# Patient Record
Sex: MALE | Race: BLACK OR AFRICAN AMERICAN | Employment: OTHER | URBAN - METROPOLITAN AREA
[De-identification: names, ages, dates, MRNs, and addresses within clinical notes are randomized per-mention and may not be internally consistent; named-entity substitution may affect disease eponyms.]

---

## 2017-01-13 ENCOUNTER — OFFICE VISIT (OUTPATIENT)
Dept: CARDIOLOGY CLINIC | Age: 67
End: 2017-01-13

## 2017-01-13 VITALS
HEART RATE: 65 BPM | SYSTOLIC BLOOD PRESSURE: 142 MMHG | BODY MASS INDEX: 38.36 KG/M2 | WEIGHT: 315 LBS | DIASTOLIC BLOOD PRESSURE: 82 MMHG | HEIGHT: 76 IN

## 2017-01-13 DIAGNOSIS — I10 ESSENTIAL HYPERTENSION: ICD-10-CM

## 2017-01-13 DIAGNOSIS — I42.9 CARDIOMYOPATHY (HCC): ICD-10-CM

## 2017-01-13 DIAGNOSIS — I42.8 NICM (NONISCHEMIC CARDIOMYOPATHY) (HCC): Primary | ICD-10-CM

## 2017-01-13 PROCEDURE — 99215 OFFICE O/P EST HI 40 MIN: CPT | Performed by: INTERNAL MEDICINE

## 2017-01-13 PROCEDURE — 93000 ELECTROCARDIOGRAM COMPLETE: CPT | Performed by: INTERNAL MEDICINE

## 2017-03-07 RX ORDER — AMLODIPINE BESYLATE 2.5 MG/1
TABLET ORAL
Qty: 90 TABLET | Refills: 0 | Status: ON HOLD | OUTPATIENT
Start: 2017-03-07 | End: 2017-05-11 | Stop reason: HOSPADM

## 2017-03-07 RX ORDER — POTASSIUM CHLORIDE 20 MEQ/1
TABLET, EXTENDED RELEASE ORAL
Qty: 90 TABLET | Refills: 0 | Status: SHIPPED | OUTPATIENT
Start: 2017-03-07 | End: 2017-07-22 | Stop reason: SDUPTHER

## 2017-03-07 RX ORDER — LEVOTHYROXINE SODIUM 0.03 MG/1
TABLET ORAL
Qty: 90 TABLET | Refills: 0 | Status: SHIPPED | OUTPATIENT
Start: 2017-03-07 | End: 2017-06-24 | Stop reason: SDUPTHER

## 2017-03-13 ENCOUNTER — OFFICE VISIT (OUTPATIENT)
Dept: INTERNAL MEDICINE CLINIC | Age: 67
End: 2017-03-13

## 2017-03-13 VITALS
SYSTOLIC BLOOD PRESSURE: 130 MMHG | HEART RATE: 74 BPM | DIASTOLIC BLOOD PRESSURE: 82 MMHG | WEIGHT: 314.8 LBS | OXYGEN SATURATION: 98 % | BODY MASS INDEX: 38.32 KG/M2

## 2017-03-13 DIAGNOSIS — E11.8 CONTROLLED TYPE 2 DIABETES MELLITUS WITH COMPLICATION, WITH LONG-TERM CURRENT USE OF INSULIN (HCC): Primary | ICD-10-CM

## 2017-03-13 DIAGNOSIS — E78.00 PURE HYPERCHOLESTEROLEMIA: ICD-10-CM

## 2017-03-13 DIAGNOSIS — I10 ESSENTIAL HYPERTENSION: ICD-10-CM

## 2017-03-13 DIAGNOSIS — E55.9 VITAMIN D DEFICIENCY: ICD-10-CM

## 2017-03-13 DIAGNOSIS — N40.1 BENIGN NON-NODULAR PROSTATIC HYPERPLASIA WITH LOWER URINARY TRACT SYMPTOMS: ICD-10-CM

## 2017-03-13 DIAGNOSIS — Z13.9 SCREENING: ICD-10-CM

## 2017-03-13 DIAGNOSIS — Z79.4 CONTROLLED TYPE 2 DIABETES MELLITUS WITH COMPLICATION, WITH LONG-TERM CURRENT USE OF INSULIN (HCC): Primary | ICD-10-CM

## 2017-03-13 LAB
GLUCOSE BLD-MCNC: 156 MG/DL
HBA1C MFR BLD: 6.8 %

## 2017-03-13 PROCEDURE — 99214 OFFICE O/P EST MOD 30 MIN: CPT | Performed by: INTERNAL MEDICINE

## 2017-03-13 PROCEDURE — 82962 GLUCOSE BLOOD TEST: CPT | Performed by: INTERNAL MEDICINE

## 2017-03-13 PROCEDURE — 83036 HEMOGLOBIN GLYCOSYLATED A1C: CPT | Performed by: INTERNAL MEDICINE

## 2017-03-13 RX ORDER — ATORVASTATIN CALCIUM 20 MG/1
20 TABLET, FILM COATED ORAL DAILY
Qty: 30 TABLET | Refills: 5 | Status: SHIPPED | OUTPATIENT
Start: 2017-03-13 | End: 2017-10-10 | Stop reason: SDUPTHER

## 2017-03-13 RX ORDER — ATORVASTATIN CALCIUM 20 MG/1
TABLET, FILM COATED ORAL
Qty: 30 TABLET | Refills: 5 | Status: CANCELLED | OUTPATIENT
Start: 2017-03-13

## 2017-03-13 RX ORDER — TADALAFIL 5 MG/1
TABLET ORAL
Qty: 30 TABLET | Refills: 3 | Status: SHIPPED | OUTPATIENT
Start: 2017-03-13 | End: 2018-01-30

## 2017-03-13 ASSESSMENT — PATIENT HEALTH QUESTIONNAIRE - PHQ9
SUM OF ALL RESPONSES TO PHQ QUESTIONS 1-9: 0
1. LITTLE INTEREST OR PLEASURE IN DOING THINGS: 0
1. LITTLE INTEREST OR PLEASURE IN DOING THINGS: 0
2. FEELING DOWN, DEPRESSED OR HOPELESS: 0
SUM OF ALL RESPONSES TO PHQ9 QUESTIONS 1 & 2: 0
SUM OF ALL RESPONSES TO PHQ9 QUESTIONS 1 & 2: 0
SUM OF ALL RESPONSES TO PHQ QUESTIONS 1-9: 0

## 2017-03-14 LAB
A/G RATIO: 1.3 (ref 1.1–2.2)
ALBUMIN SERPL-MCNC: 3.7 G/DL (ref 3.4–5)
ALP BLD-CCNC: 94 U/L (ref 40–129)
ALT SERPL-CCNC: 18 U/L (ref 10–40)
ANION GAP SERPL CALCULATED.3IONS-SCNC: 15 MMOL/L (ref 3–16)
AST SERPL-CCNC: 15 U/L (ref 15–37)
BILIRUB SERPL-MCNC: 0.4 MG/DL (ref 0–1)
BUN BLDV-MCNC: 19 MG/DL (ref 7–20)
CALCIUM SERPL-MCNC: 8.7 MG/DL (ref 8.3–10.6)
CHLORIDE BLD-SCNC: 107 MMOL/L (ref 99–110)
CHOLESTEROL, TOTAL: 154 MG/DL (ref 0–199)
CO2: 21 MMOL/L (ref 21–32)
CREAT SERPL-MCNC: 1.4 MG/DL (ref 0.8–1.3)
CREATININE URINE: 164.1 MG/DL (ref 39–259)
GFR AFRICAN AMERICAN: >60
GFR NON-AFRICAN AMERICAN: 51
GLOBULIN: 2.9 G/DL
GLUCOSE BLD-MCNC: 111 MG/DL (ref 70–99)
HDLC SERPL-MCNC: 62 MG/DL (ref 40–60)
LDL CHOLESTEROL CALCULATED: 81 MG/DL
MICROALBUMIN UR-MCNC: 68 MG/DL
MICROALBUMIN/CREAT UR-RTO: 414.4 MG/G (ref 0–30)
POTASSIUM SERPL-SCNC: 4.7 MMOL/L (ref 3.5–5.1)
PROSTATE SPECIFIC ANTIGEN: 2.49 NG/ML (ref 0–4)
SODIUM BLD-SCNC: 143 MMOL/L (ref 136–145)
TOTAL PROTEIN: 6.6 G/DL (ref 6.4–8.2)
TRIGL SERPL-MCNC: 57 MG/DL (ref 0–150)
TSH REFLEX: 1.24 UIU/ML (ref 0.27–4.2)
VITAMIN D 25-HYDROXY: 34.9 NG/ML
VLDLC SERPL CALC-MCNC: 11 MG/DL

## 2017-03-26 ASSESSMENT — ENCOUNTER SYMPTOMS
GASTROINTESTINAL NEGATIVE: 1
RESPIRATORY NEGATIVE: 1
EYES NEGATIVE: 1

## 2017-03-30 ENCOUNTER — OFFICE VISIT (OUTPATIENT)
Dept: INTERNAL MEDICINE CLINIC | Age: 67
End: 2017-03-30

## 2017-03-30 VITALS
OXYGEN SATURATION: 98 % | HEIGHT: 76 IN | SYSTOLIC BLOOD PRESSURE: 104 MMHG | TEMPERATURE: 97.5 F | BODY MASS INDEX: 38.11 KG/M2 | HEART RATE: 109 BPM | DIASTOLIC BLOOD PRESSURE: 88 MMHG | WEIGHT: 313 LBS

## 2017-03-30 DIAGNOSIS — R35.89 POLYURIA: ICD-10-CM

## 2017-03-30 DIAGNOSIS — I50.22 CHRONIC SYSTOLIC CONGESTIVE HEART FAILURE (HCC): ICD-10-CM

## 2017-03-30 DIAGNOSIS — Z79.4 TYPE 2 DIABETES MELLITUS WITH COMPLICATION, WITH LONG-TERM CURRENT USE OF INSULIN (HCC): Primary | ICD-10-CM

## 2017-03-30 DIAGNOSIS — E11.8 TYPE 2 DIABETES MELLITUS WITH COMPLICATION, WITH LONG-TERM CURRENT USE OF INSULIN (HCC): Primary | ICD-10-CM

## 2017-03-30 DIAGNOSIS — N40.0 BENIGN NON-NODULAR PROSTATIC HYPERPLASIA WITHOUT LOWER URINARY TRACT SYMPTOMS: ICD-10-CM

## 2017-03-30 LAB
BACTERIA: ABNORMAL /HPF
BILIRUBIN URINE: ABNORMAL
BLOOD, URINE: ABNORMAL
CLARITY: ABNORMAL
COLOR: ABNORMAL
EPITHELIAL CELLS, UA: 0 /HPF (ref 0–5)
GLUCOSE BLD-MCNC: 153 MG/DL
GLUCOSE URINE: NEGATIVE MG/DL
HYALINE CASTS: 9 /HPF (ref 0–8)
KETONES, URINE: NEGATIVE MG/DL
LEUKOCYTE ESTERASE, URINE: ABNORMAL
MICROSCOPIC EXAMINATION: YES
NITRITE, URINE: POSITIVE
PH UA: 6
PRO-BNP: 2993 PG/ML (ref 0–124)
PROTEIN UA: 100 MG/DL
RBC UA: ABNORMAL /HPF (ref 0–2)
SPECIFIC GRAVITY UA: 1.02
URINE TYPE: ABNORMAL
UROBILINOGEN, URINE: 0.2 E.U./DL
WBC UA: 77 /HPF (ref 0–5)

## 2017-03-30 PROCEDURE — 82962 GLUCOSE BLOOD TEST: CPT | Performed by: INTERNAL MEDICINE

## 2017-03-30 PROCEDURE — 99214 OFFICE O/P EST MOD 30 MIN: CPT | Performed by: INTERNAL MEDICINE

## 2017-03-30 RX ORDER — TAMSULOSIN HYDROCHLORIDE 0.4 MG/1
0.4 CAPSULE ORAL DAILY
Qty: 30 CAPSULE | Refills: 3 | Status: SHIPPED | OUTPATIENT
Start: 2017-03-30 | End: 2017-08-09 | Stop reason: SDUPTHER

## 2017-03-30 ASSESSMENT — PATIENT HEALTH QUESTIONNAIRE - PHQ9
SUM OF ALL RESPONSES TO PHQ QUESTIONS 1-9: 0
SUM OF ALL RESPONSES TO PHQ9 QUESTIONS 1 & 2: 0
2. FEELING DOWN, DEPRESSED OR HOPELESS: 0
1. LITTLE INTEREST OR PLEASURE IN DOING THINGS: 0

## 2017-04-01 LAB
ORGANISM: ABNORMAL
URINE CULTURE, ROUTINE: ABNORMAL

## 2017-04-10 ASSESSMENT — ENCOUNTER SYMPTOMS
EYES NEGATIVE: 1
RESPIRATORY NEGATIVE: 1
GASTROINTESTINAL NEGATIVE: 1

## 2017-04-12 ENCOUNTER — TELEPHONE (OUTPATIENT)
Dept: INTERNAL MEDICINE CLINIC | Age: 67
End: 2017-04-12

## 2017-04-12 DIAGNOSIS — I50.21 ACUTE SYSTOLIC CONGESTIVE HEART FAILURE (HCC): Primary | ICD-10-CM

## 2017-04-12 RX ORDER — CIPROFLOXACIN 500 MG/1
500 TABLET, FILM COATED ORAL 2 TIMES DAILY
Qty: 20 TABLET | Refills: 0 | Status: SHIPPED | OUTPATIENT
Start: 2017-04-12 | End: 2017-04-22

## 2017-04-19 ENCOUNTER — TELEPHONE (OUTPATIENT)
Dept: INTERNAL MEDICINE CLINIC | Age: 67
End: 2017-04-19

## 2017-04-21 ENCOUNTER — TELEPHONE (OUTPATIENT)
Dept: INTERNAL MEDICINE CLINIC | Age: 67
End: 2017-04-21

## 2017-04-27 ENCOUNTER — TELEPHONE (OUTPATIENT)
Dept: INTERNAL MEDICINE CLINIC | Age: 67
End: 2017-04-27

## 2017-04-27 RX ORDER — BLOOD-GLUCOSE METER
1 EACH MISCELLANEOUS 2 TIMES DAILY
Qty: 1 KIT | Refills: 0 | Status: SHIPPED | OUTPATIENT
Start: 2017-04-27 | End: 2018-01-30

## 2017-05-04 ENCOUNTER — OFFICE VISIT (OUTPATIENT)
Dept: INTERNAL MEDICINE CLINIC | Age: 67
End: 2017-05-04

## 2017-05-04 VITALS
HEART RATE: 88 BPM | DIASTOLIC BLOOD PRESSURE: 66 MMHG | BODY MASS INDEX: 38.36 KG/M2 | SYSTOLIC BLOOD PRESSURE: 134 MMHG | TEMPERATURE: 98.3 F | WEIGHT: 315 LBS | OXYGEN SATURATION: 98 % | HEIGHT: 76 IN

## 2017-05-04 DIAGNOSIS — E11.8 TYPE 2 DIABETES MELLITUS WITH COMPLICATION, WITH LONG-TERM CURRENT USE OF INSULIN (HCC): ICD-10-CM

## 2017-05-04 DIAGNOSIS — I48.91 ATRIAL FIBRILLATION, UNSPECIFIED TYPE (HCC): ICD-10-CM

## 2017-05-04 DIAGNOSIS — I10 ESSENTIAL HYPERTENSION: ICD-10-CM

## 2017-05-04 DIAGNOSIS — I50.21 ACUTE SYSTOLIC CONGESTIVE HEART FAILURE (HCC): ICD-10-CM

## 2017-05-04 DIAGNOSIS — N39.0 URINARY TRACT INFECTION, SITE UNSPECIFIED: ICD-10-CM

## 2017-05-04 DIAGNOSIS — Z79.4 TYPE 2 DIABETES MELLITUS WITH COMPLICATION, WITH LONG-TERM CURRENT USE OF INSULIN (HCC): ICD-10-CM

## 2017-05-04 LAB — GLUCOSE BLD-MCNC: 92 MG/DL

## 2017-05-04 PROCEDURE — 99214 OFFICE O/P EST MOD 30 MIN: CPT | Performed by: INTERNAL MEDICINE

## 2017-05-04 PROCEDURE — 93000 ELECTROCARDIOGRAM COMPLETE: CPT | Performed by: INTERNAL MEDICINE

## 2017-05-04 PROCEDURE — 82962 GLUCOSE BLOOD TEST: CPT | Performed by: INTERNAL MEDICINE

## 2017-05-04 ASSESSMENT — PATIENT HEALTH QUESTIONNAIRE - PHQ9
1. LITTLE INTEREST OR PLEASURE IN DOING THINGS: 1
2. FEELING DOWN, DEPRESSED OR HOPELESS: 0
SUM OF ALL RESPONSES TO PHQ9 QUESTIONS 1 & 2: 1
SUM OF ALL RESPONSES TO PHQ QUESTIONS 1-9: 1

## 2017-05-15 ENCOUNTER — CARE COORDINATION (OUTPATIENT)
Dept: CARE COORDINATION | Age: 67
End: 2017-05-15

## 2017-05-16 ENCOUNTER — OFFICE VISIT (OUTPATIENT)
Dept: CARDIOLOGY CLINIC | Age: 67
End: 2017-05-16

## 2017-05-16 VITALS
HEART RATE: 60 BPM | DIASTOLIC BLOOD PRESSURE: 72 MMHG | WEIGHT: 311 LBS | BODY MASS INDEX: 37.86 KG/M2 | SYSTOLIC BLOOD PRESSURE: 120 MMHG

## 2017-05-16 DIAGNOSIS — I42.9 CARDIOMYOPATHY (HCC): ICD-10-CM

## 2017-05-16 DIAGNOSIS — I50.21 ACUTE SYSTOLIC CONGESTIVE HEART FAILURE (HCC): ICD-10-CM

## 2017-05-16 DIAGNOSIS — I10 ESSENTIAL HYPERTENSION: ICD-10-CM

## 2017-05-16 DIAGNOSIS — I42.8 NICM (NONISCHEMIC CARDIOMYOPATHY) (HCC): Primary | ICD-10-CM

## 2017-05-16 DIAGNOSIS — I47.29 NSVT (NONSUSTAINED VENTRICULAR TACHYCARDIA): ICD-10-CM

## 2017-05-16 DIAGNOSIS — I50.23 ACUTE ON CHRONIC SYSTOLIC HEART FAILURE (HCC): ICD-10-CM

## 2017-05-16 PROCEDURE — 99214 OFFICE O/P EST MOD 30 MIN: CPT | Performed by: NURSE PRACTITIONER

## 2017-05-17 ENCOUNTER — HOSPITAL ENCOUNTER (OUTPATIENT)
Dept: NON INVASIVE DIAGNOSTICS | Age: 67
Discharge: HOME | End: 2017-05-17
Admitting: INTERNAL MEDICINE

## 2017-05-17 DIAGNOSIS — I42.9 CARDIOMYOPATHY (HCC): ICD-10-CM

## 2017-05-17 LAB
LV EF: 25 %
LVEF MODALITY: NORMAL

## 2017-06-06 ENCOUNTER — OFFICE VISIT (OUTPATIENT)
Dept: CARDIOLOGY CLINIC | Age: 67
End: 2017-06-06

## 2017-06-06 VITALS
SYSTOLIC BLOOD PRESSURE: 110 MMHG | DIASTOLIC BLOOD PRESSURE: 62 MMHG | HEART RATE: 56 BPM | BODY MASS INDEX: 38.83 KG/M2 | WEIGHT: 315 LBS

## 2017-06-06 DIAGNOSIS — I42.8 NICM (NONISCHEMIC CARDIOMYOPATHY) (HCC): Primary | ICD-10-CM

## 2017-06-06 DIAGNOSIS — I48.0 PAROXYSMAL ATRIAL FIBRILLATION (HCC): ICD-10-CM

## 2017-06-06 PROCEDURE — 99214 OFFICE O/P EST MOD 30 MIN: CPT | Performed by: INTERNAL MEDICINE

## 2017-06-16 ENCOUNTER — OFFICE VISIT (OUTPATIENT)
Dept: INTERNAL MEDICINE CLINIC | Age: 67
End: 2017-06-16

## 2017-06-16 VITALS
WEIGHT: 315 LBS | DIASTOLIC BLOOD PRESSURE: 82 MMHG | HEART RATE: 104 BPM | OXYGEN SATURATION: 93 % | SYSTOLIC BLOOD PRESSURE: 118 MMHG | BODY MASS INDEX: 40 KG/M2

## 2017-06-16 DIAGNOSIS — E78.2 MIXED HYPERLIPIDEMIA: ICD-10-CM

## 2017-06-16 DIAGNOSIS — D63.8 ANEMIA, CHRONIC DISEASE: ICD-10-CM

## 2017-06-16 DIAGNOSIS — E11.8 CONTROLLED TYPE 2 DIABETES MELLITUS WITH COMPLICATION, WITH LONG-TERM CURRENT USE OF INSULIN (HCC): Primary | ICD-10-CM

## 2017-06-16 DIAGNOSIS — I50.22 CHRONIC SYSTOLIC CONGESTIVE HEART FAILURE (HCC): ICD-10-CM

## 2017-06-16 DIAGNOSIS — I10 ESSENTIAL HYPERTENSION: ICD-10-CM

## 2017-06-16 DIAGNOSIS — Z79.4 CONTROLLED TYPE 2 DIABETES MELLITUS WITH COMPLICATION, WITH LONG-TERM CURRENT USE OF INSULIN (HCC): Primary | ICD-10-CM

## 2017-06-16 LAB
ANION GAP SERPL CALCULATED.3IONS-SCNC: 13 MMOL/L (ref 3–16)
BUN BLDV-MCNC: 26 MG/DL (ref 7–20)
CALCIUM SERPL-MCNC: 8.9 MG/DL (ref 8.3–10.6)
CHLORIDE BLD-SCNC: 107 MMOL/L (ref 99–110)
CO2: 25 MMOL/L (ref 21–32)
CREAT SERPL-MCNC: 1.3 MG/DL (ref 0.8–1.3)
FERRITIN: 52.4 NG/ML (ref 30–400)
GFR AFRICAN AMERICAN: >60
GFR NON-AFRICAN AMERICAN: 55
GLUCOSE BLD-MCNC: 137 MG/DL
GLUCOSE BLD-MCNC: 97 MG/DL (ref 70–99)
HBA1C MFR BLD: 7.2 %
MAGNESIUM: 2.2 MG/DL (ref 1.8–2.4)
POTASSIUM SERPL-SCNC: 4.3 MMOL/L (ref 3.5–5.1)
SODIUM BLD-SCNC: 145 MMOL/L (ref 136–145)

## 2017-06-16 PROCEDURE — 83036 HEMOGLOBIN GLYCOSYLATED A1C: CPT | Performed by: INTERNAL MEDICINE

## 2017-06-16 PROCEDURE — 99214 OFFICE O/P EST MOD 30 MIN: CPT | Performed by: INTERNAL MEDICINE

## 2017-06-16 PROCEDURE — 82962 GLUCOSE BLOOD TEST: CPT | Performed by: INTERNAL MEDICINE

## 2017-06-16 RX ORDER — OMEGA-3-ACID ETHYL ESTERS 1 G/1
1 CAPSULE, LIQUID FILLED ORAL 2 TIMES DAILY
Qty: 60 CAPSULE | Refills: 3 | Status: SHIPPED | OUTPATIENT
Start: 2017-06-16 | End: 2017-06-26

## 2017-06-21 ENCOUNTER — TELEPHONE (OUTPATIENT)
Dept: INTERNAL MEDICINE CLINIC | Age: 67
End: 2017-06-21

## 2017-06-26 ENCOUNTER — OFFICE VISIT (OUTPATIENT)
Dept: CARDIOLOGY CLINIC | Age: 67
End: 2017-06-26

## 2017-06-26 VITALS
HEIGHT: 76 IN | DIASTOLIC BLOOD PRESSURE: 60 MMHG | OXYGEN SATURATION: 98 % | WEIGHT: 315 LBS | BODY MASS INDEX: 38.36 KG/M2 | HEART RATE: 82 BPM | SYSTOLIC BLOOD PRESSURE: 100 MMHG

## 2017-06-26 DIAGNOSIS — I47.29 NSVT (NONSUSTAINED VENTRICULAR TACHYCARDIA): ICD-10-CM

## 2017-06-26 DIAGNOSIS — I48.91 NEW ONSET A-FIB (HCC): Primary | ICD-10-CM

## 2017-06-26 DIAGNOSIS — R06.02 SOB (SHORTNESS OF BREATH): ICD-10-CM

## 2017-06-26 DIAGNOSIS — I48.0 PAROXYSMAL ATRIAL FIBRILLATION (HCC): ICD-10-CM

## 2017-06-26 PROCEDURE — 99215 OFFICE O/P EST HI 40 MIN: CPT | Performed by: INTERNAL MEDICINE

## 2017-06-26 PROCEDURE — 93000 ELECTROCARDIOGRAM COMPLETE: CPT | Performed by: INTERNAL MEDICINE

## 2017-06-26 RX ORDER — CARVEDILOL 12.5 MG/1
12.5 TABLET ORAL 2 TIMES DAILY
Qty: 60 TABLET | Refills: 8 | Status: SHIPPED | OUTPATIENT
Start: 2017-06-26 | End: 2018-03-16 | Stop reason: SDUPTHER

## 2017-06-26 ASSESSMENT — ENCOUNTER SYMPTOMS
GASTROINTESTINAL NEGATIVE: 1
EYES NEGATIVE: 1
RESPIRATORY NEGATIVE: 1

## 2017-06-27 ENCOUNTER — TELEPHONE (OUTPATIENT)
Dept: CARDIOLOGY CLINIC | Age: 67
End: 2017-06-27

## 2017-06-29 ENCOUNTER — TELEPHONE (OUTPATIENT)
Dept: CARDIOLOGY CLINIC | Age: 67
End: 2017-06-29

## 2017-06-30 ENCOUNTER — HOSPITAL ENCOUNTER (OUTPATIENT)
Dept: CARDIAC CATH/INVASIVE PROCEDURES | Age: 67
Discharge: HOME | End: 2017-07-01
Admitting: INTERNAL MEDICINE

## 2017-06-30 ENCOUNTER — HOSPITAL ENCOUNTER (OUTPATIENT)
Dept: NON INVASIVE DIAGNOSTICS | Age: 67
Discharge: HOME | End: 2017-06-30
Attending: INTERNAL MEDICINE | Admitting: INTERNAL MEDICINE

## 2017-06-30 VITALS — BODY MASS INDEX: 38.36 KG/M2 | WEIGHT: 315 LBS | HEIGHT: 76 IN

## 2017-06-30 LAB
ANION GAP SERPL CALCULATED.3IONS-SCNC: 11 MMOL/L (ref 3–16)
BUN BLDV-MCNC: 35 MG/DL (ref 7–20)
CALCIUM SERPL-MCNC: 8.6 MG/DL (ref 8.3–10.6)
CHLORIDE BLD-SCNC: 106 MMOL/L (ref 99–110)
CO2: 25 MMOL/L (ref 21–32)
CREAT SERPL-MCNC: 1.3 MG/DL (ref 0.8–1.3)
EKG ATRIAL RATE: 66 BPM
EKG ATRIAL RATE: 74 BPM
EKG DIAGNOSIS: NORMAL
EKG DIAGNOSIS: NORMAL
EKG P AXIS: 58 DEGREES
EKG P-R INTERVAL: 192 MS
EKG Q-T INTERVAL: 430 MS
EKG Q-T INTERVAL: 488 MS
EKG QRS DURATION: 172 MS
EKG QRS DURATION: 172 MS
EKG QTC CALCULATION (BAZETT): 499 MS
EKG QTC CALCULATION (BAZETT): 511 MS
EKG R AXIS: -43 DEGREES
EKG R AXIS: -51 DEGREES
EKG T AXIS: 32 DEGREES
EKG T AXIS: 52 DEGREES
EKG VENTRICULAR RATE: 66 BPM
EKG VENTRICULAR RATE: 81 BPM
GFR AFRICAN AMERICAN: >60
GFR NON-AFRICAN AMERICAN: 55
GLUCOSE BLD-MCNC: 84 MG/DL (ref 70–99)
HCT VFR BLD CALC: 34.9 % (ref 40.5–52.5)
HEMOGLOBIN: 11 G/DL (ref 13.5–17.5)
INR BLD: 1.32 (ref 0.85–1.15)
LV EF: 55 %
LVEF MODALITY: NORMAL
MCH RBC QN AUTO: 26.3 PG (ref 26–34)
MCHC RBC AUTO-ENTMCNC: 31.5 G/DL (ref 31–36)
MCV RBC AUTO: 83.4 FL (ref 80–100)
PDW BLD-RTO: 17.6 % (ref 12.4–15.4)
PLATELET # BLD: 151 K/UL (ref 135–450)
PMV BLD AUTO: 10 FL (ref 5–10.5)
POTASSIUM SERPL-SCNC: 4.1 MMOL/L (ref 3.5–5.1)
PROTHROMBIN TIME: 14.9 SEC (ref 9.6–13)
RBC # BLD: 4.18 M/UL (ref 4.2–5.9)
SODIUM BLD-SCNC: 142 MMOL/L (ref 136–145)
WBC # BLD: 4.3 K/UL (ref 4–11)

## 2017-06-30 PROCEDURE — 92960 CARDIOVERSION ELECTRIC EXT: CPT | Performed by: INTERNAL MEDICINE

## 2017-06-30 PROCEDURE — 93312 ECHO TRANSESOPHAGEAL: CPT | Performed by: INTERNAL MEDICINE

## 2017-06-30 PROCEDURE — 93325 DOPPLER ECHO COLOR FLOW MAPG: CPT | Performed by: INTERNAL MEDICINE

## 2017-06-30 RX ORDER — SODIUM CHLORIDE 0.9 % (FLUSH) 0.9 %
10 SYRINGE (ML) INJECTION PRN
Status: DISCONTINUED | OUTPATIENT
Start: 2017-06-30 | End: 2018-05-01 | Stop reason: HOSPADM

## 2017-06-30 RX ORDER — SODIUM CHLORIDE 9 MG/ML
INJECTION, SOLUTION INTRAVENOUS CONTINUOUS
Status: DISCONTINUED | OUTPATIENT
Start: 2017-06-30 | End: 2018-05-01 | Stop reason: HOSPADM

## 2017-06-30 RX ORDER — SODIUM CHLORIDE 0.9 % (FLUSH) 0.9 %
10 SYRINGE (ML) INJECTION EVERY 12 HOURS SCHEDULED
Status: CANCELLED | OUTPATIENT
Start: 2017-06-30

## 2017-06-30 RX ORDER — SODIUM CHLORIDE 0.9 % (FLUSH) 0.9 %
10 SYRINGE (ML) INJECTION EVERY 12 HOURS SCHEDULED
Status: DISCONTINUED | OUTPATIENT
Start: 2017-06-30 | End: 2018-05-01 | Stop reason: HOSPADM

## 2017-06-30 RX ORDER — SODIUM CHLORIDE 0.9 % (FLUSH) 0.9 %
10 SYRINGE (ML) INJECTION PRN
Status: CANCELLED | OUTPATIENT
Start: 2017-06-30

## 2017-07-03 ENCOUNTER — TELEPHONE (OUTPATIENT)
Dept: CARDIOLOGY CLINIC | Age: 67
End: 2017-07-03

## 2017-07-05 ENCOUNTER — INITIAL CONSULT (OUTPATIENT)
Dept: SURGERY | Age: 67
End: 2017-07-05

## 2017-07-05 VITALS
DIASTOLIC BLOOD PRESSURE: 61 MMHG | WEIGHT: 315 LBS | SYSTOLIC BLOOD PRESSURE: 114 MMHG | BODY MASS INDEX: 38.36 KG/M2 | HEIGHT: 76 IN | HEART RATE: 58 BPM

## 2017-07-05 DIAGNOSIS — E66.01 MORBID OBESITY WITH BMI OF 40.0-44.9, ADULT (HCC): ICD-10-CM

## 2017-07-05 DIAGNOSIS — I50.23 ACUTE ON CHRONIC SYSTOLIC HEART FAILURE (HCC): ICD-10-CM

## 2017-07-05 DIAGNOSIS — I47.29 NSVT (NONSUSTAINED VENTRICULAR TACHYCARDIA): ICD-10-CM

## 2017-07-05 DIAGNOSIS — L02.416 ABSCESS OF LEFT LEG: Primary | ICD-10-CM

## 2017-07-05 PROCEDURE — 99204 OFFICE O/P NEW MOD 45 MIN: CPT | Performed by: SURGERY

## 2017-07-05 ASSESSMENT — ENCOUNTER SYMPTOMS
SHORTNESS OF BREATH: 1
GASTROINTESTINAL NEGATIVE: 1

## 2017-07-13 ENCOUNTER — OFFICE VISIT (OUTPATIENT)
Dept: SURGERY | Age: 67
End: 2017-07-13

## 2017-07-13 VITALS
DIASTOLIC BLOOD PRESSURE: 68 MMHG | HEART RATE: 59 BPM | SYSTOLIC BLOOD PRESSURE: 126 MMHG | WEIGHT: 315 LBS | BODY MASS INDEX: 39.2 KG/M2

## 2017-07-13 DIAGNOSIS — I47.29 NSVT (NONSUSTAINED VENTRICULAR TACHYCARDIA): ICD-10-CM

## 2017-07-13 DIAGNOSIS — I50.23 ACUTE ON CHRONIC SYSTOLIC HEART FAILURE (HCC): ICD-10-CM

## 2017-07-13 DIAGNOSIS — E66.01 MORBID OBESITY WITH BMI OF 40.0-44.9, ADULT (HCC): ICD-10-CM

## 2017-07-13 DIAGNOSIS — L02.416 ABSCESS OF LEFT LEG: Primary | ICD-10-CM

## 2017-07-13 PROCEDURE — 99213 OFFICE O/P EST LOW 20 MIN: CPT | Performed by: SURGERY

## 2017-07-13 ASSESSMENT — ENCOUNTER SYMPTOMS: RESPIRATORY NEGATIVE: 1

## 2017-07-18 ENCOUNTER — TELEPHONE (OUTPATIENT)
Dept: INTERNAL MEDICINE CLINIC | Age: 67
End: 2017-07-18

## 2017-07-24 RX ORDER — POTASSIUM CHLORIDE 20 MEQ/1
TABLET, EXTENDED RELEASE ORAL
Qty: 90 TABLET | Refills: 0 | Status: SHIPPED | OUTPATIENT
Start: 2017-07-24 | End: 2017-09-06 | Stop reason: SDUPTHER

## 2017-07-25 ENCOUNTER — TELEPHONE (OUTPATIENT)
Dept: INTERNAL MEDICINE CLINIC | Age: 67
End: 2017-07-25

## 2017-07-25 ENCOUNTER — OFFICE VISIT (OUTPATIENT)
Dept: INTERNAL MEDICINE CLINIC | Age: 67
End: 2017-07-25

## 2017-07-25 DIAGNOSIS — I10 ESSENTIAL HYPERTENSION: ICD-10-CM

## 2017-07-25 DIAGNOSIS — R53.81 PHYSICAL DECONDITIONING: ICD-10-CM

## 2017-07-25 DIAGNOSIS — I42.9 CARDIOMYOPATHY, UNSPECIFIED (HCC): ICD-10-CM

## 2017-07-25 DIAGNOSIS — E11.8 CONTROLLED TYPE 2 DIABETES MELLITUS WITH COMPLICATION, WITH LONG-TERM CURRENT USE OF INSULIN (HCC): Primary | ICD-10-CM

## 2017-07-25 DIAGNOSIS — Z79.4 CONTROLLED TYPE 2 DIABETES MELLITUS WITH COMPLICATION, WITH LONG-TERM CURRENT USE OF INSULIN (HCC): Primary | ICD-10-CM

## 2017-07-25 DIAGNOSIS — E55.9 VITAMIN D DEFICIENCY: ICD-10-CM

## 2017-07-25 LAB
ANION GAP SERPL CALCULATED.3IONS-SCNC: 13 MMOL/L (ref 3–16)
BUN BLDV-MCNC: 29 MG/DL (ref 7–20)
CALCIUM SERPL-MCNC: 8.9 MG/DL (ref 8.3–10.6)
CHLORIDE BLD-SCNC: 100 MMOL/L (ref 99–110)
CO2: 26 MMOL/L (ref 21–32)
CREAT SERPL-MCNC: 1.1 MG/DL (ref 0.8–1.3)
GFR AFRICAN AMERICAN: >60
GFR NON-AFRICAN AMERICAN: >60
GLUCOSE BLD-MCNC: 119 MG/DL (ref 70–99)
GLUCOSE BLD-MCNC: 156 MG/DL
POTASSIUM SERPL-SCNC: 5 MMOL/L (ref 3.5–5.1)
SODIUM BLD-SCNC: 139 MMOL/L (ref 136–145)
VITAMIN D 25-HYDROXY: 34.8 NG/ML

## 2017-07-25 PROCEDURE — 99214 OFFICE O/P EST MOD 30 MIN: CPT | Performed by: INTERNAL MEDICINE

## 2017-07-25 PROCEDURE — 82962 GLUCOSE BLOOD TEST: CPT | Performed by: INTERNAL MEDICINE

## 2017-07-26 ENCOUNTER — TELEPHONE (OUTPATIENT)
Dept: INTERNAL MEDICINE CLINIC | Age: 67
End: 2017-07-26

## 2017-07-26 DIAGNOSIS — G56.03 CARPAL TUNNEL SYNDROME, BILATERAL UPPER LIMBS: Primary | ICD-10-CM

## 2017-07-27 ENCOUNTER — TELEPHONE (OUTPATIENT)
Dept: INTERNAL MEDICINE CLINIC | Age: 67
End: 2017-07-27

## 2017-08-04 ENCOUNTER — TELEPHONE (OUTPATIENT)
Dept: INTERNAL MEDICINE CLINIC | Age: 67
End: 2017-08-04

## 2017-08-07 ENCOUNTER — HOSPITAL ENCOUNTER (OUTPATIENT)
Dept: NON INVASIVE DIAGNOSTICS | Age: 67
Discharge: HOME | End: 2017-08-07
Attending: INTERNAL MEDICINE | Admitting: INTERNAL MEDICINE

## 2017-08-07 VITALS
DIASTOLIC BLOOD PRESSURE: 92 MMHG | SYSTOLIC BLOOD PRESSURE: 140 MMHG | OXYGEN SATURATION: 97 % | HEART RATE: 60 BPM | WEIGHT: 315 LBS | BODY MASS INDEX: 39.78 KG/M2

## 2017-08-07 DIAGNOSIS — I47.29 NSVT (NONSUSTAINED VENTRICULAR TACHYCARDIA): ICD-10-CM

## 2017-08-07 DIAGNOSIS — I48.0 PAROXYSMAL ATRIAL FIBRILLATION (HCC): ICD-10-CM

## 2017-08-07 LAB
LV EF: 35 %
LVEF MODALITY: NORMAL

## 2017-08-07 ASSESSMENT — ENCOUNTER SYMPTOMS
EYES NEGATIVE: 1
RESPIRATORY NEGATIVE: 1
GASTROINTESTINAL NEGATIVE: 1

## 2017-08-08 ENCOUNTER — OFFICE VISIT (OUTPATIENT)
Dept: CARDIOLOGY CLINIC | Age: 67
End: 2017-08-08

## 2017-08-08 VITALS
HEIGHT: 76 IN | SYSTOLIC BLOOD PRESSURE: 122 MMHG | DIASTOLIC BLOOD PRESSURE: 60 MMHG | HEART RATE: 56 BPM | OXYGEN SATURATION: 97 % | BODY MASS INDEX: 38.36 KG/M2 | WEIGHT: 315 LBS

## 2017-08-08 DIAGNOSIS — I50.22 CHRONIC SYSTOLIC HEART FAILURE (HCC): ICD-10-CM

## 2017-08-08 DIAGNOSIS — I48.91 ATRIAL FIBRILLATION STATUS POST CARDIOVERSION (HCC): Primary | ICD-10-CM

## 2017-08-08 DIAGNOSIS — I47.29 NSVT (NONSUSTAINED VENTRICULAR TACHYCARDIA): ICD-10-CM

## 2017-08-08 DIAGNOSIS — I48.0 PAROXYSMAL ATRIAL FIBRILLATION (HCC): ICD-10-CM

## 2017-08-08 DIAGNOSIS — I10 ESSENTIAL HYPERTENSION: ICD-10-CM

## 2017-08-08 DIAGNOSIS — I45.10 RBBB: ICD-10-CM

## 2017-08-08 DIAGNOSIS — I42.8 NICM (NONISCHEMIC CARDIOMYOPATHY) (HCC): ICD-10-CM

## 2017-08-08 DIAGNOSIS — G47.33 OBSTRUCTIVE SLEEP APNEA: ICD-10-CM

## 2017-08-08 PROCEDURE — 93000 ELECTROCARDIOGRAM COMPLETE: CPT | Performed by: NURSE PRACTITIONER

## 2017-08-08 PROCEDURE — 99215 OFFICE O/P EST HI 40 MIN: CPT | Performed by: NURSE PRACTITIONER

## 2017-08-09 ENCOUNTER — HOSPITAL ENCOUNTER (OUTPATIENT)
Dept: NEUROLOGY | Age: 67
Discharge: HOME | End: 2017-08-09
Attending: INTERNAL MEDICINE | Admitting: INTERNAL MEDICINE

## 2017-08-09 DIAGNOSIS — G56.03 BILATERAL CARPAL TUNNEL SYNDROME: ICD-10-CM

## 2017-08-10 ENCOUNTER — TELEPHONE (OUTPATIENT)
Dept: CARDIOLOGY CLINIC | Age: 67
End: 2017-08-10

## 2017-08-17 ENCOUNTER — TELEPHONE (OUTPATIENT)
Dept: CARDIOLOGY CLINIC | Age: 67
End: 2017-08-17

## 2017-09-01 DIAGNOSIS — Z95.810 CARDIAC RESYNCHRONIZATION THERAPY DEFIBRILLATOR (CRT-D) IN PLACE: Primary | ICD-10-CM

## 2017-09-06 ENCOUNTER — PROCEDURE VISIT (OUTPATIENT)
Dept: CARDIOLOGY CLINIC | Age: 67
End: 2017-09-06

## 2017-09-06 ENCOUNTER — OFFICE VISIT (OUTPATIENT)
Dept: CARDIOLOGY CLINIC | Age: 67
End: 2017-09-06

## 2017-09-06 VITALS
WEIGHT: 315 LBS | OXYGEN SATURATION: 99 % | HEART RATE: 60 BPM | DIASTOLIC BLOOD PRESSURE: 68 MMHG | SYSTOLIC BLOOD PRESSURE: 122 MMHG | HEIGHT: 76 IN | BODY MASS INDEX: 38.36 KG/M2

## 2017-09-06 DIAGNOSIS — I50.22 CHRONIC SYSTOLIC HEART FAILURE (HCC): ICD-10-CM

## 2017-09-06 DIAGNOSIS — Z95.810 CARDIAC RESYNCHRONIZATION THERAPY DEFIBRILLATOR (CRT-D) IN PLACE: ICD-10-CM

## 2017-09-06 DIAGNOSIS — I42.0 DILATED CARDIOMYOPATHY (HCC): Primary | ICD-10-CM

## 2017-09-06 DIAGNOSIS — I10 ESSENTIAL HYPERTENSION: ICD-10-CM

## 2017-09-06 DIAGNOSIS — E78.5 DYSLIPIDEMIA: ICD-10-CM

## 2017-09-06 DIAGNOSIS — I42.9 CARDIOMYOPATHY, UNSPECIFIED TYPE (HCC): ICD-10-CM

## 2017-09-06 DIAGNOSIS — E66.01 MORBID OBESITY WITH BMI OF 40.0-44.9, ADULT (HCC): ICD-10-CM

## 2017-09-06 DIAGNOSIS — I48.0 PAROXYSMAL ATRIAL FIBRILLATION (HCC): ICD-10-CM

## 2017-09-06 DIAGNOSIS — I47.29 NSVT (NONSUSTAINED VENTRICULAR TACHYCARDIA): ICD-10-CM

## 2017-09-06 DIAGNOSIS — G47.33 OBSTRUCTIVE SLEEP APNEA: ICD-10-CM

## 2017-09-06 PROCEDURE — 99024 POSTOP FOLLOW-UP VISIT: CPT | Performed by: NURSE PRACTITIONER

## 2017-09-06 PROCEDURE — 93284 PRGRMG EVAL IMPLANTABLE DFB: CPT | Performed by: INTERNAL MEDICINE

## 2017-09-06 RX ORDER — POTASSIUM CHLORIDE 20 MEQ/1
20 TABLET, EXTENDED RELEASE ORAL
Qty: 90 TABLET | Refills: 0 | Status: SHIPPED | OUTPATIENT
Start: 2017-09-06 | End: 2017-12-06 | Stop reason: ALTCHOICE

## 2017-09-08 RX ORDER — LISINOPRIL 2.5 MG/1
2.5 TABLET ORAL DAILY
Qty: 30 TABLET | Refills: 3 | Status: SHIPPED | OUTPATIENT
Start: 2017-09-08 | End: 2018-01-02 | Stop reason: SDUPTHER

## 2017-09-08 RX ORDER — SPIRONOLACTONE 25 MG/1
25 TABLET ORAL DAILY
Qty: 30 TABLET | Refills: 3 | Status: SHIPPED | OUTPATIENT
Start: 2017-09-08 | End: 2018-01-02 | Stop reason: SDUPTHER

## 2017-09-13 ENCOUNTER — OFFICE VISIT (OUTPATIENT)
Dept: CARDIOLOGY CLINIC | Age: 67
End: 2017-09-13

## 2017-09-13 VITALS
DIASTOLIC BLOOD PRESSURE: 70 MMHG | WEIGHT: 315 LBS | BODY MASS INDEX: 39.68 KG/M2 | SYSTOLIC BLOOD PRESSURE: 164 MMHG | HEART RATE: 64 BPM

## 2017-09-13 DIAGNOSIS — Z95.810 CARDIAC DEFIBRILLATOR IN PLACE: ICD-10-CM

## 2017-09-13 DIAGNOSIS — I48.91 ATRIAL FIBRILLATION STATUS POST CARDIOVERSION (HCC): ICD-10-CM

## 2017-09-13 DIAGNOSIS — I42.0 DILATED CARDIOMYOPATHY (HCC): Primary | ICD-10-CM

## 2017-09-13 PROCEDURE — 99024 POSTOP FOLLOW-UP VISIT: CPT | Performed by: INTERNAL MEDICINE

## 2017-09-28 ENCOUNTER — OFFICE VISIT (OUTPATIENT)
Dept: INTERNAL MEDICINE CLINIC | Age: 67
End: 2017-09-28

## 2017-09-28 VITALS
TEMPERATURE: 98.3 F | HEART RATE: 95 BPM | DIASTOLIC BLOOD PRESSURE: 60 MMHG | OXYGEN SATURATION: 98 % | SYSTOLIC BLOOD PRESSURE: 90 MMHG | BODY MASS INDEX: 38.36 KG/M2 | WEIGHT: 315 LBS | HEIGHT: 76 IN

## 2017-09-28 DIAGNOSIS — I42.9 CARDIOMYOPATHY, UNSPECIFIED TYPE (HCC): ICD-10-CM

## 2017-09-28 DIAGNOSIS — E11.9 TYPE 2 DIABETES MELLITUS WITHOUT COMPLICATION, WITH LONG-TERM CURRENT USE OF INSULIN (HCC): ICD-10-CM

## 2017-09-28 DIAGNOSIS — Z79.4 TYPE 2 DIABETES MELLITUS WITHOUT COMPLICATION, WITH LONG-TERM CURRENT USE OF INSULIN (HCC): ICD-10-CM

## 2017-09-28 DIAGNOSIS — I95.1 ORTHOSTATIC HYPOTENSION: ICD-10-CM

## 2017-09-28 LAB
ANION GAP SERPL CALCULATED.3IONS-SCNC: 13 MMOL/L (ref 3–16)
BUN BLDV-MCNC: 39 MG/DL (ref 7–20)
CALCIUM SERPL-MCNC: 9 MG/DL (ref 8.3–10.6)
CHLORIDE BLD-SCNC: 102 MMOL/L (ref 99–110)
CO2: 26 MMOL/L (ref 21–32)
CREAT SERPL-MCNC: 2 MG/DL (ref 0.8–1.3)
GFR AFRICAN AMERICAN: 41
GFR NON-AFRICAN AMERICAN: 34
GLUCOSE BLD-MCNC: 53 MG/DL (ref 70–99)
GLUCOSE BLD-MCNC: 88 MG/DL
HBA1C MFR BLD: 6.8 %
HCT VFR BLD CALC: 41.6 % (ref 40.5–52.5)
HEMOGLOBIN: 13.4 G/DL (ref 13.5–17.5)
MCH RBC QN AUTO: 28.5 PG (ref 26–34)
MCHC RBC AUTO-ENTMCNC: 32.1 G/DL (ref 31–36)
MCV RBC AUTO: 88.7 FL (ref 80–100)
PDW BLD-RTO: 15.9 % (ref 12.4–15.4)
PLATELET # BLD: 163 K/UL (ref 135–450)
PMV BLD AUTO: 10.8 FL (ref 5–10.5)
POTASSIUM SERPL-SCNC: 4.9 MMOL/L (ref 3.5–5.1)
RBC # BLD: 4.69 M/UL (ref 4.2–5.9)
SODIUM BLD-SCNC: 141 MMOL/L (ref 136–145)
WBC # BLD: 4.7 K/UL (ref 4–11)

## 2017-09-28 PROCEDURE — 83036 HEMOGLOBIN GLYCOSYLATED A1C: CPT | Performed by: INTERNAL MEDICINE

## 2017-09-28 PROCEDURE — 82962 GLUCOSE BLOOD TEST: CPT | Performed by: INTERNAL MEDICINE

## 2017-09-28 PROCEDURE — 99214 OFFICE O/P EST MOD 30 MIN: CPT | Performed by: INTERNAL MEDICINE

## 2017-09-28 NOTE — MR AVS SNAPSHOT
Additional Information about your Body Mass Index (BMI)           Your BMI as listed above is considered obese (30 or more). BMI is an estimate of body fat, calculated from your height and weight. The higher your BMI, the greater your risk of heart disease, high blood pressure, type 2 diabetes, stroke, gallstones, arthritis, sleep apnea, and certain cancers. BMI is not perfect. It may overestimate body fat in athletes and people who are more muscular. Even a small weight loss (between 5 and 10 percent of your current weight) by decreasing your calorie intake and becoming more physically active will help lower your risk of developing or worsening diseases associated with obesity. Learn more at: [email or web address]             Medications and Orders      Your Current Medications Are              NOVOLOG FLEXPEN 100 UNIT/ML injection pen AS DIRECTED 10 UNITS THREE TIMES DAILY    lisinopril (PRINIVIL;ZESTRIL) 2.5 MG tablet Take 1 tablet by mouth daily    spironolactone (ALDACTONE) 25 MG tablet Take 1 tablet by mouth daily    potassium chloride (KLOR-CON M) 20 MEQ extended release tablet Take 1 tablet by mouth every 48 hours    acetaminophen (TYLENOL) 325 MG tablet Take 2 tablets by mouth every 4 hours as needed for Pain    tamsulosin (FLOMAX) 0.4 MG capsule TAKE 1 CAPSULE BY MOUTH DAILY    levothyroxine (SYNTHROID) 25 MCG tablet TAKE 1 TABLET BY MOUTH DAILY    carvedilol (COREG) 12.5 MG tablet Take 1 tablet by mouth 2 times daily    glucose blood VI test strips (ONE TOUCH ULTRA TEST) strip Monitor blood sugar 3 times daily and as needed.     furosemide (LASIX) 40 MG tablet Take 1 tablet by mouth 2 times daily    insulin glargine (LANTUS SOLOSTAR) 100 UNIT/ML injection pen INJECT 4-6 UNITS DAILY    apixaban (ELIQUIS) 5 MG TABS tablet Take 1 tablet by mouth 2 times daily    Coenzyme Q10 (CO Q 10) 100 MG CAPS Take 1 capsule by mouth daily guardian has access to your record, the parent or guardian should login with their own [employer] username and password and access your record to view the After Visit Summary. Additional Information  If you have questions, please contact the physician practice where you receive care. Remember, [employer] is NOT to be used for urgent needs. For medical emergencies, dial 911. For questions regarding your [employer] account call [phone]. If you have a clinical question, please call your doctor's office.

## 2017-09-28 NOTE — PROGRESS NOTES
Vaccine Information Sheet, \"Influenza - Inactivated\"  given to Jelly Simon, or parent/legal guardian of  Jelly Simon and verbalized understanding. Patient responses:    Have you ever had a reaction to a flu vaccine? No  Are you able to eat eggs without adverse effects? No  Do you have any current illness? No  Have you ever had Guillian [place] Syndrome? No    Flu vaccine given per order. Please see immunization tab.

## 2017-09-29 ENCOUNTER — TELEPHONE (OUTPATIENT)
Dept: INTERNAL MEDICINE CLINIC | Age: 67
End: 2017-09-29

## 2017-09-29 NOTE — TELEPHONE ENCOUNTER
Called and spoke to patient to inform that information was forwarded to PCP and PCP will give a call later with further instruction.  Message sent to PCP

## 2017-09-29 NOTE — TELEPHONE ENCOUNTER
Patient checked his blood pressure this morning 131/63 in one arm and checked other 133/63 pulse 62 please advised.

## 2017-10-10 DIAGNOSIS — I10 ESSENTIAL HYPERTENSION: ICD-10-CM

## 2017-10-10 RX ORDER — ATORVASTATIN CALCIUM 20 MG/1
20 TABLET, FILM COATED ORAL DAILY
Qty: 30 TABLET | Refills: 0 | Status: SHIPPED | OUTPATIENT
Start: 2017-10-10 | End: 2017-12-05 | Stop reason: SDUPTHER

## 2017-10-11 ENCOUNTER — TELEPHONE (OUTPATIENT)
Dept: INTERNAL MEDICINE CLINIC | Age: 67
End: 2017-10-11

## 2017-10-11 NOTE — TELEPHONE ENCOUNTER
Patient is needing refill on eliquis said he called yesterday and contacted pharmacy he is all out of this medication please call.

## 2017-10-16 ASSESSMENT — ENCOUNTER SYMPTOMS
EYES NEGATIVE: 1
RESPIRATORY NEGATIVE: 1
GASTROINTESTINAL NEGATIVE: 1

## 2017-10-19 ENCOUNTER — HOSPITAL ENCOUNTER (OUTPATIENT)
Dept: CARDIAC REHAB | Age: 67
Discharge: HOME | End: 2017-10-31
Attending: INTERNAL MEDICINE | Admitting: INTERNAL MEDICINE

## 2017-10-24 LAB
ANION GAP SERPL CALCULATED.3IONS-SCNC: 12 MMOL/L (ref 3–16)
BUN BLDV-MCNC: 14 MG/DL (ref 7–20)
CALCIUM SERPL-MCNC: 9.4 MG/DL (ref 8.3–10.6)
CHLORIDE BLD-SCNC: 105 MMOL/L (ref 99–110)
CO2: 24 MMOL/L (ref 21–32)
CREAT SERPL-MCNC: 1 MG/DL (ref 0.8–1.3)
GFR AFRICAN AMERICAN: >60
GFR NON-AFRICAN AMERICAN: >60
GLUCOSE BLD-MCNC: 137 MG/DL (ref 70–99)
POTASSIUM SERPL-SCNC: 5.1 MMOL/L (ref 3.5–5.1)
SODIUM BLD-SCNC: 141 MMOL/L (ref 136–145)

## 2017-10-31 ENCOUNTER — OFFICE VISIT (OUTPATIENT)
Dept: INTERNAL MEDICINE CLINIC | Age: 67
End: 2017-10-31

## 2017-10-31 VITALS
OXYGEN SATURATION: 99 % | DIASTOLIC BLOOD PRESSURE: 70 MMHG | SYSTOLIC BLOOD PRESSURE: 138 MMHG | WEIGHT: 315 LBS | BODY MASS INDEX: 40.05 KG/M2 | HEART RATE: 77 BPM | TEMPERATURE: 98.7 F

## 2017-10-31 DIAGNOSIS — I42.8 OTHER CARDIOMYOPATHY (HCC): ICD-10-CM

## 2017-10-31 DIAGNOSIS — Z79.4 TYPE 2 DIABETES MELLITUS WITH COMPLICATION, WITH LONG-TERM CURRENT USE OF INSULIN (HCC): ICD-10-CM

## 2017-10-31 DIAGNOSIS — E11.8 TYPE 2 DIABETES MELLITUS WITH COMPLICATION, WITH LONG-TERM CURRENT USE OF INSULIN (HCC): ICD-10-CM

## 2017-10-31 DIAGNOSIS — R05.9 COUGH: ICD-10-CM

## 2017-10-31 DIAGNOSIS — Z23 FLU VACCINE NEED: ICD-10-CM

## 2017-10-31 PROCEDURE — 99214 OFFICE O/P EST MOD 30 MIN: CPT | Performed by: INTERNAL MEDICINE

## 2017-10-31 PROCEDURE — 90472 IMMUNIZATION ADMIN EACH ADD: CPT | Performed by: INTERNAL MEDICINE

## 2017-10-31 PROCEDURE — 90471 IMMUNIZATION ADMIN: CPT | Performed by: INTERNAL MEDICINE

## 2017-10-31 PROCEDURE — 90715 TDAP VACCINE 7 YRS/> IM: CPT | Performed by: INTERNAL MEDICINE

## 2017-10-31 PROCEDURE — 90662 IIV NO PRSV INCREASED AG IM: CPT | Performed by: INTERNAL MEDICINE

## 2017-10-31 RX ORDER — ATORVASTATIN CALCIUM 40 MG/1
40 TABLET, FILM COATED ORAL DAILY
Qty: 30 TABLET | Refills: 5 | Status: SHIPPED | OUTPATIENT
Start: 2017-10-31 | End: 2018-03-16 | Stop reason: SDUPTHER

## 2017-10-31 RX ORDER — BENZONATATE 100 MG/1
100 CAPSULE ORAL 3 TIMES DAILY PRN
Qty: 21 CAPSULE | Refills: 0 | Status: SHIPPED | OUTPATIENT
Start: 2017-10-31 | End: 2017-11-07

## 2017-10-31 NOTE — PROGRESS NOTES
Subjective:      Patient ID: Krystina Mariano is a 61 y.o. male. HPIHe is here for a check up. Complains of cold symptoms with cough and congestion. Seems to be getting better. Denies fever or chills. He notes back pain with standing particularly if standing is lengthy as in taking a shower. Has started cardiac rehab. He has cardiomyopathy with h/o CHF. Had gained 8 pounds after stopping lasix for 3 days. Restarted lasix and loss 5 of them. Says he just wanted to see what would happen without lasix. Review of Systems   Constitutional:        9 pound weight gain since last visit on 9/28/17. HENT: Negative. Eyes: Negative. Respiratory: Negative. Cardiovascular: Negative. Gastrointestinal: Negative. Endocrine:        Diabetes, treated with B-B insulin. , last A1c 6.8. Genitourinary: Negative. Musculoskeletal: Negative. Skin: Negative. Neurological: Negative. Psychiatric/Behavioral: Negative. Objective:   Physical Exam   Constitutional: He is oriented to person, place, and time. No distress. Obese, pleasant male. HENT:   Head: Normocephalic and atraumatic. Right Ear: External ear normal.   Left Ear: External ear normal.   Nose: Nose normal.   Mouth/Throat: Oropharynx is clear and moist.   Eyes: Conjunctivae and EOM are normal. Pupils are equal, round, and reactive to light. No scleral icterus. Neck: Normal range of motion. Neck supple. No thyromegaly present. Cardiovascular: Normal rate, regular rhythm, normal heart sounds and intact distal pulses. Pulmonary/Chest: Effort normal and breath sounds normal.   Abdominal: Soft. Bowel sounds are normal. He exhibits no mass. Musculoskeletal:   Leg edema, 1+. Back exam negative. Lymphadenopathy:     He has no cervical adenopathy. Neurological: He is alert and oriented to person, place, and time. He has normal reflexes. Skin: Skin is warm and dry.    Psychiatric: He has a normal mood and affect. His behavior is normal. Judgment and thought content normal.       Assessment:       1. Other cardiomyopathy ([address]  Medication compliance stressed. Low sodium diet discussed. Continue in cardiac rehab. Weight monitoring. 2. Type 2 diabetes mellitus with complication, with long-term current use of insulin (HCC)  atorvastatin (LIPITOR) 40 MG tablet  Diet, physical activity, weight reduction discussed. Amb External Referral To Ophthalmology   3. Cough. ? Angy [place] or CHF. benzonatate (TESSALON PERLES) 100 MG capsule  See above. 4. Flu vaccine need  INFLUENZA, HIGH DOSE, 65 YRS +, IM, PF, PREFILL SYR, 0.5ML (FLUZONE HD)    Tdap (age 6y and older) IM (BOOSTRIX)    CANCELED: INFLUENZA, QUADV, 18-64 YRS, ID, PF, MICRO INJ, 0.1ML (FLUZONE QUADV, PF)   5. Back pain. Exam negative. Need core strengthening as part of rehab. [facility] received counseling on the following healthy behaviors: diet, physical activity and compliance. Patient given educational materials on diet and exercise. I have instructed [facility] to complete a self tracking handout on weight, blood sugar and instructed them to bring it with them to his next appointment. Discussed use, benefit, and side effects of prescribed medications. Barriers to medication compliance addressed. All patient questions answered. Pt voiced understanding. Plan:     See plans above.

## 2017-11-01 ENCOUNTER — HOSPITAL ENCOUNTER (OUTPATIENT)
Dept: OTHER | Age: 67
Discharge: HOME | End: 2017-11-30
Attending: INTERNAL MEDICINE | Admitting: INTERNAL MEDICINE

## 2017-11-06 ASSESSMENT — ENCOUNTER SYMPTOMS
EYES NEGATIVE: 1
RESPIRATORY NEGATIVE: 1
GASTROINTESTINAL NEGATIVE: 1

## 2017-11-10 LAB
GLUCOSE BLD-MCNC: 117 MG/DL (ref 70–99)
GLUCOSE BLD-MCNC: 96 MG/DL (ref 70–99)
PERFORMED ON: ABNORMAL
PERFORMED ON: NORMAL

## 2017-11-29 NOTE — TELEPHONE ENCOUNTER
Medication Refill    When was your last appointment with cardiology?  (if 1year or longer, please schedule an appointment)9/6/17 (appt on 12/6/17)    Medication needing refilled: Furosemide    Doseage of the medication: 40mg    How are you taking this medication (QD, BID, TID, QID, PRN):    Patient want a 30 or 90 day supply called in: 30 day    Which Pharm[facility] are we sending the medication to:  [facility] [phone] [facility] [address], [address] [facility] [phone]    Pharmacy Phone number:    Pharmacy Fax number:      You can reach Marshal at #[phone]

## 2017-11-29 NOTE — TELEPHONE ENCOUNTER
Kimiracle Nuñez is calling back stating he has been out of his Furosemide for 3 days now and would like to get this medication ASAP. Told him we were still waiting for the doctor to sign off.

## 2017-11-30 RX ORDER — FUROSEMIDE 40 MG/1
40 TABLET ORAL 2 TIMES DAILY
Qty: 60 TABLET | Refills: 1 | Status: SHIPPED | OUTPATIENT
Start: 2017-11-30 | End: 2017-12-28 | Stop reason: SDUPTHER

## 2017-12-01 ENCOUNTER — HOSPITAL ENCOUNTER (OUTPATIENT)
Dept: OTHER | Age: 67
Discharge: HOME | End: 2017-12-31
Attending: INTERNAL MEDICINE | Admitting: INTERNAL MEDICINE

## 2017-12-05 DIAGNOSIS — I10 ESSENTIAL HYPERTENSION: ICD-10-CM

## 2017-12-06 ENCOUNTER — OFFICE VISIT (OUTPATIENT)
Dept: CARDIOLOGY CLINIC | Age: 67
End: 2017-12-06

## 2017-12-06 ENCOUNTER — PROCEDURE VISIT (OUTPATIENT)
Dept: CARDIOLOGY CLINIC | Age: 67
End: 2017-12-06

## 2017-12-06 VITALS
OXYGEN SATURATION: 98 % | DIASTOLIC BLOOD PRESSURE: 60 MMHG | WEIGHT: 315 LBS | HEART RATE: 60 BPM | SYSTOLIC BLOOD PRESSURE: 118 MMHG | HEIGHT: 75 IN | BODY MASS INDEX: 39.17 KG/M2

## 2017-12-06 DIAGNOSIS — Z95.810 CARDIAC RESYNCHRONIZATION THERAPY DEFIBRILLATOR (CRT-D) IN PLACE: ICD-10-CM

## 2017-12-06 DIAGNOSIS — I10 ESSENTIAL HYPERTENSION: ICD-10-CM

## 2017-12-06 DIAGNOSIS — I42.0 DILATED CARDIOMYOPATHY (HCC): ICD-10-CM

## 2017-12-06 DIAGNOSIS — I50.22 CHRONIC SYSTOLIC HEART FAILURE (HCC): ICD-10-CM

## 2017-12-06 DIAGNOSIS — I42.9 CARDIOMYOPATHY, UNSPECIFIED TYPE (HCC): ICD-10-CM

## 2017-12-06 DIAGNOSIS — I48.0 PAROXYSMAL ATRIAL FIBRILLATION (HCC): ICD-10-CM

## 2017-12-06 DIAGNOSIS — I47.29 NSVT (NONSUSTAINED VENTRICULAR TACHYCARDIA): Primary | ICD-10-CM

## 2017-12-06 PROCEDURE — 93290 INTERROG DEV EVAL ICPMS IP: CPT | Performed by: INTERNAL MEDICINE

## 2017-12-06 PROCEDURE — 93284 PRGRMG EVAL IMPLANTABLE DFB: CPT | Performed by: INTERNAL MEDICINE

## 2017-12-06 PROCEDURE — 99213 OFFICE O/P EST LOW 20 MIN: CPT | Performed by: NURSE PRACTITIONER

## 2017-12-06 RX ORDER — AMIODARONE HYDROCHLORIDE 200 MG/1
200 TABLET ORAL DAILY
Qty: 30 TABLET | Refills: 3 | Status: SHIPPED | OUTPATIENT
Start: 2017-12-06 | End: 2018-04-02 | Stop reason: SDUPTHER

## 2017-12-06 RX ORDER — PEN NEEDLE, DIABETIC 31 GX5/16"
NEEDLE, DISPOSABLE MISCELLANEOUS
Qty: 100 EACH | Refills: 3 | Status: SHIPPED | OUTPATIENT
Start: 2017-12-06 | End: 2019-04-30 | Stop reason: SDUPTHER

## 2017-12-06 RX ORDER — ATORVASTATIN CALCIUM 20 MG/1
20 TABLET, FILM COATED ORAL DAILY
Qty: 30 TABLET | Refills: 0 | Status: SHIPPED | OUTPATIENT
Start: 2017-12-06 | End: 2017-12-06 | Stop reason: DRUGHIGH

## 2017-12-06 NOTE — PROGRESS NOTES
[address]   Electrophysiology   Date: 12/6/2017  CC:    Chief Complaint   Patient presents with    3 Month Follow-Up     CM, CHF, AF, HTN, HLD, CKD, DM, ICD; device check today    Dizziness     little off and on    Edema     very little feet and ankles    Fatigue     \"so so\"    Shortness of Breath     little, but thinks because he is out of shape     I had the privilege of visiting Dg Black in the office. He presents today for follow up after BiV ICD and CM/HF management. Patient reports he has been feeling well. Past medical history significant for atrial fibrillation s/p DCCV, HTN, HLD, CHF, recent dx of low EF, DM.       HPI: Dg Black is a 74 y.o.      Past Medical History:   Diagnosis Date    Atrial fibrillation [facility]     Cardiomyopathy     CHF (congestive heart failure) (HCC)     Dyslipidemia     Hyperlipidemia     Hypertension     Hypothyroidism     Leg edema     MRSA (methicillin resistant staph aureus) culture positive 10/5/15    foot/bone    MRSA nasal colonization 05/05/2017    Obesity     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     Thyroid disease     Type 2 diabetes mellitus without complication (HCC)     Type II or unspecified type diabetes mellitus without mention of complication, not stated as uncontrolled         Past Surgical History:   Procedure Laterality Date    ADRENAL GLAND SURGERY  1970    CARDIAC DEFIBRILLATOR PLACEMENT  09/05/2017    Dr. Nancy Trinh  1-2-10    GASTRIC BYPASS SURGERY      OTHER SURGICAL HISTORY  10/6/15    INCISION AND DRAINAGE RIGHT FOOT          OTHER SURGICAL HISTORY Right 10/8/15    INCISION AND DRAINAGE RIGHT FOOT         Allergies:  No Known Allergies    Medication:  Current Outpatient Prescriptions   Medication Sig Dispense Refill    B-D UF III MINI PEN NEEDLES 31G X 5 MM MISC USE DAILY 100 each 3    furosemide (LASIX) 40 MG tablet Take 1 tablet by mouth 2 times daily 60 tablet 1    ONE TOUCH times daily for 30 days. 60 capsule 6    therapeutic multivitamin-minerals (THERAGRAN-M) tablet Take 1 tablet by mouth daily.  aspirin 81 MG EC tablet Take 81 mg by mouth daily. No current facility-administered medications for this visit. Facility-Administered Medications Ordered in Other Visits   Medication Dose Route Frequency Provider Last Rate Last Dose    0.9 % sodium chloride infusion   Intravenous Continuous Heraclio Gama MD        sodium chloride flush 0.9 % injection 10 mL  10 mL Intravenous 2 times per day Heraclio Gama MD        sodium chloride flush 0.9 % injection 10 mL  10 mL Intravenous PRN Heraclio Gama MD           Social History:  Reviewed. reports that he has quit smoking. His smoking use included Cigarettes. He quit smokeless tobacco use about 11 months ago. He reports that he drinks alcohol. He reports that he does not use drugs. Family History:  Reviewed. family history includes Heart Disease in his father. Review of System:    · General ROS: negative for chills, fever, change in weight   · Psychological ROS: negative for  anxiety or depression  · Ophthalmic ROS: negative for  eye pain or loss of vision  · ENT ROS: negative for  headaches, sore throat   · Allergy and Immunology ROS: negative for  hives  · Hematological and Lymphatic ROS: negative for  bleeding problems, blood clots, bruising or jaundice  · Endocrine ROS: negative for  skin changes, temperature intolerance or unexpected weight changes  · Respiratory ROS: negative for  cough, sputum, wheezing, shortness of breath   · Cardiovascular ROS: Per HPI.    · Gastrointestinal ROS: negative for abdominal pain, diarrhea, nausea/vomiting, bleeding   · Musculoskeletal ROS: negative for  joint swelling, pain    · Neurological ROS: negative for  confusion, numbness/tingling, seizures, weakness  · Dermatological ROS: negative for rash, changes in skin color, lesions     Physical Examination:  /60   Pulse 60   Ht 6' 3\" (1.905 m)   Wt (!) 332 lb (150.6 kg) Comment: did not wish to remove shoes  SpO2 98%   BMI 41.50 kg/m²     · Constitutional: Oriented. No distress. · Head: Normocephalic and atraumatic. · Mouth/Throat: Oropharynx is clear and moist.   · Eyes: Conjunctivae normal. EOM are normal.   · Neck: Normal range of motion. Neck supple. No rigidity. No JVD present. · Cardiovascular: Normal rate, regular rhythm, S1&S2 and intact distal pulses. · Incision site stable  · Pulmonary/Chest: Bilateral respiratory sounds. No wheezes. No rhonchi. · Abdominal: Soft. Bowel sounds present. No distension, No tenderness. · Musculoskeletal: No tenderness. No edema    · Neurological: Alert and oriented. Cranial nerve appears intact, No Gross deficit   · Skin: Skin is warm and dry. No rash noted. · Psychiatric: Has a normal mood, affect and behavior       Labs:  Lab Results   Component Value Date    CREATININE 1.0 10/24/2017    BUN 14 10/24/2017     10/24/2017    K 5.1 10/24/2017     10/24/2017    CO2 24 10/24/2017       EC/1/17  personally reviewed   SB   RBBB   Right axis   ms     Echo:   17  Left ventricular size is mildly increased. Mild concentric left ventricular   hypertrophy is present. Global ejection fraction is moderately decreased and   estimated at approximately 35%. There is global hypokinesis with abnormal   septal motion secondary to the bundle branch block.   The right ventricle is mildly enlarged with normal left ventricular   function.   The left atrium is moderately dilated.   The right atrium is mildly dilated.   No structural valve abnormalities appreciated.       Cath:    Records not available   Device:   Device interrogated and functioning appropriately   [employer]    BivICD     Assessment:   Patient Active Problem List    Diagnosis Date Noted    NSVT (nonsustained ventricular tachycardia) ([facility])      Priority: High    Chronic systolic heart failure ([address] Priority: High    Dilated cardiomyopathy (HCC)      Priority: High    Essential hypertension      Priority: High    Hyperlipidemia      Priority: High    [facility] 10/05/2015     Priority: High    Foot osteomyelitis, right ([facility] 10/05/2015     Priority: High    Obstructive sleep apnea 09/08/2010     Priority: High    Diabetes mellitus ([facility] 06/05/2010     Priority: High    Diabetic foot ulcer ([facility] 06/05/2010     Priority: High    Dyslipidemia 06/05/2010     Priority: High    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 ([facility] 06/05/2010     Priority: High    Obesity 06/05/2010     Priority: High    Abscess of left leg 07/05/2017     Priority: Low    Morbid obesity with BMI of 40.0-44.9, adult ([facility] 07/05/2017     Priority: Low    Paroxysmal atrial fibrillation (HCC)      Priority: Low    Hypokalemia      Priority: Low    H/O gastric bypass 12/07/2016     Priority: Low    Benign prostatic hyperplasia with lower urinary tract symptoms 12/07/2016     Priority: Low    MRSA infection 10/07/2015     Priority: Low    Pre-op evaluation      Priority: Low    Diabetic ulcer of right foot associated with type 2 diabetes mellitus ([facility]      Priority: Low    Subacute osteomyelitis of right foot ([facility]      Priority: Low    Diabetic foot infection ([facility] 10/05/2015     Priority: Low    Hypothyroidism 01/09/2014     Priority: Low    Graves' disease 06/19/2012     Priority: Low    Vitamin D deficiency 09/22/2010     Priority: Low    Acute congestive heart failure ([facility] 09/08/2010     Priority: Low    Cardiomyopathy ([facility] 06/05/2010     Priority: Low    Renal disease due to hypertension 06/05/2010     Priority: Low    HTN (hypertension) 06/05/2010     Priority: Low    Cardiac resynchronization therapy defibrillator (CRT-D) in place 09/01/2017        Plan:  1. BiV ICD in situ   Device interrogated and functioning appropriately   restrictions   2.  Cardiomyopathy   Non ischemic   EF 35%   On Coreg,

## 2017-12-06 NOTE — PROGRESS NOTES
Patient comes in for programming evaluation for his defibrillator. All sensing and pacing parameters are within normal range. Lowered outputs. Please see interrogation for more detail. Patient will follow up in 3 months in office or remotely. Optivol is within normal range.

## 2017-12-14 RX ORDER — INSULIN GLARGINE 100 [IU]/ML
INJECTION, SOLUTION SUBCUTANEOUS
Qty: 15 ML | Refills: 0 | Status: SHIPPED | OUTPATIENT
Start: 2017-12-14 | End: 2018-06-27 | Stop reason: SDUPTHER

## 2017-12-28 ENCOUNTER — OFFICE VISIT (OUTPATIENT)
Dept: CARDIOLOGY CLINIC | Age: 67
End: 2017-12-28

## 2017-12-28 VITALS
HEART RATE: 68 BPM | WEIGHT: 315 LBS | BODY MASS INDEX: 41.65 KG/M2 | DIASTOLIC BLOOD PRESSURE: 60 MMHG | SYSTOLIC BLOOD PRESSURE: 130 MMHG

## 2017-12-28 DIAGNOSIS — I47.29 NSVT (NONSUSTAINED VENTRICULAR TACHYCARDIA): ICD-10-CM

## 2017-12-28 DIAGNOSIS — I10 ESSENTIAL HYPERTENSION: ICD-10-CM

## 2017-12-28 DIAGNOSIS — I42.0 DILATED CARDIOMYOPATHY (HCC): Primary | ICD-10-CM

## 2017-12-28 DIAGNOSIS — I50.22 CHRONIC SYSTOLIC HEART FAILURE (HCC): ICD-10-CM

## 2017-12-28 DIAGNOSIS — I42.9 CARDIOMYOPATHY, UNSPECIFIED TYPE (HCC): ICD-10-CM

## 2017-12-28 PROCEDURE — 99214 OFFICE O/P EST MOD 30 MIN: CPT | Performed by: INTERNAL MEDICINE

## 2017-12-28 PROCEDURE — 93000 ELECTROCARDIOGRAM COMPLETE: CPT | Performed by: INTERNAL MEDICINE

## 2017-12-28 RX ORDER — FUROSEMIDE 40 MG/1
40 TABLET ORAL DAILY
Qty: 60 TABLET | Refills: 1 | Status: SHIPPED | OUTPATIENT
Start: 2017-12-28 | End: 2018-03-16 | Stop reason: SDUPTHER

## 2017-12-28 NOTE — PROGRESS NOTES
[address]   Dr Alia Brooke. Dalia CRAFT, [address]                                                                   Outpatient Follow Up Note      CC: fu with hx CM,  pafib. s/p DCCV  HTN HLD CRD DMT2  BiV ICD in situ  appears near euvolemic / his lasix was decreased to 40mg daily from BID by PCP  Goes to cardiac rehab     12/28/17  HPI:  Bryson Lindsey is 74 y.o. male who presents today with hxCM HFpEF, pafib. s/p DCCV  HTN HLD CRD DMT2 / BiV ICD in situ / no on amiodarone    stable today         Past Medical History:   Diagnosis Date    Atrial fibrillation ([address]     Cardiomyopathy     CHF (congestive heart failure) (HCC)     Dyslipidemia     Hyperlipidemia     Hypertension     Hypothyroidism     Leg edema     MRSA (methicillin resistant staph aureus) culture positive 10/5/15    foot/bone    MRSA nasal colonization 05/05/2017    Obesity     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     Thyroid disease     Type 2 diabetes mellitus without complication (HCC)     Type II or unspecified type diabetes mellitus without mention of complication, not stated as uncontrolled      PSH  Past Surgical History:   Procedure Laterality Date    ADRENAL GLAND SURGERY  1970    CARDIAC DEFIBRILLATOR PLACEMENT  09/05/2017    Dr. Jimmy Martinez  1-2-10    GASTRIC BYPASS SURGERY      OTHER SURGICAL HISTORY  10/6/15    INCISION AND DRAINAGE RIGHT FOOT          OTHER SURGICAL HISTORY Right 10/8/15    INCISION AND DRAINAGE RIGHT FOOT       SH:       History   Smoking Status    Former Smoker    Types: Cigarettes   Smokeless Tobacco    Former User    Quit date: 1/1/2017     Current Medications:  Prior to Visit Medications    Medication Sig Taking?  Authorizing Provider   ONE TOUCH ULTRA TEST strip USE WITH GLUCOSE METER THREE TIMES DAILY AND AS NEEDED Yes MD YURIY Collins 100 UNIT/ML injection pen INJECT 4 TO 6 UNITS AS DIRECTED DAILY Yes Kaitlin Cano MD   B-D UF III MINI PEN NEEDLES 31G X 5 MM MISC USE DAILY Yes Kaitlin Cano MD   amiodarone (CORDARONE) 200 MG tablet Take 1 tablet by mouth daily Yes Henry Hutton NP   furosemide (LASIX) 40 MG tablet Take 1 tablet by mouth 2 times daily Yes Mariah Carpio MD   atorvastatin (LIPITOR) 40 MG tablet Take 1 tablet by mouth daily Yes Kaitlin Cano MD   apixaban (ELIQUIS) 5 MG TABS tablet Take 1 tablet by mouth 2 times daily Yes Mariah Carpio MD   NOVOLOG FLEXPEN 100 UNIT/ML injection pen AS DIRECTED 10 UNITS THREE TIMES DAILY Yes Kaitlin Cano MD   lisinopril (PRINIVIL;ZESTRIL) 2.5 MG tablet Take 1 tablet by mouth daily Yes Kaitlin Cano MD   spironolactone (ALDACTONE) 25 MG tablet Take 1 tablet by mouth daily Yes Kaitlin Cano MD   acetaminophen (TYLENOL) 325 MG tablet Take 2 tablets by mouth every 4 hours as needed for Pain Yes Haily Moise MD   tamsulosin (FLOMAX) 0.4 MG capsule TAKE 1 CAPSULE BY MOUTH DAILY Yes Kaitlin Cano MD   levothyroxine (SYNTHROID) 25 MCG tablet TAKE 1 TABLET BY MOUTH DAILY Yes Kaitlin Cano MD   carvedilol (COREG) 12.5 MG tablet Take 1 tablet by mouth 2 times daily Yes Mariah Carpio MD   Coenzyme Q10 (CO Q 10) 100 MG CAPS Take 1 capsule by mouth daily Yes Historical Provider, MD   Cinnamon 500 MG CAPS Take 1 capsule by mouth daily Yes Historical Provider, MD Sawyer Hover Oil 1000 MG CAPS Take 1 capsule by mouth daily Yes Historical Provider, MD   L-Arginine 1000 MG TABS Take 1 tablet by mouth Twice a Week Yes Historical Provider, MD   Blood Glucose Monitoring Suppl (ONE TOUCH ULTRA 2) W/DEVICE KIT 1 kit by Does not apply route 2 times daily Yes Kaitlin Cano MD   glucose blood VI test strips (ONE TOUCH TEST STRIPS) strip 1 each by In Vitro route daily As needed.  Yes Kaitlin Cano MD   tadalafil (CIALIS) 5 MG tablet Take 1 tablet daily for prostate enlargement ( BPH P. Yes Kaitlin Cano MD   magnesium oxide (MAG-OX) 400 MG tablet Take 400 mg by mouth once a week  Yes Historical Provider, MD   Vitamin D (CHOLECALCIFEROL) 1000 UNITS CAPS capsule Take 2,000 Units by mouth nightly  Yes Historical Provider, MD   Insulin Syringe-Needle U-100 (INSULIN SYRINGE .5CC/31GX5/16\") 31G X 5/16\" 0.5 ML MISC Patient tests bid Yes Elan Deng MD   omega-3 acid ethyl esters (LOVAZA) 1 GM capsule Take 1 capsule by mouth 2 times daily for 30 days. Yes Elan Deng MD   therapeutic multivitamin-minerals [facility]) tablet Take 1 tablet by mouth daily. Yes Historical Provider, MD   aspirin 81 MG EC tablet Take 81 mg by mouth daily. Yes Historical Provider, MD     Family History  Family History   Problem Relation Age of Onset    Heart Disease Father         ROS:    CONSTITUTIONAL: No major weight gain or loss, fatigue, weakness, night sweats or fever. HEENT: No new vision difficulties or ringing in the ears. RESPIRATORY: No new SOB, PND, orthopnea or cough. CARDIOVASCULAR: See HPI  GI: No nausea, vomiting, diarrhea, constipation, abdominal pain or changes in bowel habits. : No urinary frequency, urgency, incontinence hematuria or dysuria. SKIN: No cyanosis or skin lesions. MUSCULOSKELETAL: No new muscle or joint pain. NEUROLOGICAL: No syncope or TIA-like symptoms. PSYCHIATRIC: No anxiety, pain, insomnia or depression  WILL? PHYSICAL EXAM:      Wt Readings from Last 3 Encounters:   12/28/17 (!) 333 lb 3.2 oz (151.1 kg)   12/06/17 (!) 332 lb (150.6 kg)   10/31/17 (!) 329 lb (149.2 kg)       BP Readings from Last 3 Encounters:   12/28/17 130/60   12/06/17 118/60   10/31/17 138/70       Pulse Readings from Last 3 Encounters:   12/28/17 68   12/06/17 60   10/31/17 77       CONSTITUTIONAL: Cooperative, no apparent distress, and appears well nourished / developed  NEUROLOGIC:  Awake and orientated to person, place and time. PSYCH: Calm affect. SKIN: Warm and dry.   HEENT: Sclera non-icteric, normocephalic, neck supple, no elevation of JVP, normal carotid pulses with no activity   Hx biartic surgery/ obesity but has lost 120#    Plan:   Patient education on touch screen in room   Discussed heart heathy lifestyle including nutrition, exercise & importance of nonsmoking,       Echo and blood work  PTV in 3 months       [address]   This patient is stable and better than on our last visit. He did have admission to hospital and has improved significantly. He goes to cardiac rehab and is lost another 15 pounds weight. Has been decreased to 40 mg twice a day and he seems to be doing well and having much less edema fluid. Denies any chest pains or shortness of breath. At this time we will continue his medication which includes anticoagulation with eliquis. In 10 units to watch diet. He has lost a total of 420 pounds since his bariatric surgery and with the diuresis. His last electrolytes within good range. Return to see us in 3 months. Monika Lee M.D.  [facility] [place]

## 2018-01-01 ENCOUNTER — HOSPITAL ENCOUNTER (OUTPATIENT)
Dept: OTHER | Age: 68
Discharge: HOME | End: 2018-01-31
Attending: INTERNAL MEDICINE | Admitting: INTERNAL MEDICINE

## 2018-01-02 RX ORDER — SPIRONOLACTONE 25 MG/1
25 TABLET ORAL DAILY
Qty: 30 TABLET | Refills: 0 | Status: SHIPPED | OUTPATIENT
Start: 2018-01-02 | End: 2018-01-31 | Stop reason: SDUPTHER

## 2018-01-02 RX ORDER — LISINOPRIL 2.5 MG/1
2.5 TABLET ORAL DAILY
Qty: 30 TABLET | Refills: 0 | Status: SHIPPED | OUTPATIENT
Start: 2018-01-02 | End: 2018-01-31 | Stop reason: SDUPTHER

## 2018-01-02 RX ORDER — ATORVASTATIN CALCIUM 20 MG/1
TABLET, FILM COATED ORAL
Qty: 30 TABLET | Refills: 0 | Status: SHIPPED | OUTPATIENT
Start: 2018-01-02 | End: 2018-01-30

## 2018-01-17 ENCOUNTER — PROCEDURE VISIT (OUTPATIENT)
Dept: CARDIOLOGY CLINIC | Age: 68
End: 2018-01-17

## 2018-01-17 ENCOUNTER — OFFICE VISIT (OUTPATIENT)
Dept: CARDIOLOGY CLINIC | Age: 68
End: 2018-01-17

## 2018-01-17 VITALS
SYSTOLIC BLOOD PRESSURE: 118 MMHG | HEIGHT: 75 IN | BODY MASS INDEX: 39.17 KG/M2 | WEIGHT: 315 LBS | OXYGEN SATURATION: 97 % | HEART RATE: 60 BPM | DIASTOLIC BLOOD PRESSURE: 62 MMHG

## 2018-01-17 DIAGNOSIS — I42.0 DILATED CARDIOMYOPATHY (HCC): ICD-10-CM

## 2018-01-17 DIAGNOSIS — I10 ESSENTIAL HYPERTENSION: ICD-10-CM

## 2018-01-17 DIAGNOSIS — I47.29 NSVT (NONSUSTAINED VENTRICULAR TACHYCARDIA): Primary | ICD-10-CM

## 2018-01-17 DIAGNOSIS — I50.22 CHRONIC SYSTOLIC HEART FAILURE (HCC): ICD-10-CM

## 2018-01-17 DIAGNOSIS — I42.9 CARDIOMYOPATHY, UNSPECIFIED TYPE (HCC): ICD-10-CM

## 2018-01-17 DIAGNOSIS — Z95.810 CARDIAC RESYNCHRONIZATION THERAPY DEFIBRILLATOR (CRT-D) IN PLACE: ICD-10-CM

## 2018-01-17 DIAGNOSIS — I48.0 PAROXYSMAL ATRIAL FIBRILLATION (HCC): ICD-10-CM

## 2018-01-17 PROCEDURE — 99214 OFFICE O/P EST MOD 30 MIN: CPT | Performed by: NURSE PRACTITIONER

## 2018-01-17 PROCEDURE — 93284 PRGRMG EVAL IMPLANTABLE DFB: CPT | Performed by: INTERNAL MEDICINE

## 2018-01-17 PROCEDURE — 93290 INTERROG DEV EVAL ICPMS IP: CPT | Performed by: INTERNAL MEDICINE

## 2018-01-17 NOTE — PROGRESS NOTES
Patient comes in for device follow up. Everything is functioning well. Pt had AF episodes recorded. He is on Eliquis. ATP therapy turned on per Dr. Campos Pratt. Optivol is slightly elevated. Patient is to see Carrillo Green NP today in office as well.

## 2018-01-23 ENCOUNTER — TELEPHONE (OUTPATIENT)
Dept: INTERNAL MEDICINE CLINIC | Age: 68
End: 2018-01-23

## 2018-01-23 DIAGNOSIS — K64.9 HEMORRHOIDS WITHOUT COMPLICATION: Primary | ICD-10-CM

## 2018-01-23 RX ORDER — HYDROCORTISONE ACETATE 25 MG/1
25 SUPPOSITORY RECTAL EVERY 12 HOURS
Qty: 24 SUPPOSITORY | Refills: 1 | Status: SHIPPED | OUTPATIENT
Start: 2018-01-23 | End: 2018-06-06 | Stop reason: ALTCHOICE

## 2018-01-23 NOTE — TELEPHONE ENCOUNTER
Patient is requesting the doctor call in a prescription for hemorrhoids problems   Call back [phone]

## 2018-01-30 ENCOUNTER — OFFICE VISIT (OUTPATIENT)
Dept: INTERNAL MEDICINE CLINIC | Age: 68
End: 2018-01-30

## 2018-01-30 VITALS
OXYGEN SATURATION: 98 % | HEIGHT: 75 IN | BODY MASS INDEX: 39.17 KG/M2 | DIASTOLIC BLOOD PRESSURE: 60 MMHG | HEART RATE: 75 BPM | WEIGHT: 315 LBS | SYSTOLIC BLOOD PRESSURE: 118 MMHG

## 2018-01-30 DIAGNOSIS — Z79.4 TYPE 2 DIABETES MELLITUS WITHOUT COMPLICATION, WITH LONG-TERM CURRENT USE OF INSULIN (HCC): Primary | ICD-10-CM

## 2018-01-30 DIAGNOSIS — I42.9 CARDIOMYOPATHY, UNSPECIFIED TYPE (HCC): ICD-10-CM

## 2018-01-30 DIAGNOSIS — I10 ESSENTIAL HYPERTENSION: ICD-10-CM

## 2018-01-30 DIAGNOSIS — I50.22 CHRONIC SYSTOLIC HEART FAILURE (HCC): ICD-10-CM

## 2018-01-30 DIAGNOSIS — E78.2 MIXED HYPERLIPIDEMIA: ICD-10-CM

## 2018-01-30 DIAGNOSIS — I47.29 NSVT (NONSUSTAINED VENTRICULAR TACHYCARDIA) (HCC): ICD-10-CM

## 2018-01-30 DIAGNOSIS — E11.9 TYPE 2 DIABETES MELLITUS WITHOUT COMPLICATION, WITH LONG-TERM CURRENT USE OF INSULIN (HCC): Primary | ICD-10-CM

## 2018-01-30 DIAGNOSIS — I42.0 DILATED CARDIOMYOPATHY (HCC): ICD-10-CM

## 2018-01-30 DIAGNOSIS — I42.8 OTHER CARDIOMYOPATHY (HCC): ICD-10-CM

## 2018-01-30 LAB
A/G RATIO: 1.1 (ref 1.1–2.2)
ALBUMIN SERPL-MCNC: 3.8 G/DL (ref 3.4–5)
ALP BLD-CCNC: 105 U/L (ref 40–129)
ALT SERPL-CCNC: 25 U/L (ref 10–40)
ANION GAP SERPL CALCULATED.3IONS-SCNC: 12 MMOL/L (ref 3–16)
AST SERPL-CCNC: 22 U/L (ref 15–37)
BILIRUB SERPL-MCNC: 0.5 MG/DL (ref 0–1)
BILIRUBIN DIRECT: <0.2 MG/DL (ref 0–0.3)
BILIRUBIN, INDIRECT: NORMAL MG/DL (ref 0–1)
BUN BLDV-MCNC: 16 MG/DL (ref 7–20)
CALCIUM SERPL-MCNC: 8.9 MG/DL (ref 8.3–10.6)
CHLORIDE BLD-SCNC: 106 MMOL/L (ref 99–110)
CHOLESTEROL, TOTAL: 135 MG/DL (ref 0–199)
CO2: 24 MMOL/L (ref 21–32)
CREAT SERPL-MCNC: 1.1 MG/DL (ref 0.8–1.3)
CREATININE URINE: 165 MG/DL (ref 39–259)
GFR AFRICAN AMERICAN: >60
GFR NON-AFRICAN AMERICAN: >60
GLOBULIN: 3.4 G/DL
GLUCOSE BLD-MCNC: 115 MG/DL
GLUCOSE BLD-MCNC: 119 MG/DL (ref 70–99)
HBA1C MFR BLD: 7.1 %
HCT VFR BLD CALC: 40.6 % (ref 40.5–52.5)
HDLC SERPL-MCNC: 71 MG/DL (ref 40–60)
HEMOGLOBIN: 13 G/DL (ref 13.5–17.5)
LDL CHOLESTEROL CALCULATED: 51 MG/DL
MAGNESIUM: 2.2 MG/DL (ref 1.8–2.4)
MCH RBC QN AUTO: 28.7 PG (ref 26–34)
MCHC RBC AUTO-ENTMCNC: 32 G/DL (ref 31–36)
MCV RBC AUTO: 89.7 FL (ref 80–100)
MICROALBUMIN UR-MCNC: 12.2 MG/DL
MICROALBUMIN/CREAT UR-RTO: 73.9 MG/G (ref 0–30)
PDW BLD-RTO: 14.5 % (ref 12.4–15.4)
PLATELET # BLD: 134 K/UL (ref 135–450)
PMV BLD AUTO: 11 FL (ref 5–10.5)
POTASSIUM SERPL-SCNC: 5 MMOL/L (ref 3.5–5.1)
RBC # BLD: 4.53 M/UL (ref 4.2–5.9)
SODIUM BLD-SCNC: 142 MMOL/L (ref 136–145)
TOTAL PROTEIN: 7.2 G/DL (ref 6.4–8.2)
TRIGL SERPL-MCNC: 64 MG/DL (ref 0–150)
TSH REFLEX FT4: 1.92 UIU/ML (ref 0.27–4.2)
VLDLC SERPL CALC-MCNC: 13 MG/DL
WBC # BLD: 4.6 K/UL (ref 4–11)

## 2018-01-30 PROCEDURE — 82962 GLUCOSE BLOOD TEST: CPT | Performed by: INTERNAL MEDICINE

## 2018-01-30 PROCEDURE — 83036 HEMOGLOBIN GLYCOSYLATED A1C: CPT | Performed by: INTERNAL MEDICINE

## 2018-01-30 PROCEDURE — 99214 OFFICE O/P EST MOD 30 MIN: CPT | Performed by: INTERNAL MEDICINE

## 2018-01-30 NOTE — PROGRESS NOTES
Subjective:      Patient ID: Jameson Fernandes is a 74 y.o. male. HPIHe is here for a check up and f/u on HTN, DM, HLP. He is taking medication as prescribed, continues to work on a more balanced meal plan and more physical actiivty although he does do cardiac rehab 3 times weekly. Note that he was experiencing dizziness which is more lightheadedness but with stopping cialis this symptom resolved except if he gets up to quickly. He decreased lasix and experienced 6 pounds weight gain and which then leveled off. Denies CP or SOB. Review of Systems   Constitutional:        BMI elevated at 41.5. See HPI. HENT: Negative. Eyes: Negative. Respiratory: Negative. Cardiovascular:        Cardiomyopathy with ICD. ECHO, LVEF 35 to 40 %. Diastolic dysfunction, hypokinesis of inferior and damon-septal walls. LVH. A. Fib history on eliquis and amidarone with decrease A. Fib burden   Gastrointestinal: Negative. Genitourinary: Negative. Musculoskeletal: Negative. Skin: Negative. Neurological: Negative. Psychiatric/Behavioral: Negative. Objective:   Physical Exam   Constitutional: He is oriented to person, place, and time. No distress. Obese, pleasant male. HENT:   Head: Normocephalic and atraumatic. Right Ear: External ear normal.   Left Ear: External ear normal.   Nose: Nose normal.   Mouth/Throat: Oropharynx is clear and moist.   Eyes: Conjunctivae and EOM are normal. Pupils are equal, round, and reactive to light. No scleral icterus. Neck: Normal range of motion. Neck supple. No thyromegaly present. Cardiovascular: Normal rate, regular rhythm, normal heart sounds and intact distal pulses. No orthostatic b/p change. Pulmonary/Chest: Effort normal and breath sounds normal.   Abdominal: Soft. Bowel sounds are normal. He exhibits no mass. Musculoskeletal:   Right leg trace edema, left leg 1 + edema. Feet, no ulcers, and decrease light touch appreciation.

## 2018-01-31 RX ORDER — SPIRONOLACTONE 25 MG/1
25 TABLET ORAL DAILY
Qty: 90 TABLET | Refills: 0 | Status: SHIPPED | OUTPATIENT
Start: 2018-01-31 | End: 2018-03-16 | Stop reason: SDUPTHER

## 2018-01-31 RX ORDER — LISINOPRIL 2.5 MG/1
2.5 TABLET ORAL DAILY
Qty: 90 TABLET | Refills: 0 | Status: SHIPPED | OUTPATIENT
Start: 2018-01-31 | End: 2018-03-16 | Stop reason: SDUPTHER

## 2018-02-01 ENCOUNTER — HOSPITAL ENCOUNTER (OUTPATIENT)
Dept: NON INVASIVE DIAGNOSTICS | Age: 68
Discharge: HOME | End: 2018-02-01
Attending: NURSE PRACTITIONER | Admitting: NURSE PRACTITIONER

## 2018-02-01 ENCOUNTER — HOSPITAL ENCOUNTER (OUTPATIENT)
Dept: OTHER | Age: 68
Discharge: HOME | End: 2018-02-28
Attending: INTERNAL MEDICINE | Admitting: INTERNAL MEDICINE

## 2018-02-01 DIAGNOSIS — I47.29 NSVT (NONSUSTAINED VENTRICULAR TACHYCARDIA): ICD-10-CM

## 2018-02-01 DIAGNOSIS — I42.0 DILATED CARDIOMYOPATHY (HCC): ICD-10-CM

## 2018-02-01 DIAGNOSIS — I42.9 CARDIOMYOPATHY, UNSPECIFIED TYPE (HCC): ICD-10-CM

## 2018-02-01 DIAGNOSIS — I50.22 CHRONIC SYSTOLIC HEART FAILURE (HCC): ICD-10-CM

## 2018-02-01 DIAGNOSIS — I10 ESSENTIAL HYPERTENSION: ICD-10-CM

## 2018-02-01 LAB
LV EF: 40 %
LVEF MODALITY: NORMAL
VITAMIN D 1,25-DIHYDROXY: 102 PG/ML (ref 19.9–79.3)

## 2018-02-06 ENCOUNTER — HOSPITAL ENCOUNTER (OUTPATIENT)
Dept: CARDIAC REHAB | Age: 68
Discharge: HOME | End: 2018-02-07
Admitting: INTERNAL MEDICINE

## 2018-02-06 VITALS — WEIGHT: 315 LBS | BODY MASS INDEX: 41.53 KG/M2

## 2018-02-11 ASSESSMENT — ENCOUNTER SYMPTOMS
EYES NEGATIVE: 1
GASTROINTESTINAL NEGATIVE: 1
RESPIRATORY NEGATIVE: 1

## 2018-02-13 ENCOUNTER — HOSPITAL ENCOUNTER (OUTPATIENT)
Dept: CARDIAC REHAB | Age: 68
Discharge: HOME | End: 2018-02-14
Admitting: INTERNAL MEDICINE

## 2018-02-13 VITALS — WEIGHT: 315 LBS | BODY MASS INDEX: 41.87 KG/M2

## 2018-02-15 ENCOUNTER — HOSPITAL ENCOUNTER (OUTPATIENT)
Dept: CARDIAC REHAB | Age: 68
Discharge: HOME | End: 2018-02-16
Admitting: INTERNAL MEDICINE

## 2018-02-15 VITALS — WEIGHT: 315 LBS | BODY MASS INDEX: 41.87 KG/M2

## 2018-02-15 NOTE — PROGRESS NOTES
[facility]  Phase 2 Cardiac Rehabilitation  Individualized Cardiac Treatment Plan     Patient Name:  Artem Sargent :  1950 Age:  74 y.o. Account Number:  [de-identified]  Diagnosis:  CHF/ Pacemaker   Date of Event: 17  Physician:  [place]/ Dr Filiberto Grover   Next Office Visit:  Dr. Nicholas Javier in 2017  Dr Filiberto Grover 2017    Risk Stratifications: ()Low ()Intermediate (x)High  Allergies: No Known Allergies     .  Primary Diagnosis   Dilated Cardiomyopathy 17     Cardiac History  History of symptoms related to cardiac event. (Note frequency, duration, location, radiation, quality, predisposing factors, other symptoms and what relieved symptoms)       Mr. Arabella Shea presents today for his Cardiac Rehab evaluation. He reports a history of Hypertension, Diabetes, Hyperlipidemia, and congestive heart failure. He reports he was a light smoker in his 31's and quit about 10-12 years ago. He states he had been having increased shortness of breath and swelling in his legs in May 2017. He denies having had chest pains He states after his last admission for CHF he was doing well. He noticed when he was home and taking his Lasix that he had to go the bathroom so much that he could not leave his house. So he decided to stop taking his Lasix. , so he could \" do things\" . He reports he had been going to his physician for about 3 weeks and was told he needed to be admitted into the hospital for better control. He states his weight on admission was 335 and when he went home it was 309. He states he watches his sodium but only weighs himself every other day. He reports he was also in atrial fibrillation and was cardioverted during his admission. He then had a Biv-AICD implanted on 17. [facility] denies chest pain or soreness at the pacemaker sight. He reports he had some dizziness recently and had decreased his Lasix to 40mg.  He reports since he has done that he has not to progress with this goal...  5. Limiting fried foods to twice weekly. 6. Feels he does 'pretty good' at looking at sodium in the nutrition label and choosing lower sodium options when available.                 Review goals/Revised:      1. Anticipating nutrition classes in January. 2. Would like to learn more about how to count CHO, will provide written materials (reviewed with RD). 3. Reports intake of 4-6 servings of fruits/non-starchy vegetables per day. 4. Limiting to twice weekly. 5. Reports he needs to progress with this goal, continues to eat fried fish twice weekly. Goal to limit to once weekly over the next month, experiment with other methods of cooking fish. 6. Continuing to progress, able to better identify high sodium foods, read labels.                 Review goals/Revised:    1. Attended first of three nutrition classes today, 1/17/18, asked pertinent questions. 2. Reports he is trying to monitor her CHO, but 'can do better'. 3. Reports he is doing well with this, consuming at least 6 servings total per day. 4. Limiting sweet all together, less consumption of cookies and doughnuts. 5. States that this is difficult for him, reports he 'can do better' with limiting fried foods. 6. Reports he feels comfortable with reading nutrition labels and identifying healthier options.                       Long Term:  1. Improved Rate your plate score  [] yes  2.  Improved glucose control  [] yes   Individual Cardiac Treatment Plan  Psychosocial  PSYCHOSOCIAL  ASSESSMENT/PLAN PSYCHOSOCIAL  REASSESSMENT PSYCHOSOCIAL   REASSESSMENT PSYCHOSOCIAL   REASSESSMENT PSYCHOSOCIAL  DISCHARGE/FOLLOW-UP   PSYCHOSOCIAL ASSESSMENT PSYCHOSOCIAL REASSESSMENT PSYCHOSOCIAL REASSESSMENT PSYCHOSOCIAL REASSESSMENT PSYCHOSOCIAL REASSESSMENT   Behavioral Outcomes Behavioral Outcomes Behavioral Outcomes Behavioral Outcomes Behavioral Outcomes   Tool Used: Ferrans and Nava Score:  Overall score 14.38  Psych/Spiritial 13.57      PHQ-9 score8     Score 10 or > signifies moderate or > depression.       Depression:  Overall score  Psych/Spiritial      PHQ-9 score:    Does patient have Family Support? [x] Yes   [] No   [] Lives alone [x]  Lives with spouse           PSYCHOSOCIAL PLAN PSYCHOSOCIAL PLAN PSYCHOSOCIAL PLAN PSYCHOSOCIAL PLAN  PSYCHOSOCIAL PLAN   Interventions Interventions Interventions Interventions Interventions    [] Physician notified of PHQ-9 score of 10 or > per protocol, as signifies moderate or > depression          PHQ-9 score:    []  Improvement   []  Unchanged   []  Notify PCP if score is worse   Is patient currently taking anti-depressant or anti-anxiety medications? [] Yes   [x] No  Current depression tx:   Denies depression or anxiety  Current depression tx:   [x]  N/A Current depression tx   [x] N/A: Current depression tx:   [x]  N/A  []  Patient has plan/treatment for anxiety/depression. Education Education Education Education Education   Individual discussion/education of:   [x] Stress management skills   [x] Relaxation Techniques   [x] Coping Techniques     Being on the computer  Like to watch You Tube  Check if completed:   [] Attends Emotional Wellness class  (x)Voices method of coping/ relaxation technique Check if completed:   [] Attends Emotional wellness class   [x] Voices method of coping/ relaxation technique Check if completed:   [] Attends Emotional wellness class   [x] Voices method of coping/ relaxation technique     Goals       Goals*   Initial Psychosocial Goals Set [] Yes   [] No       Previous Psychosocial Goals Met  [] Yes   [] No   Sheronrobert's psychosocial goals are as follows:     1. To take a class at [employer] , on grammar and computers. Wants to write a book     2. Be able to go back to the [employer]     Improved PHQ9 score  Improved Mental Health  Score Reports he has not yet made effort at achieving these goals Denies concern for depression or anxiety at this time.      Plans to attend a class at a local [employer] in January.  Has not been able to progress with psychosocial goals over the past month, but hopes to be able to soon. Hopes to attend class after cardiac rehab is complete.            [] Improved PHQ9 score   [] Improved Mental Health score      Individual Cardiac Treatment Plan  Other:  Risk Factor/Education  CORE MEASURE  ASSESSMENT/PLAN CORE MEASURE  REASSESSMENT  CORE MEASURE  REASSESSMENT CORE MEASURE  REASSESSMENT CORE MEASURE  DISCHARGE/FOLLOW-UP   CORE MEASURE ASSESSMENT CORE MEASURE REASSESSMENT CORE MEASURE REASSESSMENT CORE MEASURE REASSESSMENT CORE MEASURE  Discharge   Hypertension   [x]Yes []No  Resting BP: 136/64  Peak Ex BP:140/70   See medication list. Hypertension     Resting BP: 120/68  Peak Ex BP: 140/76  Medication Changes:  [x]Yes []No  Reports recent change in his Coreg and Lasix (dosages decreased due to complaints of dizziness) Hypertension     Resting BP: 140/78  Peak Ex BP: 140/72  Medication Changes:  []Yes [x]No    Hypertension     Resting BP: 142/70  Peak Ex BP: 120/74  Medication Changes:  []Yes [x]No               Hypertension     Resting BP:   Peak Ex BP:  Medication Changes:  []Yes []No   Tobacco Use  []Current [x]Former []Never  Started age 29   Years smoked: quit 10-12 years ago   Never a serious smokers .  Did not like the smell of smoke on people     Date Quit:   Quit date set: []Yes []No  Date:  NA    # cigarettes smoked/day: 3 / and mostly when drinking   Used to go to happy hour all the time   Does not drink anymore   Smokeless Tobacco use:   []Yes  [x]No  Amount:  Tobacco Use-remains smoke free  Change in smoking status               Quit date:  Tobacco Use-remains smoke free  Change in smoking status               Quit date:  Tobacco Use  Change in smoking status     Remains smoke free            Quit date:                Tobacco Use  Change in smoking status               Quit date:    Heart Health Knowledge   Test Score: 14 /15  CHF 15/15       Heart Health Knowledge   Test Score:    /15         Learning Barriers  Please select one:  []Speech  []Literacy  [] Hearing  []Cognitive  [] Vision  [x]Ready to Learn Learning Barriers addressed:[] Y[] N  Modifications:         Other Core Measures EDUCATION PLAN Other Core Measures EDUCATION PLAN Other Core Measures EDUCATION PLAN Other Core Measures EDUCATION PLAN Other Core Measure EDUCATION PLAN   Interventions Interventions Interventions Interventions Interventions   Cardiac Risk Factor Education Needed        [x]HTN               [] Attended Education Class on Risk Factors for Heart Disease  [] Home B/P monitoring  [x] Dietary Sodium Restriction       []Attended Education Class on Risk Factors for Heart Disease  [] Home B/P monitoring  [x] Dietary Sodium Restriction          []Attended Education Class on Risk Factors for Heart Disease     []Home B/P monitoring  [x] Dietary Sodium Restriction             Marvin voices 1. Understanding of risks for heart disease and how to modify their risk. 2. Understanding of importance of restricting sodium in diet. []Y []N  Smoking Cessation counseling needed  []Y []N Pt verbalizes readiness to quit smoking?  []Y[] () N Quit date set  []Y []N Cut down cigarettes []One on one counseling on Tobacco Cessation  [] Attend Smoking Cessation classes    []Smoking Cessation Information given  []Smoking cessation medications used: [] One on one counseling on Tobacco Cessation. [] Attend smoking cessation classes        []Smoking cessation medications used: [] One on one counseling on Tobacco Cessation.     [] Attend smoking cessation classes          []Smoking cessation medications used: Tobacco Cessation Counseling attended  []Yes  []No  If not completed, Why?      Core Measure Education Core Measure Education Core Measure Education Core Measure Education Education   [x] Cardiac Rehab Education booklet given  [x] Cardiac Rehab education class list given Attended program, walking outdoors twice weekly for short periods. He is encouraged to begin using his home stationary bike as well. He continues to follow the heart failure program, weighing himself daily and monitoring his sodium intake. He is looking forward to the nutrition classes beginning in early January for additional information on how to modify his diet. Please see above for specifics regarding how he is progressing with meeting initial program goals. Thank you. VANITA Stovall MS, DAVINA, RAKESH, RN 11/17/2017     60 day ITP Update: Bravo Guy continues to progress well. He has completed 15 sessions to date. He was absent for the past 4 sessions due to a cold. He returns today, anxious to get back to exercising. He has reached a max MET level of 1.7 on the Nustep. He feels he is making good progress with dietary modifications. He is looking forward to the nutrition series of classes that begin mid-January. He reports continued compliance with the CHF protocol. Please see above for specifics regarding goal progression. Will continue to progress as tolerated. Thank you. VANITA Stovall MS, DAVINA, RAKESH, RN 12/15/2017.     90 day ITP Update: Bravo Guy has returned after a long absence due to illness. He has completed 21 sessions to date. He feels he has lost some of his stamina due to his absence, but is looking forward to returning to exercise on a more regular basis. He was eagerly anticipating the nutrition classes that started today hoping to gain additional knowledge about to eat a healthier diet that will support blood glucose control and weight loss. He exercises without adverse signs/symptoms. Telemetry remains paced rhythm. Please see above for specifics regarding goal progression. Will continue to progress as tolerated and provide support and education towards risk factor modification. Thank you. VANITA Stovall MS, DAVINA, RAKESH, RN 1/17/2018    120 day ITP Update: Bravo Guy has progressed over the past 30 days since returning to .  He has completed 28 sessions to date. He states he is feeling well and that his exercise is getting \"back on track\". His weight has been maintained @ 332.4-335lbs He is weighing himself daily and has noted a 3lb/24hr wt gain recently. He states that he has been instructed by his cardiologist on those days to take an additional lasix. He currently does not appear to any increased edema and denies sob. He states he is compliant with his fluids, taking in less than 64oz daily and continues to avoid sodium. Telemetry monitoring remains paced rhythm.  Christina Nicholas RN 2/15/2018

## 2018-02-20 ENCOUNTER — HOSPITAL ENCOUNTER (OUTPATIENT)
Dept: CARDIAC REHAB | Age: 68
Discharge: HOME | End: 2018-02-21
Admitting: INTERNAL MEDICINE

## 2018-02-20 VITALS — WEIGHT: 315 LBS | BODY MASS INDEX: 41.87 KG/M2

## 2018-02-22 ENCOUNTER — HOSPITAL ENCOUNTER (OUTPATIENT)
Dept: CARDIAC REHAB | Age: 68
Discharge: HOME | End: 2018-02-23
Admitting: INTERNAL MEDICINE

## 2018-02-22 VITALS — BODY MASS INDEX: 41.87 KG/M2 | WEIGHT: 315 LBS

## 2018-02-27 ENCOUNTER — HOSPITAL ENCOUNTER (OUTPATIENT)
Dept: CARDIAC REHAB | Age: 68
Discharge: HOME | End: 2018-02-28
Admitting: INTERNAL MEDICINE

## 2018-02-27 VITALS — WEIGHT: 315 LBS | BODY MASS INDEX: 41.87 KG/M2

## 2018-03-01 ENCOUNTER — HOSPITAL ENCOUNTER (OUTPATIENT)
Dept: OTHER | Age: 68
Discharge: HOME | End: 2018-03-31
Attending: INTERNAL MEDICINE | Admitting: INTERNAL MEDICINE

## 2018-03-01 ENCOUNTER — HOSPITAL ENCOUNTER (OUTPATIENT)
Dept: CARDIAC REHAB | Age: 68
Discharge: HOME | End: 2018-03-02
Admitting: INTERNAL MEDICINE

## 2018-03-01 VITALS — BODY MASS INDEX: 42.12 KG/M2 | WEIGHT: 315 LBS

## 2018-03-01 RX ORDER — TAMSULOSIN HYDROCHLORIDE 0.4 MG/1
CAPSULE ORAL
Qty: 30 CAPSULE | Refills: 5 | Status: SHIPPED | OUTPATIENT
Start: 2018-03-01 | End: 2018-08-08 | Stop reason: SDUPTHER

## 2018-03-02 ENCOUNTER — TELEPHONE (OUTPATIENT)
Dept: CARDIOLOGY CLINIC | Age: 68
End: 2018-03-02

## 2018-03-13 ENCOUNTER — TELEPHONE (OUTPATIENT)
Dept: INTERNAL MEDICINE CLINIC | Age: 68
End: 2018-03-13

## 2018-03-14 ENCOUNTER — TELEPHONE (OUTPATIENT)
Dept: INTERNAL MEDICINE CLINIC | Age: 68
End: 2018-03-14

## 2018-03-16 DIAGNOSIS — I10 ESSENTIAL HYPERTENSION: Primary | ICD-10-CM

## 2018-03-16 DIAGNOSIS — E11.8 TYPE 2 DIABETES MELLITUS WITH COMPLICATION, WITH LONG-TERM CURRENT USE OF INSULIN (HCC): ICD-10-CM

## 2018-03-16 DIAGNOSIS — Z79.4 TYPE 2 DIABETES MELLITUS WITH COMPLICATION, WITH LONG-TERM CURRENT USE OF INSULIN (HCC): ICD-10-CM

## 2018-03-16 DIAGNOSIS — E03.9 ACQUIRED HYPOTHYROIDISM: ICD-10-CM

## 2018-03-16 DIAGNOSIS — I50.20 SYSTOLIC CONGESTIVE HEART FAILURE, UNSPECIFIED CONGESTIVE HEART FAILURE CHRONICITY: ICD-10-CM

## 2018-03-16 RX ORDER — LISINOPRIL 2.5 MG/1
2.5 TABLET ORAL DAILY
Qty: 30 TABLET | Refills: 1 | Status: SHIPPED | OUTPATIENT
Start: 2018-03-16 | End: 2018-06-29 | Stop reason: SDUPTHER

## 2018-03-16 RX ORDER — CARVEDILOL 12.5 MG/1
12.5 TABLET ORAL 2 TIMES DAILY
Qty: 60 TABLET | Refills: 1 | Status: SHIPPED | OUTPATIENT
Start: 2018-03-16 | End: 2018-10-01 | Stop reason: SDUPTHER

## 2018-03-16 RX ORDER — LEVOTHYROXINE SODIUM 0.03 MG/1
25 TABLET ORAL DAILY
Qty: 30 TABLET | Refills: 3 | Status: SHIPPED | OUTPATIENT
Start: 2018-03-16 | End: 2018-12-04 | Stop reason: SDUPTHER

## 2018-03-16 RX ORDER — SPIRONOLACTONE 25 MG/1
25 TABLET ORAL DAILY
Qty: 30 TABLET | Refills: 0 | Status: SHIPPED | OUTPATIENT
Start: 2018-03-16 | End: 2018-05-29 | Stop reason: SDUPTHER

## 2018-03-16 RX ORDER — ATORVASTATIN CALCIUM 40 MG/1
40 TABLET, FILM COATED ORAL DAILY
Qty: 30 TABLET | Refills: 1 | Status: SHIPPED | OUTPATIENT
Start: 2018-03-16 | End: 2018-06-29 | Stop reason: SDUPTHER

## 2018-03-16 RX ORDER — FUROSEMIDE 40 MG/1
40 TABLET ORAL DAILY
Qty: 60 TABLET | Refills: 1 | Status: SHIPPED | OUTPATIENT
Start: 2018-03-16 | End: 2018-06-29 | Stop reason: SDUPTHER

## 2018-03-20 ENCOUNTER — HOSPITAL ENCOUNTER (OUTPATIENT)
Dept: CARDIAC REHAB | Age: 68
Discharge: HOME | End: 2018-03-21
Admitting: INTERNAL MEDICINE

## 2018-03-20 VITALS — WEIGHT: 315 LBS | BODY MASS INDEX: 42.12 KG/M2

## 2018-03-22 ENCOUNTER — HOSPITAL ENCOUNTER (OUTPATIENT)
Dept: CARDIAC REHAB | Age: 68
Discharge: HOME | End: 2018-03-23
Admitting: INTERNAL MEDICINE

## 2018-03-27 ENCOUNTER — HOSPITAL ENCOUNTER (OUTPATIENT)
Dept: CARDIAC REHAB | Age: 68
Discharge: HOME | End: 2018-03-28
Admitting: INTERNAL MEDICINE

## 2018-03-27 VITALS — WEIGHT: 315 LBS | BODY MASS INDEX: 42.25 KG/M2

## 2018-03-27 NOTE — PROGRESS NOTES
Participated in education class on managing sodium restriction and review of mediterranean and DASH diet

## 2018-03-28 ENCOUNTER — TELEPHONE (OUTPATIENT)
Dept: INTERNAL MEDICINE CLINIC | Age: 68
End: 2018-03-28

## 2018-03-28 NOTE — TELEPHONE ENCOUNTER
Returned call to Andi Solis isnt needed to measure INR. Pt needs to stop one day before procedure and start back after procedure.

## 2018-03-28 NOTE — TELEPHONE ENCOUNTER
Megan Cerna calling from Dr. Deloris Song office concerning the patient procedure on 4/11/18    Dr. Deloris Song would like to verify if the patient is still on the Eliquis 5 MG and what     His  INR should be before allowing the procedure    Call back  [phone]

## 2018-03-29 ENCOUNTER — HOSPITAL ENCOUNTER (OUTPATIENT)
Dept: CARDIAC REHAB | Age: 68
Discharge: HOME | End: 2018-03-30
Admitting: INTERNAL MEDICINE

## 2018-03-29 VITALS — BODY MASS INDEX: 42.75 KG/M2 | WEIGHT: 315 LBS

## 2018-04-01 ENCOUNTER — HOSPITAL ENCOUNTER (OUTPATIENT)
Dept: OTHER | Age: 68
Discharge: HOME | End: 2018-04-30
Attending: INTERNAL MEDICINE | Admitting: INTERNAL MEDICINE

## 2018-04-02 RX ORDER — AMIODARONE HYDROCHLORIDE 200 MG/1
200 TABLET ORAL DAILY
Qty: 90 TABLET | Refills: 3 | Status: SHIPPED | OUTPATIENT
Start: 2018-04-02 | End: 2018-07-09 | Stop reason: ALTCHOICE

## 2018-04-02 RX ORDER — CARVEDILOL 12.5 MG/1
TABLET ORAL
Qty: 60 TABLET | Refills: 3 | Status: SHIPPED | OUTPATIENT
Start: 2018-04-02 | End: 2018-06-06 | Stop reason: SDUPTHER

## 2018-04-23 ENCOUNTER — PROCEDURE VISIT (OUTPATIENT)
Dept: CARDIOLOGY CLINIC | Age: 68
End: 2018-04-23

## 2018-04-23 ENCOUNTER — TELEPHONE (OUTPATIENT)
Dept: CARDIOLOGY CLINIC | Age: 68
End: 2018-04-23

## 2018-04-23 DIAGNOSIS — Z95.810 CARDIAC RESYNCHRONIZATION THERAPY DEFIBRILLATOR (CRT-D) IN PLACE: Primary | ICD-10-CM

## 2018-04-23 DIAGNOSIS — I42.0 DILATED CARDIOMYOPATHY (HCC): ICD-10-CM

## 2018-04-23 DIAGNOSIS — I50.22 CHRONIC SYSTOLIC HEART FAILURE (HCC): ICD-10-CM

## 2018-04-23 PROCEDURE — 93283 PRGRMG EVAL IMPLANTABLE DFB: CPT | Performed by: INTERNAL MEDICINE

## 2018-04-23 PROCEDURE — 93290 INTERROG DEV EVAL ICPMS IP: CPT | Performed by: INTERNAL MEDICINE

## 2018-04-24 ENCOUNTER — TELEPHONE (OUTPATIENT)
Dept: CARDIOLOGY CLINIC | Age: 68
End: 2018-04-24

## 2018-05-01 ENCOUNTER — HOSPITAL ENCOUNTER (OUTPATIENT)
Dept: CARDIAC REHAB | Age: 68
Discharge: HOME | End: 2018-05-02
Admitting: INTERNAL MEDICINE

## 2018-05-01 ENCOUNTER — HOSPITAL ENCOUNTER (OUTPATIENT)
Dept: OTHER | Age: 68
Discharge: HOME | End: 2018-05-31
Attending: INTERNAL MEDICINE | Admitting: INTERNAL MEDICINE

## 2018-05-04 NOTE — PROGRESS NOTES
[facility]  Phase 2 Cardiac Rehabilitation  Individualized Cardiac Treatment Plan     Patient Name:  Gigi Bejarano :  1950 Age:  74 y.o. Account Number:  [de-identified]  Diagnosis:  CHF/ Pacemaker   Date of Event: 17  Physician:  [place]/ Dr Afshin Byrd   Next Office Visit:  Dr. Gallo Blackwell in 2017  Dr Afshin Byrd 2017    Risk Stratifications: ()Low ()Intermediate (x)High  Allergies: No Known Allergies     .  Primary Diagnosis   Dilated Cardiomyopathy 17     Cardiac History  History of symptoms related to cardiac event. (Note frequency, duration, location, radiation, quality, predisposing factors, other symptoms and what relieved symptoms)       Mr. Brie Iqbal presents today for his Cardiac Rehab evaluation. He reports a history of Hypertension, Diabetes, Hyperlipidemia, and congestive heart failure. He reports he was a light smoker in his 31's and quit about 10-12 years ago. He states he had been having increased shortness of breath and swelling in his legs in May 2017. He denies having had chest pains He states after his last admission for CHF he was doing well. He noticed when he was home and taking his Lasix that he had to go the bathroom so much that he could not leave his house. So he decided to stop taking his Lasix. , so he could \" do things\" . He reports he had been going to his physician for about 3 weeks and was told he needed to be admitted into the hospital for better control. He states his weight on admission was 335 and when he went home it was 309. He states he watches his sodium but only weighs himself every other day. He reports he was also in atrial fibrillation and was cardioverted during his admission. He then had a Biv-AICD implanted on 17. Martínez Galvan denies chest pain or soreness at the pacemaker sight. He reports he had some dizziness recently and had decreased his Lasix to 40mg.  He reports since he has done that he has not mets or higher for patients in better physical condition.        Resistance Training  [x] yes  [] no          Max. Met level: 1.7     Session #: 9     Resistance Training  [] yes  [x] no  Type:     Symptoms with Exercise[x] yes [] no     Joint stiffness at times Max. Met Level: 1.7     Session#: 15     Resistance Training  [x] yes [x] no  Type: resistance exercises, stretches at home    Will orient to weights      Symptoms with Exercise[x] yes[] no    Continues to have joint stiffness, but describes it as tolerable and non-limiting Max. Met. Level: 1.9     Session #: 21     Resistance Training  [] yes [x] no  Type:     Symptoms with Exercise [x] yes [] no    Some stiffness in joints, otherwise no complaints Max. Met level:     Sessions#:     Home Resistance Training [] Y[] N  Type:   Home Exercise Plan and Goals  Vincent plans:  Exercise (x)yes ()no  Frequency: 2 x week   Duration: 10 minutes   Mode:Walking     Has a stationary bike in basement that he would like to get back down the stairs to use.      Discharge Goal:  30+ min.  Of aerobic exercise 3-5 days/week       Home Exercise Goal  Reassessed  Exercising [x] yes[] no  Frequency: 3 times weekly  Duration: 5 minutes  Mode: outdoor walking     Some SOB with walking, will try to increase duration, encouraged to begin using his stationary bike    Home Exercise Goal Reassessed  Exercising [x] yes [] no  Frequency: daily  Duration: 10 minutes  Mode: walking, stretching, leg lifts    Encouraged to begin using stationary bike at home.       Home Exercise Goal Reassessed  Exercising [x] yes [] no  Frequency: 2-3 times weekly  Duration: 20-30 minutes  Mode: see below    Katie 10 minutes/day    Walks around the house for 10 minutes per day    Uses stationary bike in basement once weekly-finds the steps difficult so he is not using is as regularly             See above plan   Education Plan Education Plan Education Plan Education Plan Education Completed Orientation to:  [x] Y [] N Rehab/Routine  [x] Y[] N RPE Scale  [x] Y [] N Exercise Safety  [x] Y [] N   S/S to Report  [x] ()Y [] N Infection Control    Understand/Review  [x] Y [] N RPE Scale  [x] Y [] N Exercise Safety  [x] Y [] N Equipment Orientation  [x] Y [] N S/S to Report  [x] Y [] N Warm Up/Cool Down        Attends Ed Class:  []  Benefits of Exercise   []   Resistance Training 101  []   Benefits of Cardiac Rehab     [x] Patient is competent with stretches/equipment  []  Patient continues to need assist with equipment/routine           Attends Ed. Class  []  Benefits of Exercise  []   Resistance Training 101  []  Benefits of Cardiac Rehab                                            Attends Ed.  Class:  -   Benefits of Exercise   -  Resistance Training 101       []  Carlee Talley when not to exercise  []  Y Completed all 3  Education classes      Individual Cardiac Treatment Plan  Nutrition  NUTRITION  ASSESSMENT/PLAN NUTRITION  REASSESSMENT NUTRITION   REASSESSMENT NUTRITION   REASSESSMENT NUTRITION  DISCHARGE/FOLLOW-UP   NUTRITION ASSESSMENT NUTRITION REASSESSMENT NUTRITION REASSESSMENT NUTRITION   REASSESSMENT NUTRITION REASSESSMENT   Weight Management  Weight:  329.1  Height:  73.5''  ( 6'1.5'')    BMI: 42.8   [] Normal  [] Overweight (x)Obese    [] Recent gain:    [] Recent loss:   Patients goal weight:   Reports he averages 318-329 #   Weighs every other day     Short term 300#  Long term 260#     Weight Management  Weight: weight fluctuates 329-334 lbs                         Weight Management  Weight: 333 lbs                      Weight   Management  Weight: 334 lbs          Weight Management  Weight:                  Height:    BMI:    Diet  Current Diet: Cardiac/ Diabetic        Diet  Dietary Improvements:   Diabetes:   [x] Y  [] N     HgA1c 6.8 /date 9/28/17  Checks BS at home   [x] Y  [] N  Frequency  3 x day / sliding scale coverage   Range 103-190  Medications Insulin   Low BS Reaction never had hypoglycemia    [x]  To check BS first 6 sessions before/after exercise   [x]  To carry snack for low BS Most recent BS    [] Y  [x] N Any medication changes   [] Y  [x] N Problems with low sugar or high sugars with exercise. Treatment: checks his blood sugars TID, states they typically run in the 140s during the day and near 100 for am fasting Most recent BS; range 105-160 mg/dL    [] Y  [x] N Any medication changes    Continues to check his blood sugars three times daily, fasting and two previous to meals Most recent BS- mg/dL    [] Y  [x] N Any medication changes Most recent BS   [] Y  [] N Any medication changes   Lipids  Hyperlipidemia  [x] Y  [] N  3/14/17  Total Chol: 154  HDL: 62  LDL: 81  Triglycerides: 57  Current Tx: Atorvastatin 80 mg daily            [] Y [x] N Medication changes? [] Y  [x] N Recent Lipids   [] Y [x] N Medication changes? [] Y [x] N Recent Lipids  [] Y [x] N Medication changes? [] Y [x] N Recent Lipids  [] Y  [] N Medication changes? [] Y  [] N Recent lipids   Diet Assessment Tool:  Rate your plate survey  WHKNT=11/51  Score of 55-69 is excellent.        Diet Assessment Tool:  Rate your plate survey  Score:    /69  Score of 55-69 is excellent. NUTRITION PLAN NUTRITION PLAN NUTRITION PLAN NUTRITION PLAN NUTRITION PLAN   *Interventions* *Interventions* *Interventions* *Interventions* *Interventions*   Professional Referral  Please check if needed. [] Dietician Consult    [] Diabetes Referral Professional Referral   []  Seen by dietician for   Consult/referral   []  Diabetes referral  [] Seen by dietician for consult/ referral   []  Seen for Diabetes Referral    Has patient completed consult if needed?   [] Y [] N Met with dietician   [] Y  [] N Met with diabetes educator   Nutrition Education Nutrition Education Nutrition Education Nutrition Education Nutrition Education    [] Individual Nutrition Counseling by staff/dietician     Did not have diabetic 14.38  Psych/Spiritial 13.57      PHQ-9 score8     Score 10 or > signifies moderate or > depression.       Depression:  Overall score  Psych/Spiritial      PHQ-9 score:    Does patient have Family Support? [x] Yes   [] No   [] Lives alone [x]  Lives with spouse           PSYCHOSOCIAL PLAN PSYCHOSOCIAL PLAN PSYCHOSOCIAL PLAN PSYCHOSOCIAL PLAN  PSYCHOSOCIAL PLAN   Interventions Interventions Interventions Interventions Interventions    [] Physician notified of PHQ-9 score of 10 or > per protocol, as signifies moderate or > depression          PHQ-9 score:    []  Improvement   []  Unchanged   []  Notify PCP if score is worse   Is patient currently taking anti-depressant or anti-anxiety medications? [] Yes   [x] No  Current depression tx:   Denies depression or anxiety  Current depression tx:   [x]  N/A Current depression tx   [x] N/A: Current depression tx:   [x]  N/A  []  Patient has plan/treatment for anxiety/depression. Education Education Education Education Education   Individual discussion/education of:   [x] Stress management skills   [x] Relaxation Techniques   [x] Coping Techniques     Being on the computer  Like to watch You Tube  Check if completed:   [] Attends Emotional Wellness class  (x)Voices method of coping/ relaxation technique Check if completed:   [] Attends Emotional wellness class   [x] Voices method of coping/ relaxation technique Check if completed:   [] Attends Emotional wellness class   [x] Voices method of coping/ relaxation technique     Goals       Goals*   Initial Psychosocial Goals Set [] Yes   [] No       Previous Psychosocial Goals Met  [] Yes   [] No   Marvin's psychosocial goals are as follows:     1. To take a class at [employer] , on grammar and computers. Wants to write a book     2.  Be able to go back to the [place]     Improved PHQ9 score  Improved Mental Health  Score Reports he has not yet made effort at achieving these goals Denies concern for depression or anxiety at this time. Plans to attend a class at a local [employer] in January.  Has not been able to progress with psychosocial goals over the past month, but hopes to be able to soon. Hopes to attend class after cardiac rehab is complete.            [] Improved PHQ9 score   [] Improved Mental Health score      Individual Cardiac Treatment Plan  Other:  Risk Factor/Education  CORE MEASURE  ASSESSMENT/PLAN CORE MEASURE  REASSESSMENT  CORE MEASURE  REASSESSMENT CORE MEASURE  REASSESSMENT CORE MEASURE  DISCHARGE/FOLLOW-UP   CORE MEASURE ASSESSMENT CORE MEASURE REASSESSMENT CORE MEASURE REASSESSMENT CORE MEASURE REASSESSMENT CORE MEASURE  Discharge   Hypertension   [x]Yes []No  Resting BP: 136/64  Peak Ex BP:140/70   See medication list. Hypertension     Resting BP: 120/68  Peak Ex BP: 140/76  Medication Changes:  [x]Yes []No  Reports recent change in his Coreg and Lasix (dosages decreased due to complaints of dizziness) Hypertension     Resting BP: 140/78  Peak Ex BP: 140/72  Medication Changes:  []Yes [x]No    Hypertension     Resting BP: 142/70  Peak Ex BP: 120/74  Medication Changes:  []Yes [x]No               Hypertension     Resting BP:   Peak Ex BP:  Medication Changes:  []Yes []No   Tobacco Use  []Current [x]Former []Never  Started age 29   Years smoked: quit 10-12 years ago   Never a serious smokers .  Did not like the smell of smoke on people     Date Quit:   Quit date set: []Yes []No  Date:  NA    # cigarettes smoked/day: 3 / and mostly when drinking   Used to go to happy hour all the time   Does not drink anymore   Smokeless Tobacco use:   []Yes  [x]No  Amount:  Tobacco Use-remains smoke free  Change in smoking status               Quit date:  Tobacco Use-remains smoke free  Change in smoking status               Quit date:  Tobacco Use  Change in smoking status     Remains smoke free            Quit date:                Tobacco Use  Change in smoking status               Quit date:    Heart Health Knowledge

## 2018-05-10 ENCOUNTER — TELEPHONE (OUTPATIENT)
Dept: INTERNAL MEDICINE CLINIC | Age: 68
End: 2018-05-10

## 2018-06-06 ENCOUNTER — OFFICE VISIT (OUTPATIENT)
Dept: INTERNAL MEDICINE CLINIC | Age: 68
End: 2018-06-06

## 2018-06-06 VITALS
DIASTOLIC BLOOD PRESSURE: 60 MMHG | SYSTOLIC BLOOD PRESSURE: 130 MMHG | HEART RATE: 64 BPM | HEIGHT: 76 IN | RESPIRATION RATE: 20 BRPM | WEIGHT: 315 LBS | BODY MASS INDEX: 38.36 KG/M2 | TEMPERATURE: 98.3 F

## 2018-06-06 DIAGNOSIS — J06.9 ACUTE URI: Primary | ICD-10-CM

## 2018-06-06 DIAGNOSIS — E11.9 TYPE 2 DIABETES MELLITUS WITHOUT COMPLICATION, WITH LONG-TERM CURRENT USE OF INSULIN (HCC): ICD-10-CM

## 2018-06-06 DIAGNOSIS — R05.9 COUGH: ICD-10-CM

## 2018-06-06 DIAGNOSIS — Z79.4 TYPE 2 DIABETES MELLITUS WITHOUT COMPLICATION, WITH LONG-TERM CURRENT USE OF INSULIN (HCC): ICD-10-CM

## 2018-06-06 DIAGNOSIS — Z71.3 DIETARY COUNSELING: ICD-10-CM

## 2018-06-06 LAB
GLUCOSE BLD-MCNC: 221 MG/DL
HBA1C MFR BLD: 7.2 %

## 2018-06-06 PROCEDURE — 99213 OFFICE O/P EST LOW 20 MIN: CPT | Performed by: NURSE PRACTITIONER

## 2018-06-06 PROCEDURE — 82962 GLUCOSE BLOOD TEST: CPT | Performed by: NURSE PRACTITIONER

## 2018-06-06 PROCEDURE — 83036 HEMOGLOBIN GLYCOSYLATED A1C: CPT | Performed by: NURSE PRACTITIONER

## 2018-06-06 RX ORDER — AMOXICILLIN 500 MG/1
500 CAPSULE ORAL 3 TIMES DAILY
Qty: 21 CAPSULE | Refills: 0 | Status: SHIPPED | OUTPATIENT
Start: 2018-06-06 | End: 2018-06-13

## 2018-06-06 RX ORDER — GUAIFENESIN 600 MG/1
600 TABLET, EXTENDED RELEASE ORAL 2 TIMES DAILY
Qty: 14 TABLET | Refills: 0 | Status: SHIPPED | OUTPATIENT
Start: 2018-06-06 | End: 2018-06-06 | Stop reason: CLARIF

## 2018-06-06 ASSESSMENT — ENCOUNTER SYMPTOMS
WHEEZING: 0
STRIDOR: 0
SHORTNESS OF BREATH: 0
APNEA: 0
DIARRHEA: 0
CHOKING: 0
SWOLLEN GLANDS: 0
COUGH: 1
SORE THROAT: 0
SINUS PAIN: 0
CHEST TIGHTNESS: 0

## 2018-06-06 ASSESSMENT — PATIENT HEALTH QUESTIONNAIRE - PHQ9
2. FEELING DOWN, DEPRESSED OR HOPELESS: 0
SUM OF ALL RESPONSES TO PHQ9 QUESTIONS 1 & 2: 0
1. LITTLE INTEREST OR PLEASURE IN DOING THINGS: 0
SUM OF ALL RESPONSES TO PHQ QUESTIONS 1-9: 0

## 2018-06-12 ENCOUNTER — NURSE ONLY (OUTPATIENT)
Dept: CARDIOLOGY CLINIC | Age: 68
End: 2018-06-12

## 2018-06-12 DIAGNOSIS — I48.0 PAROXYSMAL ATRIAL FIBRILLATION (HCC): Primary | ICD-10-CM

## 2018-06-19 ENCOUNTER — OFFICE VISIT (OUTPATIENT)
Dept: INTERNAL MEDICINE CLINIC | Age: 68
End: 2018-06-19

## 2018-06-19 VITALS
DIASTOLIC BLOOD PRESSURE: 62 MMHG | HEART RATE: 83 BPM | WEIGHT: 315 LBS | BODY MASS INDEX: 38.36 KG/M2 | HEIGHT: 76 IN | SYSTOLIC BLOOD PRESSURE: 128 MMHG | OXYGEN SATURATION: 95 %

## 2018-06-19 DIAGNOSIS — Z79.4 TYPE 2 DIABETES MELLITUS WITHOUT COMPLICATION, WITH LONG-TERM CURRENT USE OF INSULIN (HCC): Primary | ICD-10-CM

## 2018-06-19 DIAGNOSIS — R53.83 OTHER FATIGUE: ICD-10-CM

## 2018-06-19 DIAGNOSIS — R97.20 ELEVATED PSA: ICD-10-CM

## 2018-06-19 DIAGNOSIS — E11.9 TYPE 2 DIABETES MELLITUS WITHOUT COMPLICATION, WITH LONG-TERM CURRENT USE OF INSULIN (HCC): Primary | ICD-10-CM

## 2018-06-19 DIAGNOSIS — I10 ESSENTIAL HYPERTENSION: ICD-10-CM

## 2018-06-19 DIAGNOSIS — E78.2 MIXED HYPERLIPIDEMIA: ICD-10-CM

## 2018-06-19 LAB — GLUCOSE BLD-MCNC: 151 MG/DL

## 2018-06-19 PROCEDURE — G8417 CALC BMI ABV UP PARAM F/U: HCPCS | Performed by: INTERNAL MEDICINE

## 2018-06-19 PROCEDURE — 2022F DILAT RTA XM EVC RTNOPTHY: CPT | Performed by: INTERNAL MEDICINE

## 2018-06-19 PROCEDURE — 1123F ACP DISCUSS/DSCN MKR DOCD: CPT | Performed by: INTERNAL MEDICINE

## 2018-06-19 PROCEDURE — 1036F TOBACCO NON-USER: CPT | Performed by: INTERNAL MEDICINE

## 2018-06-19 PROCEDURE — 82962 GLUCOSE BLOOD TEST: CPT | Performed by: INTERNAL MEDICINE

## 2018-06-19 PROCEDURE — 4040F PNEUMOC VAC/ADMIN/RCVD: CPT | Performed by: INTERNAL MEDICINE

## 2018-06-19 PROCEDURE — 99214 OFFICE O/P EST MOD 30 MIN: CPT | Performed by: INTERNAL MEDICINE

## 2018-06-19 PROCEDURE — G8427 DOCREV CUR MEDS BY ELIG CLIN: HCPCS | Performed by: INTERNAL MEDICINE

## 2018-06-19 PROCEDURE — 3017F COLORECTAL CA SCREEN DOC REV: CPT | Performed by: INTERNAL MEDICINE

## 2018-06-19 PROCEDURE — 3045F PR MOST RECENT HEMOGLOBIN A1C LEVEL 7.0-9.0%: CPT | Performed by: INTERNAL MEDICINE

## 2018-06-19 RX ORDER — AZITHROMYCIN 250 MG/1
TABLET, FILM COATED ORAL
Qty: 1 PACKET | Refills: 0 | Status: SHIPPED | OUTPATIENT
Start: 2018-06-19 | End: 2018-06-23

## 2018-06-19 NOTE — PROGRESS NOTES
Subjective:      Patient ID: Rm Mcleod is a 74 y.o. male. HPIHe is here for a check up and f/u on diabetes, type 2, HTN, and hyperlipidemia. He is taking medication as prescribed, continues to work on a more balanced meal plan. He complains of low energy. Legs feel tired after about 1/4 of a mile. Uses energy drinks to increase energy before working out at the [employer] 3 times per week. Weight has been increasing. Denies any exertional CP. Review of Systems   Constitutional: Positive for fatigue. Elevated BMI at 42.2. See HPI. HENT: Negative. Eyes: Negative. Respiratory: Negative. Cardiovascular:        HTN, on stable med regimen. Cardiomyopathy, with h/o HFrEF. Has ICD. Has been compliant with medication. Gastrointestinal: Negative. Endocrine:        Hyperlipidemia, treated with statin. Diabetes, treated with B-B insulin. A1c is 7.2. Genitourinary: Negative. Musculoskeletal: Negative. Skin: Negative. Neurological: Negative. Psychiatric/Behavioral: Negative. Objective:   Physical Exam   Constitutional: He is oriented to person, place, and time. No distress. Obese, pleasant male. HENT:   Head: Normocephalic and atraumatic. Right Ear: External ear normal.   Left Ear: External ear normal.   Nose: Nose normal.   Mouth/Throat: Oropharynx is clear and moist.   Eyes: Conjunctivae and EOM are normal. Pupils are equal, round, and reactive to light. No scleral icterus. Neck: Normal range of motion. Neck supple. No thyromegaly present. Cardiovascular: Normal rate, regular rhythm, normal heart sounds and intact distal pulses. Pulmonary/Chest: Effort normal and breath sounds normal.   Abdominal: Soft. Bowel sounds are normal. He exhibits no mass. Musculoskeletal:   Lower leg edema, trace right and 1/2 + left. Lymphadenopathy:     He has no cervical adenopathy.    Neurological: He is alert and oriented to person, place,

## 2018-06-20 ENCOUNTER — TELEPHONE (OUTPATIENT)
Dept: INTERNAL MEDICINE CLINIC | Age: 68
End: 2018-06-20

## 2018-06-20 LAB
ANION GAP SERPL CALCULATED.3IONS-SCNC: 13 MMOL/L (ref 3–16)
BUN BLDV-MCNC: 20 MG/DL (ref 7–20)
CALCIUM SERPL-MCNC: 9.2 MG/DL (ref 8.3–10.6)
CHLORIDE BLD-SCNC: 105 MMOL/L (ref 99–110)
CO2: 24 MMOL/L (ref 21–32)
CREAT SERPL-MCNC: 1.2 MG/DL (ref 0.8–1.3)
GFR AFRICAN AMERICAN: >60
GFR NON-AFRICAN AMERICAN: >60
GLUCOSE BLD-MCNC: 139 MG/DL (ref 70–99)
POTASSIUM SERPL-SCNC: 4.5 MMOL/L (ref 3.5–5.1)
PROSTATE SPECIFIC ANTIGEN: 1.61 NG/ML (ref 0–4)
SODIUM BLD-SCNC: 142 MMOL/L (ref 136–145)

## 2018-06-20 RX ORDER — CIPROFLOXACIN 500 MG/1
500 TABLET, FILM COATED ORAL 2 TIMES DAILY
Qty: 14 TABLET | Refills: 0 | Status: SHIPPED | OUTPATIENT
Start: 2018-06-20 | End: 2018-06-27

## 2018-06-27 ENCOUNTER — TELEPHONE (OUTPATIENT)
Dept: INTERNAL MEDICINE CLINIC | Age: 68
End: 2018-06-27

## 2018-07-05 ENCOUNTER — TELEPHONE (OUTPATIENT)
Dept: INTERNAL MEDICINE CLINIC | Age: 68
End: 2018-07-05

## 2018-07-06 ENCOUNTER — OFFICE VISIT (OUTPATIENT)
Dept: CARDIOLOGY CLINIC | Age: 68
End: 2018-07-06

## 2018-07-06 VITALS
WEIGHT: 315 LBS | DIASTOLIC BLOOD PRESSURE: 70 MMHG | HEART RATE: 64 BPM | SYSTOLIC BLOOD PRESSURE: 138 MMHG | BODY MASS INDEX: 42.17 KG/M2

## 2018-07-06 DIAGNOSIS — I42.0 DILATED CARDIOMYOPATHY (HCC): ICD-10-CM

## 2018-07-06 DIAGNOSIS — I48.0 PAROXYSMAL ATRIAL FIBRILLATION (HCC): Primary | ICD-10-CM

## 2018-07-06 DIAGNOSIS — Z95.810 CARDIAC RESYNCHRONIZATION THERAPY DEFIBRILLATOR (CRT-D) IN PLACE: ICD-10-CM

## 2018-07-06 DIAGNOSIS — I10 ESSENTIAL HYPERTENSION: ICD-10-CM

## 2018-07-06 PROCEDURE — 4040F PNEUMOC VAC/ADMIN/RCVD: CPT | Performed by: INTERNAL MEDICINE

## 2018-07-06 PROCEDURE — 3017F COLORECTAL CA SCREEN DOC REV: CPT | Performed by: INTERNAL MEDICINE

## 2018-07-06 PROCEDURE — G8417 CALC BMI ABV UP PARAM F/U: HCPCS | Performed by: INTERNAL MEDICINE

## 2018-07-06 PROCEDURE — 1036F TOBACCO NON-USER: CPT | Performed by: INTERNAL MEDICINE

## 2018-07-06 PROCEDURE — 1101F PT FALLS ASSESS-DOCD LE1/YR: CPT | Performed by: INTERNAL MEDICINE

## 2018-07-06 PROCEDURE — G8427 DOCREV CUR MEDS BY ELIG CLIN: HCPCS | Performed by: INTERNAL MEDICINE

## 2018-07-06 PROCEDURE — 99214 OFFICE O/P EST MOD 30 MIN: CPT | Performed by: INTERNAL MEDICINE

## 2018-07-06 PROCEDURE — 1123F ACP DISCUSS/DSCN MKR DOCD: CPT | Performed by: INTERNAL MEDICINE

## 2018-07-06 NOTE — PROGRESS NOTES
UNITS CAPS capsule Take 2,000 Units by mouth nightly  Yes Historical Provider, MD   Insulin Syringe-Needle U-100 (INSULIN SYRINGE .5CC/31GX5/16\") 31G X 5/16\" 0.5 ML MISC Patient tests bid Yes Antonio Dangelo MD   therapeutic multivitamin-minerals [facility]) tablet Take 1 tablet by mouth daily. Yes Historical Provider, MD   aspirin 81 MG EC tablet Take 81 mg by mouth daily. Yes Historical Provider, MD   spironolactone (ALDACTONE) 25 MG tablet TAKE 1 TABLET BY MOUTH DAILY  Antonio Dangelo MD     Family History  Family History   Problem Relation Age of Onset    Heart Disease Father         ROS:    CONSTITUTIONAL: No major weight gain or loss, fatigue, weakness, night sweats or fever. HEENT: No new vision difficulties or ringing in the ears. RESPIRATORY: No new SOB, PND, orthopnea or cough. CARDIOVASCULAR: See HPI  GI: No nausea, vomiting, diarrhea, constipation, abdominal pain or changes in bowel habits. : No urinary frequency, urgency, incontinence hematuria or dysuria. SKIN: No cyanosis or skin lesions. MUSCULOSKELETAL: No new muscle or joint pain. NEUROLOGICAL: No syncope or TIA-like symptoms. PSYCHIATRIC: No anxiety, pain, insomnia or depression  WILL? PHYSICAL EXAM:      Wt Readings from Last 3 Encounters:   07/06/18 (!) 346 lb 6.4 oz (157.1 kg)   06/19/18 (!) 344 lb (156 kg)   06/06/18 (!) 345 lb 6.4 oz (156.7 kg)       BP Readings from Last 3 Encounters:   07/06/18 138/70   06/19/18 128/62   06/06/18 130/60       Pulse Readings from Last 3 Encounters:   07/06/18 64   06/19/18 83   06/06/18 64       CONSTITUTIONAL: Cooperative, no apparent distress, and appears well nourished / developed  NEUROLOGIC:  Awake and orientated to person, place and time. PSYCH: Calm affect. SKIN: Warm and dry. HEENT: Sclera non-icteric, normocephalic, neck supple, no elevation of JVP, normal carotid pulses with no bruits and thyroid normal size.   LUNGS:  No increased work of breathing and clear to auscultation, no crackles or wheezing  CARDIOVASCULAR:  Regular rate and rhythm with no murmurs, gallops, rubs, or abnormal heart sounds, normal PMI. The apical impulses not displaced  Heart tones are crisp and normal  JVP . .. ABDOMEN:  Normal bowel sounds, non-distended and non-tender to palpation  EXT: <1+  edema, no calf tenderness. Pulses are present bilaterally. Assessment:    NICM   BiV ICD in situ / Dr Elma Chow, lasix, ACE, spironolactone  Goes to cardiac rehab     pAFIb   On eliquis / BB amiodarone   [id number] Score for Atrial Fibrillation Stroke Risk   Risk   Factors  Component Value   C CHF Yes 1   H HTN Yes 1   A2 Age >= 75 No,  (68 y.o.) 0   D DM Yes 1   S2 Prior Stroke/TIA No 0   V Vascular Disease No 0   A Age 74-69 Yes,  (68 y.o.) 1   Sc Sex male 0    [id number]  Score  4   Echo 8/2017   This is a limited echo to assess left ventricular function. A complete echo   was performed on 5/17/2017 and a transesophageal echo was performed on   6/30/2017. Left ventricular size is mildly increased. Mild concentric left ventricular   hypertrophy is present. Global ejection fraction is moderately decreased and   estimated at approximately 35%. There is global hypokinesis with abnormal   septal motion secondary to the bundle branch block.   The right ventricle is mildly enlarged with normal left ventricular   function.   The left atrium is moderately dilated.   The right atrium is mildly dilated.   No structural valve abnormalities appreciated. On amiodarone  Last TSH:. ..wnl on 5/2017   Last chest xray:. .. 9/2/2017    Last eye exam...needs to make appt soon   Last hepatic panel . ..will check      EKG today    Hx NSVT  On amiodarone / BB     HTN  Optimal    WILL  uses CPAP    HLD  lipitor     anticoagulated  / on eliquis   Hgb and creatinine are wnl     Morbid obesity / hx bariatric surgery   Discussed nutrition / activity   Hx biartic surgery/ obesity but has lost 120#    Plan:   Patient education on touch screen in room   Discussed heart heathy lifestyle including nutrition, exercise & importance of nonsmoking,       At this time all looks good. I think we can in stop the amiodarone. He wishes to come off of that and I think that probably is appropriate. He will have an interrogation of his device. As Dr. anderson his electrophysiologist is leaving he will be following up with a new EP doctor [facility] at the [facility].  See us in 3 months. Jake Palafox M.D.  [facility] [place]

## 2018-07-08 ASSESSMENT — ENCOUNTER SYMPTOMS
RESPIRATORY NEGATIVE: 1
GASTROINTESTINAL NEGATIVE: 1
EYES NEGATIVE: 1

## 2018-07-09 ENCOUNTER — PROCEDURE VISIT (OUTPATIENT)
Dept: CARDIOLOGY CLINIC | Age: 68
End: 2018-07-09

## 2018-07-09 ENCOUNTER — OFFICE VISIT (OUTPATIENT)
Dept: CARDIOLOGY CLINIC | Age: 68
End: 2018-07-09

## 2018-07-09 VITALS
HEART RATE: 60 BPM | OXYGEN SATURATION: 96 % | HEIGHT: 75 IN | SYSTOLIC BLOOD PRESSURE: 130 MMHG | WEIGHT: 315 LBS | DIASTOLIC BLOOD PRESSURE: 80 MMHG | BODY MASS INDEX: 39.17 KG/M2

## 2018-07-09 DIAGNOSIS — I47.29 NSVT (NONSUSTAINED VENTRICULAR TACHYCARDIA): ICD-10-CM

## 2018-07-09 DIAGNOSIS — I42.0 DILATED CARDIOMYOPATHY (HCC): ICD-10-CM

## 2018-07-09 DIAGNOSIS — I48.0 PAROXYSMAL A-FIB (HCC): ICD-10-CM

## 2018-07-09 DIAGNOSIS — I10 ESSENTIAL HYPERTENSION: ICD-10-CM

## 2018-07-09 DIAGNOSIS — I50.22 CHRONIC SYSTOLIC HEART FAILURE (HCC): ICD-10-CM

## 2018-07-09 DIAGNOSIS — I42.8 NICM (NONISCHEMIC CARDIOMYOPATHY) (HCC): ICD-10-CM

## 2018-07-09 DIAGNOSIS — I50.22 CHRONIC SYSTOLIC HF (HEART FAILURE) (HCC): Primary | ICD-10-CM

## 2018-07-09 DIAGNOSIS — Z95.810 CARDIAC RESYNCHRONIZATION THERAPY DEFIBRILLATOR (CRT-D) IN PLACE: ICD-10-CM

## 2018-07-09 PROCEDURE — 1036F TOBACCO NON-USER: CPT | Performed by: INTERNAL MEDICINE

## 2018-07-09 PROCEDURE — G8427 DOCREV CUR MEDS BY ELIG CLIN: HCPCS | Performed by: INTERNAL MEDICINE

## 2018-07-09 PROCEDURE — 93284 PRGRMG EVAL IMPLANTABLE DFB: CPT | Performed by: INTERNAL MEDICINE

## 2018-07-09 PROCEDURE — 93290 INTERROG DEV EVAL ICPMS IP: CPT | Performed by: INTERNAL MEDICINE

## 2018-07-09 PROCEDURE — 1123F ACP DISCUSS/DSCN MKR DOCD: CPT | Performed by: INTERNAL MEDICINE

## 2018-07-09 PROCEDURE — G8417 CALC BMI ABV UP PARAM F/U: HCPCS | Performed by: INTERNAL MEDICINE

## 2018-07-09 PROCEDURE — 99214 OFFICE O/P EST MOD 30 MIN: CPT | Performed by: INTERNAL MEDICINE

## 2018-07-09 PROCEDURE — 4040F PNEUMOC VAC/ADMIN/RCVD: CPT | Performed by: INTERNAL MEDICINE

## 2018-07-09 PROCEDURE — 3017F COLORECTAL CA SCREEN DOC REV: CPT | Performed by: INTERNAL MEDICINE

## 2018-07-09 NOTE — PATIENT INSTRUCTIONS
· You have symptoms of a stroke. These may include:  ¨ Sudden numbness, tingling, weakness, or loss of movement in your face, arm, or leg, especially on only one side of your body. ¨ Sudden vision changes. ¨ Sudden trouble speaking. ¨ Sudden confusion or trouble understanding simple statements. ¨ Sudden problems with walking or balance. ¨ A sudden, severe headache that is different from past headaches.     · You passed out (lost consciousness).    Call your doctor now or seek immediate medical care if:    · You have new or increased shortness of breath.     · You feel dizzy or lightheaded, or you feel like you may faint.     · Your heart rate becomes irregular.     · You can feel your heart flutter in your chest or skip heartbeats. Tell your doctor if these symptoms are new or worse.    Watch closely for changes in your health, and be sure to contact your doctor if you have any problems. Where can you learn more? Go to [email or web address]. org and sign in to your [employer] account. Enter U020 in the Hi[employer] box to learn more about \"Atrial Fibrillation: Care Instructions. \"     If you do not have an account, please click on the \"Sign Up Now\" link. Current as of: December 6, 2017  Content Version: 11.6  © 3964-2538 [employer]. Care instructions adapted under license by [facility] ([facility]). If you have questions about a medical condition or this instruction, always ask your healthcare professional. [place] [address] any warranty or liability for your use of this information.

## 2018-07-09 NOTE — PROGRESS NOTES
[address]   Electrophysiology   Date: 7/9/2018  I had the privilege of visiting Nolberto Cesar in the office. CC: Device questions    HPI: Nolberto Cesar is a 74 y.o. male with a history of nonischemic cardiomyopathy s/p Bi-V ICD implant 9/2017, symptomatic Afib s/p DCCV 6/2017, HTN, HLD and DM II. Today, he arrives for a follow up for Afib and device management. He has been doing okay. He has completed cardiac rehab phase II and is now participating in an exercise program at the [facility]. He is asking about drinking energy drinks from time to time. He denies lightheadedness, dizziness, shortness of breath, chest pain, palpitations, orthopnea, edema, presyncope or syncope. 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 Christine Solis  1-2-10    GASTRIC BYPASS SURGERY      OTHER SURGICAL HISTORY  10/6/15    INCISION AND DRAINAGE RIGHT FOOT          OTHER SURGICAL HISTORY Right 10/8/15    INCISION AND DRAINAGE RIGHT FOOT         Allergies:  No Known Allergies    Medication:  Prior to Admission medications    Medication Sig Start Date End Date Taking?  Authorizing Provider   atorvastatin (LIPITOR) 40 MG tablet TAKE 1 TABLET BY MOUTH DAILY 6/29/18 7/29/18 Yes Kush Davalos MD lisinopril (PRINIVIL;ZESTRIL) 2.5 MG tablet TAKE 1 TABLET BY MOUTH DAILY 6/29/18  Yes Albina Daniels MD   furosemide (LASIX) 40 MG tablet TAKE 1 TABLET BY MOUTH DAILY 6/29/18  Yes Albina Daniels MD   NOVOLOG FLEXPEN 100 UNIT/ML injection pen AS DIRECTED 10 UNITS THREE TIMES DAILY 6/28/18  Yes Albina Daniels MD   insulin glargine (LANTUS SOLOSTAR) 100 UNIT/ML injection pen INJECT 4 TO 6 UNITS AS DIRECTED DAILY 6/27/18  Yes Albina Daniels MD   insulin glargine (LANTUS SOLOSTAR) 100 UNIT/ML injection pen 3-6 units nightly    Lot [id number] Ex; 2/2020 6/27/18  Yes Albina Daniels MD   apixaban (ELIQUIS) 5 MG TABS tablet Take 1 tablet by mouth 2 times daily 6/6/18  Yes Marlene Linn APRN - CNP   carvedilol (COREG) 12.5 MG tablet Take 1 tablet by mouth 2 times daily 3/16/18  Yes Albina Daniels MD   levothyroxine (SYNTHROID) 25 MCG tablet Take 1 tablet by mouth daily 3/16/18 6/6/21 Yes Albina Daniels MD   tamsulosin (FLOMAX) 0.4 MG capsule TAKE 1 CAPSULE BY MOUTH DAILY 3/1/18  Yes Albina Daniels MD   B-D UF III MINI PEN NEEDLES 31G X 5 MM MISC USE DAILY 12/6/17  Yes Albina Daniels MD   acetaminophen (TYLENOL) 325 MG tablet Take 2 tablets by mouth every 4 hours as needed for Pain 9/2/17  Yes Fede Musa MD   Coenzyme Q10 (CO Q 10) 100 MG CAPS Take 1 capsule by mouth daily   Yes Historical Provider, MD   Cinnamon 500 MG CAPS Take 1 capsule by mouth daily   Yes Historical Provider, MD Vyas Owen Oil 1000 MG CAPS Take 1 capsule by mouth daily   Yes Historical Provider, MD   glucose blood VI test strips (ONE TOUCH TEST STRIPS) strip 1 each by In Vitro route daily As needed.  4/27/17  Yes Albina Daniels MD   magnesium oxide (MAG-OX) 400 MG tablet Take 400 mg by mouth once a week    Yes Historical Provider, MD   Vitamin D (CHOLECALCIFEROL) 1000 UNITS CAPS capsule Take 2,000 Units by mouth nightly    Yes Historical Provider, MD   Insulin Syringe-Needle U-100 (INSULIN SYRINGE .5CC/31GX5/16\") 31G X 5/16\" 0.5 ML MISC Patient tests bid 5/21/13  Yes Kenia Pack MD   therapeutic multivitamin-minerals [facility]) tablet Take 1 tablet by mouth daily. Yes Historical Provider, MD   aspirin 81 MG EC tablet Take 81 mg by mouth daily. Yes Historical Provider, MD   spironolactone (ALDACTONE) 25 MG tablet TAKE 1 TABLET BY MOUTH DAILY 5/29/18 6/28/18  Kenia Pack MD       Social History:  Reviewed. reports that he quit smoking about 18 months ago. His smoking use included Cigarettes. He has a 4.00 pack-year smoking history. He has never used smokeless tobacco. He reports that he drinks alcohol. He reports that he does not use drugs. Family History:  Reviewed. family history includes Heart Disease in his father. Denies family history of sudden cardiac death, arrhythmia, premature CAD    Review of System:    · General ROS: negative for - chills, fever   · Psychological ROS: negative for - anxiety or depression  · Ophthalmic ROS: negative for - eye pain or loss of vision  · ENT ROS: negative for - headaches, sore throat   · Allergy and Immunology ROS: negative for - hives  · Hematological and Lymphatic ROS: negative for - bleeding problems, blood clots, bruising or jaundice  · Endocrine ROS: negative for - skin changes, temperature intolerance or unexpected weight changes  · Respiratory ROS: negative for - cough, sputum, wheezing  · Cardiovascular ROS: Per HPI. · Gastrointestinal ROS: negative for - abdominal pain, diarrhea, nausea/vomiting, bleeding +obesity  · Genito-Urinary ROS: negative for - dysuria or incontinence  · Musculoskeletal ROS: negative for - joint swelling   · Neurological ROS: negative for - confusion, numbness/tingling, seizures, weakness  · Dermatological ROS: negative for - rash    Physical Examination:  Vitals:    07/09/18 1601   BP: 130/80   Pulse: 60   SpO2: 96%       · Constitutional: Oriented. No distress. · Head: Normocephalic and atraumatic.    · Mouth/Throat: Oropharynx is clear and moist.   · Eyes:

## 2018-07-26 RX ORDER — APIXABAN 5 MG/1
TABLET, FILM COATED ORAL
Qty: 60 TABLET | Refills: 3 | Status: SHIPPED | OUTPATIENT
Start: 2018-07-26 | End: 2018-10-23 | Stop reason: SDUPTHER

## 2018-08-01 RX ORDER — LEVOTHYROXINE SODIUM 0.03 MG/1
TABLET ORAL
Qty: 90 TABLET | Refills: 0 | Status: SHIPPED | OUTPATIENT
Start: 2018-08-01 | End: 2018-10-23 | Stop reason: SDUPTHER

## 2018-08-08 NOTE — TELEPHONE ENCOUNTER
Patient requesting a medication refill.   Medication Tamsulosin (Flomax)   Dosage 0.4 mg  Frequencytake 1 capsule by mouth daily  [facility]   Next [facility] visit09/19/18     Patient just received new insurance    [employer] # [phone]

## 2018-08-09 RX ORDER — TAMSULOSIN HYDROCHLORIDE 0.4 MG/1
CAPSULE ORAL
Qty: 30 CAPSULE | Refills: 5 | Status: SHIPPED | OUTPATIENT
Start: 2018-08-09 | End: 2018-08-09 | Stop reason: SDUPTHER

## 2018-08-25 NOTE — PROGRESS NOTES
had any further dizziness. He reports he is to follow up with his PCP today.       []  MI              []  CABG         []  PTCA            []  Valve Replacement     [x]  Arrhythmia Atrial Fib cardioversion 2017     [x]  CHF   Last Admission: 17    [x] Pacemaker        []  ICD   []  Other      Ejection fraction  At one time 23%  Now up tp 35%                  Physical Assessment Alert and oriented to person,place, and time. He is pleasant and cooperative and in no acute distress      General Appearance              Color:   [x]  Natural          [] Pale    ( ) Cyanotic      []  Flushed         [] Jaundice              Skin Integrity:   Warm and dry. Skin intact. Lower extremities dry and slightly discolored / reddish/brown               Incision(s) where:  Left clavicular. Healed and in close approximation  Hx of infection at incision(s) site [x] No  [] Yes                 Cardiovascular Assessment/ Vital Signs     Heart rate: 58              Heart sounds: S1S2 regular               Height: 73.5'' / 6'1.5'' without shoes    Weight:  329.1     Resp rate:12               Lungs sounds: Clear to auscultation       Dyspnea  []  no  [x] yes, at rest at times and when starts to walk a short distance        [x]  Sleep Apnea    [x]  CPAP     []  Oxygen                  Sleeping Habits:     # of Pillows:  2 under head   # of times up at night:  3 x to use Bathroom . Sleeps in spurts. Reports he sleeps well flomax was helping      BP  R arm sittin/66       L arm sittin/64  Checks BP @ home QOD      Peripheral pulses        []  no      [x] yes     Edema [] no       [x] yes      Location:  1+ edema ankles      Fatigue  [] no      [x] yes      \" a little \"    Numbness/tingling [x]  no   []  yes   He reports he had some tingling in his right hand and was unable to move his fingers.  He reports he had tests for neuropathy and they came back \" okay\" He sstates it was due to his KCL and he had adjustments in his HR: 80  BPM      RPE:   12 / slight dyspnea and sciatic pain down left leg    Rhythm:  PMR        6 Min. Walk Test  Distance walked:       feet  Mets:  Max. HR.      BPM  RPE:   Rhythm:  % changed:   Fall Risk/Other  Fall risk assessed (x)yes   Issue: None   Orthopedic problems ()yes ()no  Walker () Cane()   Safety issues reviewed  (x)yes If patient has balance or orthopedic issue, action: n/a         EXERCISE PLAN Exercise Plan EXERCISE PLAN EXERCISE PLAN EXERCISE PLAN   Interventions Interventions Interventions Interventions Interventions   Exercise Prescription/Order Exercise Prescription/Order Exercise Prescription/order Exercise   Prescription/Order Discharge Exercise Plan                Mode         1. TM  No treadmill at this time   2.NS at  1/30  for  20 min at  2.0 mets  3. UBE at 0-5  for  10 min Mode        Bike-recumbent bike level 1/19 lovett x 10 minutes  UBE-arm ergometer 10 lovett x 10 minutes (1.3 METS)  Scifit-stepper level 3/13 lovett x 20-30 minutes (1.7 METS)    Mode        TM  NS level 4/14 lovett x 30 minutes (1.7 METS)  Ellipt/Arc  Bike  UBE arm ergometer 15 lovett x 10 minutes (1.5 METS)  Scifit    Mode        TM  NS level 6/27 lovett x 20 minutes (1.9 METS)  Ellipt/Arc  Bike  UBE arm ergometer 15 lovett x 10 minutes   Scifit levl 5/8 lovett x 10 minutes    Continue independent exercise [] Y     Continue in Phase IV [] Y     Aerobic Mode:      Frequency: 2-3  Week  Duration: 15-30 min  Intensity:11-13  THR___or RPE 11-12    Frequency:   3 x week   Duration: 20-30 min  Intensity: 12  RPE 11-13    Frequency: 3 x week  Duration: 20-40 min. Intensity:   RPE 11-14    Frequency: 3 x week  Duration: 30-45 min  Intensity: 12  RPE 11-14 Frequency:      Exercise 3-5 days per week. [] yes[] no  []   30+ min. Of exercise per session    [] yes [] no     Progression:  Depending on patient condition, time and intensity will be increased. An initial met level< 3 is classified as \"light\".  Progression to \"moderate\" 3-6 14.38  Psych/Spiritial 13.57      PHQ-9 score8     Score 10 or > signifies moderate or > depression.       Depression:  Overall score  Psych/Spiritial      PHQ-9 score:    Does patient have Family Support? [x] Yes   [] No   [] Lives alone [x]  Lives with spouse           PSYCHOSOCIAL PLAN PSYCHOSOCIAL PLAN PSYCHOSOCIAL PLAN PSYCHOSOCIAL PLAN  PSYCHOSOCIAL PLAN   Interventions Interventions Interventions Interventions Interventions    [] Physician notified of PHQ-9 score of 10 or > per protocol, as signifies moderate or > depression          PHQ-9 score:    []  Improvement   []  Unchanged   []  Notify PCP if score is worse   Is patient currently taking anti-depressant or anti-anxiety medications? [] Yes   [x] No  Current depression tx:   Denies depression or anxiety  Current depression tx:   [x]  N/A Current depression tx   [x] N/A: Current depression tx:   [x]  N/A  []  Patient has plan/treatment for anxiety/depression. Education Education Education Education Education   Individual discussion/education of:   [x] Stress management skills   [x] Relaxation Techniques   [x] Coping Techniques     Being on the computer  Like to watch You Tube  Check if completed:   [] Attends Emotional Wellness class  (x)Voices method of coping/ relaxation technique Check if completed:   [] Attends Emotional wellness class   [x] Voices method of coping/ relaxation technique Check if completed:   [] Attends Emotional wellness class   [x] Voices method of coping/ relaxation technique     Goals       Goals*   Initial Psychosocial Goals Set [] Yes   [] No       Previous Psychosocial Goals Met  [] Yes   [] No   Marvin's psychosocial goals are as follows:     1. To take a class at [employer] , on grammar and computers. Wants to write a book     2.  Be able to go back to the [employer]     Improved PHQ9 score  Improved Mental Health  Score Reports he has not yet made effort at achieving these goals Denies concern for depression or anxiety Test Score: 14 /15  CHF 15/15       Heart Health Knowledge   Test Score:    /15         Learning Barriers  Please select one:  []Speech  []Literacy  [] Hearing  []Cognitive  [] Vision  [x]Ready to Learn Learning Barriers addressed:[] Y[] N  Modifications:         Other Core Measures EDUCATION PLAN Other Core Measures EDUCATION PLAN Other Core Measures EDUCATION PLAN Other Core Measures EDUCATION PLAN Other Core Measure EDUCATION PLAN   Interventions Interventions Interventions Interventions Interventions   Cardiac Risk Factor Education Needed        [x]HTN               [] Attended Education Class on Risk Factors for Heart Disease  [] Home B/P monitoring  [x] Dietary Sodium Restriction       []Attended Education Class on Risk Factors for Heart Disease  [] Home B/P monitoring  [x] Dietary Sodium Restriction          []Attended Education Class on Risk Factors for Heart Disease     []Home B/P monitoring  [x] Dietary Sodium Restriction             Marvin voices 1. Understanding of risks for heart disease and how to modify their risk. 2. Understanding of importance of restricting sodium in diet. []Y []N  Smoking Cessation counseling needed  []Y []N Pt verbalizes readiness to quit smoking?  []Y[] () N Quit date set  []Y []N Cut down cigarettes []One on one counseling on Tobacco Cessation  [] Attend Smoking Cessation classes    []Smoking Cessation Information given  []Smoking cessation medications used: [] One on one counseling on Tobacco Cessation. [] Attend smoking cessation classes        []Smoking cessation medications used: [] One on one counseling on Tobacco Cessation.     [] Attend smoking cessation classes          []Smoking cessation medications used: Tobacco Cessation Counseling attended  []Yes  []No  If not completed, Why?      Core Measure Education Core Measure Education Core Measure Education Core Measure Education Education   [x] Cardiac Rehab Education booklet given  [x] Cardiac Rehab education class list given Attended Education Classes:  1. []  A & P of the  Heart & Body  2. [x]Heart Disease  3. [x] Risk Factors  4. []  Cardiac Medications  5. [] Cardiac Interventions Attended Education Classes:  1. []  A & P of the  Heart & Body  2. [x] Heart Disease  3. [x]  Risk Factors  4. [x]  Cardiac Medications  5. [] Cardiac Interventions Attended Education Classes:  1. [] A & P of the  Heart & Body  2. [x]Heart Disease  3. [x] Risk Factors  4. [x] Cardiac Medications  5. []Cardiac Interventions Attend all the education classes. *Goals* Goal Reassessment Goal Reassessment Goal Reassessment *Goals*   Vincents Initial Risk factor/education  goals are as follows:     1. To be able to get weight and diabetes under control  2 Increase energy level  3. Establishing an exercise routine. 4. Follow CHF protocol, start weighing daily          Resting B/P < 140/90 or 130/80 if DM or CKD  [] Y []N Complete tobacco cessation  [x]Y []N Understanding risks of heart disease  [x]Y []N Heart failure self management    Goal Progress:         1. Continue to progress  2. Feels his energy levels have increased about '5%', feels he is able to go up and down stairs much easier. 3. Attendance is fairly consistent in cardiac rehab. 4. Continues to weigh himself on a regular basis, keeping record. Able to verbalize when it is appropriate to call the doctor.    Goal Progress:    1. Maintaining weight. 2. He recently had a cold and was absent from rehab for 4 sessions, feels he lost some of his 'stamina' at that time, rates his overall improvement in energy the same, ~5%.  3. Attendance fairly consistent with the exception of recent absence due to to illness. 4. Reports compliance with CHF protocol.    Goal Progress:    1. Maintaining weight. 2. He is returning after a recent absence due to illness so he had not been exercising as frequently. Feels he has lost some of the stamina in which he had achieved.  Hopes to return to exercise on a regular basis and gain some of the stamina and strength back. 3. Trying to return to a more structured exercise routine. 4. Continues to follow the CHF protocol on a regular basis. Vijaya Choe has met goals ()yes         Physician Response  [x]Initiate Individualized Treatment Plan (ITP)  []Other Physician Response  [x] Proceed with rehab and 30 day updates per ITP. []Other    Physician Response  [] Proceed with rehab and 30 day updates per ITP. []Other Physician Response  [x] Proceed with rehab and 30 day updates per ITP. []Other               Physician Final Signature      Comments: 10/20/17 Initial evaluation for Cardiac Rehab phase II. Mr Matti Gomez present today to begin his Cardiac Rehab. He reports he has not been doing any form of exercise sine his hospitalization in May 2017. He states he does get some dyspnea with activity. He has not had any complaints of dizziness sine his Lasix was decreased. He does not weigh himself everyday and it was stressed to him the importance of following the CHF protocol. He verbalized understanding. He was able to complete the six minute walk yet his gait was slightly unsteady. He reports his back has been hurting and feels its his mattress. He did experience some left leg sciatic pain but was able to walk 800 feet. He complained of slight dyspnea at the end. He walked at 1.5 mph for a MET level of 2.2. He is looking forward to attending cardiac rehab and getting his energy back as well as learning about following a heart healthy diet and managing his diabetes. He is receptive to setting goals to modify his risk factors. See above. His telemetry reveals Paced Rhythm Thank You. Eliel Colmenares R.N.      30 day ITP Update: Vijaya Choe is progressing well in the program. He has completed 9 sessions to date. He is tolerating slow but gradual increases in exercise workloads and duration. Telemetry remains paced rhythm. His max MET level is 1.7.  He has started to develop returning to CR. He has completed 28 sessions to date. He states he is feeling well and that his exercise is getting \"back on track\". His weight has been maintained @ 332.4-335lbs He is weighing himself daily and has noted a 3lb/24hr wt gain recently. He states that he has been instructed by his cardiologist on those days to take an additional lasix. He currently does not appear to any increased edema and denies sob. He states he is compliant with his fluids, taking in less than 64oz daily and continues to avoid sodium. Telemetry monitoring remains paced rhythm.  Izabela Desai RN 2/15/2018

## 2018-09-19 ENCOUNTER — OFFICE VISIT (OUTPATIENT)
Dept: INTERNAL MEDICINE CLINIC | Age: 68
End: 2018-09-19

## 2018-09-19 VITALS
DIASTOLIC BLOOD PRESSURE: 80 MMHG | SYSTOLIC BLOOD PRESSURE: 134 MMHG | HEIGHT: 76 IN | WEIGHT: 315 LBS | OXYGEN SATURATION: 98 % | BODY MASS INDEX: 38.36 KG/M2 | HEART RATE: 65 BPM

## 2018-09-19 DIAGNOSIS — E55.9 VITAMIN D DEFICIENCY: ICD-10-CM

## 2018-09-19 DIAGNOSIS — E56.9 VITAMIN DEFICIENCY: ICD-10-CM

## 2018-09-19 DIAGNOSIS — R53.83 OTHER FATIGUE: ICD-10-CM

## 2018-09-19 DIAGNOSIS — Z23 NEED FOR PNEUMOCOCCAL VACCINATION: ICD-10-CM

## 2018-09-19 DIAGNOSIS — I10 ESSENTIAL HYPERTENSION: ICD-10-CM

## 2018-09-19 DIAGNOSIS — E78.2 MIXED HYPERLIPIDEMIA: ICD-10-CM

## 2018-09-19 DIAGNOSIS — R80.8 OTHER PROTEINURIA: ICD-10-CM

## 2018-09-19 DIAGNOSIS — E11.9 TYPE 2 DIABETES MELLITUS WITHOUT COMPLICATION, WITH LONG-TERM CURRENT USE OF INSULIN (HCC): Primary | ICD-10-CM

## 2018-09-19 DIAGNOSIS — R53.81 PHYSICAL DECONDITIONING: ICD-10-CM

## 2018-09-19 DIAGNOSIS — I42.8 OTHER CARDIOMYOPATHY (HCC): ICD-10-CM

## 2018-09-19 DIAGNOSIS — Z79.4 TYPE 2 DIABETES MELLITUS WITHOUT COMPLICATION, WITH LONG-TERM CURRENT USE OF INSULIN (HCC): Primary | ICD-10-CM

## 2018-09-19 DIAGNOSIS — N18.2 CKD (CHRONIC KIDNEY DISEASE) STAGE 2, GFR 60-89 ML/MIN: ICD-10-CM

## 2018-09-19 DIAGNOSIS — Z23 NEED FOR IMMUNIZATION AGAINST INFLUENZA: ICD-10-CM

## 2018-09-19 LAB
ANION GAP SERPL CALCULATED.3IONS-SCNC: 12 MMOL/L (ref 3–16)
BUN BLDV-MCNC: 14 MG/DL (ref 7–20)
CALCIUM SERPL-MCNC: 9.1 MG/DL (ref 8.3–10.6)
CHLORIDE BLD-SCNC: 103 MMOL/L (ref 99–110)
CHOLESTEROL, TOTAL: 152 MG/DL (ref 0–199)
CO2: 28 MMOL/L (ref 21–32)
CREAT SERPL-MCNC: 1.2 MG/DL (ref 0.8–1.3)
CREATININE URINE: 228.2 MG/DL (ref 39–259)
FOLATE: 19.4 NG/ML (ref 4.78–24.2)
GFR AFRICAN AMERICAN: >60
GFR NON-AFRICAN AMERICAN: >60
GLUCOSE BLD-MCNC: 110 MG/DL
GLUCOSE BLD-MCNC: 118 MG/DL (ref 70–99)
HBA1C MFR BLD: 7.5 %
HCT VFR BLD CALC: 36.6 % (ref 40.5–52.5)
HDLC SERPL-MCNC: 62 MG/DL (ref 40–60)
HEMOGLOBIN: 11.9 G/DL (ref 13.5–17.5)
LDL CHOLESTEROL CALCULATED: 75 MG/DL
MCH RBC QN AUTO: 28 PG (ref 26–34)
MCHC RBC AUTO-ENTMCNC: 32.5 G/DL (ref 31–36)
MCV RBC AUTO: 86.1 FL (ref 80–100)
MICROALBUMIN UR-MCNC: 9.4 MG/DL
MICROALBUMIN/CREAT UR-RTO: 41.2 MG/G (ref 0–30)
PDW BLD-RTO: 13.7 % (ref 12.4–15.4)
PLATELET # BLD: 168 K/UL (ref 135–450)
PMV BLD AUTO: 10.7 FL (ref 5–10.5)
POTASSIUM SERPL-SCNC: 4 MMOL/L (ref 3.5–5.1)
RBC # BLD: 4.25 M/UL (ref 4.2–5.9)
SODIUM BLD-SCNC: 143 MMOL/L (ref 136–145)
TRIGL SERPL-MCNC: 73 MG/DL (ref 0–150)
TSH REFLEX: 1.29 UIU/ML (ref 0.27–4.2)
VITAMIN B-12: 1560 PG/ML (ref 211–911)
VITAMIN D 25-HYDROXY: 36.7 NG/ML
VLDLC SERPL CALC-MCNC: 15 MG/DL
WBC # BLD: 4.8 K/UL (ref 4–11)

## 2018-09-19 PROCEDURE — 2022F DILAT RTA XM EVC RTNOPTHY: CPT | Performed by: INTERNAL MEDICINE

## 2018-09-19 PROCEDURE — 1101F PT FALLS ASSESS-DOCD LE1/YR: CPT | Performed by: INTERNAL MEDICINE

## 2018-09-19 PROCEDURE — 90662 IIV NO PRSV INCREASED AG IM: CPT | Performed by: INTERNAL MEDICINE

## 2018-09-19 PROCEDURE — G0008 ADMIN INFLUENZA VIRUS VAC: HCPCS | Performed by: INTERNAL MEDICINE

## 2018-09-19 PROCEDURE — 1036F TOBACCO NON-USER: CPT | Performed by: INTERNAL MEDICINE

## 2018-09-19 PROCEDURE — G8417 CALC BMI ABV UP PARAM F/U: HCPCS | Performed by: INTERNAL MEDICINE

## 2018-09-19 PROCEDURE — 83036 HEMOGLOBIN GLYCOSYLATED A1C: CPT | Performed by: INTERNAL MEDICINE

## 2018-09-19 PROCEDURE — G0009 ADMIN PNEUMOCOCCAL VACCINE: HCPCS | Performed by: INTERNAL MEDICINE

## 2018-09-19 PROCEDURE — 4040F PNEUMOC VAC/ADMIN/RCVD: CPT | Performed by: INTERNAL MEDICINE

## 2018-09-19 PROCEDURE — 1123F ACP DISCUSS/DSCN MKR DOCD: CPT | Performed by: INTERNAL MEDICINE

## 2018-09-19 PROCEDURE — 82962 GLUCOSE BLOOD TEST: CPT | Performed by: INTERNAL MEDICINE

## 2018-09-19 PROCEDURE — 3017F COLORECTAL CA SCREEN DOC REV: CPT | Performed by: INTERNAL MEDICINE

## 2018-09-19 PROCEDURE — 3045F PR MOST RECENT HEMOGLOBIN A1C LEVEL 7.0-9.0%: CPT | Performed by: INTERNAL MEDICINE

## 2018-09-19 PROCEDURE — G8428 CUR MEDS NOT DOCUMENT: HCPCS | Performed by: INTERNAL MEDICINE

## 2018-09-19 PROCEDURE — 90670 PCV13 VACCINE IM: CPT | Performed by: INTERNAL MEDICINE

## 2018-09-19 PROCEDURE — 99215 OFFICE O/P EST HI 40 MIN: CPT | Performed by: INTERNAL MEDICINE

## 2018-09-19 PROCEDURE — 36415 COLL VENOUS BLD VENIPUNCTURE: CPT | Performed by: INTERNAL MEDICINE

## 2018-09-19 NOTE — PROGRESS NOTES
Vaccine Information Sheet, \"Influenza - Inactivated\"  given to Darnell Cespedes, or parent/legal guardian of  Darnell Cespedes and verbalized understanding. Patient responses:    Have you ever had a reaction to a flu vaccine? No  Are you able to eat eggs without adverse effects? Yes  Do you have any current illness? No  Have you ever had Guillian Sobieski Syndrome? No    Flu vaccine given per order. Please see immunization tab.
cervical adenopathy. Neurological: He is alert and oriented to person, place, and time. He has normal reflexes. Skin: Skin is warm and dry. Psychiatric: He has a normal mood and affect. His behavior is normal. Judgment and thought content normal.       Assessment:        Diagnosis Orders   1. Type 2 diabetes mellitus without complication, with long-term current use of insulin ([facility])  Diet, physical activity and weight loss discussed. SS insulin adjustments discussed. POCT glycosylated hemoglobin (Hb A1C)    POCT Glucose   2. Mixed hyperlipidemia  Lipid Panel  Heart healthy diet and statin compliance discussed. 3. Essential hypertension  Stable. Continue same medication regimen. DASH diet discussed. Basic Metabolic Panel    TSH with Reflex    MICROALBUMIN / CREATININE URINE RATIO   4. Other proteinuria  Diabetic and hypertensive nephropathy. Risk factor control stressed. 5. Other fatigue  CBC  Suspect deconditioning in addition to chronic HF. Med compliance, regular physical activity discussed. 6. Vitamin deficiency  VITAMIN B12 & FOLATE   7. Vitamin D deficiency  VITAMIN D 25 HYDROXY  Diet and supplement discussed. 8. Need for pneumococcal vaccination  PREVNAR 13 IM (Pneumococcal conjugate vaccine 13-valent)   9. Need for immunization against influenza  INFLUENZA, HIGH DOSE, 65 YRS +, IM, PF, PREFILL SYR, 0.5ML (FLUZONE HD)   10. Other cardiomyopathy ([address]  diuretic compliance discussed. Lower sodium. Daily weight. Reestablish baseline weight. 11. Physical deconditioning  Regular physical activity discussed. 12.    Stage 2 CKD: stable. Risk factor control stressed. Avoidance of nephrotoxins discussed. Plan:    See plans above.         Bharati Steiner MD

## 2018-10-01 RX ORDER — CARVEDILOL 12.5 MG/1
TABLET ORAL
Qty: 180 TABLET | Refills: 3 | Status: SHIPPED | OUTPATIENT
Start: 2018-10-01 | End: 2018-12-31 | Stop reason: SDUPTHER

## 2018-10-01 RX ORDER — CARVEDILOL 12.5 MG/1
12.5 TABLET ORAL 2 TIMES DAILY
Qty: 60 TABLET | Refills: 3 | Status: SHIPPED | OUTPATIENT
Start: 2018-10-01 | End: 2018-10-01 | Stop reason: SDUPTHER

## 2018-10-11 ENCOUNTER — TELEPHONE (OUTPATIENT)
Dept: INTERNAL MEDICINE CLINIC | Age: 68
End: 2018-10-11

## 2018-10-15 ENCOUNTER — TELEPHONE (OUTPATIENT)
Dept: INTERNAL MEDICINE CLINIC | Age: 68
End: 2018-10-15

## 2018-10-15 ASSESSMENT — ENCOUNTER SYMPTOMS
GASTROINTESTINAL NEGATIVE: 1
SHORTNESS OF BREATH: 1
EYES NEGATIVE: 1

## 2018-10-16 ENCOUNTER — NURSE ONLY (OUTPATIENT)
Dept: CARDIOLOGY CLINIC | Age: 68
End: 2018-10-16

## 2018-10-16 DIAGNOSIS — Z95.810 CARDIAC RESYNCHRONIZATION THERAPY DEFIBRILLATOR (CRT-D) IN PLACE: ICD-10-CM

## 2018-10-23 ENCOUNTER — OFFICE VISIT (OUTPATIENT)
Dept: CARDIOLOGY CLINIC | Age: 68
End: 2018-10-23
Payer: COMMERCIAL

## 2018-10-23 VITALS
WEIGHT: 315 LBS | HEART RATE: 92 BPM | DIASTOLIC BLOOD PRESSURE: 78 MMHG | BODY MASS INDEX: 43.36 KG/M2 | SYSTOLIC BLOOD PRESSURE: 142 MMHG | OXYGEN SATURATION: 94 %

## 2018-10-23 DIAGNOSIS — E05.00 GRAVES' DISEASE: ICD-10-CM

## 2018-10-23 DIAGNOSIS — L08.9 DIABETIC FOOT INFECTION (HCC): ICD-10-CM

## 2018-10-23 DIAGNOSIS — E11.40: ICD-10-CM

## 2018-10-23 DIAGNOSIS — I42.0 DILATED CARDIOMYOPATHY (HCC): ICD-10-CM

## 2018-10-23 DIAGNOSIS — I50.22 CHRONIC SYSTOLIC HEART FAILURE (HCC): ICD-10-CM

## 2018-10-23 DIAGNOSIS — E11.628 DIABETIC FOOT INFECTION (HCC): ICD-10-CM

## 2018-10-23 DIAGNOSIS — G47.33 OBSTRUCTIVE SLEEP APNEA: ICD-10-CM

## 2018-10-23 DIAGNOSIS — E11.649 TYPE 2 DIABETES MELLITUS WITH HYPOGLYCEMIA WITHOUT COMA, WITHOUT LONG-TERM CURRENT USE OF INSULIN (HCC): ICD-10-CM

## 2018-10-23 DIAGNOSIS — G54.0: ICD-10-CM

## 2018-10-23 DIAGNOSIS — E78.5 HYPERLIPIDEMIA, UNSPECIFIED HYPERLIPIDEMIA TYPE: ICD-10-CM

## 2018-10-23 DIAGNOSIS — I10 ESSENTIAL HYPERTENSION: ICD-10-CM

## 2018-10-23 DIAGNOSIS — E78.5 DYSLIPIDEMIA: Primary | ICD-10-CM

## 2018-10-23 PROCEDURE — 2022F DILAT RTA XM EVC RTNOPTHY: CPT | Performed by: NURSE PRACTITIONER

## 2018-10-23 PROCEDURE — 1123F ACP DISCUSS/DSCN MKR DOCD: CPT | Performed by: NURSE PRACTITIONER

## 2018-10-23 PROCEDURE — G8417 CALC BMI ABV UP PARAM F/U: HCPCS | Performed by: NURSE PRACTITIONER

## 2018-10-23 PROCEDURE — 99214 OFFICE O/P EST MOD 30 MIN: CPT | Performed by: NURSE PRACTITIONER

## 2018-10-23 PROCEDURE — 3045F PR MOST RECENT HEMOGLOBIN A1C LEVEL 7.0-9.0%: CPT | Performed by: NURSE PRACTITIONER

## 2018-10-23 PROCEDURE — G8427 DOCREV CUR MEDS BY ELIG CLIN: HCPCS | Performed by: NURSE PRACTITIONER

## 2018-10-23 PROCEDURE — G8482 FLU IMMUNIZE ORDER/ADMIN: HCPCS | Performed by: NURSE PRACTITIONER

## 2018-10-23 PROCEDURE — 3017F COLORECTAL CA SCREEN DOC REV: CPT | Performed by: NURSE PRACTITIONER

## 2018-10-23 PROCEDURE — 1101F PT FALLS ASSESS-DOCD LE1/YR: CPT | Performed by: NURSE PRACTITIONER

## 2018-10-23 PROCEDURE — 4040F PNEUMOC VAC/ADMIN/RCVD: CPT | Performed by: NURSE PRACTITIONER

## 2018-10-23 PROCEDURE — 1036F TOBACCO NON-USER: CPT | Performed by: NURSE PRACTITIONER

## 2018-10-23 NOTE — PROGRESS NOTES
(CHOLECALCIFEROL) 1000 UNITS CAPS capsule Take 2,000 Units by mouth nightly  Yes Historical Provider, MD   Insulin Syringe-Needle U-100 (INSULIN SYRINGE .5CC/31GX5/16\") 31G X 5/16\" 0.5 ML MISC Patient tests bid Yes Kathy Patel MD   therapeutic multivitamin-minerals [facility]) tablet Take 1 tablet by mouth daily. Yes Historical Provider, MD   aspirin 81 MG EC tablet Take 81 mg by mouth daily. Yes Historical Provider, MD     Family History  Family History   Problem Relation Age of Onset    Heart Disease Father           · ROS:   · Cardiovascular: Reviewed in HPI  · Allergic/Immunologic: No nasal congestion or hives. · All other ROS are reviewed and are unremarkable. PHYSICAL EXAM:      Wt Readings from Last 3 Encounters:   10/23/18 (!) 356 lb 3.2 oz (161.6 kg)   09/19/18 (!) 350 lb (158.8 kg)   07/09/18 (!) 345 lb (156.5 kg)       BP Readings from Last 3 Encounters:   10/23/18 (!) 142/78   09/19/18 134/80   07/09/18 130/80       Pulse Readings from Last 3 Encounters:   10/23/18 92   09/19/18 65   07/09/18 60       Exam   ENT: neg   NEUROLOGIC:  Neg   PSYCH: neg  SKIN: Warm and dry.   LUNGS:  No increased work of breathing and clear to auscultation, no crackles or wheezing  CARDIOVASCULAR: RRR  ABDOMEN: soft large  EXT: 1+ edema pitting     Assessment:     NICM   BiV ICD in situ / Dr Miranda Ear   On Coreg, lasix, ACE, spironolactone  Goes to cardiac rehab      pAFIb   On eliquis / BB   Hgb 11.9  / sCr 1.2   [id number] Score for Atrial Fibrillation Stroke Risk    Risk   Factors   Component Value   C CHF Yes 1   H HTN Yes 1   A2 Age >= 76 No,  (68 y.o.) 0   D DM Yes 1   S2 Prior Stroke/TIA No 0   V Vascular Disease No 0   A Age 74-69 Yes,  (68 y.o.) 1   Sc Sex male 0     [id number]  Score   4   Echo 2/2018  Mild concentric left ventricular hypertrophy is present.   Left ventricular size is moderately increased.   Left ventricular function is reduced with ejection fraction estimated at 35   to 04-%.   Diastolic

## 2018-10-26 ENCOUNTER — TELEPHONE (OUTPATIENT)
Dept: INTERNAL MEDICINE CLINIC | Age: 68
End: 2018-10-26

## 2018-10-29 RX ORDER — INSULIN ASPART 100 [IU]/ML
INJECTION, SOLUTION INTRAVENOUS; SUBCUTANEOUS
Qty: 15 ML | Refills: 0 | Status: SHIPPED | OUTPATIENT
Start: 2018-10-29 | End: 2018-10-31 | Stop reason: SDUPTHER

## 2018-10-30 ENCOUNTER — TELEPHONE (OUTPATIENT)
Dept: PRIMARY CARE CLINIC | Age: 68
End: 2018-10-30

## 2018-10-31 ENCOUNTER — TELEPHONE (OUTPATIENT)
Dept: INTERNAL MEDICINE CLINIC | Age: 68
End: 2018-10-31

## 2018-11-07 ENCOUNTER — OFFICE VISIT (OUTPATIENT)
Dept: CARDIOLOGY CLINIC | Age: 68
End: 2018-11-07
Payer: COMMERCIAL

## 2018-11-07 VITALS
BODY MASS INDEX: 39.17 KG/M2 | HEART RATE: 62 BPM | DIASTOLIC BLOOD PRESSURE: 80 MMHG | HEIGHT: 75 IN | OXYGEN SATURATION: 97 % | SYSTOLIC BLOOD PRESSURE: 130 MMHG | WEIGHT: 315 LBS

## 2018-11-07 DIAGNOSIS — Z79.899 ON AMIODARONE THERAPY: ICD-10-CM

## 2018-11-07 DIAGNOSIS — E66.01 CLASS 3 SEVERE OBESITY DUE TO EXCESS CALORIES WITHOUT SERIOUS COMORBIDITY WITH BODY MASS INDEX (BMI) OF 40.0 TO 44.9 IN ADULT (HCC): ICD-10-CM

## 2018-11-07 DIAGNOSIS — I48.0 PAROXYSMAL ATRIAL FIBRILLATION (HCC): Primary | ICD-10-CM

## 2018-11-07 DIAGNOSIS — I42.8 NICM (NONISCHEMIC CARDIOMYOPATHY) (HCC): ICD-10-CM

## 2018-11-07 DIAGNOSIS — Z95.810 CARDIAC RESYNCHRONIZATION THERAPY DEFIBRILLATOR (CRT-D) IN PLACE: ICD-10-CM

## 2018-11-07 LAB
ALBUMIN SERPL-MCNC: 4.3 G/DL (ref 3.4–5)
ALP BLD-CCNC: 129 U/L (ref 40–129)
ALT SERPL-CCNC: 20 U/L (ref 10–40)
AST SERPL-CCNC: 18 U/L (ref 15–37)
BILIRUB SERPL-MCNC: 0.3 MG/DL (ref 0–1)
BILIRUBIN DIRECT: <0.2 MG/DL (ref 0–0.3)
BILIRUBIN, INDIRECT: NORMAL MG/DL (ref 0–1)
TOTAL PROTEIN: 7.1 G/DL (ref 6.4–8.2)
TSH REFLEX: 1.06 UIU/ML (ref 0.27–4.2)

## 2018-11-07 PROCEDURE — 1101F PT FALLS ASSESS-DOCD LE1/YR: CPT | Performed by: INTERNAL MEDICINE

## 2018-11-07 PROCEDURE — 99214 OFFICE O/P EST MOD 30 MIN: CPT | Performed by: INTERNAL MEDICINE

## 2018-11-07 PROCEDURE — 3017F COLORECTAL CA SCREEN DOC REV: CPT | Performed by: INTERNAL MEDICINE

## 2018-11-07 PROCEDURE — 1036F TOBACCO NON-USER: CPT | Performed by: INTERNAL MEDICINE

## 2018-11-07 PROCEDURE — 4040F PNEUMOC VAC/ADMIN/RCVD: CPT | Performed by: INTERNAL MEDICINE

## 2018-11-07 PROCEDURE — 1123F ACP DISCUSS/DSCN MKR DOCD: CPT | Performed by: INTERNAL MEDICINE

## 2018-11-07 PROCEDURE — G8482 FLU IMMUNIZE ORDER/ADMIN: HCPCS | Performed by: INTERNAL MEDICINE

## 2018-11-07 PROCEDURE — G8417 CALC BMI ABV UP PARAM F/U: HCPCS | Performed by: INTERNAL MEDICINE

## 2018-11-07 PROCEDURE — G8427 DOCREV CUR MEDS BY ELIG CLIN: HCPCS | Performed by: INTERNAL MEDICINE

## 2018-11-07 PROCEDURE — 93000 ELECTROCARDIOGRAM COMPLETE: CPT | Performed by: INTERNAL MEDICINE

## 2018-11-07 RX ORDER — AMIODARONE HYDROCHLORIDE 200 MG/1
200 TABLET ORAL DAILY
Qty: 30 TABLET | Refills: 6 | Status: SHIPPED | OUTPATIENT
Start: 2018-11-07 | End: 2019-04-30 | Stop reason: SDUPTHER

## 2018-11-07 NOTE — PROGRESS NOTES
Giovanni Glez MD       Social History:   reports that he quit smoking about 22 months ago. His smoking use included Cigarettes. He has a 4.00 pack-year smoking history. He has never used smokeless tobacco. He reports that he drinks alcohol. He reports that he does not use drugs. Family History:  family history includes Heart Disease in his father. Reviewed. Denies family history of sudden cardiac death, arrhythmia, premature CAD    Review of System:    · General ROS: negative for - chills, fever   · Psychological ROS: negative for - anxiety or depression  · Ophthalmic ROS: negative for - eye pain or loss of vision  · ENT ROS: negative for - epistaxis, headaches, nasal discharge, sore throat   · Allergy and Immunology ROS: negative for - hives, nasal congestion   · Hematological and Lymphatic ROS: negative for - bleeding problems, blood clots, bruising or jaundice  · Endocrine ROS: negative for - skin changes, temperature intolerance or unexpected weight changes  · Respiratory ROS: negative for - cough, hemoptysis, pleuritic pain, SOB, sputum changes or wheezing  · Cardiovascular ROS: Per HPI. · Gastrointestinal ROS: negative for - abdominal pain, blood in stools, diarrhea, hematemesis, melena,  nausea/vomiting or swallowing difficulty/pain +obese  · Genito-Urinary ROS: negative for - dysuria or incontinence  · Musculoskeletal ROS: negative for - joint swelling or muscle pain  · Neurological ROS: negative for - confusion, dizziness, gait disturbance, headaches, numbness/tingling, seizures,  speech problems, tremors, visual changes or weakness  · Dermatological ROS: negative for - rash    Physical Examination:  Vitals:    11/07/18 1420   BP: 130/80   Pulse: 62   SpO2: 97%       · Constitutional: Oriented. No distress. · Head: Normocephalic and atraumatic. · Mouth/Throat: Oropharynx is clear and moist.   · Eyes: Conjunctivae normal. EOM are normal.   · Neck: Normal range of motion. Neck supple. No rigidity. Dyslipidemia 06/05/2010     Priority: High    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 ([facility] 06/05/2010     Priority: High    Obesity 06/05/2010     Priority: High    Cardiac resynchronization therapy defibrillator (CRT-D) in place 09/01/2017    Abscess of left leg 07/05/2017    Morbid obesity with BMI of 40.0-44.9, adult ([facility] 07/05/2017    Paroxysmal atrial fibrillation (HCC)     Hypokalemia     H/O gastric bypass 12/07/2016    Benign prostatic hyperplasia with lower urinary tract symptoms 12/07/2016    MRSA infection 10/07/2015    Diabetic ulcer of right foot associated with type 2 diabetes mellitus ([facility]     Subacute osteomyelitis of right foot ([facility]     Diabetic foot infection ([facility] 10/05/2015    Hypothyroidism 01/09/2014    Graves' disease 06/19/2012    Vitamin D deficiency 09/22/2010    Acute congestive heart failure ([facility] 09/08/2010    Cardiomyopathy ([facility] 06/05/2010    Renal disease due to hypertension 06/05/2010    HTN (hypertension) 06/05/2010        Plan:    Cardiomyopathy:  -Nonischemic  -S/p BI-V ICD implant  -Improved post implant but LVEF at 35-40%  -Device interrogated recently and functioning appropriately  -Approximately 8.1 years remaining on battery life  -Bi-V paced 94%      Afib:  -Paroxysmal  -Symptomatic  -Captured on EKGs 6/26/17, 5/4/17, 6/30/17 and device interrogation  -7.6% afib burden on device interrogation (666 episodes)  -Per EKG, in SR today  -Was previously on amiodarone (which was discontinued by Dr. Elvin Knox summer 2018). I would recommend restarting the amiodarone at 200 mg daily since he often has episodes of atrial fibrillation with RVR that lead to HF exacerbation. Will monitor closely for amio toxicity.  Will be checking baseline TFT, LFT, PFT starting today.  -Continue Coreg 12.5 mg BID  -CHADS Vasc (HF, HTN, DM, age)- on Eliquis 5 mg daily      Obesity:  -Placed a referral for weight loss clinic at [facility]      Follow up in 6 months with myself and with

## 2018-11-07 NOTE — PATIENT INSTRUCTIONS
· You have symptoms of a stroke. These may include:  ? Sudden numbness, tingling, weakness, or loss of movement in your face, arm, or leg, especially on only one side of your body. ? Sudden vision changes. ? Sudden trouble speaking. ? Sudden confusion or trouble understanding simple statements. ? Sudden problems with walking or balance. ? A sudden, severe headache that is different from past headaches.     · You passed out (lost consciousness).    Call your doctor now or seek immediate medical care if:    · You have new or increased shortness of breath.     · You feel dizzy or lightheaded, or you feel like you may faint.     · Your heart rate becomes irregular.     · You can feel your heart flutter in your chest or skip heartbeats. Tell your doctor if these symptoms are new or worse.    Watch closely for changes in your health, and be sure to contact your doctor if you have any problems. Where can you learn more? Go to [email or web address] and sign in to your [employer] account. Enter U020 in the [employer] box to learn more about \"Atrial Fibrillation: Care Instructions. \"     If you do not have an account, please click on the \"Sign Up Now\" link. Current as of: December 6, 2017  Content Version: 11.8  © 6375-3228 Healthwise, Incorporated. Care instructions adapted under license by [facility] ([facility]). If you have questions about a medical condition or this instruction, always ask your healthcare professional. [place] [address] any warranty or liability for your use of this information.

## 2018-11-19 ENCOUNTER — HOSPITAL ENCOUNTER (OUTPATIENT)
Dept: PULMONOLOGY | Age: 68
Discharge: HOME | End: 2018-11-19
Payer: MEDICARE

## 2018-11-19 DIAGNOSIS — Z79.899 ON AMIODARONE THERAPY: ICD-10-CM

## 2018-11-19 PROCEDURE — 94726 PLETHYSMOGRAPHY LUNG VOLUMES: CPT

## 2018-11-19 PROCEDURE — 94010 BREATHING CAPACITY TEST: CPT | Performed by: INTERNAL MEDICINE

## 2018-11-19 PROCEDURE — 94010 BREATHING CAPACITY TEST: CPT

## 2018-11-19 PROCEDURE — 94726 PLETHYSMOGRAPHY LUNG VOLUMES: CPT | Performed by: INTERNAL MEDICINE

## 2018-11-19 PROCEDURE — 94664 DEMO&/EVAL PT USE INHALER: CPT

## 2018-11-19 PROCEDURE — 94729 DIFFUSING CAPACITY: CPT

## 2018-11-19 PROCEDURE — 94729 DIFFUSING CAPACITY: CPT | Performed by: INTERNAL MEDICINE

## 2018-11-27 ENCOUNTER — TELEPHONE (OUTPATIENT)
Dept: BARIATRICS/WEIGHT MGMT | Age: 68
End: 2018-11-27

## 2018-11-30 RX ORDER — APIXABAN 5 MG/1
TABLET, FILM COATED ORAL
Qty: 60 TABLET | Refills: 6 | Status: SHIPPED | OUTPATIENT
Start: 2018-11-30 | End: 2019-07-30 | Stop reason: SDUPTHER

## 2018-12-04 DIAGNOSIS — E03.9 ACQUIRED HYPOTHYROIDISM: ICD-10-CM

## 2018-12-06 RX ORDER — LEVOTHYROXINE SODIUM 0.03 MG/1
25 TABLET ORAL DAILY
Qty: 90 TABLET | Refills: 3 | Status: SHIPPED | OUTPATIENT
Start: 2018-12-06 | End: 2019-03-18 | Stop reason: SDUPTHER

## 2018-12-10 ENCOUNTER — OFFICE VISIT (OUTPATIENT)
Dept: BARIATRICS/WEIGHT MGMT | Age: 68
End: 2018-12-10
Payer: COMMERCIAL

## 2018-12-10 VITALS
SYSTOLIC BLOOD PRESSURE: 158 MMHG | DIASTOLIC BLOOD PRESSURE: 88 MMHG | BODY MASS INDEX: 39.17 KG/M2 | HEART RATE: 76 BPM | WEIGHT: 315 LBS | HEIGHT: 75 IN

## 2018-12-10 DIAGNOSIS — I10 ESSENTIAL HYPERTENSION: ICD-10-CM

## 2018-12-10 DIAGNOSIS — E11.69 DIABETES MELLITUS TYPE 2 IN OBESE (HCC): ICD-10-CM

## 2018-12-10 DIAGNOSIS — Z98.84 H/O GASTRIC BYPASS: ICD-10-CM

## 2018-12-10 DIAGNOSIS — Z71.3 DIETARY COUNSELING AND SURVEILLANCE: ICD-10-CM

## 2018-12-10 DIAGNOSIS — E66.9 DIABETES MELLITUS TYPE 2 IN OBESE (HCC): ICD-10-CM

## 2018-12-10 DIAGNOSIS — E66.01 MORBID OBESITY WITH BMI OF 40.0-44.9, ADULT (HCC): Primary | ICD-10-CM

## 2018-12-10 PROCEDURE — 2022F DILAT RTA XM EVC RTNOPTHY: CPT | Performed by: FAMILY MEDICINE

## 2018-12-10 PROCEDURE — G8417 CALC BMI ABV UP PARAM F/U: HCPCS | Performed by: FAMILY MEDICINE

## 2018-12-10 PROCEDURE — 99204 OFFICE O/P NEW MOD 45 MIN: CPT | Performed by: FAMILY MEDICINE

## 2018-12-10 PROCEDURE — 3045F PR MOST RECENT HEMOGLOBIN A1C LEVEL 7.0-9.0%: CPT | Performed by: FAMILY MEDICINE

## 2018-12-10 PROCEDURE — G8482 FLU IMMUNIZE ORDER/ADMIN: HCPCS | Performed by: FAMILY MEDICINE

## 2018-12-10 PROCEDURE — 1036F TOBACCO NON-USER: CPT | Performed by: FAMILY MEDICINE

## 2018-12-10 PROCEDURE — 4040F PNEUMOC VAC/ADMIN/RCVD: CPT | Performed by: FAMILY MEDICINE

## 2018-12-10 PROCEDURE — 3017F COLORECTAL CA SCREEN DOC REV: CPT | Performed by: FAMILY MEDICINE

## 2018-12-10 PROCEDURE — 1101F PT FALLS ASSESS-DOCD LE1/YR: CPT | Performed by: FAMILY MEDICINE

## 2018-12-10 PROCEDURE — G8427 DOCREV CUR MEDS BY ELIG CLIN: HCPCS | Performed by: FAMILY MEDICINE

## 2018-12-10 PROCEDURE — 1123F ACP DISCUSS/DSCN MKR DOCD: CPT | Performed by: FAMILY MEDICINE

## 2018-12-10 ASSESSMENT — ENCOUNTER SYMPTOMS
RESPIRATORY NEGATIVE: 1
GASTROINTESTINAL NEGATIVE: 1
EYES NEGATIVE: 1

## 2018-12-10 NOTE — PROGRESS NOTES
Patient: Vini Barrera  Encounter Date: 12/10/2018    YOB: 1950               Age: 79 y.o.    BP (!) 158/88   Pulse 76   Ht 6' 3\" (1.905 m)   Wt (!) 358 lb (162.4 kg)   BMI 44.75 kg/m²                                          Body mass index is 44.75 kg/m². Chief Complaint   Patient presents with    Weight Management     NP, MWM       HPI:    79 y.o. male presents to establish care and join our medical weight loss program as referred by Dr. Neto Downing. The patient's medical history is significant for class III obesity (s/p Keyur-en-Y gastric bypass in 2012). The patient has a long-standing history of obesity which started gradually. The problem is severe. The patient has been struggling with weight regain. Presurgical weight: 426 pounds. Lowest postsurgical weight: 319 pounds. Net weight loss to date: 68 pounds. Does not have any restrictions from surgery. Can eat >2 cups per sitting. Risk factors include annual weight gain of >2 lbs (1 kg)/ year and sedentary lifestyle. Aggravating factors include poor diet and lack of physical activity. The patient has tried various diet/exercise plans which have been ineffective in the long-run. Motivated to start losing weight to help improve his overall health. Primarily interested in dietary counseling. When did you become overweight?   [] Childhood   [x] Teens   [x] Adulthood   [] Pregnancy   [] Menopause    Highest adult weight: 426 pounds at age 63    Weight history:     Vitals in Chronological Order 9/28/2017 10/31/2017 10/31/2017 12/6/2017   Weight 320 lb 329 lb  332 lb     Vitals in Chronological Order 12/28/2017 1/17/2018 1/30/2018 2/6/2018   Weight 333 lb 3.2 oz 334 lb 333 lb 332 lb 4 oz     Vitals in Chronological Order 2/13/2018 2/15/2018 2/20/2018 2/22/2018   Weight 335 lb 335 lb 335 lb 335 lb     Vitals in Chronological Order 2/27/2018 3/1/2018 3/20/2018 3/27/2018   Weight 335 lb 337 lb 337 lb 338 lb     Vitals in Chronological Order 3/29/2018 6/6/2018 6/19/2018 7/6/2018   Weight 342 lb 345 lb 6.4 oz 344 lb 346 lb 6.4 oz     Vitals in Chronological Order 7/9/2018 9/19/2018 10/23/2018 10/23/2018   Weight 345 lb 350 lb 356 lb 3.2 oz      Vitals in Chronological Order 11/7/2018 12/10/2018 12/10/2018   Weight 355 lb 358 lb          Triggers for weight gain? [] Stress   [] Illness   [] Medications   [] Travel  []Injury     [] Nightshift work   [] Insomnia  [x] No specific triggers   [] Other    Food triggers:   [x] Stress   [x] Boredom   [x] Fast food   [x] Eating out   [] Seeking reward   [] Social     Have you ever taken medications to help you lose weight? [] Yes  [x] No    Have you ever been on a meal replacement program?  [] Yes  [x] No      Dietitian'sassessment reviewed and addressed with patient.        Reviewed:  [x] Nutrition and the importance of regular protein intake  [x] Hidden CHO/carbohydrate sources  [x] Alcohol use  [x] Tobacco use  [x] Drug use- Denies   [x] Importance of exercise and reducing sedentary time  [x] Labs      No Known Allergies      Current Outpatient Prescriptions:     levothyroxine (SYNTHROID) 25 MCG tablet, TAKE 1 TABLET BY MOUTH DAILY, Disp: 90 tablet, Rfl: 3    ELIQUIS 5 MG TABS tablet, TAKE 1 TABLET BY MOUTH TWICE DAILY, Disp: 60 tablet, Rfl: 6    spironolactone (ALDACTONE) 25 MG tablet, TAKE 1 TABLET BY MOUTH DAILY, Disp: 30 tablet, Rfl: 5    lisinopril (PRINIVIL;ZESTRIL) 2.5 MG tablet, TAKE 1 TABLET BY MOUTH DAILY, Disp: 90 tablet, Rfl: 3    CIALIS 5 MG tablet, TAKE 1 TABLET BY MOUTH DAILY, Disp: 30 tablet, Rfl: 5    amiodarone (CORDARONE) 200 MG tablet, Take 1 tablet by mouth daily, Disp: 30 tablet, Rfl: 6    insulin aspart (NOVOLOG FLEXPEN) 100 UNIT/ML injection pen, Inject 20 Units into the skin 3 times daily (before meals), Disp: 5 pen, Rfl: 5    Insulin Degludec 100 UNIT/ML SOPN, Inject 4 Units into the skin Daily, Disp: 3 pen, Rfl: 5    omega-3 acid ethyl esters (LOVAZA) 1 g capsule, Right 10/8/15    INCISION AND DRAINAGE RIGHT FOOT         Family History   Problem Relation Age of Onset    Hypertension Mother     Heart Disease Father     Hypertension Maternal Grandmother     Diabetes Maternal Grandmother        Review of Systems   Constitutional:        Weight gain    HENT: Negative. Eyes: Negative. Respiratory: Negative. Cardiovascular: Negative. Gastrointestinal: Negative. Endocrine: Negative. Musculoskeletal: Negative. Neurological: Negative. Psychiatric/Behavioral: Negative. Physical Exam   Constitutional: He is oriented to person, place, and time. He appears well-developed and well-nourished. HENT:   Head: Normocephalic. Eyes: Conjunctivae and EOM are normal.   Neck: Normal range of motion. Neck supple. No thyromegaly present. Cardiovascular: Normal rate, regular rhythm and normal heart sounds. Exam reveals no gallop and no friction rub. No murmur heard. Pulmonary/Chest: Effort normal and breath sounds normal. No respiratory distress. He has no wheezes. He has no rales. Abdominal: Soft. He exhibits no mass. There is no tenderness. There is no rebound and no guarding. obese   Musculoskeletal: He exhibits edema. + 1 B/L LE   Lymphadenopathy:     He has no cervical adenopathy. Neurological: He is alert and oriented to person, place, and time. Skin: Skin is warm and dry. Psychiatric: He has a normal mood and affect.  His behavior is normal. Judgment and thought content normal.       Orders Only on 11/07/2018   Component Date Value Ref Range Status    TSH 11/07/2018 1.06  0.27 - 4.20 uIU/mL Final    Total Protein 11/07/2018 7.1  6.4 - 8.2 g/dL Final    Alb 11/07/2018 4.3  3.4 - 5.0 g/dL Final    Alkaline Phosphatase 11/07/2018 129  40 - 129 U/L Final    ALT 11/07/2018 20  10 - 40 U/L Final    AST 11/07/2018 18  15 - 37 U/L Final    Total Bilirubin 11/07/2018 0.3  0.0 - 1.0 mg/dL Final    Bilirubin, Direct 11/07/2018 <0.2  0.0 - 0.3

## 2018-12-19 ENCOUNTER — OFFICE VISIT (OUTPATIENT)
Dept: INTERNAL MEDICINE CLINIC | Age: 68
End: 2018-12-19
Payer: COMMERCIAL

## 2018-12-19 VITALS
HEART RATE: 72 BPM | OXYGEN SATURATION: 94 % | HEIGHT: 75 IN | SYSTOLIC BLOOD PRESSURE: 136 MMHG | WEIGHT: 315 LBS | BODY MASS INDEX: 39.17 KG/M2 | DIASTOLIC BLOOD PRESSURE: 78 MMHG

## 2018-12-19 DIAGNOSIS — S99.921A INJURY OF RIGHT GREAT TOE, INITIAL ENCOUNTER: ICD-10-CM

## 2018-12-19 DIAGNOSIS — E11.8 TYPE 2 DIABETES MELLITUS WITH COMPLICATION, WITH LONG-TERM CURRENT USE OF INSULIN (HCC): ICD-10-CM

## 2018-12-19 DIAGNOSIS — I10 ESSENTIAL HYPERTENSION: Primary | ICD-10-CM

## 2018-12-19 DIAGNOSIS — D64.9 CHRONIC ANEMIA: ICD-10-CM

## 2018-12-19 DIAGNOSIS — R53.83 OTHER FATIGUE: ICD-10-CM

## 2018-12-19 DIAGNOSIS — Z79.4 TYPE 2 DIABETES MELLITUS WITH COMPLICATION, WITH LONG-TERM CURRENT USE OF INSULIN (HCC): ICD-10-CM

## 2018-12-19 DIAGNOSIS — E78.2 MIXED HYPERLIPIDEMIA: ICD-10-CM

## 2018-12-19 LAB
FERRITIN: 26.9 NG/ML (ref 30–400)
GLUCOSE BLD-MCNC: 89 MG/DL
HBA1C MFR BLD: 8 %
HCT VFR BLD CALC: 38.7 % (ref 40.5–52.5)
HEMOGLOBIN: 12.3 G/DL (ref 13.5–17.5)
IRON SATURATION: 11 % (ref 20–50)
IRON: 38 UG/DL (ref 59–158)
MCH RBC QN AUTO: 27 PG (ref 26–34)
MCHC RBC AUTO-ENTMCNC: 31.8 G/DL (ref 31–36)
MCV RBC AUTO: 84.8 FL (ref 80–100)
PDW BLD-RTO: 15.7 % (ref 12.4–15.4)
PLATELET # BLD: 162 K/UL (ref 135–450)
PMV BLD AUTO: 11.3 FL (ref 5–10.5)
RBC # BLD: 4.56 M/UL (ref 4.2–5.9)
TOTAL IRON BINDING CAPACITY: 331 UG/DL (ref 260–445)
WBC # BLD: 5.5 K/UL (ref 4–11)

## 2018-12-19 PROCEDURE — 2022F DILAT RTA XM EVC RTNOPTHY: CPT | Performed by: INTERNAL MEDICINE

## 2018-12-19 PROCEDURE — 1123F ACP DISCUSS/DSCN MKR DOCD: CPT | Performed by: INTERNAL MEDICINE

## 2018-12-19 PROCEDURE — G8417 CALC BMI ABV UP PARAM F/U: HCPCS | Performed by: INTERNAL MEDICINE

## 2018-12-19 PROCEDURE — 1036F TOBACCO NON-USER: CPT | Performed by: INTERNAL MEDICINE

## 2018-12-19 PROCEDURE — G8427 DOCREV CUR MEDS BY ELIG CLIN: HCPCS | Performed by: INTERNAL MEDICINE

## 2018-12-19 PROCEDURE — 3017F COLORECTAL CA SCREEN DOC REV: CPT | Performed by: INTERNAL MEDICINE

## 2018-12-19 PROCEDURE — 82962 GLUCOSE BLOOD TEST: CPT | Performed by: INTERNAL MEDICINE

## 2018-12-19 PROCEDURE — 4040F PNEUMOC VAC/ADMIN/RCVD: CPT | Performed by: INTERNAL MEDICINE

## 2018-12-19 PROCEDURE — 1101F PT FALLS ASSESS-DOCD LE1/YR: CPT | Performed by: INTERNAL MEDICINE

## 2018-12-19 PROCEDURE — 3045F PR MOST RECENT HEMOGLOBIN A1C LEVEL 7.0-9.0%: CPT | Performed by: INTERNAL MEDICINE

## 2018-12-19 PROCEDURE — 99215 OFFICE O/P EST HI 40 MIN: CPT | Performed by: INTERNAL MEDICINE

## 2018-12-19 PROCEDURE — 83036 HEMOGLOBIN GLYCOSYLATED A1C: CPT | Performed by: INTERNAL MEDICINE

## 2018-12-19 PROCEDURE — G8482 FLU IMMUNIZE ORDER/ADMIN: HCPCS | Performed by: INTERNAL MEDICINE

## 2018-12-19 ASSESSMENT — PATIENT HEALTH QUESTIONNAIRE - PHQ9
SUM OF ALL RESPONSES TO PHQ QUESTIONS 1-9: 0
2. FEELING DOWN, DEPRESSED OR HOPELESS: 0
SUM OF ALL RESPONSES TO PHQ QUESTIONS 1-9: 0
SUM OF ALL RESPONSES TO PHQ9 QUESTIONS 1 & 2: 0
1. LITTLE INTEREST OR PLEASURE IN DOING THINGS: 0

## 2018-12-19 NOTE — PROGRESS NOTES
sounds and intact distal pulses. Pulmonary/Chest: Effort normal and breath sounds normal.   Abdominal: Soft. Bowel sounds are normal. He exhibits no mass. Musculoskeletal:   Right great toe nailbed as described in HPI. Left leg, 1-1/2 + leg edema, 1 + right leg edema. Lymphadenopathy:     He has no cervical adenopathy. Neurological: He is alert and oriented to person, place, and time. He has normal reflexes. Skin: Skin is warm and dry. Psychiatric: He has a normal mood and affect. His behavior is normal. Judgment and thought content normal.       Assessment:        Diagnosis Orders   1. Essential hypertension  Stable. Continue same medication regimen. DASH diet discussed. 2. Chronic anemia  CBC    IRON AND TIBC    FERRITIN  Low ferritin suggest blood loss anemia. Need GI consult. 3. Type 2 diabetes mellitus with complication, with long-term current use of insulin ([facility])  Diet, physical activity and weight loss discussed. Insulin adjustment with SS discussed. 4. BMI 40.0-44.9, adult ([facility])  Lower calories and regular physical activity discussed. 5. Mixed hyperlipidemia  Heart healthy diet and statin compliance discussed. 6. Injury of right great toe, initial encounter  Non traumatic nail removal. Tissue looks healthy. Podiatry f/u.   7. Other fatigue  Multiple factors including anemia, weight gain, conditioning, chronic HF, medication, etc.   Refer to GI for w/u. He is embarking on a healthier lifestyle. Cardiology f/u for any med and other adjustments. Plan:     See plans above.         Tyrone Cosby MD

## 2018-12-30 ASSESSMENT — ENCOUNTER SYMPTOMS
EYES NEGATIVE: 1
RESPIRATORY NEGATIVE: 1
GASTROINTESTINAL NEGATIVE: 1

## 2018-12-31 ENCOUNTER — TELEPHONE (OUTPATIENT)
Dept: CARDIOLOGY CLINIC | Age: 68
End: 2018-12-31

## 2019-01-02 RX ORDER — FUROSEMIDE 40 MG/1
40 TABLET ORAL DAILY
Qty: 90 TABLET | Refills: 1 | Status: ON HOLD | OUTPATIENT
Start: 2019-01-02 | End: 2019-03-08 | Stop reason: HOSPADM

## 2019-01-23 ENCOUNTER — TELEPHONE (OUTPATIENT)
Dept: CARDIOLOGY CLINIC | Age: 69
End: 2019-01-23

## 2019-01-23 ENCOUNTER — TELEPHONE (OUTPATIENT)
Dept: INTERNAL MEDICINE CLINIC | Age: 69
End: 2019-01-23

## 2019-01-31 ENCOUNTER — TELEPHONE (OUTPATIENT)
Dept: INTERNAL MEDICINE CLINIC | Age: 69
End: 2019-01-31

## 2019-01-31 DIAGNOSIS — D50.0 IRON DEFICIENCY ANEMIA DUE TO CHRONIC BLOOD LOSS: Primary | ICD-10-CM

## 2019-02-11 ENCOUNTER — TELEPHONE (OUTPATIENT)
Dept: BARIATRICS/WEIGHT MGMT | Age: 69
End: 2019-02-11

## 2019-02-14 ENCOUNTER — TELEPHONE (OUTPATIENT)
Dept: PULMONOLOGY | Age: 69
End: 2019-02-14

## 2019-02-15 ENCOUNTER — TELEPHONE (OUTPATIENT)
Dept: INTERNAL MEDICINE CLINIC | Age: 69
End: 2019-02-15

## 2019-02-20 ENCOUNTER — TELEPHONE (OUTPATIENT)
Dept: CARDIOLOGY CLINIC | Age: 69
End: 2019-02-20

## 2019-03-08 ENCOUNTER — HOSPITAL ENCOUNTER (OUTPATIENT)
Age: 69
Setting detail: OUTPATIENT SURGERY
Discharge: HOME | End: 2019-03-08
Attending: INTERNAL MEDICINE | Admitting: INTERNAL MEDICINE
Payer: MEDICARE

## 2019-03-08 ENCOUNTER — ANESTHESIA (OUTPATIENT)
Dept: ENDOSCOPY | Age: 69
End: 2019-03-08
Payer: MEDICARE

## 2019-03-08 ENCOUNTER — ANESTHESIA EVENT (OUTPATIENT)
Dept: ENDOSCOPY | Age: 69
End: 2019-03-08
Payer: MEDICARE

## 2019-03-08 VITALS
OXYGEN SATURATION: 94 % | DIASTOLIC BLOOD PRESSURE: 67 MMHG | RESPIRATION RATE: 20 BRPM | HEIGHT: 76 IN | HEART RATE: 60 BPM | WEIGHT: 315 LBS | SYSTOLIC BLOOD PRESSURE: 123 MMHG | TEMPERATURE: 96.8 F | BODY MASS INDEX: 38.36 KG/M2

## 2019-03-08 VITALS
SYSTOLIC BLOOD PRESSURE: 98 MMHG | DIASTOLIC BLOOD PRESSURE: 60 MMHG | RESPIRATION RATE: 24 BRPM | OXYGEN SATURATION: 99 %

## 2019-03-08 LAB
GLUCOSE BLD-MCNC: 172 MG/DL (ref 70–99)
PERFORMED ON: ABNORMAL

## 2019-03-08 PROCEDURE — 2580000003 HC RX 258: Performed by: NURSE ANESTHETIST, CERTIFIED REGISTERED

## 2019-03-08 PROCEDURE — 7100000011 HC PHASE II RECOVERY - ADDTL 15 MIN: Performed by: INTERNAL MEDICINE

## 2019-03-08 PROCEDURE — 7100000010 HC PHASE II RECOVERY - FIRST 15 MIN: Performed by: INTERNAL MEDICINE

## 2019-03-08 PROCEDURE — 3609009500 HC COLONOSCOPY DIAGNOSTIC OR SCREENING: Performed by: INTERNAL MEDICINE

## 2019-03-08 PROCEDURE — 2709999900 HC NON-CHARGEABLE SUPPLY: Performed by: INTERNAL MEDICINE

## 2019-03-08 PROCEDURE — 2500000003 HC RX 250 WO HCPCS: Performed by: NURSE ANESTHETIST, CERTIFIED REGISTERED

## 2019-03-08 PROCEDURE — 6360000002 HC RX W HCPCS: Performed by: INTERNAL MEDICINE

## 2019-03-08 PROCEDURE — 3700000001 HC ADD 15 MINUTES (ANESTHESIA): Performed by: INTERNAL MEDICINE

## 2019-03-08 PROCEDURE — 6360000002 HC RX W HCPCS: Performed by: NURSE ANESTHETIST, CERTIFIED REGISTERED

## 2019-03-08 PROCEDURE — 3609012400 HC EGD TRANSORAL BIOPSY SINGLE/MULTIPLE: Performed by: INTERNAL MEDICINE

## 2019-03-08 PROCEDURE — 88305 TISSUE EXAM BY PATHOLOGIST: CPT

## 2019-03-08 PROCEDURE — 3700000000 HC ANESTHESIA ATTENDED CARE: Performed by: INTERNAL MEDICINE

## 2019-03-08 PROCEDURE — 2580000003 HC RX 258: Performed by: INTERNAL MEDICINE

## 2019-03-08 RX ORDER — SODIUM CHLORIDE, SODIUM LACTATE, POTASSIUM CHLORIDE, CALCIUM CHLORIDE 600; 310; 30; 20 MG/100ML; MG/100ML; MG/100ML; MG/100ML
INJECTION, SOLUTION INTRAVENOUS CONTINUOUS PRN
Status: DISCONTINUED | OUTPATIENT
Start: 2019-03-08 | End: 2019-03-08 | Stop reason: SDUPTHER

## 2019-03-08 RX ORDER — PROPOFOL 10 MG/ML
INJECTION, EMULSION INTRAVENOUS PRN
Status: DISCONTINUED | OUTPATIENT
Start: 2019-03-08 | End: 2019-03-08 | Stop reason: SDUPTHER

## 2019-03-08 RX ORDER — LEVOTHYROXINE SODIUM 0.03 MG/1
25 TABLET ORAL DAILY
Refills: 3 | Status: ON HOLD | COMMUNITY
Start: 2019-02-13 | End: 2019-08-28

## 2019-03-08 RX ORDER — LIDOCAINE HYDROCHLORIDE 10 MG/ML
INJECTION, SOLUTION EPIDURAL; INFILTRATION; INTRACAUDAL; PERINEURAL PRN
Status: DISCONTINUED | OUTPATIENT
Start: 2019-03-08 | End: 2019-03-08 | Stop reason: SDUPTHER

## 2019-03-08 RX ORDER — LANOLIN ALCOHOL/MO/W.PET/CERES
325 CREAM (GRAM) TOPICAL
Status: ON HOLD | COMMUNITY
End: 2020-07-20

## 2019-03-08 RX ORDER — AMPICILLIN AND SULBACTAM 2; 1 G/1; G/1
3 INJECTION, POWDER, FOR SOLUTION INTRAMUSCULAR; INTRAVENOUS ONCE
Status: DISCONTINUED | OUTPATIENT
Start: 2019-03-08 | End: 2019-03-08

## 2019-03-08 RX ADMIN — AMPICILLIN SODIUM AND SULBACTAM SODIUM 3 G: 2; 1 INJECTION, POWDER, FOR SOLUTION INTRAMUSCULAR; INTRAVENOUS at 15:27

## 2019-03-08 RX ADMIN — PROPOFOL 20 MG: 10 INJECTION, EMULSION INTRAVENOUS at 14:56

## 2019-03-08 RX ADMIN — PROPOFOL 20 MG: 10 INJECTION, EMULSION INTRAVENOUS at 14:44

## 2019-03-08 RX ADMIN — PROPOFOL 20 MG: 10 INJECTION, EMULSION INTRAVENOUS at 15:01

## 2019-03-08 RX ADMIN — SODIUM CHLORIDE, SODIUM LACTATE, POTASSIUM CHLORIDE, AND CALCIUM CHLORIDE: 600; 310; 30; 20 INJECTION, SOLUTION INTRAVENOUS at 14:36

## 2019-03-08 RX ADMIN — PROPOFOL 30 MG: 10 INJECTION, EMULSION INTRAVENOUS at 14:54

## 2019-03-08 RX ADMIN — PROPOFOL 50 MG: 10 INJECTION, EMULSION INTRAVENOUS at 14:42

## 2019-03-08 RX ADMIN — LIDOCAINE HYDROCHLORIDE 40 MG: 10 INJECTION, SOLUTION EPIDURAL; INFILTRATION; INTRACAUDAL; PERINEURAL at 14:42

## 2019-03-08 RX ADMIN — PROPOFOL 40 MG: 10 INJECTION, EMULSION INTRAVENOUS at 14:52

## 2019-03-08 RX ADMIN — PROPOFOL 20 MG: 10 INJECTION, EMULSION INTRAVENOUS at 14:49

## 2019-03-08 RX ADMIN — PROPOFOL 30 MG: 10 INJECTION, EMULSION INTRAVENOUS at 14:58

## 2019-03-08 RX ADMIN — PROPOFOL 20 MG: 10 INJECTION, EMULSION INTRAVENOUS at 14:47

## 2019-03-08 ASSESSMENT — PULMONARY FUNCTION TESTS
PIF_VALUE: 0
PIF_VALUE: 1
PIF_VALUE: 0
PIF_VALUE: 1
PIF_VALUE: 1
PIF_VALUE: 0

## 2019-03-08 ASSESSMENT — PAIN - FUNCTIONAL ASSESSMENT
PAIN_FUNCTIONAL_ASSESSMENT: 0-10

## 2019-03-08 ASSESSMENT — ENCOUNTER SYMPTOMS
SHORTNESS OF BREATH: 1
COUGH: 1

## 2019-03-14 ENCOUNTER — TELEPHONE (OUTPATIENT)
Dept: CARDIOLOGY CLINIC | Age: 69
End: 2019-03-14

## 2019-03-18 ENCOUNTER — TELEPHONE (OUTPATIENT)
Dept: INTERNAL MEDICINE CLINIC | Age: 69
End: 2019-03-18

## 2019-03-18 DIAGNOSIS — E03.9 ACQUIRED HYPOTHYROIDISM: ICD-10-CM

## 2019-03-18 RX ORDER — LEVOTHYROXINE SODIUM 0.03 MG/1
25 TABLET ORAL DAILY
Qty: 90 TABLET | Refills: 3 | Status: SHIPPED | OUTPATIENT
Start: 2019-03-18 | End: 2019-04-29

## 2019-03-19 ENCOUNTER — TELEPHONE (OUTPATIENT)
Dept: INTERNAL MEDICINE CLINIC | Age: 69
End: 2019-03-19

## 2019-03-19 ENCOUNTER — NURSE ONLY (OUTPATIENT)
Dept: CARDIOLOGY CLINIC | Age: 69
End: 2019-03-19
Payer: COMMERCIAL

## 2019-03-19 DIAGNOSIS — I50.22 CHRONIC SYSTOLIC HF (HEART FAILURE) (HCC): ICD-10-CM

## 2019-03-19 DIAGNOSIS — I42.8 NICM (NONISCHEMIC CARDIOMYOPATHY) (HCC): ICD-10-CM

## 2019-03-19 DIAGNOSIS — Z95.810 CARDIAC RESYNCHRONIZATION THERAPY DEFIBRILLATOR (CRT-D) IN PLACE: Primary | ICD-10-CM

## 2019-03-19 PROCEDURE — 93296 REM INTERROG EVL PM/IDS: CPT | Performed by: INTERNAL MEDICINE

## 2019-03-19 PROCEDURE — 93295 DEV INTERROG REMOTE 1/2/MLT: CPT | Performed by: INTERNAL MEDICINE

## 2019-03-19 PROCEDURE — 93297 REM INTERROG DEV EVAL ICPMS: CPT | Performed by: INTERNAL MEDICINE

## 2019-03-20 ENCOUNTER — OFFICE VISIT (OUTPATIENT)
Dept: BARIATRICS/WEIGHT MGMT | Age: 69
End: 2019-03-20
Payer: COMMERCIAL

## 2019-03-20 VITALS
SYSTOLIC BLOOD PRESSURE: 110 MMHG | RESPIRATION RATE: 16 BRPM | BODY MASS INDEX: 39.17 KG/M2 | DIASTOLIC BLOOD PRESSURE: 70 MMHG | WEIGHT: 315 LBS | HEIGHT: 75 IN | HEART RATE: 68 BPM

## 2019-03-20 DIAGNOSIS — E66.01 MORBID OBESITY WITH BMI OF 40.0-44.9, ADULT (HCC): Primary | ICD-10-CM

## 2019-03-20 DIAGNOSIS — I10 ESSENTIAL HYPERTENSION: ICD-10-CM

## 2019-03-20 DIAGNOSIS — E78.5 HYPERLIPIDEMIA, UNSPECIFIED HYPERLIPIDEMIA TYPE: ICD-10-CM

## 2019-03-20 DIAGNOSIS — Z98.84 H/O GASTRIC BYPASS: ICD-10-CM

## 2019-03-20 DIAGNOSIS — G47.33 OBSTRUCTIVE SLEEP APNEA: ICD-10-CM

## 2019-03-20 PROCEDURE — 3017F COLORECTAL CA SCREEN DOC REV: CPT | Performed by: NURSE PRACTITIONER

## 2019-03-20 PROCEDURE — G8427 DOCREV CUR MEDS BY ELIG CLIN: HCPCS | Performed by: NURSE PRACTITIONER

## 2019-03-20 PROCEDURE — 4040F PNEUMOC VAC/ADMIN/RCVD: CPT | Performed by: NURSE PRACTITIONER

## 2019-03-20 PROCEDURE — 99213 OFFICE O/P EST LOW 20 MIN: CPT | Performed by: NURSE PRACTITIONER

## 2019-03-20 PROCEDURE — G8482 FLU IMMUNIZE ORDER/ADMIN: HCPCS | Performed by: NURSE PRACTITIONER

## 2019-03-20 PROCEDURE — G8417 CALC BMI ABV UP PARAM F/U: HCPCS | Performed by: NURSE PRACTITIONER

## 2019-03-20 PROCEDURE — 1101F PT FALLS ASSESS-DOCD LE1/YR: CPT | Performed by: NURSE PRACTITIONER

## 2019-03-20 PROCEDURE — 1036F TOBACCO NON-USER: CPT | Performed by: NURSE PRACTITIONER

## 2019-03-20 PROCEDURE — 1123F ACP DISCUSS/DSCN MKR DOCD: CPT | Performed by: NURSE PRACTITIONER

## 2019-03-20 ASSESSMENT — ENCOUNTER SYMPTOMS
ALLERGIC/IMMUNOLOGIC NEGATIVE: 1
EYES NEGATIVE: 1
RESPIRATORY NEGATIVE: 1
GASTROINTESTINAL NEGATIVE: 1

## 2019-03-21 ENCOUNTER — OFFICE VISIT (OUTPATIENT)
Dept: INTERNAL MEDICINE CLINIC | Age: 69
End: 2019-03-21
Payer: COMMERCIAL

## 2019-03-21 VITALS
BODY MASS INDEX: 39.17 KG/M2 | HEART RATE: 79 BPM | SYSTOLIC BLOOD PRESSURE: 136 MMHG | HEIGHT: 75 IN | WEIGHT: 315 LBS | OXYGEN SATURATION: 97 % | DIASTOLIC BLOOD PRESSURE: 78 MMHG

## 2019-03-21 DIAGNOSIS — D50.8 OTHER IRON DEFICIENCY ANEMIA: ICD-10-CM

## 2019-03-21 DIAGNOSIS — E56.9 VITAMIN DEFICIENCY: ICD-10-CM

## 2019-03-21 DIAGNOSIS — E11.8 TYPE 2 DIABETES MELLITUS WITH COMPLICATION, WITH LONG-TERM CURRENT USE OF INSULIN (HCC): Primary | ICD-10-CM

## 2019-03-21 DIAGNOSIS — Z98.84 H/O GASTRIC BYPASS: ICD-10-CM

## 2019-03-21 DIAGNOSIS — Z79.4 TYPE 2 DIABETES MELLITUS WITH COMPLICATION, WITH LONG-TERM CURRENT USE OF INSULIN (HCC): Primary | ICD-10-CM

## 2019-03-21 DIAGNOSIS — E78.2 MIXED HYPERLIPIDEMIA: ICD-10-CM

## 2019-03-21 DIAGNOSIS — R53.81 PHYSICAL DECONDITIONING: ICD-10-CM

## 2019-03-21 DIAGNOSIS — I10 ESSENTIAL HYPERTENSION: ICD-10-CM

## 2019-03-21 LAB
FERRITIN: 27.2 NG/ML (ref 30–400)
GLUCOSE BLD-MCNC: 166 MG/DL
HBA1C MFR BLD: 7.2 %
HCT VFR BLD CALC: 39.4 % (ref 40.5–52.5)
HEMOGLOBIN: 12.4 G/DL (ref 13.5–17.5)
IRON SATURATION: 18 % (ref 20–50)
IRON: 55 UG/DL (ref 59–158)
MCH RBC QN AUTO: 27.3 PG (ref 26–34)
MCHC RBC AUTO-ENTMCNC: 31.4 G/DL (ref 31–36)
MCV RBC AUTO: 86.8 FL (ref 80–100)
PDW BLD-RTO: 14.7 % (ref 12.4–15.4)
PLATELET # BLD: 159 K/UL (ref 135–450)
PMV BLD AUTO: 11.2 FL (ref 5–10.5)
RBC # BLD: 4.53 M/UL (ref 4.2–5.9)
TOTAL IRON BINDING CAPACITY: 307 UG/DL (ref 260–445)
WBC # BLD: 4.8 K/UL (ref 4–11)

## 2019-03-21 PROCEDURE — G8417 CALC BMI ABV UP PARAM F/U: HCPCS | Performed by: INTERNAL MEDICINE

## 2019-03-21 PROCEDURE — 82962 GLUCOSE BLOOD TEST: CPT | Performed by: INTERNAL MEDICINE

## 2019-03-21 PROCEDURE — G8427 DOCREV CUR MEDS BY ELIG CLIN: HCPCS | Performed by: INTERNAL MEDICINE

## 2019-03-21 PROCEDURE — 83036 HEMOGLOBIN GLYCOSYLATED A1C: CPT | Performed by: INTERNAL MEDICINE

## 2019-03-21 PROCEDURE — G8482 FLU IMMUNIZE ORDER/ADMIN: HCPCS | Performed by: INTERNAL MEDICINE

## 2019-03-21 PROCEDURE — 2022F DILAT RTA XM EVC RTNOPTHY: CPT | Performed by: INTERNAL MEDICINE

## 2019-03-21 PROCEDURE — 1123F ACP DISCUSS/DSCN MKR DOCD: CPT | Performed by: INTERNAL MEDICINE

## 2019-03-21 PROCEDURE — 3045F PR MOST RECENT HEMOGLOBIN A1C LEVEL 7.0-9.0%: CPT | Performed by: INTERNAL MEDICINE

## 2019-03-21 PROCEDURE — 99214 OFFICE O/P EST MOD 30 MIN: CPT | Performed by: INTERNAL MEDICINE

## 2019-03-21 PROCEDURE — 1036F TOBACCO NON-USER: CPT | Performed by: INTERNAL MEDICINE

## 2019-03-21 PROCEDURE — 3017F COLORECTAL CA SCREEN DOC REV: CPT | Performed by: INTERNAL MEDICINE

## 2019-03-21 PROCEDURE — 4040F PNEUMOC VAC/ADMIN/RCVD: CPT | Performed by: INTERNAL MEDICINE

## 2019-03-21 ASSESSMENT — PATIENT HEALTH QUESTIONNAIRE - PHQ9
SUM OF ALL RESPONSES TO PHQ9 QUESTIONS 1 & 2: 0
SUM OF ALL RESPONSES TO PHQ QUESTIONS 1-9: 0
2. FEELING DOWN, DEPRESSED OR HOPELESS: 0
1. LITTLE INTEREST OR PLEASURE IN DOING THINGS: 0
SUM OF ALL RESPONSES TO PHQ QUESTIONS 1-9: 0

## 2019-03-25 LAB
ALPHA-TOCOPHEROL: 8.4 MG/L (ref 5.5–18)
GAMMA-TOCOPHEROL: 0.6 MG/L (ref 0–6)
RETINYL PALMITATE: <0.02 MG/L (ref 0–0.1)
VITAMIN A LEVEL: 0.52 MG/L (ref 0.3–1.2)
VITAMIN A, INTERP: NORMAL
VITAMIN B1, PLASMA: 16 NMOL/L (ref 4–15)
VITAMIN K1: 0.85 NMOL/L (ref 0.22–4.88)

## 2019-03-27 ENCOUNTER — TELEPHONE (OUTPATIENT)
Dept: INTERNAL MEDICINE CLINIC | Age: 69
End: 2019-03-27

## 2019-03-29 ASSESSMENT — ENCOUNTER SYMPTOMS
RESPIRATORY NEGATIVE: 1
EYES NEGATIVE: 1

## 2019-04-29 ENCOUNTER — OFFICE VISIT (OUTPATIENT)
Dept: CARDIOLOGY CLINIC | Age: 69
End: 2019-04-29
Payer: COMMERCIAL

## 2019-04-29 ENCOUNTER — TELEPHONE (OUTPATIENT)
Dept: INTERNAL MEDICINE CLINIC | Age: 69
End: 2019-04-29

## 2019-04-29 VITALS
WEIGHT: 315 LBS | DIASTOLIC BLOOD PRESSURE: 70 MMHG | BODY MASS INDEX: 43.57 KG/M2 | HEART RATE: 66 BPM | SYSTOLIC BLOOD PRESSURE: 118 MMHG

## 2019-04-29 DIAGNOSIS — E11.9 TYPE 2 DIABETES MELLITUS WITHOUT COMPLICATION, UNSPECIFIED WHETHER LONG TERM INSULIN USE (HCC): Primary | ICD-10-CM

## 2019-04-29 DIAGNOSIS — I10 ESSENTIAL HYPERTENSION: ICD-10-CM

## 2019-04-29 DIAGNOSIS — I42.9 CARDIOMYOPATHY, UNSPECIFIED TYPE (HCC): ICD-10-CM

## 2019-04-29 DIAGNOSIS — I50.22 CHRONIC SYSTOLIC HEART FAILURE (HCC): ICD-10-CM

## 2019-04-29 DIAGNOSIS — I42.0 DILATED CARDIOMYOPATHY (HCC): ICD-10-CM

## 2019-04-29 DIAGNOSIS — G62.9 NEUROPATHY: ICD-10-CM

## 2019-04-29 DIAGNOSIS — I48.0 PAROXYSMAL ATRIAL FIBRILLATION (HCC): ICD-10-CM

## 2019-04-29 PROCEDURE — 3045F PR MOST RECENT HEMOGLOBIN A1C LEVEL 7.0-9.0%: CPT | Performed by: INTERNAL MEDICINE

## 2019-04-29 PROCEDURE — 1036F TOBACCO NON-USER: CPT | Performed by: INTERNAL MEDICINE

## 2019-04-29 PROCEDURE — 99214 OFFICE O/P EST MOD 30 MIN: CPT | Performed by: INTERNAL MEDICINE

## 2019-04-29 PROCEDURE — G8427 DOCREV CUR MEDS BY ELIG CLIN: HCPCS | Performed by: INTERNAL MEDICINE

## 2019-04-29 PROCEDURE — 3017F COLORECTAL CA SCREEN DOC REV: CPT | Performed by: INTERNAL MEDICINE

## 2019-04-29 PROCEDURE — 2022F DILAT RTA XM EVC RTNOPTHY: CPT | Performed by: INTERNAL MEDICINE

## 2019-04-29 PROCEDURE — 4040F PNEUMOC VAC/ADMIN/RCVD: CPT | Performed by: INTERNAL MEDICINE

## 2019-04-29 PROCEDURE — G8417 CALC BMI ABV UP PARAM F/U: HCPCS | Performed by: INTERNAL MEDICINE

## 2019-04-29 PROCEDURE — 1123F ACP DISCUSS/DSCN MKR DOCD: CPT | Performed by: INTERNAL MEDICINE

## 2019-04-29 NOTE — PROGRESS NOTES
[address]  Office Visit           Joelyn Gottron MD,  [place] Counter APRN FNP CVNP       Cardiology           Roopa Quiñones  1950    April 29, 2019    CC: here with CM     HPI:  The patient is 79 y.o. male with hx CM HFpEF, pafib. s/p DCCV  HTN HLD CRD DMT2 / BiV ICD in situ / obesity / WILL uses CPAP    AICD interrogation are with  Dr [facility] office routinely     No c/o cp / has ocas woozy feeling / no falls uses cane/ <1+ edema / near euvolemic /  Down 10# / works out routinely / has weak legs with neuropathy   No c/o n/v diarrhea  / no wt gain no cough fever chills     Reviewed most recent test with pt. Review of Systems:  Constitutional: Denies  fatigue, weakness, night sweats or fever. HEENT: Denies new visual changes, ringing in ears, nosebleeds,nasal congestion  Respiratory: Denies new or change in SOB, PND, orthopnea or cough. Cardiovascular: see HPI  GI: Denies N/V, diarrhea, constipation, abdominal pain, change in bowel habits, melena or hematochezia  : Denies urinary frequency, urgency, incontinence, hematuria or dysuria. Skin: Denies rash, hives, or cyanosis  Musculoskeletal: Denies joint or muscle aches/pain  Neurological: Denies syncope or TIA-like symptoms.   Psychiatric: Denies anxiety, insomnia or depression     Past Medical History:   Diagnosis Date    Atrial fibrillation ([facility]     Back pain     Cardiomyopathy     CHF (congestive heart failure) (HCC)     COPD (chronic obstructive pulmonary disease) (HCC)     Dyslipidemia     GERD (gastroesophageal reflux disease)     Hyperlipidemia     Hypertension     Hypothyroidism     Leg edema     MRSA (methicillin resistant staph aureus) culture positive 10/5/15    foot/bone    MRSA nasal colonization 05/05/2017    Obesity     Prolonged emergence from general anesthesia     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     Stroke [facility])     Thyroid disease     Type 2 diabetes mellitus without complication (HCC)     Type II or unspecified type diabetes mellitus without mention of complication, not stated as uncontrolled      Past Surgical History:   Procedure Laterality Date    ADRENAL GLAND SURGERY  1970    CARDIAC DEFIBRILLATOR PLACEMENT  2017    Dr. Calabrese Reasons COLONOSCOPY N/A 3/8/2019    COLONOSCOPY WITH CHRISTIANNE MARAVILLA (3gm) performed by Yovany Pina MD at [address]  1-2-10    GASTRIC BYPASS SURGERY      OTHER SURGICAL HISTORY  10/6/15    INCISION AND DRAINAGE RIGHT FOOT          OTHER SURGICAL HISTORY Right 10/8/15    INCISION AND DRAINAGE RIGHT FOOT      PACEMAKER PLACEMENT      UPPER GASTROINTESTINAL ENDOSCOPY N/A 3/8/2019    EGD BIOPSY GASTRIC H. PYLORI performed by Yovany Pina MD at [address] ENDOSCOPY     Family History   Problem Relation Age of Onset    Hypertension Mother     Heart Disease Father     Hypertension Maternal Grandmother     Diabetes Maternal Grandmother      Social History     Tobacco Use    Smoking status: Former Smoker     Packs/day: 1.00     Years: 4.00     Pack years: 4.00     Types: Cigarettes     Last attempt to quit: 2017     Years since quittin.3    Smokeless tobacco: Never Used   Substance Use Topics    Alcohol use: Yes     Comment: 2-3 times per year    Drug use: No       No Known Allergies  Current Outpatient Medications   Medication Sig Dispense Refill    levothyroxine (SYNTHROID) 25 MCG tablet Take 25 mcg by mouth daily  3    ferrous sulfate 325 (65 Fe) MG EC tablet Take 325 mg by mouth daily (with breakfast)      carvedilol (COREG) 12.5 MG tablet TAKE 1 TABLET BY MOUTH TWICE DAILY 60 tablet 5    amLODIPine (NORVASC) 2.5 MG tablet TAKE 1 TABLET BY MOUTH DAILY 90 tablet 5    ELIQUIS 5 MG TABS tablet TAKE 1 TABLET BY MOUTH TWICE DAILY 60 tablet 6    spironolactone (ALDACTONE) 25 MG tablet TAKE 1 TABLET BY MOUTH DAILY 30 tablet 5    lisinopril (PRINIVIL;ZESTRIL) 2.5 MG tablet TAKE 1 TABLET BY MOUTH DAILY 90 tablet 3    CIALIS 5 MG tablet TAKE 1 TABLET BY MOUTH DAILY 30 tablet 5    amiodarone (CORDARONE) 200 MG tablet Take 1 tablet by mouth daily 30 tablet 6    insulin aspart (NOVOLOG FLEXPEN) 100 UNIT/ML injection pen Inject 20 Units into the skin 3 times daily (before meals) 5 pen 5    Insulin Degludec 100 UNIT/ML SOPN Inject 4 Units into the skin Daily 3 pen 5    omega-3 acid ethyl esters (LOVAZA) 1 g capsule TAKE 1 CAPSULE BY MOUTH TWICE DAILY (Patient taking differently: TAKE 1 CAPSULE BY MOUTH Daily) 60 capsule 5    tamsulosin (FLOMAX) 0.4 MG capsule TAKE 1 CAPSULE BY MOUTH DAILY 90 capsule 3    ONE TOUCH ULTRA TEST strip TEST UP TO THREE TIMES DAILY 300 strip 5    furosemide (LASIX) 40 MG tablet TAKE 1 TABLET BY MOUTH DAILY 60 tablet 5    B-D UF III MINI PEN NEEDLES 31G X 5 MM MISC USE DAILY 100 each 3    acetaminophen (TYLENOL) 325 MG tablet Take 2 tablets by mouth every 4 hours as needed for Pain 120 tablet 3    Coenzyme Q10 (CO Q 10) 100 MG CAPS Take 1 capsule by mouth daily      Cinnamon 500 MG CAPS Take 1 capsule by mouth daily      glucose blood VI test strips (ONE TOUCH TEST STRIPS) strip 1 each by In Vitro route daily As needed. 100 each 3    Vitamin D (CHOLECALCIFEROL) 1000 UNITS CAPS capsule Take 2,000 Units by mouth nightly       Insulin Syringe-Needle U-100 (INSULIN SYRINGE .5CC/31GX5/16\") 31G X 5/16\" 0.5 ML MISC Patient tests bid 100 Syringe 5    therapeutic multivitamin-minerals (THERAGRAN-M) tablet Take 1 tablet by mouth daily.  aspirin 81 MG EC tablet Take 81 mg by mouth daily.  atorvastatin (LIPITOR) 20 MG tablet TAKE 1 TABLET BY MOUTH EVERY DAY 30 tablet 5    Krill Oil 1000 MG CAPS Take 1 capsule by mouth daily       No current facility-administered medications for this visit.         Physical Exam:   /70   Pulse 66   Wt (!) 348 lb 9.6 oz (158.1 kg)   BMI 43.57 kg/m²   BP Readings from Last 3 Encounters:   04/29/19 118/70   03/21/19 136/78   03/20/19 110/70     Pulse Readings from Last 3 Encounters:   04/29/19 66   03/21/19 79   03/20/19 68     Wt Readings from Last 3 Encounters:   04/29/19 (!) 348 lb 9.6 oz (158.1 kg)   03/21/19 (!) 352 lb (159.7 kg)   03/20/19 (!) 349 lb (158.3 kg)     Constitutional: He is oriented to person, place, and time. He appears well-developed and well-nourished. In no acute distress. HEENT: Normocephalic and atraumatic. Sclerae anicteric. No xanthelasmas. Conjunctiva white, no subconjunctival hemorrhage   External inspection of ears nose teeth & gums   Eyes:PERRLA EOM's intact. Neck: Neck supple. No JVD present. Carotids without bruits. No mass and no thyromegaly present. No lymphadenopathy present. Cardiovascular: RRR, normal S1 and S2; no murmur/gallop or rub, PMI nondisplaced  Pulmonary/Chest: Effort normal.  Lungs clear to auscultation. Chest wall nontender  Abdominal: soft, nontender, nondistended. + bowel sounds; no organomegaly or bruits. Aorta normal  Extremities: No edema, cyanosis, or clubbing. Pulses are 2+ radial/carotid/dorsalis pedis bilaterally. Cap refill brisk. Neurological: No cranial nerve deficit. Psychiatric: He has a normal mood and affect.  His speech is normal and behavior is normal.     Lab Review:   Lab Results   Component Value Date    TRIG 73 09/19/2018    HDL 62 09/19/2018    HDL 56 11/15/2011    LDLCALC 75 09/19/2018    LABVLDL 15 09/19/2018     Lab Results   Component Value Date     09/19/2018    K 4.0 09/19/2018     09/19/2018    CO2 28 09/19/2018    BUN 14 09/19/2018    CREATININE 1.2 09/19/2018    GLUCOSE 166 03/21/2019    CALCIUM 9.1 09/19/2018      Lab Results   Component Value Date    WBC 4.8 03/21/2019    HGB 12.4 (L) 03/21/2019    HCT 39.4 (L) 03/21/2019    MCV 86.8 03/21/2019     03/21/2019       I have reviewed any available labs, images, any stress test, LHC on this pt       Please cc this note to PCP    Assessment:    hx CM HFpEF  Near euvolemic   BiV ICD in situ / on amiodarone / obesity   AICD interrogation are with  Dr Yeny Camacho office routinely     Pafib  [id number] Score for Atrial Fibrillation Stroke Risk   Risk   Factors  Component Value   C CHF Yes 1   H HTN Yes 1   A2 Age >= 76 No,  (70 y.o.) 0   D DM Yes 1   S2 Prior Stroke/TIA No 0   V Vascular Disease No 0   A Age 74-69 Yes,  (70 y.o.) 1   Sc Sex male 0    [id number]  Score  4   Denies  any falls or any noted bleeding   Hgb 12.4 /stable  / sCr wnl   s/p DCCV      HTN   Optimal     HLD  On statin   LDL 75 9/2018     CRD  Stable    DMT2  Glucose average  118     WILL   uses CPAP     Plan:  No c/o cp / has ocas woozy feeling / no falls uses cane/ mild edema / near euvolemic /  Down 10# / works out routinely / has weak legs with neuropathy   Recommend  PT   Echo soon / may need GXT or LHC in future   Fu in 2-3 weeks     Thanks for allowing me to participate in the care of this patient.   Please cc this note to PCP      LOR Uriostegui

## 2019-04-29 NOTE — PATIENT INSTRUCTIONS
No c/o cp / has ocas woozy feeling / no falls uses cane/ mild edema / near euvolemic /  Down 10# / works out routinely / has weak legs with neuropathy   Echo soon / sean need GXT or LHC in future   Fu in 2-3 weeks

## 2019-04-29 NOTE — TELEPHONE ENCOUNTER
Pt stated his heart doctor suggestion he sees a physical therapist due to his legs been very weak. Please contact asap

## 2019-04-30 RX ORDER — AMIODARONE HYDROCHLORIDE 200 MG/1
200 TABLET ORAL DAILY
Qty: 90 TABLET | Refills: 3 | Status: SHIPPED | OUTPATIENT
Start: 2019-04-30 | End: 2020-10-21 | Stop reason: SDUPTHER

## 2019-05-02 ENCOUNTER — TELEPHONE (OUTPATIENT)
Dept: INTERNAL MEDICINE CLINIC | Age: 69
End: 2019-05-02

## 2019-05-02 DIAGNOSIS — R26.89 BALANCE PROBLEM: Primary | ICD-10-CM

## 2019-05-02 DIAGNOSIS — R29.898 WEAKNESS OF BOTH LOWER EXTREMITIES: ICD-10-CM

## 2019-05-13 ENCOUNTER — TELEPHONE (OUTPATIENT)
Dept: CARDIOLOGY CLINIC | Age: 69
End: 2019-05-13

## 2019-05-13 ENCOUNTER — HOSPITAL ENCOUNTER (OUTPATIENT)
Dept: PHYSICAL THERAPY | Age: 69
Setting detail: THERAPIES SERIES
Discharge: HOME | End: 2019-05-13
Payer: COMMERCIAL

## 2019-05-13 DIAGNOSIS — I42.9 CARDIOMYOPATHY, UNSPECIFIED TYPE (HCC): ICD-10-CM

## 2019-05-13 DIAGNOSIS — I50.22 CHRONIC SYSTOLIC HEART FAILURE (HCC): ICD-10-CM

## 2019-05-13 DIAGNOSIS — I42.0 DILATED CARDIOMYOPATHY (HCC): Primary | ICD-10-CM

## 2019-05-13 NOTE — TELEPHONE ENCOUNTER
[facility] Scheduling/pre-cert  called to request an updated order for Mr. Montero Echo. They said they requested this last week. They cannot pre-cert his upcoming Echo without a corrected order. E11.9 Diabetes is not a covered diagnosis. The patient has Cardiomyopathy I42.9  and CHF I50.22 both of which are covered. The new ICD-10 codes where given to the pre-cert department and an updated order placed in the chart.

## 2019-05-13 NOTE — FLOWSHEET NOTE
Physical Therapy  Cancellation/No-show Note  Patient Name:  Taryn Abraham  :  1950   Date:  2019  Cancelled visits to date: 1   19 Initial eval.   No-shows to date: 0    For today's appointment patient:  [x]  Cancelled  []  Rescheduled appointment  []  No-show     Reason given by patient:  []  Patient ill  []  Conflicting appointment  [x]  No transportation    []  Conflict with work  []  No reason given  []  Other:     Comments:      Electronically signed by:  Valeri Monet, PT

## 2019-05-23 NOTE — PROGRESS NOTES
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 Hailey Olson COLONOSCOPY N/A 3/8/2019    COLONOSCOPY WITH MAC, UNASYN (3gm) performed by Marisa Lundborg, MD at [address]  1-2-10    GASTRIC BYPASS SURGERY      OTHER SURGICAL HISTORY  10/6/15    INCISION AND DRAINAGE RIGHT FOOT          OTHER SURGICAL HISTORY Right 10/8/15    INCISION AND DRAINAGE RIGHT FOOT      PACEMAKER PLACEMENT      UPPER GASTROINTESTINAL ENDOSCOPY N/A 3/8/2019    EGD BIOPSY GASTRIC H. PYLORI performed by Marisa Lundborg, MD at [address] ENDOSCOPY       Allergies:  No Known Allergies    Medication:   Prior to Admission medications    Medication Sig Start Date End Date Taking?  Authorizing Provider   B-D UF III MINI PEN NEEDLES 31G X 5 MM MISC USE DAILY 5/1/19   Fahad Mead MD   NOVOLOG FLEXPEN 100 UNIT/ML injection pen ADMINISTER 20 UNITS UNDER THE SKIN THREE TIMES DAILY BEFORE MEALS 5/1/19   Fahad Mead MD   amiodarone (CORDARONE) 200 MG tablet TAKE 1 TABLET BY MOUTH DAILY 4/30/19   Shelle Simmonds, MD   levothyroxine (SYNTHROID) 25 MCG tablet Take 25 mcg by mouth daily 2/13/19   Historical Provider, MD   ferrous sulfate 325 (65 Fe) MG EC tablet Take 325 mg by mouth daily (with breakfast)    Historical Provider, MD   atorvastatin (LIPITOR) 20 MG tablet TAKE 1 TABLET BY MOUTH EVERY DAY 2/2/19   Fahad Mead MD carvedilol (COREG) 12.5 MG tablet TAKE 1 TABLET BY MOUTH TWICE DAILY 12/31/18   Cali Nicolas MD   amLODIPine (NORVASC) 2.5 MG tablet TAKE 1 TABLET BY MOUTH DAILY 12/31/18   Cali Nicolas MD   ELIQUIS 5 MG TABS tablet TAKE 1 TABLET BY MOUTH TWICE DAILY 11/30/18   Vidhya Evangelista MD   spironolactone (ALDACTONE) 25 MG tablet TAKE 1 TABLET BY MOUTH DAILY 11/30/18 4/29/19  Cali Nicolas MD   lisinopril (PRINIVIL;ZESTRIL) 2.5 MG tablet TAKE 1 TABLET BY MOUTH DAILY 11/30/18   Cali Nicolas MD   CIALIS 5 MG tablet TAKE 1 TABLET BY MOUTH DAILY 11/14/18   Cali Nicolas MD   Insulin Degludec 100 UNIT/ML SOPN Inject 4 Units into the skin Daily 10/30/18   Cali Nicolas MD   omega-3 acid ethyl esters (LOVAZA) 1 g capsule TAKE 1 CAPSULE BY MOUTH TWICE DAILY  Patient taking differently: TAKE 1 CAPSULE BY MOUTH Daily 10/19/18   Cali Nicolas MD   tamsulosin (FLOMAX) 0.4 MG capsule TAKE 1 CAPSULE BY MOUTH DAILY 8/10/18   Cali Nicolas MD   ONE TOUCH ULTRA TEST strip TEST UP TO THREE TIMES DAILY 8/1/18   Cali Nicolas MD   furosemide (LASIX) 40 MG tablet TAKE 1 TABLET BY MOUTH DAILY 6/29/18   Cali Nicolas MD   acetaminophen (TYLENOL) 325 MG tablet Take 2 tablets by mouth every 4 hours as needed for Pain 9/2/17   Josias Argueta MD   Coenzyme Q10 (CO Q 10) 100 MG CAPS Take 1 capsule by mouth daily    Historical Provider, MD   Cinnamon 500 MG CAPS Take 1 capsule by mouth daily    Historical Provider, MD Chin [place]ows Oil 1000 MG CAPS Take 1 capsule by mouth daily    Historical Provider, MD   glucose blood VI test strips (ONE TOUCH TEST STRIPS) strip 1 each by In Vitro route daily As needed.  4/27/17   Cali Nicolas MD   Vitamin D (CHOLECALCIFEROL) 1000 UNITS CAPS capsule Take 2,000 Units by mouth nightly     Historical Provider, MD   Insulin Syringe-Needle U-100 (INSULIN SYRINGE .5CC/31GX5/16\") 31G X 5/16\" 0.5 ML MISC Patient tests bid 5/21/13   Cali Nicolas MD   therapeutic multivitamin-minerals [facility]) tablet Take 1 tablet by mouth daily. Historical Provider, MD   aspirin 81 MG EC tablet Take 81 mg by mouth daily. Historical Provider, MD       Social History:   reports that he quit smoking about 2 years ago. His smoking use included cigarettes. He has a 4.00 pack-year smoking history. He has never used smokeless tobacco. He reports that he drinks alcohol. He reports that he does not use drugs. Family History:  family history includes Diabetes in his maternal grandmother; Heart Disease in his father; Hypertension in his maternal grandmother and mother. Reviewed. Denies family history of sudden cardiac death, arrhythmia, premature CAD    Review of System:    · General ROS: negative for - chills, fever   · Psychological ROS: negative for - anxiety or depression  · Ophthalmic ROS: negative for - eye pain or loss of vision  · ENT ROS: negative for - epistaxis, headaches, nasal discharge, sore throat   · Allergy and Immunology ROS: negative for - hives, nasal congestion   · Hematological and Lymphatic ROS: negative for - bleeding problems, blood clots, bruising or jaundice  · Endocrine ROS: negative for - skin changes, temperature intolerance or unexpected weight changes  · Respiratory ROS: negative for - cough, hemoptysis, pleuritic pain, SOB, sputum changes or wheezing  · Cardiovascular ROS: Per HPI. · Gastrointestinal ROS: negative for - abdominal pain, blood in stools, diarrhea, hematemesis, melena,  nausea/vomiting or swallowing difficulty/pain +obese  · Genito-Urinary ROS: negative for - dysuria or incontinence  · Musculoskeletal ROS: negative for - joint swelling or muscle pain  · Neurological ROS: negative for - confusion, dizziness, gait disturbance, headaches, numbness/tingling, seizures,  speech problems, tremors, visual changes or weakness  · Dermatological ROS: negative for - rash    Physical Examination:  There were no vitals filed for this visit. · Constitutional: Oriented. No distress.    · Head: Normocephalic and atraumatic. · Mouth/Throat: Oropharynx is clear and moist.   · Eyes: Conjunctivae normal. EOM are normal.   · Neck: Normal range of motion. Neck supple. No rigidity. No JVD present. · Cardiovascular: Paced, S1&S2 and intact distal pulses. · Pulmonary/Chest: Bilateral respiratory sounds. No wheezes. No rhonchi. · Abdominal: Soft. Bowel sounds present. No distension, No tenderness. · Musculoskeletal: No tenderness. No edema    · Lymphadenopathy: Has no cervical adenopathy. · Neurological: Alert and oriented. Cranial nerve appears intact, No Gross deficit   · Skin: Skin is warm and dry. No rash noted. +LCW device  · Psychiatric: Has a normal mood, affect and behavior     Labs:  Reviewed. ECG: reviewed, 5/29/19 sinus rhythm with biventricular pacing. Studies:   1. Event monitor:   None    2. Echo: 5/24/19  Summary  Poor image quality. Overall left ventricular systolic function appears moderately reduced. Ejection fraction is visually estimated to be 35-40% with diffuse hypokinesis. There is mildly increased left ventricular wall thickness. The right ventricle is not well visualized but ICD wire is present. Mild tricuspid regurgitation. Trivial aortic and mitral regurgitation. Echo:  2/1/18 (s/p Bi-V implant)  Mild concentric left ventricular hypertrophy is present. Left ventricular size is moderately increased. Left ventricular function is reduced with ejection fraction estimated at 35  to 40-%. Diastolic filling parameters suggests grade I diastolic dysfunction . Hypokinesis of inferior & damon-septal wall. Mildly dilated left atrium. Aortic valve leaflets appear thickened. Trivial aortic regurgitation is present. 3. Stress Test:    None    4. Cath:   None on file     Procedures:  1. Medtronic Bi-V ICD implant on 9/1/17 with Dr. Eduardo Jackson  2.  Successful DCCV on 6/30/17 with Dr. Eduardo Jackson    Assessment:   Patient Active Problem List    Diagnosis Date Noted    NSVT (nonsustained ventricular tachycardia) (HCC)      Priority: High    Chronic systolic heart failure (HCC)      Priority: High    Dilated cardiomyopathy (HCC)      Priority: High    Essential hypertension      Priority: High    Hyperlipidemia      Priority: High    Diabetic foot ([facility] 10/05/2015     Priority: High    [facility] 10/05/2015     Priority: High    Obstructive sleep apnea 09/08/2010     Priority: High    Diabetes mellitus ([facility] 06/05/2010     Priority: High    Diabetic foot ulcer ([facility] 06/05/2010     Priority: High    Dyslipidemia 06/05/2010     Priority: High    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 ([facility] 06/05/2010     Priority: High    Obesity 06/05/2010     Priority: High    On amiodarone therapy     Cardiac resynchronization therapy defibrillator (CRT-D) in place 09/01/2017    Abscess of left leg 07/05/2017    Morbid obesity with BMI of 40.0-44.9, adult ([facility] 07/05/2017    Paroxysmal atrial fibrillation (HCC)     Hypokalemia     H/O gastric bypass 12/07/2016    Benign prostatic hyperplasia with lower urinary tract symptoms 12/07/2016    MRSA infection 10/07/2015    Diabetic ulcer of right foot associated with type 2 diabetes mellitus ([facility]     Subacute osteomyelitis of right foot ([facility]     Diabetic foot infection ([facility] 10/05/2015    Hypothyroidism 01/09/2014    Graves' disease 06/19/2012    Vitamin D deficiency 09/22/2010    Acute congestive heart failure ([facility] 09/08/2010    Cardiomyopathy ([facility] 06/05/2010    Renal disease due to hypertension 06/05/2010    HTN (hypertension) 06/05/2010        Plan:    Cardiomyopathy:  -Nonischemic  -S/p BI-V ICD implant  -Improved post implant but LVEF at 35-40%  -Device interrogated today and functioning appropriately   Medtronic BiV ICD, implanted 9/1/17: Estimated longevity of 7.2 years.  The right atrial lead has an intrinsic p-wave amplitude recording of 2.4 mV, impedance of 475 ohms, and pacing capture

## 2019-05-24 ENCOUNTER — HOSPITAL ENCOUNTER (OUTPATIENT)
Dept: NON INVASIVE DIAGNOSTICS | Age: 69
Discharge: HOME | End: 2019-05-24
Payer: MEDICARE

## 2019-05-24 DIAGNOSIS — E11.9 TYPE 2 DIABETES MELLITUS WITHOUT COMPLICATION, UNSPECIFIED WHETHER LONG TERM INSULIN USE (HCC): ICD-10-CM

## 2019-05-24 LAB
LV EF: 38 %
LVEF MODALITY: NORMAL

## 2019-05-24 PROCEDURE — 93306 TTE W/DOPPLER COMPLETE: CPT

## 2019-05-29 ENCOUNTER — OFFICE VISIT (OUTPATIENT)
Dept: CARDIOLOGY CLINIC | Age: 69
End: 2019-05-29
Payer: COMMERCIAL

## 2019-05-29 ENCOUNTER — PROCEDURE VISIT (OUTPATIENT)
Dept: CARDIOLOGY CLINIC | Age: 69
End: 2019-05-29
Payer: COMMERCIAL

## 2019-05-29 VITALS
OXYGEN SATURATION: 99 % | DIASTOLIC BLOOD PRESSURE: 66 MMHG | BODY MASS INDEX: 39.17 KG/M2 | HEART RATE: 63 BPM | WEIGHT: 315 LBS | HEIGHT: 75 IN | SYSTOLIC BLOOD PRESSURE: 134 MMHG

## 2019-05-29 DIAGNOSIS — I47.29 NSVT (NONSUSTAINED VENTRICULAR TACHYCARDIA): Primary | ICD-10-CM

## 2019-05-29 DIAGNOSIS — I42.8 NICM (NONISCHEMIC CARDIOMYOPATHY) (HCC): ICD-10-CM

## 2019-05-29 DIAGNOSIS — I50.22 CHRONIC SYSTOLIC HF (HEART FAILURE) (HCC): ICD-10-CM

## 2019-05-29 DIAGNOSIS — Z95.810 CARDIAC RESYNCHRONIZATION THERAPY DEFIBRILLATOR (CRT-D) IN PLACE: ICD-10-CM

## 2019-05-29 PROCEDURE — 3017F COLORECTAL CA SCREEN DOC REV: CPT | Performed by: INTERNAL MEDICINE

## 2019-05-29 PROCEDURE — 1123F ACP DISCUSS/DSCN MKR DOCD: CPT | Performed by: INTERNAL MEDICINE

## 2019-05-29 PROCEDURE — 4040F PNEUMOC VAC/ADMIN/RCVD: CPT | Performed by: INTERNAL MEDICINE

## 2019-05-29 PROCEDURE — G8417 CALC BMI ABV UP PARAM F/U: HCPCS | Performed by: INTERNAL MEDICINE

## 2019-05-29 PROCEDURE — 93290 INTERROG DEV EVAL ICPMS IP: CPT | Performed by: INTERNAL MEDICINE

## 2019-05-29 PROCEDURE — 99214 OFFICE O/P EST MOD 30 MIN: CPT | Performed by: INTERNAL MEDICINE

## 2019-05-29 PROCEDURE — 93000 ELECTROCARDIOGRAM COMPLETE: CPT | Performed by: INTERNAL MEDICINE

## 2019-05-29 PROCEDURE — G8427 DOCREV CUR MEDS BY ELIG CLIN: HCPCS | Performed by: INTERNAL MEDICINE

## 2019-05-29 PROCEDURE — 93284 PRGRMG EVAL IMPLANTABLE DFB: CPT | Performed by: INTERNAL MEDICINE

## 2019-05-29 PROCEDURE — 1036F TOBACCO NON-USER: CPT | Performed by: INTERNAL MEDICINE

## 2019-05-29 NOTE — PATIENT INSTRUCTIONS
Patient Education        Heart-Healthy Diet: Care Instructions  Your Care Instructions    A heart-healthy diet has lots of vegetables, fruits, nuts, beans, and whole grains, and is low in salt. It limits foods that are high in saturated fat, such as meats, cheeses, and fried foods. It may be hard to change your diet, but even small changes can lower your risk of heart attack and heart disease. Follow-up care is a key part of your treatment and safety. Be sure to make and go to all appointments, and call your doctor if you are having problems. It's also a good idea to know your test results and keep a list of the medicines you take. How can you care for yourself at home? Watch your portions  · Learn what a serving is. A \"serving\" and a \"portion\" are not always the same thing. Make sure that you are not eating larger portions than are recommended. For example, a serving of pasta is ½ cup. A serving size of meat is 2 to 3 ounces. A 3-ounce serving is about the size of a deck of cards. Measure serving sizes until you are good at [place]" them. Keep in mind that restaurants often serve portions that are 2 or 3 times the size of one serving. · To keep your energy level up and keep you from feeling hungry, eat often but in smaller portions. · Eat only the number of calories you need to stay at a healthy weight. If you need to lose weight, eat fewer calories than your body burns (through exercise and other physical activity). Eat more fruits and vegetables  · Eat a variety of fruit and vegetables every day. Dark green, deep orange, red, or yellow fruits and vegetables are especially good for you. Examples include spinach, carrots, peaches, and berries. · Keep carrots, celery, and other veggies handy for snacks. Buy fruit that is in season and store it where you can see it so that you will be tempted to eat it. · Cook dishes that have a lot of veggies in them, such as stir-fries and soups.   Limit saturated and [employer], and be sure to contact your doctor if:    · You would like help planning heart-healthy meals. Where can you learn more? Go to [email or web address] and sign in to your [employer] account. Enter V137 in the [employer] box to learn more about \"Heart-Healthy Diet: Care Instructions. \"     If you do not have an account, please click on the \"Sign Up Now\" link. Current as of: July 22, 2018  Content Version: 12.0  © 8705-7404 [employer], [employer]. Care instructions adapted under license by [facility] ([facility]). If you have questions about a medical condition or this instruction, always ask your healthcare professional. [address] any warranty or liability for your use of this information.

## 2019-05-29 NOTE — LETTER
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 [address]     Type II or unspecified type diabetes mellitus without mention of complication, not stated as uncontrolled         Past Surgical History:   Procedure Laterality Date    ADRENAL GLAND SURGERY  1970    CARDIAC DEFIBRILLATOR PLACEMENT  09/05/2017    Dr. Odalys Campos COLONOSCOPY N/A 3/8/2019    COLONOSCOPY WITH MAC, UNASYN (3gm) performed by Guillermo Guzman MD at [address] [place] [address]  1-2-10    GASTRIC BYPASS SURGERY      OTHER SURGICAL HISTORY  10/6/15    INCISION AND DRAINAGE RIGHT FOOT          OTHER SURGICAL HISTORY Right 10/8/15    INCISION AND DRAINAGE RIGHT FOOT      PACEMAKER PLACEMENT      UPPER GASTROINTESTINAL ENDOSCOPY N/A 3/8/2019    EGD BIOPSY GASTRIC H. PYLORI performed by Guillermo Guzman MD at [facility] ENDOSCOPY       Allergies:  No Known Allergies    Medication:   Prior to Admission medications    Medication Sig Start Date End Date Taking?  Authorizing Provider   B-D UF III MINI PEN NEEDLES 31G X 5 MM MISC USE DAILY 5/1/19   Virgel Goodell, MD   NOVOLOG FLEXPEN 100 UNIT/ML injection pen ADMINISTER 20 UNITS UNDER THE SKIN THREE TIMES DAILY BEFORE MEALS 5/1/19   Virgel Goodell, MD   amiodarone (CORDARONE) 200 MG tablet TAKE 1 TABLET BY MOUTH DAILY 4/30/19   Herbert Dumont MD   levothyroxine (SYNTHROID) 25 MCG tablet Take 25 mcg by mouth daily 2/13/19   Historical Provider, MD   ferrous sulfate 325 (65 Fe) MG EC tablet Take 325 mg by mouth daily (with breakfast)    Historical Provider, MD   atorvastatin (LIPITOR) 20 MG tablet TAKE 1 TABLET BY MOUTH EVERY DAY 2/2/19   Akosua Ponce MD   carvedilol (COREG) 12.5 MG tablet TAKE 1 TABLET BY MOUTH TWICE DAILY 12/31/18   Akosua Ponce MD   amLODIPine (NORVASC) 2.5 MG tablet TAKE 1 TABLET BY MOUTH DAILY 12/31/18   Akosua Ponce MD   ELIQUIS 5 MG TABS tablet TAKE 1 TABLET BY MOUTH TWICE DAILY 11/30/18   William Jane MD   spironolactone (ALDACTONE) 25 MG tablet TAKE 1 TABLET BY MOUTH DAILY 11/30/18 4/29/19  Akosua Ponce MD   lisinopril (PRINIVIL;ZESTRIL) 2.5 MG tablet TAKE 1 TABLET BY MOUTH DAILY 11/30/18   Akosua Ponce MD   CIALIS 5 MG tablet TAKE 1 TABLET BY MOUTH DAILY 11/14/18   Akosua Ponce MD   Insulin Degludec 100 UNIT/ML SOPN Inject 4 Units into the skin Daily 10/30/18   Akosua Ponce MD   omega-3 acid ethyl esters (LOVAZA) 1 g capsule TAKE 1 CAPSULE BY MOUTH TWICE DAILY  Patient taking differently: TAKE 1 CAPSULE BY MOUTH Daily 10/19/18   Akosua Ponce MD   tamsulosin (FLOMAX) 0.4 MG capsule TAKE 1 CAPSULE BY MOUTH DAILY 8/10/18   Akosua Ponce MD   ONE TOUCH ULTRA TEST strip TEST UP TO THREE TIMES DAILY 8/1/18   Akosua Ponce MD   furosemide (LASIX) 40 MG tablet TAKE 1 TABLET BY MOUTH DAILY 6/29/18   Akosua Ponce MD   acetaminophen (TYLENOL) 325 MG tablet Take 2 tablets by mouth every 4 hours as needed for Pain 9/2/17   Marielle Ellis MD   Coenzyme Q10 (CO Q 10) 100 MG CAPS Take 1 capsule by mouth daily    Historical Provider, MD   Cinnamon 500 MG CAPS Take 1 capsule by mouth daily    Historical Provider, MD [place] Oil 1000 MG CAPS Take 1 capsule by mouth daily    Historical Provider, MD   glucose blood VI test strips (ONE TOUCH TEST STRIPS) strip 1 each by In Vitro route daily As needed.  4/27/17   Akosua Ponce MD   Vitamin D (CHOLECALCIFEROL) 1000 UNITS CAPS capsule Take 2,000 Units by mouth nightly     Historical Provider, MD   Insulin Syringe-Needle U-100 (INSULIN SYRINGE .5CC/31GX5/16\") 31G X 5/16\" 0.5 ML MISC Patient tests bid 5/21/13   Akosua Ponce MD therapeutic multivitamin-minerals (THERAGRAN-M) tablet Take 1 tablet by mouth daily. Historical Provider, MD   aspirin 81 MG EC tablet Take 81 mg by mouth daily. Historical Provider, MD       Social History:   reports that he quit smoking about 2 years ago. His smoking use included cigarettes. He has a 4.00 pack-year smoking history. He has never used smokeless tobacco. He reports that he drinks alcohol. He reports that he does not use drugs. Family History:  family history includes Diabetes in his maternal grandmother; Heart Disease in his father; Hypertension in his maternal grandmother and mother. Reviewed. Denies family history of sudden cardiac death, arrhythmia, premature CAD    Review of System:    · General ROS: negative for - chills, fever   · Psychological ROS: negative for - anxiety or depression  · Ophthalmic ROS: negative for - eye pain or loss of vision  · ENT ROS: negative for - epistaxis, headaches, nasal discharge, sore throat   · Allergy and Immunology ROS: negative for - hives, nasal congestion   · Hematological and Lymphatic ROS: negative for - bleeding problems, blood clots, bruising or jaundice  · Endocrine ROS: negative for - skin changes, temperature intolerance or unexpected weight changes  · Respiratory ROS: negative for - cough, hemoptysis, pleuritic pain, SOB, sputum changes or wheezing  · Cardiovascular ROS: Per HPI.    · Gastrointestinal ROS: negative for - abdominal pain, blood in stools, diarrhea, hematemesis, melena,  nausea/vomiting or swallowing difficulty/pain +obese  · Genito-Urinary ROS: negative for - dysuria or incontinence  · Musculoskeletal ROS: negative for - joint swelling or muscle pain  · Neurological ROS: negative for - confusion, dizziness, gait disturbance, headaches, numbness/tingling, seizures,  speech problems, tremors, visual changes or weakness  · Dermatological ROS: negative for - rash    Physical Examination: There were no vitals filed for this visit. · Constitutional: Oriented. No distress. · Head: Normocephalic and atraumatic. · Mouth/Throat: Oropharynx is clear and moist.   · Eyes: Conjunctivae normal. EOM are normal.   · Neck: Normal range of motion. Neck supple. No rigidity. No JVD present. · Cardiovascular: Paced, S1&S2 and intact distal pulses. · Pulmonary/Chest: Bilateral respiratory sounds. No wheezes. No rhonchi. · Abdominal: Soft. Bowel sounds present. No distension, No tenderness. · Musculoskeletal: No tenderness. No edema    · Lymphadenopathy: Has no cervical adenopathy. · Neurological: Alert and oriented. Cranial nerve appears intact, No Gross deficit   · Skin: Skin is warm and dry. No rash noted. +LCW device  · Psychiatric: Has a normal mood, affect and behavior     Labs:  Reviewed. ECG: reviewed, 5/29/19 sinus rhythm with biventricular pacing. Studies:   1. Event monitor:   None    2. Echo: 5/24/19  Summary  Poor image quality. Overall left ventricular systolic function appears moderately reduced. Ejection fraction is visually estimated to be 35-40% with diffuse hypokinesis. There is mildly increased left ventricular wall thickness. The right ventricle is not well visualized but ICD wire is present. Mild tricuspid regurgitation. Trivial aortic and mitral regurgitation. Echo:  2/1/18 (s/p Bi-V implant)  Mild concentric left ventricular hypertrophy is present. Left ventricular size is moderately increased. Left ventricular function is reduced with ejection fraction estimated at 35  to 40-%. Diastolic filling parameters suggests grade I diastolic dysfunction . Hypokinesis of inferior & damon-septal wall. Mildly dilated left atrium. Aortic valve leaflets appear thickened. Trivial aortic regurgitation is present. 3. Stress Test:    None    4. Cath:   None on file     Procedures:  1.  Medtronic Bi-V ICD implant on 9/1/17 with Dr. Lake Gallagher 2. Successful DCCV on 6/30/17 with Dr. Pike Eastern:   Patient Active Problem List    Diagnosis Date Noted    NSVT (nonsustained ventricular tachycardia) ([facility] [address]      Priority: High    Chronic systolic heart failure ([address]      Priority: High    Dilated cardiomyopathy ([address]      Priority: High    Essential hypertension      Priority: High    Hyperlipidemia      Priority: High    Diabetic foot ([facility] [address] 10/05/2015     Priority: High    Foot osteomyelitis, right ([address] 10/05/2015     Priority: High    Obstructive sleep apnea 09/08/2010     Priority: High    Diabetes mellitus ([address] 06/05/2010     Priority: High    Diabetic foot ulcer ([facility] [address] 06/05/2010     Priority: High    Dyslipidemia 06/05/2010     Priority: High    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 ([address] 06/05/2010     Priority: High    Obesity 06/05/2010     Priority: High    On amiodarone therapy     Cardiac resynchronization therapy defibrillator (CRT-D) in place 09/01/2017    Abscess of left leg 07/05/2017    Morbid obesity with BMI of 40.0-44.9, adult ([facility] [address] 07/05/2017    Paroxysmal atrial fibrillation (HCC)     Hypokalemia     H/O gastric bypass 12/07/2016    Benign prostatic hyperplasia with lower urinary tract symptoms 12/07/2016    MRSA infection 10/07/2015    Diabetic ulcer of right foot associated with type 2 diabetes mellitus ([facility] [address]     Subacute osteomyelitis of right foot ([facility] [address]     Diabetic foot infection ([facility] [address] 10/05/2015    Hypothyroidism 01/09/2014    Graves' disease 06/19/2012    Vitamin D deficiency 09/22/2010    Acute congestive heart failure ([address] 09/08/2010    Cardiomyopathy ([facility] [address] 06/05/2010    Renal disease due to hypertension 06/05/2010    HTN (hypertension) 06/05/2010        Plan:    Cardiomyopathy:  -Nonischemic  -S/p BI-V ICD implant  -Improved post implant but LVEF at 35-40%  -Device interrogated today and functioning appropriately   Medtronic BiV ICD, implanted 9/1/17: Estimated longevity of 7.2 years.  The right atrial lead has an intrinsic p-wave amplitude recording of 2.4 mV, impedance of 475 ohms, and pacing capture threshold of 0.75 V @ 0.4ms. The right ventricular lead has an intrinsic R-wave amplitude recording of 7.6 mV, impedance of 399 ohms, and pacing capture threshold of 0.875 V @ 0.4ms. The coronary sinus lead has an impedance of 646 ohms, and pacing capture threshold of 0.75 V @ 0.4ms. There has been no VT,VF there are 1458 episodes of paroxsymal afib longest lasting 6 minutes. There has been  no ATP therapy and no ICD shock therapy. The patient is biventricularly paced 99% of the time    Afib:  -Paroxysmal  -Symptomatic  -Captured on EKGs 6/26/17, 5/4/17, 6/30/17 and device interrogation  -7.6% afib burden on device interrogation (666 episodes)  -Per EKG, in SR today  -Continue Amiodarone 200 mg Daily  -TSH 1.06 11/7/18  -AST 18, ALT 20 (1/7/18)  -Continue Coreg 12.5 mg BID  -CHADS Vasc (HF, HTN, DM, age)- on Eliquis 5 mg daily  -Much time was spent counseling the patient on healthier lifestyle, particularly dramatically decreasing the intake of processed sugar. Follow up in one year. Thank you for allowing me to participate in the care of Taryn Abraham. All questions and concerns were addressed to the patient/family. Alternatives to my treatment were discussed. This note was scribed in the presence of William Jane MD by Martha Peña RN. The scribe's documentation has been prepared under my direction and personally reviewed by me in its entirety. I confirm that the note above accurately reflects all work, physical examination, the discussion of treatments and procedures, and medical decision making performed by me.     William Jane MD, [address], [facility] [place], [facility]  Cardiac Electrophysiology  [address]    Office: [phone]

## 2019-05-29 NOTE — PROGRESS NOTES
Patient comes in for programming evaluation for his ICD. The device is functioning within normal parameters. No changes need to be made at this time. Known PAF recorded. - Atrial ATP       - w/ high V rates      - on Eliquis  1 new NSVT       - 1 sec, 9-beats      - on Coreg and Mandyo    Dr. Narda Crockett to review interrogation. See interrogation/Paceart report for further details. Patient is to see Dr. Narda Crockett in office today also. Patient will follow up in 3 months in office or remotely.

## 2019-05-31 ENCOUNTER — OFFICE VISIT (OUTPATIENT)
Dept: CARDIOLOGY CLINIC | Age: 69
End: 2019-05-31
Payer: COMMERCIAL

## 2019-05-31 ENCOUNTER — TELEPHONE (OUTPATIENT)
Dept: INTERNAL MEDICINE CLINIC | Age: 69
End: 2019-05-31

## 2019-05-31 VITALS
DIASTOLIC BLOOD PRESSURE: 70 MMHG | SYSTOLIC BLOOD PRESSURE: 130 MMHG | WEIGHT: 315 LBS | HEART RATE: 70 BPM | BODY MASS INDEX: 42.72 KG/M2

## 2019-05-31 DIAGNOSIS — I42.8 NICM (NONISCHEMIC CARDIOMYOPATHY) (HCC): ICD-10-CM

## 2019-05-31 DIAGNOSIS — Z95.810 CARDIAC RESYNCHRONIZATION THERAPY DEFIBRILLATOR (CRT-D) IN PLACE: ICD-10-CM

## 2019-05-31 DIAGNOSIS — I42.9 CARDIOMYOPATHY, UNSPECIFIED TYPE (HCC): Primary | ICD-10-CM

## 2019-05-31 DIAGNOSIS — I10 ESSENTIAL HYPERTENSION: ICD-10-CM

## 2019-05-31 PROCEDURE — 1036F TOBACCO NON-USER: CPT | Performed by: INTERNAL MEDICINE

## 2019-05-31 PROCEDURE — G8417 CALC BMI ABV UP PARAM F/U: HCPCS | Performed by: INTERNAL MEDICINE

## 2019-05-31 PROCEDURE — 1123F ACP DISCUSS/DSCN MKR DOCD: CPT | Performed by: INTERNAL MEDICINE

## 2019-05-31 PROCEDURE — 4040F PNEUMOC VAC/ADMIN/RCVD: CPT | Performed by: INTERNAL MEDICINE

## 2019-05-31 PROCEDURE — 3017F COLORECTAL CA SCREEN DOC REV: CPT | Performed by: INTERNAL MEDICINE

## 2019-05-31 PROCEDURE — G8427 DOCREV CUR MEDS BY ELIG CLIN: HCPCS | Performed by: INTERNAL MEDICINE

## 2019-05-31 PROCEDURE — 99214 OFFICE O/P EST MOD 30 MIN: CPT | Performed by: INTERNAL MEDICINE

## 2019-05-31 NOTE — PROGRESS NOTES
[address]   Cardiac Electrophysiology Consultation   Date: 5/31/2019  Reason for Consultation: Device Management  Consult Requesting Physician: Ramon Marx MD  Primary Care Physician: Jannet Prince MD    Chief Complaint:   No chief complaint on file. HPI: Ben Jalloh is a 79 y.o. with a PMH significant for nonischemic cardiomyopathy with Bi-V ICD in situ, paroxysmal Afib, obesity s/p gastric bypass (lost 130 lbs with a 30 lb regain to date), HTN, HLD, hypothyroidism on synthroid and DM II who was previously followed by Dr. Afia Ornelas at [facility] for his cardiac needs. It appears that he switched his care around 5/2017 to [address] [place] [address] after he presented to [facility] with increasing SOB and LE edema--sent by his PCP. He was found to be in afib (not new) and acute on chronic HF. A cardioversion was attempted but cancelled d/t JOSHUA thrombus on JACQUELYN-- only to be rescheduled and successfully completed 6/30/17. He also underwent Bi-V ICD implant on 9/1/17 and his LVEF has since improved to 35-40%. Review of notes from [facility] documents a diagnosis of atrial flutter but I do not see EKG's or notes to corroborate this diagnosis. He was on amiodarone for approximately 4-5 months before he was taken off of it by Dr. Tim Gomez for concern over possible side effects. He has remained on Coreg only since that time. Amiodarone was restarted at last office visit 11/7/18 after frequent episodes of atrial fibrillation with RVR that lead to HF exacerbation. Today, he arrives for a follow up for device management. He reports that his medication Carvedilol is making him feel jittery. He also reports feeling fatigued and short of breath.      Past Medical History:   Diagnosis Date    Atrial fibrillation (HCC)     Back pain     Cardiomyopathy     CHF (congestive heart failure) (HCC)     COPD (chronic obstructive pulmonary disease) (HCC)     Dyslipidemia     GERD (gastroesophageal reflux Demetra Aviles MD   carvedilol (COREG) 12.5 MG tablet TAKE 1 TABLET BY MOUTH TWICE DAILY 12/31/18  Yes Ancelmo Najera MD   amLODIPine (NORVASC) 2.5 MG tablet TAKE 1 TABLET BY MOUTH DAILY 12/31/18  Yes Ancelmo Najera MD   ELIQUIS 5 MG TABS tablet TAKE 1 TABLET BY MOUTH TWICE DAILY 11/30/18  Yes Dennis Alegria MD   lisinopril (PRINIVIL;ZESTRIL) 2.5 MG tablet TAKE 1 TABLET BY MOUTH DAILY 11/30/18  Yes Ancelmo Najera MD   CIALIS 5 MG tablet TAKE 1 TABLET BY MOUTH DAILY 11/14/18  Yes Ancelmo Najera MD   Insulin Degludec 100 UNIT/ML SOPN Inject 4 Units into the skin Daily 10/30/18  Yes nAcelmo Najera MD   omega-3 acid ethyl esters (LOVAZA) 1 g capsule TAKE 1 CAPSULE BY MOUTH TWICE DAILY  Patient taking differently: TAKE 1 CAPSULE BY MOUTH Daily 10/19/18  Yes Ancelmo Najera MD   tamsulosin (FLOMAX) 0.4 MG capsule TAKE 1 CAPSULE BY MOUTH DAILY 8/10/18  Yes Ancelmo Najera MD   ONE TOUCH ULTRA TEST strip TEST UP TO THREE TIMES DAILY 8/1/18  Yes Ancelmo Najera MD   furosemide (LASIX) 40 MG tablet TAKE 1 TABLET BY MOUTH DAILY 6/29/18  Yes Ancelmo Najera MD   acetaminophen (TYLENOL) 325 MG tablet Take 2 tablets by mouth every 4 hours as needed for Pain 9/2/17  Yes Antwon Brown MD   Coenzyme Q10 (CO Q 10) 100 MG CAPS Take 1 capsule by mouth daily   Yes Historical Provider, MD   Cinnamon 500 MG CAPS Take 1 capsule by mouth daily   Yes Historical Provider, MD Garcia Terence Oil 1000 MG CAPS Take 1 capsule by mouth daily   Yes Historical Provider, MD   glucose blood VI test strips (ONE TOUCH TEST STRIPS) strip 1 each by In Vitro route daily As needed. 4/27/17  Yes Ancelmo Najera MD   Vitamin D (CHOLECALCIFEROL) 1000 UNITS CAPS capsule Take 2,000 Units by mouth nightly    Yes Historical Provider, MD   Insulin Syringe-Needle U-100 (INSULIN SYRINGE .5CC/31GX5/16\") 31G X 5/16\" 0.5 ML MISC Patient tests bid 5/21/13  Yes Ancelmo Najera MD   therapeutic multivitamin-minerals [facility]) tablet Take 1 tablet by mouth daily.    Yes Historical Provider, MD   aspirin 81 MG EC tablet Take 81 mg by mouth daily. Yes Historical Provider, MD   spironolactone (ALDACTONE) 25 MG tablet TAKE 1 TABLET BY MOUTH DAILY 11/30/18 4/29/19  Roge Stroud MD       Social History:   reports that he quit smoking about 2 years ago. His smoking use included cigarettes. He has a 4.00 pack-year smoking history. He has never used smokeless tobacco. He reports that he drinks alcohol. He reports that he does not use drugs. Family History:  family history includes Diabetes in his maternal grandmother; Heart Disease in his father; Hypertension in his maternal grandmother and mother. Reviewed. Denies family history of sudden cardiac death, arrhythmia, premature CAD    Review of System:    · General ROS: negative for - chills, fever   · Psychological ROS: negative for - anxiety or depression  · Ophthalmic ROS: negative for - eye pain or loss of vision  · ENT ROS: negative for - epistaxis, headaches, nasal discharge, sore throat   · Allergy and Immunology ROS: negative for - hives, nasal congestion   · Hematological and Lymphatic ROS: negative for - bleeding problems, blood clots, bruising or jaundice  · Endocrine ROS: negative for - skin changes, temperature intolerance or unexpected weight changes  · Respiratory ROS: negative for - cough, hemoptysis, pleuritic pain, SOB, sputum changes or wheezing  · Cardiovascular ROS: Per HPI.    · Gastrointestinal ROS: negative for - abdominal pain, blood in stools, diarrhea, hematemesis, melena,  nausea/vomiting or swallowing difficulty/pain +obese  · Genito-Urinary ROS: negative for - dysuria or incontinence  · Musculoskeletal ROS: negative for - joint swelling or muscle pain  · Neurological ROS: negative for - confusion, dizziness, gait disturbance, headaches, numbness/tingling, seizures,  speech problems, tremors, visual changes or weakness  · Dermatological ROS: negative for - rash    Physical Examination:  Vitals:    05/31/19 1148 BP: 130/70   Pulse: 70       · Constitutional: Oriented. No distress. · Head: Normocephalic and atraumatic. · Mouth/Throat: Oropharynx is clear and moist.   · Eyes: Conjunctivae normal. EOM are normal.   · Neck: Normal range of motion. Neck supple. No rigidity. No JVD present. · Cardiovascular: Paced, S1&S2 and intact distal pulses. · Pulmonary/Chest: Bilateral respiratory sounds. No wheezes. No rhonchi. · Abdominal: Soft. Bowel sounds present. No distension, No tenderness. · Musculoskeletal: No tenderness. No edema    · Lymphadenopathy: Has no cervical adenopathy. · Neurological: Alert and oriented. Cranial nerve appears intact, No Gross deficit   · Skin: Skin is warm and dry. No rash noted. +LCW device  · Psychiatric: Has a normal mood, affect and behavior     Labs:  Reviewed. ECG: reviewed, 5/29/19 sinus rhythm with biventricular pacing. Studies:   1. Event monitor:   None    2. Echo: 5/24/19  Summary  Poor image quality. Overall left ventricular systolic function appears moderately reduced. Ejection fraction is visually estimated to be 35-40% with diffuse hypokinesis. There is mildly increased left ventricular wall thickness. The right ventricle is not well visualized but ICD wire is present. Mild tricuspid regurgitation. Trivial aortic and mitral regurgitation. Echo:  2/1/18 (s/p Bi-V implant)  Mild concentric left ventricular hypertrophy is present. Left ventricular size is moderately increased. Left ventricular function is reduced with ejection fraction estimated at 35  to 40-%. Diastolic filling parameters suggests grade I diastolic dysfunction . Hypokinesis of inferior & damon-septal wall. Mildly dilated left atrium. Aortic valve leaflets appear thickened. Trivial aortic regurgitation is present. 3. Stress Test:    None    4. Cath:   None on file     Procedures:  1. Medtronic Bi-V ICD implant on 9/1/17 with Dr. Patrick Taylor  2.  Successful DCCV on 6/30/17 with  Janes    Assessment:   Patient Active Problem List    Diagnosis Date Noted    NSVT ([facility]onsustained ventricular tachycardia) ([facility])      Priority: High    Chronic systolic heart failure ([facility]      Priority: High    Dilated cardiomyopathy ([address]      Priority: High    Essential hypertension      Priority: High    Hyperlipidemia      Priority: High    Diabetic foot ([address] 10/05/2015     Priority: High    Foot osteomyelitis, right ([address] 10/05/2015     Priority: High    Obstructive sleep apnea 09/08/2010     Priority: High    Diabetes mellitus ([address] 06/05/2010     Priority: High    Diabetic foot ulcer ([address] 06/05/2010     Priority: High    Dyslipidemia 06/05/2010     Priority: High    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 ([address] 06/05/2010     Priority: High    Obesity 06/05/2010     Priority: High    On amiodarone therapy     Cardiac resynchronization therapy defibrillator (CRT-D) in place 09/01/2017    Abscess of left leg 07/05/2017    Morbid obesity with BMI of 40.0-44.9, adult ([address] 07/05/2017    Paroxysmal atrial fibrillation (HCC)     Hypokalemia     H/O gastric bypass 12/07/2016    Benign prostatic hyperplasia with lower urinary tract symptoms 12/07/2016    MRSA infection 10/07/2015    Diabetic ulcer of right foot associated with type 2 diabetes mellitus ([address]     Subacute osteomyelitis of right foot ([address]     Diabetic foot infection ([facility] 10/05/2015    Hypothyroidism 01/09/2014    Graves' disease 06/19/2012    Vitamin D deficiency 09/22/2010    Acute congestive heart failure ([address] 09/08/2010    Cardiomyopathy ([address] 06/05/2010    Renal disease due to hypertension 06/05/2010    HTN (hypertension) 06/05/2010        Plan:    Cardiomyopathy:  -Nonischemic  -S/p BI-V ICD implant  -Improved post implant but LVEF at 35-40%  -Device interrogated today and functioning appropriately   Medtronic BiV ICD, implanted 9/1/17: Estimated longevity of 7.2 years.  The right atrial lead has an intrinsic p-wave amplitude recording of 2.4 mV, impedance of 475 ohms, and pacing capture threshold of 0.75 V @ 0.4ms. The right ventricular lead has an intrinsic R-wave amplitude recording of 7.6 mV, impedance of 399 ohms, and pacing capture threshold of 0.875 V @ 0.4ms. The coronary sinus lead has an impedance of 646 ohms, and pacing capture threshold of 0.75 V @ 0.4ms. There has been no VT,VF there are 1458 episodes of paroxsymal afib longest lasting 6 minutes. There has been  no ATP therapy and no ICD shock therapy. The patient is biventricularly paced 99% of the time    Afib:  -Paroxysmal  -Symptomatic  -Captured on EKGs 6/26/17, 5/4/17, 6/30/17 and device interrogation  -7.6% afib burden on device interrogation (666 episodes)  -Per EKG, in SR today  -Continue Amiodarone 200 mg Daily  -TSH 1.06 11/7/18  -AST 18, ALT 20 (1/7/18)  -Continue Coreg 12.5 mg BID  -CHADS Vasc (HF, HTN, DM, age)- on Eliquis 5 mg daily  -Much time was spent counseling the patient on healthier lifestyle, particularly dramatically decreasing the intake of processed sugar. Follow up in one year. Thank you for allowing me to participate in the care of Nehemiah Rojas. All questions and concerns were addressed to the patient/family. Alternatives to my treatment were discussed. This note was scribed in the presence of Yaniv Harris MD by Bernice Thorpe RN. The scribe's documentation has been prepared under my direction and personally reviewed by me in its entirety. I confirm that the note above accurately reflects all work, physical examination, the discussion of treatments and procedures, and medical decision making performed by me.     Yaniv Harris MD, [address], [facility], [facility]  Cardiac Electrophysiology  [address] [facility]   Office: [phone]

## 2019-05-31 NOTE — PROGRESS NOTES
[facility]  Office Visit           Javi Sherman MD,  [facility]                           Cardiology           Liane Common  1950    May 31, 2019    CC: here with CM     HPI:  The patient is 79 y.o. male with hx CM HFpEF, pafib. s/p DCCV  HTN HLD CRD DMT2 / BiV ICD in situ / obesity / WILL uses CPAP    AICD interrogation are with  Dr Margaret Soto office routinely     No c/o cp / has ocas woozy feeling / no falls uses cane/ <1+ edema / near euvolemic /  Down 10# / works out routinely / has weak legs with neuropathy   No c/o n/v diarrhea  / no wt gain no cough fever chills      This patient on 5/31/19 is awake and alert and seemed to be improving. He wishes to come off some of his medications. Recent interrogation of his device shows that he is 7.9% in atrial fibrillation. Medications included amiodarone. I think at this time his blood pressure is relatively soft and we can reduce his carvedilol to 6.25 mg twice a day and spironolactone at 12.5 mg daily. We'll discontinue amlodipine. Status post gastric I appears surgery with weight loss of over 130 pounds. Review of Systems:  Constitutional: Denies  fatigue, weakness, night sweats or fever. HEENT: Denies new visual changes, ringing in ears, nosebleeds,nasal congestion  Respiratory: Denies new or change in SOB, PND, orthopnea or cough. Cardiovascular: see HPI  GI: Denies N/V, diarrhea, constipation, abdominal pain, change in bowel habits, melena or hematochezia  : Denies urinary frequency, urgency, incontinence, hematuria or dysuria. Skin: Denies rash, hives, or cyanosis  Musculoskeletal: Denies joint or muscle aches/pain  Neurological: Denies syncope or TIA-like symptoms.   Psychiatric: Denies anxiety, insomnia or depression     Past Medical History:   Diagnosis Date    Atrial fibrillation ([address]     Back pain     Cardiomyopathy     CHF (congestive heart failure) (HCC)     COPD (chronic obstructive pulmonary disease) ([address]     Dyslipidemia     GERD (gastroesophageal reflux disease)     Hyperlipidemia     Hypertension     Hypothyroidism     Leg edema     MRSA (methicillin resistant staph aureus) culture positive 10/5/15    foot/bone    MRSA nasal colonization 2017    Obesity     Prolonged emergence from general anesthesia     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 Zaina Villegas COLONOSCOPY N/A 3/8/2019    COLONOSCOPY WITH MAC, UNASYN (3gm) performed by Deni Walker MD at [address] [place] [address]  1--10    GASTRIC BYPASS SURGERY      OTHER SURGICAL HISTORY  10/6/15    INCISION AND DRAINAGE RIGHT FOOT          OTHER SURGICAL HISTORY Right 10/8/15    INCISION AND DRAINAGE RIGHT FOOT      PACEMAKER PLACEMENT      UPPER GASTROINTESTINAL ENDOSCOPY N/A 3/8/2019    EGD BIOPSY GASTRIC H. PYLORI performed by Deni Walker MD at [facility] ENDOSCOPY     Family History   Problem Relation Age of Onset    Hypertension Mother     Heart Disease Father     Hypertension Maternal Grandmother     Diabetes Maternal Grandmother      Social History     Tobacco Use    Smoking status: Former Smoker     Packs/day: 1.00     Years: 4.00     Pack years: 4.00     Types: Cigarettes     Last attempt to quit: 2017     Years since quittin.4    Smokeless tobacco: Never Used   Substance Use Topics    Alcohol use: Yes     Comment: 2-3 times per year    Drug use: No       No Known Allergies  Current Outpatient Medications   Medication Sig Dispense Refill    B-D UF III MINI PEN NEEDLES 31G X 5 MM MISC USE DAILY 100 each 5    NOVOLOG FLEXPEN 100 UNIT/ML injection pen ADMINISTER 20 UNITS UNDER THE SKIN THREE TIMES DAILY BEFORE MEALS 15 mL 3    amiodarone (CORDARONE) 200 MG tablet TAKE 1 TABLET BY MOUTH DAILY 90 tablet 3    levothyroxine (SYNTHROID) 25 MCG tablet Take 25 mcg by mouth daily  3    ferrous sulfate 325 (65 Fe) MG EC tablet Take 325 mg by mouth daily (with breakfast)      atorvastatin (LIPITOR) 20 MG tablet TAKE 1 TABLET BY MOUTH EVERY DAY 30 tablet 5    carvedilol (COREG) 12.5 MG tablet TAKE 1 TABLET BY MOUTH TWICE DAILY (Patient taking differently: half table every other day.) 60 tablet 5    ELIQUIS 5 MG TABS tablet TAKE 1 TABLET BY MOUTH TWICE DAILY 60 tablet 6    spironolactone (ALDACTONE) 25 MG tablet TAKE 1 TABLET BY MOUTH DAILY (Patient taking differently: Take 25 mg by mouth daily Indications: Half tablet Daily ) 30 tablet 5    lisinopril (PRINIVIL;ZESTRIL) 2.5 MG tablet TAKE 1 TABLET BY MOUTH DAILY 90 tablet 3    CIALIS 5 MG tablet TAKE 1 TABLET BY MOUTH DAILY 30 tablet 5    Insulin Degludec 100 UNIT/ML SOPN Inject 4 Units into the skin Daily 3 pen 5    omega-3 acid ethyl esters (LOVAZA) 1 g capsule TAKE 1 CAPSULE BY MOUTH TWICE DAILY (Patient taking differently: TAKE 1 CAPSULE BY MOUTH Daily) 60 capsule 5    tamsulosin (FLOMAX) 0.4 MG capsule TAKE 1 CAPSULE BY MOUTH DAILY 90 capsule 3    ONE TOUCH ULTRA TEST strip TEST UP TO THREE TIMES DAILY 300 strip 5    furosemide (LASIX) 40 MG tablet TAKE 1 TABLET BY MOUTH DAILY 60 tablet 5    acetaminophen (TYLENOL) 325 MG tablet Take 2 tablets by mouth every 4 hours as needed for Pain 120 tablet 3    Coenzyme Q10 (CO Q 10) 100 MG CAPS Take 1 capsule by mouth daily      Cinnamon 500 MG CAPS Take 1 capsule by mouth daily      Krill Oil 1000 MG CAPS Take 1 capsule by mouth daily      glucose blood VI test strips (ONE TOUCH TEST STRIPS) strip 1 each by In Vitro route daily As needed.  100 each 3    Vitamin D (CHOLECALCIFEROL) 1000 UNITS CAPS capsule Take 2,000 Units by mouth nightly       Insulin Syringe-Needle U-100 (INSULIN SYRINGE .5CC/31GX5/16\") 31G X 5/16\" 0.5 ML MISC Patient tests bid 100 Syringe 5    therapeutic multivitamin-minerals (THERAGRAN-M) tablet Take 1 tablet by mouth daily.  aspirin 81 MG EC tablet Take 81 mg by mouth daily. No current facility-administered medications for this visit. Physical Exam:   /70   Pulse 70   Wt (!) 341 lb 12.8 oz (155 kg)   BMI 42.72 kg/m²   BP Readings from Last 3 Encounters:   05/31/19 130/70   05/29/19 134/66   04/29/19 118/70     Pulse Readings from Last 3 Encounters:   05/31/19 70   05/29/19 63   04/29/19 66     Wt Readings from Last 3 Encounters:   05/31/19 (!) 341 lb 12.8 oz (155 kg)   05/29/19 (!) 342 lb (155.1 kg)   04/29/19 (!) 348 lb 9.6 oz (158.1 kg)     Constitutional: He is oriented to person, place, and time. He appears well-developed and well-nourished. In no acute distress. HEENT: Normocephalic and atraumatic. Sclerae anicteric. No xanthelasmas. Conjunctiva white, no subconjunctival hemorrhage   External inspection of ears nose teeth & gums   Eyes:PERRLA EOM's intact. Neck: Neck supple. No JVD present. Carotids without bruits. No mass and no thyromegaly present. No lymphadenopathy present. Cardiovascular: RRR, normal S1 and S2; no murmur/gallop or rub, PMI nondisplaced  Pulmonary/Chest: Effort normal.  Lungs clear to auscultation. Chest wall nontender  Abdominal: soft, nontender, nondistended. + bowel sounds; no organomegaly or bruits. Aorta normal  Extremities: No edema, cyanosis, or clubbing. Pulses are 2+ radial/carotid/dorsalis pedis bilaterally. Cap refill brisk. Neurological: No cranial nerve deficit. Psychiatric: He has a normal mood and affect.  His speech is normal and behavior is normal.     Lab Review:   Lab Results   Component Value Date    TRIG 73 09/19/2018    HDL 62 09/19/2018    HDL 56 11/15/2011    LDLCALC 75 09/19/2018    LABVLDL 15 09/19/2018     Lab Results   Component Value Date     09/19/2018    K 4.0 09/19/2018     09/19/2018    CO2 28 09/19/2018    BUN 14 09/19/2018    CREATININE 1.2 09/19/2018    GLUCOSE 166 03/21/2019    CALCIUM 9.1 09/19/2018      Lab Results   Component Value Date    WBC 4.8 03/21/2019    HGB 12.4 (L) 03/21/2019    HCT 39.4 (L) 03/21/2019    MCV 86.8 03/21/2019     03/21/2019                  Assessment:    hx CM HFpEF  Near euvolemic   BiV ICD in situ /  on amiodarone / obesity   AICD interrogation are with  Dr Jeffry Somers office routinely     Pafib  [id number] Score for Atrial Fibrillation Stroke Risk   Risk   Factors  Component Value   C CHF Yes 1   H HTN Yes 1   A2 Age >= 76 No,  (70 y.o.) 0   D DM Yes 1   S2 Prior Stroke/TIA No 0   V Vascular Disease No 0   A Age 74-69 Yes,  (70 y.o.) 1   Sc Sex male 0    [id number]  Score  4   Denies  any falls or any noted bleeding   Hgb 12.4 /stable  / sCr wnl   s/p DCCV      HTN   Optimal     HLD  On statin   LDL 75 9/2018     CRD  Stable    DMT2  Glucose average  118     WILL   uses CPAP     Plan:   Continuing to improve from our perspective. Reduce carvedilol 6.25 mg twice a day. Stop amlodipine. Reduce spironolactone 12.5 mg daily. Return to see me in 3 months. Andreas Huff M.D.  [facility]

## 2019-06-24 ENCOUNTER — TELEPHONE (OUTPATIENT)
Dept: CARDIOLOGY CLINIC | Age: 69
End: 2019-06-24

## 2019-06-24 DIAGNOSIS — I10 ESSENTIAL HYPERTENSION: ICD-10-CM

## 2019-06-28 RX ORDER — CARVEDILOL 6.25 MG/1
6.25 TABLET ORAL 2 TIMES DAILY WITH MEALS
Qty: 60 TABLET | Refills: 5 | Status: SHIPPED | OUTPATIENT
Start: 2019-06-28 | End: 2019-07-02 | Stop reason: SDUPTHER

## 2019-06-28 RX ORDER — SPIRONOLACTONE 25 MG/1
12.5 TABLET ORAL DAILY
Qty: 30 TABLET | Refills: 5 | Status: SHIPPED | OUTPATIENT
Start: 2019-06-28 | End: 2019-07-02 | Stop reason: SDUPTHER

## 2019-07-02 DIAGNOSIS — I10 ESSENTIAL HYPERTENSION: ICD-10-CM

## 2019-07-02 RX ORDER — SPIRONOLACTONE 25 MG/1
25 TABLET ORAL DAILY
Qty: 90 TABLET | Refills: 0 | Status: ON HOLD | OUTPATIENT
Start: 2019-07-02 | End: 2019-08-29

## 2019-07-02 RX ORDER — SPIRONOLACTONE 25 MG/1
12.5 TABLET ORAL DAILY
Qty: 30 TABLET | Refills: 3 | Status: SHIPPED | OUTPATIENT
Start: 2019-07-02 | End: 2020-05-08 | Stop reason: SDUPTHER

## 2019-07-02 RX ORDER — CARVEDILOL 6.25 MG/1
6.25 TABLET ORAL 2 TIMES DAILY WITH MEALS
Qty: 60 TABLET | Refills: 3 | Status: SHIPPED | OUTPATIENT
Start: 2019-07-02 | End: 2020-02-17

## 2019-07-30 RX ORDER — APIXABAN 5 MG/1
TABLET, FILM COATED ORAL
Qty: 60 TABLET | Refills: 0 | Status: SHIPPED | OUTPATIENT
Start: 2019-07-30 | End: 2019-09-30 | Stop reason: SDUPTHER

## 2019-08-07 ENCOUNTER — HOSPITAL ENCOUNTER (OUTPATIENT)
Dept: PHYSICAL THERAPY | Age: 69
Setting detail: THERAPIES SERIES
Discharge: HOME | End: 2019-08-07
Payer: MEDICARE

## 2019-08-07 PROCEDURE — 97161 PT EVAL LOW COMPLEX 20 MIN: CPT | Performed by: PHYSICAL THERAPIST

## 2019-08-07 PROCEDURE — 97530 THERAPEUTIC ACTIVITIES: CPT | Performed by: PHYSICAL THERAPIST

## 2019-08-07 PROCEDURE — 97110 THERAPEUTIC EXERCISES: CPT | Performed by: PHYSICAL THERAPIST

## 2019-08-07 NOTE — PLAN OF CARE
length. Balance: requires UE support to maintain tandem balance. SLS not assessed d/t difficulty with tandem balance. [x] Patient history, allergies, meds reviewed. Medical chart reviewed. See intake form. Review Of Systems (ROS):  [x]Performed Review of systems (Integumentary, CardioPulmonary, Neurological) by intake and observation. Intake form has been scanned into medical record. Patient has been instructed to contact their primary care physician regarding ROS issues if not already being addressed at this time. Co-morbidities/Complexities (which will affect course of rehabilitation):   []None           Arthritic conditions   []Rheumatoid arthritis (M05.9)  []Osteoarthritis (M19.91)   Cardiovascular conditions   []Hypertension (I10)  []Hyperlipidemia (E78.5)  []Angina pectoris (I20)  []Atherosclerosis (I70)   Musculoskeletal conditions   []Disc pathology   []Congenital spine pathologies   [x]Prior surgical intervention  []Osteoporosis (M81.8)  []Osteopenia (M85.8)   Endocrine conditions   []Hypothyroid (E03.9)  []Hyperthyroid Gastrointestinal conditions   []Constipation (V89.49)   Metabolic conditions   []Morbid obesity (E66.01)  [x]Diabetes type 1(E10.65) or 2 (E11.65)   []Neuropathy (G60.9)     Pulmonary conditions   []Asthma (J45)  []Coughing   []COPD (J44.9)   Psychological Disorders  []Anxiety (F41.9)  []Depression (F32.9)   []Other:   []Other:          Barriers to/and or personal factors that will affect rehab potential:              []Age  []Sex              [x]Motivation/Lack of Motivation                        []Co-Morbidities              []Cognitive Function, education/learning barriers              []Environmental, home barriers              []profession/work barriers  []past PT/medical experience  [x]other:  Justification: Pt is motivated to get better and does go to the gym, he is receptive to feedback    Falls Risk Assessment (30 days):  Increased d/t balance Modalities as needed that may include: thermal agents, E-stim, Biofeedback, US, iontophoresis as indicated  [x] Patient education on joint protection, postural re-education, activity modification, progression of HEP. HEP instruction:   Initiated 8/7/19. Printed hand out given. Pt demonstrated proper form of each exercise and expressed verbal understanding of frequency and duration. GOALS:  Patient stated goal: get stronger, be able to walk and stand for >30 min    Therapist goals for Patient:   Short Term Goals: To be achieved in: 2 weeks 8/21/19  1. Independent in HEP and progression per patient tolerance, in order to prevent re-injury. 2. Patient will have a decrease in pain to facilitate improvement in movement, function, and ADLs as indicated by Functional Deficits. Long Term Goals: To be achieved in: 8 weeks 10/2/19  1. Disability index score of 20% or less for the [facility] to assist with reaching prior level of function. 2. Patient will demonstrate increased AROM to [facility] to allow for proper joint functioning as indicated by patients Functional Deficits. 3. Patient will demonstrate an increase in BLE strength to 4/5 to allow for proper functional mobility as indicated by patients Functional Deficits. 4. Patient will return to ADLs, IADLs and functional activities without increased symptoms or restriction. 5. Patient will be able to negotiate stairs with step over step pattern. 6. Patient will be able to stand/ambulate for 30 minutes or greater for improved community mobility and participation. Electronically signed by:  Margaret Rossi PT, DPT  Physical Therapist  [phone]  [email or web address]

## 2019-08-07 NOTE — FLOWSHEET NOTE
TA x  1    [] [facility] Traction (84872)  [] ES(attended) (31574)      [] ES (un) (92657):     GOALS:   Patient stated goal: get stronger, be able to walk and stand for >30 min     Therapist goals for Patient:   Short Term Goals: To be achieved in: 2 weeks 8/21/19  1. Independent in HEP and progression per patient tolerance, in order to prevent re-injury. 2. Patient will have a decrease in pain to facilitate improvement in movement, function, and ADLs as indicated by Functional Deficits.     Long Term Goals: To be achieved in: 8 weeks 10/2/19  1. Disability index score of 20% or less for the [facility] to assist with reaching prior level of function. 2. Patient will demonstrate increased AROM to [facility] to allow for proper joint functioning as indicated by patients Functional Deficits. 3. Patient will demonstrate an increase in BLE strength to 4/5 to allow for proper functional mobility as indicated by patients Functional Deficits. 4. Patient will return to ADLs, IADLs and functional activities without increased symptoms or restriction. 5. Patient will be able to negotiate stairs with step over step pattern. 6. Patient will be able to stand/ambulate for 30 minutes or greater for improved community mobility and participation. Progression Towards Functional goals:  [] Patient is progressing as expected towards functional goals listed. [] Progression is slowed due to complexities listed. [] Progression has been slowed due to co-morbidities. [x] Plan just implemented, too soon to assess goals progression  [] Other:     ASSESSMENT:  See eval    Treatment/Activity Tolerance:  [x] Patient tolerated treatment well [x] Patient limited by fatique  [] Patient limited by pain  [] Patient limited by other medical complications  [x] Other: Pt only able to complete 10 reps of step ups d/t muscle weakness.  Good tolerance to weight machines, stated that he could already feel a huge difference between reps/weight used on the

## 2019-08-09 ENCOUNTER — ANESTHESIA EVENT (OUTPATIENT)
Dept: ENDOSCOPY | Age: 69
End: 2019-08-09
Payer: MEDICARE

## 2019-08-09 ENCOUNTER — HOSPITAL ENCOUNTER (OUTPATIENT)
Age: 69
Setting detail: OUTPATIENT SURGERY
Discharge: HOME | End: 2019-08-09
Attending: INTERNAL MEDICINE | Admitting: INTERNAL MEDICINE
Payer: MEDICARE

## 2019-08-09 ENCOUNTER — ANESTHESIA (OUTPATIENT)
Dept: ENDOSCOPY | Age: 69
End: 2019-08-09
Payer: MEDICARE

## 2019-08-09 VITALS
RESPIRATION RATE: 16 BRPM | OXYGEN SATURATION: 99 % | DIASTOLIC BLOOD PRESSURE: 66 MMHG | SYSTOLIC BLOOD PRESSURE: 115 MMHG | HEART RATE: 81 BPM

## 2019-08-09 VITALS
SYSTOLIC BLOOD PRESSURE: 92 MMHG | DIASTOLIC BLOOD PRESSURE: 72 MMHG | RESPIRATION RATE: 21 BRPM | OXYGEN SATURATION: 93 %

## 2019-08-09 LAB
GLUCOSE BLD-MCNC: 149 MG/DL (ref 70–99)
PERFORMED ON: ABNORMAL

## 2019-08-09 PROCEDURE — 2580000003 HC RX 258: Performed by: NURSE ANESTHETIST, CERTIFIED REGISTERED

## 2019-08-09 PROCEDURE — 7100000010 HC PHASE II RECOVERY - FIRST 15 MIN: Performed by: INTERNAL MEDICINE

## 2019-08-09 PROCEDURE — 88305 TISSUE EXAM BY PATHOLOGIST: CPT

## 2019-08-09 PROCEDURE — 3609010300 HC COLONOSCOPY W/BIOPSY SINGLE/MULTIPLE: Performed by: INTERNAL MEDICINE

## 2019-08-09 PROCEDURE — 2500000003 HC RX 250 WO HCPCS: Performed by: INTERNAL MEDICINE

## 2019-08-09 PROCEDURE — 7100000011 HC PHASE II RECOVERY - ADDTL 15 MIN: Performed by: INTERNAL MEDICINE

## 2019-08-09 PROCEDURE — 6360000002 HC RX W HCPCS: Performed by: NURSE ANESTHETIST, CERTIFIED REGISTERED

## 2019-08-09 PROCEDURE — 3609010600 HC COLONOSCOPY POLYPECTOMY SNARE/COLD BIOPSY: Performed by: INTERNAL MEDICINE

## 2019-08-09 PROCEDURE — 3700000001 HC ADD 15 MINUTES (ANESTHESIA): Performed by: INTERNAL MEDICINE

## 2019-08-09 PROCEDURE — 3700000000 HC ANESTHESIA ATTENDED CARE: Performed by: INTERNAL MEDICINE

## 2019-08-09 PROCEDURE — 2709999900 HC NON-CHARGEABLE SUPPLY: Performed by: INTERNAL MEDICINE

## 2019-08-09 RX ORDER — PROPOFOL 10 MG/ML
INJECTION, EMULSION INTRAVENOUS PRN
Status: DISCONTINUED | OUTPATIENT
Start: 2019-08-09 | End: 2019-08-09 | Stop reason: SDUPTHER

## 2019-08-09 RX ORDER — SODIUM CHLORIDE, SODIUM LACTATE, POTASSIUM CHLORIDE, CALCIUM CHLORIDE 600; 310; 30; 20 MG/100ML; MG/100ML; MG/100ML; MG/100ML
INJECTION, SOLUTION INTRAVENOUS CONTINUOUS PRN
Status: DISCONTINUED | OUTPATIENT
Start: 2019-08-09 | End: 2019-08-09 | Stop reason: SDUPTHER

## 2019-08-09 RX ADMIN — SODIUM CHLORIDE, SODIUM LACTATE, POTASSIUM CHLORIDE, AND CALCIUM CHLORIDE: 600; 310; 30; 20 INJECTION, SOLUTION INTRAVENOUS at 12:11

## 2019-08-09 RX ADMIN — PROPOFOL 50 MG: 10 INJECTION, EMULSION INTRAVENOUS at 12:30

## 2019-08-09 RX ADMIN — PROPOFOL 50 MG: 10 INJECTION, EMULSION INTRAVENOUS at 12:32

## 2019-08-09 RX ADMIN — PROPOFOL 50 MG: 10 INJECTION, EMULSION INTRAVENOUS at 12:28

## 2019-08-09 ASSESSMENT — PULMONARY FUNCTION TESTS
PIF_VALUE: 0

## 2019-08-09 ASSESSMENT — PAIN - FUNCTIONAL ASSESSMENT
PAIN_FUNCTIONAL_ASSESSMENT: 0-10

## 2019-08-09 ASSESSMENT — LIFESTYLE VARIABLES: SMOKING_STATUS: 0

## 2019-08-09 ASSESSMENT — PAIN SCALES - GENERAL: PAINLEVEL_OUTOF10: 0

## 2019-08-09 NOTE — H&P
ADMINISTER 20 UNITS UNDER THE SKIN THREE TIMES DAILY BEFORE MEALS 5/1/19  Yes Glenn Street MD   amiodarone (CORDARONE) 200 MG tablet TAKE 1 TABLET BY MOUTH DAILY 4/30/19  Yes Nick Cleveland MD   ferrous sulfate 325 (65 Fe) MG EC tablet Take 325 mg by mouth daily (with breakfast)   Yes Historical Provider, MD   atorvastatin (LIPITOR) 20 MG tablet TAKE 1 TABLET BY MOUTH EVERY DAY 2/2/19  Yes Glenn Street MD   lisinopril (PRINIVIL;ZESTRIL) 2.5 MG tablet TAKE 1 TABLET BY MOUTH DAILY 11/30/18  Yes Glenn Street MD   CIALIS 5 MG tablet TAKE 1 TABLET BY MOUTH DAILY 11/14/18  Yes Glenn Street MD   Insulin Degludec 100 UNIT/ML SOPN Inject 4 Units into the skin Daily 10/30/18  Yes Glenn Street MD   omega-3 acid ethyl esters (LOVAZA) 1 g capsule TAKE 1 CAPSULE BY MOUTH TWICE DAILY  Patient taking differently: TAKE 1 CAPSULE BY MOUTH Daily 10/19/18  Yes Glenn Street MD   tamsulosin (FLOMAX) 0.4 MG capsule TAKE 1 CAPSULE BY MOUTH DAILY 8/10/18  Yes Glenn Street MD   ONE TOUCH ULTRA TEST strip TEST UP TO THREE TIMES DAILY 8/1/18  Yes Glenn Street MD   furosemide (LASIX) 40 MG tablet TAKE 1 TABLET BY MOUTH DAILY 6/29/18  Yes Glenn Street MD   Coenzyme Q10 (CO Q 10) 100 MG CAPS Take 1 capsule by mouth daily   Yes Historical Provider, MD   Cinnamon 500 MG CAPS Take 1 capsule by mouth daily   Yes Historical Provider, MD   glucose blood VI test strips (ONE TOUCH TEST STRIPS) strip 1 each by In Vitro route daily As needed. 4/27/17  Yes Glenn Street MD   Vitamin D (CHOLECALCIFEROL) 1000 UNITS CAPS capsule Take 2,000 Units by mouth nightly    Yes Historical Provider, MD   Insulin Syringe-Needle U-100 (INSULIN SYRINGE .5CC/31GX5/16\") 31G X 5/16\" 0.5 ML MISC Patient tests bid 5/21/13  Yes Glenn Street MD   therapeutic multivitamin-minerals [facility]) tablet Take 1 tablet by mouth daily. Yes Historical Provider, MD   aspirin 81 MG EC tablet Take 81 mg by mouth daily.    Yes Historical Provider, MD   ELIQUIS 5 MG

## 2019-08-09 NOTE — ANESTHESIA PRE PROCEDURE
Obstructive sleep apnea G47.33    Acute congestive heart failure ([facility]) I50.9    Vitamin D deficiency E55.9    Graves' disease E05.00    Hypothyroidism E03.9    Diabetic foot ([facility]) E11.8    Foot osteomyelitis, right ([facility]) M86.9    Diabetic foot infection ([facility]) E11.628, L08.9    Dilated cardiomyopathy ([facility]) I42.0    Essential hypertension I10    Hyperlipidemia E78.5    Diabetic ulcer of right foot associated with type 2 diabetes mellitus ([facility] E11.621, L97.519    Subacute osteomyelitis of right foot ([facility] M86.271    MRSA infection A49.02    H/O gastric bypass Z98.84    Benign prostatic hyperplasia with lower urinary tract symptoms N40.1    Paroxysmal atrial fibrillation ([facility]) I48.0    Hypokalemia E87.6    Chronic systolic heart failure ([facility]) I50.22    NSVT (nonsustained ventricular tachycardia) ([facility]) I47.2    Abscess of left leg L02.416    Morbid obesity with BMI of 40.0-44.9, adult ([facility]) E66.01, Z68.41    Cardiac resynchronization therapy defibrillator (CRT-D) in place Z95.810    On amiodarone therapy Z79.899       Past Medical History:        Diagnosis Date    Atrial fibrillation ([facility])     Back pain     Cardiomyopathy     CHF (congestive heart failure) ([facility])     COPD (chronic obstructive pulmonary disease) ([facility])     Dyslipidemia     GERD (gastroesophageal reflux disease)     Hyperlipidemia     Hypertension     Hypothyroidism     Leg edema     MRSA (methicillin resistant staph aureus) culture positive 10/5/15    foot/bone    MRSA nasal colonization 05/05/2017    Obesity     Prolonged emergence from general anesthesia     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

## 2019-08-19 NOTE — TELEPHONE ENCOUNTER
Medication Question/Concern    What is the name of the medication you need to speak with someone about? eliquis    Doseage of the medication: 5mg    How are you taking this medication: 2x daily    What issues/concerns are you having with this medication: pt states he knocked his bottle of medication down the sink, pt would like to know if we could provide him with samples until it is time for his refill. You can reach the pt at [phone].

## 2019-08-26 ENCOUNTER — NURSE TRIAGE (OUTPATIENT)
Dept: OTHER | Facility: CLINIC | Age: 69
End: 2019-08-26

## 2019-08-26 ENCOUNTER — HOSPITAL ENCOUNTER (EMERGENCY)
Age: 69
Discharge: HOME | End: 2019-08-27
Attending: EMERGENCY MEDICINE
Payer: MEDICARE

## 2019-08-26 DIAGNOSIS — S91.109A TOE AVULSION, INITIAL ENCOUNTER: Primary | ICD-10-CM

## 2019-08-26 PROCEDURE — 99284 EMERGENCY DEPT VISIT MOD MDM: CPT

## 2019-08-27 ENCOUNTER — APPOINTMENT (OUTPATIENT)
Dept: GENERAL RADIOLOGY | Age: 69
End: 2019-08-27
Payer: MEDICARE

## 2019-08-27 VITALS
WEIGHT: 315 LBS | BODY MASS INDEX: 42.12 KG/M2 | DIASTOLIC BLOOD PRESSURE: 67 MMHG | TEMPERATURE: 98.1 F | OXYGEN SATURATION: 98 % | SYSTOLIC BLOOD PRESSURE: 147 MMHG | RESPIRATION RATE: 16 BRPM | HEART RATE: 71 BPM

## 2019-08-27 LAB
A/G RATIO: 1.2 (ref 1.1–2.2)
ALBUMIN SERPL-MCNC: 3.7 G/DL (ref 3.4–5)
ALP BLD-CCNC: 122 U/L (ref 40–129)
ALT SERPL-CCNC: 42 U/L (ref 10–40)
ANION GAP SERPL CALCULATED.3IONS-SCNC: 12 MMOL/L (ref 3–16)
APTT: 41.6 SEC (ref 26–36)
AST SERPL-CCNC: 31 U/L (ref 15–37)
BASOPHILS ABSOLUTE: 0.1 K/UL (ref 0–0.2)
BASOPHILS RELATIVE PERCENT: 1.5 %
BILIRUB SERPL-MCNC: 0.3 MG/DL (ref 0–1)
BUN BLDV-MCNC: 15 MG/DL (ref 7–20)
CALCIUM SERPL-MCNC: 9 MG/DL (ref 8.3–10.6)
CHLORIDE BLD-SCNC: 106 MMOL/L (ref 99–110)
CO2: 21 MMOL/L (ref 21–32)
CREAT SERPL-MCNC: 1 MG/DL (ref 0.8–1.3)
EOSINOPHILS ABSOLUTE: 0.2 K/UL (ref 0–0.6)
EOSINOPHILS RELATIVE PERCENT: 4.2 %
GFR AFRICAN AMERICAN: >60
GFR NON-AFRICAN AMERICAN: >60
GLOBULIN: 3.2 G/DL
GLUCOSE BLD-MCNC: 161 MG/DL (ref 70–99)
GLUCOSE BLD-MCNC: 179 MG/DL (ref 70–99)
HCT VFR BLD CALC: 39.7 % (ref 40.5–52.5)
HEMOGLOBIN: 13.4 G/DL (ref 13.5–17.5)
INR BLD: 1.19 (ref 0.86–1.14)
LYMPHOCYTES ABSOLUTE: 1.2 K/UL (ref 1–5.1)
LYMPHOCYTES RELATIVE PERCENT: 25.1 %
MCH RBC QN AUTO: 29.3 PG (ref 26–34)
MCHC RBC AUTO-ENTMCNC: 33.7 G/DL (ref 31–36)
MCV RBC AUTO: 86.9 FL (ref 80–100)
MONOCYTES ABSOLUTE: 0.5 K/UL (ref 0–1.3)
MONOCYTES RELATIVE PERCENT: 10.1 %
NEUTROPHILS ABSOLUTE: 2.7 K/UL (ref 1.7–7.7)
NEUTROPHILS RELATIVE PERCENT: 59.1 %
PDW BLD-RTO: 14.5 % (ref 12.4–15.4)
PERFORMED ON: ABNORMAL
PLATELET # BLD: 147 K/UL (ref 135–450)
PMV BLD AUTO: 10.1 FL (ref 5–10.5)
POTASSIUM REFLEX MAGNESIUM: 4.4 MMOL/L (ref 3.5–5.1)
PROTHROMBIN TIME: 13.6 SEC (ref 9.8–13)
RBC # BLD: 4.57 M/UL (ref 4.2–5.9)
SODIUM BLD-SCNC: 139 MMOL/L (ref 136–145)
TOTAL PROTEIN: 6.9 G/DL (ref 6.4–8.2)
WBC # BLD: 4.6 K/UL (ref 4–11)

## 2019-08-27 PROCEDURE — 80053 COMPREHEN METABOLIC PANEL: CPT

## 2019-08-27 PROCEDURE — 85025 COMPLETE CBC W/AUTO DIFF WBC: CPT

## 2019-08-27 PROCEDURE — 73660 X-RAY EXAM OF TOE(S): CPT

## 2019-08-27 PROCEDURE — 85610 PROTHROMBIN TIME: CPT

## 2019-08-27 PROCEDURE — 36415 COLL VENOUS BLD VENIPUNCTURE: CPT

## 2019-08-27 PROCEDURE — 85730 THROMBOPLASTIN TIME PARTIAL: CPT

## 2019-08-27 PROCEDURE — 6370000000 HC RX 637 (ALT 250 FOR IP): Performed by: EMERGENCY MEDICINE

## 2019-08-27 RX ORDER — CEPHALEXIN 500 MG/1
500 CAPSULE ORAL ONCE
Status: COMPLETED | OUTPATIENT
Start: 2019-08-27 | End: 2019-08-27

## 2019-08-27 RX ORDER — CEPHALEXIN 500 MG/1
500 CAPSULE ORAL 4 TIMES DAILY
Qty: 40 CAPSULE | Refills: 0 | Status: ON HOLD | OUTPATIENT
Start: 2019-08-27 | End: 2019-08-30 | Stop reason: SDUPTHER

## 2019-08-27 RX ADMIN — CEPHALEXIN 500 MG: 500 CAPSULE ORAL at 01:35

## 2019-08-27 ASSESSMENT — PAIN DESCRIPTION - PAIN TYPE: TYPE: ACUTE PAIN

## 2019-08-27 ASSESSMENT — PAIN DESCRIPTION - DESCRIPTORS: DESCRIPTORS: ACHING

## 2019-08-27 ASSESSMENT — PAIN SCALES - GENERAL: PAINLEVEL_OUTOF10: 7

## 2019-08-27 ASSESSMENT — PAIN DESCRIPTION - LOCATION: LOCATION: ARM

## 2019-08-27 ASSESSMENT — PAIN - FUNCTIONAL ASSESSMENT: PAIN_FUNCTIONAL_ASSESSMENT: ACTIVITIES ARE NOT PREVENTED

## 2019-08-27 ASSESSMENT — PAIN DESCRIPTION - FREQUENCY: FREQUENCY: CONTINUOUS

## 2019-08-27 ASSESSMENT — PAIN DESCRIPTION - ORIENTATION: ORIENTATION: RIGHT

## 2019-08-27 NOTE — ED PROVIDER NOTES
mood and affect, thoughts are linear and organized, without delusions/hallucinations, responds appropriately to questions      LABS and DIAGNOSTIC RESULTS      RADIOLOGY  X-RAYS:  I have reviewed radiologic plain film image(s). ALL OTHER NON-PLAIN FILM IMAGES SUCH AS CT, ULTRASOUND AND MRI HAVE BEEN READ BY THE RADIOLOGIST. XR TOE RIGHT (MIN 2 VIEWS)   Final Result   1. Mild subluxation of the distal phalanx of the 2nd toe with respect to the   mid phalanx with small triangular osseous density appear to be present   between the mid and distal phalanx, possibly sequela of prior trauma. 2. Interval extensive erosion of the mid to distal 1st metatarsal with   triangular/pencil pointing appearance noted and circular area of erosion   along its distal most aspect. Findings may represent osteomyelitis, possibly   chronic. 3. Diffuse medial and dorsal soft tissue swelling.             LABS  Results for orders placed or performed during the hospital encounter of 08/26/19   CBC Auto Differential   Result Value Ref Range    WBC 4.6 4.0 - 11.0 K/uL    RBC 4.57 4.20 - 5.90 M/uL    Hemoglobin 13.4 (L) 13.5 - 17.5 g/dL    Hematocrit 39.7 (L) 40.5 - 52.5 %    MCV 86.9 80.0 - 100.0 fL    MCH 29.3 26.0 - 34.0 pg    MCHC 33.7 31.0 - 36.0 g/dL    RDW 14.5 12.4 - 15.4 %    Platelets 128 593 - 126 K/uL    MPV 10.1 5.0 - 10.5 fL    Neutrophils % 59.1 %    Lymphocytes % 25.1 %    Monocytes % 10.1 %    Eosinophils % 4.2 %    Basophils % 1.5 %    Neutrophils Absolute 2.7 1.7 - 7.7 K/uL    Lymphocytes Absolute 1.2 1.0 - 5.1 K/uL    Monocytes Absolute 0.5 0.0 - 1.3 K/uL    Eosinophils Absolute 0.2 0.0 - 0.6 K/uL    Basophils Absolute 0.1 0.0 - 0.2 K/uL   Comprehensive Metabolic Panel w/ Reflex to MG   Result Value Ref Range    Sodium 139 136 - 145 mmol/L    Potassium reflex Magnesium 4.4 3.5 - 5.1 mmol/L    Chloride 106 99 - 110 mmol/L    CO2 21 21 - 32 mmol/L    Anion Gap 12 3 - 16    Glucose 179 (H) 70 - 99 mg/dL    BUN 15 7 - 20

## 2019-08-28 ENCOUNTER — OFFICE VISIT (OUTPATIENT)
Dept: ORTHOPEDIC SURGERY | Age: 69
End: 2019-08-28
Payer: MEDICARE

## 2019-08-28 ENCOUNTER — ANESTHESIA EVENT (OUTPATIENT)
Dept: OPERATING ROOM | Age: 69
DRG: 516 | End: 2019-08-28
Payer: COMMERCIAL

## 2019-08-28 ENCOUNTER — ANESTHESIA (OUTPATIENT)
Dept: OPERATING ROOM | Age: 69
DRG: 516 | End: 2019-08-28
Payer: COMMERCIAL

## 2019-08-28 ENCOUNTER — HOSPITAL ENCOUNTER (INPATIENT)
Age: 69
LOS: 2 days | Discharge: HOME | DRG: 516 | End: 2019-08-30
Attending: ORTHOPAEDIC SURGERY | Admitting: ORTHOPAEDIC SURGERY
Payer: COMMERCIAL

## 2019-08-28 VITALS
OXYGEN SATURATION: 100 % | SYSTOLIC BLOOD PRESSURE: 144 MMHG | DIASTOLIC BLOOD PRESSURE: 73 MMHG | RESPIRATION RATE: 25 BRPM | TEMPERATURE: 97.7 F

## 2019-08-28 VITALS — RESPIRATION RATE: 16 BRPM | BODY MASS INDEX: 38.36 KG/M2 | HEIGHT: 76 IN | WEIGHT: 315 LBS

## 2019-08-28 DIAGNOSIS — S92.911B: Primary | ICD-10-CM

## 2019-08-28 PROBLEM — S92.919B: Status: ACTIVE | Noted: 2019-08-28

## 2019-08-28 PROBLEM — S92.501D: Status: ACTIVE | Noted: 2019-08-28

## 2019-08-28 LAB
GLUCOSE BLD-MCNC: 102 MG/DL (ref 70–99)
GLUCOSE BLD-MCNC: 102 MG/DL (ref 70–99)
GLUCOSE BLD-MCNC: 181 MG/DL (ref 70–99)
GLUCOSE BLD-MCNC: 206 MG/DL (ref 70–99)
GLUCOSE BLD-MCNC: 95 MG/DL (ref 70–99)
PERFORMED ON: ABNORMAL
PERFORMED ON: NORMAL

## 2019-08-28 PROCEDURE — 2709999900 HC NON-CHARGEABLE SUPPLY: Performed by: ORTHOPAEDIC SURGERY

## 2019-08-28 PROCEDURE — 11012 DEB SKIN BONE AT FX SITE: CPT | Performed by: ORTHOPAEDIC SURGERY

## 2019-08-28 PROCEDURE — 2580000003 HC RX 258: Performed by: ANESTHESIOLOGY

## 2019-08-28 PROCEDURE — 2580000003 HC RX 258: Performed by: INTERNAL MEDICINE

## 2019-08-28 PROCEDURE — 7100000001 HC PACU RECOVERY - ADDTL 15 MIN: Performed by: ORTHOPAEDIC SURGERY

## 2019-08-28 PROCEDURE — 3700000001 HC ADD 15 MINUTES (ANESTHESIA): Performed by: ORTHOPAEDIC SURGERY

## 2019-08-28 PROCEDURE — 1200000000 HC SEMI PRIVATE

## 2019-08-28 PROCEDURE — 2500000003 HC RX 250 WO HCPCS: Performed by: NURSE ANESTHETIST, CERTIFIED REGISTERED

## 2019-08-28 PROCEDURE — G8417 CALC BMI ABV UP PARAM F/U: HCPCS | Performed by: ORTHOPAEDIC SURGERY

## 2019-08-28 PROCEDURE — 6370000000 HC RX 637 (ALT 250 FOR IP): Performed by: INTERNAL MEDICINE

## 2019-08-28 PROCEDURE — 94150 VITAL CAPACITY TEST: CPT

## 2019-08-28 PROCEDURE — 3600000003 HC SURGERY LEVEL 3 BASE: Performed by: ORTHOPAEDIC SURGERY

## 2019-08-28 PROCEDURE — 94760 N-INVAS EAR/PLS OXIMETRY 1: CPT

## 2019-08-28 PROCEDURE — 2580000003 HC RX 258: Performed by: ORTHOPAEDIC SURGERY

## 2019-08-28 PROCEDURE — 99203 OFFICE O/P NEW LOW 30 MIN: CPT | Performed by: ORTHOPAEDIC SURGERY

## 2019-08-28 PROCEDURE — 3600000013 HC SURGERY LEVEL 3 ADDTL 15MIN: Performed by: ORTHOPAEDIC SURGERY

## 2019-08-28 PROCEDURE — 94660 CPAP INITIATION&MGMT: CPT

## 2019-08-28 PROCEDURE — 88311 DECALCIFY TISSUE: CPT

## 2019-08-28 PROCEDURE — 6360000002 HC RX W HCPCS: Performed by: INTERNAL MEDICINE

## 2019-08-28 PROCEDURE — 3700000000 HC ANESTHESIA ATTENDED CARE: Performed by: ORTHOPAEDIC SURGERY

## 2019-08-28 PROCEDURE — 7100000000 HC PACU RECOVERY - FIRST 15 MIN: Performed by: ORTHOPAEDIC SURGERY

## 2019-08-28 PROCEDURE — 0QBQ0ZZ EXCISION OF RIGHT TOE PHALANX, OPEN APPROACH: ICD-10-PCS | Performed by: ORTHOPAEDIC SURGERY

## 2019-08-28 PROCEDURE — G8427 DOCREV CUR MEDS BY ELIG CLIN: HCPCS | Performed by: ORTHOPAEDIC SURGERY

## 2019-08-28 PROCEDURE — 6360000002 HC RX W HCPCS: Performed by: ORTHOPAEDIC SURGERY

## 2019-08-28 PROCEDURE — 88305 TISSUE EXAM BY PATHOLOGIST: CPT

## 2019-08-28 PROCEDURE — 2580000003 HC RX 258: Performed by: NURSE ANESTHETIST, CERTIFIED REGISTERED

## 2019-08-28 PROCEDURE — 6360000002 HC RX W HCPCS: Performed by: NURSE ANESTHETIST, CERTIFIED REGISTERED

## 2019-08-28 RX ORDER — CEFAZOLIN SODIUM 1 G/3ML
INJECTION, POWDER, FOR SOLUTION INTRAMUSCULAR; INTRAVENOUS PRN
Status: DISCONTINUED | OUTPATIENT
Start: 2019-08-28 | End: 2019-08-28 | Stop reason: SDUPTHER

## 2019-08-28 RX ORDER — FERROUS SULFATE TAB EC 324 MG (65 MG FE EQUIVALENT) 324 (65 FE) MG
325 TABLET DELAYED RESPONSE ORAL
Status: DISCONTINUED | OUTPATIENT
Start: 2019-08-29 | End: 2019-08-29

## 2019-08-28 RX ORDER — ATORVASTATIN CALCIUM 40 MG/1
40 TABLET, FILM COATED ORAL DAILY
Status: DISCONTINUED | OUTPATIENT
Start: 2019-08-28 | End: 2019-08-30 | Stop reason: HOSPADM

## 2019-08-28 RX ORDER — ASPIRIN 81 MG/1
81 TABLET ORAL DAILY
Status: DISCONTINUED | OUTPATIENT
Start: 2019-08-28 | End: 2019-08-30 | Stop reason: HOSPADM

## 2019-08-28 RX ORDER — POTASSIUM CHLORIDE 7.45 MG/ML
10 INJECTION INTRAVENOUS PRN
Status: DISCONTINUED | OUTPATIENT
Start: 2019-08-28 | End: 2019-08-30 | Stop reason: HOSPADM

## 2019-08-28 RX ORDER — ONDANSETRON 2 MG/ML
4 INJECTION INTRAMUSCULAR; INTRAVENOUS EVERY 6 HOURS PRN
Status: DISCONTINUED | OUTPATIENT
Start: 2019-08-28 | End: 2019-08-30 | Stop reason: HOSPADM

## 2019-08-28 RX ORDER — MAGNESIUM SULFATE 1 G/100ML
1 INJECTION INTRAVENOUS PRN
Status: DISCONTINUED | OUTPATIENT
Start: 2019-08-28 | End: 2019-08-30 | Stop reason: HOSPADM

## 2019-08-28 RX ORDER — FENTANYL CITRATE 50 UG/ML
25 INJECTION, SOLUTION INTRAMUSCULAR; INTRAVENOUS EVERY 5 MIN PRN
Status: DISCONTINUED | OUTPATIENT
Start: 2019-08-28 | End: 2019-08-28 | Stop reason: HOSPADM

## 2019-08-28 RX ORDER — SPIRONOLACTONE 25 MG/1
25 TABLET ORAL DAILY
Status: DISCONTINUED | OUTPATIENT
Start: 2019-08-28 | End: 2019-08-30 | Stop reason: HOSPADM

## 2019-08-28 RX ORDER — FUROSEMIDE 40 MG/1
40 TABLET ORAL DAILY
Status: DISCONTINUED | OUTPATIENT
Start: 2019-08-28 | End: 2019-08-30 | Stop reason: HOSPADM

## 2019-08-28 RX ORDER — DEXTROSE MONOHYDRATE 50 MG/ML
100 INJECTION, SOLUTION INTRAVENOUS PRN
Status: DISCONTINUED | OUTPATIENT
Start: 2019-08-28 | End: 2019-08-30 | Stop reason: HOSPADM

## 2019-08-28 RX ORDER — SODIUM CHLORIDE 0.9 % (FLUSH) 0.9 %
10 SYRINGE (ML) INJECTION PRN
Status: DISCONTINUED | OUTPATIENT
Start: 2019-08-28 | End: 2019-08-30 | Stop reason: HOSPADM

## 2019-08-28 RX ORDER — POTASSIUM CHLORIDE 20 MEQ/1
40 TABLET, EXTENDED RELEASE ORAL PRN
Status: DISCONTINUED | OUTPATIENT
Start: 2019-08-28 | End: 2019-08-30 | Stop reason: HOSPADM

## 2019-08-28 RX ORDER — SODIUM CHLORIDE 0.9 % (FLUSH) 0.9 %
10 SYRINGE (ML) INJECTION EVERY 12 HOURS SCHEDULED
Status: DISCONTINUED | OUTPATIENT
Start: 2019-08-28 | End: 2019-08-30 | Stop reason: HOSPADM

## 2019-08-28 RX ORDER — SODIUM CHLORIDE 0.9 % (FLUSH) 0.9 %
10 SYRINGE (ML) INJECTION EVERY 12 HOURS SCHEDULED
Status: DISCONTINUED | OUTPATIENT
Start: 2019-08-28 | End: 2019-08-28 | Stop reason: HOSPADM

## 2019-08-28 RX ORDER — AMIODARONE HYDROCHLORIDE 200 MG/1
200 TABLET ORAL DAILY
Status: DISCONTINUED | OUTPATIENT
Start: 2019-08-28 | End: 2019-08-30 | Stop reason: HOSPADM

## 2019-08-28 RX ORDER — SODIUM CHLORIDE 0.9 % (FLUSH) 0.9 %
10 SYRINGE (ML) INJECTION PRN
Status: DISCONTINUED | OUTPATIENT
Start: 2019-08-28 | End: 2019-08-28 | Stop reason: HOSPADM

## 2019-08-28 RX ORDER — DEXTROSE MONOHYDRATE 25 G/50ML
12.5 INJECTION, SOLUTION INTRAVENOUS PRN
Status: DISCONTINUED | OUTPATIENT
Start: 2019-08-28 | End: 2019-08-30 | Stop reason: HOSPADM

## 2019-08-28 RX ORDER — SODIUM CHLORIDE 9 MG/ML
INJECTION, SOLUTION INTRAVENOUS CONTINUOUS
Status: DISCONTINUED | OUTPATIENT
Start: 2019-08-28 | End: 2019-08-28

## 2019-08-28 RX ORDER — PROPOFOL 10 MG/ML
INJECTION, EMULSION INTRAVENOUS CONTINUOUS PRN
Status: DISCONTINUED | OUTPATIENT
Start: 2019-08-28 | End: 2019-08-28 | Stop reason: SDUPTHER

## 2019-08-28 RX ORDER — OXYCODONE HYDROCHLORIDE AND ACETAMINOPHEN 5; 325 MG/1; MG/1
1 TABLET ORAL PRN
Status: DISCONTINUED | OUTPATIENT
Start: 2019-08-28 | End: 2019-08-28 | Stop reason: HOSPADM

## 2019-08-28 RX ORDER — M-VIT,TX,IRON,MINS/CALC/FOLIC 27MG-0.4MG
1 TABLET ORAL DAILY
Status: DISCONTINUED | OUTPATIENT
Start: 2019-08-28 | End: 2019-08-30 | Stop reason: HOSPADM

## 2019-08-28 RX ORDER — NICOTINE POLACRILEX 4 MG
15 LOZENGE BUCCAL PRN
Status: DISCONTINUED | OUTPATIENT
Start: 2019-08-28 | End: 2019-08-30 | Stop reason: HOSPADM

## 2019-08-28 RX ORDER — LEVOTHYROXINE SODIUM 0.03 MG/1
1 TABLET ORAL DAILY
Status: DISCONTINUED | OUTPATIENT
Start: 2019-08-28 | End: 2019-08-28 | Stop reason: SDUPTHER

## 2019-08-28 RX ORDER — LEVOTHYROXINE SODIUM 0.03 MG/1
25 TABLET ORAL DAILY
Status: DISCONTINUED | OUTPATIENT
Start: 2019-08-29 | End: 2019-08-30 | Stop reason: HOSPADM

## 2019-08-28 RX ORDER — ONDANSETRON 2 MG/ML
4 INJECTION INTRAMUSCULAR; INTRAVENOUS
Status: DISCONTINUED | OUTPATIENT
Start: 2019-08-28 | End: 2019-08-28 | Stop reason: HOSPADM

## 2019-08-28 RX ORDER — GLYCOPYRROLATE 0.2 MG/ML
INJECTION INTRAMUSCULAR; INTRAVENOUS PRN
Status: DISCONTINUED | OUTPATIENT
Start: 2019-08-28 | End: 2019-08-28 | Stop reason: SDUPTHER

## 2019-08-28 RX ORDER — OXYCODONE HYDROCHLORIDE AND ACETAMINOPHEN 5; 325 MG/1; MG/1
2 TABLET ORAL PRN
Status: DISCONTINUED | OUTPATIENT
Start: 2019-08-28 | End: 2019-08-28 | Stop reason: HOSPADM

## 2019-08-28 RX ORDER — PROPOFOL 10 MG/ML
INJECTION, EMULSION INTRAVENOUS PRN
Status: DISCONTINUED | OUTPATIENT
Start: 2019-08-28 | End: 2019-08-28 | Stop reason: SDUPTHER

## 2019-08-28 RX ORDER — CARVEDILOL 6.25 MG/1
6.25 TABLET ORAL 2 TIMES DAILY WITH MEALS
Status: DISCONTINUED | OUTPATIENT
Start: 2019-08-28 | End: 2019-08-30 | Stop reason: HOSPADM

## 2019-08-28 RX ORDER — TAMSULOSIN HYDROCHLORIDE 0.4 MG/1
0.4 CAPSULE ORAL DAILY
Status: DISCONTINUED | OUTPATIENT
Start: 2019-08-28 | End: 2019-08-30 | Stop reason: HOSPADM

## 2019-08-28 RX ORDER — INSULIN GLARGINE 100 [IU]/ML
4 INJECTION, SOLUTION SUBCUTANEOUS DAILY
Status: DISCONTINUED | OUTPATIENT
Start: 2019-08-29 | End: 2019-08-29

## 2019-08-28 RX ORDER — SODIUM CHLORIDE 9 MG/ML
INJECTION, SOLUTION INTRAVENOUS CONTINUOUS PRN
Status: DISCONTINUED | OUTPATIENT
Start: 2019-08-28 | End: 2019-08-28 | Stop reason: SDUPTHER

## 2019-08-28 RX ORDER — KETAMINE HCL IN NACL, ISO-OSM 100MG/10ML
SYRINGE (ML) INJECTION PRN
Status: DISCONTINUED | OUTPATIENT
Start: 2019-08-28 | End: 2019-08-28 | Stop reason: SDUPTHER

## 2019-08-28 RX ORDER — ATORVASTATIN CALCIUM 20 MG/1
1 TABLET, FILM COATED ORAL DAILY
Status: DISCONTINUED | OUTPATIENT
Start: 2019-08-28 | End: 2019-08-28 | Stop reason: SDUPTHER

## 2019-08-28 RX ADMIN — PROPOFOL 50 MCG/KG/MIN: 10 INJECTION, EMULSION INTRAVENOUS at 10:28

## 2019-08-28 RX ADMIN — SODIUM CHLORIDE: 9 INJECTION, SOLUTION INTRAVENOUS at 10:24

## 2019-08-28 RX ADMIN — AMIODARONE HYDROCHLORIDE 200 MG: 200 TABLET ORAL at 14:03

## 2019-08-28 RX ADMIN — ATORVASTATIN CALCIUM 40 MG: 40 TABLET, FILM COATED ORAL at 14:03

## 2019-08-28 RX ADMIN — MULTIPLE VITAMINS W/ MINERALS TAB 1 TABLET: TAB at 14:03

## 2019-08-28 RX ADMIN — INSULIN LISPRO 4 UNITS: 100 INJECTION, SOLUTION INTRAVENOUS; SUBCUTANEOUS at 17:26

## 2019-08-28 RX ADMIN — VITAMIN D, TAB 1000IU (100/BT) 2000 UNITS: 25 TAB at 20:48

## 2019-08-28 RX ADMIN — INSULIN LISPRO 1 UNITS: 100 INJECTION, SOLUTION INTRAVENOUS; SUBCUTANEOUS at 21:52

## 2019-08-28 RX ADMIN — CEFAZOLIN SODIUM 3000 MG: 1 INJECTION, POWDER, FOR SOLUTION INTRAMUSCULAR; INTRAVENOUS at 10:33

## 2019-08-28 RX ADMIN — GLYCOPYRROLATE 0.2 MG: 0.2 INJECTION, SOLUTION INTRAMUSCULAR; INTRAVENOUS at 10:36

## 2019-08-28 RX ADMIN — Medication 15 MG: at 10:28

## 2019-08-28 RX ADMIN — CEFAZOLIN 1 G: 1 INJECTION, POWDER, FOR SOLUTION INTRAMUSCULAR; INTRAVENOUS at 23:00

## 2019-08-28 RX ADMIN — FUROSEMIDE 40 MG: 40 TABLET ORAL at 14:03

## 2019-08-28 RX ADMIN — CARVEDILOL 6.25 MG: 6.25 TABLET, FILM COATED ORAL at 17:26

## 2019-08-28 RX ADMIN — SODIUM CHLORIDE, PRESERVATIVE FREE 10 ML: 5 INJECTION INTRAVENOUS at 10:15

## 2019-08-28 RX ADMIN — PROPOFOL 20 MG: 10 INJECTION, EMULSION INTRAVENOUS at 10:29

## 2019-08-28 RX ADMIN — TAMSULOSIN HYDROCHLORIDE 0.4 MG: 0.4 CAPSULE ORAL at 14:03

## 2019-08-28 RX ADMIN — SODIUM CHLORIDE, PRESERVATIVE FREE 10 ML: 5 INJECTION INTRAVENOUS at 20:49

## 2019-08-28 RX ADMIN — ASPIRIN 81 MG: 81 TABLET ORAL at 14:03

## 2019-08-28 RX ADMIN — Medication 15 MG: at 10:35

## 2019-08-28 RX ADMIN — SPIRONOLACTONE 25 MG: 25 TABLET ORAL at 14:03

## 2019-08-28 RX ADMIN — CEFAZOLIN 1 G: 1 INJECTION, POWDER, FOR SOLUTION INTRAMUSCULAR; INTRAVENOUS at 15:54

## 2019-08-28 ASSESSMENT — PAIN - FUNCTIONAL ASSESSMENT
PAIN_FUNCTIONAL_ASSESSMENT: ACTIVITIES ARE NOT PREVENTED
PAIN_FUNCTIONAL_ASSESSMENT: PREVENTS OR INTERFERES SOME ACTIVE ACTIVITIES AND ADLS
PAIN_FUNCTIONAL_ASSESSMENT: 0-10
PAIN_FUNCTIONAL_ASSESSMENT: PREVENTS OR INTERFERES SOME ACTIVE ACTIVITIES AND ADLS

## 2019-08-28 ASSESSMENT — PULMONARY FUNCTION TESTS
PIF_VALUE: 0
PIF_VALUE: 1
PIF_VALUE: 0

## 2019-08-28 ASSESSMENT — PAIN SCALES - GENERAL
PAINLEVEL_OUTOF10: 0
PAINLEVEL_OUTOF10: 3
PAINLEVEL_OUTOF10: 0

## 2019-08-28 ASSESSMENT — PAIN DESCRIPTION - FREQUENCY: FREQUENCY: CONTINUOUS

## 2019-08-28 ASSESSMENT — PAIN DESCRIPTION - PAIN TYPE: TYPE: ACUTE PAIN

## 2019-08-28 ASSESSMENT — PAIN DESCRIPTION - ONSET: ONSET: ON-GOING

## 2019-08-28 ASSESSMENT — PAIN DESCRIPTION - DESCRIPTORS: DESCRIPTORS: ACHING

## 2019-08-28 ASSESSMENT — PAIN DESCRIPTION - LOCATION: LOCATION: FOOT

## 2019-08-28 ASSESSMENT — PAIN DESCRIPTION - PROGRESSION: CLINICAL_PROGRESSION: NOT CHANGED

## 2019-08-28 ASSESSMENT — ENCOUNTER SYMPTOMS: SHORTNESS OF BREATH: 0

## 2019-08-28 ASSESSMENT — PAIN DESCRIPTION - ORIENTATION: ORIENTATION: RIGHT

## 2019-08-28 NOTE — PROGRESS NOTES
Patient to room 3108 from PACU by bed transport . Patient is alert and oriented X4. Fluids: infusing. Vitals: he is hypertensive, other vitals stable. Dressing: to the right foot is clean, dry, intact. Pulses: to the right leg are intact. Review of floor, room, call light, phone and hospital policies complete. Patient and family verbalized understanding. All questions have been answered.  Will monitor

## 2019-08-28 NOTE — H&P
Cinnamon 500 MG CAPS Take 1 capsule by mouth daily      glucose blood VI test strips (ONE TOUCH TEST STRIPS) strip 1 each by In Vitro route daily As needed. 100 each 3    Vitamin D (CHOLECALCIFEROL) 1000 UNITS CAPS capsule Take 2,000 Units by mouth nightly       Insulin Syringe-Needle U-100 (INSULIN SYRINGE .5CC/31GX5/16\") 31G X 5/16\" 0.5 ML MISC Patient tests bid 100 Syringe 5    therapeutic multivitamin-minerals (THERAGRAN-M) tablet Take 1 tablet by mouth daily.  aspirin 81 MG EC tablet Take 81 mg by mouth daily. No Known Allergies   I reviewed the HPI, medications, allergies, reason for surgery, diagnosis and treatment plan and there has been no change. The patient was evaluated by me today. Physical exam findings for this update include:    Vitals:    08/28/19 0956   BP: 134/76   Pulse: 61   Resp: 18   Temp: 97.2 °F (36.2 °C)   SpO2: 99%     Airway is intact  Chest: chest clear, no wheezing, rales, normal symmetric air entry, no tachypnea, retractions or cyanosis  Heart: regular rate and rhythm ; heart sounds normal  Findings on exam of the body region where surgery is to be performed include:  Right foot 2nd toe open fracture dislocation.     Electronically signed by Willy Kasper on 8/28/2019 at 10:02 AM
never used smokeless tobacco. He reports that he drinks alcohol. He reports that he does not use drugs. Family History:  Reviewed in detail and negative for DM, Early CAD, Cancer, CVA. Positive as follows:        Problem Relation Age of Onset    Hypertension Mother     Heart Disease Father     Hypertension Maternal Grandmother     Diabetes Maternal Grandmother        REVIEW OF SYSTEMS:   Positive review  noted in the HPI. All other systems reviewed and negative. PHYSICAL EXAM:    BP (!) 185/80   Pulse 60   Temp 97.9 °F (36.6 °C) (Oral)   Resp 16   Ht 6' 4\" (1.93 m)   Wt (!) 337 lb (152.9 kg)   SpO2 96%   BMI 41.02 kg/m²   General Appearance: alert and oriented to person, place and time, well developed and well- nourished, in no acute distress  Skin: warm and dry, dressing + Toe  Head: normocephalic and atraumatic  Eyes: pupils equal, round, and reactive to light, extraocular eye movements intact, conjunctivae normal  ENT: tympanic membrane, external ear and ear canal normal bilaterally, nose without deformity, nasal mucosa and turbinates normal without polyps  Neck: supple and non-tender without mass, no thyromegaly or thyroid nodules, no cervical lymphadenopathy  Pulmonary/Chest: clear to auscultation bilaterally- no wheezes, rales or rhonchi, normal air movement, no respiratory distress  Cardiovascular: normal rate, regular rhythm, normal S1 and S2, no murmurs, rubs, clicks, or gallops, Peripheral pulses good, Cap refill <3 sec, no carotid bruits  Abdomen: soft, non-tender, non-distended, normal bowel sounds, no masses or organomegaly  Extremities: no cyanosis, clubbing or edema  Musculoskeletal: normal range of motion, no joint swelling, deformity or tenderness  Neurologic: reflexes normal and symmetric, no cranial nerve deficit, gait, coordination and speech normal      LABS:    Right foot shows mild subluxation of the distal phalanx of the second toe.   Interval erosion of the distal first

## 2019-08-28 NOTE — PROGRESS NOTES
[id number] [facility] Orthopedic Surgery   Progress Note      S/P :  SUBJECTIVE  In bed. Alert and oriented. . Pain is   described in right foot and with the intensity of moderate. Pain is described as aching. OBJECTIVE              Physical                      VITALS:  BP (!) 185/80   Pulse 60   Temp 97.9 °F (36.6 °C) (Oral)   Resp 16   Ht 6' 4\" (1.93 m)   Wt (!) 337 lb (152.9 kg)   SpO2 96%   BMI 41.02 kg/m²                     MUSCULOSKELETAL:  right foot NVI. Wiggles toes to command. Brisk cap refill noted to right toes. Dressing of Kerlix right foot clean and dry.                     NEUROLOGIC:                                  Sensory:  Touch:  Right Lower Extremity:  normal                                           Data       CBC:   Lab Results   Component Value Date    WBC 4.6 08/27/2019    RBC 4.57 08/27/2019    HGB 13.4 08/27/2019    HCT 39.7 08/27/2019    MCV 86.9 08/27/2019    MCH 29.3 08/27/2019    MCHC 33.7 08/27/2019    RDW 14.5 08/27/2019     08/27/2019    MPV 10.1 08/27/2019        WBC:    Lab Results   Component Value Date    WBC 4.6 08/27/2019        Hemoglobin/Hematocrit:    Lab Results   Component Value Date    HGB 13.4 08/27/2019    HCT 39.7 08/27/2019        PT/INR:    Lab Results   Component Value Date    PROTIME 13.6 08/27/2019    INR 1.19 08/27/2019              Current Inpatient Medications             Current Facility-Administered Medications: amiodarone (CORDARONE) tablet 200 mg, 200 mg, Oral, Daily  aspirin EC tablet 81 mg, 81 mg, Oral, Daily  atorvastatin (LIPITOR) tablet 40 mg, 40 mg, Oral, Daily  carvedilol (COREG) tablet 6.25 mg, 6.25 mg, Oral, BID WC  [START ON 8/29/2019] apixaban (ELIQUIS) tablet 5 mg, 5 mg, Oral, BID  [START ON 8/29/2019] ferrous sulfate EC tablet 325 mg, 325 mg, Oral, Daily with breakfast  furosemide (LASIX) tablet 40 mg, 40 mg, Oral, Daily  [START ON 8/29/2019] levothyroxine (SYNTHROID) tablet 25 mcg, 25 mcg, Oral, Daily  spironolactone (ALDACTONE) tablet

## 2019-08-29 LAB
ANION GAP SERPL CALCULATED.3IONS-SCNC: 11 MMOL/L (ref 3–16)
BUN BLDV-MCNC: 19 MG/DL (ref 7–20)
CALCIUM SERPL-MCNC: 8.7 MG/DL (ref 8.3–10.6)
CHLORIDE BLD-SCNC: 105 MMOL/L (ref 99–110)
CO2: 23 MMOL/L (ref 21–32)
CREAT SERPL-MCNC: 1.3 MG/DL (ref 0.8–1.3)
GFR AFRICAN AMERICAN: >60
GFR NON-AFRICAN AMERICAN: 55
GLUCOSE BLD-MCNC: 126 MG/DL (ref 70–99)
GLUCOSE BLD-MCNC: 149 MG/DL (ref 70–99)
GLUCOSE BLD-MCNC: 159 MG/DL (ref 70–99)
GLUCOSE BLD-MCNC: 177 MG/DL (ref 70–99)
GLUCOSE BLD-MCNC: 213 MG/DL (ref 70–99)
HCT VFR BLD CALC: 36.1 % (ref 40.5–52.5)
HEMOGLOBIN: 11.9 G/DL (ref 13.5–17.5)
MCH RBC QN AUTO: 29.3 PG (ref 26–34)
MCHC RBC AUTO-ENTMCNC: 32.9 G/DL (ref 31–36)
MCV RBC AUTO: 89.3 FL (ref 80–100)
PDW BLD-RTO: 14.9 % (ref 12.4–15.4)
PERFORMED ON: ABNORMAL
PLATELET # BLD: 133 K/UL (ref 135–450)
PMV BLD AUTO: 9.7 FL (ref 5–10.5)
POTASSIUM REFLEX MAGNESIUM: 3.8 MMOL/L (ref 3.5–5.1)
RBC # BLD: 4.05 M/UL (ref 4.2–5.9)
SEDIMENTATION RATE, ERYTHROCYTE: 17 MM/HR (ref 0–20)
SODIUM BLD-SCNC: 139 MMOL/L (ref 136–145)
WBC # BLD: 5.1 K/UL (ref 4–11)

## 2019-08-29 PROCEDURE — 2580000003 HC RX 258: Performed by: INTERNAL MEDICINE

## 2019-08-29 PROCEDURE — 85652 RBC SED RATE AUTOMATED: CPT

## 2019-08-29 PROCEDURE — 6360000002 HC RX W HCPCS: Performed by: INTERNAL MEDICINE

## 2019-08-29 PROCEDURE — 97161 PT EVAL LOW COMPLEX 20 MIN: CPT

## 2019-08-29 PROCEDURE — 80048 BASIC METABOLIC PNL TOTAL CA: CPT

## 2019-08-29 PROCEDURE — 6370000000 HC RX 637 (ALT 250 FOR IP): Performed by: INTERNAL MEDICINE

## 2019-08-29 PROCEDURE — 97530 THERAPEUTIC ACTIVITIES: CPT

## 2019-08-29 PROCEDURE — 85027 COMPLETE CBC AUTOMATED: CPT

## 2019-08-29 PROCEDURE — 94760 N-INVAS EAR/PLS OXIMETRY 1: CPT

## 2019-08-29 PROCEDURE — 1200000000 HC SEMI PRIVATE

## 2019-08-29 PROCEDURE — 97116 GAIT TRAINING THERAPY: CPT

## 2019-08-29 PROCEDURE — 36415 COLL VENOUS BLD VENIPUNCTURE: CPT

## 2019-08-29 RX ORDER — INSULIN GLARGINE 100 [IU]/ML
4 INJECTION, SOLUTION SUBCUTANEOUS NIGHTLY
Status: DISCONTINUED | OUTPATIENT
Start: 2019-08-29 | End: 2019-08-30 | Stop reason: HOSPADM

## 2019-08-29 RX ORDER — FERROUS SULFATE TAB EC 324 MG (65 MG FE EQUIVALENT) 324 (65 FE) MG
324 TABLET DELAYED RESPONSE ORAL
Status: DISCONTINUED | OUTPATIENT
Start: 2019-08-29 | End: 2019-08-30 | Stop reason: HOSPADM

## 2019-08-29 RX ADMIN — APIXABAN 5 MG: 5 TABLET, FILM COATED ORAL at 09:43

## 2019-08-29 RX ADMIN — INSULIN LISPRO 2 UNITS: 100 INJECTION, SOLUTION INTRAVENOUS; SUBCUTANEOUS at 17:24

## 2019-08-29 RX ADMIN — SODIUM CHLORIDE, PRESERVATIVE FREE 10 ML: 5 INJECTION INTRAVENOUS at 09:50

## 2019-08-29 RX ADMIN — ATORVASTATIN CALCIUM 40 MG: 40 TABLET, FILM COATED ORAL at 09:44

## 2019-08-29 RX ADMIN — INSULIN GLARGINE 4 UNITS: 100 INJECTION, SOLUTION SUBCUTANEOUS at 21:28

## 2019-08-29 RX ADMIN — CARVEDILOL 6.25 MG: 6.25 TABLET, FILM COATED ORAL at 17:21

## 2019-08-29 RX ADMIN — AMIODARONE HYDROCHLORIDE 200 MG: 200 TABLET ORAL at 09:43

## 2019-08-29 RX ADMIN — FUROSEMIDE 40 MG: 40 TABLET ORAL at 09:43

## 2019-08-29 RX ADMIN — APIXABAN 5 MG: 5 TABLET, FILM COATED ORAL at 21:27

## 2019-08-29 RX ADMIN — VITAMIN D, TAB 1000IU (100/BT) 2000 UNITS: 25 TAB at 21:27

## 2019-08-29 RX ADMIN — SODIUM CHLORIDE, PRESERVATIVE FREE 10 ML: 5 INJECTION INTRAVENOUS at 21:28

## 2019-08-29 RX ADMIN — INSULIN LISPRO 2 UNITS: 100 INJECTION, SOLUTION INTRAVENOUS; SUBCUTANEOUS at 09:42

## 2019-08-29 RX ADMIN — TAMSULOSIN HYDROCHLORIDE 0.4 MG: 0.4 CAPSULE ORAL at 17:41

## 2019-08-29 RX ADMIN — CEFAZOLIN 1 G: 1 INJECTION, POWDER, FOR SOLUTION INTRAMUSCULAR; INTRAVENOUS at 22:49

## 2019-08-29 RX ADMIN — SPIRONOLACTONE 25 MG: 25 TABLET ORAL at 09:43

## 2019-08-29 RX ADMIN — CEFAZOLIN 1 G: 1 INJECTION, POWDER, FOR SOLUTION INTRAMUSCULAR; INTRAVENOUS at 15:31

## 2019-08-29 RX ADMIN — CARVEDILOL 6.25 MG: 6.25 TABLET, FILM COATED ORAL at 09:43

## 2019-08-29 RX ADMIN — CEFAZOLIN 1 G: 1 INJECTION, POWDER, FOR SOLUTION INTRAMUSCULAR; INTRAVENOUS at 06:22

## 2019-08-29 RX ADMIN — ASPIRIN 81 MG: 81 TABLET ORAL at 09:43

## 2019-08-29 RX ADMIN — MULTIPLE VITAMINS W/ MINERALS TAB 1 TABLET: TAB at 09:43

## 2019-08-29 RX ADMIN — FERROUS SULFATE TAB EC 324 MG (65 MG FE EQUIVALENT) 324 MG: 324 (65 FE) TABLET DELAYED RESPONSE at 12:17

## 2019-08-29 RX ADMIN — LEVOTHYROXINE SODIUM 25 MCG: 25 TABLET ORAL at 06:22

## 2019-08-29 RX ADMIN — INSULIN LISPRO 2 UNITS: 100 INJECTION, SOLUTION INTRAVENOUS; SUBCUTANEOUS at 12:15

## 2019-08-29 ASSESSMENT — PAIN SCALES - GENERAL
PAINLEVEL_OUTOF10: 0

## 2019-08-29 ASSESSMENT — PAIN DESCRIPTION - LOCATION
LOCATION: FOOT
LOCATION: FOOT

## 2019-08-29 ASSESSMENT — PAIN DESCRIPTION - PAIN TYPE
TYPE: ACUTE PAIN
TYPE: ACUTE PAIN

## 2019-08-29 NOTE — PROGRESS NOTES
Progress Note  Admit Date: 2019      PCP: Shahzad Martinez MD     CC: F/U for fracture toe    SUBJECTIVE / Interval History:  Patient feels okay. No leg pain. States he has a blister under his first toe postsurgical dressing present        Allergies  Patient has no known allergies. Medications    Scheduled Meds:   amiodarone  200 mg Oral Daily    aspirin  81 mg Oral Daily    atorvastatin  40 mg Oral Daily    carvedilol  6.25 mg Oral BID WC    apixaban  5 mg Oral BID    ferrous sulfate  325 mg Oral Daily with breakfast    furosemide  40 mg Oral Daily    levothyroxine  25 mcg Oral Daily    spironolactone  25 mg Oral Daily    tamsulosin  0.4 mg Oral Daily    therapeutic multivitamin-minerals  1 tablet Oral Daily    vitamin D  2,000 Units Oral Nightly    insulin lispro  0-12 Units Subcutaneous TID WC    insulin lispro  0-6 Units Subcutaneous Nightly    sodium chloride flush  10 mL Intravenous 2 times per day    insulin glargine  4 Units Subcutaneous Daily    ceFAZolin  1 g Intravenous Q8H     Continuous Infusions:   dextrose         PRN Meds:  sodium chloride flush, magnesium hydroxide, ondansetron, potassium chloride **OR** potassium alternative oral replacement **OR** potassium chloride, magnesium sulfate, glucose, dextrose, glucagon (rDNA), dextrose    Vitals    TEMPERATURE:  Current - Temp: 98 °F (36.7 °C); Max - Temp  Av.7 °F (36.5 °C)  Min: 97.2 °F (36.2 °C)  Max: 98 °F (36.7 °C)  RESPIRATIONS RANGE: Resp  Av  Min: 12  Max: 25  PULSE RANGE: Pulse  Av.1  Min: 60  Max: 66  BLOOD PRESSURE RANGE:  Systolic (05TKY), JKQ:124 , Min:134 , QVA:505   ; Diastolic (90MJE), MIKE:59, Min:72, Max:89    PULSE OXIMETRY RANGE: SpO2  Av.5 %  Min: 96 %  Max: 100 %  24HR INTAKE/OUTPUT:      Intake/Output Summary (Last 24 hours) at 2019 0734  Last data filed at 2019 0454  Gross per 24 hour   Intake 1380 ml   Output 1525 ml   Net -145 ml       Exam:    Gen: No distress.    Eyes: last 72 hours. CSF Culture:  No results for input(s): COLORCSF, APPEARCSF, CFTUBE, CLOTCSF, WBCCSF, RBCCSF, NEUTCSF, NUMCELLSCSF, LYMPHSCSF, MONOCSF, GLUCCSF, VOLCSF in the last 72 hours. Respiratory Culture:  No results for input(s): Mini Joyner in the last 72 hours. AFB:No results for input(s): AFBSMEAR in the last 72 hours. Urine Culture  No results for input(s): LABURIN in the last 72 hours. RADIOLOGY:    No orders to display       CONSULTS:    IP CONSULT TO PODIATRY    ASSESSMENT AND PLAN:      Active Problems:    Open fracture of second toe of right foot with routine healing    Open fracture of toe  Resolved Problems:    * No resolved hospital problems. *    Fracture of second toe  - s/p amputation on 8/28/19  - ct post op care  - ct IV abx     Chr systolic CHF  -EF 40 %  - compensated  - ct cardiac meds     Possible chr osteo - 1st metatarsal area-. Postop dressing present, unable to examine the leg  - seen on Xray  -  podiatry cosult.  -Hold off osteomyelitis treatment to podiatry sees the patient. Patient is already on Ancef for his fracture toe.     DM2 with neuropathy  - ct home meds  - monitor glucose  - SSI     Afib  - ct home meds      DVT Prophylaxis: lovenox  Diet: DIET CARB CONTROL; Low Sodium (2 GM); Daily Fluid Restriction: 2000 ml  Code Status: Full Code    PT/OT Eval Status:noted    Discharge plan - awaiting podiatry consult    The patient and / or the family were informed of the results of any tests, a time was given to answer questions, a plan was proposed and they agreed with plan. Discussed with consulting physicians, nursing and social work     The note was completed using EMR. Every effort was made to ensure accuracy; however, inadvertent computerized transcription errors may be present.        Keyur Robles MD

## 2019-08-29 NOTE — PROGRESS NOTES
demonstrated each exercise to pt. Therapist provided additional education about abiding by [facility] precautions when exercising and taking exercise and physical activity slow until his current injury healed. Pt provided verbal understanding of all the information provided by the therapist. Pt presents near his baseline prior to admission. He is safe and independent with all transfers, functional mobility and ambulation. Recommend pt DC home with assist from family when necessary per MD approval. Pt no longer needs to be seen by therapy while in the hospital.  Treatment Diagnosis: Decreased functional mobility  Prognosis: Excellent  Decision Making: Low Complexity  History: See above  PT Education: Goals;PT Role;Precautions;Plan of Care;General Safety;Weight-bearing Education  Patient Education: Education about exercise, healthy eating habits, and living a healier lifestyle  REQUIRES PT FOLLOW UP: No  Activity Tolerance  Activity Tolerance: Patient Tolerated treatment well       Patient Diagnosis(es): There were no encounter diagnoses. has a past medical history of Atrial fibrillation ([facility] Back pain, Cardiomyopathy, CHF (congestive heart failure) ([address] COPD (chronic obstructive pulmonary disease) ([address] Dyslipidemia, GERD (gastroesophageal reflux disease), Hyperlipidemia, Hypertension, Hypothyroidism, Leg edema, MRSA (methicillin resistant staph aureus) culture positive, MRSA nasal colonization, Obesity, Prolonged emergence from general anesthesia, 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, Stroke [facility]), Thyroid disease, Type 2 diabetes mellitus without complication ([address] and Type II or unspecified type diabetes mellitus without mention of complication, not stated as uncontrolled. has a past surgical history that includes Foot surgery (1-2-10); Adrenal gland surgery (1970); Gastric bypass surgery; other surgical history (10/6/15); other surgical history (Right, 10/8/15);  Cardiac defibrillator placement

## 2019-08-29 NOTE — CARE COORDINATION
Sw spoke with patient regarding dc needs. Patient reported he was independent in adls prior to admission; he has a cane. He reported his spouse is supportive and will transport him home at dc. Patient denied needs at the present time. Sw provided Sw phone number should needs arise.     Electronically signed by Candace Cortes on 8/29/2019 at 3:31 PM

## 2019-08-29 NOTE — DISCHARGE INSTR - COC
Continuity of Care Form    Patient Name: Carole Lpoez   :  1950  MRN:  [id number]    Admit date:  2019  Discharge date:  ***    Code Status Order: Full Code   Advance Directives:   Advance Care Flowsheet Documentation     Date/Time Healthcare Directive Type of Healthcare Directive Copy in [address] Agent's Name Healthcare Agent's Phone Number    [phone]  No, patient does not have an advance directive for healthcare treatment -- -- -- -- --          Admitting Physician:  Vira Noriega MD  PCP: Sol Grubbs MD    Discharging Nurse: [facility] Unit/Room#: N2P-8042/8050-64  Discharging Unit Phone Number: ***    Emergency Contact:   Extended Emergency Contact Information  Primary Emergency Contact: Ar Montero  Address: [facility], [address] [place] [address],[address] Phone: [phone]  Mobile Phone: [phone]  Relation: Spouse    Past Surgical History:  Past Surgical History:   Procedure Laterality Date   [address]  2017    Dr. Mark Jackson 3/8/2019    COLONOSCOPY WITH MAC, UNASYN (3gm) performed by Erika Cruz MD at [address] [facility] [address] 2019    COLONOSCOPY POLYPECTOMY SNARE/COLD BIOPSY performed by Erika Cruz MD at [address] [facility]  2019    COLONOSCOPY WITH BIOPSY performed by Erika Cruz MD at [address] [place] [place] [address] 2019    INCISION AND INCISIONAL DEBRIDEMENT OPEN FRACTURE RIGHT 2ND TOE SKIN AND BONE performed by Vira Noriega MD at [address] [place] [address] Rd  1-2-10    GASTRIC BYPASS SURGERY      OTHER SURGICAL HISTORY  10/6/15    INCISION AND DRAINAGE RIGHT FOOT          OTHER SURGICAL HISTORY Right 10/8/15    INCISION AND DRAINAGE RIGHT FOOT      PACEMAKER PLACEMENT      UPPER GASTROINTESTINAL ENDOSCOPY N/A 3/8/2019    EGD BIOPSY GASTRIC H. PYLORI performed by Javier Acosta MD at [address] ENDOSCOPY       Immunization History:   Immunization History   Administered Date(s) Administered    Influenza 10/20/2013    Influenza Virus Vaccine 10/16/2015    Influenza, High Dose (Fluzone 65 yrs and older) 10/31/2017, 09/19/2018    Influenza, Intradermal, Preservative free 11/21/2016    PPD Test 10/16/2015, 10/29/2015    Pneumococcal Conjugate 13-valent ([id number]) 09/19/2018    Pneumococcal Conjugate Vaccine 10/05/2013    Pneumococcal Polysaccharide ([id number]) 08/28/2016    Tdap (Boostrix, Adacel) 10/31/2017       Active Problems:  Patient Active Problem List   Diagnosis Code    Diabetes mellitus ([facility] E11.9    Diabetic foot ulcer ([facility] E11.621, L97.509    Dyslipidemia E78.5    Cardiomyopathy ([facility] I42.9    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 ([facility] E11.21    Renal disease due to hypertension I12.9    Obesity E66.9    HTN (hypertension) I10    Obstructive sleep apnea G47.33    Acute congestive heart failure (HCC) I50.9    Vitamin D deficiency E55.9    Graves' disease E05.00    Hypothyroidism E03.9    Diabetic foot ([address] E11.8    Foot osteomyelitis, right ([address] M86.9    Diabetic foot infection ([address] E11.628, L08.9    Dilated cardiomyopathy ([address] I42.0    Essential hypertension I10    Hyperlipidemia E78.5    Diabetic ulcer of right foot associated with type 2 diabetes mellitus ([facility] E11.621, L97.519    Subacute osteomyelitis of right foot ([address] M86.271    MRSA infection A49.02    H/O gastric bypass Z98.84    Benign prostatic hyperplasia with lower urinary tract symptoms N40.1    Paroxysmal atrial fibrillation (HCC) I48.0    Hypokalemia E87.6    Chronic systolic heart failure (HCC) I50.22    NSVT (nonsustained ventricular tachycardia) ([facility]) I47.2    Abscess of left leg L02.416    Morbid obesity with BMI of 40.0-44.9, adult (HCC) E66.01, Z68.41    Cardiac resynchronization therapy defibrillator (CRT-D) in place Z95.810    On SIGNATURE:  {[facility]:[id number]}

## 2019-08-29 NOTE — CONSULTS
Department of Podiatric Surgery  History and Physical        CHIEF COMPLAINT:  No chief complaint on file. Reason for Consultation:  Osteomyelitis    History Obtained From:  patient, electronic medical record    HISTORY OF PRESENT ILLNESS:      The patient is a 79 y.o. male who presents with Left foot concern for osteomyelitis. The patient had surgery yesterday to amputate the distal 2nd digit of the left foot by Dr. Kevin Ren. Upon x-ray evaluation for that procedure the radiologist noted what appeared to be chronic osteomyelitis of the first metatarsal. The patient did have infection in this same area 7 years prior and had a very proximal amputation of the 1st ray with preservation of the hallux. The x-rays were reviewed and given that history there is a classic picture not of osteomyelitis, but rather regrowth along the periosteum that was likely left in place. No lab work to support diagnosis of osteomyelitis chronic or acute. Ordered ESR for a baseline. Low index of suspicion for anything other than exogenous bone growth.      Past Medical History:        Diagnosis Date    Atrial fibrillation ([address]     Back pain     Cardiomyopathy     CHF [facility]congestive heart failure) ([facility])     COPD (chronic obstructive pulmonary disease) ([facility])     Dyslipidemia     GERD (gastroesophageal reflux disease)     Hyperlipidemia     Hypertension     Hypothyroidism     Leg edema     MRSA (methicillin resistant staph aureus) culture positive 10/5/15    foot/bone    MRSA nasal colonization 05/05/2017    Obesity     Prolonged emergence from general anesthesia     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     Stroke [facility])     Thyroid disease     Type 2 diabetes mellitus without complication ([facility])     Type II or unspecified type diabetes mellitus without mention of complication, not stated as uncontrolled      Past Surgical History:        Procedure Laterality Date    ADRENAL GLAND SURGERY  1970    CARDIAC DEFIBRILLATOR

## 2019-08-30 VITALS
DIASTOLIC BLOOD PRESSURE: 84 MMHG | BODY MASS INDEX: 38.36 KG/M2 | OXYGEN SATURATION: 98 % | TEMPERATURE: 98.8 F | WEIGHT: 315 LBS | SYSTOLIC BLOOD PRESSURE: 134 MMHG | HEART RATE: 63 BPM | RESPIRATION RATE: 18 BRPM | HEIGHT: 76 IN

## 2019-08-30 LAB
GLUCOSE BLD-MCNC: 141 MG/DL (ref 70–99)
GLUCOSE BLD-MCNC: 145 MG/DL (ref 70–99)
PERFORMED ON: ABNORMAL
PERFORMED ON: ABNORMAL

## 2019-08-30 PROCEDURE — 94660 CPAP INITIATION&MGMT: CPT

## 2019-08-30 PROCEDURE — 2580000003 HC RX 258: Performed by: INTERNAL MEDICINE

## 2019-08-30 PROCEDURE — 6370000000 HC RX 637 (ALT 250 FOR IP): Performed by: INTERNAL MEDICINE

## 2019-08-30 PROCEDURE — 6360000002 HC RX W HCPCS: Performed by: INTERNAL MEDICINE

## 2019-08-30 PROCEDURE — 94760 N-INVAS EAR/PLS OXIMETRY 1: CPT

## 2019-08-30 RX ORDER — CEPHALEXIN 500 MG/1
500 CAPSULE ORAL 4 TIMES DAILY
Qty: 40 CAPSULE | Refills: 0 | Status: SHIPPED | OUTPATIENT
Start: 2019-08-30 | End: 2019-09-09

## 2019-08-30 RX ADMIN — AMIODARONE HYDROCHLORIDE 200 MG: 200 TABLET ORAL at 10:19

## 2019-08-30 RX ADMIN — APIXABAN 5 MG: 5 TABLET, FILM COATED ORAL at 10:19

## 2019-08-30 RX ADMIN — ASPIRIN 81 MG: 81 TABLET ORAL at 10:19

## 2019-08-30 RX ADMIN — INSULIN LISPRO 2 UNITS: 100 INJECTION, SOLUTION INTRAVENOUS; SUBCUTANEOUS at 10:19

## 2019-08-30 RX ADMIN — CARVEDILOL 6.25 MG: 6.25 TABLET, FILM COATED ORAL at 10:19

## 2019-08-30 RX ADMIN — SODIUM CHLORIDE, PRESERVATIVE FREE 10 ML: 5 INJECTION INTRAVENOUS at 10:26

## 2019-08-30 RX ADMIN — LEVOTHYROXINE SODIUM 25 MCG: 25 TABLET ORAL at 07:11

## 2019-08-30 RX ADMIN — ATORVASTATIN CALCIUM 40 MG: 40 TABLET, FILM COATED ORAL at 10:19

## 2019-08-30 RX ADMIN — FERROUS SULFATE TAB EC 324 MG (65 MG FE EQUIVALENT) 324 MG: 324 (65 FE) TABLET DELAYED RESPONSE at 10:19

## 2019-08-30 RX ADMIN — CEFAZOLIN 1 G: 1 INJECTION, POWDER, FOR SOLUTION INTRAMUSCULAR; INTRAVENOUS at 07:11

## 2019-08-30 RX ADMIN — MULTIPLE VITAMINS W/ MINERALS TAB 1 TABLET: TAB at 10:19

## 2019-08-30 ASSESSMENT — PAIN SCALES - GENERAL
PAINLEVEL_OUTOF10: 0

## 2019-08-30 NOTE — RESEARCH
[facility] Orthopedic Surgery   Progress Note      S/P :  SUBJECTIVE  Sitting at side of bed bathing self from basin. Alert and oriented. Wants to go home . Pain is not  described in right foot States he saw Dr Renae Cheng and there is no bone infection so he is happy about that. OBJECTIVE              Physical                      VITALS:  /84   Pulse 63   Temp 98.8 °F (37.1 °C)   Resp 18   Ht 6' 4\" (1.93 m)   Wt (!) 337 lb 11.9 oz (153.2 kg)   SpO2 98%   BMI 41.11 kg/m²                     MUSCULOSKELETAL:  Right foot numb as usual, chronic. Right foot in dressing ACE,, clean and dry. Postop shoe is on.                       Data       CBC:   Lab Results   Component Value Date    WBC 5.1 08/29/2019    RBC 4.05 08/29/2019    HGB 11.9 08/29/2019    HCT 36.1 08/29/2019    MCV 89.3 08/29/2019    MCH 29.3 08/29/2019    MCHC 32.9 08/29/2019    RDW 14.9 08/29/2019     08/29/2019    MPV 9.7 08/29/2019        WBC:    Lab Results   Component Value Date    WBC 5.1 08/29/2019        Hemoglobin/Hematocrit:    Lab Results   Component Value Date    HGB 11.9 08/29/2019    HCT 36.1 08/29/2019        PT/INR:    Lab Results   Component Value Date    PROTIME 13.6 08/27/2019    INR 1.19 08/27/2019              Current Inpatient Medications             Current Facility-Administered Medications: insulin glargine (LANTUS) injection vial 4 Units, 4 Units, Subcutaneous, Nightly  ferrous sulfate EC tablet 324 mg, 324 mg, Oral, Daily with breakfast  amiodarone (CORDARONE) tablet 200 mg, 200 mg, Oral, Daily  aspirin EC tablet 81 mg, 81 mg, Oral, Daily  atorvastatin (LIPITOR) tablet 40 mg, 40 mg, Oral, Daily  carvedilol (COREG) tablet 6.25 mg, 6.25 mg, Oral, BID WC  apixaban (ELIQUIS) tablet 5 mg, 5 mg, Oral, BID  furosemide (LASIX) tablet 40 mg, 40 mg, Oral, Daily  levothyroxine (SYNTHROID) tablet 25 mcg, 25 mcg, Oral, Daily  spironolactone (ALDACTONE) tablet 25 mg, 25 mg, Oral, Daily  tamsulosin (FLOMAX) capsule 0.4 mg, 0.4 mg,

## 2019-08-30 NOTE — DISCHARGE SUMMARY
week.   Dressing supplies issued to pt if he needs to change for soiled dressing prn only. See DC instruction      Physical Exam Performed:     /84   Pulse 63   Temp 98.8 °F (37.1 °C)   Resp 18   Ht 6' 4\" (1.93 m)   Wt (!) 337 lb 11.9 oz (153.2 kg)   SpO2 98%   BMI 41.11 kg/m²       General appearance:  No apparent distress, appears stated age and cooperative. HEENT:  Normal cephalic, atraumatic without obvious deformity. Pupils equal, round, and reactive to light. Extra ocular muscles intact. Conjunctivae/corneas clear. Neck: Supple, with full range of motion. No jugular venous distention. Trachea midline. Respiratory:  Normal respiratory effort. Clear to auscultation, bilaterally without Rales/Wheezes/Rhonchi. Cardiovascular:  Regular rate and rhythm with normal S1/S2 without murmurs, rubs or gallops. Abdomen: Soft, non-tender, non-distended with normal bowel sounds. Musculoskeletal:  No clubbing, cyanosis or edema bilaterally. Full range of motion without deformity. Skin: Skin color, texture, turgor normal.  No rashes or lesions. Neurologic:  Neurovascularly intact without any focal sensory/motor deficits. Cranial nerves: II-XII intact, grossly non-focal.  Psychiatric:  Alert and oriented, thought content appropriate, normal insight  Capillary Refill: Brisk,< 3 seconds   Peripheral Pulses: +2 palpable, equal bilaterally       Labs:  For convenience and continuity at follow-up the following most recent labs are provided:      CBC:    Lab Results   Component Value Date    WBC 5.1 08/29/2019    HGB 11.9 08/29/2019    HCT 36.1 08/29/2019     08/29/2019       Renal:    Lab Results   Component Value Date     08/29/2019    K 3.8 08/29/2019     08/29/2019    CO2 23 08/29/2019    BUN 19 08/29/2019    CREATININE 1.3 08/29/2019    CALCIUM 8.7 08/29/2019    PHOS 4.2 10/15/2015         Significant Diagnostic Studies    Radiology:   No orders to display          Consults:     IP CONSULT TO PODIATRY  IP CONSULT TO PODIATRY    Disposition:  home     Condition at Discharge: Stable    Discharge Instructions/Follow-up:  pcp/ortho    Code Status:  Full Code     Activity: activity as tolerated    Diet: cardiac diet      Discharge Medications:     Current Discharge Medication List           Details   cephALEXin (KEFLEX) 500 MG capsule Take 1 capsule by mouth 4 times daily for 10 days  Qty: 40 capsule, Refills: 0              Details   atorvastatin (LIPITOR) 40 MG tablet TAKE 1 TABLET BY MOUTH DAILY  Qty: 30 tablet, Refills: 5    Associated Diagnoses: Type 2 diabetes mellitus with complication, with long-term current use of insulin (HCC)      levothyroxine (SYNTHROID) 25 MCG tablet TAKE 1 TABLET BY MOUTH DAILY  Qty: 30 tablet, Refills: 5    Associated Diagnoses: Acquired hypothyroidism      B-D UF III MINI PEN NEEDLES 31G X 5 MM MISC USE DAILY  Qty: 100 each, Refills: 5      ferrous sulfate 325 (65 Fe) MG EC tablet Take 325 mg by mouth daily (with breakfast)      lisinopril (PRINIVIL;ZESTRIL) 2.5 MG tablet TAKE 1 TABLET BY MOUTH DAILY  Qty: 90 tablet, Refills: 3    Comments: **Patient requests 90 days supply**  Associated Diagnoses: Essential hypertension      omega-3 acid ethyl esters (LOVAZA) 1 g capsule TAKE 1 CAPSULE BY MOUTH TWICE DAILY  Qty: 60 capsule, Refills: 5    Associated Diagnoses: Chronic systolic congestive heart failure (HCC)      tamsulosin (FLOMAX) 0.4 MG capsule TAKE 1 CAPSULE BY MOUTH DAILY  Qty: 90 capsule, Refills: 3    Comments: **Patient requests 90 days supply**      !! ONE TOUCH ULTRA TEST strip TEST UP TO THREE TIMES DAILY  Qty: 300 strip, Refills: 5    Comments: **Patient requests 90 days supply**      furosemide (LASIX) 40 MG tablet TAKE 1 TABLET BY MOUTH DAILY  Qty: 60 tablet, Refills: 5      Coenzyme Q10 (CO Q 10) 100 MG CAPS Take 1 capsule by mouth daily      Cinnamon 500 MG CAPS Take 1 capsule by mouth daily      !! glucose blood VI test strips (ONE TOUCH TEST STRIPS) strip

## 2019-08-30 NOTE — OP NOTE
was then brought to the  operating room and underwent local MAC with sedation. At this point, we  had the right foot prepped and draped in regular sterile routine  fashion. A time-out was called, confirming the patient's name, site,  and procedure. We avoided tourniquet given the patient has neuropathy and calcified vessels. At  this point, we used the #15 blade, we started with excisional  debridement starting with the skin, subcutaneous tissue all the way down  to the fascia and tendon. There was an open fracture dislocation of the  middle phalanx down to the head of the proximal phalanx. At this point,  we performed excisional debridement of the bone and removed all the open  fracture part and also used a bone cutter to remove part of the proximal  phalanx head to allow for a wound closure. We irrigated the wound copiously with normal saline mixed with  gentamicin. At this point, we were able to close the dorsal flap in  place. We controlled bleeding and we closed the wound with 4-0 nylon. Dressing was applied in the form of Xeroform, 4x4, Kerlix and Ace wrap. The patient tolerated the procedure well and was taken to the recovery  in stable condition. POSTOPERATIVE PLAN:  The patient will be readmitted at least overnight  for IV antibiotic given that it is an open grade 2 fracture. After that  we will send him home on p.o. antibiotics for 10 days. We are going to  keep the sutures in place for at least four weeks given that he had  diabetic neuropathy.         Sowmya Espinoza MD    D: 08/29/2019 16:44:02       T: 08/29/2019 19:21:56     [id number]  Job#: [id number]     Doc#: [id number]    CC:  Cesar Cortes MD

## 2019-08-31 ENCOUNTER — CARE COORDINATION (OUTPATIENT)
Dept: CASE MANAGEMENT | Age: 69
End: 2019-08-31

## 2019-08-31 DIAGNOSIS — S92.501D: Primary | ICD-10-CM

## 2019-08-31 PROCEDURE — 1111F DSCHRG MED/CURRENT MED MERGE: CPT | Performed by: INTERNAL MEDICINE

## 2019-09-03 RX ORDER — TAMSULOSIN HYDROCHLORIDE 0.4 MG/1
CAPSULE ORAL
Qty: 90 CAPSULE | Refills: 0 | Status: SHIPPED | OUTPATIENT
Start: 2019-09-03 | End: 2019-11-28 | Stop reason: SDUPTHER

## 2019-09-06 ENCOUNTER — OFFICE VISIT (OUTPATIENT)
Dept: ORTHOPEDIC SURGERY | Age: 69
End: 2019-09-06
Payer: COMMERCIAL

## 2019-09-06 VITALS — HEIGHT: 76 IN | BODY MASS INDEX: 38.36 KG/M2 | WEIGHT: 315 LBS | HEART RATE: 80 BPM | RESPIRATION RATE: 16 BRPM

## 2019-09-06 DIAGNOSIS — S92.911B: Primary | ICD-10-CM

## 2019-09-06 PROCEDURE — 99213 OFFICE O/P EST LOW 20 MIN: CPT | Performed by: ORTHOPAEDIC SURGERY

## 2019-09-06 PROCEDURE — 4040F PNEUMOC VAC/ADMIN/RCVD: CPT | Performed by: ORTHOPAEDIC SURGERY

## 2019-09-06 PROCEDURE — 1111F DSCHRG MED/CURRENT MED MERGE: CPT | Performed by: ORTHOPAEDIC SURGERY

## 2019-09-06 PROCEDURE — 1123F ACP DISCUSS/DSCN MKR DOCD: CPT | Performed by: ORTHOPAEDIC SURGERY

## 2019-09-06 PROCEDURE — 3017F COLORECTAL CA SCREEN DOC REV: CPT | Performed by: ORTHOPAEDIC SURGERY

## 2019-09-06 PROCEDURE — G8417 CALC BMI ABV UP PARAM F/U: HCPCS | Performed by: ORTHOPAEDIC SURGERY

## 2019-09-06 PROCEDURE — G8427 DOCREV CUR MEDS BY ELIG CLIN: HCPCS | Performed by: ORTHOPAEDIC SURGERY

## 2019-09-06 PROCEDURE — 1036F TOBACCO NON-USER: CPT | Performed by: ORTHOPAEDIC SURGERY

## 2019-09-06 NOTE — PROGRESS NOTES
CHIEF COMPLAINT: Right  foot pain/ second toe distal phalanx open fracture dislocation, s/p I&D. DATE OF SURGERY:  8/28/2019    HISTORY:  Mr. Miranda Bias 79 y.o.  [place]  male  presents today for F/U after I&d right foot open fracture. He was seen on 8/28/2019 as a consultation request from Duglas Knowles MD for evaluation of a right foot injury which occurred when he developed a laceration on the plantar surface of his right foot 2nd toe 2 days ago. He is a diabetic with diabetic neuropathy and does not know how it happened. He noticed bleeding that wasn't stopping. He denies pain d/t the dense neuropathy. No other complaint. He was seen 1st at [address] [facility], where he was x-rayed  and asked to f/u with orthopaedics. He was started on Keflex. He is on Eliquis.     Past Medical History:   Diagnosis Date    Atrial fibrillation (HCC)     Back pain     Cardiomyopathy     CHF (congestive heart failure) (HCC)     COPD (chronic obstructive pulmonary disease) (HCC)     Dyslipidemia     GERD (gastroesophageal reflux disease)     Hyperlipidemia     Hypertension     Hypothyroidism     Leg edema     MRSA (methicillin resistant staph aureus) culture positive 10/5/15    foot/bone    MRSA nasal colonization 05/05/2017    Obesity     Prolonged emergence from general anesthesia     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     Stroke [facility])     Thyroid disease     Type 2 diabetes mellitus without complication (HCC)     Type II or unspecified type diabetes mellitus without mention of complication, not stated as uncontrolled        Past Surgical History:   Procedure Laterality Date    ADRENAL GLAND SURGERY  1970    CARDIAC DEFIBRILLATOR PLACEMENT  09/05/2017    Dr. Fredis Reed COLONOSCOPY N/A 3/8/2019    COLONOSCOPY WITH MAC, UNASYN (3gm) performed by Allen Tavares MD at [address] [facility] N/A 8/9/2019    COLONOSCOPY POLYPECTOMY SNARE/COLD BIOPSY performed by Allen Tavares MD at [address] [place] [address] N

## 2019-09-10 ENCOUNTER — NURSE ONLY (OUTPATIENT)
Dept: CARDIOLOGY CLINIC | Age: 69
End: 2019-09-10
Payer: MEDICARE

## 2019-09-10 DIAGNOSIS — I42.8 NICM (NONISCHEMIC CARDIOMYOPATHY) (HCC): ICD-10-CM

## 2019-09-10 DIAGNOSIS — Z95.810 CARDIAC RESYNCHRONIZATION THERAPY DEFIBRILLATOR (CRT-D) IN PLACE: ICD-10-CM

## 2019-09-10 DIAGNOSIS — I50.22 CHRONIC SYSTOLIC HF (HEART FAILURE) (HCC): ICD-10-CM

## 2019-09-10 PROCEDURE — 93297 REM INTERROG DEV EVAL ICPMS: CPT | Performed by: INTERNAL MEDICINE

## 2019-09-10 PROCEDURE — 93296 REM INTERROG EVL PM/IDS: CPT | Performed by: INTERNAL MEDICINE

## 2019-09-10 PROCEDURE — 93295 DEV INTERROG REMOTE 1/2/MLT: CPT | Performed by: INTERNAL MEDICINE

## 2019-09-11 ENCOUNTER — CARE COORDINATION (OUTPATIENT)
Dept: CASE MANAGEMENT | Age: 69
End: 2019-09-11

## 2019-09-25 ENCOUNTER — OFFICE VISIT (OUTPATIENT)
Dept: ORTHOPEDIC SURGERY | Age: 69
End: 2019-09-25
Payer: COMMERCIAL

## 2019-09-25 VITALS — WEIGHT: 315 LBS | RESPIRATION RATE: 16 BRPM | BODY MASS INDEX: 38.36 KG/M2 | HEIGHT: 76 IN | HEART RATE: 88 BPM

## 2019-09-25 DIAGNOSIS — M86.171 OTHER ACUTE OSTEOMYELITIS OF RIGHT FOOT (HCC): Primary | ICD-10-CM

## 2019-09-25 DIAGNOSIS — S92.521B OPEN DISPLACED FRACTURE OF MIDDLE PHALANX OF LESSER TOE OF RIGHT FOOT, INITIAL ENCOUNTER: ICD-10-CM

## 2019-09-25 PROCEDURE — G8427 DOCREV CUR MEDS BY ELIG CLIN: HCPCS | Performed by: NURSE PRACTITIONER

## 2019-09-25 PROCEDURE — 4040F PNEUMOC VAC/ADMIN/RCVD: CPT | Performed by: NURSE PRACTITIONER

## 2019-09-25 PROCEDURE — 1036F TOBACCO NON-USER: CPT | Performed by: NURSE PRACTITIONER

## 2019-09-25 PROCEDURE — G8417 CALC BMI ABV UP PARAM F/U: HCPCS | Performed by: NURSE PRACTITIONER

## 2019-09-25 PROCEDURE — 99213 OFFICE O/P EST LOW 20 MIN: CPT | Performed by: NURSE PRACTITIONER

## 2019-09-25 PROCEDURE — 1111F DSCHRG MED/CURRENT MED MERGE: CPT | Performed by: NURSE PRACTITIONER

## 2019-09-25 PROCEDURE — 3017F COLORECTAL CA SCREEN DOC REV: CPT | Performed by: NURSE PRACTITIONER

## 2019-09-25 PROCEDURE — 1123F ACP DISCUSS/DSCN MKR DOCD: CPT | Performed by: NURSE PRACTITIONER

## 2019-09-30 RX ORDER — AMIODARONE HYDROCHLORIDE 200 MG/1
200 TABLET ORAL DAILY
Qty: 30 TABLET | Refills: 5 | Status: SHIPPED | OUTPATIENT
Start: 2019-09-30 | End: 2020-04-02

## 2019-09-30 RX ORDER — APIXABAN 5 MG/1
TABLET, FILM COATED ORAL
Qty: 60 TABLET | Refills: 0 | OUTPATIENT
Start: 2019-09-30

## 2019-10-07 ENCOUNTER — CARE COORDINATION (OUTPATIENT)
Dept: CASE MANAGEMENT | Age: 69
End: 2019-10-07

## 2019-10-23 ENCOUNTER — OFFICE VISIT (OUTPATIENT)
Dept: ORTHOPEDIC SURGERY | Age: 69
End: 2019-10-23
Payer: MEDICARE

## 2019-10-23 VITALS — BODY MASS INDEX: 38.36 KG/M2 | RESPIRATION RATE: 16 BRPM | HEIGHT: 76 IN | WEIGHT: 315 LBS

## 2019-10-23 DIAGNOSIS — S92.501D: ICD-10-CM

## 2019-10-23 DIAGNOSIS — M86.171 OTHER ACUTE OSTEOMYELITIS OF RIGHT FOOT (HCC): Primary | ICD-10-CM

## 2019-10-23 PROCEDURE — 4040F PNEUMOC VAC/ADMIN/RCVD: CPT | Performed by: ORTHOPAEDIC SURGERY

## 2019-10-23 PROCEDURE — G8417 CALC BMI ABV UP PARAM F/U: HCPCS | Performed by: ORTHOPAEDIC SURGERY

## 2019-10-23 PROCEDURE — G8484 FLU IMMUNIZE NO ADMIN: HCPCS | Performed by: ORTHOPAEDIC SURGERY

## 2019-10-23 PROCEDURE — 3017F COLORECTAL CA SCREEN DOC REV: CPT | Performed by: ORTHOPAEDIC SURGERY

## 2019-10-23 PROCEDURE — 1036F TOBACCO NON-USER: CPT | Performed by: ORTHOPAEDIC SURGERY

## 2019-10-23 PROCEDURE — 99213 OFFICE O/P EST LOW 20 MIN: CPT | Performed by: ORTHOPAEDIC SURGERY

## 2019-10-23 PROCEDURE — G8427 DOCREV CUR MEDS BY ELIG CLIN: HCPCS | Performed by: ORTHOPAEDIC SURGERY

## 2019-10-23 PROCEDURE — 1123F ACP DISCUSS/DSCN MKR DOCD: CPT | Performed by: ORTHOPAEDIC SURGERY

## 2019-11-04 RX ORDER — CARVEDILOL 12.5 MG/1
TABLET ORAL
Qty: 180 TABLET | Refills: 1 | Status: SHIPPED | OUTPATIENT
Start: 2019-11-04 | End: 2020-07-13 | Stop reason: DRUGHIGH

## 2019-11-19 ENCOUNTER — OFFICE VISIT (OUTPATIENT)
Dept: INTERNAL MEDICINE CLINIC | Age: 69
End: 2019-11-19
Payer: COMMERCIAL

## 2019-11-19 VITALS
HEIGHT: 76 IN | OXYGEN SATURATION: 97 % | WEIGHT: 315 LBS | HEART RATE: 89 BPM | SYSTOLIC BLOOD PRESSURE: 138 MMHG | BODY MASS INDEX: 38.36 KG/M2 | DIASTOLIC BLOOD PRESSURE: 84 MMHG

## 2019-11-19 DIAGNOSIS — E78.2 MIXED HYPERLIPIDEMIA: ICD-10-CM

## 2019-11-19 DIAGNOSIS — R53.81 PHYSICAL DECONDITIONING: ICD-10-CM

## 2019-11-19 DIAGNOSIS — I10 ESSENTIAL HYPERTENSION: ICD-10-CM

## 2019-11-19 DIAGNOSIS — R97.20 ELEVATED PSA: ICD-10-CM

## 2019-11-19 DIAGNOSIS — Z23 FLU VACCINE NEED: ICD-10-CM

## 2019-11-19 DIAGNOSIS — Z79.4 TYPE 2 DIABETES MELLITUS WITH COMPLICATION, WITH LONG-TERM CURRENT USE OF INSULIN (HCC): Primary | ICD-10-CM

## 2019-11-19 DIAGNOSIS — I42.8 OTHER CARDIOMYOPATHY (HCC): ICD-10-CM

## 2019-11-19 DIAGNOSIS — R53.83 OTHER FATIGUE: ICD-10-CM

## 2019-11-19 DIAGNOSIS — E11.8 TYPE 2 DIABETES MELLITUS WITH COMPLICATION, WITH LONG-TERM CURRENT USE OF INSULIN (HCC): Primary | ICD-10-CM

## 2019-11-19 LAB
ANION GAP SERPL CALCULATED.3IONS-SCNC: 12 MMOL/L (ref 3–16)
BUN BLDV-MCNC: 17 MG/DL (ref 7–20)
CALCIUM SERPL-MCNC: 8.6 MG/DL (ref 8.3–10.6)
CHLORIDE BLD-SCNC: 107 MMOL/L (ref 99–110)
CHP ED QC CHECK: NORMAL
CO2: 24 MMOL/L (ref 21–32)
CREAT SERPL-MCNC: 1 MG/DL (ref 0.8–1.3)
GFR AFRICAN AMERICAN: >60
GFR NON-AFRICAN AMERICAN: >60
GLUCOSE BLD-MCNC: 129 MG/DL (ref 70–99)
GLUCOSE BLD-MCNC: 143 MG/DL
HBA1C MFR BLD: 6.8 %
POTASSIUM SERPL-SCNC: 4.8 MMOL/L (ref 3.5–5.1)
SODIUM BLD-SCNC: 143 MMOL/L (ref 136–145)
TSH REFLEX: 1.32 UIU/ML (ref 0.27–4.2)

## 2019-11-19 PROCEDURE — 3017F COLORECTAL CA SCREEN DOC REV: CPT | Performed by: INTERNAL MEDICINE

## 2019-11-19 PROCEDURE — 1036F TOBACCO NON-USER: CPT | Performed by: INTERNAL MEDICINE

## 2019-11-19 PROCEDURE — G8427 DOCREV CUR MEDS BY ELIG CLIN: HCPCS | Performed by: INTERNAL MEDICINE

## 2019-11-19 PROCEDURE — G8484 FLU IMMUNIZE NO ADMIN: HCPCS | Performed by: INTERNAL MEDICINE

## 2019-11-19 PROCEDURE — 1123F ACP DISCUSS/DSCN MKR DOCD: CPT | Performed by: INTERNAL MEDICINE

## 2019-11-19 PROCEDURE — 83036 HEMOGLOBIN GLYCOSYLATED A1C: CPT | Performed by: INTERNAL MEDICINE

## 2019-11-19 PROCEDURE — 3044F HG A1C LEVEL LT 7.0%: CPT | Performed by: INTERNAL MEDICINE

## 2019-11-19 PROCEDURE — G8417 CALC BMI ABV UP PARAM F/U: HCPCS | Performed by: INTERNAL MEDICINE

## 2019-11-19 PROCEDURE — 2022F DILAT RTA XM EVC RTNOPTHY: CPT | Performed by: INTERNAL MEDICINE

## 2019-11-19 PROCEDURE — 82962 GLUCOSE BLOOD TEST: CPT | Performed by: INTERNAL MEDICINE

## 2019-11-19 PROCEDURE — 99214 OFFICE O/P EST MOD 30 MIN: CPT | Performed by: INTERNAL MEDICINE

## 2019-11-19 PROCEDURE — 4040F PNEUMOC VAC/ADMIN/RCVD: CPT | Performed by: INTERNAL MEDICINE

## 2019-12-02 RX ORDER — TAMSULOSIN HYDROCHLORIDE 0.4 MG/1
CAPSULE ORAL
Qty: 90 CAPSULE | Refills: 0 | Status: SHIPPED | OUTPATIENT
Start: 2019-12-02 | End: 2020-03-03

## 2019-12-15 ASSESSMENT — ENCOUNTER SYMPTOMS
EYES NEGATIVE: 1
RESPIRATORY NEGATIVE: 1

## 2019-12-17 ENCOUNTER — NURSE ONLY (OUTPATIENT)
Dept: CARDIOLOGY CLINIC | Age: 69
End: 2019-12-17
Payer: COMMERCIAL

## 2019-12-17 ENCOUNTER — TELEPHONE (OUTPATIENT)
Dept: CARDIOLOGY CLINIC | Age: 69
End: 2019-12-17

## 2019-12-17 DIAGNOSIS — Z95.810 CARDIAC RESYNCHRONIZATION THERAPY DEFIBRILLATOR (CRT-D) IN PLACE: ICD-10-CM

## 2019-12-17 DIAGNOSIS — I42.8 NICM (NONISCHEMIC CARDIOMYOPATHY) (HCC): ICD-10-CM

## 2019-12-17 DIAGNOSIS — I50.22 CHRONIC SYSTOLIC HF (HEART FAILURE) (HCC): ICD-10-CM

## 2019-12-17 PROCEDURE — 93296 REM INTERROG EVL PM/IDS: CPT | Performed by: INTERNAL MEDICINE

## 2019-12-17 PROCEDURE — 93295 DEV INTERROG REMOTE 1/2/MLT: CPT | Performed by: INTERNAL MEDICINE

## 2019-12-19 ENCOUNTER — TELEPHONE (OUTPATIENT)
Dept: CARDIOLOGY CLINIC | Age: 69
End: 2019-12-19

## 2019-12-23 ENCOUNTER — TELEPHONE (OUTPATIENT)
Dept: CARDIOLOGY CLINIC | Age: 69
End: 2019-12-23

## 2019-12-23 ENCOUNTER — TELEPHONE (OUTPATIENT)
Dept: INTERNAL MEDICINE CLINIC | Age: 69
End: 2019-12-23

## 2019-12-31 ENCOUNTER — TELEPHONE (OUTPATIENT)
Dept: CARDIOLOGY CLINIC | Age: 69
End: 2019-12-31

## 2020-01-02 ENCOUNTER — TELEPHONE (OUTPATIENT)
Dept: INTERNAL MEDICINE CLINIC | Age: 70
End: 2020-01-02

## 2020-01-02 NOTE — TELEPHONE ENCOUNTER
Patient requesting a medication refill.   Medication: Cialis  Pharmacy: [id number] [facility], [address]  Last office visit: 11/19/2019  Next office visit: 2/19/2020

## 2020-01-03 RX ORDER — TADALAFIL 5 MG/1
TABLET ORAL
Qty: 30 TABLET | Refills: 5 | Status: ON HOLD | OUTPATIENT
Start: 2020-01-03 | End: 2020-07-20

## 2020-01-09 DIAGNOSIS — I42.8 OTHER CARDIOMYOPATHY (HCC): Primary | ICD-10-CM

## 2020-01-20 ENCOUNTER — TELEPHONE (OUTPATIENT)
Dept: INTERNAL MEDICINE CLINIC | Age: 70
End: 2020-01-20

## 2020-01-21 RX ORDER — BLOOD-GLUCOSE METER
1 KIT MISCELLANEOUS 3 TIMES DAILY
Qty: 1 KIT | Refills: 0 | Status: SHIPPED | OUTPATIENT
Start: 2020-01-21

## 2020-01-30 RX ORDER — ATORVASTATIN CALCIUM 40 MG/1
40 TABLET, FILM COATED ORAL DAILY
Qty: 30 TABLET | Refills: 5 | Status: SHIPPED | OUTPATIENT
Start: 2020-01-30 | End: 2020-06-02

## 2020-02-17 RX ORDER — FUROSEMIDE 40 MG/1
40 TABLET ORAL DAILY
Qty: 60 TABLET | Refills: 5 | Status: SHIPPED | OUTPATIENT
Start: 2020-02-17 | End: 2021-02-01 | Stop reason: SDUPTHER

## 2020-02-17 RX ORDER — CARVEDILOL 6.25 MG/1
TABLET ORAL
Qty: 60 TABLET | Refills: 2 | Status: SHIPPED | OUTPATIENT
Start: 2020-02-17 | End: 2020-05-15

## 2020-02-26 ENCOUNTER — TELEPHONE (OUTPATIENT)
Dept: ORTHOPEDIC SURGERY | Age: 70
End: 2020-02-26

## 2020-02-26 NOTE — TELEPHONE ENCOUNTER
PA submitted via [facility] for Omega-3-acid Ethyl Esters 1GM capsules.   Key: [id number] - PA Case ID: [id number] - Rx #: [id number]    STATUS: PENDING

## 2020-03-02 RX ORDER — LISINOPRIL 2.5 MG/1
2.5 TABLET ORAL DAILY
Qty: 90 TABLET | Refills: 3 | Status: SHIPPED | OUTPATIENT
Start: 2020-03-02 | End: 2021-04-01 | Stop reason: SDUPTHER

## 2020-03-02 RX ORDER — LEVOTHYROXINE SODIUM 0.03 MG/1
TABLET ORAL
Qty: 90 TABLET | Refills: 3 | Status: SHIPPED | OUTPATIENT
Start: 2020-03-02 | End: 2021-07-08

## 2020-03-11 ENCOUNTER — OFFICE VISIT (OUTPATIENT)
Dept: INTERNAL MEDICINE CLINIC | Age: 70
End: 2020-03-11
Payer: MEDICARE

## 2020-03-11 VITALS
SYSTOLIC BLOOD PRESSURE: 130 MMHG | HEIGHT: 76 IN | WEIGHT: 314 LBS | OXYGEN SATURATION: 99 % | BODY MASS INDEX: 38.24 KG/M2 | DIASTOLIC BLOOD PRESSURE: 82 MMHG | HEART RATE: 87 BPM

## 2020-03-11 DIAGNOSIS — D50.8 OTHER IRON DEFICIENCY ANEMIA: ICD-10-CM

## 2020-03-11 DIAGNOSIS — E78.2 MIXED HYPERLIPIDEMIA: ICD-10-CM

## 2020-03-11 DIAGNOSIS — Z12.5 PROSTATE CANCER SCREENING: ICD-10-CM

## 2020-03-11 LAB
BASOPHILS ABSOLUTE: 0 K/UL (ref 0–0.2)
BASOPHILS RELATIVE PERCENT: 0.8 %
CHP ED QC CHECK: NORMAL
EOSINOPHILS ABSOLUTE: 0.2 K/UL (ref 0–0.6)
EOSINOPHILS RELATIVE PERCENT: 2.9 %
GLUCOSE BLD-MCNC: 151 MG/DL
HBA1C MFR BLD: 7.3 %
HCT VFR BLD CALC: 43.4 % (ref 40.5–52.5)
HEMOGLOBIN: 13.9 G/DL (ref 13.5–17.5)
LYMPHOCYTES ABSOLUTE: 1.2 K/UL (ref 1–5.1)
LYMPHOCYTES RELATIVE PERCENT: 22.4 %
MCH RBC QN AUTO: 29.1 PG (ref 26–34)
MCHC RBC AUTO-ENTMCNC: 32 G/DL (ref 31–36)
MCV RBC AUTO: 90.9 FL (ref 80–100)
MONOCYTES ABSOLUTE: 0.4 K/UL (ref 0–1.3)
MONOCYTES RELATIVE PERCENT: 6.5 %
NEUTROPHILS ABSOLUTE: 3.6 K/UL (ref 1.7–7.7)
NEUTROPHILS RELATIVE PERCENT: 67.4 %
PDW BLD-RTO: 15.7 % (ref 12.4–15.4)
PLATELET # BLD: 170 K/UL (ref 135–450)
PMV BLD AUTO: 10.4 FL (ref 5–10.5)
RBC # BLD: 4.78 M/UL (ref 4.2–5.9)
WBC # BLD: 5.4 K/UL (ref 4–11)

## 2020-03-11 PROCEDURE — G8417 CALC BMI ABV UP PARAM F/U: HCPCS | Performed by: INTERNAL MEDICINE

## 2020-03-11 PROCEDURE — 82962 GLUCOSE BLOOD TEST: CPT | Performed by: INTERNAL MEDICINE

## 2020-03-11 PROCEDURE — 83036 HEMOGLOBIN GLYCOSYLATED A1C: CPT | Performed by: INTERNAL MEDICINE

## 2020-03-11 PROCEDURE — 99214 OFFICE O/P EST MOD 30 MIN: CPT | Performed by: INTERNAL MEDICINE

## 2020-03-11 PROCEDURE — 3051F HG A1C>EQUAL 7.0%<8.0%: CPT | Performed by: INTERNAL MEDICINE

## 2020-03-11 PROCEDURE — 4040F PNEUMOC VAC/ADMIN/RCVD: CPT | Performed by: INTERNAL MEDICINE

## 2020-03-11 PROCEDURE — 1123F ACP DISCUSS/DSCN MKR DOCD: CPT | Performed by: INTERNAL MEDICINE

## 2020-03-11 PROCEDURE — G8484 FLU IMMUNIZE NO ADMIN: HCPCS | Performed by: INTERNAL MEDICINE

## 2020-03-11 PROCEDURE — 1036F TOBACCO NON-USER: CPT | Performed by: INTERNAL MEDICINE

## 2020-03-11 PROCEDURE — G8427 DOCREV CUR MEDS BY ELIG CLIN: HCPCS | Performed by: INTERNAL MEDICINE

## 2020-03-11 PROCEDURE — 3017F COLORECTAL CA SCREEN DOC REV: CPT | Performed by: INTERNAL MEDICINE

## 2020-03-11 PROCEDURE — 2022F DILAT RTA XM EVC RTNOPTHY: CPT | Performed by: INTERNAL MEDICINE

## 2020-03-11 ASSESSMENT — PATIENT HEALTH QUESTIONNAIRE - PHQ9
2. FEELING DOWN, DEPRESSED OR HOPELESS: 0
SUM OF ALL RESPONSES TO PHQ QUESTIONS 1-9: 0
SUM OF ALL RESPONSES TO PHQ QUESTIONS 1-9: 0
SUM OF ALL RESPONSES TO PHQ9 QUESTIONS 1 & 2: 0
1. LITTLE INTEREST OR PLEASURE IN DOING THINGS: 0

## 2020-03-11 NOTE — PROGRESS NOTES
cervical adenopathy. Skin:     General: Skin is warm and dry. Neurological:      Mental Status: He is alert and oriented to person, place, and time. Deep Tendon Reflexes: Reflexes are normal and symmetric. Psychiatric:         Behavior: Behavior normal.         Thought Content: Thought content normal.         Judgment: Judgment normal.         Assessment:        Diagnosis Orders   1. Type 2 diabetes mellitus with complication, with long-term current use of insulin ([facility])  Diet, physical activity, weight reduction stressed. POCT Glucose    POCT glycosylated hemoglobin (Hb A1C)    empagliflozin (JARDIANCE) 10 MG tablet added to regimen. 2. Essential hypertension  Stable. DASH diet discussed. Lipid Panel    Basic Metabolic Panel    TSH with Reflex    TSH with Reflex    Basic Metabolic Panel   3. Mixed hyperlipidemia  Heart healthy diet and statin compliance discussed. 4. Other cardiomyopathy ([address]  Heart healthy lifestyle and med compliance discussed. 5.  Other fatigue  Suspect weight, conditioning, and cardiomyopathy contributing,  Advised heart healthy lifestyle and with focus on aerobic activity. 6.     Iron deficiency anemia: check iron studies. GI consult. Plan:    See plans above.          Kentrell Fraga MD

## 2020-03-12 LAB
CHOLESTEROL, TOTAL: 126 MG/DL (ref 0–199)
HDLC SERPL-MCNC: 54 MG/DL (ref 40–60)
LDL CHOLESTEROL CALCULATED: 59 MG/DL
PROSTATE SPECIFIC ANTIGEN: 2.53 NG/ML (ref 0–4)
TRIGL SERPL-MCNC: 65 MG/DL (ref 0–150)
VLDLC SERPL CALC-MCNC: 13 MG/DL

## 2020-03-24 ENCOUNTER — NURSE ONLY (OUTPATIENT)
Dept: CARDIOLOGY CLINIC | Age: 70
End: 2020-03-24
Payer: COMMERCIAL

## 2020-03-24 PROCEDURE — 93295 DEV INTERROG REMOTE 1/2/MLT: CPT | Performed by: INTERNAL MEDICINE

## 2020-03-24 PROCEDURE — 93296 REM INTERROG EVL PM/IDS: CPT | Performed by: INTERNAL MEDICINE

## 2020-03-24 NOTE — PROGRESS NOTES
Carelink transmission shows normal sensing and pacing function. Noted AF, pt is on Eliquis. See interrogation for more details. Optivol is within normal range.

## 2020-03-31 ENCOUNTER — TELEPHONE (OUTPATIENT)
Dept: INTERNAL MEDICINE CLINIC | Age: 70
End: 2020-03-31

## 2020-03-31 RX ORDER — FLASH GLUCOSE SCANNING READER
EACH MISCELLANEOUS
Qty: 1 DEVICE | Refills: 0 | Status: SHIPPED | OUTPATIENT
Start: 2020-03-31

## 2020-03-31 RX ORDER — FLASH GLUCOSE SENSOR
KIT MISCELLANEOUS
Qty: 3 EACH | Refills: 3 | Status: SHIPPED | OUTPATIENT
Start: 2020-03-31 | End: 2022-10-05 | Stop reason: SDUPTHER

## 2020-03-31 NOTE — TELEPHONE ENCOUNTER
Patient requesting results from last visit    Patient also requested [address]? says he checked with his insurance and they will pay for it.

## 2020-04-02 RX ORDER — AMIODARONE HYDROCHLORIDE 200 MG/1
200 TABLET ORAL DAILY
Qty: 30 TABLET | Refills: 5 | Status: SHIPPED | OUTPATIENT
Start: 2020-04-02 | End: 2020-07-13 | Stop reason: SDUPTHER

## 2020-04-02 RX ORDER — EMPAGLIFLOZIN 10 MG/1
TABLET, FILM COATED ORAL
Qty: 30 TABLET | Refills: 3 | Status: ON HOLD | OUTPATIENT
Start: 2020-04-02 | End: 2020-07-20

## 2020-04-16 NOTE — TELEPHONE ENCOUNTER
Please review and approve in Dr. Brenda Hoyt absence:     LAST OV:  5/29/2019  NEXT OV: 5/13/2020  LAST LIPID, CBC:  3/11/2020  LAST TSH, BMP:  11/19/2019

## 2020-05-07 RX ORDER — SPIRONOLACTONE 25 MG/1
12.5 TABLET ORAL DAILY
Qty: 30 TABLET | Refills: 3 | Status: CANCELLED | OUTPATIENT
Start: 2020-05-07 | End: 2020-06-06

## 2020-05-11 RX ORDER — SPIRONOLACTONE 25 MG/1
12.5 TABLET ORAL DAILY
Qty: 15 TABLET | Refills: 0 | Status: SHIPPED | OUTPATIENT
Start: 2020-05-11 | End: 2020-06-03 | Stop reason: SDUPTHER

## 2020-05-12 DIAGNOSIS — I10 ESSENTIAL HYPERTENSION: ICD-10-CM

## 2020-05-12 NOTE — TELEPHONE ENCOUNTER
Medication:   Requested Prescriptions     Pending Prescriptions Disp Refills    spironolactone (ALDACTONE) 25 MG tablet 15 tablet 0     Sig: Take 0.5 tablets by mouth daily     Last OV:05/31/2019  Last LAB:03/11/2020

## 2020-05-13 ENCOUNTER — NURSE ONLY (OUTPATIENT)
Dept: CARDIOLOGY CLINIC | Age: 70
End: 2020-05-13
Payer: MEDICARE

## 2020-05-13 ENCOUNTER — OFFICE VISIT (OUTPATIENT)
Dept: CARDIOLOGY CLINIC | Age: 70
End: 2020-05-13
Payer: MEDICARE

## 2020-05-13 VITALS
TEMPERATURE: 97.9 F | WEIGHT: 315 LBS | BODY MASS INDEX: 38.36 KG/M2 | SYSTOLIC BLOOD PRESSURE: 112 MMHG | HEIGHT: 76 IN | DIASTOLIC BLOOD PRESSURE: 68 MMHG

## 2020-05-13 PROCEDURE — 93290 INTERROG DEV EVAL ICPMS IP: CPT | Performed by: INTERNAL MEDICINE

## 2020-05-13 PROCEDURE — 99214 OFFICE O/P EST MOD 30 MIN: CPT | Performed by: INTERNAL MEDICINE

## 2020-05-13 PROCEDURE — 93284 PRGRMG EVAL IMPLANTABLE DFB: CPT | Performed by: INTERNAL MEDICINE

## 2020-05-13 NOTE — PROGRESS NOTES
well. He denies chest pain/pressure, tightness, edema, shortness of breath, heart racing, palpitations, lightheadedness, dizziness, syncope, presyncope,  PND or orthopnea.      Past Medical History:   Diagnosis Date    Atrial fibrillation (HCC)     Back pain     Cardiomyopathy     CHF (congestive heart failure) (HCC)     COPD (chronic obstructive pulmonary disease) (HCC)     Dyslipidemia     GERD (gastroesophageal reflux disease)     Hyperlipidemia     Hypertension     Hypothyroidism     Leg edema     MRSA (methicillin resistant staph aureus) culture positive 10/5/15    foot/bone    MRSA nasal colonization 05/05/2017    Obesity     Prolonged emergence from general anesthesia     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     [facility]     Thyroid disease     Type 2 diabetes mellitus without complication ([facility]     Type II or unspecified type diabetes mellitus without mention of complication, not stated as uncontrolled         Past Surgical History:   Procedure Laterality Date    ADRENAL GLAND SURGERY  1970    CARDIAC DEFIBRILLATOR PLACEMENT  09/05/2017    Dr. Dante Woo COLONOSCOPY N/A 3/8/2019    COLONOSCOPY WITH MAC, UNASYN (3gm) performed by Ioana Kenny MD at [address] [place] [address] N/A 8/9/2019    COLONOSCOPY POLYPECTOMY SNARE/COLD BIOPSY performed by Ioana Kenny MD at [address] [place] [address]  8/9/2019    COLONOSCOPY WITH BIOPSY performed by Ioana Kenny MD at [address] [place] [address] 8/28/2019    INCISION AND INCISIONAL DEBRIDEMENT OPEN FRACTURE RIGHT 2ND TOE SKIN AND BONE performed by Isaak Najera MD at [address] [place] [address] Rd  1-2-10    GASTRIC BYPASS SURGERY      OTHER SURGICAL HISTORY  10/6/15    INCISION AND DRAINAGE RIGHT FOOT          OTHER SURGICAL HISTORY Right 10/8/15    INCISION AND DRAINAGE RIGHT FOOT      PACEMAKER PLACEMENT      UPPER GASTROINTESTINAL ENDOSCOPY N/A 3/8/2019    EGD BIOPSY GASTRIC H. PYLORI performed by Indra Castellon Christelle Cisse MD at [facility] ENDOSCOPY       Allergies:  No Known Allergies    Medication:   Prior to Admission medications    Medication Sig Start Date End Date Taking? Authorizing Provider   spironolactone (ALDACTONE) 25 MG tablet Take 0.5 tablets by mouth daily 5/11/20 6/10/20 Yes KENNY Anderson CNP   apixaban (ELIQUIS) 5 MG TABS tablet TAKE 1 TABLET BY MOUTH TWICE DAILY 4/16/20  Yes Rafael Patel MD   JARDIANCE 10 MG tablet TAKE 1 TABLET BY MOUTH DAILY 4/2/20  Yes Amber Barton MD   amiodarone (CORDARONE) 200 MG tablet TAKE 1 TABLET BY MOUTH DAILY 4/2/20  Yes Dora Feldman MD   Continuous Blood Gluc  (FREESTYLE KWESI 14 DAY READER) ANITA Use as Directed Dx E11.9 3/31/20  Yes Amber Barton MD   Continuous Blood Gluc Sensor (FREESTYLE KWESI 14 DAY SENSOR) MISC Use as directed Dx E11.9 3/31/20  Yes Amber Barton MD   dapagliflozin (FARXIGA) 5 MG tablet Take 1 tablet by mouth every morning 3/11/20  Yes Amber Barton MD   tamsulosin (FLOMAX) 0.4 MG capsule TAKE 1 CAPSULE BY MOUTH DAILY 3/3/20  Yes Amber Barton MD   lisinopril (PRINIVIL;ZESTRIL) 2.5 MG tablet TAKE 1 TABLET BY MOUTH DAILY 3/2/20  Yes Amber Barton MD   levothyroxine (SYNTHROID) 25 MCG tablet TAKE 1 TABLET BY MOUTH DAILY 3/2/20  Yes Amber Barton MD   furosemide (LASIX) 40 MG tablet TAKE 1 TABLET BY MOUTH DAILY 2/17/20  Yes Amber Barton MD   carvedilol (COREG) 6.25 MG tablet TAKE 1 TABLET BY MOUTH TWICE DAILY WITH MEALS 2/17/20  Yes KENNY Anderson CNP   omega-3 acid ethyl esters (LOVAZA) 1 g capsule TAKE 1 CAPSULE BY MOUTH TWICE DAILY 2/11/20  Yes Amber Barton MD   atorvastatin (LIPITOR) 40 MG tablet TAKE 1 TABLET BY MOUTH DAILY 1/30/20  Yes Amber Barton MD   Blood Glucose Monitoring Suppl (ONE TOUCH ULTRA MINI) w/Device KIT 1 kit by Does not apply route three times daily 1/21/20  Yes MD Aydin Torrez MISC by Does not apply route Expires: 1/9/22. Diagnosis: cardiomyopathy.  1/9/20  Yes Amber Barton Normal range of motion. Neck supple. No rigidity. No JVD present. · Cardiovascular: Paced, S1&S2 and intact distal pulses. · Pulmonary/Chest: Bilateral respiratory sounds. No wheezes. No rhonchi. · Abdominal: Soft. Bowel sounds present. No distension, No tenderness. · Musculoskeletal: No tenderness. No edema    · Lymphadenopathy: Has no cervical adenopathy. · Neurological: Alert and oriented. Cranial nerve appears intact, No Gross deficit   · Skin: Skin is warm and dry. No rash noted. +LCW device  · Psychiatric: Has a normal mood, affect and behavior     Labs:  Reviewed. EC20 reviewed sinus with paced ventricular complexes that track sinus rhythm (consider biventricular pacing)    Studies:   1. Event monitor:   None    2. Echo: 19  Summary  Poor image quality. Overall left ventricular systolic function appears moderately reduced. Ejection fraction is visually estimated to be 35-40% with diffuse hypokinesis. There is mildly increased left ventricular wall thickness. The right ventricle is not well visualized but ICD wire is present. Mild tricuspid regurgitation. Trivial aortic and mitral regurgitation. Echo:  18 (s/p Bi-V implant)  Mild concentric left ventricular hypertrophy is present. Left ventricular size is moderately increased. Left ventricular function is reduced with ejection fraction estimated at 35  to 40-%. Diastolic filling parameters suggests grade I diastolic dysfunction . Hypokinesis of inferior & damon-septal wall. Mildly dilated left atrium. Aortic valve leaflets appear thickened. Trivial aortic regurgitation is present. 3. Stress Test:    None    4. Cath:   None on file     Procedures:  1. Medtronic Bi-V ICD implant on 17 with Dr. Adwoa Yarbrough  2.  Successful DCCV on 17 with Dr. Radha Kinsey / Plan:    Cardiomyopathy:  -Nonischemic  -S/p BI-V ICD implant  -Improved post implant but LVEF at 35-40%  -Device interrogated remotely on 3/24/2020

## 2020-05-13 NOTE — PATIENT INSTRUCTIONS
Patient Education        Learning About Saving Energy When You Have a Chronic Condition  Introduction    Everyday tasks can be tiring when you have COPD, heart failure, or another long-term (chronic) condition. You may feel at times that you've lost your ability to live your life. But learning to conserve, or save, your energy can help you be less tired. Conserving your energy means finding ways of doing daily activities with as little effort as possible. With some small changes in the way you do things, you can get your tasks done more easily. Some treatments are available that might help. Pulmonary rehabilitation can teach you ways to breathe easier. Cardiac rehabilitation can help make your heart stronger. You also may want to see an occupational or physical therapist. The therapist can give you more tips on building strength and moving with less effort. What can you do to conserve your energy? Planning  · Make a list of what you have to do every day. Group the tasks by location. · Do all the chores in one part of your house around the same time. · Go out for errands or do chores at the time of day when you have the most energy. · Plan rest periods into your day. Getting things done  · Sit down as often as you can when you get dressed, do chores, or cook. · Use a cart with wheels to roll items, such as laundry, from one room to another. · Push or slide boxes or other large items instead of lifting them. Reaching and bending  · Put things you use the most on shelves that are at the level of your waist or shoulder. · Use long-handled grabbers or other tools to reach items on a high shelf or to  things off the floor. Use long-handled dusters when you clean the house. · Use a raised toilet seat to avoid bending too far to sit or stand up. Eating  · Eat several small meals instead of three larger meals.   · If you get too tired to eat much, try to choose healthy foods that have more calories. Have a yogurt-and-fruit smoothie for breakfast. Put avocado on a sandwich. Or add cheese or peanut butter to snacks. · If you don't feel very hungry, try to eat first and drink water or other fluids later, after a meal. This can help keep you from losing weight. Sip small amounts of fluids if you need to drink while you eat. Having sex  · Choose the time of day when you have more energy. · A wdzu-[id number] position for sex can be less tiring. Sometimes you may want to focus more on caressing. Watch closely for changes in your health, and be sure to contact your doctor if you have any problems. Where can you learn more? Go to https://e[employer]pepiceweb.[employer]. org and sign in to your [employer]ol[employer] account. Enter H190 in the [employer] box to learn more about \"Learning About Saving Energy When You Have a Chronic Condition. \"     If you do not have an account, please click on the \"Sign Up Now\" link. Current as of: June 9, 2019Content Version: 12.4  © 9532-2728 Healthwise, Incorporated. Care instructions adapted under license by [facility] ([facility]). If you have questions about a medical condition or this instruction, always ask your healthcare professional. [place] [address] any warranty or liability for your use of this information. Patient Education        Learning About Cutting Calories  How do calories affect your weight? Food gives your body energy. Energy from the food you eat is measured in calories. This energy keeps your heart beating, your brain active, and your muscles working. Your body needs a certain number of calories each day. After your body uses the calories it needs, it stores extra calories as fat. To lose weight safely, you have to eat fewer calories while eating in a healthy way. How many calories do you need each day? The more active you are, the more calories you need. When you are less active, you need fewer calories.  How many calories you need each day also depends on several things, including your age and whether you are male or female. Here are some general guidelines for adults:  · Less active women and older adults need 1,600 to 2,000 calories each day. · Active women and less active men need 2,000 to 2,400 calories each day. · Active men need 2,400 to 3,000 calories each day. How can you cut calories and eat healthy meals? Whole grains, vegetables and fruits, and dried beans are good lower-calorie foods. They give you lots of nutrients and fiber. And they fill you up. Sweets, energy drinks, and soda pop are high in calories. They give you few nutrients and no fiber. Try to limit soda pop, fruit juice, and energy drinks. Drink water instead. Some fats can be part of a healthy diet. But cutting back on fats from highly processed foods like fast foods and many snack foods is a good way to lower the calories in your diet. Also, use smaller amounts of fats like butter, margarine, salad dressing, and mayonnaise. Add fresh garlic, lemon, or herbs to your meals to add flavor without adding fat. Meats and dairy products can be a big source of hidden fats. Try to choose lean or low-fat versions of these products. Fat-free cookies, candies, chips, and frozen treats can still be high in sugar and calories. Some fat-free foods have more calories than regular ones. Eat fat-free treats in moderation, as you would other foods. If your favorite foods are high in fat, salt, sugar, or calories, limit how often you eat them. Eat smaller servings, or look for healthy substitutes. Fill up on fruits, vegetables, and whole grains. Eating at home  · Use meat as a side dish instead of as the main part of your meal.  · Try main dishes that use whole wheat pasta, brown rice, dried beans, or vegetables. · Find ways to cook with little or no fat, such as broiling, steaming, or grilling. · Use cooking spray instead of oil.  If you use oil, use a monounsaturated oil,

## 2020-05-15 RX ORDER — CARVEDILOL 6.25 MG/1
TABLET ORAL
Qty: 60 TABLET | Refills: 2 | Status: SHIPPED | OUTPATIENT
Start: 2020-05-15 | End: 2020-06-15

## 2020-06-03 RX ORDER — SPIRONOLACTONE 25 MG/1
12.5 TABLET ORAL DAILY
Qty: 15 TABLET | Refills: 0 | Status: SHIPPED | OUTPATIENT
Start: 2020-06-03 | End: 2020-06-03

## 2020-06-03 RX ORDER — SPIRONOLACTONE 25 MG/1
TABLET ORAL
Qty: 45 TABLET | Refills: 5 | Status: SHIPPED | OUTPATIENT
Start: 2020-06-03 | End: 2020-12-14 | Stop reason: SDUPTHER

## 2020-06-16 RX ORDER — CARVEDILOL 6.25 MG/1
TABLET ORAL
Qty: 30 TABLET | Refills: 0 | Status: SHIPPED | OUTPATIENT
Start: 2020-06-16 | End: 2020-07-13

## 2020-06-23 ENCOUNTER — TELEPHONE (OUTPATIENT)
Dept: INTERNAL MEDICINE CLINIC | Age: 70
End: 2020-06-23

## 2020-06-26 ENCOUNTER — OFFICE VISIT (OUTPATIENT)
Dept: CARDIOLOGY CLINIC | Age: 70
End: 2020-06-26
Payer: MEDICARE

## 2020-06-26 VITALS
SYSTOLIC BLOOD PRESSURE: 124 MMHG | DIASTOLIC BLOOD PRESSURE: 60 MMHG | WEIGHT: 315 LBS | HEART RATE: 72 BPM | BODY MASS INDEX: 38.71 KG/M2 | TEMPERATURE: 98 F

## 2020-06-26 PROCEDURE — 99214 OFFICE O/P EST MOD 30 MIN: CPT | Performed by: INTERNAL MEDICINE

## 2020-06-26 NOTE — PROGRESS NOTES
3. 4    Smokeless tobacco: Never Used   Substance Use Topics    Alcohol use: Yes     Comment: 2-3 times per year    Drug use: No       No Known Allergies  Current Outpatient Medications   Medication Sig Dispense Refill    carvedilol (COREG) 6.25 MG tablet TAKE 1 TABLET BY MOUTH TWICE DAILY WITH MEALS 30 tablet 0    spironolactone (ALDACTONE) 25 MG tablet TAKE 1/2 TABLET BY MOUTH DAILY 45 tablet 5    atorvastatin (LIPITOR) 40 MG tablet TAKE 1 TABLET BY MOUTH DAILY 90 tablet 3    apixaban (ELIQUIS) 5 MG TABS tablet TAKE 1 TABLET BY MOUTH TWICE DAILY 60 tablet 11    JARDIANCE 10 MG tablet TAKE 1 TABLET BY MOUTH DAILY 30 tablet 3    amiodarone (CORDARONE) 200 MG tablet TAKE 1 TABLET BY MOUTH DAILY 30 tablet 5    Continuous Blood Gluc  (FREESTYLE KWESI 14 DAY READER) ANITA Use as Directed Dx E11.9 1 Device 0    Continuous Blood Gluc Sensor (FREESTYLE KWESI 14 DAY SENSOR) [facility] Use as directed Dx E11.9 3 each 3    dapagliflozin (FARXIGA) 5 MG tablet Take 1 tablet by mouth every morning 90 tablet 1    tamsulosin (FLOMAX) 0.4 MG capsule TAKE 1 CAPSULE BY MOUTH DAILY 90 capsule 2    lisinopril (PRINIVIL;ZESTRIL) 2.5 MG tablet TAKE 1 TABLET BY MOUTH DAILY 90 tablet 3    levothyroxine (SYNTHROID) 25 MCG tablet TAKE 1 TABLET BY MOUTH DAILY 90 tablet 3    furosemide (LASIX) 40 MG tablet TAKE 1 TABLET BY MOUTH DAILY 60 tablet 5    omega-3 acid ethyl esters (LOVAZA) 1 g capsule TAKE 1 CAPSULE BY MOUTH TWICE DAILY 180 capsule 3    Blood Glucose Monitoring Suppl (ONE TOUCH ULTRA MINI) w/Device KIT 1 kit by Does not apply route three times daily 1 kit 0    Handicap Placard MISC by Does not apply route Expires: 1/9/22. Diagnosis: cardiomyopathy.  1 each 0    tadalafil (CIALIS) 5 MG tablet TAKE 1 TABLET BY MOUTH DAILY 30 tablet 5    Insulin Degludec (TRESIBA FLEXTOUCH) 100 UNIT/ML SOPN Inject 10 Units into the skin nightly 9 mL 3    carvedilol (COREG) 12.5 MG tablet TAKE 1 TABLET BY MOUTH TWICE DAILY 180 tablet 1    insulin aspart (NOVOLOG FLEXPEN) 100 UNIT/ML injection pen Inject 8-10 Units into the skin 3 times daily (before meals) 15 pen 3    ONE TOUCH ULTRA TEST strip TEST UP TO THREE TIMES DAILY 300 strip 0    B-D UF III MINI PEN NEEDLES 31G X 5 MM MISC USE DAILY 100 each 5    amiodarone (CORDARONE) 200 MG tablet TAKE 1 TABLET BY MOUTH DAILY 90 tablet 3    ferrous sulfate 325 (65 Fe) MG EC tablet Take 325 mg by mouth daily (with breakfast)      acetaminophen (TYLENOL) 325 MG tablet Take 2 tablets by mouth every 4 hours as needed for Pain 120 tablet 3    Coenzyme Q10 (CO Q 10) 100 MG CAPS Take 1 capsule by mouth daily      Cinnamon 500 MG CAPS Take 1 capsule by mouth daily      glucose blood VI test strips (ONE TOUCH TEST STRIPS) strip 1 each by In Vitro route daily As needed. 100 each 3    Vitamin D (CHOLECALCIFEROL) 1000 UNITS CAPS capsule Take 2,000 Units by mouth nightly       Insulin Syringe-Needle U-100 (INSULIN SYRINGE .5CC/31GX5/16\") 31G X 5/16\" 0.5 ML MISC Patient tests bid 100 Syringe 5    therapeutic multivitamin-minerals (THERAGRAN-M) tablet Take 1 tablet by mouth daily.  aspirin 81 MG EC tablet Take 81 mg by mouth daily.  levothyroxine (SYNTHROID) 25 MCG tablet TAKE 1 TABLET BY MOUTH DAILY 30 tablet 5     No current facility-administered medications for this visit. Physical Exam:   /60   Pulse 72   Temp 98 °F (36.7 °C)   Wt (!) 318 lb (144.2 kg)   BMI 38.71 kg/m²   BP Readings from Last 3 Encounters:   06/26/20 124/60   05/13/20 112/68   03/11/20 130/82     Pulse Readings from Last 3 Encounters:   06/26/20 72   03/11/20 87   11/19/19 89     Wt Readings from Last 3 Encounters:   06/26/20 (!) 318 lb (144.2 kg)   05/13/20 (!) 320 lb (145.2 kg)   03/11/20 (!) 314 lb (142.4 kg)     Constitutional: He is oriented to person, place, and time. He appears well-developed and well-nourished. In no acute distress. HEENT: Normocephalic and atraumatic. Sclerae anicteric.

## 2020-06-29 RX ORDER — ATORVASTATIN CALCIUM 40 MG/1
40 TABLET, FILM COATED ORAL DAILY
Qty: 90 TABLET | Refills: 3 | Status: SHIPPED | OUTPATIENT
Start: 2020-06-29 | End: 2021-07-30

## 2020-07-01 ENCOUNTER — VIRTUAL VISIT (OUTPATIENT)
Dept: INTERNAL MEDICINE CLINIC | Age: 70
End: 2020-07-01
Payer: MEDICARE

## 2020-07-01 PROCEDURE — 3051F HG A1C>EQUAL 7.0%<8.0%: CPT | Performed by: INTERNAL MEDICINE

## 2020-07-01 PROCEDURE — 99214 OFFICE O/P EST MOD 30 MIN: CPT | Performed by: INTERNAL MEDICINE

## 2020-07-02 ENCOUNTER — HOSPITAL ENCOUNTER (OUTPATIENT)
Dept: VASCULAR LAB | Age: 70
Discharge: HOME | End: 2020-07-02
Payer: MEDICARE

## 2020-07-02 PROCEDURE — 93970 EXTREMITY STUDY: CPT

## 2020-07-06 RX ORDER — BLOOD SUGAR DIAGNOSTIC
STRIP MISCELLANEOUS
Qty: 300 STRIP | Refills: 1 | Status: SHIPPED | OUTPATIENT
Start: 2020-07-06 | End: 2021-02-01

## 2020-07-07 ENCOUNTER — TELEPHONE (OUTPATIENT)
Dept: INTERNAL MEDICINE CLINIC | Age: 70
End: 2020-07-07

## 2020-07-07 DIAGNOSIS — I10 ESSENTIAL HYPERTENSION: ICD-10-CM

## 2020-07-07 DIAGNOSIS — Z95.810 CARDIAC RESYNCHRONIZATION THERAPY DEFIBRILLATOR (CRT-D) IN PLACE: ICD-10-CM

## 2020-07-07 DIAGNOSIS — E78.2 MIXED HYPERLIPIDEMIA: ICD-10-CM

## 2020-07-07 DIAGNOSIS — I50.22 CHRONIC SYSTOLIC HF (HEART FAILURE) (HCC): ICD-10-CM

## 2020-07-07 LAB
ANION GAP SERPL CALCULATED.3IONS-SCNC: 12 MMOL/L (ref 3–16)
BUN BLDV-MCNC: 49 MG/DL (ref 7–20)
CALCIUM SERPL-MCNC: 8.6 MG/DL (ref 8.3–10.6)
CHLORIDE BLD-SCNC: 102 MMOL/L (ref 99–110)
CHOLESTEROL, TOTAL: 112 MG/DL (ref 0–199)
CO2: 21 MMOL/L (ref 21–32)
CREAT SERPL-MCNC: 2.1 MG/DL (ref 0.8–1.3)
GFR AFRICAN AMERICAN: 38
GFR NON-AFRICAN AMERICAN: 32
GLUCOSE BLD-MCNC: 150 MG/DL (ref 70–99)
HDLC SERPL-MCNC: 53 MG/DL (ref 40–60)
LDL CHOLESTEROL CALCULATED: 49 MG/DL
POTASSIUM SERPL-SCNC: 5.4 MMOL/L (ref 3.5–5.1)
SODIUM BLD-SCNC: 135 MMOL/L (ref 136–145)
TRIGL SERPL-MCNC: 49 MG/DL (ref 0–150)
TSH REFLEX: 1.34 UIU/ML (ref 0.27–4.2)
VLDLC SERPL CALC-MCNC: 10 MG/DL

## 2020-07-07 NOTE — TELEPHONE ENCOUNTER
Patient had virtual visit with physician labs were order for patient but he also wanted an a1c ordered patient wants to be contacted when this has been added so he can get bloodwork done please advise.

## 2020-07-13 ENCOUNTER — TELEPHONE (OUTPATIENT)
Dept: INTERNAL MEDICINE CLINIC | Age: 70
End: 2020-07-13

## 2020-07-13 RX ORDER — CARVEDILOL 6.25 MG/1
TABLET ORAL
Qty: 180 TABLET | Refills: 2 | Status: ON HOLD | OUTPATIENT
Start: 2020-07-13 | End: 2020-07-27 | Stop reason: HOSPADM

## 2020-07-14 ENCOUNTER — TELEPHONE (OUTPATIENT)
Dept: CARDIOLOGY CLINIC | Age: 70
End: 2020-07-14

## 2020-07-14 NOTE — TELEPHONE ENCOUNTER
The patient called requesting his VL Extremity Venous Bilateral results. Please call the patient back at [phone].

## 2020-07-15 NOTE — TELEPHONE ENCOUNTER
Larissa  Venous ultrasound no DVT  He is on eliquis which is a benefit to protecting against DVT  Marlowe Sacks   Please inform him

## 2020-07-17 ENCOUNTER — TELEPHONE (OUTPATIENT)
Dept: INTERNAL MEDICINE CLINIC | Age: 70
End: 2020-07-17

## 2020-07-17 NOTE — TELEPHONE ENCOUNTER
Per patient physician increased the Novolog from 8 to 18 units. Patient now needs more medication.       [address]

## 2020-07-19 ENCOUNTER — APPOINTMENT (OUTPATIENT)
Dept: GENERAL RADIOLOGY | Age: 70
End: 2020-07-19
Payer: MEDICARE

## 2020-07-19 ENCOUNTER — HOSPITAL ENCOUNTER (EMERGENCY)
Age: 70
Discharge: HOME | End: 2020-07-19
Attending: EMERGENCY MEDICINE
Payer: MEDICARE

## 2020-07-19 VITALS
RESPIRATION RATE: 16 BRPM | DIASTOLIC BLOOD PRESSURE: 55 MMHG | HEIGHT: 76 IN | HEART RATE: 72 BPM | BODY MASS INDEX: 38.36 KG/M2 | OXYGEN SATURATION: 99 % | SYSTOLIC BLOOD PRESSURE: 121 MMHG | TEMPERATURE: 98.3 F | WEIGHT: 315 LBS

## 2020-07-19 LAB
ANION GAP SERPL CALCULATED.3IONS-SCNC: 10 MMOL/L (ref 3–16)
BASOPHILS ABSOLUTE: 0 K/UL (ref 0–0.2)
BASOPHILS RELATIVE PERCENT: 0.2 %
BUN BLDV-MCNC: 25 MG/DL (ref 7–20)
C-REACTIVE PROTEIN: 37.8 MG/L (ref 0–5.1)
CALCIUM SERPL-MCNC: 8.6 MG/DL (ref 8.3–10.6)
CHLORIDE BLD-SCNC: 102 MMOL/L (ref 99–110)
CHP ED QC CHECK: YES
CO2: 22 MMOL/L (ref 21–32)
CREAT SERPL-MCNC: 1.5 MG/DL (ref 0.8–1.3)
EOSINOPHILS ABSOLUTE: 0.1 K/UL (ref 0–0.6)
EOSINOPHILS RELATIVE PERCENT: 1.2 %
GFR AFRICAN AMERICAN: 56
GFR NON-AFRICAN AMERICAN: 47
GLUCOSE BLD-MCNC: 167 MG/DL (ref 70–99)
GLUCOSE BLD-MCNC: 172 MG/DL
GLUCOSE BLD-MCNC: 172 MG/DL (ref 70–99)
HCT VFR BLD CALC: 38.3 % (ref 40.5–52.5)
HEMOGLOBIN: 12.2 G/DL (ref 13.5–17.5)
LACTIC ACID: 1.1 MMOL/L (ref 0.4–2)
LYMPHOCYTES ABSOLUTE: 0.4 K/UL (ref 1–5.1)
LYMPHOCYTES RELATIVE PERCENT: 4.5 %
MCH RBC QN AUTO: 28.1 PG (ref 26–34)
MCHC RBC AUTO-ENTMCNC: 31.8 G/DL (ref 31–36)
MCV RBC AUTO: 88.4 FL (ref 80–100)
MONOCYTES ABSOLUTE: 0.8 K/UL (ref 0–1.3)
MONOCYTES RELATIVE PERCENT: 8.7 %
NEUTROPHILS ABSOLUTE: 8.2 K/UL (ref 1.7–7.7)
NEUTROPHILS RELATIVE PERCENT: 85.4 %
PDW BLD-RTO: 14.3 % (ref 12.4–15.4)
PERFORMED ON: ABNORMAL
PLATELET # BLD: 183 K/UL (ref 135–450)
PMV BLD AUTO: 9.8 FL (ref 5–10.5)
POTASSIUM REFLEX MAGNESIUM: 4.8 MMOL/L (ref 3.5–5.1)
PRO-BNP: 182 PG/ML (ref 0–124)
RBC # BLD: 4.34 M/UL (ref 4.2–5.9)
REASON FOR REJECTION: NORMAL
REASON FOR REJECTION: NORMAL
REJECTED TEST: NORMAL
REJECTED TEST: NORMAL
SODIUM BLD-SCNC: 134 MMOL/L (ref 136–145)
TROPONIN: <0.01 NG/ML
WBC # BLD: 9.6 K/UL (ref 4–11)

## 2020-07-19 PROCEDURE — 85025 COMPLETE CBC W/AUTO DIFF WBC: CPT

## 2020-07-19 PROCEDURE — 73630 X-RAY EXAM OF FOOT: CPT

## 2020-07-19 PROCEDURE — 84484 ASSAY OF TROPONIN QUANT: CPT

## 2020-07-19 PROCEDURE — 99284 EMERGENCY DEPT VISIT MOD MDM: CPT

## 2020-07-19 PROCEDURE — 83605 ASSAY OF LACTIC ACID: CPT

## 2020-07-19 PROCEDURE — 83880 ASSAY OF NATRIURETIC PEPTIDE: CPT

## 2020-07-19 PROCEDURE — 71045 X-RAY EXAM CHEST 1 VIEW: CPT

## 2020-07-19 PROCEDURE — 80048 BASIC METABOLIC PNL TOTAL CA: CPT

## 2020-07-19 PROCEDURE — 93005 ELECTROCARDIOGRAM TRACING: CPT | Performed by: PHYSICIAN ASSISTANT

## 2020-07-19 PROCEDURE — 86140 C-REACTIVE PROTEIN: CPT

## 2020-07-19 RX ORDER — AMOXICILLIN AND CLAVULANATE POTASSIUM 875; 125 MG/1; MG/1
1 TABLET, FILM COATED ORAL 2 TIMES DAILY
Qty: 28 TABLET | Refills: 0 | Status: ON HOLD | OUTPATIENT
Start: 2020-07-19 | End: 2020-07-27 | Stop reason: HOSPADM

## 2020-07-19 ASSESSMENT — PAIN DESCRIPTION - FREQUENCY: FREQUENCY: CONTINUOUS

## 2020-07-19 ASSESSMENT — PAIN DESCRIPTION - LOCATION: LOCATION: FOOT

## 2020-07-19 ASSESSMENT — PAIN DESCRIPTION - PROGRESSION: CLINICAL_PROGRESSION: NOT CHANGED

## 2020-07-19 ASSESSMENT — PAIN SCALES - GENERAL: PAINLEVEL_OUTOF10: 9

## 2020-07-19 ASSESSMENT — PAIN DESCRIPTION - ORIENTATION: ORIENTATION: LEFT

## 2020-07-19 ASSESSMENT — PAIN DESCRIPTION - PAIN TYPE: TYPE: ACUTE PAIN

## 2020-07-19 ASSESSMENT — PAIN DESCRIPTION - DESCRIPTORS: DESCRIPTORS: THROBBING

## 2020-07-19 ASSESSMENT — PAIN - FUNCTIONAL ASSESSMENT: PAIN_FUNCTIONAL_ASSESSMENT: ACTIVITIES ARE NOT PREVENTED

## 2020-07-19 ASSESSMENT — PAIN DESCRIPTION - ONSET: ONSET: AWAKENED FROM SLEEP

## 2020-07-19 NOTE — ED PROVIDER NOTES
ED Attending Attestation Note     Date of evaluation: 7/19/2020    This patient was seen by the advance practice provider. I have seen and examined the patient, agree with the workup, evaluation, management and diagnosis. The care plan has been discussed. 72-year-old male who presents to the emergency department due to concern for foot wound. His past medical history is notable for diabetes, diabetic foot ulcer, renal disease related to diabetes, obesity, WILL, paroxysmal atrial fibrillation (on Eliquis) dilated cardiomyopathy, hypertension, hyperlipidemia, chronic systolic heart failure. He states he has had the wound for about a week. He endorses fatigue for a few weeks. States the wound started draining a couple of days ago though it started bleeding yesterday. He denies any fevers, chills, nausea, vomiting. Endorses some new swelling at the foot around the wound. On my exam he has a foot ulcer (approximately 8cm oval shaped) with minimal serosanguinous drainage noted at the heel that does not probe to the bone. Please see attached image. Given his fatigue an EKG was ordered which was noted to be AV dual paced; however, in comparison to prior EKG from May 13, 2020 morphology has changed. In addition to his wound work-up a cardiac work-up was also done which was negative. Labs here were notable for CRP 38, Cr 1.5 (baseline 1-1.3), Hgb 12.2 similar to baseline. Unfortunately two ESRs sent and were rejected by lab. Troponin and BNP negative. Discussed results with patient and wife. Spoke with cardiology for urgent follow up given his fatigue and new EKG findings who were in agreement. Spoke with podiatry who were in agreement with starting antibiotics and requested augmentin (follow up already scheduled for tomorrow). Prior to discharge discussed strict return precautions which both he and wife verbalized understanding of.      EKG Indication: Fatigue  EKG Interpretation: Rate 76, AV dual paced rhythm, axis left. AR within normal limits. , QTc 524. Left bundle branch block morphology noted. Comparison to prior EKG from May 13, 2020 shows this morphology is new and previously had lower voltage with paced ventricular complexes.                 Ana Dawkins MD  07/19/20 [id number]

## 2020-07-19 NOTE — ED TRIAGE NOTES
pt presents to ED for L foot pain. hx of diabetes. states he had a \"hole\" on his foot that his wife stated was a blister 2 days ago. last night it started bleeding \"profusely\". on eliquis. has appointment with podiatry tomorrow. Denies fever or chills. Denies n/v/d. +1 pitting edema noted LLE at foot and ankle. Hx of neuropathy so sensation is weak but not worse that normal he reports. +1 pedal pulse palpated. Skin is warm and dry. Sore/hole noted to bottom of foot that is white in color at the tibial surface. No bleeding noted at this time. Pt is afebrile, VSS. Lung sounds clear, normal S1S2. Denies taking medication for pain today besides ASA. Evin MULTANI is at bedside. Fall risk assessment completed every shift. All precautions in place. Pt has call light within reach at all times. Room clear of clutter. Pt aware to call for assistance when getting up.

## 2020-07-19 NOTE — ED PROVIDER NOTES
[address]      Pt Name: Daisy Dang  MRN: [id number]  [place] 1950  Date of evaluation: 7/19/2020  Provider: RANGEL Bella    Shared Visit or Autonomous Visit:  I have seen and evaluated this patient with my supervising physician Edouard Schumacher MD.      [address] [place] [address]       Chief Complaint   Patient presents with    Wound Check     pt presents to ED for L foot pain. hx of diabetes. states he had a \"hole\" on his foot that his wife stated was a blister 2 days ago. last night it started bleeding \"profusely\". on eliquis. has appointment with podiatry tomorrow. HISTORY OF PRESENT ILLNESS  (Location/Symptom, Timing/Onset, Context/Setting, Quality, Duration, Modifying Factors, Severity.)   Daisy Dang is a 81 y.o. male who presents to the emergency department with a complaint of a sore on the bottom of his left foot. Patient is an insulin-dependent diabetic, has some neuropathy in both feet, especially the toes, he says he first noticed some pain and soreness around the bottom of the heel a couple weeks ago, but just over the last for 5 days of noticed there is a hole in the bottom of the foot. He says that last night there was also some bleeding, quite a lot, but the bleeding stopped. Patient is on Eliquis therapy. He denies any known trauma to the left foot. He says he walks a little bit around the house but does not do extensive walking, and he uses a cane. He also reports that he has been feeling unusually fatigued and tired for the last couple of weeks, but has been eating normally, and denies chest pain or trouble breathing. Denies any fevers. Not presently on any antibiotic therapy. No other complaints. Nursing Notes were reviewed and I agree. REVIEW OF SYSTEMS    (2-9 systems for level 4, 10 or more for level 5)     Constitutional: Positive for fatigue. Negative for fever, chills, and unexpected weight change.    HENT: Negative for congestion, ear pain, facial swelling, rhinorrhea, sneezing, sore throat and trouble swallowing. Eyes:  Negative for photophobia, pain and visual disturbance. Respiratory:  Negative for cough, shortness of breath, wheezing and stridor. Cardiovascular:  Negative for chest pain, palpitations and leg swelling. Gastrointestinal:  Negative for nausea, vomiting, abdominal pain, diarrhea, constipation and blood in stool. Genitourinary:  Negative for dysuria, urgency, hematuria, flank pain, and groin pain. Musculoskeletal: Positive for painful sore on the bottom of the left foot near the heel. Positive for chronic neuropathy in both feet. Negative for myalgias, arthralgias, neck pain and neck stiffness. Neurological:  Negative for dizziness, seizures, syncope, speech difficulty, focal weakness, numbness and headache. Psychiatric/Behavioral:  Negative for suicidal ideas, hallucinations, confusion. Except as noted above the remainder of the review of systems was reviewed and negative.        PAST MEDICAL HISTORY         Diagnosis Date    Atrial fibrillation ([facility]     Back pain     Cardiomyopathy     CH[facility]congestive [facility] failure) ([facility]     COPD (chronic obstructive pulmonary disease) ([facility])     Dyslipidemia     GERD (gastroesophageal reflux disease)     Hyperlipidemia     Hypertension     Hypothyroidism     Leg edema     MRSA (methicillin resistant staph aureus) culture positive 10/5/15    foot/bone    MRSA nasal colonization 05/05/2017    Obesity     Prolonged emergence from general anesthesia     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     Stroke [facility])     Thyroid disease     Type 2 diabetes mellitus without complication ([facility]     Type II or unspecified type diabetes mellitus without mention of complication, not stated as uncontrolled        SURGICAL HISTORY           Procedure Laterality Date    ADRENAL GLAND SURGERY  1970    CARDIAC DEFIBRILLATOR PLACEMENT  09/05/2017 Dr. Cortney Magdaleno COLONOSCOPY N/A 3/8/2019    COLONOSCOPY WITH MAC, UNASYN (3gm) performed by Ghassan Melton MD at [facility] N/A 8/9/2019    COLONOSCOPY POLYPECTOMY SNARE/COLD BIOPSY performed by Ghassan Melton MD at [facility]  8/9/2019    COLONOSCOPY WITH BIOPSY performed by Ghassan Melton MD at [facility] Right 8/28/2019    INCISION AND INCISIONAL DEBRIDEMENT OPEN FRACTURE RIGHT 2ND TOE SKIN AND BONE performed by Anjum Mitchell MD at [address] [place] [address] Rd  1-2-10    GASTRIC BYPASS SURGERY      OTHER SURGICAL HISTORY  10/6/15    INCISION AND DRAINAGE RIGHT FOOT          OTHER SURGICAL HISTORY Right 10/8/15    INCISION AND DRAINAGE RIGHT FOOT      PACEMAKER PLACEMENT      UPPER GASTROINTESTINAL ENDOSCOPY N/A 3/8/2019    EGD BIOPSY GASTRIC H. PYLORI performed by Ghassan Melton MD [address]        Discharge Medication List as of 7/19/2020 12:45 PM      CONTINUE these medications which have NOT CHANGED    Details   carvedilol (COREG) 6.25 MG tablet TAKE 1 TABLET BY MOUTH TWICE DAILY WITH MEALS, Disp-180 tablet,R-2**Patient requests 90 days supply**Normal      !! ONETOUCH ULTRA strip TEST UP TO THREE TIMES DAILY, Disp-300 strip,R-1**Patient requests 90 days supply**Normal      atorvastatin (LIPITOR) 40 MG tablet TAKE 1 TABLET BY MOUTH DAILY, Disp-90 tablet, R-3**Patient requests 90 days supply**Normal      spironolactone (ALDACTONE) 25 MG tablet TAKE 1/2 TABLET BY MOUTH DAILY, Disp-45 tablet, R-5**Patient requests 90 days supply**Normal      apixaban (ELIQUIS) 5 MG TABS tablet TAKE 1 TABLET BY MOUTH TWICE DAILY, Disp-60 tablet, R-11Normal      JARDIANCE 10 MG tablet TAKE 1 TABLET BY MOUTH DAILY, Disp-30 tablet, R-3Normal      Continuous Blood Gluc  (FREESTYLE KWESI 14 DAY READER) ANITA Use as Directed Dx E11.9, Disp-1 Device, R-0Normal      Continuous Blood Gluc Sensor (FREESTYLE KWESI 14 DAY SENSOR) [facility] Use as directed Dx E11.9, Disp-3 each, R-3Normal      dapagliflozin (FARXIGA) 5 MG tablet Take 1 tablet by mouth every morning, Disp-90 tablet, R-1Print      tamsulosin (FLOMAX) 0.4 MG capsule TAKE 1 CAPSULE BY MOUTH DAILY, Disp-90 capsule, R-2Normal      lisinopril (PRINIVIL;ZESTRIL) 2.5 MG tablet TAKE 1 TABLET BY MOUTH DAILY, Disp-90 tablet, R-3Normal      levothyroxine (SYNTHROID) 25 MCG tablet TAKE 1 TABLET BY MOUTH DAILY, Disp-90 tablet, R-3Normal      furosemide (LASIX) 40 MG tablet TAKE 1 TABLET BY MOUTH DAILY, Disp-60 tablet, R-5Normal      omega-3 acid ethyl esters (LOVAZA) 1 g capsule TAKE 1 CAPSULE BY MOUTH TWICE DAILY, Disp-180 capsule, R-3Normal      Blood Glucose Monitoring Suppl (ONE TOUCH ULTRA MINI) w/Device KIT 3 TIMES DAILY Starting Tue 1/21/2020, Disp-1 kit, R-0, Normal      Handicap Placard MISC Starting u 1/9/2020, Disp-1 each, R-0, PrintExpires: 1/9/22. Diagnosis: cardiomyopathy.       tadalafil (CIALIS) 5 MG tablet TAKE 1 TABLET BY MOUTH DAILY, Disp-30 tablet, R-5Normal      Insulin Degludec (TRESIBA FLEXTOUCH) 100 UNIT/ML SOPN Inject 10 Units into the skin nightly, Disp-9 mL, R-3Normal      insulin aspart (NOVOLOG FLEXPEN) 100 UNIT/ML injection pen Inject 8-10 Units into the skin 3 times daily (before meals), Disp-15 pen, R-3Normal      B-D UF III MINI PEN NEEDLES 31G X 5 MM MISC Disp-100 each, R-5, Normal      amiodarone (CORDARONE) 200 MG tablet TAKE 1 TABLET BY MOUTH DAILY, Disp-90 tablet, R-3**Patient requests 90 days supply**Normal      ferrous sulfate 325 (65 Fe) MG EC tablet Take 325 mg by mouth daily (with breakfast)Historical Med      acetaminophen (TYLENOL) 325 MG tablet Take 2 tablets by mouth every 4 hours as needed for Pain, Disp-120 tablet, R-3Normal      Coenzyme Q10 (CO Q 10) 100 MG CAPS Take 1 capsule by mouth dailyHistorical Med      Cinnamon 500 MG CAPS Take 1 capsule by mouth dailyHistorical Med      !! glucose blood VI test strips (ONE TOUCH TEST STRIPS) strip DAILY Starting 2017, Until Discontinued, Disp-100 each, R-3, NormalAs needed. Vitamin D (CHOLECALCIFEROL) 1000 UNITS CAPS capsule Take 2,000 Units by mouth nightly Historical Med      Insulin Syringe-Needle U-100 (INSULIN SYRINGE .5CC/31G/\") 31G X 5\" 0.5 ML MISC Starting 2013, Until Discontinued, Disp-100 Syringe, R-5, NormalPatient tests bid      therapeutic multivitamin-minerals (THERAGRAN-M) tablet Take 1 tablet by mouth daily. aspirin 81 MG EC tablet Take 81 mg by mouth daily. !! - Potential duplicate medications found. Please discuss with provider. ALLERGIES     Patient has no known allergies. FAMILY HISTORY           Problem Relation Age of Onset    Hypertension Mother     Heart Disease Father     Hypertension Maternal Grandmother     Diabetes Maternal Grandmother      Family Status   Relation Name Status    Mother      Father      MGM  (Not Specified)        SOCIAL HISTORY      reports that he quit smoking about 3 years ago. His smoking use included cigarettes. He has a 4.00 pack-year smoking history. He has never used smokeless tobacco. He reports current alcohol use. He reports that he does not use drugs. PHYSICAL EXAM    (up to 7 for level 4, 8 or more for level 5)     ED Triage Vitals [20 1007]   BP Temp Temp Source Pulse Resp SpO2 Height Weight   (!) 167/68 98.3 °F (36.8 °C) Oral 82 17 97 % 6' 4\" (1.93 m) (!) 328 lb 9.6 oz (149.1 kg)       Constitutional:  Appearing well-developed and well-nourished. No distress. HENT:  Normocephalic and atraumatic. Conjunctivae and EOM are normal. Pupils are equal, round, and reactive to light. Neck:  Normal range of motion. Neck supple. No tracheal deviation present. No thyromegaly present. No cervical adenopathy. Cardiovascular:  Normal rate, regular rhythm, normal heart sounds and intact distal pulses. Pulmonary/Chest:  Effort normal and breath sounds normal. No respiratory distress.  No wheezes or rales. Abdominal:  Soft. Bowel sounds are normal. No distension, mass, tenderness, rebound or guarding. Musculoskeletal:  Normal range of motion to the joints of the lower extremities. Negative for lower extremity edema. Approximately 8 cm oval-shaped area of swelling on the plantar left foot, primarily over the medial and anterior aspects of the calcaneus, with a central ulceration of approximately 2 cm with surrounding can breakdown. See image below. No visible bone on superficial exam.  Neurological:  Alert and oriented to person, place, and time. No cranial nerve deficit. Skin:  Skin is warm and dry. Not diaphoretic. Psychiatric:  Normal mood, affect, behavior, judgment and thought content. DIAGNOSTIC RESULTS     RADIOLOGY:     Interpretation per the Radiologist below, if available at the time of this note:    XR CHEST PORTABLE   Final Result   No acute cardiopulmonary disease on portable chest.         XR FOOT LEFT (MIN 3 VIEWS)   Final Result   Soft tissue swelling and suggestion of possible soft tissue gas along the   plantar surface of the foot. No definitive evidence of osteomyelitis. New deformity of the hindfoot with fracture and collapse of the talus,   possibly also the calcaneus. Overall findings suspected to be due to   neuropathic joint.              LABS:  Labs Reviewed   CBC WITH AUTO DIFFERENTIAL - Abnormal; Notable for the following components:       Result Value    Hemoglobin 12.2 (*)     Hematocrit 38.3 (*)     Neutrophils Absolute 8.2 (*)     Lymphocytes Absolute 0.4 (*)     All other components within normal limits    Narrative:     Performed at:  [address] [address]   Phone [phone]   BASIC METABOLIC PANEL W/ REFLEX TO MG FOR LOW K - Abnormal; Notable for the following components:    Sodium 134 (*)     Glucose 167 (*)     BUN 25 (*)     CREATININE 1.5 (*)     GFR Non- American 47 (*)     GFR  64 (*)     All other components within normal limits    Narrative:     [address] [place] [address] tel. [phone],  Rejected Test Name/Called to: Bernarda Mccormick RN, 07/19/2020 11:26, by [place] [employer]  Performed at:  [facility]  [address] [place], [place] [address]   Phone [phone]   BRAIN NATRIURETIC PEPTIDE - Abnormal; Notable for the following components:    Pro- (*)     All other components within normal limits    Narrative:     [address] [place] [address] tel. [phone],  Rejected Test Name/Called to: Bernarda Mccormick RN, 07/19/2020 11:26, by [place] Memos  Performed at:  [facility]  [address] [place], [place] [address]   Phone [phone]   C-REACTIVE PROTEIN - Abnormal; Notable for the following components:    CRP 37.8 (*)     All other components within normal limits    Narrative:     [address] [place] [address] tel. [phone],  Rejected Test Name/Called to: Bernarda Mccormick RN, 07/19/2020 11:26, by [place] [employer]  Performed at:  [facility]  [address] [place], [place] [address]   Phone [phone]   POCT GLUCOSE - Abnormal; Notable for the following components:    POC Glucose 172 (*)     All other components within normal limits    Narrative:     Performed at:  [facility]  [address] [place], [place] [address]   Phone [phone]   POCT GLUCOSE - Normal   LACTIC ACID, PLASMA    Narrative:     [address] [place] [address] tel. [phone],  Rejected Test Name/Called to: Bernarda Mccormick RN, 07/19/2020 11:26, by [place] Memos  Performed at:  [facility]  [address] [place], [place] [address]   Phone [phone]   TROPONIN    Narrative:     [address] [place] [address] tel. [phone],  Rejected Test Name/Called to: Bernarda Mccormick RN, 07/19/2020 11:26, by [place] Memos  Performed at:  [facility]  [address] [place], [place] [address]   Phone [phone] Results of the cardiologist on-call, Dr. Araseli Freeman, who advised that the patient did not seem to need any further cardiology intervention but should follow-up with his cardiologist tomorrow. There was no indication for hospitalization or further workup. Patient will be discharged. The patient verbalized understanding and agreement with this plan of care. The patient was advised to return to the emergency department if symptoms should significantly worsen or if new and concerning symptoms should appear. I estimate there is LOW risk for SEVERE CELLULITIS, SEPSIS, NECROTIZING FASCIITIS, PULMONARY EMBOLISM, ACUTE CORONARY SYNDROME, OR THORACIC AORTIC DISSECTION, thus I consider the discharge disposition reasonable. PROCEDURES:  None    FINAL IMPRESSION      1. Diabetic ulcer of left heel associated with type 2 diabetes mellitus, unspecified ulcer stage ([address]    2.  Abnormal EKG          DISPOSITION/PLAN   DISPOSITION Decision To Discharge 07/19/2020 12:44:32 PM      PATIENT REFERRED TO:  Reggie March, [address] [facility].  [phone]    Go to   Keep your scheduled appointment tomorrow for podiatry    Jose Armando Cheng MD  [address] [place] [address]  [phone]    Call in 1 day  For cardiology follow-up care      DISCHARGE MEDICATIONS:  Discharge Medication List as of 7/19/2020 12:45 PM      START taking these medications    Details   amoxicillin-clavulanate (AUGMENTIN) 875-125 MG per tablet Take 1 tablet by mouth 2 times daily for 14 days, Disp-28 tablet,R-0Print             (Please note that portions of this note were completed with a voice recognition program.  Efforts were made to edit the dictations but occasionally words are mis-transcribed.)    Rocky Clifton, [address] [place] [address],[address], [address] [place] [address] Fanny  07/19/20 [id number]

## 2020-07-19 NOTE — ED NOTES
AVS reviewed with pt and wife who verbalized understanding of f/u care and dc instructions. Pt stable for dc. IV removed.  Pt aware to f/u with podiatry and cardiology      hBarati Walton RN  07/19/20 [address] [place] [address]  07/19/20 3412

## 2020-07-20 ENCOUNTER — HOSPITAL ENCOUNTER (INPATIENT)
Age: 70
LOS: 7 days | Discharge: NURSING, REHAB OR LONG-TERM CARE | DRG: 623 | End: 2020-07-27
Attending: INTERNAL MEDICINE | Admitting: INTERNAL MEDICINE
Payer: MEDICARE

## 2020-07-20 ENCOUNTER — APPOINTMENT (OUTPATIENT)
Dept: GENERAL RADIOLOGY | Age: 70
DRG: 623 | End: 2020-07-20
Attending: INTERNAL MEDICINE
Payer: MEDICARE

## 2020-07-20 PROBLEM — L03.119 CELLULITIS AND ABSCESS OF FOOT: Status: ACTIVE | Noted: 2020-07-20

## 2020-07-20 PROBLEM — L02.619 CELLULITIS AND ABSCESS OF FOOT: Status: ACTIVE | Noted: 2020-07-20

## 2020-07-20 LAB
EKG ATRIAL RATE: 76 BPM
EKG DIAGNOSIS: NORMAL
EKG P AXIS: 40 DEGREES
EKG P-R INTERVAL: 172 MS
EKG Q-T INTERVAL: 466 MS
EKG QRS DURATION: 190 MS
EKG QTC CALCULATION (BAZETT): 524 MS
EKG R AXIS: 206 DEGREES
EKG T AXIS: 40 DEGREES
EKG VENTRICULAR RATE: 76 BPM
GLUCOSE BLD-MCNC: 179 MG/DL (ref 70–99)
PERFORMED ON: ABNORMAL

## 2020-07-20 PROCEDURE — 6360000002 HC RX W HCPCS: Performed by: STUDENT IN AN ORGANIZED HEALTH CARE EDUCATION/TRAINING PROGRAM

## 2020-07-20 PROCEDURE — 87075 CULTR BACTERIA EXCEPT BLOOD: CPT

## 2020-07-20 PROCEDURE — 87205 SMEAR GRAM STAIN: CPT

## 2020-07-20 PROCEDURE — 73630 X-RAY EXAM OF FOOT: CPT

## 2020-07-20 PROCEDURE — 6370000000 HC RX 637 (ALT 250 FOR IP): Performed by: INTERNAL MEDICINE

## 2020-07-20 PROCEDURE — 2580000003 HC RX 258: Performed by: STUDENT IN AN ORGANIZED HEALTH CARE EDUCATION/TRAINING PROGRAM

## 2020-07-20 PROCEDURE — 87070 CULTURE OTHR SPECIMN AEROBIC: CPT

## 2020-07-20 PROCEDURE — 93010 ELECTROCARDIOGRAM REPORT: CPT | Performed by: INTERNAL MEDICINE

## 2020-07-20 PROCEDURE — 1200000000 HC SEMI PRIVATE

## 2020-07-20 RX ORDER — DOCUSATE SODIUM 100 MG/1
100 CAPSULE, LIQUID FILLED ORAL 2 TIMES DAILY PRN
Status: DISCONTINUED | OUTPATIENT
Start: 2020-07-20 | End: 2020-07-27 | Stop reason: HOSPADM

## 2020-07-20 RX ORDER — DEXTROSE MONOHYDRATE 50 MG/ML
100 INJECTION, SOLUTION INTRAVENOUS PRN
Status: DISCONTINUED | OUTPATIENT
Start: 2020-07-20 | End: 2020-07-27 | Stop reason: HOSPADM

## 2020-07-20 RX ORDER — ONDANSETRON 2 MG/ML
4 INJECTION INTRAMUSCULAR; INTRAVENOUS EVERY 6 HOURS PRN
Status: DISCONTINUED | OUTPATIENT
Start: 2020-07-20 | End: 2020-07-20 | Stop reason: CLARIF

## 2020-07-20 RX ORDER — CARVEDILOL 6.25 MG/1
6.25 TABLET ORAL 2 TIMES DAILY WITH MEALS
Status: DISCONTINUED | OUTPATIENT
Start: 2020-07-21 | End: 2020-07-23

## 2020-07-20 RX ORDER — AMIODARONE HYDROCHLORIDE 200 MG/1
200 TABLET ORAL DAILY
Status: DISCONTINUED | OUTPATIENT
Start: 2020-07-21 | End: 2020-07-27 | Stop reason: HOSPADM

## 2020-07-20 RX ORDER — INSULIN LISPRO 100 [IU]/ML
10 INJECTION, SOLUTION INTRAVENOUS; SUBCUTANEOUS
Status: DISCONTINUED | OUTPATIENT
Start: 2020-07-21 | End: 2020-07-26

## 2020-07-20 RX ORDER — DEXTROSE MONOHYDRATE 25 G/50ML
12.5 INJECTION, SOLUTION INTRAVENOUS PRN
Status: DISCONTINUED | OUTPATIENT
Start: 2020-07-20 | End: 2020-07-27 | Stop reason: HOSPADM

## 2020-07-20 RX ORDER — INSULIN LISPRO 100 [IU]/ML
0-12 INJECTION, SOLUTION INTRAVENOUS; SUBCUTANEOUS
Status: DISCONTINUED | OUTPATIENT
Start: 2020-07-21 | End: 2020-07-22

## 2020-07-20 RX ORDER — LISINOPRIL 2.5 MG/1
2.5 TABLET ORAL DAILY
Status: DISCONTINUED | OUTPATIENT
Start: 2020-07-21 | End: 2020-07-27 | Stop reason: HOSPADM

## 2020-07-20 RX ORDER — TAMSULOSIN HYDROCHLORIDE 0.4 MG/1
0.4 CAPSULE ORAL NIGHTLY
Status: DISCONTINUED | OUTPATIENT
Start: 2020-07-21 | End: 2020-07-27 | Stop reason: HOSPADM

## 2020-07-20 RX ORDER — LEVOTHYROXINE SODIUM 0.03 MG/1
25 TABLET ORAL DAILY
Status: DISCONTINUED | OUTPATIENT
Start: 2020-07-21 | End: 2020-07-27 | Stop reason: HOSPADM

## 2020-07-20 RX ORDER — ACETAMINOPHEN 500 MG
500 TABLET ORAL EVERY 6 HOURS PRN
Status: DISCONTINUED | OUTPATIENT
Start: 2020-07-20 | End: 2020-07-27 | Stop reason: HOSPADM

## 2020-07-20 RX ORDER — NALOXONE HYDROCHLORIDE 0.4 MG/ML
0.4 INJECTION, SOLUTION INTRAMUSCULAR; INTRAVENOUS; SUBCUTANEOUS PRN
Status: DISCONTINUED | OUTPATIENT
Start: 2020-07-20 | End: 2020-07-27 | Stop reason: HOSPADM

## 2020-07-20 RX ORDER — SPIRONOLACTONE 25 MG/1
12.5 TABLET ORAL DAILY
Status: DISCONTINUED | OUTPATIENT
Start: 2020-07-21 | End: 2020-07-27 | Stop reason: HOSPADM

## 2020-07-20 RX ORDER — INSULIN LISPRO 100 [IU]/ML
0-6 INJECTION, SOLUTION INTRAVENOUS; SUBCUTANEOUS NIGHTLY
Status: DISCONTINUED | OUTPATIENT
Start: 2020-07-20 | End: 2020-07-22

## 2020-07-20 RX ORDER — DIPHENHYDRAMINE HYDROCHLORIDE 50 MG/ML
25 INJECTION INTRAMUSCULAR; INTRAVENOUS EVERY 6 HOURS PRN
Status: DISCONTINUED | OUTPATIENT
Start: 2020-07-20 | End: 2020-07-27 | Stop reason: HOSPADM

## 2020-07-20 RX ORDER — ATORVASTATIN CALCIUM 40 MG/1
40 TABLET, FILM COATED ORAL NIGHTLY
Status: DISCONTINUED | OUTPATIENT
Start: 2020-07-21 | End: 2020-07-27 | Stop reason: HOSPADM

## 2020-07-20 RX ORDER — FUROSEMIDE 40 MG/1
40 TABLET ORAL DAILY
Status: DISCONTINUED | OUTPATIENT
Start: 2020-07-21 | End: 2020-07-27 | Stop reason: HOSPADM

## 2020-07-20 RX ORDER — PROMETHAZINE HYDROCHLORIDE 12.5 MG/1
12.5 TABLET ORAL EVERY 6 HOURS PRN
Status: DISCONTINUED | OUTPATIENT
Start: 2020-07-20 | End: 2020-07-27 | Stop reason: HOSPADM

## 2020-07-20 RX ORDER — NICOTINE POLACRILEX 4 MG
15 LOZENGE BUCCAL PRN
Status: DISCONTINUED | OUTPATIENT
Start: 2020-07-20 | End: 2020-07-27 | Stop reason: HOSPADM

## 2020-07-20 RX ORDER — INSULIN ASPART 100 [IU]/ML
18 INJECTION, SOLUTION INTRAVENOUS; SUBCUTANEOUS
Qty: 16 PEN | Refills: 3 | Status: SHIPPED | OUTPATIENT
Start: 2020-07-20 | End: 2021-02-01

## 2020-07-20 RX ORDER — ASPIRIN 81 MG/1
81 TABLET ORAL DAILY
Status: DISCONTINUED | OUTPATIENT
Start: 2020-07-21 | End: 2020-07-27 | Stop reason: HOSPADM

## 2020-07-20 RX ADMIN — INSULIN LISPRO 1 UNITS: 100 INJECTION, SOLUTION INTRAVENOUS; SUBCUTANEOUS at 23:31

## 2020-07-20 RX ADMIN — CEFEPIME 2 G: 2 INJECTION, POWDER, FOR SOLUTION INTRAVENOUS at 23:28

## 2020-07-20 NOTE — CONSULTS
Department of Podiatry Consult Note  Resident       Reason for Consult:  Abscess, left foot  Requesting Physician:  Ct Solorio MD    CHIEF COMPLAINT:  Left foot wound    HISTORY OF PRESENT ILLNESS:                The patient is a 81 y.o. male with significant past medical history listed below who is consulted for a left foot abscess. Patient admits to a wound on the bottom of his left foot, that originally started as a blister about 1 week ago. He is unsure as to what caused the wound. He states that since Saturday night, he noticed increased bloody drainage from the wound, but denies an inciting event. He was seen yesterday morning in [facility] ED for the wound but was sent home with a prescription for oral antibiotics (augmentin). Patient followed up with his podiatrist, Dr. Rudy Bell, today and was directly admitted to [facility] for IV antibiotics and possible surgical intervention. Patient denies any recent or current f/n/v/c/cp/sob, but admits to increased fatigue. He has no other pedal complaints at this time.      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)     Thyroid disease     Type 2 diabetes mellitus without complication ([facility]     Type II or unspecified type diabetes mellitus without mention of complication, not stated as uncontrolled      Past Surgical History:        Procedure Laterality Date    ADRENAL GLAND SURGERY  1970    CARDIAC DEFIBRILLATOR PLACEMENT  09/05/2017 Dr. Dion Chisholm 3/8/2019    COLONOSCOPY WITH MAC, UNASYN (3gm) performed by Ahsan Lawson MD at [facility] N/A 8/9/2019    COLONOSCOPY POLYPECTOMY SNARE/COLD BIOPSY performed by Ahsan Lawson MD at [facility]  8/9/2019    COLONOSCOPY WITH BIOPSY performed by Ahsan Lawson MD at [address] [place] [address] Right 8/28/2019    INCISION AND INCISIONAL DEBRIDEMENT OPEN FRACTURE RIGHT 2ND TOE SKIN AND BONE performed by Jeni Hernández MD at [address] [place] [address] Rd  1-2-10    GASTRIC BYPASS SURGERY      OTHER SURGICAL HISTORY  10/6/15    INCISION AND DRAINAGE RIGHT FOOT          OTHER SURGICAL HISTORY Right 10/8/15    INCISION AND DRAINAGE RIGHT FOOT      PACEMAKER PLACEMENT      UPPER GASTROINTESTINAL ENDOSCOPY N/A 3/8/2019    EGD BIOPSY GASTRIC H. PYLORI performed by Ahsan Lawson MD at [facility] ENDOSCOPY     Current Medications:    No current facility-administered medications for this encounter. Allergies:   Patient has no known allergies. Social History:    TOBACCO:   reports that he quit smoking about 3 years ago. His smoking use included cigarettes. He has a 4.00 pack-year smoking history. He has never used smokeless tobacco.  ETOH:   reports current alcohol use. DRUGS:   reports no history of drug use. Family History:       Problem Relation Age of Onset    Hypertension Mother     Heart Disease Father     Hypertension Maternal Grandmother     Diabetes Maternal Grandmother      REVIEW OF SYSTEMS:  A 12 point review of symptoms is unremarkable with the exception of the chief complaint. Patient specifically denies nausea, fever, vomiting, chills, shortness of breath, chest pain, abdominal pain, constipation or difficulty urinating. PHYSICAL EXAM:      Vitals: There were no vitals taken for this visit.     LABS:   Recent Labs     07/19/20  1100   WBC 9.6   HGB 12.2*   HCT 38.3*        Recent Labs     07/19/20  1100   *   K 4.8    CO2 22   BUN 25*   CREATININE 1.5*     No results for input(s): PROT, INR, APTT in the last 72 hours. VASCULAR: DP and PT pulses non palpable 2/2 edema. CFT is brisk to the digits of the foot b/l. Skin temperature is warm to warm from proximal to distal with focal calor noted to the left plantar midfoot. Non-pitting edema noted to the left foot, localized to the area of the wound. No pain with calf compression b/l. Mild localized erythema noted, left foot. NEUROLOGIC: Gross and epicritic sensation is diminished b/l. Protective sensation is absent at all pedal sites b/l. DERMATOLOGIC: Full thickness wound present to the plantar aspect of the left midfoot. Wound bed is fibrotic tissue and measures approximately 3.0x2.0x2.0 cm. Wound edges macerated. Wound probes to bone. Serosanguinous drainage noted. No malodor noted. Images from 7/19/20      MUSCULOSKELETAL: Muscle strength is 5/5 for all pedal groups tested. Mild pain with palpation of left wound and periwound area. IMAGING  -XR left foot pending     IMPRESSION/RECOMMENDATIONS:      -Full thickness wound, plantar left foot, Coffey III  -Cellulitis, left LE  -Diabetes mellitus with peripheral neuropathy     -Patient examined and evaluated at bedside  -VSS, CBC pending  -ESR/CRP pending   -Broad spectrum IV antibiotics vanc/cefepime started at this time  -XR pending  -Possible MRI left foot depending on pacemaker compatibility   -Wound culture of left foot wound collected; follow up results   -Wound packed with sterile 1/4 inch iodoform packing gauze, and dressed with betadine, gauze, kerlix, and ace  -Surgical shoe ordered. Patient to wear at all times while out of bed. -NON weight bearing to the left foot   -PT/OT ordered, f/u eval; patient strict NON WB to the left foot 2/2 plantar foot wound     Dispo: Patient with full thickness plantar left foot wound. F/u wound culture and possible MRI to determine further treatment plan. Thank you for the opportunity to take part in the patient's care.  The patient will be staffed with Dr. Ian Garcia, NICA, PGY-3  Pager #: [phone] or perfect serve ([address] [place] [address])

## 2020-07-20 NOTE — CONSULTS
Clinical Pharmacy Consult Note    Admit date: 7/20/2020    Subjective/Objective:  82 yo with PMHx that includes afib, CHF, COPD, HLD, HTN, hypothyroidism, obesity, stroke, DM2, and hx of MRSA infection of foot in 2015. Patient presented to [facility] ED yesterday for evaluation of a wound on the bottom of his left foot and was discharged on Augmentin. Patient followed up with podiatry today and was admitted for IV antibiotics for cellulitis and possible abscess. Pharmacy is consulted to dose Vancomycin per Dr. Fay Mitchell. Pertinent Medications:  Vancomycin -- day # 1   1500mg IV q18h (7/20-current)   Cefepime -- day #1   2g IV q12h (7/20-current)     Recent Labs     07/19/20  1100   *   K 4.8      CO2 22   BUN 25*   CREATININE 1.5*       Estimated Creatinine Clearance: 75 mL/min (A) (based on SCr of 1.5 mg/dL (H)). Recent Labs     07/19/20  1100   WBC 9.6   HGB 12.2*   HCT 38.3*   MCV 88.4          Height:  6' 4\" (193 cm)  Weight: Weight: (!) 328 lb (148.8 kg)    Micro:  Date Site Micro Susceptibility   7/20 Wound  (Foot) Ordered        Assessment/Plan:  1. Cellulitis / abscess of left foot  :  vancomycin + cefepime day #1  Vancomycin  · Will start vancomycin 1.5g IV q18h which provides ~ 14 mg/kg (based on AdjBW of 112kg) and is based on a half-life elimination of ~16 hours. · Will monitor closely as patient is at increased risk of accumulation given BMI of 40, and variable renal function. · Clinical pharmacist will follow-up in AM.  · Renal function will be monitored closely and dosing will be adjusted as appropriate. Please call with any questions. Thank you for consulting pharmacy!   Kimberly Aguilar PharmD, [address] [facility]: [phone]  [address] [place] [address] wireless: [phone]  7/20/2020 7:38 PM

## 2020-07-21 ENCOUNTER — APPOINTMENT (OUTPATIENT)
Dept: MRI IMAGING | Age: 70
DRG: 623 | End: 2020-07-21
Attending: INTERNAL MEDICINE
Payer: MEDICARE

## 2020-07-21 ENCOUNTER — NURSE ONLY (OUTPATIENT)
Dept: CARDIOLOGY CLINIC | Age: 70
End: 2020-07-21
Payer: MEDICARE

## 2020-07-21 PROBLEM — E11.42 DIABETIC POLYNEUROPATHY ASSOCIATED WITH TYPE 2 DIABETES MELLITUS (HCC): Status: ACTIVE | Noted: 2020-07-21

## 2020-07-21 PROBLEM — E11.621 TYPE 2 DIABETES MELLITUS WITH LEFT DIABETIC FOOT ULCER (HCC): Status: ACTIVE | Noted: 2020-07-21

## 2020-07-21 PROBLEM — L97.529 TYPE 2 DIABETES MELLITUS WITH LEFT DIABETIC FOOT ULCER (HCC): Status: ACTIVE | Noted: 2020-07-21

## 2020-07-21 LAB
ANION GAP SERPL CALCULATED.3IONS-SCNC: 9 MMOL/L (ref 3–16)
BASOPHILS ABSOLUTE: 0 K/UL (ref 0–0.2)
BASOPHILS RELATIVE PERCENT: 0.4 %
BUN BLDV-MCNC: 35 MG/DL (ref 7–20)
C-REACTIVE PROTEIN: 309.3 MG/L (ref 0–5.1)
CALCIUM SERPL-MCNC: 8.9 MG/DL (ref 8.3–10.6)
CHLORIDE BLD-SCNC: 104 MMOL/L (ref 99–110)
CO2: 22 MMOL/L (ref 21–32)
CREAT SERPL-MCNC: 1.6 MG/DL (ref 0.8–1.3)
EOSINOPHILS ABSOLUTE: 0.2 K/UL (ref 0–0.6)
EOSINOPHILS RELATIVE PERCENT: 1.6 %
ESTIMATED AVERAGE GLUCOSE: 177.2 MG/DL
GFR AFRICAN AMERICAN: 52
GFR NON-AFRICAN AMERICAN: 43
GLUCOSE BLD-MCNC: 170 MG/DL (ref 70–99)
GLUCOSE BLD-MCNC: 186 MG/DL (ref 70–99)
GLUCOSE BLD-MCNC: 199 MG/DL (ref 70–99)
GLUCOSE BLD-MCNC: 218 MG/DL (ref 70–99)
GLUCOSE BLD-MCNC: 90 MG/DL (ref 70–99)
HBA1C MFR BLD: 7.8 %
HCT VFR BLD CALC: 32.6 % (ref 40.5–52.5)
HEMOGLOBIN: 10.4 G/DL (ref 13.5–17.5)
LYMPHOCYTES ABSOLUTE: 0.9 K/UL (ref 1–5.1)
LYMPHOCYTES RELATIVE PERCENT: 9.3 %
MCH RBC QN AUTO: 28.5 PG (ref 26–34)
MCHC RBC AUTO-ENTMCNC: 31.9 G/DL (ref 31–36)
MCV RBC AUTO: 89.4 FL (ref 80–100)
MONOCYTES ABSOLUTE: 1.2 K/UL (ref 0–1.3)
MONOCYTES RELATIVE PERCENT: 12.1 %
NEUTROPHILS ABSOLUTE: 7.7 K/UL (ref 1.7–7.7)
NEUTROPHILS RELATIVE PERCENT: 76.6 %
PDW BLD-RTO: 14.3 % (ref 12.4–15.4)
PERFORMED ON: ABNORMAL
PERFORMED ON: NORMAL
PLATELET # BLD: 159 K/UL (ref 135–450)
PMV BLD AUTO: 9.4 FL (ref 5–10.5)
POTASSIUM SERPL-SCNC: 4.2 MMOL/L (ref 3.5–5.1)
PREALBUMIN: 9.9 MG/DL (ref 20–40)
RBC # BLD: 3.65 M/UL (ref 4.2–5.9)
SODIUM BLD-SCNC: 135 MMOL/L (ref 136–145)
WBC # BLD: 10 K/UL (ref 4–11)

## 2020-07-21 PROCEDURE — 73720 MRI LWR EXTREMITY W/O&W/DYE: CPT

## 2020-07-21 PROCEDURE — 6370000000 HC RX 637 (ALT 250 FOR IP): Performed by: INTERNAL MEDICINE

## 2020-07-21 PROCEDURE — A9579 GAD-BASE MR CONTRAST NOS,1ML: HCPCS | Performed by: PODIATRIST

## 2020-07-21 PROCEDURE — 80048 BASIC METABOLIC PNL TOTAL CA: CPT

## 2020-07-21 PROCEDURE — 6360000002 HC RX W HCPCS: Performed by: STUDENT IN AN ORGANIZED HEALTH CARE EDUCATION/TRAINING PROGRAM

## 2020-07-21 PROCEDURE — 84134 ASSAY OF PREALBUMIN: CPT

## 2020-07-21 PROCEDURE — 97162 PT EVAL MOD COMPLEX 30 MIN: CPT

## 2020-07-21 PROCEDURE — 93284 PRGRMG EVAL IMPLANTABLE DFB: CPT | Performed by: INTERNAL MEDICINE

## 2020-07-21 PROCEDURE — 97530 THERAPEUTIC ACTIVITIES: CPT

## 2020-07-21 PROCEDURE — 97535 SELF CARE MNGMENT TRAINING: CPT

## 2020-07-21 PROCEDURE — 85025 COMPLETE CBC W/AUTO DIFF WBC: CPT

## 2020-07-21 PROCEDURE — 99223 1ST HOSP IP/OBS HIGH 75: CPT | Performed by: INTERNAL MEDICINE

## 2020-07-21 PROCEDURE — 36415 COLL VENOUS BLD VENIPUNCTURE: CPT

## 2020-07-21 PROCEDURE — 2580000003 HC RX 258: Performed by: STUDENT IN AN ORGANIZED HEALTH CARE EDUCATION/TRAINING PROGRAM

## 2020-07-21 PROCEDURE — 6360000004 HC RX CONTRAST MEDICATION: Performed by: PODIATRIST

## 2020-07-21 PROCEDURE — 97166 OT EVAL MOD COMPLEX 45 MIN: CPT

## 2020-07-21 PROCEDURE — 86140 C-REACTIVE PROTEIN: CPT

## 2020-07-21 PROCEDURE — 1200000000 HC SEMI PRIVATE

## 2020-07-21 PROCEDURE — 83036 HEMOGLOBIN GLYCOSYLATED A1C: CPT

## 2020-07-21 RX ORDER — SIMETHICONE 20 MG/.3ML
40 EMULSION ORAL EVERY 6 HOURS PRN
Status: DISCONTINUED | OUTPATIENT
Start: 2020-07-21 | End: 2020-07-27 | Stop reason: HOSPADM

## 2020-07-21 RX ORDER — TRAMADOL HYDROCHLORIDE 50 MG/1
50 TABLET ORAL EVERY 6 HOURS PRN
Status: DISCONTINUED | OUTPATIENT
Start: 2020-07-21 | End: 2020-07-27 | Stop reason: HOSPADM

## 2020-07-21 RX ORDER — INSULIN LISPRO 100 [IU]/ML
10 INJECTION, SOLUTION INTRAVENOUS; SUBCUTANEOUS ONCE
Status: COMPLETED | OUTPATIENT
Start: 2020-07-21 | End: 2020-07-21

## 2020-07-21 RX ADMIN — APIXABAN 5 MG: 5 TABLET, FILM COATED ORAL at 08:17

## 2020-07-21 RX ADMIN — INSULIN GLARGINE 10 UNITS: 100 INJECTION, SOLUTION SUBCUTANEOUS at 01:49

## 2020-07-21 RX ADMIN — LISINOPRIL 2.5 MG: 2.5 TABLET ORAL at 08:17

## 2020-07-21 RX ADMIN — INSULIN LISPRO 10 UNITS: 100 INJECTION, SOLUTION INTRAVENOUS; SUBCUTANEOUS at 08:24

## 2020-07-21 RX ADMIN — INSULIN LISPRO 2 UNITS: 100 INJECTION, SOLUTION INTRAVENOUS; SUBCUTANEOUS at 08:23

## 2020-07-21 RX ADMIN — TAMSULOSIN HYDROCHLORIDE 0.4 MG: 0.4 CAPSULE ORAL at 20:23

## 2020-07-21 RX ADMIN — INSULIN LISPRO 1 UNITS: 100 INJECTION, SOLUTION INTRAVENOUS; SUBCUTANEOUS at 22:02

## 2020-07-21 RX ADMIN — CARVEDILOL 6.25 MG: 6.25 TABLET, FILM COATED ORAL at 00:02

## 2020-07-21 RX ADMIN — AMIODARONE HYDROCHLORIDE 200 MG: 200 TABLET ORAL at 08:17

## 2020-07-21 RX ADMIN — ASPIRIN 81 MG: 81 TABLET, COATED ORAL at 08:17

## 2020-07-21 RX ADMIN — INSULIN LISPRO 10 UNITS: 100 INJECTION, SOLUTION INTRAVENOUS; SUBCUTANEOUS at 12:27

## 2020-07-21 RX ADMIN — CEFEPIME 2 G: 2 INJECTION, POWDER, FOR SOLUTION INTRAVENOUS at 20:24

## 2020-07-21 RX ADMIN — VANCOMYCIN HYDROCHLORIDE 1500 MG: 10 INJECTION, POWDER, LYOPHILIZED, FOR SOLUTION INTRAVENOUS at 00:02

## 2020-07-21 RX ADMIN — INSULIN LISPRO 4 UNITS: 100 INJECTION, SOLUTION INTRAVENOUS; SUBCUTANEOUS at 12:27

## 2020-07-21 RX ADMIN — APIXABAN 5 MG: 5 TABLET, FILM COATED ORAL at 20:23

## 2020-07-21 RX ADMIN — GADOTERIDOL 20 ML: 279.3 INJECTION, SOLUTION INTRAVENOUS at 16:03

## 2020-07-21 RX ADMIN — TRAMADOL HYDROCHLORIDE 50 MG: 50 TABLET, FILM COATED ORAL at 20:23

## 2020-07-21 RX ADMIN — CEFEPIME 2 G: 2 INJECTION, POWDER, FOR SOLUTION INTRAVENOUS at 08:18

## 2020-07-21 RX ADMIN — INSULIN LISPRO 10 UNITS: 100 INJECTION, SOLUTION INTRAVENOUS; SUBCUTANEOUS at 21:57

## 2020-07-21 RX ADMIN — ATORVASTATIN CALCIUM 40 MG: 40 TABLET, FILM COATED ORAL at 20:23

## 2020-07-21 RX ADMIN — APIXABAN 5 MG: 5 TABLET, FILM COATED ORAL at 00:02

## 2020-07-21 RX ADMIN — CARVEDILOL 6.25 MG: 6.25 TABLET, FILM COATED ORAL at 08:17

## 2020-07-21 RX ADMIN — ATORVASTATIN CALCIUM 40 MG: 40 TABLET, FILM COATED ORAL at 00:02

## 2020-07-21 RX ADMIN — VANCOMYCIN HYDROCHLORIDE 1750 MG: 10 INJECTION, POWDER, LYOPHILIZED, FOR SOLUTION INTRAVENOUS at 17:44

## 2020-07-21 RX ADMIN — LEVOTHYROXINE SODIUM 25 MCG: 0.03 TABLET ORAL at 06:45

## 2020-07-21 RX ADMIN — TRAMADOL HYDROCHLORIDE 50 MG: 50 TABLET, FILM COATED ORAL at 14:09

## 2020-07-21 RX ADMIN — FUROSEMIDE 40 MG: 40 TABLET ORAL at 08:17

## 2020-07-21 RX ADMIN — TAMSULOSIN HYDROCHLORIDE 0.4 MG: 0.4 CAPSULE ORAL at 00:05

## 2020-07-21 RX ADMIN — CARVEDILOL 6.25 MG: 6.25 TABLET, FILM COATED ORAL at 17:44

## 2020-07-21 ASSESSMENT — PAIN SCALES - GENERAL
PAINLEVEL_OUTOF10: 7
PAINLEVEL_OUTOF10: 6
PAINLEVEL_OUTOF10: 0
PAINLEVEL_OUTOF10: 3

## 2020-07-21 NOTE — PROGRESS NOTES
at risk for accumulation due to BMI of 39.9 and elevated SCr. Will monitor closely. · Renal function will be monitored closely /dose will be adjusted as appropriate. Please call with any questions.   Eden Finley PharmD, BCPS  Wireless: [id number]  or [phone]  7/21/2020 10:06 AM Alert and oriented to person, place and time

## 2020-07-21 NOTE — PROGRESS NOTES
Physical Therapy    Facility/Department: [facility] [address] [facility] [address] [facility]  Initial Assessment    NAME: Brayden Padilla  : 1950  MRN: [id number]    Date of Service: 2020    Discharge Recommendations:Marvin Reid scored a 10/24 on the AM-PAC short mobility form. Current research shows that an AM-PAC score of 17 or less is typically not associated with a discharge to the patient's home setting. Based on the patient's AM-PAC score and their current functional mobility deficits, it is recommended that the patient have 3-5 sessions per week of Physical Therapy at d/c to increase the patient's independence. Please see assessment section for further patient specific details. If patient discharges prior to next session this note will serve as a discharge summary. Please see below for the latest assessment towards goals. PT Equipment Recommendations  Equipment Needed: (defer to next level of care)    Assessment   Assessment: 82 yo admitted 20 for L foot cellulitis and is now strict NWB L. Pt unable to demo sit to stand transfer NWB L. Pt reports wife works full time. Safety concerns for pt to return home NWB L. Pt would benefit from continued skilled IP PT at discharge. Treatment Diagnosis: mobility impairment due to L foot cellulitis  Decision Making: Low Complexity  Patient Education: Pt educated on PT role, importance of OOB mobility, strict NWB L and he verbalized understanding. REQUIRES PT FOLLOW UP: Yes  Activity Tolerance  Activity Tolerance: Patient Tolerated treatment well;Patient limited by endurance; Patient limited by pain       Patient Diagnosis(es): There were no encounter diagnoses.      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 has a past surgical history that includes Foot surgery (1-2-10); Adrenal gland surgery (1970); Gastric bypass surgery; other surgical history (10/6/15); other surgical history (Right, 10/8/15); Cardiac defibrillator placement (09/05/2017); pacemaker placement; Colonoscopy (N/A, 3/8/2019); Upper gastrointestinal endoscopy (N/A, 3/8/2019); Colonoscopy (N/A, 8/9/2019); Colonoscopy (8/9/2019); and Foot Debridement (Right, 8/28/2019). Restrictions  Position Activity Restriction  Other position/activity restrictions: strict NWB L, no activity restrictions noted     Vision/Hearing  Vision: Within Functional Limits  Hearing: Within functional limits       Subjective  General  Additional Pertinent Hx: The patient is a 81 y.o. male with medical history as below, most notable for HTN, DM type 2 with neuropathy, a fib on eliquis, systolic CHF, s/p BiV ICD in 2017, who presents to [facility] with ulcer on his left foot with drainage. Podiatry consult. Family / Caregiver Present: No  Diagnosis: L foot ulcer/LE cellulitis  Follows Commands: Within Functional Limits  Subjective  Subjective: Pt found supine in bed and agreeable to PT. \" My R leg hasn't been working well since my bariatric surgery.  \"  Pain Screening  Patient Currently in Pain: Yes(rates L foot pain 6/10, RN aware)         Orientation  Orientation  Overall Orientation Status: Within Functional Limits  Social/Functional History  Social/Functional History  Lives With: Spouse  Type of Home: House  Home Layout: Two level, Performs ADL's on one level  Home Access: Stairs to enter with rails(not sterdy)  Entrance Stairs - Number of Steps: 3  Bathroom Shower/Tub: Tub/Shower unit  Bathroom Toilet: Standard  Home Equipment: [employer], [address] Help From: Family  ADL Assistance: Independent  Homemaking Assistance: Independent  Ambulation Assistance: Independent  Transfer Assistance: Independent  Active : Yes  Mode of Transportation: Car  Occupation: Retired  Type of occupation: [employer] (multiple jobs)  Additional Comments: fall 3-4 months       Objective          AROM RLE (degrees)  RLE AROM: WFL  AROM LLE (degrees)  LLE AROM : WFL     Strength RLE  Strength RLE: (hip flexion 2+/5; knee ext and ankle DF 4/5)  Strength LLE  Strength LLE: (4/5 hip flex/knee ext; ankle NT due to bandaging)     Sensation  Overall Sensation Status: (impaired B LE)     Bed mobility  Rolling to Right: Stand by assistance  Supine to Sit: Minimal assistance  Sit to Supine: Minimal assistance  Scooting: Minimal assistance  Comment: Pt required assist to move and position LEs in bed. Transfers  Sit to Stand: (attempt sit to/from stand from raised ht bed x2 with NWB L and pt unable to clear hips without WB L)        Balance  Sitting - Static: Good  Sitting - Dynamic: Good  Comments: Pt able to sit EOB x15 mins unsupported independent.         Plan   Plan  Times per week: 2-5  Current Treatment Recommendations: Transfer Training, Endurance Training, Functional Mobility Training  Safety Devices  Type of devices: Bed alarm in place, Left in bed, Call light within reach, Nurse notified                        AM-PAC Score  AM-PAC Inpatient Mobility Raw Score : 10 (07/21/20 1208)  AM-PAC Inpatient T-Scale Score : 32.29 (07/21/20 1208)  Mobility Inpatient CMS 0-100% Score: 76.75 (07/21/20 1208)  Mobility Inpatient CMS G-Code Modifier : CL (07/21/20 1208)          Goals  Short term goals  Time Frame for Short term goals: discharge  Short term goal 1: sit to/from supine supervision  Short term goal 2: sit to/from stand min assist NWB L  Short term goal 3: bed to/from chair supervision NWDULCE L  Patient Goals   Patient goals : return home       Therapy Time   Individual Concurrent Group Co-treatment   Time In 1100         Time Out 1138         Minutes 38           Timed Code Treatment Minutes: 28      Total Treatment Minutes:  1463 Shruti Arias PT

## 2020-07-21 NOTE — PROGRESS NOTES
Received shift change report at bedside with NILESH Ward. Patient AAOx4, VSS. No acute distress noted at this time. Patient has no needs at this time, Will continue to monitor.

## 2020-07-21 NOTE — PROGRESS NOTES
Progress Note  Admit Date: 7/20/2020       Overnight Events: none noted     CC: F/U for diabetic foot infection  Interval History: pt syates he is having some pain in leg and foot this afternoon, no other complaints except askign for cpap as he uses one at home.  vancomycin  1,750 mg Intravenous Q18H    cefepime  2 g Intravenous Q12H    insulin lispro  0-12 Units Subcutaneous TID WC    insulin lispro  0-6 Units Subcutaneous Nightly    amiodarone  200 mg Oral Daily    apixaban  5 mg Oral BID    aspirin  81 mg Oral Daily    atorvastatin  40 mg Oral Nightly    carvedilol  6.25 mg Oral BID WC    furosemide  40 mg Oral Daily    insulin lispro (1 Unit Dial)  10 Units Subcutaneous TID WC    insulin glargine  10 Units Subcutaneous Nightly    levothyroxine  25 mcg Oral Daily    lisinopril  2.5 mg Oral Daily    spironolactone  12.5 mg Oral Daily    tamsulosin  0.4 mg Oral Nightly      PRN Medications: naloxone, diphenhydrAMINE, acetaminophen, docusate sodium, promethazine, glucose, dextrose, glucagon (rDNA), dextrose  Diet: DIET CARDIAC; Carb Control: 4 carb choices (60 gms)/meal  Continuous Infusions:   dextrose       PHYSICAL EXAM:  /67   Pulse 64   Temp 98.4 °F (36.9 °C) (Oral)   Resp 16   Ht 6' 4\" (1.93 m)   Wt (!) 328 lb (148.8 kg)   SpO2 97%   BMI 39.93 kg/m²   Recent Labs     07/19/20  1130 07/20/20  2124 07/21/20  0816 07/21/20  1207   POCGLU 172* 179* 170* 218*       Intake/Output Summary (Last 24 hours) at 7/21/2020 1259  Last data filed at 7/21/2020 1007  Gross per 24 hour   Intake 240 ml   Output 775 ml   Net -535 ml     General appearance: No apparent distress appears stated age and cooperative. HEENT Normal cephalic, atraumatic without obvious deformity. Conjunctivae/corneas clear. Neck: Supple, No jugular venous distention/bruits. Trachea midline without thyromegaly or adenopathy with full range of motion.   Lungs: Clear to auscultation, bilaterally without Rales/Wheezes/Rhonchi with good respiratory effort. Heart: Regular rate and rhythm with Normal S1/S2 without murmurs, rubs or gallops, point of maximum impulse non-displaced  Abdomen: Soft, non-tender or non-distended without rigidity or guarding and positive bowel sounds all four quadrants. Extremities: No clubbing, cyanosis, left foot is covered in dressing. Skin: Skin color, texture, turgor normal.  Pictures reviewed. Neurologic: Alert and oriented X 3, grossly non-focal.  Mental status: Alert, oriented, thought content appropriate. Capillary refill is brisk  Peripheral pulses 2+    LABS:  Recent Labs     07/19/20  1100 07/21/20  0604   WBC 9.6 10.0   HGB 12.2* 10.4*   HCT 38.3* 32.6*    159                                                                    Recent Labs     07/19/20  1100 07/19/20  1146 07/21/20  0605   *  --  135*   K 4.8  --  4.2     --  104   CO2 22  --  22   BUN 25*  --  35*   CREATININE 1.5*  --  1.6*   GLUCOSE 167* 172 186*     No results for input(s): AST, ALT, ALB, BILITOT, ALKPHOS in the last 72 hours. Recent Labs     07/19/20  1100   TROPONINI <0.01     Assessment & Plan:    Patient Active Problem List:      Diabetic foot infection  Possible osteomyelitis  HTN  Chronic systolic CHF EF 15-59%  DM type 2  Atrial fibrillation on eliquiOSA     Plan:  Start cefepime and vancomycin  - seen by ID, rec continue for now. Podiatry consulted, possible MRI, trying to see if compatible with pacer. Follow up on wound cultures and also blood work  Cont with coreg and low dose lisinopril if creatinine is stable  lantus nightly with meal insulin, ISS, adjust based on glucose levels  PT/OT  Will continue eliquis if no procedures expected  Will ask resp therapy to assist with CPAP.           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 ([address]     Renal disease due to hypertension     Obesity     HTN (hypertension)     Obstructive sleep apnea     Acute congestive heart failure (HCC)     Vitamin D deficiency     Graves' disease     Hypothyroidism     Diabetic foot ([address]     Foot osteomyelitis, right ([address]     Diabetic foot infection ([address]     Dilated cardiomyopathy ([address]     Essential hypertension     Hyperlipidemia     Diabetic ulcer of right foot associated with type 2 diabetes mellitus ([address]     Subacute osteomyelitis of right foot (HCC)     MRSA infection     H/O gastric bypass     Benign prostatic hyperplasia with lower urinary tract symptoms     Paroxysmal atrial fibrillation (HCC)     Hypokalemia     Chronic systolic heart failure (HCC)     NSVT (nonsustained ventricular tachycardia) (HCC)     Abscess of left leg     Morbid obesity with BMI of 40.0-44.9, adult ([facility]     Cardiac resynchronization therapy defibrillator (CRT-D) in place     On amiodarone therapy     Open fracture of second toe of right foot with routine healing     Open fracture of toe     Open fracture dislocation of interphalangeal joint of single toe of right foot     Cellulitis and abscess of foot     Type 2 diabetes mellitus with left diabetic foot ulcer ([address]     Diabetic polyneuropathy associated with type 2 diabetes mellitus ([address]        The patient and / or the family were informed of the results of any tests, a time was given to answer questions, a plan was proposed and they agreed with plan.   Disposition: inpt  Full Code

## 2020-07-21 NOTE — PROGRESS NOTES
Patient is AOx4 and able to make needs known; compliant with all medications as ordered; VSS; RRR; consuming all nutrition; bowel sounds 4Q; non weightbearing L foot; dressing CDI; voiding adequately; afebrile; bed alarm engaged; call light within reach; will continue to monitor.        Electronically signed by Barak Herrmann RN on 7/21/20 at 3:45 PM EDT

## 2020-07-21 NOTE — H&P
Hospital Medicine History & Physical      PCP: Sirena Garces MD    Date of Admission: 7/20/2020    Date of Service: Pt seen/examined on 7/20/2020 and Admitted to Inpatient with expected LOS greater than two midnights due to medical therapy. Chief Complaint:  Drainage and pain in left foot      History Of Present Illness: The patient is a 81 y.o. male with medical history as below, most notable for HTN, DM type 2 with neuropathy, a fib on eliquis, systolic CHF, s/p BiV ICD in 2017, who presents to [facility] with ulcer on his left foot with drainage. Patient initially noted left foot swelling few weeks ago. There was no ulcer present initially. Last week his wife noted some bloody drainage from ope sore on the bottom of his heel. He was seen in ER yesterday and discharged home with augmentin. He followed up with his podiatrist, who was concerned about worsening foot cellulitis and wound probing to the bone. Patient was referred for direct admission.      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 Baron Marie 3/8/2019    COLONOSCOPY WITH MAC, UNASYN (3gm) performed by Fay Addison MD at [facility] N/A 8/9/2019    COLONOSCOPY POLYPECTOMY SNARE/COLD BIOPSY performed by Fay Addison MD at [facility]  8/9/2019    COLONOSCOPY WITH BIOPSY performed by Fay Addison MD at [address] Right 8/28/2019    INCISION AND INCISIONAL DEBRIDEMENT OPEN FRACTURE RIGHT 2ND TOE SKIN AND BONE performed by Phan Gamez MD at [address] [place] [address] Rd  1-2-10    GASTRIC BYPASS SURGERY      OTHER SURGICAL HISTORY  10/6/15    INCISION AND DRAINAGE RIGHT FOOT          OTHER SURGICAL HISTORY Right 10/8/15    INCISION AND DRAINAGE RIGHT FOOT      PACEMAKER PLACEMENT      UPPER GASTROINTESTINAL ENDOSCOPY N/A 3/8/2019    EGD BIOPSY GASTRIC H. PYLORI performed by Fay Addison MD at [facility] ENDOSCOPY       Medications Prior to Admission:    Prior to Admission medications    Medication Sig Start Date End Date Taking?  Authorizing Provider   insulin aspart (NOVOLOG FLEXPEN) 100 UNIT/ML injection pen Inject 18 Units into the skin 3 times daily (before meals) 7/20/20   Perla Stroud MD   amoxicillin-clavulanate (AUGMENTIN) 875-125 MG per tablet Take 1 tablet by mouth 2 times daily for 14 days 7/19/20 8/2/20  RANGEL James   carvedilol (COREG) 6.25 MG tablet TAKE 1 TABLET BY MOUTH TWICE DAILY WITH MEALS 7/13/20   KENNY Solomon - CNP   ONETOUCH ULTRA strip TEST UP TO THREE TIMES DAILY 7/6/20   Perla Stroud MD   atorvastatin (LIPITOR) 40 MG tablet TAKE 1 TABLET BY MOUTH DAILY 6/29/20   Perla Stroud MD   spironolactone (ALDACTONE) 25 MG tablet TAKE 1/2 TABLET BY MOUTH DAILY 6/3/20   Lucy Nicholas MD   apixaban (ELIQUIS) 5 MG TABS tablet TAKE 1 TABLET BY MOUTH TWICE DAILY 4/16/20   Oscar Murdock MD   JARDIANCE 10 MG tablet TAKE 1 TABLET BY MOUTH DAILY 4/2/20   Perla Stroud MD   Continuous Blood Gluc  (FREESTYLE KWESI 14 DAY READER) ANITA Use as Directed Dx E11.9 3/31/20 Jorge Casey MD   Continuous Blood Gluc Sensor (FREESTYLE KWESI 14 DAY SENSOR) MISC Use as directed Dx E11.9 3/31/20   Jorge Casey MD   dapagliflozin [facility]) 5 MG tablet Take 1 tablet by mouth every morning 3/11/20   Jorge Casey MD   tamsulosin (FLOMAX) 0.4 MG capsule TAKE 1 CAPSULE BY MOUTH DAILY 3/3/20   Jorge Casey MD   lisinopril (PRINIVIL;ZESTRIL) 2.5 MG tablet TAKE 1 TABLET BY MOUTH DAILY 3/2/20   Jorge Casey MD   levothyroxine (SYNTHROID) 25 MCG tablet TAKE 1 TABLET BY MOUTH DAILY 3/2/20   Jorge Casey MD   furosemide (LASIX) 40 MG tablet TAKE 1 TABLET BY MOUTH DAILY  Patient taking differently: Take 80 mg by mouth daily  2/17/20   Jorge Casey MD   omega-3 acid ethyl esters (LOVAZA) 1 g capsule TAKE 1 CAPSULE BY MOUTH TWICE DAILY 2/11/20   Jorge Casey MD   Blood Glucose Monitoring Suppl (ONE TOUCH ULTRA MINI) w/Device KIT 1 kit by Does not apply route three times daily 1/21/20   Jorge Casey MD   Handicap Placard MISC by Does not apply route Expires: 1/9/22. Diagnosis: cardiomyopathy.  1/9/20   Jorge Casey MD   tadalafil (CIALIS) 5 MG tablet TAKE 1 TABLET BY MOUTH DAILY 1/3/20   Jorge Casey MD   Insulin Degludec [facility]) 100 UNIT/ML SOPN Inject 10 Units into the skin nightly 12/27/19   Jorge Casey MD   levothyroxine (SYNTHROID) 25 MCG tablet TAKE 1 TABLET BY MOUTH DAILY 7/31/19 8/31/19  Jorge Casey MD   B-D UF III MINI PEN NEEDLES 31G X 5 MM MISC USE DAILY 5/1/19   Jorge Casey MD   amiodarone (CORDARONE) 200 MG tablet TAKE 1 TABLET BY MOUTH DAILY 4/30/19   Lonnie Pugh MD   ferrous sulfate 325 (65 Fe) MG EC tablet Take 325 mg by mouth daily (with breakfast)    Historical MD Jayashree   acetaminophen (TYLENOL) 325 MG tablet Take 2 tablets by mouth every 4 hours as needed for Pain 9/2/17   Tex Sen, MD   Coenzyme Q10 (CO Q 10) 100 MG CAPS Take 1 capsule by mouth daily    Historical Provider, MD   Cinnamon 500 MG CAPS Take 1 capsule by mouth daily Historical Provider, MD   glucose blood VI test strips (ONE TOUCH TEST STRIPS) strip 1 each by In Vitro route daily As needed. 4/27/17   Luna Chau MD   Vitamin D (CHOLECALCIFEROL) 1000 UNITS CAPS capsule Take 2,000 Units by mouth nightly     Historical Provider, MD   Insulin Syringe-Needle U-100 (INSULIN SYRINGE .5CC/31GX5/16\") 31G X 5/16\" 0.5 ML MISC Patient tests bid 5/21/13   Luna Chau MD   therapeutic multivitamin-minerals [facility]) tablet Take 1 tablet by mouth daily. Historical Provider, MD   aspirin 81 MG EC tablet Take 81 mg by mouth daily. Historical Provider, MD       Allergies:  Patient has no known allergies. Social History:  The patient currently lives at home. TOBACCO:   reports that he quit smoking about 3 years ago. His smoking use included cigarettes. He has a 4.00 pack-year smoking history. He has never used smokeless tobacco.  ETOH:   reports current alcohol use. Family History:  Reviewed in detail and Positive as follows:        Problem Relation Age of Onset    Hypertension Mother     Heart Disease Father     Hypertension Maternal Grandmother     Diabetes Maternal Grandmother        REVIEW OF SYSTEMS:   Positive and negative as noted in the HPI. All other systems reviewed and negative. PHYSICAL EXAM:    /66   Pulse 80   Temp 99 °F (37.2 °C) (Oral)   Resp 16   Ht 6' 4\" (1.93 m)   Wt (!) 328 lb (148.8 kg)   SpO2 98%   BMI 39.93 kg/m²     General appearance: No apparent distress appears stated age and cooperative. HEENT Normal cephalic, atraumatic without obvious deformity. Conjunctivae/corneas clear. Neck: Supple, No jugular venous distention/bruits. Trachea midline without thyromegaly or adenopathy with full range of motion. Lungs: Clear to auscultation, bilaterally without Rales/Wheezes/Rhonchi with good respiratory effort.   Heart: Regular rate and rhythm with Normal S1/S2 without murmurs, rubs or gallops, point of maximum impulse non-displaced  Abdomen: Soft, non-tender or non-distended without rigidity or guarding and positive bowel sounds all four quadrants. Extremities: No clubbing, cyanosis, left foot is covered in dressing. Skin: Skin color, texture, turgor normal.  Pictures reviewed. Neurologic: Alert and oriented X 3, grossly non-focal.  Mental status: Alert, oriented, thought content appropriate. Capillary refill is brisk  Peripheral pulses 2+    ECHO  Poor image quality. Overall left ventricular systolic function appears moderately reduced. Ejection fraction is visually estimated to be 35-40% with diffuse   hypokinesis. There is mildly increased left ventricular wall thickness. The right ventricle is not well visualized but ICD wire is present. Mild tricuspid regurgitation. Trivial aortic and mitral regurgitation. CBC   Recent Labs     07/19/20  1100   WBC 9.6   HGB 12.2*   HCT 38.3*         RENAL  Recent Labs     07/19/20  1100   *   K 4.8      CO2 22   BUN 25*   CREATININE 1.5*     LFT'S  No results for input(s): AST, ALT, ALB, BILIDIR, BILITOT, ALKPHOS in the last 72 hours. COAG  No results for input(s): INR in the last 72 hours.   CARDIAC ENZYMES  Recent Labs     07/19/20  1100   TROPONINI <0.01       U/A:    Lab Results   Component Value Date    NITRITE neg 12/29/2010    COLORU DK YELLOW 03/30/2017    WBCUA 77 03/30/2017    RBCUA 5-10 03/30/2017    BACTERIA 4+ 03/30/2017    CLARITYU CLOUDY 03/30/2017    SPECGRAV 1.024 03/30/2017    LEUKOCYTESUR MODERATE 03/30/2017    BLOODU MODERATE 03/30/2017    GLUCOSEU Negative 03/30/2017       ABG    Lab Results   Component Value Date    ELU7WQX 25 01/09/2012    BEART 0.8 01/09/2012    PHART 7.39 01/09/2012    WTT0DBU 43 01/09/2012    PO2ART 67 01/09/2012           Active Hospital Problems    Diagnosis Date Noted    Cellulitis and abscess of foot [L03.119, L02.619] 07/20/2020         ASSESSMENT/PLAN:    Diabetic foot infection  Possible osteomyelitis  HTN  Chronic systolic CHF EF 42-33%  DM type 2  Atrial fibrillation on eliquis    Plan:  Start cefepime and vancomycin  consult podiatry  Follow up on wound cultures and also blood work  If possible obtain MRI left foot (order was placed, will need verification of pacemaker compatibility in the morning)  Cont with coreg and low dose lisinopril if creatinine is stable  lantus nightly with meal insulin, ISS, adjust based on glucose levels  PT/OT  Infectious disease consult  Will continue eliquis if no procedures expected      DVT Prophylaxis: eliquis  Diet: No diet orders on file  Code Status: Full Code  PT/OT Eval Status: pending    Ryland David MD    Thank you Perla Stroud MD for the opportunity to be involved in this patient's care. If you have any questions or concerns please feel free to contact me at [phone].

## 2020-07-21 NOTE — PROGRESS NOTES
Pt arrived to unit around [address], FAHAD, podiatry and medicine informed, IVs attempted, Pt NPO until at least XR per podiatry and pt informed. Report given to nightshift RN.

## 2020-07-21 NOTE — PLAN OF CARE
Problem: Pain:  Goal: Pain level will decrease  Description: Pain level will decrease  Outcome: Ongoing  Note: Patient alerts RN of increasing pain; RN administers pain medication as ordered to patient satisfaction       Problem: Falls - Risk of:  Goal: Will remain free from falls  Description: Will remain free from falls  Outcome: Ongoing  Note: Call light within reach; bed alarm engaged; increased frequency of rounds

## 2020-07-21 NOTE — PROGRESS NOTES
Podiatric Surgery Daily Progress Note  Bharati Orlin      Subjective :   Patient seen and examined this am at the bedside. Patient denies any acute overnight events. Patient denies N/V/F/C/SOB. Patient denies calf pain, thigh pain, chest pain. Review of Systems: A 12 point review of symptoms is unremarkable with the exception of the chief complaint. Patient specifically denies nausea, fever, vomiting, chills, shortness of breath, chest pain, abdominal pain, constipation or difficulty urinating. Objective     /68   Pulse 75   Temp 98.9 °F (37.2 °C) (Oral)   Resp 16   Ht 6' 4\" (1.93 m)   Wt (!) 328 lb (148.8 kg)   SpO2 96%   BMI 39.93 kg/m²      I/O:    Intake/Output Summary (Last 24 hours) at 7/21/2020 0706  Last data filed at 7/21/2020 0529  Gross per 24 hour   Intake --   Output 475 ml   Net -475 ml              Wt Readings from Last 3 Encounters:   07/20/20 (!) 328 lb (148.8 kg)   07/19/20 (!) 328 lb 9.6 oz (149.1 kg)   06/26/20 (!) 318 lb (144.2 kg)       LABS:    Recent Labs     07/19/20  1100 07/21/20  0604   WBC 9.6 10.0   HGB 12.2* 10.4*   HCT 38.3* 32.6*    159        Recent Labs     07/19/20  1100   *   K 4.8      CO2 22   BUN 25*   CREATININE 1.5*      No results for input(s): PROT, INR, APTT in the last 72 hours. LOWER EXTREMITY EXAMINATION    Dressing to left LE intact. Moderate serosanguinous strikethrough noted to the external dressing. Serosanguinous drainage noted to the internal layers of the dressing. VASCULAR: DP and PT pulses non palpable 2/2 edema. CFT is brisk to the digits of the foot b/l. Skin temperature is warm to warm from proximal to distal with focal calor noted to the left plantar midfoot. Non-pitting edema noted to the left foot, localized to the area of the wound. No pain with calf compression b/l. Mild localized erythema noted, left foot.      NEUROLOGIC: Gross and epicritic sensation is diminished b/l.  Protective sensation is absent at all pedal sites b/l.     DERMATOLOGIC: Full thickness wound present to the plantar aspect of the left midfoot. Wound bed is fibrotic tissue and measures approximately 3.0x2.0x2.0 cm. Wound edges macerated. Wound probes to bone. Serosanguinous drainage noted. No malodor noted. MUSCULOSKELETAL: Muscle strength is 5/5 for all pedal groups tested. Mild pain with palpation of left wound and periwound area.       IMAGING:  XR left foot 7/21/20 - pending     ASSESSMENT/PLAN  -Full thickness wound, plantar left foot, Coffey III  -Cellulitis, left LE  -Diabetes mellitus with peripheral neuropathy      -Patient examined and evaluated at bedside  -VSS, no leukocytosis noted  -CRP 37.8  -Broad spectrum IV antibiotics vanc/cefepime started at this time  -XR pending  -Possible MRI left foot depending on pacemaker compatibility   -Wound culture of left foot wound collected 7/20/20; follow up results   -Wound packed with sterile 1/4 inch iodoform packing gauze, and dressed with betadine, gauze, abd, kerlix, and ace  -Surgical shoe ordered. Patient to wear at all times while out of bed. -NON weight bearing to the left foot   -PT/OT ordered, f/u eval; patient strict NON WB to the left foot 2/2 plantar foot wound      DISPO: Continued moderate drainage noted to left foot wound. Will follow up MRI to determine further treatment plan. Discussed assessment and plan with Dr. Jossie Lugo.     Do Shaikh DPM, PGY-3  Pager #: [phone] or perfect serve ([address] [place] [address])

## 2020-07-21 NOTE — CONSULTS
List of Home Medications the patient is currently taking is complete. Source of medications in list is phone call with patient. The following medications were removed from admission medication list:  Ferrous sulfate 325 mg tablet  Jardiance mg tablet  Tadalafil 5 mg    The following medication instructions/doses were adjusted to reflect how patient is taking:  Spironolactone 25 mg - patient taking 1 tablet daily, not 1/2 tablet. Furosemide 40 mg - patient taking 40 mg daily (previously 80 mg)    Please note:  Per patient, dapagliflozin has replaced Jardiance. Please call with questions.   Tete Rico [phone]  7/20/2020 10:10 PM

## 2020-07-21 NOTE — CARE COORDINATION
Case Management Assessment           Initial Evaluation                Date / Time of Evaluation: 7/21/2020 5:51 PM                 Assessment Completed by: Edda Alexander    Patient Name: Frank Espinosa     YOB: 1950  Diagnosis: Cellulitis and abscess of foot [L03.119, L02.619]     Date / Time: 7/20/2020  6:13 PM    Patient Admission Status: Inpatient    If patient is discharged prior to next notation, then this note serves as note for discharge by case management. Current PCP: Reynaldo Mcdaniels MD  Clinic Patient: No    Chart Reviewed: Yes  Patient/ Family Interviewed: Yes    Initial assessment completed at bedside with: pt     Hospitalization in the last 30 days: No    Emergency Contacts:  Extended Emergency Contact Information  Primary Emergency Contact: [facility]  Address: [facility], [address] [place] [address],[address] Phone: [phone]  Mobile Phone: [phone]  Relation: Spouse    Advance Directives:   Code Status: [address]: No    Financial  Payor: Mingo Garcia / Plan: Wilberto Etienne ESSENTIAL/PLUS / Product Type: *No Product type* /     Pre-cert required for SNF: Yes    Pharmacy    [id number] - [address]  [facility] [address] [id number]  Phone: [phone] Fax: [phone]      Potential assistance Purchasing Medications: Potential Assistance Purchasing Medications: No  Does Patient want to participate in local refill/ meds to beds program?: No    Meds To Beds General Rules:  1. Can ONLY be done Monday- Friday between 8:30am-5pm  2. Prescription(s) must be in pharmacy by 3pm to be filled same day  3. Copy of patient's insurance/ prescription drug card and patient face sheet must be sent along with the prescription(s)  4. Cost of Rx cannot be added to hospital bill.  If financial assistance is needed, please contact unit  or ;  or  CANNOT provide pharmacy voucher for patients co-pays  5.  Patients can then  the prescription on their way out of the hospital at discharge, or pharmacy can deliver to the bedside if staff is available. (payment due at time of pick-up or delivery - cash, check, or card accepted)     Able to afford home medications/ co-pay costs: Yes    ADLS  Support Systems: Spouse/Significant Other, Children    PT AM-PAC: 10 /24  OT AM-PAC: 15 /24    [place]: from home with S/O   Steps:     Plans to RETURN to current housing: Yes  Barriers to RETURNING to current housing: pending surgical intervention and PT OT eval     Home Care Information  Currently ACTIVE with [address]: No  Home Care Agency: Not Applicable    Currently ACTIVE with [place] on A[facility]: No        Durable Medical Equipment  DME Provider: none  Equipment:     Home Oxygen and [address] [place] prior to admission: No  [address]: Not Applicable  Other Respiratory Equipment:       Dialysis  Active with HD/PD prior to admission: No  Nephrologist:     HD Center:  Not Applicable    DISCHARGE PLAN:  Disposition: TBD     Transportation PLAN for discharge: family     Factors facilitating achievement of predicted outcomes: Family support, Cooperative and Pleasant    Barriers to discharge: Wound Care and PT OT eval w/recs    Additional Case Management Notes: pt from home with S/O wants to return but needs PT OT eval for Dispo recs, ID consult for ABX recs, cs pending    The Plan for Transition of Care is related to the following treatment goals of Cellulitis and abscess of foot [L03.119, L02.619]    The Patient and/or patient representative Grisel Pratt and his family were provided with a choice of provider and agrees with the discharge plan Yes    Freedom of choice list was provided with basic dialogue that supports the patient's individualized plan of care/goals and shares the quality data associated with the providers.  Not Indicated    Care Transition patient: Irish Hernandes RN  [facility]  Case Management Department  Ph: [phone]   Fax: [phone]

## 2020-07-21 NOTE — PROGRESS NOTES
Occupational Therapy   Occupational Therapy Initial Assessment & Treatment Note  Date: 2020   Patient Name: Philip Santiago  MRN: [id number]     : 1950    Date of Service: 2020    Discharge Recommendations: Philip Santiago scored a 15/24 on the AM-PAC ADL Inpatient form. Current research shows that an AM-PAC score of 17 or less is typically not associated with a discharge to the patient's home setting. Based on the patient's AM-PAC score and their current ADL deficits, it is recommended that the patient have 3-5 sessions per week of Occupational Therapy at d/c to increase the patient's independence. Please see assessment section for further patient specific details. If patient discharges prior to next session this note will serve as a discharge summary. Please see below for the latest assessment towards goals. Assessment   Performance deficits / Impairments: Decreased functional mobility ; Decreased ADL status; Decreased endurance;Decreased safe awareness  Assessment: Pt from home with wife. Prior to admission, Pt was Independent in simple ADLs and IADLs. Pt is significantly limited with mobility / transfers / LE ADLs 2/2  LLE NWB  and chronic weakness in RLE. Pt required Min A for bed mobility and shoe and sock management. Pt unable to stand despite Max A x 2 with NWB LLE. Recommend OOB via lift with RN staff. Pt would benefit from inpt OT services at d/c to increase functional level. Will follow as inpt.   Treatment Diagnosis: Decreased functional mobility and ADL status 2/2 NWB precaution on L  Prognosis: Good  Decision Making: Medium Complexity  OT Education: OT Role;Plan of Care  Patient Education: Good understanding  Activity Tolerance  Activity Tolerance: Patient Tolerated treatment well;Treatment limited secondary to medical complications (free text)  Activity Tolerance: Pt limited 2/2 NWB precautions in LLE  Safety Devices  Safety Devices in place: Yes  Type of devices: Bed alarm Pt pleasant and alert. Pt agreeable for OT eval and treatment. Pt stated that his R leg is weak from previous-stroke after bariatric surgery. Social/Functional History  Social/Functional History  Lives With: Spouse  Type of Home: House  Home Layout: Two level, Performs ADL's on one level  Home Access: Stairs to enter with rails(not steady)  Entrance Stairs - Number of Steps: 3  Bathroom Shower/Tub: Tub/Shower unit  Bathroom Toilet: Standard  Home Equipment: Cane, [address] Help From: Family  ADL Assistance: Independent  Homemaking Assistance: Independent  Ambulation Assistance: Independent  Transfer Assistance: Independent  Active : Yes  Mode of Transportation: Car  Occupation: Retired  Type of occupation: [employer] (multiple jobs)  Additional Comments: fall 3-4 months       Objective  Treatment included functional transfer training, ADL's and pt. education. Vision: Within Functional Limits  Hearing: Within functional limits    Orientation  Overall Orientation Status: Within Normal Limits     Balance  Sitting Balance: Modified independent (Uses hands to support on bed. 15 min sit)  Standing Balance: Unable to assess(comment)  ADL  UE Bathing: Unable to assess(comment)  LE Bathing: Unable to assess(comment)  UE Dressing: Modified independent   LE Dressing: Minimal assistance;Verbal cueing;Setup  Toileting: Unable to assess(comment)  Additional Comments: Pt was limited due to NWB on L foot. Tone RUE  RUE Tone: Normotonic  Tone LUE  LUE Tone: Normotonic  Coordination  Movements Are Fluid And Coordinated: Yes     Bed mobility  Rolling to Right: Stand by assistance  Supine to Sit: Minimal assistance(HOB elevated, use of railing)  Sit to Supine: Minimal assistance(HOB flatten)  Scooting: Minimal assistance  Comment: Pt required help to move legs and position them in bed.   Transfers  Sit to stand: Unable to assess  Stand to sit: Unable to assess  Transfer Comments: Unable to stand despite Max A x 2 - 2 trials. Pt was limited due to NWB status on L foot and weakness in R foot. Cognition  Overall Cognitive Status: WFL    LUE AROM (degrees)  LUE AROM : WFL  Left Hand AROM (degrees)  Left Hand AROM: WFL  RUE AROM (degrees)  RUE AROM : WFL  Right Hand AROM (degrees)  Right Hand AROM: WFL  LUE Strength  Gross LUE Strength: WNL  L Hand General: 4+/5  RUE Strength  Gross RUE Strength: WNL  R Hand General: 4+/5     Plan   Plan  Times per week: 2-5  Times per day: Daily  Current Treatment Recommendations: Strengthening, Functional Mobility Training, Endurance Training, Self-Care / ADL, Safety Education & Training    AM-PAC Score  AM-[facility] Inpatient Daily Activity Raw Score: 15 (07/21/20 1113)  AM-PAC Inpatient ADL T-Scale Score : 34.69 (07/21/20 1113)  ADL Inpatient CMS 0-100% Score: 56.46 (07/21/20 1113)  ADL Inpatient CMS G-Code Modifier : CK (07/21/20 1113)    Goals  Short term goals  Time Frame for Short term goals: at d/c  Short term goal 1: SBA for bed mobility, supine to sit EOB  Short term goal 2: SBA for LE dressing at EOB, no verbal cues    Patient Goals   Patient goals : wants to go home       Therapy Time   Individual Concurrent Group Co-treatment   Time In 1030         Time Out 1108         Minutes 38         Timed Code Treatment Minutes:   23 min    Total Treatment Minutes:  38 min  Juan J Lua, Occupational Therapy Student     Therapist was present, directed the patients care, made skilled judgement and was responsible for assessment and treatment of the patient.      Rae Lehighton OTR/L  [phone]

## 2020-07-21 NOTE — CONSULTS
Infectious Diseases Inpatient Consult Note    RESIDENT NOTE - reviewed / edited, attending note at bottom    Reason for Consult:   L diabetic foot infection  Requesting Physician:   Dr. Carol Crowley MD  Primary Care Physician:  Gildardo Dang MD  History Obtained From:   Pt, EPIC    Admit Date: 7/20/2020  Hospital Day: 2    CHIEF COMPLAINT:    Left foot wound    HISTORY OF PRESENT ILLNESS:    81 y.o male PMH Afib, CHF, COPD, MRSA and DM2 w/ peripheral neuropathy. Patient following a podiatrist Dr. Charles Holloway for local wound care. Over 3 weeks ago patient had noticed increased swelling to his left leg. Patient went to his Cardiologist Dr. Tavares Winter and recommended increasing his Lasix to 80 mg a day. Patient went to [facility] ED (7/18) and was sent home with Augmentin. Patient was on oral abx for 2 days    Saw Dr. Charles Holloway at [facility] on Monday 7/20. Due to concern of increased redness, deep wound, and increased drainage he was referred for direct admission. Admit 7/20 - WBC 9.6 low grade fever 99  .3, Wound culture    Today 7/21 - Afebrile, no leukocytosis  Patient admits to slight tenderness to the bottom part of his left, but overall has no other complaints. Denies N/V/F/C/SOB or other constitutional symptoms.        Past Medical History:    Past Medical History:   Diagnosis Date    Atrial fibrillation ([facility]     Back pain     Cardiomyopathy     CHF (congestive heart failure) (HCC)     COPD (chronic obstructive pulmonary disease) (HCC)     Dyslipidemia     GERD (gastroesophageal reflux disease)     Hyperlipidemia     Hypertension     Hypothyroidism     Leg edema     MRSA (methicillin resistant staph aureus) culture positive 10/5/15    foot/bone    MRSA nasal colonization 05/05/2017    Obesity     Prolonged emergence from general anesthesia     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     Stroke [facility])     Thyroid disease     Type 2 diabetes mellitus without complication ([address]     Type II or unspecified type diabetes mellitus without mention of complication, not stated as uncontrolled        Past Surgical History:    Past Surgical History:   Procedure Laterality Date    ADRENAL GLAND SURGERY  1970    CARDIAC DEFIBRILLATOR PLACEMENT  09/05/2017    Dr. Brittaney Linares COLONOSCOPY N/A 3/8/2019    COLONOSCOPY WITH MAC, UNASYN (3gm) performed by Serina Ledesma MD at [facility] N/A 8/9/2019    COLONOSCOPY POLYPECTOMY SNARE/COLD BIOPSY performed by Serina Ledesma MD at [facility]  8/9/2019    COLONOSCOPY WITH BIOPSY performed by Serina Ledesma MD at [address] [place] [address] Right 8/28/2019    INCISION AND INCISIONAL DEBRIDEMENT OPEN FRACTURE RIGHT 2ND TOE SKIN AND BONE performed by Britany Thomas MD at [facility]  1-2-10    GASTRIC BYPASS SURGERY      OTHER SURGICAL HISTORY  10/6/15    INCISION AND DRAINAGE RIGHT FOOT          OTHER SURGICAL HISTORY Right 10/8/15    INCISION AND DRAINAGE RIGHT FOOT      PACEMAKER PLACEMENT      UPPER GASTROINTESTINAL ENDOSCOPY N/A 3/8/2019    EGD BIOPSY GASTRIC H. PYLORI performed by Serina Ledesma MD at [facility] ENDOSCOPY       Current Medications:     cefepime  2 g Intravenous Q12H    vancomycin  1,500 mg Intravenous Q18H    insulin lispro  0-12 Units Subcutaneous TID WC    insulin lispro  0-6 Units Subcutaneous Nightly    amiodarone  200 mg Oral Daily    apixaban  5 mg Oral BID    aspirin  81 mg Oral Daily    atorvastatin  40 mg Oral Nightly    carvedilol  6.25 mg Oral BID WC    furosemide  40 mg Oral Daily    insulin lispro (1 Unit Dial)  10 Units Subcutaneous TID WC    insulin glargine  10 Units Subcutaneous Nightly    levothyroxine  25 mcg Oral Daily    lisinopril  2.5 mg Oral Daily    spironolactone  12.5 mg Oral Daily    tamsulosin  0.4 mg Oral Nightly       Allergies:  Patient has no known allergies.     Social History:    TOBACCO:    None  ETOH:    None  DRUGS:   None  MARITAL STATUS:   OCCUPATION:   Retired     Patient lives at home with his wife    Family History:   No immunodeficiency    REVIEW OF SYSTEMS:    No fever / chills / sweats. No weight loss. No visual change, eye pain, eye discharge. No oral lesion, sore throat, dysphagia. Denies cough / sputum. Denies chest pain, palpitations. Denies n / v / abd pain. No diarrhea. Denies dysuria or change in urinary function. + swelling and pain LLE. No myalgia, arthralgia.  + skin change plantar L foot full thickness wound  Denies focal weakness, sensory change or other neurologic symptom    Denies new / worse depression, psychiatric symptoms  Denies any Endocrine or Hematologic symptoms    PHYSICAL EXAM:      Vitals:    /74   Pulse 66   Temp 98.6 °F (37 °C) (Oral)   Resp 16   Ht 6' 4\" (1.93 m)   Wt (!) 328 lb (148.8 kg)   SpO2 95%   BMI 39.93 kg/m²     GENERAL: No apparent distress.     HEENT: Membranes moist, no oral lesion, PERRL  NECK:  Supple, no lymphadenopaty  LUNGS: Clear b/l, no rales, no dullness  CARDIAC: RRR, no murmur appreciated  ABD:  + BS, soft / NT  EXT:  No rash, edema LLE , plantar ulceration L foot  NEURO: No focal neurologic findings  PSYCH: Orientation, sensorium, mood normal  LINES:  Peripheral iv    DATA:    Lab Results   Component Value Date    WBC 10.0 07/21/2020    HGB 10.4 (L) 07/21/2020    HCT 32.6 (L) 07/21/2020    MCV 89.4 07/21/2020     07/21/2020     Lab Results   Component Value Date    CREATININE 1.6 (H) 07/21/2020    BUN 35 (H) 07/21/2020     (L) 07/21/2020    K 4.2 07/21/2020     07/21/2020    CO2 22 07/21/2020       Hepatic Function Panel:   Lab Results   Component Value Date    ALKPHOS 122 08/27/2019    ALT 42 08/27/2019    AST 31 08/27/2019    PROT 6.9 08/27/2019    PROT 6.8 11/27/2012    BILITOT 0.3 08/27/2019    BILIDIR <0.2 11/07/2018    IBILI see below 11/07/2018    LABALBU 3.7 08/27/2019       Micro:  7/20 Body fluid cult; NGTD  7/20 Wound cult, f/u    Imaging:   Xray left foot 7/20  Impression    1. Pathologic joint is complete collapse of the hindfoot with fragmentation. 2. Extensive forefoot soft tissue swelling with no localized bone destruction or demineralization. 3. Correlation with MRI would exclude occult osteomyelitis. Xray chest 7/19  Impression    No acute cardiopulmonary disease on portable chest.        LE DVT study 7/2  Conclusions          Summary          Normal venous duplex examination of the legs bilaterally with normal venous     Doppler signals noted throughout.     No evidence of thrombophlebitis is noted bilaterally in the deep and     superficial veins of the legs.  Small calf thrombi cannot be excluded ,     particularly in the left lower extremity      MRI Left foot; pending    7/20        IMPRESSION:      Patient Active Problem List   Diagnosis    Diabetes mellitus ([address]    Diabetic foot ulcer ([address]    Cardiomyopathy ([address]    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 ([address]    Renal disease due to hypertension    Obesity    HTN (hypertension)    Obstructive sleep apnea    Acute congestive heart failure (HCC)    Vitamin D deficiency    Graves' disease    Hypothyroidism    Diabetic foot ([address]    Foot osteomyelitis, right ([address]    Diabetic foot infection ([address]    Dilated cardiomyopathy ([address]    Essential hypertension    Hyperlipidemia    Diabetic ulcer of right foot associated with type 2 diabetes mellitus ([address]    Subacute osteomyelitis of right foot ([address]    MRSA infection    H/O gastric bypass    Benign prostatic hyperplasia with lower urinary tract symptoms    Paroxysmal atrial fibrillation (HCC)    Hypokalemia    Chronic systolic heart failure (HCC)    NSVT (nonsustained ventricular tachycardia) (HCC)    Abscess of left leg    Morbid obesity with BMI of 40.0-44.9, adult ([address]    Cardiac resynchronization therapy defibrillator (CRT-D) in place    On amiodarone therapy    Open fracture of second toe of right foot with routine healing    Open fracture of toe    Open fracture dislocation of interphalangeal joint of single toe of right foot    Cellulitis and abscess of foot       Diabetic foot infection - Left   Cellulitis, Left  DM2 with peripheral neuropathy  CHF  Obesity BMI (39)    RECOMMENDATIONS:    Cont. IV Vanc & Cefepime  F/U wound cult, MRI  Podiatry following; monitor and f/u advance imaging    Discussed with attending    Marc Knowles DPM   Podiatric Resident, PGY-2  Pager: [phone] or Perfect serve    Addendum to Resident Consult note:  Pt seen, examined and evaluated. I have reviewed the current history, physical findings, labs and assessment and plan and agree with note as documented by resident (Dr. Glen Echeverria). As above  DM foot with neuropathy, L foot ulcer and cellulitis. Admit, started on vancomycin + cefepime  Podiatry consulted - noted 'probe to bone'  Wound cult sent.   Poss MRI (if PM compatible)    REC/  Cont vancomycin + cefepime for now  Await cult  Wound care, further w/u per Podiatry    Discussed with pt   Dread Waddell MD

## 2020-07-22 ENCOUNTER — ANESTHESIA (OUTPATIENT)
Dept: OPERATING ROOM | Age: 70
DRG: 623 | End: 2020-07-22
Payer: MEDICARE

## 2020-07-22 ENCOUNTER — APPOINTMENT (OUTPATIENT)
Dept: GENERAL RADIOLOGY | Age: 70
DRG: 623 | End: 2020-07-22
Attending: INTERNAL MEDICINE
Payer: MEDICARE

## 2020-07-22 ENCOUNTER — ANESTHESIA EVENT (OUTPATIENT)
Dept: OPERATING ROOM | Age: 70
DRG: 623 | End: 2020-07-22
Payer: MEDICARE

## 2020-07-22 ENCOUNTER — TELEPHONE (OUTPATIENT)
Dept: INTERNAL MEDICINE CLINIC | Age: 70
End: 2020-07-22

## 2020-07-22 VITALS — OXYGEN SATURATION: 100 % | DIASTOLIC BLOOD PRESSURE: 68 MMHG | SYSTOLIC BLOOD PRESSURE: 134 MMHG

## 2020-07-22 LAB
ANION GAP SERPL CALCULATED.3IONS-SCNC: 10 MMOL/L (ref 3–16)
ANION GAP SERPL CALCULATED.3IONS-SCNC: 12 MMOL/L (ref 3–16)
BASOPHILS ABSOLUTE: 0 K/UL (ref 0–0.2)
BASOPHILS RELATIVE PERCENT: 0.5 %
BODY FLUID CULTURE, STERILE: NORMAL
BUN BLDV-MCNC: 34 MG/DL (ref 7–20)
BUN BLDV-MCNC: 38 MG/DL (ref 7–20)
CALCIUM SERPL-MCNC: 9 MG/DL (ref 8.3–10.6)
CALCIUM SERPL-MCNC: 9.5 MG/DL (ref 8.3–10.6)
CHLORIDE BLD-SCNC: 101 MMOL/L (ref 99–110)
CHLORIDE BLD-SCNC: 103 MMOL/L (ref 99–110)
CO2: 21 MMOL/L (ref 21–32)
CO2: 24 MMOL/L (ref 21–32)
CREAT SERPL-MCNC: 1.3 MG/DL (ref 0.8–1.3)
CREAT SERPL-MCNC: 1.6 MG/DL (ref 0.8–1.3)
CREATININE URINE: 39.8 MG/DL (ref 39–259)
EKG ATRIAL RATE: 64 BPM
EKG DIAGNOSIS: NORMAL
EKG P AXIS: 38 DEGREES
EKG P-R INTERVAL: 202 MS
EKG Q-T INTERVAL: 466 MS
EKG QRS DURATION: 166 MS
EKG QTC CALCULATION (BAZETT): 480 MS
EKG R AXIS: 180 DEGREES
EKG T AXIS: 30 DEGREES
EKG VENTRICULAR RATE: 64 BPM
EOSINOPHILS ABSOLUTE: 0.3 K/UL (ref 0–0.6)
EOSINOPHILS RELATIVE PERCENT: 4.5 %
GFR AFRICAN AMERICAN: 52
GFR AFRICAN AMERICAN: >60
GFR NON-AFRICAN AMERICAN: 43
GFR NON-AFRICAN AMERICAN: 55
GLUCOSE BLD-MCNC: 115 MG/DL (ref 70–99)
GLUCOSE BLD-MCNC: 126 MG/DL (ref 70–99)
GLUCOSE BLD-MCNC: 130 MG/DL (ref 70–99)
GLUCOSE BLD-MCNC: 138 MG/DL (ref 70–99)
GLUCOSE BLD-MCNC: 138 MG/DL (ref 70–99)
GLUCOSE BLD-MCNC: 149 MG/DL (ref 70–99)
GLUCOSE BLD-MCNC: 149 MG/DL (ref 70–99)
GLUCOSE BLD-MCNC: 256 MG/DL (ref 70–99)
GLUCOSE BLD-MCNC: 73 MG/DL (ref 70–99)
GRAM STAIN RESULT: NORMAL
HCT VFR BLD CALC: 30.9 % (ref 40.5–52.5)
HEMOGLOBIN: 10.1 G/DL (ref 13.5–17.5)
LYMPHOCYTES ABSOLUTE: 0.9 K/UL (ref 1–5.1)
LYMPHOCYTES RELATIVE PERCENT: 13.1 %
MAGNESIUM: 2.3 MG/DL (ref 1.8–2.4)
MCH RBC QN AUTO: 28.7 PG (ref 26–34)
MCHC RBC AUTO-ENTMCNC: 32.7 G/DL (ref 31–36)
MCV RBC AUTO: 87.8 FL (ref 80–100)
MONOCYTES ABSOLUTE: 0.9 K/UL (ref 0–1.3)
MONOCYTES RELATIVE PERCENT: 12.6 %
NEUTROPHILS ABSOLUTE: 4.8 K/UL (ref 1.7–7.7)
NEUTROPHILS RELATIVE PERCENT: 69.3 %
PDW BLD-RTO: 14.1 % (ref 12.4–15.4)
PERFORMED ON: ABNORMAL
PERFORMED ON: NORMAL
PLATELET # BLD: 171 K/UL (ref 135–450)
PMV BLD AUTO: 9.7 FL (ref 5–10.5)
POTASSIUM SERPL-SCNC: 4.7 MMOL/L (ref 3.5–5.1)
POTASSIUM SERPL-SCNC: 5.5 MMOL/L (ref 3.5–5.1)
PROTEIN PROTEIN: 0.01 G/DL
PROTEIN PROTEIN: 8 MG/DL
RBC # BLD: 3.51 M/UL (ref 4.2–5.9)
SARS-COV-2, NAAT: NOT DETECTED
SODIUM BLD-SCNC: 132 MMOL/L (ref 136–145)
SODIUM BLD-SCNC: 139 MMOL/L (ref 136–145)
SODIUM URINE: 85 MMOL/L
WBC # BLD: 6.9 K/UL (ref 4–11)

## 2020-07-22 PROCEDURE — 87075 CULTR BACTERIA EXCEPT BLOOD: CPT

## 2020-07-22 PROCEDURE — 88304 TISSUE EXAM BY PATHOLOGIST: CPT

## 2020-07-22 PROCEDURE — 2500000003 HC RX 250 WO HCPCS: Performed by: PODIATRIST

## 2020-07-22 PROCEDURE — 2709999900 HC NON-CHARGEABLE SUPPLY: Performed by: PODIATRIST

## 2020-07-22 PROCEDURE — 2580000003 HC RX 258: Performed by: STUDENT IN AN ORGANIZED HEALTH CARE EDUCATION/TRAINING PROGRAM

## 2020-07-22 PROCEDURE — 6360000002 HC RX W HCPCS: Performed by: PODIATRIST

## 2020-07-22 PROCEDURE — 84156 ASSAY OF PROTEIN URINE: CPT

## 2020-07-22 PROCEDURE — 83735 ASSAY OF MAGNESIUM: CPT

## 2020-07-22 PROCEDURE — 85025 COMPLETE CBC W/AUTO DIFF WBC: CPT

## 2020-07-22 PROCEDURE — 87116 MYCOBACTERIA CULTURE: CPT

## 2020-07-22 PROCEDURE — 1200000000 HC SEMI PRIVATE

## 2020-07-22 PROCEDURE — 87015 SPECIMEN INFECT AGNT CONCNTJ: CPT

## 2020-07-22 PROCEDURE — 3600000014 HC SURGERY LEVEL 4 ADDTL 15MIN: Performed by: PODIATRIST

## 2020-07-22 PROCEDURE — 6370000000 HC RX 637 (ALT 250 FOR IP): Performed by: STUDENT IN AN ORGANIZED HEALTH CARE EDUCATION/TRAINING PROGRAM

## 2020-07-22 PROCEDURE — 93010 ELECTROCARDIOGRAM REPORT: CPT | Performed by: INTERNAL MEDICINE

## 2020-07-22 PROCEDURE — 84166 PROTEIN E-PHORESIS/URINE/CSF: CPT

## 2020-07-22 PROCEDURE — 6360000002 HC RX W HCPCS: Performed by: NURSE ANESTHETIST, CERTIFIED REGISTERED

## 2020-07-22 PROCEDURE — 99232 SBSQ HOSP IP/OBS MODERATE 35: CPT | Performed by: INTERNAL MEDICINE

## 2020-07-22 PROCEDURE — 94660 CPAP INITIATION&MGMT: CPT

## 2020-07-22 PROCEDURE — 6370000000 HC RX 637 (ALT 250 FOR IP): Performed by: INTERNAL MEDICINE

## 2020-07-22 PROCEDURE — 7100000001 HC PACU RECOVERY - ADDTL 15 MIN: Performed by: PODIATRIST

## 2020-07-22 PROCEDURE — 87070 CULTURE OTHR SPECIMN AEROBIC: CPT

## 2020-07-22 PROCEDURE — 87205 SMEAR GRAM STAIN: CPT

## 2020-07-22 PROCEDURE — 3700000000 HC ANESTHESIA ATTENDED CARE: Performed by: PODIATRIST

## 2020-07-22 PROCEDURE — 6360000002 HC RX W HCPCS: Performed by: STUDENT IN AN ORGANIZED HEALTH CARE EDUCATION/TRAINING PROGRAM

## 2020-07-22 PROCEDURE — 84155 ASSAY OF PROTEIN SERUM: CPT

## 2020-07-22 PROCEDURE — 2580000003 HC RX 258: Performed by: NURSE ANESTHETIST, CERTIFIED REGISTERED

## 2020-07-22 PROCEDURE — 71046 X-RAY EXAM CHEST 2 VIEWS: CPT

## 2020-07-22 PROCEDURE — 84165 PROTEIN E-PHORESIS SERUM: CPT

## 2020-07-22 PROCEDURE — 87102 FUNGUS ISOLATION CULTURE: CPT

## 2020-07-22 PROCEDURE — 87186 SC STD MICRODIL/AGAR DIL: CPT

## 2020-07-22 PROCEDURE — 93005 ELECTROCARDIOGRAM TRACING: CPT | Performed by: STUDENT IN AN ORGANIZED HEALTH CARE EDUCATION/TRAINING PROGRAM

## 2020-07-22 PROCEDURE — 36415 COLL VENOUS BLD VENIPUNCTURE: CPT

## 2020-07-22 PROCEDURE — 87077 CULTURE AEROBIC IDENTIFY: CPT

## 2020-07-22 PROCEDURE — 87206 SMEAR FLUORESCENT/ACID STAI: CPT

## 2020-07-22 PROCEDURE — 82570 ASSAY OF URINE CREATININE: CPT

## 2020-07-22 PROCEDURE — 83883 ASSAY NEPHELOMETRY NOT SPEC: CPT

## 2020-07-22 PROCEDURE — 2580000003 HC RX 258: Performed by: PODIATRIST

## 2020-07-22 PROCEDURE — 88311 DECALCIFY TISSUE: CPT

## 2020-07-22 PROCEDURE — 0QBM0ZX EXCISION OF LEFT TARSAL, OPEN APPROACH, DIAGNOSTIC: ICD-10-PCS | Performed by: PODIATRIST

## 2020-07-22 PROCEDURE — 80048 BASIC METABOLIC PNL TOTAL CA: CPT

## 2020-07-22 PROCEDURE — 3600000004 HC SURGERY LEVEL 4 BASE: Performed by: PODIATRIST

## 2020-07-22 PROCEDURE — 73630 X-RAY EXAM OF FOOT: CPT

## 2020-07-22 PROCEDURE — 3700000001 HC ADD 15 MINUTES (ANESTHESIA): Performed by: PODIATRIST

## 2020-07-22 PROCEDURE — 84300 ASSAY OF URINE SODIUM: CPT

## 2020-07-22 PROCEDURE — 7100000000 HC PACU RECOVERY - FIRST 15 MIN: Performed by: PODIATRIST

## 2020-07-22 PROCEDURE — 0JBR0ZZ EXCISION OF LEFT FOOT SUBCUTANEOUS TISSUE AND FASCIA, OPEN APPROACH: ICD-10-PCS | Performed by: PODIATRIST

## 2020-07-22 RX ORDER — PROMETHAZINE HYDROCHLORIDE 25 MG/ML
6.25 INJECTION, SOLUTION INTRAMUSCULAR; INTRAVENOUS
Status: DISCONTINUED | OUTPATIENT
Start: 2020-07-22 | End: 2020-07-22 | Stop reason: HOSPADM

## 2020-07-22 RX ORDER — FENTANYL CITRATE 50 UG/ML
INJECTION, SOLUTION INTRAMUSCULAR; INTRAVENOUS PRN
Status: DISCONTINUED | OUTPATIENT
Start: 2020-07-22 | End: 2020-07-22 | Stop reason: SDUPTHER

## 2020-07-22 RX ORDER — HYDRALAZINE HYDROCHLORIDE 20 MG/ML
5 INJECTION INTRAMUSCULAR; INTRAVENOUS EVERY 10 MIN PRN
Status: DISCONTINUED | OUTPATIENT
Start: 2020-07-22 | End: 2020-07-22 | Stop reason: HOSPADM

## 2020-07-22 RX ORDER — OXYCODONE HYDROCHLORIDE 5 MG/1
5 TABLET ORAL PRN
Status: DISCONTINUED | OUTPATIENT
Start: 2020-07-22 | End: 2020-07-22 | Stop reason: HOSPADM

## 2020-07-22 RX ORDER — INSULIN LISPRO 100 [IU]/ML
0-6 INJECTION, SOLUTION INTRAVENOUS; SUBCUTANEOUS
Status: DISCONTINUED | OUTPATIENT
Start: 2020-07-22 | End: 2020-07-26

## 2020-07-22 RX ORDER — INSULIN LISPRO 100 [IU]/ML
0-3 INJECTION, SOLUTION INTRAVENOUS; SUBCUTANEOUS NIGHTLY
Status: DISCONTINUED | OUTPATIENT
Start: 2020-07-22 | End: 2020-07-26

## 2020-07-22 RX ORDER — MEPERIDINE HYDROCHLORIDE 25 MG/ML
12.5 INJECTION INTRAMUSCULAR; INTRAVENOUS; SUBCUTANEOUS EVERY 5 MIN PRN
Status: DISCONTINUED | OUTPATIENT
Start: 2020-07-22 | End: 2020-07-22 | Stop reason: HOSPADM

## 2020-07-22 RX ORDER — LABETALOL 20 MG/4 ML (5 MG/ML) INTRAVENOUS SYRINGE
5 EVERY 10 MIN PRN
Status: DISCONTINUED | OUTPATIENT
Start: 2020-07-22 | End: 2020-07-22 | Stop reason: HOSPADM

## 2020-07-22 RX ORDER — SODIUM CHLORIDE 9 MG/ML
INJECTION, SOLUTION INTRAVENOUS CONTINUOUS PRN
Status: DISCONTINUED | OUTPATIENT
Start: 2020-07-22 | End: 2020-07-22 | Stop reason: SDUPTHER

## 2020-07-22 RX ORDER — PROPOFOL 10 MG/ML
INJECTION, EMULSION INTRAVENOUS PRN
Status: DISCONTINUED | OUTPATIENT
Start: 2020-07-22 | End: 2020-07-22 | Stop reason: SDUPTHER

## 2020-07-22 RX ORDER — MIDAZOLAM HYDROCHLORIDE 1 MG/ML
INJECTION INTRAMUSCULAR; INTRAVENOUS PRN
Status: DISCONTINUED | OUTPATIENT
Start: 2020-07-22 | End: 2020-07-22 | Stop reason: SDUPTHER

## 2020-07-22 RX ORDER — OXYCODONE HYDROCHLORIDE 5 MG/1
10 TABLET ORAL PRN
Status: DISCONTINUED | OUTPATIENT
Start: 2020-07-22 | End: 2020-07-22 | Stop reason: HOSPADM

## 2020-07-22 RX ORDER — DIPHENHYDRAMINE HYDROCHLORIDE 50 MG/ML
12.5 INJECTION INTRAMUSCULAR; INTRAVENOUS
Status: DISCONTINUED | OUTPATIENT
Start: 2020-07-22 | End: 2020-07-22 | Stop reason: HOSPADM

## 2020-07-22 RX ORDER — MORPHINE SULFATE 4 MG/ML
1 INJECTION, SOLUTION INTRAMUSCULAR; INTRAVENOUS EVERY 5 MIN PRN
Status: DISCONTINUED | OUTPATIENT
Start: 2020-07-22 | End: 2020-07-22 | Stop reason: HOSPADM

## 2020-07-22 RX ORDER — MAGNESIUM HYDROXIDE 1200 MG/15ML
LIQUID ORAL CONTINUOUS PRN
Status: COMPLETED | OUTPATIENT
Start: 2020-07-22 | End: 2020-07-22

## 2020-07-22 RX ORDER — INSULIN LISPRO 100 [IU]/ML
10 INJECTION, SOLUTION INTRAVENOUS; SUBCUTANEOUS ONCE
Status: COMPLETED | OUTPATIENT
Start: 2020-07-22 | End: 2020-07-22

## 2020-07-22 RX ORDER — METOCLOPRAMIDE HYDROCHLORIDE 5 MG/ML
10 INJECTION INTRAMUSCULAR; INTRAVENOUS
Status: DISCONTINUED | OUTPATIENT
Start: 2020-07-22 | End: 2020-07-22 | Stop reason: HOSPADM

## 2020-07-22 RX ORDER — LIDOCAINE HYDROCHLORIDE 20 MG/ML
INJECTION, SOLUTION EPIDURAL; INFILTRATION; INTRACAUDAL; PERINEURAL PRN
Status: DISCONTINUED | OUTPATIENT
Start: 2020-07-22 | End: 2020-07-22 | Stop reason: ALTCHOICE

## 2020-07-22 RX ADMIN — SPIRONOLACTONE 12.5 MG: 25 TABLET, FILM COATED ORAL at 09:27

## 2020-07-22 RX ADMIN — Medication 40 MG: at 09:30

## 2020-07-22 RX ADMIN — CARVEDILOL 6.25 MG: 6.25 TABLET, FILM COATED ORAL at 09:26

## 2020-07-22 RX ADMIN — VANCOMYCIN HYDROCHLORIDE 1750 MG: 10 INJECTION, POWDER, LYOPHILIZED, FOR SOLUTION INTRAVENOUS at 12:02

## 2020-07-22 RX ADMIN — PROPOFOL 30 MG: 10 INJECTION, EMULSION INTRAVENOUS at 17:49

## 2020-07-22 RX ADMIN — MIDAZOLAM HYDROCHLORIDE 2 MG: 2 INJECTION, SOLUTION INTRAMUSCULAR; INTRAVENOUS at 17:46

## 2020-07-22 RX ADMIN — Medication 40 MG: at 15:31

## 2020-07-22 RX ADMIN — CEFEPIME 2 G: 2 INJECTION, POWDER, FOR SOLUTION INTRAVENOUS at 20:41

## 2020-07-22 RX ADMIN — TRAMADOL HYDROCHLORIDE 50 MG: 50 TABLET, FILM COATED ORAL at 15:31

## 2020-07-22 RX ADMIN — TRAMADOL HYDROCHLORIDE 50 MG: 50 TABLET, FILM COATED ORAL at 02:36

## 2020-07-22 RX ADMIN — FENTANYL CITRATE 50 MCG: 50 INJECTION INTRAMUSCULAR; INTRAVENOUS at 18:04

## 2020-07-22 RX ADMIN — TRAMADOL HYDROCHLORIDE 50 MG: 50 TABLET, FILM COATED ORAL at 22:42

## 2020-07-22 RX ADMIN — LISINOPRIL 2.5 MG: 2.5 TABLET ORAL at 09:26

## 2020-07-22 RX ADMIN — ASPIRIN 81 MG: 81 TABLET, COATED ORAL at 09:27

## 2020-07-22 RX ADMIN — TRAMADOL HYDROCHLORIDE 50 MG: 50 TABLET, FILM COATED ORAL at 09:32

## 2020-07-22 RX ADMIN — LEVOTHYROXINE SODIUM 25 MCG: 0.03 TABLET ORAL at 05:10

## 2020-07-22 RX ADMIN — Medication 40 MG: at 02:36

## 2020-07-22 RX ADMIN — CEFEPIME 2 G: 2 INJECTION, POWDER, FOR SOLUTION INTRAVENOUS at 06:42

## 2020-07-22 RX ADMIN — FUROSEMIDE 40 MG: 40 TABLET ORAL at 09:27

## 2020-07-22 RX ADMIN — INSULIN LISPRO 10 UNITS: 100 INJECTION, SOLUTION INTRAVENOUS; SUBCUTANEOUS at 23:11

## 2020-07-22 RX ADMIN — FENTANYL CITRATE 50 MCG: 50 INJECTION INTRAMUSCULAR; INTRAVENOUS at 17:51

## 2020-07-22 RX ADMIN — Medication 40 MG: at 22:36

## 2020-07-22 RX ADMIN — ATORVASTATIN CALCIUM 40 MG: 40 TABLET, FILM COATED ORAL at 22:38

## 2020-07-22 RX ADMIN — TAMSULOSIN HYDROCHLORIDE 0.4 MG: 0.4 CAPSULE ORAL at 22:38

## 2020-07-22 RX ADMIN — AMIODARONE HYDROCHLORIDE 200 MG: 200 TABLET ORAL at 09:26

## 2020-07-22 RX ADMIN — CARVEDILOL 6.25 MG: 6.25 TABLET, FILM COATED ORAL at 16:54

## 2020-07-22 RX ADMIN — SODIUM CHLORIDE: 9 INJECTION, SOLUTION INTRAVENOUS at 17:45

## 2020-07-22 ASSESSMENT — PULMONARY FUNCTION TESTS
PIF_VALUE: 0
PIF_VALUE: 1
PIF_VALUE: 1
PIF_VALUE: 0
PIF_VALUE: 1
PIF_VALUE: 0
PIF_VALUE: 1
PIF_VALUE: 0
PIF_VALUE: 1
PIF_VALUE: 0
PIF_VALUE: 1
PIF_VALUE: 0
PIF_VALUE: 0
PIF_VALUE: 1
PIF_VALUE: 0
PIF_VALUE: 1
PIF_VALUE: 0
PIF_VALUE: 1

## 2020-07-22 ASSESSMENT — PAIN DESCRIPTION - DESCRIPTORS
DESCRIPTORS: ACHING
DESCRIPTORS: THROBBING
DESCRIPTORS: ACHING

## 2020-07-22 ASSESSMENT — PAIN SCALES - GENERAL
PAINLEVEL_OUTOF10: 6
PAINLEVEL_OUTOF10: 6
PAINLEVEL_OUTOF10: 7
PAINLEVEL_OUTOF10: 6
PAINLEVEL_OUTOF10: 0
PAINLEVEL_OUTOF10: 4
PAINLEVEL_OUTOF10: 8
PAINLEVEL_OUTOF10: 6

## 2020-07-22 ASSESSMENT — PAIN DESCRIPTION - PAIN TYPE
TYPE: ACUTE PAIN
TYPE: SURGICAL PAIN
TYPE: ACUTE PAIN

## 2020-07-22 ASSESSMENT — PAIN DESCRIPTION - LOCATION
LOCATION: LEG
LOCATION: FOOT;LEG
LOCATION: BACK

## 2020-07-22 ASSESSMENT — PAIN DESCRIPTION - ONSET
ONSET: ON-GOING

## 2020-07-22 ASSESSMENT — PAIN DESCRIPTION - FREQUENCY
FREQUENCY: CONTINUOUS

## 2020-07-22 ASSESSMENT — PAIN DESCRIPTION - PROGRESSION
CLINICAL_PROGRESSION: NOT CHANGED
CLINICAL_PROGRESSION: NOT CHANGED

## 2020-07-22 ASSESSMENT — PAIN DESCRIPTION - ORIENTATION: ORIENTATION: LEFT

## 2020-07-22 ASSESSMENT — PAIN - FUNCTIONAL ASSESSMENT: PAIN_FUNCTIONAL_ASSESSMENT: ACTIVITIES ARE NOT PREVENTED

## 2020-07-22 NOTE — BRIEF OP NOTE
Brief Postoperative Note      Patient: Irina Lewis  YOB: 1950  MRN: [id number]    Date of Procedure: 7/22/2020    Pre-Op Diagnosis: ABSCESS LEFT FOOT    Post-Op Diagnosis: Same       Procedure(s):  LEFT FOOT INCISION AND DRAINAGE, down to and including bone, with removal of osseous fragment and BONE BIOPSY    Surgeon(s):  Funmilayo Irene DPM    Assistant:  Resident: Leda Cunningham DPM    Anesthesia: Choice    Estimated Blood Loss (mL): Minimal    Complications: None    Specimens:   ID Type Source Tests Collected by Time Destination   1 : 1. TAILOR [facility] LEFT FOOT Specimen Foot CULTURE, FUNGUS, CULTURE, SURGICAL, CULTURE WITH SMEAR, ACID FAST BACILLIUS Denis sol DPM 7/22/2020 1813    A : A. TAILOR HEAD LEFT FOOT Tissue Tissue SURGICAL PATHOLOGY Denis Nguyen DPM 7/22/2020 1818        Implants:  * No implants in log *      Drains: * No LDAs found *    Findings: Large free floating osseous fragment removed from plantar foot wound and sent for pathology. DISPO: No further surgical intervention from podiatry standpoint. F/u bone biopsy. Patient will need wound vac at d/c.      Electronically signed by Leda Cunningham DPM on 7/22/2020 at 6:37 PM

## 2020-07-22 NOTE — PROGRESS NOTES
Patient off floor. Will come back to swab for COVID19.   Electronically signed by Neelam Avendano RN on 7/22/2020 at 11:20 AM

## 2020-07-22 NOTE — PROGRESS NOTES
Patient admitted to PACU #13 from OR per bed at 1841 s/p LEFT FOOT INCISION AND DRAINAGE WITH BONE BIOPSY AND REMOVAL OF FREE FLOATING BONE FRAGMENT (Left). Report received at bedside in PACU per CRNA. Patient was reported to be hemodynamically stable in OR but patient did have a short run (approximately 10 beats of VT) and it appeared that patient's defibrillator went off in the OR per CRNA with no further runs of VT and patient continuing to be in a paced rhythm. No complications reported in OR per CRNA. Patient connected to PACU monitoring equipment. IVF's infusing with site unremarkable. Patient arrived to PACU responsive but not fully wakeful from anesthesia but with respirations easy, even and regular with no pain noted or verbalized. LLE surgical dressing with ace wrap and wound vac remains C,D,I with scant amount of bloody drainage noted in tubing of wound vac. LLE elevated on pillow. No further changes. Will continue to monitor.

## 2020-07-22 NOTE — PROGRESS NOTES
PACU Transfer Note    Vitals:    07/22/20 1945   BP: (!) 142/58   Pulse: 61   Resp: 18   Temp: 97.6 °F (36.4 °C)   SpO2: 100%       In: 388 [I.V.:388]  Out: 150 [Urine:150]    Pain assessment:  none  Pain Level: 0    Report given to Receiving unit RN. Patient meets efrain discharge criteria from PACU. No further changes.      7/22/2020 7:57 PM

## 2020-07-22 NOTE — TELEPHONE ENCOUNTER
----- Message from David Flow sent at 7/22/2020  9:00 AM EDT -----  Subject: Message to Provider    QUESTIONS  Information for Provider? Patient is having foot surgery. Dr. Maddy Owusu   is doing the surgery this evening. Just wanted to let the doctor know.  ---------------------------------------------------------------------------  --------------  CALL BACK INFO  What is the best way for the office to contact you? OK to leave message on   voicemail  Preferred Call Back Phone Number? [phone]  ---------------------------------------------------------------------------  --------------  SCRIPT ANSWERS  Relationship to Patient? Other  Representative Name? Ar(wife)  Is the Representative on the appropriate HIPAA document in Epic?  Yes

## 2020-07-22 NOTE — ANESTHESIA PRE PROCEDURE
Department of Anesthesiology  Preprocedure Note       Name:  Mary Lucero   Age:  81 y.o.  :  1950                                          MRN:  [id number]         Date:  2020      Surgeon: Parvin Crowe):  Sammi White DPM    Procedure: Procedure(s):  LEFT FOOT INCISION AND DRAINAGE    Medications prior to admission:   Prior to Admission medications    Medication Sig Start Date End Date Taking?  Authorizing Provider   carvedilol (COREG) 6.25 MG tablet TAKE 1 TABLET BY MOUTH TWICE DAILY WITH MEALS 20  Yes Mariela Samuel, APRN - CNP   atorvastatin (LIPITOR) 40 MG tablet TAKE 1 TABLET BY MOUTH DAILY 20  Yes Manish Jain MD   spironolactone (ALDACTONE) 25 MG tablet TAKE 1/2 TABLET BY MOUTH DAILY  Patient taking differently: 25 mg daily  6/3/20  Yes Patrick Michel MD   apixaban (ELIQUIS) 5 MG TABS tablet TAKE 1 TABLET BY MOUTH TWICE DAILY 20  Yes George Serrato MD   dapagliflozin (FARXIGA) 5 MG tablet Take 1 tablet by mouth every morning 3/11/20  Yes Manish Jain MD   tamsulosin (FLOMAX) 0.4 MG capsule TAKE 1 CAPSULE BY MOUTH DAILY 3/3/20  Yes Manish Jain MD   lisinopril (PRINIVIL;ZESTRIL) 2.5 MG tablet TAKE 1 TABLET BY MOUTH DAILY 3/2/20  Yes Manish Jain MD   levothyroxine (SYNTHROID) 25 MCG tablet TAKE 1 TABLET BY MOUTH DAILY 3/2/20  Yes Manish Jain MD   furosemide (LASIX) 40 MG tablet TAKE 1 TABLET BY MOUTH DAILY 20  Yes Manish Jain MD   omega-3 acid ethyl esters (LOVAZA) 1 g capsule TAKE 1 CAPSULE BY MOUTH TWICE DAILY 20  Yes Manish Jain MD   amiodarone (CORDARONE) 200 MG tablet TAKE 1 TABLET BY MOUTH DAILY 19  Yes Patrick Michel MD   Coenzyme Q10 (CO Q 10) 100 MG CAPS Take 1 capsule by mouth daily   Yes Historical Provider, MD   Cinnamon 500 MG CAPS Take 1 capsule by mouth daily   Yes Historical Provider, MD   Vitamin D (CHOLECALCIFEROL) 1000 UNITS CAPS capsule Take 2,000 Units by mouth nightly    Yes Historical Provider, MD   therapeutic Subcutaneous Nightly Summer Berry MD        vancomycin (VANCOCIN) 1,750 mg in dextrose 5 % 250 mL IVPB  1,750 mg Intravenous Q18H Hilda Lucas DPM   Stopped at 07/22/20 1404    traMADol (ULTRAM) tablet 50 mg  50 mg Oral Q6H PRN Mychal Monreal MD   50 mg at 07/22/20 1531    simethicone (MYLICON) 40 ST/6.4HG drops 40 mg  40 mg Oral Q6H PRN Lashae Campo MD   40 mg at 07/22/20 1531    naloxone [facility]) injection 0.4 mg  0.4 mg Intravenous PRN Hilda Lucas, NICA        diphenhydrAMINE (BENADRYL) injection 25 mg  25 mg Intravenous Q6H PRN Hilda Lucas, DPM        acetaminophen (TYLENOL) tablet 500 mg  500 mg Oral Q6H PRN Hilda Lucas, DPM        docusate sodium (COLACE) capsule 100 mg  100 mg Oral BID PRN Hilda Lucas DPM        promethazine (PHENERGAN) tablet 12.5 mg  12.5 mg Oral Q6H PRN Hilda Lucas, DPGIBSON        cefepime (MAXIPIME) 2 g IVPB minibag  2 g Intravenous Q12H Hilda Lucas DPM   Stopped at 07/22/20 0750    glucose (GLUTOSE) 40 % oral gel 15 g  15 g Oral PRN Summer Berry MD        dextrose 50 % IV solution  12.5 g Intravenous PRN Summer Berry MD        glucagon (rDNA) injection 1 mg  1 mg Intramuscular PRN Summer Berry MD        dextrose 5 % solution  100 mL/hr Intravenous PRN Summer Berry MD        amiodarone (CORDARONE) tablet 200 mg  200 mg Oral Daily Summer Berry MD   200 mg at 07/22/20 8632    [Held by provider] apixaban (ELIQUIS) tablet 5 mg  5 mg Oral BID Summer Berry MD   5 mg at 07/21/20 2023    aspirin EC tablet 81 mg  81 mg Oral Daily Summer Berry MD   81 mg at 07/22/20 3676    atorvastatin (LIPITOR) tablet 40 mg  40 mg Oral Nightly Summer Berry MD   40 mg at 07/21/20 2023    carvedilol (COREG) tablet 6.25 mg  6.25 mg Oral BID  Summer Berry MD   6.25 mg at 07/22/20 1654    furosemide (LASIX) tablet 40 mg  40 mg Oral Daily Summer Berry MD   40 mg at 07/22/20 0912    insulin lispro (1 Unit Dial) 10 Units 10 Units Subcutaneous TID WC Arabella Garcia MD   10 Units at 07/21/20 1227    insulin glargine (LANTUS;BASAGLAR) injection pen 10 Units  10 Units Subcutaneous Nightly Arabella Garcia MD   10 Units at 07/21/20 0149    levothyroxine (SYNTHROID) tablet 25 mcg  25 mcg Oral Daily Arabella Garcia MD   25 mcg at 07/22/20 0510    lisinopril (PRINIVIL;ZESTRIL) tablet 2.5 mg  2.5 mg Oral Daily Arabella Garcia MD   2.5 mg at 07/22/20 4071    spironolactone (ALDACTONE) tablet 12.5 mg  12.5 mg Oral Daily Arabella Garcia MD   12.5 mg at 07/22/20 2241    tamsulosin (FLOMAX) capsule 0.4 mg  0.4 mg Oral Nightly Arabella Garcia MD   0.4 mg at 07/21/20 2023       Allergies:  No Known Allergies    Problem List:    Patient Active Problem List   Diagnosis Code    Diabetes mellitus ([address] [facility] E11.9    Diabetic foot ulcer ([facility] E11.621, L97.509    Cardiomyopathy ([facility] I42.9    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 ([facility] E11.21    Renal disease due to hypertension I12.9    Obesity E66.9    HTN (hypertension) I10    Obstructive sleep apnea G47.33    Acute congestive heart failure (HCC) I50.9    Vitamin D deficiency E55.9    Graves' disease E05.00    Hypothyroidism E03.9    Diabetic foot ([facility] E11.8    Foot osteomyelitis, right ([facility] M86.9    Diabetic foot infection ([facility] E11.628, L08.9    Dilated cardiomyopathy ([facility] I42.0    Essential hypertension I10    Hyperlipidemia E78.5    Diabetic ulcer of right foot associated with type 2 diabetes mellitus ([facility] E11.621, L97.519    Subacute osteomyelitis of right foot ([facility] M86.271    MRSA infection A49.02    H/O gastric bypass Z98.84    Benign prostatic hyperplasia with lower urinary tract symptoms N40.1    Paroxysmal atrial fibrillation (HCC) I48.0    Hypokalemia E87.6    Chronic systolic heart failure (HCC) I50.22    NSVT (nonsustained ventricular tachycardia) ([facility] I47.2    Abscess of left leg L02.416    Morbid obesity with BMI of 40.0-44.9, adult ([facility] [facility] E66.01, Z68.41  Cardiac resynchronization therapy defibrillator (CRT-D) in place Z95.810    On amiodarone therapy Z79.899    Open fracture of second toe of right foot with routine healing S92.501D    Open fracture of toe S92.919B    Open fracture dislocation of interphalangeal joint of single toe of right foot S92.911B    Cellulitis and abscess of foot L03.119, L02.619    Type 2 diabetes mellitus with left diabetic foot ulcer (HCC) E11.621, L97.529    Diabetic polyneuropathy associated with type 2 diabetes mellitus (HCC) E11.42       Past Medical History:        Diagnosis Date    Atrial fibrillation (HCC)     Back pain     Cardiomyopathy     CHF (congestive heart failure) (HCC)     COPD (chronic obstructive pulmonary disease) (HCC)     Dyslipidemia     GERD (gastroesophageal reflux disease)     Hyperlipidemia     Hypertension     Hypothyroidism     Leg edema     MRSA (methicillin resistant staph aureus) culture positive 10/5/15    foot/bone    MRSA nasal colonization 05/05/2017    Obesity     Prolonged emergence from general anesthesia     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     Stroke ([facility]     Thyroid disease     Type 2 diabetes mellitus without complication (HCC)     Type II or unspecified type diabetes mellitus without mention of complication, not stated as uncontrolled        Past Surgical History:        Procedure Laterality Date    ADRENAL GLAND SURGERY  1970    CARDIAC DEFIBRILLATOR PLACEMENT  09/05/2017    Dr. Lieutenant Concepcion COLONOSCOPY N/A 3/8/2019    COLONOSCOPY WITH MAC, UNASYN (3gm) performed by Elsy Green MD at [facility] COLONOSCOPY N/A 8/9/2019    COLONOSCOPY POLYPECTOMY SNARE/COLD BIOPSY performed by Elsy Green MD at [address]  8/9/2019    COLONOSCOPY WITH BIOPSY performed by Elsy Green MD at [address] [place] [address] 8/28/2019    INCISION AND INCISIONAL DEBRIDEMENT OPEN FRACTURE RIGHT 2ND TOE SKIN AND BONE performed by Phan Gamez MD at [address] [place] [address] Rd  1-2-10    GASTRIC BYPASS SURGERY      OTHER SURGICAL HISTORY  10/6/15    INCISION AND DRAINAGE RIGHT FOOT          OTHER SURGICAL HISTORY Right 10/8/15    INCISION AND DRAINAGE RIGHT FOOT      PACEMAKER PLACEMENT      UPPER GASTROINTESTINAL ENDOSCOPY N/A 3/8/2019    EGD BIOPSY GASTRIC H. PYLORI performed by Fay Addison MD at [address] History:    Social History     Tobacco Use    Smoking status: Former Smoker     Packs/day: 1.00     Years: 4.00     Pack years: 4.00     Types: Cigarettes     Last attempt to quit: 1/1/2017     Years since quitting: 3.5    Smokeless tobacco: Never Used   Substance Use Topics    Alcohol use: Yes     Comment: 2-3 times per year                                Counseling given: Not Answered      Vital Signs (Current):   Vitals:    07/22/20 0731 07/22/20 1158 07/22/20 1456 07/22/20 1636   BP: 111/62 113/70 119/68 120/68   Pulse: 64 65 60 63   Resp: 16 16 16 16   Temp: 98.3 °F (36.8 °C) 97.9 °F (36.6 °C) 97.7 °F (36.5 °C) 97.9 °F (36.6 °C)   TempSrc: Oral Oral Oral Oral   SpO2: 99%  99% 97%   Weight:       Height:                                                  BP Readings from Last 3 Encounters:   07/22/20 120/68   07/19/20 (!) 121/55   06/26/20 124/60       NPO Status:                                                                                 BMI:   Wt Readings from Last 3 Encounters:   07/20/20 (!) 328 lb (148.8 kg)   07/19/20 (!) 328 lb 9.6 oz (149.1 kg)   06/26/20 (!) 318 lb (144.2 kg)     Body mass index is 39.93 kg/m².     CBC:   Lab Results   Component Value Date    WBC 6.9 07/22/2020    RBC 3.51 07/22/2020    HGB 10.1 07/22/2020    HCT 30.9 07/22/2020    MCV 87.8 07/22/2020    RDW 14.1 07/22/2020     07/22/2020       CMP:   Lab Results   Component Value Date     07/22/2020    K 4.7 07/22/2020    K 4.8 07/19/2020     07/22/2020    CO2 21 07/22/2020    BUN 38 07/22/2020    CREATININE 1.6 07/22/2020    GFRAA 52 07/22/2020    GFRAA >60 05/15/2013    AGRATIO 1.2 08/27/2019    LABGLOM 43 07/22/2020    GLUCOSE 126 07/22/2020    PROT 6.9 08/27/2019    PROT 6.8 11/27/2012    CALCIUM 9.0 07/22/2020    BILITOT 0.3 08/27/2019    ALKPHOS 122 08/27/2019    AST 31 08/27/2019    ALT 42 08/27/2019       POC Tests:   Recent Labs     07/22/20  1634   POCGLU 138*       Coags:   Lab Results   Component Value Date    PROTIME 13.6 08/27/2019    INR 1.19 08/27/2019    APTT 41.6 08/27/2019       HCG (If Applicable): No results found for: PREGTESTUR, PREGSERUM, HCG, HCGQUANT     ABGs:   Lab Results   Component Value Date    PHART 7.39 01/09/2012    PO2ART 67 01/09/2012    NTQ2NFJ 43 01/09/2012    IXN0QQG 25 01/09/2012    BEART 0.8 01/09/2012        Type & Screen (If Applicable):  No results found for: LABABO, LABRH    Drug/Infectious Status (If Applicable):  No results found for: HIV, HEPCAB    COVID-19 Screening (If Applicable):   Lab Results   Component Value Date    COVID19 Not Detected 07/22/2020         Anesthesia Evaluation  Patient summary reviewed and Nursing notes reviewed no history of anesthetic complications:   Airway: Mallampati: II  TM distance: >3 FB   Neck ROM: full  Mouth opening: > = 3 FB Dental:          Pulmonary:   (+) COPD:  sleep apnea:                             Cardiovascular:    (+) hypertension:, CHF:,                   Neuro/Psych:   (+) CVA:, neuromuscular disease:,             GI/Hepatic/Renal:   (+) GERD:,           Endo/Other:    (+) DiabetesType II DM, , hypothyroidism::., .                 Abdominal:           Vascular:                                        Anesthesia Plan      general     ASA 1    (62-year-old male presents for LEFT FOOT INCISION AND DRAINAGE. Plan general anesthesia with ASA standard monitors. Questions answered. Patient agreeable with anesthetic plan.  )  Induction: intravenous. Anesthetic plan and risks discussed with patient.       Plan discussed with CRNA.    Attending anesthesiologist reviewed and agrees with Pre Eval content        Lars Garland MD   7/22/2020

## 2020-07-22 NOTE — CONSULTS
Labs and notes reviewed    Full consult to follow    Thanks  Nephrology  [address]  Office: [phone]  Cell: [phone]  Fax: [phone]

## 2020-07-22 NOTE — PLAN OF CARE
Problem: Pain:  Goal: Pain level will decrease  Description: Pain level will decrease  Outcome: Ongoing  Note: Patient taking PRN pain medications, no pain at this time. Will continue to assess. Problem: Falls - Risk of:  Goal: Will remain free from falls  Description: Will remain free from falls  Outcome: Ongoing  Note: Patient continues to be on bedrest no falls this shift. Will continue to monitor. Yes

## 2020-07-22 NOTE — PROGRESS NOTES
Report called to [address] Triana RN receiving patient on [address] at 5093 with all information provided.

## 2020-07-22 NOTE — PROGRESS NOTES
Progress Note  Admit Date: 7/20/2020       Overnight Events: none noted     CC: F/U for diabetic foot infection  Interval History: pt was able to get MRI yesterday, results are significant for some deep abscess formations as well as abnormal marrow signal, possible osteo vs charcot. Subjectively he is feeling much better since admission. He denies any chest pain or dyspnea. Tolerating antibiotics without nausea or emesis.  vancomycin  1,750 mg Intravenous Q18H    cefepime  2 g Intravenous Q12H    insulin lispro  0-12 Units Subcutaneous TID WC    insulin lispro  0-6 Units Subcutaneous Nightly    amiodarone  200 mg Oral Daily    [Held by provider] apixaban  5 mg Oral BID    aspirin  81 mg Oral Daily    atorvastatin  40 mg Oral Nightly    carvedilol  6.25 mg Oral BID WC    furosemide  40 mg Oral Daily    insulin lispro (1 Unit Dial)  10 Units Subcutaneous TID WC    insulin glargine  10 Units Subcutaneous Nightly    levothyroxine  25 mcg Oral Daily    lisinopril  2.5 mg Oral Daily    spironolactone  12.5 mg Oral Daily    tamsulosin  0.4 mg Oral Nightly      PRN Medications: traMADol, simethicone, naloxone, diphenhydrAMINE, acetaminophen, docusate sodium, promethazine, glucose, dextrose, glucagon (rDNA), dextrose  Diet: Diet NPO Effective Now  Continuous Infusions:   dextrose       PHYSICAL EXAM:  /62   Pulse 64   Temp 98.3 °F (36.8 °C) (Oral)   Resp 16   Ht 6' 4\" (1.93 m)   Wt (!) 328 lb (148.8 kg)   SpO2 99%   BMI 39.93 kg/m²   Recent Labs     07/21/20  1207 07/21/20  1745 07/21/20  1953 07/22/20  0059 07/22/20  0737   POCGLU 218* 90 199* 73 115*       Intake/Output Summary (Last 24 hours) at 7/22/2020 1009  Last data filed at 7/22/2020 0737  Gross per 24 hour   Intake 50 ml   Output 950 ml   Net -900 ml     General appearance: No apparent distress appears stated age and cooperative.   Lungs: Clear to auscultation, bilaterally without Rales/Wheezes/Rhonchi with good respiratory effort. Heart: Regular rate and rhythm with Normal S1 and split S2 without murmurs, rubs or gallops, point of maximum impulse non-displaced  Abdomen: Soft, non-tender or non-distended without rigidity or guarding and positive bowel sounds all four quadrants. Extremities: No clubbing, cyanosis, left foot is covered in dressing. Skin: Skin color, texture, turgor normal.  Pictures reviewed. Neurologic: Alert and oriented X 3, grossly non-focal.  Mental status: Alert, oriented, thought content appropriate. Capillary refill is brisk  Peripheral pulses 2+    LABS:  Recent Labs     07/19/20  1100 07/21/20  0604 07/22/20  0431   WBC 9.6 10.0 6.9   HGB 12.2* 10.4* 10.1*   HCT 38.3* 32.6* 30.9*    159 171                                                                    Recent Labs     07/19/20  1100 07/19/20  1146 07/21/20  0605 07/22/20  0431   *  --  135* 132*   K 4.8  --  4.2 4.7     --  104 101   CO2 22  --  22 21   BUN 25*  --  35* 38*   CREATININE 1.5*  --  1.6* 1.6*   GLUCOSE 167* 172 186* 126*     No results for input(s): AST, ALT, ALB, BILITOT, ALKPHOS in the last 72 hours. Recent Labs     07/19/20  1100   TROPONINI <0.01     Assessment & Plan:    Patient Active Problem List:      Diabetic foot infection with deep stead abscesses and Possible osteomyelitis vs charcot neuroarthropathy:  Wound cx NGTD  On cefepime and vanco  Podiatry and ID are on board  Plan for I&D and bone biopsy today  Non WB on LE  Might need placement- SW following    HTN, Chronic systolic CHF EF 29-51%:  Controlled volume status is stable a this time    DM type 2:  Cont low dose lantus 10 U, prandial coverage and decrease sliding scale to low dose    Atrial fibrillation:  Hold eliquis in preparation for I&D    WILL:  Nightly CPAP    CKD stage 3:  Stable creatinine at 1.6. does not look that patient was able to follow up with nephrology recently.  Will consult while he is here.      Disposition: inpt  Full Code

## 2020-07-22 NOTE — PROGRESS NOTES
demineralization. 3. Correlation with MRI would exclude occult osteomyelitis.       Xray chest 7/19  Impression    No acute cardiopulmonary disease on portable chest.          LE DVT study 7/2   Summary          Normal venous duplex examination of the legs bilaterally with normal venous     Doppler signals noted throughout.     No evidence of thrombophlebitis is noted bilaterally in the deep and     superficial veins of the legs. Small calf thrombi cannot be excluded ,     particularly in the left lower extremity       MRI Left foot  Impression    Soft tissue ulcer involving the heel pad with underlying soft tissue inflammation         Hindfoot collapse with fragmentation and disorganization consistent with Charcot neuropathy. Dorsal  subluxation of the midfoot indicating disruption of Chopart ligaments.         Abnormal marrow signal and marrow enhancement involving the hindfoot and midfoot as well as distal tibia and fibula, suspicious for osteomyelitis.  Ancillary findings include peripherally enhancing fluid collections which likely represent abscesses.         Ossific structure within the medial heel pad, correlating with conventional radiographs-see above       Scheduled Meds:   insulin lispro  0-6 Units Subcutaneous TID WC    insulin lispro  0-3 Units Subcutaneous Nightly    vancomycin  1,750 mg Intravenous Q18H    cefepime  2 g Intravenous Q12H    amiodarone  200 mg Oral Daily    [Held by provider] apixaban  5 mg Oral BID    aspirin  81 mg Oral Daily    atorvastatin  40 mg Oral Nightly    carvedilol  6.25 mg Oral BID WC    furosemide  40 mg Oral Daily    insulin lispro (1 Unit Dial)  10 Units Subcutaneous TID WC    insulin glargine  10 Units Subcutaneous Nightly    levothyroxine  25 mcg Oral Daily    lisinopril  2.5 mg Oral Daily    spironolactone  12.5 mg Oral Daily    tamsulosin  0.4 mg Oral Nightly       Continuous Infusions:   dextrose         PRN Meds:  traMADol, simethicone, naloxone, diphenhydrAMINE, acetaminophen, docusate sodium, promethazine, glucose, dextrose, glucagon (rDNA), dextrose      Assessment:      Diabetic foot infection - Left   Cellulitis, Left  OM vs Charcot neuropathy; Left foot  DM2 with peripheral neuropathy  CHF  Obesity BMI (39)    Plan:     Cont. IV vancomycin + Cefepime for now  Podiatry - concern w/ new MRI finding suspected OM w/ fluid collection  Sx intervention this evening / wound care  L foot/ankle I&D w/ bone biopsy  Pt NPO    F/U sx bone path and cult    Anticipate PICC with IV abx outpt    Discussed with Attending    Thomas Barba DPM   Podiatric Resident, PGY-2  Pager: [phone] or Perfect serve    Addendum to Resident Progress note:  Pt seen, examined and evaluated. I have reviewed the current history, physical findings, labs and assessment and plan and agree with note as documented by resident (Dr. Hung Coronado).    As above  DM foot with neuropathy, L foot ulcer and cellulitis. Admit, started on vancomycin + cefepime  Podiatry consulted - noted 'probe to bone'  Wound cult sent. MRI -   'Hindfoot collapse with fragmentation and disorganization consistent with Charcot neuropathy.  Dorsal  subluxation of the midfoot indicating disruption of Chopart ligaments. 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

## 2020-07-22 NOTE — PROGRESS NOTES
Dr. Katie Pickard at bedside in PACU talking with patient at 36. X-rays taken in PACU per physician order at [address]. Patient tolerated without any complaints.

## 2020-07-22 NOTE — PLAN OF CARE
Problem: Falls - Risk of:  Goal: Absence of physical injury  Description: Absence of physical injury  Outcome: Ongoing  Note: Patient is on bedrest at this time. Patient unable to keep weight off of injured foot when trying to get up. Patient is oriented and does not try to get up      Problem: Skin Integrity:  Goal: Will show no infection signs and symptoms  Description: Will show no infection signs and symptoms  Outcome: Ongoing  Note: Patient currently receiving IV abx. No temp and WBC at 6.9 this AM. Patient does have pus like drainage from wound site. Will go to OR later today.

## 2020-07-22 NOTE — PROGRESS NOTES
Clinical Pharmacy Progress Note    ADMIT DATE: 7/20/2020     Interval update: MRI showed suspected osteomyelitis with fluid collection - podiatry planning for surgical intervention this evening. 82 yo M with PMHx significant for HTN, DM, AFib, HFrEF, s/p BiV ICD (2017) who presented to ED 7/20 with L foot ulcer with drainage. Pt was seen in ED 7/19 and discharged on Augmentin. He was seen by podiatrist on 7/20 and referred to direct admission. Currently on IV cefepime + vancomycin. Pharmacy asked to dose vancomycin per Dr. Edson Dumas. PERTINENT MEDICATIONS:  Cefepime 2g IV q12h - day #2  Vancomycin - pharmacy to dose - day #2    7/20   Vancomycin 1.75g IV q18h (7/21-current)    LABORATORY:  Recent Labs     07/21/20  0605 07/22/20  0431   * 132*   K 4.2 4.7    101   CO2 22 21   BUN 35* 38*   CREATININE 1.6* 1.6*   GLUCOSE 186* 126*     Estimated Creatinine Clearance: 71 mL/min (A) (based on SCr of 1.6 mg/dL (H)). Recent Labs     07/21/20  0604 07/22/20  0431   WBC 10.0 6.9   HGB 10.4* 10.1*    171     MICROBIOLOGY:  L foot culture (7/20):  NGTD    VITALS:  /70   Pulse 65   Temp 97.9 °F (36.6 °C) (Oral)   Resp 16   Ht 6' 4\" (1.93 m)   Wt (!) 328 lb (148.8 kg)   SpO2 99%   BMI 39.93 kg/m²     Intake/Output Summary (Last 24 hours) at 7/22/2020 1324  Last data filed at 7/22/2020 1201  Gross per 24 hour   Intake 50 ml   Output 1225 ml   Net -1175 ml     PROPHYLAXIS:  Stress ulcer: not indicated  VTE:  Apixaban 5 mg BID (on hold for surgery)    ASSESSMENT/PLAN:  1)  Diabetic foot infection, r/o osteomyelitis: Cefepime + Vancomycin - Day #3  · Vancomycin--pharmacy to dose  · Remains on vancomycin 1.75g IV q18h. SCr stable at 1.6, unchanged from yesterday. · Vancomycin trough was ordered for this AM, but was not drawn before dose was given. Will retime trough prior to next dose, 7/23 AM. Target trough = 15-20 mcg/mL.   · Patient is at risk for accumulation due to BMI of 39.9 and elevated SCr. Will monitor closely. · Renal function will be monitored closely /dose will be adjusted as appropriate. Please call with any questions.   Ronna CortesD, BCPS  Wireless: [phone]  or [phone]  7/22/2020 1:24 PM

## 2020-07-22 NOTE — FLOWSHEET NOTE
07/21/20 2016   Encounter Summary   Services provided to: Patient   Referral/Consult From: Rounding   Continue Visiting   (7/21, carmen )   Length of Encounter 15 minutes   Routine   Type Initial   Assessment Calm; Approachable; Hopeful   Intervention Active listening;Explored feelings, thoughts, concerns   Outcome Comfort;Expressed gratitude   Staff [place], [place]

## 2020-07-22 NOTE — PROGRESS NOTES
changes consistent with osteomyelitis  -Possible MRI left foot depending on pacemaker compatibility   -Wound culture of left foot wound collected 7/20/20; NGTD  -Wound packed with sterile 1/4 inch iodoform packing gauze, and dressed with betadine, gauze, abd, kerlix, and ace  -Surgical shoe ordered. Patient to wear at all times while out of bed. -NON weight bearing to the left foot, heel weight bearing for transfers only    -PT/OT ordered, f/u eval; patient strict NON WB to the left foot 2/2 plantar foot wound, heel weight bearing for transfers only  -Discussed surgical intervention with patient at bedside. All potential risks, benefits, and complications were discussed with the patient prior to the scheduling of surgery. No guarantees or promises where made to what the outcomes will be. The patient wished to proceed with surgery, and informed written consent was obtained. -NPO immediately   -COVID-19 for surgery   -Chest x-ray pre-op   -EKG pre-op      DISPO: Given MRI changes, will proceed with surgical intervention at this time and perform incision and drainage with bone biopsy of the left foot and ankle; patient will likely require long term antibiotics outpatient; Patient is scheduled as an add on procedure for tonight, 7/22/20 at 5:30 pm    Discussed assessment and plan with Dr. Ashleigh Marquez.     Roberta Sen DPM, PGY-3  Pager #: [phone] or perfect serve ([address] [place] [address])

## 2020-07-22 NOTE — PROGRESS NOTES
Device interrogation by company representative @ [facility] 7/21/20. Post MRI. crt-d w/ normal function. MRI SureScan Checklist  The following device clinic information has been confirmed  Device was implanted in the pectoral region  Leads are [employer] MRI labeled  Leads are electrically intact  No implanted lead extenders, lead adaptors, or abandoned leads are present  RV capture threshold should not exceed 2.00 V at 0.40 ms for pacemaker dependent patients  No parameters have been changed during the current session. See PACEART report under Cardiology tab.

## 2020-07-22 NOTE — CONSULTS
[facility]    [facility]              [phone]                       Plan :     - UA, Urine Na, Urine Protein, UPEP, SPEP, Urine Eosinophils, Serum free light chains  - Renal U/S  - Continue Abx with Vanc and Cefepime  - Please adjust Vanc apporpriately to decrease risk of renal injury  - Please avoid nephrotoxic agents   - Continue Lasix     Assessment :     1. RODERICK on CKD stage 3  - Likely 2/2 to cardiorenal syndrome along with infectious component  - CHF (last echo 5/19, EF 35-40%)  - Cr fluctuates between 1-1.5  - Type II Cardiorenal syndrome: CKD due to chronic HF  - UA, Urine Na, Urine Protein, UPEP, SPEP, Urine Eosinophils, Serum free light chains  - Renal U/S    2. Osteomyelitis  - Continue Abx with Vanc and Cefepime  - Please adjust Vanc apporpriately to decrease risk of renal injury    3. CKD Stage 3  - Assessed by Dr. Emily Jean initially at [facility]  - S/p gastric bypass in 2013 with 100lb wt. Loss  - Last time seen Cr was 1.0  - Please avoid nephrotoxic agents     4. Mild Hyponatremia  - Likely 2/2 high volume status due to CHF  - Na 132  - Continue Lasix    [facility] would like to thank Tricia Mcgowan MD   for opportunity to serve this patient      Please call with questions at-   24 Hrs Answering service [phone] or  7 am- 5 pm via Perfect serve or cell phone  Vonda Wood          CC/reason for consult :     RODERICK     HPI :     Frank Espinosa is a 81 y.o. male with PMH of IDDM, Afib (on Eliquis), systolic CHF s/p BiV ICD in 2017, COPD, HTN, MRSA + osteomyelitis in 2015, CKD stage 3, Stroke presented [facility] on 7/20/2020 with a draining left foot ulcer. Patient initially started experiencing left foot pain and soreness a couple of weeks ago. 5 days ago he noticed increased drainage from the heel of his left foot. On 7/18/20, he noticed blood from the area and came to the ED.  In the ED, he complained of fatigue for a couple of weeks along with the draining foot ulcer. He was seen by podiatry, put on Augmentin and told to follow up outpatient. After his follow-up outpatient, there was concern of worsening foot cellulitis and wound probing to the bone. He came to the ED on 7/20/20 and was admitted. In the ED, vittals were stable, BUN:Cr was 25:1.5, . 3. He had an MRI done later after his admission which showed soft tissue inflammation, Charcot neuropathy, suspicion for osteomyelitis involving his distal tibia and fibula and peripheral enhancing abcess     Interval History:     No acute overnights events    ROS:     Seen with - deferred due to COVID r/o status    positives in bold   Constitutional:  fever, chills, weakness, weight change, fatigue  Skin:  rash, pruritus, hair loss, bruising, dry skin, petechiae  Head, Face, Neck   headaches, swelling,  cervical adenopathy  Respiratory: shortness of breath, cough, or wheezing  Cardiovascular: chest pain, palpitations, dizzy, edema  Gastrointestinal: nausea, vomiting, diarrhea, constipation,belly pain    Yellow skin, blood in stool  Musculoskeletal:  back pain, muscle weakness, gait problems,       joint pain or swelling. Genitourinary:  dysuria, poor urine flow, flank pain, blood in urine  Neurologic:  vertigo, TIA'S, syncope, seizures, focal weakness  Psychosocial:  insomnia, anxiety, or depression.   Additional positive findings:                       All other remaining systems are negative or unable to obtain        PMH/PSH/SH/Family History:     Past Medical History:   Diagnosis Date    Atrial fibrillation (HCC)     Back pain     Cardiomyopathy     CHF (congestive heart failure) (HCC)     COPD (chronic obstructive pulmonary disease) (HCC)     Dyslipidemia     GERD (gastroesophageal reflux disease)     Hyperlipidemia     Hypertension     Hypothyroidism     Leg edema     MRSA (methicillin resistant staph aureus) culture positive 10/5/15    foot/bone    MRSA nasal colonization 05/05/2017  Obesity     Prolonged emergence from general anesthesia     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     [facility] [facility]     Thyroid disease     Type 2 diabetes mellitus without complication ([facility]     Type II or unspecified type diabetes mellitus without mention of complication, not stated as uncontrolled        Past Surgical History:   Procedure Laterality Date    ADRENAL GLAND SURGERY  1970    CARDIAC DEFIBRILLATOR PLACEMENT  09/05/2017    Dr. Margaret Vidales COLONOSCOPY N/A 3/8/2019    COLONOSCOPY WITH MAC, UNASYN (3gm) performed by Sumi Gonzalez MD at [address] 8/9/2019    COLONOSCOPY POLYPECTOMY SNARE/COLD BIOPSY [facility]  8/9/2019    COLONOSCOPY WITH BIOPSY performed by Sumi Gonzalez MD at [facility] Right 8/28/2019    INCISION AND INCISIONAL DEBRIDEMENT OPEN FRACTURE RIGHT 2ND TOE SKIN AND BONE performed by Harlan Pfeiffer MD at [facility] [facility]  1-2-10    GASTRIC BYPASS SURGERY      OTHER SURGICAL HISTORY  10/6/15    INCISION AND DRAINAGE RIGHT FOOT          OTHER SURGICAL HISTORY Right 10/8/15    INCISION AND DRAINAGE RIGHT FOOT      PACEMAKER PLACEMENT      UPPER GASTROINTESTINAL ENDOSCOPY N/A 3/8/2019    EGD BIOPSY GASTRIC H. PYLORI [facility]        reports that he quit smoking about 3 years ago. His smoking use included cigarettes. He has a 4.00 pack-year smoking history. He has never used smokeless tobacco. He reports current alcohol use. He reports that he does not use drugs. family history includes Diabetes in his maternal grandmother; Heart Disease in his father; Hypertension in his maternal grandmother and mother.          Medication:     Current Facility-Administered Medications: insulin lispro (1 Unit Dial) 0-6 Units, 0-6 Units, Subcutaneous, TID WC  insulin lispro (1 Unit Dial) 0-3 Units, 0-3 Units, Subcutaneous, Nightly  vancomycin (VANCOCIN) 1,750 mg in dextrose 5 % 250 mL IVPB, 1,750 mg, Intravenous, Q18H  traMADol (ULTRAM) tablet 50 mg, 50 mg, Oral, Q6H PRN  simethicone (MYLICON) 40 HY/6.5BW drops 40 mg, 40 mg, Oral, Q6H PRN  naloxone (NARCAN) injection 0.4 mg, 0.4 mg, Intravenous, PRN  diphenhydrAMINE (BENADRYL) injection 25 mg, 25 mg, Intravenous, Q6H PRN  acetaminophen (TYLENOL) tablet 500 mg, 500 mg, Oral, Q6H PRN  docusate sodium (COLACE) capsule 100 mg, 100 mg, Oral, BID PRN  promethazine (PHENERGAN) tablet 12.5 mg, 12.5 mg, Oral, Q6H PRN  cefepime (MAXIPIME) 2 g IVPB minibag, 2 g, Intravenous, Q12H  glucose (GLUTOSE) 40 % oral gel 15 g, 15 g, Oral, PRN  dextrose 50 % IV solution, 12.5 g, Intravenous, PRN  glucagon (rDNA) injection 1 mg, 1 mg, Intramuscular, PRN  dextrose 5 % solution, 100 mL/hr, Intravenous, PRN  amiodarone (CORDARONE) tablet 200 mg, 200 mg, Oral, Daily  [Held by provider] apixaban (ELIQUIS) tablet 5 mg, 5 mg, Oral, BID  aspirin EC tablet 81 mg, 81 mg, Oral, Daily  atorvastatin (LIPITOR) tablet 40 mg, 40 mg, Oral, Nightly  carvedilol (COREG) tablet 6.25 mg, 6.25 mg, Oral, BID WC  furosemide (LASIX) tablet 40 mg, 40 mg, Oral, Daily  insulin lispro (1 Unit Dial) 10 Units, 10 Units, Subcutaneous, TID WC  insulin glargine (LANTUS;BASAGLAR) injection pen 10 Units, 10 Units, Subcutaneous, Nightly  levothyroxine (SYNTHROID) tablet 25 mcg, 25 mcg, Oral, Daily  lisinopril (PRINIVIL;ZESTRIL) tablet 2.5 mg, 2.5 mg, Oral, Daily  spironolactone (ALDACTONE) tablet 12.5 mg, 12.5 mg, Oral, Daily  tamsulosin (FLOMAX) capsule 0.4 mg, 0.4 mg, Oral, Nightly       Vitals :     Vitals:    07/22/20 0731   BP: 111/62   Pulse: 64   Resp: 16   Temp: 98.3 °F (36.8 °C)   SpO2: 99%       I & O :       Intake/Output Summary (Last 24 hours) at 7/22/2020 1157  Last data filed at 7/22/2020 0737  Gross per 24 hour   Intake 50 ml   Output 950 ml   Net -900 ml        Physical Examination :     General appearance: Anxious- no, distressed- no, in good spirits- yes  HEENT: Lips- normal, teeth- ok , oral mucosa- moist  Neck : Mass- no, appears symmetrical, JVD- not visible  Respiratory: Respiratory effort- , wheeze- no, crackles -  Cardiovascular: Ausculation- No M/R/G, Edema   Abdomen: visible mass- no, distention- no, scar- no, tenderness- no                            hepatosplenomegaly-  no  Musculoskeletal:  clubbing no,cyanosis- no , digital ischemia- no                           muscle strength- grossly normal , tone - grossly normal  Skin: rashes- no , ulcers- no, induration- no, tightening - no  Psychiatric:  Judgement and insight- normal           AAO X 3  Additional finding:     LABS:     Recent Labs     07/21/20  0604 07/22/20  0431   WBC 10.0 6.9   HGB 10.4* 10.1*   HCT 32.6* 30.9*    171     Recent Labs     07/21/20  0605 07/22/20  0431   * 132*   K 4.2 4.7    101   CO2 22 21   BUN 35* 38*   CREATININE 1.6* 1.6*   GLUCOSE 186* 126*          Patient was seen and examined and the case was discussed with the resident. He acted as my scribe. I agree with the assessment and plan.     Thanks  Nephrology  Anthony Cardenas [address] [facility], [address] [place] [address]  Office: [phone]  Cell: [phone]  Fax: [phone]

## 2020-07-22 NOTE — PROGRESS NOTES
Patient's wife at bedside in PACU visiting with her  along with update given at [address].

## 2020-07-23 ENCOUNTER — PROCEDURE VISIT (OUTPATIENT)
Dept: CARDIOLOGY CLINIC | Age: 70
End: 2020-07-23
Payer: MEDICARE

## 2020-07-23 ENCOUNTER — APPOINTMENT (OUTPATIENT)
Dept: ULTRASOUND IMAGING | Age: 70
DRG: 623 | End: 2020-07-23
Attending: INTERNAL MEDICINE
Payer: MEDICARE

## 2020-07-23 LAB
ANION GAP SERPL CALCULATED.3IONS-SCNC: 12 MMOL/L (ref 3–16)
BASOPHILS ABSOLUTE: 0 K/UL (ref 0–0.2)
BASOPHILS RELATIVE PERCENT: 0.6 %
BUN BLDV-MCNC: 36 MG/DL (ref 7–20)
CALCIUM SERPL-MCNC: 9.1 MG/DL (ref 8.3–10.6)
CHLORIDE BLD-SCNC: 103 MMOL/L (ref 99–110)
CO2: 22 MMOL/L (ref 21–32)
CREAT SERPL-MCNC: 1.3 MG/DL (ref 0.8–1.3)
EOSINOPHIL,URINE: NORMAL
EOSINOPHILS ABSOLUTE: 0.2 K/UL (ref 0–0.6)
EOSINOPHILS RELATIVE PERCENT: 4.7 %
GFR AFRICAN AMERICAN: >60
GFR NON-AFRICAN AMERICAN: 55
GLUCOSE BLD-MCNC: 142 MG/DL (ref 70–99)
GLUCOSE BLD-MCNC: 158 MG/DL (ref 70–99)
GLUCOSE BLD-MCNC: 179 MG/DL (ref 70–99)
GLUCOSE BLD-MCNC: 189 MG/DL (ref 70–99)
GLUCOSE BLD-MCNC: 206 MG/DL (ref 70–99)
GLUCOSE BLD-MCNC: 212 MG/DL (ref 70–99)
GLUCOSE BLD-MCNC: 78 MG/DL (ref 70–99)
HCT VFR BLD CALC: 33 % (ref 40.5–52.5)
HEMOGLOBIN: 10.7 G/DL (ref 13.5–17.5)
LYMPHOCYTES ABSOLUTE: 0.7 K/UL (ref 1–5.1)
LYMPHOCYTES RELATIVE PERCENT: 12.6 %
MCH RBC QN AUTO: 28.7 PG (ref 26–34)
MCHC RBC AUTO-ENTMCNC: 32.5 G/DL (ref 31–36)
MCV RBC AUTO: 88.3 FL (ref 80–100)
MONOCYTES ABSOLUTE: 0.8 K/UL (ref 0–1.3)
MONOCYTES RELATIVE PERCENT: 15.3 %
NEUTROPHILS ABSOLUTE: 3.5 K/UL (ref 1.7–7.7)
NEUTROPHILS RELATIVE PERCENT: 66.8 %
PDW BLD-RTO: 14.1 % (ref 12.4–15.4)
PERFORMED ON: ABNORMAL
PERFORMED ON: NORMAL
PLATELET # BLD: 179 K/UL (ref 135–450)
PMV BLD AUTO: 9 FL (ref 5–10.5)
POTASSIUM SERPL-SCNC: 4.7 MMOL/L (ref 3.5–5.1)
RBC # BLD: 3.74 M/UL (ref 4.2–5.9)
SODIUM BLD-SCNC: 137 MMOL/L (ref 136–145)
UREA NITROGEN, UR: 491.3 MG/DL (ref 800–1666)
VANCOMYCIN TROUGH: 18.9 UG/ML (ref 10–20)
WBC # BLD: 5.3 K/UL (ref 4–11)

## 2020-07-23 PROCEDURE — 94761 N-INVAS EAR/PLS OXIMETRY MLT: CPT

## 2020-07-23 PROCEDURE — 6360000002 HC RX W HCPCS: Performed by: STUDENT IN AN ORGANIZED HEALTH CARE EDUCATION/TRAINING PROGRAM

## 2020-07-23 PROCEDURE — 85025 COMPLETE CBC W/AUTO DIFF WBC: CPT

## 2020-07-23 PROCEDURE — 80048 BASIC METABOLIC PNL TOTAL CA: CPT

## 2020-07-23 PROCEDURE — 97535 SELF CARE MNGMENT TRAINING: CPT

## 2020-07-23 PROCEDURE — 6370000000 HC RX 637 (ALT 250 FOR IP): Performed by: STUDENT IN AN ORGANIZED HEALTH CARE EDUCATION/TRAINING PROGRAM

## 2020-07-23 PROCEDURE — 02HV33Z INSERTION OF INFUSION DEVICE INTO SUPERIOR VENA CAVA, PERCUTANEOUS APPROACH: ICD-10-PCS | Performed by: INTERNAL MEDICINE

## 2020-07-23 PROCEDURE — 2580000003 HC RX 258: Performed by: INTERNAL MEDICINE

## 2020-07-23 PROCEDURE — 76770 US EXAM ABDO BACK WALL COMP: CPT

## 2020-07-23 PROCEDURE — 6370000000 HC RX 637 (ALT 250 FOR IP): Performed by: INTERNAL MEDICINE

## 2020-07-23 PROCEDURE — 97530 THERAPEUTIC ACTIVITIES: CPT

## 2020-07-23 PROCEDURE — 2580000003 HC RX 258: Performed by: STUDENT IN AN ORGANIZED HEALTH CARE EDUCATION/TRAINING PROGRAM

## 2020-07-23 PROCEDURE — 93284 PRGRMG EVAL IMPLANTABLE DFB: CPT | Performed by: INTERNAL MEDICINE

## 2020-07-23 PROCEDURE — 84540 ASSAY OF URINE/UREA-N: CPT

## 2020-07-23 PROCEDURE — 2500000003 HC RX 250 WO HCPCS: Performed by: INTERNAL MEDICINE

## 2020-07-23 PROCEDURE — 99223 1ST HOSP IP/OBS HIGH 75: CPT | Performed by: INTERNAL MEDICINE

## 2020-07-23 PROCEDURE — C1751 CATH, INF, PER/CENT/MIDLINE: HCPCS

## 2020-07-23 PROCEDURE — 36569 INSJ PICC 5 YR+ W/O IMAGING: CPT

## 2020-07-23 PROCEDURE — 94660 CPAP INITIATION&MGMT: CPT

## 2020-07-23 PROCEDURE — 1200000000 HC SEMI PRIVATE

## 2020-07-23 PROCEDURE — 36415 COLL VENOUS BLD VENIPUNCTURE: CPT

## 2020-07-23 PROCEDURE — 6360000002 HC RX W HCPCS: Performed by: INTERNAL MEDICINE

## 2020-07-23 PROCEDURE — 80202 ASSAY OF VANCOMYCIN: CPT

## 2020-07-23 RX ORDER — CARVEDILOL 12.5 MG/1
12.5 TABLET ORAL 2 TIMES DAILY WITH MEALS
Status: DISCONTINUED | OUTPATIENT
Start: 2020-07-23 | End: 2020-07-27 | Stop reason: HOSPADM

## 2020-07-23 RX ORDER — LIDOCAINE HYDROCHLORIDE 10 MG/ML
5 INJECTION, SOLUTION EPIDURAL; INFILTRATION; INTRACAUDAL; PERINEURAL ONCE
Status: COMPLETED | OUTPATIENT
Start: 2020-07-23 | End: 2020-07-23

## 2020-07-23 RX ORDER — SODIUM CHLORIDE 0.9 % (FLUSH) 0.9 %
10 SYRINGE (ML) INJECTION EVERY 12 HOURS SCHEDULED
Status: DISCONTINUED | OUTPATIENT
Start: 2020-07-23 | End: 2020-07-27 | Stop reason: HOSPADM

## 2020-07-23 RX ORDER — SODIUM CHLORIDE 0.9 % (FLUSH) 0.9 %
10 SYRINGE (ML) INJECTION PRN
Status: DISCONTINUED | OUTPATIENT
Start: 2020-07-23 | End: 2020-07-27 | Stop reason: HOSPADM

## 2020-07-23 RX ORDER — INSULIN LISPRO 100 [IU]/ML
4 INJECTION, SOLUTION INTRAVENOUS; SUBCUTANEOUS ONCE
Status: COMPLETED | OUTPATIENT
Start: 2020-07-23 | End: 2020-07-23

## 2020-07-23 RX ADMIN — CARVEDILOL 12.5 MG: 12.5 TABLET, FILM COATED ORAL at 16:41

## 2020-07-23 RX ADMIN — APIXABAN 5 MG: 5 TABLET, FILM COATED ORAL at 20:18

## 2020-07-23 RX ADMIN — TRAMADOL HYDROCHLORIDE 50 MG: 50 TABLET, FILM COATED ORAL at 13:22

## 2020-07-23 RX ADMIN — INSULIN LISPRO 10 UNITS: 100 INJECTION, SOLUTION INTRAVENOUS; SUBCUTANEOUS at 09:54

## 2020-07-23 RX ADMIN — LISINOPRIL 2.5 MG: 2.5 TABLET ORAL at 09:53

## 2020-07-23 RX ADMIN — TAMSULOSIN HYDROCHLORIDE 0.4 MG: 0.4 CAPSULE ORAL at 20:18

## 2020-07-23 RX ADMIN — INSULIN LISPRO 10 UNITS: 100 INJECTION, SOLUTION INTRAVENOUS; SUBCUTANEOUS at 11:37

## 2020-07-23 RX ADMIN — CARVEDILOL 6.25 MG: 6.25 TABLET, FILM COATED ORAL at 09:52

## 2020-07-23 RX ADMIN — INSULIN GLARGINE 10 UNITS: 100 INJECTION, SOLUTION SUBCUTANEOUS at 22:58

## 2020-07-23 RX ADMIN — AMIODARONE HYDROCHLORIDE 200 MG: 200 TABLET ORAL at 09:53

## 2020-07-23 RX ADMIN — INSULIN LISPRO 4 UNITS: 100 INJECTION, SOLUTION INTRAVENOUS; SUBCUTANEOUS at 20:47

## 2020-07-23 RX ADMIN — INSULIN LISPRO 2 UNITS: 100 INJECTION, SOLUTION INTRAVENOUS; SUBCUTANEOUS at 11:36

## 2020-07-23 RX ADMIN — Medication 40 MG: at 09:53

## 2020-07-23 RX ADMIN — FUROSEMIDE 40 MG: 40 TABLET ORAL at 09:51

## 2020-07-23 RX ADMIN — INSULIN LISPRO 1 UNITS: 100 INJECTION, SOLUTION INTRAVENOUS; SUBCUTANEOUS at 09:54

## 2020-07-23 RX ADMIN — CEFTRIAXONE SODIUM 2 G: 2 INJECTION, POWDER, FOR SOLUTION INTRAMUSCULAR; INTRAVENOUS at 13:05

## 2020-07-23 RX ADMIN — Medication 10 ML: at 20:20

## 2020-07-23 RX ADMIN — ATORVASTATIN CALCIUM 40 MG: 40 TABLET, FILM COATED ORAL at 20:18

## 2020-07-23 RX ADMIN — INSULIN GLARGINE 10 UNITS: 100 INJECTION, SOLUTION SUBCUTANEOUS at 00:58

## 2020-07-23 RX ADMIN — Medication 40 MG: at 19:59

## 2020-07-23 RX ADMIN — TRAMADOL HYDROCHLORIDE 50 MG: 50 TABLET, FILM COATED ORAL at 20:18

## 2020-07-23 RX ADMIN — SPIRONOLACTONE 12.5 MG: 25 TABLET, FILM COATED ORAL at 09:52

## 2020-07-23 RX ADMIN — LIDOCAINE HYDROCHLORIDE 5 ML: 10 INJECTION, SOLUTION EPIDURAL; INFILTRATION; INTRACAUDAL; PERINEURAL at 12:45

## 2020-07-23 RX ADMIN — LEVOTHYROXINE SODIUM 25 MCG: 0.03 TABLET ORAL at 06:24

## 2020-07-23 RX ADMIN — APIXABAN 5 MG: 5 TABLET, FILM COATED ORAL at 09:52

## 2020-07-23 RX ADMIN — VANCOMYCIN HYDROCHLORIDE 1750 MG: 10 INJECTION, POWDER, LYOPHILIZED, FOR SOLUTION INTRAVENOUS at 06:25

## 2020-07-23 RX ADMIN — ASPIRIN 81 MG: 81 TABLET, COATED ORAL at 09:52

## 2020-07-23 ASSESSMENT — PAIN DESCRIPTION - PAIN TYPE: TYPE: SURGICAL PAIN

## 2020-07-23 ASSESSMENT — PAIN DESCRIPTION - DESCRIPTORS
DESCRIPTORS: ACHING
DESCRIPTORS: ACHING

## 2020-07-23 ASSESSMENT — PAIN DESCRIPTION - LOCATION
LOCATION: LEG

## 2020-07-23 ASSESSMENT — PAIN SCALES - GENERAL
PAINLEVEL_OUTOF10: 6
PAINLEVEL_OUTOF10: 2
PAINLEVEL_OUTOF10: 6
PAINLEVEL_OUTOF10: 8
PAINLEVEL_OUTOF10: 7

## 2020-07-23 ASSESSMENT — PAIN DESCRIPTION - PROGRESSION: CLINICAL_PROGRESSION: NOT CHANGED

## 2020-07-23 ASSESSMENT — PAIN DESCRIPTION - ORIENTATION
ORIENTATION: LEFT

## 2020-07-23 ASSESSMENT — PAIN DESCRIPTION - FREQUENCY: FREQUENCY: CONTINUOUS

## 2020-07-23 ASSESSMENT — PAIN - FUNCTIONAL ASSESSMENT: PAIN_FUNCTIONAL_ASSESSMENT: ACTIVITIES ARE NOT PREVENTED

## 2020-07-23 ASSESSMENT — PAIN DESCRIPTION - ONSET: ONSET: ON-GOING

## 2020-07-23 NOTE — PROCEDURES
PICC   PROCEDURE   NOTE  Chart reviewed for allergies, diagnosis, labs, known contraindications, reason for line placement and planned length of treatment. Informed consent noted to be signed and on chart. Insertion procedure discussed with patient/family member. Three patient identifiers - Patient name,   and MRN -  completed &  confirmed verbally. Time out performed Hat, mask and eye shield donned. PICC site scrubbed with Chloraprep  One-Step applicator for 30 seconds x 1. Hand Hygiene  performed with 3% Chlorhexidine surgical scrub x1 min prior to  Sterile gloves, sterile gown being donned. Patient draped using maximal sterile barrier technique ( head to toe ). PICC site scrubbed a 2nd time with Chloraprep One-Step applicator x 30 sec. Modified Seldinger  technique/ultrasound assisted and tip locating system utilized for insertion. 1% Lidocaine 5 ml injected interdermally pre-insertion. PICC tip location in the SVC confirmed by ECG technology Sherlock 3CG. Positive brisk blood return obtained from all lumens  and each lumen flushed with 10 mls  0.9% Sterile Sodium Chloride. All lumens flush easily with no resistance. Valve placed on all lumens followed by Alcohol Swab Caps on end of each. Bio-patch in place. Catheter secured with non-sutured locking device per hospital protocol. Sterile Tegaderm applied over PICC site. Sterile field maintained during procedure. Guide wire and positioning wire accounted for post procedure and disposed of in sharps. Appearance of site is Clean dry and intact without bleeding or edema. All edges of Tegaderm occlusive. Site marked with date and initials of RN placing line. Teaching performed to pt/family and noted in education section. Bed placed in low position post-procedure. Top 2 side rails in up position call button in reach. Pt. Response to procedure tolerated well.   RN notified of all of the above. A single Lumen Power PICC was trimmed at 44 CM and placed [place] brachial vein. 1 CM out.

## 2020-07-23 NOTE — PROGRESS NOTES
Podiatric Surgery Daily Progress Note  Brayden Padilla   1 Day Post-Op      Subjective :   Patient seen and examined this am at the bedside. Patient denies any acute overnight events. Patient denies N/V/F/C/SOB. Patient denies calf pain, thigh pain, chest pain. Review of Systems: A 12 point review of symptoms is unremarkable with the exception of the chief complaint. Patient specifically denies nausea, fever, vomiting, chills, shortness of breath, chest pain, abdominal pain, constipation or difficulty urinating. Objective     /66   Pulse 75   Temp 97.6 °F (36.4 °C) (Oral)   Resp 16   Ht 6' 4\" (1.93 m)   Wt (!) 328 lb (148.8 kg)   SpO2 96%   BMI 39.93 kg/m²      I/O:    Intake/Output Summary (Last 24 hours) at 7/23/2020 0507  Last data filed at 7/23/2020 0302  Gross per 24 hour   Intake 1318 ml   Output 2375 ml   Net -1057 ml              Wt Readings from Last 3 Encounters:   07/20/20 (!) 328 lb (148.8 kg)   07/19/20 (!) 328 lb 9.6 oz (149.1 kg)   06/26/20 (!) 318 lb (144.2 kg)       LABS:    Recent Labs     07/21/20  0604 07/22/20  0431   WBC 10.0 6.9   HGB 10.4* 10.1*   HCT 32.6* 30.9*    171        Recent Labs     07/22/20  2052      K 5.5*      CO2 24   BUN 34*   CREATININE 1.3        Recent Labs     07/22/20  1300   PROT 6.3*           LOWER EXTREMITY EXAMINATION    Dressing to left LE intact. Wound vac running at continuous 125 mmHg. Scant hemorrhagic drainage noted in cannister. No strike through noted to external layers of dressing.      VASCULAR: DP and PT pulses non palpable 2/2 edema. CFT is brisk to the digits of the foot b/l. Skin temperature is warm to warm from proximal to distal with focal calor noted to the left plantar midfoot. Non-pitting edema noted to the left foot, localized to the area of the wound. No pain with calf compression b/l. Mild localized erythema noted, left foot.      NEUROLOGIC: Gross and epicritic sensation is diminished b/l.  Protective sensation is absent at all pedal sites b/l.     DERMATOLOGIC: Full thickness wound present to the plantar aspect of the left midfoot. Wound bed is mainly red granular tissue and measures approximately 3.0x2.0x2.0 cm. Lateral periwound slightly macerated. Wound probes to bone. Scant hemorrhagic drainage with dressing removal noted. No malodor noted. MUSCULOSKELETAL: Muscle strength is 5/5 for all pedal groups tested. No pain with palpation of left wound and periwound area.       IMAGING:  XR left foot s/p I&D 7/22/20  Findings:    Hindfoot collapse and dorsal subluxation of the midfoot representing Charcot neuropathy are redemonstrated. There has been interval removal of the ossific density in the medial heel pad. There is no acute fracture or dislocation. Postoperative soft    tissue tissue swelling is present.              Impression    Impression:    Charcot neuropathy status post removal of plantar osseous fragment.                MRI left foot 7/21/20  Impression         Soft tissue ulcer involving the heel pad with underlying soft tissue inflammation         Hindfoot collapse with fragmentation and disorganization consistent with Charcot neuropathy. Dorsal  subluxation of the midfoot indicating disruption of Chopart ligaments.         Abnormal marrow signal and marrow enhancement involving the hindfoot and midfoot as well as distal tibia and fibula, suspicious for osteomyelitis.  Ancillary findings include peripherally enhancing fluid collections which likely represent abscesses.         Ossific structure within the medial heel pad, correlating with conventional radiographs-see above           ASSESSMENT/PLAN  -S/P Incision and Drainage, down to and including bone, left foot (DOS: 7/22/20)  -Full thickness wound, plantar left foot, Coffey III  -Cellulitis, left LE - resolved  -Osteomyelitis vs charcot neuroarthopathy, left foot/ankle  -Diabetes mellitus with peripheral neuropathy      -Patient examined and evaluated at bedside  -VSS, morning labs pending   -XR reviewed s/p I&D procedure, impressions stated above  -MRI reviewed, impressions stated above; multiple abscess formations identified, and changes consistent with osteomyelitis  -Wound culture of left foot wound collected 7/20/20; NGTD  -F/u bone biopsy and wound cultures  -ID following, recs appreciated.   -Wound vac dressing changed   -Wound vac running at continuous 125 mmHg  -Surgical shoe ordered. Patient to wear at all times while out of bed. -NON weight bearing to the left foot, heel weight bearing for transfers only    -PT/OT ordered, f/u eval; patient strict NON WB to the left foot 2/2 plantar foot wound, heel weight bearing for transfers only  -SW following, WOUND VAC NEEDED AT D/C. Paperwork filled out and placed on patients chart. DISPO: No further surgical intervention from podiatry standpoint. Patient okay for discharge once wound vac therapy is set up and final ID recs are made. Discussed assessment and plan with Dr. Shruthi Mace.     Theresa Patiño DPM, PGY-3  Pager #: [phone] or perfect serve ([address] [place] [address])

## 2020-07-23 NOTE — PROGRESS NOTES
Occupational Therapy  Facility/Department: [facility]  Daily Treatment Note  NAME: Vida Sanchez  : 1950  MRN: [id number]    Date of Service: 2020    Discharge Recommendations:Marvin Guy scored a  on the AM-PAC ADL Inpatient form. Current research shows that an AM-PAC score of 17 or less is typically not associated with a discharge to the patient's home setting. Based on the patient's AM-PAC score and their current ADL deficits, it is recommended that the patient have 3-5 sessions per week of Occupational Therapy at d/c to increase the patient's independence. Please see assessment section for further patient specific details. If patient discharges prior to next session this note will serve as a discharge summary. Please see below for the latest assessment towards goals. OT Equipment Recommendations  Equipment Needed: No  Other: defer to next care facility    Assessment   Performance deficits / Impairments: Decreased functional mobility ; Decreased ADL status; Decreased endurance;Decreased safe awareness  Assessment: Pt with ongoing limitations with functional transfers / LE ADls. Pt requires 2 person assist with sit to stand and difficultly maintaining Heel w/b LLE Pt would benefit from ongoing OT as inpt at d/c. Will follow per plan of care. Treatment Diagnosis: Decreased functional mobility and ADL status  2/2 NWB/ Heel WB  precaution on LLE  REQUIRES OT FOLLOW UP: Yes  Activity Tolerance  Activity Tolerance: Patient Tolerated treatment well;Patient limited by fatigue  Activity Tolerance: Pt limited 2/2 NWB precautions in LLE  Safety Devices  Safety Devices in place: Yes  Type of devices: Bed alarm in place; Left in bed;Call light within reach;Nurse notified         Patient Diagnosis(es): There were no encounter diagnoses.       has a past medical history of Atrial fibrillation ([address] Back pain, Cardiomyopathy, CHF (congestive heart failure) ([address] COPD (chronic obstructive pulmonary disease) ([facility] Dyslipidemia, GERD (gastroesophageal reflux disease), Hyperlipidemia, Hypertension, Hypothyroidism, Leg edema, MRSA (methicillin resistant staph aureus) culture positive, MRSA nasal colonization, Obesity, Prolonged emergence from general anesthesia, 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, Stroke [facility]), Thyroid disease, Type 2 diabetes mellitus without complication ([facility] [address] and Type II or unspecified type diabetes mellitus without mention of complication, not stated as uncontrolled. has a past surgical history that includes Foot surgery (1-2-10); Adrenal gland surgery (1970); Gastric bypass surgery; other surgical history (10/6/15); other surgical history (Right, 10/8/15); Cardiac defibrillator placement (09/05/2017); pacemaker placement; Colonoscopy (N/A, 3/8/2019); Upper gastrointestinal endoscopy (N/A, 3/8/2019); Colonoscopy (N/A, 8/9/2019); Colonoscopy (8/9/2019); Foot Debridement (Right, 8/28/2019); and Foot Debridement (Left, 7/22/2020). Restrictions  Position Activity Restriction  Other position/activity restrictions: strict NWB L except heel WB for txfers only (per 7/22 podiatry note), no activity restrictions noted  Subjective   General  Chart Reviewed: Yes  Additional Pertinent Hx: Pt admit 7/20 from pediatry for Cellulitis and abscess of foot. s/p I&D 7/22 with wound vac application. XRfoot: possible soft tissue gas along plantar surface on foot, deformity on hindfoot            XRchest: negative for cardiopulmonary    PMH: DM, stroke, renal disease, HTN, COPD, obesity, back pain, cardiomyopathy  Family / Caregiver Present: No  Diagnosis: Cellulitis and abscess of foot  Subjective  Subjective: \" I think the doctors told me I need to go to a nursing center\"  Pt in bed agreeable for OT tx.   Pt able use heel LLE for transfers only  Vital Signs  Patient Currently in Pain: Denies     Orientation  Orientation  Overall Orientation Status: Within Normal Limits    Objective    ADL  Feeding: Setup  LE Dressing: Moderate assistance;Maximum assistance(pt needing Max A with donning shoe / cast shoe -\" It hurts to bend)  Toileting: Maximum assistance(with pericare / able to use urinal to void)  Additional Comments: Pt was limited due to NWB/ Heel WB  on L foot. Balance  Sitting Balance: Supervision(to Mod independence - pt needing cues for sitting square at EOB. Sat x 18 mins)  Standing Balance:  Moderate assistance(x 1 with walker -- Pt unable to maintain heel WB LLE)  Standing Balance  Time: 1 mins x 1 , parital stance x 15 secs  Activity: stance with walker  Functional Mobility  Functional Mobility Comments: unable to attempt - not able to maintain heel NWB  Toilet Transfers  Toilet Transfer: Unable to assess  Toilet Transfers Comments: not safe to attempt  Bed mobility  Rolling to Right: Stand by assistance(with rail and HOB Up)  Supine to Sit: Minimal assistance(with trapeze and rail; effortful for LEs)  Sit to Supine: Minimal assistance(for LEs)  Scooting: Minimal assistance  Transfers  Sit to stand: Dependent/Total(Max A x 1 and Min A with raised bed)  Stand to sit: Moderate assistance  Transfer Comments: Pt attempted stance x 2 with Max A x 1, Min A x 1 with difficulty maintaining Heel WB LLE     Cognition  Overall Cognitive Status: WFL     Type of ROM/Therapeutic Exercise  Comment: pt edu and encouraged to perform AROM in bed all planes- verb understanding     Plan   Plan  Times per week: 2-5  Times per day: Daily  Current Treatment Recommendations: Strengthening, Functional Mobility Training, Endurance Training, Self-Care / ADL, Safety Education & Training    AM-PAC Score  AM-[facility] Inpatient Daily Activity Raw Score: 16 (07/23/20 1210)  AM-PAC Inpatient ADL T-Scale Score : 35.96 (07/23/20 1210)  ADL Inpatient CMS 0-100% Score: 53.32 (07/23/20 1210)  ADL Inpatient CMS G-Code Modifier : CK (07/23/20 1210)    Goals  Short term goals  Time Frame for Short term goals: at d/c  Short term goal 1: SBA for bed mobility, supine to sit EOB - not met  Short term goal 2: SBA for LE dressing at EOB, no verbal cues - not met  Short term goal 3: Sit to Stand with Mod A x1- new goal  Patient Goals   Patient goals : wants to go home     Therapy Time   Individual Concurrent Group Co-treatment   Time In 1124         Time Out 1203         Minutes 39              Timed Code Treatment Minutes:  39 mins     Total Treatment Minutes:  39 mins       Denis Armstrong OT

## 2020-07-23 NOTE — OP NOTE
Operative Note      Patient: Bharati Ordaz  YOB: 1950  MRN: [id number]    Date of Procedure: 7/22/2020    Pre-Op Diagnosis: ABSCESS LEFT FOOT  Post-Op Diagnosis: Same   Procedure(s):  1. LEFT FOOT INCISION AND DRAINAGE down to and including bone, left foot   2. Bone biopsy, left foot   Surgeon(s):  Miguel Schreiber DPM  Assistant:   Resident: Hilda Lucas DPM  Anesthesia: Choice  Estimated Blood Loss (mL): Minimal  Complications: None  Specimens:   ID Type Source Tests Collected by Time Destination   1 : 1. TAILOR HEAD LEFT FOOT Specimen Foot CULTURE, FUNGUS, CULTURE, SURGICAL, CULTURE WITH SMEAR, ACID FAST [facility] 7/22/2020 1813    A : A. TAILOR HEAD LEFT FOOT Tissue Tissue [facility] 7/22/2020 1818       Drains:   Negative Pressure Wound Therapy Foot Left (Active)   Wound Type Surgical 07/23/20 1251   Dressing Type Other (Comment) 07/23/20 1251   Cycle Continuous 07/23/20 1251   Target Pressure (mmHg) 125 07/23/20 1251   Dressing Status Clean; Intact;Dry 07/23/20 1251   Dressing Changed Changed/New 07/22/20 1940   Drainage Amount Scant 07/22/20 1940   Drainage Description Serosanguinous 07/22/20 1940   Output (ml) 0 ml 07/23/20 0937   Wound Assessment Other (Comment) 07/23/20 0302   Michelle-wound Assessment Other (Comment) 07/23/20 0302   Findings: as dictated     Detailed Description of Procedure:     INDICATIONS FOR PROCEDURE: This patient has signs and symptoms clinically  consistent with the above mentioned preoperative diagnosis. Having failed conservative treatment, it was determined that the patient would benefit from surgical intervention. All potential risks, benefits, and complications were discussed with the patient prior to the scheduling of surgery. All the patient's questions were answered and no guarantees were given. The patient wished to proceed with surgery, and informed written consent was obtained.      DETAILS OF PROCEDURE: The patient was brought from the pre-operative area and placed on the operating table in the supine position. Following IV sedation, a local anesthetic block was then injected proximal to the incision site consisting of 10cc of 2% lidocaine plain. The left  lower extremity was then scrubbed, prepped, and draped in the usual sterile fashion. A time-out was performed. The patient, procedure, and operative site were confirmed. An Esmarch bandage was then utilized to exsanguinate the patient's left lower extremity and left in place at the level of the ankle for hemostasis, and the following procedure was performed. Procedures #1 and #2: Incision and drainage, down to and including bone, and bone biopsy, left foot: Attention was directed toward the plantar medial aspect of the left midfoot where a full thickness wound was noted. The wound probed deep to bone. Moderate serosanguinous drainage was noted. Using a #15 blade, sharp excisional debridement of the left foot wound was performed down to, but not including, the level of bone. <25 cm^2 non-viable tissue was removed. 5cc estimated blood loss. Hemostasis achieved with pressure. Next, blunt dissection was used to explore the wound bed and a free floating large osseous fragment was identified directly below the wound bed. This was grasped with a kocher and sharply excised in toto. Upon removal, the fragment was noted to be at least 5.0x4.0cm. It was passed to the back table to be sent as a bone biopsy specimen for pathology evaluation. At this time, copious amounts of normal sterile saline was used to irrigate the wound bed via pulse lavage system. Upon completion, hemostasis was achieved with pressure. Attention was then directed toward applying the wound vac. At this time a piece of non-adherent dressing was placed over the incision site. Next, a [employer] Wound Vac was placed over the patient's incision site using the manufacturers instructions.  Tegaderm was used to cover and protect the surrounding soft tissues from maceration. The black foam was cut to size and placed over the incision site. This was then covered with clear tegaderm. Next, the wound vac was connected to the suction device with no leaks noted and great negative pressure noted. A soft sterile dressing was applied consisting of gauze, kerlix, and ace. The esmarch tourniquet was released after approximately 20 minutes and a prompt hyperemic response was noted on all aspects of the patient's left lower extremity. END OF PROCEDURE: The patient tolerated the procedure and anesthesia well. The patient left the OR and was transferred to the PACU with vital signs stable and vascular status intact to all aspects of the left lower extremity. Following short period of postoperative monitoring, the patient will be readmitted to medical floor for further medical management and treatment of their medical comorbid conditions.     Dictated on behalf of Dr. Wilfrid Singh, Sebastian Felix DPM, PGY-3  Pager #: [phone] or perfect elisabeth Conner)      Electronically signed by Nikole Reno DPM on 7/23/2020 at 2:08 PM

## 2020-07-23 NOTE — PROGRESS NOTES
Impression    Impression:    Charcot neuropathy status post removal of plantar osseous fragment. Xray left foot 7/20  Impression    1. Pathologic joint is complete collapse of the hindfoot with fragmentation. 2. Extensive forefoot soft tissue swelling with no localized bone destruction or demineralization. 3. Correlation with MRI would exclude occult osteomyelitis.       Xray chest 7/19  Impression    No acute cardiopulmonary disease on portable chest.       LE DVT study 7/2   Summary          Normal venous duplex examination of the legs bilaterally with normal venous     Doppler signals noted throughout.     No evidence of thrombophlebitis is noted bilaterally in the deep and     superficial veins of the legs. Small calf thrombi cannot be excluded ,     particularly in the left lower extremity       MRI Left foot  Impression    Soft tissue ulcer involving the heel pad with underlying soft tissue inflammation         Hindfoot collapse with fragmentation and disorganization consistent with Charcot neuropathy. Dorsal  subluxation of the midfoot indicating disruption of Chopart ligaments.         Abnormal marrow signal and marrow enhancement involving the hindfoot and midfoot as well as distal tibia and fibula, suspicious for osteomyelitis.  Ancillary findings include peripherally enhancing fluid collections which likely represent abscesses.         Ossific structure within the medial heel pad, correlating with conventional radiographs-see above       Scheduled Meds:   [START ON 7/24/2020] vancomycin  1,750 mg Intravenous Q24H    insulin lispro  0-6 Units Subcutaneous TID WC    insulin lispro  0-3 Units Subcutaneous Nightly    apixaban  5 mg Oral BID    cefepime  2 g Intravenous Q12H    amiodarone  200 mg Oral Daily    [Held by provider] apixaban  5 mg Oral BID    aspirin  81 mg Oral Daily    atorvastatin  40 mg Oral Nightly    carvedilol  6.25 mg Oral BID WC    furosemide  40 mg Oral Daily    insulin lispro (1 Unit Dial)  10 Units Subcutaneous TID     insulin glargine  10 Units Subcutaneous Nightly    levothyroxine  25 mcg Oral Daily    lisinopril  2.5 mg Oral Daily    spironolactone  12.5 mg Oral Daily    tamsulosin  0.4 mg Oral Nightly       Continuous Infusions:   dextrose         PRN Meds:  traMADol, simethicone, naloxone, diphenhydrAMINE, acetaminophen, docusate sodium, promethazine, glucose, dextrose, glucagon (rDNA), dextrose      Assessment:      S/P I&D with bone removal (DOS 7/22/2020)  Diabetic foot infection - Left   Cellulitis, Left  OM vs Charcot neuropathy; Left foot  DM2 with peripheral neuropathy  CHF  Obesity BMI (39)    Plan:     Cont. IV vancomycin + Cefepime for now  Podiatry - no further sx / OK for D/C with wound VAC / ID recs  Unsure if bone destruction was caused by Osteomylitis vs. Charcot    F/U sx bone path and cult    Nephrology - Cr improving / f/u outpt     May anticipate PICC with IV abx outpt     Discussed with Attending    Hudson Hemphill DPM   Podiatric Resident, PGY-2  Pager: [phone] or Perfect serve    Addendum to Resident Progress note:  Pt seen, examined and evaluated. I have reviewed the current history, physical findings, labs and assessment and plan and agree with note as documented by resident (Dr. Gonzales).    As above  DM foot with neuropathy, L foot ulcer and cellulitis. Admit, started on vancomycin + cefepime  Podiatry consulted - noted 'probe to bone'  Wound cult - GS 3+WBC, no organism, cult - mixed skin xu.    MRI -   'Hindfoot collapse with fragmentation and disorganization consistent with Charcot neuropathy. Dorsal  subluxation of the midfoot indicating disruption of Chopart ligaments. '    POD#1 L foot I&D, removed bone fragment.     IMP/  DM, neuropathy  Charcot neuroarthropathy with extensive destructive changes on MRI   DM foot ulcer with poss extension osteomyelitis       Unclear how much bone marrow signal on MRI represents Charcot vs osteomyelitis is unclear  Given limb threatening condition, will empirically rx for osteomyelitis     REC/  Cont vancomycin  Change cefepime to ceftriaxone  Will f/u on OR cult  / path    PICC   See TONYA  OR later today per Podiatry - debridement; await findings, cultures / path     Discussed with pt, RN  Prakash Gant MD    INFUSION ORDERS:  Vancomycin 1.75 gm iv daily through 9/2  Ceftriaxone 2 gm iv daily through 9/2  - First dose given in hospital  - PICC   - Disposition / date discharge  - Check CBC w diff, CMP, ESR, CRP, vancomycin trough every Mon or Tue - FAX result to [phone]  - Call with antibiotic / infusion issues, [phone]  - No f/u in outpatient ID office necessary

## 2020-07-23 NOTE — PROGRESS NOTES
4 Eyes Admission Assessment     I agree as the admission nurse that 2 RN's have performed a thorough Head to Toe Skin Assessment on the patient. ALL assessment sites listed below have been assessed on admission. Areas assessed by both nurses:   [x]   Head, Face, and Ears   [x]   Shoulders, Back, and Chest  [x]   Arms, Elbows, and Hands   [x]   Coccyx, Sacrum, and Ischum  [x]   Legs, Feet, and Heels        Does the Patient have Skin Breakdown? No, exception to surgical site see TORO Parks Prevention initiated:  No   Wound Care Orders initiated:  No      [facility] nurse consulted for Pressure Injury (Stage 3,4, Unstageable, DTI, NWPT, and Complex wounds):  No      Nurse 1 eSignature: Electronically signed by Vonda Holly on 7/22/20 at 10:30 PM EDT    **SHARE this note so that the co-signing nurse is able to place an eSignature**    Nurse 2 eSignature: Electronically signed by Rin Alvarez RN on 7/22/20 at 10:30 PM EDT

## 2020-07-23 NOTE — PROGRESS NOTES
[facility]    [facility]              [phone]                       Plan :     -  UPEP, SPEP, Urine Eosinophils, Serum free light chain are pending  - Renal U/S  - On ABX  - Please adjust Vanc apporpriately to decrease risk of renal injury  - Please avoid nephrotoxic agents   - Continue Lasix  - creatinine improved 1.6 > 1.3     Assessment :     1. RODERICK on CKD stage 3  - Likely 2/2 to cardiorenal syndrome along with infectious component  - CHF (last echo 5/19, EF 35-40%)  - Cr fluctuates between 1-1.5  - Type II Cardiorenal syndrome: CKD due to chronic HF  - UA, Urine Na, Urine Protein, UPEP, SPEP, Urine Eosinophils, Serum free light chains  - Renal U/S    2. Osteomyelitis  - Continue Abx with Vanc and Cefepime  - Please adjust Vanc apporpriately to decrease risk of renal injury    3. CKD Stage 3  - Assessed by Dr. Dorinda Ahmadi initially at [facility]  - S/p gastric bypass in 2013 with 100lb wt. Loss  - Last time seen Cr was 1.0  - Please avoid nephrotoxic agents     4. Mild Hyponatremia  - Likely 2/2 high volume status due to CHF  - Na 137  - Continue Lasix    [facility] would like to thank Steve Macedo MD   for opportunity to serve this patient      Please call with questions at-   24 Hrs Answering service [phone] or  7 am- 5 pm via Perfect serve or cell phone  Juliana Yung          CC/reason for consult :     RODERICK     HPI :     Sahil Quintana is a 81 y.o. male with PMH of IDDM, Afib (on Eliquis), systolic CHF s/p BiV ICD in 2017, COPD, HTN, MRSA + osteomyelitis in 2015, CKD stage 3, Stroke presented [facility] on 7/20/2020 with a draining left foot ulcer. Patient initially started experiencing left foot pain and soreness a couple of weeks ago. 5 days ago he noticed increased drainage from the heel of his left foot. On 7/18/20, he noticed blood from the area and came to the ED.  In the ED, he complained of fatigue for a couple of weeks along with the draining foot ulcer. He was seen by podiatry, put on Augmentin and told to follow up outpatient. After his follow-up outpatient, there was concern of worsening foot cellulitis and wound probing to the bone. He came to the ED on 7/20/20 and was admitted. In the ED, vittals were stable, BUN:Cr was 25:1.5, . 3. He had an MRI done later after his admission which showed soft tissue inflammation, Charcot neuropathy, suspicion for osteomyelitis involving his distal tibia and fibula and peripheral enhancing abcess     Interval History:     No acute overnights events  Urine is 2375 ml and BP is OK    ROS:     Seen with -  With resident    positives in bold   Constitutional:  fever, chills, weakness, weight change, fatigue  Skin:  rash, pruritus, hair loss, bruising, dry skin, petechiae  Head, Face, Neck   headaches, swelling,  cervical adenopathy  Respiratory: shortness of breath, cough, or wheezing  Cardiovascular: chest pain, palpitations, dizzy, edema  Gastrointestinal: nausea, vomiting, diarrhea, constipation,belly pain    Yellow skin, blood in stool  Musculoskeletal:  back pain, muscle weakness, gait problems,       joint pain or swelling. Genitourinary:  dysuria, poor urine flow, flank pain, blood in urine  Neurologic:  vertigo, TIA'S, syncope, seizures, focal weakness  Psychosocial:  insomnia, anxiety, or depression.   Additional positive findings:                       All other remaining systems are negative or unable to obtain        PMH/PSH/SH/Family History:     Past Medical History:   Diagnosis Date    Atrial fibrillation (HCC)     Back pain     Cardiomyopathy     CHF (congestive heart failure) (HCC)     COPD (chronic obstructive pulmonary disease) (HCC)     Dyslipidemia     GERD (gastroesophageal reflux disease)     Hyperlipidemia     Hypertension     Hypothyroidism     Leg edema     MRSA (methicillin resistant staph aureus) culture positive 10/5/15    foot/bone    MRSA nasal colonization 05/05/2017    Obesity     Prolonged emergence from general anesthesia     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     Stroke [facility])     Thyroid disease     Type 2 diabetes mellitus without complication ([facility] [address]     Type II or unspecified type diabetes mellitus without mention of complication, not stated as uncontrolled        Past Surgical History:   Procedure Laterality Date    ADRENAL GLAND SURGERY  1970    CARDIAC DEFIBRILLATOR PLACEMENT  09/05/2017    Dr. Deidre Dalton COLONOSCOPY N/A 3/8/2019    COLONOSCOPY WITH MAC, UNASYN (3gm) performed by Micki Liang MD at [address] 8/9/2019    COLONOSCOPY POLYPECTOMY SNARE/COLD BIOPSY [facility]  8/9/2019    COLONOSCOPY WITH BIOPSY performed by Micki Liang MD at [address] Right 8/28/2019    INCISION AND INCISIONAL DEBRIDEMENT OPEN FRACTURE RIGHT 2ND TOE SKIN AND BONE performed by Sai Wills MD at [address] Left 7/22/2020    LEFT FOOT INCISION AND DRAINAGE WITH BONE BIOPSY AND REMOVAL OF FREE FLOATING BONE FRAGMENT performed by Jose Roberto Moreno DPM at [facility]  1-2-    GASTRIC BYPASS SURGERY      OTHER SURGICAL HISTORY  10/6/15    INCISION AND DRAINAGE RIGHT FOOT          OTHER SURGICAL HISTORY Right 10/8/15    INCISION AND DRAINAGE RIGHT FOOT      PACEMAKER PLACEMENT      UPPER GASTROINTESTINAL ENDOSCOPY N/A 3/8/2019    EGD BIOPSY GASTRIC H. PYLORI performed by Micki Liang MD at [facility]        reports that he quit smoking about 3 years ago. His smoking use included cigarettes. He has a 4.00 pack-year smoking history. He has never used smokeless tobacco. He reports current alcohol use. He reports that he does not use drugs. family history includes Diabetes in his maternal grandmother; Heart Disease in his father; Hypertension in his maternal grandmother and mother.          Medication: Current Facility-Administered Medications: [START ON 7/24/2020] vancomycin (VANCOCIN) 1,750 mg in dextrose 5 % 250 mL IVPB, 1,750 mg, Intravenous, Q24H  insulin lispro (1 Unit Dial) 0-6 Units, 0-6 Units, Subcutaneous, TID WC  insulin lispro (1 Unit Dial) 0-3 Units, 0-3 Units, Subcutaneous, Nightly  apixaban (ELIQUIS) tablet 5 mg, 5 mg, Oral, BID  traMADol (ULTRAM) tablet 50 mg, 50 mg, Oral, Q6H PRN  simethicone (MYLICON) 40 EK/9.2EO drops 40 mg, 40 mg, Oral, Q6H PRN  naloxone (NARCAN) injection 0.4 mg, 0.4 mg, Intravenous, PRN  diphenhydrAMINE (BENADRYL) injection 25 mg, 25 mg, Intravenous, Q6H PRN  acetaminophen (TYLENOL) tablet 500 mg, 500 mg, Oral, Q6H PRN  docusate sodium (COLACE) capsule 100 mg, 100 mg, Oral, BID PRN  promethazine (PHENERGAN) tablet 12.5 mg, 12.5 mg, Oral, Q6H PRN  cefepime (MAXIPIME) 2 g IVPB minibag, 2 g, Intravenous, Q12H  glucose (GLUTOSE) 40 % oral gel 15 g, 15 g, Oral, PRN  dextrose 50 % IV solution, 12.5 g, Intravenous, PRN  glucagon (rDNA) injection 1 mg, 1 mg, Intramuscular, PRN  dextrose 5 % solution, 100 mL/hr, Intravenous, PRN  amiodarone (CORDARONE) tablet 200 mg, 200 mg, Oral, Daily  [Held by provider] apixaban (ELIQUIS) tablet 5 mg, 5 mg, Oral, BID  aspirin EC tablet 81 mg, 81 mg, Oral, Daily  atorvastatin (LIPITOR) tablet 40 mg, 40 mg, Oral, Nightly  carvedilol (COREG) tablet 6.25 mg, 6.25 mg, Oral, BID WC  furosemide (LASIX) tablet 40 mg, 40 mg, Oral, Daily  insulin lispro (1 Unit Dial) 10 Units, 10 Units, Subcutaneous, TID WC  insulin glargine (LANTUS;BASAGLAR) injection pen 10 Units, 10 Units, Subcutaneous, Nightly  levothyroxine (SYNTHROID) tablet 25 mcg, 25 mcg, Oral, Daily  lisinopril (PRINIVIL;ZESTRIL) tablet 2.5 mg, 2.5 mg, Oral, Daily  spironolactone (ALDACTONE) tablet 12.5 mg, 12.5 mg, Oral, Daily  tamsulosin (FLOMAX) capsule 0.4 mg, 0.4 mg, Oral, Nightly       Vitals :     Vitals:    07/23/20 0417   BP:    Pulse:    Resp: 16   Temp:    SpO2:        I & O : Intake/Output Summary (Last 24 hours) at 7/23/2020 0810  Last data filed at 7/23/2020 0302  Gross per 24 hour   Intake 1268 ml   Output 2375 ml   Net -1107 ml        Physical Examination :     General appearance: Anxious- no, distressed- no, in good spirits- yes  HEENT: Lips- normal, teeth- ok , oral mucosa- moist  Neck : Mass- no, appears symmetrical, JVD- not visible  Respiratory: Respiratory effort- , wheeze- no, crackles -  Cardiovascular:  Ausculation- No M/R/G, Edema   Abdomen: visible mass- no, distention- no, scar- no, tenderness- no                            hepatosplenomegaly-  no  Musculoskeletal:  clubbing no,cyanosis- no , digital ischemia- no                           muscle strength- grossly normal , tone - grossly normal  Skin: rashes- no , ulcers- no, induration- no, tightening - no  Psychiatric:  Judgement and insight- normal           AAO X 3  Additional finding:     LABS:     Recent Labs     07/21/20  0604 07/22/20  0431 07/23/20  0423   WBC 10.0 6.9 5.3   HGB 10.4* 10.1* 10.7*   HCT 32.6* 30.9* 33.0*    171 179     Recent Labs     07/22/20  0431 07/22/20 2052 07/23/20  0423   * 139 137   K 4.7 5.5* 4.7    103 103   CO2 21 24 22   BUN 38* 34* 36*   CREATININE 1.6* 1.3 1.3   GLUCOSE 126* 138* 142*   MG  --  2.30  --             Thanks  Nephrology  Anthony Cardenas [address], [address]  Office: [phone]  Cell: [phone]  Fax: [phone]

## 2020-07-23 NOTE — PROGRESS NOTES
Physical Therapy  Facility/Department: [facility]  Daily Treatment Note  NAME: Jannet Aguilar  : 1950  MRN: [id number]    Date of Service: 2020    Discharge Recommendations:Marvin Springer scored a  on the AM-PAC short mobility form. Current research shows that an AM-PAC score of 17 or less is typically not associated with a discharge to the patient's home setting. Based on the patient's AM-PAC score and their current functional mobility deficits, it is recommended that the patient have 3-5 sessions per week of Physical Therapy at d/c to increase the patient's independence. Please see assessment section for further patient specific details. If patient discharges prior to next session this note will serve as a discharge summary. Please see below for the latest assessment towards goals. PT Equipment Recommendations  Equipment Needed: (defer)    Assessment   Body structures, Functions, Activity limitations: Decreased functional mobility   Assessment: Pt now POD 1 from L foot I and D and is allowed heel WB L for txfers per podiatry notes. Pt reports he has preexisting weakness R LE from old CVA. Pt unable to observe heel WB L for sit to/from stand with bariatric rolling walker. Safety concerns for pt to return home with L foot WB restriction and inability to maintain. Pt planning to pursue SNF at discharge. Pt would benefit from continued skilled IP PT at discharge to maximize his functional independence prior to d/c home. REcommend nursing utilize maximover to assist pt to chair PRN and often. Treatment Diagnosis: mobility impairment due to L foot cellulitis  Patient Education: Pt educated on PT role, importance of OOB mobility, strict NWB L/heel WB L for txfers and he verbalized understanding. REQUIRES PT FOLLOW UP: Yes  Activity Tolerance  Activity Tolerance: Patient Tolerated treatment well;Patient limited by endurance; Patient limited by pain  Activity Tolerance: MOSS with activity     Patient Diagnosis(es): There were no encounter diagnoses. has a past medical history of Atrial fibrillation ([facility] [address] Back pain, Cardiomyopathy, CHF (congestive heart failure) ([facility] [address] COPD (chronic obstructive pulmonary disease) ([facility] [address] Dyslipidemia, GERD (gastroesophageal reflux disease), Hyperlipidemia, Hypertension, Hypothyroidism, Leg edema, MRSA (methicillin resistant staph aureus) culture positive, MRSA nasal colonization, Obesity, Prolonged emergence from general anesthesia, 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, Stroke [facility]), Thyroid disease, Type [facility] complication [facility] and Type II or unspecified type diabetes mellitus without mention of complication, not stated as uncontrolled. has a past surgical history that includes Foot surgery (1-2-10); Adrenal gland surgery (1970); Gastric bypass surgery; other surgical history (10/6/15); other surgical history (Right, 10/8/15); Cardiac defibrillator placement (09/05/2017); pacemaker placement; Colonoscopy (N/A, 3/8/2019); Upper gastrointestinal endoscopy (N/A, 3/8/2019); Colonoscopy (N/A, 8/9/2019); Colonoscopy (8/9/2019); Foot Debridement (Right, 8/28/2019); and Foot Debridement (Left, 7/22/2020). Restrictions  Position Activity Restriction  Other position/activity restrictions: strict NWB L except heel WB for txfers only (per 7/22 podiatry note), no activity restrictions noted  Subjective   General  Chart Reviewed: Yes  Additional Pertinent Hx: The patient is a 81 y.o. male with medical history as below, most notable for HTN, DM type 2 with neuropathy, a fib on eliquis, systolic CHF, s/p BiV ICD in 2017, who presents to [facility] with ulcer on his left foot with drainage. Podiatry consult; 7/22 OR for I and D L foot down to bone. Family / Caregiver Present: No  Subjective  Subjective: Pt found supine in bed and agreeable to PT. \"I'm going to a rehab center when I leave.  \"  Pain Screening  Patient Currently in Pain: Denies  Vital Signs  Patient Currently in Pain: Denies       Orientation  Orientation  Overall Orientation Status: Within Functional Limits     Objective   Bed mobility  Rolling to Right: Stand by assistance(with rail and HOB Up)  Supine to Sit: Minimal assistance(with trapeze and rail; effortful for LEs)  Sit to Supine: Minimal assistance(for LEs)  Scooting: Minimal assistance     Transfers  Sit to Stand: mod  Assistance(from raised ht bed with max vc;pt unable to maintain heel WB L; slow and effortful); pt stood x1 min with min/CGA and max vc  Stand to sit: Minimal Assistance(vc for hand placement; unable to maintain heel WB L)  Bed to Chair: (not attempted due to pt unable to maintain heel WB L to stand; PICC line RN waiting to see pt)    Ambulation  Ambulation?: (unable to assess due to pt's inability to maintain heel WB L for stance)     Balance  Sitting - Static: Good  Sitting - Dynamic: Good  Comments: Pt able to sit EOB x15 mins unsupported independent.                             AM-PAC Score  AM-PAC Inpatient Mobility Raw Score : 11 (07/23/20 1214)  -PAC Inpatient T-Scale Score : 33.86 (07/23/20 1214)  Mobility Inpatient CMS 0-100% Score: 72.57 (07/23/20 1214)  Mobility Inpatient CMS G-Code Modifier : CL (07/23/20 1214)          Goals  Short term goals  Time Frame for Short term goals: discharge  Short term goal 1: sit to/from supine supervision  ongoing  Short term goal 2: sit to/from stand min assist NWB L/heel WB L  ongoing  Short term goal 3: bed to/from chair supervision NWB L/heel WB L  ongoing  Patient Goals   Patient goals : return home    Plan    Plan  Times per week: 2-5  Current Treatment Recommendations: Transfer Training, Endurance Training, Functional Mobility Training  Safety Devices  Type of devices: Bed alarm in place, Left in bed, Call light within reach, Nurse notified(PICC Line RN here and needed pt in bed)     Therapy Time   Individual Concurrent Group Co-treatment   Time In [phone]

## 2020-07-23 NOTE — ANESTHESIA POSTPROCEDURE EVALUATION
Department of Anesthesiology  Postprocedure Note    Patient: Bharati Ordaz  MRN: [id number]  YOB: 1950  Date of evaluation: 7/22/2020  Time:  8:03 PM     Procedure Summary     Date:  07/22/20 Room / Location:  [address] [place] [address] [address] [place] [address] / [facility]    Anesthesia Start:  1603 Anesthesia Stop:  9980    Procedure:  LEFT FOOT INCISION AND DRAINAGE WITH BONE BIOPSY AND REMOVAL OF FREE FLOATING BONE FRAGMENT (Left ) Diagnosis:  (ABSCESS LEFT FOOT)    Surgeon:  Miguel Schreiber DPM Responsible Provider:  Deni Degroot MD    Anesthesia Type:  general ASA Status:  3          Anesthesia Type: No value filed. Perez Phase I: Perez Score: 10    Perez Phase II:      Last vitals: Reviewed and per EMR flowsheets.        Anesthesia Post Evaluation    Patient location during evaluation: PACU  Patient participation: complete - patient participated  Level of consciousness: awake and alert  Pain score: 0  Airway patency: patent  Nausea & Vomiting: no nausea and no vomiting  Complications: no  Cardiovascular status: hemodynamically stable  Respiratory status: acceptable  Hydration status: euvolemic

## 2020-07-23 NOTE — CONSULTS
[address]   Cardiac Electrophysiology Consultation   Date: 7/23/2020  Admit Date:  7/20/2020  Reason for Consultation: ? ICD shock  Consult Requesting Physician: Carmelo Montes MD     No chief complaint on file. HPI: Heri Bradshaw is a 81 y.o. gentleman with a past medical history significant for nonischemic cardiomyopathy, status post CRT-D, paroxysmal atrial fibrillation, obesity, status post gastric bypass, hypertension, hyperlipidemia, hypothyroidism, type 2 diabetes mellitus was admitted to the hospital secondary to the need for wound excision of the lower extremity. In the OR, he was reported to have a 10 beat run of ventricular tachycardia and received a ICD therapy based on the CRNA report. I do not have any documentations of the same. Hence, EP was consulted for further evaluation. Patient denies to have any symptoms of shortness of breath, chest pain, palpitations or lightheadedness. He denies any syncope. There is no previous ICD therapy.       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 Wilfred Coreas N/A 3/8/2019    COLONOSCOPY WITH MAC, UNASYN (3gm) performed by Minh Barakat MD at [address] N/A 8/9/2019    COLONOSCOPY POLYPECTOMY SNARE/COLD BIOPSY performed by Minh Barakat MD at [address] [facility]  8/9/2019    COLONOSCOPY WITH BIOPSY performed by Minh Barakat MD at [address] [facility] Right 8/28/2019    INCISION AND INCISIONAL DEBRIDEMENT OPEN FRACTURE RIGHT 2ND TOE SKIN AND BONE performed by Mary Quesada MD at [address] [facility] Left 7/22/2020    LEFT FOOT INCISION AND DRAINAGE WITH BONE BIOPSY AND REMOVAL OF FREE FLOATING BONE FRAGMENT performed by Reggie Rodriguez DPM at [address] [facility]  1-2-10    GASTRIC BYPASS SURGERY      OTHER SURGICAL HISTORY  10/6/15    INCISION AND DRAINAGE RIGHT FOOT          OTHER SURGICAL HISTORY Right 10/8/15    INCISION AND DRAINAGE RIGHT FOOT      PACEMAKER PLACEMENT      UPPER GASTROINTESTINAL ENDOSCOPY N/A 3/8/2019    EGD BIOPSY GASTRIC H. PYLORI performed by Minh Barakat MD at [facility] ENDOSCOPY       No Known Allergies    Social History:  Reviewed. reports that he quit smoking about 3 years ago. His smoking use included cigarettes. He has a 4.00 pack-year smoking history. He has never used smokeless tobacco. He reports current alcohol use. He reports that he does not use drugs. Family History:  Reviewed. family history includes Diabetes in his maternal grandmother; Heart Disease in his father; Hypertension in his maternal grandmother and mother. No premature CAD. Review of System:  All other systems reviewed except for that noted above.  Pertinent negatives and positives are:     Objective      · General: negative for fever, chills   · Ophthalmic ROS: negative for - eye pain or loss of vision  · ENT ROS: negative for - headaches, sore throat   · Respiratory: negative for - cough, sputum  · Cardiovascular: Reviewed in HPI  · Gastrointestinal: negative for - abdominal pain, diarrhea, N/V  · Hematology: negative for - bleeding, blood clots, bruising or jaundice  · Genito-Urinary:  negative for - Dysuria or incontinence  · Musculoskeletal: negative for - Joint swelling, muscle pain  · Neurological: negative for - confusion, dizziness, headaches   · Psychiatric: No anxiety, no depression. · Dermatological: negative for - rash    Physical Examination:  Vitals:    20 1112   BP: (!) 147/71   Pulse: 69   Resp: 18   Temp: 97.6 °F (36.4 °C)   SpO2: 98%        Intake/Output Summary (Last 24 hours) at 2020 1229  Last data filed at 2020 0950  Gross per 24 hour   Intake 1268 ml   Output 2400 ml   Net -1132 ml     In: 1018 [P.O.:580; I.V.:388]  Out: 1300    Wt Readings from Last 3 Encounters:   20 (!) 330 lb (149.7 kg)   20 (!) 328 lb 9.6 oz (149.1 kg)   20 (!) 318 lb (144.2 kg)     Temp  Av.7 °F (36.5 °C)  Min: 97 °F (36.1 °C)  Max: 97.9 °F (36.6 °C)  Pulse  Av.8  Min: 60  Max: 78  BP  Min: 107/57  Max: 162/75  SpO2  Av.4 %  Min: 95 %  Max: 100 %  FiO2   Av.4 %  Min: 21 %  Max: 95 %    · Telemetry: A paced biventricular paced rhythm, nonsustained ventricular tachycardia lasting for 4 beats  · Constitutional: Alert. Oriented to person, place, and time. No distress. · Head: Normocephalic and atraumatic. · Mouth/Throat: Lips appear moist. Oropharynx is clear and moist.  · Eyes: Conjunctivae normal. EOM are normal.   · Neck: Neck supple. No lymphadenopathy. No rigidity. No JVD present. · Cardiovascular: Normal rate, regular rhythm. Normal S1&S2. Carotid pulse 2+ bilaterally. · Pulmonary/Chest: Bilateral respiratory sounds present. No respiratory accessory muscle use. No wheezes, No rhonchi. · Abdominal: Soft. Normal bowel sounds present. No distension, No tenderness. No splenomegaly. No hernia. · Musculoskeletal: No tenderness. · Lymphadenopathy: Has no cervical adenopathy. · Neurological: Alert and oriented.  Cranial nerve II-XII grossly intact, No gross deficit to touch. · Skin: Skin is warm and dry. No rash, lesions, ulcerations noted. · Psychiatric: No anxiety nor agitation. Labs:  Reviewed. Recent Labs     07/22/20 0431 07/22/20 2052 07/23/20 0423   * 139 137   K 4.7 5.5* 4.7    103 103   CO2 21 24 22   BUN 38* 34* 36*   CREATININE 1.6* 1.3 1.3     Recent Labs     07/21/20  0604 07/22/20 0431 07/23/20 0423   WBC 10.0 6.9 5.3   HGB 10.4* 10.1* 10.7*   HCT 32.6* 30.9* 33.0*   MCV 89.4 87.8 88.3    171 179     Lab Results   Component Value Date    CKTOTAL 46 04/30/2012    TROPONINI <0.01 07/19/2020     No results found for: BNP  Lab Results   Component Value Date    PROTIME 13.6 08/27/2019    PROTIME 11.1 09/01/2017    PROTIME 14.9 06/30/2017    INR 1.19 08/27/2019    INR 0.98 09/01/2017    INR 1.32 06/30/2017     Lab Results   Component Value Date    CHOL 112 07/07/2020    HDL 53 07/07/2020    HDL 56 11/15/2011    TRIG 49 07/07/2020       Diagnostic and imaging results reviewed. ECG: A paced V paced    I independently reviewed the ECG and telemetry.      Scheduled Meds:   [START ON 7/24/2020] vancomycin  1,750 mg Intravenous Q24H    cefTRIAXone (ROCEPHIN) IV  2 g Intravenous Q24H    lidocaine 1 % injection  5 mL Intradermal Once    sodium chloride flush  10 mL Intravenous 2 times per day    insulin lispro  0-6 Units Subcutaneous TID WC    insulin lispro  0-3 Units Subcutaneous Nightly    apixaban  5 mg Oral BID    amiodarone  200 mg Oral Daily    aspirin  81 mg Oral Daily    atorvastatin  40 mg Oral Nightly    carvedilol  6.25 mg Oral BID WC    furosemide  40 mg Oral Daily    insulin lispro (1 Unit Dial)  10 Units Subcutaneous TID WC    insulin glargine  10 Units Subcutaneous Nightly    levothyroxine  25 mcg Oral Daily    lisinopril  2.5 mg Oral Daily    spironolactone  12.5 mg Oral Daily    tamsulosin  0.4 mg Oral Nightly     Continuous Infusions:   dextrose       PRN Meds:.sodium chloride disease 06/19/2012    Vitamin D deficiency 09/22/2010    Acute congestive heart failure ([facility] 09/08/2010    Cardiomyopathy ([facility] 06/05/2010    Renal disease due to hypertension 06/05/2010    HTN (hypertension) 06/05/2010      Active Hospital Problems    Diagnosis Date Noted    Type 2 diabetes mellitus with left diabetic foot ulcer ([facility] [Y25.390, L97.529] 07/21/2020    Diabetic polyneuropathy associated with type 2 diabetes mellitus ([facility] [E11.42] 07/21/2020    Cellulitis and abscess of foot [L03.119, L02.619] 07/20/2020     Recommendation (s):    1. Nonischemic cardiomyopathy  2. No ICD therapy/no sustained ventricular tachycardia  3. Nonsustained ventricular tachycardia  4. Hypertension  5. Paroxysmal atrial fibrillation, on amiodarone    The CIED was interrogated and programmed and I supervised and reviewed all the data. All findings and changes are in device interrogation sheat and reflect my personal interpretation and changes and is scanned to Epic. Device interrogation did not show any evidence of sustained ventricular tachycardia. If there is any 10 beat run of ventricular tachycardia, probably this will not be sensed/stored in the device. However, a 10 beat run of ventricular tachycardia will not give ICD therapy based on the device settings. He does have nonsustained ventricular tachycardia. We do have some room in the blood pressure to increase the dose of beta-blockers. He, he is on Coreg 6.25 mg p.o. twice daily. Recommend increasing Coreg to 12.5 mg p.o. twice daily. He is also on amiodarone 200 mg daily for management of his atrial fibrillation. This, in addition to the increased dose of beta-blockers, will be suffice for management of nonsustained ventricular tachycardia. Meanwhile, let us continue him on Eliquis 5 mg p.o. twice daily for stroke prophylaxis in the setting of paroxysmal atrial fibrillation.     Thank you for allowing me to participate in the care of Osmany Varma . If you have any questions/comments, please do not hesitate to contact us. Vidya Mijares MD   Cardiac Electrophysiology  [address]    For any EP related issues after 5 PM, contact [facility] on call cardiology through .

## 2020-07-23 NOTE — PLAN OF CARE
Problem: Pain:  Goal: Control of acute pain  Description: Control of acute pain  Outcome: Ongoing  Note: Patient stated a pain of 6/10 in lower left leg. Patient was given PRN pain medication and has since been resting in bed. Will continue to monitor. Problem: Skin Integrity:  Goal: Absence of new skin breakdown  Description: Absence of new skin breakdown  Outcome: Ongoing  Note: Patient has no new presence of skin breakdown. Patient has surgical site covered with dressing over left foot. Will continue to monitor.

## 2020-07-23 NOTE — PROGRESS NOTES
Progress Note  Admit Date: 7/20/2020       Overnight Events: none noted     CC: F/U for diabetic foot infection  Interval History:   Patient was admitted with diabetic foot infection. MRI significant for some deep abscess formations as well as abnormal marrow signal, possible osteo vs charcot. S/p I&D left foot 7/22 with bone fragment removal and bone biopsy. IV recommended IV antibiotics through 9/2/20    Per PACU report- patient had Vtach in [facility] [address] [place] [address] and shocked by his ICD. He is paced now and denies any chest pain or dyspnea. Overall feels much better. Has wound vac in place.             [START ON 7/24/2020] vancomycin  1,750 mg Intravenous Q24H    cefTRIAXone (ROCEPHIN) IV  2 g Intravenous Q24H    lidocaine 1 % injection  5 mL Intradermal Once    sodium chloride flush  10 mL Intravenous 2 times per day    insulin lispro  0-6 Units Subcutaneous TID WC    insulin lispro  0-3 Units Subcutaneous Nightly    apixaban  5 mg Oral BID    amiodarone  200 mg Oral Daily    aspirin  81 mg Oral Daily    atorvastatin  40 mg Oral Nightly    carvedilol  6.25 mg Oral BID WC    furosemide  40 mg Oral Daily    insulin lispro (1 Unit Dial)  10 Units Subcutaneous TID WC    insulin glargine  10 Units Subcutaneous Nightly    levothyroxine  25 mcg Oral Daily    lisinopril  2.5 mg Oral Daily    spironolactone  12.5 mg Oral Daily    tamsulosin  0.4 mg Oral Nightly      PRN Medications: sodium chloride flush, traMADol, simethicone, naloxone, diphenhydrAMINE, acetaminophen, docusate sodium, promethazine, glucose, dextrose, glucagon (rDNA), dextrose  Diet: DIET CARDIAC; Carb Control: 4 carb choices (60 gms)/meal  Dietary Nutrition Supplements: Wound Healing Oral Supplement  Continuous Infusions:   dextrose       PHYSICAL EXAM:  /71   Pulse 78   Temp 97.9 °F (36.6 °C) (Oral)   Resp 16   Ht 6' 4\" (1.93 m)   Wt (!) 330 lb (149.7 kg)   SpO2 97%   BMI 40.17 kg/m²   Recent Labs     07/22/20  1844 07/22/20 2039 07/22/20  2246 07/23/20  0058 07/23/20  0741   POCGLU 149* 149* 256* 206* 158*       Intake/Output Summary (Last 24 hours) at 7/23/2020 1106  Last data filed at 7/23/2020 0950  Gross per 24 hour   Intake 1268 ml   Output 2675 ml   Net -1407 ml     General appearance: No apparent distress appears stated age and cooperative. Lungs: Clear to auscultation, bilaterally without Rales/Wheezes/Rhonchi with good respiratory effort. Heart: Regular rate and rhythm with Normal S1 and split S2 without murmurs, rubs or gallops, point of maximum impulse non-displaced  Abdomen: Soft, non-tender or non-distended without rigidity or guarding and positive bowel sounds all four quadrants. Extremities: No clubbing, cyanosis, left foot is covered in dressing. Skin: Skin color, texture, turgor normal.  Pictures reviewed. Neurologic: Alert and oriented X 3, grossly non-focal.  Mental status: Alert, oriented, thought content appropriate. Capillary refill is brisk  Peripheral pulses 2+    LABS:  Recent Labs     07/21/20  0604 07/22/20  0431 07/23/20  0423   WBC 10.0 6.9 5.3   HGB 10.4* 10.1* 10.7*   HCT 32.6* 30.9* 33.0*    171 179                                                                    Recent Labs     07/22/20  0431 07/22/20 2052 07/23/20  0423   * 139 137   K 4.7 5.5* 4.7    103 103   CO2 21 24 22   BUN 38* 34* 36*   CREATININE 1.6* 1.3 1.3   GLUCOSE 126* 138* 142*     No results for input(s): AST, ALT, ALB, BILITOT, ALKPHOS in the last 72 hours. No results for input(s): TROPONINI in the last 72 hours.   Assessment & Plan:    Patient Active Problem List:      Diabetic foot infection with abscesses and Possible osteomyelitis vs charcot neuroarthropathy:  Wound cx NGTD  S/p I&D with bone biopsy 7/22  On ceftriaxone and vanco, plan for IV antibiotics through 9/2  Podiatry and ID are on board  Non WB on LE  Might need placement- SW following    HTN, Chronic systolic CHF EF 94-21%:  Controlled   volume status is stable a this time  Cont with coreg, lisinopril, lasix    Episode of Vtach in OR with ICD response:  Cardiology consulted patient may need ischemic eval    DM type 2:  Cont low dose lantus 10 U, prandial coverage and sliding scale to low dose    Atrial fibrillation:  Resume eliquis    WILL:  Nightly CPAP    CKD stage 3:  Stable creatinine at 1.6-1.3. nephrology following.      Disposition: inpt.  Will need cardiology clearance prior to discharge  Full Code [Mother] : mother [Toothpaste] : Primary Fluoride Source: Toothpaste [Yes] : Patient goes to dentist yearly [Up to date] : Up to date [Eats meals with family] : eats meals with family [Has family members/adults to turn to for help] : has family members/adults to turn to for help [Is permitted and is able to make independent decisions] : Is permitted and is able to make independent decisions [Grade: ____] : Grade: [unfilled] [Normal Performance] : normal performance [Normal Behavior/Attention] : normal behavior/attention [Eats regular meals including adequate fruits and vegetables] : eats regular meals including adequate fruits and vegetables [Normal Homework] : normal homework [Drinks non-sweetened liquids] : drinks non-sweetened liquids  [Calcium source] : calcium source [Has friends] : has friends [At least 1 hour of physical activity a day] : at least 1 hour of physical activity a day [Has interests/participates in community activities/volunteers] : has interests/participates in community activities/volunteers. [Screen time (except homework) less than 2 hours a day] : screen time (except homework) less than 2 hours a day [Uses safety belts/safety equipment] : uses safety belts/safety equipment  [No] : No cigarette smoke exposure [Has ways to cope with stress] : has ways to cope with stress [Has peer relationships free of violence] : has peer relationships free of violence [Displays self-confidence] : displays self-confidence [Premenarche] : premenarche [Sleep Concerns] : no sleep concerns [Has concerns about body or appearance] : does not have concerns about body or appearance [Uses electronic nicotine delivery system] : does not use electronic nicotine delivery system [Uses drugs] : does not use drugs  [Uses tobacco] : does not use tobacco [Has problems with sleep] : does not have problems with sleep [Drinks alcohol] : does not drink alcohol [Has thought about hurting self or considered suicide] : has not thought about hurting self or considered suicide [de-identified] : Fluoride rinse [Gets depressed, anxious, or irritable/has mood swings] : does not get depressed, anxious, or irritable/has mood swings [de-identified] : RFMS [de-identified] : Volleyball/Track and Field

## 2020-07-23 NOTE — CONSULTS
[facility]   Interventional cardiology Consult/H&P  Consulting Cardiologist Alyssa Cheng M.D., [facility]  Referring Provider:  Oli Qureshi MD    7/23/2020, 2:06 PM        Primary Cardiologist:  Asked by Bakari Sutton MD/Reji Castro MD to evaluate and assess this patient's pacemaker discharged with V. tach    History of Present Illness:   Donna Landeros is a 81 y.o. male is being seen for evaluation of his cardiac status relative to his cardiomyopathy and concerned about possible pacemaker discharge yesterday in the surgical suite. The patient was here for debridement of the ulcerated wound on his leg. In the process apparently had a run of V. tach and reportedly had DC cardioversion by his defibrillator. We did have it interrogated today and there is no evidence on the interrogation that it defibrillator actually fired. The patient has been stable his ejection fraction has been estimated to be in the range of 35% and clinically not in failure. He has leg ulceration that are very slow in healing and he in fact did have infection and was having debridement yesterday. Today he is awake and alert and generally feeling good and in good spirits. No chest pains no shortness of breath. He relates that he did have a heart cath about 10 years ago at the [facility] and that he had clean arteries at that time. .  We are having trouble finding those reports to corroborate. We will continue digging.        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), Thyroid disease, Type 2 diabetes mellitus without complication ([facility] and Type II or unspecified type diabetes mellitus without mention of complication, not stated as uncontrolled. Surgical History:   has a past surgical history that includes Foot surgery (1-2-10); Adrenal gland surgery (1970); Gastric bypass surgery; other surgical history (10/6/15); other surgical history (Right, 10/8/15); Cardiac defibrillator placement (09/05/2017); pacemaker placement; Colonoscopy (N/A, 3/8/2019); Upper gastrointestinal endoscopy (N/A, 3/8/2019); Colonoscopy (N/A, 8/9/2019); Colonoscopy (8/9/2019); Foot Debridement (Right, 8/28/2019); and Foot Debridement (Left, 7/22/2020). Social History:   reports that he quit smoking about 3 years ago. His smoking use included cigarettes. He has a 4.00 pack-year smoking history. He has never used smokeless tobacco. He reports current alcohol use. He reports that he does not use drugs. Family History:  family history includes Diabetes in his maternal grandmother; Heart Disease in his father; Hypertension in his maternal grandmother and mother. Home Medications:  Prior to Admission medications    Medication Sig Start Date End Date Taking?  Authorizing Provider   carvedilol (COREG) 6.25 MG tablet TAKE 1 TABLET BY MOUTH TWICE DAILY WITH MEALS 7/13/20  Yes KENNY Ibrahim - CNP   atorvastatin (LIPITOR) 40 MG tablet TAKE 1 TABLET BY MOUTH DAILY 6/29/20  Yes Ajith Miles MD   spironolactone (ALDACTONE) 25 MG tablet TAKE 1/2 TABLET BY MOUTH DAILY  Patient taking differently: 25 mg daily  6/3/20  Yes Marjan Maxwell MD   apixaban (ELIQUIS) 5 MG TABS tablet TAKE 1 TABLET BY MOUTH TWICE DAILY 4/16/20  Yes Lnyne Marcus MD   dapagliflozin (FARXIGA) 5 MG tablet Take 1 tablet by mouth every morning 3/11/20  Yes Ajith Miles MD   tamsulosin (FLOMAX) 0.4 MG capsule TAKE 1 CAPSULE BY MOUTH DAILY 3/3/20  Yes Ajith Miles MD   lisinopril Units into the skin nightly 12/27/19   Serenity Hernandez MD   B-D UF III MINI PEN NEEDLES 31G X 5 MM MISC USE DAILY 5/1/19   Serenity Hernandez MD   acetaminophen (TYLENOL) 325 MG tablet Take 2 tablets by mouth every 4 hours as needed for Pain 9/2/17   Jacek Trejo MD   glucose blood VI test strips (ONE TOUCH TEST STRIPS) strip 1 each by In Vitro route daily As needed.  4/27/17   Serenity Hernandez MD   Insulin Syringe-Needle U-100 (INSULIN SYRINGE .5CC/31GX5/16\") 31G X 5/16\" 0.5 ML MISC Patient tests bid 5/21/13   Serenity Hernandez MD        Current Medications:  Current Facility-Administered Medications   Medication Dose Route Frequency Provider Last Rate Last Dose    [START ON 7/24/2020] vancomycin (VANCOCIN) 1,750 mg in dextrose 5 % 250 mL IVPB  1,750 mg Intravenous Q24H Hilda Lucas DPM        cefTRIAXone (ROCEPHIN) 2 g IVPB in D5W 50ml minibag  2 g Intravenous Q24H Adebayo Rose MD   Stopped at 07/23/20 1351    sodium chloride flush 0.9 % injection 10 mL  10 mL Intravenous 2 times per day Adebayo Rose MD        sodium chloride flush 0.9 % injection 10 mL  10 mL Intravenous PRN Adebayo Rose MD        carvedilol (COREG) tablet 12.5 mg  12.5 mg Oral BID  Candie Stewart MD        insulin lispro (1 Unit Dial) 0-6 Units  0-6 Units Subcutaneous TID  iHlda Lucas DPM   2 Units at 07/23/20 1136    insulin lispro (1 Unit Dial) 0-3 Units  0-3 Units Subcutaneous Nightly Hilda Lucas DPM        apixaban (ELIQUIS) tablet 5 mg  5 mg Oral BID ACE NickM   5 mg at 07/23/20 3443    traMADol (ULTRAM) tablet 50 mg  50 mg Oral Q6H PRN ACE NickM   50 mg at 07/23/20 1322    simethicone (MYLICON) 40 YZ/4.3HO drops 40 mg  40 mg Oral Q6H PRN Hilda Lucas DPM   40 mg at 07/23/20 0953    naloxone (NARCAN) injection 0.4 mg  0.4 mg Intravenous PRN iHlda Lucas DPM        diphenhydrAMINE (BENADRYL) injection 25 mg  25 mg Intravenous Q6H PRN or diplopia. No scleral icterus. · ENT: No Headaches, hearing loss or vertigo. No mouth sores or sore throat. · Cardiovascular: Reviewed in HPI  ·  Pulmonary:  no cough or sputum production. · Gastrointestinal: No abdominal pain, appetite loss, blood in stools. No change in bowel or bladder habits. · Genitourinary: No dysuria, trouble voiding, or hematuria. · Musculoskeletal:  No gait disturbance, weakness or joint complaints. · Integumentary: No rash or pruritis. Endocrine: No malaise, fatigue or temperature intolerance. Hematologic/Lymphatic: No abnormal bruising or bleeding, blood clots or swollen lymph nodes. · Allergic/Immunologic: No nasal congestion or hives. · All other ROS are reviewed and are unremarkable. Physical Examination:    Vitals:    07/23/20 0626 07/23/20 0932 07/23/20 1112 07/23/20 1247   BP:  128/71 (!) 147/71 (!) 147/71   Pulse:  78 69 67   Resp:  16 18 16   Temp:  97.9 °F (36.6 °C) 97.6 °F (36.4 °C) 98.2 °F (36.8 °C)   TempSrc:  Oral Oral Oral   SpO2:  97% 98% 96%   Weight: (!) 330 lb (149.7 kg)      Height:            EXAMl and General Appearance: Generally weak. Having wounds on his lower extremity from surgical debridement yesterday. HEENT: eyes and ears intact. No nasal masses  THYROID: not enlarged  LUNGS:  · Clear to auscultation and percussion  HEART and VASCULAR:  · The apical impulses not displaced  · Heart tones are crisp and normal  · Cervical veins are not engorged  · The carotid upstroke is normal in amplitude and contour without delay or bruit  · Peripheral pulses are symmetrical and full  · There is no clubbing, cyanosis of the extremities. · No peripheral edema  · Femoral Arteries: 2+ and equal  · Pedal Pulses:2+ and equal   ABDOMEN[de-identified]  · No masses or tenderness  · Liver/Spleen: No Abnormalities Noted  NEUROLOGICAL:  . Moves all extremities to command. Cranial nerves 2-12 are in tact.   PSYCHIATRIC: alert and lucid  and oriented and appropriate  SKIN: No lesions or rashes  LYMPH NODES: none enlarged    ·   ·   ·     CBC with Differential:    Lab Results   Component Value Date    WBC 5.3 07/23/2020    RBC 3.74 07/23/2020    HGB 10.7 07/23/2020    HCT 33.0 07/23/2020     07/23/2020    MCV 88.3 07/23/2020    MCH 28.7 07/23/2020    MCHC 32.5 07/23/2020    RDW 14.1 07/23/2020    LYMPHOPCT 12.6 07/23/2020    MONOPCT 15.3 07/23/2020    EOSPCT 11.2 10/26/2015    BASOPCT 0.6 07/23/2020    MONOSABS 0.8 07/23/2020    LYMPHSABS 0.7 07/23/2020    EOSABS 0.2 07/23/2020    BASOSABS 0.0 07/23/2020     BMP:    Lab Results   Component Value Date     07/23/2020    K 4.7 07/23/2020    K 4.8 07/19/2020     07/23/2020    CO2 22 07/23/2020    BUN 36 07/23/2020    LABALBU 3.7 08/27/2019    CREATININE 1.3 07/23/2020    CALCIUM 9.1 07/23/2020    GFRAA >60 07/23/2020    GFRAA >60 05/15/2013    LABGLOM 55 07/23/2020     Uric Acid:  No components found for: URIC  PT/INR:    Lab Results   Component Value Date    PROTIME 13.6 08/27/2019    INR 1.19 08/27/2019     Last 3 Troponin:    Lab Results   Component Value Date    TROPONINI <0.01 07/19/2020    TROPONINI <0.01 07/03/2017    TROPONINI 0.09 05/05/2017     FLP:    Lab Results   Component Value Date    TRIG 49 07/07/2020    HDL 53 07/07/2020    HDL 56 11/15/2011    LDLCALC 49 07/07/2020    LABVLDL 10 07/07/2020       EKG: Sequential paced rhythm with occasional PVC.   echocardiogram  Assessment/ Plan     Patient Active Problem List    Diagnosis Date Noted    NSVT (nonsustained ventricular tachycardia) (HCC)      Priority: High    Chronic systolic heart failure ([facility]      Priority: High    Dilated cardiomyopathy ([facility]      Priority: High    Essential hypertension      Priority: High    Hyperlipidemia      Priority: High    Diabetic foot ([facility] 10/05/2015     Priority: High    Foot osteomyelitis, right ([facility] 10/05/2015     Priority: High    Obstructive sleep apnea 09/08/2010     Priority: High    Diabetes mellitus ([facility] 06/05/2010     Priority: High    Diabetic foot ulcer ([facility] 06/05/2010     Priority: High    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 ([facility] 06/05/2010     Priority: High    Obesity 06/05/2010     Priority: High    Type 2 diabetes mellitus with left diabetic foot ulcer ([facility] 07/21/2020    Diabetic polyneuropathy associated with type 2 diabetes mellitus ([facility] 07/21/2020    Cellulitis and abscess of foot 07/20/2020    Open fracture of second toe of right foot with routine healing 08/28/2019    Open fracture of toe 08/28/2019    Open fracture dislocation of interphalangeal joint of single toe of right foot 08/28/2019    On amiodarone therapy     Cardiac resynchronization therapy defibrillator (CRT-D) in place 09/01/2017    Abscess of left leg 07/05/2017    Morbid obesity with BMI of 40.0-44.9, adult ([facility] 07/05/2017    Paroxysmal atrial fibrillation (HCC)     Hypokalemia     H/O gastric bypass 12/07/2016    Benign prostatic hyperplasia with lower urinary tract symptoms 12/07/2016    MRSA infection 10/07/2015    Diabetic ulcer of right foot associated with type 2 diabetes mellitus ([facility]     Subacute osteomyelitis of right foot ([facility]     Diabetic foot infection ([facility] 10/05/2015    Hypothyroidism 01/09/2014    Graves' disease 06/19/2012    Vitamin D deficiency 09/22/2010    Acute congestive heart failure ([facility] 09/08/2010    Cardiomyopathy ([facility] 06/05/2010    Renal disease due to hypertension 06/05/2010    HTN (hypertension) 06/05/2010   The report of a shock from his defibrillator yesterday seem to be unfounded. Interrogation suggest that this did not occur. The patient is having some PVCs and did have a 4 5 beat run of ventricular tachycardia this morning. We are evaluating for possible coronary ischemia. We are trying to find his cardiac cath report from 10 years ago. We will continue to evaluate his electrolytes and monitor him.   An ischemia evaluation and current time will be attempted. In concert with Dr. Ana Luisa Santos will increase his Coreg for more beta-blockade continuation of Eliquis because of his underlying atrial fibrillation. Thanks for allowing me the opportunity  to participate in the evaluation and care of your patients.  Please call if we can assist further [phone]    This chart was likely completed using voice recognition technology and may contain unintended grammatical , phraseology,and/or punctuation errors  Winnie Monzon M.D., [facility]  7/23/20202:06 PM

## 2020-07-23 NOTE — CARE COORDINATION
CM following, spoke with pt and S/O and they have given SNF choice referrals made. Pt with PICC for IV ABX and [place] and non-wt bearing to operative foot. awaiting acceptance for SNF then start cert.   Electronically signed by Amy Galvan RN on 7/23/2020 at [address] PM  [phone]

## 2020-07-23 NOTE — DISCHARGE INSTR - COC
Continuity of Care Form    Patient Name: Abhinav Deleon   :  1950  MRN:  [id number]    Admit date:  2020  Discharge date:  2020    Code Status Order: Full Code   Advance Directives:   Advance Care Flowsheet Documentation     Date/Time Healthcare Directive Type of Healthcare Directive Copy in [address] Agent's Name Healthcare Agent's Phone Number    20 [phone]  No, patient does not have an advance directive for healthcare treatment -- -- -- -- --          Admitting Physician:  Zaina Chow MD  PCP: Tito Nolan MD    [facility]0361-27  Discharging Unit Phone Number:     Emergency Contact:   Extended Emergency Contact Information  Primary Emergency Contact: Ar Montero  Address: [facility], [address] [place] [address],[address] [place] [address] Phone: [phone]  Mobile Phone: [phone]  Relation: Spouse    Past Surgical History:  Past Surgical History:   Procedure Laterality Date   [address]  2017    Dr. [place] Us 3/8/2019    COLONOSCOPY WITH Oh MARAVILLA (3gm) performed by Keri Mclaughlin MD at [address] [place] [address] Road 2019    COLONOSCOPY POLYPECTOMY SNARE/COLD BIOPSY performed by Keri Mclaughlin MD at [place] [address]  2019    COLONOSCOPY WITH BIOPSY performed by Keri Mclaughlin MD at [address] [place] [address] 2019    INCISION AND INCISIONAL DEBRIDEMENT OPEN FRACTURE RIGHT 2ND TOE SKIN AND BONE performed by Juan Bravo MD at [address] [place] [address] Rd  1-2-10    GASTRIC BYPASS SURGERY      OTHER SURGICAL HISTORY  10/6/15    INCISION AND DRAINAGE RIGHT FOOT          OTHER SURGICAL HISTORY Right 10/8/15    INCISION AND DRAINAGE RIGHT FOOT      PACEMAKER PLACEMENT      UPPER GASTROINTESTINAL ENDOSCOPY N/A 3/8/2019    EGD BIOPSY GASTRIC H.  PYLORI performed by Micki Liang MD at [facility] ENDOSCOPY       Immunization History:   Immunization History   Administered Date(s) Administered    Influenza 10/20/2013    Influenza Virus Vaccine 10/16/2015    Influenza, High Dose (Fluzone 65 yrs and older) 10/31/2017, 09/19/2018    Influenza, Intradermal, Preservative free 11/21/2016    PPD Test 10/16/2015, 10/29/2015    Pneumococcal Conjugate 13-valent ([id number]) 09/19/2018    Pneumococcal Conjugate Vaccine 10/05/2013    Pneumococcal Polysaccharide ([id number]) 08/28/2016    Tdap (Boostrix, Adacel) 10/31/2017       Active Problems:  Patient Active Problem List   Diagnosis Code    Diabetes mellitus ([facility] E11.9    Diabetic foot ulcer ([facility] E11.621, L97.509    Cardiomyopathy ([facility] I42.9    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 ([address] E11.21    Renal disease due to hypertension I12.9    Obesity E66.9    HTN (hypertension) I10    Obstructive sleep apnea G47.33    Acute congestive heart failure (HCC) I50.9    Vitamin D deficiency E55.9    Graves' disease E05.00    Hypothyroidism E03.9    Diabetic foot ([address] E11.8    Foot osteomyelitis, right ([address] M86.9    Diabetic foot infection ([address] E11.628, L08.9    Dilated cardiomyopathy ([address] I42.0    Essential hypertension I10    Hyperlipidemia E78.5    Diabetic ulcer of right foot associated with type 2 diabetes mellitus ([address] E11.621, L97.519    Subacute osteomyelitis of right foot ([address] M86.271    MRSA infection A49.02    H/O gastric bypass Z98.84    Benign prostatic hyperplasia with lower urinary tract symptoms N40.1    Paroxysmal atrial fibrillation (HCC) I48.0    Hypokalemia E87.6    Chronic systolic heart failure (HCC) I50.22    NSVT (nonsustained ventricular tachycardia) ([facility]) I47.2    Abscess of left leg L02.416    Morbid obesity with BMI of 40.0-44.9, adult (HCC) E66.01, Z68.41    Cardiac resynchronization therapy defibrillator (CRT-D) in place Z95.810    On amiodarone therapy

## 2020-07-24 LAB
ANION GAP SERPL CALCULATED.3IONS-SCNC: 9 MMOL/L (ref 3–16)
BASOPHILS ABSOLUTE: 0.1 K/UL (ref 0–0.2)
BASOPHILS RELATIVE PERCENT: 1.1 %
BUN BLDV-MCNC: 38 MG/DL (ref 7–20)
CALCIUM SERPL-MCNC: 9.2 MG/DL (ref 8.3–10.6)
CHLORIDE BLD-SCNC: 101 MMOL/L (ref 99–110)
CO2: 26 MMOL/L (ref 21–32)
CREAT SERPL-MCNC: 1.4 MG/DL (ref 0.8–1.3)
EOSINOPHILS ABSOLUTE: 0.2 K/UL (ref 0–0.6)
EOSINOPHILS RELATIVE PERCENT: 4.9 %
GFR AFRICAN AMERICAN: >60
GFR NON-AFRICAN AMERICAN: 50
GLUCOSE BLD-MCNC: 117 MG/DL (ref 70–99)
GLUCOSE BLD-MCNC: 122 MG/DL (ref 70–99)
GLUCOSE BLD-MCNC: 148 MG/DL (ref 70–99)
GLUCOSE BLD-MCNC: 186 MG/DL (ref 70–99)
GLUCOSE BLD-MCNC: 207 MG/DL (ref 70–99)
GLUCOSE BLD-MCNC: 210 MG/DL (ref 70–99)
HCT VFR BLD CALC: 31.2 % (ref 40.5–52.5)
HEMOGLOBIN: 10.1 G/DL (ref 13.5–17.5)
KAPPA, FREE LIGHT CHAINS, SERUM: 126.12 MG/L (ref 3.3–19.4)
KAPPA/LAMBDA RATIO: 1.55 (ref 0.26–1.65)
KAPPA/LAMBDA TEST COMMENT: ABNORMAL
LAMBDA, FREE LIGHT CHAINS, SERUM: 81.6 MG/L (ref 5.71–26.3)
LYMPHOCYTES ABSOLUTE: 1 K/UL (ref 1–5.1)
LYMPHOCYTES RELATIVE PERCENT: 21.7 %
MCH RBC QN AUTO: 28.4 PG (ref 26–34)
MCHC RBC AUTO-ENTMCNC: 32.5 G/DL (ref 31–36)
MCV RBC AUTO: 87.2 FL (ref 80–100)
MONOCYTES ABSOLUTE: 0.7 K/UL (ref 0–1.3)
MONOCYTES RELATIVE PERCENT: 15.2 %
NEUTROPHILS ABSOLUTE: 2.7 K/UL (ref 1.7–7.7)
NEUTROPHILS RELATIVE PERCENT: 57.1 %
PDW BLD-RTO: 14.2 % (ref 12.4–15.4)
PERFORMED ON: ABNORMAL
PLATELET # BLD: 189 K/UL (ref 135–450)
PMV BLD AUTO: 9 FL (ref 5–10.5)
POTASSIUM SERPL-SCNC: 4.1 MMOL/L (ref 3.5–5.1)
RBC # BLD: 3.58 M/UL (ref 4.2–5.9)
SODIUM BLD-SCNC: 136 MMOL/L (ref 136–145)
WBC # BLD: 4.8 K/UL (ref 4–11)

## 2020-07-24 PROCEDURE — 2580000003 HC RX 258: Performed by: INTERNAL MEDICINE

## 2020-07-24 PROCEDURE — 99233 SBSQ HOSP IP/OBS HIGH 50: CPT | Performed by: INTERNAL MEDICINE

## 2020-07-24 PROCEDURE — 6370000000 HC RX 637 (ALT 250 FOR IP): Performed by: INTERNAL MEDICINE

## 2020-07-24 PROCEDURE — 6360000002 HC RX W HCPCS: Performed by: INTERNAL MEDICINE

## 2020-07-24 PROCEDURE — 6360000002 HC RX W HCPCS: Performed by: STUDENT IN AN ORGANIZED HEALTH CARE EDUCATION/TRAINING PROGRAM

## 2020-07-24 PROCEDURE — 6370000000 HC RX 637 (ALT 250 FOR IP): Performed by: STUDENT IN AN ORGANIZED HEALTH CARE EDUCATION/TRAINING PROGRAM

## 2020-07-24 PROCEDURE — 85025 COMPLETE CBC W/AUTO DIFF WBC: CPT

## 2020-07-24 PROCEDURE — 2580000003 HC RX 258: Performed by: STUDENT IN AN ORGANIZED HEALTH CARE EDUCATION/TRAINING PROGRAM

## 2020-07-24 PROCEDURE — 94660 CPAP INITIATION&MGMT: CPT

## 2020-07-24 PROCEDURE — 1200000000 HC SEMI PRIVATE

## 2020-07-24 PROCEDURE — 36591 DRAW BLOOD OFF VENOUS DEVICE: CPT

## 2020-07-24 PROCEDURE — 36592 COLLECT BLOOD FROM PICC: CPT

## 2020-07-24 PROCEDURE — 80048 BASIC METABOLIC PNL TOTAL CA: CPT

## 2020-07-24 RX ORDER — INSULIN LISPRO 100 [IU]/ML
4 INJECTION, SOLUTION INTRAVENOUS; SUBCUTANEOUS ONCE
Status: COMPLETED | OUTPATIENT
Start: 2020-07-24 | End: 2020-07-24

## 2020-07-24 RX ADMIN — TRAMADOL HYDROCHLORIDE 50 MG: 50 TABLET, FILM COATED ORAL at 14:10

## 2020-07-24 RX ADMIN — CEFTRIAXONE SODIUM 2 G: 2 INJECTION, POWDER, FOR SOLUTION INTRAMUSCULAR; INTRAVENOUS at 11:35

## 2020-07-24 RX ADMIN — INSULIN LISPRO 10 UNITS: 100 INJECTION, SOLUTION INTRAVENOUS; SUBCUTANEOUS at 08:38

## 2020-07-24 RX ADMIN — Medication 40 MG: at 06:17

## 2020-07-24 RX ADMIN — TRAMADOL HYDROCHLORIDE 50 MG: 50 TABLET, FILM COATED ORAL at 07:29

## 2020-07-24 RX ADMIN — INSULIN LISPRO 2 UNITS: 100 INJECTION, SOLUTION INTRAVENOUS; SUBCUTANEOUS at 11:35

## 2020-07-24 RX ADMIN — INSULIN LISPRO 10 UNITS: 100 INJECTION, SOLUTION INTRAVENOUS; SUBCUTANEOUS at 11:36

## 2020-07-24 RX ADMIN — AMIODARONE HYDROCHLORIDE 200 MG: 200 TABLET ORAL at 08:33

## 2020-07-24 RX ADMIN — TRAMADOL HYDROCHLORIDE 50 MG: 50 TABLET, FILM COATED ORAL at 23:31

## 2020-07-24 RX ADMIN — APIXABAN 5 MG: 5 TABLET, FILM COATED ORAL at 21:18

## 2020-07-24 RX ADMIN — Medication 10 ML: at 22:10

## 2020-07-24 RX ADMIN — VANCOMYCIN HYDROCHLORIDE 1750 MG: 10 INJECTION, POWDER, LYOPHILIZED, FOR SOLUTION INTRAVENOUS at 06:18

## 2020-07-24 RX ADMIN — INSULIN LISPRO 1 UNITS: 100 INJECTION, SOLUTION INTRAVENOUS; SUBCUTANEOUS at 08:38

## 2020-07-24 RX ADMIN — INSULIN LISPRO 4 UNITS: 100 INJECTION, SOLUTION INTRAVENOUS; SUBCUTANEOUS at 18:01

## 2020-07-24 RX ADMIN — SPIRONOLACTONE 12.5 MG: 25 TABLET, FILM COATED ORAL at 08:33

## 2020-07-24 RX ADMIN — CARVEDILOL 12.5 MG: 12.5 TABLET, FILM COATED ORAL at 08:33

## 2020-07-24 RX ADMIN — INSULIN LISPRO 1 UNITS: 100 INJECTION, SOLUTION INTRAVENOUS; SUBCUTANEOUS at 21:17

## 2020-07-24 RX ADMIN — ASPIRIN 81 MG: 81 TABLET, COATED ORAL at 08:33

## 2020-07-24 RX ADMIN — ATORVASTATIN CALCIUM 40 MG: 40 TABLET, FILM COATED ORAL at 21:18

## 2020-07-24 RX ADMIN — Medication 10 ML: at 08:34

## 2020-07-24 RX ADMIN — INSULIN GLARGINE 10 UNITS: 100 INJECTION, SOLUTION SUBCUTANEOUS at 23:21

## 2020-07-24 RX ADMIN — APIXABAN 5 MG: 5 TABLET, FILM COATED ORAL at 08:33

## 2020-07-24 RX ADMIN — FUROSEMIDE 40 MG: 40 TABLET ORAL at 08:33

## 2020-07-24 RX ADMIN — TAMSULOSIN HYDROCHLORIDE 0.4 MG: 0.4 CAPSULE ORAL at 21:18

## 2020-07-24 RX ADMIN — LEVOTHYROXINE SODIUM 25 MCG: 0.03 TABLET ORAL at 06:16

## 2020-07-24 RX ADMIN — CARVEDILOL 12.5 MG: 12.5 TABLET, FILM COATED ORAL at 17:15

## 2020-07-24 ASSESSMENT — PAIN DESCRIPTION - LOCATION
LOCATION: LEG

## 2020-07-24 ASSESSMENT — PAIN DESCRIPTION - PAIN TYPE
TYPE: SURGICAL PAIN

## 2020-07-24 ASSESSMENT — PAIN DESCRIPTION - ORIENTATION
ORIENTATION: LEFT

## 2020-07-24 ASSESSMENT — PAIN DESCRIPTION - FREQUENCY
FREQUENCY: CONTINUOUS

## 2020-07-24 ASSESSMENT — PAIN SCALES - GENERAL
PAINLEVEL_OUTOF10: 7
PAINLEVEL_OUTOF10: 0
PAINLEVEL_OUTOF10: 0
PAINLEVEL_OUTOF10: 6
PAINLEVEL_OUTOF10: 0

## 2020-07-24 ASSESSMENT — PAIN DESCRIPTION - DESCRIPTORS
DESCRIPTORS: ACHING

## 2020-07-24 ASSESSMENT — PAIN - FUNCTIONAL ASSESSMENT
PAIN_FUNCTIONAL_ASSESSMENT: ACTIVITIES ARE NOT PREVENTED
PAIN_FUNCTIONAL_ASSESSMENT: PREVENTS OR INTERFERES SOME ACTIVE ACTIVITIES AND ADLS
PAIN_FUNCTIONAL_ASSESSMENT: ACTIVITIES ARE NOT PREVENTED

## 2020-07-24 ASSESSMENT — PAIN DESCRIPTION - ONSET
ONSET: ON-GOING

## 2020-07-24 ASSESSMENT — PAIN DESCRIPTION - PROGRESSION
CLINICAL_PROGRESSION: NOT CHANGED

## 2020-07-24 NOTE — PROGRESS NOTES
Patient is alert and oriented. Patient VSS and afebrile. Patient has stated a pain of 6/20 in lower left extremity and was given PRN pain medication and has since been resting. Patient blood glucose was 179 and patient stated that he would like to take 4 units of insulin because he still wanted a snack latter on. MD was notified and a one time dose was put in for patient. Patient was given lantus later on per patient request. Patient has ACE wrap and wound vac in place with no drainage on ACE wrap. Patient had a loose bowel movement tonight. Patient is able to turn self in bed and has bed in lowest position and call light within reach. Will continue to monitor.

## 2020-07-24 NOTE — PROGRESS NOTES
Podiatric Surgery Daily Progress Note  Andria Cerna   2 Days Post-Op      Subjective :   Patient seen and examined this am at the bedside. Patient denies any acute overnight events. Patient denies N/V/F/C/SOB. Patient denies calf pain, thigh pain, chest pain. Review of Systems: A 12 point review of symptoms is unremarkable with the exception of the chief complaint. Patient specifically denies nausea, fever, vomiting, chills, shortness of breath, chest pain, abdominal pain, constipation or difficulty urinating. Objective     /70   Pulse 70   Temp 97.9 °F (36.6 °C) (Oral)   Resp 14   Ht 6' 4\" (1.93 m)   Wt (!) 330 lb (149.7 kg)   SpO2 100%   BMI 40.17 kg/m²      I/O:    Intake/Output Summary (Last 24 hours) at 7/24/2020 0655  Last data filed at 7/24/2020 0215  Gross per 24 hour   Intake 630 ml   Output 2200 ml   Net -1570 ml              Wt Readings from Last 3 Encounters:   07/23/20 (!) 330 lb (149.7 kg)   07/19/20 (!) 328 lb 9.6 oz (149.1 kg)   06/26/20 (!) 318 lb (144.2 kg)       LABS:    Recent Labs     07/23/20  0423 07/24/20  0430   WBC 5.3 4.8   HGB 10.7* 10.1*   HCT 33.0* 31.2*    189        Recent Labs     07/24/20  0430      K 4.1      CO2 26   BUN 38*   CREATININE 1.4*        Recent Labs     07/22/20  1300   PROT 6.3*           LOWER EXTREMITY EXAMINATION    Dressing to left LE intact. Wound vac running at continuous 125 mmHg. Scant hemorrhagic drainage noted in cannister. No strike through noted to external layers of dressing.      VASCULAR: DP and PT pulses non palpable 2/2 edema. CFT is brisk to the digits of the foot b/l. Skin temperature is warm to warm from proximal to distal with focal calor noted to the left plantar midfoot. Non-pitting edema noted to the left foot, localized to the area of the wound. No pain with calf compression b/l. Mild localized erythema noted, left foot.      NEUROLOGIC: Gross and epicritic sensation is diminished b/l.  Protective bedside  -VSS, morning labs pending   -XR reviewed s/p I&D procedure, impressions stated above  -MRI reviewed, impressions stated above; multiple abscess formations identified, and changes consistent with osteomyelitis  -Wound culture of left foot wound collected 7/20/20: NGTD  -Surgical wound cultures from 7/22/20: NGTD  -F/u bone biopsy from 7/22/20  -ID following, recs appreciated. PICC line inserted, patient to receive vancomycin/ceftriaxone outpatient through 9/2  -Wound vac dressing changed. Followed by gauze to protect the skin from the wound vac tubing, followed by kerlix, and ace   -Wound vac running at continuous 125 mmHg  -Surgical shoe ordered. Patient to wear at all times while out of bed. -NON weight bearing to the left foot, heel weight bearing for transfers only    -PT/OT ordered, f/u eval; patient strict NON WB to the left foot 2/2 plantar foot wound, heel weight bearing for transfers only  -SW following, WOUND VAC NEEDED AT D/C. Paperwork filled out and placed on patients chart. DISPO: No further surgical intervention from podiatry standpoint. Patient okay for discharge once medically stable. Discussed assessment and plan with Dr. Charles Reno.     Hussein Sanchez DPM, PGY-3  Pager #: [phone] or perfect serve ([address] [place] [address])

## 2020-07-24 NOTE — PLAN OF CARE
Problem: Pain:  Goal: Pain level will decrease  Description: Pain level will decrease  Outcome: Ongoing  Note: Patient complains of pain in his left leg. Leg is elevated and patient was repositioned. Patient given prn medication. Will continue to monitor. Problem: Skin Integrity:  Goal: Will show no infection signs and symptoms  Description: Will show no infection signs and symptoms  Outcome: Ongoing  Note: Patient skin is intact. Patient surgical dressing is CDI. Patient on special mattress. Will continue to monitor.

## 2020-07-24 NOTE — PROGRESS NOTES
07/24/20  0737   POCGLU 212* 78 179* 189* 148*       Intake/Output Summary (Last 24 hours) at 7/24/2020 1020  Last data filed at 7/24/2020 1290  Gross per 24 hour   Intake 1120 ml   Output 2475 ml   Net -1355 ml     General appearance: No apparent distress appears stated age and cooperative. Lungs: Clear to auscultation, bilaterally without Rales/Wheezes/Rhonchi with good respiratory effort. Heart: Regular rate and rhythm with Normal S1 and split S2 without murmurs, rubs or gallops, point of maximum impulse non-displaced  Abdomen: Soft, non-tender or non-distended without rigidity or guarding and positive bowel sounds all four quadrants. Extremities: No clubbing, cyanosis, left foot is covered in dressing. Skin: Skin color, texture, turgor normal.  Pictures reviewed. Neurologic: Alert and oriented X 3, grossly non-focal.  Mental status: Alert, oriented, thought content appropriate. Capillary refill is brisk  Peripheral pulses 2+    LABS:  Recent Labs     07/22/20 0431 07/23/20  0423 07/24/20  0430   WBC 6.9 5.3 4.8   HGB 10.1* 10.7* 10.1*   HCT 30.9* 33.0* 31.2*    179 189                                                                    Recent Labs     07/22/20 2052 07/23/20  0423 07/24/20  0430    137 136   K 5.5* 4.7 4.1    103 101   CO2 24 22 26   BUN 34* 36* 38*   CREATININE 1.3 1.3 1.4*   GLUCOSE 138* 142* 122*     No results for input(s): AST, ALT, ALB, BILITOT, ALKPHOS in the last 72 hours. No results for input(s): TROPONINI in the last 72 hours. Assessment & Plan:    Patient Active Problem List:      Diabetic foot infection with abscesses and Possible osteomyelitis vs charcot neuroarthropathy:  Wound cx NGTD  S/p I&D with bone biopsy 7/22- path pending  On ceftriaxone and vanco, plan for IV antibiotics through 9/2.  PICC in place (7/23)  Podiatry and ID are on board  Non WB on LE  need placement- SW following, precert in process    HTN, Chronic systolic CHF EF

## 2020-07-24 NOTE — PROGRESS NOTES
The [address] [place] [address]  Cardiology Daily Progress Note  7/24/2020,3:55 PM      Pam Calloway MD  Primary cardiologist:    Admit Date:  7/20/2020  Hospital Day: Hospital Day: 5  Subjective:  Mr. Hua Thorne he is awake and alert. His rhythm is stable he is paced. Did not have shocks from his defibrillator. Cardiac rhythm looks stable. The wounds are stabilized and bandaged and he is about to be transferred to an external long-term care facility. From a cardiac perspective I see nothing else that needs to be done. We will sign off at this time but be happy to follow him as necessary. Objective:   /65   Pulse 66   Temp 97.8 °F (36.6 °C) (Oral)   Resp 18   Ht 6' 4\" (1.93 m)   Wt (!) 330 lb (149.7 kg)   SpO2 96%   BMI 40.17 kg/m²       Intake/Output Summary (Last 24 hours) at 7/24/2020 1555  Last data filed at 7/24/2020 1534  Gross per 24 hour   Intake 1320 ml   Output 1625 ml   Net -305 ml       TELEMETRY: AV paced at 68. Physical Examination:    Vitals:    07/24/20 0713 07/24/20 0728 07/24/20 1131 07/24/20 1432   BP: 108/67  135/68 110/65   Pulse: 68  60 66   Resp: 14  16 18   Temp: 98 °F (36.7 °C)  98 °F (36.7 °C) 97.8 °F (36.6 °C)   TempSrc: Oral  Oral Oral   SpO2: 100%  100% 96%   Weight:  (!) 330 lb (149.7 kg)     Height:            Constitutional and General Appearance:  Healthy. And alert . HEENT: eyes and ears intact. No nasal masses  THYROID: not enlarged  LUNGS:  · Clear to auscultation and percussion  HEART and VASCULAR:  · The apical impulses not displaced  · Heart tones are crisp and normal  · Cervical veins are not engorged  · The carotid upstroke is normal in amplitude and contour without delay or bruit  · Peripheral pulses are symmetrical and full  · There is no clubbing, cyanosis of the extremities.   · No peripheral edema  · Femoral Arteries: 2+ and equal  · Pedal Pulses: 2+ and equal   ABDOMEN[de-identified]  · No masses or 72 hours.   IMAGING: as noted    Assessment:  Patient Active Problem List    Diagnosis Date Noted    NSVT (nonsustained ventricular tachycardia) ([facility])      Priority: High    Chronic systolic heart failure ([facility]      Priority: High    Dilated cardiomyopathy ([address]      Priority: High    Essential hypertension      Priority: High    Hyperlipidemia      Priority: High    Diabetic foot ([address] 10/05/2015     Priority: High    Foot osteomyelitis, right ([address] 10/05/2015     Priority: High    Obstructive sleep apnea 09/08/2010     Priority: High    Diabetes mellitus ([address] 06/05/2010     Priority: High    Diabetic foot ulcer ([address] 06/05/2010     Priority: High    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 ([address] 06/05/2010     Priority: High    Obesity 06/05/2010     Priority: High    Type 2 diabetes mellitus with left diabetic foot ulcer ([address] 07/21/2020    Diabetic polyneuropathy associated with type 2 diabetes mellitus ([facility] 07/21/2020    Cellulitis and abscess of foot 07/20/2020    Open fracture of second toe of right foot with routine healing 08/28/2019    Open fracture of toe 08/28/2019    Open fracture dislocation of interphalangeal joint of single toe of right foot 08/28/2019    On amiodarone therapy     Cardiac resynchronization therapy defibrillator (CRT-D) in place 09/01/2017    Abscess of left leg 07/05/2017    Morbid obesity with BMI of 40.0-44.9, adult ([address] 07/05/2017    Paroxysmal atrial fibrillation (HCC)     Hypokalemia     H/O gastric bypass 12/07/2016    Benign prostatic hyperplasia with lower urinary tract symptoms 12/07/2016    MRSA infection 10/07/2015    Diabetic ulcer of right foot associated with type 2 diabetes mellitus ([address]     Subacute osteomyelitis of right foot ([address]     Diabetic foot infection ([facility] 10/05/2015    Hypothyroidism 01/09/2014    Graves' disease 06/19/2012    Vitamin D deficiency 09/22/2010    Acute congestive heart failure ([address] 09/08/2010    Cardiomyopathy ([facility] 06/05/2010    Renal disease due to hypertension 06/05/2010    HTN (hypertension) 06/05/2010       Plan:   Continue current medications. Core Measures:  · Discharge instructions:   · LVEF documented:   · ACEI for LV dysfunction:   ·    Clinically stable. About to be discharged home. We will follow her as necessary. Allie Rhodes MD, [facility]  Thanks for allowing me  the opportunity to participate in the evaluation and care of your patient.  If there are questions please call me [phone]    This note was likely completed using voice recognition technology and may contain unintended grammatical, phraseology and/or punctuation  errors      Amanda Prieto M.D.,[facility]  7/24/2020 3:55 PM

## 2020-07-24 NOTE — PROGRESS NOTES
Device interrogation by company representative @ [facility] 7/23/20, pt currently admitted. See interrogation for further details. CRT-D. No  arrhythmias recorded. Total * 98.0 % (MVP Off)  Effective 97.6 %  optivol near baseline. See PACEART report under Cardiology tab.

## 2020-07-24 NOTE — PROGRESS NOTES
Clinical Pharmacy Progress Note    ADMIT DATE: 7/20/2020     Subjective / Objective:  80 yo M with PMHx significant for HTN, DM, AFib, HFrEF, s/p BiV ICD (2017) who initially was seen in ED 7/19 for left foot wound with drainage - discharged on Augmentin. He was seen by podiatrist on 7/20 for same, and was referred for direct admission for IV antibiotics, imaging, and surgical intervention. MRI with osteomyelitis vs charcot neuroarthropathy. Pt is now s/p I&D of LLE down to and including the bone (done 7/22). Interval update:    Surgical cultures with no growth. Per ID, planning to continue Vanc + Ceftriaxone through 9/2/20. PICC placed 7/23. Pharmacy is consulted to dose vancomycin per Dr. Roma Betts. Current antibiotics:  Cefepime 2g IV q12h - day #4  Vancomycin - pharmacy to dose - day #4   1.5g IV x1 7/20   1.75g IV q18h (7/21-7/23)   1.75g IV q24h (7/23 - current)      Recent Labs     07/23/20  0423 07/24/20  0430    136   K 4.7 4.1    101   CO2 22 26   BUN 36* 38*   CREATININE 1.3 1.4*   GLUCOSE 142* 122*     Estimated Creatinine Clearance: 81 mL/min (A) (based on SCr of 1.4 mg/dL (H)).     Recent Labs     07/23/20  0423 07/24/20  0430   WBC 5.3 4.8   HGB 10.7* 10.1*    189     Vancomycin Levels:  Trough  7/23 @ 04:23 = 18.9mcg/mL (drawn ~1 hr early, on 1.75g IV q18h)    MICROBIOLOGY:  Fluid, left foot (7/22) = Mixed skin xu    VITALS:  /68   Pulse 60   Temp 98 °F (36.7 °C) (Oral)   Resp 16   Ht 6' 4\" (1.93 m)   Wt (!) 330 lb (149.7 kg)   SpO2 100%   BMI 40.17 kg/m²     Intake/Output Summary (Last 24 hours) at 7/24/2020 1336  Last data filed at 7/24/2020 1250  Gross per 24 hour   Intake 1240 ml   Output 1875 ml   Net -635 ml     PROPHYLAXIS:  Stress ulcer: not indicated  VTE:  Apixaban 5 mg BID     ASSESSMENT/PLAN:  1)  LLE Diabetic foot infection with osteomyelitis vs charcot: Cefepime + Vancomycin - Day #4  · Cefepime -   Current dose remains appropriate based on indication and current renal function. Will continue to monitor and adjust as needed per [facility] Renal Dose Adjustment Policy. · Vancomycin - Pharmacy to dose  · Continue 1.75g IV q24h. Dose adjusted 7/23 based on trough. · Clinical condition will be monitored closely, and repeat levels will be ordered & dose adjustments made as appropriate.     Please call with questions--  Thanks--  Jenny Serrato, PharmD, [address], [facility] [place]  [phone]   7/24/2020 1:36 PM

## 2020-07-24 NOTE — PROGRESS NOTES
The [address] [place] [address]  Cardiology Daily Progress Note  7/24/2020,4:16 PM      [facility]  Reynaldo Mcdaniels MD  Primary cardiologist:    Admit Date:  7/20/2020  Hospital Day: Hospital Day: 5  Subjective:  Mr. Harley Lema he is awake and alert. His rhythm is stable he is paced. Did not have shocks from his defibrillator. Cardiac rhythm looks stable. The wounds are stabilized and bandaged and he is about to be transferred to an external long-term care facility. From a cardiac perspective I see nothing else that needs to be done. We will sign off at this time but be happy to follow him as necessary. Objective:   /65   Pulse 66   Temp 97.8 °F (36.6 °C) (Oral)   Resp 18   Ht 6' 4\" (1.93 m)   Wt (!) 330 lb (149.7 kg)   SpO2 96%   BMI 40.17 kg/m²       Intake/Output Summary (Last 24 hours) at 7/24/2020 1616  Last data filed at 7/24/2020 1534  Gross per 24 hour   Intake 1320 ml   Output 1625 ml   Net -305 ml       TELEMETRY: AV paced at 68. Physical Examination:    Vitals:    07/24/20 0713 07/24/20 0728 07/24/20 1131 07/24/20 1432   BP: 108/67  135/68 110/65   Pulse: 68  60 66   Resp: 14  16 18   Temp: 98 °F (36.7 °C)  98 °F (36.7 °C) 97.8 °F (36.6 °C)   TempSrc: Oral  Oral Oral   SpO2: 100%  100% 96%   Weight:  (!) 330 lb (149.7 kg)     Height:            Constitutional and General Appearance:  Healthy. And alert . HEENT: eyes and ears intact. No nasal masses  THYROID: not enlarged  LUNGS:  · Clear to auscultation and percussion  HEART and VASCULAR:  · The apical impulses not displaced  · Heart tones are crisp and normal  · Cervical veins are not engorged  · The carotid upstroke is normal in amplitude and contour without delay or bruit  · Peripheral pulses are symmetrical and full  · There is no clubbing, cyanosis of the extremities.   · No peripheral edema  · Femoral Arteries: 2+ and equal  · Pedal Pulses: 2+ and equal   ABDOMEN[de-identified]  · No masses or tenderness  · Liver/Spleen: No Abnormalities Noted  NEUROLOGICAL:  . Moves all extremities to command. Cranial nerves 2-12 are in tact. PSYCHIATRIC: alert and lucid and oriented and appropriate  SKIN: No lesions or rashes  LYMPH NODES: none enlarged      Medications:    vancomycin  1,750 mg Intravenous Q24H    cefTRIAXone (ROCEPHIN) IV  2 g Intravenous Q24H    sodium chloride flush  10 mL Intravenous 2 times per day    carvedilol  12.5 mg Oral BID WC    insulin lispro  0-6 Units Subcutaneous TID WC    insulin lispro  0-3 Units Subcutaneous Nightly    apixaban  5 mg Oral BID    amiodarone  200 mg Oral Daily    aspirin  81 mg Oral Daily    atorvastatin  40 mg Oral Nightly    furosemide  40 mg Oral Daily    insulin lispro (1 Unit Dial)  10 Units Subcutaneous TID WC    insulin glargine  10 Units Subcutaneous Nightly    levothyroxine  25 mcg Oral Daily    lisinopril  2.5 mg Oral Daily    spironolactone  12.5 mg Oral Daily    tamsulosin  0.4 mg Oral Nightly      dextrose       sodium chloride flush, traMADol, simethicone, naloxone, diphenhydrAMINE, acetaminophen, docusate sodium, promethazine, glucose, dextrose, glucagon (rDNA), dextrose    Lab Data:  CBC:   Recent Labs     07/22/20 0431 07/23/20  0423 07/24/20  0430   WBC 6.9 5.3 4.8   HGB 10.1* 10.7* 10.1*   HCT 30.9* 33.0* 31.2*   MCV 87.8 88.3 87.2    179 189     BMP:   Recent Labs     07/22/20 2052 07/23/20 0423 07/24/20  0430    137 136   K 5.5* 4.7 4.1    103 101   CO2 24 22 26   BUN 34* 36* 38*   CREATININE 1.3 1.3 1.4*     Troponin:Troponin:   Lab Results   Component Value Date    TROPONINI <0.01 07/19/2020     LIVER PROFILE: No results for input(s): AST, ALT, LIPASE, BILIDIR, BILITOT, ALKPHOS in the last 72 hours. Invalid input(s): AMYLASE,  ALB  PT/INR: No results for input(s): PROTIME, INR in the last 72 hours. APTT: No results for input(s): APTT in the last 72 hours.   BNP:  No results for input(s): BNP in the last 72 hours.   IMAGING: as noted    Assessment:  Patient Active Problem List    Diagnosis Date Noted    NSVT (nonsustained ventricular tachycardia) ([facility])      Priority: High    Chronic systolic heart failure ([facility]      Priority: High    Dilated cardiomyopathy ([facility]      Priority: High    Essential hypertension      Priority: High    Hyperlipidemia      Priority: High    Diabetic foot ([facility] 10/05/2015     Priority: High    Foot osteomyelitis, right ([facility] 10/05/2015     Priority: High    Obstructive sleep apnea 09/08/2010     Priority: High    Diabetes mellitus ([facility] 06/05/2010     Priority: High    Diabetic foot ulcer ([facility] 06/05/2010     Priority: High    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 ([facility] 06/05/2010     Priority: High    Obesity 06/05/2010     Priority: High    Cardiac resynchronization therapy defibrillator (CRT-D) in place 09/01/2017     Priority: Low    Abscess of left leg 07/05/2017     Priority: Low    Morbid obesity with BMI of 40.0-44.9, adult ([facility] 07/05/2017     Priority: Low    Paroxysmal atrial fibrillation (HCC)      Priority: Low    Hypokalemia      Priority: Low    H/O gastric bypass 12/07/2016     Priority: Low    Benign prostatic hyperplasia with lower urinary tract symptoms 12/07/2016     Priority: Low    MRSA infection 10/07/2015     Priority: Low    Diabetic ulcer of right foot associated with type 2 diabetes mellitus ([facility]      Priority: Low    Subacute osteomyelitis of right foot ([facility]      Priority: Low    Diabetic foot infection ([facility] 10/05/2015     Priority: Low    Hypothyroidism 01/09/2014     Priority: Low    Graves' disease 06/19/2012     Priority: Low    Vitamin D deficiency 09/22/2010     Priority: Low    Acute congestive heart failure ([facility] 09/08/2010     Priority: Low    Cardiomyopathy ([facility] 06/05/2010     Priority: Low    Renal disease due to hypertension 06/05/2010     Priority: Low    HTN (hypertension) 06/05/2010     Priority: Low    Type 2 diabetes mellitus with left diabetic foot ulcer ([facility] 07/21/2020    Diabetic polyneuropathy associated with type 2 diabetes mellitus ([facility] 07/21/2020    Cellulitis and abscess of foot 07/20/2020    Open fracture of second toe of right foot with routine healing 08/28/2019    Open fracture of toe 08/28/2019    Open fracture dislocation of interphalangeal joint of single toe of right foot 08/28/2019    On amiodarone therapy        Plan:   Continue current medications. Clinically stable. Going home soon About to be discharged home. We will follow him as necessary. Tere Torres MD, [address] [facility]  Thanks for allowing me  the opportunity to participate in the evaluation and care of your patient.  If there are questions please call me [phone]    This note was likely completed using voice recognition technology and may contain unintended grammatical, phraseology and/or punctuation  errors      [facility]ennie Stuart Medical CenterWILVER SAMPSON M.D.,[facility]  7/24/2020 4:16 PM

## 2020-07-24 NOTE — PROGRESS NOTES
ID Follow-up NOTE  RESIDENT NOTE - reviewed / edited, attending note at bottom    CC:   L diabetic foot infection  Antibiotics: Vancomycin + Ceftriaxone    Admit Date: 7/20/2020  Hospital Day: 5    Subjective:     Patient is feeling \"great\". No other complaints  Wound VAC in place    Objective:     Patient Vitals for the past 8 hrs:   BP Temp Temp src Pulse Resp SpO2 Weight   07/24/20 0728 -- -- -- -- -- -- (!) 330 lb (149.7 kg)   07/24/20 0713 108/67 98 °F (36.7 °C) Oral 68 14 100 % --   07/24/20 0458 -- -- -- -- 16 -- --   07/24/20 0419 128/70 97.9 °F (36.6 °C) Oral 70 14 100 % --   07/24/20 0204 -- -- -- -- 16 -- --     I/O last 3 completed shifts: In: 56 [P.O.:620; I.V.:10]  Out: 2400 [Urine:2400]  I/O this shift: In: 56 [P.O.:240; I.V.:250]  Out: -     EXAM:  GENERAL: No apparent distress. Eating breakfast  HEENT: Membranes moist, no oral lesion  NECK:  Supple, no lymphadenopathy  LUNGS: Clear b/l, no rales, no dullness  CARDIAC: RRR, no murmur appreciated  ABD:  + BS, soft / NT  EXT:  LLE wound with wound VAC running at 125 mmHg  NEURO: No focal neurologic findings  PSYCH: Orientation, sensorium, mood normal  LINES:  R PICC in place    Data Review:  Lab Results   Component Value Date    WBC 4.8 07/24/2020    HGB 10.1 (L) 07/24/2020    HCT 31.2 (L) 07/24/2020    MCV 87.2 07/24/2020     07/24/2020     Lab Results   Component Value Date    CREATININE 1.4 (H) 07/24/2020    BUN 38 (H) 07/24/2020     07/24/2020    K 4.1 07/24/2020     07/24/2020    CO2 26 07/24/2020       Hepatic Function Panel:   Lab Results   Component Value Date    ALKPHOS 122 08/27/2019    ALT 42 08/27/2019    AST 31 08/27/2019    PROT 6.3 07/22/2020    PROT 6.8 11/27/2012    BILITOT 0.3 08/27/2019    BILIDIR <0.2 11/07/2018    IBILI see below 11/07/2018    LABALBU 3.7 08/27/2019       MICRO:  7/22 sx cult; NGTD  7/22 sx Path; f/u results  7/21 COVID-19; Not detected1  7/20 Body fluid cult;  Mixed skin xu,  Rare growth      IMAGING:  Postop Xray 7/22   Impression    Impression:    Charcot neuropathy status post removal of plantar osseous fragment. Xray left foot 7/20  Impression    1. Pathologic joint is complete collapse of the hindfoot with fragmentation. 2. Extensive forefoot soft tissue swelling with no localized bone destruction or demineralization. 3. Correlation with MRI would exclude occult osteomyelitis.       Xray chest 7/19  Impression    No acute cardiopulmonary disease on portable chest.       LE DVT study 7/2   Summary          Normal venous duplex examination of the legs bilaterally with normal venous     Doppler signals noted throughout.     No evidence of thrombophlebitis is noted bilaterally in the deep and     superficial veins of the legs. Small calf thrombi cannot be excluded ,     particularly in the left lower extremity       MRI Left foot  Impression    Soft tissue ulcer involving the heel pad with underlying soft tissue inflammation         Hindfoot collapse with fragmentation and disorganization consistent with Charcot neuropathy. Dorsal  subluxation of the midfoot indicating disruption of Chopart ligaments.         Abnormal marrow signal and marrow enhancement involving the hindfoot and midfoot as well as distal tibia and fibula, suspicious for osteomyelitis.  Ancillary findings include peripherally enhancing fluid collections which likely represent abscesses.         Ossific structure within the medial heel pad, correlating with conventional radiographs-see above       Scheduled Meds:   vancomycin  1,750 mg Intravenous Q24H    cefTRIAXone (ROCEPHIN) IV  2 g Intravenous Q24H    sodium chloride flush  10 mL Intravenous 2 times per day    carvedilol  12.5 mg Oral BID WC    insulin lispro  0-6 Units Subcutaneous TID WC    insulin lispro  0-3 Units Subcutaneous Nightly    apixaban  5 mg Oral BID    amiodarone  200 mg Oral Daily    aspirin  81 mg Oral Daily    atorvastatin  40 mg Oral Nightly    furosemide  40 mg Oral Daily    insulin lispro (1 Unit Dial)  10 Units Subcutaneous TID     insulin glargine  10 Units Subcutaneous Nightly    levothyroxine  25 mcg Oral Daily    lisinopril  2.5 mg Oral Daily    spironolactone  12.5 mg Oral Daily    tamsulosin  0.4 mg Oral Nightly       Continuous Infusions:   dextrose         PRN Meds:  sodium chloride flush, traMADol, simethicone, naloxone, diphenhydrAMINE, acetaminophen, docusate sodium, promethazine, glucose, dextrose, glucagon (rDNA), dextrose      Assessment:      S/P I&D with bone removal (DOS 7/22/2020)  Diabetic foot infection - Left   Cellulitis, Left - improved  OM vs Charcot neuropathy; Left foot  DM2 with peripheral neuropathy  CHF  Obesity BMI (39)    Plan:     Cont. IV vancomycin D/c Cefepime  Started Ceftriaxone  [facility] for D/c to SNF / wound care / F/u Dr. Kathy Monahan if bone destruction was caused by Osteomylitis vs. Charcot  Discussed case with Radiologist - multiple fluid collection w/ increased bone marrow edema on calcaneus and at multiple bone of the foot. Suspected L foot infected and tracking up the peroneal & FHL tendons. Sx Cult; no growth  F/U sx bone path  CM - SNF placement    Nephrology - Cr improving / f/u outpt     PICC placed with IV Vancomycin + Ceftriaxone through 9/2 / Orders in [facility]    Discussed with Attending    Babs Modi DPM   Podiatric Resident, PGY-2  Pager: [phone] or Perfect serve    Addendum to Resident Progress note:  Pt seen, examined and evaluated. I have reviewed the current history, physical findings, labs and assessment and plan and agree with note as documented by resident (Dr. Gonzales).    As above  DM foot with neuropathy, L foot ulcer and cellulitis.   Admit, started on vancomycin + cefepime  Podiatry consulted - noted 'probe to bone'    Wound cult - GS 3+WBC, no organism, cult - mixed skin xu.      MRI -  Reviewed with Radiologist, edema mult bones, fluid collection c/c infection  'Hindfoot collapse with fragmentation and disorganization consistent with Charcot neuropathy.  Dorsal  subluxation of the midfoot indicating disruption of Chopart ligaments. '     POD#2 L foot I&D, removed bone fragment, irrigation     IMP/  DM, neuropathy  Charcot neuroarthropathy with extensive destructive changes on MRI   DM foot ulcer with poss extension osteomyelitis       Unclear how much bone marrow signal on MRI represents Charcot vs osteomyelitis is unclear  Given limb threatening condition, will empirically rx for osteomyelitis     REC/  Cont vancomycin +ceftriaxone  Will f/u on OR cult  / path     See TONYA - treat with iv antibiotic x at least 6 weeks, after iv course, po suppression  Wound care and f/u per Podiatry     Discussed with pt  Call with ID issues over weekend  Abelino Garcia MD     INFUSION ORDERS:  Vancomycin 1.75 gm iv daily through 9/2  Ceftriaxone 2 gm iv daily through 9/2  - First dose given in hospital  - R PICC   - Disposition / date discharge  - Check CBC w diff, CMP, ESR, CRP, vancomycin trough every Mon or Tue - FAX result to [phone]  - Call with antibiotic / infusion issues, [phone]  - No f/u in outpatient ID office necessary

## 2020-07-24 NOTE — CARE COORDINATION
CM following, pt will DC to [facility] on Monday floor to SNF program through [facility]. Spoke with Jet Montero [phone] has approved pt to DC can take pt on Monday. KRYSTYNA complete #[id number]. Pt will need transport at DC. Pt aware of DC plan and in agreement.   Electronically signed by Barbara Golden RN on 7/24/2020 at 4:43 PM  [phone]

## 2020-07-24 NOTE — PROGRESS NOTES
[facility]    [facility]              [phone]                       Plan :     -  UPEP, SPEP, Serum free light chain are pending  -Renal ultrasound showed bilateral normal-sized kidneys with no hydronephrosis. Prostate is slightly enlarged.  -Started lisinopril and spironolactone. - Please adjust Vanc apporpriately to decrease risk of renal injury  - Please avoid nephrotoxic agents   - Continue Lasix  - creatinine improved 1.6 > 1.4     Assessment :     1. RODERICK on CKD stage 3  - Likely 2/2 to cardiorenal syndrome along with infectious component  - CHF (last echo 5/19, EF 35-40%)  - Cr fluctuates between 1-1.5  - Type II Cardiorenal syndrome: CKD due to chronic HF  - UA, Urine Na, Urine Protein, UPEP, SPEP, Urine Eosinophils, Serum free light chains      2. Osteomyelitis  - Continue Abx with Vanc and Cefepime  - Please adjust Vanc apporpriately to decrease risk of renal injury    3. CKD Stage 3  - Assessed by Dr. Leila Lockwood initially at [facility]  - S/p gastric bypass in 2013 with 100lb wt. Loss  - Last time seen Cr was 1.0  - Please avoid nephrotoxic agents     4. Mild Hyponatremia  - Likely 2/2 high volume status due to CHF  - Continue Lasix    [facility] would like to thank Contreras Martinez MD   for opportunity to serve this patient      Please call with questions at-   24 Hrs Answering service [phone] or  7 am- 5 pm via Perfect serve or cell phone  Sumeet Reusing          CC/reason for consult :     RODERICK     HPI :     Beti Osuna is a 81 y.o. male with PMH of IDDM, Afib (on Eliquis), systolic CHF s/p BiV ICD in 2017, COPD, HTN, MRSA + osteomyelitis in 2015, CKD stage 3, Stroke presented [facility] on 7/20/2020 with a draining left foot ulcer. Patient initially started experiencing left foot pain and soreness a couple of weeks ago. 5 days ago he noticed increased drainage from the heel of his left foot.  On 7/18/20, he noticed blood from the area and came to the ED. In the ED, he complained of fatigue for a couple of weeks along with the draining foot ulcer. He was seen by podiatry, put on Augmentin and told to follow up outpatient. After his follow-up outpatient, there was concern of worsening foot cellulitis and wound probing to the bone. He came to the ED on 7/20/20 and was admitted. In the ED, vittals were stable, BUN:Cr was 25:1.5, . 3. He had an MRI done later after his admission which showed soft tissue inflammation, Charcot neuropathy, suspicion for osteomyelitis involving his distal tibia and fibula and peripheral enhancing abcess     Interval History:     No acute overnights events  Urine is 2375 ml and BP is OK    ROS:     Seen with -  With resident    positives in bold   Constitutional:  fever, chills, weakness, weight change, fatigue  Skin:  rash, pruritus, hair loss, bruising, dry skin, petechiae  Head, Face, Neck   headaches, swelling,  cervical adenopathy  Respiratory: shortness of breath, cough, or wheezing  Cardiovascular: chest pain, palpitations, dizzy, edema  Gastrointestinal: nausea, vomiting, diarrhea, constipation,belly pain    Yellow skin, blood in stool  Musculoskeletal:  back pain, muscle weakness, gait problems,       joint pain or swelling. Genitourinary:  dysuria, poor urine flow, flank pain, blood in urine  Neurologic:  vertigo, TIA'S, syncope, seizures, focal weakness  Psychosocial:  insomnia, anxiety, or depression.   Additional positive findings:                       All other remaining systems are negative or unable to obtain        PMH/PSH/SH/Family History:     Past Medical History:   Diagnosis Date    Atrial fibrillation (HCC)     Back pain     Cardiomyopathy     CHF (congestive heart failure) (HCC)     COPD (chronic obstructive pulmonary disease) (HCC)     Dyslipidemia     GERD (gastroesophageal reflux disease)     Hyperlipidemia     Hypertension     Hypothyroidism     Leg edema     MRSA (methicillin resistant staph aureus) culture positive 10/5/15    foot/bone    MRSA nasal colonization 05/05/2017    Obesity     Prolonged emergence from general anesthesia     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     Stroke [facility])     Thyroid disease     Type 2 diabetes mellitus without complication ([facility] [address]     Type II or unspecified type diabetes mellitus without mention of complication, not stated as uncontrolled        Past Surgical History:   Procedure Laterality Date    ADRENAL GLAND SURGERY  1970    CARDIAC DEFIBRILLATOR PLACEMENT  09/05/2017    Dr. Puentes Nose COLONOSCOPY N/A 3/8/2019    COLONOSCOPY WITH MAC, UNASYN (3gm) performed by Jazmyn Mcmanus MD at [address] 8/9/2019    COLONOSCOPY POLYPECTOMY SNARE/COLD BIOPSY performed by Jazmyn Mcmanus MD at [address] [facility]  8/9/2019    COLONOSCOPY WITH BIOPSY performed by Jazmyn Mcmanus MD at [address] [facility] Right 8/28/2019    INCISION AND INCISIONAL DEBRIDEMENT OPEN FRACTURE RIGHT 2ND TOE SKIN AND BONE performed by Breanna Blas MD at [address] Left 7/22/2020    LEFT FOOT INCISION AND DRAINAGE WITH BONE BIOPSY AND REMOVAL OF FREE FLOATING BONE FRAGMENT performed by Dmitry Link DPM at [address] [facility]  1-2-    GASTRIC BYPASS SURGERY      OTHER SURGICAL HISTORY  10/6/15    INCISION AND DRAINAGE RIGHT FOOT          OTHER SURGICAL HISTORY Right 10/8/15    INCISION AND DRAINAGE RIGHT FOOT      PACEMAKER PLACEMENT      UPPER GASTROINTESTINAL ENDOSCOPY N/A 3/8/2019    EGD BIOPSY GASTRIC H. PYLORI performed by Jazmyn Mcmanus MD at [facility]        reports that he quit smoking about 3 years ago. His smoking use included cigarettes. He has a 4.00 pack-year smoking history. He has never used smokeless tobacco. He reports current alcohol use. He reports that he does not use drugs.     family history includes Diabetes in his maternal grandmother; Heart Disease in his father; Hypertension in his maternal grandmother and mother.          Medication:     Current Facility-Administered Medications: vancomycin (VANCOCIN) 1,750 mg in dextrose 5 % 250 mL IVPB, 1,750 mg, Intravenous, Q24H  cefTRIAXone (ROCEPHIN) 2 g IVPB in D5W 50ml minibag, 2 g, Intravenous, Q24H  sodium chloride flush 0.9 % injection 10 mL, 10 mL, Intravenous, 2 times per day  sodium chloride flush 0.9 % injection 10 mL, 10 mL, Intravenous, PRN  carvedilol (COREG) tablet 12.5 mg, 12.5 mg, Oral, BID WC  insulin lispro (1 Unit Dial) 0-6 Units, 0-6 Units, Subcutaneous, TID WC  insulin lispro (1 Unit Dial) 0-3 Units, 0-3 Units, Subcutaneous, Nightly  apixaban (ELIQUIS) tablet 5 mg, 5 mg, Oral, BID  traMADol (ULTRAM) tablet 50 mg, 50 mg, Oral, Q6H PRN  simethicone (MYLICON) 40 KOJO/0.8IJ drops 40 mg, 40 mg, Oral, Q6H PRN  naloxone (NARCAN) injection 0.4 mg, 0.4 mg, Intravenous, PRN  diphenhydrAMINE (BENADRYL) injection 25 mg, 25 mg, Intravenous, Q6H PRN  acetaminophen (TYLENOL) tablet 500 mg, 500 mg, Oral, Q6H PRN  docusate sodium (COLACE) capsule 100 mg, 100 mg, Oral, BID PRN  promethazine (PHENERGAN) tablet 12.5 mg, 12.5 mg, Oral, Q6H PRN  glucose (GLUTOSE) 40 % oral gel 15 g, 15 g, Oral, PRN  dextrose 50 % IV solution, 12.5 g, Intravenous, PRN  glucagon (rDNA) injection 1 mg, 1 mg, Intramuscular, PRN  dextrose 5 % solution, 100 mL/hr, Intravenous, PRN  amiodarone (CORDARONE) tablet 200 mg, 200 mg, Oral, Daily  aspirin EC tablet 81 mg, 81 mg, Oral, Daily  atorvastatin (LIPITOR) tablet 40 mg, 40 mg, Oral, Nightly  furosemide (LASIX) tablet 40 mg, 40 mg, Oral, Daily  insulin lispro (1 Unit Dial) 10 Units, 10 Units, Subcutaneous, TID WC  insulin glargine (LANTUS;BASAGLAR) injection pen 10 Units, 10 Units, Subcutaneous, Nightly  levothyroxine (SYNTHROID) tablet 25 mcg, 25 mcg, Oral, Daily  lisinopril (PRINIVIL;ZESTRIL) tablet 2.5 mg, 2.5 mg, Oral, Daily  spironolactone (ALDACTONE) tablet 12.5 mg, 12.5 mg, Oral, Daily  tamsulosin (FLOMAX) capsule 0.4 mg, 0.4 mg, Oral, Nightly       Vitals :     Vitals:    07/24/20 0713   BP: 108/67   Pulse: 68   Resp: 14   Temp: 98 °F (36.7 °C)   SpO2: 100%       I & O :       Intake/Output Summary (Last 24 hours) at 7/24/2020 0912  Last data filed at 7/24/2020 0830  Gross per 24 hour   Intake 1120 ml   Output 2400 ml   Net -1280 ml        Physical Examination :     General appearance: Anxious- no, distressed- no, in good spirits- yes  HEENT: Lips- normal, teeth- ok , oral mucosa- moist  Neck : Mass- no, appears symmetrical, JVD- not visible  Respiratory: Respiratory effort- , wheeze- no, crackles -  Cardiovascular:  Ausculation- No M/R/G, Edema   Abdomen: visible mass- no, distention- no, scar- no, tenderness- no                            hepatosplenomegaly-  no  Musculoskeletal:  clubbing no,cyanosis- no , digital ischemia- no                           muscle strength- grossly normal , tone - grossly normal  Skin: rashes- no , ulcers- no, induration- no, tightening - no  Psychiatric:  Judgement and insight- normal           AAO X 3  Additional finding:     LABS:     Recent Labs     07/22/20 0431 07/23/20  0423 07/24/20  0430   WBC 6.9 5.3 4.8   HGB 10.1* 10.7* 10.1*   HCT 30.9* 33.0* 31.2*    179 189     Recent Labs     07/22/20 2052 07/23/20  0423 07/24/20  0430    137 136   K 5.5* 4.7 4.1    103 101   CO2 24 22 26   BUN 34* 36* 38*   CREATININE 1.3 1.3 1.4*   GLUCOSE 138* 142* 122*   MG 2.30  --   --             Thanks  Nephrology  Anthony C[address]as [address], [address]  Office: [phone]  Cell: [phone]  Fax: [phone]

## 2020-07-24 NOTE — PLAN OF CARE
Problem: Pain:  Goal: Control of acute pain  Description: Control of acute pain  Outcome: Ongoing  Note: Patient stated a pain of 6/10 in lower left extremity. Patient was given PRN pain medication and has since been resting. Will continue to monitor. Problem: Falls - Risk of:  Goal: Absence of physical injury  Description: Absence of physical injury  Outcome: Ongoing  Note: Patient has been in bed. Patient is able to turn self and move bed up and down as needed. Patient has not been able to get out of bed since he has a none weight baring side on his left foot. Will continue to monitor.

## 2020-07-25 LAB
ANION GAP SERPL CALCULATED.3IONS-SCNC: 9 MMOL/L (ref 3–16)
BASOPHILS ABSOLUTE: 0 K/UL (ref 0–0.2)
BASOPHILS RELATIVE PERCENT: 0.9 %
BUN BLDV-MCNC: 40 MG/DL (ref 7–20)
CALCIUM SERPL-MCNC: 9.3 MG/DL (ref 8.3–10.6)
CHLORIDE BLD-SCNC: 103 MMOL/L (ref 99–110)
CO2: 26 MMOL/L (ref 21–32)
CREAT SERPL-MCNC: 1.1 MG/DL (ref 0.8–1.3)
EOSINOPHILS ABSOLUTE: 0.3 K/UL (ref 0–0.6)
EOSINOPHILS RELATIVE PERCENT: 5.4 %
GFR AFRICAN AMERICAN: >60
GFR NON-AFRICAN AMERICAN: >60
GLUCOSE BLD-MCNC: 111 MG/DL (ref 70–99)
GLUCOSE BLD-MCNC: 159 MG/DL (ref 70–99)
GLUCOSE BLD-MCNC: 166 MG/DL (ref 70–99)
GLUCOSE BLD-MCNC: 173 MG/DL (ref 70–99)
GLUCOSE BLD-MCNC: 230 MG/DL (ref 70–99)
HCT VFR BLD CALC: 31.8 % (ref 40.5–52.5)
HEMOGLOBIN: 10.5 G/DL (ref 13.5–17.5)
LYMPHOCYTES ABSOLUTE: 1.1 K/UL (ref 1–5.1)
LYMPHOCYTES RELATIVE PERCENT: 22.3 %
MCH RBC QN AUTO: 28.7 PG (ref 26–34)
MCHC RBC AUTO-ENTMCNC: 33.1 G/DL (ref 31–36)
MCV RBC AUTO: 86.5 FL (ref 80–100)
MONOCYTES ABSOLUTE: 0.7 K/UL (ref 0–1.3)
MONOCYTES RELATIVE PERCENT: 13.7 %
NEUTROPHILS ABSOLUTE: 2.9 K/UL (ref 1.7–7.7)
NEUTROPHILS RELATIVE PERCENT: 57.7 %
PDW BLD-RTO: 14 % (ref 12.4–15.4)
PERFORMED ON: ABNORMAL
PLATELET # BLD: 210 K/UL (ref 135–450)
PMV BLD AUTO: 9.1 FL (ref 5–10.5)
POTASSIUM SERPL-SCNC: 4.4 MMOL/L (ref 3.5–5.1)
RBC # BLD: 3.67 M/UL (ref 4.2–5.9)
SODIUM BLD-SCNC: 138 MMOL/L (ref 136–145)
WBC # BLD: 5.1 K/UL (ref 4–11)

## 2020-07-25 PROCEDURE — 6370000000 HC RX 637 (ALT 250 FOR IP): Performed by: INTERNAL MEDICINE

## 2020-07-25 PROCEDURE — 6370000000 HC RX 637 (ALT 250 FOR IP): Performed by: STUDENT IN AN ORGANIZED HEALTH CARE EDUCATION/TRAINING PROGRAM

## 2020-07-25 PROCEDURE — 2580000003 HC RX 258: Performed by: STUDENT IN AN ORGANIZED HEALTH CARE EDUCATION/TRAINING PROGRAM

## 2020-07-25 PROCEDURE — 6360000002 HC RX W HCPCS: Performed by: STUDENT IN AN ORGANIZED HEALTH CARE EDUCATION/TRAINING PROGRAM

## 2020-07-25 PROCEDURE — U0003 INFECTIOUS AGENT DETECTION BY NUCLEIC ACID (DNA OR RNA); SEVERE ACUTE RESPIRATORY SYNDROME CORONAVIRUS 2 (SARS-COV-2) (CORONAVIRUS DISEASE [COVID-19]), AMPLIFIED PROBE TECHNIQUE, MAKING USE OF HIGH THROUGHPUT TECHNOLOGIES AS DESCRIBED BY CMS-2020-01-R: HCPCS

## 2020-07-25 PROCEDURE — 1200000000 HC SEMI PRIVATE

## 2020-07-25 PROCEDURE — 94660 CPAP INITIATION&MGMT: CPT

## 2020-07-25 PROCEDURE — 85025 COMPLETE CBC W/AUTO DIFF WBC: CPT

## 2020-07-25 PROCEDURE — 36592 COLLECT BLOOD FROM PICC: CPT

## 2020-07-25 PROCEDURE — 2580000003 HC RX 258: Performed by: INTERNAL MEDICINE

## 2020-07-25 PROCEDURE — 80048 BASIC METABOLIC PNL TOTAL CA: CPT

## 2020-07-25 PROCEDURE — 6360000002 HC RX W HCPCS: Performed by: INTERNAL MEDICINE

## 2020-07-25 RX ADMIN — Medication 10 ML: at 20:19

## 2020-07-25 RX ADMIN — LEVOTHYROXINE SODIUM 25 MCG: 0.03 TABLET ORAL at 05:48

## 2020-07-25 RX ADMIN — ASPIRIN 81 MG: 81 TABLET, COATED ORAL at 08:22

## 2020-07-25 RX ADMIN — CARVEDILOL 12.5 MG: 12.5 TABLET, FILM COATED ORAL at 17:39

## 2020-07-25 RX ADMIN — INSULIN LISPRO 0 UNITS: 100 INJECTION, SOLUTION INTRAVENOUS; SUBCUTANEOUS at 17:39

## 2020-07-25 RX ADMIN — CEFTRIAXONE SODIUM 2 G: 2 INJECTION, POWDER, FOR SOLUTION INTRAMUSCULAR; INTRAVENOUS at 11:05

## 2020-07-25 RX ADMIN — AMIODARONE HYDROCHLORIDE 200 MG: 200 TABLET ORAL at 08:21

## 2020-07-25 RX ADMIN — Medication 40 MG: at 12:48

## 2020-07-25 RX ADMIN — LISINOPRIL 2.5 MG: 2.5 TABLET ORAL at 08:22

## 2020-07-25 RX ADMIN — FUROSEMIDE 40 MG: 40 TABLET ORAL at 08:21

## 2020-07-25 RX ADMIN — TRAMADOL HYDROCHLORIDE 50 MG: 50 TABLET, FILM COATED ORAL at 05:48

## 2020-07-25 RX ADMIN — APIXABAN 5 MG: 5 TABLET, FILM COATED ORAL at 20:19

## 2020-07-25 RX ADMIN — ATORVASTATIN CALCIUM 40 MG: 40 TABLET, FILM COATED ORAL at 20:19

## 2020-07-25 RX ADMIN — CARVEDILOL 12.5 MG: 12.5 TABLET, FILM COATED ORAL at 08:22

## 2020-07-25 RX ADMIN — SPIRONOLACTONE 12.5 MG: 25 TABLET, FILM COATED ORAL at 08:21

## 2020-07-25 RX ADMIN — INSULIN LISPRO 2 UNITS: 100 INJECTION, SOLUTION INTRAVENOUS; SUBCUTANEOUS at 12:59

## 2020-07-25 RX ADMIN — APIXABAN 5 MG: 5 TABLET, FILM COATED ORAL at 08:21

## 2020-07-25 RX ADMIN — INSULIN LISPRO 1 UNITS: 100 INJECTION, SOLUTION INTRAVENOUS; SUBCUTANEOUS at 08:24

## 2020-07-25 RX ADMIN — TAMSULOSIN HYDROCHLORIDE 0.4 MG: 0.4 CAPSULE ORAL at 20:19

## 2020-07-25 RX ADMIN — INSULIN LISPRO 10 UNITS: 100 INJECTION, SOLUTION INTRAVENOUS; SUBCUTANEOUS at 17:40

## 2020-07-25 RX ADMIN — INSULIN LISPRO 10 UNITS: 100 INJECTION, SOLUTION INTRAVENOUS; SUBCUTANEOUS at 12:59

## 2020-07-25 RX ADMIN — INSULIN LISPRO 10 UNITS: 100 INJECTION, SOLUTION INTRAVENOUS; SUBCUTANEOUS at 08:24

## 2020-07-25 RX ADMIN — INSULIN LISPRO 1 UNITS: 100 INJECTION, SOLUTION INTRAVENOUS; SUBCUTANEOUS at 22:18

## 2020-07-25 RX ADMIN — VANCOMYCIN HYDROCHLORIDE 1750 MG: 10 INJECTION, POWDER, LYOPHILIZED, FOR SOLUTION INTRAVENOUS at 05:49

## 2020-07-25 ASSESSMENT — PAIN SCALES - GENERAL
PAINLEVEL_OUTOF10: 0
PAINLEVEL_OUTOF10: 6
PAINLEVEL_OUTOF10: 0

## 2020-07-25 NOTE — PLAN OF CARE
Problem: Pain:  Goal: Pain level will decrease  Description: Pain level will decrease  Outcome: Ongoing  Note: Patient alerts RN of increasing pain; RN administers pain medication as ordered to patient satisfaction    Goal: Control of acute pain  Description: Control of acute pain  7/25/2020 0112 by Fletcher Kwok  Outcome: Ongoing  Note: Patient stated pain of 6/10. Patient was given PRN pain medication and since been satisfied. Will continue to monitor. Problem: Falls - Risk of:  Goal: Will remain free from falls  Description: Will remain free from falls  Outcome: Ongoing  Note: Call light within reach; bed alarm engaged; increased frequency of rounds      Problem: Skin Integrity:  Goal: Will show no infection signs and symptoms  Description: Will show no infection signs and symptoms  7/25/2020 0112 by Fletcher Kwok  Outcome: Ongoing  Note: Patient has wound vac on left lower extremity that is clean, dry and intact. Patient has VSS and afebrile. Patient shows o signs of infection.

## 2020-07-25 NOTE — PROGRESS NOTES
Progress Note  Admit Date: 7/20/2020       Overnight Events: none noted     CC: F/U for diabetic foot infection  Interval History:   Patient was admitted with diabetic foot infection. MRI significant for some deep abscess formations as well as abnormal marrow signal, possible osteo vs charcot. S/p I&D left foot 7/22 with bone fragment removal and bone biopsy. IV recommended IV antibiotics through 9/2/20. Has wound vac in place. PICC was placed on 7/23. No acute events. Cardiology recommended no further testing. Afebrile. Pain is well controlled. No dyspnea. Eating well.            vancomycin  1,750 mg Intravenous Q24H    cefTRIAXone (ROCEPHIN) IV  2 g Intravenous Q24H    sodium chloride flush  10 mL Intravenous 2 times per day    carvedilol  12.5 mg Oral BID WC    insulin lispro  0-6 Units Subcutaneous TID WC    insulin lispro  0-3 Units Subcutaneous Nightly    apixaban  5 mg Oral BID    amiodarone  200 mg Oral Daily    aspirin  81 mg Oral Daily    atorvastatin  40 mg Oral Nightly    furosemide  40 mg Oral Daily    insulin lispro (1 Unit Dial)  10 Units Subcutaneous TID WC    insulin glargine  10 Units Subcutaneous Nightly    levothyroxine  25 mcg Oral Daily    lisinopril  2.5 mg Oral Daily    spironolactone  12.5 mg Oral Daily    tamsulosin  0.4 mg Oral Nightly      PRN Medications: sodium chloride flush, traMADol, simethicone, naloxone, diphenhydrAMINE, acetaminophen, docusate sodium, promethazine, glucose, dextrose, glucagon (rDNA), dextrose  Diet: DIET CARDIAC; Carb Control: 4 carb choices (60 gms)/meal  Dietary Nutrition Supplements: Wound Healing Oral Supplement  Continuous Infusions:   dextrose       PHYSICAL EXAM:  BP (!) 142/73   Pulse 60   Temp 97.4 °F (36.3 °C) (Oral)   Resp 16   Ht 6' 4\" (1.93 m)   Wt (!) 331 lb (150.1 kg)   SpO2 96%   BMI 40.29 kg/m²   Recent Labs     07/24/20  1640 07/24/20  2116 07/24/20  2323 07/25/20  0801 07/25/20  1158   POCGLU 117* 210* 186* 166* 230*       Intake/Output Summary (Last 24 hours) at 7/25/2020 1211  Last data filed at 7/25/2020 2204  Gross per 24 hour   Intake 1060 ml   Output 1800 ml   Net -740 ml     General appearance: No apparent distress appears stated age and cooperative. Lungs: Clear to auscultation, bilaterally without Rales/Wheezes/Rhonchi with good respiratory effort. Heart: Regular rate and rhythm with Normal S1 and split S2 without murmurs, rubs or gallops, point of maximum impulse non-displaced  Abdomen: Soft, non-tender or non-distended without rigidity or guarding and positive bowel sounds all four quadrants. Extremities: No clubbing, cyanosis, left foot is covered in dressing. Skin: Skin color, texture, turgor normal.  Pictures reviewed. Neurologic: Alert and oriented X 3, grossly non-focal.  Mental status: Alert, oriented, thought content appropriate. Capillary refill is brisk  Peripheral pulses 2+    LABS:  Recent Labs     07/23/20  0423 07/24/20  0430 07/25/20  0430   WBC 5.3 4.8 5.1   HGB 10.7* 10.1* 10.5*   HCT 33.0* 31.2* 31.8*    189 210                                                                    Recent Labs     07/23/20  0423 07/24/20  0430 07/25/20  0430    136 138   K 4.7 4.1 4.4    101 103   CO2 22 26 26   BUN 36* 38* 40*   CREATININE 1.3 1.4* 1.1   GLUCOSE 142* 122* 159*     No results for input(s): AST, ALT, ALB, BILITOT, ALKPHOS in the last 72 hours. No results for input(s): TROPONINI in the last 72 hours. Assessment & Plan:    Patient Active Problem List:      Diabetic foot infection with abscesses and Possible osteomyelitis vs charcot neuroarthropathy:  Wound cx NGTD  S/p I&D with bone biopsy 7/22- path pending  On ceftriaxone and vanco, plan for IV antibiotics through 9/2.  PICC in place (7/23)  Podiatry and ID are on board  Non WB on LE  need placement- SW following, precert in process    HTN, Chronic systolic CHF EF 56-94%:  Controlled   volume status is stable a this

## 2020-07-25 NOTE — PROGRESS NOTES
representing Charcot neuropathy are redemonstrated. There has been interval removal of the ossific density in the medial heel pad. There is no acute fracture or dislocation. Postoperative soft    tissue tissue swelling is present.              Impression    Impression:    Charcot neuropathy status post removal of plantar osseous fragment.                 MRI left foot 7/21/20  Impression         Soft tissue ulcer involving the heel pad with underlying soft tissue inflammation         Hindfoot collapse with fragmentation and disorganization consistent with Charcot neuropathy. Dorsal  subluxation of the midfoot indicating disruption of Chopart ligaments.         Abnormal marrow signal and marrow enhancement involving the hindfoot and midfoot as well as distal tibia and fibula, suspicious for osteomyelitis. Ancillary findings include peripherally enhancing fluid collections which likely represent abscesses.         Ossific structure within the medial heel pad, correlating with conventional radiographs-see above        ASSESSMENT/PLAN  1. S/p incision and drainage, down to and including bone, left foot (DOS:7/22/20)  2. Full thickness wound, plantar left foot, Coffey III  3. Cellulitis, left LE resolved  4. Osteomyelitis vs charcot neuroarthropathy, left foot/ankle  5. Diabetes mellitus with peripheral neuropathy    -Patient examined and evaluated at bedside this am  -VSS, no leukocytosis (WBC-5.1)  -XR reviewed, impressions noted above  -MRI reviewed, impressions noted above  -Surgical wound culture from 7/22/2020- NGTD  -Bone biopsy from 7/22/20- pending  -ID following, recommends vancomycin/ceftriaxone. PICC placed, patient to receive vancomycin/ceftriaxone through 9/2. Agree with recommendations  -Wound vac not changed at this time. To be changed MWF. -Wound vac running at continuous 125 mmHg  -NON weight bearing to the left foot, heel weight bearing for transfers only  -SW following, WOUND VAC NEED AT D/C.

## 2020-07-25 NOTE — PLAN OF CARE
Problem: Pain:  Goal: Control of acute pain  Description: Control of acute pain  Outcome: Ongoing  Note: Patient stated pain of 6/10. Patient was given PRN pain medication and since been satisfied. Will continue to monitor. Problem: Skin Integrity:  Goal: Will show no infection signs and symptoms  Description: Will show no infection signs and symptoms  7/25/2020 0112 by Lyn Loomis  Outcome: Ongoing  Note: Patient has wound vac on left lower extremity that is clean, dry and intact. Patient has VSS and afebrile. Patient shows o signs of infection.

## 2020-07-25 NOTE — PROGRESS NOTES
[facility]    [facility]              [phone]                       Plan :     -  UPEP, SPEP, Serum free light chain are pending  -Renal ultrasound showed bilateral normal-sized kidneys with no hydronephrosis. Prostate is slightly enlarged.  -Started lisinopril and spironolactone. - Creatinine getting better  Tolerating ACE well   Dose vanc according to GFR     Assessment :     1. RODERICK on CKD stage 3  - Likely 2/2 to cardiorenal syndrome along with infectious component  - CHF (last echo 5/19, EF 35-40%)  - Cr fluctuates between 1-1.5  - Type II Cardiorenal syndrome: CKD due to chronic HF  - UA, Urine Na, Urine Protein, UPEP, SPEP, Urine Eosinophils, Serum free light chains      2. Osteomyelitis  - Continue Abx with Vanc and Cefepime  - Please adjust Vanc apporpriately to decrease risk of renal injury    3. CKD Stage 3  - Assessed by Dr. Ja Titus initially at [facility]  - S/p gastric bypass in 2013 with 100lb wt. Loss  - Last time seen Cr was 1.0  - Please avoid nephrotoxic agents     4. Mild Hyponatremia  - Likely 2/2 high volume status due to CHF  - Continue [facility] would like to thank Jaymie Rich MD   for opportunity to serve this patient      Please call with questions at-   24 Hrs Answering service [phone] or  7 am- 5 pm via Perfect serve or cell phone  Bárbara Licea          CC/reason for consult :     RODERICK     HPI :     Nicolle Gentile is a 81 y.o. male with PMH of IDDM, Afib (on Eliquis), systolic CHF s/p BiV ICD in 2017, COPD, HTN, MRSA + osteomyelitis in 2015, CKD stage 3, Stroke presented [facility] on 7/20/2020 with a draining left foot ulcer. Patient initially started experiencing left foot pain and soreness a couple of weeks ago. 5 days ago he noticed increased drainage from the heel of his left foot. On 7/18/20, he noticed blood from the area and came to the ED.  In the ED, he complained of fatigue for a couple of weeks along with the draining foot ulcer. He was seen by podiatry, put on Augmentin and told to follow up outpatient. After his follow-up outpatient, there was concern of worsening foot cellulitis and wound probing to the bone. He came to the ED on 7/20/20 and was admitted. In the ED, vittals were stable, BUN:Cr was 25:1.5, . 3. He had an MRI done later after his admission which showed soft tissue inflammation, Charcot neuropathy, suspicion for osteomyelitis involving his distal tibia and fibula and peripheral enhancing abcess     Interval History:     Seems ok   No new complaints    ROS:     Seen with -     positives in bold   Constitutional:  fever, chills, weakness, weight change, fatigue  Skin:  rash, pruritus, hair loss, bruising, dry skin, petechiae  Head, Face, Neck   headaches, swelling,  cervical adenopathy  Respiratory: shortness of breath, cough, or wheezing  Cardiovascular: chest pain, palpitations, dizzy, edema  Gastrointestinal: nausea, vomiting, diarrhea, constipation,belly pain    Yellow skin, blood in stool  Musculoskeletal:  back pain, muscle weakness, gait problems,       joint pain or swelling. Genitourinary:  dysuria, poor urine flow, flank pain, blood in urine  Neurologic:  vertigo, TIA'S, syncope, seizures, focal weakness  Psychosocial:  insomnia, anxiety, or depression.   Additional positive findings:                       All other remaining systems are negative or unable to obtain        PMH/PSH/SH/Family History:     Past Medical History:   Diagnosis Date    Atrial fibrillation (HCC)     Back pain     Cardiomyopathy     CHF (congestive heart failure) (HCC)     COPD (chronic obstructive pulmonary disease) (HCC)     Dyslipidemia     GERD (gastroesophageal reflux disease)     Hyperlipidemia     Hypertension     Hypothyroidism     Leg edema     MRSA (methicillin resistant staph aureus) culture positive 10/5/15    foot/bone    MRSA nasal colonization 05/05/2017    Obesity     Prolonged emergence from general anesthesia     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     Stroke [facility])     Thyroid disease     Type 2 diabetes mellitus without complication ([address]     Type II or unspecified type diabetes mellitus without mention of complication, not stated as uncontrolled        Past Surgical History:   Procedure Laterality Date    ADRENAL GLAND SURGERY  1970    CARDIAC DEFIBRILLATOR PLACEMENT  09/05/2017    Dr. Kylie Santiago COLONOSCOPY N/A 3/8/2019    COLONOSCOPY WITH MAC, UNASYN (3gm) performed by Angella Roman MD at [address] 8/9/2019    COLONOSCOPY POLYPECTOMY SNARE/COLD BIOPSY performed by [facility] MD [facility] [address] [facility] [address]  8/9/2019    COLONOSCOPY WITH BIOPSY performed by Angella Roman MD at [address] [facility] Right 8/28/2019    INCISION AND INCISIONAL DEBRIDEMENT OPEN FRACTURE RIGHT 2ND TOE SKIN AND BONE performed by Lane Sibley MD at [address] Left 7/22/2020    LEFT FOOT INCISION AND DRAINAGE WITH BONE BIOPSY AND REMOVAL OF FREE FLOATING BONE FRAGMENT performed by Liz Hutton DPM at [address]    GASTRIC BYPASS SURGERY      OTHER SURGICAL HISTORY  10/6/15    INCISION AND DRAINAGE RIGHT FOOT          OTHER SURGICAL HISTORY Right 10/8/15    INCISION AND DRAINAGE RIGHT FOOT      PACEMAKER PLACEMENT      UPPER GASTROINTESTINAL ENDOSCOPY N/A 3/8/2019    EGD BIOPSY GASTRIC H. PYLORI performed by Angella Roman MD at [facility]        reports that he quit smoking about 3 years ago. His smoking use included cigarettes. He has a 4.00 pack-year smoking history. He has never used smokeless tobacco. He reports current alcohol use. He reports that he does not use drugs. family history includes Diabetes in his maternal grandmother; Heart Disease in his father; Hypertension in his maternal grandmother and mother.          Medication:     Current Facility-Administered Medications: vancomycin (VANCOCIN) 1,750 mg in dextrose 5 % 250 mL IVPB, 1,750 mg, Intravenous, Q24H  cefTRIAXone (ROCEPHIN) 2 g IVPB in D5W 50ml minibag, 2 g, Intravenous, Q24H  sodium chloride flush 0.9 % injection 10 mL, 10 mL, Intravenous, 2 times per day  sodium chloride flush 0.9 % injection 10 mL, 10 mL, Intravenous, PRN  carvedilol (COREG) tablet 12.5 mg, 12.5 mg, Oral, BID WC  insulin lispro (1 Unit Dial) 0-6 Units, 0-6 Units, Subcutaneous, TID WC  insulin lispro (1 Unit Dial) 0-3 Units, 0-3 Units, Subcutaneous, Nightly  apixaban (ELIQUIS) tablet 5 mg, 5 mg, Oral, BID  traMADol (ULTRAM) tablet 50 mg, 50 mg, Oral, Q6H PRN  simethicone (MYLICON) 40 BN/8.4IO drops 40 mg, 40 mg, Oral, Q6H PRN  naloxone (NARCAN) injection 0.4 mg, 0.4 mg, Intravenous, PRN  diphenhydrAMINE (BENADRYL) injection 25 mg, 25 mg, Intravenous, Q6H PRN  acetaminophen (TYLENOL) tablet 500 mg, 500 mg, Oral, Q6H PRN  docusate sodium (COLACE) capsule 100 mg, 100 mg, Oral, BID PRN  promethazine (PHENERGAN) tablet 12.5 mg, 12.5 mg, Oral, Q6H PRN  glucose (GLUTOSE) 40 % oral gel 15 g, 15 g, Oral, PRN  dextrose 50 % IV solution, 12.5 g, Intravenous, PRN  glucagon (rDNA) injection 1 mg, 1 mg, Intramuscular, PRN  dextrose 5 % solution, 100 mL/hr, Intravenous, PRN  amiodarone (CORDARONE) tablet 200 mg, 200 mg, Oral, Daily  aspirin EC tablet 81 mg, 81 mg, Oral, Daily  atorvastatin (LIPITOR) tablet 40 mg, 40 mg, Oral, Nightly  furosemide (LASIX) tablet 40 mg, 40 mg, Oral, Daily  insulin lispro (1 Unit Dial) 10 Units, 10 Units, Subcutaneous, TID WC  insulin glargine (LANTUS;BASAGLAR) injection pen 10 Units, 10 Units, Subcutaneous, Nightly  levothyroxine (SYNTHROID) tablet 25 mcg, 25 mcg, Oral, Daily  lisinopril (PRINIVIL;ZESTRIL) tablet 2.5 mg, 2.5 mg, Oral, Daily  spironolactone (ALDACTONE) tablet 12.5 mg, 12.5 mg, Oral, Daily  tamsulosin (FLOMAX) capsule 0.4 mg, 0.4 mg, Oral, Nightly       Vitals :     Vitals:    07/25/20 1453   BP: (!) 164/70   Pulse: 68   Resp:

## 2020-07-25 NOTE — PROGRESS NOTES
Patient AOx4 and able to make needs known; compliant with all medications as ordered; pain controlled with PRN pain management; wounds vac dressing CDI; patient denies SOB; CP; bowel sounds audible 4Q; VSS; RRR, AV paced; bed in lowest position; bed alarm engaged; increased frequency of rounds; will continue to monitor.        Electronically signed by Bharti Benavidez RN on 7/25/20 at 12:22 PM EDT

## 2020-07-26 LAB
ANION GAP SERPL CALCULATED.3IONS-SCNC: 10 MMOL/L (ref 3–16)
BASOPHILS ABSOLUTE: 0.1 K/UL (ref 0–0.2)
BASOPHILS RELATIVE PERCENT: 0.9 %
BUN BLDV-MCNC: 47 MG/DL (ref 7–20)
CALCIUM SERPL-MCNC: 9.5 MG/DL (ref 8.3–10.6)
CHLORIDE BLD-SCNC: 102 MMOL/L (ref 99–110)
CO2: 27 MMOL/L (ref 21–32)
CREAT SERPL-MCNC: 1.2 MG/DL (ref 0.8–1.3)
EOSINOPHILS ABSOLUTE: 0.3 K/UL (ref 0–0.6)
EOSINOPHILS RELATIVE PERCENT: 4.6 %
GFR AFRICAN AMERICAN: >60
GFR NON-AFRICAN AMERICAN: >60
GLUCOSE BLD-MCNC: 157 MG/DL (ref 70–99)
GLUCOSE BLD-MCNC: 164 MG/DL (ref 70–99)
GLUCOSE BLD-MCNC: 169 MG/DL (ref 70–99)
GLUCOSE BLD-MCNC: 218 MG/DL (ref 70–99)
GLUCOSE BLD-MCNC: 254 MG/DL (ref 70–99)
HCT VFR BLD CALC: 32 % (ref 40.5–52.5)
HEMOGLOBIN: 10.6 G/DL (ref 13.5–17.5)
LYMPHOCYTES ABSOLUTE: 1 K/UL (ref 1–5.1)
LYMPHOCYTES RELATIVE PERCENT: 16.2 %
MCH RBC QN AUTO: 28.5 PG (ref 26–34)
MCHC RBC AUTO-ENTMCNC: 33.2 G/DL (ref 31–36)
MCV RBC AUTO: 86 FL (ref 80–100)
MONOCYTES ABSOLUTE: 0.7 K/UL (ref 0–1.3)
MONOCYTES RELATIVE PERCENT: 10.7 %
NEUTROPHILS ABSOLUTE: 4.2 K/UL (ref 1.7–7.7)
NEUTROPHILS RELATIVE PERCENT: 67.6 %
PDW BLD-RTO: 14.2 % (ref 12.4–15.4)
PERFORMED ON: ABNORMAL
PLATELET # BLD: 218 K/UL (ref 135–450)
PMV BLD AUTO: 8.7 FL (ref 5–10.5)
POTASSIUM SERPL-SCNC: 4.9 MMOL/L (ref 3.5–5.1)
RBC # BLD: 3.71 M/UL (ref 4.2–5.9)
SODIUM BLD-SCNC: 139 MMOL/L (ref 136–145)
VANCOMYCIN TROUGH: 20.3 UG/ML (ref 10–20)
WBC # BLD: 6.2 K/UL (ref 4–11)

## 2020-07-26 PROCEDURE — 6370000000 HC RX 637 (ALT 250 FOR IP): Performed by: STUDENT IN AN ORGANIZED HEALTH CARE EDUCATION/TRAINING PROGRAM

## 2020-07-26 PROCEDURE — 80202 ASSAY OF VANCOMYCIN: CPT

## 2020-07-26 PROCEDURE — 6360000002 HC RX W HCPCS: Performed by: INTERNAL MEDICINE

## 2020-07-26 PROCEDURE — 6370000000 HC RX 637 (ALT 250 FOR IP): Performed by: INTERNAL MEDICINE

## 2020-07-26 PROCEDURE — 2580000003 HC RX 258: Performed by: STUDENT IN AN ORGANIZED HEALTH CARE EDUCATION/TRAINING PROGRAM

## 2020-07-26 PROCEDURE — 85025 COMPLETE CBC W/AUTO DIFF WBC: CPT

## 2020-07-26 PROCEDURE — 94660 CPAP INITIATION&MGMT: CPT

## 2020-07-26 PROCEDURE — 80048 BASIC METABOLIC PNL TOTAL CA: CPT

## 2020-07-26 PROCEDURE — 2580000003 HC RX 258: Performed by: INTERNAL MEDICINE

## 2020-07-26 PROCEDURE — 6360000002 HC RX W HCPCS: Performed by: STUDENT IN AN ORGANIZED HEALTH CARE EDUCATION/TRAINING PROGRAM

## 2020-07-26 PROCEDURE — 1200000000 HC SEMI PRIVATE

## 2020-07-26 RX ORDER — INSULIN LISPRO 100 [IU]/ML
0-12 INJECTION, SOLUTION INTRAVENOUS; SUBCUTANEOUS
Status: DISCONTINUED | OUTPATIENT
Start: 2020-07-26 | End: 2020-07-27 | Stop reason: HOSPADM

## 2020-07-26 RX ORDER — INSULIN LISPRO 100 [IU]/ML
12 INJECTION, SOLUTION INTRAVENOUS; SUBCUTANEOUS
Status: DISCONTINUED | OUTPATIENT
Start: 2020-07-26 | End: 2020-07-27 | Stop reason: HOSPADM

## 2020-07-26 RX ORDER — INSULIN LISPRO 100 [IU]/ML
0-6 INJECTION, SOLUTION INTRAVENOUS; SUBCUTANEOUS NIGHTLY
Status: DISCONTINUED | OUTPATIENT
Start: 2020-07-26 | End: 2020-07-27 | Stop reason: HOSPADM

## 2020-07-26 RX ORDER — INSULIN LISPRO 100 [IU]/ML
4 INJECTION, SOLUTION INTRAVENOUS; SUBCUTANEOUS NIGHTLY
Status: DISCONTINUED | OUTPATIENT
Start: 2020-07-26 | End: 2020-07-27 | Stop reason: HOSPADM

## 2020-07-26 RX ADMIN — ATORVASTATIN CALCIUM 40 MG: 40 TABLET, FILM COATED ORAL at 21:21

## 2020-07-26 RX ADMIN — LEVOTHYROXINE SODIUM 25 MCG: 0.03 TABLET ORAL at 07:34

## 2020-07-26 RX ADMIN — TAMSULOSIN HYDROCHLORIDE 0.4 MG: 0.4 CAPSULE ORAL at 21:21

## 2020-07-26 RX ADMIN — APIXABAN 5 MG: 5 TABLET, FILM COATED ORAL at 08:46

## 2020-07-26 RX ADMIN — LISINOPRIL 2.5 MG: 2.5 TABLET ORAL at 08:46

## 2020-07-26 RX ADMIN — INSULIN LISPRO 10 UNITS: 100 INJECTION, SOLUTION INTRAVENOUS; SUBCUTANEOUS at 07:38

## 2020-07-26 RX ADMIN — INSULIN LISPRO 1 UNITS: 100 INJECTION, SOLUTION INTRAVENOUS; SUBCUTANEOUS at 21:27

## 2020-07-26 RX ADMIN — CARVEDILOL 12.5 MG: 12.5 TABLET, FILM COATED ORAL at 07:45

## 2020-07-26 RX ADMIN — Medication 10 ML: at 08:46

## 2020-07-26 RX ADMIN — INSULIN LISPRO 12 UNITS: 100 INJECTION, SOLUTION INTRAVENOUS; SUBCUTANEOUS at 17:58

## 2020-07-26 RX ADMIN — VANCOMYCIN HYDROCHLORIDE 1250 MG: 10 INJECTION, POWDER, LYOPHILIZED, FOR SOLUTION INTRAVENOUS at 08:46

## 2020-07-26 RX ADMIN — Medication 40 MG: at 03:16

## 2020-07-26 RX ADMIN — TRAMADOL HYDROCHLORIDE 50 MG: 50 TABLET, FILM COATED ORAL at 03:16

## 2020-07-26 RX ADMIN — APIXABAN 5 MG: 5 TABLET, FILM COATED ORAL at 21:21

## 2020-07-26 RX ADMIN — FUROSEMIDE 40 MG: 40 TABLET ORAL at 08:46

## 2020-07-26 RX ADMIN — INSULIN LISPRO 10 UNITS: 100 INJECTION, SOLUTION INTRAVENOUS; SUBCUTANEOUS at 11:30

## 2020-07-26 RX ADMIN — SPIRONOLACTONE 12.5 MG: 25 TABLET, FILM COATED ORAL at 08:47

## 2020-07-26 RX ADMIN — TRAMADOL HYDROCHLORIDE 50 MG: 50 TABLET, FILM COATED ORAL at 23:25

## 2020-07-26 RX ADMIN — INSULIN LISPRO 3 UNITS: 100 INJECTION, SOLUTION INTRAVENOUS; SUBCUTANEOUS at 11:29

## 2020-07-26 RX ADMIN — INSULIN LISPRO 4 UNITS: 100 INJECTION, SOLUTION INTRAVENOUS; SUBCUTANEOUS at 21:47

## 2020-07-26 RX ADMIN — INSULIN LISPRO 4 UNITS: 100 INJECTION, SOLUTION INTRAVENOUS; SUBCUTANEOUS at 17:58

## 2020-07-26 RX ADMIN — CEFTRIAXONE SODIUM 2 G: 2 INJECTION, POWDER, FOR SOLUTION INTRAMUSCULAR; INTRAVENOUS at 11:41

## 2020-07-26 RX ADMIN — ASPIRIN 81 MG: 81 TABLET, COATED ORAL at 08:47

## 2020-07-26 RX ADMIN — AMIODARONE HYDROCHLORIDE 200 MG: 200 TABLET ORAL at 08:47

## 2020-07-26 RX ADMIN — Medication 10 ML: at 21:21

## 2020-07-26 ASSESSMENT — PAIN SCALES - GENERAL
PAINLEVEL_OUTOF10: 6
PAINLEVEL_OUTOF10: 0
PAINLEVEL_OUTOF10: 6

## 2020-07-26 NOTE — PROGRESS NOTES
111* 173* 157*       Intake/Output Summary (Last 24 hours) at 7/26/2020 0908  Last data filed at 7/26/2020 0857  Gross per 24 hour   Intake 240 ml   Output 1875 ml   Net -1635 ml     General appearance: No apparent distress appears stated age and cooperative. Lungs: Clear to auscultation, bilaterally without Rales/Wheezes/Rhonchi with good respiratory effort. Heart: Regular rate and rhythm with Normal S1 and split S2 without murmurs, rubs or gallops, point of maximum impulse non-displaced  Abdomen: Soft, non-tender or non-distended without rigidity or guarding and positive bowel sounds all four quadrants. Extremities: No clubbing, cyanosis, left foot is covered in dressing. Skin: Skin color, texture, turgor normal.  Pictures reviewed. Neurologic: Alert and oriented X 3, grossly non-focal.  Mental status: Alert, oriented, thought content appropriate. Capillary refill is brisk  Peripheral pulses 2+    LABS:  Recent Labs     07/24/20  0430 07/25/20  0430 07/26/20  0535   WBC 4.8 5.1 6.2   HGB 10.1* 10.5* 10.6*   HCT 31.2* 31.8* 32.0*    210 218                                                                    Recent Labs     07/24/20  0430 07/25/20  0430 07/26/20  0535    138 139   K 4.1 4.4 4.9    103 102   CO2 26 26 27   BUN 38* 40* 47*   CREATININE 1.4* 1.1 1.2   GLUCOSE 122* 159* 164*     No results for input(s): AST, ALT, ALB, BILITOT, ALKPHOS in the last 72 hours. No results for input(s): TROPONINI in the last 72 hours. Assessment & Plan:    Patient Active Problem List:      Diabetic foot infection with abscesses and Possible osteomyelitis vs charcot neuroarthropathy:  Wound cx NGTD  S/p I&D with bone biopsy 7/22- path pending  On ceftriaxone and vanco, plan for IV antibiotics through 9/2. PICC in place (7/23)  Podiatry and ID are on board  Non WB on LE  need placement- SW following, precert in process, likely discharge Monday.  COVID 19 sent for placement    HTN, Chronic systolic CHF EF 35-40%:  Controlled   volume status is stable a this time  Cont with coreg, lisinopril, lasix    Episode of Vtach in OR:  Cardiology consulted . ICD interrogated- showed runs of NSVT but no shocks. Coreg dose was increased, already on amio. No need for further cardiac testing. DM type 2:  Cont low dose lantus 14 U, prandial coverage and sliding scale low dose. Added lispro for his bedtime snack. Atrial fibrillation:  eliquis    WILL:  Nightly CPAP    CKD stage 3:  Stable creatinine at 1.6-1.3. nephrology following.      Disposition: inpt.  Full Code

## 2020-07-26 NOTE — PROGRESS NOTES
infection with osteomyelitis vs charcot: Ceftriaxone - day #4 + Vancomycin - day #6  · Vancomycin - Pharmacy to dose  · Trough this AM = 20.3mcg/mL, higher than prior trough despite reducing dose 7/23. · Will decrease further to 1.25g IV q24h. · Clinical condition will be monitored closely, and repeat levels will be ordered & dose adjustments made as appropriate.     Please call with questions--  Thanks--  Chauncey Gonzalez, PharmD, [address] [facility]  [phone]   7/26/2020 7:33 AM

## 2020-07-26 NOTE — PROGRESS NOTES
fatigue for a couple of weeks along with the draining foot ulcer. He was seen by podiatry, put on Augmentin and told to follow up outpatient. After his follow-up outpatient, there was concern of worsening foot cellulitis and wound probing to the bone. He came to the ED on 7/20/20 and was admitted. In the ED, vittals were stable, BUN:Cr was 25:1.5, . 3. He had an MRI done later after his admission which showed soft tissue inflammation, Charcot neuropathy, suspicion for osteomyelitis involving his distal tibia and fibula and peripheral enhancing abcess     Interval History:     No new complaints     ROS:     Seen with -     positives in bold   Constitutional:  fever, chills, weakness, weight change, fatigue  Skin:  rash, pruritus, hair loss, bruising, dry skin, petechiae  Head, Face, Neck   headaches, swelling,  cervical adenopathy  Respiratory: shortness of breath, cough, or wheezing  Cardiovascular: chest pain, palpitations, dizzy, edema  Gastrointestinal: nausea, vomiting, diarrhea, constipation,belly pain    Yellow skin, blood in stool  Musculoskeletal:  back pain, muscle weakness, gait problems,       joint pain or swelling. Genitourinary:  dysuria, poor urine flow, flank pain, blood in urine  Neurologic:  vertigo, TIA'S, syncope, seizures, focal weakness  Psychosocial:  insomnia, anxiety, or depression.   Additional positive findings:                       All other remaining systems are negative or unable to obtain        PMH/PSH/SH/Family History:     Past Medical History:   Diagnosis Date    Atrial fibrillation (HCC)     Back pain     Cardiomyopathy     CHF (congestive heart failure) (HCC)     COPD (chronic obstructive pulmonary disease) (HCC)     Dyslipidemia     GERD (gastroesophageal reflux disease)     Hyperlipidemia     Hypertension     Hypothyroidism     Leg edema     MRSA (methicillin resistant staph aureus) culture positive 10/5/15    foot/bone    MRSA nasal colonization Facility-Administered Medications: vancomycin (VANCOCIN) 1,250 mg in dextrose 5 % 250 mL IVPB, 1,250 mg, Intravenous, Q24H  insulin glargine (LANTUS;BASAGLAR) injection pen 14 Units, 14 Units, Subcutaneous, Nightly  insulin lispro (1 Unit Dial) 4 Units, 4 Units, Subcutaneous, Nightly  insulin lispro (1 Unit Dial) 12 Units, 12 Units, Subcutaneous, TID WC  insulin lispro (1 Unit Dial) 0-12 Units, 0-12 Units, Subcutaneous, TID WC  insulin lispro (1 Unit Dial) 0-6 Units, 0-6 Units, Subcutaneous, Nightly  cefTRIAXone (ROCEPHIN) 2 g IVPB in D5W 50ml minibag, 2 g, Intravenous, Q24H  sodium chloride flush 0.9 % injection 10 mL, 10 mL, Intravenous, 2 times per day  sodium chloride flush 0.9 % injection 10 mL, 10 mL, Intravenous, PRN  carvedilol (COREG) tablet 12.5 mg, 12.5 mg, Oral, BID WC  apixaban (ELIQUIS) tablet 5 mg, 5 mg, Oral, BID  traMADol (ULTRAM) tablet 50 mg, 50 mg, Oral, Q6H PRN  simethicone (MYLICON) 40 RN/7.5OU drops 40 mg, 40 mg, Oral, Q6H PRN  naloxone (NARCAN) injection 0.4 mg, 0.4 mg, Intravenous, PRN  diphenhydrAMINE (BENADRYL) injection 25 mg, 25 mg, Intravenous, Q6H PRN  acetaminophen (TYLENOL) tablet 500 mg, 500 mg, Oral, Q6H PRN  docusate sodium (COLACE) capsule 100 mg, 100 mg, Oral, BID PRN  promethazine (PHENERGAN) tablet 12.5 mg, 12.5 mg, Oral, Q6H PRN  glucose (GLUTOSE) 40 % oral gel 15 g, 15 g, Oral, PRN  dextrose 50 % IV solution, 12.5 g, Intravenous, PRN  glucagon (rDNA) injection 1 mg, 1 mg, Intramuscular, PRN  dextrose 5 % solution, 100 mL/hr, Intravenous, PRN  amiodarone (CORDARONE) tablet 200 mg, 200 mg, Oral, Daily  aspirin EC tablet 81 mg, 81 mg, Oral, Daily  atorvastatin (LIPITOR) tablet 40 mg, 40 mg, Oral, Nightly  furosemide (LASIX) tablet 40 mg, 40 mg, Oral, Daily  levothyroxine (SYNTHROID) tablet 25 mcg, 25 mcg, Oral, Daily  lisinopril (PRINIVIL;ZESTRIL) tablet 2.5 mg, 2.5 mg, Oral, Daily  spironolactone (ALDACTONE) tablet 12.5 mg, 12.5 mg, Oral, Daily  tamsulosin (FLOMAX) capsule 0.4 mg, 0.4 mg, Oral, Nightly       Vitals :     Vitals:    07/26/20 1454   BP: 98/61   Pulse: 64   Resp: 16   Temp: 98.6 °F (37 °C)   SpO2: 93%       I & O :       Intake/Output Summary (Last 24 hours) at 7/26/2020 1504  Last data filed at 7/26/2020 1350  Gross per 24 hour   Intake 240 ml   Output 2150 ml   Net -1910 ml        Physical Examination :     General appearance: Anxious- no, distressed- no, in good spirits- yes  HEENT: Lips- normal, teeth- ok , oral mucosa- moist  Neck : Mass- no, appears symmetrical, JVD- not visible  Respiratory: Respiratory effort- , wheeze- no, crackles -  Cardiovascular:  Ausculation- No M/R/G, Edema   Abdomen: visible mass- no, distention- no, scar- no, tenderness- no                            hepatosplenomegaly-  no  Musculoskeletal:  clubbing no,cyanosis- no , digital ischemia- no                           muscle strength- grossly normal , tone - grossly normal  Skin: rashes- no , ulcers- no, induration- no, tightening - no  Psychiatric:  Judgement and insight- normal           AAO X 3  Additional finding:     LABS:     Recent Labs     07/24/20  0430 07/25/20  0430 07/26/20  0535   WBC 4.8 5.1 6.2   HGB 10.1* 10.5* 10.6*   HCT 31.2* 31.8* 32.0*    210 218     Recent Labs     07/24/20  0430 07/25/20  0430 07/26/20  0535    138 139   K 4.1 4.4 4.9    103 102   CO2 26 26 27   BUN 38* 40* 47*   CREATININE 1.4* 1.1 1.2   GLUCOSE 122* 159* 164*            Thanks  Nephrology  Anthony Cardenas [address], [address]  Office: [phone]

## 2020-07-26 NOTE — PLAN OF CARE
Problem: Pain:  Goal: Pain level will decrease  Description: Pain level will decrease  Outcome: Ongoing  Note: Patient c/o pain rated as 8 out of 10 Tramadol given per protocol. Upon reassessment patient was asleep with RR >10. Will continue to monitor       Problem: Falls - Risk of:  Goal: Will remain free from falls  Description: Will remain free from falls  Outcome: Ongoing  Note: Patient remains free from physical injury. Fall precautions in place: Patient in bed lowest position,wheels locked. 2/4 side rails up Call light and beside table within reach. Will continue to monitor       Problem: Skin Integrity:  Goal: Will show no infection signs and symptoms  Description: Will show no infection signs and symptoms  Outcome: Ongoing  Note: CDI surgical sites. Ace wrap in place. No signs of infection noted.  Will continue to monitor

## 2020-07-26 NOTE — PROGRESS NOTES
Podiatric Surgery Daily Progress Note  Eva Loredo      Subjective :   Patient seen and examined this am at the bedside. Patient states his foot is not in pain this morning, the pain comes and goes but is well controlled with pain medication. Patient denies any acute overnight events. Patient denies N/V/F/C/SOB. Patient denies calf pain, thigh pain, chest pain. Review of Systems: A 12 point review of symptoms is unremarkable with the exception of the chief complaint. Patient specifically denies nausea, fever, vomiting, chills, shortness of breath, chest pain, abdominal pain, constipation or difficulty urinating. Objective     /65   Pulse 72   Temp 98.1 °F (36.7 °C) (Oral)   Resp 18   Ht 6' 4\" (1.93 m)   Wt (!) 331 lb (150.1 kg)   SpO2 99%   BMI 40.29 kg/m²      I/O:    Intake/Output Summary (Last 24 hours) at 7/26/2020 0815  Last data filed at 7/26/2020 0109  Gross per 24 hour   Intake 240 ml   Output 1800 ml   Net -1560 ml              Wt Readings from Last 3 Encounters:   07/25/20 (!) 331 lb (150.1 kg)   07/19/20 (!) 328 lb 9.6 oz (149.1 kg)   06/26/20 (!) 318 lb (144.2 kg)       LABS:    Recent Labs     07/25/20  0430 07/26/20  0535   WBC 5.1 6.2   HGB 10.5* 10.6*   HCT 31.8* 32.0*    218        Recent Labs     07/26/20  0535      K 4.9      CO2 27   BUN 47*   CREATININE 1.2      No results for input(s): PROT, INR, APTT in the last 72 hours. LOWER EXTREMITY EXAMINATION    Dressing to left lower extremity is clean, dry, and intact at this time. No strikethrough noted to the external layer of dressing. Wound vac and dressing to be changed MWF. Wound vac noted to have excellent seal and is running continuous at 125 mmHg. Approximately 20 cc of hemorrhagic drainage noted in canister. No pain with calf squeeze. Patient is able to flex and extend digits of left lower extremity.      IMAGING:  XR left foot s/p I&D 7/22/20  Findings:    Hindfoot collapse and dorsal patients non weight bearing status while at the facility, patient understands  -SW following, WOUND VAC NEEDED AT D/C, paperwork filled out and in patient's chart    DISPO: S/p incision and drainage, down to and including bone, left foot (DOS:7/22/20). Wound vac will be changed tomorrow AM prior to discharge. No further surgical intervention from podiatric standpoint at this time. Patient is OK for discharge from podiatric standpoint.     Discussed assessment and plan with Dr. Josue Jeffery DPM.    Ru Head DPM  Podiatric Resident, PGY-1  Pager #: [phone] or Perfect Serve

## 2020-07-27 VITALS
DIASTOLIC BLOOD PRESSURE: 57 MMHG | RESPIRATION RATE: 16 BRPM | HEIGHT: 76 IN | SYSTOLIC BLOOD PRESSURE: 103 MMHG | BODY MASS INDEX: 38.36 KG/M2 | TEMPERATURE: 98.2 F | WEIGHT: 315 LBS | HEART RATE: 62 BPM | OXYGEN SATURATION: 94 %

## 2020-07-27 LAB
ANAEROBIC CULTURE: NORMAL
ANION GAP SERPL CALCULATED.3IONS-SCNC: 6 MMOL/L (ref 3–16)
BASOPHILS ABSOLUTE: 0.1 K/UL (ref 0–0.2)
BASOPHILS RELATIVE PERCENT: 1.3 %
BUN BLDV-MCNC: 49 MG/DL (ref 7–20)
CALCIUM SERPL-MCNC: 9.5 MG/DL (ref 8.3–10.6)
CHLORIDE BLD-SCNC: 101 MMOL/L (ref 99–110)
CO2: 29 MMOL/L (ref 21–32)
CREAT SERPL-MCNC: 1.4 MG/DL (ref 0.8–1.3)
EOSINOPHILS ABSOLUTE: 0.2 K/UL (ref 0–0.6)
EOSINOPHILS RELATIVE PERCENT: 3.4 %
GFR AFRICAN AMERICAN: >60
GFR NON-AFRICAN AMERICAN: 50
GLUCOSE BLD-MCNC: 149 MG/DL (ref 70–99)
GLUCOSE BLD-MCNC: 160 MG/DL (ref 70–99)
GLUCOSE BLD-MCNC: 216 MG/DL (ref 70–99)
GLUCOSE BLD-MCNC: 85 MG/DL (ref 70–99)
HCT VFR BLD CALC: 31.6 % (ref 40.5–52.5)
HEMOGLOBIN: 10.3 G/DL (ref 13.5–17.5)
LYMPHOCYTES ABSOLUTE: 1.1 K/UL (ref 1–5.1)
LYMPHOCYTES RELATIVE PERCENT: 15.3 %
MCH RBC QN AUTO: 28.2 PG (ref 26–34)
MCHC RBC AUTO-ENTMCNC: 32.5 G/DL (ref 31–36)
MCV RBC AUTO: 86.5 FL (ref 80–100)
MONOCYTES ABSOLUTE: 0.7 K/UL (ref 0–1.3)
MONOCYTES RELATIVE PERCENT: 9.4 %
NEUTROPHILS ABSOLUTE: 5.1 K/UL (ref 1.7–7.7)
NEUTROPHILS RELATIVE PERCENT: 70.6 %
PDW BLD-RTO: 14 % (ref 12.4–15.4)
PERFORMED ON: ABNORMAL
PERFORMED ON: ABNORMAL
PERFORMED ON: NORMAL
PLATELET # BLD: 235 K/UL (ref 135–450)
PMV BLD AUTO: 8.8 FL (ref 5–10.5)
POTASSIUM SERPL-SCNC: 4.8 MMOL/L (ref 3.5–5.1)
RBC # BLD: 3.65 M/UL (ref 4.2–5.9)
SODIUM BLD-SCNC: 136 MMOL/L (ref 136–145)
WBC # BLD: 7.3 K/UL (ref 4–11)

## 2020-07-27 PROCEDURE — 85025 COMPLETE CBC W/AUTO DIFF WBC: CPT

## 2020-07-27 PROCEDURE — 97530 THERAPEUTIC ACTIVITIES: CPT

## 2020-07-27 PROCEDURE — 6360000002 HC RX W HCPCS: Performed by: INTERNAL MEDICINE

## 2020-07-27 PROCEDURE — 6370000000 HC RX 637 (ALT 250 FOR IP): Performed by: STUDENT IN AN ORGANIZED HEALTH CARE EDUCATION/TRAINING PROGRAM

## 2020-07-27 PROCEDURE — 2580000003 HC RX 258: Performed by: INTERNAL MEDICINE

## 2020-07-27 PROCEDURE — 97110 THERAPEUTIC EXERCISES: CPT

## 2020-07-27 PROCEDURE — 99232 SBSQ HOSP IP/OBS MODERATE 35: CPT | Performed by: INTERNAL MEDICINE

## 2020-07-27 PROCEDURE — 94660 CPAP INITIATION&MGMT: CPT

## 2020-07-27 PROCEDURE — 97535 SELF CARE MNGMENT TRAINING: CPT

## 2020-07-27 PROCEDURE — 2580000003 HC RX 258: Performed by: STUDENT IN AN ORGANIZED HEALTH CARE EDUCATION/TRAINING PROGRAM

## 2020-07-27 PROCEDURE — 6370000000 HC RX 637 (ALT 250 FOR IP): Performed by: INTERNAL MEDICINE

## 2020-07-27 PROCEDURE — 6360000002 HC RX W HCPCS: Performed by: STUDENT IN AN ORGANIZED HEALTH CARE EDUCATION/TRAINING PROGRAM

## 2020-07-27 PROCEDURE — 80048 BASIC METABOLIC PNL TOTAL CA: CPT

## 2020-07-27 RX ORDER — CARVEDILOL 12.5 MG/1
12.5 TABLET ORAL 2 TIMES DAILY WITH MEALS
Qty: 60 TABLET | Refills: 3 | Status: SHIPPED | OUTPATIENT
Start: 2020-07-27 | End: 2020-12-14 | Stop reason: SDUPTHER

## 2020-07-27 RX ORDER — TRAMADOL HYDROCHLORIDE 50 MG/1
50 TABLET ORAL EVERY 6 HOURS PRN
Qty: 20 TABLET | Refills: 0 | Status: SHIPPED | OUTPATIENT
Start: 2020-07-27 | End: 2020-08-01

## 2020-07-27 RX ADMIN — FUROSEMIDE 40 MG: 40 TABLET ORAL at 09:09

## 2020-07-27 RX ADMIN — LISINOPRIL 2.5 MG: 2.5 TABLET ORAL at 09:09

## 2020-07-27 RX ADMIN — CARVEDILOL 12.5 MG: 12.5 TABLET, FILM COATED ORAL at 09:09

## 2020-07-27 RX ADMIN — TRAMADOL HYDROCHLORIDE 50 MG: 50 TABLET, FILM COATED ORAL at 16:11

## 2020-07-27 RX ADMIN — TRAMADOL HYDROCHLORIDE 50 MG: 50 TABLET, FILM COATED ORAL at 06:34

## 2020-07-27 RX ADMIN — INSULIN LISPRO 2 UNITS: 100 INJECTION, SOLUTION INTRAVENOUS; SUBCUTANEOUS at 08:58

## 2020-07-27 RX ADMIN — APIXABAN 5 MG: 5 TABLET, FILM COATED ORAL at 09:08

## 2020-07-27 RX ADMIN — AMIODARONE HYDROCHLORIDE 200 MG: 200 TABLET ORAL at 09:09

## 2020-07-27 RX ADMIN — LEVOTHYROXINE SODIUM 25 MCG: 0.03 TABLET ORAL at 06:34

## 2020-07-27 RX ADMIN — ASPIRIN 81 MG: 81 TABLET, COATED ORAL at 09:08

## 2020-07-27 RX ADMIN — INSULIN LISPRO 12 UNITS: 100 INJECTION, SOLUTION INTRAVENOUS; SUBCUTANEOUS at 12:52

## 2020-07-27 RX ADMIN — CEFTRIAXONE SODIUM 2 G: 2 INJECTION, POWDER, FOR SOLUTION INTRAMUSCULAR; INTRAVENOUS at 12:18

## 2020-07-27 RX ADMIN — SPIRONOLACTONE 12.5 MG: 25 TABLET, FILM COATED ORAL at 09:08

## 2020-07-27 RX ADMIN — INSULIN LISPRO 4 UNITS: 100 INJECTION, SOLUTION INTRAVENOUS; SUBCUTANEOUS at 12:52

## 2020-07-27 RX ADMIN — INSULIN LISPRO 12 UNITS: 100 INJECTION, SOLUTION INTRAVENOUS; SUBCUTANEOUS at 08:58

## 2020-07-27 RX ADMIN — VANCOMYCIN HYDROCHLORIDE 1250 MG: 10 INJECTION, POWDER, LYOPHILIZED, FOR SOLUTION INTRAVENOUS at 10:16

## 2020-07-27 RX ADMIN — Medication 40 MG: at 03:36

## 2020-07-27 ASSESSMENT — PAIN SCALES - GENERAL
PAINLEVEL_OUTOF10: 6
PAINLEVEL_OUTOF10: 0
PAINLEVEL_OUTOF10: 6

## 2020-07-27 NOTE — PROGRESS NOTES
ID Follow-up NOTE  RESIDENT NOTE - reviewed / edited, attending note at bottom    CC:   L diabetic foot infection w/ Wound VAC placement  Antibiotics: Vancomycin + Ceftriaxone    Admit Date: 7/20/2020  Hospital Day: 8    Subjective:     Patient still feels \"great\" and thinks he will be d/c later this evening. Wound Vac changed this AM by Podiatry. No other complaints. Objective:     Patient Vitals for the past 8 hrs:   BP Temp Temp src Pulse Resp SpO2   07/27/20 1051 113/60 97.2 °F (36.2 °C) Oral 62 16 96 %   07/27/20 0750 123/68 97.3 °F (36.3 °C) Oral 67 16 97 %     I/O last 3 completed shifts: In: 240 [P.O.:240]  Out: 2125 [Urine:2125]  I/O this shift:  In: 240 [P.O.:240]  Out: 300 [Urine:300]    EXAM:  GENERAL: No apparent distress. Eating lunch in bed  HEENT: Membranes moist, no oral lesion  NECK:  Supple, no lymphadenopathy  LUNGS: Clear b/l, no rales, no dullness  CARDIAC: RRR, no murmur appreciated  ABD:  + BS, soft / NT  EXT:  LLE wound VAC running at 125 mmHg  NEURO: No focal neurologic findings  PSYCH: Orientation, sensorium, mood normal  LINES:  R PICC in place    Data Review:  Lab Results   Component Value Date    WBC 7.3 07/27/2020    HGB 10.3 (L) 07/27/2020    HCT 31.6 (L) 07/27/2020    MCV 86.5 07/27/2020     07/27/2020     Lab Results   Component Value Date    CREATININE 1.4 (H) 07/27/2020    BUN 49 (H) 07/27/2020     07/27/2020    K 4.8 07/27/2020     07/27/2020    CO2 29 07/27/2020       Hepatic Function Panel:   Lab Results   Component Value Date    ALKPHOS 122 08/27/2019    ALT 42 08/27/2019    AST 31 08/27/2019    PROT 6.3 07/22/2020    PROT 6.8 11/27/2012    BILITOT 0.3 08/27/2019    BILIDIR <0.2 11/07/2018    IBILI see below 11/07/2018    LABALBU 3.7 08/27/2019       MICRO:  7/26 SARS-CoV; pending  7/22 sx cult; Staphylococcus epidermidis - rare growth  7/22 sx Path; f/u results  7/21 COVID-19; Not detected  7/20 Body fluid cult;  Mixed skin xu,  Rare growth    IMAGING:  Postop Xray 7/22   Impression    Impression:    Charcot neuropathy status post removal of plantar osseous fragment. Xray left foot 7/20  Impression    1. Pathologic joint is complete collapse of the hindfoot with fragmentation. 2. Extensive forefoot soft tissue swelling with no localized bone destruction or demineralization. 3. Correlation with MRI would exclude occult osteomyelitis.       Xray chest 7/19  Impression    No acute cardiopulmonary disease on portable chest.       LE DVT study 7/2   Summary          Normal venous duplex examination of the legs bilaterally with normal venous     Doppler signals noted throughout.     No evidence of thrombophlebitis is noted bilaterally in the deep and     superficial veins of the legs. Small calf thrombi cannot be excluded ,     particularly in the left lower extremity       MRI Left foot  Impression    Soft tissue ulcer involving the heel pad with underlying soft tissue inflammation         Hindfoot collapse with fragmentation and disorganization consistent with Charcot neuropathy. Dorsal  subluxation of the midfoot indicating disruption of Chopart ligaments.         Abnormal marrow signal and marrow enhancement involving the hindfoot and midfoot as well as distal tibia and fibula, suspicious for osteomyelitis.  Ancillary findings include peripherally enhancing fluid collections which likely represent abscesses.         Ossific structure within the medial heel pad, correlating with conventional radiographs-see above       Scheduled Meds:   vancomycin  1,250 mg Intravenous Q24H    insulin glargine  14 Units Subcutaneous Nightly    insulin lispro  4 Units Subcutaneous Nightly    insulin lispro (1 Unit Dial)  12 Units Subcutaneous TID WC    insulin lispro  0-12 Units Subcutaneous TID WC    insulin lispro  0-6 Units Subcutaneous Nightly    cefTRIAXone (ROCEPHIN) IV  2 g Intravenous Q24H    sodium chloride flush  10 mL Intravenous 2 times per day    carvedilol  12.5 mg Oral BID WC    apixaban  5 mg Oral BID    amiodarone  200 mg Oral Daily    aspirin  81 mg Oral Daily    atorvastatin  40 mg Oral Nightly    furosemide  40 mg Oral Daily    levothyroxine  25 mcg Oral Daily    lisinopril  2.5 mg Oral Daily    spironolactone  12.5 mg Oral Daily    tamsulosin  0.4 mg Oral Nightly       Continuous Infusions:   dextrose         PRN Meds:  sodium chloride flush, traMADol, simethicone, naloxone, diphenhydrAMINE, acetaminophen, docusate sodium, promethazine, glucose, dextrose, glucagon (rDNA), dextrose      Assessment:      S/P I&D with bone removal (DOS 7/22/2020)  Diabetic foot infection - Left - resolved   Cellulitis, Left - improved  OM vs Charcot neuropathy; Left foot  DM2 with peripheral neuropathy  CHF  Obesity BMI (39)    Plan:     Cont. IV vancomycin & Ceftriaxone  [facility] for D/c to SNF / Wound VAC this AM / F/u Dr. Kaitlynn Kumar if bone destruction was caused by Osteomylitis vs. Charcot  Discussed case with Radiologist - multiple fluid collection w/ increased bone marrow edema on calcaneus and at multiple bone of the foot. Suspected L foot infected and tracking up the peroneal & FHL tendons. Sx Cult; Staphylococcus epidermidis  F/U sx bone path  CM - SNF  / SARS-CoV r/o for SNF placement    Nephrology - Cr stable / f/u outpt     PICC placed / IV Vancomycin + Ceftriaxone through 9/2 / See TONYA    Discussed with Attending    Marcelo Veronica DPM   Podiatric Resident, PGY-2  Pager: [phone] or Perfect serve    Addendum to Resident Progress note:  Pt seen, examined and evaluated. I have reviewed the current history, physical findings, labs and assessment and plan and agree with note as documented by resident (Dr. Gonzales).    As above  DM foot with neuropathy, L foot ulcer and cellulitis.   Admit, started on vancomycin + cefepime  Podiatry consulted - noted 'probe to bone'     Wound cult - GS 3+WBC, no organism, cult - mixed skin xu.       MRI -  Reviewed with Radiologist, edema mult bones, fluid collection c/c infection  'Hindfoot collapse with fragmentation and disorganization consistent with Charcot neuropathy.  Dorsal  subluxation of the midfoot indicating disruption of Chopart ligaments. '     POD#5 L foot I&D, removed bone fragment, irrigation  Cult + S epidermidis     IMP/  DM, neuropathy  Charcot neuroarthropathy with extensive destructive changes on MRI   DM foot ulcer with poss extension osteomyelitis       Unclear how much bone marrow signal on MRI represents Charcot vs osteomyelitis is unclear  Given limb threatening condition, will empirically rx for osteomyelitis     REC/  Cont vancomycin +ceftriaxone  Will f/u on OR cult  / path     See TONYA - treat with iv antibiotic x at least 6 weeks, after iv course, po suppression  Wound care and f/u per Podiatry     Discussed with pt  Paco Mullen MD     INFUSION ORDERS:  Vancomycin 1.25 gm iv daily through 9/2  Ceftriaxone 2 gm iv daily through 9/2  - First dose given in hospital  - R PICC   - Disposition / date discharge - [facility] [phone] /   - Check CBC w diff, CMP, ESR, CRP, vancomycin trough every Mon or Tue - FAX result to [phone]  - Call with antibiotic / infusion issues, [phone]  - No f/u in outpatient ID office necessary

## 2020-07-27 NOTE — PLAN OF CARE
Problem: Pain:  Goal: Pain level will decrease  Description: Pain level will decrease  7/27/2020 0940 by Paula Morillo RN  Outcome: Ongoing  Note: Patient alerts RN of increasing pain; RN administers pain medication as ordered to patient satisfaction   7/27/2020 0508 by Rosie Olivera RN  Outcome: Ongoing  Note: Patient c/o pain rated as 6 out of 10 Tramadol given per protocol. Upon reassessment patient was asleep with RR >10. Will continue to monitor       Problem: Falls - Risk of:  Goal: Will remain free from falls  Description: Will remain free from falls  7/27/2020 0940 by Paula Morillo RN  Outcome: Ongoing  Note: Call light within reach; bed alarm engaged; increased frequency of rounds   7/27/2020 0508 by Rosie Olivera RN  Outcome: Ongoing  Note: Patient remains free from physical injury. Fall precautions in place: Patient in bed lowest position,wheels locked. 2/4 side rails up Call light and beside table within reach. Will continue to monitor       Problem: Skin Integrity:  Goal: Will show no infection signs and symptoms  Description: Will show no infection signs and symptoms  7/27/2020 0508 by Rosie Olivera RN  Outcome: Ongoing  Note: CDI Ace wrap no signs of infection noted. Will continue to monitor     Problem: Infection - Surgical Site:  Goal: Will show no infection signs and symptoms  Description: Will show no infection signs and symptoms  7/27/2020 0508 by Rosie Olivera RN  Outcome: Ongoing  Note: CDI Ace wrap no signs of infection noted.  Will continue to monitor

## 2020-07-27 NOTE — PROGRESS NOTES
Podiatric Surgery Daily Progress Note  Asiya Ochoa      Subjective :   Patient seen and examined this am at the bedside. Patient denies any acute overnight events. Patient denies N/V/F/C/SOB. Patient denies calf pain, thigh pain, chest pain. Review of Systems: A 12 point review of symptoms is unremarkable with the exception of the chief complaint. Patient specifically denies nausea, fever, vomiting, chills, shortness of breath, chest pain, abdominal pain, constipation or difficulty urinating. Objective     /68   Pulse 67   Temp 97.3 °F (36.3 °C) (Oral)   Resp 16   Ht 6' 4\" (1.93 m)   Wt (!) 331 lb (150.1 kg)   SpO2 97%   BMI 40.29 kg/m²      I/O:    Intake/Output Summary (Last 24 hours) at 7/27/2020 1045  Last data filed at 7/27/2020 0912  Gross per 24 hour   Intake 480 ml   Output 2175 ml   Net -1695 ml              Wt Readings from Last 3 Encounters:   07/25/20 (!) 331 lb (150.1 kg)   07/19/20 (!) 328 lb 9.6 oz (149.1 kg)   06/26/20 (!) 318 lb (144.2 kg)       LABS:    Recent Labs     07/26/20  0535 07/27/20  0635   WBC 6.2 7.3   HGB 10.6* 10.3*   HCT 32.0* 31.6*    235        Recent Labs     07/27/20  0635      K 4.8      CO2 29   BUN 49*   CREATININE 1.4*      No results for input(s): PROT, INR, APTT in the last 72 hours. LOWER EXTREMITY EXAMINATION    Dressing to left LE intact. No strikethrough noted to the external dressing. No drainage noted to the internal layers of the dressing. Scant hemorrhagic drainage noted in wound vac canister. VASCULAR: DP and PT pulses are nonpalpable b/l. CFT is brisk to the digits of the foot b/l. Skin temperature is warm to warm from proximal to distal with no focal calor noted to left plantar midfoot. No edema noted b/l. No pain with calf compression b/l. NEUROLOGIC: Gross and epicritic sensation is diminished b/l. Protective sensation is absent at all pedal sites b/l.     DERMATOLOGIC: Full thickness wound noted to the plantar aspect of left midfoot. Wound bed is mixture of granular and fibrotic tissue. Periwound maceration noted. No periwound erythema noted. No purulent drainage or malodor noted. Wound does not probe to bone, track, or undermine. Images from 7/27. Verbal consent obtained for all images taken today. MUSCULOSKELETAL: Muscle strength is 5/5 for all pedal groups tested. No pain with palpation of left foot plantar wound and periwound area. IMAGING:  XR left foot s/p I&D 7/22/20  Findings:    Hindfoot collapse and dorsal subluxation of the midfoot representing Charcot neuropathy are redemonstrated. There has been interval removal of the ossific density in the medial heel pad. There is no acute fracture or dislocation. Postoperative soft    tissue tissue swelling is present.              Impression    Impression:    Charcot neuropathy status post removal of plantar osseous fragment.                 MRI left foot 7/21/20  Impression         Soft tissue ulcer involving the heel pad with underlying soft tissue inflammation         Hindfoot collapse with fragmentation and disorganization consistent with Charcot neuropathy. Dorsal  subluxation of the midfoot indicating disruption of Chopart ligaments.         Abnormal marrow signal and marrow enhancement involving the hindfoot and midfoot as well as distal tibia and fibula, suspicious for osteomyelitis. Ancillary findings include peripherally enhancing fluid collections which likely represent abscesses.         Ossific structure within the medial heel pad, correlating with conventional radiographs-see above          ASSESSMENT/PLAN  1. S/p I&D, down to and including bone; left foot (DOS: 7/22/20)  2. Full thickness wound; plantar left foot; Coffey III  3. Cellulitis; left LE resolved  4. Osteomyelitis vs charcot neuroarthropathy; left foot/ankle  5.  Diabetes mellitus with peripheral neuropathy    -Patient was examined and evaluated at bedside this am  -VSS, no leukocytosis (WBC-7.3)  -XR, MRI reviewed, impressions noted above  -Surgical wound culture from 7/22- staph epidermidis  -Bone biospy- pending  -ID following, recommends vancomycin/ceftriaxone. PICC is placed, patient to receive IV antibiotics through 9/2 per ID. Agree with recommendations  -Wound dressed with wet to dry sterile saline, DSD, Ace bandage  -Patient to be NON weight bearing to left foot, heel weight bearing for transfers only  -Discussed importance of patients non weight bearing status while at the facility, patient understands  -Patient to follow up with Dr. Reg Dowd in his private office this week, patient understands  -Spoke with  today ensuring that wound vac will be at facility  -Wound vac will need to be applied at facility by tomorrow    DISPO: s/p I&D, down to and including bone, left foot (DOS: 7/22/20). Wet to dry applied to left lower extremity, wound vac needs to be applied by tomorrow at patient's facility. Patient is OK for discharge from podiatric standpoint.      Discussed assessment and plan with Dr. Atif Gibbs DPM.    Sushil Cintron DPM  Podiatric Resident, PGY-1  Pager #: [phone] or Perfect Serve

## 2020-07-27 NOTE — PROGRESS NOTES
Physical Therapy  Facility/Department: [facility]  Daily Treatment Note  NAME: Brayden Padilla  : 1950  MRN: [id number]    Date of Service: 2020    Discharge Recommendations:  Brayden Padilla scored a  on the AM-PAC short mobility form. Current research shows that an AM-PAC score of 17 or less is typically not associated with a discharge to the patient's home setting. Based on the patient's AM-PAC score and their current functional mobility deficits, it is recommended that the patient have 3-5 sessions per week of Physical Therapy at d/c to increase the patient's independence. Please see assessment section for further patient specific details. If patient discharges prior to next session this note will serve as a discharge summary. Please see below for the latest assessment towards goals. Patient would benefit from continued therapy after discharge   PT Equipment Recommendations  Equipment Needed: No    Assessment   Body structures, Functions, Activity limitations: Decreased functional mobility   Assessment: Limited treatment as pt fatigued quickly sitting EOB and requested to use the bedpan after sitting and exercises. Rec continued inpt PT at d/c. Will follow. Treatment Diagnosis: mobility impairment due to L foot cellulitis  Prognosis: Good  Decision Making: Low Complexity  Patient Education: Pt educated on PT role, importance of OOB mobility, strict NWB L/heel WB L for txfers and he verbalized understanding  REQUIRES PT FOLLOW UP: Yes  Activity Tolerance  Activity Tolerance: Patient limited by fatigue     Patient Diagnosis(es): The encounter diagnosis was Cellulitis and abscess of foot.      has a past medical history of Atrial fibrillation ([address] Back pain, Cardiomyopathy, CHF (congestive heart failure) ([address] COPD (chronic obstructive pulmonary disease) ([facility] [address] Dyslipidemia, GERD (gastroesophageal reflux disease), Hyperlipidemia, Hypertension, Hypothyroidism, Leg edema, MRSA (methicillin resistant staph aureus) culture positive, MRSA nasal colonization, Obesity, Prolonged emergence from general anesthesia, 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, Stroke [facility]), Thyroid disease, Type 2 diabetes mellitus without complication ([facility] [address] and Type II or unspecified type diabetes mellitus without mention of complication, not stated as uncontrolled. has a past surgical history that includes Foot surgery (1-2-10); Adrenal gland surgery (1970); Gastric bypass surgery; other surgical history (10/6/15); other surgical history (Right, 10/8/15); Cardiac defibrillator placement (09/05/2017); pacemaker placement; Colonoscopy (N/A, 3/8/2019); Upper gastrointestinal endoscopy (N/A, 3/8/2019); Colonoscopy (N/A, 8/9/2019); Colonoscopy (8/9/2019); Foot Debridement (Right, 8/28/2019); and Foot Debridement (Left, 7/22/2020). Restrictions  Position Activity Restriction  Other position/activity restrictions: Strict NWB L except heel WB for txfers only (per 7/22 podiatry note), no activity restrictions noted     Subjective   General  Chart Reviewed: Yes  Additional Pertinent Hx: The patient is a 81 y.o. male with medical history as below, most notable for HTN, DM type 2 with neuropathy, a fib on eliquis, systolic CHF, s/p BiV ICD in 2017, who presents to [facility] with ulcer on his left foot with drainage. Podiatry consult; 7/22 OR for I and D L foot down to bone. Family / Caregiver Present: Yes  Subjective  Subjective: Pt found supine in bed and agreeable to PT  Pain Screening  Patient Currently in Pain: Denies       Orientation   WFL    Objective   Bed mobility  Rolling to Right: Stand by assistance  Supine to Sit: Minimal assistance  Sit to Supine: Minimal assistance(For LE assistance)  Scooting: Minimal assistance     Balance  Sitting - Static: Fair  Comments: Pt sat EOB x 8  mins with SBA. Pt with tendency to lean to the R.      Exercises  Comments: Pt performed B hip flexion, LAQ, AP x 10 with cues at EOB. Limited exercises as pt requests to lay down for bedpan placement.      Treatment:  Functional mobility training and pt education    Goals  Short term goals  Time Frame for Short term goals: discharge  Short term goal 1: sit to/from supine supervision  ongoing  Short term goal 2: sit to/from stand min assist NWB L/heel WB L  ongoing  Short term goal 3: bed to/from chair supervision NWB L/heel WB L  ongoing  Patient Goals   Patient goals : return home    Plan    Plan  Times per week: 2-5  Current Treatment Recommendations: Transfer Training, Endurance Training, Functional Mobility Training  Safety Devices  Type of devices: Left in chair, Nurse notified, Bed alarm in place, Left in bed     Therapy Time   Individual Concurrent Group Co-treatment   Time In 1305         Time Out 1330         Minutes 25           Timed Code Treatment Minutes:   10    Total Treatment Minutes:  [address] Li Beltran, PT

## 2020-07-27 NOTE — PLAN OF CARE
Problem: Pain:  Goal: Pain level will decrease  Description: Pain level will decrease  Outcome: Ongoing  Note: Patient c/o pain rated as 6 out of 10 Tramadol given per protocol. Upon reassessment patient was asleep with RR >10. Will continue to monitor       Problem: Falls - Risk of:  Goal: Will remain free from falls  Description: Will remain free from falls  Outcome: Ongoing  Note: Patient remains free from physical injury. Fall precautions in place: Patient in bed lowest position,wheels locked. 2/4 side rails up Call light and beside table within reach. Will continue to monitor       Problem: Skin Integrity:  Goal: Will show no infection signs and symptoms  Description: Will show no infection signs and symptoms  Outcome: Ongoing  Note: CDI Ace wrap no signs of infection noted.  Will continue to monitor

## 2020-07-27 NOTE — PROGRESS NOTES
infection with osteomyelitis vs charcot: Ceftriaxone - day #5 + Vancomycin - day #7  · Vancomycin--Pharmacy to dose  · Currently on vanc 1.25g IV q24h. Dose adjusted 7/26 based on trough of 20.3mcg/mL on 1.75g IV q24h. · SCr up slightly today (1.2-->1. 4). 2.1L UOP over past 24h. · Will continue current dose for now with close monitoring of renal function. Will monitor SCr very closely. · Clinical condition will be monitored closely, and repeat levels will be ordered & dose adjustments made as appropriate.     Please call with questions--  Eden Finley, RonnaD, BCPS  Wireless: [phone]  or [phone]  7/27/2020 8:05 AM

## 2020-07-27 NOTE — PLAN OF CARE
Problem: Pain:  Goal: Pain level will decrease  Description: Pain level will decrease  7/27/2020 1721 by Quinn Jarrett RN  Outcome: Completed  7/27/2020 0940 by Quinn Jarrett RN  Outcome: Ongoing  Note: Patient alerts RN of increasing pain; RN administers pain medication as ordered to patient satisfaction   7/27/2020 0508 by Benny Springer RN  Outcome: Ongoing  Note: Patient c/o pain rated as 6 out of 10 Tramadol given per protocol. Upon reassessment patient was asleep with RR >10. Will continue to monitor    Goal: Control of acute pain  Description: Control of acute pain  Outcome: Completed  Goal: Control of chronic pain  Description: Control of chronic pain  Outcome: Completed     Problem: Falls - Risk of:  Goal: Will remain free from falls  Description: Will remain free from falls  7/27/2020 1721 by Quinn Jarrett RN  Outcome: Completed  7/27/2020 0940 by Quinn Jarrett RN  Outcome: Ongoing  Note: Call light within reach; bed alarm engaged; increased frequency of rounds   7/27/2020 0508 by Benny Springer RN  Outcome: Ongoing  Note: Patient remains free from physical injury. Fall precautions in place: Patient in bed lowest position,wheels locked. 2/4 side rails up Call light and beside table within reach. Will continue to monitor    Goal: Absence of physical injury  Description: Absence of physical injury  Outcome: Completed     Problem: Skin Integrity:  Goal: Will show no infection signs and symptoms  Description: Will show no infection signs and symptoms  7/27/2020 1721 by Quinn Jarrett RN  Outcome: Completed  7/27/2020 0508 by Benny Springer RN  Outcome: Ongoing  Note: CDI Ace wrap no signs of infection noted.  Will continue to monitor  Goal: Absence of new skin breakdown  Description: Absence of new skin breakdown  Outcome: Completed     Problem: Infection - Surgical Site:  Goal: Will show no infection signs and symptoms  Description: Will show no infection signs and symptoms  7/27/2020 1721 by Richie Herrera RN  Outcome: Completed  7/27/2020 0508 by Teodoro Garcia RN  Outcome: Ongoing  Note: CDI Ace wrap no signs of infection noted.  Will continue to monitor

## 2020-07-27 NOTE — DISCHARGE SUMMARY
Hospital Medicine Discharge Summary      Patient ID: Frank Espinosa      Patient's PCP: Reynaldo Mcdaniels MD    Admit Date: 7/20/2020     Discharge Date:   7/27/2020    Admitting Physician: Tricia Mcgowan MD    Discharge Physician: Tricia Mcgowan MD     Discharge Diagnoses: Active Hospital Problems    Diagnosis Date Noted    Type 2 diabetes mellitus with left diabetic foot ulcer ([facility] [Q89.564, L97.529] 07/21/2020    Diabetic polyneuropathy associated with type 2 diabetes mellitus ([facility] [E11.42] 07/21/2020    Cellulitis and abscess of foot [L03.119, L02.619] 07/20/2020         The patient was seen and examined on day of discharge and this discharge summary is in conjunction with any daily progress note from day of discharge. Hospital Course: The patient is a 81 y.o. male with medical history most notable for HTN, DM type 2 with neuropathy, a fib on eliquis, systolic CHF, s/p BiV ICD in 2017, who presented to [facility] with ulcer on his left foot with drainage. He followed up with his podiatrist, who was concerned about worsening foot cellulitis and wound probing to the bone. Patient was referred for direct admission. Hospital course:  1. Diabetic foot infection with abscesses and Possible osteomyelitis vs charcot neuroarthropathy:  Wound cx - staph epi  S/p I&D with bone biopsy 7/22- path pending  On ceftriaxone and vanco, plan for IV antibiotics through 9/2. PICC in place (7/23)  Non WB on LE  Patient needed placement to SNF     2. HTN, Chronic systolic CHF EF 64-48%, a fib:  Controlled volume status is stable a this time  Cont with coreg, lisinopril, lasix, eliquis     3. Possible Episode of Vtach in OR:  Cardiology consulted . ICD interrogated- showed runs of NSVT but no shocks. Coreg dose was increased, already on amio. No need for further cardiac testing.      4. WILL:  Nightly CPAP     5.  CKD stage 3:  Stable creatinine at 1.6-1.3. nephrology following.        Consults:     IP 2 times daily (with meals)  Qty: 60 tablet, Refills: 3              Details   atorvastatin (LIPITOR) 40 MG tablet TAKE 1 TABLET BY MOUTH DAILY  Qty: 90 tablet, Refills: 3    Comments: **Patient requests 90 days supply**  Associated Diagnoses: Type 2 diabetes mellitus with complication, with long-term current use of insulin (HCC)      spironolactone (ALDACTONE) 25 MG tablet TAKE 1/2 TABLET BY MOUTH DAILY  Qty: 45 tablet, Refills: 5    Comments: **Patient requests 90 days supply**  Associated Diagnoses: Essential hypertension      apixaban (ELIQUIS) 5 MG TABS tablet TAKE 1 TABLET BY MOUTH TWICE DAILY  Qty: 60 tablet, Refills: 11      dapagliflozin (FARXIGA) 5 MG tablet Take 1 tablet by mouth every morning  Qty: 90 tablet, Refills: 1      tamsulosin (FLOMAX) 0.4 MG capsule TAKE 1 CAPSULE BY MOUTH DAILY  Qty: 90 capsule, Refills: 2      lisinopril (PRINIVIL;ZESTRIL) 2.5 MG tablet TAKE 1 TABLET BY MOUTH DAILY  Qty: 90 tablet, Refills: 3    Associated Diagnoses: Essential hypertension      levothyroxine (SYNTHROID) 25 MCG tablet TAKE 1 TABLET BY MOUTH DAILY  Qty: 90 tablet, Refills: 3      furosemide (LASIX) 40 MG tablet TAKE 1 TABLET BY MOUTH DAILY  Qty: 60 tablet, Refills: 5      omega-3 acid ethyl esters (LOVAZA) 1 g capsule TAKE 1 CAPSULE BY MOUTH TWICE DAILY  Qty: 180 capsule, Refills: 3    Associated Diagnoses: Chronic systolic congestive heart failure (HCC)      amiodarone (CORDARONE) 200 MG tablet TAKE 1 TABLET BY MOUTH DAILY  Qty: 90 tablet, Refills: 3    Comments: **Patient requests 90 days supply**      Coenzyme Q10 (CO Q 10) 100 MG CAPS Take 1 capsule by mouth daily      Cinnamon 500 MG CAPS Take 1 capsule by mouth daily      Vitamin D (CHOLECALCIFEROL) 1000 UNITS CAPS capsule Take 2,000 Units by mouth nightly       therapeutic multivitamin-minerals (THERAGRAN-M) tablet Take 1 tablet by mouth daily. aspirin 81 MG EC tablet Take 81 mg by mouth daily.       insulin aspart (NOVOLOG FLEXPEN) 100 UNIT/ML injection pen Inject 18 Units into the skin 3 times daily (before meals)  Qty: 16 pen, Refills: 3      !! ONETOUCH ULTRA strip TEST UP TO THREE TIMES DAILY  Qty: 300 strip, Refills: 1    Comments: **Patient requests 90 days supply**      Continuous Blood Gluc  (FREESTYLE KWESI 14 DAY READER) ANITA Use as Directed Dx E11.9  Qty: 1 Device, Refills: 0      Continuous Blood Gluc Sensor (FREESTYLE KWESI 14 DAY SENSOR) MISC Use as directed Dx E11.9  Qty: 3 each, Refills: 3      Blood Glucose Monitoring Suppl (ONE TOUCH ULTRA MINI) w/Device KIT 1 kit by Does not apply route three times daily  Qty: 1 kit, Refills: 0      Handicap Placard MISC by Does not apply route Expires: 1/9/22. Diagnosis: cardiomyopathy. Qty: 1 each, Refills: 0    Associated Diagnoses: Other cardiomyopathy (HCC)      Insulin Degludec (TRESIBA FLEXTOUCH) 100 UNIT/ML SOPN Inject 10 Units into the skin nightly  Qty: 9 mL, Refills: 3      B-D UF III MINI PEN NEEDLES 31G X 5 MM MISC USE DAILY  Qty: 100 each, Refills: 5      acetaminophen (TYLENOL) 325 MG tablet Take 2 tablets by mouth every 4 hours as needed for Pain  Qty: 120 tablet, Refills: 3      !! glucose blood VI test strips (ONE TOUCH TEST STRIPS) strip 1 each by In Vitro route daily As needed. Qty: 100 each, Refills: 3      Insulin Syringe-Needle U-100 (INSULIN SYRINGE .5CC/31GX5/16\") 31G X 5/16\" 0.5 ML MISC Patient tests bid  Qty: 100 Syringe, Refills: 5       !! - Potential duplicate medications found. Please discuss with provider. Time Spent on discharge is more than 30 minutes in the examination, evaluation, counseling and review of medications and discharge plan. Signed:  Shayy Harp MD   7/27/2020      Thank you Jessica Cuenca MD for the opportunity to be involved in this patient's care. If you have any questions or concerns please feel free to contact me at [phone].

## 2020-07-27 NOTE — PROGRESS NOTES
[facility]    [facility]              [phone]                       Plan :     -Free light chain ratio is 1.55.  - Continue lisinopril and spironolactone. - Creatinine stable      Dose vanc according to GFR     Assessment :     1. RODERICK on CKD stage 3  - Likely 2/2 to cardiorenal syndrome along with infectious component  - CHF (last echo 5/19, EF 35-40%)  - Cr fluctuates between 1-1.5  - Type II Cardiorenal syndrome: CKD due to chronic HF  - UA, Urine Na, Urine Protein, UPEP, SPEP, Urine    Eosinophils, Serum free light chains  - -Renal ultrasound showed bilateral normal-sized kidneys with no hydronephrosis. Prostate is slightly enlarged. 2. Osteomyelitis  - Continue Abx with Vanc and Cefepime  - Please adjust Vanc apporpriately to decrease risk of renal injury    3. CKD Stage 3  - Assessed by Dr. Bob Plaza initially at [facility]  - S/p gastric bypass in 2013 with 100lb wt. Loss  - Last time seen Cr was 1.0  - Please avoid nephrotoxic agents     4. Mild Hyponatremia  - Likely 2/2 high volume status due to CHF  - Continue [facility] would like to thank Lindy Lim MD   for opportunity to serve this patient      Please call with questions at-   24 Hrs Answering service [phone] or  7 am- 5 pm via Perfect serve or cell phone  Sammy Zarco          CC/reason for consult :     RODERICK     HPI :     Joce Obando is a 81 y.o. male with PMH of IDDM, Afib (on Eliquis), systolic CHF s/p BiV ICD in 2017, COPD, HTN, MRSA + osteomyelitis in 2015, CKD stage 3, Stroke presented [facility] on 7/20/2020 with a draining left foot ulcer. Patient initially started experiencing left foot pain and soreness a couple of weeks ago. 5 days ago he noticed increased drainage from the heel of his left foot. On 7/18/20, he noticed blood from the area and came to the ED. In the ED, he complained of fatigue for a couple of weeks along with the draining foot ulcer.  He was seen by podiatry, put on Augmentin and told to follow up outpatient. After his follow-up outpatient, there was concern of worsening foot cellulitis and wound probing to the bone. He came to the ED on 7/20/20 and was admitted. In the ED, vittals were stable, BUN:Cr was 25:1.5, . 3. He had an MRI done later after his admission which showed soft tissue inflammation, Charcot neuropathy, suspicion for osteomyelitis involving his distal tibia and fibula and peripheral enhancing abcess     Interval History:     No new complaints   Blood pressures well controlled. Urine output is 2.1 L.    ROS:     Seen with -resident      positives in bold   Constitutional:  fever, chills, weakness, weight change, fatigue  Skin:  rash, pruritus, hair loss, bruising, dry skin, petechiae  Head, Face, Neck   headaches, swelling,  cervical adenopathy  Respiratory: shortness of breath, cough, or wheezing  Cardiovascular: chest pain, palpitations, dizzy, edema  Gastrointestinal: nausea, vomiting, diarrhea, constipation,belly pain    Yellow skin, blood in stool  Musculoskeletal:  back pain, muscle weakness, gait problems,       joint pain or swelling. Genitourinary:  dysuria, poor urine flow, flank pain, blood in urine  Neurologic:  vertigo, TIA'S, syncope, seizures, focal weakness  Psychosocial:  insomnia, anxiety, or depression.   Additional positive findings:                       All other remaining systems are negative or unable to obtain        PMH/PSH/SH/Family History:     Past Medical History:   Diagnosis Date    Atrial fibrillation (HCC)     Back pain     Cardiomyopathy     CHF (congestive heart failure) (HCC)     COPD (chronic obstructive pulmonary disease) (HCC)     Dyslipidemia     GERD (gastroesophageal reflux disease)     Hyperlipidemia     Hypertension     Hypothyroidism     Leg edema     MRSA (methicillin resistant staph aureus) culture positive 10/5/15    foot/bone    MRSA nasal colonization 05/05/2017  Obesity     Prolonged emergence from general anesthesia     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     [facility]roke [facility])     Thyroid disease     Type 2 diabetes mellitus without complication ([facility] [address]     Type II or unspecified type diabetes mellitus without mention of complication, not stated as uncontrolled        Past Surgical History:   Procedure Laterality Date    ADRENAL GLAND SURGERY  1970    CARDIAC DEFIBRILLATOR PLACEMENT  09/05/2017    Dr. Meza Look COLONOSCOPY N/A 3/8/2019    COLONOSCOPY WITH MAC, UNASYN (3gm) performed by Emmanuelle Nicolas MD at [address] 8/9/2019    COLONOSCOPY POLYPECTOMY SNARE/COLD BIOPSY performed by Emmanuelle Nicolas MD at [address]  8/9/2019    COLONOSCOPY WITH BIOPSY performed by Emmanuelle Nicolas MD at [address] [facility] Right 8/28/2019    INCISION AND INCISIONAL DEBRIDEMENT OPEN FRACTURE RIGHT 2ND TOE SKIN AND BONE performed by April Owens MD at [address] Left 7/22/2020    LEFT FOOT INCISION AND DRAINAGE WITH BONE BIOPSY AND REMOVAL OF FREE FLOATING BONE FRAGMENT performed by Stephanie Brown DPM at [address] [address]    GASTRIC BYPASS SURGERY      OTHER SURGICAL HISTORY  10/6/15    INCISION AND DRAINAGE RIGHT FOOT          OTHER SURGICAL HISTORY Right 10/8/15    INCISION AND DRAINAGE RIGHT FOOT      PACEMAKER PLACEMENT      UPPER GASTROINTESTINAL ENDOSCOPY N/A 3/8/2019    EGD BIOPSY GASTRIC H. PYLORI performed by Emmanuelle Nicolas MD at [place] [address]        reports that he quit smoking about 3 years ago. His smoking use included cigarettes. He has a 4.00 pack-year smoking history. He has never used smokeless tobacco. He reports current alcohol use. He reports that he does not use drugs. family history includes Diabetes in his maternal grandmother; Heart Disease in his father; Hypertension in his maternal grandmother and mother.          Medication:     Current Facility-Administered Medications: vancomycin (VANCOCIN) 1,250 mg in dextrose 5 % 250 mL IVPB, 1,250 mg, Intravenous, Q24H  insulin glargine (LANTUS;BASAGLAR) injection pen 14 Units, 14 Units, Subcutaneous, Nightly  insulin lispro (1 Unit Dial) 4 Units, 4 Units, Subcutaneous, Nightly  insulin lispro (1 Unit Dial) 12 Units, 12 Units, Subcutaneous, TID WC  insulin lispro (1 Unit Dial) 0-12 Units, 0-12 Units, Subcutaneous, TID WC  insulin lispro (1 Unit Dial) 0-6 Units, 0-6 Units, Subcutaneous, Nightly  cefTRIAXone (ROCEPHIN) 2 g IVPB in D5W 50ml minibag, 2 g, Intravenous, Q24H  sodium chloride flush 0.9 % injection 10 mL, 10 mL, Intravenous, 2 times per day  sodium chloride flush 0.9 % injection 10 mL, 10 mL, Intravenous, PRN  carvedilol (COREG) tablet 12.5 mg, 12.5 mg, Oral, BID WC  apixaban (ELIQUIS) tablet 5 mg, 5 mg, Oral, BID  traMADol (ULTRAM) tablet 50 mg, 50 mg, Oral, Q6H PRN  simethicone (MYLICON) 40 UF/8.9OA drops 40 mg, 40 mg, Oral, Q6H PRN  naloxone (NARCAN) injection 0.4 mg, 0.4 mg, Intravenous, PRN  diphenhydrAMINE (BENADRYL) injection 25 mg, 25 mg, Intravenous, Q6H PRN  acetaminophen (TYLENOL) tablet 500 mg, 500 mg, Oral, Q6H PRN  docusate sodium (COLACE) capsule 100 mg, 100 mg, Oral, BID PRN  promethazine (PHENERGAN) tablet 12.5 mg, 12.5 mg, Oral, Q6H PRN  glucose (GLUTOSE) 40 % oral gel 15 g, 15 g, Oral, PRN  dextrose 50 % IV solution, 12.5 g, Intravenous, PRN  glucagon (rDNA) injection 1 mg, 1 mg, Intramuscular, PRN  dextrose 5 % solution, 100 mL/hr, Intravenous, PRN  amiodarone (CORDARONE) tablet 200 mg, 200 mg, Oral, Daily  aspirin EC tablet 81 mg, 81 mg, Oral, Daily  atorvastatin (LIPITOR) tablet 40 mg, 40 mg, Oral, Nightly  furosemide (LASIX) tablet 40 mg, 40 mg, Oral, Daily  levothyroxine (SYNTHROID) tablet 25 mcg, 25 mcg, Oral, Daily  lisinopril (PRINIVIL;ZESTRIL) tablet 2.5 mg, 2.5 mg, Oral, Daily  spironolactone (ALDACTONE) tablet 12.5 mg, 12.5 mg, Oral, Daily  tamsulosin (FLOMAX) capsule 0.4 mg, 0.4 mg, Oral, Nightly       Vitals :     Vitals:    07/27/20 0750   BP: 123/68   Pulse: 67   Resp: 16   Temp: 97.3 °F (36.3 °C)   SpO2: 97%       I & O :       Intake/Output Summary (Last 24 hours) at 7/27/2020 0846  Last data filed at 7/27/2020 0400  Gross per 24 hour   Intake 240 ml   Output 2125 ml   Net -1885 ml        Physical Examination :     General appearance: Anxious- no, distressed- no, in good spirits- yes  HEENT: Lips- normal, teeth- ok , oral mucosa- moist  Neck : Mass- no, appears symmetrical, JVD- not visible  Respiratory: Respiratory effort- , wheeze- no, crackles -  Cardiovascular:  Ausculation- No M/R/G, Edema   Abdomen: visible mass- no, distention- no, scar- no, tenderness- no                            hepatosplenomegaly-  no  Musculoskeletal:  clubbing no,cyanosis- no , digital ischemia- no                           muscle strength- grossly normal , tone - grossly normal  Skin: rashes- no , ulcers- no, induration- no, tightening - no  Psychiatric:  Judgement and insight- normal           AAO X 3  Additional finding:     LABS:     Recent Labs     07/25/20  0430 07/26/20  0535 07/27/20  0635   WBC 5.1 6.2 7.3   HGB 10.5* 10.6* 10.3*   HCT 31.8* 32.0* 31.6*    218 235     Recent Labs     07/25/20  0430 07/26/20  0535 07/27/20  0635    139 136   K 4.4 4.9 4.8    102 101   CO2 26 27 29   BUN 40* 47* 49*   CREATININE 1.1 1.2 1.4*   GLUCOSE 159* 164* 160*            Thanks  Nephrology  [address], [address]  Office: [phone]

## 2020-07-27 NOTE — PROGRESS NOTES
Occupational Therapy  Facility/Department: [address]  Daily Treatment Note  NAME: Henri Wing  : 1950  MRN: [id number]    Date of Service: 2020    Discharge Recommendations:    Henri Wing scored a 17/24 on the AM-PAC ADL Inpatient form. Current research shows that an AM-PAC score of 17 or less is typically not associated with a discharge to the patient's home setting. Based on the patient's AM-PAC score and their current ADL deficits, it is recommended that the patient have 3-5 sessions per week of Occupational Therapy at d/c to increase the patient's independence. Please see assessment section for further patient specific details. If patient discharges prior to next session this note will serve as a discharge summary. Please see below for the latest assessment towards goals. Assessment     Pt limited by deconditioning and pain this session. Pt completing bed mobility with SBA for supine to sit and Mod A for sit to Supine. Pt declining stance secondary to pain. UE exercise and ADL grooming completed EOB with encouragement and SBA. Pt is currently unsafe for return home and would benefit from inpt OT for maximizing functional independence. Continue OT per POC. Patient Diagnosis(es): The encounter diagnosis was Cellulitis and abscess of foot.       has a past medical history of Atrial fibrillation ([address] Back pain, Cardiomyopathy, CHF (congestive heart failure) ([address] COPD (chronic obstructive pulmonary disease) ([address] Dyslipidemia, GERD (gastroesophageal reflux disease), Hyperlipidemia, Hypertension, Hypothyroidism, Leg edema, MRSA (methicillin resistant staph aureus) culture positive, MRSA nasal colonization, Obesity, Prolonged emergence from general anesthesia, 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, Stroke [facility]), Thyroid disease, Type 2 diabetes mellitus without complication ([address] and Type II or unspecified type diabetes mellitus without mention of complication, not stated as uncontrolled. has a past surgical history that includes Foot surgery (1-2-10); Adrenal gland surgery (1970); Gastric bypass surgery; other surgical history (10/6/15); other surgical history (Right, 10/8/15); Cardiac defibrillator placement (09/05/2017); pacemaker placement; Colonoscopy (N/A, 3/8/2019); Upper gastrointestinal endoscopy (N/A, 3/8/2019); Colonoscopy (N/A, 8/9/2019); Colonoscopy (8/9/2019); Foot Debridement (Right, 8/28/2019); and Foot Debridement (Left, 7/22/2020). Restrictions  Position Activity Restriction  Other position/activity restrictions: strict NWB L except heel WB for txfers only (per 7/22 podiatry note), no activity restrictions noted       Additional Pertinent Hx: The patient is a 81 y.o. male with medical history as below, most notable for HTN, DM type 2 with neuropathy, a fib on eliquis, systolic CHF, s/p BiV ICD in 2017, who [facility] to [facility] with ulcer on his left foot with drainage. Podiatry consult; 7/22 OR for I and D L foot down to bone. Diagnosis: Cellulitis and abscess of foot  Treatment Diagnosis: Decreased functional mobiltiy and ADL satus 2/2 NWB/ Heel WB  precaution on LLE    Subjective:   Pt met side lying in bed and agreeable to OT treatment with encouragement    Pain:   Pain increasing  In LLE with sitting EOB     Objective:    Cognition/Orientation:  WFL     Bed mobility   Supine to sit: SBA with increased time and effort HOB flat and use of BR  Sit to Supine:  Mod A for assist with LE  Scooting: CGA for scooting forward on EOB (blocking R knee for ensuring no sliding forward to far on EOB)    Functional Mobility   Other:  Pt declining stance from EOB secondary to pain    Pt sitting EOB for 15 min with pt supporting trunk with UE and R lateral lean    ADLs   Grooming: SBA for washing face and hands seated EOB    UE Exercises   10 reps shoulder flex/ext  10 reps forward punch  10 reps diagonal cross body reach  2 sets of 5 chair

## 2020-07-27 NOTE — PROGRESS NOTES
07/27/20  0809 07/27/20  1049   POCGLU 254* 218* 169* 149* 216*       Intake/Output Summary (Last 24 hours) at 7/27/2020 1059  Last data filed at 7/27/2020 0912  Gross per 24 hour   Intake 480 ml   Output 2175 ml   Net -1695 ml     General appearance: No apparent distress appears stated age and cooperative. Lungs: Clear to auscultation, bilaterally without Rales/Wheezes/Rhonchi with good respiratory effort. Heart: Regular rate and rhythm with Normal S1 and split S2 without murmurs, rubs or gallops, point of maximum impulse non-displaced  Abdomen: Soft, non-tender or non-distended without rigidity or guarding and positive bowel sounds all four quadrants. Extremities: No clubbing, cyanosis, left foot is covered in dressing. Skin: Skin color, texture, turgor normal.  Pictures reviewed. Neurologic: Alert and oriented X 3, grossly non-focal.  Mental status: Alert, oriented, thought content appropriate. Capillary refill is brisk  Peripheral pulses 2+    LABS:  Recent Labs     07/25/20  0430 07/26/20  0535 07/27/20  0635   WBC 5.1 6.2 7.3   HGB 10.5* 10.6* 10.3*   HCT 31.8* 32.0* 31.6*    218 235                                                                    Recent Labs     07/25/20  0430 07/26/20  0535 07/27/20  0635    139 136   K 4.4 4.9 4.8    102 101   CO2 26 27 29   BUN 40* 47* 49*   CREATININE 1.1 1.2 1.4*   GLUCOSE 159* 164* 160*     No results for input(s): AST, ALT, ALB, BILITOT, ALKPHOS in the last 72 hours. No results for input(s): TROPONINI in the last 72 hours. Assessment & Plan:    Patient Active Problem List:      Diabetic foot infection with abscesses and Possible osteomyelitis vs charcot neuroarthropathy:  Wound cx - staph epi  S/p I&D with bone biopsy 7/22- path pending  On ceftriaxone and vanco, plan for IV antibiotics through 9/2.  PICC in place (7/23)  Podiatry and ID are on board  Non WB on LE  need placement- SW following, precert in process, COVID 19 sent for placement    HTN, Chronic systolic CHF EF 76-51%:  Controlled   volume status is stable a this time  Cont with coreg, lisinopril, lasix    Episode of Vtach in OR:  Cardiology consulted . ICD interrogated- showed runs of NSVT but no shocks. Coreg dose was increased, already on amio. No need for further cardiac testing. DM type 2:  Cont low dose lantus 14 U, prandial coverage and sliding scale low dose. Added lispro for his bedtime snack. Atrial fibrillation:  eliquis    WILL:  Nightly CPAP    CKD stage 3:  Stable creatinine at 1.6-1.3. nephrology following.      Disposition: inpt. Full Code   Medically stable for discharge.

## 2020-07-27 NOTE — CARE COORDINATION
Case Management Assessment            Discharge Note                    Date / Time of Note: 7/27/2020 1:10 PM                  Discharge Note Completed by: Gala Hodgkins    Patient Name: Eva Loredo   YOB: 1950  Diagnosis: Cellulitis and abscess of foot [L03.119, L02.619]   Date / Time: 7/20/2020  6:13 PM    Current PCP: Jacek Cook MD  Clinic patient: No    Hospitalization in the last 30 days: No    Advance Directives:  Code Status: Full Code  [place] DNR form completed and on chart: No    Financial:  Payor: Valeri Buchanan / Plan: Bridgett George ESSENTIAL/PLUS / Product Type: *No Product type* /      Pharmacy:    [id number] [address]  [address] [place] [address]  Phone: [phone] Fax: [phone]      Assistance purchasing medications?: Potential Assistance Purchasing Medications: No  Assistance provided by Case Management: None at this time    Does patient want to participate in local refill/ meds to beds program?: No    Meds To Beds General Rules:  1. Can ONLY be done Monday- Friday between 8:30am-5pm  2. Prescription(s) must be in pharmacy by 3pm to be filled same day  3. Copy of patient's insurance/ prescription drug card and patient face sheet must be sent along with the prescription(s)  4. Cost of Rx cannot be added to hospital bill. If financial assistance is needed, please contact unit  or ;  or  CANNOT provide pharmacy voucher for patients co-pays  5.  Patients can then  the prescription on their way out of the hospital at discharge, or pharmacy can deliver to the bedside if staff is available. (payment due at time of pick-up or delivery - cash, check, or card accepted)     Able to afford home medications/ co-pay costs: Yes    ADLS:  Current PT AM-PAC Score: 11 /24  Current OT AM-PAC Score: 17 /24      DISCHARGE Disposition: nurse to call report to [phone]. Spoke with Judy Marc from [facility] to dry has biju placed to wound [place] will be placed at SNF. The Plan for Transition of Care is related to the following treatment goals of Cellulitis and abscess of foot [L03.119, L02.619]    The Patient and/or patient representative Gina Jennings and his family were provided with a choice of provider and agrees with the discharge plan Yes    Freedom of choice list was provided with basic dialogue that supports the patient's individualized plan of care/goals and shares the quality data associated with the providers.  Yes    Care Transitions patient: No    Barbara Golden RN  [facility]  Case Management Department  Ph: [phone]  Fax: [phone]

## 2020-07-27 NOTE — PROGRESS NOTES
Report called to Artie Hines, receiving nurse, at [phone] [facility] transport     Electronically signed by Leola Cruz RN on 7/27/20 at 6:10 PM EDT

## 2020-07-27 NOTE — PROGRESS NOTES
Patient discharged via transport stretcher without incident         Electronically signed by Nyla Calles RN on 7/27/20 at 6:57 PM EDT

## 2020-07-28 LAB
ANAEROBIC CULTURE: ABNORMAL
CULTURE SURGICAL: ABNORMAL
GRAM STAIN RESULT: ABNORMAL
ORGANISM: ABNORMAL

## 2020-07-28 RX ORDER — DAPAGLIFLOZIN 5 MG/1
TABLET, FILM COATED ORAL
Qty: 90 TABLET | Refills: 1 | Status: SHIPPED | OUTPATIENT
Start: 2020-07-28 | End: 2021-02-01

## 2020-07-28 NOTE — ADT AUTH CERT
Utilization Reviews         Cellulitis - Care Day 6 (7/25/2020) by Roxy Torres RN         Review Status  Review Entered    Completed  7/27/2020 11:15        Criteria Review       Care Day: 6 Care Date: 7/25/2020 Level of Care:    Guideline Day 3    Level Of Care    ( ) Floor to discharge [E]    7/27/2020 11:15 AM EDT by Aditya Valera      no    Clinical Status    (X) * Hemodynamic stability    7/27/2020 11:15 AM EDT by Aditya Valera      98.1 (36.7)   16   68   164/70Abnormal    (X) * Fever absent or improved    7/27/2020 11:15 AM EDT by Kianna Murphy absent    ( ) * Skin exam stable or improved    7/27/2020 11:15 AM EDT by Kianna Murphy no    (X) * Mental status at baseline    7/27/2020 11:15 AM EDT by Kianna Murphy a/o    ( ) * Antibiotic treatment needs appropriate for next level of care    7/27/2020 11:15 AM EDT by Kianna elizalde    (X) * Pain absent or manageable at next level of care    7/27/2020 11:15 AM EDT by Aditya Valera      pain rated 0-6/10.managed    ( ) * Discharge plans and education understood    Activity    (X) * Ambulatory    7/27/2020 11:15 AM EDT by Aditya Valera      [facility]    Routes    (X) * Oral hydration, medications, [F] and diet    7/27/2020 11:15 AM EDT by Aditya Valera      flomax o qd, aldactone 12.5 po qd,coreg 12.5 po bid,  lisinopril 2.5 po qd, synthroic qd ssi qid, insulin 10u tid, lasix 40 po qd, asa 81 po qd lipitor 40 po qd,eliquis 5mg po bid cordarone 200mg po qd, ultram 50mg q 6 prn(took x 1)    Interventions    (X) WBC    7/27/2020 11:15 AM EDT by Aditya Valera      5.1    Medications    (X) Parenteral or oral antibiotics [B]    7/27/2020 11:15 AM EDT by Aditya Valera      vanco iv q 24, rocephin iv q 24,    * Milestone    Additional Notes    7/25    Gen floor    POD#3 s/P debridement    Bun/cr-40/1.1    On IV ATBx    + wound vac          EP consult-- 81 y.o. gentleman with a past medical history significant for nonischemic cardiomyopathy, status post CRT-D, paroxysmal atrial fibrillation, obesity, status post gastric bypass, hypertension, hyperlipidemia, hypothyroidism, type 2 diabetes mellitus was admitted to the hospital secondary to the need for wound excision of the lower extremity.  In the OR, he was reported to have a 10 beat run of ventricular tachycardia and received a ICD therapy based on the CRNA report. Marti Gongora do not have any documentations of the same.  Hence, EP was consulted for further evaluation. Device interrogation did not show any evidence of sustained ventricular tachycardia. He does have non-sustained v-tach. Will adjust coreg dose to 12.5 po bid          Podiatry-    ASSESSMENT/PLAN    -S/P Incision and Drainage, down to and including bone, left foot (DOS: 7/22/20)    -Full thickness wound, plantar left foot, Coffey III    -Cellulitis, left LE - resolved    -Osteomyelitis vs charcot neuroarthopathy, left foot/ankle    -Diabetes mellitus with peripheral neuropathy          -Patient examined and evaluated at bedside    -VSS, morning labs pending    -XR reviewed s/p I&D procedure, impressions stated above    -MRI reviewed, impressions stated above; multiple abscess formations identified, and changes consistent with osteomyelitis    -Wound culture of left foot wound collected 7/20/20: NGTD    -Surgical wound cultures from 7/22/20: NGTD    -F/u bone biopsy from 7/22/20    -ID following, recs appreciated. PICC line inserted, patient to receive vancomycin/ceftriaxone outpatient through 9/2    -Wound vac dressing changed. Followed by gauze to protect the skin from the wound vac tubing, followed by kerlix, and ace    -Wound vac running at continuous 125 mmHg    -Surgical shoe ordered. Patient to wear at all times while out of bed.     -NON weight bearing to the left foot, heel weight bearing for transfers only      -PT/OT ordered, f/u eval; patient strict NON WB to the left foot 2/2 plantar foot wound, heel weight bearaing for transfers Renal-    Plan :         -  UPEP, SPEP, Serum free light chain are pending    -Renal ultrasound showed bilateral normal-sized kidneys with no hydronephrosis.  Prostate is slightly enlarged.    -Started lisinopril and spironolactone. - Creatinine getting better    Tolerating ACE well    Dose vanc according to GFR         Assessment :         1. RODERICK on CKD stage 3    - Likely 2/2 to cardiorenal syndrome along with infectious component    - CHF (last echo 5/19, EF 35-40%)    - Cr fluctuates between 1-1.5    - Type II Cardiorenal syndrome: CKD due to chronic HF    - UA, Urine Na, Urine Protein, UPEP, SPEP, Urine Eosinophils, Serum free light chains              2. Osteomyelitis    - Continue Abx with Vanc and Cefepime    - Please adjust Vanc apporpriately to decrease risk of renal injury         3. CKD Stage 3    - Assessed by Dr. Bob Plaza initially at [facility]    - S/p gastric bypass in 2013 with 100lb wt. Loss    - Last time seen Cr was 1.0    - Please avoid nephrotoxic agents         4.  Mild Hyponatremia    - Likely 2/2 high volume status due to CHF    - Continue Lasix           Cellulitis - Care Day 4 (7/23/2020) by Gilles Alcantara RN         Review Status  Review Entered    Completed  7/27/2020 11:07        Criteria Review       Care Day: 4 Care Date: 7/23/2020 Level of Care:    Guideline Day 3    Level Of Care    ( ) Floor to discharge [E]    7/27/2020 11:07 AM EDT by Corrie Barraza      no    Clinical Status    (X) * Hemodynamic stability    7/27/2020 11:07 AM EDT by Corrie Barraza      97.9 (36.6)   14   70   128/70    (X) * Fever absent or improved    7/27/2020 11:07 AM EDT by Alissa Leslie absent    ( ) * Skin exam stable or improved    (X) * Mental status at baseline    7/27/2020 11:07 AM EDT by Corrie Barraza      a/o    ( ) * Antibiotic treatment needs appropriate for next level of care    7/27/2020 11:07 AM EDT by Corrie Barraza      no    (X) * Pain absent or manageable at next level of care    7/27/2020 11:07 AM EDT by Marlene Mallory      pain 6/10-managed    ( ) * Discharge plans and education understood    Activity    (X) * Ambulatory    7/27/2020 11:07 AM EDT by Marlene Mallory      [facility]    Routes    (X) * Oral hydration, medications, [F] and diet    7/27/2020 11:07 AM EDT by Marlene Mallory      flomax o qd, aldactone 12.5 po qd, lisinopril 2.5 po qd, synthroic qd ssi qid, insulin 10u tid, lasix 40 po qd, asa 81 po qd lipitor 40 po qd,eliquis 5mg po bid cordarone 200mg po qd, ultram 50mg q 6 prn(took x 2)    Interventions    (X) WBC    7/27/2020 11:07 AM EDT by Marlene Mallory      5.3    Medications    (X) Parenteral or oral antibiotics [B]    7/27/2020 11:07 AM EDT by Cheyenne billingsley iv q 24 , vanco iv q 18    * Milestone    Additional Notes    7/23    POD#1    On IV ATBx    + wound vac    Bun/cr-36/1.3    Podiatry-    -S/P Incision and Drainage, down to and including bone, left foot (DOS: 7/22/20)    -Full thickness wound, plantar left foot, Coffey III    -Cellulitis, left LE - resolved    -Osteomyelitis vs charcot neuroarthopathy, left foot/ankle    -Diabetes mellitus with peripheral neuropathy          -Patient examined and evaluated at bedside    -VSS, morning labs pending    -XR reviewed s/p I&D procedure, impressions stated above    -MRI reviewed, impressions stated above; multiple abscess formations identified, and changes consistent with osteomyelitis    -Wound culture of left foot wound collected 7/20/20; NGTD    -F/u bone biopsy and wound cultures    -ID following, recs appreciated.    -Wound vac dressing changed    -Wound vac running at continuous 125 mmHg    -Surgical shoe ordered. Patient to wear at all times while out of bed.     -NON weight bearing to the left foot, heel weight bearing for transfers only      -PT/OT ordered, f/u eval; patient strict NON WB to the left foot 2/2 plantar foot wound, heel weight bearing for transfers only    -SW following, WOUND VAC NEEDED AT D/C. Paperwork filled out and placed on patients chart.            Renal-    Plan :         -  UPEP, SPEP, Urine Eosinophils, Serum free light chain are pending    - Renal U/S    - On ABX    - Please adjust Vanc apporpriately to decrease risk of renal injury    - Please avoid nephrotoxic agents    - Continue Lasix    - creatinine improved 1.6 > 1.3         Assessment :         1. RODERICK on CKD stage 3    - Likely 2/2 to cardiorenal syndrome along with infectious component    - CHF (last echo 5/19, EF 35-40%)    - Cr fluctuates between 1-1.5    - Type II Cardiorenal syndrome: CKD due to chronic HF    - UA, Urine Na, Urine Protein, UPEP, SPEP, Urine Eosinophils, Serum free light chains    - Renal U/S         2. Osteomyelitis    - Continue Abx with Vanc and Cefepime    - Please adjust Vanc apporpriately to decrease risk of renal injury         3. CKD Stage 3    - Assessed by Dr. Clarissa Leblanc initially at [facility]    - S/p gastric bypass in 2013 with 100lb wt. Loss    - Last time seen Cr was 1.0    - Please avoid nephrotoxic agents         4. Mild Hyponatremia    - Likely 2/2 high volume status due to CHF    - Na 137    - Continue Lasix          ID-    S/P I&D with bone removal (DOS 7/22/2020)    Diabetic foot infection - Left    Cellulitis, Left    OM vs Charcot neuropathy; Left foot    DM2 with peripheral neuropathy    CHF    Obesity BMI (39)         Plan:         Cont.  IV vancomycin + Cefepime for now    Podiatry - no further sx / OK for D/C with wound VAC / ID recs    Unsure if bone destruction was caused by Osteomylitis vs. Charcot    F/U sx bone path and cult    Nephrology - Cr improving / f/u outpt    May anticipate PICC with IV abx outpt

## 2020-07-29 LAB
ALBUMIN SERPL-MCNC: 2.4 G/DL (ref 3.1–4.9)
ALPHA-1-GLOBULIN: 0.5 G/DL (ref 0.2–0.4)
ALPHA-2-GLOBULIN: 1.1 G/DL (ref 0.4–1.1)
BETA GLOBULIN: 1.2 G/DL (ref 0.9–1.6)
GAMMA GLOBULIN: 1.1 G/DL (ref 0.6–1.8)
SPE/IFE INTERPRETATION: NORMAL
TOTAL PROTEIN: 6.3 G/DL (ref 6.4–8.2)
URINE ELECTROPHORESIS INTERP: NORMAL

## 2020-07-31 ASSESSMENT — ENCOUNTER SYMPTOMS
EYES NEGATIVE: 1
GASTROINTESTINAL NEGATIVE: 1
RESPIRATORY NEGATIVE: 1

## 2020-07-31 NOTE — PROGRESS NOTES
2020    TELEHEALTH EVALUATION -- Audio/Visual (During ANH-25 public health emergency)    HPI:    Joce Obando (:  1950) has requested an audio/video evaluation for the following concern(s):    Diabetes, T2: takes medication as prescribed, working on more physical activity and meal planning. Treated with B-B insulin and Farxiga. A1c 7.3 3/2020. Hypertension: monitors sodium in his diet, compliant with med regimen. See med list.     Hyperlipidemia: treated with statin. LDL 59, 3/2020. Increasing PSA. 1.61 2018. 2.53, 3/2020. H/O BPH and polyuria. Cardiomyopathy with reduced LVEF and A. Fib treated with eliquis. Recent increase left swelling. Doppler negative. Lasix increased. Review of Systems   Constitutional: Negative. HENT: Negative. Eyes: Negative. Respiratory: Negative. Cardiovascular:        HTN: see HPI, see med list.     Cardiomyopathy, see HPI. Denies CP or SOB. Gastrointestinal: Negative. Endocrine:        Diabetes, see HPI. HLD: see HPI. Genitourinary:        Increasing PSA with h/o BPH. See HPI. Musculoskeletal: Negative. Skin: Negative. Neurological: Negative. Psychiatric/Behavioral: Negative. Prior to Visit Medications    Medication Sig Taking?  Authorizing Provider   atorvastatin (LIPITOR) 40 MG tablet TAKE 1 TABLET BY MOUTH DAILY Yes Luna Chau MD   spironolactone (ALDACTONE) 25 MG tablet TAKE 1/2 TABLET BY MOUTH DAILY  Patient taking differently: 25 mg daily  Yes Juice Pringle MD   apixaban (ELIQUIS) 5 MG TABS tablet TAKE 1 TABLET BY MOUTH TWICE DAILY Yes Pam Severino MD   Continuous Blood Gluc  (FREESTYLE KWESI 14 DAY READER) ANITA Use as Directed Dx E11.9 Yes Luna Chau MD   Continuous Blood Gluc Sensor (FREESTYLE KWESI 14 DAY SENSOR) MISC Use as directed Dx E11.9 Yes Luna Chau MD   tamsulosin (FLOMAX) 0.4 MG capsule TAKE 1 CAPSULE BY MOUTH DAILY Yes Luna Chau MD   lisinopril (PRINIVIL;ZESTRIL) 2.5 MG tablet TAKE 1 TABLET BY MOUTH DAILY Yes Ajith Miles MD   levothyroxine (SYNTHROID) 25 MCG tablet TAKE 1 TABLET BY MOUTH DAILY Yes Ajith Miles MD   furosemide (LASIX) 40 MG tablet TAKE 1 TABLET BY MOUTH DAILY Yes Ajith iMles MD   omega-3 acid ethyl esters (LOVAZA) 1 g capsule TAKE 1 CAPSULE BY MOUTH TWICE DAILY Yes Ajith Miles MD   Blood Glucose Monitoring Suppl (ONE TOUCH ULTRA MINI) w/Device KIT 1 kit by Does not apply route three times daily Yes Ajith Miles MD   Handicap Placard MISC by Does not apply route Expires: 1/9/22. Diagnosis: cardiomyopathy. Yes Ajith Miles MD   Insulin Degludec (TRESIBA FLEXTOUCH) 100 UNIT/ML SOPN Inject 10 Units into the skin nightly Yes Ajith Miles MD   B-D UF III MINI PEN NEEDLES 31G X 5 MM MISC USE DAILY Yes Ajith Miles MD   amiodarone (CORDARONE) 200 MG tablet TAKE 1 TABLET BY MOUTH DAILY Yes Marjan Maxwell MD   acetaminophen (TYLENOL) 325 MG tablet Take 2 tablets by mouth every 4 hours as needed for Pain Yes Lynne Marcus MD   Coenzyme Q10 (CO Q 10) 100 MG CAPS Take 1 capsule by mouth daily Yes Historical Provider, MD   Cinnamon 500 MG CAPS Take 1 capsule by mouth daily Yes Historical Provider, MD   glucose blood VI test strips (ONE TOUCH TEST STRIPS) strip 1 each by In Vitro route daily As needed. Yes Ajith Miles MD   Vitamin D (CHOLECALCIFEROL) 1000 UNITS CAPS capsule Take 2,000 Units by mouth nightly  Yes Historical Provider, MD   Insulin Syringe-Needle U-100 (INSULIN SYRINGE .5CC/31GX5/16\") 31G X 5/16\" 0.5 ML MISC Patient tests bid Yes Ajith Miles MD   therapeutic multivitamin-minerals [facility]) tablet Take 1 tablet by mouth daily. Yes Historical Provider, MD   aspirin 81 MG EC tablet Take 81 mg by mouth daily.  Yes Historical Provider, MD   FARXIGA 5 MG tablet TAKE 1 TABLET BY MOUTH EVERY MORNING  Ajith Miles MD   traMADol (ULTRAM) 50 MG tablet Take 1 tablet by mouth every 6 hours as needed for Pain for up to 5 days. Kathi Sosa MD   carvedilol (COREG) 12.5 MG tablet Take 1 tablet by mouth 2 times daily (with meals)  Kathi Sosa MD   insulin aspart (NOVOLOG FLEXPEN) 100 UNIT/ML injection pen Inject 18 Units into the skin 3 times daily (before meals)  Sirena Garces MD   [facility] ULTRA strip TEST UP TO THREE TIMES DAILY  Sirena Garces MD       Social History     Tobacco Use    Smoking status: Former Smoker     Packs/day: 1.00     Years: 4.00     Pack years: 4.00     Types: Cigarettes     Last attempt to quit: 1/1/2017     Years since quitting: 3.5    Smokeless tobacco: Never Used   Substance Use Topics    Alcohol use: Yes     Comment: 2-3 times per year    Drug use: No            PHYSICAL EXAMINATION:  [ INSTRUCTIONS:  \"[x]\" Indicates a positive item  \"[]\" Indicates a negative item  -- DELETE ALL ITEMS NOT EXAMINED]  Vital Signs: (As obtained by patient/caregiver or practitioner observation)    Blood pressure-120/80  Heart rate-    Respiratory rate-    Temperature-  Pulse oximetry-     Constitutional: [x] Appears well-developed and well-nourished [x] No apparent distress      [] Abnormal-   Mental status  [x] Alert and awake  [x] Oriented to person/place/time [x]Able to follow commands      Eyes:  EOM    [x]  Normal  [] Abnormal-  Sclera  [x]  Normal  [] Abnormal -         Discharge [x]  None visible  [] Abnormal -    HENT:   [x] Normocephalic, atraumatic.   [] Abnormal   [x] Mouth/Throat: Mucous membranes are moist.     External Ears [x] Normal  [] Abnormal-     Neck: [] No visualized mass     Pulmonary/Chest: [x] Respiratory effort normal.  [x] No visualized signs of difficulty breathing or respiratory distress        [] Abnormal-      Musculoskeletal:   [x] Normal gait with no signs of ataxia         [x] Normal range of motion of neck        [] Abnormal-       Neurological:        [x] No Facial Asymmetry (Cranial nerve 7 motor function) (limited exam to video visit)          [x] No gaze palsy        [] discussion virtually to substitute for in-person clinic visit. Patient and provider were located at their individual homes. --Dior Baer MD on 7/31/2020 at 5:05 PM    An electronic signature was used to authenticate this note.

## 2020-08-03 LAB
ALBUMIN SERPL-MCNC: 2.6 G/DL
ALP BLD-CCNC: 94 U/L
ALT SERPL-CCNC: 16 U/L
ANION GAP SERPL CALCULATED.3IONS-SCNC: NORMAL MMOL/L
AST SERPL-CCNC: 13 U/L
BASOPHILS ABSOLUTE: 0.1 /ΜL
BASOPHILS RELATIVE PERCENT: 0.8 %
BILIRUB SERPL-MCNC: 0.3 MG/DL (ref 0.1–1.4)
BUN BLDV-MCNC: 28 MG/DL
C-REACTIVE PROTEIN: 31.2
CALCIUM SERPL-MCNC: 8.4 MG/DL
CHLORIDE BLD-SCNC: 102 MMOL/L
CO2: 29 MMOL/L
CREAT SERPL-MCNC: 1.3 MG/DL
EOSINOPHILS ABSOLUTE: 0.3 /ΜL
EOSINOPHILS RELATIVE PERCENT: 4 %
GFR CALCULATED: NORMAL
GLUCOSE BLD-MCNC: 111 MG/DL
HCT VFR BLD CALC: 28.5 % (ref 41–53)
HEMOGLOBIN: 9.3 G/DL (ref 13.5–17.5)
LYMPHOCYTES ABSOLUTE: 1.1 /ΜL
LYMPHOCYTES RELATIVE PERCENT: 18 %
MCH RBC QN AUTO: 28 PG
MCHC RBC AUTO-ENTMCNC: 32.7 G/DL
MCV RBC AUTO: 85.5 FL
MONOCYTES ABSOLUTE: 0.5 /ΜL
MONOCYTES RELATIVE PERCENT: 8.4 %
NEUTROPHILS ABSOLUTE: 4.3 /ΜL
NEUTROPHILS RELATIVE PERCENT: 68.8 %
PDW BLD-RTO: 14.2 %
PLATELET # BLD: 252 K/ΜL
PMV BLD AUTO: 9.3 FL
POTASSIUM SERPL-SCNC: 4.5 MMOL/L
RBC # BLD: 3.34 10^6/ΜL
SEDIMENTATION RATE, ERYTHROCYTE: 85
SODIUM BLD-SCNC: 139 MMOL/L
TOTAL PROTEIN: 5.7
VANCOMYCIN TROUGH: 17.8
WBC # BLD: 6.2 10^3/ML

## 2020-08-05 LAB
REPORT: NORMAL
SARS-COV-2: NOT DETECTED
THIS TEST SENT TO: NORMAL

## 2020-08-11 LAB
ALBUMIN SERPL-MCNC: 2.7 G/DL
ALP BLD-CCNC: 92 U/L
ALT SERPL-CCNC: 16 U/L
ANION GAP SERPL CALCULATED.3IONS-SCNC: NORMAL MMOL/L
AST SERPL-CCNC: 14 U/L
BASOPHILS ABSOLUTE: 0.1 /ΜL
BASOPHILS RELATIVE PERCENT: 1.1 %
BILIRUB SERPL-MCNC: 0.3 MG/DL (ref 0.1–1.4)
BUN BLDV-MCNC: 20 MG/DL
C-REACTIVE PROTEIN: 16.1
CALCIUM SERPL-MCNC: 8.4 MG/DL
CHLORIDE BLD-SCNC: 101 MMOL/L
CO2: 33 MMOL/L
CREAT SERPL-MCNC: 1.2 MG/DL
EOSINOPHILS ABSOLUTE: 0.4 /ΜL
EOSINOPHILS RELATIVE PERCENT: 7.5 %
GFR CALCULATED: NORMAL
GLUCOSE BLD-MCNC: 67 MG/DL
HCT VFR BLD CALC: 31 % (ref 41–53)
HEMOGLOBIN: 9.8 G/DL (ref 13.5–17.5)
LYMPHOCYTES ABSOLUTE: 1.3 /ΜL
LYMPHOCYTES RELATIVE PERCENT: 28.8 %
MCH RBC QN AUTO: 27.3 PG
MCHC RBC AUTO-ENTMCNC: 31.5 G/DL
MCV RBC AUTO: 86.8 FL
MONOCYTES ABSOLUTE: 0.5 /ΜL
MONOCYTES RELATIVE PERCENT: 10.6 %
NEUTROPHILS ABSOLUTE: 2.7 /ΜL
NEUTROPHILS RELATIVE PERCENT: 54 %
PDW BLD-RTO: 14.7 %
PLATELET # BLD: 234 K/ΜL
PMV BLD AUTO: 9.3 FL
POTASSIUM SERPL-SCNC: 4.3 MMOL/L
RBC # BLD: 3.57 10^6/ΜL
SEDIMENTATION RATE, ERYTHROCYTE: 77
SODIUM BLD-SCNC: 139 MMOL/L
TOTAL PROTEIN: 5.5
VANCOMYCIN TROUGH: 13.9
WBC # BLD: 4.9 10^3/ML

## 2020-08-17 LAB
ALBUMIN SERPL-MCNC: 2.8 G/DL
ALP BLD-CCNC: 98 U/L
ALT SERPL-CCNC: 16 U/L
ANION GAP SERPL CALCULATED.3IONS-SCNC: NORMAL MMOL/L
AST SERPL-CCNC: 14 U/L
BASOPHILS ABSOLUTE: 0.1 /ΜL
BASOPHILS RELATIVE PERCENT: 1.2 %
BILIRUB SERPL-MCNC: 0.2 MG/DL (ref 0.1–1.4)
BUN BLDV-MCNC: 20 MG/DL
C-REACTIVE PROTEIN: 10.9
CALCIUM SERPL-MCNC: 8.6 MG/DL
CHLORIDE BLD-SCNC: 105 MMOL/L
CO2: 31 MMOL/L
CREAT SERPL-MCNC: 1.3 MG/DL
EOSINOPHILS ABSOLUTE: 0.5 /ΜL
EOSINOPHILS RELATIVE PERCENT: 9 %
GFR CALCULATED: NORMAL
GLUCOSE BLD-MCNC: 69 MG/DL
HCT VFR BLD CALC: 30.4 % (ref 41–53)
HEMOGLOBIN: 9.9 G/DL (ref 13.5–17.5)
LYMPHOCYTES ABSOLUTE: 1.3 /ΜL
LYMPHOCYTES RELATIVE PERCENT: 24 %
MCH RBC QN AUTO: 28.6 PG
MCHC RBC AUTO-ENTMCNC: 32.7 G/DL
MCV RBC AUTO: 87.6 FL
MONOCYTES ABSOLUTE: 0.5 /ΜL
MONOCYTES RELATIVE PERCENT: 9.7 %
NEUTROPHILS ABSOLUTE: 3.1 /ΜL
NEUTROPHILS RELATIVE PERCENT: 56.1 %
PDW BLD-RTO: 16.8 %
PLATELET # BLD: 181 K/ΜL
PMV BLD AUTO: 8.9 FL
POTASSIUM SERPL-SCNC: 4.1 MMOL/L
RBC # BLD: 3.47 10^6/ΜL
SEDIMENTATION RATE, ERYTHROCYTE: 18
SODIUM BLD-SCNC: 142 MMOL/L
TOTAL PROTEIN: 5.4
VANCOMYCIN TROUGH: 13.4
WBC # BLD: 5.6 10^3/ML

## 2020-08-24 LAB
FUNGUS (MYCOLOGY) CULTURE: NORMAL
FUNGUS STAIN: NORMAL

## 2020-08-26 ENCOUNTER — TELEPHONE (OUTPATIENT)
Dept: INFECTIOUS DISEASES | Age: 70
End: 2020-08-26

## 2020-08-26 LAB
ALBUMIN SERPL-MCNC: 2.6 G/DL
ALP BLD-CCNC: 135 U/L
ALT SERPL-CCNC: 50 U/L
ANION GAP SERPL CALCULATED.3IONS-SCNC: NORMAL MMOL/L
AST SERPL-CCNC: 15 U/L
BASOPHILS ABSOLUTE: 0.1 /ΜL
BASOPHILS RELATIVE PERCENT: 1.3 %
BILIRUB SERPL-MCNC: 0.3 MG/DL (ref 0.1–1.4)
BUN BLDV-MCNC: 17 MG/DL
C-REACTIVE PROTEIN: 11
CALCIUM SERPL-MCNC: 8.2 MG/DL
CHLORIDE BLD-SCNC: 107 MMOL/L
CO2: 24 MMOL/L
CREAT SERPL-MCNC: 1.3 MG/DL
EOSINOPHILS ABSOLUTE: 0.6 /ΜL
EOSINOPHILS RELATIVE PERCENT: 13.9 %
GFR CALCULATED: NORMAL
GLUCOSE BLD-MCNC: 130 MG/DL
HCT VFR BLD CALC: 33.8 % (ref 41–53)
HEMOGLOBIN: 10.6 G/DL (ref 13.5–17.5)
LYMPHOCYTES ABSOLUTE: 1.5 /ΜL
LYMPHOCYTES RELATIVE PERCENT: 31.9 %
MCH RBC QN AUTO: 28.4 PG
MCHC RBC AUTO-ENTMCNC: 31.4 G/DL
MCV RBC AUTO: 90.6 FL
MONOCYTES ABSOLUTE: 0.6 /ΜL
MONOCYTES RELATIVE PERCENT: 12 %
NEUTROPHILS ABSOLUTE: 1.9 /ΜL
NEUTROPHILS RELATIVE PERCENT: 40.9 %
PDW BLD-RTO: 17.9 %
PLATELET # BLD: 142 K/ΜL
PMV BLD AUTO: 9.2 FL
POTASSIUM SERPL-SCNC: 3.5 MMOL/L
RBC # BLD: 3.73 10^6/ΜL
SEDIMENTATION RATE, ERYTHROCYTE: 48
SODIUM BLD-SCNC: 141 MMOL/L
TOTAL PROTEIN: 5.4
VANCOMYCIN TROUGH: 20.2
WBC # BLD: 4.7 10^3/ML

## 2020-08-26 NOTE — TELEPHONE ENCOUNTER
8/26/2020  VT 20.2 (true trough)  BUN 17  Creatinine 1.3    Orders  IV Vancomycin 1.25 gm QD  IV Ceftriaxone 2 gm QD    I did confirm a true trough and current dosing. Facility stated they are giving patient 1.75 gm QD.

## 2020-08-31 ENCOUNTER — TELEPHONE (OUTPATIENT)
Dept: INFECTIOUS DISEASES | Age: 70
End: 2020-08-31

## 2020-08-31 NOTE — TELEPHONE ENCOUNTER
Called pt / pt called back --   Pt report is 'healing'. Taking 2 antibiotics - vancomycin / ceftriaxone. Saw Podiatrist, Dr Leonel Bradley last Fri 8/28. Has VAC over wound.     Hands and legs 'breaking out'    Will end iv antibiotic as scheduled - 9/2 last day

## 2020-09-02 ENCOUNTER — TELEPHONE (OUTPATIENT)
Dept: INFECTIOUS DISEASES | Age: 70
End: 2020-09-02

## 2020-09-02 NOTE — TELEPHONE ENCOUNTER
Patient called stating he did not receive his IV abx yesterday due to abnormal labs. Notes state today would be last day of IV abx. He is at [facility]. Did we receive labs from 9/1?

## 2020-09-08 LAB
AFB CULTURE (MYCOBACTERIA): NORMAL
AFB SMEAR: NORMAL

## 2020-10-20 ENCOUNTER — TELEPHONE (OUTPATIENT)
Dept: CARDIOLOGY CLINIC | Age: 70
End: 2020-10-20

## 2020-10-20 ENCOUNTER — TELEPHONE (OUTPATIENT)
Dept: INTERNAL MEDICINE CLINIC | Age: 70
End: 2020-10-20

## 2020-10-20 NOTE — TELEPHONE ENCOUNTER
Calling to get a verbal order for home care. He was just relased from [facility]. Wants to confirm that he should be on 2 liters of oxygen at night. Vitamin D on dischage papers has 2000 mg and he is taking 5000 mg.  Please advise

## 2020-10-21 ENCOUNTER — TELEPHONE (OUTPATIENT)
Dept: INTERNAL MEDICINE CLINIC | Age: 70
End: 2020-10-21

## 2020-10-21 ENCOUNTER — NURSE ONLY (OUTPATIENT)
Dept: CARDIOLOGY CLINIC | Age: 70
End: 2020-10-21
Payer: MEDICARE

## 2020-10-21 PROCEDURE — 93295 DEV INTERROG REMOTE 1/2/MLT: CPT | Performed by: INTERNAL MEDICINE

## 2020-10-21 PROCEDURE — 93297 REM INTERROG DEV EVAL ICPMS: CPT | Performed by: INTERNAL MEDICINE

## 2020-10-21 PROCEDURE — 93296 REM INTERROG EVL PM/IDS: CPT | Performed by: INTERNAL MEDICINE

## 2020-10-21 RX ORDER — AMIODARONE HYDROCHLORIDE 200 MG/1
200 TABLET ORAL DAILY
Qty: 90 TABLET | Refills: 3 | Status: SHIPPED | OUTPATIENT
Start: 2020-10-21 | End: 2021-10-06

## 2020-10-21 RX ORDER — AMIODARONE HYDROCHLORIDE 200 MG/1
200 TABLET ORAL DAILY
Qty: 90 TABLET | Refills: 3 | OUTPATIENT
Start: 2020-10-21

## 2020-10-21 NOTE — LETTER
[address] [place] [employer] [phone] [address] [phone]    Pacemaker/Defibrillator Clinic          10/21/20        [address] [facility] [address]        Dear Klarissa Long    This letter is to inform you that we received the transmission from your monitor at home that checks your implanted heart device. The next date your monitor will automatically transmit will be 1-25-21. If your report needs attention we will notify you. Your device and monitor are wireless and most transmit cellularly, but please periodically check your monitor is still plugged in to the electrical outlet. If you still use the telephone land line to send please ensure the connection to the phone kacy is secure. This will help to ensure successful automatic transmissions in the future. Also, the monitor needs to be close to you while sleeping at night. Please be aware that the remote device transmission sites are periodically monitored only during regular business hours during which simultaneous in-office device clinics are being run. If your transmission requires attention, we will contact you as soon as possible. Thank you.             Vinay [address]

## 2020-10-21 NOTE — PROGRESS NOTES
We received remote transmission from patient's monitor at home. Transmission shows normal sensing and pacing function. EP physician will review. See interrogation under cardiology tab in the [address] [facility] [address] field for more details. Optivol is within normal range.

## 2020-10-28 ENCOUNTER — HOSPITAL ENCOUNTER (OUTPATIENT)
Dept: VASCULAR LAB | Age: 70
Discharge: HOME | End: 2020-10-28
Payer: MEDICARE

## 2020-10-28 PROCEDURE — 93971 EXTREMITY STUDY: CPT

## 2020-10-29 ENCOUNTER — TELEPHONE (OUTPATIENT)
Dept: INTERNAL MEDICINE CLINIC | Age: 70
End: 2020-10-29

## 2020-10-29 NOTE — TELEPHONE ENCOUNTER
FYI [facility] want you to know that they are changing pt plan of care to once a week pt is still not aloud to bare  weight only allowed to do little weight transfer due to surgery

## 2020-11-03 ENCOUNTER — OFFICE VISIT (OUTPATIENT)
Dept: INTERNAL MEDICINE CLINIC | Age: 70
End: 2020-11-03
Payer: MEDICARE

## 2020-11-03 VITALS
WEIGHT: 315 LBS | TEMPERATURE: 96.9 F | HEART RATE: 76 BPM | DIASTOLIC BLOOD PRESSURE: 64 MMHG | OXYGEN SATURATION: 99 % | SYSTOLIC BLOOD PRESSURE: 110 MMHG | BODY MASS INDEX: 40.29 KG/M2

## 2020-11-03 LAB
CHP ED QC CHECK: NORMAL
GLUCOSE BLD-MCNC: 91 MG/DL
HBA1C MFR BLD: 6.4 %

## 2020-11-03 PROCEDURE — G0438 PPPS, INITIAL VISIT: HCPCS | Performed by: INTERNAL MEDICINE

## 2020-11-03 PROCEDURE — 83036 HEMOGLOBIN GLYCOSYLATED A1C: CPT | Performed by: INTERNAL MEDICINE

## 2020-11-03 PROCEDURE — 82962 GLUCOSE BLOOD TEST: CPT | Performed by: INTERNAL MEDICINE

## 2020-11-03 RX ORDER — CLOTRIMAZOLE AND BETAMETHASONE DIPROPIONATE 10; .64 MG/G; MG/G
CREAM TOPICAL
Qty: 15 G | Refills: 1 | Status: SHIPPED | OUTPATIENT
Start: 2020-11-03 | End: 2022-04-26

## 2020-11-03 ASSESSMENT — PATIENT HEALTH QUESTIONNAIRE - PHQ9
SUM OF ALL RESPONSES TO PHQ QUESTIONS 1-9: 0
SUM OF ALL RESPONSES TO PHQ QUESTIONS 1-9: 0
1. LITTLE INTEREST OR PLEASURE IN DOING THINGS: 0
SUM OF ALL RESPONSES TO PHQ9 QUESTIONS 1 & 2: 0
SUM OF ALL RESPONSES TO PHQ QUESTIONS 1-9: 0
2. FEELING DOWN, DEPRESSED OR HOPELESS: 0

## 2020-11-03 ASSESSMENT — LIFESTYLE VARIABLES: HOW OFTEN DO YOU HAVE A DRINK CONTAINING ALCOHOL: 0

## 2020-11-03 NOTE — PROGRESS NOTES
Subjective:      Patient ID: Jacinto Calhoun is a 78 y.o. male. HPIJohann has been hospitalized with left foot infection, with debridement including osteomyelitis. Appropriate antibiotic and length of treated completed at [address]. Had bone infection. Needed skin graft after completion of antibiotic. Extensive rehab with slow improvement. Foot is healing but still non weight bearing. Has been home for 2 weeks. Says Medicare ran out. Regular podiatry f/u. Review of Systems see above and above. Objective:   Physical Exam see above and below. Assessment:    see above and below. Plan:    see above and below. Rosi Palafox MD   Medicare Annual Wellness Visit  Name: Jet Look Date: 11/3/2020   MRN: <[id number]> Sex: Male   Age: 78 y.o. Ethnicity: Non-/Non    : 1950 Race: Winter Amaya is here for Medicare AWV    Screenings for behavioral, psychosocial and functional/safety risks, and cognitive dysfunction are all negative except as indicated below. These results, as well as other patient data from the [address] [facility] [place] form, are documented in Flowsheets linked to this Encounter. No Known Allergies    Prior to Visit Medications    Medication Sig Taking?  Authorizing Provider   amiodarone (CORDARONE) 200 MG tablet Take 1 tablet by mouth daily  January Flor MD   FARXIGA 5 MG tablet TAKE 1 TABLET BY MOUTH EVERY MORNING  Rosi Palafox MD   carvedilol (COREG) 12.5 MG tablet Take 1 tablet by mouth 2 times daily (with meals)  Edwige Arreola MD   insulin aspart (NOVOLOG FLEXPEN) 100 UNIT/ML injection pen Inject 18 Units into the skin 3 times daily (before meals)  Rosi Palafox MD   [facility] ULTRA strip TEST UP  [facility] [address]  Rosi Palafox MD   atorvastatin (LIPITOR) 40 MG tablet TAKE 1 TABLET BY MOUTH DAILY  Rosi Palafox MD   spironolactone (ALDACTONE) 25 MG tablet TAKE 1/2 TABLET BY MOUTH DAILY  Patient taking [facility]) tablet Take 1 tablet by mouth daily. Historical Provider, MD   aspirin 81 MG EC tablet Take 81 mg by mouth daily.   Historical Provider, MD       Past Medical History:   Diagnosis Date    Atrial fibrillation ([facility]     Back pain     Cardiomyopathy     CHF (congestive heart failure) (HCC)     COPD (chronic obstructive pulmonary disease) (HCC)     Dyslipidemia     GERD (gastroesophageal reflux disease)     Hyperlipidemia     Hypertension     Hypothyroidism     Leg edema     MRSA (methicillin resistant staph aureus) culture positive 10/5/15    foot/bone    MRSA nasal colonization 05/05/2017    Obesity     Prolonged emergence from general anesthesia     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     Stroke ([facility]     Thyroid disease     Type 2 diabetes mellitus without complication ([facility]     Type II or unspecified type diabetes mellitus without mention of complication, not stated as uncontrolled        Past Surgical History:   Procedure Laterality Date    ADRENAL GLAND SURGERY  1970    CARDIAC DEFIBRILLATOR PLACEMENT  09/05/2017    Dr. Vianey Rodriguez COLONOSCOPY N/A 3/8/2019    COLONOSCOPY WITH MAC, UNASYN (3gm) performed by Nika Irwin MD at [facility] N/A 8/9/2019    COLONOSCOPY POLYPECTOMY SNARE/COLD BIOPSY performed by Nika Irwin MD at [facility]  8/9/2019    COLONOSCOPY WITH BIOPSY performed by Nika Irwin MD at [address] [place] [address] Right 8/28/2019    INCISION AND INCISIONAL DEBRIDEMENT OPEN FRACTURE RIGHT 2ND TOE SKIN AND BONE performed by Zaheer Darling MD at [address] [place] [address] 7/22/2020    LEFT FOOT INCISION AND DRAINAGE WITH BONE BIOPSY AND REMOVAL OF FREE FLOATING BONE FRAGMENT performed by Chepe Alanis DPM at [facility]  1-2-    GASTRIC BYPASS SURGERY      OTHER SURGICAL HISTORY  10/6/15    INCISION AND DRAINAGE RIGHT FOOT          OTHER SURGICAL HISTORY Right 10/8/15 INCISION AND DRAINAGE RIGHT FOOT      PACEMAKER PLACEMENT      UPPER GASTROINTESTINAL ENDOSCOPY N/A 3/8/2019    EGD BIOPSY GASTRIC H. PYLORI performed by Rolene Canavan, MD at [facility] ENDOSCOPY       Family History   Problem Relation Age of Onset    Hypertension Mother     Heart Disease Father     Hypertension Maternal Grandmother     Diabetes Maternal Grandmother        CareTeam (Including outside providers/suppliers regularly involved in providing care):   Patient Care Team:  Rosi Palafox MD as PCP - General (Internal Medicine)  Rosi Palafox MD as PCP - [facility]  Lorin Fernandez MD as Consulting Physician (Pulmonology)    Wt Readings from Last 3 Encounters:   11/03/20 (!) 331 lb (150.1 kg)   07/25/20 (!) 331 lb (150.1 kg)   07/19/20 (!) 328 lb 9.6 oz (149.1 kg)     Vitals:    11/03/20 1424   BP: 110/64   Site: Left Upper Arm   Position: Sitting   Cuff Size: Medium Adult   Pulse: 76   Temp: 96.9 °F (36.1 °C)   SpO2: 99%   Weight: (!) 331 lb (150.1 kg)     Body mass index is 40.29 kg/m². Based upon direct observation of the patient, evaluation of cognition reveals normal memory and cognitive exam. Created clock with correct time. Patient's complete Health Risk Assessment and screening values have been reviewed and are found in Flowsheets. The following problems were reviewed today and where indicated follow up appointments were made and/or referrals ordered. Positive Risk Factor Screenings with Interventions:     General Health and ACP:  General  In general, how would you say your health is?: Fair  In the past 7 days, have you experienced any of the following?  New or Increased Pain, New or Increased Fatigue, Loneliness, Social Isolation, Stress or Anger?: (!) New or Increased Fatigue  Do you get the social and emotional support that you need?: Yes  Do you have a Living Will?: (!) No  Advance Directives     Power of  Living Will ACP-Advance Directive ACP-Power of [employer] Not on File Filed on 07/03/17 Filed Not on File      General Health Risk Interventions:  · References given for cutler. · Discussion general conditioning. · Questions answered. Health Habits/Nutrition:  Health Habits/Nutrition  Do you exercise for at least 20 minutes 2-3 times per week?: (!) No  Have you lost any weight without trying in the past 3 months?: (!) Yes  Do you eat fewer than 2 meals per day?: No  Have you seen a dentist within the past year?: Yes     Health Habits/Nutrition Interventions:  · Health and wellness advice given. · Literature provided. · Questions answered. Hearing/Vision:  No exam data present  Hearing/Vision  Do you or your family notice any trouble with your hearing?: No  Do you have difficulty driving, watching TV, or doing any of your daily activities because of your eyesight?: No  Have you had an eye exam within the past year?: (!) No  Hearing/Vision Interventions:  · Referral given. Safety:  Safety  Do you have working smoke detectors?: Yes  Have all throw rugs been removed or fastened?: Yes  Do you have non-slip mats or surfaces in all bathtubs/showers?: (!) No  Do all of your stairways have a railing or banister?: Yes  Are your doorways, halls and stairs free of clutter?: Yes  Do you always fasten your seatbelt when you are in a car?: Yes  Safety Interventions:  · References given. ADL:  ADLs  In the past 7 days, did you need help from others to perform any of the following everyday activities? Eating, dressing, grooming, bathing, toileting, or walking/balance?: (!) Bathing, Toileting, Walking/Balance  In the past 7 days, did you need help from others to take care of any of the following? Laundry, housekeeping, banking/finances, shopping, telephone use, food preparation, transportation, or taking medications?: [employer], Housekeeping, Food Preparation, Transportation  ADL Interventions:  · Wife and aide in place.      Personalized Preventive Plan   Current Health Maintenance Status  Immunization History   Administered Date(s) Administered    Influenza 10/20/2013    Influenza Virus Vaccine 10/16/2015    Influenza, High Dose (Fluzone 65 yrs and older) 10/31/2017, 09/19/2018    Influenza, Intradermal, Preservative free 11/21/2016    PPD Test 10/16/2015, 10/29/2015    Pneumococcal Conjugate 13-valent ([id number]) 09/19/2018    Pneumococcal Conjugate Vaccine 10/05/2013    Pneumococcal Polysaccharide ([id number]) 08/28/2016    Tdap (Boostrix, Adacel) 10/31/2017        Health Maintenance   Topic Date Due    AAA screen  1950    Shingles Vaccine (1 of 2) 11/03/2002    Diabetic retinal exam  11/13/2014    Annual Wellness Visit (AWV)  03/22/2020    Diabetic foot exam  08/29/2020    Flu vaccine (1) 09/01/2020    Lipid screen  07/07/2021    TSH testing  07/07/2021    A1C test (Diabetic or Prediabetic)  07/21/2021    Potassium monitoring  08/26/2021    Creatinine monitoring  08/26/2021    Pneumococcal 65+ years Vaccine (2 of 2 - PPSV23) 08/28/2021    DTaP/Tdap/Td vaccine (2 - Td) 10/31/2027    Colon cancer screen colonoscopy  08/09/2029    Hepatitis C screen  Completed    Hepatitis A vaccine  Aged Out    Hib vaccine  Aged Out    Meningococcal (ACWY) vaccine  Aged Out     Recommendations for Vouch Due: see orders and patient instructions/AVS.  . Recommended screening schedule for the next 5-10 years is provided to the patient in written form: see Patient Instructions/AVS.     Diagnosis Orders   1. Diabetes mellitus type 2 with complications (HCC)  Diet, weight reduction, physical activity as tolerated. Med compliance stressed. POCT Glucose    POCT glycosylated hemoglobin (Hb A1C)    dapagliflozin (FARXIGA) 10 MG tablet   2. Flu vaccine need  INFLUENZA, TRIV, INACTIVATED, SUBUNIT, ADJUVANTED, 65 YRS AND OLDER, IM, PREFILL SYR, 0.5ML (FLUAD TRIV)   3. Essential hypertension  Continue same med regime. DASH diet discussed. COMPREHENSIVE METABOLIC PANEL   4. Vitamin D deficiency  VITAMIN D 25 HYDROXY  Diet and supplement discussed. 5. Chronic anemia  Diet discussed. FERRITIN    IRON AND TIBC   6. Rash  clotrimazole-betamethasone (LOTRISONE) 1-0.05 % cream   7. Screening for AAA (abdominal aortic aneurysm)  US screening for AAA   8. Routine general medical examination at a health care facility  As above. Cardiovascular Disease Risk Counseling: Assessed the patient's risk to develop cardiovascular disease and reviewed main risk factors. Reviewed steps to reduce disease risk including:   · Quitting tobacco use, reducing amount smoked, or not starting the habit  · Making healthy food choices  · Being physically active and gradualy increasing activity levels   · Reduce weight and determine a healthy BMI goal  · Monitor blood pressure and treat if higher than 140/90 mmHg  · Maintain blood total cholesterol levels under 5 mmol/l or 190 mg/dl  · Maintain LDL cholesterol levels under 3.0 mmol/l or 115 mg/dl   · Control blood glucose levels  · Consider taking aspirin (75 mg daily), once blood pressure is controlled   Provided a follow up plan. Time spent (minutes): 10 minutes.

## 2020-11-03 NOTE — PATIENT INSTRUCTIONS
Probiotic for prolonged period. Advance Directives: Care Instructions  Overview  An advance directive is a legal way to state your wishes at the end of your life. It tells your family and your doctor what to do if you can't say what you want. There are two main types of advance directives. You can change them any time your wishes change. Living will. This form tells your family and your doctor your wishes about life support and other treatment. The form is also called a declaration. Medical power of . This form lets you name a person to make treatment decisions for you when you can't speak for yourself. This person is called a health care agent (health care proxy, health care surrogate). The form is also called a durable power of  for health care. If you do not have an advance directive, decisions about your medical care may be made by a family member, or by a doctor or a  who doesn't know you. It may help to think of an advance directive as a gift to the people who care for you. If you have one, they won't have to make tough decisions by themselves. Follow-up care is a key part of your treatment and safety. Be sure to make and go to all appointments, and call your doctor if you are having problems. It's also a good idea to know your test results and keep a list of the medicines you take. What should you include in an advance directive? Many states have a unique advance directive form. (It may ask you to address specific issues.) Or you might use a universal form that's approved by many states. If your form doesn't tell you what to address, it may be hard to know what to include in your advance directive. Use the questions below to help you get started. · Who do you want to make decisions about your medical care if you are not able to? · What life-support measures do you want if you have a serious illness that gets worse over time or can't be cured?   · What are you most care agent is. Where can you learn more? Go to https://chpepiceweb.Swift Endeavor. org and sign in to your Savveo account. Enter [id number] in the [facility] box to learn more about \"Learning About [address]. \"     If you do not have an account, please click on the \"Sign Up Now\" link. Current as of: December 9, 2019               Content Version: 12.6  © 2454-9928 [employer]. Care instructions adapted under license by [facility] ([facility]). If you have questions about a medical condition or this instruction, always ask your healthcare professional. [address] any warranty or liability for your use of this information. Learning About Living Perroy  What is a living will? A living will, also called a declaration, is a legal form. It tells your family and your doctor your wishes when you can't speak for yourself. It's used by the health professionals who will treat you as you near the end of your life or if you get seriously hurt or ill. If you put your wishes in writing, your loved ones and others will know what kind of care you want. They won't need to guess. This can ease your mind and be helpful to others. And you can change or cancel your living will at any time. A living will is not the same as an estate or property will. An estate will explains what you want to happen with your money and property after you die. How do you use it? A living will is used to describe the kinds of treatment or life support you want as you near the end of your life or if you get seriously hurt or ill. Keep these facts in mind about living cutler. · Your living will is used only if you can't speak or make decisions for yourself. Most often, one or more doctors must certify that you can't speak or decide for yourself before your living will takes effect. · If you get better and can speak for yourself again, you can accept or refuse any treatment.  It doesn't matter what you said in your living will. · Some states may limit your right to refuse treatment in certain cases. For example, you may need to clearly state in your living will that you don't want artificial hydration and nutrition, such as being fed through a tube. Is a living will a legal document? A living will is a legal document. Each state has its own laws about living cutler. And a living will may be called something else in your state. Here are some things to know about living cutler. · You don't need an  to complete a living will. But legal advice can be helpful if your state's laws are unclear. It can also help if your health history is complicated or your family can't agree on what should be in your living will. · You can change your living will at any time. Some people find that their wishes about end-of-life care change as their health changes. If you make big changes to your living will, complete a new form. · If you move to another state, make sure that your living will is legal in the state where you now live. In most cases, doctors will respect your wishes even if you have a form from a different state. · You might use a universal form that has been approved by many states. This kind of form can sometimes be filled out and stored online. Your digital copy will then be available wherever you have a connection to the internet. The doctors and nurses who need to treat you can find it right away. · Your state may offer an online registry. This is another place where you can store your living will online. · It's a good idea to get your living will notarized. This means using a person called a  to watch two people sign, or witness, your living will. What should you know when you create a living will? Here are some questions to ask yourself as you make your living will:  · Do you know enough about life support methods that might be used?  If not, talk to your doctor so you know what might be done if you can't breathe on your own, your heart stops, or you can't swallow. · What things would you still want to be able to do after you receive life-support methods? Would you want to be able to walk? To speak? To eat on your own? To live without the help of machines? · Do you want certain Buddhist practices performed if you become very ill? · If you have a choice, where do you want to be cared for? In your home? At a hospital or nursing home? · If you have a choice at the end of your life, where would you prefer to die? At home? In a hospital or nursing home? Somewhere else? · Would you prefer to be buried or cremated? · Do you want your organs to be donated after you die? What should you do with your living will? · Make sure that your family members and your health care agent have copies of your living will (also called a declaration). · Give your doctor a copy of your living will. Ask him or her to keep it as part of your medical record. If you have more than one doctor, make sure that each one has a copy. · Put a copy of your living will where it can be easily found. For example, some people may put a copy on their refrigerator door. If you are using a digital copy, be sure your doctor, family members, and health care agent know how to find and access it. Where can you learn more? Go to https://Worksteady.iopepiceweb.Decision Pace. org and sign in to your PercSys account. Enter S140 in the [facility] box to learn more about \"Learning About Living Perroy. \"     If you do not have an account, please click on the \"Sign Up Now\" link. Current as of: December 9, 2019               Content Version: 12.6  © 9495-3560 [employer]. Care instructions adapted under license by [facility] ([facility]).  If you have questions about a medical condition or this instruction, always ask your healthcare professional. Kota Tucker any warranty or liability for your use of this information. DASH Diet: Care Instructions  Your Care Instructions     The DASH diet is an eating plan that can help lower your blood pressure. DASH stands for Dietary Approaches to Stop Hypertension. Hypertension is high blood pressure. The DASH diet focuses on eating foods that are high in calcium, potassium, and magnesium. These nutrients can lower blood pressure. The foods that are highest in these nutrients are fruits, vegetables, low-fat dairy products, nuts, seeds, and legumes. But taking calcium, potassium, and magnesium supplements instead of eating foods that are high in those nutrients does not have the same effect. The DASH diet also includes whole grains, fish, and poultry. The DASH diet is one of several lifestyle changes your doctor may recommend to lower your high blood pressure. Your doctor may also want you to decrease the amount of sodium in your diet. Lowering sodium while following the DASH diet can lower blood pressure even further than just the DASH diet alone. Follow-up care is a key part of your treatment and safety. Be sure to make and go to all appointments, and call your doctor if you are having problems. It's also a good idea to know your test results and keep a list of the medicines you take. How can you care for yourself at home? Following the DASH diet  · Eat 4 to 5 servings of fruit each day. A serving is 1 medium-sized piece of fruit, ½ cup chopped or canned fruit, 1/4 cup dried fruit, or 4 ounces (½ cup) of fruit juice. Choose fruit more often than fruit juice. · Eat 4 to 5 servings of vegetables each day. A serving is 1 cup of lettuce or raw leafy vegetables, ½ cup of chopped or cooked vegetables, or 4 ounces (½ cup) of vegetable juice. Choose vegetables more often than vegetable juice. · Get 2 to 3 servings of low-fat and fat-free dairy each day. A serving is 8 ounces of milk, 1 cup of yogurt, or 1 ½ ounces of cheese.   · Eat 6 to 8 servings of grains each day. A serving is 1 slice of bread, 1 ounce of dry cereal, or ½ cup of cooked rice, pasta, or cooked cereal. Try to choose whole-grain products as much as possible. · Limit lean meat, poultry, and fish to 2 servings each day. A serving is 3 ounces, about the size of a deck of cards. · Eat 4 to 5 servings of nuts, seeds, and legumes (cooked dried beans, lentils, and split peas) each week. A serving is 1/3 cup of nuts, 2 tablespoons of seeds, or ½ cup of cooked beans or peas. · Limit fats and oils to 2 to 3 servings each day. A serving is 1 teaspoon of vegetable oil or 2 tablespoons of salad dressing. · Limit sweets and added sugars to 5 servings or less a week. A serving is 1 tablespoon jelly or jam, ½ cup sorbet, or 1 cup of lemonade. · Eat less than 2,300 milligrams (mg) of sodium a day. If you limit your sodium to 1,500 mg a day, you can lower your blood pressure even more. Tips for success  · Start small. Do not try to make dramatic changes to your diet all at once. You might feel that you are missing out on your favorite foods and then be more likely to not follow the plan. Make small changes, and stick with them. Once those changes become habit, add a few more changes. · Try some of the following:  ? Make it a goal to eat a fruit or vegetable at every meal and at snacks. This will make it easy to get the recommended amount of fruits and vegetables each day. ? Try yogurt topped with fruit and nuts for a snack or healthy dessert. ? Add lettuce, tomato, cucumber, and onion to sandwiches. ? Combine a ready-made pizza crust with low-fat mozzarella cheese and lots of vegetable toppings. Try using tomatoes, squash, spinach, broccoli, carrots, cauliflower, and onions. ? Have a variety of cut-up vegetables with a low-fat dip as an appetizer instead of chips and dip. ? Sprinkle sunflower seeds or chopped almonds over salads. Or try adding chopped walnuts or almonds to cooked vegetables.   ? Try some vegetarian meals using beans and peas. Add garbanzo or kidney beans to salads. Make burritos and tacos with mashed bernard beans or black beans. Where can you learn more? Go to https://dean.healthLate Nite Labs. org and sign in to your Vestiaire [employer]ive account. Enter S706 in the [facility] box to learn more about \"DASH Diet: Care Instructions. \"     If you do not have an account, please click on the \"Sign Up Now\" link. Current as of: December 16, 2019               Content Version: 12.6  © 0161-8750 [employer]. Care instructions adapted under license by [facility] ([facility]). If you have questions about a medical condition or this instruction, always ask your healthcare professional. [address] any warranty or liability for your use of this information. Learning About Healthy Weight  What is a healthy weight? A healthy weight is the weight at which you feel good about yourself and have energy for work and play. It's also one that lowers your risk for health problems. What can you do to stay at a healthy weight? It can be hard to stay at a healthy weight, especially when fast food, vending-machine snacks, and processed foods are so easy to find. And with your busy lifestyle, activity may be low on your list of things to do. But staying at a healthy weight may be easier than you think. Here are some dos and don'ts for staying at a healthy weight:  Do eat healthy foods  The kinds of foods you eat have a big impact on both your weight and your health. Reaching and staying at a healthy weight is not about going on a diet. It's about making healthier food choices every day and changing your diet for good. Healthy eating means eating a variety of foods so that you get all the nutrients you need. Your body needs protein, carbohydrate, and fats for energy. They keep your heart beating, your brain active, and your muscles working.   On most days, try to eat from each food group. This means eating a variety of:  · Whole grains, such as whole wheat breads and pastas. · Fruits and vegetables. · Dairy products, such as low-fat milk, yogurt, and cheese. · Lean proteins, such as all types of fish, chicken without the skin, and beans. Don't have too much or too little of one thing. All foods, if eaten in moderation, can be part of healthy eating. Even sweets can be okay. If your favorite foods are high in fat, salt, sugar, or calories, limit how often you eat them. Eat smaller servings, or look for healthy substitutes. Do watch what you eat  Many people eat more than their bodies need. Part of staying at a healthy weight means learning how much food you really need from day to day and not eating more than that. Even with healthy foods, eating too much can make you gain weight. Having a well-balanced diet means that you eat enough, but not too much, and that your food gives you the nutrients you need to stay healthy. So listen to your body. Eat when you're hungry. Stop when you feel satisfied. It's a good idea to have healthy snacks ready for when you get hungry. Keep healthy snacks with you at work, in your car, and at home. If you have a healthy snack easily available, you'll be less likely to pick a candy bar or bag of chips from a vending machine instead. Some healthy snacks you might want to keep on hand are fruit, low-fat yogurt, string cheese, low-fat microwave popcorn, raisins and other dried fruit, nuts, whole wheat crackers, pretzels, carrots, celery sticks, and broccoli. Do some physical activity   A big part of reaching and staying at a healthy weight is being active. When you're active, you burn calories. This makes it easier to reach and stay at a healthy weight. When you're active on a regular basis, your body burns more calories, even when you're at rest. Being active helps you lose fat and build lean muscle. Try to be active for at least 1 hour every day.  This may sound like a lot, but it's okay to be active in smaller blocks of time that add up to 1 hour a day. Any activity that makes your heart beat faster and keeps it there for a while counts. A brisk walk, run, or swim will get your heart beating faster. So will climbing stairs, shooting baskets, or cycling. Even some household chores like vacuuming and mowing the lawn will get your heart rate up. Pick activities that you enjoy--ones that make your heart beat faster, your muscles stronger, and your muscles and joints more flexible. If you find more than one thing you like doing, do them all. You don't have to do the same thing every day. Don't diet  Diets don't work. Diets are temporary. Because you give up so much when you diet, you may be hungry and think about food all the time. And after you stop dieting, you also may overeat to make up for what you missed. Most people who diet end up gaining back the pounds they lost--and more. Remember that healthy bodies come in lots of shapes and sizes. Everyone can get healthier by eating better and being more active. Where can you learn more? Go to https://AmbricpeTIM Group.Accumulate. org and sign in to your Mytonomy account. Enter 078 [id number] in the [employer] box to learn more about \"Learning About Healthy Weight. \"     If you do not have an account, please click on the \"Sign Up Now\" link. Current as of: December 11, 2019               Content Version: 12.6  © 8140-4193 U[employer]orporated. Care instructions adapted under license by [facility] ([place] [facility]). If you have questions about a medical condition or this instruction, always ask your healthcare professional. [place] [address] any warranty or liability for your use of this information. Eating Healthy Foods: Care Instructions  Your Care Instructions     Eating healthy foods can help lower your risk for disease.  Healthy food gives you energy and keeps your heart strong, your brain active, your muscles working, and your bones strong. A healthy diet includes a variety of foods from the basic food groups: grains, vegetables, fruits, milk and milk products, and meat and beans. Some people may eat more of their favorite foods from only one food group and, as a result, miss getting the nutrients they need. So, it is important to pay attention not only to what you eat but also to what you are missing from your diet. You can eat a healthy, balanced diet by making a few small changes. Follow-up care is a key part of your treatment and safety. Be sure to make and go to all appointments, and call your doctor if you are having problems. It's also a good idea to know your test results and keep a list of the medicines you take. How can you care for yourself at home? Look at what you eat  · Keep a food diary for a week or two and record everything you eat or drink. Track the number of servings you eat from each food group. · For a balanced diet every day, eat a variety of:  ? 6 or more ounce-equivalents of grains, such as cereals, breads, crackers, rice, or pasta, every day. An ounce-equivalent is 1 slice of bread, 1 cup of ready-to-eat cereal, or ½ cup of cooked rice, cooked pasta, or cooked cereal.  ? 2½ cups of vegetables, especially:  § Dark-green vegetables such as broccoli and spinach. § Orange vegetables such as carrots and sweet potatoes. § Dry beans (such as bernard and kidney beans) and peas (such as lentils). ? 2 cups of fresh, frozen, or canned fruit. A small apple or 1 banana or orange equals 1 cup. ? 3 cups of nonfat or low-fat milk, yogurt, or other milk products. ? 5½ ounces of meat and beans, such as chicken, fish, lean meat, beans, nuts, and seeds. One egg, 1 tablespoon of peanut butter, ½ ounce nuts or seeds, or ¼ cup of cooked beans equals 1 ounce of meat. · Learn how to read food labels for serving sizes and ingredients.  Fast-food and convenience-food meals often contain few or no fruits or vegetables. Make sure you eat some fruits and vegetables to make the meal more nutritious. · Look at your food diary. For each food group, add up what you have eaten and then divide the total by the number of days. This will give you an idea of how much you are eating from each food group. See if you can find some ways to change your diet to make it more healthy. Start small  · Do not try to make dramatic changes to your diet all at once. You might feel that you are missing out on your favorite foods and then be more likely to fail. · Start slowly, and gradually change your habits. Try some of the following:  ? Use whole wheat bread instead of white bread. ? Use nonfat or low-fat milk instead of whole milk. ? Eat brown rice instead of white rice, and eat whole wheat pasta instead of white-flour pasta. ? Try low-fat cheeses and low-fat yogurt. ? Add more fruits and vegetables to meals and have them for snacks. ? Add lettuce, tomato, cucumber, and onion to sandwiches. ? Add fruit to yogurt and cereal.  Enjoy food  · You can still eat your favorite foods. You just may need to eat less of them. If your favorite foods are high in fat, salt, and sugar, limit how often you eat them, but do not cut them out entirely. · Eat a wide variety of foods. Make healthy choices when eating out  · The type of restaurant you choose can help you make healthy choices. Even fast-food chains are now offering more low-fat or healthier choices on the menu. · Choose smaller portions, or take half of your meal home. · When eating out, try:  ? A veggie pizza with a whole wheat crust or grilled chicken (instead of sausage or pepperoni). ? Pasta with roasted vegetables, grilled chicken, or marinara sauce instead of cream sauce. ? A vegetable wrap or grilled chicken wrap. ? Broiled or poached food instead of fried or breaded items.   Make healthy choices easy  · Buy packaged, prewashed, ready-to-eat fresh vegetables and fruits, such as baby carrots, salad mixes, and chopped or shredded broccoli and cauliflower. · Buy packaged, presliced fruits, such as melon or pineapple. · Choose 100% fruit or vegetable juice instead of soda. Limit juice intake to 4 to 6 oz (½ to ¾ cup) a day. · Blend low-fat yogurt, fruit juice, and canned or frozen fruit to make a smoothie for breakfast or a snack. Where can you learn more? Go to https://Rubysophicaltheaeb.Temporal Power. org and sign in to your Haztucesta account. Enter T331 in the [employer] box to learn more about \"Eating Healthy Foods: Care Instructions. \"     If you do not have an account, please click on the \"Sign Up Now\" link. Current as of: August 22, 2019               Content Version: 12.6  © 9886-4025 [employer]. Care instructions adapted under license by [facility] ([facility]). If you have questions about a medical condition or this instruction, always ask your healthcare professional. [place] [address] any warranty or liability for your use of this information. Personalized Preventive Plan for Caitlin Card - 11/3/2020  Medicare offers a range of preventive health benefits. Some of the tests and screenings are paid in full while other may be subject to a deductible, co-insurance, and/or copay. Some of these benefits include a comprehensive review of your medical history including lifestyle, illnesses that may run in your family, and various assessments and screenings as appropriate. After reviewing your medical record and screening and assessments performed today your provider may have ordered immunizations, labs, imaging, and/or referrals for you. A list of these orders (if applicable) as well as your Preventive Care list are included within your After Visit Summary for your review.     Other Preventive Recommendations:    · A preventive eye exam performed by an eye specialist is recommended every 1-2 years to screen for glaucoma; cataracts, macular degeneration, and other eye disorders. · A preventive dental visit is recommended every 6 months. · Try to get at least 150 minutes of exercise per week or 10,000 steps per day on a pedometer . · Order or download the FREE \"Exercise & Physical Activity: Your Everyday Guide\" from [employer]. Call 8-484.926.2730 or search [employer] online. · You need 0148-7400 mg of calcium and 0402-4191 IU of vitamin D per day. It is possible to meet your calcium requirement with diet alone, but a vitamin D supplement is usually necessary to meet this goal.  · When exposed to the sun, use a sunscreen that protects against both UVA and UVB radiation with an SPF of 30 or greater. Reapply every 2 to 3 hours or after sweating, drying off with a towel, or swimming. · Always wear a seat belt when traveling in a car. Always wear a helmet when riding a bicycle or motorcycle.

## 2020-11-04 RX ORDER — CARVEDILOL 6.25 MG/1
TABLET ORAL
Qty: 30 TABLET | Refills: 2 | Status: SHIPPED | OUTPATIENT
Start: 2020-11-04 | End: 2021-01-05

## 2020-11-27 ENCOUNTER — TELEPHONE (OUTPATIENT)
Dept: INTERNAL MEDICINE CLINIC | Age: 70
End: 2020-11-27

## 2020-11-27 NOTE — TELEPHONE ENCOUNTER
[facility] received a call from:    Name of Caller: Yoseph Winetr    Relationship to patient: Medical Equipment Provider     Organization name: [employer] contact number: [phone]    Reason for call: Calling to check status on Rx order for CPAP supplies.  Sent 11/13

## 2020-11-28 PROCEDURE — G0008 ADMIN INFLUENZA VIRUS VAC: HCPCS | Performed by: INTERNAL MEDICINE

## 2020-11-28 PROCEDURE — 90653 IIV ADJUVANT VACCINE IM: CPT | Performed by: INTERNAL MEDICINE

## 2020-12-01 ENCOUNTER — TELEPHONE (OUTPATIENT)
Dept: INTERNAL MEDICINE CLINIC | Age: 70
End: 2020-12-01

## 2020-12-03 ENCOUNTER — OFFICE VISIT (OUTPATIENT)
Dept: SLEEP MEDICINE | Age: 70
End: 2020-12-03
Payer: MEDICARE

## 2020-12-03 VITALS
HEART RATE: 63 BPM | WEIGHT: 315 LBS | SYSTOLIC BLOOD PRESSURE: 149 MMHG | OXYGEN SATURATION: 100 % | BODY MASS INDEX: 40.29 KG/M2 | TEMPERATURE: 97.7 F | DIASTOLIC BLOOD PRESSURE: 77 MMHG

## 2020-12-03 DIAGNOSIS — E55.9 VITAMIN D DEFICIENCY: ICD-10-CM

## 2020-12-03 DIAGNOSIS — I10 ESSENTIAL HYPERTENSION: ICD-10-CM

## 2020-12-03 DIAGNOSIS — D64.9 CHRONIC ANEMIA: ICD-10-CM

## 2020-12-03 LAB
A/G RATIO: 1.3 (ref 1.1–2.2)
ALBUMIN SERPL-MCNC: 3.7 G/DL (ref 3.4–5)
ALP BLD-CCNC: 116 U/L (ref 40–129)
ALT SERPL-CCNC: 28 U/L (ref 10–40)
ANION GAP SERPL CALCULATED.3IONS-SCNC: 10 MMOL/L (ref 3–16)
AST SERPL-CCNC: 18 U/L (ref 15–37)
BILIRUB SERPL-MCNC: 0.4 MG/DL (ref 0–1)
BUN BLDV-MCNC: 18 MG/DL (ref 7–20)
CALCIUM SERPL-MCNC: 8.9 MG/DL (ref 8.3–10.6)
CHLORIDE BLD-SCNC: 108 MMOL/L (ref 99–110)
CO2: 24 MMOL/L (ref 21–32)
CREAT SERPL-MCNC: 1.3 MG/DL (ref 0.8–1.3)
FERRITIN: 46.2 NG/ML (ref 30–400)
GFR AFRICAN AMERICAN: >60
GFR NON-AFRICAN AMERICAN: 55
GLOBULIN: 2.8 G/DL
GLUCOSE BLD-MCNC: 122 MG/DL (ref 70–99)
IRON SATURATION: 21 % (ref 20–50)
IRON: 59 UG/DL (ref 59–158)
POTASSIUM SERPL-SCNC: 4.5 MMOL/L (ref 3.5–5.1)
SODIUM BLD-SCNC: 142 MMOL/L (ref 136–145)
TOTAL IRON BINDING CAPACITY: 285 UG/DL (ref 260–445)
TOTAL PROTEIN: 6.5 G/DL (ref 6.4–8.2)
VITAMIN D 25-HYDROXY: 39.8 NG/ML

## 2020-12-03 PROCEDURE — 99203 OFFICE O/P NEW LOW 30 MIN: CPT | Performed by: PSYCHIATRY & NEUROLOGY

## 2020-12-03 ASSESSMENT — SLEEP AND FATIGUE QUESTIONNAIRES
HOW LIKELY ARE YOU TO NOD OFF OR FALL ASLEEP WHILE SITTING QUIETLY AFTER LUNCH WITHOUT ALCOHOL: 1
HOW LIKELY ARE YOU TO NOD OFF OR FALL ASLEEP WHILE SITTING AND TALKING TO SOMEONE: 0
HOW LIKELY ARE YOU TO NOD OFF OR FALL ASLEEP WHILE SITTING INACTIVE IN A PUBLIC PLACE: 1
ESS TOTAL SCORE: 7
HOW LIKELY ARE YOU TO NOD OFF OR FALL ASLEEP WHILE SITTING AND READING: 1
HOW LIKELY ARE YOU TO NOD OFF OR FALL ASLEEP WHEN YOU ARE A PASSENGER IN A CAR FOR AN HOUR WITHOUT A BREAK: 1
NECK CIRCUMFERENCE (INCHES): 18
HOW LIKELY ARE YOU TO NOD OFF OR FALL ASLEEP WHILE WATCHING TV: 1
HOW LIKELY ARE YOU TO NOD OFF OR FALL ASLEEP WHILE LYING DOWN TO REST IN THE AFTERNOON WHEN CIRCUMSTANCES PERMIT: 1
HOW LIKELY ARE YOU TO NOD OFF OR FALL ASLEEP IN A CAR, WHILE STOPPED FOR A FEW MINUTES IN TRAFFIC: 1

## 2020-12-03 ASSESSMENT — ENCOUNTER SYMPTOMS
EYES NEGATIVE: 1
SHORTNESS OF BREATH: 1
GASTROINTESTINAL NEGATIVE: 1
ALLERGIC/IMMUNOLOGIC NEGATIVE: 1

## 2020-12-03 NOTE — PATIENT INSTRUCTIONS
Orders Placed This Encounter   Procedures    Sleep Study with PAP Titration     Standing Status:   Future     Standing Expiration Date:   12/3/2021     Scheduling Instructions:      Add O2 as needed during the titration     Order Specific Question:   Sleep Study Titration Type     Answer:   Split Night Study (Baseline followed by PAP Titration)     Order Specific Question:   Location For Sleep Study     Answer:   [place]     Order Specific Question:   Select Sleep Lab Location     Answer:   [facility]        Patient Education        Learning About CPAP for Sleep Apnea  What is CPAP? CPAP is a small machine that you use at home every night while you sleep. It increases air pressure in your throat to keep your airway open. When you have sleep apnea, this can help you sleep better so you feel much better. CPAP stands for \"continuous positive airway pressure. \"  The CPAP machine will have one of the following:  · A mask that covers your nose and mouth  · Prongs that fit into your nose  · A mask that covers your nose only, which is the most common type. This type is called NCPAP. The N stands for \"nasal.\"  Why is it done? CPAP is usually the best treatment for obstructive sleep apnea. It is the first treatment choice and the most widely used. CPAP:  · Helps you have more normal sleep, so you feel less sleepy and more alert during the daytime. · May help keep heart failure or other heart problems from getting worse. · May help lower your blood pressure. If you use CPAP, your bed partner may also sleep better. That's because you aren't snoring or restless. Your doctor may suggest CPAP if you have:  · Moderate to severe sleep apnea. · Sleep apnea and coronary artery disease (CAD). · Sleep apnea and heart failure. What are the side effects? Some people who use CPAP have:  · A dry or stuffy nose and a sore throat. · Irritated skin on the face. · Sore eyes. · Bloating.   How can you care for yourself? If using CPAP is not comfortable, or if you have certain side effects, work with your doctor to fix them. Here are some things you can try:  · Be sure the mask or nasal prongs fit well. · See if your doctor can adjust the pressure of your CPAP. · If your nose is dry, try a humidifier. · If your nose is runny or stuffy, try decongestant medicine or a steroid nasal spray. Be safe with medicines. Read and follow all instructions on the label. Do not use the medicine longer than the label says. If these things don't help, you might try a different type of machine. Some machines have air pressure that adjusts on its own. Others have air pressures that are different when you breathe in than when you breathe out. This may reduce discomfort caused by too much pressure in your nose. Where can you learn more? Go to https://Money Toolkitpecarmeloeb.iConclude. org and sign in to your [employer] account. Enter F520 in the [employer] box to learn more about \"Learning About CPAP for Sleep Apnea. \"     If you do not have an account, please click on the \"Sign Up Now\" link. Current as of: February 24, 2020               Content Version: 12.6  © 2006-2020 [employer]. Care instructions adapted under license by [facility] ([facility]). If you have questions about a medical condition or this instruction, always ask your healthcare professional. [place] [address] any warranty or liability for your use of this information.

## 2020-12-03 NOTE — PROGRESS NOTES
MD ERIK Bergman Board Certified in Sleep Medicine  Certified [facility] Sleep Medicine  Board Certified in Neurology [address]  [address] [facility] [address]  [address] [place] [address] [place] [facility] [address], [address]           [address]  [address] [facility] [address] [phone]    Subjective:     Patient ID: Daniella Bowen is a 78 y.o. male. Chief Complaint   Patient presents with    New Patient     ref by Dr. Gt Hermosillo WILL       HPI:        Daniella Bowen is a 78 y.o. male referred by Dr Gt Hermosillo for WILL management. Patient  was diagnosed with  obstructive sleep apnea about 10 years ago, currently of PAP machine at 8 CMWP with O2 at 2 LPM, last sleep study was 10 years ago, last PAP titration about 10 years ago. Patient is using the PAP machine  in total average of 5:42 hours a night, more than 4 hours a night about 90 % in the last 30 days data . has 8years old machine, lost 130 pounds 6 years ago after gastric bypass surgery , then he gained 20 pounds back. SLEEP SCHEDULE: Goes to bed around 12 AM in the weekdays and 12 AM in the weekends. It usually takes the patient 30 minutes to fall asleep. The patient gets up 3-4 per night to go to the bathroom. The Patient finally gets up at 7:30 AM during the weekdays and 7:30 AM in the weekends. patient wakes up with occasional dry mouth. The Patient scored Total score: 7 on [place] Sleepiness Scale ( more than 10 is indicative of daytime sleepiness)and 24 in fatigue scale ( more than 36 is indicative of daytime fatigue). The patient takes 2 naps/day for 30-45 minutes and usually is not refreshing nap. Previous evaluation and treatment has included- PSG with C PAP titration. DOT/CDL - N/A  FAA/'obduliae - N/A  The patient HTN is stable.    CHF:Echocardiogram on 05/24/2019 (Ejection fraction is visually estimated to be 35-40%)    Previous Report(s) Reviewed: historical medical records       Social History     Socioeconomic History    Marital status:      Spouse name: Not on file    Number of children: Not on file    Years of education: Not on file    Highest education level: Not on file   Occupational History    Occupation: Retired. Social Needs    Financial resource strain: Not on file    Food insecurity     Worry: Not on file     Inability: Not on file    Transportation needs     Medical: Not on file     Non-medical: Not on file   Tobacco Use    Smoking status: Former Smoker     Packs/day: 1.00     Years: 4.00     Pack years: 4.00     Types: Cigarettes     Last attempt to quit: 1/1/2017     Years since quitting: 3.9    Smokeless tobacco: Never Used   Substance and Sexual Activity    Alcohol use: Yes     Comment: 2-3 times per year    Drug use: No    Sexual activity: Yes     Partners: Female   Lifestyle    Physical activity     Days per week: Not on file     Minutes per session: Not on file    Stress: Not on file   Relationships    Social connections     Talks on phone: Not on file     Gets together: Not on file     Attends Nondenominational service: Not on file     Active member of club or organization: Not on file     Attends meetings of clubs or organizations: Not on file     Relationship status: Not on file    Intimate partner violence     Fear of current or ex partner: Not on file     Emotionally abused: Not on file     Physically abused: Not on file     Forced sexual activity: Not on file   Other Topics Concern    Not on file   Social History Narrative    Live with wife. Prior to Admission medications    Medication Sig Start Date End Date Taking?  Authorizing Provider   carvedilol (COREG) 6.25 MG tablet TAKE 1 TABLET BY MOUTH TWICE DAILY WITH MEALS 11/4/20  Yes KENNY Riley - CNP   clotrimazole-betamethasone (LOTRISONE) 1-0.05 % cream Apply topically 2 times daily.  11/3/20  Yes Raul Pickens MD   dapagliflozin [facility]) 10 MG tablet Take 1 tablet by mouth every morning 11/3/20 2/1/21 Yes Raul Pickens MD   amiodarone (CORDARONE) 200 MG tablet Take 1 tablet by mouth daily 10/21/20  Yes Kar Sanchez MD   FARXIGA 5 MG tablet TAKE 1 TABLET BY MOUTH EVERY MORNING 7/28/20  Yes Raul Pickens MD   carvedilol (COREG) 12.5 MG tablet Take 1 tablet by mouth 2 times daily (with meals) 7/27/20  Yes Param Fraser MD   insulin aspart (NOVOLOG FLEXPEN) 100 UNIT/ML injection pen Inject 18 Units into the skin 3 times daily (before meals) 7/20/20  Yes Raul Pickens MD   [facility] ULTRA strip TEST UP TO THREE TIMES DAILY 7/6/20  Yes Raul Pickens MD   atorvastatin (LIPITOR) 40 MG tablet TAKE 1 TABLET BY MOUTH DAILY 6/29/20  Yes Raul Pickens MD   spironolactone (ALDACTONE) 25 MG tablet TAKE 1/2 TABLET BY MOUTH DAILY  Patient taking differently: 25 mg daily  6/3/20  Yes Vasiliy Wang MD   apixaban (ELIQUIS) 5 MG TABS tablet TAKE 1 TABLET BY MOUTH TWICE DAILY 4/16/20  Yes Liberty Bowen MD   Continuous Blood Gluc  (FREESTYLE KWESI 14 DAY READER) ANITA Use as Directed Dx E11.9 3/31/20  Yes Raul Pickens MD   Continuous Blood Gluc Sensor (FREESTYLE KWESI 14 DAY SENSOR) MISC Use as directed Dx E11.9 3/31/20  Yes Raul Pickens MD   tamsulosin (FLOMAX) 0.4 MG capsule TAKE 1 CAPSULE BY MOUTH DAILY 3/3/20  Yes Raul Pickens MD   lisinopril (PRINIVIL;ZESTRIL) 2.5 MG tablet TAKE 1 TABLET BY MOUTH DAILY 3/2/20  Yes Raul Pickens MD   levothyroxine (SYNTHROID) 25 MCG tablet TAKE 1 TABLET BY MOUTH DAILY 3/2/20  Yes Raul Pickens MD   furosemide (LASIX) 40 MG tablet TAKE 1 TABLET BY MOUTH DAILY 2/17/20  Yes Raul Pickens MD   omega-3 acid ethyl esters (LOVAZA) 1 g capsule TAKE 1 CAPSULE BY MOUTH TWICE DAILY 2/11/20  Yes Raul Pickens MD   Blood Glucose Monitoring Suppl (ONE TOUCH ULTRA MINI) w/Device KIT 1 kit by Does not apply route three times daily 1/21/20 Yes Guillermina Somers MD   Aydin Ellington [address] by Does not apply route Expires: 1/9/22. Diagnosis: cardiomyopathy. 1/9/20  Yes Guillermina Somers MD   Insulin Degludec (TRESIBA FLEXTOUCH) 100 UNIT/ML SOPN Inject 10 Units into the skin nightly 12/27/19  Yes Guillermina Somers MD   B-D UF III MINI PEN NEEDLES 31G X 5 MM MISC USE DAILY 5/1/19  Yes Guillermina Somers MD   acetaminophen (TYLENOL) 325 MG tablet Take 2 tablets by mouth every 4 hours as needed for Pain 9/2/17  Yes Nany Huggins MD   Coenzyme Q10 (CO Q 10) 100 MG CAPS Take 1 capsule by mouth daily   Yes Historical Provider, MD   Cinnamon 500 MG CAPS Take 1 capsule by mouth daily   Yes Historical Provider, MD   glucose blood VI test strips (ONE TOUCH TEST STRIPS) strip 1 each by In Vitro route daily As needed. 4/27/17  Yes Guillermina Somers MD   Vitamin D (CHOLECALCIFEROL) 1000 UNITS CAPS capsule Take 2,000 Units by mouth nightly    Yes Historical Provider, MD   Insulin Syringe-Needle U-100 (INSULIN SYRINGE .5CC/31GX5/16\") 31G X 5/16\" 0.5 ML MISC Patient tests bid 5/21/13  Yes Guillermina Somers MD   therapeutic multivitamin-minerals [facility]) tablet Take 1 tablet by mouth daily. Yes Historical Provider, MD   aspirin 81 MG EC tablet Take 81 mg by mouth daily.    Yes Historical Provider, MD       Allergies as of 12/03/2020    (No Known Allergies)       Patient Active Problem List   Diagnosis    Diabetes mellitus ([address]    Diabetic foot ulcer ([address]    Cardiomyopathy ([address]    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 ([address]    Renal disease due to hypertension    Obesity    Obstructive sleep apnea    Acute congestive heart failure (HCC)    Vitamin D deficiency    Graves' disease    Hypothyroidism    Diabetic foot ([address]    Foot osteomyelitis, right ([address]    Diabetic foot infection ([address]    Dilated cardiomyopathy ([address]    Essential hypertension    Hyperlipidemia    Diabetic ulcer of right foot associated with type 2 diabetes mellitus ([address]    Subacute osteomyelitis of right foot [facility]    MRSA infection    H/O gastric bypass    Benign prostatic hyperplasia with lower urinary tract symptoms    Paroxysmal atrial fibrillation (HCC)    Hypokalemia    Chronic systolic heart failure (HCC)    NSVT (nonsustained ventricular tachycardia) (HCC)    Abscess of left leg    Morbid obesity with BMI of 40.0-44.9, adult (HCC)    Cardiac resynchronization therapy defibrillator (CRT-D) in place    On amiodarone therapy    Open fracture of second toe of right foot with routine healing    Open fracture of toe    Open fracture dislocation of interphalangeal joint of single toe of right foot    Cellulitis and abscess of foot    Type 2 diabetes mellitus with left diabetic foot ulcer ([address]    Diabetic polyneuropathy associated with type 2 diabetes mellitus ([address]       Past Medical History:   Diagnosis Date    Atrial fibrillation (HCC)     Back pain     Cardiomyopathy     CHF (congestive heart failure) (HCC)     COPD (chronic obstructive pulmonary disease) (HCC)     Dyslipidemia     GERD (gastroesophageal reflux disease)     Hyperlipidemia     Hypertension     Hypothyroidism     Leg edema     MRSA (methicillin resistant staph aureus) culture positive 10/5/15    foot/bone    MRSA nasal colonization 05/05/2017    Obesity     Prolonged emergence from general anesthesia     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     Stroke ([facility]     Thyroid disease     Type 2 diabetes mellitus without complication (HCC)     Type II or unspecified type diabetes mellitus without mention of complication, not stated as uncontrolled        Past Surgical History:   Procedure Laterality Date    ADRENAL GLAND SURGERY  1970    CARDIAC DEFIBRILLATOR PLACEMENT  09/05/2017    Dr. Jefry Sanches COLONOSCOPY N/A 3/8/2019    COLONOSCOPY WITH MAC, UNASYN (3gm) performed by Jackolyn Romberg, MD at [facility] COLONOSCOPY N/A 8/9/2019    COLONOSCOPY POLYPECTOMY SNARE/COLD BIOPSY performed by Micheline Michel MD at [address]  8/9/2019    COLONOSCOPY WITH BIOPSY performed by Micheline Michel MD at [address] Right 8/28/2019    INCISION AND INCISIONAL DEBRIDEMENT OPEN FRACTURE RIGHT 2ND TOE SKIN AND BONE performed by Teresa Weaver MD at [address] Left 7/22/2020    LEFT FOOT INCISION AND DRAINAGE WITH BONE BIOPSY AND REMOVAL OF FREE FLOATING BONE FRAGMENT performed by Renae Winters DPM at [address] [facility]  1-2-10    GASTRIC BYPASS SURGERY      OTHER SURGICAL HISTORY  10/6/15    INCISION AND DRAINAGE RIGHT FOOT          OTHER SURGICAL HISTORY Right 10/8/15    INCISION AND DRAINAGE RIGHT FOOT      PACEMAKER PLACEMENT      UPPER GASTROINTESTINAL ENDOSCOPY N/A 3/8/2019    EGD BIOPSY GASTRIC H. PYLORI performed by Micheline Michel MD at [address] ENDOSCOPY       Family History   Problem Relation Age of Onset    Hypertension Mother     Heart Disease Father     Hypertension Maternal Grandmother     Diabetes Maternal Grandmother        Review of Systems   Constitutional: Negative for fatigue. HENT: Negative. Eyes: Negative. Respiratory: Positive for shortness of breath. Cardiovascular: Negative. Gastrointestinal: Negative. Genitourinary: Positive for frequency. Musculoskeletal: Negative. Skin: Negative. Allergic/Immunologic: Negative. Neurological: Positive for weakness (right side). Hematological: Negative. Psychiatric/Behavioral: Negative for agitation and dysphoric mood. All other systems reviewed and are negative. Objective:     Vitals:  Weight BMI Neck circumference    Wt Readings from Last 3 Encounters:   12/03/20 (!) 331 lb (150.1 kg)   11/03/20 (!) 331 lb (150.1 kg)   07/25/20 (!) 331 lb (150.1 kg)    Body mass index is 40.29 kg/m².  Neck circumference: 18     BP HR SaO2   BP Readings from Last 3 Encounters:   12/03/20 (!) 149/77   11/03/20 110/64   07/27/20 (!) 103/57    Pulse Readings from Last 3 Encounters:   12/03/20 63   11/03/20 76   07/27/20 62    SpO2 Readings from Last 3 Encounters:   12/03/20 100%   11/03/20 99%   07/27/20 94%        The mandibular molar Class :   [x]1 []2 []3      Mallampati I Airway Classification:   []1 []2 []3 [x]4        Physical Exam  Vitals signs and nursing note reviewed. Constitutional:       Appearance: He is obese. Comments: Wheelchair     HENT:      Head: Atraumatic. Nose: Nose normal.      Mouth/Throat:      Comments: Mallampati class 4, no retrognathia or hypognathia , normal airflow in bilateral nostrils, no septum deviation , crowded oropharynx with low soft palate, high arched hard palate,no tonsils enlargement. Eyes:      Extraocular Movements: Extraocular movements intact. Neck:      Musculoskeletal: Normal range of motion and neck supple. Cardiovascular:      Rate and Rhythm: Normal rate and regular rhythm. Heart sounds: Normal heart sounds. Pulmonary:      Effort: Pulmonary effort is normal.      Breath sounds: Normal breath sounds. Musculoskeletal:      Right lower leg: Edema (trace) present. Left lower leg: Edema (trace  ) present. Skin:     General: Skin is warm. Neurological:      Mental Status: Mental status is at baseline. Comments: Right leg weakness 3/5     Psychiatric:         Mood and Affect: Mood normal.         Assessment:    Obstructive sleep apnea on CPAP at 8 cm with nocturnal O2 at 2 LPM, has very old CPAP, could not use the O2 for few weeks ( broken machine)     Diagnosis Orders   1. Obstructive sleep apnea  Sleep Study with PAP Titration   2. H/O CHF  Sleep Study with PAP Titration   3. Essential hypertension  Sleep Study with PAP Titration   4. Class 3 severe obesity due to excess calories with serious comorbidity and body mass index (BMI) of 40.0 to 44.9 in adult [facility])  Sleep Study with PAP Titration   5. Oxygen dependent  Sleep Study with PAP Titration   6.  H/O: stroke  Sleep Study with PAP Titration     Plan:     Patient was counseled about the pathophysiology of obstructive sleep apnea syndrome and the methods for evaluating its presence and severity. Patient was counseled to avoid driving and other potentially hazardous circumstances if the patient is experiencing excessive sleepiness. Treatment considerations include the use of nasal CPAP, oral dental appliance or a surgical intervention, which should be based on otolarygologic findings, In the meantime, the patient should be cautioned to avoid the use of alcohol or other depressant medications because of potential for increasing the duration and severity of apnea and cautioned regarding driving or operating and dangerous equipment if the patient is experiencing daytime sleepiness. .      Most likely treating the WILL will have position impact on HTN and CHF control. We discussed the proportionality between weight and AHI. With 10% weight change, the AHI has a 27% proportionate change. With 20% weight change, the AHI has a 45-50% proportionate change. Orders Placed This Encounter   Procedures    Sleep Study with PAP Titration       Return in about 3 months (around 3/3/2021) for Reveiwing CPAP usage and compliance report and tro.     Jacinto Gautam MD  Medical Director - [facility]

## 2020-12-14 ENCOUNTER — OFFICE VISIT (OUTPATIENT)
Dept: CARDIOLOGY CLINIC | Age: 70
End: 2020-12-14
Payer: MEDICARE

## 2020-12-14 VITALS
WEIGHT: 315 LBS | SYSTOLIC BLOOD PRESSURE: 130 MMHG | TEMPERATURE: 97.1 F | HEART RATE: 80 BPM | DIASTOLIC BLOOD PRESSURE: 78 MMHG | BODY MASS INDEX: 38.81 KG/M2

## 2020-12-14 PROCEDURE — 99214 OFFICE O/P EST MOD 30 MIN: CPT | Performed by: NURSE PRACTITIONER

## 2020-12-14 RX ORDER — CARVEDILOL 12.5 MG/1
6.25 TABLET ORAL 2 TIMES DAILY WITH MEALS
Qty: 60 TABLET | Refills: 3
Start: 2020-12-14 | End: 2021-02-01

## 2020-12-14 RX ORDER — SPIRONOLACTONE 25 MG/1
25 TABLET ORAL DAILY
Qty: 45 TABLET | Refills: 3 | Status: SHIPPED | OUTPATIENT
Start: 2020-12-14 | End: 2020-12-17 | Stop reason: SDUPTHER

## 2020-12-14 RX ORDER — LANOLIN ALCOHOL/MO/W.PET/CERES
325 CREAM (GRAM) TOPICAL DAILY
COMMUNITY

## 2020-12-14 NOTE — PROGRESS NOTES
[address]  Office Visit           Yany Douglas MD,  [facility]       Cardiology             Darrian Velasquez  1950    December 14, 2020    Primary Cardiologist:  Ellen Phlegm     Today he states he has been in rehab for abx for left leg infection   No c/o cp/SOB edema  / mild edema in lower / per \"didn't take water pill for two days\"  On GDMT for NICM   complaint with meds   Discussed nutrition / sodium intake / fluid intake   Recommend activity as tolerated     CC:Interval Hx: Mr Ruperto Nash followed by cardiology for NICM, HFrEF, pafib. s/p DCCV  HTN HLD CRD DMT2 / BiV ICD in situ / obesity / WILL uses CPAP     AICD interrogation are with  Dr Melissa Hua office routinely   Status post gastric surgery 2014  318 today was 430 at one time      Remote check 10/210/20 [facility] / no changes   On eliquis / denies bleeding or falls   no c/p or SOB/ mild edema in lower / per \"didn't take water pill for two days\"      TTE 5/2019 Overall left ventricular systolic function appears moderately reduced. Ejection fraction is visually estimated to be 35-40% with diffuse hypokinesis. There is mildly increased left ventricular wall thickness. The right ventricle is not well visualized but ICD wire is present. Mild tricuspid regurgitation. Trivial aortic and mitral regurgitation. I have examined pt and reviewed notes / any lab work EKGs stress test, angiograms, & images reviewed  I  have spent >20 minutes of face to face time with the patient with more than 50% spent counseling and coordinating care this patient. Review of Systems:  Constitutional: Denies  fatigue, weakness, night sweats or fever. HEENT: Denies new visual changes, ringing in ears, nosebleeds,nasal congestion  Respiratory: Denies new or change in SOB, PND, orthopnea or cough.    Cardiovascular: see HPI GI: Denies N/V, diarrhea, constipation, abdominal pain, change in bowel habits, melena or hematochezia  : Denies urinary frequency, urgency, incontinence, hematuria or dysuria. Skin: Denies rash, hives, or cyanosis  Musculoskeletal: Denies joint or muscle aches/pain  Neurological: Denies syncope or TIA-like symptoms.   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)     Thyroid disease     Type 2 diabetes mellitus without complication ([facility] [address]     Type II or unspecified type diabetes mellitus without mention of complication, not stated as uncontrolled      Past Surgical History:   Procedure Laterality Date    ADRENAL GLAND SURGERY  1970    CARDIAC DEFIBRILLATOR PLACEMENT  09/05/2017    Dr. Danica Don COLONOSCOPY N/A 3/8/2019    COLONOSCOPY WITH MAC, UNASYN (3gm) performed by Louise Ortiz MD at [facility] N/A 8/9/2019    COLONOSCOPY POLYPECTOMY SNARE/COLD BIOPSY performed by Louise Ortiz MD at [facility]  8/9/2019    COLONOSCOPY WITH BIOPSY performed by Louise Ortiz MD at [address] Right 8/28/2019    INCISION AND INCISIONAL DEBRIDEMENT OPEN FRACTURE RIGHT 2ND TOE SKIN AND BONE performed by Sugar Kruger MD at [address] [address] Left 7/22/2020 LEFT FOOT INCISION AND DRAINAGE WITH BONE BIOPSY AND REMOVAL OF FREE FLOATING BONE FRAGMENT performed by Nikole Khan DPM at [facility]  1-2-10    GASTRIC BYPASS SURGERY      OTHER SURGICAL HISTORY  10/6/15    INCISION AND DRAINAGE RIGHT FOOT          OTHER SURGICAL HISTORY Right 10/8/15    INCISION AND DRAINAGE RIGHT FOOT      PACEMAKER PLACEMENT      UPPER GASTROINTESTINAL ENDOSCOPY N/A 3/8/2019    EGD BIOPSY GASTRIC H. PYLORI performed by Izabela Aparicio MD at [facility] ENDOSCOPY     Family History   Problem Relation Age of Onset    Hypertension Mother     Heart Disease Father     Hypertension Maternal Grandmother     Diabetes Maternal Grandmother      Social History     Tobacco Use    Smoking status: Former Smoker     Packs/day: 1.00     Years: 4.00     Pack years: 4.00     Types: Cigarettes     Quit date: 1/1/2017     Years since quitting: 3.9    Smokeless tobacco: Never Used   Substance Use Topics    Alcohol use: Yes     Comment: 2-3 times per year    Drug use: No       No Known Allergies  Current Outpatient Medications   Medication Sig Dispense Refill    ferrous sulfate (FE TABS 325) 325 (65 Fe) MG EC tablet Take 325 mg by mouth daily      carvedilol (COREG) 6.25 MG tablet TAKE 1 TABLET BY MOUTH TWICE DAILY WITH MEALS 30 tablet 2    clotrimazole-betamethasone (LOTRISONE) 1-0.05 % cream Apply topically 2 times daily.  15 g 1    dapagliflozin (FARXIGA) 10 MG tablet Take 1 tablet by mouth every morning 90 tablet 3    amiodarone (CORDARONE) 200 MG tablet Take 1 tablet by mouth daily 90 tablet 3    FARXIGA 5 MG tablet TAKE 1 TABLET BY MOUTH EVERY MORNING 90 tablet 1    carvedilol (COREG) 12.5 MG tablet Take 1 tablet by mouth 2 times daily (with meals) 60 tablet 3    insulin aspart (NOVOLOG FLEXPEN) 100 UNIT/ML injection pen Inject 18 Units into the skin 3 times daily (before meals) 16 pen 3    ONETOUCH ULTRA strip TEST UP TO THREE TIMES DAILY 300 strip 1  atorvastatin (LIPITOR) 40 MG tablet TAKE 1 TABLET BY MOUTH DAILY 90 tablet 3    spironolactone (ALDACTONE) 25 MG tablet TAKE 1/2 TABLET BY MOUTH DAILY (Patient taking differently: 25 mg daily ) 45 tablet 5    apixaban (ELIQUIS) 5 MG TABS tablet TAKE 1 TABLET BY MOUTH TWICE DAILY 60 tablet 11    Continuous Blood Gluc  (FREESTYLE KWESI 14 DAY READER) ANITA Use as Directed Dx E11.9 1 Device 0    Continuous Blood Gluc Sensor (FREESTYLE KWESI 14 DAY SENSOR) MISC Use as directed Dx E11.9 3 each 3    tamsulosin (FLOMAX) 0.4 MG capsule TAKE 1 CAPSULE BY MOUTH DAILY 90 capsule 2    lisinopril (PRINIVIL;ZESTRIL) 2.5 MG tablet TAKE 1 TABLET BY MOUTH DAILY 90 tablet 3    levothyroxine (SYNTHROID) 25 MCG tablet TAKE 1 TABLET BY MOUTH DAILY 90 tablet 3    furosemide (LASIX) 40 MG tablet TAKE 1 TABLET BY MOUTH DAILY 60 tablet 5    omega-3 acid ethyl esters (LOVAZA) 1 g capsule TAKE 1 CAPSULE BY MOUTH TWICE DAILY 180 capsule 3    Blood Glucose Monitoring Suppl (ONE TOUCH ULTRA MINI) w/Device KIT 1 kit by Does not apply route three times daily 1 kit 0    Handicap Placard MISC by Does not apply route Expires: 1/9/22. Diagnosis: cardiomyopathy. 1 each 0    Insulin Degludec (TRESIBA FLEXTOUCH) 100 UNIT/ML SOPN Inject 10 Units into the skin nightly 9 mL 3    B-D UF III MINI PEN NEEDLES 31G X 5 MM MISC USE DAILY 100 each 5    acetaminophen (TYLENOL) 325 MG tablet Take 2 tablets by mouth every 4 hours as needed for Pain 120 tablet 3    Coenzyme Q10 (CO Q 10) 100 MG CAPS Take 1 capsule by mouth daily      Cinnamon 500 MG CAPS Take 1 capsule by mouth daily      glucose blood VI test strips (ONE TOUCH TEST STRIPS) strip 1 each by In Vitro route daily As needed.  100 each 3    Vitamin D (CHOLECALCIFEROL) 1000 UNITS CAPS capsule Take 2,000 Units by mouth nightly       Insulin Syringe-Needle U-100 (INSULIN SYRINGE .5CC/31GX5/16\") 31G X 5/16\" 0.5 ML MISC Patient tests bid 100 Syringe 5  therapeutic multivitamin-minerals (THERAGRAN-M) tablet Take 1 tablet by mouth daily.  aspirin 81 MG EC tablet Take 81 mg by mouth daily. No current facility-administered medications for this visit. Physical Exam:   /78   Pulse 80   Temp 97.1 °F (36.2 °C)   Wt (!) 318 lb 12.8 oz (144.6 kg)   BMI 38.81 kg/m²   BP Readings from Last 3 Encounters:   12/14/20 130/78   12/03/20 (!) 149/77   11/03/20 110/64     Pulse Readings from Last 3 Encounters:   12/14/20 80   12/03/20 63   11/03/20 76     Wt Readings from Last 3 Encounters:   12/14/20 (!) 318 lb 12.8 oz (144.6 kg)   12/03/20 (!) 331 lb (150.1 kg)   11/03/20 (!) 331 lb (150.1 kg)     Constitutional: He is oriented to person, place, and time. He appears well-developed and well-nourished. In no acute distress. Cardiovascular: RRR, normal S1 and S2; no murmur/gallop or rub, PMI nondisplaced  Pulmonary/Chest: Effort normal.  Lungs clear to auscultation. Chest wall nontender  Abdominal: soft, nontender, nondistended. + bowel sounds; no organomegaly or bruits. Aorta normal  Extremities: 1+ edema, cyanosis, or clubbing. Pulses are 2+ radial/carotid/dorsalis pedis bilaterally. Cap refill brisk. Neurological: No cranial nerve deficit. Psychiatric: He has a normal mood and affect.  His speech is normal and behavior is normal.     Lab Review:   Lab Results   Component Value Date    TRIG 49 07/07/2020    HDL 53 07/07/2020    HDL 56 11/15/2011    LDLCALC 49 07/07/2020    LABVLDL 10 07/07/2020     Lab Results   Component Value Date     12/03/2020    K 4.5 12/03/2020    K 4.8 07/19/2020     12/03/2020    CO2 24 12/03/2020    BUN 18 12/03/2020    CREATININE 1.3 12/03/2020    GLUCOSE 122 12/03/2020    CALCIUM 8.9 12/03/2020      Lab Results   Component Value Date    WBC 4.7 08/26/2020    HGB 10.6 (A) 08/26/2020    HCT 33.8 (A) 08/26/2020    MCV 90.6 08/26/2020     08/26/2020 I have reviewed any available labs, images, any stress test, C on this pt         Assessment:    CM HFrEF  / NICM   no c/p or SOB/ mild edema in lower / per \"didn't take water pill for two days\" pafib. BiV ICD in situ   On GDMT for CM     TTE 5/2019 Overall left ventricular systolic function appears moderately reduced. Ejection fraction is visually estimated to be 35-40% with diffuse hypokinesis. There is mildly increased left ventricular wall thickness. The right ventricle is not well visualized but ICD wire is present. Mild tricuspid regurgitation. Trivial aortic and mitral regurgitation.      pAF   [id number] Score for Atrial Fibrillation Stroke Risk   Risk   Factors  Component Value   C CHF Yes 1   H HTN Yes 1   A2 Age >= 75 No,  (79 y.o.) 0   D DM Yes 1   S2 Prior Stroke/TIA Yes 2   V Vascular Disease No 0   A Age 74-69 Yes,  (79 y.o.) 1   Sc Sex male 0    [id number]  Score  6   s/p DCCV    Remote check 10/210/20 [id number] / no changes   On eliquis / denies bleeding or falls     HTN     HLD     CRD   sCr wnl     DMT2  controlled     WILL   uses CPAP       Obesity  Status post gastric surgery 2014  318 today was 430 at one time      Remote check 10/210/20 [facility] / no changes   On eliquis / denies bleeding or falls      Plan:  Fu in 3 months with blood work  & TTE     Rosalyn Dubin APRN,CVNP

## 2020-12-17 RX ORDER — SPIRONOLACTONE 25 MG/1
25 TABLET ORAL DAILY
Qty: 90 TABLET | Refills: 3 | Status: SHIPPED | OUTPATIENT
Start: 2020-12-17 | End: 2021-02-04 | Stop reason: SDUPTHER

## 2020-12-28 ENCOUNTER — TELEPHONE (OUTPATIENT)
Dept: INTERNAL MEDICINE CLINIC | Age: 70
End: 2020-12-28

## 2020-12-28 NOTE — TELEPHONE ENCOUNTER
Patient calling about lab results done last week would like a call but will be out of the house between 1pm and 4pm. Please advise.

## 2021-01-05 RX ORDER — CARVEDILOL 6.25 MG/1
TABLET ORAL
Qty: 30 TABLET | Refills: 5 | Status: SHIPPED | OUTPATIENT
Start: 2021-01-05 | End: 2021-04-27 | Stop reason: SDUPTHER

## 2021-01-19 ENCOUNTER — TELEPHONE (OUTPATIENT)
Dept: INTERNAL MEDICINE CLINIC | Age: 71
End: 2021-01-19

## 2021-01-19 RX ORDER — INSULIN DEGLUDEC INJECTION 100 U/ML
INJECTION, SOLUTION SUBCUTANEOUS
Qty: 9 ML | Refills: 3 | Status: SHIPPED | OUTPATIENT
Start: 2021-01-19 | End: 2021-12-28

## 2021-01-25 ENCOUNTER — NURSE ONLY (OUTPATIENT)
Dept: CARDIOLOGY CLINIC | Age: 71
End: 2021-01-25
Payer: MEDICARE

## 2021-01-25 DIAGNOSIS — Z95.810 CARDIAC RESYNCHRONIZATION THERAPY DEFIBRILLATOR (CRT-D) IN PLACE: ICD-10-CM

## 2021-01-25 DIAGNOSIS — I50.22 CHRONIC SYSTOLIC HF (HEART FAILURE) (HCC): ICD-10-CM

## 2021-01-25 DIAGNOSIS — I42.8 NICM (NONISCHEMIC CARDIOMYOPATHY) (HCC): ICD-10-CM

## 2021-01-25 DIAGNOSIS — I42.9 CARDIOMYOPATHY, UNSPECIFIED TYPE (HCC): ICD-10-CM

## 2021-01-25 PROCEDURE — 93295 DEV INTERROG REMOTE 1/2/MLT: CPT | Performed by: INTERNAL MEDICINE

## 2021-01-25 PROCEDURE — 93297 REM INTERROG DEV EVAL ICPMS: CPT | Performed by: INTERNAL MEDICINE

## 2021-01-25 PROCEDURE — 93296 REM INTERROG EVL PM/IDS: CPT | Performed by: INTERNAL MEDICINE

## 2021-01-25 NOTE — PROGRESS NOTES
Pt transferred to [facility] via [facility] transport ambulance.    We received remote transmission from patient's monitor at home. Transmission shows normal sensing and pacing function. Pt has known AF and remains on Eliquis. EP physician will review. See interrogation under cardiology tab in the [facility] [address] field for more details. Optivol elevated but appears going back to baseline. Will have call out to pt for symptoms.

## 2021-01-25 NOTE — LETTER
[address] [facility] [phone] [address] [phone]    Pacemaker/Defibrillator Clinic          01/25/21        [address] [address]        Dear Ghazala Velasquez    This letter is to inform you that we received the transmission from your monitor at home that checks your implanted heart device. The next date your monitor will automatically transmit will be 4-27-21. If your report needs attention we will notify you. Your device and monitor are wireless and most transmit cellularly, but please periodically check your monitor is still plugged in to the electrical outlet. If you still use the telephone land line to send please ensure the connection to the phone kacy is secure. This will help to ensure successful automatic transmissions in the future. Also, the monitor needs to be close to you while sleeping at night. Please be aware that the remote device transmission sites are periodically monitored only during regular business hours during which simultaneous in-office device clinics are being run. If your transmission requires attention, we will contact you as soon as possible. Thank you.             [address]

## 2021-02-01 RX ORDER — FUROSEMIDE 40 MG/1
40 TABLET ORAL DAILY
Qty: 60 TABLET | Refills: 3 | Status: SHIPPED | OUTPATIENT
Start: 2021-02-01 | End: 2021-07-20

## 2021-02-04 ENCOUNTER — OFFICE VISIT (OUTPATIENT)
Dept: INTERNAL MEDICINE CLINIC | Age: 71
End: 2021-02-04
Payer: MEDICARE

## 2021-02-04 VITALS
HEART RATE: 57 BPM | WEIGHT: 315 LBS | HEIGHT: 74 IN | TEMPERATURE: 96.7 F | BODY MASS INDEX: 40.43 KG/M2 | OXYGEN SATURATION: 96 % | DIASTOLIC BLOOD PRESSURE: 64 MMHG | SYSTOLIC BLOOD PRESSURE: 128 MMHG

## 2021-02-04 DIAGNOSIS — E78.2 MIXED HYPERLIPIDEMIA: ICD-10-CM

## 2021-02-04 DIAGNOSIS — I10 ESSENTIAL HYPERTENSION: ICD-10-CM

## 2021-02-04 DIAGNOSIS — I42.8 OTHER CARDIOMYOPATHY (HCC): ICD-10-CM

## 2021-02-04 DIAGNOSIS — N32.81 OAB (OVERACTIVE BLADDER): ICD-10-CM

## 2021-02-04 DIAGNOSIS — E11.8 DIABETES MELLITUS TYPE 2 WITH COMPLICATIONS (HCC): Primary | ICD-10-CM

## 2021-02-04 LAB
CHP ED QC CHECK: NORMAL
GLUCOSE BLD-MCNC: 123 MG/DL
HBA1C MFR BLD: 6.5 %

## 2021-02-04 PROCEDURE — 99214 OFFICE O/P EST MOD 30 MIN: CPT | Performed by: INTERNAL MEDICINE

## 2021-02-04 PROCEDURE — 82962 GLUCOSE BLOOD TEST: CPT | Performed by: INTERNAL MEDICINE

## 2021-02-04 PROCEDURE — 83036 HEMOGLOBIN GLYCOSYLATED A1C: CPT | Performed by: INTERNAL MEDICINE

## 2021-02-04 RX ORDER — OXYBUTYNIN CHLORIDE 5 MG/1
5 TABLET, EXTENDED RELEASE ORAL DAILY
Qty: 90 TABLET | Refills: 1 | Status: SHIPPED | OUTPATIENT
Start: 2021-02-04 | End: 2021-07-20

## 2021-02-04 RX ORDER — SPIRONOLACTONE 25 MG/1
25 TABLET ORAL DAILY
Qty: 90 TABLET | Refills: 3 | Status: SHIPPED | OUTPATIENT
Start: 2021-02-04 | End: 2022-02-14

## 2021-02-04 RX ORDER — OXYBUTYNIN CHLORIDE 5 MG/1
5 TABLET, EXTENDED RELEASE ORAL DAILY
Qty: 30 TABLET | Refills: 3 | Status: SHIPPED | OUTPATIENT
Start: 2021-02-04 | End: 2021-02-04

## 2021-02-04 ASSESSMENT — PATIENT HEALTH QUESTIONNAIRE - PHQ9
SUM OF ALL RESPONSES TO PHQ QUESTIONS 1-9: 0
1. LITTLE INTEREST OR PLEASURE IN DOING THINGS: 0
SUM OF ALL RESPONSES TO PHQ QUESTIONS 1-9: 0

## 2021-02-04 NOTE — PROGRESS NOTES
Patient: Chantelle Marcos is a 78 y.o. male who presents today with the following Chief Complaint(s):    Chief Complaint   Patient presents with    Hypertension    Diabetes         HPI He is here for a checkup and f/u on diabetes, T2, HLD, HTN. He is taking prescribed medication, continues working on better meal planning and more physical activity which is currently stationary bicycle riding. He has a h/o cardiomyopathy with CHF. He denies SOB with exertion which is about at baseline. Taking lasix 20 mg qod. Current Outpatient Medications   Medication Sig Dispense Refill    tadalafil (CIALIS) 5 MG tablet TAKE 1 TABLET BY MOUTH DAILY 30 tablet 5    NOVOLOG FLEXPEN 100 UNIT/ML injection pen ADMINISTER 8 TO 10 UNITS UNDER THE SKIN THREE TIMES DAILY BEFORE MEALS 45 mL 5    ONETOUCH ULTRA strip USE WITH GLUCOSE METER THREE TIMES DAILY AND AS NEEDED 300 strip 3    furosemide (LASIX) 40 MG tablet Take 1 tablet by mouth daily 60 tablet 3    TRESIBA FLEXTOUCH 100 UNIT/ML SOPN INJECT 10 UNITS UNDER THE SKIN NIGHTLY 9 mL 3    carvedilol (COREG) 6.25 MG tablet TAKE 1 TABLET BY MOUTH TWICE DAILY WITH MEALS 30 tablet 5    spironolactone (ALDACTONE) 25 MG tablet Take 1 tablet by mouth daily 90 tablet 3    ferrous sulfate (FE TABS 325) 325 (65 Fe) MG EC tablet Take 325 mg by mouth daily      clotrimazole-betamethasone (LOTRISONE) 1-0.05 % cream Apply topically 2 times daily.  15 g 1    dapagliflozin (FARXIGA) 10 MG tablet Take 1 tablet by mouth every morning 90 tablet 3    amiodarone (CORDARONE) 200 MG tablet Take 1 tablet by mouth daily 90 tablet 3    atorvastatin (LIPITOR) 40 MG tablet TAKE 1 TABLET BY MOUTH DAILY 90 tablet 3    apixaban (ELIQUIS) 5 MG TABS tablet TAKE 1 TABLET BY MOUTH TWICE DAILY 60 tablet 11    Continuous Blood Gluc  (FREESTYLE KWESI 14 DAY READER) ANITA Use as Directed Dx E11.9 1 Device 0    Continuous Blood Gluc Sensor (FREESTYLE KWESI 14 DAY SENSOR) MISC Use as directed Dx E11.9 3 each 3    tamsulosin (FLOMAX) 0.4 MG capsule TAKE 1 CAPSULE BY MOUTH DAILY 90 capsule 2    lisinopril (PRINIVIL;ZESTRIL) 2.5 MG tablet TAKE 1 TABLET BY MOUTH DAILY 90 tablet 3    levothyroxine (SYNTHROID) 25 MCG tablet TAKE 1 TABLET BY MOUTH DAILY 90 tablet 3    omega-3 acid ethyl esters (LOVAZA) 1 g capsule TAKE 1 CAPSULE BY MOUTH TWICE DAILY 180 capsule 3    Blood Glucose Monitoring Suppl (ONE TOUCH ULTRA MINI) w/Device KIT 1 kit by Does not apply route three times daily 1 kit 0    Handicap Placard MISC by Does not apply route Expires: 1/9/22. Diagnosis: cardiomyopathy. 1 each 0    B-D UF III MINI PEN NEEDLES 31G X 5 MM MISC USE DAILY 100 each 5    acetaminophen (TYLENOL) 325 MG tablet Take 2 tablets by mouth every 4 hours as needed for Pain 120 tablet 3    Coenzyme Q10 (CO Q 10) 100 MG CAPS Take 1 capsule by mouth daily      Cinnamon 500 MG CAPS Take 1 capsule by mouth daily      glucose blood VI test strips (ONE TOUCH TEST STRIPS) strip 1 each by In Vitro route daily As needed. 100 each 3    Vitamin D (CHOLECALCIFEROL) 1000 UNITS CAPS capsule Take 2,000 Units by mouth nightly       Insulin Syringe-Needle U-100 (INSULIN SYRINGE .5CC/31GX5/16\") 31G X 5/16\" 0.5 ML MISC Patient tests bid 100 Syringe 5    therapeutic multivitamin-minerals (THERAGRAN-M) tablet Take 1 tablet by mouth daily.  aspirin 81 MG EC tablet Take 81 mg by mouth daily. No current facility-administered medications for this visit. Patient's past medical history, surgical history, family history, medications,and allergies  were all reviewed and updated as appropriate today. Review of Systems   Constitutional: Negative. HENT: Negative. Eyes: Negative. Respiratory: Negative. Cardiovascular:        Hypertension, treated with B blker, diuretic and and ACE I.     Cardiomyopathy, see HPI. Gastrointestinal: Negative. Endocrine:        Diabetes, T2, treated with B-B insulin and Farxiga.  A1c 6.5.     Hyperlipidemia, treated with statin. LDL 49. Genitourinary:        H/O OAB. Musculoskeletal: Negative. Skin: Negative. Neurological: Negative. Psychiatric/Behavioral: Negative. Physical Exam  Constitutional:       General: He is not in acute distress. Appearance: He is well-developed. HENT:      Head: Normocephalic and atraumatic. Right Ear: External ear normal.      Left Ear: External ear normal.      Nose: Nose normal.   Eyes:      General: No scleral icterus. Conjunctiva/sclera: Conjunctivae normal.      Pupils: Pupils are equal, round, and reactive to light. Neck:      Musculoskeletal: Normal range of motion and neck supple. Thyroid: No thyromegaly. Cardiovascular:      Rate and Rhythm: Normal rate and regular rhythm. Heart sounds: Normal heart sounds. Pulmonary:      Effort: Pulmonary effort is normal.      Breath sounds: Normal breath sounds. Abdominal:      General: Bowel sounds are normal.      Palpations: Abdomen is soft. There is no mass. Musculoskeletal:      Comments: Trace lower leg and ankle edema. Lymphadenopathy:      Cervical: No cervical adenopathy. Skin:     General: Skin is warm and dry. Neurological:      Mental Status: He is alert and oriented to person, place, and time. Deep Tendon Reflexes: Reflexes are normal and symmetric. Psychiatric:         Behavior: Behavior normal.         Thought Content: Thought content normal.         Judgment: Judgment normal.         Vitals:    02/04/21 1421   BP: 128/64   Pulse: 57   Temp: 96.7 °F (35.9 °C)   SpO2: 96%       Assessment:   Diagnosis Orders   1. Diabetes mellitus type 2 with complications (HCC)  Diet, physical activity and med compliance discussed. POCT Glucose    POCT glycosylated hemoglobin (Hb A1C)   2. Essential hypertension  spironolactone (ALDACTONE) 25 MG tablet  Continue b blker and ACE I.   DASH and low sodium diet discussed.     3. OAB (overactive bladder) DISCONTINUED: oxybutynin (DITROPAN-XL) 5 MG extended release tablet  kegel exercises discussed. 4. Mixed hyperlipidemia  Heart healthy diet and statin compliance discussed. 5. Other cardiomyopathy ([address]  Heart healthy lifestyle and statin compliance discussed. Plan:  See plans above.

## 2021-02-05 ENCOUNTER — TELEPHONE (OUTPATIENT)
Dept: INTERNAL MEDICINE CLINIC | Age: 71
End: 2021-02-05

## 2021-03-01 RX ORDER — TAMSULOSIN HYDROCHLORIDE 0.4 MG/1
CAPSULE ORAL
Qty: 90 CAPSULE | Refills: 2 | Status: SHIPPED | OUTPATIENT
Start: 2021-03-01 | End: 2021-09-01

## 2021-03-05 ASSESSMENT — ENCOUNTER SYMPTOMS
EYES NEGATIVE: 1
GASTROINTESTINAL NEGATIVE: 1
RESPIRATORY NEGATIVE: 1

## 2021-03-11 ENCOUNTER — HOSPITAL ENCOUNTER (OUTPATIENT)
Dept: ULTRASOUND IMAGING | Age: 71
Discharge: HOME | End: 2021-03-11
Payer: MEDICARE

## 2021-03-11 DIAGNOSIS — Z13.6 SCREENING FOR AAA (ABDOMINAL AORTIC ANEURYSM): ICD-10-CM

## 2021-03-11 PROCEDURE — 76706 US ABDL AORTA SCREEN AAA: CPT

## 2021-04-01 ENCOUNTER — TELEPHONE (OUTPATIENT)
Dept: INTERNAL MEDICINE CLINIC | Age: 71
End: 2021-04-01

## 2021-04-01 DIAGNOSIS — I10 ESSENTIAL HYPERTENSION: ICD-10-CM

## 2021-04-01 RX ORDER — LISINOPRIL 2.5 MG/1
2.5 TABLET ORAL DAILY
Qty: 90 TABLET | Refills: 3 | Status: SHIPPED | OUTPATIENT
Start: 2021-04-01 | End: 2022-05-12 | Stop reason: SDUPTHER

## 2021-04-01 NOTE — TELEPHONE ENCOUNTER
Patient needs a refill of lisinopril 2.5 mg sent to [facility] on [address] Patient is out of medication.

## 2021-04-12 ENCOUNTER — OFFICE VISIT (OUTPATIENT)
Dept: CARDIOLOGY CLINIC | Age: 71
End: 2021-04-12
Payer: MEDICARE

## 2021-04-12 VITALS
DIASTOLIC BLOOD PRESSURE: 62 MMHG | HEIGHT: 76 IN | BODY MASS INDEX: 38.36 KG/M2 | SYSTOLIC BLOOD PRESSURE: 110 MMHG | HEART RATE: 70 BPM | WEIGHT: 315 LBS

## 2021-04-12 DIAGNOSIS — I10 ESSENTIAL HYPERTENSION: ICD-10-CM

## 2021-04-12 DIAGNOSIS — I42.0 DILATED CARDIOMYOPATHY (HCC): ICD-10-CM

## 2021-04-12 DIAGNOSIS — I50.22 CHRONIC SYSTOLIC HF (HEART FAILURE) (HCC): Primary | ICD-10-CM

## 2021-04-12 PROCEDURE — 99214 OFFICE O/P EST MOD 30 MIN: CPT | Performed by: INTERNAL MEDICINE

## 2021-04-12 NOTE — PROGRESS NOTES
[address]  Office Visit           Jina Benavidez MD,  [facility]                           Cardiology           Barbara Flores  1950    April 12, 2021    CC: here with CM     HPI:  The patient is 78 y.o. male with hx CM HFpEF, pafib. s/p DCCV  HTN HLD CRD DMT2 / BiV ICD in situ / obesity / WILL uses CPAP    AICD interrogation are with  Dr Danya Velez office routinely     Has not tolerated Jardiance or [place]. Is here today for evaluation follow-up with seems fairly stable. No current specific issues. Still having a foot wound that is not healing very well. Did receive the Ferro Peter vaccine x2. Creatinine is 1.3 and stable. Is seeing Dr. Elvie campbell for his podiatry issues. From a cardiac perspective all else looks stable. We will plan to have him continue Lasix at 40 mg every other day. To have a renal panel soon. He will be having interrogation with for his device soon. Status post gastric I appears surgery with weight loss of over 130 pounds. Review of Systems:  Constitutional: Denies  fatigue, weakness, night sweats or fever. HEENT: Denies new visual changes, ringing in ears, nosebleeds,nasal congestion  Respiratory: Denies new or change in SOB, PND, orthopnea or cough. Cardiovascular: see HPI  GI: Denies N/V, diarrhea, constipation, abdominal pain, change in bowel habits, melena or hematochezia  : Denies urinary frequency, urgency, incontinence, hematuria or dysuria. Skin: Denies rash, hives, or cyanosis  Musculoskeletal: Denies joint or muscle aches/pain  Neurological: Denies syncope or TIA-like symptoms.   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 Romayne Dexter COLONOSCOPY N/A 3/8/2019    COLONOSCOPY WITH MAC, UNASYN (3gm) performed by Mai Velez MD at [address] [facility] [address] 8/9/2019    COLONOSCOPY POLYPECTOMY SNARE/COLD BIOPSY performed by Mai Velez MD at [address] [facility]  8/9/2019    COLONOSCOPY WITH BIOPSY performed by Mai Velez MD at [address] [facility] 8/28/2019    INCISION AND INCISIONAL DEBRIDEMENT OPEN FRACTURE RIGHT 2ND TOE SKIN AND BONE performed by Thomas Wood MD at [address] [facility],[address] [place] [address] 7/22/2020    LEFT FOOT INCISION AND DRAINAGE WITH BONE BIOPSY AND REMOVAL OF FREE FLOATING BONE FRAGMENT performed by Carie Funez DPM at [address] [facility]  1-2-    GASTRIC BYPASS SURGERY      OTHER SURGICAL HISTORY  10/6/15    INCISION AND DRAINAGE RIGHT FOOT          OTHER SURGICAL HISTORY Right 10/8/15    INCISION AND DRAINAGE RIGHT FOOT      PACEMAKER PLACEMENT      UPPER GASTROINTESTINAL ENDOSCOPY N/A 3/8/2019    EGD BIOPSY GASTRIC H. PYLORI performed by Mai Velez MD at [address] History   Problem Relation Age of Onset    Hypertension Mother     Heart Disease Father     Hypertension Maternal Grandmother     Diabetes Maternal Grandmother      Social History     Tobacco Use    Smoking status: Former Smoker     Packs/day: 1.00     Years: 4.00 Pack years: 4.00     Types: Cigarettes     Quit date: 2017     Years since quittin.2    Smokeless tobacco: Never Used   Substance Use Topics    Alcohol use: Yes     Comment: 2-3 times per year    Drug use: No       No Known Allergies  Current Outpatient Medications   Medication Sig Dispense Refill    lisinopril (PRINIVIL;ZESTRIL) 2.5 MG tablet Take 1 tablet by mouth daily 90 tablet 3    tamsulosin (FLOMAX) 0.4 MG capsule TAKE 1 CAPSULE BY MOUTH DAILY 90 capsule 2    spironolactone (ALDACTONE) 25 MG tablet Take 1 tablet by mouth daily 90 tablet 3    oxybutynin (DITROPAN-XL) 5 MG extended release tablet TAKE 1 TABLET BY MOUTH DAILY FOR OVERACTIVE BLADDER 90 tablet 1    NOVOLOG FLEXPEN 100 UNIT/ML injection pen ADMINISTER 8 TO 10 UNITS UNDER THE SKIN THREE TIMES DAILY BEFORE MEALS 45 mL 5    ONETOUCH ULTRA strip USE WITH GLUCOSE METER THREE TIMES DAILY AND AS NEEDED 300 strip 3    furosemide (LASIX) 40 MG tablet Take 1 tablet by mouth daily 60 tablet 3    TRESIBA FLEXTOUCH 100 UNIT/ML SOPN INJECT 10 UNITS UNDER THE SKIN NIGHTLY 9 mL 3    carvedilol (COREG) 6.25 MG tablet TAKE 1 TABLET BY MOUTH TWICE DAILY WITH MEALS 30 tablet 5    ferrous sulfate (FE TABS 325) 325 (65 Fe) MG EC tablet Take 325 mg by mouth daily      clotrimazole-betamethasone (LOTRISONE) 1-0.05 % cream Apply topically 2 times daily.  15 g 1    amiodarone (CORDARONE) 200 MG tablet Take 1 tablet by mouth daily 90 tablet 3    atorvastatin (LIPITOR) 40 MG tablet TAKE 1 TABLET BY MOUTH DAILY 90 tablet 3    apixaban (ELIQUIS) 5 MG TABS tablet TAKE 1 TABLET BY MOUTH TWICE DAILY 60 tablet 11    Continuous Blood Gluc  (FREESTYLE KWESI 14 DAY READER) ANITA Use as Directed Dx E11.9 1 Device 0    Continuous Blood Gluc Sensor (FREESTYLE KWESI 14 DAY SENSOR) [facility] Use as directed Dx E11.9 3 each 3    levothyroxine (SYNTHROID) 25 MCG tablet TAKE 1 TABLET BY MOUTH DAILY 90 tablet 3    omega-3 acid ethyl esters (LOVAZA) 1 g capsule TAKE 1 CAPSULE BY MOUTH TWICE DAILY 180 capsule 3    Blood Glucose Monitoring Suppl (ONE TOUCH ULTRA MINI) w/Device KIT 1 kit by Does not apply route three times daily 1 kit 0    Handicap Placard MISC by Does not apply route Expires: 1/9/22. Diagnosis: cardiomyopathy. 1 each 0    B-D UF III MINI PEN NEEDLES 31G X 5 MM MISC USE DAILY 100 each 5    acetaminophen (TYLENOL) 325 MG tablet Take 2 tablets by mouth every 4 hours as needed for Pain 120 tablet 3    Coenzyme Q10 (CO Q 10) 100 MG CAPS Take 1 capsule by mouth daily      Cinnamon 500 MG CAPS Take 1 capsule by mouth daily      glucose blood VI test strips (ONE TOUCH TEST STRIPS) strip 1 each by In Vitro route daily As needed. 100 each 3    Vitamin D (CHOLECALCIFEROL) 1000 UNITS CAPS capsule Take 2,000 Units by mouth nightly       Insulin Syringe-Needle U-100 (INSULIN SYRINGE .5CC/31GX5/16\") 31G X 5/16\" 0.5 ML MISC Patient tests bid 100 Syringe 5    therapeutic multivitamin-minerals (THERAGRAN-M) tablet Take 1 tablet by mouth daily.  aspirin 81 MG EC tablet Take 81 mg by mouth daily.  dapagliflozin (FARXIGA) 10 MG tablet Take 1 tablet by mouth every morning 90 tablet 3     No current facility-administered medications for this visit. Physical Exam:   /62   Pulse 70   Ht 6' 4\" (1.93 m)   Wt (!) 327 lb (148.3 kg)   BMI 39.80 kg/m²   BP Readings from Last 3 Encounters:   04/12/21 110/62   02/04/21 128/64   12/14/20 130/78     Pulse Readings from Last 3 Encounters:   04/12/21 70   02/04/21 57   12/14/20 80     Wt Readings from Last 3 Encounters:   04/12/21 (!) 327 lb (148.3 kg)   02/04/21 (!) 327 lb (148.3 kg)   12/14/20 (!) 318 lb 12.8 oz (144.6 kg)     Constitutional: He is oriented to person, place, and time. He appears well-developed and well-nourished. In no acute distress. HEENT: Normocephalic and atraumatic. Sclerae anicteric. No xanthelasmas.  Conjunctiva white, no subconjunctival hemorrhage   External inspection of ears nose teeth & gums   Eyes:PERRLA EOM's intact. Neck: Neck supple. No JVD present. Carotids without bruits. No mass and no thyromegaly present. No lymphadenopathy present. Cardiovascular: RRR, normal S1 and S2; no murmur/gallop or rub, PMI nondisplaced  Pulmonary/Chest: Effort normal.  Lungs clear to auscultation. Chest wall nontender  Abdominal: soft, nontender, nondistended. + bowel sounds; no organomegaly or bruits. Aorta normal  Extremities: No edema, cyanosis, or clubbing. Pulses are 2+ radial/carotid/dorsalis pedis bilaterally. Cap refill brisk. Neurological: No cranial nerve deficit. Psychiatric: He has a normal mood and affect.  His speech is normal and behavior is normal.     Lab Review:   Lab Results   Component Value Date    TRIG 49 07/07/2020    HDL 53 07/07/2020    HDL 56 11/15/2011    LDLCALC 49 07/07/2020    LABVLDL 10 07/07/2020     Lab Results   Component Value Date     12/03/2020    K 4.5 12/03/2020    K 4.8 07/19/2020     12/03/2020    CO2 24 12/03/2020    BUN 18 12/03/2020    CREATININE 1.3 12/03/2020    GLUCOSE 123 02/04/2021    CALCIUM 8.9 12/03/2020      Lab Results   Component Value Date    WBC 4.7 08/26/2020    HGB 10.6 (A) 08/26/2020    HCT 33.8 (A) 08/26/2020    MCV 90.6 08/26/2020     08/26/2020                  Assessment:    hx CM HFpEF  Near euvolemic   BiV ICD in situ /  on amiodarone / obesity   AICD interrogation are with  Dr Kev Chávez office routinely     Pafib  [id number] Score for Atrial Fibrillation Stroke Risk   Risk   Factors  Component Value   C CHF Yes 1   H HTN Yes 1   A2 Age >= 76 No,  (79 y.o.) 0   D DM Yes 1   S2 Prior Stroke/TIA Yes 2   V Vascular Disease No 0   A Age 74-69 Yes,  (79 y.o.) 1   Sc Sex male 0    [id number]  Score  6   Denies  any falls or any noted bleeding   Hgb 12.4 /stable  / sCr wnl   s/p DCCV      HTN   Optimal     HLD    On statin   LDL 75 9/2018                                           CRD  Stable    DMT2  Glucose average  118 WILL   uses CPAP     Plan:  I continue Lasix currently at 40 mg every other day. He will have his device interrogated fairly soon by . Return to see me in 6 months. Yelena Valenzuela M.D.  [facility]

## 2021-04-13 ENCOUNTER — TELEPHONE (OUTPATIENT)
Dept: INTERNAL MEDICINE CLINIC | Age: 71
End: 2021-04-13

## 2021-04-13 DIAGNOSIS — I10 ESSENTIAL HYPERTENSION: ICD-10-CM

## 2021-04-14 ENCOUNTER — TELEPHONE (OUTPATIENT)
Dept: INTERNAL MEDICINE CLINIC | Age: 71
End: 2021-04-14

## 2021-04-27 ENCOUNTER — NURSE ONLY (OUTPATIENT)
Dept: CARDIOLOGY CLINIC | Age: 71
End: 2021-04-27
Payer: MEDICARE

## 2021-04-27 DIAGNOSIS — Z95.810 CARDIAC RESYNCHRONIZATION THERAPY DEFIBRILLATOR (CRT-D) IN PLACE: ICD-10-CM

## 2021-04-27 DIAGNOSIS — I50.22 CHRONIC SYSTOLIC HF (HEART FAILURE) (HCC): ICD-10-CM

## 2021-04-27 DIAGNOSIS — I42.8 NICM (NONISCHEMIC CARDIOMYOPATHY) (HCC): ICD-10-CM

## 2021-04-27 DIAGNOSIS — I42.0 DILATED CARDIOMYOPATHY (HCC): ICD-10-CM

## 2021-04-27 PROCEDURE — 93295 DEV INTERROG REMOTE 1/2/MLT: CPT | Performed by: INTERNAL MEDICINE

## 2021-04-27 PROCEDURE — 93297 REM INTERROG DEV EVAL ICPMS: CPT | Performed by: INTERNAL MEDICINE

## 2021-04-27 PROCEDURE — 93296 REM INTERROG EVL PM/IDS: CPT | Performed by: INTERNAL MEDICINE

## 2021-04-27 RX ORDER — CARVEDILOL 6.25 MG/1
TABLET ORAL
Qty: 30 TABLET | Refills: 5 | Status: SHIPPED | OUTPATIENT
Start: 2021-04-27 | End: 2021-08-25

## 2021-04-27 NOTE — PROGRESS NOTES
We received remote transmission from patient's monitor at home. Transmission shows normal sensing and pacing function. Pt has known AF and is on Eliquis. EP physician will review. See interrogation under cardiology tab in the [address] [place] [address] [address] field for more details. Optivol slightly elevated.

## 2021-04-28 ENCOUNTER — TELEPHONE (OUTPATIENT)
Dept: CARDIOLOGY CLINIC | Age: 71
End: 2021-04-28

## 2021-04-28 NOTE — TELEPHONE ENCOUNTER
Pt called in stating that Disha Potter NP left him a message that he is retaining fluid. Pt wants to return her call.

## 2021-04-29 ENCOUNTER — TELEPHONE (OUTPATIENT)
Dept: CARDIOLOGY CLINIC | Age: 71
End: 2021-04-29

## 2021-04-29 NOTE — TELEPHONE ENCOUNTER
Patient is returning [facility] call please advise

## 2021-04-30 NOTE — TELEPHONE ENCOUNTER
Patient called our answering service last night at 5:06 and states he call your office back and was on hold for over 1/2 and then was transferred back to the  and then was disconnected. Would like a call back this morning.

## 2021-05-03 NOTE — RESULT ENCOUNTER NOTE
lmom 3 times for patient to return call also spoke to patients wife who stated she would have him to call and to make an appointment

## 2021-05-03 NOTE — TELEPHONE ENCOUNTER
lmom for patient this morning to return call for an appointment  3rd time message was left for patient

## 2021-05-11 NOTE — PROGRESS NOTES
[facility]   Electrophysiology      Date: 5/12/2021    Primary Cardiologist: Carmelo Jacobo MD  PCP: Alexia Woodard MD     Chief Complaint:   Chief Complaint   Patient presents with    1 Year Follow Up     some dizziness when he gets up too fast, fatigue. has been dealing with ulcer on the bottom of his foot. History of Present Illness:    I saw Francine Fisher in the office for electrophysiology follow up today. He is a 78 y.o. male with a past medical history of nonischemic cardiomyopathy with Bi-V ICD in situ, paroxysmal Afib, obesity s/p gastric bypass (lost 130 lbs with a 30 lb regain to date), HTN, HLD, hypothyroidism on synthroid and DM II who was previously followed by Dr. Kimberly Jon at [facility] for his cardiac needs. It appears that he switched his care around 5/2017 to [facility] after he presented to [facility] with increasing SOB and LE edema--sent by his PCP. He was found to be in afib (not new) and acute on chronic HF. A cardioversion was attempted but cancelled d/t JOSHUA thrombus on JACQUELYN-- only to be rescheduled and successfully completed 6/30/17. He also underwent Bi-V ICD implant on 9/1/17 and his LVEF has since improved to 35-40%. [facility] documents a diagnosis of atrial flutter but I do not see EKG's or notes to corroborate this diagnosis. He was on amiodarone for approximately 4-5 months before he was taken off of it by Dr. Jenise Paulino for concern over possible side effects. He has remained on Coreg only since that time. Amiodarone was restarted at last office visit 11/7/18 after frequent episodes of atrial fibrillation with RVR that lead to HF exacerbation. He has been doing okay since his last visit. He has had issues with fatigue which improved after Dr. Jenise Paulino decreased his Coreg. He also has a diabetic wound on his left foot which is still healing but has limited his activity. No palpitations or syncope.  No dyspnea more than usual or edema more than his normal. No bleeding issues. Allergies:  No Known Allergies  Home Medications:  Prior to Visit Medications    Medication Sig Taking? Authorizing Provider   apixaban (ELIQUIS) 5 MG TABS tablet TAKE 1 TABLET BY MOUTH TWICE DAILY Yes Blaze Myers MD   carvedilol (COREG) 6.25 MG tablet TAKE 1 TABLET BY MOUTH TWICE DAILY WITH MEALS Yes Christina Juarez, APRN - CNP   blood glucose test strips (ONE TOUCH TEST STRIPS) strip 1 each by In Vitro route 3 times daily As needed. Yes Karin Miller MD   dapagliflozin (FARXIGA) 10 MG tablet Take 1 tablet by mouth every morning Yes Karin Miller MD   lisinopril (PRINIVIL;ZESTRIL) 2.5 MG tablet Take 1 tablet by mouth daily Yes Karin Miller MD   tamsulosin (FLOMAX) 0.4 MG capsule TAKE 1 CAPSULE BY MOUTH DAILY Yes Karin Miller MD   spironolactone (ALDACTONE) 25 MG tablet Take 1 tablet by mouth daily Yes Karin Miller MD   oxybutynin (DITROPAN-XL) 5 MG extended release tablet TAKE 1 TABLET BY MOUTH DAILY FOR OVERACTIVE BLADDER Yes Karin Miller MD   NOVOLOG FLEXPEN 100 UNIT/ML injection pen ADMINISTER 8 TO 10 UNITS UNDER THE SKIN THREE TIMES DAILY BEFORE MEALS Yes Karin Miller MD   ONETOUCH ULTRA strip USE WITH GLUCOSE METER THREE TIMES DAILY AND AS NEEDED Yes Karin Miller MD   furosemide (LASIX) 40 MG tablet Take 1 tablet by mouth daily Yes Christina Juarez, APRN - CNP   TRESIBA FLEXTOUCH 100 UNIT/ML SOPN INJECT 10 UNITS UNDER THE SKIN NIGHTLY Yes Karin Miller MD   ferrous sulfate (FE TABS 325) 325 (65 Fe) MG EC tablet Take 325 mg by mouth daily Yes Historical Provider, MD   clotrimazole-betamethasone (LOTRISONE) 1-0.05 % cream Apply topically 2 times daily.  Yes Karin Miller MD   amiodarone (CORDARONE) 200 MG tablet Take 1 tablet by mouth daily Yes Dalila Henderson MD   atorvastatin (LIPITOR) 40 MG tablet TAKE 1 TABLET BY MOUTH DAILY Yes Karin Miller MD   Continuous Blood Gluc  ("[employer]YLE KWESI 14 DAY READER) ANITA Use as Directed Dx E11.9 Constitutional: Positive for fatigue. Negative for chills, fever and unexpected weight change. HENT: Negative for congestion, hearing loss, sinus pressure, sore throat and trouble swallowing. Respiratory: Positive for shortness of breath (MOSS). Negative for cough and wheezing. Cardiovascular: Positive for leg swelling (chronic and unchanged). Negative for chest pain and palpitations. Gastrointestinal: Negative for abdominal pain, blood in stool, constipation, diarrhea, nausea and vomiting. Genitourinary: Negative for hematuria. Musculoskeletal: Negative for arthralgias, back pain, gait problem and myalgias. Skin: Positive for wound (L foot). Negative for color change and rash. Neurological: Negative for dizziness, seizures, syncope, speech difficulty, weakness and light-headedness. Hematological: Does not bruise/bleed easily. Physical Examination:  Vitals:    05/12/21 1136   BP: 116/62   Pulse: 76   SpO2: 99%      Wt Readings from Last 3 Encounters:   05/12/21 (!) 325 lb (147.4 kg)   04/12/21 (!) 327 lb (148.3 kg)   02/04/21 (!) 327 lb (148.3 kg)       Physical Exam     Pertinent labs, diagnostic, device, and imaging results reviewed as a part of this visit    LABS    CBC:   Lab Results   Component Value Date    WBC 4.7 08/26/2020    HGB 10.6 (A) 08/26/2020    HCT 33.8 (A) 08/26/2020    MCV 90.6 08/26/2020     08/26/2020     BMP:   Lab Results   Component Value Date    CREATININE 1.3 12/03/2020    BUN 18 12/03/2020     12/03/2020    K 4.5 12/03/2020     12/03/2020    CO2 24 12/03/2020     CrCl cannot be calculated (Patient's most recent lab result is older than the maximum 120 days allowed. ).    No results found for: BNP    Thyroid:   Lab Results   Component Value Date    TSH 1.93 05/04/2017    P4IGPHG 9.2 11/27/2012     Lipid Panel:   Lab Results   Component Value Date    CHOL 112 07/07/2020    HDL 53 07/07/2020    HDL 56 11/15/2011    TRIG 49 07/07/2020     LFTs:  Lab Results   Component Value Date    ALT 28 2020    AST 18 2020    ALKPHOS 116 2020    BILITOT 0.4 2020     Coags:   Lab Results   Component Value Date    PROTIME 13.6 (H) 2019    INR 1.19 (H) 2019    APTT 41.6 (H) 2019       EC2021   AV paced at 63 BPM.     Echo: 19  Poor image quality. Overall left ventricular systolic function appears moderately reduced. Ejection fraction is visually estimated to be 35-40% with diffuse   hypokinesis. There is mildly increased left ventricular wall thickness. The right ventricle is not well visualized but ICD wire is present. Mild tricuspid regurgitation. Trivial aortic and mitral regurgitation. Cath: 07 TCH   IMPRESSION:     1. Dilated cardiomyopathy with reduced ejection fraction as noted above. 2. 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 3. Normal renal arteries selectively injected. 4. Normal right femoral artery. EP Procedures:  1. Medtronic Bi-V ICD implant on 17 with Dr. Juan R Early  2. Successful DCCV on 17 with Dr. Rasheeda Guillaume interrogation: 2021   Normal device function. Battery life 2.9 years  A paced 1.1%  V paced 97.8% (effective 97.5%)  AV paced 96.7%  Two NSVT. Several AF episodes, overall burden is 1%.     Assessment & Plan:    Non-ischemic cardiomyopathy   - improved EF post implant to 35-40%   - s/p Medtronic Bi-V ICD implanted    - device interrogation as above, device with normal function   - continue with routine follow up with device clinic    Paroxsymal atrial fibrillation   - first noted in 2017 following device implant   - issues with HF when in A fib previously   - on amiodarone 200mg QD, Coreg 6.25mg BID   - CHADS2-VASc 4 (age, HTN, DM, HF) on Eliquis 5mg BID   - burden overall is low on device check today at 1%    Chronic systolic heart failure   - EF 35-40%, NYHA class II   - on Coreg, lisinopril, Lasix, Aldactone   - managed by

## 2021-05-12 ENCOUNTER — OFFICE VISIT (OUTPATIENT)
Dept: CARDIOLOGY CLINIC | Age: 71
End: 2021-05-12
Payer: MEDICARE

## 2021-05-12 ENCOUNTER — NURSE ONLY (OUTPATIENT)
Dept: CARDIOLOGY CLINIC | Age: 71
End: 2021-05-12
Payer: MEDICARE

## 2021-05-12 VITALS
BODY MASS INDEX: 38.36 KG/M2 | HEART RATE: 76 BPM | DIASTOLIC BLOOD PRESSURE: 62 MMHG | SYSTOLIC BLOOD PRESSURE: 116 MMHG | WEIGHT: 315 LBS | HEIGHT: 76 IN | OXYGEN SATURATION: 99 %

## 2021-05-12 DIAGNOSIS — I42.0 DILATED CARDIOMYOPATHY (HCC): Primary | ICD-10-CM

## 2021-05-12 DIAGNOSIS — Z95.810 CARDIAC RESYNCHRONIZATION THERAPY DEFIBRILLATOR (CRT-D) IN PLACE: ICD-10-CM

## 2021-05-12 DIAGNOSIS — I50.22 CHRONIC SYSTOLIC HEART FAILURE (HCC): ICD-10-CM

## 2021-05-12 DIAGNOSIS — I47.29 NSVT (NONSUSTAINED VENTRICULAR TACHYCARDIA): ICD-10-CM

## 2021-05-12 DIAGNOSIS — I42.8 NICM (NONISCHEMIC CARDIOMYOPATHY) (HCC): ICD-10-CM

## 2021-05-12 DIAGNOSIS — I48.0 PAROXYSMAL ATRIAL FIBRILLATION (HCC): ICD-10-CM

## 2021-05-12 DIAGNOSIS — I50.22 CHRONIC SYSTOLIC HF (HEART FAILURE) (HCC): ICD-10-CM

## 2021-05-12 DIAGNOSIS — I42.0 DILATED CARDIOMYOPATHY (HCC): ICD-10-CM

## 2021-05-12 PROCEDURE — 93284 PRGRMG EVAL IMPLANTABLE DFB: CPT | Performed by: INTERNAL MEDICINE

## 2021-05-12 PROCEDURE — 99214 OFFICE O/P EST MOD 30 MIN: CPT | Performed by: NURSE PRACTITIONER

## 2021-05-12 ASSESSMENT — ENCOUNTER SYMPTOMS
COLOR CHANGE: 0
SORE THROAT: 0
BLOOD IN STOOL: 0
COUGH: 0
BACK PAIN: 0
ABDOMINAL PAIN: 0
CONSTIPATION: 0
SINUS PRESSURE: 0
NAUSEA: 0
SHORTNESS OF BREATH: 1
TROUBLE SWALLOWING: 0
VOMITING: 0
DIARRHEA: 0
WHEEZING: 0

## 2021-05-12 NOTE — PROGRESS NOTES
Pt seen in clinic today for cardiac device interrogation. Their device is a  MDT 3 chamber ICD. Based on threshold, impedance, and intrinsic sensing tests run today, the device appears to be functioning normally. Remaining battery life is 2y11m 2.95v  AP 97.15%      RVP 79.89           CRTP 97.62%      1% AT/AF [place]  - Pt on AC  Longest episode 48m53s 5/02/2021   Multiple episodes converted to NSR by device ATP therapy. optivol shows pt has returned to normal fluid levels after restarting lasix last week. Pt complains of general fatigue when attempting to use stationary bike to exercise. Rate Response settings changed to increase max HR from 95 to 105 and the rate at which it reaches this hr to increase. - changes discussed with and approved by NICKOLAS Flowers. Pt was informed of findings today and general questions have been answered with regard to device. Home monitoring hardware is transmitting on schedule. Today's interrogation to be reviewed by Dr. Deann Mckeon. - initial interrogation sheet lost in export from , see paceart report for information presented there. Pt to see NICKOLAS Flowers in clinic today.

## 2021-05-12 NOTE — RESULT ENCOUNTER NOTE
Device interrogation reviewed. Device interrogation shows multiple episodes of \"AF\" but unable to see tracings. Would recommend future episodes to be able to view intracardiac tracings to verify if true Afib.

## 2021-05-13 NOTE — RESULT ENCOUNTER NOTE
Device interrogation reviewed. Shows episodes of atrial fibrillation. Given that there is a CS lead in place, and concerns for dislodgement if attempts for atrial fibrillation ablation is performed, would continue with current medications.

## 2021-05-21 ENCOUNTER — OFFICE VISIT (OUTPATIENT)
Dept: INTERNAL MEDICINE CLINIC | Age: 71
End: 2021-05-21
Payer: MEDICARE

## 2021-05-21 VITALS
WEIGHT: 315 LBS | BODY MASS INDEX: 41.75 KG/M2 | OXYGEN SATURATION: 94 % | HEIGHT: 73 IN | SYSTOLIC BLOOD PRESSURE: 135 MMHG | HEART RATE: 89 BPM | DIASTOLIC BLOOD PRESSURE: 64 MMHG

## 2021-05-21 DIAGNOSIS — N18.31 STAGE 3A CHRONIC KIDNEY DISEASE (HCC): ICD-10-CM

## 2021-05-21 DIAGNOSIS — E78.2 MIXED HYPERLIPIDEMIA: ICD-10-CM

## 2021-05-21 DIAGNOSIS — Z79.4 TYPE 2 DIABETES MELLITUS WITHOUT COMPLICATION, WITH LONG-TERM CURRENT USE OF INSULIN (HCC): Primary | ICD-10-CM

## 2021-05-21 DIAGNOSIS — I42.8 OTHER CARDIOMYOPATHY (HCC): ICD-10-CM

## 2021-05-21 DIAGNOSIS — Z12.5 PROSTATE CANCER SCREENING: ICD-10-CM

## 2021-05-21 DIAGNOSIS — E11.9 TYPE 2 DIABETES MELLITUS WITHOUT COMPLICATION, WITH LONG-TERM CURRENT USE OF INSULIN (HCC): Primary | ICD-10-CM

## 2021-05-21 DIAGNOSIS — I10 ESSENTIAL HYPERTENSION: ICD-10-CM

## 2021-05-21 LAB
CHP ED QC CHECK: NORMAL
GLUCOSE BLD-MCNC: 120 MG/DL
HBA1C MFR BLD: 6.6 %

## 2021-05-21 PROCEDURE — 82962 GLUCOSE BLOOD TEST: CPT | Performed by: INTERNAL MEDICINE

## 2021-05-21 PROCEDURE — 83036 HEMOGLOBIN GLYCOSYLATED A1C: CPT | Performed by: INTERNAL MEDICINE

## 2021-05-21 PROCEDURE — 99214 OFFICE O/P EST MOD 30 MIN: CPT | Performed by: INTERNAL MEDICINE

## 2021-05-21 SDOH — ECONOMIC STABILITY: FOOD INSECURITY: WITHIN THE PAST 12 MONTHS, YOU WORRIED THAT YOUR FOOD WOULD RUN OUT BEFORE YOU GOT MONEY TO BUY MORE.: NEVER TRUE

## 2021-05-21 SDOH — EDUCATIONAL SECURITY: EDUCATION ATTAINMENT: WHAT IS THE HIGHEST LEVEL OF SCHOOL YOU HAVE COMPLETED OR THE HIGHEST DEGREE YOU HAVE RECEIVED?: PATIENT REFUSED

## 2021-05-21 SDOH — ECONOMIC STABILITY: FOOD INSECURITY: WITHIN THE PAST 12 MONTHS, THE FOOD YOU BOUGHT JUST DIDN'T LAST AND YOU DIDN'T HAVE MONEY TO GET MORE.: NEVER TRUE

## 2021-05-21 SDOH — ECONOMIC STABILITY: INCOME INSECURITY: HOW HARD IS IT FOR YOU TO PAY FOR THE VERY BASICS LIKE FOOD, HOUSING, MEDICAL CARE, AND HEATING?: NOT VERY HARD

## 2021-05-21 ASSESSMENT — PATIENT HEALTH QUESTIONNAIRE - PHQ9
1. LITTLE INTEREST OR PLEASURE IN DOING THINGS: 0
2. FEELING DOWN, DEPRESSED OR HOPELESS: 0
SUM OF ALL RESPONSES TO PHQ QUESTIONS 1-9: 0
SUM OF ALL RESPONSES TO PHQ9 QUESTIONS 1 & 2: 0

## 2021-05-21 ASSESSMENT — SOCIAL DETERMINANTS OF HEALTH (SDOH): HOW HARD IS IT FOR YOU TO PAY FOR THE VERY BASICS LIKE FOOD, HOUSING, MEDICAL CARE, AND HEATING?: NOT HARD AT ALL

## 2021-05-21 NOTE — PROGRESS NOTES
Patient: Racquel Redd is a 78 y.o. male who presents today with the following Chief Complaint(s):    Chief Complaint   Patient presents with    Diabetes    Follow-up         HPI He is here for a check up and f/u on hypertension, diabetes, T2, hyperlipidemia. He is taking medication but admits to inconsistency with diuretic. Continues working on better meal planning and more physical activity. Current Outpatient Medications   Medication Sig Dispense Refill    apixaban (ELIQUIS) 5 MG TABS tablet TAKE 1 TABLET BY MOUTH TWICE DAILY 60 tablet 11    carvedilol (COREG) 6.25 MG tablet TAKE 1 TABLET BY MOUTH TWICE DAILY WITH MEALS 30 tablet 5    blood glucose test strips (ONE TOUCH TEST STRIPS) strip 1 each by In Vitro route 3 times daily As needed. 300 each 3    dapagliflozin (FARXIGA) 10 MG tablet Take 1 tablet by mouth every morning 90 tablet 3    lisinopril (PRINIVIL;ZESTRIL) 2.5 MG tablet Take 1 tablet by mouth daily 90 tablet 3    tamsulosin (FLOMAX) 0.4 MG capsule TAKE 1 CAPSULE BY MOUTH DAILY 90 capsule 2    spironolactone (ALDACTONE) 25 MG tablet Take 1 tablet by mouth daily 90 tablet 3    oxybutynin (DITROPAN-XL) 5 MG extended release tablet TAKE 1 TABLET BY MOUTH DAILY FOR OVERACTIVE BLADDER 90 tablet 1    NOVOLOG FLEXPEN 100 UNIT/ML injection pen ADMINISTER 8 TO 10 UNITS UNDER THE SKIN THREE TIMES DAILY BEFORE MEALS 45 mL 5    ONETOUCH ULTRA strip USE WITH GLUCOSE METER THREE TIMES DAILY AND AS NEEDED 300 strip 3    furosemide (LASIX) 40 MG tablet Take 1 tablet by mouth daily 60 tablet 3    TRESIBA FLEXTOUCH 100 UNIT/ML SOPN INJECT 10 UNITS UNDER THE SKIN NIGHTLY 9 mL 3    ferrous sulfate (FE TABS 325) 325 (65 Fe) MG EC tablet Take 325 mg by mouth daily      clotrimazole-betamethasone (LOTRISONE) 1-0.05 % cream Apply topically 2 times daily.  15 g 1    amiodarone (CORDARONE) 200 MG tablet Take 1 tablet by mouth daily 90 tablet 3    atorvastatin (LIPITOR) 40 MG tablet TAKE 1 TABLET BY MOUTH DAILY 90 tablet 3    Continuous Blood Gluc  (FREESTYLE KWESI 14 DAY READER) ANITA Use as Directed Dx E11.9 1 Device 0    Continuous Blood Gluc Sensor (FREESTYLE KWESI 14 DAY SENSOR) MISC Use as directed Dx E11.9 3 each 3    levothyroxine (SYNTHROID) 25 MCG tablet TAKE 1 TABLET BY MOUTH DAILY 90 tablet 3    omega-3 acid ethyl esters (LOVAZA) 1 g capsule TAKE 1 CAPSULE BY MOUTH TWICE DAILY 180 capsule 3    Blood Glucose Monitoring Suppl (ONE TOUCH ULTRA MINI) w/Device KIT 1 kit by Does not apply route three times daily 1 kit 0    Handicap Placard MISC by Does not apply route Expires: 1/9/22. Diagnosis: cardiomyopathy. 1 each 0    B-D UF III MINI PEN NEEDLES 31G X 5 MM MISC USE DAILY 100 each 5    acetaminophen (TYLENOL) 325 MG tablet Take 2 tablets by mouth every 4 hours as needed for Pain 120 tablet 3    Coenzyme Q10 (CO Q 10) 100 MG CAPS Take 1 capsule by mouth daily      Cinnamon 500 MG CAPS Take 1 capsule by mouth daily      Vitamin D (CHOLECALCIFEROL) 1000 UNITS CAPS capsule Take 2,000 Units by mouth nightly       Insulin Syringe-Needle U-100 (INSULIN SYRINGE .5CC/31GX5/16\") 31G X 5/16\" 0.5 ML MISC Patient tests bid 100 Syringe 5    therapeutic multivitamin-minerals (THERAGRAN-M) tablet Take 1 tablet by mouth daily.  aspirin 81 MG EC tablet Take 81 mg by mouth daily. No current facility-administered medications for this visit. Patient's past medical history, surgical history, family history, medications,and allergies  were all reviewed and updated as appropriate today. Review of Systems   Constitutional: Negative. HENT: Negative. Eyes: Negative. Respiratory: Negative. Cardiovascular:        HTN, treated with B blker, ACEI and diuretic. Cardiomyopathy. Denies CP or SOB. See med list.   Gastrointestinal: Negative. Endocrine:        Hyperlipidemia, treated with statin. Prior LDL 49. Diabetes, T2, treated with B-B insulin and SGLT2. A1c 6.6. hyperlipidemia  Heart healthy diet and statin compliance discussed. 5. Essential hypertension  Continue same med regimen. B blker, ACEI, diuretic. DASH and low sodium diet discussed. 6.      Stage 3a CKD. Risk factor control stressed. Avoidance of nephrotoxins discussed.    Plan:

## 2021-05-25 DIAGNOSIS — Z12.5 PROSTATE CANCER SCREENING: ICD-10-CM

## 2021-05-25 DIAGNOSIS — I42.8 OTHER CARDIOMYOPATHY (HCC): ICD-10-CM

## 2021-05-25 LAB
ANION GAP SERPL CALCULATED.3IONS-SCNC: 14 MMOL/L (ref 3–16)
BUN BLDV-MCNC: 25 MG/DL (ref 7–20)
CALCIUM SERPL-MCNC: 8.5 MG/DL (ref 8.3–10.6)
CHLORIDE BLD-SCNC: 106 MMOL/L (ref 99–110)
CO2: 19 MMOL/L (ref 21–32)
CREAT SERPL-MCNC: 1.5 MG/DL (ref 0.8–1.3)
GFR AFRICAN AMERICAN: 56
GFR NON-AFRICAN AMERICAN: 46
GLUCOSE BLD-MCNC: 108 MG/DL (ref 70–99)
MAGNESIUM: 2.5 MG/DL (ref 1.8–2.4)
POTASSIUM SERPL-SCNC: 4.8 MMOL/L (ref 3.5–5.1)
PROSTATE SPECIFIC ANTIGEN: 1.78 NG/ML (ref 0–4)
SODIUM BLD-SCNC: 139 MMOL/L (ref 136–145)

## 2021-06-02 ENCOUNTER — TELEPHONE (OUTPATIENT)
Dept: INTERNAL MEDICINE CLINIC | Age: 71
End: 2021-06-02

## 2021-06-04 ENCOUNTER — TELEPHONE (OUTPATIENT)
Dept: INTERNAL MEDICINE CLINIC | Age: 71
End: 2021-06-04

## 2021-06-04 NOTE — TELEPHONE ENCOUNTER
They would like to know who in the [facility] would be recommended for vascular.

## 2021-06-10 ENCOUNTER — TELEPHONE (OUTPATIENT)
Dept: CARDIOLOGY CLINIC | Age: 71
End: 2021-06-10

## 2021-06-10 NOTE — TELEPHONE ENCOUNTER
Medication Samples    Medication:    Eliquis     Dosage of the medication:    5 mg    How are you taking this medication (QD, BID, TID, QID, PRN):    TAKE 1 TABLET BY MOUTH TWICE DAILY     in the office or Mail to your home?      in office

## 2021-06-14 NOTE — TELEPHONE ENCOUNTER
meds mailed to pt, called pts #    Left message on pts machine to call back    When he calls back please advise him that the meds were sent

## 2021-06-20 ASSESSMENT — ENCOUNTER SYMPTOMS
RESPIRATORY NEGATIVE: 1
GASTROINTESTINAL NEGATIVE: 1
EYES NEGATIVE: 1

## 2021-06-21 ENCOUNTER — TELEPHONE (OUTPATIENT)
Dept: INTERNAL MEDICINE CLINIC | Age: 71
End: 2021-06-21

## 2021-07-06 ENCOUNTER — HOSPITAL ENCOUNTER (OUTPATIENT)
Dept: MRI IMAGING | Age: 71
Discharge: HOME | End: 2021-07-06
Payer: MEDICARE

## 2021-07-06 ENCOUNTER — HOSPITAL ENCOUNTER (OUTPATIENT)
Dept: GENERAL RADIOLOGY | Age: 71
Discharge: HOME | End: 2021-07-06
Payer: MEDICARE

## 2021-07-06 DIAGNOSIS — M86.172 OTHER ACUTE OSTEOMYELITIS, LEFT ANKLE AND FOOT (HCC): ICD-10-CM

## 2021-07-06 DIAGNOSIS — Z95.0 PACEMAKER: ICD-10-CM

## 2021-07-06 PROCEDURE — 71045 X-RAY EXAM CHEST 1 VIEW: CPT

## 2021-07-06 PROCEDURE — 73721 MRI JNT OF LWR EXTRE W/O DYE: CPT

## 2021-07-08 ENCOUNTER — TELEPHONE (OUTPATIENT)
Dept: INTERNAL MEDICINE CLINIC | Age: 71
End: 2021-07-08

## 2021-07-08 RX ORDER — PEN NEEDLE, DIABETIC 31 GX5/16"
1 NEEDLE, DISPOSABLE MISCELLANEOUS DAILY
Qty: 100 EACH | Refills: 5 | Status: SHIPPED | OUTPATIENT
Start: 2021-07-08

## 2021-07-08 RX ORDER — LEVOTHYROXINE SODIUM 0.03 MG/1
TABLET ORAL
Qty: 90 TABLET | Refills: 3 | Status: SHIPPED | OUTPATIENT
Start: 2021-07-08 | End: 2022-10-04

## 2021-07-20 DIAGNOSIS — N32.81 OAB (OVERACTIVE BLADDER): ICD-10-CM

## 2021-07-20 RX ORDER — FUROSEMIDE 40 MG/1
40 TABLET ORAL DAILY
Qty: 60 TABLET | Refills: 3 | Status: SHIPPED | OUTPATIENT
Start: 2021-07-20 | End: 2021-12-06

## 2021-07-20 RX ORDER — OXYBUTYNIN CHLORIDE 5 MG/1
5 TABLET, EXTENDED RELEASE ORAL DAILY
Qty: 90 TABLET | Refills: 1 | Status: SHIPPED | OUTPATIENT
Start: 2021-07-20 | End: 2022-01-05

## 2021-07-27 ENCOUNTER — NURSE ONLY (OUTPATIENT)
Dept: CARDIOLOGY CLINIC | Age: 71
End: 2021-07-27
Payer: MEDICARE

## 2021-07-27 DIAGNOSIS — Z95.810 CARDIAC RESYNCHRONIZATION THERAPY DEFIBRILLATOR (CRT-D) IN PLACE: ICD-10-CM

## 2021-07-27 DIAGNOSIS — I42.0 DILATED CARDIOMYOPATHY (HCC): ICD-10-CM

## 2021-07-27 DIAGNOSIS — I50.22 CHRONIC SYSTOLIC HEART FAILURE (HCC): ICD-10-CM

## 2021-07-27 DIAGNOSIS — I47.29 NSVT (NONSUSTAINED VENTRICULAR TACHYCARDIA): ICD-10-CM

## 2021-07-27 PROCEDURE — 93296 REM INTERROG EVL PM/IDS: CPT | Performed by: INTERNAL MEDICINE

## 2021-07-27 PROCEDURE — 93297 REM INTERROG DEV EVAL ICPMS: CPT | Performed by: INTERNAL MEDICINE

## 2021-07-27 PROCEDURE — 93295 DEV INTERROG REMOTE 1/2/MLT: CPT | Performed by: INTERNAL MEDICINE

## 2021-07-27 NOTE — PROGRESS NOTES
We received remote transmission from patient's CRT-D monitor at home. Transmission shows normal sensing and pacing function. NSVT noted (Coreg, Amiodarone). Ap 99.0%  BiVp 99.2%, Effective 99.0%, VSRp 0.6%    Possible Optivol fluid accumulation [id number]; currently shows decreasing trend below 60 threshold and TI trending towards reference line. EP physician will review. See interrogation under cardiology tab in the [address] [place] [address] [address] field for more details.

## 2021-07-27 NOTE — LETTER
[address] [facility] [phone]  [address] [phone]    Pacemaker/Defibrillator Clinic    07/27/21      [address] [phone]      Dear Carol Gonzalez    This letter is to inform you that we received the transmission from your monitor at home that checks your implanted heart device. The next date your monitor will automatically transmit will be 10/26. If your report needs attention we will notify you. Your device and monitor are wireless and most transmit cellularly, but please periodically check your monitor is still plugged in to the electrical outlet. If you still use the telephone land line to send please ensure the connection to the phone kacy is secure. This will help to ensure successful automatic transmissions in the future. Also, the monitor needs to be close to you while sleeping at night. Please be aware that the remote device transmission sites are periodically monitored only during regular business hours during which simultaneous in-office device clinics are being run. If your transmission requires attention, we will contact you as soon as possible. **PLEASE NOTE** that our [facility] policy and processes are changing to ensure a more seamless approach for all parties involved, allowing more time for our nurses to address patient issues and concerns. We will no longer be sending letters for NORMAL remote transmissions. You will be contacted by phone if your transmission requires attention (as previously done), and letters will only be sent regarding monitor disconnections or missed transmissions if you are unable to be reached by phone. Please do not be alarmed by this new process, as we will continue to contact you if your transmission report requires attention. This will be your final \"remote received\" letter.   From this point forward, the [facility] will be utilizing the no news is good news approach. As always, please feel free to contact your nurse with any questions or concerns. Thank you.     Vinay [address]

## 2021-07-29 DIAGNOSIS — E11.8 TYPE 2 DIABETES MELLITUS WITH COMPLICATION, WITH LONG-TERM CURRENT USE OF INSULIN (HCC): ICD-10-CM

## 2021-07-29 DIAGNOSIS — Z79.4 TYPE 2 DIABETES MELLITUS WITH COMPLICATION, WITH LONG-TERM CURRENT USE OF INSULIN (HCC): ICD-10-CM

## 2021-07-30 RX ORDER — ATORVASTATIN CALCIUM 40 MG/1
40 TABLET, FILM COATED ORAL DAILY
Qty: 90 TABLET | Refills: 3 | Status: SHIPPED | OUTPATIENT
Start: 2021-07-30 | End: 2022-10-04

## 2021-08-19 ENCOUNTER — OFFICE VISIT (OUTPATIENT)
Dept: INTERNAL MEDICINE CLINIC | Age: 71
End: 2021-08-19
Payer: MEDICARE

## 2021-08-19 VITALS
RESPIRATION RATE: 16 BRPM | OXYGEN SATURATION: 98 % | SYSTOLIC BLOOD PRESSURE: 126 MMHG | HEIGHT: 73 IN | BODY MASS INDEX: 41.75 KG/M2 | HEART RATE: 77 BPM | WEIGHT: 315 LBS | DIASTOLIC BLOOD PRESSURE: 68 MMHG

## 2021-08-19 DIAGNOSIS — I10 ESSENTIAL HYPERTENSION: ICD-10-CM

## 2021-08-19 DIAGNOSIS — E11.8 DIABETES MELLITUS TYPE 2 WITH COMPLICATIONS (HCC): Primary | ICD-10-CM

## 2021-08-19 DIAGNOSIS — L97.501: ICD-10-CM

## 2021-08-19 DIAGNOSIS — E78.2 MIXED HYPERLIPIDEMIA: ICD-10-CM

## 2021-08-19 DIAGNOSIS — N18.30 STAGE 3 CHRONIC KIDNEY DISEASE, UNSPECIFIED WHETHER STAGE 3A OR 3B CKD (HCC): ICD-10-CM

## 2021-08-19 PROCEDURE — 99214 OFFICE O/P EST MOD 30 MIN: CPT | Performed by: INTERNAL MEDICINE

## 2021-08-23 NOTE — H&P
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 Adela Murphy COLONOSCOPY N/A 3/8/2019    COLONOSCOPY WITH MAC, UNASYN (3gm) performed by Patricia Trivedi MD at [address] 8/9/2019    COLONOSCOPY POLYPECTOMY SNARE/COLD BIOPSY performed by Patricia Trivedi MD at [facility]  8/9/2019    COLONOSCOPY WITH BIOPSY performed by Patricia Trivedi MD at [address] Right 8/28/2019    INCISION AND INCISIONAL DEBRIDEMENT OPEN FRACTURE RIGHT 2ND TOE SKIN AND BONE performed by Guillermo Huerta MD at [address] [address] Left 7/22/2020    LEFT FOOT INCISION AND DRAINAGE WITH BONE BIOPSY AND REMOVAL OF FREE FLOATING BONE FRAGMENT performed by Santino Noriega DPM at [place] [address]  1-2-10    GASTRIC BYPASS SURGERY      OTHER SURGICAL HISTORY  10/6/15    INCISION AND DRAINAGE RIGHT FOOT          OTHER SURGICAL HISTORY Right 10/8/15    INCISION AND DRAINAGE RIGHT FOOT      PACEMAKER PLACEMENT      UPPER GASTROINTESTINAL ENDOSCOPY N/A 3/8/2019    EGD BIOPSY GASTRIC H. PYLORI performed by Patricia Trivedi MD at [facility] ENDOSCOPY       FAMILY HISTORY    Family History   Problem Relation Age of Onset    Hypertension Mother     Heart Disease Father     Hypertension Maternal Grandmother     Diabetes Maternal Grandmother        SOCIAL HISTORY    Social History     Tobacco Use    Smoking status: Former Smoker     Packs/day: 1.00     Years: 4.00     Pack years: 4.00     Types: Cigarettes     Quit date: 2017     Years since quittin.6    Smokeless tobacco: Never Used   Vaping Use    Vaping Use: Never used   Substance Use Topics    Alcohol use: Yes     Comment: 2-3 times per year    Drug use: No       ALLERGIES    No Known Allergies    MEDICATIONS    Current Outpatient Medications on File Prior to Encounter   Medication Sig Dispense Refill    atorvastatin (LIPITOR) 40 MG tablet TAKE 1 TABLET BY MOUTH DAILY 90 tablet 3    furosemide (LASIX) 40 MG tablet TAKE 1 TABLET BY MOUTH DAILY 60 tablet 3    oxybutynin (DITROPAN-XL) 5 MG extended release tablet TAKE 1 TABLET BY MOUTH DAILY FOR OVERACTIVE BLADDER 90 tablet 1    levothyroxine (SYNTHROID) 25 MCG tablet TAKE 1 TABLET BY MOUTH DAILY 90 tablet 3    Insulin Pen Needle (B-D UF III MINI PEN NEEDLES) 31G X 5 MM MISC Inject 1 each into the skin daily 100 each 5    apixaban (ELIQUIS) 5 MG TABS tablet TAKE 1 TABLET BY MOUTH TWICE DAILY 60 tablet 11    carvedilol (COREG) 6.25 MG tablet TAKE 1 TABLET BY MOUTH TWICE DAILY WITH MEALS 30 tablet 5    dapagliflozin (FARXIGA) 10 MG tablet Take 1 tablet by mouth every morning 90 tablet 3    lisinopril (PRINIVIL;ZESTRIL) 2.5 MG tablet Take 1 tablet by mouth daily 90 tablet 3    tamsulosin (FLOMAX) 0.4 MG capsule TAKE 1 CAPSULE BY MOUTH DAILY 90 capsule 2    spironolactone (ALDACTONE) 25 MG tablet Take 1 tablet by mouth daily 90 tablet 3    NOVOLOG FLEXPEN 100 UNIT/ML injection pen ADMINISTER 8 TO 10 UNITS UNDER THE SKIN THREE TIMES DAILY BEFORE MEALS 45 mL 5    TRESIBA FLEXTOUCH 100 UNIT/ML SOPN INJECT 10 UNITS UNDER THE SKIN NIGHTLY 9 mL 3    ferrous sulfate (FE TABS 325) 325 (65 Fe) MG EC tablet Take 325 mg by mouth daily      amiodarone (CORDARONE) 200 MG tablet Take 1 tablet by mouth daily 90 tablet 3    Continuous Blood Gluc  (FREESTYLE KWESI 14 DAY READER) ANITA Use as Directed Dx E11.9 1 Device 0    Continuous Blood Gluc Sensor (FREESTYLE KWESI 14 DAY SENSOR) MISC Use as directed Dx E11.9 3 each 3    omega-3 acid ethyl esters (LOVAZA) 1 g capsule TAKE 1 CAPSULE BY MOUTH TWICE DAILY 180 capsule 3    Blood Glucose Monitoring Suppl (ONE TOUCH ULTRA MINI) w/Device KIT 1 kit by Does not apply route three times daily 1 kit 0    Handicap Placard MISC by Does not apply route Expires: 1/9/22. Diagnosis: cardiomyopathy. 1 each 0    acetaminophen (TYLENOL) 325 MG tablet Take 2 tablets by mouth every 4 hours as needed for Pain 120 tablet 3    Coenzyme Q10 (CO Q 10) 100 MG CAPS Take 1 capsule by mouth daily      Cinnamon 500 MG CAPS Take 1 capsule by mouth daily      Vitamin D (CHOLECALCIFEROL) 1000 UNITS CAPS capsule Take 2,000 Units by mouth nightly       Insulin Syringe-Needle U-100 (INSULIN SYRINGE .5CC/31GX5/16\") 31G X 5/16\" 0.5 ML MISC Patient tests bid 100 Syringe 5    therapeutic multivitamin-minerals (THERAGRAN-M) tablet Take 1 tablet by mouth daily.  aspirin 81 MG EC tablet Take 81 mg by mouth daily.  blood glucose test strips (ONE TOUCH TEST STRIPS) strip 1 each by In Vitro route 3 times daily As needed. 300 each 3    ONETOUCH ULTRA strip USE WITH GLUCOSE METER THREE TIMES DAILY AND AS NEEDED 300 strip 3    clotrimazole-betamethasone (LOTRISONE) 1-0.05 % cream Apply topically 2 times daily. 15 g 1     No current facility-administered medications on file prior to encounter. REVIEW OF SYSTEMS  A comprehensive review of systems was negative except for: Pertinent items are noted in HPI.     Objective:      BP (!) 152/83   Pulse 71   Temp 97.5 °F (36.4 °C) (Infrared)   Resp 16     Wt Readings from Last 3 Encounters:   08/19/21 (!) 339 lb (153.8 kg)   05/21/21 (!) 329 lb 12.8 oz (149.6 kg)   05/12/21 (!) 325 lb (147.4 kg)       PHYSICAL EXAM  General Appearance/Constitutional: alert and oriented to person, place and time, well-developed and well nourished, and in no acute distress. Nontoxic. Skin: warm and dry, no rash, positive wound per LDA documentation. Head: normocephalic and atraumatic. Eyes: extraocular eye movements intact, conjunctivae normal, and sclera anicteric. ENT: hearing grossly normal bilaterally. Normal appearance. Pulmonary/Chest: no chest wall tenderness and clear anteriorly. No respiratory distress. Cardiovascular: normal rate and regular rhythm. GI: abdomen soft, non-tender and non-distended. Musculoskeletal: normal range of motion of joints. Nontender calves. No cyanosis. Nontender calves. Edema 3+  Neurologic: no gross cranial nerve deficit and speech normal. No focal deficits. Mental status normal.      Medical Decision Makin-year-old male with multiple comorbid conditions as outlined below who is presenting as an initial visit to our wound center for nonhealing left diabetic foot ulcer present for about a year prior to presentation here. The patient had been under the care of his podiatrist at [facility].  He is seeking a second opinion as wound has not progressed. Problem List Items Addressed This Visit     Diabetic polyneuropathy associated with type 2 diabetes mellitus ([address] - Primary    Diabetic ulcer of left midfoot associated with type 2 diabetes mellitus, with necrosis of bone (HCC)    Impaired mobility    Subacute osteomyelitis of left foot ([address]    Diabetes mellitus with skin ulcer ([address]    Charcot foot due to diabetes mellitus ([address]    Atherosclerosis of native arteries of left leg with ulceration of heel and midfoot (HCC)        Comorbid conditions affecting wound healing: DM type 2, cardiomyopathy, hypertension, hyperlipidemia, stage IIIa chronic kidney disease, obesity, chronic leg edema, chronic anticoagulation. Wound evaluation: Left lower extremity is edematous with obvious Charcot deformity of left foot.   On the plantar surface patient has a large ulcer with tunneling and heavy drainage of serous type. There is quite a bit of callus and maceration noted. Overlying nonviable fibrous tissue as well as some areas of necrosis. There is also a wound on the left dorsal ankle extending to the dorsal foot. This wound is superficial.    Problems addressed during this encounter:  1. Left diabetic foot ulcer, plantar surface of midfoot. Severity of bone necrosis. Coffey grade 3  2. Left diabetic foot/ankle ulcer, dorsal aspect. Severity of skin layers involved only. Coffey grade 1  3. Diabetes mellitus type 2. Last hemoglobin A1c was 6.8. Continues to adhere to diabetic diet. Doing better with control. Additional education provided. 4. Severe left lower extremity edema. Gentle compression initiated  5. Charcot deformity leading to severe difficulties with offloading, impaired mobility. Offloading boot prescribed. Offloading felt applied today by me. 6. Osteomyelitis of left foot. We will need to look at all his records from outside facility and determine his previous antibiotic coverage and whether he will need to see infectious diseases again for IV antibiotics +/- surgical debridement. 7. Suspected peripheral arterial disease. Arterial Dopplers ordered. 8. Hyperbaric oxygen therapy needs. Discussed with patient the possibility of using this treatment modality as adjunctive treatment to help with healing. Patient seemed very motivated to proceed. However I do need to assure that there is adequate outflow prior to initiating this treatment plan. Education provided for the patient here. Data reviewed and analyzed:  1. Assessment required other independent historian(s): No patient . 2. Review of prior external note from other providers done today: Yes  3. New imaging or lab ordered today: Yes  4. Review of test results done today: Yes, MRI  5. Independent interpretation of test(s): Yes, MRI  6.  Discussion of management or test interpretation with another provider, other qualified health care professional and other external source. Risk of complications and/or mortality of patient management:  1. This patient has a high risk of morbidity and mortality from additional diagnostic testing or treatment. This is due to the above conditions affecting wound healing as well as patient and procedure risk factors. Education and discussion held with patient regarding these disease processes pertinent to wound(s). 2. Other pertinent decisions include: minor surgery or procedures as below. 3. The patient's diagnosis or treatment is not significantly limited by social determinants of health as noted by: N/A .  4. Prescription drug management: silvadene     Procedures done this visit:     Procedure Note  Indications:  Based on my examination of this patient's wound(s)/ulcer(s) today, debridement is required to promote healing and evaluate the wound base. Performed by: Zara Corcoran MD  Consent obtained:  Yes  Time out taken:  Yes  Pain Control: Anesthetic  Anesthetic: 4% Lidocaine Cream   Debridement: Excisional Debridement  Using curette, #15 blade scalpel and forceps the wound(s)/ulcer(s) was/were debrided down through and including the removal of muscle/fascia. Devitalized Tissue Debrided:  fibrin, biofilm, slough, exudate and callus    Pre Debridement Measurements:  Are located in the [place]  Documentation Flow Sheet    Diabetic/Pressure/Non Pressure Ulcers only:  Ulcer: Diabetic ulcer, bone necrosis     Wound/Ulcer #: 1    Post Debridement Measurements:  Wound/Ulcer Descriptions are Pre Debridement except measurements:    Negative Pressure Wound Therapy Foot Left (Active)   Number of days: 398       Wound 08/24/21 Foot Left;Plantar #1 (Active)   Wound Image   08/24/21 0917   Dressing Status New dressing applied 08/24/21 1106   Dressing/Treatment Roll gauze; Alginate with Ag; Ace wrap;Gauze dressing/dressing sponge; Other (comment) 08/24/21 1106   Offloading for Diabetic Foot Ulcers Felt or foam;Walker 08/24/21 1106   Wound Length (cm) 1.9 cm 08/24/21 0917   Wound Width (cm) 1.3 cm 08/24/21 0917   Wound Depth (cm) 1 cm 08/24/21 0917   Wound Surface Area (cm^2) 2.47 cm^2 08/24/21 0917   Wound Volume (cm^3) 2.47 cm^3 08/24/21 0917   Post-Procedure Length (cm) 1.9 cm 08/24/21 1001   Post-Procedure Width (cm) 1.3 cm 08/24/21 1001   Post-Procedure Depth (cm) 1.1 cm 08/24/21 1001   Post-Procedure Surface Area (cm^2) 2.47 cm^2 08/24/21 1001   Post-Procedure Volume (cm^3) 2.717 cm^3 08/24/21 1001   Distance Tunneling (cm) 2.2 cm 08/24/21 0917   Tunneling Position ___ O'Clock 6 08/24/21 0917   Undermining Starts ___ O'Clock 12 08/24/21 0917   Undermining Ends___ O'Clock 5 08/24/21 0917   Undermining Maxium Distance (cm) 0.9 08/24/21 0917   Wound Assessment Granulation tissue;Slough 08/24/21 1110   Drainage Amount Moderate 08/24/21 1110   Drainage Description Serosanguinous 08/24/21 1110   Odor None 08/24/21 1110   Michelle-wound Assessment Hyperkeratosis (callous); Maceration 08/24/21 0917   Margins Unattached edges 08/24/21 0917   Wound Thickness Description not for Pressure Injury Full thickness 08/24/21 0917   Number of days: 0       Wound 08/24/21 Foot Left;Medial;Dorsal #2 (Active)   Wound Image   08/24/21 0917   Dressing Status New dressing applied 08/24/21 1002   Dressing/Treatment ABD; Ace wrap;Alginate with Ag;Roll gauze 08/24/21 1002   Wound Length (cm) 1.1 cm 08/24/21 0917   Wound Width (cm) 3.4 cm 08/24/21 0917   Wound Depth (cm) 0.1 cm 08/24/21 0917   Wound Surface Area (cm^2) 3.74 cm^2 08/24/21 0917   Wound Volume (cm^3) 0.374 cm^3 08/24/21 0917   Post-Procedure Length (cm) 1.1 cm 08/24/21 1001   Post-Procedure Width (cm) 3.4 cm 08/24/21 1001   Post-Procedure Depth (cm) 0.2 cm 08/24/21 1001   Post-Procedure Surface Area (cm^2) 3.74 cm^2 08/24/21 1001   Post-Procedure Volume (cm^3) 0.748 cm^3 08/24/21 1001   Wound Assessment Granulation tissue;Slough 08/24/21 8559 Drainage Amount Moderate 08/24/21 1110   Drainage Description Serosanguinous 08/24/21 0917   Odor None 08/24/21 0917   Fransico-wound Assessment Dry/flaky 08/24/21 0917   Margins Undefined edges 08/24/21 0917   Wound Thickness Description not for Pressure Injury Full thickness 08/24/21 0917   Number of days: 0     Incision 08/28/19 Foot Right (Active)   Number of days: 727       Incision 07/22/20 Foot Anterior; Left (Active)   Number of days: 397     Total Surface Area Debrided:  2.47 sq cm   Estimated Blood Loss:  Minimal  Hemostasis Achieved:  by pressure  Procedural Pain:  0  / 10   Post Procedural Pain:  0 / 10   Response to treatment:  Well tolerated by patient. Written patient dismissal instructions given to patient and signed by patient or POA. Discharge Instructions       [address] and Hyperbaric Oxygen Therapy   Physician Orders and Discharge Instructions  [address] [address]  Telephone: [phone]    NAME:  Donna Orellana OF BIRTH:  1950  MEDICAL RECORD NUMBER:  [id number]  DATE:  8/24/2021    Wound Cleansing:   Do not scrub or use excessive force. Cleanse wound prior to applying a clean dressing with:  [x] Normal Saline  [x] Keep Wound Dry in Shower    [] Wound Cleanser   [] Cleanse wound with Mild Soap & Water  [] May Shower at Discharge   [] Other:       Topical Treatments:  Do not apply lotions, creams, or ointments to wound bed unless directed. [] Apply moisturizing lotion to skin surrounding the wound prior to dressing change.  [] Apply antifungal ointment to skin surrounding the wound prior to dressing change.  [] Apply thin film of moisture barrier ointment to skin immediately around wound.   [x] Other: FELT PADS AND SILVER ALGINATE TO FRANSICO-WOUND (CHANGE ONLY AS NEEDED FOR DRAINAGE)     Dressings:           Wound Location LEFT PLANTAR FOOT    [x] Apply Primary Dressing:   [x] Other: SILVADENE TO BASE OF WOUND     [X] Alginate with Silver OVER TOP SILVADENE        [x] Cover and Secure with:     [x] Gauze    [x] Roll gauze TOES TO KNEE   [x] Ace Wrap- NOT FOR COMPRESSION JUST TO KEEP DRESSING IN PLACE     [] Other:    Avoid contact of tape with skin. [x] Change dressing: [x] Daily       Dressings:           Wound Location LEFT DORSAL FOOT    [x] Apply Primary Dressing:       [x] Alginate with Silver      [x] Cover and Secure with:     [x] Gauze [x] ABD PAD [x] Kerlix- TOES TO KNEE   [x] Ace Wrap-NOT FOR COMPRESSION JUST TO KEEP DRESSING IN PLACE     Avoid contact of tape with skin. [x] Change dressing: [x] Daily           Edema Control:  Apply:   [x] SpandaGrip []Right Leg  [x]Left Leg      []Low compression 5-10 mm/Hg      [x]Medium compression 10-20 mm/Hg     []High compression  20-30 mm/Hg   every morning immediately when getting up should be applied to affected leg(s) from mid foot to knee making sure to cover the heel. Remove every night before going to bed. [x] Elevate leg(s) above the level of the heart when sitting. [x] Avoid prolonged standing in one place. Off-Loading:   [x] Off-loading when [x] walking  [] in bed [] sitting  [] Total non-weight bearing  [] Right Leg  [] Left Leg   [x] Assistive Device [] Walker [] Cane  [] Wheelchair  [] Crutches   [x] Surgical shoe    [] Podus Boot(s)   [] Foam Boot(s)  [] Roll About    [] Cast Boot [] CROW Boot  [] Other:      Dietary:  [] Diet as tolerated:   [x] Calorie Diabetic Diet:   [] No Added Salt:  [x] Increase Protein:   [] Other:     Activity:  [x] Activity: BATHROOM PRIVILEGES ONLY    If you are still having pain after you go home:  [x] Elevate the affected limb. [x] Use over-the-counter medications you would normally use for pain as permitted by your family doctor. [x] For persistent pain not relieved by the above interventions, please call your family doctor.              Return Appointment:  [] Wound and dressing supply provider:   [] ECF or Home Healthcare:  [] Wound Assessment: [] Physician or NP scheduled for Wound Assessment:   [x] Return Appointment: With DR. Charlotte Benitez MD  in  1 Week(s)  [x] Ordered tests:     Nurse Case Manger:  ROB   Electronically signed by Amna Menjivar RN on 8/24/2021 at 10:05 AM     Claribel [address]: Should you experience any significant changes in your wound(s) or have questions about your wound care, please contact the [address] [place] [address] at [address] 8:00 am - 4:30 pm and Friday 8:00 am - 12:30 pm.  If you need help with your wound outside these hours and cannot wait until we are again available, contact your PCP or go to the hospital emergency room. PLEASE NOTE: IF YOU ARE UNABLE TO OBTAIN WOUND SUPPLIES, CONTINUE TO USE THE SUPPLIES YOU HAVE AVAILABLE UNTIL YOU ARE ABLE TO REACH US. IT IS MOST IMPORTANT TO KEEP THE WOUND COVERED AT ALL TIMES. Physician Signature:_______________________    Date: ___________ Time:  ____________        DR. Marcos Ta MD                         Electronically signed by Sharan Morgan MD on 8/24/2021 at 11:46 AM

## 2021-08-24 ENCOUNTER — HOSPITAL ENCOUNTER (OUTPATIENT)
Dept: WOUND CARE | Age: 71
Discharge: HOME | End: 2021-08-24
Payer: MEDICARE

## 2021-08-24 VITALS
SYSTOLIC BLOOD PRESSURE: 152 MMHG | HEART RATE: 71 BPM | RESPIRATION RATE: 16 BRPM | TEMPERATURE: 97.5 F | DIASTOLIC BLOOD PRESSURE: 83 MMHG

## 2021-08-24 DIAGNOSIS — E11.622 DIABETES MELLITUS WITH SKIN ULCER (HCC): ICD-10-CM

## 2021-08-24 DIAGNOSIS — E11.621 DIABETIC ULCER OF LEFT MIDFOOT ASSOCIATED WITH TYPE 2 DIABETES MELLITUS, WITH NECROSIS OF BONE (HCC): ICD-10-CM

## 2021-08-24 DIAGNOSIS — E11.621 TYPE 2 DIABETES MELLITUS WITH LEFT DIABETIC FOOT ULCER (HCC): ICD-10-CM

## 2021-08-24 DIAGNOSIS — L97.424 DIABETIC ULCER OF LEFT MIDFOOT ASSOCIATED WITH TYPE 2 DIABETES MELLITUS, WITH NECROSIS OF BONE (HCC): ICD-10-CM

## 2021-08-24 DIAGNOSIS — M86.272 SUBACUTE OSTEOMYELITIS OF LEFT FOOT (HCC): ICD-10-CM

## 2021-08-24 DIAGNOSIS — E11.21 DIABETIC NEPHROPATHY ASSOCIATED WITH TYPE 2 DIABETES MELLITUS (HCC): ICD-10-CM

## 2021-08-24 DIAGNOSIS — L97.529 TYPE 2 DIABETES MELLITUS WITH LEFT DIABETIC FOOT ULCER (HCC): ICD-10-CM

## 2021-08-24 DIAGNOSIS — I70.244 ATHEROSCLEROSIS OF NATIVE ARTERIES OF LEFT LEG WITH ULCERATION OF HEEL AND MIDFOOT (HCC): ICD-10-CM

## 2021-08-24 DIAGNOSIS — L98.499 DIABETES MELLITUS WITH SKIN ULCER (HCC): ICD-10-CM

## 2021-08-24 DIAGNOSIS — Z74.09 IMPAIRED MOBILITY: ICD-10-CM

## 2021-08-24 DIAGNOSIS — E11.610 CHARCOT FOOT DUE TO DIABETES MELLITUS (HCC): ICD-10-CM

## 2021-08-24 DIAGNOSIS — E11.8 DIABETIC FOOT (HCC): ICD-10-CM

## 2021-08-24 DIAGNOSIS — E11.42 DIABETIC POLYNEUROPATHY ASSOCIATED WITH TYPE 2 DIABETES MELLITUS (HCC): Primary | ICD-10-CM

## 2021-08-24 PROBLEM — L97.423 DIABETIC ULCER OF LEFT MIDFOOT ASSOCIATED WITH TYPE 2 DIABETES MELLITUS, WITH NECROSIS OF MUSCLE (HCC): Status: ACTIVE | Noted: 2021-08-24

## 2021-08-24 PROCEDURE — 11043 DBRDMT MUSC&/FSCA 1ST 20/<: CPT | Performed by: EMERGENCY MEDICINE

## 2021-08-24 PROCEDURE — 99204 OFFICE O/P NEW MOD 45 MIN: CPT | Performed by: EMERGENCY MEDICINE

## 2021-08-24 PROCEDURE — 11043 DBRDMT MUSC&/FSCA 1ST 20/<: CPT

## 2021-08-24 PROCEDURE — 99213 OFFICE O/P EST LOW 20 MIN: CPT

## 2021-08-24 RX ORDER — LIDOCAINE 40 MG/G
CREAM TOPICAL ONCE
Status: COMPLETED | OUTPATIENT
Start: 2021-08-24 | End: 2021-08-24

## 2021-08-24 RX ORDER — LIDOCAINE 50 MG/G
OINTMENT TOPICAL ONCE
Status: CANCELLED | OUTPATIENT
Start: 2021-08-24 | End: 2021-08-24

## 2021-08-24 RX ORDER — LIDOCAINE 40 MG/G
CREAM TOPICAL ONCE
Status: CANCELLED | OUTPATIENT
Start: 2021-08-24 | End: 2021-08-24

## 2021-08-24 RX ORDER — BACITRACIN, NEOMYCIN, POLYMYXIN B 400; 3.5; 5 [USP'U]/G; MG/G; [USP'U]/G
OINTMENT TOPICAL ONCE
Status: CANCELLED | OUTPATIENT
Start: 2021-08-24 | End: 2021-08-24

## 2021-08-24 RX ORDER — BACITRACIN ZINC AND POLYMYXIN B SULFATE 500; 1000 [USP'U]/G; [USP'U]/G
OINTMENT TOPICAL ONCE
Status: CANCELLED | OUTPATIENT
Start: 2021-08-24 | End: 2021-08-24

## 2021-08-24 RX ORDER — CLOBETASOL PROPIONATE 0.5 MG/G
OINTMENT TOPICAL ONCE
Status: CANCELLED | OUTPATIENT
Start: 2021-08-24 | End: 2021-08-24

## 2021-08-24 RX ORDER — LIDOCAINE HYDROCHLORIDE 20 MG/ML
JELLY TOPICAL ONCE
Status: CANCELLED | OUTPATIENT
Start: 2021-08-24 | End: 2021-08-24

## 2021-08-24 RX ORDER — GENTAMICIN SULFATE 1 MG/G
OINTMENT TOPICAL ONCE
Status: CANCELLED | OUTPATIENT
Start: 2021-08-24 | End: 2021-08-24

## 2021-08-24 RX ORDER — GINSENG 100 MG
CAPSULE ORAL ONCE
Status: CANCELLED | OUTPATIENT
Start: 2021-08-24 | End: 2021-08-24

## 2021-08-24 RX ORDER — BETAMETHASONE DIPROPIONATE 0.05 %
OINTMENT (GRAM) TOPICAL ONCE
Status: CANCELLED | OUTPATIENT
Start: 2021-08-24 | End: 2021-08-24

## 2021-08-24 RX ORDER — LIDOCAINE HYDROCHLORIDE 40 MG/ML
SOLUTION TOPICAL ONCE
Status: CANCELLED | OUTPATIENT
Start: 2021-08-24 | End: 2021-08-24

## 2021-08-24 RX ADMIN — LIDOCAINE: 40 CREAM TOPICAL at 09:15

## 2021-08-24 ASSESSMENT — PAIN SCALES - GENERAL
PAINLEVEL_OUTOF10: 0
PAINLEVEL_OUTOF10: 0

## 2021-08-24 NOTE — LETTER
[address] [facility] [address] [facility]  [phone]  Dept: [phone]        Thank you Mr. Bronson Hanson for choosing [facility] for your Wound Care needs. We hope you found your first visit both encouraging and educational.  We look forward to serving you throughout the healing process. Before your next visit please review the information you received in your [employer]. The first visit can be overwhelming and this is a useful tool to refresh what information you may have been given. If you find yourself with any questions prior to your upcoming appointment, please feel free to contact us. Often wounds can be challenging and it is our goal to see you through the healing process with as much support possible. Again, thank you for choosing [facility] [facility]!     Sincerely,      The Staff of [address] [place] [address] Pkwy and Hyperbaric Oxygen Therapy

## 2021-08-24 NOTE — PLAN OF CARE
[address]:     [employer] [id number] [facility], [address] [place] [address] p: [phone] f: [phone]     Ordering Center:     [place] [address] [address]  [address] OFFICE [address]LDG 2  [facility]  [phone]  WOUND CARE Dept: [address] [facility] [phone]    Patient Information:      [address]  [address] [facility] [place] [id number] [phone]   : 1950  AGE: 78 y.o.      GENDER: male   TODAYS DATE:  2021    Insurance:      PRIMARY INSURANCE:  Plan: [employer] ESSENTIAL/PLUS  Coverage: [employer] MEDICARE  Effective Date: 2020  [id number] - (Medicare Managed)    Patient Wound Information:      Problem List Items Addressed This Visit     Diabetic polyneuropathy associated with type 2 diabetes mellitus ([facility] [address] - Primary    Diabetic ulcer of left midfoot associated with type 2 diabetes mellitus, with necrosis of muscle ([facility] [address]    Impaired mobility        ICD-10 codes: E11.621, L97.529    WOUNDS REQUIRING DRESSING SUPPLIES:     Negative Pressure Wound Therapy Foot Left (Active)   Number of days: 398       Wound 21 Foot Left;Plantar #1 (Active)   Wound Image   21   Wound Length (cm) 1.9 cm 21 0917   Wound Width (cm) 1.3 cm 21 0917   Wound Depth (cm) 1 cm 21 0917   Wound Surface Area (cm^2) 2.47 cm^2 21 0917   Wound Volume (cm^3) 2.47 cm^3 21 0917   Post-Procedure Length (cm) 1.9 cm 21 1001   Post-Procedure Width (cm) 1.3 cm 21 1001   Post-Procedure Depth (cm) 1.1 cm 21 1001   Post-Procedure Surface Area (cm^2) 2.47 cm^2 21 1001   Post-Procedure Volume (cm^3) 2.717 cm^3 21 1001   Distance Tunneling (cm) 2.2 cm 21 0917   Tunneling Position ___ O'Clock 6 21 0917   Undermining Starts ___ O'Clock 12 2117   Undermining Ends___ O'Clock 5 21 0917   Undermining Maxium Distance (cm) 0.9 21 2263   Wound Assessment Granulation tissue;Slough 08/24/21 1110   Drainage Amount Moderate 08/24/21 1110   Drainage Description Serosanguinous 08/24/21 1110   Odor None 08/24/21 1110   Michelle-wound Assessment Hyperkeratosis (callous); Maceration 08/24/21 0917   Margins Unattached edges 08/24/21 0917   Wound Thickness Description not for Pressure Injury Full thickness 08/24/21 0917   Number of days: 0       Wound 08/24/21 Foot Left;Medial;Dorsal #2 (Active)   Wound Image   08/24/21 0917   Wound Length (cm) 1.1 cm 08/24/21 0917   Wound Width (cm) 3.4 cm 08/24/21 0917   Wound Depth (cm) 0.1 cm 08/24/21 0917   Wound Surface Area (cm^2) 3.74 cm^2 08/24/21 0917   Wound Volume (cm^3) 0.374 cm^3 08/24/21 0917   Post-Procedure Length (cm) 1.1 cm 08/24/21 1001   Post-Procedure Width (cm) 3.4 cm 08/24/21 1001   Post-Procedure Depth (cm) 0.2 cm 08/24/21 1001   Post-Procedure Surface Area (cm^2) 3.74 cm^2 08/24/21 1001   Post-Procedure Volume (cm^3) 0.748 cm^3 08/24/21 1001   Wound Assessment Granulation tissue;Slough 08/24/21 0917   Drainage Amount Moderate 08/24/21 1110   Drainage Description Serosanguinous 08/24/21 0917   Odor None 08/24/21 0917   Michelle-wound Assessment Dry/flaky 08/24/21 0917   Margins Undefined edges 08/24/21 0917   Wound Thickness Description not for Pressure Injury Full thickness 08/24/21 0917   Number of days: 0     Incision 08/28/19 Foot Right (Active)   Number of days: 727       Incision 07/22/20 Foot Anterior; Left (Active)   Number of days: 397       Supplies Requested :      WOUND #: 1   PRIMARY DRESSING:  Alginate with silver rope Aquacel Silver Rope   Cover and Secure with: 4X4 gauze pad  Bulky roll gauze     FREQUENCY OF DRESSING CHANGES:  Daily       WOUND #: 2   PRIMARY DRESSING:  Alginate with silver pad   Aquacel Silver Pad   Cover and Secure with: 4X4 gauze pad  ABD pad  Bulky roll gauze     FREQUENCY OF DRESSING CHANGES:  Daily       ADDITIONAL ITEMS:  [] Gloves Small  [] Gloves Medium [x] Gloves Large [] Gloves Elson Kayser  [] Tape 1\" [x] Tape 2\" [] Tape 3\"  [] Medipore Tape  [x] Saline  [x] Skin Prep   [x] Adhesive Remover   [x] Cotton Tip Applicators   [] Other:    Patient Wound(s) Debrided: [x] Yes   [] No    Debribement Type: subcutaneous tissue    Debridement Date: 8/24/21    Patient currently being seen by Home Health: [] Yes   [x] No    Duration for needed supplies:  []15  []30  []60  [x]90 Days    Provider Information:      PROVIDER'S NAME: Angela Helm MD     NPI: [id number]

## 2021-08-24 NOTE — PLAN OF CARE
Patient Name:  Terrell Rosales  YOB: 1950  Today's Date:  August 24, 2021  Medical Record Number:  [id number]  Provider:    [address] [place] [address] Pkwy   Appointment Treatment Guidelines        The [address] [place] [address] Pkwy Appointment Treatment Guidelines were reviewed on August 24, 2021 with the patient. Mr. Zulema Araya understanding of the [address] [place] [address] Pkwy Appointment Treatment Guidelines.       Electronically signed by Rosalind Andrews RN on 8/24/21 at 9:49 AM EDT

## 2021-08-24 NOTE — LETTER
[address]  [address] OFFICE [place] 2 MAX [address] [facility] [address]  [phone]  Dept: [phone]   TODAYS DATE: 8/20/2021    [address] [place] [address],4Th Floor Wound Care   Appointment Treatment Guidelines  Welcome Mr. Omer Zaragoza to the [address] [place] [address] Pkwy. We appreciate the confidence you have shown in choosing us as your wound care provider. Our goal is to heal your wound(s) as quickly as possible. Please read the items below regarding the nature of your appointments. 1. We will make every effort to schedule appointments that are convenient for you. Certain days and times may not be available, depending on your providers office hours and details of your care. 2. Patients will not necessarily be brought to an exam room in the order in which they arrive. Many providers work out of this office and patients are here for different procedures. Our goal is to serve you as quickly as possible. 3. We acknowledge that your time is valuable. Please remember that wound healing takes time and we appreciate your understanding that the length of each patients appointment will vary depending upon their need. 4. It is for your protection that we ask for insurance cards, photo ID, and new consent forms on your first visit and periodically throughout your treatment at all our facilities. 5. Wound Care treatment is known to be most effective when provided on a regular basis. Missed appointments, and not following the recommended plan of care can result in ineffective treatment and a poor outcome. If you find it difficult to keep appointments or to follow the recommended plan of care, it is your responsibility to let the staff know, so that we can work with you toward a solution. 6. If you need to miss an appointment, please call to let us know. We expect 24 hours notice for all cancellations.  We also expect missed visits to be rescheduled as soon as possible, preferably within the same week to promote the most effective healing time for your wound(s). 7. If you will be late for an appointment, please call our center to be sure that the provider can still see you when you arrive. If you are more than 15 minutes late your appointment may need to be rescheduled. 8. If two (2) appointments are missed without notifying us, your care plan may be discontinued. The same may happen if multiple visits are cancelled or rescheduled, even with notice. A missed visit is time when another patient, who also needs care, could have been seen. Thank you for your understanding and consideration.

## 2021-08-25 RX ORDER — CARVEDILOL 6.25 MG/1
TABLET ORAL
Qty: 30 TABLET | Refills: 5 | Status: SHIPPED | OUTPATIENT
Start: 2021-08-25 | End: 2022-04-26

## 2021-08-31 ENCOUNTER — HOSPITAL ENCOUNTER (OUTPATIENT)
Dept: WOUND CARE | Age: 71
Discharge: HOME | End: 2021-08-31
Payer: MEDICARE

## 2021-08-31 VITALS
RESPIRATION RATE: 16 BRPM | TEMPERATURE: 97.6 F | HEART RATE: 68 BPM | SYSTOLIC BLOOD PRESSURE: 114 MMHG | DIASTOLIC BLOOD PRESSURE: 66 MMHG

## 2021-08-31 DIAGNOSIS — I70.244 ATHEROSCLEROSIS OF NATIVE ARTERIES OF LEFT LEG WITH ULCERATION OF HEEL AND MIDFOOT (HCC): ICD-10-CM

## 2021-08-31 DIAGNOSIS — E11.622 DIABETES MELLITUS WITH SKIN ULCER (HCC): ICD-10-CM

## 2021-08-31 DIAGNOSIS — E11.21 DIABETIC NEPHROPATHY ASSOCIATED WITH TYPE 2 DIABETES MELLITUS (HCC): ICD-10-CM

## 2021-08-31 DIAGNOSIS — Z74.09 IMPAIRED MOBILITY: ICD-10-CM

## 2021-08-31 DIAGNOSIS — L97.529 TYPE 2 DIABETES MELLITUS WITH LEFT DIABETIC FOOT ULCER (HCC): ICD-10-CM

## 2021-08-31 DIAGNOSIS — E11.8 DIABETIC FOOT (HCC): ICD-10-CM

## 2021-08-31 DIAGNOSIS — E11.621 DIABETIC ULCER OF LEFT MIDFOOT ASSOCIATED WITH TYPE 2 DIABETES MELLITUS, WITH NECROSIS OF BONE (HCC): ICD-10-CM

## 2021-08-31 DIAGNOSIS — L97.424 DIABETIC ULCER OF LEFT MIDFOOT ASSOCIATED WITH TYPE 2 DIABETES MELLITUS, WITH NECROSIS OF BONE (HCC): ICD-10-CM

## 2021-08-31 DIAGNOSIS — M86.272 SUBACUTE OSTEOMYELITIS OF LEFT FOOT (HCC): Primary | ICD-10-CM

## 2021-08-31 DIAGNOSIS — E11.42 DIABETIC POLYNEUROPATHY ASSOCIATED WITH TYPE 2 DIABETES MELLITUS (HCC): ICD-10-CM

## 2021-08-31 DIAGNOSIS — E11.610 CHARCOT FOOT DUE TO DIABETES MELLITUS (HCC): ICD-10-CM

## 2021-08-31 DIAGNOSIS — E11.621 TYPE 2 DIABETES MELLITUS WITH LEFT DIABETIC FOOT ULCER (HCC): ICD-10-CM

## 2021-08-31 DIAGNOSIS — L98.499 DIABETES MELLITUS WITH SKIN ULCER (HCC): ICD-10-CM

## 2021-08-31 PROCEDURE — 87070 CULTURE OTHR SPECIMN AEROBIC: CPT

## 2021-08-31 PROCEDURE — 87186 SC STD MICRODIL/AGAR DIL: CPT

## 2021-08-31 PROCEDURE — 88305 TISSUE EXAM BY PATHOLOGIST: CPT

## 2021-08-31 PROCEDURE — 87176 TISSUE HOMOGENIZATION CULTR: CPT

## 2021-08-31 PROCEDURE — 11102 TANGNTL BX SKIN SINGLE LES: CPT | Performed by: EMERGENCY MEDICINE

## 2021-08-31 PROCEDURE — 11043 DBRDMT MUSC&/FSCA 1ST 20/<: CPT

## 2021-08-31 PROCEDURE — 87077 CULTURE AEROBIC IDENTIFY: CPT

## 2021-08-31 PROCEDURE — 87205 SMEAR GRAM STAIN: CPT

## 2021-08-31 PROCEDURE — 11043 DBRDMT MUSC&/FSCA 1ST 20/<: CPT | Performed by: EMERGENCY MEDICINE

## 2021-08-31 PROCEDURE — 11106 INCAL BX SKN SINGLE LES: CPT

## 2021-08-31 RX ORDER — LIDOCAINE 40 MG/G
CREAM TOPICAL ONCE
Status: COMPLETED | OUTPATIENT
Start: 2021-08-31 | End: 2021-08-31

## 2021-08-31 RX ORDER — LIDOCAINE HYDROCHLORIDE 40 MG/ML
SOLUTION TOPICAL ONCE
Status: CANCELLED | OUTPATIENT
Start: 2021-08-31 | End: 2021-08-31

## 2021-08-31 RX ORDER — BETAMETHASONE DIPROPIONATE 0.05 %
OINTMENT (GRAM) TOPICAL ONCE
Status: CANCELLED | OUTPATIENT
Start: 2021-08-31 | End: 2021-08-31

## 2021-08-31 RX ORDER — LIDOCAINE HYDROCHLORIDE 20 MG/ML
JELLY TOPICAL ONCE
Status: CANCELLED | OUTPATIENT
Start: 2021-08-31 | End: 2021-08-31

## 2021-08-31 RX ORDER — CLOBETASOL PROPIONATE 0.5 MG/G
OINTMENT TOPICAL ONCE
Status: CANCELLED | OUTPATIENT
Start: 2021-08-31 | End: 2021-08-31

## 2021-08-31 RX ORDER — BACITRACIN ZINC AND POLYMYXIN B SULFATE 500; 1000 [USP'U]/G; [USP'U]/G
OINTMENT TOPICAL ONCE
Status: CANCELLED | OUTPATIENT
Start: 2021-08-31 | End: 2021-08-31

## 2021-08-31 RX ORDER — BACITRACIN, NEOMYCIN, POLYMYXIN B 400; 3.5; 5 [USP'U]/G; MG/G; [USP'U]/G
OINTMENT TOPICAL ONCE
Status: CANCELLED | OUTPATIENT
Start: 2021-08-31 | End: 2021-08-31

## 2021-08-31 RX ORDER — LIDOCAINE 50 MG/G
OINTMENT TOPICAL ONCE
Status: CANCELLED | OUTPATIENT
Start: 2021-08-31 | End: 2021-08-31

## 2021-08-31 RX ORDER — LIDOCAINE 40 MG/G
CREAM TOPICAL ONCE
Status: CANCELLED | OUTPATIENT
Start: 2021-08-31 | End: 2021-08-31

## 2021-08-31 RX ORDER — GINSENG 100 MG
CAPSULE ORAL ONCE
Status: CANCELLED | OUTPATIENT
Start: 2021-08-31 | End: 2021-08-31

## 2021-08-31 RX ORDER — GENTAMICIN SULFATE 1 MG/G
OINTMENT TOPICAL ONCE
Status: CANCELLED | OUTPATIENT
Start: 2021-08-31 | End: 2021-08-31

## 2021-08-31 RX ADMIN — LIDOCAINE: 40 CREAM TOPICAL at 09:18

## 2021-08-31 ASSESSMENT — PAIN SCALES - GENERAL: PAINLEVEL_OUTOF10: 0

## 2021-08-31 NOTE — PROGRESS NOTES
[facility]   Progress Note     George Bernal RECORD NUMBER:  [id number]  AGE: 78 y.o. GENDER: male  : 1950  EPISODE DATE:  2021    Subjective:     Chief Complaint   Patient presents with    Wound Check     Follow-up visit for wounds to the right foot. Patient presenting for follow up evaluation of above wound. Symptoms, wound related issues, or other pertinent wound history since last visit: none. Patient tolerating dressings well. arterial dopplers scheduled for 2021    Medical Decision Makin-year-old male with multiple comorbid conditions as outlined below who is presenting as a follow up appointment to our wound center for nonhealing left diabetic foot ulcer present for about a year prior to presentation here. The patient had been under the care of his podiatrist at [facility].  He has transitioned his care to our wound center as he wanted a second opinion given his poor wound progress.     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 ([address]    Relevant Orders    Initiate Outpatient Wound Care Protocol    Diabetic foot ([address]    Relevant Orders    Initiate Outpatient Wound Care Protocol    Type 2 diabetes mellitus with left diabetic foot ulcer ([address]    Relevant Orders    Initiate Outpatient Wound Care Protocol    Diabetic polyneuropathy associated with type 2 diabetes mellitus ([address]    Relevant Orders    Initiate Outpatient Wound Care Protocol    Diabetic ulcer of left midfoot associated with type 2 diabetes mellitus, with necrosis of bone ([address]    Relevant Orders    Initiate Outpatient Wound Care Protocol    Impaired mobility    Relevant Orders    Initiate Outpatient Wound Care Protocol    Subacute osteomyelitis of left foot ([address] - Primary    Relevant Orders    Initiate Outpatient Wound Care Protocol    Diabetes mellitus with skin ulcer ([address]    Relevant Orders    Initiate Outpatient Wound Care Protocol    Charcot foot due to diabetes mellitus ([facility]    Relevant Orders    Initiate Outpatient Wound Care Protocol    Atherosclerosis of native arteries of left leg with ulceration of heel and midfoot ([facility]    Relevant Orders    Initiate Outpatient Wound Care Protocol        Comorbid conditions affecting wound healing: DM type 2, cardiomyopathy, hypertension, hyperlipidemia, stage IIIa chronic kidney disease, obesity, chronic leg edema, chronic anticoagulation.     Wound evaluation: wound looks much better. There is nonviable tissue protruding out of the wound bed. Appears to be smaller. Surrounding skin is significantly better. Maceration and inflammation resolved. The callus is also nearly resolved. Problems addressed during this encounter:  1. Left diabetic foot ulcer, plantar surface of midfoot. Severity of bone necrosis. Coffey grade 3  2. Left diabetic foot/ankle ulcer, dorsal aspect. Severity of skin layers involved only. Coffey grade 1  3. Diabetes mellitus type 2. Last hemoglobin A1c was 6.8. Continues to adhere to diabetic diet. Doing better with control. Additional education provided. 4. Left lower extremity edema. This is improved. Continue as current with gentle compression  5. Charcot deformity leading to severe difficulties with offloading, impaired mobility. Offloading boot prescribed. Offloading felt applied today by me. 6. Osteomyelitis of left foot. I reviewed his records from the outside facility and his MRI in July 2021. The patient has received multiple courses of antibiotics already. Since wound is slowly improving, we will see if hyperbaric oxygen therapy can be done after arterial studies done. 7. Suspected peripheral arterial disease. Arterial Dopplers ordered. 8. Hyperbaric oxygen therapy needs. Discussed with him previously. Patient was very motivated to proceed. Education about HBOT provided for the patient.   I reiterated that outflow status is important to determine prior to HBO therapy. Data reviewed and analyzed:  1. Assessment required other independent historian(s): No patient . 2. Review of prior external note from other providers done today: No  3. New imaging or lab ordered today: Yes. Tissue culture and tissue sent for pathology. 4. Review of test results done today: Yes  5. Independent interpretation of test(s): Yes  6. Discussion of management or test interpretation with other qualified health care professional and other external source. Risk of complications and/or mortality of patient management:  1. This patient has a high risk of morbidity and mortality from additional diagnostic testing or treatment. This is due to the above conditions affecting wound healing as well as patient and procedure risk factors. Education and discussion held with patient regarding these disease processes pertinent to wound(s). 2. Other pertinent decisions include: minor surgery or procedures as below. 3. The patient's diagnosis or treatment is not significantly limited by social determinants of health as noted by: N/A .  4. Prescription drug management: silvadene     Negative Pressure Wound Therapy Foot Left (Active)   Number of days: 405       Wound 08/24/21 Foot Left;Plantar #1 (Active)   Wound Image   08/24/21 0917   Wound Etiology Diabetic Coffey 3 08/24/21 1106   Dressing Status Old drainage noted 08/31/21 0908   Wound Cleansed Cleansed with saline 08/31/21 0908   Dressing/Treatment Roll gauze; Alginate with Ag; Ace wrap;Gauze dressing/dressing sponge; Other (comment) 08/24/21 1106   Offloading for Diabetic Foot Ulcers Felt or foam;Walker 08/24/21 1106   Wound Length (cm) 1.5 cm 08/31/21 0908   Wound Width (cm) 1.3 cm 08/31/21 0908   Wound Depth (cm) 1 cm 08/31/21 0908   Wound Surface Area (cm^2) 1.95 cm^2 08/31/21 0908   Change in Wound Size % (l*w) 21.05 08/31/21 0908   Wound Volume (cm^3) 1.95 cm^3 08/31/21 0908 Wound Healing % 21 08/31/21 0908   Post-Procedure Length (cm) 1.5 cm 08/31/21 0918   Post-Procedure Width (cm) 1.3 cm 08/31/21 0918   Post-Procedure Depth (cm) 1.1 cm 08/31/21 0918   Post-Procedure Surface Area (cm^2) 1.95 cm^2 08/31/21 0918   Post-Procedure Volume (cm^3) 2.145 cm^3 08/31/21 0918   Distance Tunneling (cm) 0.8 cm 08/31/21 0908   Tunneling Position ___ O'Clock 6 08/31/21 0908   Undermining Starts ___ O'Clock 12 08/24/21 0917   Undermining Ends___ O'Clock 5 08/24/21 0917   Undermining Maxium Distance (cm) 0.9 08/24/21 0917   Wound Assessment Granulation tissue;Slough 08/31/21 0908   Drainage Amount Small 08/31/21 0908   Drainage Description Serosanguinous 08/31/21 0908   Odor None 08/31/21 0908   Michelle-wound Assessment Hyperkeratosis (callous); Maceration 08/31/21 0908   Margins Unattached edges 08/31/21 0908   Wound Thickness Description not for Pressure Injury Full thickness 08/31/21 0908   Number of days: 7       Wound 08/24/21 Foot Left;Medial;Dorsal #2 (Active)   Wound Image   08/24/21 0917   Wound Etiology Skin Tear 08/24/21 1002   Dressing Status Old drainage noted 08/31/21 0908   Wound Cleansed Cleansed with saline 08/31/21 0908   Dressing/Treatment ABD; Ace wrap;Alginate with Ag;Roll gauze 08/24/21 1002   Wound Length (cm) 0.5 cm 08/31/21 0908   Wound Width (cm) 2.5 cm 08/31/21 0908   Wound Depth (cm) 0.1 cm 08/31/21 0908   Wound Surface Area (cm^2) 1.25 cm^2 08/31/21 0908   Change in Wound Size % (l*w) 66.58 08/31/21 0908   Wound Volume (cm^3) 0.125 cm^3 08/31/21 0908   Wound Healing % 67 08/31/21 0908   Post-Procedure Length (cm) 0.6 cm 08/31/21 0918   Post-Procedure Width (cm) 2.5 cm 08/31/21 0918   Post-Procedure Depth (cm) 0.2 cm 08/31/21 0918   Post-Procedure Surface Area (cm^2) 1.5 cm^2 08/31/21 0918   Post-Procedure Volume (cm^3) 0.3 cm^3 08/31/21 0918   Wound Assessment Granulation tissue;Slough 08/31/21 0908   Drainage Amount Moderate 08/31/21 0908   Drainage Description Serosanguinous 08/31/21 0908   Odor None 08/31/21 0908   Michelle-wound Assessment Dry/flaky 08/31/21 0908   Margins Undefined edges 08/31/21 0908   Wound Thickness Description not for Pressure Injury Full thickness 08/31/21 0908   Number of days: 7     Incision 08/28/19 Foot Right (Active)   Number of days: 733       Incision 07/22/20 Foot Anterior; Left (Active)   Number of days: 404       Procedures done during this encounter:   Debridement: Excisional Debridement  Indications:  Based on my examination of this patient's wound(s)/ulcer(s) today, debridement is required to promote healing and evaluate the wound base. Risks and benefits discussed with patient who has agreed to proceed. Performed by: Sharan Morgan MD  Consent obtained:  Yes  Time out taken:  Yes  Pain Control: Anesthetic  Anesthetic: 4% Lidocaine Cream   Using curette, #15 blade scalpel, scissors and forceps the wound(s)/ulcer(s) was/were debrided down through and including the removal of muscle/fascia. Devitalized Tissue Debrided:  fibrin, biofilm, slough, exudate, callus and nonviable protruding tissue. Pre Debridement Measurements:  Are located in the [place]  Documentation Flow Sheet  Wound/Ulcer #: 1  Post Debridement Measurements:  Wound/Ulcer Descriptions are Pre Debridement except measurements: Total Surface Area Debrided:  1.95 sq cm   Diabetic/Pressure/Non Pressure Ulcers only:  Ulcer: Diabetic ulcer, bone necrosis   Estimated Blood Loss:  Minimal  Hemostasis Achieved:  by pressure  Procedural Pain:  0  / 10   Post Procedural Pain:  0 / 10   Response to treatment:  Well tolerated by patient. Procedure  Biopsy Note    Biopsy Type:  Tangential Biopsy of skin single lesion (e.g., shave, scoop, saucerize,curette)    Performed by: Sharan Morgan MD    Consent obtained: Yes    Time out taken: Yes    Pain Control: Anesthetic: 4% Lidocaine Cream    Location of Biopsy:  Wound/Ulcer Number(s)  Wound/Ulcer # 1    Negative Pressure Wound Therapy Foot Left (Active)   Number of days: 405       Wound 08/24/21 Foot Left;Plantar #1 (Active)   Wound Image   08/24/21 0917   Wound Etiology Diabetic Coffey 3 08/24/21 1106   Dressing Status Old drainage noted 08/31/21 0908   Wound Cleansed Cleansed with saline 08/31/21 0908   Dressing/Treatment Roll gauze; Alginate with Ag; Ace wrap;Gauze dressing/dressing sponge; Other (comment) 08/24/21 1106   Offloading for Diabetic Foot Ulcers Felt or foam;Walker 08/24/21 1106   Wound Length (cm) 1.5 cm 08/31/21 0908   Wound Width (cm) 1.3 cm 08/31/21 0908   Wound Depth (cm) 1 cm 08/31/21 0908   Wound Surface Area (cm^2) 1.95 cm^2 08/31/21 0908   Change in Wound Size % (l*w) 21.05 08/31/21 0908   Wound Volume (cm^3) 1.95 cm^3 08/31/21 0908   Wound Healing % 21 08/31/21 0908   Post-Procedure Length (cm) 1.5 cm 08/31/21 0918   Post-Procedure Width (cm) 1.3 cm 08/31/21 0918   Post-Procedure Depth (cm) 1.1 cm 08/31/21 0918   Post-Procedure Surface Area (cm^2) 1.95 cm^2 08/31/21 0918   Post-Procedure Volume (cm^3) 2.145 cm^3 08/31/21 0918   Distance Tunneling (cm) 0.8 cm 08/31/21 0908   Tunneling Position ___ O'Clock 6 08/31/21 0908   Undermining Starts ___ O'Clock 12 08/24/21 0917   Undermining Ends___ O'Clock 5 08/24/21 0917   Undermining Maxium Distance (cm) 0.9 08/24/21 0917   Wound Assessment Granulation tissue;Slough 08/31/21 0908   Drainage Amount Small 08/31/21 0908   Drainage Description Serosanguinous 08/31/21 0908   Odor None 08/31/21 0908   Michelle-wound Assessment Hyperkeratosis (callous); Maceration 08/31/21 0908   Margins Unattached edges 08/31/21 0908   Wound Thickness Description not for Pressure Injury Full thickness 08/31/21 0908   Number of days: 7       Wound 08/24/21 Foot Left;Medial;Dorsal #2 (Active)   Wound Image   08/24/21 0917   Wound Etiology Skin Tear 08/24/21 1002   Dressing Status Old drainage noted 08/31/21 0908   Wound Cleansed Cleansed with saline 08/31/21 0908   Dressing/Treatment ABD; Ace wrap;Alginate with Ag;Roll gauze 08/24/21 1002   Wound Length (cm) 0.5 cm 08/31/21 0908   Wound Width (cm) 2.5 cm 08/31/21 0908   Wound Depth (cm) 0.1 cm 08/31/21 0908   Wound Surface Area (cm^2) 1.25 cm^2 08/31/21 0908   Change in Wound Size % (l*w) 66.58 08/31/21 0908   Wound Volume (cm^3) 0.125 cm^3 08/31/21 0908   Wound Healing % 67 08/31/21 0908   Post-Procedure Length (cm) 0.6 cm 08/31/21 0918   Post-Procedure Width (cm) 2.5 cm 08/31/21 0918   Post-Procedure Depth (cm) 0.2 cm 08/31/21 0918   Post-Procedure Surface Area (cm^2) 1.5 cm^2 08/31/21 0918   Post-Procedure Volume (cm^3) 0.3 cm^3 08/31/21 0918   Wound Assessment Granulation tissue;Slough 08/31/21 0908   Drainage Amount Moderate 08/31/21 0908   Drainage Description Serosanguinous 08/31/21 0908   Odor None 08/31/21 0908   Michelle-wound Assessment Dry/flaky 08/31/21 0908   Margins Undefined edges 08/31/21 0908   Wound Thickness Description not for Pressure Injury Full thickness 08/31/21 0908   Number of days: 7     Incision 08/28/19 Foot Right (Active)   Number of days: 733       Incision 07/22/20 Foot Anterior; Left (Active)   Number of days: 404       Instruments used to perform Biopsy: forceps and #15 surgical blade    Specimen sent to Pathology:Yes    Total number of Biopsy Specimen: 1     Estimated Blood Loss: with a small amount of bleeding    Hemostasis Achieved:by pressure    Procedural Pain:0  / 10     Post Procedural Pain:0 / 10     Response to treatment: Patient tolerated procedure well with no complaints of pain. HISTORY of PRESENT ILLNESS HPI     Hailey Emerson is a 78 y.o. male who presents today for wound/ulcer evaluation. History of Wound Context: Per original history and physical on this patient. Changes in history since last exam: none.      Objective:    /66   Pulse 68   Temp 97.6 °F (36.4 °C) (Temporal)   Resp 16   Wt Readings from Last 3 Encounters:   08/19/21 (!) 339 lb (153.8 kg)   05/21/21 (!) 329 lb 12.8 oz (149.6 kg)   05/12/21 (!) 325 lb (147.4 kg)       PHYSICAL EXAM  General: alert and in no acute distress. Normal appearing  Skin: warm and dry, no rash  Head: normocephalic and atraumatic  Eyes: extraocular eye movements intact, conjunctivae normal, and sclera anicteric  ENT: hearing grossly normal bilaterally. Normal appearance  Respiratory: no chest wall tenderness. no respiratory distress  GI: abdomen soft, non-tender and non-distended, benign  Musculoskeletal: baseline range of motion in joints. Nontender calves. No cyanosis. Edema 2+. Neurologic: Speech normal. No focal deficits.  Mental status normal or at baseline    PAST MEDICAL HISTORY        Diagnosis Date    Atrial fibrillation (HCC)     Back pain     Cardiomyopathy     CHF (congestive heart failure) (HCC)     COPD (chronic obstructive pulmonary disease) (HCC)     Dyslipidemia     GERD (gastroesophageal reflux disease)     Hyperlipidemia     Hypertension     Hypothyroidism     Leg edema     MRSA (methicillin resistant staph aureus) culture positive 10/5/15    foot/bone    MRSA nasal colonization 05/05/2017    Obesity     Prolonged emergence from general anesthesia     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     Stroke ([facility]     Thyroid disease     Type 2 diabetes mellitus without complication ([facility])     Type II or unspecified type diabetes mellitus without mention of complication, not stated as uncontrolled        PAST SURGICAL HISTORY    Past Surgical History:   Procedure Laterality Date    ADRENAL GLAND SURGERY  1970    CARDIAC DEFIBRILLATOR PLACEMENT  09/05/2017    Dr. Beatriz Zavala COLONOSCOPY N/A 3/8/2019    COLONOSCOPY WITH MAC, UNASYN (3gm) performed by Gianna Arroyo MD at [address] [place] [address] N/A 8/9/2019    COLONOSCOPY POLYPECTOMY SNARE/COLD BIOPSY performed by Gianna Arroyo MD at [address] [place] [address]  8/9/2019    COLONOSCOPY WITH BIOPSY performed by Gianna Arroyo MD at [address] [place] [address] 8/28/2019 INCISION AND INCISIONAL DEBRIDEMENT OPEN FRACTURE RIGHT 2ND TOE SKIN AND BONE performed by Roxi Nickerson MD at [address] Left 2020    LEFT FOOT INCISION AND DRAINAGE WITH BONE BIOPSY AND REMOVAL OF FREE FLOATING BONE FRAGMENT performed by Kaia Kulkarni DPM at [address]  1-2-10    GASTRIC BYPASS SURGERY      OTHER SURGICAL HISTORY  10/6/15    INCISION AND DRAINAGE RIGHT FOOT          OTHER SURGICAL HISTORY Right 10/8/15    INCISION AND DRAINAGE RIGHT FOOT      PACEMAKER PLACEMENT      UPPER GASTROINTESTINAL ENDOSCOPY N/A 3/8/2019    EGD BIOPSY GASTRIC H. PYLORI performed by Stanley Guo MD at [facility] HISTORY    Family History   Problem Relation Age of Onset    Hypertension Mother     Heart Disease Father     Hypertension Maternal Grandmother     Diabetes Maternal Grandmother        SOCIAL HISTORY    Social History     Tobacco Use    Smoking status: Former Smoker     Packs/day: 1.00     Years: 4.00     Pack years: 4.00     Types: Cigarettes     Quit date: 2017     Years since quittin.6    Smokeless tobacco: Never Used   Vaping Use    Vaping Use: Never used   Substance Use Topics    Alcohol use: Yes     Comment: 2-3 times per year    Drug use: No       ALLERGIES    No Known Allergies    MEDICATIONS    Current Outpatient Medications on File Prior to Encounter   Medication Sig Dispense Refill    carvedilol (COREG) 6.25 MG tablet TAKE 1 TABLET BY MOUTH TWICE DAILY WITH MEALS 30 tablet 5    silver sulfADIAZINE (SILVADENE) 1 % cream Apply topically daily.  400 g 0    atorvastatin (LIPITOR) 40 MG tablet TAKE 1 TABLET BY MOUTH DAILY 90 tablet 3    furosemide (LASIX) 40 MG tablet TAKE 1 TABLET BY MOUTH DAILY 60 tablet 3    oxybutynin (DITROPAN-XL) 5 MG extended release tablet TAKE 1 TABLET BY MOUTH DAILY FOR OVERACTIVE BLADDER 90 tablet 1    levothyroxine (SYNTHROID) 25 MCG tablet TAKE 1 TABLET BY MOUTH DAILY 90 tablet 3    Insulin Pen Needle (B-D UF III MINI PEN NEEDLES) 31G X 5 MM MISC Inject 1 each into the skin daily 100 each 5    apixaban (ELIQUIS) 5 MG TABS tablet TAKE 1 TABLET BY MOUTH TWICE DAILY 60 tablet 11    blood glucose test strips (ONE TOUCH TEST STRIPS) strip 1 each by In Vitro route 3 times daily As needed. 300 each 3    dapagliflozin (FARXIGA) 10 MG tablet Take 1 tablet by mouth every morning 90 tablet 3    lisinopril (PRINIVIL;ZESTRIL) 2.5 MG tablet Take 1 tablet by mouth daily 90 tablet 3    tamsulosin (FLOMAX) 0.4 MG capsule TAKE 1 CAPSULE BY MOUTH DAILY 90 capsule 2    spironolactone (ALDACTONE) 25 MG tablet Take 1 tablet by mouth daily 90 tablet 3    NOVOLOG FLEXPEN 100 UNIT/ML injection pen ADMINISTER 8 TO 10 UNITS UNDER THE SKIN THREE TIMES DAILY BEFORE MEALS 45 mL 5    ONETOUCH ULTRA strip USE WITH GLUCOSE METER THREE TIMES DAILY AND AS NEEDED 300 strip 3    TRESIBA FLEXTOUCH 100 UNIT/ML SOPN INJECT 10 UNITS UNDER THE SKIN NIGHTLY 9 mL 3    ferrous sulfate (FE TABS 325) 325 (65 Fe) MG EC tablet Take 325 mg by mouth daily      clotrimazole-betamethasone (LOTRISONE) 1-0.05 % cream Apply topically 2 times daily. 15 g 1    amiodarone (CORDARONE) 200 MG tablet Take 1 tablet by mouth daily 90 tablet 3    Continuous Blood Gluc  (FREESTYLE KWESI 14 DAY READER) ANITA Use as Directed Dx E11.9 1 Device 0    Continuous Blood Gluc Sensor (FREESTYLE KWESI 14 DAY SENSOR) [facility] Use as directed Dx E11.9 3 each 3    omega-3 acid ethyl esters (LOVAZA) 1 g capsule TAKE 1 CAPSULE BY MOUTH TWICE DAILY 180 capsule 3    Blood Glucose Monitoring Suppl (ONE TOUCH ULTRA MINI) w/Device KIT 1 kit by Does not apply route three times daily 1 kit 0    Handicap Placard MISC by Does not apply route Expires: 1/9/22. Diagnosis: cardiomyopathy.  1 each 0    acetaminophen (TYLENOL) 325 MG tablet Take 2 tablets by mouth every 4 hours as needed for Pain 120 tablet 3    Coenzyme Q10 (CO Q 10) 100 MG CAPS Take 1 capsule by mouth daily  Cinnamon 500 MG CAPS Take 1 capsule by mouth daily      Vitamin D (CHOLECALCIFEROL) 1000 UNITS CAPS capsule Take 2,000 Units by mouth nightly       Insulin Syringe-Needle U-100 (INSULIN SYRINGE .5CC/31GX5/16\") 31G X 5/16\" 0.5 ML MISC Patient tests bid 100 Syringe 5    therapeutic multivitamin-minerals (THERAGRAN-M) tablet Take 1 tablet by mouth daily.  aspirin 81 MG EC tablet Take 81 mg by mouth daily. No current facility-administered medications on file prior to encounter. REVIEW OF SYSTEMS  A comprehensive review of systems was negative except for what has been indicated above and: wound, edema. Drainage. Written patient dismissal instructions given to patient and signed by patient or POA. Discharge [address] and Hyperbaric Oxygen Therapy   Physician Orders and Discharge Instructions  [address]   [address] [place] [address], [place] [address]  Telephone: [phone]     NAME:  Asim Mckay OF BIRTH:  1950  MEDICAL RECORD NUMBER:  [id number]  DATE:  8/31/2021     Wound Cleansing:   Do not scrub or use excessive force. Cleanse wound prior to applying a clean dressing with:  [x]? Normal Saline  [x]? Keep Wound Dry in Shower    []? Wound Cleanser   []? Cleanse wound with Mild Soap & Water  []? May Shower at Discharge   []? Other:        Topical Treatments:  Do not apply lotions, creams, or ointments to wound bed unless directed. []? Apply moisturizing lotion to skin surrounding the wound prior to dressing change. []? Apply antifungal ointment to skin surrounding the wound prior to dressing change. []? Apply thin film of moisture barrier ointment to skin immediately around wound. [x]? Other: FELT PADS TO FRANSICO-WOUND (CHANGE ONLY AS NEEDED FOR DRAINAGE)                Dressings:                  Wound Location LEFT PLANTAR FOOT     [x]?  Apply Primary Dressing: [x]? Other: SILVADENE TO BASE OF WOUND                                                                [x]? Cover and Secure with:                   [x]? Gauze                       [x]? Roll gauze TOES TO KNEE              [x]? Ace Wrap- NOT FOR COMPRESSION JUST TO KEEP DRESSING IN PLACE                                     []? Other:               Avoid contact of tape with skin. [x]? Change dressing:   [x]? Daily        Dressings:                  Wound Location LEFT DORSAL FOOT       [x]? Apply Primary Dressing:                                          [x]? Alginate with Silver                 [x]? Cover and Secure with:                   [x]? Gauze         [x]? ABD PAD   [x]? Kerlix- TOES TO KNEE              [x]? Ace Wrap-NOT FOR COMPRESSION JUST TO KEEP DRESSING IN PLACE                                     Avoid contact of tape with skin. [x]? Change dressing:   [x]? Daily                              Edema Control:  Apply:              [x]? SpandaGrip            []? Right Leg     [x]? Left Leg                                      []?Low compression 5-10 mm/Hg                                             [x]? Medium compression 10-20 mm/Hg                                      []?High compression  20-30 mm/Hg              every morning immediately when getting up should be applied to affected leg(s) from mid foot to knee making sure to cover the heel. Remove every night before going to bed. [x]? Elevate leg(s) above the level of the heart when sitting. [x]? Avoid prolonged standing in one place.     Off-Loading:   [x]? Off-loading when    [x]? walking       []? in bed         []? sitting  []? Total non-weight bearing  []? Right Leg  []? Left Leg          [x]? Assistive Device     []? Virginie Ann        []? Cane           []? Wheelchair  []? Crutches              [x]? Surgical shoe    []? Podus Boot(s)   []? Foam Boot(s)  []? Roll About              []? Cast Boot   []? CROW Boot  []? Other:        Dietary:  []? Diet as tolerated:     [x]? Calorie Diabetic Diet:           []? No Added Salt:  [x]? Increase Protein:     []? Other:              Activity:  [x]? Activity: BATHROOM PRIVILEGES ONLY     If you are still having pain after you go home:  [x]? Elevate the affected limb. [x]? Use over-the-counter medications you would normally use for pain as permitted by your family doctor. [x]? For persistent pain not relieved by the above interventions, please call your family doctor.   Kaushal Ashley   Return Appointment:  []? Wound and dressing supply provider:   []? ECF or Home Healthcare:  []? Wound Assessment:          []? Physician or NP scheduled for Wound Assessment:   [x]? Return Appointment: With Manuel Gold MD  in  1 Week(s)  [x]? Ordered tests:      Nurse Case Manger:  ROB  Electronically signed by Ivana Jung RN on 8/31/2021 at [address] Information: Should you experience any significant changes in your wound(s) or have questions about your wound care, please contact the [address] [place] [address] at [address] 8:00 am - 4:30 pm and Friday 8:00 am - 12:30 pm.  If you need help with your wound outside these hours and cannot wait until we are again available, contact your PCP or go to the hospital emergency room.      PLEASE NOTE: IF YOU ARE UNABLE TO OBTAIN WOUND SUPPLIES, CONTINUE TO USE THE SUPPLIES YOU HAVE AVAILABLE UNTIL YOU ARE ABLE TO [facility]. IT IS MOST IMPORTANT TO KEEP THE WOUND COVERED AT ALL TIMES.      Physician Signature:_______________________     Date: ___________ Time:  ____________                                DR. Lilly Polanco MD        Electronically signed by Fredis Ordoñez MD on 8/31/2021 at 9:37 AM

## 2021-09-01 RX ORDER — TAMSULOSIN HYDROCHLORIDE 0.4 MG/1
CAPSULE ORAL
Qty: 90 CAPSULE | Refills: 2 | Status: SHIPPED | OUTPATIENT
Start: 2021-09-01 | End: 2022-09-01

## 2021-09-02 ENCOUNTER — HOSPITAL ENCOUNTER (OUTPATIENT)
Dept: VASCULAR LAB | Age: 71
Discharge: HOME | End: 2021-09-02
Payer: MEDICARE

## 2021-09-02 DIAGNOSIS — M86.272 SUBACUTE OSTEOMYELITIS OF LEFT FOOT (HCC): ICD-10-CM

## 2021-09-02 DIAGNOSIS — E11.610 CHARCOT FOOT DUE TO DIABETES MELLITUS (HCC): ICD-10-CM

## 2021-09-02 DIAGNOSIS — I70.244 ATHEROSCLEROSIS OF NATIVE ARTERIES OF LEFT LEG WITH ULCERATION OF HEEL AND MIDFOOT (HCC): ICD-10-CM

## 2021-09-02 DIAGNOSIS — L97.424 DIABETIC ULCER OF LEFT MIDFOOT ASSOCIATED WITH TYPE 2 DIABETES MELLITUS, WITH NECROSIS OF BONE (HCC): ICD-10-CM

## 2021-09-02 DIAGNOSIS — E11.42 DIABETIC POLYNEUROPATHY ASSOCIATED WITH TYPE 2 DIABETES MELLITUS (HCC): ICD-10-CM

## 2021-09-02 DIAGNOSIS — L98.499 DIABETES MELLITUS WITH SKIN ULCER (HCC): ICD-10-CM

## 2021-09-02 DIAGNOSIS — Z74.09 IMPAIRED MOBILITY: ICD-10-CM

## 2021-09-02 DIAGNOSIS — E11.622 DIABETES MELLITUS WITH SKIN ULCER (HCC): ICD-10-CM

## 2021-09-02 DIAGNOSIS — E11.621 DIABETIC ULCER OF LEFT MIDFOOT ASSOCIATED WITH TYPE 2 DIABETES MELLITUS, WITH NECROSIS OF BONE (HCC): ICD-10-CM

## 2021-09-02 PROCEDURE — 93882 EXTRACRANIAL UNI/LTD STUDY: CPT

## 2021-09-02 PROCEDURE — 93925 LOWER EXTREMITY STUDY: CPT

## 2021-09-03 LAB
GRAM STAIN RESULT: ABNORMAL
ORGANISM: ABNORMAL
ORGANISM: ABNORMAL
WOUND/ABSCESS: ABNORMAL
WOUND/ABSCESS: ABNORMAL

## 2021-09-08 ENCOUNTER — HOSPITAL ENCOUNTER (OUTPATIENT)
Dept: WOUND CARE | Age: 71
Discharge: HOME | End: 2021-09-08
Payer: MEDICARE

## 2021-09-08 VITALS
TEMPERATURE: 97.5 F | HEART RATE: 81 BPM | RESPIRATION RATE: 18 BRPM | SYSTOLIC BLOOD PRESSURE: 164 MMHG | DIASTOLIC BLOOD PRESSURE: 79 MMHG

## 2021-09-08 DIAGNOSIS — L98.499 DIABETES MELLITUS WITH SKIN ULCER (HCC): ICD-10-CM

## 2021-09-08 DIAGNOSIS — E11.621 DIABETIC ULCER OF LEFT MIDFOOT ASSOCIATED WITH TYPE 2 DIABETES MELLITUS, WITH NECROSIS OF BONE (HCC): ICD-10-CM

## 2021-09-08 DIAGNOSIS — Z74.09 IMPAIRED MOBILITY: ICD-10-CM

## 2021-09-08 DIAGNOSIS — E11.621 TYPE 2 DIABETES MELLITUS WITH LEFT DIABETIC FOOT ULCER (HCC): ICD-10-CM

## 2021-09-08 DIAGNOSIS — L97.529 TYPE 2 DIABETES MELLITUS WITH LEFT DIABETIC FOOT ULCER (HCC): ICD-10-CM

## 2021-09-08 DIAGNOSIS — E11.610 CHARCOT FOOT DUE TO DIABETES MELLITUS (HCC): ICD-10-CM

## 2021-09-08 DIAGNOSIS — M86.272 SUBACUTE OSTEOMYELITIS OF LEFT FOOT (HCC): ICD-10-CM

## 2021-09-08 DIAGNOSIS — E11.21 DIABETIC NEPHROPATHY ASSOCIATED WITH TYPE 2 DIABETES MELLITUS (HCC): ICD-10-CM

## 2021-09-08 DIAGNOSIS — E11.42 DIABETIC POLYNEUROPATHY ASSOCIATED WITH TYPE 2 DIABETES MELLITUS (HCC): ICD-10-CM

## 2021-09-08 DIAGNOSIS — I70.244 ATHEROSCLEROSIS OF NATIVE ARTERIES OF LEFT LEG WITH ULCERATION OF HEEL AND MIDFOOT (HCC): ICD-10-CM

## 2021-09-08 DIAGNOSIS — L97.424 DIABETIC ULCER OF LEFT MIDFOOT ASSOCIATED WITH TYPE 2 DIABETES MELLITUS, WITH NECROSIS OF BONE (HCC): ICD-10-CM

## 2021-09-08 DIAGNOSIS — E11.8 DIABETIC FOOT (HCC): Primary | ICD-10-CM

## 2021-09-08 DIAGNOSIS — E11.622 DIABETES MELLITUS WITH SKIN ULCER (HCC): ICD-10-CM

## 2021-09-08 PROCEDURE — 11043 DBRDMT MUSC&/FSCA 1ST 20/<: CPT

## 2021-09-08 PROCEDURE — 11043 DBRDMT MUSC&/FSCA 1ST 20/<: CPT | Performed by: EMERGENCY MEDICINE

## 2021-09-08 RX ORDER — LIDOCAINE 40 MG/G
CREAM TOPICAL ONCE
Status: COMPLETED | OUTPATIENT
Start: 2021-09-08 | End: 2021-09-08

## 2021-09-08 RX ORDER — LIDOCAINE 50 MG/G
OINTMENT TOPICAL ONCE
Status: CANCELLED | OUTPATIENT
Start: 2021-09-08 | End: 2021-09-08

## 2021-09-08 RX ORDER — LIDOCAINE HYDROCHLORIDE 40 MG/ML
SOLUTION TOPICAL ONCE
Status: CANCELLED | OUTPATIENT
Start: 2021-09-08 | End: 2021-09-08

## 2021-09-08 RX ORDER — GENTAMICIN SULFATE 1 MG/G
OINTMENT TOPICAL ONCE
Status: CANCELLED | OUTPATIENT
Start: 2021-09-08 | End: 2021-09-08

## 2021-09-08 RX ORDER — SULFAMETHOXAZOLE AND TRIMETHOPRIM 800; 160 MG/1; MG/1
1 TABLET ORAL 2 TIMES DAILY
Qty: 14 TABLET | Refills: 0 | Status: SHIPPED | OUTPATIENT
Start: 2021-09-08 | End: 2021-09-15

## 2021-09-08 RX ORDER — BACITRACIN, NEOMYCIN, POLYMYXIN B 400; 3.5; 5 [USP'U]/G; MG/G; [USP'U]/G
OINTMENT TOPICAL ONCE
Status: CANCELLED | OUTPATIENT
Start: 2021-09-08 | End: 2021-09-08

## 2021-09-08 RX ORDER — BETAMETHASONE DIPROPIONATE 0.05 %
OINTMENT (GRAM) TOPICAL ONCE
Status: CANCELLED | OUTPATIENT
Start: 2021-09-08 | End: 2021-09-08

## 2021-09-08 RX ORDER — LIDOCAINE HYDROCHLORIDE 20 MG/ML
JELLY TOPICAL ONCE
Status: CANCELLED | OUTPATIENT
Start: 2021-09-08 | End: 2021-09-08

## 2021-09-08 RX ORDER — GINSENG 100 MG
CAPSULE ORAL ONCE
Status: CANCELLED | OUTPATIENT
Start: 2021-09-08 | End: 2021-09-08

## 2021-09-08 RX ORDER — CLOBETASOL PROPIONATE 0.5 MG/G
OINTMENT TOPICAL ONCE
Status: CANCELLED | OUTPATIENT
Start: 2021-09-08 | End: 2021-09-08

## 2021-09-08 RX ORDER — LIDOCAINE 40 MG/G
CREAM TOPICAL ONCE
Status: CANCELLED | OUTPATIENT
Start: 2021-09-08 | End: 2021-09-08

## 2021-09-08 RX ORDER — BACITRACIN ZINC AND POLYMYXIN B SULFATE 500; 1000 [USP'U]/G; [USP'U]/G
OINTMENT TOPICAL ONCE
Status: CANCELLED | OUTPATIENT
Start: 2021-09-08 | End: 2021-09-08

## 2021-09-08 RX ADMIN — LIDOCAINE 1 G: 40 CREAM TOPICAL at 09:25

## 2021-09-08 ASSESSMENT — PAIN SCALES - GENERAL
PAINLEVEL_OUTOF10: 0
PAINLEVEL_OUTOF10: 0

## 2021-09-08 NOTE — PROGRESS NOTES
[facility]   Progress Note     George Bernal RECORD NUMBER:  [id number]  AGE: 78 y.o. GENDER: male  : 1950  EPISODE DATE:  2021    Subjective:     Chief Complaint   Patient presents with    Wound Check     follow up visit left foot wound      Patient presenting for follow up evaluation of above wound. Symptoms, wound related issues, or other pertinent wound history since last visit: none. Patient tolerating dressings well. arterial dopplers done 2021    Medical Decision Makin-year-old male with multiple comorbid conditions as outlined below who is presenting as a follow up appointment to our wound center for nonhealing left diabetic foot ulcer present for about a year prior to presentation here. The patient had been under the care of his podiatrist at [address] [place] [address].  He has transitioned his care to our wound center as he wanted a second opinion given his poor wound progress. 21: no new issues. Patient has had arterial Dopplers since last visit. Results as follows:  Right CUATE was not available due to body habitus.    Left CUATE was not available due to body habitus.    The majority of the waveforms are multiphasic throughout the left lower    extremity.    Elevated velocity of the proximal femoral artery suggest a greater than 50%    stenosis.    Findings are suggestive of outflow disease. There is good collateral flow    noted throughout.        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 ([address]    Relevant Orders    Initiate Outpatient Wound Care Protocol    Diabetic foot ([address] - Primary    Relevant Orders    Initiate Outpatient Wound Care Protocol    Type 2 diabetes mellitus with left diabetic foot ulcer ([address]    Relevant Orders    Initiate Outpatient Wound Care Protocol    Diabetic polyneuropathy associated with type 2 diabetes mellitus ([address]    Relevant Orders    Initiate Outpatient Wound Care Protocol    Diabetic ulcer of left midfoot associated with type 2 diabetes mellitus, with necrosis of bone ([address]    Relevant Orders    Initiate Outpatient Wound Care Protocol    Impaired mobility    Relevant Orders    Initiate Outpatient Wound Care Protocol    Subacute osteomyelitis of left foot ([address]    Relevant Orders    Initiate Outpatient Wound Care Protocol    Diabetes mellitus with skin ulcer ([address]    Relevant Orders    Initiate Outpatient Wound Care Protocol    Charcot foot due to diabetes mellitus ([address]    Relevant Orders    Initiate Outpatient Wound Care Protocol    Atherosclerosis of native arteries of left leg with ulceration of heel and midfoot ([address]    Relevant Orders    Initiate Outpatient Wound Care Protocol        Comorbid conditions affecting wound healing: DM type 2, cardiomyopathy, hypertension, hyperlipidemia, stage IIIa chronic kidney disease, obesity, chronic leg edema, chronic anticoagulation.     Wound evaluation: healthier appearance, but still pretty macerated with callus. Nonviable tissue in the wound bed although it is less than before. Problems addressed during this encounter:  1. Left diabetic foot ulcer, plantar surface of midfoot. Severity of bone necrosis. Coffey grade 3  2. Left diabetic foot/ankle ulcer, dorsal aspect. Severity of skin layers involved only. Coffey grade 1  3. Diabetes mellitus type 2. Last hemoglobin A1c was 6.8. Continues to adhere to diabetic diet. Doing better with control. Additional education provided. 4. Left lower extremity edema. This is improved. Continue as current with gentle compression  5. Charcot deformity leading to severe difficulties with offloading, impaired mobility. Offloading boot prescribed. Offloading felt applied today by me. 6. Osteomyelitis of left foot. I reviewed his records from the outside facility and his MRI in July 2021.   The patient has received multiple courses of antibiotics already. Last course of IV antibiotics was in September 2020. Since wound is slowly improving, we will see if hyperbaric oxygen therapy can be done after arterial studies done. Cultures from last visit reviewed. We will place the patient on Bactrim  7. Suspected peripheral arterial disease. Arterial Dopplers ordered. Results reviewed. 8. Hyperbaric oxygen therapy needs. Discussed with him previously. Patient was very motivated to proceed. Education about HBOT provided for the patient. I reiterated that outflow status is important to determine prior to HBO therapy. Data reviewed and analyzed:  1. Assessment required other independent historian(s): No patient . 2. Review of prior external note from other providers done today: No  3. New imaging or lab ordered today: No.  Tissue culture and tissue sent for pathology 8/31/21 . 4. Review of test results done today: Yes.  tissue culture and pathology as well as arterial Doppler. Culture was positive for Enterococcus faecalis. Pathology showed ulcerated necrotic tissue with acute inflammation consistent with wound history. 5. Independent interpretation of test(s): No  6. Discussion of management or test interpretation with other qualified health care professional and other external source. Risk of complications and/or mortality of patient management:  1. This patient has a high risk of morbidity and mortality from additional diagnostic testing or treatment. This is due to the above conditions affecting wound healing as well as patient and procedure risk factors. Education and discussion held with patient regarding these disease processes pertinent to wound(s). 2. Other pertinent decisions include: minor surgery or procedures as below. 3. The patient's diagnosis or treatment is not significantly limited by social determinants of health as noted by: N/A .  4.  Prescription drug management: Bactrim     Negative Pressure Wound Therapy Foot Diabetic Foot Ulcers Post op shoe 08/31/21 0945   Wound Length (cm) 0 cm 09/08/21 0918   Wound Width (cm) 0 cm 09/08/21 0918   Wound Depth (cm) 0 cm 09/08/21 0918   Wound Surface Area (cm^2) 0 cm^2 09/08/21 0918   Change in Wound Size % (l*w) 100 09/08/21 0918   Wound Volume (cm^3) 0 cm^3 09/08/21 0918   Wound Healing % 100 09/08/21 0918   Post-Procedure Length (cm) 0 cm 09/08/21 0942   Post-Procedure Width (cm) 0 cm 09/08/21 0942   Post-Procedure Depth (cm) 0 cm 09/08/21 0942   Post-Procedure Surface Area (cm^2) 0 cm^2 09/08/21 0942   Post-Procedure Volume (cm^3) 0 cm^3 09/08/21 0942   Wound Assessment Epithelialization 09/08/21 0918   Drainage Amount Moderate 08/31/21 0908   Drainage Description Serosanguinous 08/31/21 0908   Odor None 08/31/21 0908   Michelle-wound Assessment Dry/flaky;Fragile 09/08/21 0918   Margins Undefined edges 08/31/21 0908   Wound Thickness Description not for Pressure Injury Full thickness 08/31/21 0908   Number of days: 15     Incision 08/28/19 Foot Right (Active)   Number of days: 741       Incision 07/22/20 Foot Anterior; Left (Active)   Number of days: 412       Procedures done during this encounter:   Debridement: Excisional Debridement  Indications:  Based on my examination of this patient's wound(s)/ulcer(s) today, debridement is required to promote healing and evaluate the wound base. Risks and benefits discussed with patient who has agreed to proceed. Performed by: Fady Yusuf MD  Consent obtained:  Yes  Time out taken:  Yes  Pain Control: Anesthetic  Anesthetic: 4% Lidocaine Cream   Using curette, #15 blade scalpel, scissors and forceps the wound(s)/ulcer(s) was/were debrided down through and including the removal of muscle/fascia. Devitalized Tissue Debrided:  fibrin, biofilm, slough, exudate, callus and nonviable protruding tissue.   Pre Debridement Measurements:  Are located in the [place]  Documentation Flow Sheet  Wound/Ulcer #: 1  Post Debridement Measurements:  Wound/Ulcer Descriptions are Pre Debridement except measurements: Total Surface Area Debrided:  2.94 sq cm   Diabetic/Pressure/Non Pressure Ulcers only:  Ulcer: Diabetic ulcer, bone necrosis   Estimated Blood Loss:  Minimal  Hemostasis Achieved:  by silver nitrate stick  Procedural Pain:  0  / 10   Post Procedural Pain:  0 / 10   Response to treatment:  Well tolerated by patient. HISTORY of PRESENT ILLNESS YARON Elena is a 78 y.o. male who presents today for wound/ulcer evaluation. History of Wound Context: Per original history and physical on this patient. Changes in history since last exam: none. Objective:    BP (!) 164/79   Pulse 81   Temp 97.5 °F (36.4 °C) (Temporal)   Resp 18   Wt Readings from Last 3 Encounters:   08/19/21 (!) 339 lb (153.8 kg)   05/21/21 (!) 329 lb 12.8 oz (149.6 kg)   05/12/21 (!) 325 lb (147.4 kg)       PHYSICAL EXAM  General: alert and in no acute distress. Normal appearing  Skin: warm and dry, no rash  Head: normocephalic and atraumatic  Eyes: extraocular eye movements intact, conjunctivae normal, and sclera anicteric  ENT: hearing grossly normal bilaterally. Normal appearance  Respiratory: no chest wall tenderness. no respiratory distress  GI: abdomen soft, non-tender and non-distended, benign  Musculoskeletal: baseline range of motion in joints. Nontender calves. No cyanosis. Edema 2+. Neurologic: Speech normal. No focal deficits.  Mental status normal or at baseline    PAST MEDICAL HISTORY        Diagnosis Date    Atrial fibrillation (HCC)     Back pain     Cardiomyopathy     CHF (congestive heart failure) (HCC)     COPD (chronic obstructive pulmonary disease) (HCC)     Dyslipidemia     GERD (gastroesophageal reflux disease)     Hyperlipidemia     Hypertension     Hypothyroidism     Leg edema     MRSA (methicillin resistant staph aureus) culture positive 10/5/15    foot/bone    MRSA nasal colonization 05/05/2017    Obesity  Prolonged emergence from general anesthesia     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     Stroke [facility])     Thyroid disease     Type 2 diabetes mellitus without complication [facility]     Type II or unspecified type diabetes mellitus without mention of complication, not stated as uncontrolled        PAST SURGICAL HISTORY    Past Surgical History:   Procedure Laterality Date    ADRENAL GLAND SURGERY  1970    CARDIAC DEFIBRILLATOR PLACEMENT  2017    Dr. Balwinder Khan COLONOSCOPY N/A 3/8/2019    COLONOSCOPY WITH MAC, UNASYN (3gm) performed by Pollo Young MD at [address] 2019    COLONOSCOPY POLYPECTOMY SNARE/COLD BIOPSY performed by Pollo Young MD at [facility]  2019    COLONOSCOPY WITH BIOPSY performed by Pollo Young MD at [address] [facility] Right 2019    INCISION AND INCISIONAL DEBRIDEMENT OPEN FRACTURE RIGHT 2ND TOE SKIN AND BONE performed by Shilpa Tolbert MD at [address] Left 2020    LEFT FOOT INCISION AND DRAINAGE WITH BONE BIOPSY AND REMOVAL OF FREE FLOATING BONE FRAGMENT performed by Trung Amador DPM at [facility]    GASTRIC BYPASS SURGERY      OTHER SURGICAL HISTORY  10/6/15    INCISION AND DRAINAGE RIGHT FOOT          OTHER SURGICAL HISTORY Right 10/8/15    INCISION AND DRAINAGE RIGHT FOOT      PACEMAKER PLACEMENT      UPPER GASTROINTESTINAL ENDOSCOPY N/A 3/8/2019    EGD BIOPSY GASTRIC H. PYLORI performed by Pollo Young MD at [facility]    Family History   Problem Relation Age of Onset    Hypertension Mother     Heart Disease Father     Hypertension Maternal Grandmother     Diabetes Maternal Grandmother        SOCIAL HISTORY    Social History     Tobacco Use    Smoking status: Former Smoker     Packs/day: 1.00     Years: 4.00     Pack years: 4.00     Types: Cigarettes     Quit date: 2017     Years since quittin.6    Smokeless tobacco: Never Used   Vaping Use    Vaping Use: Never used   Substance Use Topics    Alcohol use: Yes     Comment: 2-3 times per year    Drug use: No       ALLERGIES    No Known Allergies    MEDICATIONS    Current Outpatient Medications on File Prior to Encounter   Medication Sig Dispense Refill    tamsulosin (FLOMAX) 0.4 MG capsule TAKE 1 CAPSULE BY MOUTH DAILY 90 capsule 2    carvedilol (COREG) 6.25 MG tablet TAKE 1 TABLET BY MOUTH TWICE DAILY WITH MEALS 30 tablet 5    silver sulfADIAZINE (SILVADENE) 1 % cream Apply topically daily. 400 g 0    atorvastatin (LIPITOR) 40 MG tablet TAKE 1 TABLET BY MOUTH DAILY 90 tablet 3    furosemide (LASIX) 40 MG tablet TAKE 1 TABLET BY MOUTH DAILY 60 tablet 3    oxybutynin (DITROPAN-XL) 5 MG extended release tablet TAKE 1 TABLET BY MOUTH DAILY FOR OVERACTIVE BLADDER 90 tablet 1    levothyroxine (SYNTHROID) 25 MCG tablet TAKE 1 TABLET BY MOUTH DAILY 90 tablet 3    Insulin Pen Needle (B-D UF III MINI PEN NEEDLES) 31G X 5 MM MISC Inject 1 each into the skin daily 100 each 5    apixaban (ELIQUIS) 5 MG TABS tablet TAKE 1 TABLET BY MOUTH TWICE DAILY 60 tablet 11    blood glucose test strips (ONE TOUCH TEST STRIPS) strip 1 each by In Vitro route 3 times daily As needed.  300 each 3    dapagliflozin (FARXIGA) 10 MG tablet Take 1 tablet by mouth every morning 90 tablet 3    lisinopril (PRINIVIL;ZESTRIL) 2.5 MG tablet Take 1 tablet by mouth daily 90 tablet 3    spironolactone (ALDACTONE) 25 MG tablet Take 1 tablet by mouth daily 90 tablet 3    NOVOLOG FLEXPEN 100 UNIT/ML injection pen ADMINISTER 8 TO 10 UNITS UNDER THE SKIN THREE TIMES DAILY BEFORE MEALS 45 mL 5    ONETOUCH ULTRA strip USE WITH GLUCOSE METER THREE TIMES DAILY AND AS NEEDED 300 strip 3    TRESIBA FLEXTOUCH 100 UNIT/ML SOPN INJECT 10 UNITS UNDER THE SKIN NIGHTLY 9 mL 3    ferrous sulfate (FE TABS 325) 325 (65 Fe) MG EC tablet Take 325 mg by mouth daily      clotrimazole-betamethasone (LOTRISONE) 1-0.05 % cream Apply topically 2 times daily. 15 g 1    amiodarone (CORDARONE) 200 MG tablet Take 1 tablet by mouth daily 90 tablet 3    Continuous Blood Gluc  (FREESTYLE KWESI 14 DAY READER) ANITA Use as Directed Dx E11.9 1 Device 0    Continuous Blood Gluc Sensor (FREESTYLE KWESI 14 DAY SENSOR) MISC Use as directed Dx E11.9 3 each 3    omega-3 acid ethyl esters (LOVAZA) 1 g capsule TAKE 1 CAPSULE BY MOUTH TWICE DAILY 180 capsule 3    Blood Glucose Monitoring Suppl (ONE TOUCH ULTRA MINI) w/Device KIT 1 kit by Does not apply route three times daily 1 kit 0    Handicap Placard MISC by Does not apply route Expires: 1/9/22. Diagnosis: cardiomyopathy. 1 each 0    acetaminophen (TYLENOL) 325 MG tablet Take 2 tablets by mouth every 4 hours as needed for Pain 120 tablet 3    Coenzyme Q10 (CO Q 10) 100 MG CAPS Take 1 capsule by mouth daily      Cinnamon 500 MG CAPS Take 1 capsule by mouth daily      Vitamin D (CHOLECALCIFEROL) 1000 UNITS CAPS capsule Take 2,000 Units by mouth nightly       Insulin Syringe-Needle U-100 (INSULIN SYRINGE .5CC/31GX5/16\") 31G X 5/16\" 0.5 ML MISC Patient tests bid 100 Syringe 5    therapeutic multivitamin-minerals (THERAGRAN-M) tablet Take 1 tablet by mouth daily.  aspirin 81 MG EC tablet Take 81 mg by mouth daily. No current facility-administered medications on file prior to encounter. REVIEW OF SYSTEMS  A comprehensive review of systems was negative except for what has been indicated above and: wound, edema. Drainage. Written patient dismissal instructions given to patient and signed by patient or POA.          Discharge Instructions       [facility] [place] [facility]   Physician Orders and Discharge Instructions  Misael[address]  [address] [address], [facility]  Telephone: [phone]      FAX [phone]     NAME: [address] [place] [address] [place]:  1950  MEDICAL RECORD NUMBER:  [id number]  DATE:  9/8/2021     Wound Cleansing:   Do not scrub or use excessive force. Cleanse wound prior to applying a clean dressing with:  [x]? ? Normal Saline  [x]? ? Keep Wound Dry in Shower    []? ? Wound Cleanser   []? ? Cleanse wound with Mild Soap & Water  []? ? May Shower at Discharge   []? ? Other:        Topical Treatments:  Do not apply lotions, creams, or ointments to wound bed unless directed. [x]? ? Apply moisturizing lotion to skin surrounding the wound prior to dressing change. []?? Apply antifungal ointment to skin surrounding the wound prior to dressing change. []?? Apply thin film of moisture barrier ointment to skin immediately around wound. [x]? ? Other: FELT PADS TO FRANSICO-WOUND (CHANGE ONLY AS NEEDED FOR DRAINAGE)                Dressings:                  Wound Location LEFT PLANTAR FOOT     [x]? ? Apply Primary Dressing:   [x]? ? : HYDROFERA BLUE ROPE- PACK INTO WOUND, SLIGHTLY MOISTENED WITH SALINE                                                                 [x]? ? Cover and Secure with:                   [x]? ? Gauze                       [x]? ? Roll gauze TOES TO KNEE              [x]? ? Ace Wrap- NOT FOR COMPRESSION JUST TO KEEP DRESSING IN PLACE                                     []? ? Other:               Avoid contact of tape with skin. [x]? ? Change dressing:   [x]? ? EVERY OTHER DAY                                  Edema Control:  Apply:              [x]? ? SpandaGrip            []? ? Right Leg     [x]? ? Left Leg                                      []? ?Low compression 5-10 mm/Hg                                             [x]? ? Medium compression 10-20 mm/Hg                                      []? ? High compression  20-30 mm/Hg              every morning immediately when getting up should be applied to affected leg(s) from mid foot to knee making sure to cover the heel.  Remove every night before going to bed.              [x]? ? Elevate leg(s) above the level of the heart when sitting.                 [x]? ? Avoid prolonged standing in one place.     Off-Loading:   [x]? ? Off-loading when    [x]? ? walking       []? ? in bed         []? ? sitting  []? ? Total non-weight bearing  []? ? Right Leg  []? ? Left Leg          [x]? ?Miguel Mj Device     []? ? Walker        []? ? Cane           []? ? Wheelchair  []? ? Crutches              [x]? ? Surgical shoe    []? ? Podus Boot(s)   []? ? Foam Boot(s)  []? ? Roll About              []? ? Cast Boot   []? ?[employer]  []? ? Other:        Dietary:  []?? Diet as tolerated:     [x]? ? Calorie Diabetic Diet:           []? ? No Added Salt:  [x]? ? Increase Protein:     []? ? Other:              Activity:  [x]? ? Activity: BATHROOM PRIVILEGES ONLY     If you are still having pain after you go home:  [x]? ? Elevate the affected limb.    [x]? ? Use over-the-counter medications you would normally use for pain as permitted by your family doctor. [x]? ? For persistent pain not relieved by the above interventions, please call your family doctor.             Return Appointment:  []?? Wound and dressing supply provider: [facility] AT THIS TIME 9/8/2021  []?? ECF or Home Healthcare:  []?? Wound Assessment:          []? ? Physician or NP scheduled for Wound Assessment:   [x]? ? Return Appointment: With DR. Tracee Mora MD [facility] Week(s)  [x]? ? Ordered tests: BACTRIM ORDERED TO PREFERRED PHARMACY    REFERRAL TO INFECTIOUS DISEASE- DR [facility]                                                       [address], [place] [address]                                                        Phone: [phone]    Nurse Case Manger: [facility]        Electronically signed by : Mai Conrad on 9/8/2021 at 9:46 AM     [address] Information: Should you experience any significant changes in your wound(s) or have questions about your wound care, please contact the [facility] [phone] [address] 8:00 am - 4:30 pm and Friday 8:00 am - 12:30 pm.  If you need help with your wound outside these hours and cannot wait until we are again available, contact your PCP or go to the hospital emergency room.      PLEASE NOTE: IF YOU ARE UNABLE TO OBTAIN WOUND SUPPLIES, CONTINUE TO USE THE SUPPLIES YOU HAVE AVAILABLE UNTIL YOU ARE ABLE TO [address]. IT IS MOST IMPORTANT TO KEEP THE WOUND COVERED AT ALL TIMES.     Physician Signature:_______________________     Date: ___________ Time:  ____________                                GV.  James Nathan MD        Electronically signed by Dariela Harrell MD on 9/8/2021 at 10:30 AM

## 2021-09-12 ASSESSMENT — ENCOUNTER SYMPTOMS
EYES NEGATIVE: 1
RESPIRATORY NEGATIVE: 1
GASTROINTESTINAL NEGATIVE: 1

## 2021-09-12 NOTE — PROGRESS NOTES
Patient: Heidy Briones is a 78 y.o. male who presents today with the following Chief Complaint(s):    Chief Complaint   Patient presents with    Diabetes         HPIHe is here for a check up and f/u on hypertension, hyperlipidemia, diabetes, T2, and stage 3 CKD. He is taking medication as prescribed, continues to focus on meal planning and more physical activity. Current Outpatient Medications   Medication Sig Dispense Refill    atorvastatin (LIPITOR) 40 MG tablet TAKE 1 TABLET BY MOUTH DAILY 90 tablet 3    furosemide (LASIX) 40 MG tablet TAKE 1 TABLET BY MOUTH DAILY 60 tablet 3    oxybutynin (DITROPAN-XL) 5 MG extended release tablet TAKE 1 TABLET BY MOUTH DAILY FOR OVERACTIVE BLADDER 90 tablet 1    levothyroxine (SYNTHROID) 25 MCG tablet TAKE 1 TABLET BY MOUTH DAILY 90 tablet 3    Insulin Pen Needle (B-D UF III MINI PEN NEEDLES) 31G X 5 MM MISC Inject 1 each into the skin daily 100 each 5    apixaban (ELIQUIS) 5 MG TABS tablet TAKE 1 TABLET BY MOUTH TWICE DAILY 60 tablet 11    blood glucose test strips (ONE TOUCH TEST STRIPS) strip 1 each by In Vitro route 3 times daily As needed. 300 each 3    dapagliflozin (FARXIGA) 10 MG tablet Take 1 tablet by mouth every morning 90 tablet 3    lisinopril (PRINIVIL;ZESTRIL) 2.5 MG tablet Take 1 tablet by mouth daily 90 tablet 3    spironolactone (ALDACTONE) 25 MG tablet Take 1 tablet by mouth daily 90 tablet 3    NOVOLOG FLEXPEN 100 UNIT/ML injection pen ADMINISTER 8 TO 10 UNITS UNDER THE SKIN THREE TIMES DAILY BEFORE MEALS 45 mL 5    ONETOUCH ULTRA strip USE WITH GLUCOSE METER THREE TIMES DAILY AND AS NEEDED 300 strip 3    TRESIBA FLEXTOUCH 100 UNIT/ML SOPN INJECT 10 UNITS UNDER THE SKIN NIGHTLY 9 mL 3    ferrous sulfate (FE TABS 325) 325 (65 Fe) MG EC tablet Take 325 mg by mouth daily      clotrimazole-betamethasone (LOTRISONE) 1-0.05 % cream Apply topically 2 times daily.  15 g 1    amiodarone (CORDARONE) 200 MG tablet Take 1 tablet by mouth daily 90 tablet 3    Continuous Blood Gluc  (FREESTYLE KWESI 14 DAY READER) ANITA Use as Directed Dx E11.9 1 Device 0    Continuous Blood Gluc Sensor (FREESTYLE KWESI 14 DAY SENSOR) MISC Use as directed Dx E11.9 3 each 3    omega-3 acid ethyl esters (LOVAZA) 1 g capsule TAKE 1 CAPSULE BY MOUTH TWICE DAILY 180 capsule 3    Blood Glucose Monitoring Suppl (ONE TOUCH ULTRA MINI) w/Device KIT 1 kit by Does not apply route three times daily 1 kit 0    Handicap Placard MISC by Does not apply route Expires: 1/9/22. Diagnosis: cardiomyopathy. 1 each 0    Coenzyme Q10 (CO Q 10) 100 MG CAPS Take 1 capsule by mouth daily      Cinnamon 500 MG CAPS Take 1 capsule by mouth daily      Vitamin D (CHOLECALCIFEROL) 1000 UNITS CAPS capsule Take 2,000 Units by mouth nightly       Insulin Syringe-Needle U-100 (INSULIN SYRINGE .5CC/31GX5/16\") 31G X 5/16\" 0.5 ML MISC Patient tests bid 100 Syringe 5    therapeutic multivitamin-minerals (THERAGRAN-M) tablet Take 1 tablet by mouth daily.  aspirin 81 MG EC tablet Take 81 mg by mouth daily.  sulfamethoxazole-trimethoprim (BACTRIM DS;SEPTRA DS) 800-160 MG per tablet Take 1 tablet by mouth 2 times daily for 7 days 14 tablet 0    tamsulosin (FLOMAX) 0.4 MG capsule TAKE 1 CAPSULE BY MOUTH DAILY 90 capsule 2    carvedilol (COREG) 6.25 MG tablet TAKE 1 TABLET BY MOUTH TWICE DAILY WITH MEALS 30 tablet 5    silver sulfADIAZINE (SILVADENE) 1 % cream Apply topically daily. 400 g 0    acetaminophen (TYLENOL) 325 MG tablet Take 2 tablets by mouth every 4 hours as needed for Pain 120 tablet 3     No current facility-administered medications for this visit. Patient's past medical history, surgical history, family history, medications,and allergies  were all reviewed and updated as appropriate today. Review of Systems   Constitutional: Negative. HENT: Negative. Eyes: Negative. Respiratory: Negative.     Cardiovascular:        Hypertension, treated with DULCE black, diuretic, ACEi. Cardiomyopathy with H/O HFrEF. H/O ICD. Denies CP and does get SOB with extended exertion. Gastrointestinal: Negative. Endocrine:        Hyperlipidemia, treated with statin. LDL 45. Diabetes, type 2, treated with B-B insulin and SGLT2i. A1c 6.6. Hypothyroidism, TSH 1.34. treated with synthroid. Genitourinary:        Stage 3 CKD. B/Cr 25. 1.5. GFR 56. Musculoskeletal:        Left foot ulcer slow healing. Skin: Negative. Neurological: Negative. Psychiatric/Behavioral: Negative. Physical Exam  Constitutional:       General: He is not in acute distress. Appearance: He is well-developed. HENT:      Head: Normocephalic and atraumatic. Right Ear: External ear normal.      Left Ear: External ear normal.      Nose: Nose normal.   Eyes:      General: No scleral icterus. Conjunctiva/sclera: Conjunctivae normal.      Pupils: Pupils are equal, round, and reactive to light. Neck:      Thyroid: No thyromegaly. Cardiovascular:      Rate and Rhythm: Normal rate and regular rhythm. Heart sounds: Normal heart sounds. Pulmonary:      Effort: Pulmonary effort is normal.      Breath sounds: Normal breath sounds. Abdominal:      General: Bowel sounds are normal.      Palpations: Abdomen is soft. There is no mass. Musculoskeletal:      Cervical back: Normal range of motion and neck supple. Comments: Leg edema noted. Lymphadenopathy:      Cervical: No cervical adenopathy. Skin:     General: Skin is warm and dry. Neurological:      Mental Status: He is alert and oriented to person, place, and time. Deep Tendon Reflexes: Reflexes are normal and symmetric. Psychiatric:         Behavior: Behavior normal.         Thought Content: Thought content normal.         Judgment: Judgment normal.         Vitals:    08/19/21 1343   BP: 126/68   Pulse: 77   Resp: 16   SpO2: 98%       Assessment:   Diagnosis Orders   1.  Diabetes mellitus type 2 with complications ([facility]  Diet, monitoring, physical activity as tolerated, med compliance stressed. 2. Non-healing ulcer of foot, unspecified laterality, limited to breakdown of skin [facility])  Kev Early MD, [facility] [place]   3. Stage 3 chronic kidney disease, unspecified whether stage 3a or 3b CKD ([facility]  MAGNESIUM  Risk factor control stressed. Avoidance of nephrotoxins discussed. BASIC METABOLIC PANEL   4. Mixed hyperlipidemia  Lipid Panel  Heart healthy diet and statin compliance discussed. 5. Essential hypertension  Continue stated med regimen. DASH and low sodium diet discussed. Plan:  See plans above.

## 2021-09-14 ENCOUNTER — TELEPHONE (OUTPATIENT)
Dept: INFECTIOUS DISEASES | Age: 71
End: 2021-09-14

## 2021-09-14 ENCOUNTER — HOSPITAL ENCOUNTER (OUTPATIENT)
Dept: WOUND CARE | Age: 71
Discharge: HOME | End: 2021-09-14
Payer: MEDICARE

## 2021-09-14 VITALS
TEMPERATURE: 97.6 F | SYSTOLIC BLOOD PRESSURE: 137 MMHG | HEART RATE: 89 BPM | RESPIRATION RATE: 18 BRPM | DIASTOLIC BLOOD PRESSURE: 72 MMHG

## 2021-09-14 DIAGNOSIS — I70.244 ATHEROSCLEROSIS OF NATIVE ARTERIES OF LEFT LEG WITH ULCERATION OF HEEL AND MIDFOOT (HCC): ICD-10-CM

## 2021-09-14 DIAGNOSIS — E11.621 DIABETIC ULCER OF LEFT MIDFOOT ASSOCIATED WITH TYPE 2 DIABETES MELLITUS, WITH NECROSIS OF BONE (HCC): ICD-10-CM

## 2021-09-14 DIAGNOSIS — L98.499 DIABETES MELLITUS WITH SKIN ULCER (HCC): ICD-10-CM

## 2021-09-14 DIAGNOSIS — L02.619 CELLULITIS AND ABSCESS OF FOOT: ICD-10-CM

## 2021-09-14 DIAGNOSIS — E11.610 CHARCOT FOOT DUE TO DIABETES MELLITUS (HCC): ICD-10-CM

## 2021-09-14 DIAGNOSIS — E11.42 DIABETIC POLYNEUROPATHY ASSOCIATED WITH TYPE 2 DIABETES MELLITUS (HCC): ICD-10-CM

## 2021-09-14 DIAGNOSIS — L97.424 DIABETIC ULCER OF LEFT MIDFOOT ASSOCIATED WITH TYPE 2 DIABETES MELLITUS, WITH NECROSIS OF BONE (HCC): ICD-10-CM

## 2021-09-14 DIAGNOSIS — L97.529 TYPE 2 DIABETES MELLITUS WITH LEFT DIABETIC FOOT ULCER (HCC): ICD-10-CM

## 2021-09-14 DIAGNOSIS — Z74.09 IMPAIRED MOBILITY: Primary | ICD-10-CM

## 2021-09-14 DIAGNOSIS — L03.119 CELLULITIS AND ABSCESS OF FOOT: ICD-10-CM

## 2021-09-14 DIAGNOSIS — M86.272 SUBACUTE OSTEOMYELITIS OF LEFT FOOT (HCC): ICD-10-CM

## 2021-09-14 DIAGNOSIS — E11.21 DIABETIC NEPHROPATHY ASSOCIATED WITH TYPE 2 DIABETES MELLITUS (HCC): ICD-10-CM

## 2021-09-14 DIAGNOSIS — E11.622 DIABETES MELLITUS WITH SKIN ULCER (HCC): ICD-10-CM

## 2021-09-14 DIAGNOSIS — E11.621 TYPE 2 DIABETES MELLITUS WITH LEFT DIABETIC FOOT ULCER (HCC): ICD-10-CM

## 2021-09-14 DIAGNOSIS — E11.8 DIABETIC FOOT (HCC): ICD-10-CM

## 2021-09-14 PROCEDURE — 11043 DBRDMT MUSC&/FSCA 1ST 20/<: CPT | Performed by: EMERGENCY MEDICINE

## 2021-09-14 PROCEDURE — 11043 DBRDMT MUSC&/FSCA 1ST 20/<: CPT

## 2021-09-14 RX ORDER — LIDOCAINE 40 MG/G
CREAM TOPICAL ONCE
Status: COMPLETED | OUTPATIENT
Start: 2021-09-14 | End: 2021-09-14

## 2021-09-14 RX ORDER — LIDOCAINE 40 MG/G
CREAM TOPICAL ONCE
Status: CANCELLED | OUTPATIENT
Start: 2021-09-14 | End: 2021-09-14

## 2021-09-14 RX ORDER — LIDOCAINE HYDROCHLORIDE 40 MG/ML
SOLUTION TOPICAL ONCE
Status: CANCELLED | OUTPATIENT
Start: 2021-09-14 | End: 2021-09-14

## 2021-09-14 RX ORDER — BACITRACIN ZINC AND POLYMYXIN B SULFATE 500; 1000 [USP'U]/G; [USP'U]/G
OINTMENT TOPICAL ONCE
Status: CANCELLED | OUTPATIENT
Start: 2021-09-14 | End: 2021-09-14

## 2021-09-14 RX ORDER — BACITRACIN, NEOMYCIN, POLYMYXIN B 400; 3.5; 5 [USP'U]/G; MG/G; [USP'U]/G
OINTMENT TOPICAL ONCE
Status: CANCELLED | OUTPATIENT
Start: 2021-09-14 | End: 2021-09-14

## 2021-09-14 RX ORDER — LIDOCAINE HYDROCHLORIDE 20 MG/ML
JELLY TOPICAL ONCE
Status: CANCELLED | OUTPATIENT
Start: 2021-09-14 | End: 2021-09-14

## 2021-09-14 RX ORDER — GINSENG 100 MG
CAPSULE ORAL ONCE
Status: CANCELLED | OUTPATIENT
Start: 2021-09-14 | End: 2021-09-14

## 2021-09-14 RX ORDER — BETAMETHASONE DIPROPIONATE 0.05 %
OINTMENT (GRAM) TOPICAL ONCE
Status: CANCELLED | OUTPATIENT
Start: 2021-09-14 | End: 2021-09-14

## 2021-09-14 RX ORDER — LIDOCAINE 50 MG/G
OINTMENT TOPICAL ONCE
Status: CANCELLED | OUTPATIENT
Start: 2021-09-14 | End: 2021-09-14

## 2021-09-14 RX ORDER — SULFAMETHOXAZOLE AND TRIMETHOPRIM 800; 160 MG/1; MG/1
1 TABLET ORAL 2 TIMES DAILY
Qty: 20 TABLET | Refills: 0 | Status: SHIPPED | OUTPATIENT
Start: 2021-09-14 | End: 2021-09-24

## 2021-09-14 RX ORDER — CLOBETASOL PROPIONATE 0.5 MG/G
OINTMENT TOPICAL ONCE
Status: CANCELLED | OUTPATIENT
Start: 2021-09-14 | End: 2021-09-14

## 2021-09-14 RX ORDER — GENTAMICIN SULFATE 1 MG/G
OINTMENT TOPICAL ONCE
Status: CANCELLED | OUTPATIENT
Start: 2021-09-14 | End: 2021-09-14

## 2021-09-14 RX ADMIN — LIDOCAINE: 40 CREAM TOPICAL at 09:40

## 2021-09-14 ASSESSMENT — PAIN SCALES - GENERAL
PAINLEVEL_OUTOF10: 0
PAINLEVEL_OUTOF10: 0

## 2021-09-14 NOTE — PROGRESS NOTES
[facility],4Th Floor Wound Care/Hyperbaric Oxygen Therapy Center   Progress Note     George Bernal RECORD NUMBER:  [id number]  AGE: 78 y.o. GENDER: male  : 1950  EPISODE DATE:  2021    Subjective:     Chief Complaint   Patient presents with    Wound Check     Follow Up on Left Foot      Patient presenting for follow up evaluation of above wound. Symptoms, wound related issues, or other pertinent wound history since last visit: none. Patient tolerating dressings well. arterial dopplers done 2021    Medical Decision Makin-year-old male with multiple comorbid conditions as outlined below who is presenting as a follow up appointment to our wound center for nonhealing left diabetic foot ulcer present for about a year prior to presentation here. The patient had been under the care of his podiatrist at [facility].  He has transitioned his care to our wound center as he wanted a second opinion given his poor wound progress. 21: no new issues. Patient has had arterial Dopplers since last visit. Results as follows:  Right CUATE was not available due to body habitus.    Left CUATE was not available due to body habitus.    The majority of the waveforms are multiphasic throughout the left lower    extremity.    Elevated velocity of the proximal femoral artery suggest a greater than 50%    stenosis.    Findings are suggestive of outflow disease. There is good collateral flow    noted throughout.        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 ([address]    Diabetic foot ([address]    Cellulitis and abscess of foot    Relevant Orders    Klarissa Davila MD, Orthopedic Surgery, [facility]    Type 2 diabetes mellitus with left diabetic foot ulcer ([address]    Relevant Orders    Akanksha Dennis MD, Orthopedic Surgery, [facility]    Diabetic polyneuropathy associated with type 2 diabetes mellitus ([address]    Diabetic ulcer of left midfoot associated with type 2 diabetes mellitus, with necrosis of bone (HCC)    Impaired mobility - Primary    Subacute osteomyelitis of left foot ([facility]    Relevant Orders    Klarissa Kessler MD, Orthopedic Surgery, [facility]    Diabetes mellitus with skin ulcer ([facility]    Charcot foot due to diabetes mellitus ([facility]    Relevant Orders    Tony Massey MD, Orthopedic Surgery, [facility]    Atherosclerosis of native arteries of left leg with ulceration of heel and midfoot ([facility]        Comorbid conditions affecting wound healing: DM type 2, cardiomyopathy, hypertension, hyperlipidemia, stage IIIa chronic kidney disease, obesity, chronic leg edema, chronic anticoagulation.     Wound evaluation: healthier appearance, more shallow. Mild maceration to periwound skin. Moderate drainage of serous type. More granular. Some nonviable tissue requiring debridement today    Problems addressed during this encounter:  1. Left diabetic foot ulcer, plantar surface of midfoot. Severity of bone necrosis. Coffey grade 3. The patient would benefit from surgical debridement. Referral to orthopedics done today for surgery to include bone biopsy. Hopefully once this is done, will be left open and we will continue the healing process from the inside out. 2. Left diabetic foot/ankle ulcer, dorsal aspect. Severity of skin layers involved only. Coffey grade 1  3. Diabetes mellitus type 2. Last hemoglobin A1c was 6.8. Continues to adhere to diabetic diet. Doing better with control. Additional education provided. 4. Left lower extremity edema. This is improved. Continue as current with gentle compression  5. Charcot deformity leading to severe difficulties with offloading, impaired mobility. Offloading boot prescribed. Offloading felt applied today by me. 6. Osteomyelitis of left foot. I reviewed his records from the outside facility and his MRI in July 2021.   The patient has received multiple courses of antibiotics already. Last course of IV antibiotics was in September 2020. Since wound is slowly improving, we will see if hyperbaric oxygen therapy can be done after arterial studies done. Cultures from last visit reviewed. We will place the patient on Bactrim  7. Suspected peripheral arterial disease. Arterial Dopplers ordered. Results reviewed. 8. Hyperbaric oxygen therapy needs. Discussed with him previously. Patient was very motivated to proceed. Education about HBOT provided for the patient. I reiterated that outflow status is important to determine prior to HBO therapy. Data reviewed and analyzed:  1. Assessment required other independent historian(s): No patient . 2. Review of prior external note from other providers done today: No  3. New imaging or lab ordered today: No.  Tissue culture and tissue sent for pathology 8/31/21 . 4. Review of test results done today: Yes.  tissue culture and pathology as well as arterial Doppler. Culture was positive for Enterococcus faecalis. Pathology showed ulcerated necrotic tissue with acute inflammation consistent with wound history. Reviewed with patient again. 5. Independent interpretation of test(s): No  6. Discussion of management or test interpretation with other qualified health care professional and other external source. Risk of complications and/or mortality of patient management:  1. This patient has a high risk of morbidity and mortality from additional diagnostic testing or treatment. This is due to the above conditions affecting wound healing as well as patient and procedure risk factors. Education and discussion held with patient regarding these disease processes pertinent to wound(s). 2. Other pertinent decisions include: minor surgery or procedures as below. 3. The patient's diagnosis or treatment is not significantly limited by social determinants of health as noted by: N/A .  4.  Prescription drug management: Bactrim Negative Pressure Wound Therapy Foot Left (Active)   Number of days: 419       Wound 08/24/21 Foot Left;Plantar #1 (Active)   Wound Image   09/14/21 0932   Wound Etiology Diabetic Coffey 3 08/24/21 1106   Dressing Status Old drainage noted 09/14/21 0932   Wound Cleansed Cleansed with saline 09/08/21 1015   Dressing/Treatment Ace wrap;Gauze dressing/dressing sponge; Hydrofera blue;Moisten with saline;Moisturizing cream;Roll gauze 09/08/21 1015   Offloading for Diabetic Foot Ulcers Felt or foam;Post op shoe 09/08/21 1015   Wound Length (cm) 2.3 cm 09/14/21 0932   Wound Width (cm) 2 cm 09/14/21 0932   Wound Depth (cm) 1.5 cm 09/14/21 0932   Wound Surface Area (cm^2) 4.6 cm^2 09/14/21 0932   Change in Wound Size % (l*w) -86.23 09/14/21 0932   Wound Volume (cm^3) 6.9 cm^3 09/14/21 0932   Wound Healing % -179 09/14/21 0932   Post-Procedure Length (cm) 2.3 cm 09/14/21 1006   Post-Procedure Width (cm) 2 cm 09/14/21 1006   Post-Procedure Depth (cm) 1.6 cm 09/14/21 1006   Post-Procedure Surface Area (cm^2) 4.6 cm^2 09/14/21 1006   Post-Procedure Volume (cm^3) 7.36 cm^3 09/14/21 1006   Distance Tunneling (cm) 0.8 cm 08/31/21 0908   Tunneling Position ___ O'Clock 6 08/31/21 0908   Undermining Starts ___ O'Clock 12 09/14/21 0932   Undermining Ends___ O'Clock 7 09/14/21 0932   Undermining Maxium Distance (cm) 1.7 09/14/21 0932   Wound Assessment Granulation tissue;Slough 09/14/21 0932   Drainage Amount Moderate 09/14/21 0932   Drainage Description Serosanguinous 09/14/21 0932   Odor Moderate 09/14/21 0932   Michelle-wound Assessment Maceration 09/14/21 0932   Margins Unattached edges; Undefined edges 09/14/21 0932   Wound Thickness Description not for Pressure Injury Full thickness 09/14/21 0932   Number of days: 21     Incision 08/28/19 Foot Right (Active)   Number of days: 747       Incision 07/22/20 Foot Anterior; Left (Active)   Number of days: 418       Procedures done during this encounter:   Debridement: Excisional Debridement  Indications:  Based on my examination of this patient's wound(s)/ulcer(s) today, debridement is required to promote healing and evaluate the wound base. Risks and benefits discussed with patient who has agreed to proceed. Performed by: Jr Orellana MD  Consent obtained:  Yes  Time out taken:  Yes  Pain Control: Anesthetic  Anesthetic: 4% Lidocaine Cream   Using curette, #15 blade scalpel, scissors and forceps the wound(s)/ulcer(s) was/were debrided down through and including the removal of muscle/fascia. Devitalized Tissue Debrided:  fibrin, biofilm, slough, exudate, callus and nonviable protruding tissue. Pre Debridement Measurements:  Are located in the [place]  Documentation Flow Sheet  Wound/Ulcer #: 1  Post Debridement Measurements:  Wound/Ulcer Descriptions are Pre Debridement except measurements: Total Surface Area Debrided:  4.6 sq cm   Diabetic/Pressure/Non Pressure Ulcers only:  Ulcer: Diabetic ulcer, bone necrosis   Estimated Blood Loss:  Minimal  Hemostasis Achieved:  by silver nitrate stick  Procedural Pain:  0  / 10   Post Procedural Pain:  0 / 10   Response to treatment:  Well tolerated by patient. HISTORY of PRESENT ILLNESS HPI     Joshua Alexander is a 78 y.o. male who presents today for wound/ulcer evaluation. History of Wound Context: Per original history and physical on this patient. Changes in history since last exam: none. Objective:    /72   Pulse 89   Temp 97.6 °F (36.4 °C) (Temporal)   Resp 18   Wt Readings from Last 3 Encounters:   08/19/21 (!) 339 lb (153.8 kg)   05/21/21 (!) 329 lb 12.8 oz (149.6 kg)   05/12/21 (!) 325 lb (147.4 kg)       PHYSICAL EXAM  General: alert and in no acute distress. Normal appearing  Skin: warm and dry, no rash  Head: normocephalic and atraumatic  Eyes: extraocular eye movements intact, conjunctivae normal, and sclera anicteric  ENT: hearing grossly normal bilaterally.  Normal appearance  Respiratory: no chest wall tenderness. no respiratory distress  GI: abdomen soft, non-tender and non-distended, benign  Musculoskeletal: baseline range of motion in joints. Nontender calves. No cyanosis. Edema 2+. Neurologic: Speech normal. No focal deficits.  Mental status normal or at baseline    PAST MEDICAL HISTORY        Diagnosis Date    Atrial fibrillation (HCC)     Back pain     Cardiomyopathy     CHF (congestive heart failure) (HCC)     COPD (chronic obstructive pulmonary disease) (HCC)     Dyslipidemia     GERD (gastroesophageal reflux disease)     Hyperlipidemia     Hypertension     Hypothyroidism     Leg edema     MRSA (methicillin resistant staph aureus) culture positive 10/5/15    foot/bone    MRSA nasal colonization 05/05/2017    Obesity     Prolonged emergence from general anesthesia     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     St[facility] ([facility]     Thyroid disease     Type 2 diabetes mellitus without complication ([facility]     Type II or unspecified type diabetes mellitus without mention of complication, not stated as uncontrolled        PAST SURGICAL HISTORY    Past Surgical History:   Procedure Laterality Date    ADRENAL GLAND SURGERY  1970    CARDIAC DEFIBRILLATOR PLACEMENT  09/05/2017    Dr. Rosalva Card COLONOSCOPY N/A 3/8/2019    COLONOSCOPY WITH MAC, UNASYN (3gm) performed by Anjel Huerta MD at [facility] N/A 8/9/2019    COLONOSCOPY POLYPECTOMY SNARE/COLD BIOPSY performed by Anjel Huerta MD at [facility] [employer] [facility]  8/9/2019    COLONOSCOPY WITH BIOPSY performed by Anjel Huerta MD at [address] [place] [address] Right 8/28/2019    INCISION AND INCISIONAL DEBRIDEMENT OPEN FRACTURE RIGHT 2ND TOE SKIN AND BONE performed by Lang Fulton MD at [address] [place] [address] Left 7/22/2020    LEFT FOOT INCISION AND DRAINAGE WITH BONE BIOPSY AND REMOVAL OF FREE FLOATING BONE FRAGMENT performed by Philippe Diggs DPM at [address] [place] [address] BYPASS SURGERY      OTHER SURGICAL HISTORY  10/6/15    INCISION AND DRAINAGE RIGHT FOOT          OTHER SURGICAL HISTORY Right 10/8/15    INCISION AND DRAINAGE RIGHT FOOT      PACEMAKER PLACEMENT      UPPER GASTROINTESTINAL ENDOSCOPY N/A 3/8/2019    EGD BIOPSY GASTRIC H. PYLORI performed by Rj Cade MD at [facility] ENDOSCOPY       FAMILY HISTORY    Family History   Problem Relation Age of Onset    Hypertension Mother     Heart Disease Father     Hypertension Maternal Grandmother     Diabetes Maternal Grandmother        SOCIAL HISTORY    Social History     Tobacco Use    Smoking status: Former Smoker     Packs/day: 1.00     Years: 4.00     Pack years: 4.00     Types: Cigarettes     Quit date: 2017     Years since quittin.7    Smokeless tobacco: Never Used   Vaping Use    Vaping Use: Never used   Substance Use Topics    Alcohol use: Yes     Comment: 2-3 times per year    Drug use: No       ALLERGIES    No Known Allergies    MEDICATIONS    Current Outpatient Medications on File Prior to Encounter   Medication Sig Dispense Refill    sulfamethoxazole-trimethoprim (BACTRIM DS;SEPTRA DS) 800-160 MG per tablet Take 1 tablet by mouth 2 times daily for 7 days 14 tablet 0    tamsulosin (FLOMAX) 0.4 MG capsule TAKE 1 CAPSULE BY MOUTH DAILY 90 capsule 2    carvedilol (COREG) 6.25 MG tablet TAKE 1 TABLET BY MOUTH TWICE DAILY WITH MEALS 30 tablet 5    silver sulfADIAZINE (SILVADENE) 1 % cream Apply topically daily.  400 g 0    atorvastatin (LIPITOR) 40 MG tablet TAKE 1 TABLET BY MOUTH DAILY 90 tablet 3    furosemide (LASIX) 40 MG tablet TAKE 1 TABLET BY MOUTH DAILY 60 tablet 3    oxybutynin (DITROPAN-XL) 5 MG extended release tablet TAKE 1 TABLET BY MOUTH DAILY FOR OVERACTIVE BLADDER 90 tablet 1    levothyroxine (SYNTHROID) 25 MCG tablet TAKE 1 TABLET BY MOUTH DAILY 90 tablet 3    Insulin Pen Needle (B-D UF III MINI PEN NEEDLES) 31G X 5 MM MISC Inject 1 each into the skin daily 100 each 5    apixaban (ELIQUIS) 5 MG TABS tablet TAKE 1 TABLET BY MOUTH TWICE DAILY 60 tablet 11    blood glucose test strips (ONE TOUCH TEST STRIPS) strip 1 each by In Vitro route 3 times daily As needed. 300 each 3    dapagliflozin (FARXIGA) 10 MG tablet Take 1 tablet by mouth every morning 90 tablet 3    lisinopril (PRINIVIL;ZESTRIL) 2.5 MG tablet Take 1 tablet by mouth daily 90 tablet 3    spironolactone (ALDACTONE) 25 MG tablet Take 1 tablet by mouth daily 90 tablet 3    NOVOLOG FLEXPEN 100 UNIT/ML injection pen ADMINISTER 8 TO 10 UNITS UNDER THE SKIN THREE TIMES DAILY BEFORE MEALS 45 mL 5    ONETOUCH ULTRA strip USE WITH GLUCOSE METER THREE TIMES DAILY AND AS NEEDED 300 strip 3    TRESIBA FLEXTOUCH 100 UNIT/ML SOPN INJECT 10 UNITS UNDER THE SKIN NIGHTLY 9 mL 3    ferrous sulfate (FE TABS 325) 325 (65 Fe) MG EC tablet Take 325 mg by mouth daily      clotrimazole-betamethasone (LOTRISONE) 1-0.05 % cream Apply topically 2 times daily. 15 g 1    amiodarone (CORDARONE) 200 MG tablet Take 1 tablet by mouth daily 90 tablet 3    Continuous Blood Gluc  (FREESTYLE KWESI 14 DAY READER) ANITA Use as Directed Dx E11.9 1 Device 0    Continuous Blood Gluc Sensor (FREESTYLE KWESI 14 DAY SENSOR) [facility] Use as directed Dx E11.9 3 each 3    omega-3 acid ethyl esters (LOVAZA) 1 g capsule TAKE 1 CAPSULE BY MOUTH TWICE DAILY 180 capsule 3    Blood Glucose Monitoring Suppl (ONE TOUCH ULTRA MINI) w/Device KIT 1 kit by Does not apply route three times daily 1 kit 0    Handicap Placard MISC by Does not apply route Expires: 1/9/22. Diagnosis: cardiomyopathy.  1 each 0    acetaminophen (TYLENOL) 325 MG tablet Take 2 tablets by mouth every 4 hours as needed for Pain 120 tablet 3    Coenzyme Q10 (CO Q 10) 100 MG CAPS Take 1 capsule by mouth daily      Cinnamon 500 MG CAPS Take 1 capsule by mouth daily      Vitamin D (CHOLECALCIFEROL) 1000 UNITS CAPS capsule Take 2,000 Units by mouth nightly       Insulin Syringe-Needle U-100 (INSULIN SYRINGE .5CC/31GX5/16\") 31G X 5/16\" 0.5 ML MISC Patient tests bid 100 Syringe 5    therapeutic multivitamin-minerals (THERAGRAN-M) tablet Take 1 tablet by mouth daily.  aspirin 81 MG EC tablet Take 81 mg by mouth daily. No current facility-administered medications on file prior to encounter. REVIEW OF SYSTEMS  A comprehensive review of systems was negative except for what has been indicated above and: wound, edema. Drainage. Written patient dismissal instructions given to patient and signed by patient or POA. Discharge [address] and Hyperbaric Oxygen Therapy   Physician Orders and Discharge Instructions  [address]   [address], [facility]  Telephone: [phone]      FAX [phone]     NAME: Marty Hale  DATE OF BIRTH:  1950  MEDICAL RECORD NUMBER:  [id number]  DATE:  9/14/2021     Wound Cleansing:   Do not scrub or use excessive force. Cleanse wound prior to applying a clean dressing with:  [x]? ?? Normal Saline  [x]? ?? Keep Wound Dry in Shower    []??? Wound Cleanser   []? ?? Cleanse wound with Mild Soap & Water  []??? May Shower at Discharge   []? ?? Other:        Topical Treatments:  Do not apply lotions, creams, or ointments to wound bed unless directed. [x]??? Apply moisturizing lotion to skin surrounding the wound prior to dressing change.  []??? Apply antifungal ointment to skin surrounding the wound prior to dressing change.  []??? Apply thin film of moisture barrier ointment to skin immediately around wound. [x]??? Other: FELT PADS TO FRANSICO-WOUND (CHANGE ONLY AS NEEDED FOR DRAINAGE)                Dressings:                  Wound Location LEFT PLANTAR FOOT     [x]? ?? Apply Primary Dressing:   [x]??? : HYDROFERA BLUE ROPE- PACK INTO WOUND, SLIGHTLY MOISTENED WITH SALINE                                                                 [x]? ?? Cover and Secure with:                   [x]? ?? Gauze                       [x]? ?? Roll gauze TOES TO KNEE              [x]? ?? Ace Wrap- NOT FOR COMPRESSION JUST TO KEEP DRESSING IN PLACE                                     []? ?? Other:               Avoid contact of tape with skin. [x]??? Change dressing:   [x]? ?? EVERY OTHER DAY                                  Edema Control:  Apply:              [x]? ?? SpandaGrip            []? ? ? Right Leg     [x]? ? ? Left Leg                                      []? ? ?Low compression 5-10 mm/Hg                                             [x]? ? ? Medium compression 10-20 mm/Hg                                      []? ? ? High compression  20-30 mm/Hg              every morning immediately when getting up should be applied to affected leg(s) from mid foot to knee making sure to cover the heel.  Remove every night before going to bed.              [x]? ?? Elevate leg(s) above the level of the heart when sitting.                 [x]? ?? Avoid prolonged standing in one place.     Off-Loading:   [x]? ?? Off-loading when    [x]? ?? walking       []? ?? in bed         []? ?? sitting  []? ?? Total non-weight bearing  []??? Right Leg  []??? Left Leg          [x]? ?? Assistive Device     []??? Walker        []? ?? Cane           []??? Wheelchair  []??? Crutches              [x]? ?? Surgical shoe    []? ?? Podus Boot(s)   []? ?? Foam Boot(s)  []??? Roll About              []? ?? Cast Boot   []? ?? CROW Boot  []??? Other:        Dietary:  []??? Diet as tolerated:     [x]? ?? Calorie Diabetic Diet:           []??? No Added Salt:  [x]??? Increase Protein:     []??? Other:              Activity:  [x]??? Activity: BATHROOM PRIVILEGES ONLY     If you are still having pain after you go home:  [x]??? Elevate the affected limb.    [x]? ?? Use over-the-counter medications you would normally use for pain as permitted by your family doctor. [x]??? For persistent pain not relieved by the above interventions, please call your family doctor.           Return Appointment:  []??? Wound and dressing supply provider: [facility]CL[facility]ES AT THIS TIME 9/8/2021  []??? ECF or Home Healthcare:  []??? Wound Assessment:          []? ?? Physician or NP scheduled for Wound Assessment:   [x]??? Return Appointment: With DR. Emery Kowalski MD  in  1 Week(s)  []??? Ordered tests:     **REFERRAL TO INFECTIOUS DISEASE- CALL ID AT [facility]**    Nurse Case Manger: [facility]   Electronically signed by Crispin Ramos RN on 9/14/2021 at 10:09 AM    [address] [place] [address] Information: Should you experience any significant changes in your wound(s) or have questions about your wound care, please contact the [address] [place] [address] [phone] [address] 8:00 am - 4:30 pm and Friday 8:00 am - 12:30 pm.  If you need help with your wound outside these hours and cannot wait until we are again available, contact your PCP or go to the hospital emergency room.      PLEASE NOTE: IF YOU ARE UNABLE TO OBTAIN WOUND SUPPLIES, CONTINUE TO USE THE SUPPLIES YOU HAVE AVAILABLE UNTIL YOU ARE ABLE TO [facility]. IT IS MOST IMPORTANT TO KEEP THE WOUND COVERED AT ALL TIMES.     Physician Signature:_______________________     Date: ___________ Time:  ____________                                .  James Nathan MD           Electronically signed by Dariela Harrell MD on 9/14/2021 at 10:20 AM

## 2021-09-14 NOTE — TELEPHONE ENCOUNTER
Spoke w/ pt and he stated that he has been going to  for an year and things have not seem to be getter better. At that point he decided to go to the wound clinic which is where he saw Dr. Naheed Avalos for the past 3 weeks and Dr. Naheed Avalos is the one that recommended that he see Dr. Aarti Jaime.  [phone]

## 2021-09-14 NOTE — TELEPHONE ENCOUNTER
Pt called in to inform Dr. Jalen Dueñas that his ulcer on his foot is not getting any better even after seeing the wound clinic. Pt states that the wound doc seems to think that he has an infection in his bones and wants Dr. Jalen Dueñas to see pt. Pt has had a Vascular of lower extremities arterial Duplex procedure and is currently on bactrim. Dr. Jalen Dueñsa first available appt isn't until 10/7. Should pt be scheduled sooner? If not; do you agree with pt taking Bactrim?  Pt can be reached at [phone]

## 2021-09-14 NOTE — TELEPHONE ENCOUNTER
Pt needs to see a Podiatrist who is a specialist in foot problems --  I am not able to do x-rays and do not do foot surgery  I mainly work in the hospital and have limited office hourss  I work with foot surgeons and help them with antibiotics part of foot infections  If he does not want to go to Dr Elliot Walker, I can recommend another Podiatrist

## 2021-09-14 NOTE — TELEPHONE ENCOUNTER
I saw this pt in August 2020 -  If he has non-healing wound and there is concern for underlying osteomyelitis,   he should contact the Podiatry attending - Dr Jennifer Mckeon pt and left message for pt

## 2021-09-20 ENCOUNTER — TELEPHONE (OUTPATIENT)
Dept: INFECTIOUS DISEASES | Age: 71
End: 2021-09-20

## 2021-09-20 NOTE — TELEPHONE ENCOUNTER
Called pt --    Hx DM, neuropathy, obesity   L DM foot ulcer   L charcot neuroarthropathy    Admit 7/20-27/20, OR 7/22 I&D, bx   Biopsy with acute osteomyelitis, cult - S epidermitis  Treat for course iv antibiotics     Still with ulcer, going to [facility] - Dr Liz Dove, started Bactrim for now   MRI 7/6/21 - Charcot changes, fragmented talus, calcaneous, malallignment diffuse marrow edema  Cult 8/31 - rare E faecais, light P mirabilis  Has appt with Fab Kebede on Fri 9/24    IMP/  DFI with chronic ulcer, charcot neuroarthropathy, underlying osteomyelitis (chronic)   - requires surgical / medical treatment.   Iv antibiotics alone will not solve issue    - limb threatening problems    REC/  Cont bactrim  See foot / ankle surg - appt Fri   - need debridement - poss staged with reconstruction

## 2021-09-20 NOTE — TELEPHONE ENCOUNTER
Patient called and asked if Dr. Brie Koo had reviewed his MRI and if he believes the patient needs IV antibiotics.

## 2021-09-21 ENCOUNTER — HOSPITAL ENCOUNTER (OUTPATIENT)
Dept: WOUND CARE | Age: 71
Discharge: HOME | End: 2021-09-21
Payer: MEDICARE

## 2021-09-21 VITALS
HEART RATE: 77 BPM | SYSTOLIC BLOOD PRESSURE: 151 MMHG | DIASTOLIC BLOOD PRESSURE: 82 MMHG | RESPIRATION RATE: 18 BRPM | TEMPERATURE: 97.4 F

## 2021-09-21 DIAGNOSIS — E11.621 DIABETIC ULCER OF LEFT MIDFOOT ASSOCIATED WITH TYPE 2 DIABETES MELLITUS, WITH NECROSIS OF BONE (HCC): ICD-10-CM

## 2021-09-21 DIAGNOSIS — E11.610 CHARCOT FOOT DUE TO DIABETES MELLITUS (HCC): ICD-10-CM

## 2021-09-21 DIAGNOSIS — E11.42 DIABETIC POLYNEUROPATHY ASSOCIATED WITH TYPE 2 DIABETES MELLITUS (HCC): ICD-10-CM

## 2021-09-21 DIAGNOSIS — L97.424 DIABETIC ULCER OF LEFT MIDFOOT ASSOCIATED WITH TYPE 2 DIABETES MELLITUS, WITH NECROSIS OF BONE (HCC): ICD-10-CM

## 2021-09-21 DIAGNOSIS — E11.8 DIABETIC FOOT (HCC): ICD-10-CM

## 2021-09-21 DIAGNOSIS — L98.499 DIABETES MELLITUS WITH SKIN ULCER (HCC): ICD-10-CM

## 2021-09-21 DIAGNOSIS — E11.21 DIABETIC NEPHROPATHY ASSOCIATED WITH TYPE 2 DIABETES MELLITUS (HCC): ICD-10-CM

## 2021-09-21 DIAGNOSIS — E11.621 TYPE 2 DIABETES MELLITUS WITH LEFT DIABETIC FOOT ULCER (HCC): ICD-10-CM

## 2021-09-21 DIAGNOSIS — Z74.09 IMPAIRED MOBILITY: ICD-10-CM

## 2021-09-21 DIAGNOSIS — M86.272 SUBACUTE OSTEOMYELITIS OF LEFT FOOT (HCC): Primary | ICD-10-CM

## 2021-09-21 DIAGNOSIS — E11.622 DIABETES MELLITUS WITH SKIN ULCER (HCC): ICD-10-CM

## 2021-09-21 DIAGNOSIS — I70.244 ATHEROSCLEROSIS OF NATIVE ARTERIES OF LEFT LEG WITH ULCERATION OF HEEL AND MIDFOOT (HCC): ICD-10-CM

## 2021-09-21 DIAGNOSIS — L97.529 TYPE 2 DIABETES MELLITUS WITH LEFT DIABETIC FOOT ULCER (HCC): ICD-10-CM

## 2021-09-21 PROCEDURE — 11043 DBRDMT MUSC&/FSCA 1ST 20/<: CPT

## 2021-09-21 PROCEDURE — 11043 DBRDMT MUSC&/FSCA 1ST 20/<: CPT | Performed by: EMERGENCY MEDICINE

## 2021-09-21 RX ORDER — BACITRACIN, NEOMYCIN, POLYMYXIN B 400; 3.5; 5 [USP'U]/G; MG/G; [USP'U]/G
OINTMENT TOPICAL ONCE
OUTPATIENT
Start: 2021-09-21 | End: 2021-09-21

## 2021-09-21 RX ORDER — LIDOCAINE 40 MG/G
CREAM TOPICAL ONCE
Status: COMPLETED | OUTPATIENT
Start: 2021-09-21 | End: 2021-09-21

## 2021-09-21 RX ORDER — LIDOCAINE HYDROCHLORIDE 20 MG/ML
JELLY TOPICAL ONCE
OUTPATIENT
Start: 2021-09-21 | End: 2021-09-21

## 2021-09-21 RX ORDER — LIDOCAINE HYDROCHLORIDE 40 MG/ML
SOLUTION TOPICAL ONCE
Status: CANCELLED | OUTPATIENT
Start: 2021-09-21 | End: 2021-09-21

## 2021-09-21 RX ORDER — BETAMETHASONE DIPROPIONATE 0.05 %
OINTMENT (GRAM) TOPICAL ONCE
OUTPATIENT
Start: 2021-09-21 | End: 2021-09-21

## 2021-09-21 RX ORDER — LIDOCAINE 50 MG/G
OINTMENT TOPICAL ONCE
OUTPATIENT
Start: 2021-09-21 | End: 2021-09-21

## 2021-09-21 RX ORDER — GINSENG 100 MG
CAPSULE ORAL ONCE
OUTPATIENT
Start: 2021-09-21 | End: 2021-09-21

## 2021-09-21 RX ORDER — GENTAMICIN SULFATE 1 MG/G
OINTMENT TOPICAL ONCE
OUTPATIENT
Start: 2021-09-21 | End: 2021-09-21

## 2021-09-21 RX ORDER — BACITRACIN ZINC AND POLYMYXIN B SULFATE 500; 1000 [USP'U]/G; [USP'U]/G
OINTMENT TOPICAL ONCE
OUTPATIENT
Start: 2021-09-21 | End: 2021-09-21

## 2021-09-21 RX ORDER — CLOBETASOL PROPIONATE 0.5 MG/G
OINTMENT TOPICAL ONCE
OUTPATIENT
Start: 2021-09-21 | End: 2021-09-21

## 2021-09-21 RX ORDER — LIDOCAINE 40 MG/G
CREAM TOPICAL ONCE
OUTPATIENT
Start: 2021-09-21 | End: 2021-09-21

## 2021-09-21 RX ADMIN — LIDOCAINE 1 G: 40 CREAM TOPICAL at 09:22

## 2021-09-21 ASSESSMENT — PAIN SCALES - GENERAL
PAINLEVEL_OUTOF10: 0
PAINLEVEL_OUTOF10: 0

## 2021-09-21 NOTE — PROGRESS NOTES
[facility]Hyper[facility]ic Oxygen Therapy Center   Progress Note     George Bernal RECORD NUMBER:  [id number]  AGE: 78 y.o. GENDER: male  : 1950  EPISODE DATE:  2021    Subjective:     Chief Complaint   Patient presents with    Wound Check     follow up visit left foot wound      Patient presenting for follow up evaluation of above wound. Symptoms, wound related issues, or other pertinent wound history since last visit: none. Patient tolerating dressings well. arterial dopplers done 2021    Medical Decision Makin-year-old male with multiple comorbid conditions as outlined below who is presenting as a follow up appointment to our wound center for nonhealing left diabetic foot ulcer present for about a year prior to presentation here. The patient had been under the care of his podiatrist at [facility].  He has transitioned his care to our wound center as he wanted a second opinion given his poor wound progress. 21: no new issues. Patient has had arterial Dopplers since last visit. Results as follows:  Right CUATE was not available due to body habitus.    Left CUATE was not available due to body habitus.    The majority of the waveforms are multiphasic throughout the left lower    extremity.    Elevated velocity of the proximal femoral artery suggest a greater than 50%    stenosis.    Findings are suggestive of outflow disease. There is good collateral flow    noted throughout.        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 ([address]    Relevant Orders    Initiate Outpatient Wound Care Protocol    Diabetic foot [facility])    Relevant Orders    Initiate Outpatient Wound Care Protocol    Type 2 diabetes mellitus with left diabetic foot ulcer ([address]    Relevant Orders    Initiate Outpatient Wound Care Protocol    Diabetic polyneuropathy associated with type 2 diabetes mellitus ([address]    Relevant Orders Initiate Outpatient Wound Care Protocol    Diabetic ulcer of left midfoot associated with type 2 diabetes mellitus, with necrosis of bone ([facility]    Relevant Orders    Initiate Outpatient Wound Care Protocol    Impaired mobility    Relevant Orders    Initiate Outpatient Wound Care Protocol    Subacute osteomyelitis of left foot ([facility] - Primary    Relevant Orders    Initiate Outpatient Wound Care Protocol    Diabetes mellitus with skin ulcer ([facility]    Relevant Orders    Initiate Outpatient Wound Care Protocol    Charcot foot due to diabetes mellitus ([facility]    Relevant Orders    Initiate Outpatient Wound Care Protocol    Atherosclerosis of native arteries of left leg with ulceration of heel and midfoot ([facility]    Relevant Orders    Initiate Outpatient Wound Care Protocol        Comorbid conditions affecting wound healing: DM type 2, cardiomyopathy, hypertension, hyperlipidemia, stage IIIa chronic kidney disease, obesity, chronic leg edema, chronic anticoagulation.     Wound evaluation: healthier appearance, again more shallow. Mild maceration to periwound skin. Moderate drainage of serous type. Again more granular. Some nonviable tissue interspersed requiring debridement today. Leg edema sig improved. Problems addressed during this encounter:  1. Left diabetic foot ulcer, plantar surface of midfoot. Severity of bone necrosis. Coffey grade 3. The patient would benefit from surgical debridement. Referral to orthopedics done today for surgery to include bone biopsy. Hopefully once this is done, will be left open and we will continue the healing process from the inside out. ID following as well. 2. Left diabetic foot/ankle ulcer, dorsal aspect. Severity of skin layers involved only. Coffey grade 1  3. Diabetes mellitus type 2. Last hemoglobin A1c was 6.8. Continues to adhere to diabetic diet. Doing better with control. Additional education provided. 4. Left lower extremity edema. This is improved.   Continue as current with gentle compression  5. Charcot deformity leading to severe difficulties with offloading, impaired mobility. Offloading boot prescribed. Offloading felt applied today by me. 6. Osteomyelitis of left foot. I reviewed his records from the outside facility and his MRI in July 2021. The patient has received multiple courses of antibiotics already. Last course of IV antibiotics was in September 2020. Since wound is slowly improving, we will see if hyperbaric oxygen therapy can be done after ortho evaluation and treatment. Cultures reviewed. Prescribed Bactrim last visit  7. Suspected peripheral arterial disease. Arterial Dopplers results reviewed. 8. Hyperbaric oxygen therapy needs. Discussed with him previously. Patient was very motivated to proceed. Education about HBOT provided for the patient. Data reviewed and analyzed:  1. Assessment required other independent historian(s): No patient . 2. Review of prior external note from other providers done today: No  3. New imaging or lab ordered today: No.  Tissue culture and tissue sent for pathology 8/31/21 . 4. Review of test results done today: No.  Previously reviewed, tissue culture and pathology as well as arterial Doppler. Culture was positive for Enterococcus faecalis. Pathology showed ulcerated necrotic tissue with acute inflammation consistent with wound history. Reviewed with patient again. 5. Independent interpretation of test(s): No  6. Discussion of management or test interpretation with other qualified health care professional and other external source. Risk of complications and/or mortality of patient management:  1. This patient has a high risk of morbidity and mortality from additional diagnostic testing or treatment. This is due to the above conditions affecting wound healing as well as patient and procedure risk factors.  Education and discussion held with patient regarding these disease processes pertinent to wound(s). 2. Other pertinent decisions include: minor surgery or procedures as below. 3. The patient's diagnosis or treatment is not significantly limited by social determinants of health as noted by: N/A .  4. Prescription drug management: Bactrim     Negative Pressure Wound Therapy Foot Left (Active)   Number of days: 426       Wound 08/24/21 Foot Left;Plantar #1 (Active)   Wound Image   09/14/21 0932   Wound Etiology Diabetic Coffey 3 08/24/21 1106   Dressing Status New dressing applied 09/14/21 1054   Wound Cleansed Cleansed with saline 09/08/21 1015   Dressing/Treatment Ace wrap;Gauze dressing/dressing sponge; Hydrofera blue;Moisten with saline;Moisturizing cream;Roll gauze; Alginate with Ag 09/14/21 1054   Offloading for Diabetic Foot Ulcers Felt or foam;Post op shoe 09/14/21 1054   Wound Length (cm) 2.1 cm 09/21/21 0917   Wound Width (cm) 1.5 cm 09/21/21 0917   Wound Depth (cm) 1.1 cm 09/21/21 0917   Wound Surface Area (cm^2) 3.15 cm^2 09/21/21 0917   Change in Wound Size % (l*w) -27.53 09/21/21 0917   Wound Volume (cm^3) 3.465 cm^3 09/21/21 0917   Wound Healing % -40 09/21/21 0917   Post-Procedure Length (cm) 2.3 cm 09/14/21 1006   Post-Procedure Width (cm) 2 cm 09/14/21 1006   Post-Procedure Depth (cm) 1.6 cm 09/14/21 1006   Post-Procedure Surface Area (cm^2) 4.6 cm^2 09/14/21 1006   Post-Procedure Volume (cm^3) 7.36 cm^3 09/14/21 1006   Distance Tunneling (cm) 0.8 cm 08/31/21 0908   Tunneling Position ___ O'Clock 6 08/31/21 0908   Undermining Starts ___ O'Clock 12 09/14/21 0932   Undermining Ends___ O'Clock 7 09/14/21 0932   Undermining Maxium Distance (cm) 1.7 09/14/21 0932   Wound Assessment Granulation tissue;Slough; Exposed structure bone 09/21/21 0917   Drainage Amount Moderate 09/21/21 0917   Drainage Description Serosanguinous 09/21/21 0917   Odor None 09/21/21 0917   Michelle-wound Assessment Maceration 09/21/21 0917   Margins Attached edges 09/21/21 0917   Wound Thickness Description not for Pressure Injury Full thickness 09/21/21 0917   Number of days: 28     Incision 08/28/19 Foot Right (Active)   Number of days: 754       Incision 07/22/20 Foot Anterior; Left (Active)   Number of days: 425       Procedures done during this encounter:   Debridement: Excisional Debridement  Indications:  Based on my examination of this patient's wound(s)/ulcer(s) today, debridement is required to promote healing and evaluate the wound base. Risks and benefits discussed with patient who has agreed to proceed. Performed by: Arnold Duggan MD  Consent obtained:  Yes  Time out taken:  Yes  Pain Control: Anesthetic  Anesthetic: 4% Lidocaine Cream   Using curette, #15 blade scalpel, scissors and forceps the wound(s)/ulcer(s) was/were debrided down through and including the removal of muscle/fascia. Devitalized Tissue Debrided:  fibrin, biofilm, slough, exudate, callus and nonviable protruding tissue. Pre Debridement Measurements:  Are located in the [place]  Documentation Flow Sheet  Wound/Ulcer #: 1  Post Debridement Measurements:  Wound/Ulcer Descriptions are Pre Debridement except measurements: Total Surface Area Debrided:  3.15 sq cm   Diabetic/Pressure/Non Pressure Ulcers only:  Ulcer: Diabetic ulcer, bone necrosis   Estimated Blood Loss:  Minimal  Hemostasis Achieved:  by silver nitrate stick  Procedural Pain:  0  / 10   Post Procedural Pain:  0 / 10   Response to treatment:  Well tolerated by patient. HISTORY of PRESENT ILLNESS HPI     Bandar Cordoba is a 78 y.o. male who presents today for wound/ulcer evaluation. History of Wound Context: Per original history and physical on this patient. Changes in history since last exam: none.      Objective:    BP (!) 151/82   Pulse 77   Temp 97.4 °F (36.3 °C) (Temporal)   Resp 18   Wt Readings from Last 3 Encounters:   08/19/21 (!) 339 lb (153.8 kg)   05/21/21 (!) 329 lb 12.8 oz (149.6 kg)   05/12/21 (!) 325 lb (147.4 kg)       PHYSICAL EXAM  General: alert and in no acute distress. Normal appearing  Skin: warm and dry, no rash  Head: normocephalic and atraumatic  Eyes: extraocular eye movements intact, conjunctivae normal, and sclera anicteric  ENT: hearing grossly normal bilaterally. Normal appearance  Respiratory: no chest wall tenderness. no respiratory distress  GI: abdomen soft, non-tender and non-distended, benign  Musculoskeletal: baseline range of motion in joints. Nontender calves. No cyanosis. Edema 2+. Neurologic: Speech normal. No focal deficits.  Mental status normal or at baseline    PAST MEDICAL HISTORY        Diagnosis Date    Atrial fibrillation (HCC)     Back pain     Cardiomyopathy     CHF (congestive heart failure) (HCC)     COPD (chronic obstructive pulmonary disease) (HCC)     Dyslipidemia     GERD (gastroesophageal reflux disease)     Hyperlipidemia     Hypertension     Hypothyroidism     Leg edema     MRSA (methicillin resistant staph aureus) culture positive 10/5/15    foot/bone    MRSA nasal colonization 05/05/2017    Obesity     Prolonged emergence from general anesthesia     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     St[facility]     Thyroid disease     Type 2 diabetes mellitus without complication ([facility])     Type II or unspecified type diabetes mellitus without mention of complication, not stated as uncontrolled        PAST SURGICAL HISTORY    Past Surgical History:   Procedure Laterality Date    ADRENAL GLAND SURGERY  1970    CARDIAC DEFIBRILLATOR PLACEMENT  09/05/2017    Dr. Carola Inman COLONOSCOPY N/A 3/8/2019    COLONOSCOPY WITH MAC, UNASYN (3gm) performed by Boubacar Almanzar MD at [address] [place] [place] [address] N/A 8/9/2019    COLONOSCOPY POLYPECTOMY SNARE/COLD BIOPSY performed by Boubacar Almanzar MD at [address] [place] [address]  8/9/2019    COLONOSCOPY WITH BIOPSY performed by Boubacar Almanzar MD at [address] [place] [address] 8/28/2019    INCISION AND INCISIONAL DEBRIDEMENT OPEN FRACTURE RIGHT 2ND TOE SKIN AND BONE performed by Mark Justice MD at [address] [facility] [address] Left 2020    LEFT FOOT INCISION AND DRAINAGE WITH BONE BIOPSY AND REMOVAL OF FREE FLOATING BONE FRAGMENT performed by Queta Adams DPM at [address] [facility]  --10    GASTRIC BYPASS SURGERY      OTHER SURGICAL HISTORY  10/6/15    INCISION AND DRAINAGE RIGHT FOOT          OTHER SURGICAL HISTORY Right 10/8/15    INCISION AND DRAINAGE RIGHT FOOT      PACEMAKER PLACEMENT      UPPER GASTROINTESTINAL ENDOSCOPY N/A 3/8/2019    EGD BIOPSY GASTRIC H. PYLORI performed by Shaneka Whitehead MD at [address] [facility] HISTORY    Family History   Problem Relation Age of Onset    Hypertension Mother     Heart Disease Father     Hypertension Maternal Grandmother     Diabetes Maternal Grandmother        SOCIAL HISTORY    Social History     Tobacco Use    Smoking status: Former Smoker     Packs/day: 1.00     Years: 4.00     Pack years: 4.00     Types: Cigarettes     Quit date: 2017     Years since quittin.7    Smokeless tobacco: Never Used   Vaping Use    Vaping Use: Never used   Substance Use Topics    Alcohol use: Yes     Comment: 2-3 times per year    Drug use: No       ALLERGIES    No Known Allergies    MEDICATIONS    Current Outpatient Medications on File Prior to Encounter   Medication Sig Dispense Refill    sulfamethoxazole-trimethoprim (BACTRIM DS;SEPTRA DS) 800-160 MG per tablet Take 1 tablet by mouth 2 times daily for 10 days 20 tablet 0    tamsulosin (FLOMAX) 0.4 MG capsule TAKE 1 CAPSULE BY MOUTH DAILY 90 capsule 2    carvedilol (COREG) 6.25 MG tablet TAKE 1 TABLET BY MOUTH TWICE DAILY WITH MEALS 30 tablet 5    silver sulfADIAZINE (SILVADENE) 1 % cream Apply topically daily.  400 g 0    atorvastatin (LIPITOR) 40 MG tablet TAKE 1 TABLET BY MOUTH DAILY 90 tablet 3    furosemide (LASIX) 40 MG tablet TAKE 1 TABLET BY MOUTH DAILY 60 tablet 3    oxybutynin (DITROPAN-XL) 5 MG extended release tablet TAKE 1 TABLET BY MOUTH DAILY FOR OVERACTIVE BLADDER 90 tablet 1    levothyroxine (SYNTHROID) 25 MCG tablet TAKE 1 TABLET BY MOUTH DAILY 90 tablet 3    Insulin Pen Needle (B-D UF III MINI PEN NEEDLES) 31G X 5 MM MISC Inject 1 each into the skin daily 100 each 5    apixaban (ELIQUIS) 5 MG TABS tablet TAKE 1 TABLET BY MOUTH TWICE DAILY 60 tablet 11    blood glucose test strips (ONE TOUCH TEST STRIPS) strip 1 each by In Vitro route 3 times daily As needed. 300 each 3    dapagliflozin (FARXIGA) 10 MG tablet Take 1 tablet by mouth every morning 90 tablet 3    lisinopril (PRINIVIL;ZESTRIL) 2.5 MG tablet Take 1 tablet by mouth daily 90 tablet 3    spironolactone (ALDACTONE) 25 MG tablet Take 1 tablet by mouth daily 90 tablet 3    NOVOLOG FLEXPEN 100 UNIT/ML injection pen ADMINISTER 8 TO 10 UNITS UNDER THE SKIN THREE TIMES DAILY BEFORE MEALS 45 mL 5    ONETOUCH ULTRA strip USE WITH GLUCOSE METER THREE TIMES DAILY AND AS NEEDED 300 strip 3    TRESIBA FLEXTOUCH 100 UNIT/ML SOPN INJECT 10 UNITS UNDER THE SKIN NIGHTLY 9 mL 3    ferrous sulfate (FE TABS 325) 325 (65 Fe) MG EC tablet Take 325 mg by mouth daily      clotrimazole-betamethasone (LOTRISONE) 1-0.05 % cream Apply topically 2 times daily. 15 g 1    amiodarone (CORDARONE) 200 MG tablet Take 1 tablet by mouth daily 90 tablet 3    Continuous Blood Gluc  (FREESTYLE KWESI 14 DAY READER) ANITA Use as Directed Dx E11.9 1 Device 0    Continuous Blood Gluc Sensor (FREESTYLE KWESI 14 DAY SENSOR) [facility] Use as directed Dx E11.9 3 each 3    omega-3 acid ethyl esters (LOVAZA) 1 g capsule TAKE 1 CAPSULE BY MOUTH TWICE DAILY 180 capsule 3    Blood Glucose Monitoring Suppl (ONE TOUCH ULTRA MINI) w/Device KIT 1 kit by Does not apply route three times daily 1 kit 0    Handicap Placard MISC by Does not apply route Expires: 1/9/22. Diagnosis: cardiomyopathy.  1 each 0    acetaminophen (TYLENOL) 325 MG tablet Take 2 tablets by mouth every 4 hours as needed for Pain 120 tablet 3    Coenzyme Q10 (CO Q 10) 100 MG CAPS Take 1 capsule by mouth daily      Cinnamon 500 MG CAPS Take 1 capsule by mouth daily      Vitamin D (CHOLECALCIFEROL) 1000 UNITS CAPS capsule Take 2,000 Units by mouth nightly       Insulin Syringe-Needle U-100 (INSULIN SYRINGE .5CC/31GX5/16\") 31G X 5/16\" 0.5 ML MISC Patient tests bid 100 Syringe 5    therapeutic multivitamin-minerals (THERAGRAN-M) tablet Take 1 tablet by mouth daily.  aspirin 81 MG EC tablet Take 81 mg by mouth daily. No current facility-administered medications on file prior to encounter. REVIEW OF SYSTEMS  A comprehensive review of systems was negative except for what has been indicated above and: wound, edema. Drainage. Written patient dismissal instructions given to patient and signed by patient or POA. Discharge [address] and Hyperbaric Oxygen Therapy   Physician Orders and Discharge Instructions  [place] [address], [facility]  Telephone: [phone]      FAX [phone]     NAME: [address] [place] [address] [place]:  1950  MEDICAL RECORD NUMBER:  [id number]  DATE:  9/21/2021     Wound Cleansing:   Do not scrub or use excessive force. Cleanse wound prior to applying a clean dressing with:  [x]???? Normal Saline  [x]???? Keep Wound Dry in Shower    []???? Wound Cleanser   []???? Cleanse wound with Mild Soap & Water  []???? May Shower at Discharge   []???? Other:        Topical Treatments:  Do not apply lotions, creams, or ointments to wound bed unless directed. [x]???? Apply moisturizing lotion to skin surrounding the wound prior to dressing change.  []???? Apply antifungal ointment to skin surrounding the wound prior to dressing change.  []???? Apply thin film of moisture barrier ointment to skin immediately around wound.   [x]???? Other: FELT PADS TO FRANSICO-WOUND (CHANGE ONLY AS NEEDED FOR DRAINAGE)                Dressings:                  Wound Location LEFT PLANTAR FOOT     [x]? ??? Apply Primary Dressing:   [x]???? : SILVER ALGINATE - PACK INTO WOUND, SLIGHTLY MOISTENED WITH SALINE                                                                 [x]? ??? Cover and Secure with:                   [x]? ??? Gauze                       [x]? ??? Roll gauze TOES TO KNEE              [x]? ??? Ace Wrap- NOT FOR COMPRESSION JUST TO KEEP DRESSING IN PLACE                                     []???? Other:               Avoid contact of tape with skin. [x]???? Change dressing:   [x]? ??? EVERY OTHER DAY                                  Edema Control:  Apply:              [x]? ??? SpandaGrip            []????Right Leg     [x]? ? ??Left Leg                                      []?? ??Low compression 5-10 mm/Hg                                             [x]? ? ??Medium compression 10-20 mm/Hg                                      []?? ?? High compression  20-30 mm/Hg              every morning immediately when getting up should be applied to affected leg(s) from mid foot to knee making sure to cover the heel.  Remove every night before going to bed.              [x]? ??? Elevate leg(s) above the level of the heart when sitting.                 [x]? ??? Avoid prolonged standing in one place.     Off-Loading:   [x]???? Off-loading when    [x]? ??? walking       []???? in bed         []???? sitting  []???? Total non-weight bearing  []???? Right Leg  []???? Left Leg          [x]? ??? Assistive Device     []???? Walker        []???? Cane           []???? Wheelchair  []???? Crutches              [x]? ??? Surgical shoe    []???? Podus Boot(s)   []???? Foam Boot(s)  []???? Roll About              []???? Cast Boot   []???? CROW Boot  []???? Other:        Dietary:  []???? Diet as tolerated:     [x]? ??? Calorie Diabetic Diet:           []???? No Added Salt:  [x]???? Increase Protein:     []???? Other:              Activity:  [x]???? Activity: BATHROOM PRIVILEGES ONLY     If you are still having pain after you go home:  [x]???? Elevate the affected limb.    [x]???? Use over-the-counter medications you would normally use for pain as permitted by your family doctor. [x]???? For persistent pain not relieved by the above interventions, please call your family doctor.             Return Appointment:  []???? Wound and dressing supply provider: DECLINES AT THIS TIME 9/8/2021  []???? ECF or Home Healthcare:  []???? Wound Assessment:          []???? Physician or NP scheduled for Wound Assessment:   [x]???? Return Appointment: With DR. Adolfo Santiago MD  in  1 Week(s)  []???? Ordered tests:      **REFERRAL TO INFECTIOUS DISEASE- CALL ID AT [facility]**     Nurse Case Manger: Randall  Information: Should you experience any significant changes in your wound(s) or have questions about your wound care, please contact the [address] [place] [address] [phone] [address] 8:00 am - 4:30 pm and Friday 8:00 am - 12:30 pm.  If you need help with your wound outside these hours and cannot wait until we are again available, contact your PCP or go to the hospital emergency room.      PLEASE NOTE: IF YOU ARE UNABLE TO OBTAIN WOUND SUPPLIES, CONTINUE TO USE THE SUPPLIES YOU HAVE AVAILABLE UNTIL YOU ARE ABLE TO [facility]. IT IS MOST IMPORTANT TO KEEP THE WOUND COVERED AT ALL TIMES.     Physician Signature:_______________________     Date: ___________ Time:  ____________                                HS.  Regla Mcwilliams MD             Electronically signed by Kody López MD on 9/21/2021 at 10:17 AM

## 2021-09-24 ENCOUNTER — OFFICE VISIT (OUTPATIENT)
Dept: ORTHOPEDIC SURGERY | Age: 71
End: 2021-09-24
Payer: MEDICARE

## 2021-09-24 VITALS — HEIGHT: 73 IN | BODY MASS INDEX: 41.75 KG/M2 | RESPIRATION RATE: 16 BRPM | WEIGHT: 315 LBS

## 2021-09-24 DIAGNOSIS — M14.672 CHARCOT ANKLE, LEFT: Primary | ICD-10-CM

## 2021-09-24 DIAGNOSIS — M86.472 CHRONIC OSTEOMYELITIS OF LEFT FOOT WITH DRAINING SINUS (HCC): ICD-10-CM

## 2021-09-24 PROCEDURE — 99203 OFFICE O/P NEW LOW 30 MIN: CPT | Performed by: ORTHOPAEDIC SURGERY

## 2021-09-24 PROCEDURE — L3260 AMBULATORY SURGICAL BOOT EAC: HCPCS | Performed by: ORTHOPAEDIC SURGERY

## 2021-09-25 PROBLEM — M14.672 CHARCOT ANKLE, LEFT: Status: ACTIVE | Noted: 2021-08-24

## 2021-09-25 PROBLEM — M86.472 CHRONIC OSTEOMYELITIS OF LEFT FOOT WITH DRAINING SINUS (HCC): Status: ACTIVE | Noted: 2021-09-25

## 2021-09-25 NOTE — PROGRESS NOTES
CHIEF COMPLAINT:   1- Left ankle pain/ Charcot ankle and hindfoot, . 2- Left hind foot osteomyelitis. HISTORY:  Mr. Anshu De La Paz 78 y.o.  male presents today for the first visit for evaluation of left ankle and ankle pain and chronic plantar heel wound which started a year ago and was seen by a. Podiatry and wound center.  He is complaining of achy  Pain. Alleviating factors:  rest. Pain is increase with standing and walking and shoe wear. Pain is sharp with first few steps, dull achy pain by the end of the day. No radiation and no numbness and tingling sensation. No other complaint. He has DM with neuropathy.     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 Daria Isaac COLONOSCOPY N/A 3/8/2019    COLONOSCOPY WITH MAC, UNASYN (3gm) performed by Alea Benito MD at [facility] N/A 8/9/2019    COLONOSCOPY POLYPECTOMY SNARE/COLD BIOPSY performed by Alea Benito MD at [facility]  8/9/2019    COLONOSCOPY WITH BIOPSY performed by Alea Benito MD at [address] [place] [address] 8/28/2019 Connections:     Frequency of Communication with Friends and Family:     Frequency of Social Gatherings with Friends and Family:     Attends Evangelical Services:     Active Member of Clubs or Organizations:     Attends Club or Organization Meetings:     Marital Status:    Intimate Partner Violence:     Fear of Current or Ex-Partner:     Emotionally Abused:     Physically Abused:     Sexually Abused:        Family History   Problem Relation Age of Onset    Hypertension Mother     Heart Disease Father     Hypertension Maternal Grandmother     Diabetes Maternal Grandmother        Current Outpatient Medications on File Prior to Visit   Medication Sig Dispense Refill    tamsulosin (FLOMAX) 0.4 MG capsule TAKE 1 CAPSULE BY MOUTH DAILY 90 capsule 2    carvedilol (COREG) 6.25 MG tablet TAKE 1 TABLET BY MOUTH TWICE DAILY WITH MEALS 30 tablet 5    silver sulfADIAZINE (SILVADENE) 1 % cream Apply topically daily. 400 g 0    atorvastatin (LIPITOR) 40 MG tablet TAKE 1 TABLET BY MOUTH DAILY 90 tablet 3    furosemide (LASIX) 40 MG tablet TAKE 1 TABLET BY MOUTH DAILY 60 tablet 3    oxybutynin (DITROPAN-XL) 5 MG extended release tablet TAKE 1 TABLET BY MOUTH DAILY FOR OVERACTIVE BLADDER 90 tablet 1    levothyroxine (SYNTHROID) 25 MCG tablet TAKE 1 TABLET BY MOUTH DAILY 90 tablet 3    Insulin Pen Needle (B-D UF III MINI PEN NEEDLES) 31G X 5 MM MISC Inject 1 each into the skin daily 100 each 5    apixaban (ELIQUIS) 5 MG TABS tablet TAKE 1 TABLET BY MOUTH TWICE DAILY 60 tablet 11    blood glucose test strips (ONE TOUCH TEST STRIPS) strip 1 each by In Vitro route 3 times daily As needed.  300 each 3    dapagliflozin (FARXIGA) 10 MG tablet Take 1 tablet by mouth every morning 90 tablet 3    lisinopril (PRINIVIL;ZESTRIL) 2.5 MG tablet Take 1 tablet by mouth daily 90 tablet 3    spironolactone (ALDACTONE) 25 MG tablet Take 1 tablet by mouth daily 90 tablet 3    NOVOLOG FLEXPEN 100 UNIT/ML injection pen ADMINISTER 8 very pleasant 78 y.o.  male who presents today in no acute distress, awake, alert, and oriented. He is well dressed, nourished and  groomed. Patient with normal affect. Height is  6' 1\" (1.854 m), weight is (!) 339 lb (153.8 kg), Body mass index is 44.73 kg/m². Resting respiratory rate is 16. Examination of left ankle showing decrease ROM and swelling compare to the other side. He has decrease sensation and good pedal pulses.  He has decrease strength in all four planes, including eversion, and has mild tenderness on deep palpation over the left ankle joint and hind foot, with swelling and plantar heel wound compared to the other side.                      IMAGING:  Xray's were reviewed,  3 views of the left foot taken in office today, and showed significant hind foot bony changes with the talus and calcaneus are side by side. MRI left ankle dated 7/6/2021 was reviewed and showed     1. Chronic soft tissue ulceration of the plantar heel with no organized fluid   collection identified.  Subcutaneous edema of the imaged ankle.  Correlate   clinically for cellulitis. 2. Findings again most suggestive of Charcot arthropathy of the hindfoot with   associated malalignment and fragmentation of the talus and calcaneus. Malalignment has slightly progressed when compared with exam from July 21, 2020. 3. Prominent marrow edema of the distal tibia, fibula, talus, and calcaneus   with associated patchy and confluent areas of decreased T1 signal. Findings   may be related to reactive/mechanical edema given severe malalignment of the   hindfoot and Charcot arthropathy.  Superimposed osteomyelitis difficult to   entirely exclude given the overlapping imaging characteristics between   Charcot arthropathy and infection.  Of note, the   4. Moderate-sized hindfoot effusion and severe synovitis.  Please note that   sterility of fluid is indeterminate on imaging.        IMPRESSION:   1- Left ankle pain/

## 2021-09-28 ENCOUNTER — HOSPITAL ENCOUNTER (OUTPATIENT)
Dept: WOUND CARE | Age: 71
Discharge: HOME | End: 2021-09-28
Payer: MEDICARE

## 2021-09-28 DIAGNOSIS — M86.272 SUBACUTE OSTEOMYELITIS OF LEFT FOOT (HCC): Primary | ICD-10-CM

## 2021-09-28 DIAGNOSIS — I70.244 ATHEROSCLEROSIS OF NATIVE ARTERIES OF LEFT LEG WITH ULCERATION OF HEEL AND MIDFOOT (HCC): ICD-10-CM

## 2021-09-28 DIAGNOSIS — E11.621 DIABETIC ULCER OF LEFT MIDFOOT ASSOCIATED WITH TYPE 2 DIABETES MELLITUS, WITH NECROSIS OF BONE (HCC): ICD-10-CM

## 2021-09-28 DIAGNOSIS — E11.42 DIABETIC POLYNEUROPATHY ASSOCIATED WITH TYPE 2 DIABETES MELLITUS (HCC): ICD-10-CM

## 2021-09-28 DIAGNOSIS — Z74.09 IMPAIRED MOBILITY: ICD-10-CM

## 2021-09-28 DIAGNOSIS — L97.424 DIABETIC ULCER OF LEFT MIDFOOT ASSOCIATED WITH TYPE 2 DIABETES MELLITUS, WITH NECROSIS OF BONE (HCC): ICD-10-CM

## 2021-10-06 RX ORDER — AMIODARONE HYDROCHLORIDE 200 MG/1
200 TABLET ORAL DAILY
Qty: 30 TABLET | Refills: 0 | Status: SHIPPED | OUTPATIENT
Start: 2021-10-06 | End: 2022-04-14

## 2021-10-06 NOTE — TELEPHONE ENCOUNTER
Requested Prescriptions     Pending Prescriptions Disp Refills    amiodarone (CORDARONE) 200 MG tablet [Pharmacy Med Name: AMIODARONE 200MG TABLETS] 30 tablet 0     Sig: TAKE 1 TABLET BY MOUTH DAILY                  Last Office Visit: 4//12/2021    Next Office Visit: Visit date not found , left message to schedule f/u appt

## 2021-11-17 ENCOUNTER — TELEPHONE (OUTPATIENT)
Dept: INTERNAL MEDICINE CLINIC | Age: 71
End: 2021-11-17

## 2021-11-17 NOTE — TELEPHONE ENCOUNTER
Calling to see if physician will sign on home health orders on patient he is being discharged today from [facility]. Please advise.

## 2021-11-18 ENCOUNTER — TELEPHONE (OUTPATIENT)
Dept: INTERNAL MEDICINE CLINIC | Age: 71
End: 2021-11-18

## 2021-11-21 ENCOUNTER — HOSPITAL ENCOUNTER (OUTPATIENT)
Dept: SLEEP CENTER | Age: 71
Discharge: HOME | End: 2021-11-21
Payer: MEDICARE

## 2021-11-21 DIAGNOSIS — G47.33 OBSTRUCTIVE SLEEP APNEA: ICD-10-CM

## 2021-11-21 DIAGNOSIS — G47.33 OSA (OBSTRUCTIVE SLEEP APNEA): ICD-10-CM

## 2021-11-21 DIAGNOSIS — I10 ESSENTIAL HYPERTENSION: ICD-10-CM

## 2021-11-21 DIAGNOSIS — Z86.73 H/O: STROKE: ICD-10-CM

## 2021-11-21 DIAGNOSIS — Z99.81 OXYGEN DEPENDENT: ICD-10-CM

## 2021-11-21 DIAGNOSIS — E66.01 CLASS 3 SEVERE OBESITY DUE TO EXCESS CALORIES WITH SERIOUS COMORBIDITY AND BODY MASS INDEX (BMI) OF 40.0 TO 44.9 IN ADULT (HCC): ICD-10-CM

## 2021-11-21 DIAGNOSIS — Z86.79 H/O CHF: ICD-10-CM

## 2021-11-21 PROCEDURE — 95810 POLYSOM 6/> YRS 4/> PARAM: CPT

## 2021-11-22 DIAGNOSIS — G47.33 OSA (OBSTRUCTIVE SLEEP APNEA): Primary | ICD-10-CM

## 2021-11-23 PROCEDURE — 95810 POLYSOM 6/> YRS 4/> PARAM: CPT | Performed by: PSYCHIATRY & NEUROLOGY

## 2021-11-24 ENCOUNTER — TELEPHONE (OUTPATIENT)
Dept: SLEEP MEDICINE | Age: 71
End: 2021-11-24

## 2021-12-03 ENCOUNTER — TELEPHONE (OUTPATIENT)
Dept: INFECTIOUS DISEASES | Age: 71
End: 2021-12-03

## 2021-12-03 NOTE — TELEPHONE ENCOUNTER
Pt with L hindfoot osteomyelitis  Saw Ortho 9/25, recommended L BKA and referred pt to Vascular Surg. Please update me on what has happened since 9/25, has pt seen Vascular, did pt have BKA?

## 2021-12-03 NOTE — TELEPHONE ENCOUNTER
Nurse Mani Howard from SNF called and asked if a follow up with patient is needed regarding his foot.   Thank you

## 2021-12-06 RX ORDER — FUROSEMIDE 40 MG/1
40 TABLET ORAL DAILY
Qty: 60 TABLET | Refills: 3 | Status: SHIPPED | OUTPATIENT
Start: 2021-12-06 | End: 2022-07-05

## 2021-12-15 ENCOUNTER — VIRTUAL VISIT (OUTPATIENT)
Dept: INTERNAL MEDICINE CLINIC | Age: 71
End: 2021-12-15
Payer: MEDICARE

## 2021-12-15 DIAGNOSIS — I48.20 ATRIAL FIBRILLATION, CHRONIC (HCC): ICD-10-CM

## 2021-12-15 DIAGNOSIS — E11.8 DIABETES MELLITUS TYPE 2 WITH COMPLICATIONS (HCC): Primary | ICD-10-CM

## 2021-12-15 DIAGNOSIS — I10 PRIMARY HYPERTENSION: ICD-10-CM

## 2021-12-15 DIAGNOSIS — N18.31 STAGE 3A CHRONIC KIDNEY DISEASE (HCC): ICD-10-CM

## 2021-12-15 DIAGNOSIS — Z86.31 H/O DIABETIC FOOT ULCER: ICD-10-CM

## 2021-12-15 DIAGNOSIS — E78.2 MIXED HYPERLIPIDEMIA: ICD-10-CM

## 2021-12-15 PROCEDURE — 1111F DSCHRG MED/CURRENT MED MERGE: CPT | Performed by: INTERNAL MEDICINE

## 2021-12-15 PROCEDURE — 99214 OFFICE O/P EST MOD 30 MIN: CPT | Performed by: INTERNAL MEDICINE

## 2021-12-15 RX ORDER — POTASSIUM CHLORIDE 20 MEQ/1
20 TABLET, EXTENDED RELEASE ORAL DAILY
Qty: 30 TABLET | Refills: 0 | Status: SHIPPED | OUTPATIENT
Start: 2021-12-15 | End: 2021-12-17

## 2021-12-15 NOTE — PROGRESS NOTES
Patient: Rose Denis is a 73 y.o. male who presents today with the following Chief Complaint(s):    Chief Complaint   Patient presents with    Diabetes         HPI    Current Outpatient Medications   Medication Sig Dispense Refill    furosemide (LASIX) 40 MG tablet TAKE 1 TABLET BY MOUTH DAILY 60 tablet 3    amiodarone (CORDARONE) 200 MG tablet TAKE 1 TABLET BY MOUTH DAILY 30 tablet 0    tamsulosin (FLOMAX) 0.4 MG capsule TAKE 1 CAPSULE BY MOUTH DAILY 90 capsule 2    carvedilol (COREG) 6.25 MG tablet TAKE 1 TABLET BY MOUTH TWICE DAILY WITH MEALS 30 tablet 5    silver sulfADIAZINE (SILVADENE) 1 % cream Apply topically daily. 400 g 0    atorvastatin (LIPITOR) 40 MG tablet TAKE 1 TABLET BY MOUTH DAILY 90 tablet 3    oxybutynin (DITROPAN-XL) 5 MG extended release tablet TAKE 1 TABLET BY MOUTH DAILY FOR OVERACTIVE BLADDER 90 tablet 1    levothyroxine (SYNTHROID) 25 MCG tablet TAKE 1 TABLET BY MOUTH DAILY 90 tablet 3    Insulin Pen Needle (B-D UF III MINI PEN NEEDLES) 31G X 5 MM MISC Inject 1 each into the skin daily 100 each 5    apixaban (ELIQUIS) 5 MG TABS tablet TAKE 1 TABLET BY MOUTH TWICE DAILY 60 tablet 11    blood glucose test strips (ONE TOUCH TEST STRIPS) strip 1 each by In Vitro route 3 times daily As needed.  300 each 3    dapagliflozin (FARXIGA) 10 MG tablet Take 1 tablet by mouth every morning 90 tablet 3    lisinopril (PRINIVIL;ZESTRIL) 2.5 MG tablet Take 1 tablet by mouth daily 90 tablet 3    spironolactone (ALDACTONE) 25 MG tablet Take 1 tablet by mouth daily 90 tablet 3    NOVOLOG FLEXPEN 100 UNIT/ML injection pen ADMINISTER 8 TO 10 UNITS UNDER THE SKIN THREE TIMES DAILY BEFORE MEALS 45 mL 5    ONETOUCH ULTRA strip USE WITH GLUCOSE METER THREE TIMES DAILY AND AS NEEDED 300 strip 3    TRESIBA FLEXTOUCH 100 UNIT/ML SOPN INJECT 10 UNITS UNDER THE SKIN NIGHTLY 9 mL 3    ferrous sulfate (FE TABS 325) 325 (65 Fe) MG EC tablet Take 325 mg by mouth daily      clotrimazole-betamethasone (LOTRISONE) 1-0.05 % cream Apply topically 2 times daily. 15 g 1    Continuous Blood Gluc  (FREESTYLE KWESI 14 DAY READER) ANITA Use as Directed Dx E11.9 1 Device 0    Continuous Blood Gluc Sensor (FREESTYLE KWESI 14 DAY SENSOR) MISC Use as directed Dx E11.9 3 each 3    omega-3 acid ethyl esters (LOVAZA) 1 g capsule TAKE 1 CAPSULE BY MOUTH TWICE DAILY 180 capsule 3    Blood Glucose Monitoring Suppl (ONE TOUCH ULTRA MINI) w/Device KIT 1 kit by Does not apply route three times daily 1 kit 0    Handicap Placard MISC by Does not apply route Expires: 1/9/22. Diagnosis: cardiomyopathy. 1 each 0    acetaminophen (TYLENOL) 325 MG tablet Take 2 tablets by mouth every 4 hours as needed for Pain 120 tablet 3    Coenzyme Q10 (CO Q 10) 100 MG CAPS Take 1 capsule by mouth daily      Cinnamon 500 MG CAPS Take 1 capsule by mouth daily      Vitamin D (CHOLECALCIFEROL) 1000 UNITS CAPS capsule Take 2,000 Units by mouth nightly       Insulin Syringe-Needle U-100 (INSULIN SYRINGE .5CC/31GX5/16\") 31G X 5/16\" 0.5 ML MISC Patient tests bid 100 Syringe 5    therapeutic multivitamin-minerals (THERAGRAN-M) tablet Take 1 tablet by mouth daily.  aspirin 81 MG EC tablet Take 81 mg by mouth daily. No current facility-administered medications for this visit. Patient's past medical history, surgical history, family history, medications,and allergies  were all reviewed and updated as appropriate today. Review of Systems      Physical Exam    There were no vitals filed for this visit. Assessment:  No diagnosis found. Plan:  There are no diagnoses linked to this encounter.

## 2021-12-17 DIAGNOSIS — I50.20 SYSTOLIC CONGESTIVE HEART FAILURE, UNSPECIFIED HF CHRONICITY (HCC): Primary | ICD-10-CM

## 2021-12-17 RX ORDER — POTASSIUM CHLORIDE 20 MEQ/1
20 TABLET, EXTENDED RELEASE ORAL DAILY
Qty: 90 TABLET | Refills: 1 | Status: SHIPPED | OUTPATIENT
Start: 2021-12-17 | End: 2022-11-04 | Stop reason: ALTCHOICE

## 2021-12-21 ENCOUNTER — TELEPHONE (OUTPATIENT)
Dept: INTERNAL MEDICINE CLINIC | Age: 71
End: 2021-12-21

## 2021-12-21 ENCOUNTER — TELEPHONE (OUTPATIENT)
Dept: PULMONOLOGY | Age: 71
End: 2021-12-21

## 2021-12-21 RX ORDER — BLOOD-GLUCOSE METER
1 EACH MISCELLANEOUS 2 TIMES DAILY
Qty: 1 KIT | Refills: 0 | Status: SHIPPED | OUTPATIENT
Start: 2021-12-21

## 2021-12-21 NOTE — TELEPHONE ENCOUNTER
Phone call to patient regarding moving forward with a titration study. He states he has a Respironics machine and involved in the recall.   He states he uses [employer] as his DME  He needs an order for a new cpap and or supplies for his current machine

## 2021-12-21 NOTE — TELEPHONE ENCOUNTER
Patient is requesting prescription sent in for a new glucometer along with test strips and lancets please advise.

## 2021-12-27 ENCOUNTER — NURSE ONLY (OUTPATIENT)
Dept: CARDIOLOGY CLINIC | Age: 71
End: 2021-12-27
Payer: MEDICARE

## 2021-12-27 DIAGNOSIS — I42.0 DILATED CARDIOMYOPATHY (HCC): ICD-10-CM

## 2021-12-27 DIAGNOSIS — I50.22 CHRONIC SYSTOLIC HEART FAILURE (HCC): ICD-10-CM

## 2021-12-27 DIAGNOSIS — Z95.810 CARDIAC RESYNCHRONIZATION THERAPY DEFIBRILLATOR (CRT-D) IN PLACE: ICD-10-CM

## 2021-12-27 PROCEDURE — 93295 DEV INTERROG REMOTE 1/2/MLT: CPT | Performed by: INTERNAL MEDICINE

## 2021-12-27 PROCEDURE — 93296 REM INTERROG EVL PM/IDS: CPT | Performed by: INTERNAL MEDICINE

## 2021-12-27 PROCEDURE — 93297 REM INTERROG DEV EVAL ICPMS: CPT | Performed by: INTERNAL MEDICINE

## 2021-12-27 NOTE — PROGRESS NOTES
We received remote transmission from patient's CRT-D monitor at home. Transmission shows normal sensing and pacing function. Noted NSVT and AT/AF (<0.1% burden, 0 of 3 pace-terminated). Pt on Coreg, amiodarone, Eliquis. Ap 98.8%  BiVp 98.3%, Effective 98.1%, VSRp 1.2%    Possible Optivol fluid accumulation 10.25-12.07.21, now resolved and back to baseline. EP physician will review. See interrogation under cardiology tab in the [address] [place] [address] [address] field for more details. Will continue to monitor remotely.

## 2021-12-28 RX ORDER — INSULIN DEGLUDEC INJECTION 100 U/ML
INJECTION, SOLUTION SUBCUTANEOUS
Qty: 9 ML | Refills: 3 | Status: SHIPPED | OUTPATIENT
Start: 2021-12-28 | End: 2022-04-26 | Stop reason: CLARIF

## 2022-01-03 DIAGNOSIS — G47.33 OSA (OBSTRUCTIVE SLEEP APNEA): Primary | ICD-10-CM

## 2022-01-04 DIAGNOSIS — N32.81 OAB (OVERACTIVE BLADDER): ICD-10-CM

## 2022-01-05 ENCOUNTER — HOSPITAL ENCOUNTER (OUTPATIENT)
Dept: WOUND CARE | Age: 72
Discharge: HOME | End: 2022-01-05
Payer: MEDICARE

## 2022-01-05 VITALS
HEART RATE: 70 BPM | RESPIRATION RATE: 18 BRPM | DIASTOLIC BLOOD PRESSURE: 79 MMHG | TEMPERATURE: 97.5 F | SYSTOLIC BLOOD PRESSURE: 163 MMHG

## 2022-01-05 DIAGNOSIS — L98.499 DIABETES MELLITUS WITH SKIN ULCER (HCC): ICD-10-CM

## 2022-01-05 DIAGNOSIS — E11.21 DIABETIC NEPHROPATHY ASSOCIATED WITH TYPE 2 DIABETES MELLITUS (HCC): ICD-10-CM

## 2022-01-05 DIAGNOSIS — E11.8 DIABETIC FOOT (HCC): Primary | ICD-10-CM

## 2022-01-05 DIAGNOSIS — Z74.09 IMPAIRED MOBILITY: ICD-10-CM

## 2022-01-05 DIAGNOSIS — M14.672 CHARCOT ANKLE, LEFT: ICD-10-CM

## 2022-01-05 DIAGNOSIS — I70.244 ATHEROSCLEROSIS OF NATIVE ARTERIES OF LEFT LEG WITH ULCERATION OF HEEL AND MIDFOOT (HCC): ICD-10-CM

## 2022-01-05 DIAGNOSIS — E11.42 DIABETIC POLYNEUROPATHY ASSOCIATED WITH TYPE 2 DIABETES MELLITUS (HCC): ICD-10-CM

## 2022-01-05 DIAGNOSIS — E11.622 DIABETES MELLITUS WITH SKIN ULCER (HCC): ICD-10-CM

## 2022-01-05 DIAGNOSIS — L97.424 DIABETIC ULCER OF LEFT MIDFOOT ASSOCIATED WITH TYPE 2 DIABETES MELLITUS, WITH NECROSIS OF BONE (HCC): ICD-10-CM

## 2022-01-05 DIAGNOSIS — M86.272 SUBACUTE OSTEOMYELITIS OF LEFT FOOT (HCC): ICD-10-CM

## 2022-01-05 DIAGNOSIS — L97.529 TYPE 2 DIABETES MELLITUS WITH LEFT DIABETIC FOOT ULCER (HCC): ICD-10-CM

## 2022-01-05 DIAGNOSIS — E11.621 TYPE 2 DIABETES MELLITUS WITH LEFT DIABETIC FOOT ULCER (HCC): ICD-10-CM

## 2022-01-05 DIAGNOSIS — E11.621 DIABETIC ULCER OF LEFT MIDFOOT ASSOCIATED WITH TYPE 2 DIABETES MELLITUS, WITH NECROSIS OF BONE (HCC): ICD-10-CM

## 2022-01-05 PROCEDURE — 99213 OFFICE O/P EST LOW 20 MIN: CPT

## 2022-01-05 PROCEDURE — 11042 DBRDMT SUBQ TIS 1ST 20SQCM/<: CPT

## 2022-01-05 RX ORDER — HYDROCODONE BITARTRATE AND ACETAMINOPHEN 5; 325 MG/1; MG/1
1 TABLET ORAL EVERY 4 HOURS PRN
Qty: 18 TABLET | Refills: 0 | Status: SHIPPED | OUTPATIENT
Start: 2022-01-05 | End: 2022-01-08

## 2022-01-05 RX ORDER — LIDOCAINE HYDROCHLORIDE 20 MG/ML
JELLY TOPICAL ONCE
OUTPATIENT
Start: 2022-01-05 | End: 2022-01-05

## 2022-01-05 RX ORDER — GENTAMICIN SULFATE 1 MG/G
OINTMENT TOPICAL ONCE
OUTPATIENT
Start: 2022-01-05 | End: 2022-01-05

## 2022-01-05 RX ORDER — CLOBETASOL PROPIONATE 0.5 MG/G
OINTMENT TOPICAL ONCE
OUTPATIENT
Start: 2022-01-05 | End: 2022-01-05

## 2022-01-05 RX ORDER — OXYBUTYNIN CHLORIDE 5 MG/1
5 TABLET, EXTENDED RELEASE ORAL DAILY
Qty: 90 TABLET | Refills: 1 | Status: SHIPPED | OUTPATIENT
Start: 2022-01-05 | End: 2022-11-04 | Stop reason: ALTCHOICE

## 2022-01-05 RX ORDER — LIDOCAINE 50 MG/G
OINTMENT TOPICAL ONCE
OUTPATIENT
Start: 2022-01-05 | End: 2022-01-05

## 2022-01-05 RX ORDER — METRONIDAZOLE 250 MG/1
200 TABLET ORAL 2 TIMES DAILY
COMMUNITY

## 2022-01-05 RX ORDER — LIDOCAINE HYDROCHLORIDE 40 MG/ML
SOLUTION TOPICAL ONCE
Status: COMPLETED | OUTPATIENT
Start: 2022-01-05 | End: 2022-01-05

## 2022-01-05 RX ORDER — BACITRACIN ZINC AND POLYMYXIN B SULFATE 500; 1000 [USP'U]/G; [USP'U]/G
OINTMENT TOPICAL ONCE
OUTPATIENT
Start: 2022-01-05 | End: 2022-01-05

## 2022-01-05 RX ORDER — LIDOCAINE 40 MG/G
CREAM TOPICAL ONCE
OUTPATIENT
Start: 2022-01-05 | End: 2022-01-05

## 2022-01-05 RX ORDER — LIDOCAINE HYDROCHLORIDE 40 MG/ML
SOLUTION TOPICAL ONCE
OUTPATIENT
Start: 2022-01-05 | End: 2022-01-05

## 2022-01-05 RX ORDER — BACITRACIN, NEOMYCIN, POLYMYXIN B 400; 3.5; 5 [USP'U]/G; MG/G; [USP'U]/G
OINTMENT TOPICAL ONCE
OUTPATIENT
Start: 2022-01-05 | End: 2022-01-05

## 2022-01-05 RX ORDER — BETAMETHASONE DIPROPIONATE 0.05 %
OINTMENT (GRAM) TOPICAL ONCE
OUTPATIENT
Start: 2022-01-05 | End: 2022-01-05

## 2022-01-05 RX ORDER — GINSENG 100 MG
CAPSULE ORAL ONCE
OUTPATIENT
Start: 2022-01-05 | End: 2022-01-05

## 2022-01-05 RX ADMIN — LIDOCAINE HYDROCHLORIDE: 40 SOLUTION TOPICAL at 16:12

## 2022-01-05 ASSESSMENT — PAIN DESCRIPTION - PAIN TYPE
TYPE: ACUTE PAIN
TYPE: ACUTE PAIN

## 2022-01-05 ASSESSMENT — PAIN DESCRIPTION - ONSET
ONSET: ON-GOING
ONSET: AWAKENED FROM SLEEP

## 2022-01-05 ASSESSMENT — PAIN DESCRIPTION - LOCATION
LOCATION: FOOT
LOCATION: FOOT

## 2022-01-05 ASSESSMENT — PAIN DESCRIPTION - ORIENTATION
ORIENTATION: LEFT;OTHER (COMMENT)
ORIENTATION: LEFT

## 2022-01-05 ASSESSMENT — PAIN SCALES - GENERAL
PAINLEVEL_OUTOF10: 6
PAINLEVEL_OUTOF10: 5

## 2022-01-05 ASSESSMENT — PAIN DESCRIPTION - FREQUENCY
FREQUENCY: INTERMITTENT
FREQUENCY: INTERMITTENT

## 2022-01-05 ASSESSMENT — PAIN - FUNCTIONAL ASSESSMENT
PAIN_FUNCTIONAL_ASSESSMENT: PREVENTS OR INTERFERES SOME ACTIVE ACTIVITIES AND ADLS
PAIN_FUNCTIONAL_ASSESSMENT: PREVENTS OR INTERFERES SOME ACTIVE ACTIVITIES AND ADLS

## 2022-01-05 ASSESSMENT — PAIN DESCRIPTION - PROGRESSION
CLINICAL_PROGRESSION: NOT CHANGED
CLINICAL_PROGRESSION: GRADUALLY WORSENING

## 2022-01-05 ASSESSMENT — PAIN DESCRIPTION - DESCRIPTORS
DESCRIPTORS: THROBBING
DESCRIPTORS: THROBBING

## 2022-01-05 NOTE — PLAN OF CARE
Wound Care Supplies    **Please Call Patient for any out of pocket cost**    Supply Company:     [employer] [id number] [address] [facility] [place] p: [phone] f: [phone]     Ordering Center:     [facility] [address]  [address]  [address]  [phone]  WOUND CARE Dept: [phone]   FAX NUMBER [unfilled]    Patient Information:      Odell Potts  [facility] [address] [facility]   [phone]   : 1950  AGE: 73 y.o.      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 ([address]    Relevant Orders    Initiate Outpatient Wound Care Protocol    Diabetic foot ([address] - Primary    Relevant Orders    Initiate Outpatient Wound Care Protocol    Type 2 diabetes mellitus with left diabetic foot ulcer ([address]    Relevant Medications    HYDROcodone-acetaminophen ([facility] [place]) 5-325 MG per tablet    Other Relevant Orders    Initiate Outpatient Wound Care Protocol    Diabetic polyneuropathy associated with type 2 diabetes mellitus ([address]    Relevant Orders    Initiate Outpatient Wound Care Protocol    Diabetic ulcer of left midfoot associated with type 2 diabetes mellitus, with necrosis of bone ([address]    Relevant Orders    XR FOOT LEFT (MIN 3 VIEWS)    Initiate Outpatient Wound Care Protocol    Impaired mobility    Relevant Orders    Initiate Outpatient Wound Care Protocol    Subacute osteomyelitis of left foot ([address]    Relevant Orders    Initiate Outpatient Wound Care Protocol    Diabetes mellitus with skin ulcer ([address]    Relevant Orders    Initiate Outpatient Wound Care Protocol    Charcot ankle, left    Relevant Orders    Initiate Outpatient Wound Care Full thickness 01/05/22 1513   Number of days: 134     Incision 08/28/19 Foot Right (Active)   Number of days: 861       Incision 07/22/20 Foot Anterior; Left (Active)   Number of days: 531       Supplies Requested :      WOUND #: 1   PRIMARY DRESSING:  Hydrofera Blue pad   Cover and Secure with: Gauze pad  Bulky roll gauze     FREQUENCY OF DRESSING CHANGES:  Three times per week           ADDITIONAL ITEMS:  [] Gloves Small  [] Gloves Medium [x] Gloves Large [] Gloves XLarge  [] Tape 1\" [] Tape 2\" [] Tape 3\"  [x] Medipore Tape  [x] Saline  [] Skin Prep   [] Adhesive Remover   [] Cotton Tip Applicators   [] Other:    Patient Wound(s) Debrided: [x] Yes   [] No    Debribement Type: subcutaneous tissue    Debridement Date: 1/5/21    Patient currently being seen by Home Health: [] Yes   [x] No    Duration for needed supplies:  []15  []30  []60  [x]90 Days    Provider Information:      PROVIDER'S NAME: Nawaf Robertson DPM     NPI: [id number] ANDRESSA  [id number]

## 2022-01-05 NOTE — LETTER
[facility] [address]  [phone]  Dept: [phone]        Thank you . Laura Knapp for choosing [facility] for your Wound Care needs. We hope you found your first visit both encouraging and educational.  We look forward to serving you throughout the healing process. Before your next visit please review the information you received in your [employer]. The first visit can be overwhelming and this is a useful tool to refresh what information you may have been given. If you find yourself with any questions prior to your upcoming appointment, please feel free to contact us. Often wounds can be challenging and it is our goal to see you through the healing process with as much support possible. Again, thank you for choosing [employer]!     Sincerely,      The Staff of [address] [place] [address] Pkwy and Hyperbaric Oxygen Therapy
preferably within the same week to promote the most effective healing time for your wound(s). 7. If you will be late for an appointment, please call our center to be sure that the provider can still see you when you arrive. If you are more than 15 minutes late your appointment may need to be rescheduled. 8. If two (2) appointments are missed without notifying us, your care plan may be discontinued. The same may happen if multiple visits are cancelled or rescheduled, even with notice. A missed visit is time when another patient, who also needs care, could have been seen. Thank you for your understanding and consideration.

## 2022-01-05 NOTE — PROGRESS NOTES
[address]   Progress Note and Procedure Note      George Bernal RECORD NUMBER:  [id number]  AGE: 73 y.o. GENDER: male  : 1950  EPISODE DATE:  2022    Subjective:     Chief Complaint   Patient presents with    Wound Check     initial left foot         HISTORY of PRESENT ILLNESS HPI     Aston Monzon is a 73 y.o. male who presents today for wound/ulcer evaluation. History of Wound Context: Patient complains of an ulcer on the bottom of his left foot that has been present since 2020. Patient reports he was following up with Dr. Cami Roger for treatment of the ulcer. Patient states Dr. Oscar Boss referred him to the [facility], and he was examined by Dr. Martin Malik. Patient states the [facility] referred him to Dr. Heber Bello due to significant Charcot deformity of the left foot, and Dr. Heber Bello recommended a BKA for the patient. Patient states he did not want a BKA, so he returned to Dr. Oscar Boss for wound care. Patient states Dr. Oscar Boss \"cleaned out the bone\" in the left foot on 2021 at [facility]. Patient states he had a few instances of foot infection with hospitalization for IV antibiotics. Patient states he decided to return to the [facility], because he is interested in HBO treatment. Patient states his wife his helping him perform daily dressing changes with felt padding and Hydrofera blue as directed by Dr. Oscar Boss. Patient reports very little drainage from the wound. Patient states he has moderate pain from the foot with weight on the foot and sometimes the pain can keep him up at night. Patient is an insulin-dependent diabetic with partial amputation of the right second toe. Patient is also on Lasix.      Wound/Ulcer Pain Timing/Severity: mild, moderate  Quality of pain: aching, throbbing  Severity:  5 / 10   Modifying Factors: Pain worsens with walking and Pain is relieved/improved with rest  Associated Signs/Symptoms: edema and pain    Ulcer Identification:  Ulcer Type: diabetic    Contributing Factors: edema, diabetes, chronic pressure, decreased mobility, obesity and charcot deformity    Acute Wound: N/A    PAST MEDICAL HISTORY        Diagnosis Date    Atrial fibrillation (HCC)     Back pain     Cardiomyopathy     CHF (congestive heart failure) (HCC)     COPD (chronic obstructive pulmonary disease) (HCC)     Dyslipidemia     GERD (gastroesophageal reflux disease)     Hyperlipidemia     Hypertension     Hypothyroidism     Leg edema     MRSA (methicillin resistant staph aureus) culture positive 10/5/15    foot/bone    MRSA nasal colonization 05/05/2017    Obesity     Prolonged emergence from general anesthesia     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 Brooke Murphy COLONOSCOPY N/A 3/8/2019    COLONOSCOPY WITH MAC, UNASYN (3gm) performed by Amalia Gómez MD at [facility] N/A 8/9/2019    COLONOSCOPY POLYPECTOMY SNARE/COLD BIOPSY performed by Amalia Gómez MD at [facility]  8/9/2019    COLONOSCOPY WITH BIOPSY performed by Amalia Gómez MD at [facility] 8/28/2019    INCISION AND INCISIONAL DEBRIDEMENT OPEN FRACTURE RIGHT 2ND TOE SKIN AND BONE performed by Harmeet Blanco MD at [facility],[address] [place] [address] 7/22/2020    LEFT FOOT INCISION AND DRAINAGE WITH BONE BIOPSY AND REMOVAL OF FREE FLOATING BONE FRAGMENT performed by Mary Jane Bolton DPM at [facility]  1-2-10    GASTRIC BYPASS SURGERY      OTHER SURGICAL HISTORY  10/6/15    INCISION AND DRAINAGE RIGHT FOOT          OTHER SURGICAL HISTORY Right 10/8/15    INCISION AND DRAINAGE RIGHT FOOT      PACEMAKER PLACEMENT      UPPER GASTROINTESTINAL ENDOSCOPY N/A 3/8/2019    EGD BIOPSY GASTRIC H. PYLORI performed by Rafael Sierra MD at [address] [facility]    Family History   Problem Relation Age of Onset    Hypertension Mother     Heart Disease Father     Hypertension Maternal Grandmother     Diabetes Maternal Grandmother        SOCIAL HISTORY    Social History     Tobacco Use    Smoking status: Former Smoker     Packs/day: 1.00     Years: 4.00     Pack years: 4.00     Types: Cigarettes     Quit date: 2017     Years since quittin.0    Smokeless tobacco: Never Used   Vaping Use    Vaping Use: Never used   Substance Use Topics    Alcohol use: Yes     Comment: 2-3 times per year    Drug use: No       ALLERGIES    No Known Allergies    MEDICATIONS    Current Outpatient Medications on File Prior to Encounter   Medication Sig Dispense Refill    metroNIDAZOLE (FLAGYL) 250 MG tablet Take 200 mg by mouth 2 times daily      oxybutynin (DITROPAN-XL) 5 MG extended release tablet TAKE 1 TABLET BY MOUTH DAILY FOR OVERACTIVE BLADDER 90 tablet 1    TRESIBA FLEXTOUCH 100 UNIT/ML SOPN INJECT 10 UNITS UNDER THE SKIN NIGHTLY 9 mL 3    Blood Glucose Monitoring Suppl (ONETOUCH VERIO) w/Device KIT 1 each by Does not apply route 2 times daily 1 kit 0    blood glucose test strips (ASCENSIA AUTODISC VI;ONE TOUCH ULTRA TEST VI) strip 1 each by In Vitro route daily Test as directed,Dispense according to insurance formulary 100 each 3    potassium chloride (KLOR-CON M) 20 MEQ extended release tablet TAKE 1 TABLET BY MOUTH DAILY 90 tablet 1    Handicap Placard MISC by Does not apply route Diagnosis: heart failure. Expires : 21. 1 each 0    Handicap Placard MISC by Does not apply route Diagnosis: heart failure.     Expires: 24. 1 each 0    furosemide (LASIX) 40 MG tablet TAKE 1 TABLET BY MOUTH DAILY 60 tablet 3    amiodarone (CORDARONE) 200 MG tablet TAKE 1 TABLET BY MOUTH DAILY 30 tablet 0    tamsulosin (FLOMAX) 0.4 MG capsule TAKE 1 CAPSULE BY MOUTH DAILY 90 capsule 2    carvedilol (COREG) 6.25 MG tablet TAKE 1 TABLET BY MOUTH TWICE DAILY WITH MEALS 30 tablet 5    silver sulfADIAZINE (SILVADENE) 1 % cream Apply topically daily. 400 g 0    atorvastatin (LIPITOR) 40 MG tablet TAKE 1 TABLET BY MOUTH DAILY 90 tablet 3    levothyroxine (SYNTHROID) 25 MCG tablet TAKE 1 TABLET BY MOUTH DAILY 90 tablet 3    Insulin Pen Needle (B-D UF III MINI PEN NEEDLES) 31G X 5 MM MISC Inject 1 each into the skin daily 100 each 5    apixaban (ELIQUIS) 5 MG TABS tablet TAKE 1 TABLET BY MOUTH TWICE DAILY 60 tablet 11    blood glucose test strips (ONE TOUCH TEST STRIPS) strip 1 each by In Vitro route 3 times daily As needed. 300 each 3    dapagliflozin (FARXIGA) 10 MG tablet Take 1 tablet by mouth every morning 90 tablet 3    lisinopril (PRINIVIL;ZESTRIL) 2.5 MG tablet Take 1 tablet by mouth daily 90 tablet 3    spironolactone (ALDACTONE) 25 MG tablet Take 1 tablet by mouth daily 90 tablet 3    NOVOLOG FLEXPEN 100 UNIT/ML injection pen ADMINISTER 8 TO 10 UNITS UNDER THE SKIN THREE TIMES DAILY BEFORE MEALS 45 mL 5    ONETOUCH ULTRA strip USE WITH GLUCOSE METER THREE TIMES DAILY AND AS NEEDED 300 strip 3    ferrous sulfate (FE TABS 325) 325 (65 Fe) MG EC tablet Take 325 mg by mouth daily      clotrimazole-betamethasone (LOTRISONE) 1-0.05 % cream Apply topically 2 times daily.  15 g 1    Continuous Blood Gluc  (FREESTYLE KWESI 14 DAY READER) ANITA Use as Directed Dx E11.9 1 Device 0    Continuous Blood Gluc Sensor (FREESTYLE KWESI 14 DAY SENSOR) [facility] Use as directed Dx E11.9 3 each 3    omega-3 acid ethyl esters (LOVAZA) 1 g capsule TAKE 1 CAPSULE BY MOUTH TWICE DAILY 180 capsule 3    Blood Glucose Monitoring Suppl (ONE TOUCH ULTRA MINI) w/Device KIT 1 kit by Does not apply route three times daily 1 kit 0    Handicap Placard [facility] by Does not apply route Expires: 1/9/22. Diagnosis: cardiomyopathy. 1 each 0    acetaminophen (TYLENOL) 325 MG tablet Take 2 tablets by mouth every 4 hours as needed for Pain 120 tablet 3    Coenzyme Q10 (CO Q 10) 100 MG CAPS Take 1 capsule by mouth daily      Cinnamon 500 MG CAPS Take 1 capsule by mouth daily      Vitamin D (CHOLECALCIFEROL) 1000 UNITS CAPS capsule Take 2,000 Units by mouth nightly       Insulin Syringe-Needle U-100 (INSULIN SYRINGE .5CC/31GX5/16\") 31G X 5/16\" 0.5 ML MISC Patient tests bid 100 Syringe 5    therapeutic multivitamin-minerals (THERAGRAN-M) tablet Take 1 tablet by mouth daily.  aspirin 81 MG EC tablet Take 81 mg by mouth daily. No current facility-administered medications on file prior to encounter. REVIEW OF SYSTEMS  Review of Systems    Pertinent items are noted in HPI. Objective:      BP (!) 163/79   Pulse 70   Temp 97.5 °F (36.4 °C) (Temporal)   Resp 18     Wt Readings from Last 3 Encounters:   09/24/21 (!) 339 lb (153.8 kg)   08/19/21 (!) 339 lb (153.8 kg)   05/21/21 (!) 329 lb 12.8 oz (149.6 kg)       PHYSICAL EXAM  Physical Exam    Patient is alert and oriented x 3, and is in no acute distress. Vascular: DP pulse weakly palpable bilateral. PT pulse not palpable bilateral. CRT less than 4 seconds to toes 1-5 bilateral. No Pedal hair noted bilateral. Edema noted to lower extremity bilateral.   Derm:  Skin is warm to warm from proximal leg to distal foot bilateral.   Neuro:  Protective sensation absent to 10/10 sites bilateral via a 5.07 [place] monofilament. Musculoskeletal: Muscle strength 5/5 with PF/DF/I and E of right foot, 4/5 on left foot. Full and stable ROM of 1st metatarsophalangeal joint bilateral without pain or crepitus. Partial amputation of the right second toe.     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 ([facility]    Relevant Orders Initiate Outpatient Wound Care Protocol    Diabetic foot ([facility] - Primary    Relevant Orders    Initiate Outpatient Wound Care Protocol    Type 2 diabetes mellitus with left diabetic foot ulcer (HCC)    Relevant Medications    HYDROcodone-acetaminophen (NORCO) 5-325 MG per tablet    Other Relevant Orders    Initiate Outpatient Wound Care Protocol    Diabetic polyneuropathy associated with type 2 diabetes mellitus ([facility]    Relevant Orders    Initiate Outpatient Wound Care Protocol    Diabetic ulcer of left midfoot associated with type 2 diabetes mellitus, with necrosis of bone (HCC)    Relevant Orders    XR FOOT LEFT (MIN 3 VIEWS)    Initiate Outpatient Wound Care Protocol    Impaired mobility    Relevant Orders    Initiate Outpatient Wound Care Protocol    Subacute osteomyelitis of left foot ([facility]    Relevant Orders    Initiate Outpatient Wound Care Protocol    Diabetes mellitus with skin ulcer ([facility]    Relevant Orders    Initiate Outpatient Wound Care Protocol    Charcot ankle, left    Relevant Orders    Initiate Outpatient Wound Care Protocol    Atherosclerosis of native arteries of left leg with ulceration of heel and midfoot ([facility]    Relevant Orders    VL DUP LOWER EXTREMITY ARTERIES BILATERAL    Initiate Outpatient Wound Care Protocol           Procedure Note  Indications:  Based on my examination of this patient's wound(s)/ulcer(s) today, debridement is required to promote healing and evaluate the wound base. Performed by: Rowena Acharya DPM    Consent obtained:  Yes    Time out taken:  Yes    Pain Control: Anesthetic  Anesthetic: 4% Lidocaine Liquid Topical (2.5 ml)       Debridement: Excisional Debridement    Using curette, #15 blade scalpel and forceps the wound(s)/ulcer(s) was/were debrided down through and including the removal of epidermis, dermis and subcutaneous tissue.         Devitalized Tissue Debrided:  fibrin and biofilm    Pre Debridement Measurements:  Are located in the [place]  Documentation Flow Sheet    Diabetic/Pressure/Non Pressure Ulcers only:  Ulcer: Diabetic ulcer, fat layer exposed     Wound/Ulcer #: 1    Post Debridement Measurements:  Wound/Ulcer Descriptions are Pre Debridement except measurements:    Negative Pressure Wound Therapy Foot Left (Active)   Number of days: 532       Wound 08/24/21 Foot Left;Plantar #1 (Active)   Wound Image   01/05/22 1513   Wound Etiology Diabetic [place] 3 08/24/21 1106   Dressing Status New dressing applied 09/21/21 1120   Wound Cleansed Cleansed with saline 09/08/21 1015   Dressing/Treatment Hydrofera blue;Gauze dressing/dressing sponge;Roll gauze 01/05/22 1612   Offloading for Diabetic Foot Ulcers Felt or foam;Post op shoe 01/05/22 1612   Wound Length (cm) 1.1 cm 01/05/22 1513   Wound Width (cm) 0.8 cm 01/05/22 1513   Wound Depth (cm) 0.3 cm 01/05/22 1513   Wound Surface Area (cm^2) 0.88 cm^2 01/05/22 1513   Change in Wound Size % (l*w) 64.37 01/05/22 1513   Wound Volume (cm^3) 0.264 cm^3 01/05/22 1513   Wound Healing % 89 01/05/22 1513   Post-Procedure Length (cm) 1.2 cm 01/05/22 1539   Post-Procedure Width (cm) 0.9 cm 01/05/22 1539   Post-Procedure Depth (cm) 0.3 cm 01/05/22 1539   Post-Procedure Surface Area (cm^2) 1.08 cm^2 01/05/22 1539   Post-Procedure Volume (cm^3) 0.324 cm^3 01/05/22 1539   Distance Tunneling (cm) 0.8 cm 08/31/21 0908   Tunneling Position ___ O'Clock 6 08/31/21 0908   Undermining Starts ___ O'Clock 12 09/14/21 0932   Undermining Ends___ O'Clock 7 09/14/21 0932   Undermining Maxium Distance (cm) 1.7 09/14/21 0932   Wound Assessment Granulation tissue;Slough; Exposed structure bone 01/05/22 1513   Drainage Amount Small 01/05/22 1513   Drainage Description Serosanguinous 01/05/22 1513   Odor None 01/05/22 1513   Michelle-wound Assessment Dry/flaky; Hyperkeratosis (callous) 01/05/22 1513   Margins Attached edges 01/05/22 1513   Wound Thickness Description not for Pressure Injury Full thickness 01/05/22 1513   Number of days: 134 Incision 08/28/19 Foot Right (Active)   Number of days: 861       Incision 07/22/20 Foot Anterior; Left (Active)   Number of days: 531       Total Surface Area Debrided:  1.08 sq cm     Estimated Blood Loss:  Minimal    Hemostasis Achieved:  by pressure    Procedural Pain:  0  / 10     Post Procedural Pain:  0 / 10     Response to treatment:  Well tolerated by patient. Plan:   - Informed patient that he is at high risk of limb loss due to chronic left foot ulcer with significant Charcot arthropathy and previous history of osteomyelitis of the left foot. Discussed conservative and surgical treatment options with the patient in detail, and the potential risks and benefits of each treatment. Informed patient that HBO therapy may be an option if he qualified the criteria. Advised patient to elevate his feet laying in bed/chair to help reduce swelling. All of the patient's questions were answered to his satisfaction.   - Reviewed patient's bone biopsy pathology report from [facility] 09/30/2021. Specimen was positive for Candida and Enterococcus. Treatment Note please see attached Discharge Instructions    Written patient dismissal instructions given to patient and signed by patient or POA. Discharge [address] and Hyperbaric Oxygen Therapy   Physician Orders and Discharge Instructions  [address]   [address] [place] [address], [facility]  Telephone: [phone]      FAX [phone]     NAME: [address] [place] [address] [place]:  1950  MEDICAL RECORD NUMBER:  [id number]  DATE:  9/21/2021     Wound Cleansing:   Do not scrub or use excessive force.   Cleanse wound prior to applying a clean dressing with:  [x]????? Normal Saline  [x]????? Keep Wound Dry in Shower    []????? Wound Cleanser   []????? Cleanse wound with Mild Soap & Water  []????? May Shower at Discharge   []????? Other:        Topical Treatments:  Do not apply lotions, creams, or ointments to wound bed unless directed. [x]????? Apply moisturizing lotion to skin surrounding the wound prior to dressing change.  []????? Apply antifungal ointment to skin surrounding the wound prior to dressing change.  []????? Apply thin film of moisture barrier ointment to skin immediately around wound. [x]????? Other: FELT PADS TO FRANSICO-WOUND (CHANGE ONLY AS NEEDED FOR DRAINAGE)                Dressings:                  Wound Location LEFT PLANTAR FOOT     [x]????? Apply Primary Dressing:   [x]????? HYDROFERA BLUE DAMPENED WITH NORMAL SALINE                                                   [x]????? Cover and Secure with:                   [x]????? Gauze                       [x]????? Roll gauze                                  []????? Other:               Avoid contact of tape with skin. [x]????? Change dressing:   [x]????? EVERY OTHER DAY                 Edema Control:  Apply:              [x]????? SpandaGrip            [x]??? ? ? Right Leg     [x]? ?? ? ? Left Leg                                      []??? ? ?Low compression 5-10 mm/Hg                                             [x]??? ? ? Medium compression 10-20 mm/Hg                                      []??? ? ? High compression  20-30 mm/Hg              every morning immediately when getting up should be applied to affected leg(s) from mid foot to knee making sure to cover the heel.  Remove every night before going to bed.              [x]????? Elevate leg(s) above the level of the heart when sitting.                 [x]????? Avoid prolonged standing in one place.     Off-Loading:   [x]????? Off-loading when    [x]????? walking       []????? in bed         []????? sitting  []????? Total non-weight bearing  []????? Right Leg  []????? Left Leg          [x]????? Assistive Device     []????? Walker        []????? Cane           []????? Wheelchair  []????? Crutches              [x]????? Surgical shoe    []????? Podus Boot(s)   []????? Foam Boot(s)  []????? Roll About              []????? Cast Boot   []????? CROW Boot  []????? Other:        Dietary:  []????? Diet as tolerated:     [x]????? Calorie Diabetic Diet:           []????? No Added Salt:  [x]????? Increase Protein:     []????? Other:              Activity:  [x]????? Activity: BATHROOM PRIVILEGES ONLY     If you are still having pain after you go home:  [x]????? Elevate the affected limb.    [x]????? Use over-the-counter medications you would normally use for pain as permitted by your family doctor. [x]????? For persistent pain not relieved by the above interventions, please call your family doctor.             Return Appointment:  [x]????? Wound and dressing supply provider: [facility]  []????? ECF or Home Healthcare:  []????? Wound Assessment:          []????? Physician or NP scheduled for Wound Assessment:   [x]????? Return Appointment: With  [address] [facility] [address], DPM  in  1 Week(s)  [x]????? Ordered tests: Obtain Arterial Studies of Bilateral Lower Extremities (Call [phone] to schedule in [address] of [facility] on the Second Floor); [address] [place] [address] Dr Hunter Case Manger: [facility]   Electronically signed by Lexi Drummond RN on 1/5/2022 at 3:40 PM  [address] [place] [address] Information: Should you experience any significant changes in your wound(s) or have questions about your wound care, please [facility]  [phone] [address] 8:00 am - 4:30 pm and Friday 8:00 am - 12:30 pm.  If you need help with your wound outside these hours and cannot wait until we are again available, contact your PCP or go to the hospital emergency room.      PLEASE NOTE: IF YOU ARE UNABLE TO OBTAIN WOUND SUPPLIES, CONTINUE TO USE THE SUPPLIES YOU HAVE AVAILABLE UNTIL YOU ARE ABLE TO [facility].  IT IS MOST IMPORTANT TO KEEP THE WOUND COVERED AT ALL TIMES.     Physician Signature:_______________________     Date: ___________ Time:  ____________                                WZ.  Katherine Zheng MD            Electronically signed by Yoni Doshi DPM on 1/5/2022 at 4:15 PM

## 2022-01-12 ENCOUNTER — HOSPITAL ENCOUNTER (OUTPATIENT)
Dept: WOUND CARE | Age: 72
Discharge: HOME | End: 2022-01-12

## 2022-01-14 ASSESSMENT — ENCOUNTER SYMPTOMS
GASTROINTESTINAL NEGATIVE: 1
RESPIRATORY NEGATIVE: 1
EYES NEGATIVE: 1

## 2022-01-15 NOTE — PROGRESS NOTES
12/15/2021    TELEHEALTH EVALUATION -- Audio/Visual (During QWHME-53 public health emergency)    HPI:    Arya Manning (:  1950) has requested an audio/video evaluation for the following concern(s):    Diabetic foot ulcer: D/Cd from [facility] after treatment for infected diabetic foot ulcer including osteomyelitis. He is home now with continued wound care. Remains non weight bearing. Has ID f/u. Diabetes, type 2: treated with B-B insulin. Doing well with blood sugar management. Hypertension: taking medication as prescribed. Monitoring the salt in his diet. Hyperlipidemia: treated with statin. Aware of importance of heart healthy diet. Review of Systems   Constitutional: Negative. HENT: Negative. Eyes: Negative. Respiratory: Negative. Cardiovascular:        Cardiomyopathy. Denies CP or SOB. A.Fib. remains on Eliquis, amiodarone, coreg. Gastrointestinal: Negative. Endocrine:        DM, T2, see HPI. Hyperlipidemia, treated with statin. Genitourinary:        Stage 3a CKD. Musculoskeletal: Negative. Skin: Negative. Neurological: Negative. Psychiatric/Behavioral: Negative. Prior to Visit Medications    Medication Sig Taking? Authorizing Provider   furosemide (LASIX) 40 MG tablet TAKE 1 TABLET BY MOUTH DAILY Yes Radha Araya MD   amiodarone (CORDARONE) 200 MG tablet TAKE 1 TABLET BY MOUTH DAILY Yes KENNY Verduzco CNP   tamsulosin (FLOMAX) 0.4 MG capsule TAKE 1 CAPSULE BY MOUTH DAILY Yes Radha Araya MD   carvedilol (COREG) 6.25 MG tablet TAKE 1 TABLET BY MOUTH TWICE DAILY WITH MEALS Yes KENNY Verduzco CNP   silver sulfADIAZINE (SILVADENE) 1 % cream Apply topically daily.  Yes Elda Rodriguez MD   atorvastatin (LIPITOR) 40 MG tablet TAKE 1 TABLET BY MOUTH DAILY Yes Radha Araya MD   levothyroxine (SYNTHROID) 25 MCG tablet TAKE 1 TABLET BY MOUTH DAILY Yes Radha Araya MD   Insulin Pen Needle (B-D UF III MINI PEN NEEDLES) 31G X 5 MM MISC Inject 1 each into the skin daily Yes Dimas Canas MD   apixaban (ELIQUIS) 5 MG TABS tablet TAKE 1 TABLET BY MOUTH TWICE DAILY Yes Arnulfo Jauregui MD   blood glucose test strips (ONE TOUCH TEST STRIPS) strip 1 each by In Vitro route 3 times daily As needed. Yes Dimas Canas MD   dapagliflozin (FARXIGA) 10 MG tablet Take 1 tablet by mouth every morning Yes Dimas Canas MD   lisinopril (PRINIVIL;ZESTRIL) 2.5 MG tablet Take 1 tablet by mouth daily Yes Dimas Canas MD   spironolactone (ALDACTONE) 25 MG tablet Take 1 tablet by mouth daily Yes Dimas aCnas MD   NOVOLOG FLEXPEN 100 UNIT/ML injection pen ADMINISTER 8 TO 10 UNITS UNDER THE SKIN THREE TIMES DAILY BEFORE MEALS Yes Dimas Canas MD   ONETOUCH ULTRA strip USE WITH GLUCOSE METER THREE TIMES DAILY AND AS NEEDED Yes Dimas Canas MD   ferrous sulfate (FE TABS 325) 325 (65 Fe) MG EC tablet Take 325 mg by mouth daily Yes Historical Provider, MD   clotrimazole-betamethasone (LOTRISONE) 1-0.05 % cream Apply topically 2 times daily. Yes Dimas Canas MD   Continuous Blood Gluc  (FREESTYLE KWESI 14 DAY READER) ANITA Use as Directed Dx E11.9 Yes Dimas Canas MD   Continuous Blood Gluc Sensor (FREESTYLE KWESI 14 DAY SENSOR) [facility] Use as directed Dx E11.9 Yes Dimas Canas MD   omega-3 acid ethyl esters (LOVAZA) 1 g capsule TAKE 1 CAPSULE BY MOUTH TWICE DAILY Yes Dimas Canas MD   Blood Glucose Monitoring Suppl (ONE TOUCH ULTRA MINI) w/Device KIT 1 kit by Does not apply route three times daily Yes Dimas Canas MD   Handicap Placard MISC by Does not apply route Expires: 1/9/22. Diagnosis: cardiomyopathy.  Yes Dimas Canas MD   acetaminophen (TYLENOL) 325 MG tablet Take 2 tablets by mouth every 4 hours as needed for Pain Yes Arnulfo Jauregui MD   Coenzyme Q10 (CO Q 10) 100 MG CAPS Take 1 capsule by mouth daily Yes Historical Provider, MD   Cinnamon 500 MG CAPS Take 1 capsule by mouth daily Yes Historical Provider, MD   Vitamin D obtained by patient/caregiver or practitioner observation)    Blood pressure-  Heart rate-    Respiratory rate-    Temperature-  Pulse oximetry-     Constitutional: [x] Appears well-developed and well-nourished [x] No apparent distress      [x] Abnormal- obese. Mental status  [x] Alert and awake  [] Oriented to person/place/time []Able to follow commands      Eyes:  EOM    [x]  Normal  [] Abnormal-  Sclera  [x]  Normal  [] Abnormal -         Discharge []  None visible  [] Abnormal -    HENT:   [x] Normocephalic, atraumatic. [] Abnormal   [x] Mouth/Throat: Mucous membranes are moist.     External Ears [x] Normal  [] Abnormal-     Neck: [x] No visualized mass     Pulmonary/Chest: [x] Respiratory effort normal.  [x] No visualized signs of difficulty breathing or respiratory distress        [] Abnormal-      Musculoskeletal:   [x] Normal gait with no signs of ataxia         [x] Normal range of motion of neck        [] Abnormal-       Neurological:        [x] No Facial Asymmetry (Cranial nerve 7 motor function) (limited exam to video visit)          [x] No gaze palsy        [] Abnormal-         Skin:        [x] No significant exanthematous lesions or discoloration noted on facial skin         [] Abnormal-            Psychiatric:       [x] Normal Affect [] No Hallucinations        [] Abnormal-     Other pertinent observable physical exam findings-     ASSESSMENT/PLAN:   Diagnosis Orders   1. Diabetes mellitus type 2 with complications (HCC)  Diet, monitoring and SS insulin adjustments discussed. 2. Stage 3a chronic kidney disease ([facility]  Renea Olmedo MD, Nephrology, [facility]  Avoidance of nephrotoxins. Risk factor control stressed. [place] DISCHARGE MEDS RECONCILED W/ CURRENT OUTPATIENT MED LIST   3. Primary hypertension  Continue same med regimen. DASH and low sodium diet discussed. 4. Mixed hyperlipidemia  Heart healthy diet and statin compliance discussed.     5. [facility], [facility] ([facility]  Continue same therapy  Compliance stressed. 6. H/O diabetic foot ulcer  Continue wound care. ID f/u. Non weight bearing. Rose Denis, was evaluated through a synchronous (real-time) audio-video encounter. The patient (or guardian if applicable) is aware that this is a billable service. Verbal consent to proceed has been obtained within the past 12 months. The visit was conducted pursuant to the emergency declaration under the [address] [place] [address], [address] [place] [address] and the [employer] General Act. Patient identification was verified, and a caregiver was present when appropriate. The patient was located in a state where the provider was credentialed to provide care. Total time spent on this encounter: Not billed by time    --Butch Paz MD on 1/14/2022 at 7:55 PM    An electronic signature was used to authenticate this note.

## 2022-02-14 DIAGNOSIS — I10 ESSENTIAL HYPERTENSION: ICD-10-CM

## 2022-02-14 RX ORDER — SPIRONOLACTONE 25 MG/1
25 TABLET ORAL DAILY
Qty: 90 TABLET | Refills: 3 | Status: SHIPPED | OUTPATIENT
Start: 2022-02-14 | End: 2022-08-22

## 2022-03-08 RX ORDER — INSULIN ASPART 100 [IU]/ML
INJECTION, SOLUTION INTRAVENOUS; SUBCUTANEOUS
Qty: 45 ML | Refills: 5 | Status: SHIPPED | OUTPATIENT
Start: 2022-03-08

## 2022-03-18 ENCOUNTER — TELEPHONE (OUTPATIENT)
Dept: PULMONOLOGY | Age: 72
End: 2022-03-18

## 2022-03-18 NOTE — TELEPHONE ENCOUNTER
LM for patient to call the office regarding his titration/sleep study in lab appointment. Was informed he recently is recovering from a surgery and took our phone number so he can call us when he returns home.

## 2022-04-05 ENCOUNTER — TELEPHONE (OUTPATIENT)
Dept: INTERNAL MEDICINE CLINIC | Age: 72
End: 2022-04-05

## 2022-04-07 ENCOUNTER — TELEPHONE (OUTPATIENT)
Dept: INTERNAL MEDICINE CLINIC | Age: 72
End: 2022-04-07

## 2022-04-08 ENCOUNTER — TELEMEDICINE (OUTPATIENT)
Dept: INTERNAL MEDICINE CLINIC | Age: 72
End: 2022-04-08
Payer: MEDICARE

## 2022-04-08 DIAGNOSIS — R52 PAIN: ICD-10-CM

## 2022-04-08 DIAGNOSIS — I10 PRIMARY HYPERTENSION: ICD-10-CM

## 2022-04-08 DIAGNOSIS — M62.838 MUSCLE SPASM: ICD-10-CM

## 2022-04-08 DIAGNOSIS — E78.2 MIXED HYPERLIPIDEMIA: ICD-10-CM

## 2022-04-08 DIAGNOSIS — E11.8 DIABETES MELLITUS TYPE 2 WITH COMPLICATIONS (HCC): ICD-10-CM

## 2022-04-08 PROCEDURE — 99214 OFFICE O/P EST MOD 30 MIN: CPT | Performed by: INTERNAL MEDICINE

## 2022-04-08 RX ORDER — METHOCARBAMOL 750 MG/1
TABLET, FILM COATED ORAL
COMMUNITY
Start: 2022-03-26 | End: 2022-04-26 | Stop reason: SDUPTHER

## 2022-04-08 RX ORDER — HYDROCHLOROTHIAZIDE 25 MG/1
TABLET ORAL
COMMUNITY
Start: 2022-03-28 | End: 2022-04-26 | Stop reason: SDUPTHER

## 2022-04-08 RX ORDER — METHOCARBAMOL 750 MG/1
750 TABLET, FILM COATED ORAL 3 TIMES DAILY
COMMUNITY
Start: 2022-02-08 | End: 2022-04-10 | Stop reason: SDUPTHER

## 2022-04-08 RX ORDER — POLYETHYLENE GLYCOL 3350 17 G/17G
17 POWDER, FOR SOLUTION ORAL DAILY
COMMUNITY
Start: 2022-02-09 | End: 2022-04-26

## 2022-04-08 RX ORDER — AMOXICILLIN 250 MG
2 CAPSULE ORAL DAILY
COMMUNITY
Start: 2022-02-09 | End: 2022-04-26

## 2022-04-08 RX ORDER — PSEUDOEPHEDRINE HCL 30 MG
100 TABLET ORAL DAILY
COMMUNITY
Start: 2022-02-09

## 2022-04-10 RX ORDER — METHOCARBAMOL 750 MG/1
750 TABLET, FILM COATED ORAL 3 TIMES DAILY PRN
Qty: 40 TABLET | Refills: 1 | Status: SHIPPED | OUTPATIENT
Start: 2022-04-10

## 2022-04-10 RX ORDER — OXYCODONE HYDROCHLORIDE AND ACETAMINOPHEN 5; 325 MG/1; MG/1
1 TABLET ORAL EVERY 6 HOURS PRN
Qty: 32 TABLET | Refills: 0 | Status: SHIPPED | OUTPATIENT
Start: 2022-04-10 | End: 2022-04-18

## 2022-04-14 RX ORDER — AMIODARONE HYDROCHLORIDE 200 MG/1
200 TABLET ORAL DAILY
Qty: 30 TABLET | Refills: 0 | Status: SHIPPED | OUTPATIENT
Start: 2022-04-14 | End: 2022-04-18 | Stop reason: SDUPTHER

## 2022-04-18 ENCOUNTER — NURSE ONLY (OUTPATIENT)
Dept: CARDIOLOGY CLINIC | Age: 72
End: 2022-04-18
Payer: MEDICARE

## 2022-04-18 DIAGNOSIS — I50.22 CHRONIC SYSTOLIC HEART FAILURE (HCC): ICD-10-CM

## 2022-04-18 DIAGNOSIS — Z95.810 CARDIAC RESYNCHRONIZATION THERAPY DEFIBRILLATOR (CRT-D) IN PLACE: ICD-10-CM

## 2022-04-18 DIAGNOSIS — I42.0 DILATED CARDIOMYOPATHY (HCC): ICD-10-CM

## 2022-04-18 RX ORDER — AMIODARONE HYDROCHLORIDE 200 MG/1
200 TABLET ORAL DAILY
Qty: 90 TABLET | Refills: 1 | Status: SHIPPED | OUTPATIENT
Start: 2022-04-18 | End: 2022-05-31 | Stop reason: SDUPTHER

## 2022-04-18 RX ORDER — AMIODARONE HYDROCHLORIDE 200 MG/1
200 TABLET ORAL DAILY
Qty: 90 TABLET | Refills: 0 | OUTPATIENT
Start: 2022-04-18

## 2022-04-18 NOTE — PROGRESS NOTES
We received remote transmission from patient's CRT-D monitor at home. Transmission shows normal sensing and pacing function. No arrhythmias/ episodes. Ap 98.8%  BiVp 98.5%, Effective 97.8%, VSRp 1.4%    Possible Optivol fluid accumulation 3/1/22-- 4/1/22, now resolved and back to baseline. EP physician will review. See interrogation under cardiology tab in the [address] [place] [address] [address] field for more details. Will continue to monitor remotely.

## 2022-04-18 NOTE — TELEPHONE ENCOUNTER
Vanesa Brewster called in this afternoon stating that his medication was denied b/c he needed to make an apt. He said that he was in the hospital and had to have his leg amputated and can't walk or get around, asked if we could please send a refill over.            Medication Refill    Medication needing refilled:  amiodarone (CORDARONE)     Dosage of the medication:  200 MG tablet     How are you taking this medication (QD, BID, TID, QID, PRN):  TAKE 1 TABLET BY MOUTH DAILY    30 or 90 day supply called in: 30 day     When will you run out of your medication:  He's totally out    Which Pharmacy are we sending the medication to?:    [address] [place] [address], [address] [place] [address],First Floor [facility] [address] [facility], [address] [place] [id number]   Phone:  [phone]  Fax:  [phone]

## 2022-04-19 PROCEDURE — 93296 REM INTERROG EVL PM/IDS: CPT | Performed by: INTERNAL MEDICINE

## 2022-04-19 PROCEDURE — 93297 REM INTERROG DEV EVAL ICPMS: CPT | Performed by: INTERNAL MEDICINE

## 2022-04-19 PROCEDURE — 93295 DEV INTERROG REMOTE 1/2/MLT: CPT | Performed by: INTERNAL MEDICINE

## 2022-04-25 ENCOUNTER — TELEPHONE (OUTPATIENT)
Dept: INTERNAL MEDICINE CLINIC | Age: 72
End: 2022-04-25

## 2022-04-25 DIAGNOSIS — Z79.4 TYPE 2 DIABETES MELLITUS TREATED WITH INSULIN (HCC): Primary | ICD-10-CM

## 2022-04-25 DIAGNOSIS — E78.2 MIXED HYPERLIPIDEMIA: ICD-10-CM

## 2022-04-25 DIAGNOSIS — E11.9 TYPE 2 DIABETES MELLITUS TREATED WITH INSULIN (HCC): Primary | ICD-10-CM

## 2022-04-25 NOTE — TELEPHONE ENCOUNTER
Patient is in a rehab, due to surgery, the doctor stated to him that he need his blood pressure medication increase. His BP has been very high since the surgery.   Please advise

## 2022-04-26 ENCOUNTER — OFFICE VISIT (OUTPATIENT)
Dept: CARDIOLOGY CLINIC | Age: 72
End: 2022-04-26
Payer: MEDICARE

## 2022-04-26 ENCOUNTER — TELEPHONE (OUTPATIENT)
Dept: INTERNAL MEDICINE CLINIC | Age: 72
End: 2022-04-26

## 2022-04-26 VITALS
HEART RATE: 62 BPM | BODY MASS INDEX: 42.22 KG/M2 | DIASTOLIC BLOOD PRESSURE: 69 MMHG | SYSTOLIC BLOOD PRESSURE: 141 MMHG | WEIGHT: 315 LBS

## 2022-04-26 DIAGNOSIS — R06.02 SOB (SHORTNESS OF BREATH): Primary | ICD-10-CM

## 2022-04-26 PROCEDURE — 99214 OFFICE O/P EST MOD 30 MIN: CPT | Performed by: NURSE PRACTITIONER

## 2022-04-26 RX ORDER — HYDROCHLOROTHIAZIDE 25 MG/1
25 TABLET ORAL DAILY
Qty: 90 TABLET | Refills: 3 | Status: SHIPPED | OUTPATIENT
Start: 2022-04-26 | End: 2022-08-22

## 2022-04-26 RX ORDER — CARVEDILOL 12.5 MG/1
12.5 TABLET ORAL 2 TIMES DAILY WITH MEALS
Qty: 180 TABLET | Refills: 3 | Status: SHIPPED | OUTPATIENT
Start: 2022-04-26 | End: 2022-07-30

## 2022-04-26 NOTE — PROGRESS NOTES
Consent:  He & health care decision maker is aware that that he may receive a bill for this virual service, depending on his insurance coverage, and has provided verbal consent to proceed: yes   Documentation:  I communicated with the patient and/or health care decision maker about our discussions of care. Details of this discussion including any medical advice provided:    I affirm this is a Patient Initiated Episode with an Established Patient who has not had a related appointment within my department in the past 7 days or scheduled within the next 24 hours. Total Time:30 minutes       The [address] [facility] Cardiology   [employer]   Doxy. 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 s/p DCCV  HTN HLD CRD DMT2 / BiV ICD in situ / obesity / WILL uses CPAP    AICD interrogation 4/18/22  with  Dr Saulo Rivera office routinely   [facility] CRTD [id number]    Interval Hx: VSS  / B/P 140/62   discussed sodium & fluid intake  no c/o cp or SOB   denies any bleeding or falls   States his surg site is doing well   States has a nurse once a week to assess him since he came home  reviewed all his meds   Blood work TTE / fu with in 6-8 weeks      AICD interrogated 4/2022  Bi-v ICD   afib on eliquis  · MRI SureScan On: 14-Jan-2022 11:39:54 MRI SureScan Off: 14-Jan-2022 12:43:40. Data was not collected during MRI SureScan. · Possible OptiVol fluid accumulation: 01-Mar-2022 -- 01-Apr-2022. · RV Capture Management: Actual safety margin (4 X) > programmed margin (2 X). · Patient Activity less than 1 hr/day for 13 weeks    TTE 2019    Overall left ventricular systolic function appears moderately reduced. Ejection fraction is visually estimated to be 35-40% with diffuse   hypokinesis. There is mildly increased left ventricular wall thickness. The right ventricle is not well visualized but ICD wire is present. Mild tricuspid regurgitation. Trivial aortic and mitral regurgitation. I have reviewed notes / any lab work EKGs stress test, angiograms, & images reviewed  I  have spent 30 minutes with pt on phone or on video visit with the patient with more than 50% spent counseling and coordinating care this patient. Objective:   BP (!) 141/69   Pulse 62   Wt (!) 320 lb (145.2 kg)   BMI 42.22 kg/m²   No intake or output data in the 24 hours ending 04/26/22 1044  Wt Readings from Last 3 Encounters:   04/26/22 (!) 320 lb (145.2 kg)   09/24/21 (!) 339 lb (153.8 kg)   08/19/21 (!) 339 lb (153.8 kg)       Physical Exam:   BP (!) 141/69   Pulse 62   Wt (!) 320 lb (145.2 kg)   BMI 42.22 kg/m²   BP Readings from Last 3 Encounters:   04/26/22 (!) 141/69   01/05/22 (!) 163/79   09/21/21 (!) 151/82     Pulse Readings from Last 3 Encounters:   04/26/22 62   01/05/22 70   09/21/21 77     No intake or output data in the 24 hours ending 04/26/22 1044  Wt Readings from Last 2 Encounters:   04/26/22 (!) 320 lb (145.2 kg)   09/24/21 (!) 339 lb (153.8 kg)     Constitutional: He is oriented to person, place, and time / in NAD   Vitals /wt per patient at home         Reviewed  available lab work,  EKGs, images, [facility]       Assessment    BKA     fu after being in rehab for 7 weeks 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 Ejection fraction is visually estimated to be 35-40% with diffuse   hypokinesis. There is mildly increased left ventricular wall thickness. The right ventricle is not well visualized but ICD wire is present. Mild tricuspid regurgitation. Trivial aortic and mitral regurgitation.     p afib  On Eliquis  [id number] Score for Atrial Fibrillation Stroke Risk 6  s/p DCCV      HTN   wnl     HLD     CRD    DMT2   Managed per IM Obestity  Body mass index is 42.22 kg/m².   discussed diet activity     WILL uses CPAP      Plan:  Blood work  & TTE / fu with in 6-8 weeks    appt 6/8/11am with Dr Matti Dixon

## 2022-04-26 NOTE — TELEPHONE ENCOUNTER
[facility] Medication Refills:    carvedilol (COREG) 6.25 MG tablet [[id number]]     Order Details  Dose, Route, Frequency: As Directed   Dispense Quantity: 30 tablet Refills: 5          Sig: TAKE 1 TABLET BY MOUTH TWICE DAILY WITH [address], [address] [facility] [phone]   [address] [place], [place] [address]   Phone:  [phone]  Fax:  [phone]     Patient taking differently (12.5 mg twice daily)     Last Office Visit: 04/12/21

## 2022-05-04 ENCOUNTER — TELEPHONE (OUTPATIENT)
Dept: PULMONOLOGY | Age: 72
End: 2022-05-04

## 2022-05-04 NOTE — TELEPHONE ENCOUNTER
She is on O2 at night and we have to try to do CPAP/BiPAP titration with O2 as needed.   Anyway, I will order new APAP between 8 and 14 cm

## 2022-05-04 NOTE — TELEPHONE ENCOUNTER
Pt states he did not do titration study due to he had an infection in his foot and had has leg amputated   What else for pt to do?

## 2022-05-04 NOTE — PROGRESS NOTES
Lemont Goodpasture         : 1950  [] [address]     [] [address]      [] Bern[place]     []Rc's    [] Jenise Fairly  [] [facility]   [] Other:  Diagnosis: [x] WILL (G47.33) [] CSA (G47.31) [] Apnea (G47.30)   Length of Need: [] 12 Months [x] 99 Months [] Other:    Machine (CHRISTINA!): [] Respironics Dream Station      Auto [x] ResMed AirSense     Auto [] Other:     [x]  CPAP () [] Bilevel ()   Mode: [x] Auto [] Spontaneous    Mode: [] Auto [] Spontaneous           Between 8 and 14 cm                 Comfort Settings:   - Ramp Pressure: 5 cmH2O                                        - Ramp time: 15 min                                     -  Flex/EPR - 3 full time                                    - For ResMed Bilevel (TiMax-4 sec   TiMin- 0.2 sec)     Humidifier: [x] Heated ()        [x] Water chamber replacement ()/ 1 per 6 months        Mask:  Please always start with the mask the patient used during the titraion   [x] Nasal () /1 per 3 months [x] Full Face () /1 per 3 months   [x] Patient choice -Size and fit mask [x] Patient Choice - Size and fit mask   [] Dispense:  [] Dispense:    [x] Headgear () / 1 per 3 months [x] Headgear () / 1 per 3 months   [x] Replacement Nasal Cushion ()/2 per month [x] Interface Replacement ()/1 per month   [x] Replacement Nasal Pillows ()/2 per month         Tubing: [x] Heated ()/1 per 3 months    [] Standard ()/1 per 3 months [] Other:           Filters: [x] Non-disposable ()/1 per 6 months     [x] Ultra-Fine, Disposable ()/2 per month        Miscellaneous: [x] Chin Strap ()/ 1 per 6 months [] O2 bleed-in:       LPM   [] Oximetry on CPAP/Bilevel []  Other:          Start Order Date: 22    MEDICAL JUSTIFICATION:  I, the undersigned, certify that the above prescribed supplies are medically necessary for this patients wellbeing.   In my opinion, the supplies are both reasonable and necessary in reference to accepted standards of medicalpractice in treatment of this patients condition.     Rivas Saab MD      NPI: [id number]       Order Signed Date: 05/04/22    Electronically signed by Rivas Saab MD on 5/4/2022 at 11:48 AM

## 2022-05-04 NOTE — TELEPHONE ENCOUNTER
The patient has been notified of this information and all questions answered.       Patient chose [employer] as DME faxed

## 2022-05-05 ENCOUNTER — TELEPHONE (OUTPATIENT)
Dept: INTERNAL MEDICINE CLINIC | Age: 72
End: 2022-05-05

## 2022-05-05 NOTE — TELEPHONE ENCOUNTER
----- Message from Wesly Andreaye sent at 5/5/2022 10:52 AM EDT -----  Subject: Appointment Request    Reason for Call: Routine Existing Condition Follow Up (Diabetes)    QUESTIONS  Type of Appointment? Established Patient  Reason for appointment request? Available appointments did not meet   patient need  Additional Information for Provider? Pt called in and wants to do follow   up appointment but would like to do vv stated can not walk yet did get leg   amputated and would like t vv and also has a nurse that visit and last   visit will be next week and want to know if provider can put in orders for   blood work so nurse can draw it on the day of nurse visit . Screened green     ---------------------------------------------------------------------------  --------------  CALL BACK INFO  What is the best way for the office to contact you? OK to leave message on   voicemail  Preferred Call Back Phone Number? [phone]  ---------------------------------------------------------------------------  --------------  SCRIPT ANSWERS  Relationship to Patient? Self  Is this follow up request related to routine Diabetes Management? Yes  Have you been diagnosed with, awaiting test results for, or told that you   are suspected of having COVID-19 (Coronavirus)? (If patient has tested   negative or was tested as a requirement for work, school, or travel and   not based on symptoms, answer no)? No  Within the past 10 days have you developed any of the following symptoms   (answer no if symptoms have been present longer than 10 days or began   more than 10 days ago)? Fever or Chills, Cough, Shortness of breath or   difficulty breathing, Loss of taste or smell, Sore throat, Nasal   congestion, Sneezing or runny nose, Fatigue or generalized body aches   (answer no if pain is specific to a body part e.g. back pain), Diarrhea,   Headache? No  Have you had close contact with someone with COVID-19 in the last 7 days?    No  (Service Expert  click yes below to proceed with [place] As Usual   Scheduling)?  Yes

## 2022-05-09 DIAGNOSIS — Z12.5 PROSTATE CANCER SCREENING: Primary | ICD-10-CM

## 2022-05-09 NOTE — PROGRESS NOTES
2022    TELEHEALTH EVALUATION -- Audio/Visual (During WFCKE-08 public health emergency)    HPI:    Darleen Glez (:  1950) has requested an audio/video evaluation for the following concern(s):    T2 DM. HTN.  HLD. H/O amputation. Review of Systems    Prior to Visit Medications    Medication Sig Taking?  Authorizing Provider   methocarbamol (ROBAXIN) 750 MG tablet Take 1 tablet by mouth 3 times daily as needed (muscle spasm.) Yes Claudio López MD   docusate (COLACE, DULCOLAX) 100 MG CAPS Take 100 mg by mouth daily Yes Historical Provider, MD   carvedilol (COREG) 12.5 MG tablet Take 1 tablet by mouth 2 times daily (with meals)  KENNY Gautam CNP   hydroCHLOROthiazide (HYDRODIURIL) 25 MG tablet Take 1 tablet by mouth daily  KENNY Gautam CNP   amiodarone (CORDARONE) 200 MG tablet Take 1 tablet by mouth daily  Hali Patricia MD   NOVOLOG FLEXPEN 100 UNIT/ML injection pen ADMINISTER 8 TO 10 UNITS UNDER THE SKIN THREE TIMES DAILY BEFORE MEALS  Patient taking differently: Sliding scale  Claudio López MD   spironolactone (ALDACTONE) 25 MG tablet TAKE 1 TABLET BY MOUTH DAILY  Claudio López MD   oxybutynin (DITROPAN-XL) 5 MG extended release tablet TAKE 1 TABLET BY MOUTH DAILY FOR OVERACTIVE BLADDER  Claudio López MD   metroNIDAZOLE (FLAGYL) 250 MG tablet Take 200 mg by mouth 2 times daily  Historical Provider, MD   Blood Glucose Monitoring Suppl (Kaylan Butts) w/Device KIT 1 each by Does not apply route 2 times daily  Claudio López MD   blood glucose test strips (ASCENSIA AUTODISC VI;ONE TOUCH ULTRA TEST VI) strip 1 each by In Vitro route daily Test as directed,Dispense according to insurance formulary  Claudio López MD   potassium chloride (KLOR-CON M) 20 MEQ extended release tablet TAKE 1 TABLET BY MOUTH DAILY  Patient taking differently: Take 20 mEq by mouth daily 3 times per week  Claudio López MD   Handicap Placard MISC by Does not apply route Diagnosis: heart failure. Expires: 12/7/24. Deya Irvin MD   furosemide (LASIX) 40 MG tablet TAKE 1 TABLET BY MOUTH DAILY  Patient taking differently: Take 40 mg by mouth daily 3 times per week  Deya Irvin MD   tamsulosin (FLOMAX) 0.4 MG capsule TAKE 1 CAPSULE BY MOUTH DAILY  Deya Irvin MD   atorvastatin (LIPITOR) 40 MG tablet TAKE 1 TABLET BY MOUTH DAILY  Deya Irvin MD   levothyroxine (SYNTHROID) 25 MCG tablet TAKE 1 TABLET BY MOUTH DAILY  Deya Irvin MD   Insulin Pen Needle (B-D UF III MINI PEN NEEDLES) 31G X 5 MM MISC Inject 1 each into the skin daily  Deya Irvin MD   apixaban (ELIQUIS) 5 MG TABS tablet TAKE 1 TABLET BY MOUTH TWICE DAILY  Ashley Grajeda MD   blood glucose test strips (ONE TOUCH TEST STRIPS) strip 1 each by In Vitro route 3 times daily As needed.   Deya Irvin MD   dapagliflozin (FARXIGA) 10 MG tablet Take 1 tablet by mouth every morning  Deya Irvin MD   lisinopril (PRINIVIL;ZESTRIL) 2.5 MG tablet Take 1 tablet by mouth daily  Deya Irvin MD   [facility] ULTRA strip USE WITH GLUCOSE METER THREE TIMES DAILY AND AS NEEDED  Deya Irvin MD   ferrous sulfate (FE TABS 325) 325 (65 Fe) MG EC tablet Take 325 mg by mouth daily  Patient not taking: Reported on 4/26/2022  Historical Provider, MD   Continuous Blood Gluc  (FREESTYLE KWESI 14 DAY READER) ANITA Use as Directed Dx E11.9  Deya Irvin MD   Continuous Blood Gluc Sensor (FREESTYLE KWESI 14 DAY SENSOR) [facility] Use as directed Dx E11.9  Deya Irvin MD   Blood Glucose Monitoring Suppl (ONE TOUCH ULTRA MINI) w/Device KIT 1 kit by Does not apply route three times daily  Deya Irvin MD   acetaminophen (TYLENOL) 325 MG tablet Take 2 tablets by mouth every 4 hours as needed for Pain  Oscar Rosenthal MD   Coenzyme Q10 (CO Q 10) 100 MG CAPS Take 1 capsule by mouth daily  Historical Provider, MD   Cinnamon 500 MG CAPS Take 1 capsule by mouth daily  Historical Provider, MD   Vitamin D (CHOLECALCIFEROL) 1000 UNITS CAPS capsule Take 2,000 Units by mouth nightly   Historical Provider, MD   Insulin Syringe-Needle U-100 (INSULIN SYRINGE .5CC/31GX5/16\") 31G X 5/16\" 0.5 ML MISC Patient tests bid  Rosi Palafox MD   therapeutic multivitamin-minerals [facility]) tablet Take 1 tablet by mouth daily. Historical Provider, MD   aspirin 81 MG EC tablet Take 81 mg by mouth daily. Patient not taking: Reported on 2022  Historical Provider, MD       Social History     Tobacco Use    Smoking status: Former Smoker     Packs/day: 1.00     Years: 4.00     Pack years: 4.00     Types: Cigarettes     Quit date: 2017     Years since quittin.3    Smokeless tobacco: Never Used   Vaping Use    Vaping Use: Never used   Substance Use Topics    Alcohol use: Yes     Comment: 2-3 times per year    Drug use: No            PHYSICAL EXAMINATION:  [ INSTRUCTIONS:  \"[x]\" Indicates a positive item  \"[]\" Indicates a negative item  -- DELETE ALL ITEMS NOT EXAMINED]  Vital Signs: (As obtained by patient/caregiver or practitioner observation)    Blood pressure-  Heart rate-    Respiratory rate-    Temperature-  Pulse oximetry-     Constitutional: [] Appears well-developed and well-nourished [] No apparent distress      [] Abnormal-   Mental status  [] Alert and awake  [] Oriented to person/place/time []Able to follow commands      Eyes:  EOM    []  Normal  [] Abnormal-  Sclera  []  Normal  [] Abnormal -         Discharge []  None visible  [] Abnormal -    HENT:   [] Normocephalic, atraumatic.   [] Abnormal   [] Mouth/Throat: Mucous membranes are moist.     External Ears [] Normal  [] Abnormal-     Neck: [] No visualized mass     Pulmonary/Chest: [x] Respiratory effort normal.  [x] No visualized signs of difficulty breathing or respiratory distress        [] Abnormal-      Musculoskeletal:   [x] Normal gait with no signs of ataxia         [x] Normal range of motion of neck        [] Abnormal-       Neurological:        [x] No Facial Asymmetry (Cranial nerve 7 motor function) (limited exam to video visit)          [x] No gaze palsy        [] Abnormal-         Skin:        [x] No significant exanthematous lesions or discoloration noted on facial skin         [] Abnormal-            Psychiatric:       [x] Normal Affect [] No Hallucinations        [] Abnormal-     Other pertinent observable physical exam findings-     ASSESSMENT/PLAN:   Diagnosis Orders   1. Primary hypertension     2. Mixed hyperlipidemia     3. Diabetes mellitus type 2 with complications ([facility] [address]     4. Muscle spasm  methocarbamol (ROBAXIN) 750 MG tablet   5. Pain  oxyCODONE-acetaminophen (PERCOCET) 5-325 MG per tablet         No follow-ups on file. Giselle Bill, was evaluated through a synchronous (real-time) audio-video encounter. The patient (or guardian if applicable) is aware that this is a billable service, which includes applicable co-pays. This Virtual Visit was conducted with patient's (and/or legal guardian's) consent. The visit was conducted pursuant to the emergency declaration under the [address] [place] [address] authority and the [employer] Act. Patient identification was verified, and a caregiver was present when appropriate. The patient was located at home in a state where the provider was licensed to provide care. Total time spent on this encounter: billing not based on time. --Petra Garces MD on 5/8/2022 at 11:39 PM    An electronic signature was used to authenticate this note.

## 2022-05-10 ENCOUNTER — TELEPHONE (OUTPATIENT)
Dept: INTERNAL MEDICINE CLINIC | Age: 72
End: 2022-05-10

## 2022-05-11 ENCOUNTER — TELEPHONE (OUTPATIENT)
Dept: INTERNAL MEDICINE CLINIC | Age: 72
End: 2022-05-11

## 2022-05-11 NOTE — TELEPHONE ENCOUNTER
Called [facility], there was no answer, was able to LVM to call office with any questions.    Called  PT also aware

## 2022-05-11 NOTE — TELEPHONE ENCOUNTER
Patient states that [facility] is the facility that will be drawing his blood at home and they have some questions for the provider would like a call as soon as possible please advise.  There number is [phone]

## 2022-05-12 ENCOUNTER — TELEPHONE (OUTPATIENT)
Dept: INTERNAL MEDICINE CLINIC | Age: 72
End: 2022-05-12

## 2022-05-12 DIAGNOSIS — I10 ESSENTIAL HYPERTENSION: ICD-10-CM

## 2022-05-12 RX ORDER — LISINOPRIL 2.5 MG/1
2.5 TABLET ORAL DAILY
Qty: 90 TABLET | Refills: 3 | Status: SHIPPED | OUTPATIENT
Start: 2022-05-12 | End: 2022-08-22

## 2022-05-16 ENCOUNTER — TELEPHONE (OUTPATIENT)
Dept: INTERNAL MEDICINE CLINIC | Age: 72
End: 2022-05-16

## 2022-05-16 NOTE — TELEPHONE ENCOUNTER
Patient received his leg prothesis and is needing a order for physical and occupational therapy to learn how to use it. They will also need last visit note demographic sheet along with list of current medications please fax to [phone].

## 2022-05-20 ENCOUNTER — TELEPHONE (OUTPATIENT)
Dept: INTERNAL MEDICINE CLINIC | Age: 72
End: 2022-05-20

## 2022-05-20 NOTE — TELEPHONE ENCOUNTER
Jose Sanderson from [facility], received orders for the patient. The order does not have codes.   She is trying to put the patient on the schedule for tomorrow and states that you can leave a VM if you call after 5 pm.   Please advise

## 2022-05-25 ENCOUNTER — NURSE ONLY (OUTPATIENT)
Dept: CARDIOLOGY CLINIC | Age: 72
End: 2022-05-25
Payer: MEDICARE

## 2022-05-25 DIAGNOSIS — Z95.810 CARDIAC RESYNCHRONIZATION THERAPY DEFIBRILLATOR (CRT-D) IN PLACE: ICD-10-CM

## 2022-05-25 DIAGNOSIS — I42.0 DILATED CARDIOMYOPATHY (HCC): ICD-10-CM

## 2022-05-25 DIAGNOSIS — I50.22 CHRONIC SYSTOLIC HEART FAILURE (HCC): ICD-10-CM

## 2022-05-25 PROCEDURE — 93297 REM INTERROG DEV EVAL ICPMS: CPT | Performed by: NURSE PRACTITIONER

## 2022-05-25 PROCEDURE — G2066 INTER DEVC REMOTE 30D: HCPCS | Performed by: NURSE PRACTITIONER

## 2022-05-25 NOTE — PROGRESS NOTES
We received remote transmission from patient's CRT-D monitor at home. Transmission shows normal sensing and pacing function. NSVT noted (on Coreg, amiodarone). Ap 98.8%  BiVp 98.6%, Effective 98.3%, VSRp 1.2%    Optivol is WNL. NP will review. See interrogation under cardiology tab in the [address] [place] [address] [address] field for more details. Will continue to monitor remotely.

## 2022-05-31 RX ORDER — AMIODARONE HYDROCHLORIDE 200 MG/1
200 TABLET ORAL DAILY
Qty: 30 TABLET | Refills: 0 | Status: SHIPPED | OUTPATIENT
Start: 2022-05-31 | End: 2022-06-07

## 2022-05-31 NOTE — TELEPHONE ENCOUNTER
He states he can't make an appointment due to his leg.  Once he is able to walk again, he will call the office to schedule f/u

## 2022-05-31 NOTE — TELEPHONE ENCOUNTER
Medication Refill    Medication needing refilled:  amiodarone (CORDARONE)     Dosage of the medication:  200 MG tablet     How are you taking this medication (QD, BID, TID, QID, PRN):  Take 1 tablet by mouth daily    30 or 90 day supply called in: 30 day supply     When will you run out of your medication: He only has 1 left     Which Pharmacy are we sending the medication to?:    [address] [facility], [address] [facility],[address] [address]   Phone:  [phone]  Fax:  [phone]      Osman Back states that he can't schedule any appointments at this time with just getting his leg amputated

## 2022-06-02 ENCOUNTER — TELEMEDICINE (OUTPATIENT)
Dept: INTERNAL MEDICINE CLINIC | Age: 72
End: 2022-06-02
Payer: MEDICARE

## 2022-06-02 DIAGNOSIS — I10 PRIMARY HYPERTENSION: ICD-10-CM

## 2022-06-02 DIAGNOSIS — Z89.512 HX OF BKA, LEFT (HCC): Primary | ICD-10-CM

## 2022-06-02 DIAGNOSIS — R52 PAIN: ICD-10-CM

## 2022-06-02 DIAGNOSIS — E11.8 CONTROLLED TYPE 2 DIABETES MELLITUS WITH COMPLICATION, WITHOUT LONG-TERM CURRENT USE OF INSULIN (HCC): ICD-10-CM

## 2022-06-02 PROCEDURE — 1123F ACP DISCUSS/DSCN MKR DOCD: CPT | Performed by: INTERNAL MEDICINE

## 2022-06-02 PROCEDURE — 99214 OFFICE O/P EST MOD 30 MIN: CPT | Performed by: INTERNAL MEDICINE

## 2022-06-02 RX ORDER — TRAMADOL HYDROCHLORIDE 50 MG/1
50 TABLET ORAL 2 TIMES DAILY PRN
Qty: 40 TABLET | Refills: 0 | Status: SHIPPED | OUTPATIENT
Start: 2022-06-02 | End: 2022-07-01

## 2022-06-02 SDOH — ECONOMIC STABILITY: FOOD INSECURITY: WITHIN THE PAST 12 MONTHS, THE FOOD YOU BOUGHT JUST DIDN'T LAST AND YOU DIDN'T HAVE MONEY TO GET MORE.: NEVER TRUE

## 2022-06-02 SDOH — ECONOMIC STABILITY: FOOD INSECURITY: WITHIN THE PAST 12 MONTHS, YOU WORRIED THAT YOUR FOOD WOULD RUN OUT BEFORE YOU GOT MONEY TO BUY MORE.: NEVER TRUE

## 2022-06-02 ASSESSMENT — ANXIETY QUESTIONNAIRES
2. NOT BEING ABLE TO STOP OR CONTROL WORRYING: 0
GAD7 TOTAL SCORE: 0
7. FEELING AFRAID AS IF SOMETHING AWFUL MIGHT HAPPEN: 0
6. BECOMING EASILY ANNOYED OR IRRITABLE: 0
5. BEING SO RESTLESS THAT IT IS HARD TO SIT STILL: 0
1. FEELING NERVOUS, ANXIOUS, OR ON EDGE: 0
3. WORRYING TOO MUCH ABOUT DIFFERENT THINGS: 0
4. TROUBLE RELAXING: 0
IF YOU CHECKED OFF ANY PROBLEMS ON THIS QUESTIONNAIRE, HOW DIFFICULT HAVE THESE PROBLEMS MADE IT FOR YOU TO DO YOUR WORK, TAKE CARE OF THINGS AT HOME, OR GET ALONG WITH OTHER PEOPLE: NOT DIFFICULT AT ALL

## 2022-06-02 ASSESSMENT — SOCIAL DETERMINANTS OF HEALTH (SDOH): HOW HARD IS IT FOR YOU TO PAY FOR THE VERY BASICS LIKE FOOD, HOUSING, MEDICAL CARE, AND HEATING?: NOT HARD AT ALL

## 2022-06-02 NOTE — PROGRESS NOTES
Patient: Juice Sequeira is a 73 y.o. male who presents today with the following Chief Complaint(s):    Chief Complaint   Patient presents with    Diabetes    Other     LEG          HPI    Current Outpatient Medications   Medication Sig Dispense Refill    amiodarone (CORDARONE) 200 MG tablet Take 1 tablet by mouth daily 30 tablet 0    lisinopril (PRINIVIL;ZESTRIL) 2.5 MG tablet Take 1 tablet by mouth daily 90 tablet 3    carvedilol (COREG) 12.5 MG tablet Take 1 tablet by mouth 2 times daily (with meals) 180 tablet 3    hydroCHLOROthiazide (HYDRODIURIL) 25 MG tablet Take 1 tablet by mouth daily 90 tablet 3    methocarbamol (ROBAXIN) 750 MG tablet Take 1 tablet by mouth 3 times daily as needed (muscle spasm.) 40 tablet 1    docusate (COLACE, DULCOLAX) 100 MG CAPS Take 100 mg by mouth daily      NOVOLOG FLEXPEN 100 UNIT/ML injection pen ADMINISTER 8 TO 10 UNITS UNDER THE SKIN THREE TIMES DAILY BEFORE MEALS (Patient taking differently: Sliding scale) 45 mL 5    spironolactone (ALDACTONE) 25 MG tablet TAKE 1 TABLET BY MOUTH DAILY 90 tablet 3    oxybutynin (DITROPAN-XL) 5 MG extended release tablet TAKE 1 TABLET BY MOUTH DAILY FOR OVERACTIVE BLADDER 90 tablet 1    metroNIDAZOLE (FLAGYL) 250 MG tablet Take 200 mg by mouth 2 times daily      Blood Glucose Monitoring Suppl (ONETOUCH VERIO) w/Device KIT 1 each by Does not apply route 2 times daily 1 kit 0    blood glucose test strips (ASCENSIA AUTODISC VI;ONE TOUCH ULTRA TEST VI) strip 1 each by In Vitro route daily Test as directed,Dispense according to insurance formulary 100 each 3    potassium chloride (KLOR-CON M) 20 MEQ extended release tablet TAKE 1 TABLET BY MOUTH DAILY (Patient taking differently: Take 20 mEq by mouth daily 3 times per week) 90 tablet 1    Handicap Placard MISC by Does not apply route Diagnosis: heart failure.     Expires: 12/7/24. 1 each 0    furosemide (LASIX) 40 MG tablet TAKE 1 TABLET BY MOUTH DAILY (Patient taking differently: Take 40 mg by mouth daily 3 times per week) 60 tablet 3    tamsulosin (FLOMAX) 0.4 MG capsule TAKE 1 CAPSULE BY MOUTH DAILY 90 capsule 2    atorvastatin (LIPITOR) 40 MG tablet TAKE 1 TABLET BY MOUTH DAILY 90 tablet 3    levothyroxine (SYNTHROID) 25 MCG tablet TAKE 1 TABLET BY MOUTH DAILY 90 tablet 3    Insulin Pen Needle (B-D UF III MINI PEN NEEDLES) 31G X 5 MM MISC Inject 1 each into the skin daily 100 each 5    apixaban (ELIQUIS) 5 MG TABS tablet TAKE 1 TABLET BY MOUTH TWICE DAILY 60 tablet 11    blood glucose test strips (ONE TOUCH TEST STRIPS) strip 1 each by In Vitro route 3 times daily As needed. 300 each 3    dapagliflozin (FARXIGA) 10 MG tablet Take 1 tablet by mouth every morning 90 tablet 3    ONETOUCH ULTRA strip USE WITH GLUCOSE METER THREE TIMES DAILY AND AS NEEDED 300 strip 3    ferrous sulfate (FE TABS 325) 325 (65 Fe) MG EC tablet Take 325 mg by mouth daily (Patient not taking: Reported on 4/26/2022)      Continuous Blood Gluc  (FREESTYLE KWESI 14 DAY READER) ANITA Use as Directed Dx E11.9 1 Device 0    Continuous Blood Gluc Sensor (FREESTYLE KWESI 14 DAY SENSOR) [facility] Use as directed Dx E11.9 3 each 3    Blood Glucose Monitoring Suppl (ONE TOUCH ULTRA MINI) w/Device KIT 1 kit by Does not apply route three times daily 1 kit 0    acetaminophen (TYLENOL) 325 MG tablet Take 2 tablets by mouth every 4 hours as needed for Pain 120 tablet 3    Coenzyme Q10 (CO Q 10) 100 MG CAPS Take 1 capsule by mouth daily      Cinnamon 500 MG CAPS Take 1 capsule by mouth daily      Vitamin D (CHOLECALCIFEROL) 1000 UNITS CAPS capsule Take 2,000 Units by mouth nightly       Insulin Syringe-Needle U-100 (INSULIN SYRINGE .5CC/31GX5/16\") 31G X 5/16\" 0.5 ML MISC Patient tests bid 100 Syringe 5    therapeutic multivitamin-minerals (THERAGRAN-M) tablet Take 1 tablet by mouth daily.  aspirin 81 MG EC tablet Take 81 mg by mouth daily.  (Patient not taking: Reported on 4/26/2022)       No current facility-administered medications for this visit. Patient's past medical history, surgical history, family history, medications,and allergies  were all reviewed and updated as appropriate today. Review of Systems      Physical Exam    There were no vitals filed for this visit. Assessment:  No diagnosis found. Plan:  There are no diagnoses linked to this encounter.

## 2022-06-02 NOTE — PATIENT INSTRUCTIONS
[facility] Laboratory Locations - No appointment necessary. @ indicates the location is open Saturdays in addition to Monday through Friday. Call your preferred location for test preparation, business hours and other information you need. [employer] accepts [employer]. [facility]    @ [facility] [address] [facility] [address] [place] [address]. [facility], [address]   Ph: [phone] [facility] [address]   Ph: [phone]  @ [facility] [facility]   Ph: [phone]    [facility] S[facility] [address]   Ph: [phone] @ [place] [facility] [address]  Ph: [phone] @ [facility]. [address] [facility]. [facility], [place] [address]   Ph: [phone]    [place]   @ [place] [facility] [address] [place], [place] [address]   Ph: [phone] [facility] B[facility] [address], [address] [facility]  Ph: [phone] [place] [address]   [facility]:  [address] [facility] Sv[facility] [facility]   Ph: [phone] [facility]. Lab Svcs.   [address] [facility], [address]   Ph: [phone]

## 2022-06-03 ENCOUNTER — TELEPHONE (OUTPATIENT)
Dept: INTERNAL MEDICINE CLINIC | Age: 72
End: 2022-06-03

## 2022-06-03 DIAGNOSIS — D50.8 OTHER IRON DEFICIENCY ANEMIA: Primary | ICD-10-CM

## 2022-06-03 NOTE — TELEPHONE ENCOUNTER
Patient says he called his insurance and they stated that physician can request that patient be admitted into inpatient facility to get more physician training to use prothesis home health only comes twice a week and patient doesn't feel he is really learning how to use them, patient requesting to go to [facility] or [facility] to be [facility]. Please advise.

## 2022-06-03 NOTE — TELEPHONE ENCOUNTER
Last OV: 5/12/21  Next OV: 6/8/22  Last refill:5/10/21  Most recent Labs: 4/26/22  Last EKG (if needed):5/12/21 Patient has symptoms that could be consistent with intermittent lightheadedness.  Patient appears to be hemodynamically stable and well hydrated.  EKG to was unremarkable for significant arrhythmia.  Labs pending.  Maintain good hydration.

## 2022-06-08 ENCOUNTER — TELEPHONE (OUTPATIENT)
Dept: INTERNAL MEDICINE CLINIC | Age: 72
End: 2022-06-08

## 2022-06-08 NOTE — TELEPHONE ENCOUNTER
Patient called back and stated that his  is going to work on getting him back into a facility for pt. chavo

## 2022-06-08 NOTE — TELEPHONE ENCOUNTER
Home health requesting a verbal for  please advise. Patient calling to see if both sets of lab results were received so that physician can compare please advise.

## 2022-06-09 ENCOUNTER — TELEPHONE (OUTPATIENT)
Dept: INTERNAL MEDICINE CLINIC | Age: 72
End: 2022-06-09

## 2022-06-09 NOTE — TELEPHONE ENCOUNTER
Hafsa Borja from [facility] care needs a verbal order for a  for the patient. He needs to go to a nursing home for care due to his wife being unable to take care of him. He stated that he needs to go to [facility].   Please advise

## 2022-06-13 ENCOUNTER — TELEPHONE (OUTPATIENT)
Dept: INTERNAL MEDICINE CLINIC | Age: 72
End: 2022-06-13

## 2022-06-13 NOTE — TELEPHONE ENCOUNTER
Requesting verbal order for occupational therapy. through [facility]    Requesting last in office notes and labs for physician to evaluate for patient to be admitted to hospital. Please fax to [phone]

## 2022-06-14 RX ORDER — AMIODARONE HYDROCHLORIDE 200 MG/1
200 TABLET ORAL DAILY
Qty: 90 TABLET | Refills: 2 | Status: SHIPPED | OUTPATIENT
Start: 2022-06-14

## 2022-06-15 ENCOUNTER — TELEPHONE (OUTPATIENT)
Dept: INTERNAL MEDICINE CLINIC | Age: 72
End: 2022-06-15

## 2022-06-16 ENCOUNTER — TELEPHONE (OUTPATIENT)
Dept: INTERNAL MEDICINE CLINIC | Age: 72
End: 2022-06-16

## 2022-06-16 RX ORDER — FERROUS SULFATE 325(65) MG
325 TABLET ORAL 2 TIMES DAILY
Qty: 60 TABLET | Refills: 1 | Status: SHIPPED | OUTPATIENT
Start: 2022-06-16 | End: 2022-08-22

## 2022-06-16 NOTE — TELEPHONE ENCOUNTER
Patient's wife called back due to call from physician being missed stated that she just took husbands bp and it was 104/62 heart rate 62. Needed to know which blood pressure medication to stop please advise.

## 2022-06-16 NOTE — TELEPHONE ENCOUNTER
This morning blood pressure 74 over 46 and lightheaded does have good skin service. Advised for 911 to be called for patient to go to emergency. chavo

## 2022-06-16 NOTE — TELEPHONE ENCOUNTER
Called and left message to call the office. Let message with OT that he should hold b/p medication until numbers are in normal range. He would need to call for further instruction.

## 2022-06-16 NOTE — TELEPHONE ENCOUNTER
Nurse called back to inform provider that patient refused to go to emergency didn't want 911 called just wanted to try to drink some water and he did nurse says she checked his blood pressure again and it was 83/50 she advised him to call 911, but patient stated his wife was a nurse and she would continue to check his blood pressure. chavo

## 2022-06-17 ENCOUNTER — TELEPHONE (OUTPATIENT)
Dept: INTERNAL MEDICINE CLINIC | Age: 72
End: 2022-06-17

## 2022-06-17 NOTE — TELEPHONE ENCOUNTER
Patient called to report his Blood pressure today it was 18/57 taken at 9:10 am he said Dr. Kenya Guerrero told him to call him with this information.    Please advise

## 2022-06-17 NOTE — TELEPHONE ENCOUNTER
Continue lisinopril and hold spironolactone and carvedilol if b/p < 674 systolic. If 110 to 120 restart spironolactone. If greater than 120 restart carvedilol. Call in 3 days for f/u.

## 2022-06-28 ENCOUNTER — TELEPHONE (OUTPATIENT)
Dept: INTERNAL MEDICINE CLINIC | Age: 72
End: 2022-06-28

## 2022-06-28 NOTE — TELEPHONE ENCOUNTER
Mr. Christiano Lugo called to report his blood pressure readings:    AM - between 99 to 107/51 to 53  PM - between 115 to 118/59 to 62  Please advise

## 2022-06-29 ENCOUNTER — NURSE ONLY (OUTPATIENT)
Dept: CARDIOLOGY CLINIC | Age: 72
End: 2022-06-29
Payer: MEDICARE

## 2022-06-29 DIAGNOSIS — Z95.810 CARDIAC RESYNCHRONIZATION THERAPY DEFIBRILLATOR (CRT-D) IN PLACE: ICD-10-CM

## 2022-06-29 DIAGNOSIS — I42.0 DILATED CARDIOMYOPATHY (HCC): ICD-10-CM

## 2022-06-29 DIAGNOSIS — I50.22 CHRONIC SYSTOLIC HEART FAILURE (HCC): ICD-10-CM

## 2022-06-29 PROCEDURE — 93297 REM INTERROG DEV EVAL ICPMS: CPT | Performed by: NURSE PRACTITIONER

## 2022-06-29 PROCEDURE — G2066 INTER DEVC REMOTE 30D: HCPCS | Performed by: NURSE PRACTITIONER

## 2022-06-30 ENCOUNTER — TELEPHONE (OUTPATIENT)
Dept: PULMONOLOGY | Age: 72
End: 2022-06-30

## 2022-06-30 DIAGNOSIS — R52 PAIN: ICD-10-CM

## 2022-06-30 NOTE — TELEPHONE ENCOUNTER
Patient calls stating he keeps getting call for his CPAP supplies on a CPAP machine he stopped using due to the recall. He states the calls are coming from [place]. He was here in May and a new machine was ordered but he stated he has never received it. I attempted to contact [facility] in [place] since that is where his order in May was sent, but had to leave a message.

## 2022-06-30 NOTE — TELEPHONE ENCOUNTER
Phone call to [facility] in [place] regarding below message. Spoke to Penelope who stated patient will need to call [facility] in [facility] and advise them to stop sending supplies since he is no longer using that machine. Penelope stated he will check into patient insurance regarding getting him a new CPAP machine. (His current machine was purchased in 2011). Patient was notified of all this information and expressed understanding.

## 2022-06-30 NOTE — PROGRESS NOTES
We received remote transmission from patient's CRT-D monitor at home. Transmission shows normal sensing and pacing function. No arrhythmias/ or events. Ap 98.7%  BiVp 99.0%, Effective 98.9%, VSRp 0.8%    Optivol is within normal range, slightly elevated up to 55 (below 60 threshold); TI slightly below reference line. TriageHF Heart Failure Risk Status on 29-Jun-2022 is Medium*    NP will review. See interrogation under cardiology tab in the [address] [place] [address] [address] field for more details. Will continue to monitor remotely. (End of 31-day monitoring period 6/29/22).

## 2022-06-30 NOTE — RESULT ENCOUNTER NOTE
Device interrogation reviewed. Optivol reading is up-trending, even though it has not reached threshold. Please advise patient to observe CHF symptoms and to adhere low-salt diet.

## 2022-06-30 NOTE — TELEPHONE ENCOUNTER
Yolanda Mcconnell with [address] calls in from [phone] asking for face to face notes and documented need for new machine, continued use and benefit. Appears patient hasn't been seen for years.

## 2022-07-01 RX ORDER — TRAMADOL HYDROCHLORIDE 50 MG/1
TABLET ORAL
Qty: 40 TABLET | Refills: 0 | Status: SHIPPED | OUTPATIENT
Start: 2022-07-01 | End: 2022-07-21

## 2022-07-01 NOTE — TELEPHONE ENCOUNTER
Spoke with pt inform him his bp are ok and the provider want him to continue his treatment regimen and call next Tuesday for follow up on bp readings

## 2022-07-01 NOTE — TELEPHONE ENCOUNTER
Let him know blood pressures are ok. Continue same treatment regimen.   Call next Tuesday for f/u on new b/p readings

## 2022-07-02 ASSESSMENT — ENCOUNTER SYMPTOMS
EYES NEGATIVE: 1
RESPIRATORY NEGATIVE: 1
GASTROINTESTINAL NEGATIVE: 1

## 2022-07-03 NOTE — PROGRESS NOTES
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 No progress toward walking with prothesis. His weight makes if difficult for 2 therapist to maneuver him. Not learning needed skills. Has fallen as a result. Needs inpatient treatment which will provide needed support system. Diabetes, T2: doing ok on current regimen. . HTN: 111/59, b/p. Taking prescribed medication. Monitoring sodium intake. 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 Can be severe. Comes and goes. Stuart Vazquez (:  1950) has requested an audio/video evaluation for the following concern(s):        Review of Systems   Constitutional: Negative. HENT: Negative. Eyes: Negative. Respiratory: Negative. Cardiovascular:        HTN, stable. Cardionyopathy. H/O HFrEF. Denies SOB or CP. See cardiology note for detail cardiac history. Gastrointestinal: Negative. Endocrine:        T2 DM, see HPI. Genitourinary: Negative. Musculoskeletal:        Left BKA. See HPI. Phantom pain. Skin: Negative. Neurological: Negative. Psychiatric/Behavioral: Negative. Prior to Visit Medications    Medication Sig Taking?  Authorizing Provider   traMADol (ULTRAM) 50 MG tablet TAKE 1 TABLET BY MOUTH TWICE DAILY AS NEEDED FOR PAIN FOR UP TO 20 DAYS  Hemant Gan MD   ferrous sulfate (IRON 325) 325 (65 Fe) MG tablet Take 1 tablet by mouth 2 times daily  Hemant Gan MD   amiodarone (CORDARONE) 200 MG tablet TAKE 1 TABLET BY MOUTH DAILY  KENNY Yi CNP   apixaban (ELIQUIS) 5 MG TABS tablet TAKE 1 TABLET BY MOUTH TWICE DAILY  Oscar Betts MD   lisinopril (PRINIVIL;ZESTRIL) 2.5 MG tablet Take 1 tablet by mouth daily  Hemant Gan MD   carvedilol (COREG) 12.5 MG tablet Take 1 tablet by mouth 2 times daily (with meals)  KENNY Yi CNP   hydroCHLOROthiazide (HYDRODIURIL) 25 MG tablet Take 1 tablet by mouth daily  [facility], APRN - CNP   methocarbamol (ROBAXIN) 750 MG tablet Take 1 tablet by mouth 3 times daily as needed (muscle spasm.)  Amrit Henderson MD   docusate (COLACE, DULCOLAX) 100 MG CAPS Take 100 mg by mouth daily  Historical Provider, MD   NOVOLOG FLEXPEN 100 UNIT/ML injection pen ADMINISTER 8 TO 10 UNITS UNDER THE SKIN THREE TIMES DAILY BEFORE MEALS  Patient taking differently: Sliding scale  Amrit Henderson MD   spironolactone (ALDACTONE) 25 MG tablet TAKE 1 TABLET BY MOUTH DAILY  Amrit Henderson MD   oxybutynin (DITROPAN-XL) 5 MG extended release tablet TAKE 1 TABLET BY MOUTH DAILY FOR OVERACTIVE BLADDER  Amrit Henderson MD   metroNIDAZOLE (FLAGYL) 250 MG tablet Take 200 mg by mouth 2 times daily  Historical Provider, MD   Blood Glucose Monitoring Suppl (Oscar Majano) w/Device KIT 1 each by Does not apply route 2 times daily  Amrit Henderson MD   blood glucose test strips (ASCENSIA AUTODISC VI;ONE TOUCH ULTRA TEST VI) strip 1 each by In Vitro route daily Test as directed,Dispense according to insurance formulary  Amrit Henderson MD   potassium chloride (KLOR-CON M) 20 MEQ extended release tablet TAKE 1 TABLET BY MOUTH DAILY  Patient taking differently: Take 20 mEq by mouth daily 3 times per week  Amrit Henderson MD   Handicap Placard MISC by Does not apply route Diagnosis: heart failure. Expires: 12/7/24.   Amrit Henderson MD   furosemide (LASIX) 40 MG tablet TAKE 1 TABLET BY MOUTH DAILY  Patient taking differently: Take 40 mg by mouth daily 3 times per week  Amrit Henderson MD   tamsulosin (FLOMAX) 0.4 MG capsule TAKE 1 CAPSULE BY MOUTH DAILY  Amrit Henderson MD   atorvastatin (LIPITOR) 40 MG tablet TAKE 1 TABLET BY MOUTH DAILY  Amrit Henderson MD   levothyroxine (SYNTHROID) 25 MCG tablet TAKE 1 TABLET BY MOUTH DAILY  Amrit Henderson MD   Insulin Pen Needle (B-D UF III MINI PEN NEEDLES) 31G X 5 MM MISC Inject 1 each into the skin daily  Amrit Henderson MD   blood glucose test strips (ONE TOUCH TEST STRIPS) strip 1 each by In Vitro route 3 times daily As needed. Junior Davis MD   dapagliflozin (FARXIGA) 10 MG tablet Take 1 tablet by mouth every morning  Junior Davis MD   [facility] ULTRA strip USE WITH GLUCOSE METER THREE TIMES DAILY AND AS NEEDED  Junior Davis MD   ferrous sulfate (FE TABS 325) 325 (65 Fe) MG EC tablet Take 325 mg by mouth daily  Patient not taking: Reported on 2022  Historical Provider, MD   Continuous Blood Gluc  (FREESTYLE KWESI 14 DAY READER) ANITA Use as Directed Dx E11.9  Junior Davis MD   Continuous Blood Gluc Sensor (FREESTYLE KWESI 14 DAY SENSOR) MISC Use as directed Dx E11.9  Junior Davis MD   Blood Glucose Monitoring Suppl (ONE TOUCH ULTRA MINI) w/Device KIT 1 kit by Does not apply route three times daily  Junior Davis MD   acetaminophen (TYLENOL) 325 MG tablet Take 2 tablets by mouth every 4 hours as needed for Pain  Oscar Devine MD   Coenzyme Q10 (CO Q 10) 100 MG CAPS Take 1 capsule by mouth daily  Historical Provider, MD   Cinnamon 500 MG CAPS Take 1 capsule by mouth daily  Historical Provider, MD   Vitamin D (CHOLECALCIFEROL) 1000 UNITS CAPS capsule Take 2,000 Units by mouth nightly   Historical Provider, MD   Insulin Syringe-Needle U-100 (INSULIN SYRINGE .5CC/31GX5/16\") 31G X 5/16\" 0.5 ML MISC Patient tests bid  Junior Davis MD   therapeutic multivitamin-minerals [facility]) tablet Take 1 tablet by mouth daily. Historical Provider, MD   aspirin 81 MG EC tablet Take 81 mg by mouth daily. Patient not taking: Reported on 2022  Historical Provider, MD       Social History     Tobacco Use    Smoking status: Former Smoker     Packs/day: 1.00     Years: 4.00     Pack years: 4.00     Types: Cigarettes     Quit date: 2017     Years since quittin.5    Smokeless tobacco: Never Used   Vaping Use    Vaping Use: Never used   Substance Use Topics    Alcohol use: Yes     Comment: 2-3 times per year    Drug use:  No            PHYSICAL EXAMINATION:  [ INSTRUCTIONS:  \"[x]\" Indicates a positive item  \"[]\" Indicates a negative item  -- DELETE ALL ITEMS NOT EXAMINED]  Vital Signs: (As obtained by patient/caregiver or practitioner observation)    Blood pressure- 111/59 Heart rate- 63   Respiratory rate-    Temperature-  Pulse oximetry-     Constitutional: [x] Appears well-developed and well-nourished [x] No apparent distress      [] Abnormal-   Mental status  [x] Alert and awake  [x] Oriented to person/place/time [x]Able to follow commands      Eyes:  EOM    [x]  Normal  [] Abnormal-  Sclera  [x]  Normal  [] Abnormal -         Discharge [x]  None visible  [] Abnormal -    HENT:   [x] Normocephalic, atraumatic. [] Abnormal   [x] Mouth/Throat: Mucous membranes are moist.     External Ears [x] Normal  [] Abnormal-     Neck: [x] No visualized mass     Pulmonary/Chest: [x] Respiratory effort normal.  [] No visualized signs of difficulty breathing or respiratory distress        [] Abnormal-      Musculoskeletal:   [] Normal gait with no signs of ataxia         [x] Normal range of motion of neck        [x] Abnormal- left BKA. Neurological:        [x] No Facial Asymmetry (Cranial nerve 7 motor function) (limited exam to video visit)          [x] No gaze palsy        [] Abnormal-         Skin:        [x] No significant exanthematous lesions or discoloration noted on facial skin         [] Abnormal-            Psychiatric:       [x] Normal Affect [x] No Hallucinations        [] Abnormal-     Other pertinent observable physical exam findings-     ASSESSMENT/PLAN:   Diagnosis Orders   1. Hx of BKA, left ([facility] [address]  Needs inpatient PT to master skills needed for walking with prosthesis. 2. Pain  DISCONTINUED: traMADol (ULTRAM) 50 MG tablet   3. Primary hypertension  DASH and low sodium diet discussed. 4. Controlled type 2 diabetes mellitus with complication, without long-term current use of insulin (HCC)  Diet, monitoring and med compliance discussed. No follow-ups on file. Denis Serum, was evaluated through a synchronous (real-time) audio-video encounter. The patient (or guardian if applicable) is aware that this is a billable service, which includes applicable co-pays. This Virtual Visit was conducted with patient's (and/or legal guardian's) consent. The visit was conducted pursuant to the emergency declaration under the [address] [place] [address] and the [employer] [place] [employer]. Patient identification was verified, and a caregiver was present when appropriate. Provider was located at office. Total time spent on this encounter: billing not based on time. --Luis Ray MD on 7/2/2022 at 11:34 PM    An electronic signature was used to authenticate this note.

## 2022-07-05 RX ORDER — FUROSEMIDE 40 MG/1
40 TABLET ORAL DAILY
Qty: 60 TABLET | Refills: 3 | Status: SHIPPED | OUTPATIENT
Start: 2022-07-05 | End: 2022-08-22

## 2022-07-05 RX ORDER — FUROSEMIDE 40 MG/1
40 TABLET ORAL DAILY
Qty: 60 TABLET | Refills: 3 | OUTPATIENT
Start: 2022-07-05

## 2022-07-05 NOTE — TELEPHONE ENCOUNTER
Spoke with the patient and advised him that he needs an appt for refills . Patient states that he has recently had his leg amputated and is unable to come out for an appt at this time . He states that PT has been working with him but it hasnt been going well . He states that he may be going to a rehab facility but unsure at this time.

## 2022-07-05 NOTE — TELEPHONE ENCOUNTER
Received refill request for Eliquis from [facility] AND [facility]. Last OV: 05/12/2021 w/ NPCP    Last Refill: 06/08/2022 #60 w/ 0 refills PER LAST REFILL ; PATIENT NEEDS APPT     Next Appointment: none at this time    [facility] for patient to return call in regards to refill and needing appt.

## 2022-07-05 NOTE — TELEPHONE ENCOUNTER
We can give him 30 days but he must make and appointment and keep the appointment. His device interrogation on 6/30/2022 also showed that he may be holding onto extra fluid. Please inquire about CHF symptoms such as cough, shortness of breath, dyspnea on exertion, weight gain of 2-3 pounds in a day or 5 pounds in a week, lower extremity edema or inability to lay flat without feeling short of breath.

## 2022-07-06 ENCOUNTER — TELEPHONE (OUTPATIENT)
Dept: INTERNAL MEDICINE CLINIC | Age: 72
End: 2022-07-06

## 2022-07-06 NOTE — TELEPHONE ENCOUNTER
Patient has received approval to transfer to [facility] for physical therapy till he's able to come back home. chavo

## 2022-07-30 ENCOUNTER — HOSPITAL ENCOUNTER (EMERGENCY)
Age: 72
Discharge: HOME | End: 2022-07-30
Attending: STUDENT IN AN ORGANIZED HEALTH CARE EDUCATION/TRAINING PROGRAM
Payer: MEDICARE

## 2022-07-30 ENCOUNTER — NURSE ONLY (OUTPATIENT)
Dept: CARDIOLOGY CLINIC | Age: 72
End: 2022-07-30

## 2022-07-30 ENCOUNTER — APPOINTMENT (OUTPATIENT)
Dept: GENERAL RADIOLOGY | Age: 72
End: 2022-07-30
Payer: MEDICARE

## 2022-07-30 VITALS
RESPIRATION RATE: 18 BRPM | DIASTOLIC BLOOD PRESSURE: 78 MMHG | OXYGEN SATURATION: 100 % | WEIGHT: 273 LBS | TEMPERATURE: 97.7 F | BODY MASS INDEX: 36.02 KG/M2 | HEART RATE: 67 BPM | SYSTOLIC BLOOD PRESSURE: 145 MMHG

## 2022-07-30 DIAGNOSIS — I95.9 HYPOTENSION, UNSPECIFIED HYPOTENSION TYPE: Primary | ICD-10-CM

## 2022-07-30 LAB
A/G RATIO: 0.7 (ref 1.1–2.2)
ALBUMIN SERPL-MCNC: 3 G/DL (ref 3.4–5)
ALP BLD-CCNC: 110 U/L (ref 40–129)
ALT SERPL-CCNC: 20 U/L (ref 10–40)
AMORPHOUS: ABNORMAL /HPF
ANION GAP SERPL CALCULATED.3IONS-SCNC: 11 MMOL/L (ref 3–16)
AST SERPL-CCNC: 35 U/L (ref 15–37)
BACTERIA: ABNORMAL /HPF
BASOPHILS ABSOLUTE: 0 K/UL (ref 0–0.2)
BASOPHILS RELATIVE PERCENT: 0.6 %
BILIRUB SERPL-MCNC: 0.3 MG/DL (ref 0–1)
BILIRUBIN URINE: NEGATIVE
BLOOD, URINE: ABNORMAL
BUN BLDV-MCNC: 39 MG/DL (ref 7–20)
CALCIUM SERPL-MCNC: 8.8 MG/DL (ref 8.3–10.6)
CHLORIDE BLD-SCNC: 108 MMOL/L (ref 99–110)
CLARITY: CLEAR
CO2: 18 MMOL/L (ref 21–32)
COLOR: YELLOW
CREAT SERPL-MCNC: 1.7 MG/DL (ref 0.8–1.3)
EOSINOPHILS ABSOLUTE: 0.2 K/UL (ref 0–0.6)
EOSINOPHILS RELATIVE PERCENT: 4 %
EPITHELIAL CELLS, UA: ABNORMAL /HPF (ref 0–5)
GFR AFRICAN AMERICAN: 48
GFR NON-AFRICAN AMERICAN: 40
GLUCOSE BLD-MCNC: 68 MG/DL (ref 70–99)
GLUCOSE URINE: 250 MG/DL
HCT VFR BLD CALC: 40.1 % (ref 40.5–52.5)
HEMOGLOBIN: 13.2 G/DL (ref 13.5–17.5)
KETONES, URINE: NEGATIVE MG/DL
LEUKOCYTE ESTERASE, URINE: ABNORMAL
LYMPHOCYTES ABSOLUTE: 1.3 K/UL (ref 1–5.1)
LYMPHOCYTES RELATIVE PERCENT: 22.9 %
MCH RBC QN AUTO: 27.5 PG (ref 26–34)
MCHC RBC AUTO-ENTMCNC: 33 G/DL (ref 31–36)
MCV RBC AUTO: 83.3 FL (ref 80–100)
MICROSCOPIC EXAMINATION: YES
MONOCYTES ABSOLUTE: 0.6 K/UL (ref 0–1.3)
MONOCYTES RELATIVE PERCENT: 10.8 %
NEUTROPHILS ABSOLUTE: 3.4 K/UL (ref 1.7–7.7)
NEUTROPHILS RELATIVE PERCENT: 61.7 %
NITRITE, URINE: POSITIVE
PDW BLD-RTO: 19.8 % (ref 12.4–15.4)
PH UA: 6 (ref 5–8)
PLATELET # BLD: 199 K/UL (ref 135–450)
PMV BLD AUTO: 9.3 FL (ref 5–10.5)
POTASSIUM SERPL-SCNC: 4.7 MMOL/L (ref 3.5–5.1)
POTASSIUM SERPL-SCNC: 6.1 MMOL/L (ref 3.5–5.1)
PRO-BNP: 118 PG/ML (ref 0–124)
PROTEIN UA: NEGATIVE MG/DL
RBC # BLD: 4.81 M/UL (ref 4.2–5.9)
RBC UA: ABNORMAL /HPF (ref 0–4)
SODIUM BLD-SCNC: 137 MMOL/L (ref 136–145)
SPECIFIC GRAVITY UA: 1.02 (ref 1–1.03)
TOTAL PROTEIN: 7.1 G/DL (ref 6.4–8.2)
TROPONIN: <0.01 NG/ML
URINE TYPE: ABNORMAL
UROBILINOGEN, URINE: 0.2 E.U./DL
WBC # BLD: 5.6 K/UL (ref 4–11)
WBC UA: ABNORMAL /HPF (ref 0–5)

## 2022-07-30 PROCEDURE — 84132 ASSAY OF SERUM POTASSIUM: CPT

## 2022-07-30 PROCEDURE — 93005 ELECTROCARDIOGRAM TRACING: CPT | Performed by: STUDENT IN AN ORGANIZED HEALTH CARE EDUCATION/TRAINING PROGRAM

## 2022-07-30 PROCEDURE — 84484 ASSAY OF TROPONIN QUANT: CPT

## 2022-07-30 PROCEDURE — 80053 COMPREHEN METABOLIC PANEL: CPT

## 2022-07-30 PROCEDURE — 83880 ASSAY OF NATRIURETIC PEPTIDE: CPT

## 2022-07-30 PROCEDURE — 71045 X-RAY EXAM CHEST 1 VIEW: CPT

## 2022-07-30 PROCEDURE — 99285 EMERGENCY DEPT VISIT HI MDM: CPT

## 2022-07-30 PROCEDURE — 81001 URINALYSIS AUTO W/SCOPE: CPT

## 2022-07-30 PROCEDURE — 85025 COMPLETE CBC W/AUTO DIFF WBC: CPT

## 2022-07-30 RX ORDER — CARVEDILOL 6.25 MG/1
6.25 TABLET ORAL 2 TIMES DAILY
Qty: 60 TABLET | Refills: 3 | Status: SHIPPED | OUTPATIENT
Start: 2022-07-30 | End: 2022-08-22

## 2022-07-30 RX ORDER — SODIUM CHLORIDE, SODIUM LACTATE, POTASSIUM CHLORIDE, CALCIUM CHLORIDE 600; 310; 30; 20 MG/100ML; MG/100ML; MG/100ML; MG/100ML
1000 INJECTION, SOLUTION INTRAVENOUS ONCE
Status: DISCONTINUED | OUTPATIENT
Start: 2022-07-30 | End: 2022-07-31 | Stop reason: HOSPADM

## 2022-07-30 RX ORDER — 0.9 % SODIUM CHLORIDE 0.9 %
1000 INTRAVENOUS SOLUTION INTRAVENOUS ONCE
Status: DISCONTINUED | OUTPATIENT
Start: 2022-07-30 | End: 2022-07-31 | Stop reason: HOSPADM

## 2022-07-30 ASSESSMENT — ENCOUNTER SYMPTOMS
VOMITING: 0
SHORTNESS OF BREATH: 1
ABDOMINAL PAIN: 0
CHEST TIGHTNESS: 0
NAUSEA: 0

## 2022-07-30 ASSESSMENT — PAIN - FUNCTIONAL ASSESSMENT: PAIN_FUNCTIONAL_ASSESSMENT: NONE - DENIES PAIN

## 2022-07-30 NOTE — ED PROVIDER NOTES
[address] [facility] ER          ATTENDING PHYSICIAN NOTE       Date of evaluation: 7/30/2022    Chief Complaint     Hypotension (Reportedly 80s/60s at nursing facility )    History of Present Illness     Mony Murry is a 73 y.o. male who presents from his rehab facility with reported hypotension to the 60s/40s. Patient states he has felt short of breath recently but otherwise denies complaints. He states his blood pressure has been 95-78I systolic at his facility, but he is unsure if there equipment functions properly. He denies lightheadedness, diaphoresis, syncope, chest pain, abdominal pain, nausea, or vomiting. He is recently s/p L BKA, which is why he is at the rehab facility. Review of Systems     Review of Systems   Constitutional:  Negative for diaphoresis, fatigue and fever. HENT:  Negative for congestion. Respiratory:  Positive for shortness of breath. Negative for chest tightness. Cardiovascular:  Negative for chest pain, palpitations and leg swelling. Gastrointestinal:  Negative for abdominal pain, nausea and vomiting. Genitourinary:  Negative for flank pain. Musculoskeletal:  Negative for arthralgias and myalgias. Neurological:  Negative for dizziness, syncope, weakness, light-headedness, numbness and headaches. Psychiatric/Behavioral:  Negative for agitation and confusion.       Past Medical, Surgical, Family, and Social History     He has a past medical history of Atrial fibrillation ([address] Back pain, Cardiomyopathy, CHF (congestive heart failure) ([address] COPD (chronic obstructive pulmonary disease) ([address] Dyslipidemia, GERD (gastroesophageal reflux disease), Hyperlipidemia, Hypertension, Hypothyroidism, Leg edema, MRSA (methicillin resistant staph aureus) culture positive, MRSA nasal colonization, Obesity, Prolonged emergence from general anesthesia, 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, Stroke [facility]), Thyroid disease, Type 2 diabetes CARVEDILOL (COREG) 12.5 MG TABLET    Take 1 tablet by mouth 2 times daily (with meals)    CINNAMON 500 MG CAPS    Take 1 capsule by mouth daily    COENZYME Q10 (CO Q 10) 100 MG CAPS    Take 1 capsule by mouth daily    CONTINUOUS BLOOD GLUC  (FREESTYLE KWESI 14 DAY READER) ANITA    Use as Directed Dx E11.9    CONTINUOUS BLOOD GLUC SENSOR (FREESTYLE KWESI 14 DAY SENSOR) MISC    Use as directed Dx E11.9    DAPAGLIFLOZIN (FARXIGA) 10 MG TABLET    Take 1 tablet by mouth every morning    DOCUSATE (COLACE, DULCOLAX) 100 MG CAPS    Take 100 mg by mouth daily    FERROUS SULFATE (FE TABS 325) 325 (65 FE) MG EC TABLET    Take 325 mg by mouth daily    FERROUS SULFATE (IRON 325) 325 (65 FE) MG TABLET    Take 1 tablet by mouth 2 times daily    FUROSEMIDE (LASIX) 40 MG TABLET    TAKE 1 TABLET BY MOUTH DAILY    HANDICAP PLACARD MISC    by Does not apply route Diagnosis: heart failure. Expires: 12/7/24.     HYDROCHLOROTHIAZIDE (HYDRODIURIL) 25 MG TABLET    Take 1 tablet by mouth daily    INSULIN PEN NEEDLE (B-D UF III MINI PEN NEEDLES) 31G X 5 MM MISC    Inject 1 each into the skin daily    INSULIN SYRINGE-NEEDLE U-100 (INSULIN SYRINGE .5CC/31GX5/16\") 31G X 5/16\" 0.5 ML MISC    Patient tests bid    LEVOTHYROXINE (SYNTHROID) 25 MCG TABLET    TAKE 1 TABLET BY MOUTH DAILY    LISINOPRIL (PRINIVIL;ZESTRIL) 2.5 MG TABLET    Take 1 tablet by mouth daily    METHOCARBAMOL (ROBAXIN) 750 MG TABLET    Take 1 tablet by mouth 3 times daily as needed (muscle spasm.)    METRONIDAZOLE (FLAGYL) 250 MG TABLET    Take 200 mg by mouth 2 times daily    NOVOLOG FLEXPEN 100 UNIT/ML INJECTION PEN    ADMINISTER 8 TO 10 UNITS UNDER THE SKIN THREE TIMES DAILY BEFORE MEALS    ONETOUCH ULTRA STRIP    USE WITH GLUCOSE METER THREE TIMES DAILY AND AS NEEDED    OXYBUTYNIN (DITROPAN-XL) 5 MG EXTENDED RELEASE TABLET    TAKE 1 TABLET BY MOUTH DAILY FOR OVERACTIVE BLADDER    POTASSIUM CHLORIDE (KLOR-CON M) 20 MEQ EXTENDED RELEASE TABLET    TAKE 1 TABLET BY MOUTH DAILY    SPIRONOLACTONE (ALDACTONE) 25 MG TABLET    TAKE 1 TABLET BY MOUTH DAILY    TAMSULOSIN (FLOMAX) 0.4 MG CAPSULE    TAKE 1 CAPSULE BY MOUTH DAILY    THERAPEUTIC MULTIVITAMIN-MINERALS (THERAGRAN-M) TABLET    Take 1 tablet by mouth daily. VITAMIN D (CHOLECALCIFEROL) 1000 UNITS CAPS CAPSULE    Take 2,000 Units by mouth nightly        Allergies     He has No Known Allergies. Physical Exam     INITIAL VITALS: BP: 134/70, Temp: 97.7 °F (36.5 °C), Heart Rate: 73, Resp: 17, SpO2: 100 %   Physical Exam  Constitutional:       General: He is not in acute distress. Appearance: He is not toxic-appearing. HENT:      Head: Normocephalic and atraumatic. Nose: No congestion. Eyes:      Extraocular Movements: Extraocular movements intact. Pupils: Pupils are equal, round, and reactive to light. Cardiovascular:      Rate and Rhythm: Normal rate and regular rhythm. Pulses: Normal pulses. Pulmonary:      Effort: Pulmonary effort is normal.      Breath sounds: Normal breath sounds. No wheezing or rales. Abdominal:      General: Abdomen is flat. Palpations: Abdomen is soft. Musculoskeletal:      Cervical back: Neck supple. Left lower leg: No edema. Comments: RLE BKA. Skin:     General: Skin is warm and dry. Neurological:      General: No focal deficit present. Mental Status: He is alert and oriented to person, place, and time. Motor: No weakness. Psychiatric:         Mood and Affect: Mood normal.         Behavior: Behavior normal.       Diagnostic Results     EKG   Indication: Hypotension  Interpretation: AV sequential paced rhythm at 69 bpm.    RADIOLOGY:  XR CHEST PORTABLE   Final Result      No acute cardiopulmonary findings.           LABS:   Results for orders placed or performed during the hospital encounter of 07/30/22   CBC with Auto Differential   Result Value Ref Range    WBC 5.6 4.0 - 11.0 K/uL    RBC 4.81 4.20 - 5.90 M/uL    Hemoglobin 13.2 (L) 13.5 - 17.5 g/dL    Hematocrit 40.1 (L) 40.5 - 52.5 %    MCV 83.3 80.0 - 100.0 fL    MCH 27.5 26.0 - 34.0 pg    MCHC 33.0 31.0 - 36.0 g/dL    RDW 19.8 (H) 12.4 - 15.4 %    Platelets 435 655 - 173 K/uL    MPV 9.3 5.0 - 10.5 fL    Neutrophils % 61.7 %    Lymphocytes % 22.9 %    Monocytes % 10.8 %    Eosinophils % 4.0 %    Basophils % 0.6 %    Neutrophils Absolute 3.4 1.7 - 7.7 K/uL    Lymphocytes Absolute 1.3 1.0 - 5.1 K/uL    Monocytes Absolute 0.6 0.0 - 1.3 K/uL    Eosinophils Absolute 0.2 0.0 - 0.6 K/uL    Basophils Absolute 0.0 0.0 - 0.2 K/uL   CMP   Result Value Ref Range    Sodium 137 136 - 145 mmol/L    Potassium 6.1 (HH) 3.5 - 5.1 mmol/L    Chloride 108 99 - 110 mmol/L    CO2 18 (L) 21 - 32 mmol/L    Anion Gap 11 3 - 16    Glucose 68 (L) 70 - 99 mg/dL    BUN 39 (H) 7 - 20 mg/dL    Creatinine 1.7 (H) 0.8 - 1.3 mg/dL    GFR Non- 40 (A) >60    GFR  48 (A) >60    Calcium 8.8 8.3 - 10.6 mg/dL    Total Protein 7.1 6.4 - 8.2 g/dL    Albumin 3.0 (L) 3.4 - 5.0 g/dL    Albumin/Globulin Ratio 0.7 (L) 1.1 - 2.2    Total Bilirubin 0.3 0.0 - 1.0 mg/dL    Alkaline Phosphatase 110 40 - 129 U/L    ALT 20 10 - 40 U/L    AST 35 15 - 37 U/L   Troponin   Result Value Ref Range    Troponin <0.01 <0.01 ng/mL   Brain Natriuretic Peptide   Result Value Ref Range    Pro- 0 - 124 pg/mL   Urinalysis with Microscopic   Result Value Ref Range    Urine Type Other    Potassium   Result Value Ref Range    Potassium 4.7 3.5 - 5.1 mmol/L       ED BEDSIDE ULTRASOUND:  No results found. RECENT VITALS:  BP: (!) 145/78,Temp: 97.7 °F (36.5 °C), Heart Rate: 67, Resp: 18, SpO2: 100 %     Procedures     None    ED Course     Nursing Notes, Past Medical Hx, Past Surgical Hx, Social Hx,Allergies, and Family Hx were reviewed.     patient was given the following medications:  Orders Placed This Encounter   Medications    lactated ringers infusion 1,000 mL    0.9 % sodium chloride bolus     CONSULTS:  IP CONSULT TO

## 2022-07-31 LAB
EKG ATRIAL RATE: 69 BPM
EKG DIAGNOSIS: NORMAL
EKG P-R INTERVAL: 170 MS
EKG Q-T INTERVAL: 490 MS
EKG QRS DURATION: 162 MS
EKG QTC CALCULATION (BAZETT): 525 MS
EKG R AXIS: 195 DEGREES
EKG T AXIS: 17 DEGREES
EKG VENTRICULAR RATE: 69 BPM

## 2022-07-31 RX ORDER — CEPHALEXIN 500 MG/1
500 CAPSULE ORAL 3 TIMES DAILY
Qty: 21 CAPSULE | Refills: 0 | Status: SHIPPED | OUTPATIENT
Start: 2022-07-31 | End: 2022-08-07

## 2022-07-31 NOTE — ED NOTES
Attempted to call report to [facility] with no one answering the phone.      Suraj Tapia RN  07/30/22 9428

## 2022-07-31 NOTE — ED NOTES
Patient prepared for and ready to be discharged. Patient discharged at this time in no acute distress after verbalizing understanding of discharge instructions. Patient left after receiving After Visit Summary instructions.       Butch Prescott RN  07/30/22 4368

## 2022-07-31 NOTE — DISCHARGE INSTRUCTIONS
Please continue to take all medications as directed except for your Coreg. Please discontinue your usual morning dose of Coreg and take only 6.25 mg tablet by mouth in the evening. Please follow-up with your primary care physician for repeat labs in the next week and keep a log of your blood pressure for any future medication adjustments as necessary. Please call 911 or return to the emergency department for any new or worsening symptoms, fainting chest pain difficulty breathing or other symptoms concerning for you.

## 2022-07-31 NOTE — ED PROVIDER NOTES
[address] [facility] ENCOUNTER          ATTENDING PHYSICIAN NOTE       Date of evaluation: 7/30/2022    ADDENDUM:      Care of this patient was assumed from Dr. Elisha Leone. The patient was seen for Hypotension (Reportedly 80s/60s at nursing facility )  . The patient's initial evaluation and plan have been discussed with the prior provider who initially evaluated the patient. Nursing Notes, Past Medical Hx, Past Surgical Hx, Social Hx, Allergies, and Family Hx were all reviewed. Diagnostic Results       RADIOLOGY:  XR CHEST PORTABLE   Final Result      No acute cardiopulmonary findings.           LABS:   Results for orders placed or performed during the hospital encounter of 07/30/22   CBC with Auto Differential   Result Value Ref Range    WBC 5.6 4.0 - 11.0 K/uL    RBC 4.81 4.20 - 5.90 M/uL    Hemoglobin 13.2 (L) 13.5 - 17.5 g/dL    Hematocrit 40.1 (L) 40.5 - 52.5 %    MCV 83.3 80.0 - 100.0 fL    MCH 27.5 26.0 - 34.0 pg    MCHC 33.0 31.0 - 36.0 g/dL    RDW 19.8 (H) 12.4 - 15.4 %    Platelets 611 638 - 047 K/uL    MPV 9.3 5.0 - 10.5 fL    Neutrophils % 61.7 %    Lymphocytes % 22.9 %    Monocytes % 10.8 %    Eosinophils % 4.0 %    Basophils % 0.6 %    Neutrophils Absolute 3.4 1.7 - 7.7 K/uL    Lymphocytes Absolute 1.3 1.0 - 5.1 K/uL    Monocytes Absolute 0.6 0.0 - 1.3 K/uL    Eosinophils Absolute 0.2 0.0 - 0.6 K/uL    Basophils Absolute 0.0 0.0 - 0.2 K/uL   CMP   Result Value Ref Range    Sodium 137 136 - 145 mmol/L    Potassium 6.1 (HH) 3.5 - 5.1 mmol/L    Chloride 108 99 - 110 mmol/L    CO2 18 (L) 21 - 32 mmol/L    Anion Gap 11 3 - 16    Glucose 68 (L) 70 - 99 mg/dL    BUN 39 (H) 7 - 20 mg/dL    Creatinine 1.7 (H) 0.8 - 1.3 mg/dL    GFR Non- 40 (A) >60    GFR  48 (A) >60    Calcium 8.8 8.3 - 10.6 mg/dL    Total Protein 7.1 6.4 - 8.2 g/dL    Albumin 3.0 (L) 3.4 - 5.0 g/dL    Albumin/Globulin Ratio 0.7 (L) 1.1 - 2.2    Total Bilirubin 0.3 0.0 - 1.0 mg/dL Alkaline Phosphatase 110 40 - 129 U/L    ALT 20 10 - 40 U/L    AST 35 15 - 37 U/L   Troponin   Result Value Ref Range    Troponin <0.01 <0.01 ng/mL   Brain Natriuretic Peptide   Result Value Ref Range    Pro- 0 - 124 pg/mL   Urinalysis with Microscopic   Result Value Ref Range    Color, UA Yellow Straw/Yellow    Clarity, UA Clear Clear    Glucose, Ur 250 (A) Negative mg/dL    Bilirubin Urine Negative Negative    Ketones, Urine Negative Negative mg/dL    Specific Gravity, UA 1.020 1.005 - 1.030    Blood, Urine TRACE-INTACT (A) Negative    pH, UA 6.0 5.0 - 8.0    Protein, UA Negative Negative mg/dL    Urobilinogen, Urine 0.2 <2.0 E.U./dL    Nitrite, Urine POSITIVE (A) Negative    Leukocyte Esterase, Urine LARGE (A) Negative    Microscopic Examination YES     Urine Type Other     WBC, UA  (A) 0 - 5 /HPF    RBC, UA 3-4 0 - 4 /HPF    Epithelial Cells, UA 2-5 0 - 5 /HPF    Bacteria, UA 4+ (A) None Seen /HPF    Amorphous, UA 1+ /HPF   Potassium   Result Value Ref Range    Potassium 4.7 3.5 - 5.1 mmol/L       RECENT VITALS:  BP: (!) 145/78, Temp: 97.7 °F (36.5 °C), Heart Rate: 67, Resp: 18, SpO2: 100 %     Procedures         ED Course     The patient was given the following medications:  Orders Placed This Encounter   Medications    lactated ringers infusion 1,000 mL    0.9 % sodium chloride bolus    carvedilol (COREG) 6.25 MG tablet     Sig: Take 1 tablet by mouth in the morning and 1 tablet before bedtime. Dispense:  60 tablet     Refill:  3       CONSULTS:  [facility] / ASSESSMENT / Arn Kelsie is a 73 y.o. male with a past medical history of diabetes, hypertension, peripheral vascular disease who presents to the emergency room today due to concern for low blood pressures.   Patient was seen by the off going provider, laboratory studies had been ordered were reviewed in detail and notable predominantly only for very mild RODERICK with a creatinine elevated to 1.7. Patient's blood pressure has been completely normal here he otherwise has no specific concerning complaints including no chest pain or trouble breathing no syncopal episodes. He has had several changes in his blood pressure medications over the last 6 months as well as major changes in his activity status and I suspect this is the likely etiology of his symptoms today. We will decrease the patient's Coreg to once daily dosing, this decision was made after conversation with his primary care physician's office to help arrange close outpatient follow-up. Clinical Impression     1. Hypotension, unspecified hypotension type        Disposition     PATIENT REFERRED TO:  No follow-up provider specified. DISCHARGE MEDICATIONS:  New Prescriptions    CARVEDILOL (COREG) 6.25 MG TABLET    Take 1 tablet by mouth in the morning and 1 tablet before bedtime.        DISPOSITION Decision To Discharge 07/30/2022 08:55:56 PM         Tim Hodgkins, MD  07/31/22 1262

## 2022-07-31 NOTE — ED NOTES
Adrienne Houston Nurse at [facility] and [address] [place] [address] at [phone], aware of positive uranalysis and MD Dr. Cortney Terrazas requested RX for keflex 500 mg in morning, afternoon and evening to be given x 7 days. Urine culture added on. Fax # [phone].       Hung Durand RN  07/31/22 7015

## 2022-08-01 ENCOUNTER — NURSE ONLY (OUTPATIENT)
Dept: CARDIOLOGY CLINIC | Age: 72
End: 2022-08-01

## 2022-08-09 NOTE — TELEPHONE ENCOUNTER
Called number on file and spoke with his wife. She states patient is currently at [facility]. [facility] their number is [phone]. Called and scheduled appointment for 08/22/2022. Please sign medication.

## 2022-08-19 NOTE — PROGRESS NOTES
hypotension but says that has resolved and BP has been stable. Denies any chest pain, dyspnea or edema. No palpitations. No bleeding issues. Allergies:  No Known Allergies  Home Medications:  Prior to Visit Medications    Medication Sig Taking?  Authorizing Provider   carvedilol (COREG) 3.125 MG tablet Take 1 tablet by mouth 2 times daily Yes KENNY Dela Cruz CNP   lisinopril (PRINIVIL;ZESTRIL) 2.5 MG tablet Take 1 tablet by mouth daily Yes KENNY Dela Cruz CNP   furosemide (LASIX) 40 MG tablet Take 0.5 tablets by mouth daily Yes KENNY Dela Cruz CNP   apixaban (ELIQUIS) 5 MG TABS tablet TAKE 1 TABLET BY MOUTH TWICE DAILY Yes Hayden Li MD   amiodarone (CORDARONE) 200 MG tablet TAKE 1 TABLET BY MOUTH DAILY Yes KENNY Field CNP   methocarbamol (ROBAXIN) 750 MG tablet Take 1 tablet by mouth 3 times daily as needed (muscle spasm.) Yes Thierry De La Garza MD   docusate (COLACE, DULCOLAX) 100 MG CAPS Take 100 mg by mouth daily Yes Historical Provider, MD   NOVOLOG FLEXPEN 100 UNIT/ML injection pen ADMINISTER 8 TO 10 UNITS UNDER THE SKIN THREE TIMES DAILY BEFORE MEALS  Patient taking differently: Sliding scale Yes Thierry De La Garza MD   oxybutynin (DITROPAN-XL) 5 MG extended release tablet TAKE 1 TABLET BY MOUTH DAILY FOR OVERACTIVE BLADDER Yes Thierry De La Garza MD   metroNIDAZOLE (FLAGYL) 250 MG tablet Take 200 mg by mouth 2 times daily Yes Historical Provider, MD   Blood Glucose Monitoring Suppl ([facility]) w/Device KIT 1 each by Does not apply route 2 times daily Yes Thierry De La Garza MD   blood glucose test strips (ASCENSIA AUTODISC VI;ONE TOUCH ULTRA TEST VI) strip 1 each by In Vitro route daily Test as directed,Dispense according to insurance formulary Yes Thierry De La Garza MD   potassium chloride (KLOR-CON M) 20 MEQ extended release tablet TAKE 1 TABLET BY MOUTH DAILY  Patient taking differently: Take 20 mEq by mouth daily 3 times per week Yes Thierry De La Garza MD   [facility] by Does not apply route Diagnosis: heart failure. Expires: 12/7/24. Yes Teresita Myers MD   tamsulosin (FLOMAX) 0.4 MG capsule TAKE 1 CAPSULE BY MOUTH DAILY Yes Teresita Myers MD   atorvastatin (LIPITOR) 40 MG tablet TAKE 1 TABLET BY MOUTH DAILY Yes Teresita Myers MD   levothyroxine (SYNTHROID) 25 MCG tablet TAKE 1 TABLET BY MOUTH DAILY Yes Teresita Myers MD   Insulin Pen Needle (B-D UF III MINI PEN NEEDLES) 31G X 5 MM MISC Inject 1 each into the skin daily Yes Teresita Myesr MD   blood glucose test strips (ONE TOUCH TEST STRIPS) strip 1 each by In Vitro route 3 times daily As needed. Yes Teresita Myers MD   dapagliflozin (FARXIGA) 10 MG tablet Take 1 tablet by mouth every morning Yes Teresita Myers MD   [facility] ULTRA strip USE WITH GLUCOSE METER THREE TIMES DAILY AND AS NEEDED Yes Teresita Myers MD   ferrous sulfate (FE TABS 325) 325 (65 Fe) MG EC tablet Take 325 mg by mouth daily Yes Historical Provider, MD   Continuous Blood Gluc  (FREESTYLE KWESI 14 DAY READER) ANITA Use as Directed Dx E11.9 Yes Teresita Myers MD   Continuous Blood Gluc Sensor (FREESTYLE KWESI 14 DAY SENSOR) MISC Use as directed Dx E11.9 Yes Teresita Myers MD   Blood Glucose Monitoring Suppl (ONE TOUCH ULTRA MINI) w/Device KIT 1 kit by Does not apply route three times daily Yes Teresita Myers MD   acetaminophen (TYLENOL) 325 MG tablet Take 2 tablets by mouth every 4 hours as needed for Pain Yes Ayana Rivero MD   Coenzyme Q10 (CO Q 10) 100 MG CAPS Take 1 capsule by mouth daily Yes Historical Provider, MD   Cinnamon 500 MG CAPS Take 1 capsule by mouth daily Yes Historical Provider, MD   Vitamin D (CHOLECALCIFEROL) 1000 UNITS CAPS capsule Take 2,000 Units by mouth nightly  Yes Historical Provider, MD   Insulin Syringe-Needle U-100 (INSULIN SYRINGE .5CC/31GX5/16\") 31G X 5/16\" 0.5 ML MISC Patient tests bid Yes Teresita Myers MD   therapeutic multivitamin-minerals [facility]) tablet Take 1 tablet by mouth daily.  Yes Historical MD Jayashree        Past Medical History:  Past Medical History:   Diagnosis Date    Atrial fibrillation ([facility] [address]     Back pain     Cardiomyopathy     CHF (congestive heart failure) (HCC)     COPD (chronic obstructive pulmonary disease) (HCC)     Dyslipidemia     GERD (gastroesophageal reflux disease)     Hyperlipidemia     Hypertension     Hypothyroidism     Leg edema     MRSA (methicillin resistant staph aureus) culture positive 10/5/15    foot/bone    MRSA nasal colonization 05/05/2017    Obesity     Prolonged emergence from general anesthesia     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     Stroke [facility])     Thyroid disease     Type 2 diabetes mellitus without complication ([facility] [address]     Type II or unspecified type diabetes mellitus without mention of complication, not stated as uncontrolled        Past Surgical History:   Past Surgical History:   Procedure Laterality Date    [address]  09/05/2017    Dr. Alexander Saunders 3/8/2019    COLONOSCOPY WITH MAC, UNASYN (3gm) performed by Denis Herring MD at [address] [facility] 8/9/2019    COLONOSCOPY POLYPECTOMY SNARE/COLD BIOPSY performed by Denis Herring MD at [address] [place] [address]  8/9/2019    COLONOSCOPY WITH BIOPSY performed by Denis Herring MD at [address] 8/28/2019    INCISION AND INCISIONAL DEBRIDEMENT OPEN FRACTURE RIGHT 2ND TOE SKIN AND BONE performed by Benjamin Viramontes MD at [facility] Left 7/22/2020    LEFT FOOT INCISION AND DRAINAGE WITH BONE BIOPSY AND REMOVAL OF FREE FLOATING BONE FRAGMENT performed by Eren Dial DPM at [facility]  1-2-10    GASTRIC BYPASS SURGERY      OTHER SURGICAL HISTORY  10/6/15    INCISION AND DRAINAGE RIGHT FOOT          OTHER SURGICAL HISTORY Right 10/8/15    INCISION AND DRAINAGE RIGHT FOOT      PACEMAKER PLACEMENT      UPPER GASTROINTESTINAL ENDOSCOPY N/A 3/8/2019    EGD BIOPSY GASTRIC H. PYLORI performed by Rosy Espinoza MD at [address] History:   reports that he quit smoking about 5 years ago. His smoking use included cigarettes. He has a 4.00 pack-year smoking history. He has never used smokeless tobacco. He reports current alcohol use. He reports that he does not use drugs. Family History:      Problem Relation Age of Onset    Hypertension Mother     Heart Disease Father     Hypertension Maternal Grandmother     Diabetes Maternal Grandmother        Review of Systems   Constitutional:  Negative for chills, fatigue, fever and unexpected weight change. HENT:  Negative for congestion, hearing loss, sinus pressure, sore throat and trouble swallowing. Respiratory:  Negative for cough, shortness of breath and wheezing. Cardiovascular:  Negative for chest pain, palpitations and leg swelling. Gastrointestinal:  Negative for abdominal pain, blood in stool, constipation, diarrhea, nausea and vomiting. Genitourinary:  Negative for hematuria. Musculoskeletal:  Positive for gait problem (recent L BKA). Negative for arthralgias, back pain and myalgias. Skin:  Negative for color change, rash and wound. Neurological:  Negative for dizziness, seizures, syncope, speech difficulty, weakness and light-headedness. Hematological:  Does not bruise/bleed easily. Physical Examination:  Vitals:    08/22/22 1401   BP: 118/68   Pulse: 76   SpO2: 96%      Wt Readings from Last 3 Encounters:   07/30/22 273 lb (123.8 kg)   04/26/22 (!) 320 lb (145.2 kg)   09/24/21 (!) 339 lb (153.8 kg)       Physical Exam  Vitals reviewed. Constitutional:       General: He is not in acute distress. Appearance: Normal appearance. HENT:      Head: Normocephalic and atraumatic. Nose: Nose normal.      Mouth/Throat:      Mouth: Mucous membranes are moist.   Eyes:      Conjunctiva/sclera: Conjunctivae normal.      Pupils: Pupils are equal, round, and reactive to light. Cardiovascular:      Rate and Rhythm: Normal rate and regular rhythm. Heart sounds: No murmur heard. No friction rub. No gallop. Pulmonary:      Effort: No respiratory distress. Breath sounds: No wheezing, rhonchi or rales. Abdominal:      General: Abdomen is flat. Bowel sounds are normal.      Palpations: Abdomen is soft. Musculoskeletal:         General: Normal range of motion. Right lower leg: No edema. Left lower leg: No edema. Comments: L BKA   Skin:     General: Skin is warm and dry. Findings: No bruising. Neurological:      General: No focal deficit present. Mental Status: He is alert and oriented to person, place, and time. Motor: No weakness. Psychiatric:         Mood and Affect: Mood normal.         Behavior: Behavior normal.        Pertinent labs, diagnostic, device, and imaging results reviewed as a part of this visit    LABS    CBC:   Lab Results   Component Value Date    WBC 5.6 2022    HGB 13.2 (L) 2022    HCT 40.1 (L) 2022    MCV 83.3 2022     2022     BMP:   Lab Results   Component Value Date    CREATININE 1.7 (H) 2022    BUN 39 (H) 2022     2022    K 4.7 2022     2022    CO2 18 (L) 2022     CrCl cannot be calculated (Unknown ideal weight.).    No results found for: BNP    Thyroid:   Lab Results   Component Value Date    TSH 1.93 2017    M8NIUQM 9.2 2012     Lipid Panel:   Lab Results   Component Value Date/Time    CHOL 119 2021 02:56 PM    HDL 65 2021 02:56 PM    HDL 56 11/15/2011 10:28 AM    TRIG 47 2021 02:56 PM     LFTs:  Lab Results   Component Value Date    ALT 20 2022    AST 35 2022    ALKPHOS 110 2022    BILITOT 0.3 2022     Coags:   Lab Results   Component Value Date    PROTIME 13.6 (H) 2019    INR 1.19 (H) 2019    APTT 41.6 (H) 2019       EC2022   V-paced at 81 BPM.    Echo: 5/24/19  Poor image quality. Overall left ventricular systolic function appears moderately reduced. Ejection fraction is visually estimated to be 35-40% with diffuse   hypokinesis. There is mildly increased left ventricular wall thickness. The right ventricle is not well visualized but ICD wire is present. Mild tricuspid regurgitation. Trivial aortic and mitral regurgitation. Cath: 7/5/07 [facility]   IMPRESSION:     1. Dilated cardiomyopathy with reduced ejection fraction as noted above. 2. 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 3. Normal renal arteries selectively injected. 4. Normal right femoral artery. EP Procedures:  1. Medtronic Bi-V ICD implant on 9/1/17 with Dr. Maegan Calhoun  2. Successful DCCV on 6/30/17 with Dr. Kathryn Lombardo interrogation: 8/22/2022   Normal device function. Battery life 1.9 years  V paced 97.9% (effective 97.6%)  A paced 1.4%  AV paced 94.5%  One NSVT for 28 beats on 8/8/22. Optivol WNL. Assessment & Plan:    Non-ischemic cardiomyopathy   - improved EF post implant to 35-40%   - s/p Medtronic Bi-V ICD implanted 2017   - device interrogation as above, device with normal function   - continue with routine follow up with device clinic    Paroxsymal atrial fibrillation   - first noted in 2017 following device implant   - issues with HF when in A fib previously   - on amiodarone 200mg QD   - CHADS2-VASc 4 (age, HTN, DM, HF) on Eliquis 5mg BID   - burden on device check <0.1%, will continue to follow    Chronic systolic heart failure   - EF 35-40%, NYHA class II   - off all meds aside from Lasix which I decreased to 20mg QD as recent creatinine up and Optivol WNL today   - resume Coreg at 3.125mg BID, lisinopril 2.5mg QD, can resume Aldactone and increase meds as BP will tolerate   - managed by Dr. Laureen Castrejon - advised pt to get in for f/u   - appears to be at baseline    Thank you for allowing to us to participate in the care of Merlene Fisher.     Return in about 1 year (around 8/22/2023) for an appointment with Samanta Anna NP.      KENNY Schmitz  [facility], [address] [place] [address], [address] [place] [address]  Phone: [phone]  Fax: [phone]    Electronically signed by KENNY Myles - CNP on 8/22/2022 at 2:53 PM

## 2022-08-22 ENCOUNTER — NURSE ONLY (OUTPATIENT)
Dept: CARDIOLOGY CLINIC | Age: 72
End: 2022-08-22
Payer: MEDICARE

## 2022-08-22 ENCOUNTER — OFFICE VISIT (OUTPATIENT)
Dept: CARDIOLOGY CLINIC | Age: 72
End: 2022-08-22
Payer: MEDICARE

## 2022-08-22 VITALS
SYSTOLIC BLOOD PRESSURE: 118 MMHG | BODY MASS INDEX: 36.02 KG/M2 | HEART RATE: 76 BPM | DIASTOLIC BLOOD PRESSURE: 68 MMHG | HEIGHT: 73 IN | OXYGEN SATURATION: 96 %

## 2022-08-22 DIAGNOSIS — I50.22 CHRONIC SYSTOLIC HEART FAILURE (HCC): ICD-10-CM

## 2022-08-22 DIAGNOSIS — Z95.810 CARDIAC RESYNCHRONIZATION THERAPY DEFIBRILLATOR (CRT-D) IN PLACE: ICD-10-CM

## 2022-08-22 DIAGNOSIS — I48.0 PAROXYSMAL ATRIAL FIBRILLATION (HCC): ICD-10-CM

## 2022-08-22 DIAGNOSIS — I42.0 DILATED CARDIOMYOPATHY (HCC): Primary | ICD-10-CM

## 2022-08-22 DIAGNOSIS — I10 ESSENTIAL HYPERTENSION: ICD-10-CM

## 2022-08-22 DIAGNOSIS — I47.29 NSVT (NONSUSTAINED VENTRICULAR TACHYCARDIA): ICD-10-CM

## 2022-08-22 PROCEDURE — 93000 ELECTROCARDIOGRAM COMPLETE: CPT | Performed by: NURSE PRACTITIONER

## 2022-08-22 PROCEDURE — 93290 INTERROG DEV EVAL ICPMS IP: CPT | Performed by: INTERNAL MEDICINE

## 2022-08-22 PROCEDURE — 93284 PRGRMG EVAL IMPLANTABLE DFB: CPT | Performed by: INTERNAL MEDICINE

## 2022-08-22 PROCEDURE — 99214 OFFICE O/P EST MOD 30 MIN: CPT | Performed by: NURSE PRACTITIONER

## 2022-08-22 PROCEDURE — 1123F ACP DISCUSS/DSCN MKR DOCD: CPT | Performed by: NURSE PRACTITIONER

## 2022-08-22 RX ORDER — FUROSEMIDE 40 MG/1
20 TABLET ORAL DAILY
Qty: 60 TABLET | Refills: 3
Start: 2022-08-22 | End: 2022-11-04

## 2022-08-22 RX ORDER — CARVEDILOL 3.12 MG/1
3.12 TABLET ORAL 2 TIMES DAILY
Qty: 60 TABLET | Refills: 5 | Status: SHIPPED | OUTPATIENT
Start: 2022-08-22

## 2022-08-22 RX ORDER — LISINOPRIL 2.5 MG/1
2.5 TABLET ORAL DAILY
Qty: 60 TABLET | Refills: 5 | Status: SHIPPED | OUTPATIENT
Start: 2022-08-22

## 2022-08-22 ASSESSMENT — ENCOUNTER SYMPTOMS
CONSTIPATION: 0
COLOR CHANGE: 0
COUGH: 0
WHEEZING: 0
SORE THROAT: 0
BACK PAIN: 0
ABDOMINAL PAIN: 0
SINUS PRESSURE: 0
SHORTNESS OF BREATH: 0
BLOOD IN STOOL: 0
DIARRHEA: 0
VOMITING: 0
TROUBLE SWALLOWING: 0
NAUSEA: 0

## 2022-09-01 RX ORDER — TAMSULOSIN HYDROCHLORIDE 0.4 MG/1
CAPSULE ORAL
Qty: 90 CAPSULE | Refills: 3 | Status: SHIPPED | OUTPATIENT
Start: 2022-09-01

## 2022-09-19 RX ORDER — OXYBUTYNIN CHLORIDE 5 MG/1
5 TABLET ORAL NIGHTLY
COMMUNITY

## 2022-09-19 RX ORDER — CARVEDILOL 6.25 MG/1
6.25 TABLET ORAL 2 TIMES DAILY
COMMUNITY
Start: 2021-10-06 | End: 2022-11-04 | Stop reason: ALTCHOICE

## 2022-09-19 RX ORDER — ALUMINUM HYDROXIDE, MAGNESIUM HYDROXIDE, DIMETHICONE 400; 400; 40 MG/5ML; MG/5ML; MG/5ML
1 LIQUID ORAL DAILY
COMMUNITY
Start: 2021-10-07

## 2022-09-19 RX ORDER — ATORVASTATIN CALCIUM 80 MG/1
40 TABLET, FILM COATED ORAL NIGHTLY
COMMUNITY
End: 2022-10-05

## 2022-09-19 RX ORDER — NALOXONE HYDROCHLORIDE 4 MG/.1ML
1 SPRAY NASAL PRN
COMMUNITY
Start: 2021-12-03

## 2022-09-19 NOTE — PATIENT INSTRUCTIONS
[facility] Locations - No appointment necessary. @ indicates the location is open Saturdays in addition to Monday through Friday. Call your preferred location for test preparation, business hours and other information you need. [employer] accepts [employer]. [facility]    @ [place] [facility] [address] [facility] Wei Mendoza. Debbie [address]   Ph: [phone] Merged with [facility] S[facility]. [address]   Ph: [phone]  @ [place] [facility] [address] [facility]   Ph: [phone]    [facility] [address] Tali Baumanivonsigifredo [address]   Ph: [phone] @ [place] [facility] [address]  Ph: [phone] @ [facility] Sv[facility]. [address] [place] [address]. Alexis Lewis [address]   Ph: [phone]     [facility] Svcs. [place] Mellisa [address]  Ph: [phone]    [place]   @ [place] [facility] [address] [facility] Franko Coleman., [place] [address]   Ph: [phone] [address] Steven Andinovarchidi Nash, [address] [place] [address]  Ph: [address] [place] [address]   [facility]:  [address] Lab Sv[facility] [facility]   Ph: [address] [facility] Lab Svcs. [address] [facility]   Franko Coleman., [address] [place] [address]   Ph: [phone]        Personalized Preventive Plan for Chayito Brush - 9/20/2022  Medicare offers a range of preventive health benefits. Some of the tests and screenings are paid in full while other may be subject to a deductible, co-insurance, and/or copay. Some of these benefits include a comprehensive review of your medical history including lifestyle, illnesses that may run in your family, and various assessments and screenings as appropriate.     After reviewing your medical record and screening and assessments performed today your provider may have ordered immunizations, labs, imaging, and/or referrals for you. A list of these orders (if applicable) as well as your Preventive Care list are included within your After Visit Summary for your review. Other Preventive Recommendations:    A preventive eye exam performed by an eye specialist is recommended every 1-2 years to screen for glaucoma; cataracts, macular degeneration, and other eye disorders. A preventive dental visit is recommended every 6 months. Try to get at least 150 minutes of exercise per week or 10,000 steps per day on a pedometer . Order or download the FREE \"Exercise & Physical Activity: Your Everyday Guide\" from [employer]. Call 4-297.976.9377 or search [employer] online. You need 8076-4576 mg of calcium and 3311-2667 IU of vitamin D per day. It is possible to meet your calcium requirement with diet alone, but a vitamin D supplement is usually necessary to meet this goal.  When exposed to the sun, use a sunscreen that protects against both UVA and UVB radiation with an SPF of 30 or greater. Reapply every 2 to 3 hours or after sweating, drying off with a towel, or swimming. Always wear a seat belt when traveling in a car. Always wear a helmet when riding a bicycle or motorcycle.

## 2022-09-20 ENCOUNTER — TELEMEDICINE (OUTPATIENT)
Dept: INTERNAL MEDICINE CLINIC | Age: 72
End: 2022-09-20
Payer: MEDICARE

## 2022-09-20 ENCOUNTER — TELEPHONE (OUTPATIENT)
Dept: INTERNAL MEDICINE CLINIC | Age: 72
End: 2022-09-20

## 2022-09-20 DIAGNOSIS — E83.42 HYPOMAGNESEMIA: Primary | ICD-10-CM

## 2022-09-20 DIAGNOSIS — I10 PRIMARY HYPERTENSION: ICD-10-CM

## 2022-09-20 DIAGNOSIS — E78.2 MIXED HYPERLIPIDEMIA: ICD-10-CM

## 2022-09-20 DIAGNOSIS — Z00.00 MEDICARE ANNUAL WELLNESS VISIT, SUBSEQUENT: Primary | ICD-10-CM

## 2022-09-20 DIAGNOSIS — Z12.5 PROSTATE CANCER SCREENING: ICD-10-CM

## 2022-09-20 PROCEDURE — 1123F ACP DISCUSS/DSCN MKR DOCD: CPT | Performed by: NURSE PRACTITIONER

## 2022-09-20 PROCEDURE — G0439 PPPS, SUBSEQ VISIT: HCPCS | Performed by: NURSE PRACTITIONER

## 2022-09-20 ASSESSMENT — PATIENT HEALTH QUESTIONNAIRE - PHQ9
SUM OF ALL RESPONSES TO PHQ QUESTIONS 1-9: 0
SUM OF ALL RESPONSES TO PHQ9 QUESTIONS 1 & 2: 0
SUM OF ALL RESPONSES TO PHQ QUESTIONS 1-9: 0
1. LITTLE INTEREST OR PLEASURE IN DOING THINGS: 0
2. FEELING DOWN, DEPRESSED OR HOPELESS: 0

## 2022-09-20 ASSESSMENT — LIFESTYLE VARIABLES
HOW MANY STANDARD DRINKS CONTAINING ALCOHOL DO YOU HAVE ON A TYPICAL DAY: 1 OR 2
HOW OFTEN DO YOU HAVE A DRINK CONTAINING ALCOHOL: MONTHLY OR LESS

## 2022-09-20 NOTE — TELEPHONE ENCOUNTER
----- Message from Hetal Valero sent at 9/13/2022  2:50 PM EDT -----  Subject: Appointment Request    Reason for Call: Established Patient Appointment needed: Semi-Routine   Cough, Cold Symptoms    QUESTIONS    Reason for appointment request? Available appointments did not meet   patient need     Additional Information for Provider? Pt tested positive for Covid   yesterday. He stated he just has a bit of a runny nose but he is worried   because he has had his leg amputated and he is overweight, diabetic and   has a pacemaker. I offered him a virtual visit but he stated he does not   have a MyChart and does not really want to set that up so he was wondering   if he could just have a phone call with his provider or someone available   as he has done that before.  Please advise patient as soon as possible.   ---------------------------------------------------------------------------  --------------  Formerly named [facility]  [phone]; OK to leave message on voicemail  ---------------------------------------------------------------------------  --------------  SCRIPT ANSWERS  COVID Screen: Red

## 2022-09-20 NOTE — PROGRESS NOTES
Medicare Annual Wellness Visit    Merlene Fisher is here for Medicare AWV    Assessment & Plan   Medicare annual wellness visit, subsequent    Recommendations for Preventive Services Due: see orders and patient instructions/AVS.  Recommended screening schedule for the next 5-10 years is provided to the patient in written form: see Patient Instructions/AVS.     Return for Medicare Annual Wellness Visit in 1 year. Subjective       Patient's complete Health Risk Assessment and screening values have been reviewed and are found in Flowsheets. The following problems were reviewed today and where indicated follow up appointments were made and/or referrals ordered. Positive Risk Factor Screenings with Interventions:             General Health and ACP:  General  In general, how would you say your health is?: Fair  In the past 7 days, have you experienced any of the following: New or Increased Pain, New or Increased Fatigue, Loneliness, Social Isolation, Stress or Anger?: (!) Yes  Select all that apply: (!) New or Increased Pain (PAIN IN LEFT SIDE)  Do you get the social and emotional support that you need?: Yes  Do you have a Living Will?: (!) No    Advance Directives       Power of  Living Will ACP-Advance Directive ACP-Power of     Not on File Filed on 07/03/17 Filed Not on File          General Health Risk Interventions:  Pain issues: Just got out of rehab facility.  Had left leg amputated in 1/2022  No Living Will: Patient declines ACP discussion/assistance    Health Habits/Nutrition:  Physical Activity: Sufficiently Active    Days of Exercise per Week: 7 days    Minutes of Exercise per Session: 60 min     Have you lost any weight without trying in the past 3 months?: (!) Yes     Have you seen the dentist within the past year?: (!) No  Health Habits/Nutrition Interventions:  Inadequate physical activity:  patient is not ready to increase his/her physical activity level at this time  Nutritional issues:  patient is not ready to address his/her nutritional/weight issues at this time  Dental exam overdue:  patient encouraged to make appointment with his/her dentist    Hearing/Vision:  Do you or your family notice any trouble with your hearing that hasn't been managed with hearing aids?: No  Do you have difficulty driving, watching TV, or doing any of your daily activities because of your eyesight?: No  Have you had an eye exam within the past year?: (!) No  No results found. Hearing/Vision Interventions:  Vision concerns:  patient encouraged to make appointment with his/her eye specialist     ADLs:  In the past 7 days, did you need help from others to perform any of the following everyday activities: Eating, dressing, grooming, bathing, toileting, or walking/balance?: (!) Yes  Select all that apply: (!) Eating, Dressing, Grooming, Bathing, Toileting, Walking/Balance  In the past 7 days, did you need help from others to take care of any of the following: Laundry, housekeeping, banking/finances, shopping, telephone use, food preparation, transportation, or taking medications?: (!) Yes  Select all that apply: RADSONE, Housekeeping  ADL Interventions:  Is waiting for [employer]          Objective      Patient-Reported Vitals  Patient-Reported Systolic (Top): 959 mmHg  Patient-Reported Diastolic (Bottom): 60 mmHg  Patient-Reported Temperature: 96.9  Patient-Reported Weight: 273  Patient-Reported Height: 6' 1            No Known Allergies  Prior to Visit Medications    Medication Sig Taking?  Authorizing Provider   carvedilol (COREG) 6.25 MG tablet Take 6.25 mg by mouth 2 times daily Yes Historical Provider, MD   oxybutynin (DITROPAN) 5 MG tablet Take 5 mg by mouth nightly Yes Historical Provider, MD   naloxone 4 MG/0.1ML LIQD nasal spray 1 spray by Nasal route as needed Yes Historical Provider, MD   Lactobacillus RhamnosusCOURTNEY, ( PROBIOTIC DIGESTIVE CARE) CAPS Take 1 capsule by mouth daily Yes Historical Provider, MD   calcium carbonate 1500 (600 Ca) MG TABS tablet Take 1,500 mg by mouth 2 times daily (with meals) Yes Historical Provider, MD   atorvastatin (LIPITOR) 80 MG tablet Take 40 mg by mouth nightly Yes Historical Provider, MD   tamsulosin (FLOMAX) 0.4 MG capsule TAKE 1 CAPSULE BY MOUTH DAILY Yes Eladio Santamaria MD   carvedilol (COREG) 3.125 MG tablet Take 1 tablet by mouth 2 times daily Yes KENNY Dela Cruz CNP   lisinopril (PRINIVIL;ZESTRIL) 2.5 MG tablet Take 1 tablet by mouth daily Yes KENNY Dela Cruz CNP   furosemide (LASIX) 40 MG tablet Take 0.5 tablets by mouth daily Yes KENNY Dela Cruz CNP   apixaban (ELIQUIS) 5 MG TABS tablet TAKE 1 TABLET BY MOUTH TWICE DAILY Yes Heri Emanuel MD   amiodarone (CORDARONE) 200 MG tablet TAKE 1 TABLET BY MOUTH DAILY Yes KENNY Carolina CNP   methocarbamol (ROBAXIN) 750 MG tablet Take 1 tablet by mouth 3 times daily as needed (muscle spasm.) Yes Eladio Santamaria MD   docusate (COLACE, DULCOLAX) 100 MG CAPS Take 100 mg by mouth daily Yes Historical Provider, MD   NOVOLOG FLEXPEN 100 UNIT/ML injection pen ADMINISTER 8 TO 10 UNITS UNDER THE SKIN THREE TIMES DAILY BEFORE MEALS  Patient taking differently: Sliding scale Yes Eladio Santamaria MD   oxybutynin (DITROPAN-XL) 5 MG extended release tablet TAKE 1 TABLET BY MOUTH DAILY FOR OVERACTIVE BLADDER Yes Eladio Santamaria MD   metroNIDAZOLE (FLAGYL) 250 MG tablet Take 200 mg by mouth 2 times daily Yes Historical Provider, MD   Blood Glucose Monitoring Suppl ([facility]) w/Device KIT 1 each by Does not apply route 2 times daily Yes Eladio Santamaria MD   blood glucose test strips (ASCENSIA AUTODISC VI;ONE TOUCH ULTRA TEST VI) strip 1 each by In Vitro route daily Test as directed,Dispense according to insurance formulary Yes Eladio Santamaria MD   potassium chloride (KLOR-CON M) 20 MEQ extended release tablet TAKE 1 TABLET BY MOUTH DAILY  Patient taking differently: Take 20 mEq by mouth daily 3 times per week Yes Saige Cagle MD   Handicap Placard MISC by Does not apply route Diagnosis: heart failure. Expires: 12/7/24. Yes Saige Cagle MD   atorvastatin (LIPITOR) 40 MG tablet TAKE 1 TABLET BY MOUTH DAILY Yes Saige Cagle MD   levothyroxine (SYNTHROID) 25 MCG tablet TAKE 1 TABLET BY MOUTH DAILY Yes Saige Cagle MD   Insulin Pen Needle (B-D UF III MINI PEN NEEDLES) 31G X 5 MM MISC Inject 1 each into the skin daily Yes Saige Cagle MD   blood glucose test strips (ONE TOUCH TEST STRIPS) strip 1 each by In Vitro route 3 times daily As needed. Yes Saige Cagle MD   dapagliflozin (FARXIGA) 10 MG tablet Take 1 tablet by mouth every morning Yes Saige Cagle MD   [facility] ULTRA strip USE WITH GLUCOSE METER THREE TIMES DAILY AND AS NEEDED Yes Saige Cagle MD   ferrous sulfate (FE TABS 325) 325 (65 Fe) MG EC tablet Take 325 mg by mouth daily Yes Historical Provider, MD   Continuous Blood Gluc  (FREESTYLE KWESI 14 DAY READER) ANITA Use as Directed Dx E11.9 Yes Saige Cagle MD   Continuous Blood Gluc Sensor (FREESTYLE KWESI 14 DAY SENSOR) MISC Use as directed Dx E11.9 Yes Saige Cagle MD   Blood Glucose Monitoring Suppl (ONE TOUCH ULTRA MINI) w/Device KIT 1 kit by Does not apply route three times daily Yes Saige Cagle MD   acetaminophen (TYLENOL) 325 MG tablet Take 2 tablets by mouth every 4 hours as needed for Pain Yes Donato Connor MD   Coenzyme Q10 (CO Q 10) 100 MG CAPS Take 1 capsule by mouth daily Yes Historical Provider, MD   Cinnamon 500 MG CAPS Take 1 capsule by mouth daily Yes Historical Provider, MD   Vitamin D (CHOLECALCIFEROL) 1000 UNITS CAPS capsule Take 2,000 Units by mouth nightly  Yes Historical Provider, MD   Insulin Syringe-Needle U-100 (INSULIN SYRINGE .5CC/31GX5/16\") 31G X 5/16\" 0.5 ML MISC Patient tests bid Yes Saige Cagle MD   therapeutic multivitamin-minerals [facility]) tablet Take 1 tablet by mouth daily.  Yes Historical Provider, MD Angy Mendez (Including outside providers/suppliers regularly involved in providing care):   Patient Care Team:  Julieth Blackburn MD as PCP - General (Internal Medicine)  Julieth Blackburn MD as PCP - [facility]  Pastor Albrecht MD as Consulting Physician (Pulmonology)     Reviewed and updated this visit:  Allergies  Meds           Corazon Martinez, was evaluated through a synchronous (real-time) audio-video encounter. The patient (or guardian if applicable) is aware that this is a billable service, which includes applicable co-pays. This Virtual Visit was conducted with patient's (and/or legal guardian's) consent. The visit was conducted pursuant to the emergency declaration under the [facility], [address] [place] [address] authority and the [employer] General Act. Patient identification was verified, and a caregiver was present when appropriate. The patient was located at Home: [address] [place], [place] [id number]. Provider was located at [facility] (Appt Dept): [address]  [address] [place] [address],  [address] [place] [address].

## 2022-09-20 NOTE — TELEPHONE ENCOUNTER
Six days out from covid diagnosis. Feeling well. No anti viral indicated. Advised dietary changes and supplements.

## 2022-09-22 ENCOUNTER — TELEPHONE (OUTPATIENT)
Dept: INTERNAL MEDICINE CLINIC | Age: 72
End: 2022-09-22

## 2022-09-22 NOTE — TELEPHONE ENCOUNTER
Bhavik Wills from [employer] is opening the patient services with RN, PT, OT HHA open today.   CLOTILDE

## 2022-09-23 ENCOUNTER — TELEPHONE (OUTPATIENT)
Dept: INTERNAL MEDICINE CLINIC | Age: 72
End: 2022-09-23

## 2022-09-23 DIAGNOSIS — N30.00 ACUTE CYSTITIS WITHOUT HEMATURIA: Primary | ICD-10-CM

## 2022-09-23 NOTE — TELEPHONE ENCOUNTER
Patient stated that he had an infection when he was in the hospital wanted to have ua done again to make sure he is okay and it is gone. Ua is order not sure if it needs to be ua  micro ordered please advise.

## 2022-09-27 ENCOUNTER — NURSE ONLY (OUTPATIENT)
Dept: CARDIOLOGY CLINIC | Age: 72
End: 2022-09-27
Payer: MEDICARE

## 2022-09-27 DIAGNOSIS — I48.0 PAROXYSMAL ATRIAL FIBRILLATION (HCC): ICD-10-CM

## 2022-09-27 DIAGNOSIS — Z95.810 CARDIAC RESYNCHRONIZATION THERAPY DEFIBRILLATOR (CRT-D) IN PLACE: ICD-10-CM

## 2022-09-27 DIAGNOSIS — I42.0 DILATED CARDIOMYOPATHY (HCC): ICD-10-CM

## 2022-09-27 DIAGNOSIS — I50.22 CHRONIC SYSTOLIC HEART FAILURE (HCC): Primary | ICD-10-CM

## 2022-09-27 PROCEDURE — 93295 DEV INTERROG REMOTE 1/2/MLT: CPT | Performed by: INTERNAL MEDICINE

## 2022-09-27 PROCEDURE — 93296 REM INTERROG EVL PM/IDS: CPT | Performed by: INTERNAL MEDICINE

## 2022-09-27 PROCEDURE — 93297 REM INTERROG DEV EVAL ICPMS: CPT | Performed by: NURSE PRACTITIONER

## 2022-09-27 NOTE — PROGRESS NOTES
Device programming evaluation for patient's CRT-D by company representative shows normal device function. Optivol is within normal range. Pt seeing NPCP today. EP physician will review. See interrogation under cardiology tab in the [address] [place] [address] [address] field for more details.

## 2022-09-27 NOTE — PROGRESS NOTES
Jennifer Palma transmission received 22 @ 6:39pm from [address] [place] [address] for patient's CRT-D.     REASON FOR TRANSMISSION  Presence of cardiac device  TECHNICAL FINDINGS  No device or lead performance issue(s) observed  PHYSICIAN/CLINICIAN CONTACT  Unable to give verbal report x2 calls  CONTACT PHONE NUMBER  [phone]  ADDITIONAL NOTES  Patient Name: Gregg Casillas : [id number]   DEVICE ASSESSMENT: Available daily battery/lead measurements within expected range. Lead trends stable. ARRHYTHMIA SUMMARY: Since data last cleared on 2022. Based on programmed zones, device detected/treated: 2 Monitored non-sustained VT episodes most recent on 2022 at 13:34 (1 sec at 200bpm) Intermittent atrial undersensing during episode that does not appear to affect device function. Recommend review of stored EGMs for rhythm determination. OBSERVATIONS: Device appears to be currently functioning as programmed DDDR . EP physician will review. See interrogation under cardiology tab in the [address] [place] [address] [address] field for more details.

## 2022-09-28 NOTE — PROGRESS NOTES
Remote transmission received from patient's CRT-D monitor at home. Transmission shows normal RV/LV sensing and pacing function. Undersensed AF noted on Current EGM and stored EGMs. Measured Pwave 0.9mV w sensitivity programmed 0.6mV. AT/AF burden <0.1%, likely higher d/t undersensing noted. Noted AT/AF (<0.1% burden, 1 of 2 pace-terminated). Pt on Coreg, amiodarone, Eliquis. EP physician will review. Ap 97.1%  BiVp 97.6%, Effective 97.4%, VSRp 1.7%  PVCs 21.7/hr    Possible Optivol fluid accumulation 08.30.22-ongoing, currently elevated up to >200 w correlating TI trend below reference line. TriageF Heart Failure Risk Status on 27-Sep-2022 is High*. NP will review. End of 91-day monitoring period 9/27/22. See interrogation under cardiology tab in the [address] [place] [address] [address] field for more details. Will continue to monitor remotely.

## 2022-09-29 ENCOUNTER — TELEPHONE (OUTPATIENT)
Dept: INTERNAL MEDICINE CLINIC | Age: 72
End: 2022-09-29

## 2022-10-03 DIAGNOSIS — E78.2 MIXED HYPERLIPIDEMIA: ICD-10-CM

## 2022-10-03 DIAGNOSIS — E11.8 TYPE 2 DIABETES MELLITUS WITH COMPLICATION, WITH LONG-TERM CURRENT USE OF INSULIN (HCC): ICD-10-CM

## 2022-10-03 DIAGNOSIS — E83.42 HYPOMAGNESEMIA: ICD-10-CM

## 2022-10-03 DIAGNOSIS — I10 PRIMARY HYPERTENSION: ICD-10-CM

## 2022-10-03 DIAGNOSIS — Z12.5 PROSTATE CANCER SCREENING: ICD-10-CM

## 2022-10-03 DIAGNOSIS — Z79.4 TYPE 2 DIABETES MELLITUS WITH COMPLICATION, WITH LONG-TERM CURRENT USE OF INSULIN (HCC): ICD-10-CM

## 2022-10-03 LAB
ANION GAP SERPL CALCULATED.3IONS-SCNC: 12 MMOL/L (ref 3–16)
BACTERIA: ABNORMAL /HPF
BASOPHILS ABSOLUTE: 0 K/UL (ref 0–0.2)
BASOPHILS RELATIVE PERCENT: 1.2 %
BILIRUBIN URINE: NEGATIVE
BLOOD, URINE: NEGATIVE
BUN BLDV-MCNC: 9 MG/DL (ref 7–20)
CALCIUM SERPL-MCNC: 8.6 MG/DL (ref 8.3–10.6)
CHLORIDE BLD-SCNC: 106 MMOL/L (ref 99–110)
CHOLESTEROL, TOTAL: 160 MG/DL (ref 0–199)
CLARITY: CLEAR
CO2: 23 MMOL/L (ref 21–32)
COLOR: YELLOW
CREAT SERPL-MCNC: 1.2 MG/DL (ref 0.8–1.3)
EOSINOPHILS ABSOLUTE: 0.1 K/UL (ref 0–0.6)
EOSINOPHILS RELATIVE PERCENT: 4.2 %
EPITHELIAL CELLS, UA: 0 /HPF (ref 0–5)
GFR AFRICAN AMERICAN: >60
GFR NON-AFRICAN AMERICAN: 60
GLUCOSE BLD-MCNC: 90 MG/DL (ref 70–99)
GLUCOSE URINE: 250 MG/DL
HCT VFR BLD CALC: 42.5 % (ref 40.5–52.5)
HDLC SERPL-MCNC: 69 MG/DL (ref 40–60)
HEMOGLOBIN: 13.8 G/DL (ref 13.5–17.5)
HYALINE CASTS: 0 /LPF (ref 0–8)
KETONES, URINE: NEGATIVE MG/DL
LDL CHOLESTEROL CALCULATED: 78 MG/DL
LEUKOCYTE ESTERASE, URINE: ABNORMAL
LYMPHOCYTES ABSOLUTE: 1.1 K/UL (ref 1–5.1)
LYMPHOCYTES RELATIVE PERCENT: 42.6 %
MAGNESIUM: 2.3 MG/DL (ref 1.8–2.4)
MCH RBC QN AUTO: 28.6 PG (ref 26–34)
MCHC RBC AUTO-ENTMCNC: 32.6 G/DL (ref 31–36)
MCV RBC AUTO: 88 FL (ref 80–100)
MICROSCOPIC EXAMINATION: YES
MONOCYTES ABSOLUTE: 0.3 K/UL (ref 0–1.3)
MONOCYTES RELATIVE PERCENT: 11.5 %
NEUTROPHILS ABSOLUTE: 1 K/UL (ref 1.7–7.7)
NEUTROPHILS RELATIVE PERCENT: 40.5 %
NITRITE, URINE: POSITIVE
PDW BLD-RTO: 15.5 % (ref 12.4–15.4)
PH UA: 5.5 (ref 5–8)
PLATELET # BLD: 200 K/UL (ref 135–450)
PLATELET SLIDE REVIEW: ADEQUATE
PMV BLD AUTO: 9.5 FL (ref 5–10.5)
POTASSIUM SERPL-SCNC: 4 MMOL/L (ref 3.5–5.1)
PROSTATE SPECIFIC ANTIGEN: 3 NG/ML (ref 0–4)
PROTEIN UA: ABNORMAL MG/DL
RBC # BLD: 4.83 M/UL (ref 4.2–5.9)
RBC UA: 1 /HPF (ref 0–4)
SLIDE REVIEW: ABNORMAL
SODIUM BLD-SCNC: 141 MMOL/L (ref 136–145)
SPECIFIC GRAVITY UA: 1.03 (ref 1–1.03)
TRIGL SERPL-MCNC: 67 MG/DL (ref 0–150)
TSH REFLEX: 0.82 UIU/ML (ref 0.27–4.2)
URINE TYPE: ABNORMAL
UROBILINOGEN, URINE: 0.2 E.U./DL
VLDLC SERPL CALC-MCNC: 13 MG/DL
WBC # BLD: 2.6 K/UL (ref 4–11)
WBC UA: 23 /HPF (ref 0–5)

## 2022-10-03 NOTE — TELEPHONE ENCOUNTER
Medication:   Requested Prescriptions     Pending Prescriptions Disp Refills    levothyroxine (SYNTHROID) 25 MCG tablet [Pharmacy Med Name: LEVOTHYROXINE 0.025MG (25MCG) TAB] 90 tablet 3     Sig: TAKE 1 TABLET BY MOUTH DAILY        Last Filled: 7/8/21     Patient Phone Number: [phone] (home)     Last appt: 9/20/2022   Next appt: 10/3/2022

## 2022-10-03 NOTE — TELEPHONE ENCOUNTER
Medication:   Requested Prescriptions     Pending Prescriptions Disp Refills    atorvastatin (LIPITOR) 40 MG tablet [Pharmacy Med Name: ATORVASTATIN 40MG TABLETS] 90 tablet 3     Sig: TAKE 1 TABLET BY MOUTH DAILY        Last Filled: 7/30/21    Patient Phone Number: [phone] (home)     Last appt: 9/20/2022   Next appt: 10/5/2022

## 2022-10-04 RX ORDER — LEVOTHYROXINE SODIUM 0.03 MG/1
TABLET ORAL
Qty: 90 TABLET | Refills: 3 | Status: SHIPPED | OUTPATIENT
Start: 2022-10-04

## 2022-10-04 RX ORDER — ATORVASTATIN CALCIUM 40 MG/1
40 TABLET, FILM COATED ORAL DAILY
Qty: 90 TABLET | Refills: 3 | Status: SHIPPED | OUTPATIENT
Start: 2022-10-04

## 2022-10-05 ENCOUNTER — TELEMEDICINE (OUTPATIENT)
Dept: INTERNAL MEDICINE CLINIC | Age: 72
End: 2022-10-05
Payer: MEDICARE

## 2022-10-05 DIAGNOSIS — E78.2 MIXED HYPERLIPIDEMIA: Primary | ICD-10-CM

## 2022-10-05 DIAGNOSIS — N30.00 ACUTE CYSTITIS WITHOUT HEMATURIA: ICD-10-CM

## 2022-10-05 DIAGNOSIS — I10 PRIMARY HYPERTENSION: ICD-10-CM

## 2022-10-05 DIAGNOSIS — E11.8 DIABETES MELLITUS TYPE 2 WITH COMPLICATIONS (HCC): ICD-10-CM

## 2022-10-05 PROCEDURE — 1123F ACP DISCUSS/DSCN MKR DOCD: CPT | Performed by: INTERNAL MEDICINE

## 2022-10-05 PROCEDURE — 99214 OFFICE O/P EST MOD 30 MIN: CPT | Performed by: INTERNAL MEDICINE

## 2022-10-05 RX ORDER — FLASH GLUCOSE SENSOR
KIT MISCELLANEOUS
Qty: 6 EACH | Refills: 3 | Status: SHIPPED | OUTPATIENT
Start: 2022-10-05

## 2022-10-05 RX ORDER — EZETIMIBE 10 MG/1
10 TABLET ORAL DAILY
Qty: 90 TABLET | Refills: 3 | Status: SHIPPED | OUTPATIENT
Start: 2022-10-05 | End: 2023-01-03

## 2022-10-05 NOTE — PROGRESS NOTES
10/5/2022    TELEHEALTH EVALUATION -- Audio/Visual (During XDAHB-57 public health emergency)    HPI:    Angel Vera (:  1950) has requested an audio/video evaluation for the following concern(s):    Type 2 diabetes: taking medication as prescribed, eating a fairly balanced meal plan. Hypertension: following low sodium diet. Takes medication prescribed. 134/84 b/p. Hyperlipidemia: treated with statin. LDL 78. Aware of importance of heart healthy diet. 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 Review of Systems   Constitutional: Negative. HENT: Negative. Eyes: Negative. Respiratory: Negative. Cardiovascular:         HTN, see HPI, and med list.    Gastrointestinal: Negative. Endocrine:        Type 2 DM, see med list. A1c pending, FBS 90. Hyperlipidemia, see HPI. Genitourinary:         Complain of change in urine color and odor. Musculoskeletal: Negative. Skin: Negative. Neurological: Negative. Psychiatric/Behavioral: Negative. Prior to Visit Medications    Medication Sig Taking?  Authorizing Provider   levothyroxine (SYNTHROID) 25 MCG tablet TAKE 1 TABLET BY MOUTH DAILY Yes Deana , MD   atorvastatin (LIPITOR) 40 MG tablet TAKE 1 TABLET BY MOUTH DAILY Yes Deana , MD   carvedilol (COREG) 6.25 MG tablet Take 6.25 mg by mouth 2 times daily Yes Historical Provider, MD   oxybutynin (DITROPAN) 5 MG tablet Take 5 mg by mouth nightly Yes Historical Provider, MD   naloxone 4 MG/0.1ML LIQD nasal spray 1 spray by Nasal route as needed Yes Historical Provider, MD   Lactobacillus Rhamnosus, GG, (RA PROBIOTIC DIGESTIVE CARE) CAPS Take 1 capsule by mouth daily Yes Historical Provider, MD   calcium carbonate 1500 (600 Ca) MG TABS tablet Take 1,500 mg by mouth 2 times daily (with meals) Yes Historical Provider, MD   atorvastatin (LIPITOR) 80 MG tablet Take 40 mg by mouth nightly Yes Historical Provider, MD   tamsulosin (FLOMAX) 0.4 MG capsule TAKE 1 CAPSULE BY MOUTH DAILY Yes Destiney Carpio MD   carvedilol (COREG) 3.125 MG tablet Take 1 tablet by mouth 2 times daily Yes KENNY Dela Cruz CNP   lisinopril (PRINIVIL;ZESTRIL) 2.5 MG tablet Take 1 tablet by mouth daily Yes KENNY Dela Cruz CNP   furosemide (LASIX) 40 MG tablet Take 0.5 tablets by mouth daily Yes KENNY Dela Cruz CNP   apixaban (ELIQUIS) 5 MG TABS tablet TAKE 1 TABLET BY MOUTH TWICE DAILY Yes Colonel Devin MD   amiodarone (CORDARONE) 200 MG tablet TAKE 1 TABLET BY MOUTH DAILY Yes KENNY Rice CNP   methocarbamol (ROBAXIN) 750 MG tablet Take 1 tablet by mouth 3 times daily as needed (muscle spasm.) Yes Destiney Carpio MD   docusate (COLACE, DULCOLAX) 100 MG CAPS Take 100 mg by mouth daily Yes Historical Provider, MD Licha Arias 100 UNIT/ML injection pen ADMINISTER 8 TO 10 UNITS UNDER THE SKIN THREE TIMES DAILY BEFORE MEALS  Patient taking differently: Sliding scale Yes Destiney Carpio MD   oxybutynin (DITROPAN-XL) 5 MG extended release tablet TAKE 1 TABLET BY MOUTH DAILY FOR OVERACTIVE BLADDER Yes Destiney Carpio MD   metroNIDAZOLE (FLAGYL) 250 MG tablet Take 200 mg by mouth 2 times daily Yes Historical Provider, MD   Blood Glucose Monitoring Suppl (Kody Feliz) w/Device KIT 1 each by Does not apply route 2 times daily Yes Destiney Carpio MD   blood glucose test strips (ASCENSIA AUTODISC VI;ONE TOUCH ULTRA TEST VI) strip 1 each by In Vitro route daily Test as directed,Dispense according to insurance formulary Yes Destiney Carpio MD   potassium chloride (KLOR-CON M) 20 MEQ extended release tablet TAKE 1 TABLET BY MOUTH DAILY  Patient taking differently: Take 20 mEq by mouth daily 3 times per week Yes Destiney Carpio MD   Handicap Placard MISC by Does not apply route Diagnosis: heart failure. Expires: 12/7/24.  Yes Destiney Carpio MD   Insulin Pen Needle (B-D UF III MINI PEN NEEDLES) 31G X 5 MM MISC Inject 1 each into the skin daily Yes Destiney Carpio MD blood glucose test strips (ONE TOUCH TEST STRIPS) strip 1 each by In Vitro route 3 times daily As needed. Yes Ivelisse Weinberg MD   dapagliflozin (FARXIGA) 10 MG tablet Take 1 tablet by mouth every morning Yes Ivelisse Weinberg MD   [facility] ULTRA strip USE WITH GLUCOSE METER THREE TIMES DAILY AND AS NEEDED Yes Ivelisse Weinberg MD   ferrous sulfate (FE TABS 325) 325 (65 Fe) MG EC tablet Take 325 mg by mouth daily Yes Historical Provider, MD   Continuous Blood Gluc  (FREESTYLE KWESI 14 DAY READER) ANITA Use as Directed Dx E11.9 Yes Ivelisse Weinberg MD   Continuous Blood Gluc Sensor (FREESTYLE KWESI 14 DAY SENSOR) MISC Use as directed Dx E11.9 Yes Ivelisse Weinberg MD   Blood Glucose Monitoring Suppl (ONE TOUCH ULTRA MINI) w/Device KIT 1 kit by Does not apply route three times daily Yes Ivelisse Weinberg MD   acetaminophen (TYLENOL) 325 MG tablet Take 2 tablets by mouth every 4 hours as needed for Pain Yes Toby Shaikh MD   Coenzyme Q10 (CO Q 10) 100 MG CAPS Take 1 capsule by mouth daily Yes Historical Provider, MD   Cinnamon 500 MG CAPS Take 1 capsule by mouth daily Yes Historical Provider, MD   Vitamin D (CHOLECALCIFEROL) 1000 UNITS CAPS capsule Take 2,000 Units by mouth nightly  Yes Historical Provider, MD   Insulin Syringe-Needle U-100 (INSULIN SYRINGE .5CC/31GX5/16\") 31G X 5/16\" 0.5 ML MISC Patient tests bid Yes Ivelisse Weinberg MD   therapeutic multivitamin-minerals [facility]) tablet Take 1 tablet by mouth daily. Yes Historical Provider, MD       Social History     Tobacco Use    Smoking status: Former     Packs/day: 1.00     Years: 4.00     Pack years: 4.00     Types: Cigarettes     Quit date: 2017     Years since quittin.7    Smokeless tobacco: Never   Vaping Use    Vaping Use: Never used   Substance Use Topics    Alcohol use: Yes     Comment: 2-3 times per year    Drug use:  No            PHYSICAL EXAMINATION:  [ INSTRUCTIONS:  \"[x]\" Indicates a positive item  \"[]\" Indicates a negative item  -- DELETE ALL ITEMS NOT EXAMINED]  Vital Signs: (As obtained by patient/caregiver or practitioner observation)    Blood pressure- 134/84 Heart rate- 70   Respiratory rate- 16   Temperature-  Pulse oximetry- 97 %    Constitutional: [x] Appears well-developed and well-nourished [x] No apparent distress      [] Abnormal-   Mental status  [x] Alert and awake  [x] Oriented to person/place/time [x]Able to follow commands      Eyes:  EOM    [x]  Normal  [] Abnormal-  Sclera  [x]  Normal  [] Abnormal -         Discharge [x]  None visible  [] Abnormal -    HENT:   [x] Normocephalic, atraumatic. [] Abnormal   [x] Mouth/Throat: Mucous membranes are moist.     External Ears [x] Normal  [] Abnormal-     Neck: [x] No visualized mass     Pulmonary/Chest: [x] Respiratory effort normal.  [x] No visualized signs of difficulty breathing or respiratory distress        [] Abnormal-      Musculoskeletal:   [x] Normal gait with no signs of ataxia         [x] Normal range of motion of neck        [x] Abnormal- left BKA. Neurological:        [x] No Facial Asymmetry (Cranial nerve 7 motor function) (limited exam to video visit)          [x] No gaze palsy        [] Abnormal-         Skin:        [x] No significant exanthematous lesions or discoloration noted on facial skin         [] Abnormal-            Psychiatric:       [x] Normal Affect [] No Hallucinations        [] Abnormal-     Other pertinent observable physical exam findings-     ASSESSMENT/PLAN:   Diagnosis Orders   1. Mixed hyperlipidemia  ezetimibe (ZETIA) 10 MG tablet added to statin. Heart healthy diet discussed. 2. Acute cystitis without hematuria  Culture, Urine  Increase water intake. Await culture results. 3. Primary hypertension  Continue same med regimen. DASH and low sodium diet discussed. 4. Diabetes mellitus type 2 with complications (HCC)  Diet, physical activity and med compliance discussed.               Vesta Givens, was evaluated through a synchronous (real-time) audio-video encounter. The patient (or guardian if applicable) is aware that this is a billable service, which includes applicable co-pays. This Virtual Visit was conducted with patient's (and/or legal guardian's) consent. The visit was conducted pursuant to the emergency declaration under the [address] [place] [address], [address] [place] [address] and the [employer] General Act. Patient identification was verified, and a caregiver was present when appropriate. The patient was located at Home: [address] [place] [id number]. Provider was located at Home ([address]: [place]. Total time spent on this encounter: Not billed by time    --Tereso Wright MD on 10/5/2022 at 6:03 PM    An electronic signature was used to authenticate this note.

## 2022-10-13 RX ORDER — DAPAGLIFLOZIN 10 MG/1
TABLET, FILM COATED ORAL
Qty: 90 TABLET | Refills: 3 | Status: SHIPPED | OUTPATIENT
Start: 2022-10-13

## 2022-10-23 ASSESSMENT — ENCOUNTER SYMPTOMS
EYES NEGATIVE: 1
GASTROINTESTINAL NEGATIVE: 1
RESPIRATORY NEGATIVE: 1

## 2022-10-25 ENCOUNTER — TELEPHONE (OUTPATIENT)
Dept: INTERNAL MEDICINE CLINIC | Age: 72
End: 2022-10-25

## 2022-10-26 DIAGNOSIS — N30.00 ACUTE CYSTITIS WITHOUT HEMATURIA: ICD-10-CM

## 2022-10-31 LAB
ORGANISM: ABNORMAL
URINE CULTURE, ROUTINE: ABNORMAL

## 2022-11-01 ENCOUNTER — TELEPHONE (OUTPATIENT)
Dept: INTERNAL MEDICINE CLINIC | Age: 72
End: 2022-11-01

## 2022-11-01 ENCOUNTER — NURSE ONLY (OUTPATIENT)
Dept: CARDIOLOGY CLINIC | Age: 72
End: 2022-11-01
Payer: MEDICARE

## 2022-11-01 DIAGNOSIS — Z95.810 CARDIAC RESYNCHRONIZATION THERAPY DEFIBRILLATOR (CRT-D) IN PLACE: ICD-10-CM

## 2022-11-01 DIAGNOSIS — I42.0 DILATED CARDIOMYOPATHY (HCC): ICD-10-CM

## 2022-11-01 DIAGNOSIS — I50.22 CHRONIC SYSTOLIC HEART FAILURE (HCC): Primary | ICD-10-CM

## 2022-11-01 PROCEDURE — 93297 REM INTERROG DEV EVAL ICPMS: CPT | Performed by: NURSE PRACTITIONER

## 2022-11-01 PROCEDURE — G2066 INTER DEVC REMOTE 30D: HCPCS | Performed by: NURSE PRACTITIONER

## 2022-11-01 NOTE — PROGRESS NOTES
Remote transmission received from patient's CRT-D monitor at home. Transmission shows normal RV/LV sensing and pacing function. Undersensed AF noted on stored EGMs. Measured Pwave 2.3mV w sensitivity programmed 0.6mV. KENDRICK estimated in 20 months. Noted AT/AF/L, 2.4% burden, 22.8% pace-terminated (Coreg, amiodarone, Eliquis). Ap 95.4%  BiVp 96.8%, Effective 96.2%, VSRp 2.5%  PVCs 18.6/hr    Possible Optivol fluid accumulation 08.30.22-ongoing, currently elevated up to >200 w correlating TI trend below reference line. TriageF Heart Failure Risk Status on 01-Nov-2022 is High*. End of 31-day monitoring period 11/1/22. NP will review. See interrogation under cardiology tab in the [address] [place] [address] [address] field for more details. Will continue to monitor remotely.

## 2022-11-01 NOTE — TELEPHONE ENCOUNTER
Patient is requesting results of urine test physician wanted him to take also forgot name of book he suggested for him to get to help with his eating and nutrient. Please advise.

## 2022-11-02 ENCOUNTER — TELEPHONE (OUTPATIENT)
Dept: INTERNAL MEDICINE CLINIC | Age: 72
End: 2022-11-02

## 2022-11-03 NOTE — PROGRESS NOTES
Gurmeet Gamble is scheduled on  11/04/22 [facility] 1[facility]

## 2022-11-03 NOTE — RESULT ENCOUNTER NOTE
Dayne Ahmadi is now scheduled for  November 03,2022 arriving at 1:45 at [facility] for him with date/time/location

## 2022-11-04 ENCOUNTER — TELEPHONE (OUTPATIENT)
Dept: INTERNAL MEDICINE CLINIC | Age: 72
End: 2022-11-04

## 2022-11-04 ENCOUNTER — SCHEDULED TELEPHONE ENCOUNTER (OUTPATIENT)
Dept: CARDIOLOGY CLINIC | Age: 72
End: 2022-11-04
Payer: MEDICARE

## 2022-11-04 DIAGNOSIS — I42.9 CARDIOMYOPATHY, UNSPECIFIED TYPE (HCC): Primary | ICD-10-CM

## 2022-11-04 PROCEDURE — 99214 OFFICE O/P EST MOD 30 MIN: CPT | Performed by: NURSE PRACTITIONER

## 2022-11-04 PROCEDURE — 1123F ACP DISCUSS/DSCN MKR DOCD: CPT | Performed by: NURSE PRACTITIONER

## 2022-11-04 RX ORDER — FUROSEMIDE 20 MG/1
20 TABLET ORAL DAILY
Qty: 30 TABLET | Refills: 3 | Status: SHIPPED | OUTPATIENT
Start: 2022-11-04

## 2022-11-04 NOTE — PROGRESS NOTES
Consent:  He & health care decision maker is aware that that he may receive a bill for this virual service, depending on his insurance coverage, and has provided verbal consent to proceed: yes   Documentation:  I communicated with the patient and/or health care decision maker about our discussions of care. Details of this discussion including any medical advice provided:    I affirm this is a Patient Initiated Episode with an Established Patient who has not had a related appointment within my department in the past 7 days or scheduled within the next 24 hours. Total Time:30 minutes       The [facility]   [address]   virtual visit  Note    CC: afib & optivol up   Today is VSS wt 275# / it is a low wt for him   States not a great edema but not taking diuretic as instructed due to c/o dizziness   Instructed to take his lasix 20 mg daily as ordered    Blood work and TTE   Fu in 3-4 weeks   Discussed diet fluids meds  on Eliquis denies any noted bleeding or falls     11/1/2022 Remote transmission received from patient's CRT-D monitor at home. Transmission shows normal RV/LV sensing and pacing function. Undersensed AF noted on stored EGMs. Measured Pwave 2.3mV w sensitivity programmed 0.6mV. KENDRICK estimated in 20 months. Noted AT/AF/L, 2.4% burden, 22.8% pace-terminated (Coreg, amiodarone, Eliquis). Ap 95.4%  BiVp 96.8%, Effective 96.2%, VSRp 2.5%  PVCs 18.6/hr   Possible Optivol fluid accumulation 08.30.22-ongoing, currently elevated up to >200 w correlating TI trend below reference line. TriageHF Heart Failure Risk Status on 01-Nov-2022 is High*. End of 31-day monitoring period 11/1/22. NP will review. See interrogation under cardiology tab in the [facility] [address] field for more details. Will continue to monitor remotely  PMH:  CM HFmpEF, pafib.    s/p DCCV  HTN HLD CRD DMT2 / BiV ICD in situ / obesity / WILL uses CPAP    AICD interrogation 4/18/22 with  Dr Carline Ann office routinely   [facility] 1/18/2022 had below knee amputee left leg    Interval Hx:  VSS wt stable   States has a prosthesis and doing well     I have reviewed notes / any lab work EKGs stress test, angiograms, & images reviewed  I  have spent 30 minutes with pt on phone or on video visit with the patient with more than 50% spent counseling and coordinating care this patient. Objective: There were no vitals taken for this visit. No intake or output data in the 24 hours ending 11/04/22 1443  Wt Readings from Last 3 Encounters:   07/30/22 273 lb (123.8 kg)   04/26/22 (!) 320 lb (145.2 kg)   09/24/21 (!) 339 lb (153.8 kg)       Physical Exam:   There were no vitals taken for this visit. BP Readings from Last 3 Encounters:   08/22/22 118/68   07/30/22 (!) 145/78   04/26/22 (!) 141/69     Pulse Readings from Last 3 Encounters:   08/22/22 76   07/30/22 67   04/26/22 62     No intake or output data in the 24 hours ending 11/04/22 1443  Wt Readings from Last 2 Encounters:   07/30/22 273 lb (123.8 kg)   04/26/22 (!) 320 lb (145.2 kg)     Constitutional: He is oriented to person, place, and time / in NAD   Vitals /wt per patient at home       Reviewed  available lab work,  EKGs, images, C       Assessment    BKA    fu after being in rehab for 7 weeks after BKA left      CM   HFmEF  compensated on lasix   BiV ICD in situ   AICD interrogation  routinely   Claria MRI Quad CRTD [id number]     TTE 2019    Overall left ventricular systolic function appears moderately reduced. Ejection fraction is visually estimated to be 35-40% with diffuse   hypokinesis. There is mildly increased left ventricular wall thickness. The right ventricle is not well visualized but ICD wire is present. Mild tricuspid regurgitation. Trivial aortic and mitral regurgitation.      p afib  On Eliquis  [id number] Score for Atrial Fibrillation Stroke Risk 6  s/p DCCV       HTN   wnl      HLD CRD     DMT2   Managed per IM       Obestity  Body mass index is 42.22 kg/m².   discussed diet activity      WILL uses CPAP       Plan:  afib & optivol up   Today is VSS wt 275# / it is a low wt for him   States not a great edema but not taking diuretic as instructed due to c/o dizziness   Instructed to take his lasix 20 mg daily as ordered    TTE   Fu in 3-4 weeks   Discussed diet fluids meds  on Eliquis denies any noted bleeding or falls     Teryl Folds APRN, CVNP

## 2022-11-04 NOTE — TELEPHONE ENCOUNTER
Pt called states he took his urine test and would like for provider to give him a call.  About the results

## 2022-11-07 DIAGNOSIS — N30.00 ACUTE CYSTITIS WITHOUT HEMATURIA: Primary | ICD-10-CM

## 2022-11-07 RX ORDER — LEVOFLOXACIN 500 MG/1
500 TABLET, FILM COATED ORAL DAILY
Qty: 7 TABLET | Refills: 0 | Status: SHIPPED | OUTPATIENT
Start: 2022-11-07 | End: 2022-11-07

## 2022-11-07 RX ORDER — NITROFURANTOIN 25; 75 MG/1; MG/1
100 CAPSULE ORAL 2 TIMES DAILY
Qty: 20 CAPSULE | Refills: 0 | Status: SHIPPED | OUTPATIENT
Start: 2022-11-07 | End: 2022-11-17

## 2022-11-07 NOTE — TELEPHONE ENCOUNTER
Levofloxacin for uti sent to pharmacy. Says hypoglycemic for Farxiga. 60s with sensor. 70s - 80s finger stick. No symptoms. Advised 1/2 or 5 mg or dose qod with 10 mg.

## 2022-11-07 NOTE — TELEPHONE ENCOUNTER
Levofloxacin discontinued for potential interaction with amiodarone. Macrodantin prescribed unstead.

## 2022-11-09 DIAGNOSIS — R06.02 SOB (SHORTNESS OF BREATH): Primary | ICD-10-CM

## 2022-12-01 RX ORDER — LEVOTHYROXINE SODIUM 0.03 MG/1
TABLET ORAL
Qty: 90 TABLET | Refills: 3 | Status: SHIPPED | OUTPATIENT
Start: 2022-12-01

## 2022-12-01 NOTE — TELEPHONE ENCOUNTER
Medication:   Requested Prescriptions     Pending Prescriptions Disp Refills    levothyroxine (SYNTHROID) 25 MCG tablet [Pharmacy Med Name: LEVOTHYROXINE 0.025MG (25MCG) TAB] 90 tablet 3     Sig: TAKE 1 TABLET BY MOUTH DAILY        Last Filled: 10/04/22     Patient Phone Number: [phone] (home)     Last appt: 10/5/2022   Next appt: no future appointment scheduled

## 2022-12-06 ENCOUNTER — NURSE ONLY (OUTPATIENT)
Dept: CARDIOLOGY CLINIC | Age: 72
End: 2022-12-06
Payer: MEDICARE

## 2022-12-06 DIAGNOSIS — I50.22 CHRONIC SYSTOLIC HEART FAILURE (HCC): Primary | ICD-10-CM

## 2022-12-06 DIAGNOSIS — Z95.810 CARDIAC RESYNCHRONIZATION THERAPY DEFIBRILLATOR (CRT-D) IN PLACE: ICD-10-CM

## 2022-12-06 DIAGNOSIS — I42.0 DILATED CARDIOMYOPATHY (HCC): ICD-10-CM

## 2022-12-06 PROCEDURE — G2066 INTER DEVC REMOTE 30D: HCPCS | Performed by: NURSE PRACTITIONER

## 2022-12-06 PROCEDURE — 93297 REM INTERROG DEV EVAL ICPMS: CPT | Performed by: NURSE PRACTITIONER

## 2022-12-07 NOTE — PROGRESS NOTES
Remote transmission received from patient's CRT-D monitor at home. Transmission shows normal sensing and pacing function. Noted AT/AF/L, 2.2% burden, 33.3% pace-terminated (Coreg, amiodarone, Eliquis). Ap 95.4%  BiVp 96.3%, Effective 95.6%, VSRp 2.6%  PVCs 23.2/hr    Possible Optivol fluid accumulation 08.30.22-ongoing, currently elevated up to >200 w correlating TI trend below reference line. TriageHF Heart Failure Risk Status on 06-Dec-2022 is High*. Pt has upcoming appt w [facility] 12.22.22. End of 31-day monitoring period 12/6/22. NP will review. See interrogation under cardiology tab in the [address] [place] [address] [address] field for more details. Will continue to monitor remotely.

## 2022-12-12 ENCOUNTER — TELEPHONE (OUTPATIENT)
Dept: INTERNAL MEDICINE CLINIC | Age: 72
End: 2022-12-12

## 2022-12-16 ENCOUNTER — HOSPITAL ENCOUNTER (OUTPATIENT)
Dept: NON INVASIVE DIAGNOSTICS | Age: 72
Discharge: HOME | End: 2022-12-16
Payer: MEDICARE

## 2022-12-16 DIAGNOSIS — R06.02 SOB (SHORTNESS OF BREATH): ICD-10-CM

## 2022-12-16 PROCEDURE — 93306 TTE W/DOPPLER COMPLETE: CPT

## 2022-12-19 ENCOUNTER — TELEPHONE (OUTPATIENT)
Dept: INTERNAL MEDICINE CLINIC | Age: 72
End: 2022-12-19

## 2022-12-21 RX ORDER — FLASH GLUCOSE SENSOR
KIT MISCELLANEOUS
Qty: 6 EACH | Refills: 3 | Status: CANCELLED | OUTPATIENT
Start: 2022-12-21

## 2023-01-10 ENCOUNTER — NURSE ONLY (OUTPATIENT)
Dept: CARDIOLOGY CLINIC | Age: 73
End: 2023-01-10
Payer: MEDICARE

## 2023-01-10 ENCOUNTER — TELEPHONE (OUTPATIENT)
Dept: INTERNAL MEDICINE CLINIC | Age: 73
End: 2023-01-10

## 2023-01-10 DIAGNOSIS — I42.0 DILATED CARDIOMYOPATHY (HCC): ICD-10-CM

## 2023-01-10 DIAGNOSIS — Z95.810 CARDIAC RESYNCHRONIZATION THERAPY DEFIBRILLATOR (CRT-D) IN PLACE: Primary | ICD-10-CM

## 2023-01-10 DIAGNOSIS — I50.22 CHRONIC SYSTOLIC HEART FAILURE (HCC): ICD-10-CM

## 2023-01-10 DIAGNOSIS — Z51.89 ENCOUNTER FOR WOUND CARE: Primary | ICD-10-CM

## 2023-01-10 PROCEDURE — 93297 REM INTERROG DEV EVAL ICPMS: CPT | Performed by: NURSE PRACTITIONER

## 2023-01-10 PROCEDURE — 93296 REM INTERROG EVL PM/IDS: CPT | Performed by: INTERNAL MEDICINE

## 2023-01-10 PROCEDURE — 93295 DEV INTERROG REMOTE 1/2/MLT: CPT | Performed by: INTERNAL MEDICINE

## 2023-01-10 NOTE — TELEPHONE ENCOUNTER
Patient is wanting to get a referral  to the [facility] clinic, due to his amputated leg having bruises on it and the skin is tearing. He has been to [facility] previously.   Please advise

## 2023-01-10 NOTE — PROGRESS NOTES
Remote transmission received from patient's CRT-D monitor at home. Transmission shows normal sensing and pacing function. No new arrhythmias/events recorded. Pt on Coreg, amiodarone, Eliquis. EP physician will review. Ap 98.8%  BiVp 97.7%, Effective 97.3%, VSRp 1.5%  PVCs 27.5/hr    Possible Optivol fluid accumulation 08.30.22-ongoing, currently elevated up to >200 w correlating TI trend below reference line; previously noted. TriageF Heart Failure Risk Status on 10-[place]-2023 is High*. NP will review. Pt has upcoming appt 01.13.23/[facility]. End of 91-day monitoring period 1/10/23. See interrogation under cardiology tab in the [facility] [address] field for more details. Will continue to monitor remotely.

## 2023-01-11 ENCOUNTER — TELEPHONE (OUTPATIENT)
Dept: INTERNAL MEDICINE CLINIC | Age: 73
End: 2023-01-11

## 2023-01-11 NOTE — TELEPHONE ENCOUNTER
PT CALLED BACK AND SD THAT HE TALKED TO PROVIDER ALREADY.  TOLD HIM TO CALL US IF HE NEEDS US AT ALL

## 2023-01-11 NOTE — TELEPHONE ENCOUNTER
Let him know if it is oozing pus should go to ER so they culture wound and he be placed on appropriate antibiotic.

## 2023-01-11 NOTE — TELEPHONE ENCOUNTER
Patient is needing a referral for wound care due to left leg that has been amputated above the knee has a wound and is oozing pus please advise no longer has home health due to insurance ran out. Please advise.

## 2023-01-17 ENCOUNTER — HOSPITAL ENCOUNTER (OUTPATIENT)
Dept: WOUND CARE | Age: 73
Discharge: HOME | End: 2023-01-17
Payer: MEDICARE

## 2023-01-17 VITALS
HEART RATE: 71 BPM | DIASTOLIC BLOOD PRESSURE: 55 MMHG | RESPIRATION RATE: 18 BRPM | TEMPERATURE: 97.2 F | SYSTOLIC BLOOD PRESSURE: 102 MMHG

## 2023-01-17 DIAGNOSIS — E11.622 DIABETIC ULCER OF LEFT LOWER LEG, LIMITED TO BREAKDOWN OF SKIN (HCC): ICD-10-CM

## 2023-01-17 DIAGNOSIS — E11.621 DIABETIC ULCER OF LEFT MIDFOOT ASSOCIATED WITH TYPE 2 DIABETES MELLITUS, WITH NECROSIS OF BONE (HCC): ICD-10-CM

## 2023-01-17 DIAGNOSIS — L97.529 TYPE 2 DIABETES MELLITUS WITH LEFT DIABETIC FOOT ULCER (HCC): ICD-10-CM

## 2023-01-17 DIAGNOSIS — L98.499 DIABETES MELLITUS WITH SKIN ULCER (HCC): ICD-10-CM

## 2023-01-17 DIAGNOSIS — I70.244 ATHEROSCLEROSIS OF NATIVE ARTERIES OF LEFT LEG WITH ULCERATION OF HEEL AND MIDFOOT (HCC): ICD-10-CM

## 2023-01-17 DIAGNOSIS — E11.21 DIABETIC NEPHROPATHY ASSOCIATED WITH TYPE 2 DIABETES MELLITUS (HCC): ICD-10-CM

## 2023-01-17 DIAGNOSIS — E11.8 DIABETIC FOOT (HCC): Primary | ICD-10-CM

## 2023-01-17 DIAGNOSIS — M86.272 SUBACUTE OSTEOMYELITIS OF LEFT FOOT (HCC): ICD-10-CM

## 2023-01-17 DIAGNOSIS — T87.9 BKA STUMP COMPLICATION (HCC): ICD-10-CM

## 2023-01-17 DIAGNOSIS — E11.622 DIABETES MELLITUS WITH SKIN ULCER (HCC): ICD-10-CM

## 2023-01-17 DIAGNOSIS — E11.42 DIABETIC POLYNEUROPATHY ASSOCIATED WITH TYPE 2 DIABETES MELLITUS (HCC): ICD-10-CM

## 2023-01-17 DIAGNOSIS — L97.921 DIABETIC ULCER OF LEFT LOWER LEG, LIMITED TO BREAKDOWN OF SKIN (HCC): ICD-10-CM

## 2023-01-17 DIAGNOSIS — E11.621 TYPE 2 DIABETES MELLITUS WITH LEFT DIABETIC FOOT ULCER (HCC): ICD-10-CM

## 2023-01-17 DIAGNOSIS — L97.424 DIABETIC ULCER OF LEFT MIDFOOT ASSOCIATED WITH TYPE 2 DIABETES MELLITUS, WITH NECROSIS OF BONE (HCC): ICD-10-CM

## 2023-01-17 DIAGNOSIS — M14.672 CHARCOT ANKLE, LEFT: ICD-10-CM

## 2023-01-17 DIAGNOSIS — Z74.09 IMPAIRED MOBILITY: ICD-10-CM

## 2023-01-17 PROCEDURE — 99203 OFFICE O/P NEW LOW 30 MIN: CPT | Performed by: EMERGENCY MEDICINE

## 2023-01-17 PROCEDURE — 99213 OFFICE O/P EST LOW 20 MIN: CPT

## 2023-01-17 ASSESSMENT — PAIN SCALES - GENERAL
PAINLEVEL_OUTOF10: 0
PAINLEVEL_OUTOF10: 0

## 2023-01-17 NOTE — DISCHARGE INSTRUCTIONS
[address]  Physician Orders   [facility] [place]  [address], [place] [address]  Telephone: [phone]    NAME:  Tabitha Freitas OF BIRTH:  1950  MEDICAL RECORD NUMBER:  [id number]  DATE:  1/17/2023    Congratulations! You have completed your treatment. Return to your Primary Care Physician for all your health issues. Resume your ordinary activities as tolerated. Take your medications as prescribed by your primary care physician. Check your skin daily for cracks, bruises, sores, or dryness. Use a moisturizer as needed. Clean and dry your skin, using mild soap and warm water (not hot). Avoid alcohol and caffeine and do not smoke. Maintain a nutritious diet.     **KEEP APPOINTMENT WITH [facility] 1/18/2023 TO DISCUSS PRESSURE RELIEF OFF LEFT LEG STUMP**      Physician Signature:______________________    Date: ___________ Time:  ____________    Dr Nidia Reis            Electronically signed by Selina Joseph RN on 1/17/2023 at 10:21 AM

## 2023-01-17 NOTE — LETTER
[facility]  [address]  MEDICAL OFFICE [address]   [place] [facility] [facility] [address]  [phone]  Dept: [phone]        Thank you Mr. Montero for choosing [facility] for your Wound Care needs.  We hope you found your first visit both encouraging and educational.  We look forward to serving you throughout the healing process.     Before your next visit please review the information you received in your Welcome Folder.  The first visit can be overwhelming and this is a useful tool to refresh what information you may have been given.  If you find yourself with any questions prior to your upcoming appointment, please feel free to contact us.    Often wounds can be challenging and it is our goal to see you through the healing process with as much support possible.    Again, thank you for choosing [facility]!    Sincerely,      The Staff of [facility]

## 2023-01-17 NOTE — H&P
[facility]   History and Physical Note   Referring Provider: self  Reason for Referral: stump wound    George Bernal RECORD NUMBER:  [id number]  AGE: 81 y.o. GENDER: male  : 1950  EPISODE DATE:  2023    Chief complaint and reason for visit:     Chief Complaint   Patient presents with    Wound Check     New patient visit for wounds to the left stump. HISTORY of PRESENT ILLNESS HPI     Allyn Sterling is a 81 y.o. male who presents today for an initial evaluation of a wound/ulcer. Patient is returning to the wound center for a recurrent or new wound. Wound duration:  22 from BKA at that time . Has a prosthetic. History of Wound Context: Patient is returning to the wound care center for a wound on his residual limb. Previously we were taking care of a heel wound. But this has been at least a year and a half ago. Since that time, the patient has had admission to the hospital and surgeries done as noted below. Pertinent associated symptoms: drainage  and impaired mobility    Viri Davies is a 73year old male with a past medical history significant for IDDM2 complications including neuropathy, Charcot joint, diabetic foot wounds, and long standing left heel wound who presented on 22 with increased drainage from left heel, admitted with concern for osteomyelitis. He was initiated on linezolid and zosyn and infectious disease was consulted. Podiatry was consulted and ordered MRI and culture. MRI was obtained and revealed progressive osteomyelitis within the talus subjacent to the chronic plantar ulceration with heterogeneous pattern of marrow signal supporting active/ongoing infection. Podiatry felt that salvage measures including antibiotics had been unsuccessful and recommended BKA. Vascular was consulted and the patient underwent left BKA on 22. His post operative course was uncomplicated.  He was admitted to [facility] on 1/22/22 due to functional deficits below his baseline.      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 Ralf Andrews 3/8/2019    COLONOSCOPY WITH MAC, UNASYN (3gm) performed by Stanley Guo MD at [address] [place] [address] 8/9/2019    COLONOSCOPY POLYPECTOMY SNARE/COLD BIOPSY performed by Stanley Guo MD at [place] [address]  8/9/2019    COLONOSCOPY WITH BIOPSY performed by Stanley Guo MD at [address] 8/28/2019    INCISION AND INCISIONAL DEBRIDEMENT OPEN FRACTURE RIGHT 2ND TOE SKIN AND BONE performed by Roxi Nickerson MD at [facility] 7/22/2020    LEFT FOOT INCISION AND DRAINAGE WITH BONE BIOPSY AND REMOVAL OF FREE FLOATING BONE FRAGMENT performed by Kaia Kulkarni DPM at [address] [place] [address]  1-2-10    GASTRIC BYPASS SURGERY      OTHER SURGICAL HISTORY  10/6/15    INCISION AND DRAINAGE RIGHT FOOT          OTHER SURGICAL HISTORY Right 10/8/15    INCISION AND DRAINAGE RIGHT FOOT      PACEMAKER PLACEMENT      UPPER GASTROINTESTINAL ENDOSCOPY N/A 3/8/2019 EGD BIOPSY GASTRIC H. PYLORI performed by [place], [place] at [address] [facility]  Family History   Problem Relation Age of Onset    Hypertension Mother     Heart Disease Father     Hypertension Maternal Grandmother     Diabetes Maternal Grandmother        SOCIAL HISTORY  Social History     Tobacco Use    Smoking status: Former     Packs/day: 1.00     Years: 4.00     Pack years: 4.00     Types: Cigarettes     Quit date: 2017     Years since quittin.0    Smokeless tobacco: Never   Vaping Use    Vaping Use: Never used   Substance Use Topics    Alcohol use: Yes     Comment: 2-3 times per year    Drug use: No       ALLERGIES  No Known Allergies    MEDICATIONS  Current Outpatient Medications on File Prior to Encounter   Medication Sig Dispense Refill    levothyroxine (SYNTHROID) 25 MCG tablet TAKE 1 TABLET BY MOUTH DAILY 90 tablet 3    apixaban (ELIQUIS) 5 MG TABS tablet TAKE 1 TABLET BY MOUTH TWICE DAILY 60 tablet 11    furosemide (LASIX) 20 MG tablet Take 1 tablet by mouth daily 30 tablet 3    FARXIGA 10 MG tablet TAKE 1 TABLET BY MOUTH EVERY MORNING 90 tablet 3    ezetimibe (ZETIA) 10 MG tablet Take 1 tablet by mouth daily 90 tablet 3    Continuous Blood Gluc Sensor (FREESTYLE KWESI 14 DAY SENSOR) [facility] Use as directed Dx E11.9 6 each 3    atorvastatin (LIPITOR) 40 MG tablet TAKE 1 TABLET BY MOUTH DAILY 90 tablet 3    oxybutynin (DITROPAN) 5 MG tablet Take 5 mg by mouth nightly      naloxone 4 MG/0.1ML LIQD nasal spray 1 spray by Nasal route as needed      Lactobacillus Rhamnosus, GG, ( PROBIOTIC DIGESTIVE CARE) CAPS Take 1 capsule by mouth daily      tamsulosin (FLOMAX) 0.4 MG capsule TAKE 1 CAPSULE BY MOUTH DAILY 90 capsule 3    carvedilol (COREG) 3.125 MG tablet Take 1 tablet by mouth 2 times daily 60 tablet 5    lisinopril (PRINIVIL;ZESTRIL) 2.5 MG tablet Take 1 tablet by mouth daily 60 tablet 5    amiodarone (CORDARONE) 200 MG tablet TAKE 1 TABLET BY MOUTH DAILY 90 tablet 2 methocarbamol (ROBAXIN) 750 MG tablet Take 1 tablet by mouth 3 times daily as needed (muscle spasm.) 40 tablet 1    docusate (COLACE, DULCOLAX) 100 MG CAPS Take 100 mg by mouth daily      NOVOLOG FLEXPEN 100 UNIT/ML injection pen ADMINISTER 8 TO 10 UNITS UNDER THE SKIN THREE TIMES DAILY BEFORE MEALS (Patient taking differently: Sliding scale) 45 mL 5    Blood Glucose Monitoring Suppl (ONETOUCH VERIO) w/Device KIT 1 each by Does not apply route 2 times daily 1 kit 0    blood glucose test strips (ASCENSIA AUTODISC VI;ONE TOUCH ULTRA TEST VI) strip 1 each by In Vitro route daily Test as directed,Dispense according to insurance formulary 100 each 3    Handicap Placard MISC by Does not apply route Diagnosis: heart failure. Expires: 12/7/24. 1 each 0    Insulin Pen Needle (B-D UF III MINI PEN NEEDLES) 31G X 5 MM MISC Inject 1 each into the skin daily 100 each 5    blood glucose test strips (ONE TOUCH TEST STRIPS) strip 1 each by In Vitro route 3 times daily As needed. 300 each 3    ONETOUCH ULTRA strip USE WITH GLUCOSE METER THREE TIMES DAILY AND AS NEEDED 300 strip 3    ferrous sulfate (FE TABS 325) 325 (65 Fe) MG EC tablet Take 325 mg by mouth daily      Continuous Blood Gluc  (FREESTYLE KWESI 14 DAY READER) ANITA Use as Directed Dx E11.9 1 Device 0    Blood Glucose Monitoring Suppl (ONE TOUCH ULTRA MINI) w/Device KIT 1 kit by Does not apply route three times daily 1 kit 0    acetaminophen (TYLENOL) 325 MG tablet Take 2 tablets by mouth every 4 hours as needed for Pain 120 tablet 3    Coenzyme Q10 (CO Q 10) 100 MG CAPS Take 1 capsule by mouth daily      Cinnamon 500 MG CAPS Take 1 capsule by mouth daily      Vitamin D (CHOLECALCIFEROL) 1000 UNITS CAPS capsule Take 2,000 Units by mouth nightly       Insulin Syringe-Needle U-100 (INSULIN SYRINGE .5CC/31GX5/16\") 31G X 5/16\" 0.5 ML MISC Patient tests bid 100 Syringe 5    therapeutic multivitamin-minerals (THERAGRAN-M) tablet Take 1 tablet by mouth daily.        No current facility-administered medications on file prior to encounter. REVIEW OF SYSTEMS  A comprehensive review of systems was negative except for: Pertinent items are noted in HPI. Objective:      BP (!) 102/55   Pulse 71   Temp 97.2 °F (36.2 °C) (Temporal)   Resp 18     Wt Readings from Last 3 Encounters:   22 273 lb (123.8 kg)   22 (!) 320 lb (145.2 kg)   21 (!) 339 lb (153.8 kg)       PHYSICAL EXAM  General Appearance/Constitutional: alert and oriented to person, place and time,  and in no acute distress. Nontoxic. Skin: warm and dry, no rash, positive wound per LDA documentation if applicable. Head: normocephalic and atraumatic. Eyes: extraocular eye movements intact, conjunctivae normal, and sclera anicteric. ENT: hearing grossly normal bilaterally. Normal appearance. Pulmonary/Chest: no chest wall tenderness and clear anteriorly. No respiratory distress. Cardiovascular: normal rate and regular rhythm. GI: abdomen soft, non-tender and non-distended. Musculoskeletal: normal range of motion of joints. Nontender calves. No cyanosis. Nontender calves. Edema 1+  Neurologic: no gross cranial nerve deficit and speech normal. No focal deficits. Mental status normal.    Medical Decision Makin-year-old male status post left BKA on 2022. Wound healed but dehisced at the end of 2022. There was a lot of drainage. Wife has been using silver alginate with cushion dressing. Today, the wound is essentially healed. Very fragile skin noted. There is a bony prominence as well. Assessment required other independent historian(s): No. Additional Historian: patient . Comorbid conditions affecting wound healing: As noted in [address] and [facility] which was reviewed.     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 ([address] Diabetic foot ([address] - Primary    Diabetic ulcer of left lower leg, limited to breakdown of skin ([address]    Type 2 diabetes mellitus with left diabetic foot ulcer ([address]    Diabetic polyneuropathy associated with type 2 diabetes mellitus ([address]    Diabetic ulcer of left midfoot associated with type 2 diabetes mellitus, with necrosis of bone (HCC)    Diabetes mellitus with skin ulcer ([address]       Other    BKA stump complication (HCC)    Impaired mobility    Subacute osteomyelitis of left foot (HCC)    Charcot ankle, left       Wounds and Treatment Plan:  Left lower extremity nonpressure ulcer. Severity fat layers exposed. Essentially healed. Offloading felt placed. Mepilex border to protect. Surgical wound dehiscence, essentially resolved. Other diagnoses or problems addressed:  Diabetes mellitus. Nutrition discussed in detail. Counseling and education provided. Recommended protein emphasis with meals. Patient to see [facility] for his prosthetic adjustment. Pertinent labs reviewed. Review of medical records and external note (s) from other providers was done for this visit. New lab or imaging orders placed:  none    Prescription drug management: N/A     Risk of complications and/or mortality of patient management: This patient has a moderate risk of morbidity and mortality from additional diagnostic testing or treatment. This is due to the above conditions affecting wound healing as well as patient and procedure risk factors. Education and discussion held with patient regarding these disease processes pertinent to wound(s). Other pertinent decisions include: minor surgery or procedures as below. The patient's diagnosis or treatment is not significantly limited by social determinants of health as noted by: N/A .     Discussion of management or test interpretation with other qualified health care professional and other external source    Time spent with patient and patient care issues above the usual time needed for wound assessment and treatment was: [] 15-20 min  [] 21-30 min  [x] 31-44 min  [] 45 min or more  This included time retrieving and reviewing records with patient and education provided to patient regarding disease process(es), offloading or pressure relief, nutrition needed for wound healing, smoking cessation when applicable, and infection risk. This time also included physician non-face-to-face service time visit on the date of service such as  Preparing to see the patient (eg, review of tests)  Obtaining and/or reviewing separately obtained history from outside facilities  Performing a medically necessary appropriate examination and/or evaluation  Counseling and educating the patient/family/caregiver  Ordering medications, tests, or procedures  Referring and communicating with other health care professionals as needed  Documenting clinical information in the electronic or other health record  Independently interpreting results (not reported separately) and communicating results to the patient/family/caregiver  Care coordination (not reported separately)    Negative Pressure Wound Therapy Foot Left (Active)   Number of days: 909     Incision 08/28/19 Foot Right (Active)   Number of days: 1238       Incision 07/22/20 Foot Anterior; Left (Active)   Number of days: 908       Written patient dismissal instructions given to patient and signed by patient or POA. Patient voiced understanding that the importance of adherence to instructions is paramount to wound healing improvement or success. Discharge Instructions           [address]  Physician [facility] [place] AT [address]arma[address], [facility]  Telephone: [phone]    NAME:  Franko Monzon OF BIRTH:  1950  MEDICAL RECORD NUMBER:  [id number]  DATE:  1/17/2023    Congratulations! You have completed your treatment.        Return to your Primary Care Physician for all your health issues. Resume your ordinary activities as tolerated. Take your medications as prescribed by your primary care physician. Check your skin daily for cracks, bruises, sores, or dryness. Use a moisturizer as needed. Clean and dry your skin, using mild soap and warm water (not hot). Avoid alcohol and caffeine and do not smoke. Maintain a nutritious diet.     **KEEP APPOINTMENT WITH [facility] 1/18/2023 TO DISCUSS PRESSURE RELIEF OFF LEFT LEG STUMP**      Physician Signature:______________________    Date: ___________ Time:  ____________    Dr Mumtaz Harp            Electronically signed by Sunil Doty RN on 1/17/2023 at 10:21 AM                                Electronically signed by Osman Long MD on 1/17/2023 at 10:29 AM

## 2023-01-17 NOTE — LETTER
[address]  [address] [address] OFFICE [place] [address]  [facility]  [phone]  Dept: [phone]   TODAYS DATE: 1/12/2023        [address] [place] [address],[address] Floor Wound Care   Appointment Treatment Guidelines  Welcome Mr. Tommy Angulo to the [address] [place], [place] [address]w. We appreciate the confidence you have shown in choosing us as your wound care provider. Our goal is to heal your wound(s) as quickly as possible. Please read the items below regarding the nature of your appointments. 1. We will make every effort to schedule appointments that are convenient for you. Certain days and times may not be available, depending on your providers office hours and details of your care. 2. Patients will not necessarily be brought to an exam room in the order in which they arrive. Many providers work out of this office and patients are here for different procedures. Our goal is to serve you as quickly as possible. 3. We acknowledge that your time is valuable. Please remember that wound healing takes time and we appreciate your understanding that the length of each patients appointment will vary depending upon their need. 4. It is for your protection that we ask for insurance cards, photo ID, and new consent forms on your first visit and periodically throughout your treatment at all our facilities. 5. Wound Care treatment is known to be most effective when provided on a regular basis. Missed appointments, and not following the recommended plan of care can result in ineffective treatment and a poor outcome. If you find it difficult to keep appointments or to follow the recommended plan of care, it is your responsibility to let the staff know, so that we can work with you toward a solution. 6. If you need to miss an appointment, please call to let us know. We expect 24 hours notice for all cancellations.  We also expect missed visits to be rescheduled as soon as possible, preferably within the same week to promote the most effective healing time for your wound(s). 7. If you will be late for an appointment, please call our center to be sure that the provider can still see you when you arrive. If you are more than 15 minutes late your appointment may need to be rescheduled. 8. If two (2) appointments are missed without notifying us, your care plan may be discontinued. The same may happen if multiple visits are cancelled or rescheduled, even with notice. A missed visit is time when another patient, who also needs care, could have been seen. Thank you for your understanding and consideration.

## 2023-01-20 ENCOUNTER — TELEPHONE (OUTPATIENT)
Dept: INTERNAL MEDICINE CLINIC | Age: 73
End: 2023-01-20

## 2023-01-20 DIAGNOSIS — N32.81 OAB (OVERACTIVE BLADDER): Primary | ICD-10-CM

## 2023-01-20 NOTE — TELEPHONE ENCOUNTER
Medication:   Requested Prescriptions     Pending Prescriptions Disp Refills    oxybutynin (DITROPAN) 5 MG tablet 90 tablet      Sig: Take 1 tablet by mouth nightly Take 5 mg by mouth nightly        Last Filled:      Patient Phone Number: [phone] (home)     Last appt: 10/5/2022   Next appt: 2/14/2023    Last OARRS: No flowsheet data found.

## 2023-01-23 RX ORDER — OXYBUTYNIN CHLORIDE 5 MG/1
5 TABLET ORAL NIGHTLY
Qty: 90 TABLET | Refills: 3 | Status: SHIPPED | OUTPATIENT
Start: 2023-01-23

## 2023-01-25 ENCOUNTER — OFFICE VISIT (OUTPATIENT)
Dept: CARDIOLOGY CLINIC | Age: 73
End: 2023-01-25
Payer: MEDICARE

## 2023-01-25 VITALS
WEIGHT: 266 LBS | SYSTOLIC BLOOD PRESSURE: 106 MMHG | DIASTOLIC BLOOD PRESSURE: 60 MMHG | HEART RATE: 76 BPM | BODY MASS INDEX: 35.25 KG/M2 | HEIGHT: 73 IN

## 2023-01-25 DIAGNOSIS — I48.91 ATRIAL FIBRILLATION AND FLUTTER (HCC): Primary | ICD-10-CM

## 2023-01-25 DIAGNOSIS — I48.92 ATRIAL FIBRILLATION AND FLUTTER (HCC): Primary | ICD-10-CM

## 2023-01-25 DIAGNOSIS — E78.5 HYPERLIPIDEMIA, UNSPECIFIED HYPERLIPIDEMIA TYPE: ICD-10-CM

## 2023-01-25 DIAGNOSIS — I10 ESSENTIAL HYPERTENSION: ICD-10-CM

## 2023-01-25 PROCEDURE — 3074F SYST BP LT 130 MM HG: CPT | Performed by: INTERNAL MEDICINE

## 2023-01-25 PROCEDURE — 99214 OFFICE O/P EST MOD 30 MIN: CPT | Performed by: INTERNAL MEDICINE

## 2023-01-25 PROCEDURE — 3078F DIAST BP <80 MM HG: CPT | Performed by: INTERNAL MEDICINE

## 2023-01-25 PROCEDURE — 1123F ACP DISCUSS/DSCN MKR DOCD: CPT | Performed by: INTERNAL MEDICINE

## 2023-01-25 NOTE — PROGRESS NOTES
[address]  Office Visit           Cam Neri MD,  [facility]                           Cardiology           Merlene Fisher  1950    January 25, 2023    CC: here with CM     HPI:  The patient is 81 y.o. male with hx CM HFpEF, pafib. s/p DCCV  HTN HLD CRD DMT2 / BiV ICD in situ / obesity / WILL uses CPAP    AICD interrogation are with  Dr Ceci Mcmanus office routinely     Has not tolerated Jardiance or [place]. Is here today for evaluation follow-up with seems fairly stable. No current specific issues. Still having a foot wound that is not healing very well  Creatinine is 1.3 and stable. Is seeing Dr. Jt Drake for his podiatry issues. From a cardiac perspective all else looks stable. We will plan to have him continue Lasix at 40 mg every other day. To have a renal panel soon. He will be having interrogation with for his device soon. .  Did receive the Ferro Peter vaccine x2. Status post gastric I appears surgery with weight loss of over 130 pounds. Review of Systems:  Constitutional: Denies  fatigue, weakness, night sweats or fever. HEENT: Denies new visual changes, ringing in ears, nosebleeds,nasal congestion  Respiratory: Denies new or change in SOB, PND, orthopnea or cough. Cardiovascular: see HPI  GI: Denies N/V, diarrhea, constipation, abdominal pain, change in bowel habits, melena or hematochezia  : Denies urinary frequency, urgency, incontinence, hematuria or dysuria. Skin: Denies rash, hives, or cyanosis  Musculoskeletal: Denies joint or muscle aches/pain  Neurological: Denies syncope or TIA-like symptoms.   Psychiatric: Denies anxiety, insomnia or depression     Past Medical History:   Diagnosis Date    Atrial fibrillation (HCC)     Back pain     Cardiomyopathy     CHF (congestive heart failure) (HCC)     COPD (chronic obstructive pulmonary disease) (HCC)     Dyslipidemia     GERD (gastroesophageal reflux disease)     Hyperlipidemia     Hypertension Hypothyroidism     Leg edema     MRSA (methicillin resistant staph aureus) culture positive 10/5/15    foot/bone    MRSA nasal colonization 05/05/2017    Obesity     Prolonged emergence from general anesthesia     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     Stroke [facility])     Thyroid disease     Type 2 diabetes mellitus without complication ([facility] [address]     Type II or unspecified type diabetes mellitus without mention of complication, not stated as uncontrolled      Past Surgical History:   Procedure Laterality Date    [address]  09/05/2017    Dr. Howie Martinez 3/8/2019    COLONOSCOPY WITH MAC, UNASYN (3gm) performed by Gerald Escamilla MD at [address] 8/9/2019    COLONOSCOPY POLYPECTOMY SNARE/COLD BIOPSY performed by Gerald Escamilla MD at [facility]  8/9/2019    COLONOSCOPY WITH BIOPSY performed by Gerald Escamilla MD at [address] 8/28/2019    INCISION AND INCISIONAL DEBRIDEMENT OPEN FRACTURE RIGHT 2ND TOE SKIN AND BONE performed by Alessandro Ariza MD at [facility] Left 7/22/2020    LEFT FOOT INCISION AND DRAINAGE WITH BONE BIOPSY AND REMOVAL OF FREE FLOATING BONE FRAGMENT performed by Alfonso Silver DPM at [address] [place] [address]  1-2-    GASTRIC BYPASS SURGERY      OTHER SURGICAL HISTORY  10/6/15    INCISION AND DRAINAGE RIGHT FOOT          OTHER SURGICAL HISTORY Right 10/8/15    INCISION AND DRAINAGE RIGHT FOOT      PACEMAKER PLACEMENT      UPPER GASTROINTESTINAL ENDOSCOPY N/A 3/8/2019    EGD BIOPSY GASTRIC H. PYLORI performed by Gerald Escamilla MD at [address] History   Problem Relation Age of Onset    Hypertension Mother     Heart Disease Father     Hypertension Maternal Grandmother     Diabetes Maternal Grandmother      Social History     Tobacco Use    Smoking status: Former     Packs/day: 1.00     Years: 4.00     Pack years: 4.00     Types: Cigarettes     Quit date: 2017     Years since quittin.0    Smokeless tobacco: Never   Vaping Use    Vaping Use: Never used   Substance Use Topics    Alcohol use: Yes     Comment: 2-3 times per year    Drug use: No       No Known Allergies  Current Outpatient Medications   Medication Sig Dispense Refill    oxybutynin (DITROPAN) 5 MG tablet Take 1 tablet by mouth nightly Take 5 mg by mouth nightly 90 tablet 3    levothyroxine (SYNTHROID) 25 MCG tablet TAKE 1 TABLET BY MOUTH DAILY 90 tablet 3    apixaban (ELIQUIS) 5 MG TABS tablet TAKE 1 TABLET BY MOUTH TWICE DAILY 60 tablet 11    furosemide (LASIX) 20 MG tablet Take 1 tablet by mouth daily 30 tablet 3    FARXIGA 10 MG tablet TAKE 1 TABLET BY MOUTH EVERY MORNING 90 tablet 3    Continuous Blood Gluc Sensor ([place] LogicSTYLE KWESI 14 DAY SENSOR) MISC Use as directed Dx E11.9 6 each 3    atorvastatin (LIPITOR) 40 MG tablet TAKE 1 TABLET BY MOUTH DAILY 90 tablet 3    Lactobacillus Rhamnosus, GG, ( PROBIOTIC DIGESTIVE CARE) CAPS Take 1 capsule by mouth daily      tamsulosin (FLOMAX) 0.4 MG capsule TAKE 1 CAPSULE BY MOUTH DAILY 90 capsule 3    carvedilol (COREG) 3.125 MG tablet Take 1 tablet by mouth 2 times daily 60 tablet 5    lisinopril (PRINIVIL;ZESTRIL) 2.5 MG tablet Take 1 tablet by mouth daily 60 tablet 5    amiodarone (CORDARONE) 200 MG tablet TAKE 1 TABLET BY MOUTH DAILY 90 tablet 2    methocarbamol (ROBAXIN) 750 MG tablet Take 1 tablet by mouth 3 times daily as needed (muscle spasm.) 40 tablet 1    docusate (COLACE, DULCOLAX) 100 MG CAPS Take 100 mg by mouth daily      NOVOLOG FLEXPEN 100 UNIT/ML injection pen ADMINISTER 8 TO 10 UNITS UNDER THE SKIN THREE TIMES DAILY BEFORE MEALS (Patient taking differently: Sliding scale) 45 mL 5    Blood Glucose Monitoring Suppl (ONETOUCH VERIO) w/Device KIT 1 each by Does not apply route 2 times daily 1 kit 0    blood glucose test strips (ASCENSIA AUTODISC VI;ONE TOUCH ULTRA TEST VI) strip 1 each by In Vitro route daily Test as directed,Dispense according to insurance formulary 100 each 3    Handicap Placard MISC by Does not apply route Diagnosis: heart failure. Expires: 12/7/24. 1 each 0    Insulin Pen Needle (B-D UF III MINI PEN NEEDLES) 31G X 5 MM MISC Inject 1 each into the skin daily 100 each 5    blood glucose test strips (ONE TOUCH TEST STRIPS) strip 1 each by In Vitro route 3 times daily As needed. 300 each 3    ONETOUCH ULTRA strip USE WITH GLUCOSE METER THREE TIMES DAILY AND AS NEEDED 300 strip 3    ferrous sulfate (FE TABS 325) 325 (65 Fe) MG EC tablet Take 325 mg by mouth daily      Continuous Blood Gluc  (FREESTDunwello KWESI 14 DAY READER) ANITA Use as Directed Dx E11.9 1 Device 0    Blood Glucose Monitoring Suppl (ONE TOUCH ULTRA MINI) w/Device KIT 1 kit by Does not apply route three times daily 1 kit 0    acetaminophen (TYLENOL) 325 MG tablet Take 2 tablets by mouth every 4 hours as needed for Pain 120 tablet 3    Coenzyme Q10 (CO Q 10) 100 MG CAPS Take 1 capsule by mouth daily      Cinnamon 500 MG CAPS Take 1 capsule by mouth daily      Vitamin D (CHOLECALCIFEROL) 1000 UNITS CAPS capsule Take 2,000 Units by mouth nightly       Insulin Syringe-Needle U-100 (INSULIN SYRINGE .5CC/31GX5/16\") 31G X 5/16\" 0.5 ML MISC Patient tests bid 100 Syringe 5    therapeutic multivitamin-minerals (THERAGRAN-M) tablet Take 1 tablet by mouth daily. ezetimibe (ZETIA) 10 MG tablet Take 1 tablet by mouth daily 90 tablet 3     No current facility-administered medications for this visit.        Physical Exam:   /60   Pulse 76   Ht 6' 1\" (1.854 m)   Wt 266 lb (120.7 kg)   BMI 35.09 kg/m²   BP Readings from Last 3 Encounters:   01/25/23 106/60   01/17/23 (!) 102/55   08/22/22 118/68     Pulse Readings from Last 3 Encounters:   01/25/23 76   01/17/23 71   08/22/22 76     Wt Readings from Last 3 Encounters:   01/25/23 266 lb (120.7 kg)   07/30/22 273 lb (123.8 kg)   04/26/22 (!) 320 lb (145.2 kg)     Constitutional: He is oriented to person, place, and time. He appears well-developed and well-nourished. In no acute distress. HEENT: Normocephalic and atraumatic. Sclerae anicteric. No xanthelasmas. Conjunctiva white, no subconjunctival hemorrhage   External inspection of ears nose teeth & gums   Eyes:PERRLA EOM's intact. Neck: Neck supple. No JVD present. Carotids without bruits. No mass and no thyromegaly present. No lymphadenopathy present. Cardiovascular: RRR, normal S1 and S2; no murmur/gallop or rub, PMI nondisplaced  Pulmonary/Chest: Effort normal.  Lungs clear to auscultation. Chest wall nontender  Abdominal: soft, nontender, nondistended. + bowel sounds; no organomegaly or bruits. Aorta normal  Extremities: No edema, cyanosis, or clubbing. Pulses are 2+ radial/carotid/dorsalis pedis bilaterally. Cap refill brisk. Neurological: No cranial nerve deficit. Psychiatric: He has a normal mood and affect. His speech is normal and behavior is normal.     Lab Review:   Lab Results   Component Value Date/Time    TRIG 67 10/03/2022 12:36 PM    HDL 69 10/03/2022 12:36 PM    HDL 56 11/15/2011 10:28 AM    LDLCALC 78 10/03/2022 12:36 PM    LABVLDL 13 10/03/2022 12:36 PM     Lab Results   Component Value Date/Time     10/03/2022 12:36 PM    K 4.0 10/03/2022 12:36 PM    K 4.8 07/19/2020 11:00 AM     10/03/2022 12:36 PM    CO2 23 10/03/2022 12:36 PM    BUN 9 10/03/2022 12:36 PM    CREATININE 1.2 10/03/2022 12:36 PM    GLUCOSE 90 10/03/2022 12:36 PM    CALCIUM 8.6 10/03/2022 12:36 PM      Lab Results   Component Value Date    WBC 2.6 (L) 10/03/2022    HGB 13.8 10/03/2022    HCT 42.5 10/03/2022    MCV 88.0 10/03/2022     10/03/2022      Summary 12/16/22   Poor quality due to body habitus and poor acoustic window. Left ventricular cavity size is normal with normal left ventricular wall   thickness. Overall left ventricular systolic function appears normal.   Ejection fraction is estimated to be 50-55%.    No regional wall motion abnormalities   Grade I diastolic dysfunction with normal LV filling pressures. Trivial to mild tricuspid regurgitation. Aortic valve sclerosis, without stenosis             Assessment:    hx CM HFpEF  Near euvolemic   BiV ICD in situ /  on amiodarone / obesity   AICD interrogation are with  Dr Selina Villarreal office routinely     Pafib  [id number] Score for Atrial Fibrillation Stroke Risk   Risk   Factors  Component Value   C CHF Yes 1   H HTN Yes 1   A2 Age >= 76 No,  (71 y.o.) 0   D DM Yes 1   S2 Prior Stroke/TIA Yes 2   V Vascular Disease No 0   A Age 74-69 Yes,  (71 y.o.) 1   Sc Sex male 0    [id number]  Score  6   Denies  any falls or any noted bleeding   Hgb 12.4 /stable  / sCr wnl   s/p DCCV      HTN   Optimal     HLD    On statin   LDL 75 9/2018                                           CRD  Stable    DMT2  Glucose average  118     WILL   uses CPAP     Plan:  I continue Lasix currently at 40 mg every other day. He will have his device interrogated fairly soon by . Return to see me in 6 months. Edith Espana M.D.  [address] [facility]

## 2023-01-25 NOTE — PROGRESS NOTES
[facility] [place]  Office Visit           Geovanni Multani MD,  [facility]                           Cardiology           jJ Martin  1950    January 25, 2023    CC: here with CM     HPI:  The patient is 81 y.o. male with hx CM HFpEF, pafib. 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 Apparently still having some wound healing problems and having some trouble with his prostheses and difficulty walking. He states he is tired much of the time and in spite of having what appeared to be normal ejection fraction by echocardiograph and also he has all the labs seem to be fairly normal I am concerned about his potential for coronary disease. Never had a cardiac cath. He does have cardiomyopathy and has a defibrillator with about 1.9 years of battery life. Has not tolerated Jardiance or [place]. Coronavirus vaccine x2 with booster      Post gastric bypass surgery and weight is continuing to come down   status post left BKA January 18, 2022  Hypertension  Hyperlipidemia  Cardiomyopathy  AICD on board        Status post gastric I appears surgery with weight loss of over 130 pounds. Review of Systems:  Constitutional: Denies  fatigue, weakness, night sweats or fever. HEENT: Denies new visual changes, ringing in ears, nosebleeds,nasal congestion  Respiratory: Denies new or change in SOB, PND, orthopnea or cough. Cardiovascular: see HPI  GI: Denies N/V, diarrhea, constipation, abdominal pain, change in bowel habits, melena or hematochezia  : Denies urinary frequency, urgency, incontinence, hematuria or dysuria. Skin: Denies rash, hives, or cyanosis  Musculoskeletal: Denies joint or muscle aches/pain  Neurological: Denies syncope or TIA-like symptoms.   Psychiatric: Denies anxiety, insomnia or depression     Past Medical History:   Diagnosis Date Atrial fibrillation (HCC)     Back pain     Cardiomyopathy     CHF (congestive heart failure) (HCC)     COPD (chronic obstructive pulmonary disease) (HCC)     Dyslipidemia     GERD (gastroesophageal reflux disease)     Hyperlipidemia     Hypertension     Hypothyroidism     Leg edema     MRSA (methicillin resistant staph aureus) culture positive 10/5/15    foot/bone    MRSA nasal colonization 05/05/2017    Obesity     Prolonged emergence from general anesthesia     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     Stroke [facility])     Thyroid disease     Type 2 diabetes mellitus without complication ([facility] [address]     Type II or unspecified type diabetes mellitus without mention of complication, not stated as uncontrolled      Past Surgical History:   Pro[facility] Date    [id number] [facility]  [facility]    Dr. Donovan Kraus 3/8/2019    COLONOSCOPY WITH MAC, UNASYN (3gm) performed by Jesse Angulo MD at [address] [place] [address] 8/9/2019    COLONOSCOPY POLYPECTOMY SNARE/COLD BIOPSY performed by Jesse Angulo MD at [place] [address]  8/9/2019    COLONOSCOPY WITH BIOPSY performed by Jesse Angulo MD at [address] 8/28/2019    INCISION AND INCISIONAL DEBRIDEMENT OPEN FRACTURE RIGHT 2ND TOE SKIN AND BONE performed by Ishaan Diaz MD at [facility] 7/22/2020    LEFT FOOT INCISION AND DRAINAGE WITH BONE BIOPSY AND REMOVAL OF FREE FLOATING BONE FRAGMENT performed by Michelle Shabazz DPM at [address] [place] [address]  1-2-10    GASTRIC BYPASS SURGERY      OTHER SURGICAL HISTORY  10/6/15    INCISION AND DRAINAGE RIGHT FOOT          OTHER SURGICAL HISTORY Right 10/8/15    INCISION AND DRAINAGE RIGHT FOOT      PACEMAKER PLACEMENT      UPPER GASTROINTESTINAL ENDOSCOPY N/A 3/8/2019    EGD BIOPSY GASTRIC H. PYLORI performed by Jesse Angulo MD at [facility] ENDOSCOPY     Family History   Problem Relation Age of Onset Hypertension Mother     Heart Disease Father     Hypertension Maternal Grandmother     Diabetes Maternal Grandmother      Social History     Tobacco Use    Smoking status: Former     Packs/day: 1.00     Years: 4.00     Pack years: 4.00     Types: Cigarettes     Quit date: 2017     Years since quittin.0    Smokeless tobacco: Never   Vaping Use    Vaping Use: Never used   Substance Use Topics    Alcohol use: Yes     Comment: 2-3 times per year    Drug use: No       No Known Allergies  Current Outpatient Medications   Medication Sig Dispense Refill    oxybutynin (DITROPAN) 5 MG tablet Take 1 tablet by mouth nightly Take 5 mg by mouth nightly 90 tablet 3    levothyroxine (SYNTHROID) 25 MCG tablet TAKE 1 TABLET BY MOUTH DAILY 90 tablet 3    apixaban (ELIQUIS) 5 MG TABS tablet TAKE 1 TABLET BY MOUTH TWICE DAILY 60 tablet 11    furosemide (LASIX) 20 MG tablet Take 1 tablet by mouth daily 30 tablet 3    FARXIGA 10 MG tablet TAKE 1 TABLET BY MOUTH EVERY MORNING 90 tablet 3    Continuous Blood Gluc Sensor (FREESTYLE KWESI 14 DAY SENSOR) MISC Use as directed Dx E11.9 6 each 3    atorvastatin (LIPITOR) 40 MG tablet TAKE 1 TABLET BY MOUTH DAILY 90 tablet 3    Lactobacillus Rhamnosus, GG, ( PROBIOTIC DIGESTIVE CARE) CAPS Take 1 capsule by mouth daily      tamsulosin (FLOMAX) 0.4 MG capsule TAKE 1 CAPSULE BY MOUTH DAILY 90 capsule 3    carvedilol (COREG) 3.125 MG tablet Take 1 tablet by mouth 2 times daily 60 tablet 5    lisinopril (PRINIVIL;ZESTRIL) 2.5 MG tablet Take 1 tablet by mouth daily 60 tablet 5    amiodarone (CORDARONE) 200 MG tablet TAKE 1 TABLET BY MOUTH DAILY 90 tablet 2    methocarbamol (ROBAXIN) 750 MG tablet Take 1 tablet by mouth 3 times daily as needed (muscle spasm.) 40 tablet 1    docusate (COLACE, DULCOLAX) 100 MG CAPS Take 100 mg by mouth daily      NOVOLOG FLEXPEN 100 UNIT/ML injection pen ADMINISTER 8 TO 10 UNITS UNDER THE SKIN THREE TIMES DAILY BEFORE MEALS (Patient taking differently: Sliding scale) 45 mL 5    Blood Glucose Monitoring Suppl (Narciso Reins) w/Device KIT 1 each by Does not apply route 2 times daily 1 kit 0    blood glucose test strips (ASCENSIA AUTODISC VI;ONE TOUCH ULTRA TEST VI) strip 1 each by In Vitro route daily Test as directed,Dispense according to insurance formulary 100 each 3    Handicap Placard MISC by Does not apply route Diagnosis: heart failure. Expires: 12/7/24. 1 each 0    Insulin Pen Needle (B-D UF III MINI PEN NEEDLES) 31G X 5 MM MISC Inject 1 each into the skin daily 100 each 5    blood glucose test strips (ONE TOUCH TEST STRIPS) strip 1 each by In Vitro route 3 times daily As needed. 300 each 3    ONETOUCH ULTRA strip USE WITH GLUCOSE METER THREE TIMES DAILY AND AS NEEDED 300 strip 3    ferrous sulfate (FE TABS 325) 325 (65 Fe) MG EC tablet Take 325 mg by mouth daily      Continuous Blood Gluc  (FREESTYLE KWESI 14 DAY READER) ANITA Use as Directed Dx E11.9 1 Device 0    Blood Glucose Monitoring Suppl (ONE TOUCH ULTRA MINI) w/Device KIT 1 kit by Does not apply route three times daily 1 kit 0    acetaminophen (TYLENOL) 325 MG tablet Take 2 tablets by mouth every 4 hours as needed for Pain 120 tablet 3    Coenzyme Q10 (CO Q 10) 100 MG CAPS Take 1 capsule by mouth daily      Cinnamon 500 MG CAPS Take 1 capsule by mouth daily      Vitamin D (CHOLECALCIFEROL) 1000 UNITS CAPS capsule Take 2,000 Units by mouth nightly       Insulin Syringe-Needle U-100 (INSULIN SYRINGE .5CC/31GX5/16\") 31G X 5/16\" 0.5 ML MISC Patient tests bid 100 Syringe 5    therapeutic multivitamin-minerals (THERAGRAN-M) tablet Take 1 tablet by mouth daily. ezetimibe (ZETIA) 10 MG tablet Take 1 tablet by mouth daily 90 tablet 3     No current facility-administered medications for this visit.        Physical Exam:   /60   Pulse 76   Ht 6' 1\" (1.854 m)   Wt 266 lb (120.7 kg)   BMI 35.09 kg/m²   BP Readings from Last 3 Encounters:   01/25/23 106/60   01/17/23 (!) 102/55   08/22/22 118/68 Pulse Readings from Last 3 Encounters:   01/25/23 76   01/17/23 71   08/22/22 76     Wt Readings from Last 3 Encounters:   01/25/23 266 lb (120.7 kg)   07/30/22 273 lb (123.8 kg)   04/26/22 (!) 320 lb (145.2 kg)     Constitutional: He is oriented to person, place, and time. He appears well-developed and well-nourished. In no acute distress. HEENT: Normocephalic and atraumatic. Sclerae anicteric. No xanthelasmas. Conjunctiva white, no subconjunctival hemorrhage   External inspection of ears nose teeth & gums   Eyes:PERRLA EOM's intact. Neck: Neck supple. No JVD present. Carotids without bruits. No mass and no thyromegaly present. No lymphadenopathy present. Cardiovascular: RRR, normal S1 and S2; no murmur/gallop or rub, PMI nondisplaced  Pulmonary/Chest: Effort normal.  Lungs clear to auscultation. Chest wall nontender  Abdominal: soft, nontender, nondistended. + bowel sounds; no organomegaly or bruits. Aorta normal  Extremities: No edema, cyanosis, or clubbing. Pulses are 2+ radial/carotid/dorsalis pedis bilaterally. Cap refill brisk. Neurological: No cranial nerve deficit. Psychiatric: He has a normal mood and affect.  His speech is normal and behavior is normal.     Lab Review:   Lab Results   Component Value Date/Time    TRIG 67 10/03/2022 12:36 PM    HDL 69 10/03/2022 12:36 PM    HDL 56 11/15/2011 10:28 AM    LDLCALC 78 10/03/2022 12:36 PM    LABVLDL 13 10/03/2022 12:36 PM     Lab Results   Component Value Date/Time     10/03/2022 12:36 PM    K 4.0 10/03/2022 12:36 PM    K 4.8 07/19/2020 11:00 AM     10/03/2022 12:36 PM    CO2 23 10/03/2022 12:36 PM    BUN 9 10/03/2022 12:36 PM    CREATININE 1.2 10/03/2022 12:36 PM    GLUCOSE 90 10/03/2022 12:36 PM    CALCIUM 8.6 10/03/2022 12:36 PM      Lab Results   Component Value Date    WBC 2.6 (L) 10/03/2022    HGB 13.8 10/03/2022    HCT 42.5 10/03/2022    MCV 88.0 10/03/2022     10/03/2022                  Assessment:    hx CM HFpEF  Near euvolemic   BiV ICD in situ /  on amiodarone / obesity   AICD interrogation are with  Dr Sapphire Samuels office routinely     Pafib  [id number] Score for Atrial Fibrillation Stroke Risk   Risk   Factors  Component Value   C CHF Yes 1   H HTN Yes 1   A2 Age >= 76 No,  (71 y.o.) 0   D DM Yes 1   S2 Prior Stroke/TIA Yes 2   V Vascular Disease No 0   A Age 74-69 Yes,  (71 y.o.) 1   Sc Sex male 0    [id number]  Score  6   Denies  any falls or any noted bleeding   Hgb 12.4 /stable  / sCr wnl   s/p DCCV      HTN   Optimal     HLD    On statin   LDL 75 9/2018                                           CRD  Stable    DMT2  Glucose average  118     WILL   uses CPAP     Plan:  Fasting lipid and CMP soon. We will get a Lexiscan Myoview to rule out ischemia. May very well need to have a heart cath. Return to see me in 2 months. Manda Galeas M.D.  [facility]

## 2023-02-14 ENCOUNTER — NURSE ONLY (OUTPATIENT)
Dept: CARDIOLOGY CLINIC | Age: 73
End: 2023-02-14
Payer: MEDICARE

## 2023-02-14 DIAGNOSIS — Z95.810 CARDIAC RESYNCHRONIZATION THERAPY DEFIBRILLATOR (CRT-D) IN PLACE: Primary | ICD-10-CM

## 2023-02-14 DIAGNOSIS — I42.0 DILATED CARDIOMYOPATHY (HCC): ICD-10-CM

## 2023-02-14 DIAGNOSIS — I50.22 CHRONIC SYSTOLIC HEART FAILURE (HCC): ICD-10-CM

## 2023-02-14 PROCEDURE — 93297 REM INTERROG DEV EVAL ICPMS: CPT | Performed by: NURSE PRACTITIONER

## 2023-02-14 PROCEDURE — G2066 INTER DEVC REMOTE 30D: HCPCS | Performed by: NURSE PRACTITIONER

## 2023-02-15 NOTE — PROGRESS NOTES
Remote transmission received from patient's CRT-D monitor at home. Transmission shows normal sensing and pacing function. RRT/KENDRICK estimated in 21 months. Noted NSVT and AT/AF, 0.4% burden, 6 of 22 pace-terminated episodes (Coreg, amiodarone, Eliquis). Ap 98.3%  BiVp 97.7%, Effective 97.3%, VSRp 1.6%  PVCs 24.8/hr    Possible Optivol fluid accumulation 08.30.22-ongoing, currently elevated up to >200 w correlating TI trend below reference line; previously noted. TriageF Heart Failure Risk Status on 14-Feb-2023 is High*. End of 31-day monitoring period 2/14/23. NP will review. See interrogation under cardiology tab in the [address] [place] [address] [address] field for more details. Will continue to monitor remotely.

## 2023-02-20 ENCOUNTER — TELEPHONE (OUTPATIENT)
Dept: INTERNAL MEDICINE CLINIC | Age: 73
End: 2023-02-20

## 2023-02-20 DIAGNOSIS — N30.00 ACUTE CYSTITIS WITHOUT HEMATURIA: Primary | ICD-10-CM

## 2023-02-20 NOTE — TELEPHONE ENCOUNTER
Let him know he has 2 blood test already ordered from January that are still active. He needs to complete those plus urine analysis.

## 2023-02-20 NOTE — TELEPHONE ENCOUNTER
Patient is on farxiga and now has a UTI. He would like to get a urine test done before coming to his visit. He would like to know if he has to get any other labs done before his visit on 3/8. He would also like to speak with the provider.   Please advise

## 2023-02-20 NOTE — TELEPHONE ENCOUNTER
Informed patient of the labs that are on his profile and that he can do them all in the same place. He said yes that he understood.   CLOTILDE

## 2023-03-05 DIAGNOSIS — I10 ESSENTIAL HYPERTENSION: ICD-10-CM

## 2023-03-06 ENCOUNTER — HOSPITAL ENCOUNTER (OUTPATIENT)
Age: 73
Discharge: HOME | End: 2023-03-06
Payer: MEDICARE

## 2023-03-06 ENCOUNTER — HOSPITAL ENCOUNTER (OUTPATIENT)
Dept: NON INVASIVE DIAGNOSTICS | Age: 73
Discharge: HOME | End: 2023-03-06
Payer: MEDICARE

## 2023-03-06 DIAGNOSIS — I10 ESSENTIAL HYPERTENSION: ICD-10-CM

## 2023-03-06 DIAGNOSIS — I48.92 ATRIAL FIBRILLATION AND FLUTTER (HCC): ICD-10-CM

## 2023-03-06 DIAGNOSIS — I48.91 ATRIAL FIBRILLATION AND FLUTTER (HCC): ICD-10-CM

## 2023-03-06 DIAGNOSIS — N30.00 ACUTE CYSTITIS WITHOUT HEMATURIA: ICD-10-CM

## 2023-03-06 DIAGNOSIS — E78.5 HYPERLIPIDEMIA, UNSPECIFIED HYPERLIPIDEMIA TYPE: ICD-10-CM

## 2023-03-06 LAB
A/G RATIO: 1.1 (ref 1.1–2.2)
ALBUMIN SERPL-MCNC: 3.2 G/DL (ref 3.4–5)
ALP BLD-CCNC: 75 U/L (ref 40–129)
ALT SERPL-CCNC: 20 U/L (ref 10–40)
ANION GAP SERPL CALCULATED.3IONS-SCNC: 9 MMOL/L (ref 3–16)
AST SERPL-CCNC: 17 U/L (ref 15–37)
BACTERIA: ABNORMAL /HPF
BILIRUB SERPL-MCNC: 0.4 MG/DL (ref 0–1)
BILIRUBIN URINE: NEGATIVE
BLOOD, URINE: NEGATIVE
BUN BLDV-MCNC: 17 MG/DL (ref 7–20)
CALCIUM SERPL-MCNC: 8.5 MG/DL (ref 8.3–10.6)
CHLORIDE BLD-SCNC: 107 MMOL/L (ref 99–110)
CHOLESTEROL, TOTAL: 126 MG/DL (ref 0–199)
CLARITY: ABNORMAL
CO2: 24 MMOL/L (ref 21–32)
COLOR: YELLOW
CREAT SERPL-MCNC: 1.2 MG/DL (ref 0.8–1.3)
EPITHELIAL CELLS, UA: 1 /HPF (ref 0–5)
GFR SERPL CREATININE-BSD FRML MDRD: >60 ML/MIN/{1.73_M2}
GLUCOSE BLD-MCNC: 77 MG/DL (ref 70–99)
GLUCOSE URINE: 500 MG/DL
HDLC SERPL-MCNC: 74 MG/DL (ref 40–60)
HYALINE CASTS: 2 /LPF (ref 0–8)
KETONES, URINE: NEGATIVE MG/DL
LDL CHOLESTEROL CALCULATED: 44 MG/DL
LEUKOCYTE ESTERASE, URINE: ABNORMAL
MICROSCOPIC EXAMINATION: YES
NITRITE, URINE: POSITIVE
PH UA: 5.5 (ref 5–8)
POTASSIUM SERPL-SCNC: 3.7 MMOL/L (ref 3.5–5.1)
PROTEIN UA: ABNORMAL MG/DL
RBC UA: 0 /HPF (ref 0–4)
SODIUM BLD-SCNC: 140 MMOL/L (ref 136–145)
SPECIFIC GRAVITY UA: 1.02 (ref 1–1.03)
TOTAL PROTEIN: 6.2 G/DL (ref 6.4–8.2)
TRIGL SERPL-MCNC: 38 MG/DL (ref 0–150)
URINE TYPE: ABNORMAL
UROBILINOGEN, URINE: 0.2 E.U./DL
VLDLC SERPL CALC-MCNC: 8 MG/DL
WBC UA: 85 /HPF (ref 0–5)

## 2023-03-06 PROCEDURE — 87086 URINE CULTURE/COLONY COUNT: CPT

## 2023-03-06 PROCEDURE — A9502 TC99M TETROFOSMIN: HCPCS | Performed by: INTERNAL MEDICINE

## 2023-03-06 PROCEDURE — 78452 HT MUSCLE IMAGE SPECT MULT: CPT

## 2023-03-06 PROCEDURE — 6360000002 HC RX W HCPCS: Performed by: INTERNAL MEDICINE

## 2023-03-06 PROCEDURE — 80053 COMPREHEN METABOLIC PANEL: CPT

## 2023-03-06 PROCEDURE — 87186 SC STD MICRODIL/AGAR DIL: CPT

## 2023-03-06 PROCEDURE — 80061 LIPID PANEL: CPT

## 2023-03-06 PROCEDURE — 81001 URINALYSIS AUTO W/SCOPE: CPT

## 2023-03-06 PROCEDURE — 93017 CV STRESS TEST TRACING ONLY: CPT

## 2023-03-06 PROCEDURE — 36415 COLL VENOUS BLD VENIPUNCTURE: CPT

## 2023-03-06 PROCEDURE — 3430000000 HC RX DIAGNOSTIC RADIOPHARMACEUTICAL: Performed by: INTERNAL MEDICINE

## 2023-03-06 PROCEDURE — 87077 CULTURE AEROBIC IDENTIFY: CPT

## 2023-03-06 RX ADMIN — TETROFOSMIN 10 MILLICURIE: 1.38 INJECTION, POWDER, LYOPHILIZED, FOR SOLUTION INTRAVENOUS at 11:10

## 2023-03-06 RX ADMIN — REGADENOSON 0.4 MG: 0.08 INJECTION, SOLUTION INTRAVENOUS at 12:18

## 2023-03-06 RX ADMIN — TETROFOSMIN 30 MILLICURIE: 1.38 INJECTION, POWDER, LYOPHILIZED, FOR SOLUTION INTRAVENOUS at 12:32

## 2023-03-08 ENCOUNTER — OFFICE VISIT (OUTPATIENT)
Dept: INTERNAL MEDICINE CLINIC | Age: 73
End: 2023-03-08
Payer: MEDICARE

## 2023-03-08 VITALS
WEIGHT: 268 LBS | SYSTOLIC BLOOD PRESSURE: 132 MMHG | BODY MASS INDEX: 35.36 KG/M2 | OXYGEN SATURATION: 98 % | HEART RATE: 78 BPM | DIASTOLIC BLOOD PRESSURE: 78 MMHG

## 2023-03-08 DIAGNOSIS — E03.9 ACQUIRED HYPOTHYROIDISM: ICD-10-CM

## 2023-03-08 DIAGNOSIS — N18.2 CKD (CHRONIC KIDNEY DISEASE) STAGE 2, GFR 60-89 ML/MIN: ICD-10-CM

## 2023-03-08 DIAGNOSIS — I10 PRIMARY HYPERTENSION: ICD-10-CM

## 2023-03-08 DIAGNOSIS — D64.9 ANEMIA, UNSPECIFIED TYPE: ICD-10-CM

## 2023-03-08 DIAGNOSIS — E11.8 DIABETES MELLITUS TYPE 2 WITH COMPLICATIONS (HCC): Primary | ICD-10-CM

## 2023-03-08 LAB
BASOPHILS ABSOLUTE: 0 K/UL (ref 0–0.2)
BASOPHILS RELATIVE PERCENT: 1 %
CHP ED QC CHECK: NORMAL
EOSINOPHILS ABSOLUTE: 0.2 K/UL (ref 0–0.6)
EOSINOPHILS RELATIVE PERCENT: 5 %
GLUCOSE BLD-MCNC: 102 MG/DL
HBA1C MFR BLD: 5.2 %
HCT VFR BLD CALC: 40.1 % (ref 40.5–52.5)
HEMATOLOGY PATH CONSULT: NO
HEMOGLOBIN: 13.3 G/DL (ref 13.5–17.5)
LYMPHOCYTES ABSOLUTE: 1.2 K/UL (ref 1–5.1)
LYMPHOCYTES RELATIVE PERCENT: 33 %
MCH RBC QN AUTO: 30.1 PG (ref 26–34)
MCHC RBC AUTO-ENTMCNC: 33.1 G/DL (ref 31–36)
MCV RBC AUTO: 90.9 FL (ref 80–100)
MONOCYTES ABSOLUTE: 0.2 K/UL (ref 0–1.3)
MONOCYTES RELATIVE PERCENT: 7 %
NEUTROPHILS ABSOLUTE: 1.9 K/UL (ref 1.7–7.7)
NEUTROPHILS RELATIVE PERCENT: 54 %
PDW BLD-RTO: 14.9 % (ref 12.4–15.4)
PLATELET # BLD: 159 K/UL (ref 135–450)
PMV BLD AUTO: 10.9 FL (ref 5–10.5)
RBC # BLD: 4.41 M/UL (ref 4.2–5.9)
RBC # BLD: NORMAL 10*6/UL
SLIDE REVIEW: ABNORMAL
TSH REFLEX: 1.04 UIU/ML (ref 0.27–4.2)
WBC # BLD: 3.5 K/UL (ref 4–11)

## 2023-03-08 PROCEDURE — 99214 OFFICE O/P EST MOD 30 MIN: CPT | Performed by: INTERNAL MEDICINE

## 2023-03-08 PROCEDURE — 1123F ACP DISCUSS/DSCN MKR DOCD: CPT | Performed by: INTERNAL MEDICINE

## 2023-03-08 PROCEDURE — 83036 HEMOGLOBIN GLYCOSYLATED A1C: CPT | Performed by: INTERNAL MEDICINE

## 2023-03-08 PROCEDURE — 3078F DIAST BP <80 MM HG: CPT | Performed by: INTERNAL MEDICINE

## 2023-03-08 PROCEDURE — 3074F SYST BP LT 130 MM HG: CPT | Performed by: INTERNAL MEDICINE

## 2023-03-08 PROCEDURE — 36415 COLL VENOUS BLD VENIPUNCTURE: CPT | Performed by: INTERNAL MEDICINE

## 2023-03-08 PROCEDURE — 82962 GLUCOSE BLOOD TEST: CPT | Performed by: INTERNAL MEDICINE

## 2023-03-08 PROCEDURE — 3044F HG A1C LEVEL LT 7.0%: CPT | Performed by: INTERNAL MEDICINE

## 2023-03-08 SDOH — ECONOMIC STABILITY: FOOD INSECURITY: WITHIN THE PAST 12 MONTHS, THE FOOD YOU BOUGHT JUST DIDN'T LAST AND YOU DIDN'T HAVE MONEY TO GET MORE.: NEVER TRUE

## 2023-03-08 SDOH — ECONOMIC STABILITY: INCOME INSECURITY: HOW HARD IS IT FOR YOU TO PAY FOR THE VERY BASICS LIKE FOOD, HOUSING, MEDICAL CARE, AND HEATING?: NOT HARD AT ALL

## 2023-03-08 SDOH — ECONOMIC STABILITY: FOOD INSECURITY: WITHIN THE PAST 12 MONTHS, YOU WORRIED THAT YOUR FOOD WOULD RUN OUT BEFORE YOU GOT MONEY TO BUY MORE.: NEVER TRUE

## 2023-03-08 SDOH — ECONOMIC STABILITY: HOUSING INSECURITY
IN THE LAST 12 MONTHS, WAS THERE A TIME WHEN YOU DID NOT HAVE A STEADY PLACE TO SLEEP OR SLEPT IN A SHELTER (INCLUDING NOW)?: NO

## 2023-03-08 ASSESSMENT — PATIENT HEALTH QUESTIONNAIRE - PHQ9
SUM OF ALL RESPONSES TO PHQ QUESTIONS 1-9: 0
2. FEELING DOWN, DEPRESSED OR HOPELESS: 0
1. LITTLE INTEREST OR PLEASURE IN DOING THINGS: 0
SUM OF ALL RESPONSES TO PHQ9 QUESTIONS 1 & 2: 0

## 2023-03-08 NOTE — PROGRESS NOTES
NOVOLOG FLEXPEN 100 UNIT/ML injection pen ADMINISTER 8 TO 10 UNITS UNDER THE SKIN THREE TIMES DAILY BEFORE MEALS (Patient taking differently: Sliding scale) 45 mL 5    Blood Glucose Monitoring Suppl ([facility]) w/Device KIT 1 each by Does not apply route 2 times daily 1 kit 0    blood glucose test strips (ASCENSIA AUTODISC VI;ONE TOUCH ULTRA TEST VI) strip 1 each by In Vitro route daily Test as directed,Dispense according to insurance formulary 100 each 3    Handicap Placard MISC by Does not apply route Diagnosis: heart failure. Expires: 12/7/24. 1 each 0    Insulin Pen Needle (B-D UF III MINI PEN NEEDLES) 31G X 5 MM MISC Inject 1 each into the skin daily 100 each 5    blood glucose test strips (ONE TOUCH TEST STRIPS) strip 1 each by In Vitro route 3 times daily As needed. 300 each 3    ONETOUCH ULTRA strip USE WITH GLUCOSE METER THREE TIMES DAILY AND AS NEEDED 300 strip 3    ferrous sulfate (FE TABS 325) 325 (65 Fe) MG EC tablet Take 325 mg by mouth daily      Continuous Blood Gluc  (FREESTYLE KWESI 14 DAY READER) ANITA Use as Directed Dx E11.9 1 Device 0    Blood Glucose Monitoring Suppl (ONE TOUCH ULTRA MINI) w/Device KIT 1 kit by Does not apply route three times daily 1 kit 0    acetaminophen (TYLENOL) 325 MG tablet Take 2 tablets by mouth every 4 hours as needed for Pain 120 tablet 3    Coenzyme Q10 (CO Q 10) 100 MG CAPS Take 1 capsule by mouth daily      Cinnamon 500 MG CAPS Take 1 capsule by mouth daily      Vitamin D (CHOLECALCIFEROL) 1000 UNITS CAPS capsule Take 2,000 Units by mouth nightly       Insulin Syringe-Needle U-100 (INSULIN SYRINGE .5CC/31GX5/16\") 31G X 5/16\" 0.5 ML MISC Patient tests bid 100 Syringe 5    therapeutic multivitamin-minerals (THERAGRAN-M) tablet Take 1 tablet by mouth daily. ezetimibe (ZETIA) 10 MG tablet Take 1 tablet by mouth daily 90 tablet 3     No current facility-administered medications for this visit.        Patient's past medical history, surgical history,

## 2023-03-09 LAB
ORGANISM: ABNORMAL
URINE CULTURE, ROUTINE: ABNORMAL

## 2023-03-10 RX ORDER — SPIRONOLACTONE 25 MG/1
25 TABLET ORAL DAILY
Qty: 90 TABLET | Refills: 3 | OUTPATIENT
Start: 2023-03-10

## 2023-03-14 ENCOUNTER — TELEPHONE (OUTPATIENT)
Dept: INTERNAL MEDICINE CLINIC | Age: 73
End: 2023-03-14

## 2023-03-14 DIAGNOSIS — N30.00 ACUTE CYSTITIS WITHOUT HEMATURIA: Primary | ICD-10-CM

## 2023-03-20 ENCOUNTER — TELEPHONE (OUTPATIENT)
Dept: INTERNAL MEDICINE CLINIC | Age: 73
End: 2023-03-20

## 2023-03-21 ENCOUNTER — NURSE ONLY (OUTPATIENT)
Dept: CARDIOLOGY CLINIC | Age: 73
End: 2023-03-21
Payer: MEDICARE

## 2023-03-21 DIAGNOSIS — I48.0 PAROXYSMAL ATRIAL FIBRILLATION (HCC): ICD-10-CM

## 2023-03-21 DIAGNOSIS — I47.29 NSVT (NONSUSTAINED VENTRICULAR TACHYCARDIA) (HCC): ICD-10-CM

## 2023-03-21 DIAGNOSIS — Z95.810 CARDIAC RESYNCHRONIZATION THERAPY DEFIBRILLATOR (CRT-D) IN PLACE: Primary | ICD-10-CM

## 2023-03-21 DIAGNOSIS — I42.0 DILATED CARDIOMYOPATHY (HCC): ICD-10-CM

## 2023-03-21 DIAGNOSIS — I50.22 CHRONIC SYSTOLIC HEART FAILURE (HCC): ICD-10-CM

## 2023-03-21 PROCEDURE — G2066 INTER DEVC REMOTE 30D: HCPCS | Performed by: NURSE PRACTITIONER

## 2023-03-21 PROCEDURE — 93297 REM INTERROG DEV EVAL ICPMS: CPT | Performed by: NURSE PRACTITIONER

## 2023-03-21 ASSESSMENT — ENCOUNTER SYMPTOMS
EYES NEGATIVE: 1
RESPIRATORY NEGATIVE: 1
GASTROINTESTINAL NEGATIVE: 1

## 2023-03-21 NOTE — PROGRESS NOTES
Remote transmission received from patient's CRT-D monitor at home. Transmission shows normal sensing and pacing function. RRT/KENDRICK estimated in 20 months. Noted AT/AF, 2.4% burden, 26 of 108 pace-terminated episodes (Coreg, amiodarone, Eliquis). Ap 96.0%  BiVp 96.6%, Effective 96.1%, VSRp 2.3%  PVCs 22.2/hr    Possible Optivol fluid accumulation 08.30.22-ongoing, currently elevated up to 180 w slight decreasing trend noted; TI shows decreasing trend below reference line. TriageF Heart Failure Risk Status on 21-Mar-2023 is High*. End of 31-day monitoring period 3/21/23. NP will review. See interrogation under cardiology tab in the [address] [place] [address] [address] field for more details. Will continue to monitor remotely.

## 2023-03-22 DIAGNOSIS — I10 ESSENTIAL HYPERTENSION: ICD-10-CM

## 2023-03-23 NOTE — TELEPHONE ENCOUNTER
Medication:   Requested Prescriptions     Pending Prescriptions Disp Refills    spironolactone (ALDACTONE) 25 MG tablet [Pharmacy Med Name: SPIRONOLACTONE 25MG TABLETS] 90 tablet 3     Sig: TAKE 1 TABLET BY MOUTH DAILY     Last Filled:  not on med list    Last appt: 3/8/2023   Next appt: 6/8/2023

## 2023-03-27 ENCOUNTER — TELEPHONE (OUTPATIENT)
Dept: INTERNAL MEDICINE CLINIC | Age: 73
End: 2023-03-27

## 2023-03-27 DIAGNOSIS — I10 ESSENTIAL HYPERTENSION: ICD-10-CM

## 2023-03-27 RX ORDER — SPIRONOLACTONE 25 MG/1
25 TABLET ORAL DAILY
COMMUNITY
End: 2023-04-05 | Stop reason: ALTCHOICE

## 2023-03-27 RX ORDER — CIPROFLOXACIN 500 MG/1
500 TABLET, FILM COATED ORAL 2 TIMES DAILY
Qty: 14 TABLET | Refills: 0 | Status: SHIPPED | OUTPATIENT
Start: 2023-03-27 | End: 2023-04-03

## 2023-03-27 NOTE — TELEPHONE ENCOUNTER
Medication:   Requested Prescriptions     Pending Prescriptions Disp Refills    spironolactone (ALDACTONE) 25 MG tablet [Pharmacy Med Name: SPIRONOLACTONE 25MG TABLETS] 90 tablet 3     Sig: TAKE 1 TABLET BY MOUTH DAILY     Last Filled:  03/08/2023    Last appt: 3/8/2023   Next appt: 6/8/2023

## 2023-03-30 RX ORDER — SPIRONOLACTONE 25 MG/1
25 TABLET ORAL DAILY
Qty: 90 TABLET | Refills: 3 | Status: SHIPPED | OUTPATIENT
Start: 2023-03-30

## 2023-04-05 ENCOUNTER — OFFICE VISIT (OUTPATIENT)
Dept: CARDIOLOGY CLINIC | Age: 73
End: 2023-04-05
Payer: MEDICARE

## 2023-04-05 VITALS
HEART RATE: 60 BPM | SYSTOLIC BLOOD PRESSURE: 116 MMHG | HEIGHT: 73 IN | WEIGHT: 265 LBS | BODY MASS INDEX: 35.12 KG/M2 | DIASTOLIC BLOOD PRESSURE: 60 MMHG

## 2023-04-05 DIAGNOSIS — I42.0 DILATED CARDIOMYOPATHY (HCC): ICD-10-CM

## 2023-04-05 DIAGNOSIS — I48.0 PAROXYSMAL ATRIAL FIBRILLATION (HCC): Primary | ICD-10-CM

## 2023-04-05 PROCEDURE — 99214 OFFICE O/P EST MOD 30 MIN: CPT | Performed by: INTERNAL MEDICINE

## 2023-04-05 PROCEDURE — 3078F DIAST BP <80 MM HG: CPT | Performed by: INTERNAL MEDICINE

## 2023-04-05 PROCEDURE — 1123F ACP DISCUSS/DSCN MKR DOCD: CPT | Performed by: INTERNAL MEDICINE

## 2023-04-05 PROCEDURE — 3074F SYST BP LT 130 MM HG: CPT | Performed by: INTERNAL MEDICINE

## 2023-04-05 NOTE — PROGRESS NOTES
100 each 3    Handicap Placard MISC by Does not apply route Diagnosis: heart failure. Expires: 12/7/24. 1 each 0    Insulin Pen Needle (B-D UF III MINI PEN NEEDLES) 31G X 5 MM MISC Inject 1 each into the skin daily 100 each 5    blood glucose test strips (ONE TOUCH TEST STRIPS) strip 1 each by In Vitro route 3 times daily As needed. 300 each 3    ONETOUCH ULTRA strip USE WITH GLUCOSE METER THREE TIMES DAILY AND AS NEEDED 300 strip 3    ferrous sulfate (FE TABS 325) 325 (65 Fe) MG EC tablet Take 1 tablet by mouth daily Take one tablet every other day. Continuous Blood Gluc  (FREESTYLE KWESI 14 DAY READER) ANITA Use as Directed Dx E11.9 1 Device 0    Blood Glucose Monitoring Suppl (ONE TOUCH ULTRA MINI) w/Device KIT 1 kit by Does not apply route three times daily 1 kit 0    acetaminophen (TYLENOL) 325 MG tablet Take 2 tablets by mouth every 4 hours as needed for Pain 120 tablet 3    Coenzyme Q10 (CO Q 10) 100 MG CAPS Take 1 capsule by mouth daily      Cinnamon 500 MG CAPS Take 1 capsule by mouth daily      Vitamin D (CHOLECALCIFEROL) 1000 UNITS CAPS capsule Take 2 capsules by mouth nightly Indications: cut back on it due to pcp stating the vitamin D level was to high. Insulin Syringe-Needle U-100 (INSULIN SYRINGE .5CC/31GX5/16\") 31G X 5/16\" 0.5 ML MISC Patient tests bid 100 Syringe 5    therapeutic multivitamin-minerals (THERAGRAN-M) tablet Take 1 tablet by mouth daily      ezetimibe (ZETIA) 10 MG tablet Take 1 tablet by mouth daily 90 tablet 3     No current facility-administered medications for this visit.        Physical Exam:   /60   Pulse 60   Ht 6' 1\" (1.854 m)   Wt 265 lb (120.2 kg)   BMI 34.96 kg/m²   BP Readings from Last 3 Encounters:   04/05/23 116/60   03/08/23 132/78   01/25/23 106/60     Pulse Readings from Last 3 Encounters:   04/05/23 60   03/08/23 78   01/25/23 76     Wt Readings from Last 3 Encounters:   04/05/23 265 lb (120.2 kg)   03/08/23 268 lb (121.6 kg)   01/25/23

## 2023-04-07 RX ORDER — SPIRONOLACTONE 25 MG/1
25 TABLET ORAL DAILY
Qty: 90 TABLET | Refills: 3 | Status: SHIPPED | OUTPATIENT
Start: 2023-04-07

## 2023-04-17 ENCOUNTER — TELEPHONE (OUTPATIENT)
Dept: INTERNAL MEDICINE CLINIC | Age: 73
End: 2023-04-17

## 2023-04-17 NOTE — TELEPHONE ENCOUNTER
Pt calling to find out if there is an order in his chart tohat allows him to complete a urine specimen collection

## 2023-04-25 ENCOUNTER — NURSE ONLY (OUTPATIENT)
Dept: CARDIOLOGY CLINIC | Age: 73
End: 2023-04-25

## 2023-04-25 DIAGNOSIS — Z95.810 CARDIAC RESYNCHRONIZATION THERAPY DEFIBRILLATOR (CRT-D) IN PLACE: Primary | ICD-10-CM

## 2023-04-25 DIAGNOSIS — I50.22 CHRONIC SYSTOLIC HEART FAILURE (HCC): ICD-10-CM

## 2023-04-25 DIAGNOSIS — I42.0 DILATED CARDIOMYOPATHY (HCC): ICD-10-CM

## 2023-04-25 DIAGNOSIS — I48.0 PAROXYSMAL ATRIAL FIBRILLATION (HCC): ICD-10-CM

## 2023-04-26 NOTE — PROGRESS NOTES
Remote transmission received from patient's CRT-D monitor at home. Transmission shows normal sensing and pacing function. RRT/KENDRICK estimated in 19 months. Noted AT/AF, 0.4% burden, 9 of 20 pace-terminated episodes (Coreg, amiodarone, Eliquis). EP physician will review. Ap 98.4%  BiVp 97.6%, Effective 97.4%, VSRp 1.2%  PVCs 38.0/hr    Possible Optivol fluid accumulation 08.30.22-ongoing, currently elevated up to 140 w possible plateau noted; TI remains below reference line. TriageF Heart Failure Risk Status on 25-Apr-2023 is High*. NP will review. End of 91-day monitoring period 4/25/23. See interrogation under cardiology tab in the [address] [place] [address] [address] field for more details. Will continue to monitor remotely.

## 2023-04-27 DIAGNOSIS — N30.00 ACUTE CYSTITIS WITHOUT HEMATURIA: ICD-10-CM

## 2023-04-29 LAB
BACTERIA UR CULT: ABNORMAL
ORGANISM: ABNORMAL

## 2023-05-01 RX ORDER — LANCETS 30 GAUGE
1 EACH MISCELLANEOUS DAILY
Qty: 100 EACH | Refills: 3 | Status: SHIPPED | OUTPATIENT
Start: 2023-05-01

## 2023-05-04 ENCOUNTER — TELEPHONE (OUTPATIENT)
Dept: INTERNAL MEDICINE CLINIC | Age: 73
End: 2023-05-04

## 2023-05-05 DIAGNOSIS — N39.0 URINARY TRACT INFECTION WITHOUT HEMATURIA, SITE UNSPECIFIED: Primary | ICD-10-CM

## 2023-05-05 RX ORDER — CIPROFLOXACIN 500 MG/1
500 TABLET, FILM COATED ORAL 2 TIMES DAILY
Qty: 14 TABLET | Refills: 0 | Status: SHIPPED | OUTPATIENT
Start: 2023-05-05 | End: 2023-05-12

## 2023-05-08 DIAGNOSIS — Z22.9 COLONIZATION STATUS: Primary | ICD-10-CM

## 2023-05-08 NOTE — TELEPHONE ENCOUNTER
Bacteria growth appears to be the same organism. Patient not symptomatic raising possibility of colonization. Will refer to urology for further evaluation and treatment. Advised patient to call the office for further discussion. Left VM.

## 2023-05-10 ENCOUNTER — TELEPHONE (OUTPATIENT)
Dept: INTERNAL MEDICINE CLINIC | Age: 73
End: 2023-05-10

## 2023-05-11 ENCOUNTER — HOSPITAL ENCOUNTER (INPATIENT)
Age: 73
LOS: 3 days | Discharge: HOME | DRG: 291 | End: 2023-05-14
Attending: EMERGENCY MEDICINE | Admitting: INTERNAL MEDICINE
Payer: MEDICARE

## 2023-05-11 ENCOUNTER — APPOINTMENT (OUTPATIENT)
Dept: GENERAL RADIOLOGY | Age: 73
DRG: 291 | End: 2023-05-11
Payer: MEDICARE

## 2023-05-11 ENCOUNTER — TELEPHONE (OUTPATIENT)
Dept: INTERNAL MEDICINE CLINIC | Age: 73
End: 2023-05-11

## 2023-05-11 DIAGNOSIS — M86.9 OSTEOMYELITIS OF RIGHT FOOT, UNSPECIFIED TYPE (HCC): ICD-10-CM

## 2023-05-11 DIAGNOSIS — R60.9 PERIPHERAL EDEMA: Primary | ICD-10-CM

## 2023-05-11 LAB
ANION GAP SERPL CALCULATED.3IONS-SCNC: 7 MMOL/L (ref 3–16)
BASOPHILS # BLD: 0 K/UL (ref 0–0.2)
BASOPHILS NFR BLD: 1.3 %
BUN SERPL-MCNC: 17 MG/DL (ref 7–20)
CALCIUM SERPL-MCNC: 8.6 MG/DL (ref 8.3–10.6)
CHLORIDE SERPL-SCNC: 108 MMOL/L (ref 99–110)
CO2 SERPL-SCNC: 27 MMOL/L (ref 21–32)
CREAT SERPL-MCNC: 1.3 MG/DL (ref 0.8–1.3)
CRP SERPL-MCNC: <3 MG/L (ref 0–5.1)
DEPRECATED RDW RBC AUTO: 14.3 % (ref 12.4–15.4)
EOSINOPHIL # BLD: 0.1 K/UL (ref 0–0.6)
EOSINOPHIL NFR BLD: 4.3 %
ERYTHROCYTE [SEDIMENTATION RATE] IN BLOOD BY WESTERGREN METHOD: 29 MM/HR (ref 0–20)
GFR SERPLBLD CREATININE-BSD FMLA CKD-EPI: 59 ML/MIN/{1.73_M2}
GLUCOSE BLD-MCNC: 118 MG/DL (ref 70–99)
GLUCOSE BLD-MCNC: 209 MG/DL (ref 70–99)
GLUCOSE BLD-MCNC: 85 MG/DL (ref 70–99)
GLUCOSE SERPL-MCNC: 121 MG/DL (ref 70–99)
HCT VFR BLD AUTO: 38.6 % (ref 40.5–52.5)
HGB BLD-MCNC: 12.5 G/DL (ref 13.5–17.5)
LYMPHOCYTES # BLD: 1 K/UL (ref 1–5.1)
LYMPHOCYTES NFR BLD: 32.8 %
MCH RBC QN AUTO: 28.8 PG (ref 26–34)
MCHC RBC AUTO-ENTMCNC: 32.3 G/DL (ref 31–36)
MCV RBC AUTO: 88.9 FL (ref 80–100)
MONOCYTES # BLD: 0.3 K/UL (ref 0–1.3)
MONOCYTES NFR BLD: 11.1 %
NEUTROPHILS # BLD: 1.5 K/UL (ref 1.7–7.7)
NEUTROPHILS NFR BLD: 50.5 %
NT-PROBNP SERPL-MCNC: 244 PG/ML (ref 0–124)
PERFORMED ON: ABNORMAL
PERFORMED ON: ABNORMAL
PERFORMED ON: NORMAL
PLATELET # BLD AUTO: 147 K/UL (ref 135–450)
PMV BLD AUTO: 10.2 FL (ref 5–10.5)
POTASSIUM SERPL-SCNC: 4.1 MMOL/L (ref 3.5–5.1)
RBC # BLD AUTO: 4.34 M/UL (ref 4.2–5.9)
SODIUM SERPL-SCNC: 142 MMOL/L (ref 136–145)
WBC # BLD AUTO: 3 K/UL (ref 4–11)

## 2023-05-11 PROCEDURE — 6370000000 HC RX 637 (ALT 250 FOR IP): Performed by: NURSE PRACTITIONER

## 2023-05-11 PROCEDURE — 85652 RBC SED RATE AUTOMATED: CPT

## 2023-05-11 PROCEDURE — 6360000002 HC RX W HCPCS: Performed by: INTERNAL MEDICINE

## 2023-05-11 PROCEDURE — 1200000000 HC SEMI PRIVATE

## 2023-05-11 PROCEDURE — 99285 EMERGENCY DEPT VISIT HI MDM: CPT

## 2023-05-11 PROCEDURE — 86140 C-REACTIVE PROTEIN: CPT

## 2023-05-11 PROCEDURE — 83036 HEMOGLOBIN GLYCOSYLATED A1C: CPT

## 2023-05-11 PROCEDURE — 93971 EXTREMITY STUDY: CPT

## 2023-05-11 PROCEDURE — 85025 COMPLETE CBC W/AUTO DIFF WBC: CPT

## 2023-05-11 PROCEDURE — 83880 ASSAY OF NATRIURETIC PEPTIDE: CPT

## 2023-05-11 PROCEDURE — 36415 COLL VENOUS BLD VENIPUNCTURE: CPT

## 2023-05-11 PROCEDURE — 87040 BLOOD CULTURE FOR BACTERIA: CPT

## 2023-05-11 PROCEDURE — 2580000003 HC RX 258: Performed by: INTERNAL MEDICINE

## 2023-05-11 PROCEDURE — 73630 X-RAY EXAM OF FOOT: CPT

## 2023-05-11 PROCEDURE — 2580000003 HC RX 258: Performed by: PHYSICIAN ASSISTANT

## 2023-05-11 PROCEDURE — 6370000000 HC RX 637 (ALT 250 FOR IP): Performed by: INTERNAL MEDICINE

## 2023-05-11 PROCEDURE — 6360000002 HC RX W HCPCS: Performed by: PHYSICIAN ASSISTANT

## 2023-05-11 PROCEDURE — 80048 BASIC METABOLIC PNL TOTAL CA: CPT

## 2023-05-11 RX ORDER — POLYETHYLENE GLYCOL 3350 17 G/17G
17 POWDER, FOR SOLUTION ORAL DAILY PRN
Status: DISCONTINUED | OUTPATIENT
Start: 2023-05-11 | End: 2023-05-14 | Stop reason: HOSPADM

## 2023-05-11 RX ORDER — TAMSULOSIN HYDROCHLORIDE 0.4 MG/1
0.4 CAPSULE ORAL DAILY
Status: DISCONTINUED | OUTPATIENT
Start: 2023-05-11 | End: 2023-05-14 | Stop reason: HOSPADM

## 2023-05-11 RX ORDER — SODIUM CHLORIDE 0.9 % (FLUSH) 0.9 %
5-40 SYRINGE (ML) INJECTION EVERY 12 HOURS SCHEDULED
Status: DISCONTINUED | OUTPATIENT
Start: 2023-05-11 | End: 2023-05-14 | Stop reason: HOSPADM

## 2023-05-11 RX ORDER — ACETAMINOPHEN 325 MG/1
650 TABLET ORAL EVERY 6 HOURS PRN
Status: DISCONTINUED | OUTPATIENT
Start: 2023-05-11 | End: 2023-05-11 | Stop reason: SDUPTHER

## 2023-05-11 RX ORDER — SIMETHICONE 80 MG
80 TABLET,CHEWABLE ORAL EVERY 6 HOURS PRN
Status: DISCONTINUED | OUTPATIENT
Start: 2023-05-11 | End: 2023-05-14 | Stop reason: HOSPADM

## 2023-05-11 RX ORDER — SPIRONOLACTONE 25 MG/1
25 TABLET ORAL DAILY
Status: DISCONTINUED | OUTPATIENT
Start: 2023-05-12 | End: 2023-05-14 | Stop reason: HOSPADM

## 2023-05-11 RX ORDER — FUROSEMIDE 40 MG/1
40 TABLET ORAL DAILY
Status: DISCONTINUED | OUTPATIENT
Start: 2023-05-12 | End: 2023-05-14 | Stop reason: HOSPADM

## 2023-05-11 RX ORDER — EZETIMIBE 10 MG/1
10 TABLET ORAL DAILY
Status: DISCONTINUED | OUTPATIENT
Start: 2023-05-11 | End: 2023-05-14 | Stop reason: HOSPADM

## 2023-05-11 RX ORDER — INSULIN LISPRO 100 [IU]/ML
0-4 INJECTION, SOLUTION INTRAVENOUS; SUBCUTANEOUS NIGHTLY
Status: DISCONTINUED | OUTPATIENT
Start: 2023-05-11 | End: 2023-05-14 | Stop reason: HOSPADM

## 2023-05-11 RX ORDER — FUROSEMIDE 40 MG/1
40 TABLET ORAL DAILY
Status: ON HOLD | COMMUNITY
End: 2023-05-14 | Stop reason: SDUPTHER

## 2023-05-11 RX ORDER — CARVEDILOL 3.12 MG/1
3.12 TABLET ORAL 2 TIMES DAILY WITH MEALS
Status: DISCONTINUED | OUTPATIENT
Start: 2023-05-11 | End: 2023-05-14 | Stop reason: HOSPADM

## 2023-05-11 RX ORDER — OXYBUTYNIN CHLORIDE 5 MG/1
5 TABLET ORAL NIGHTLY
Status: DISCONTINUED | OUTPATIENT
Start: 2023-05-11 | End: 2023-05-14 | Stop reason: HOSPADM

## 2023-05-11 RX ORDER — ENOXAPARIN SODIUM 100 MG/ML
30 INJECTION SUBCUTANEOUS 2 TIMES DAILY
Status: DISCONTINUED | OUTPATIENT
Start: 2023-05-11 | End: 2023-05-11

## 2023-05-11 RX ORDER — SODIUM CHLORIDE 0.9 % (FLUSH) 0.9 %
10 SYRINGE (ML) INJECTION PRN
Status: DISCONTINUED | OUTPATIENT
Start: 2023-05-11 | End: 2023-05-14 | Stop reason: HOSPADM

## 2023-05-11 RX ORDER — ONDANSETRON 4 MG/1
4 TABLET, ORALLY DISINTEGRATING ORAL EVERY 8 HOURS PRN
Status: DISCONTINUED | OUTPATIENT
Start: 2023-05-11 | End: 2023-05-14 | Stop reason: HOSPADM

## 2023-05-11 RX ORDER — AMPICILLIN TRIHYDRATE 250 MG
1 CAPSULE ORAL DAILY
Status: DISCONTINUED | OUTPATIENT
Start: 2023-05-11 | End: 2023-05-11

## 2023-05-11 RX ORDER — SODIUM CHLORIDE 9 MG/ML
INJECTION, SOLUTION INTRAVENOUS PRN
Status: DISCONTINUED | OUTPATIENT
Start: 2023-05-11 | End: 2023-05-14 | Stop reason: HOSPADM

## 2023-05-11 RX ORDER — ATORVASTATIN CALCIUM 40 MG/1
40 TABLET, FILM COATED ORAL NIGHTLY
Status: DISCONTINUED | OUTPATIENT
Start: 2023-05-11 | End: 2023-05-14 | Stop reason: HOSPADM

## 2023-05-11 RX ORDER — ONDANSETRON 2 MG/ML
4 INJECTION INTRAMUSCULAR; INTRAVENOUS EVERY 6 HOURS PRN
Status: DISCONTINUED | OUTPATIENT
Start: 2023-05-11 | End: 2023-05-14 | Stop reason: HOSPADM

## 2023-05-11 RX ORDER — ACETAMINOPHEN 650 MG/1
650 SUPPOSITORY RECTAL EVERY 6 HOURS PRN
Status: DISCONTINUED | OUTPATIENT
Start: 2023-05-11 | End: 2023-05-14 | Stop reason: HOSPADM

## 2023-05-11 RX ORDER — ACETAMINOPHEN 325 MG/1
650 TABLET ORAL EVERY 4 HOURS PRN
Status: DISCONTINUED | OUTPATIENT
Start: 2023-05-11 | End: 2023-05-14 | Stop reason: HOSPADM

## 2023-05-11 RX ORDER — M-VIT,TX,IRON,MINS/CALC/FOLIC 27MG-0.4MG
1 TABLET ORAL DAILY
Status: DISCONTINUED | OUTPATIENT
Start: 2023-05-12 | End: 2023-05-14 | Stop reason: HOSPADM

## 2023-05-11 RX ORDER — DOCUSATE SODIUM 100 MG/1
100 CAPSULE, LIQUID FILLED ORAL DAILY PRN
Status: DISCONTINUED | OUTPATIENT
Start: 2023-05-11 | End: 2023-05-12

## 2023-05-11 RX ORDER — INSULIN GLARGINE 100 [IU]/ML
7 INJECTION, SOLUTION SUBCUTANEOUS NIGHTLY
Status: DISCONTINUED | OUTPATIENT
Start: 2023-05-11 | End: 2023-05-14 | Stop reason: HOSPADM

## 2023-05-11 RX ORDER — DEXTROSE MONOHYDRATE 100 MG/ML
INJECTION, SOLUTION INTRAVENOUS CONTINUOUS PRN
Status: DISCONTINUED | OUTPATIENT
Start: 2023-05-11 | End: 2023-05-14 | Stop reason: HOSPADM

## 2023-05-11 RX ORDER — LEVOTHYROXINE SODIUM 0.03 MG/1
25 TABLET ORAL DAILY
Status: DISCONTINUED | OUTPATIENT
Start: 2023-05-12 | End: 2023-05-12

## 2023-05-11 RX ORDER — FERROUS SULFATE TAB EC 324 MG (65 MG FE EQUIVALENT) 324 (65 FE) MG
325 TABLET DELAYED RESPONSE ORAL EVERY OTHER DAY
Status: DISCONTINUED | OUTPATIENT
Start: 2023-05-12 | End: 2023-05-14 | Stop reason: HOSPADM

## 2023-05-11 RX ORDER — ALUMINUM HYDROXIDE, MAGNESIUM HYDROXIDE, DIMETHICONE 400; 400; 40 MG/5ML; MG/5ML; MG/5ML
1 LIQUID ORAL DAILY
Status: DISCONTINUED | OUTPATIENT
Start: 2023-05-11 | End: 2023-05-11

## 2023-05-11 RX ORDER — INSULIN LISPRO 100 [IU]/ML
0-16 INJECTION, SOLUTION INTRAVENOUS; SUBCUTANEOUS
Status: DISCONTINUED | OUTPATIENT
Start: 2023-05-11 | End: 2023-05-14 | Stop reason: HOSPADM

## 2023-05-11 RX ORDER — LISINOPRIL 5 MG/1
2.5 TABLET ORAL DAILY
Status: DISCONTINUED | OUTPATIENT
Start: 2023-05-12 | End: 2023-05-14 | Stop reason: HOSPADM

## 2023-05-11 RX ORDER — ATORVASTATIN CALCIUM 40 MG/1
40 TABLET, FILM COATED ORAL DAILY
Status: DISCONTINUED | OUTPATIENT
Start: 2023-05-11 | End: 2023-05-11 | Stop reason: ALTCHOICE

## 2023-05-11 RX ORDER — AMIODARONE HYDROCHLORIDE 200 MG/1
200 TABLET ORAL DAILY
Status: DISCONTINUED | OUTPATIENT
Start: 2023-05-12 | End: 2023-05-14 | Stop reason: HOSPADM

## 2023-05-11 RX ADMIN — ATORVASTATIN CALCIUM 40 MG: 40 TABLET, FILM COATED ORAL at 20:07

## 2023-05-11 RX ADMIN — CARVEDILOL 3.12 MG: 3.12 TABLET, FILM COATED ORAL at 20:07

## 2023-05-11 RX ADMIN — TAMSULOSIN HYDROCHLORIDE 0.4 MG: 0.4 CAPSULE ORAL at 20:06

## 2023-05-11 RX ADMIN — VANCOMYCIN HYDROCHLORIDE 1750 MG: 1 INJECTION, POWDER, LYOPHILIZED, FOR SOLUTION INTRAVENOUS at 18:52

## 2023-05-11 RX ADMIN — SODIUM CHLORIDE 3000 MG: 900 INJECTION INTRAVENOUS at 16:52

## 2023-05-11 RX ADMIN — SIMETHICONE 80 MG: 80 TABLET, CHEWABLE ORAL at 23:37

## 2023-05-11 RX ADMIN — AMPICILLIN SODIUM AND SULBACTAM SODIUM 3000 MG: 2; 1 INJECTION, POWDER, FOR SOLUTION INTRAMUSCULAR; INTRAVENOUS at 22:45

## 2023-05-11 RX ADMIN — APIXABAN 5 MG: 5 TABLET, FILM COATED ORAL at 20:06

## 2023-05-11 RX ADMIN — OXYBUTYNIN CHLORIDE 5 MG: 5 TABLET ORAL at 20:07

## 2023-05-11 ASSESSMENT — PAIN SCALES - GENERAL
PAINLEVEL_OUTOF10: 0
PAINLEVEL_OUTOF10: 0

## 2023-05-12 ENCOUNTER — TELEPHONE (OUTPATIENT)
Dept: CARDIOLOGY CLINIC | Age: 73
End: 2023-05-12

## 2023-05-12 DIAGNOSIS — I10 ESSENTIAL HYPERTENSION: ICD-10-CM

## 2023-05-12 LAB
EST. AVERAGE GLUCOSE BLD GHB EST-MCNC: 116.9 MG/DL
GLUCOSE BLD-MCNC: 101 MG/DL (ref 70–99)
GLUCOSE BLD-MCNC: 107 MG/DL (ref 70–99)
GLUCOSE BLD-MCNC: 124 MG/DL (ref 70–99)
GLUCOSE BLD-MCNC: 127 MG/DL (ref 70–99)
GLUCOSE BLD-MCNC: 85 MG/DL (ref 70–99)
GLUCOSE BLD-MCNC: 93 MG/DL (ref 70–99)
HBA1C MFR BLD: 5.7 %
PERFORMED ON: ABNORMAL
PERFORMED ON: NORMAL
PERFORMED ON: NORMAL

## 2023-05-12 PROCEDURE — 6360000002 HC RX W HCPCS: Performed by: INTERNAL MEDICINE

## 2023-05-12 PROCEDURE — 6370000000 HC RX 637 (ALT 250 FOR IP): Performed by: NURSE PRACTITIONER

## 2023-05-12 PROCEDURE — 94760 N-INVAS EAR/PLS OXIMETRY 1: CPT

## 2023-05-12 PROCEDURE — 2580000003 HC RX 258: Performed by: INTERNAL MEDICINE

## 2023-05-12 PROCEDURE — 99222 1ST HOSP IP/OBS MODERATE 55: CPT | Performed by: INTERNAL MEDICINE

## 2023-05-12 PROCEDURE — 6360000002 HC RX W HCPCS: Performed by: STUDENT IN AN ORGANIZED HEALTH CARE EDUCATION/TRAINING PROGRAM

## 2023-05-12 PROCEDURE — 6370000000 HC RX 637 (ALT 250 FOR IP): Performed by: INTERNAL MEDICINE

## 2023-05-12 PROCEDURE — 6370000000 HC RX 637 (ALT 250 FOR IP): Performed by: STUDENT IN AN ORGANIZED HEALTH CARE EDUCATION/TRAINING PROGRAM

## 2023-05-12 PROCEDURE — 1200000000 HC SEMI PRIVATE

## 2023-05-12 RX ORDER — DOCUSATE SODIUM 100 MG/1
200 CAPSULE, LIQUID FILLED ORAL 2 TIMES DAILY
Status: DISCONTINUED | OUTPATIENT
Start: 2023-05-12 | End: 2023-05-14 | Stop reason: HOSPADM

## 2023-05-12 RX ORDER — LEVOTHYROXINE SODIUM 0.03 MG/1
25 TABLET ORAL EVERY 24 HOURS
Status: DISCONTINUED | OUTPATIENT
Start: 2023-05-12 | End: 2023-05-14 | Stop reason: HOSPADM

## 2023-05-12 RX ORDER — FUROSEMIDE 10 MG/ML
40 INJECTION INTRAMUSCULAR; INTRAVENOUS 2 TIMES DAILY
Status: DISCONTINUED | OUTPATIENT
Start: 2023-05-12 | End: 2023-05-14 | Stop reason: HOSPADM

## 2023-05-12 RX ORDER — LISINOPRIL 2.5 MG/1
2.5 TABLET ORAL DAILY
Qty: 90 TABLET | Refills: 3 | Status: SHIPPED | OUTPATIENT
Start: 2023-05-12 | End: 2023-07-03 | Stop reason: SDUPTHER

## 2023-05-12 RX ORDER — AMOXICILLIN AND CLAVULANATE POTASSIUM 875; 125 MG/1; MG/1
1 TABLET, FILM COATED ORAL EVERY 12 HOURS SCHEDULED
Status: DISCONTINUED | OUTPATIENT
Start: 2023-05-13 | End: 2023-05-14 | Stop reason: HOSPADM

## 2023-05-12 RX ADMIN — APIXABAN 5 MG: 5 TABLET, FILM COATED ORAL at 08:15

## 2023-05-12 RX ADMIN — AMPICILLIN SODIUM AND SULBACTAM SODIUM 3000 MG: 2; 1 INJECTION, POWDER, FOR SOLUTION INTRAMUSCULAR; INTRAVENOUS at 10:43

## 2023-05-12 RX ADMIN — APIXABAN 5 MG: 5 TABLET, FILM COATED ORAL at 21:28

## 2023-05-12 RX ADMIN — ATORVASTATIN CALCIUM 40 MG: 40 TABLET, FILM COATED ORAL at 21:28

## 2023-05-12 RX ADMIN — SPIRONOLACTONE 25 MG: 25 TABLET ORAL at 08:15

## 2023-05-12 RX ADMIN — EZETIMIBE 10 MG: 10 TABLET ORAL at 08:14

## 2023-05-12 RX ADMIN — OXYBUTYNIN CHLORIDE 5 MG: 5 TABLET ORAL at 21:28

## 2023-05-12 RX ADMIN — VANCOMYCIN HYDROCHLORIDE 1000 MG: 1 INJECTION, POWDER, LYOPHILIZED, FOR SOLUTION INTRAVENOUS at 08:32

## 2023-05-12 RX ADMIN — AMPICILLIN SODIUM AND SULBACTAM SODIUM 3000 MG: 2; 1 INJECTION, POWDER, FOR SOLUTION INTRAMUSCULAR; INTRAVENOUS at 18:32

## 2023-05-12 RX ADMIN — CARVEDILOL 3.12 MG: 3.12 TABLET, FILM COATED ORAL at 08:14

## 2023-05-12 RX ADMIN — AMIODARONE HYDROCHLORIDE 200 MG: 200 TABLET ORAL at 08:14

## 2023-05-12 RX ADMIN — TAMSULOSIN HYDROCHLORIDE 0.4 MG: 0.4 CAPSULE ORAL at 21:29

## 2023-05-12 RX ADMIN — SIMETHICONE 80 MG: 80 TABLET, CHEWABLE ORAL at 14:03

## 2023-05-12 RX ADMIN — FUROSEMIDE 40 MG: 10 INJECTION, SOLUTION INTRAMUSCULAR; INTRAVENOUS at 18:08

## 2023-05-12 RX ADMIN — MULTIPLE VITAMINS W/ MINERALS TAB 1 TABLET: TAB at 08:14

## 2023-05-12 RX ADMIN — AMPICILLIN SODIUM AND SULBACTAM SODIUM 3000 MG: 2; 1 INJECTION, POWDER, FOR SOLUTION INTRAMUSCULAR; INTRAVENOUS at 05:42

## 2023-05-12 RX ADMIN — FERROUS SULFATE TAB EC 324 MG (65 MG FE EQUIVALENT) 324 MG: 324 (65 FE) TABLET DELAYED RESPONSE at 08:14

## 2023-05-12 RX ADMIN — SODIUM CHLORIDE, PRESERVATIVE FREE 10 ML: 5 INJECTION INTRAVENOUS at 23:43

## 2023-05-12 RX ADMIN — SODIUM CHLORIDE, PRESERVATIVE FREE 10 ML: 5 INJECTION INTRAVENOUS at 08:20

## 2023-05-12 RX ADMIN — LEVOTHYROXINE SODIUM 25 MCG: 0.03 TABLET ORAL at 06:08

## 2023-05-12 RX ADMIN — DOCUSATE SODIUM 100 MG: 100 CAPSULE, LIQUID FILLED ORAL at 12:18

## 2023-05-12 RX ADMIN — Medication 5000 UNITS: at 08:14

## 2023-05-12 RX ADMIN — CARVEDILOL 3.12 MG: 3.12 TABLET, FILM COATED ORAL at 21:28

## 2023-05-12 RX ADMIN — EMPAGLIFLOZIN 10 MG: 10 TABLET, FILM COATED ORAL at 08:25

## 2023-05-12 RX ADMIN — FUROSEMIDE 40 MG: 40 TABLET ORAL at 08:15

## 2023-05-12 RX ADMIN — LISINOPRIL 2.5 MG: 5 TABLET ORAL at 08:15

## 2023-05-12 RX ADMIN — AMPICILLIN SODIUM AND SULBACTAM SODIUM 3000 MG: 2; 1 INJECTION, POWDER, FOR SOLUTION INTRAMUSCULAR; INTRAVENOUS at 23:46

## 2023-05-13 PROBLEM — Z86.79 H/O CHF: Status: ACTIVE | Noted: 2023-05-13

## 2023-05-13 PROBLEM — L08.9 DIABETIC FOOT INFECTION (HCC): Status: ACTIVE | Noted: 2023-05-13

## 2023-05-13 PROBLEM — S88.119A BELOW-KNEE AMPUTATION (HCC): Status: ACTIVE | Noted: 2023-05-13

## 2023-05-13 PROBLEM — R60.0 PERIPHERAL EDEMA: Status: ACTIVE | Noted: 2023-05-13

## 2023-05-13 PROBLEM — Z95.810 AICD (AUTOMATIC CARDIOVERTER/DEFIBRILLATOR) PRESENT: Status: ACTIVE | Noted: 2023-05-13

## 2023-05-13 PROBLEM — E11.628 DIABETIC FOOT INFECTION (HCC): Status: ACTIVE | Noted: 2023-05-13

## 2023-05-13 PROBLEM — R60.9 PERIPHERAL EDEMA: Status: ACTIVE | Noted: 2023-05-13

## 2023-05-13 LAB
ANION GAP SERPL CALCULATED.3IONS-SCNC: 6 MMOL/L (ref 3–16)
BASOPHILS # BLD: 0 K/UL (ref 0–0.2)
BASOPHILS NFR BLD: 1.1 %
BUN SERPL-MCNC: 20 MG/DL (ref 7–20)
CALCIUM SERPL-MCNC: 8.4 MG/DL (ref 8.3–10.6)
CALCIUM SERPL-MCNC: 8.4 MG/DL (ref 8.3–10.6)
CHLORIDE SERPL-SCNC: 107 MMOL/L (ref 99–110)
CO2 SERPL-SCNC: 26 MMOL/L (ref 21–32)
CREAT SERPL-MCNC: 1.5 MG/DL (ref 0.8–1.3)
DEPRECATED RDW RBC AUTO: 14.1 % (ref 12.4–15.4)
EOSINOPHIL # BLD: 0.2 K/UL (ref 0–0.6)
EOSINOPHIL NFR BLD: 4.9 %
GFR SERPLBLD CREATININE-BSD FMLA CKD-EPI: 50 ML/MIN/{1.73_M2}
GLUCOSE BLD-MCNC: 107 MG/DL (ref 70–99)
GLUCOSE BLD-MCNC: 120 MG/DL (ref 70–99)
GLUCOSE BLD-MCNC: 174 MG/DL (ref 70–99)
GLUCOSE BLD-MCNC: 98 MG/DL (ref 70–99)
GLUCOSE SERPL-MCNC: 157 MG/DL (ref 70–99)
HCT VFR BLD AUTO: 36.5 % (ref 40.5–52.5)
HGB BLD-MCNC: 12.1 G/DL (ref 13.5–17.5)
LYMPHOCYTES # BLD: 1 K/UL (ref 1–5.1)
LYMPHOCYTES NFR BLD: 30.8 %
MAGNESIUM SERPL-MCNC: 2 MG/DL (ref 1.8–2.4)
MCH RBC QN AUTO: 29.9 PG (ref 26–34)
MCHC RBC AUTO-ENTMCNC: 33.1 G/DL (ref 31–36)
MCV RBC AUTO: 90.2 FL (ref 80–100)
MONOCYTES # BLD: 0.3 K/UL (ref 0–1.3)
MONOCYTES NFR BLD: 8.9 %
NEUTROPHILS # BLD: 1.8 K/UL (ref 1.7–7.7)
NEUTROPHILS NFR BLD: 54.3 %
PERFORMED ON: ABNORMAL
PERFORMED ON: NORMAL
PLATELET # BLD AUTO: 134 K/UL (ref 135–450)
PMV BLD AUTO: 10.8 FL (ref 5–10.5)
POTASSIUM SERPL-SCNC: 3.4 MMOL/L (ref 3.5–5.1)
POTASSIUM SERPL-SCNC: 3.9 MMOL/L (ref 3.5–5.1)
RBC # BLD AUTO: 4.04 M/UL (ref 4.2–5.9)
SODIUM SERPL-SCNC: 139 MMOL/L (ref 136–145)
WBC # BLD AUTO: 3.3 K/UL (ref 4–11)

## 2023-05-13 PROCEDURE — 6370000000 HC RX 637 (ALT 250 FOR IP): Performed by: STUDENT IN AN ORGANIZED HEALTH CARE EDUCATION/TRAINING PROGRAM

## 2023-05-13 PROCEDURE — 6370000000 HC RX 637 (ALT 250 FOR IP): Performed by: INTERNAL MEDICINE

## 2023-05-13 PROCEDURE — 36415 COLL VENOUS BLD VENIPUNCTURE: CPT

## 2023-05-13 PROCEDURE — 80048 BASIC METABOLIC PNL TOTAL CA: CPT

## 2023-05-13 PROCEDURE — 1200000000 HC SEMI PRIVATE

## 2023-05-13 PROCEDURE — 84132 ASSAY OF SERUM POTASSIUM: CPT

## 2023-05-13 PROCEDURE — 83735 ASSAY OF MAGNESIUM: CPT

## 2023-05-13 PROCEDURE — 2580000003 HC RX 258: Performed by: INTERNAL MEDICINE

## 2023-05-13 PROCEDURE — 85025 COMPLETE CBC W/AUTO DIFF WBC: CPT

## 2023-05-13 PROCEDURE — 82310 ASSAY OF CALCIUM: CPT

## 2023-05-13 PROCEDURE — 6360000002 HC RX W HCPCS: Performed by: STUDENT IN AN ORGANIZED HEALTH CARE EDUCATION/TRAINING PROGRAM

## 2023-05-13 RX ORDER — POTASSIUM CHLORIDE 20 MEQ/1
40 TABLET, EXTENDED RELEASE ORAL ONCE
Status: COMPLETED | OUTPATIENT
Start: 2023-05-13 | End: 2023-05-13

## 2023-05-13 RX ADMIN — CARVEDILOL 3.12 MG: 3.12 TABLET, FILM COATED ORAL at 20:54

## 2023-05-13 RX ADMIN — ACETAMINOPHEN 650 MG: 325 TABLET ORAL at 03:14

## 2023-05-13 RX ADMIN — APIXABAN 5 MG: 5 TABLET, FILM COATED ORAL at 20:56

## 2023-05-13 RX ADMIN — LEVOTHYROXINE SODIUM 25 MCG: 0.03 TABLET ORAL at 05:32

## 2023-05-13 RX ADMIN — AMIODARONE HYDROCHLORIDE 200 MG: 200 TABLET ORAL at 09:26

## 2023-05-13 RX ADMIN — APIXABAN 5 MG: 5 TABLET, FILM COATED ORAL at 09:26

## 2023-05-13 RX ADMIN — POTASSIUM CHLORIDE 40 MEQ: 1500 TABLET, EXTENDED RELEASE ORAL at 09:26

## 2023-05-13 RX ADMIN — SPIRONOLACTONE 25 MG: 25 TABLET ORAL at 09:30

## 2023-05-13 RX ADMIN — SODIUM CHLORIDE, PRESERVATIVE FREE 10 ML: 5 INJECTION INTRAVENOUS at 20:58

## 2023-05-13 RX ADMIN — OXYBUTYNIN CHLORIDE 5 MG: 5 TABLET ORAL at 20:54

## 2023-05-13 RX ADMIN — LISINOPRIL 2.5 MG: 5 TABLET ORAL at 09:29

## 2023-05-13 RX ADMIN — AMOXICILLIN AND CLAVULANATE POTASSIUM 1 TABLET: 875; 125 TABLET, FILM COATED ORAL at 09:26

## 2023-05-13 RX ADMIN — TAMSULOSIN HYDROCHLORIDE 0.4 MG: 0.4 CAPSULE ORAL at 20:54

## 2023-05-13 RX ADMIN — ATORVASTATIN CALCIUM 40 MG: 40 TABLET, FILM COATED ORAL at 20:54

## 2023-05-13 RX ADMIN — EZETIMIBE 10 MG: 10 TABLET ORAL at 09:25

## 2023-05-13 RX ADMIN — FUROSEMIDE 40 MG: 10 INJECTION, SOLUTION INTRAMUSCULAR; INTRAVENOUS at 18:40

## 2023-05-13 RX ADMIN — AMOXICILLIN AND CLAVULANATE POTASSIUM 1 TABLET: 875; 125 TABLET, FILM COATED ORAL at 20:54

## 2023-05-13 RX ADMIN — CARVEDILOL 3.12 MG: 3.12 TABLET, FILM COATED ORAL at 09:26

## 2023-05-13 RX ADMIN — FUROSEMIDE 40 MG: 10 INJECTION, SOLUTION INTRAMUSCULAR; INTRAVENOUS at 09:29

## 2023-05-13 RX ADMIN — EMPAGLIFLOZIN 10 MG: 10 TABLET, FILM COATED ORAL at 09:27

## 2023-05-13 RX ADMIN — MULTIPLE VITAMINS W/ MINERALS TAB 1 TABLET: TAB at 09:25

## 2023-05-13 ASSESSMENT — PAIN DESCRIPTION - DESCRIPTORS: DESCRIPTORS: ACHING

## 2023-05-13 ASSESSMENT — PAIN SCALES - GENERAL
PAINLEVEL_OUTOF10: 3
PAINLEVEL_OUTOF10: 0

## 2023-05-13 ASSESSMENT — PAIN DESCRIPTION - PAIN TYPE: TYPE: ACUTE PAIN

## 2023-05-13 ASSESSMENT — PAIN DESCRIPTION - FREQUENCY: FREQUENCY: INTERMITTENT

## 2023-05-13 ASSESSMENT — PAIN DESCRIPTION - LOCATION: LOCATION: FOOT

## 2023-05-13 ASSESSMENT — PAIN DESCRIPTION - ORIENTATION: ORIENTATION: RIGHT

## 2023-05-13 ASSESSMENT — PAIN DESCRIPTION - ONSET: ONSET: PROGRESSIVE

## 2023-05-13 ASSESSMENT — PAIN SCALES - WONG BAKER: WONGBAKER_NUMERICALRESPONSE: 0

## 2023-05-13 ASSESSMENT — PAIN - FUNCTIONAL ASSESSMENT: PAIN_FUNCTIONAL_ASSESSMENT: ACTIVITIES ARE NOT PREVENTED

## 2023-05-14 VITALS
TEMPERATURE: 97.3 F | WEIGHT: 256.17 LBS | HEART RATE: 64 BPM | RESPIRATION RATE: 15 BRPM | HEIGHT: 76 IN | OXYGEN SATURATION: 96 % | DIASTOLIC BLOOD PRESSURE: 65 MMHG | SYSTOLIC BLOOD PRESSURE: 109 MMHG | BODY MASS INDEX: 31.2 KG/M2

## 2023-05-14 LAB
ANION GAP SERPL CALCULATED.3IONS-SCNC: 9 MMOL/L (ref 3–16)
BASOPHILS # BLD: 0 K/UL (ref 0–0.2)
BASOPHILS NFR BLD: 1.1 %
BUN SERPL-MCNC: 22 MG/DL (ref 7–20)
CALCIUM SERPL-MCNC: 8.7 MG/DL (ref 8.3–10.6)
CHLORIDE SERPL-SCNC: 105 MMOL/L (ref 99–110)
CO2 SERPL-SCNC: 27 MMOL/L (ref 21–32)
CREAT SERPL-MCNC: 1.4 MG/DL (ref 0.8–1.3)
DEPRECATED RDW RBC AUTO: 14.3 % (ref 12.4–15.4)
EOSINOPHIL # BLD: 0.2 K/UL (ref 0–0.6)
EOSINOPHIL NFR BLD: 4.9 %
GFR SERPLBLD CREATININE-BSD FMLA CKD-EPI: 54 ML/MIN/{1.73_M2}
GLUCOSE BLD-MCNC: 112 MG/DL (ref 70–99)
GLUCOSE BLD-MCNC: 98 MG/DL (ref 70–99)
GLUCOSE SERPL-MCNC: 119 MG/DL (ref 70–99)
HCT VFR BLD AUTO: 39.9 % (ref 40.5–52.5)
HGB BLD-MCNC: 13.1 G/DL (ref 13.5–17.5)
LYMPHOCYTES # BLD: 1.2 K/UL (ref 1–5.1)
LYMPHOCYTES NFR BLD: 32.2 %
MCH RBC QN AUTO: 29.5 PG (ref 26–34)
MCHC RBC AUTO-ENTMCNC: 32.7 G/DL (ref 31–36)
MCV RBC AUTO: 89.9 FL (ref 80–100)
MONOCYTES # BLD: 0.5 K/UL (ref 0–1.3)
MONOCYTES NFR BLD: 13 %
NEUTROPHILS # BLD: 1.8 K/UL (ref 1.7–7.7)
NEUTROPHILS NFR BLD: 48.8 %
PERFORMED ON: ABNORMAL
PERFORMED ON: NORMAL
PLATELET # BLD AUTO: 135 K/UL (ref 135–450)
PMV BLD AUTO: 10.2 FL (ref 5–10.5)
POTASSIUM SERPL-SCNC: 3.7 MMOL/L (ref 3.5–5.1)
RBC # BLD AUTO: 4.43 M/UL (ref 4.2–5.9)
SODIUM SERPL-SCNC: 141 MMOL/L (ref 136–145)
WBC # BLD AUTO: 3.6 K/UL (ref 4–11)

## 2023-05-14 PROCEDURE — 80048 BASIC METABOLIC PNL TOTAL CA: CPT

## 2023-05-14 PROCEDURE — 6370000000 HC RX 637 (ALT 250 FOR IP): Performed by: INTERNAL MEDICINE

## 2023-05-14 PROCEDURE — 6360000002 HC RX W HCPCS: Performed by: STUDENT IN AN ORGANIZED HEALTH CARE EDUCATION/TRAINING PROGRAM

## 2023-05-14 PROCEDURE — 94760 N-INVAS EAR/PLS OXIMETRY 1: CPT

## 2023-05-14 PROCEDURE — 85025 COMPLETE CBC W/AUTO DIFF WBC: CPT

## 2023-05-14 PROCEDURE — 6370000000 HC RX 637 (ALT 250 FOR IP): Performed by: NURSE PRACTITIONER

## 2023-05-14 PROCEDURE — 36415 COLL VENOUS BLD VENIPUNCTURE: CPT

## 2023-05-14 PROCEDURE — 6370000000 HC RX 637 (ALT 250 FOR IP): Performed by: STUDENT IN AN ORGANIZED HEALTH CARE EDUCATION/TRAINING PROGRAM

## 2023-05-14 PROCEDURE — 2580000003 HC RX 258: Performed by: INTERNAL MEDICINE

## 2023-05-14 RX ORDER — AMOXICILLIN AND CLAVULANATE POTASSIUM 875; 125 MG/1; MG/1
1 TABLET, FILM COATED ORAL EVERY 12 HOURS SCHEDULED
Qty: 3 TABLET | Refills: 0 | Status: SHIPPED | OUTPATIENT
Start: 2023-05-14 | End: 2023-05-16

## 2023-05-14 RX ORDER — FUROSEMIDE 40 MG/1
40 TABLET ORAL DAILY
Qty: 60 TABLET | Refills: 3 | Status: SHIPPED | OUTPATIENT
Start: 2023-05-14 | End: 2023-05-18 | Stop reason: SDUPTHER

## 2023-05-14 RX ADMIN — FUROSEMIDE 40 MG: 10 INJECTION, SOLUTION INTRAMUSCULAR; INTRAVENOUS at 08:08

## 2023-05-14 RX ADMIN — EMPAGLIFLOZIN 10 MG: 10 TABLET, FILM COATED ORAL at 08:08

## 2023-05-14 RX ADMIN — EZETIMIBE 10 MG: 10 TABLET ORAL at 08:07

## 2023-05-14 RX ADMIN — AMOXICILLIN AND CLAVULANATE POTASSIUM 1 TABLET: 875; 125 TABLET, FILM COATED ORAL at 08:08

## 2023-05-14 RX ADMIN — MULTIPLE VITAMINS W/ MINERALS TAB 1 TABLET: TAB at 08:07

## 2023-05-14 RX ADMIN — SODIUM CHLORIDE, PRESERVATIVE FREE 5 ML: 5 INJECTION INTRAVENOUS at 08:12

## 2023-05-14 RX ADMIN — LISINOPRIL 2.5 MG: 5 TABLET ORAL at 08:08

## 2023-05-14 RX ADMIN — SPIRONOLACTONE 25 MG: 25 TABLET ORAL at 08:05

## 2023-05-14 RX ADMIN — APIXABAN 5 MG: 5 TABLET, FILM COATED ORAL at 08:07

## 2023-05-14 RX ADMIN — CARVEDILOL 3.12 MG: 3.12 TABLET, FILM COATED ORAL at 08:12

## 2023-05-14 RX ADMIN — SIMETHICONE 80 MG: 80 TABLET, CHEWABLE ORAL at 03:27

## 2023-05-14 RX ADMIN — AMIODARONE HYDROCHLORIDE 200 MG: 200 TABLET ORAL at 08:08

## 2023-05-14 RX ADMIN — LEVOTHYROXINE SODIUM 25 MCG: 0.03 TABLET ORAL at 05:06

## 2023-05-14 RX ADMIN — ACETAMINOPHEN 650 MG: 325 TABLET ORAL at 01:30

## 2023-05-14 ASSESSMENT — PAIN DESCRIPTION - ORIENTATION: ORIENTATION: RIGHT

## 2023-05-14 ASSESSMENT — PAIN SCALES - GENERAL
PAINLEVEL_OUTOF10: 8
PAINLEVEL_OUTOF10: 0

## 2023-05-14 ASSESSMENT — PAIN SCALES - WONG BAKER: WONGBAKER_NUMERICALRESPONSE: 0

## 2023-05-14 ASSESSMENT — PAIN DESCRIPTION - LOCATION: LOCATION: FOOT

## 2023-05-14 ASSESSMENT — PAIN DESCRIPTION - DESCRIPTORS: DESCRIPTORS: ACHING

## 2023-05-15 ENCOUNTER — TELEPHONE (OUTPATIENT)
Dept: INTERNAL MEDICINE CLINIC | Age: 73
End: 2023-05-15

## 2023-05-15 LAB
BACTERIA BLD CULT ORG #2: NORMAL
BACTERIA BLD CULT: NORMAL

## 2023-05-15 NOTE — TELEPHONE ENCOUNTER
Care Transitions Initial Follow Up Call    Outreach made within 2 business days of discharge: Yes    Patient: Nyasia Lara Patient : 1950   MRN: [id number]  Reason for Admission: There are no discharge diagnoses documented for the most recent discharge. Discharge Date: 23       Spoke with: patient     Discharge department/facility: [facility] Interactive Patient Contact:  Was patient able to fill all prescriptions: Yes  Was patient instructed to bring all medications to the follow-up visit: Yes  Is patient taking all medications as directed in the discharge summary?  Yes  Does patient understand their discharge instructions: Yes  Does patient have questions or concerns that need addressed prior to 7-14 day follow up office visit: no    Scheduled appointment with PCP within 7-14 days    Follow Up  Future Appointments   Date Time Provider Philly Aguilera   2023  9:00 AM SCHEDULE, [facility] REMOTE TRANSMISSION [facility]   2023 11:20 AM MD HATTIE Luevano [place] - [place]   2023  9:30 AM SCHEDULE, [facility] REMOTE TRANSMISSION [facility]   8/15/2023  3:00 PM SCHEDULE, [facility] REMOTE TRANSMISSION [facility]   2023 11:00 AM SCHEDULE, [facility] REMOTE TRANSMISSION [facility]   10/5/2023 11:00 AM Kaylee Shelton [facility]   10/24/2023 11:00 AM SCHEDULE, [facility] REMOTE TRANSMISSION [facility]   2023 10:00 AM SCHEDULE, [facility] REMOTE TRANSMISSION [facility]       Winnie Garcia

## 2023-05-18 ENCOUNTER — OFFICE VISIT (OUTPATIENT)
Dept: INTERNAL MEDICINE CLINIC | Age: 73
End: 2023-05-18

## 2023-05-18 VITALS
TEMPERATURE: 97.3 F | SYSTOLIC BLOOD PRESSURE: 108 MMHG | HEIGHT: 76 IN | WEIGHT: 259.8 LBS | HEART RATE: 67 BPM | DIASTOLIC BLOOD PRESSURE: 66 MMHG | BODY MASS INDEX: 31.64 KG/M2 | OXYGEN SATURATION: 97 %

## 2023-05-18 DIAGNOSIS — Z09 HOSPITAL DISCHARGE FOLLOW-UP: Primary | ICD-10-CM

## 2023-05-18 RX ORDER — FUROSEMIDE 40 MG/1
40 TABLET ORAL DAILY
Qty: 60 TABLET | Refills: 3 | Status: SHIPPED | OUTPATIENT
Start: 2023-05-18

## 2023-05-18 NOTE — PROGRESS NOTES
vitamin D level was to high. Insulin Syringe-Needle U-100 (INSULIN SYRINGE .5CC/31GX5/16\") 31G X 5/16\" 0.5 ML MISC Patient tests bid 100 Syringe 5    therapeutic multivitamin-minerals (THERAGRAN-M) tablet Take 1 tablet by mouth daily          Medications patient taking as of now reconciled against medications ordered at time of hospital discharge: Yes    A comprehensive review of systems was negative except for what was noted in the HPI. Objective:    /66 (Site: Left Upper Arm, Position: Sitting, Cuff Size: Medium Adult)   Pulse 67   Temp 97.3 °F (36.3 °C) (Oral)   Ht 6' 4\" (1.93 m)   Wt 259 lb 12.8 oz (117.8 kg)   SpO2 97%   BMI 31.62 kg/m²   General Appearance: alert and oriented to person, place and time, well-developed and well-nourished, in no acute distress  Pulmonary/Chest: clear to auscultation bilaterally- no wheezes, rales or rhonchi, normal air movement, no respiratory distress  Cardiovascular: normal rate, normal S1 and S2, no gallops, intact distal pulses, and no carotid bruits  Abdomen: soft, non-tender, non-distended, normal bowel sounds, no masses or organomegaly  Extremities: 1 + edema-  right lower extremity  Neurologic: speech normal and uses a cane      An electronic signature was used to authenticate this note.   --Sugey Bhagat APRN

## 2023-05-18 NOTE — PATIENT INSTRUCTIONS
[facility] Locations - No appointment necessary. @ indicates the location is open Saturdays in addition to Monday through Friday. Call your preferred location for test preparation, business hours and other information you need. [employer] accepts [employer]. [facility]     @ [address] [place] [address], [address] [place] [address] Av    Ph: 28 [facility] [place], [address]    Ph: [phone]   @ [address] [place] [address].[facility]    Ph: [place] [address]    Ph: [phone]  @ [address]   Ph: [phone]  @ [address] [place] [address]. [address] [place], [address] [facility] 429    Ph: 105 [facility] [address] [facility]   Ph: [phone]    [place]    @ [facility]. [place], [place] [address]    Ph: [phone]  [facility]   [address] [facility], [address] [facility]   Ph: [place] [address] [facility]. [address] 30: 311 [facility] Melchor Engel    Ph: [phone]   [facility]   [address] 2026 [facility]. Melchor Multani   Ph: 501 [facility]  [address] [place], [place] [address]    Ph: [phone]

## 2023-05-30 ENCOUNTER — NURSE ONLY (OUTPATIENT)
Dept: CARDIOLOGY CLINIC | Age: 73
End: 2023-05-30
Payer: MEDICARE

## 2023-05-30 DIAGNOSIS — I42.0 DILATED CARDIOMYOPATHY (HCC): ICD-10-CM

## 2023-05-30 DIAGNOSIS — Z95.810 CARDIAC RESYNCHRONIZATION THERAPY DEFIBRILLATOR (CRT-D) IN PLACE: Primary | ICD-10-CM

## 2023-05-30 DIAGNOSIS — I47.29 NSVT (NONSUSTAINED VENTRICULAR TACHYCARDIA) (HCC): ICD-10-CM

## 2023-05-30 DIAGNOSIS — I50.22 CHRONIC SYSTOLIC HEART FAILURE (HCC): ICD-10-CM

## 2023-05-30 DIAGNOSIS — I48.0 PAROXYSMAL ATRIAL FIBRILLATION (HCC): ICD-10-CM

## 2023-05-30 PROCEDURE — 93297 REM INTERROG DEV EVAL ICPMS: CPT | Performed by: NURSE PRACTITIONER

## 2023-05-30 PROCEDURE — G2066 INTER DEVC REMOTE 30D: HCPCS | Performed by: NURSE PRACTITIONER

## 2023-06-01 NOTE — PROGRESS NOTES
Remote transmission received from patient's CRT-D monitor at home. Transmission shows normal sensing and pacing function. RRT/KENDRICK estimated in 19 months. Noted AT/AF, 2.7% burden, 13 of 34 pace-terminated episodes (Coreg, amiodarone, Eliquis). Ap 96.7%  BiVp 98.3%, Effective 98.1%, VSRp 0.8%  PVCs 22.5/hr    Optivol is within normal range. TriageF Heart Failure Risk Status on 30-May-2023 is High*. End of 31-day monitoring period 5/30/23. NP will review. See interrogation under cardiology tab in the [address] [place] [address] [address] field for more details. Will continue to monitor remotely.

## 2023-06-08 ENCOUNTER — OFFICE VISIT (OUTPATIENT)
Dept: INTERNAL MEDICINE CLINIC | Age: 73
End: 2023-06-08
Payer: MEDICARE

## 2023-06-08 VITALS
WEIGHT: 263 LBS | SYSTOLIC BLOOD PRESSURE: 110 MMHG | BODY MASS INDEX: 32.03 KG/M2 | HEIGHT: 76 IN | HEART RATE: 70 BPM | DIASTOLIC BLOOD PRESSURE: 62 MMHG | OXYGEN SATURATION: 97 %

## 2023-06-08 DIAGNOSIS — E78.49 OTHER HYPERLIPIDEMIA: ICD-10-CM

## 2023-06-08 DIAGNOSIS — N18.31 STAGE 3A CHRONIC KIDNEY DISEASE (CKD) (HCC): ICD-10-CM

## 2023-06-08 DIAGNOSIS — Z89.512 HX OF BKA, LEFT (HCC): Primary | ICD-10-CM

## 2023-06-08 DIAGNOSIS — N52.01 ERECTILE DYSFUNCTION DUE TO ARTERIAL INSUFFICIENCY: ICD-10-CM

## 2023-06-08 DIAGNOSIS — E11.8 DIABETES MELLITUS TYPE 2 WITH COMPLICATIONS (HCC): ICD-10-CM

## 2023-06-08 DIAGNOSIS — E03.9 ACQUIRED HYPOTHYROIDISM: ICD-10-CM

## 2023-06-08 DIAGNOSIS — I50.20 HFREF (HEART FAILURE WITH REDUCED EJECTION FRACTION) (HCC): ICD-10-CM

## 2023-06-08 DIAGNOSIS — I10 PRIMARY HYPERTENSION: ICD-10-CM

## 2023-06-08 LAB
CHP ED QC CHECK: NORMAL
GLUCOSE BLD-MCNC: 92 MG/DL

## 2023-06-08 PROCEDURE — 3078F DIAST BP <80 MM HG: CPT | Performed by: INTERNAL MEDICINE

## 2023-06-08 PROCEDURE — 82962 GLUCOSE BLOOD TEST: CPT | Performed by: INTERNAL MEDICINE

## 2023-06-08 PROCEDURE — 99215 OFFICE O/P EST HI 40 MIN: CPT | Performed by: INTERNAL MEDICINE

## 2023-06-08 PROCEDURE — 3044F HG A1C LEVEL LT 7.0%: CPT | Performed by: INTERNAL MEDICINE

## 2023-06-08 PROCEDURE — 1123F ACP DISCUSS/DSCN MKR DOCD: CPT | Performed by: INTERNAL MEDICINE

## 2023-06-08 PROCEDURE — 3074F SYST BP LT 130 MM HG: CPT | Performed by: INTERNAL MEDICINE

## 2023-06-08 RX ORDER — SILDENAFIL 100 MG/1
100 TABLET, FILM COATED ORAL PRN
Qty: 30 TABLET | Refills: 3 | Status: SHIPPED | OUTPATIENT
Start: 2023-06-08

## 2023-06-12 ENCOUNTER — TELEPHONE (OUTPATIENT)
Dept: INTERNAL MEDICINE CLINIC | Age: 73
End: 2023-06-12

## 2023-06-12 NOTE — TELEPHONE ENCOUNTER
Pharmacy calling to state [place] 14 day sencor they can no longer get in there store wanting to know if they change prescription to livre 2 sencor. Gave verbal ok.

## 2023-06-16 ENCOUNTER — TELEPHONE (OUTPATIENT)
Dept: INTERNAL MEDICINE CLINIC | Age: 73
End: 2023-06-16

## 2023-06-16 RX ORDER — CARVEDILOL 3.12 MG/1
3.12 TABLET ORAL 2 TIMES DAILY
Qty: 60 TABLET | Refills: 5 | Status: SHIPPED | OUTPATIENT
Start: 2023-06-16 | End: 2023-07-03 | Stop reason: SDUPTHER

## 2023-06-16 NOTE — TELEPHONE ENCOUNTER
Patient is [facility] medication [facility]

## 2023-06-19 NOTE — H&P
TIMES DAILY BEFORE MEALS (Patient taking differently: 0-2 Units 2 times daily (with meals) Sliding scale) 45 mL 5    Blood Glucose Monitoring Suppl (Chip Domínguez) w/Device KIT 1 each by Does not apply route 2 times daily 1 kit 0    Handicap Placard MISC by Does not apply route Diagnosis: heart failure. Expires: 12/7/24. 1 each 0    Insulin Pen Needle (B-D UF III MINI PEN NEEDLES) 31G X 5 MM MISC Inject 1 each into the skin daily 100 each 5    blood glucose test strips (ONE TOUCH TEST STRIPS) strip 1 each by In Vitro route 3 times daily As needed. 300 each 3    ONETOUCH ULTRA strip USE WITH GLUCOSE METER THREE TIMES DAILY AND AS NEEDED 300 strip 3    ferrous sulfate (FE TABS 325) 325 (65 Fe) MG EC tablet Take 1 tablet by mouth every other day      Continuous Blood Gluc  (FREESTYLE KWESI 14 DAY READER) ANITA Use as Directed Dx E11.9 1 Device 0    Blood Glucose Monitoring Suppl (ONE TOUCH ULTRA MINI) w/Device KIT 1 kit by Does not apply route three times daily 1 kit 0    acetaminophen (TYLENOL) 325 MG tablet Take 2 tablets by mouth every 4 hours as needed for Pain 120 tablet 3    Coenzyme Q10 (CO Q 10) 100 MG CAPS Take 1 capsule by mouth daily      Cinnamon 500 MG CAPS Take 1 capsule by mouth daily      Cholecalciferol (VITAMIN D) 125 MCG (5000 UT) CAPS Take 5,000 Units by mouth three times a week Indications: cut back on it due to pcp stating the vitamin D level was to high. Insulin Syringe-Needle U-100 (INSULIN SYRINGE .5CC/31GX5/16\") 31G X 5/16\" 0.5 ML MISC Patient tests bid 100 Syringe 5    therapeutic multivitamin-minerals (THERAGRAN-M) tablet Take 1 tablet by mouth daily       No current facility-administered medications on file prior to encounter. REVIEW OF SYSTEMS  A comprehensive review of systems was negative except noted in HPI/wound narrative and/or updates above. Written patient dismissal instructions given to patient and signed by patient or POA.   Patient voiced understanding

## 2023-06-20 ENCOUNTER — OFFICE VISIT (OUTPATIENT)
Dept: SLEEP MEDICINE | Age: 73
End: 2023-06-20
Payer: MEDICARE

## 2023-06-20 ENCOUNTER — HOSPITAL ENCOUNTER (OUTPATIENT)
Dept: WOUND CARE | Age: 73
Discharge: HOME | End: 2023-06-20
Payer: MEDICARE

## 2023-06-20 VITALS
HEART RATE: 65 BPM | WEIGHT: 261.1 LBS | RESPIRATION RATE: 18 BRPM | OXYGEN SATURATION: 97 % | HEIGHT: 76 IN | TEMPERATURE: 97.5 F | BODY MASS INDEX: 31.79 KG/M2 | SYSTOLIC BLOOD PRESSURE: 121 MMHG | DIASTOLIC BLOOD PRESSURE: 73 MMHG

## 2023-06-20 VITALS
SYSTOLIC BLOOD PRESSURE: 121 MMHG | RESPIRATION RATE: 18 BRPM | BODY MASS INDEX: 31.89 KG/M2 | HEART RATE: 65 BPM | HEIGHT: 76 IN | WEIGHT: 261.91 LBS | TEMPERATURE: 97.9 F | DIASTOLIC BLOOD PRESSURE: 73 MMHG

## 2023-06-20 DIAGNOSIS — E11.621 TYPE 2 DIABETES MELLITUS WITH LEFT DIABETIC FOOT ULCER (HCC): ICD-10-CM

## 2023-06-20 DIAGNOSIS — R60.9 PERIPHERAL EDEMA: ICD-10-CM

## 2023-06-20 DIAGNOSIS — E11.42 DIABETIC POLYNEUROPATHY ASSOCIATED WITH TYPE 2 DIABETES MELLITUS (HCC): ICD-10-CM

## 2023-06-20 DIAGNOSIS — J44.9 OVERLAP SYNDROME OF OBSTRUCTIVE SLEEP APNEA AND CHRONIC OBSTRUCTIVE PULMONARY DISEASE (HCC): ICD-10-CM

## 2023-06-20 DIAGNOSIS — Z99.89 DEPENDENCE ON OTHER ENABLING MACHINES AND DEVICES: ICD-10-CM

## 2023-06-20 DIAGNOSIS — L97.402 DIABETIC ULCER OF HEEL ASSOCIATED WITH DIABETES MELLITUS DUE TO UNDERLYING CONDITION, WITH FAT LAYER EXPOSED, UNSPECIFIED LATERALITY (HCC): ICD-10-CM

## 2023-06-20 DIAGNOSIS — I70.244 ATHEROSCLEROSIS OF NATIVE ARTERIES OF LEFT LEG WITH ULCERATION OF HEEL AND MIDFOOT (HCC): ICD-10-CM

## 2023-06-20 DIAGNOSIS — L97.529 TYPE 2 DIABETES MELLITUS WITH LEFT DIABETIC FOOT ULCER (HCC): ICD-10-CM

## 2023-06-20 DIAGNOSIS — L97.921 DIABETIC ULCER OF LEFT LOWER LEG, LIMITED TO BREAKDOWN OF SKIN (HCC): ICD-10-CM

## 2023-06-20 DIAGNOSIS — G47.33 OVERLAP SYNDROME OF OBSTRUCTIVE SLEEP APNEA AND CHRONIC OBSTRUCTIVE PULMONARY DISEASE (HCC): ICD-10-CM

## 2023-06-20 DIAGNOSIS — E11.621 DIABETIC ULCER OF LEFT MIDFOOT ASSOCIATED WITH TYPE 2 DIABETES MELLITUS, WITH NECROSIS OF BONE (HCC): ICD-10-CM

## 2023-06-20 DIAGNOSIS — E66.9 OBESITY (BMI 30.0-34.9): ICD-10-CM

## 2023-06-20 DIAGNOSIS — I70.232 ATHEROSCLEROSIS OF NATIVE ARTERIES OF RIGHT LEG WITH ULCERATION OF CALF (HCC): ICD-10-CM

## 2023-06-20 DIAGNOSIS — E11.622 DIABETIC ULCER OF LEFT LOWER LEG, LIMITED TO BREAKDOWN OF SKIN (HCC): ICD-10-CM

## 2023-06-20 DIAGNOSIS — L98.499 DIABETES MELLITUS WITH SKIN ULCER (HCC): ICD-10-CM

## 2023-06-20 DIAGNOSIS — M14.672 CHARCOT ANKLE, LEFT: ICD-10-CM

## 2023-06-20 DIAGNOSIS — M86.272 SUBACUTE OSTEOMYELITIS OF LEFT FOOT (HCC): ICD-10-CM

## 2023-06-20 DIAGNOSIS — T87.9 BKA STUMP COMPLICATION (HCC): ICD-10-CM

## 2023-06-20 DIAGNOSIS — G47.33 OSA ON CPAP: Primary | ICD-10-CM

## 2023-06-20 DIAGNOSIS — Z99.89 OSA ON CPAP: Primary | ICD-10-CM

## 2023-06-20 DIAGNOSIS — T23.201S: ICD-10-CM

## 2023-06-20 DIAGNOSIS — I83.012 VENOUS STASIS ULCER OF RIGHT CALF WITH FAT LAYER EXPOSED WITH VARICOSE VEINS (HCC): ICD-10-CM

## 2023-06-20 DIAGNOSIS — E11.622 DIABETES MELLITUS WITH SKIN ULCER (HCC): ICD-10-CM

## 2023-06-20 DIAGNOSIS — L97.922 NON-PRESSURE CHRONIC ULCER OF LEFT LOWER LEG WITH FAT LAYER EXPOSED (HCC): ICD-10-CM

## 2023-06-20 DIAGNOSIS — L97.212 VENOUS STASIS ULCER OF RIGHT CALF WITH FAT LAYER EXPOSED WITH VARICOSE VEINS (HCC): ICD-10-CM

## 2023-06-20 DIAGNOSIS — L97.424 DIABETIC ULCER OF LEFT MIDFOOT ASSOCIATED WITH TYPE 2 DIABETES MELLITUS, WITH NECROSIS OF BONE (HCC): ICD-10-CM

## 2023-06-20 DIAGNOSIS — Z74.09 IMPAIRED MOBILITY: ICD-10-CM

## 2023-06-20 DIAGNOSIS — E11.8 DIABETIC FOOT (HCC): Primary | ICD-10-CM

## 2023-06-20 DIAGNOSIS — Z86.79 H/O CHF: ICD-10-CM

## 2023-06-20 DIAGNOSIS — E11.21 DIABETIC NEPHROPATHY ASSOCIATED WITH TYPE 2 DIABETES MELLITUS (HCC): ICD-10-CM

## 2023-06-20 DIAGNOSIS — E08.621 DIABETIC ULCER OF HEEL ASSOCIATED WITH DIABETES MELLITUS DUE TO UNDERLYING CONDITION, WITH FAT LAYER EXPOSED, UNSPECIFIED LATERALITY (HCC): ICD-10-CM

## 2023-06-20 PROCEDURE — 99204 OFFICE O/P NEW MOD 45 MIN: CPT | Performed by: EMERGENCY MEDICINE

## 2023-06-20 PROCEDURE — 11042 DBRDMT SUBQ TIS 1ST 20SQCM/<: CPT

## 2023-06-20 PROCEDURE — 11042 DBRDMT SUBQ TIS 1ST 20SQCM/<: CPT | Performed by: EMERGENCY MEDICINE

## 2023-06-20 PROCEDURE — 3078F DIAST BP <80 MM HG: CPT | Performed by: PSYCHIATRY & NEUROLOGY

## 2023-06-20 PROCEDURE — 1123F ACP DISCUSS/DSCN MKR DOCD: CPT | Performed by: PSYCHIATRY & NEUROLOGY

## 2023-06-20 PROCEDURE — 11045 DBRDMT SUBQ TISS EACH ADDL: CPT | Performed by: EMERGENCY MEDICINE

## 2023-06-20 PROCEDURE — 3074F SYST BP LT 130 MM HG: CPT | Performed by: PSYCHIATRY & NEUROLOGY

## 2023-06-20 PROCEDURE — 99214 OFFICE O/P EST MOD 30 MIN: CPT | Performed by: PSYCHIATRY & NEUROLOGY

## 2023-06-20 PROCEDURE — 99213 OFFICE O/P EST LOW 20 MIN: CPT

## 2023-06-20 PROCEDURE — 11045 DBRDMT SUBQ TISS EACH ADDL: CPT

## 2023-06-20 RX ORDER — CLOBETASOL PROPIONATE 0.5 MG/G
OINTMENT TOPICAL ONCE
OUTPATIENT
Start: 2023-06-20 | End: 2023-06-20

## 2023-06-20 RX ORDER — GENTAMICIN SULFATE 1 MG/G
OINTMENT TOPICAL ONCE
OUTPATIENT
Start: 2023-06-20 | End: 2023-06-20

## 2023-06-20 RX ORDER — BETAMETHASONE DIPROPIONATE 0.05 %
OINTMENT (GRAM) TOPICAL ONCE
OUTPATIENT
Start: 2023-06-20 | End: 2023-06-20

## 2023-06-20 RX ORDER — LIDOCAINE 40 MG/G
CREAM TOPICAL ONCE
Status: COMPLETED | OUTPATIENT
Start: 2023-06-20 | End: 2023-06-20

## 2023-06-20 RX ORDER — IBUPROFEN 200 MG
TABLET ORAL ONCE
OUTPATIENT
Start: 2023-06-20 | End: 2023-06-20

## 2023-06-20 RX ORDER — LIDOCAINE HYDROCHLORIDE 40 MG/ML
SOLUTION TOPICAL ONCE
OUTPATIENT
Start: 2023-06-20 | End: 2023-06-20

## 2023-06-20 RX ORDER — LIDOCAINE 50 MG/G
OINTMENT TOPICAL ONCE
OUTPATIENT
Start: 2023-06-20 | End: 2023-06-20

## 2023-06-20 RX ORDER — LIDOCAINE 40 MG/G
CREAM TOPICAL ONCE
OUTPATIENT
Start: 2023-06-20 | End: 2023-06-20

## 2023-06-20 RX ORDER — BACITRACIN ZINC AND POLYMYXIN B SULFATE 500; 1000 [USP'U]/G; [USP'U]/G
OINTMENT TOPICAL ONCE
OUTPATIENT
Start: 2023-06-20 | End: 2023-06-20

## 2023-06-20 RX ORDER — LIDOCAINE HYDROCHLORIDE 20 MG/ML
JELLY TOPICAL ONCE
OUTPATIENT
Start: 2023-06-20 | End: 2023-06-20

## 2023-06-20 RX ORDER — GINSENG 100 MG
CAPSULE ORAL ONCE
OUTPATIENT
Start: 2023-06-20 | End: 2023-06-20

## 2023-06-20 RX ORDER — SODIUM CHLOR/HYPOCHLOROUS ACID 0.033 %
SOLUTION, IRRIGATION IRRIGATION ONCE
OUTPATIENT
Start: 2023-06-20 | End: 2023-06-20

## 2023-06-20 RX ADMIN — LIDOCAINE: 40 CREAM TOPICAL at 08:15

## 2023-06-20 ASSESSMENT — SLEEP AND FATIGUE QUESTIONNAIRES
HOW LIKELY ARE YOU TO NOD OFF OR FALL ASLEEP WHILE LYING DOWN TO REST IN THE AFTERNOON WHEN CIRCUMSTANCES PERMIT: 2
HOW LIKELY ARE YOU TO NOD OFF OR FALL ASLEEP WHILE WATCHING TV: 2
HOW LIKELY ARE YOU TO NOD OFF OR FALL ASLEEP WHILE SITTING INACTIVE IN A PUBLIC PLACE: 1
HOW LIKELY ARE YOU TO NOD OFF OR FALL ASLEEP WHEN YOU ARE A PASSENGER IN A CAR FOR AN HOUR WITHOUT A BREAK: 1
ESS TOTAL SCORE: 9
HOW LIKELY ARE YOU TO NOD OFF OR FALL ASLEEP IN A CAR, WHILE STOPPED FOR A FEW MINUTES IN TRAFFIC: 0
HOW LIKELY ARE YOU TO NOD OFF OR FALL ASLEEP WHILE SITTING AND READING: 1
HOW LIKELY ARE YOU TO NOD OFF OR FALL ASLEEP WHILE SITTING AND TALKING TO SOMEONE: 0
HOW LIKELY ARE YOU TO NOD OFF OR FALL ASLEEP WHILE SITTING QUIETLY AFTER LUNCH WITHOUT ALCOHOL: 2

## 2023-06-20 ASSESSMENT — PAIN DESCRIPTION - ORIENTATION
ORIENTATION: LEFT;RIGHT
ORIENTATION: LEFT

## 2023-06-20 ASSESSMENT — PAIN DESCRIPTION - DESCRIPTORS
DESCRIPTORS: PRESSURE;SHARP
DESCRIPTORS: DISCOMFORT

## 2023-06-20 ASSESSMENT — PAIN DESCRIPTION - ONSET
ONSET: ON-GOING
ONSET: PROGRESSIVE

## 2023-06-20 ASSESSMENT — PAIN DESCRIPTION - LOCATION
LOCATION: LEG
LOCATION: LEG;OTHER (COMMENT)

## 2023-06-20 ASSESSMENT — PAIN DESCRIPTION - FREQUENCY
FREQUENCY: CONTINUOUS
FREQUENCY: INTERMITTENT

## 2023-06-20 ASSESSMENT — PAIN DESCRIPTION - PAIN TYPE
TYPE: ACUTE PAIN
TYPE: ACUTE PAIN

## 2023-06-20 ASSESSMENT — PAIN SCALES - GENERAL
PAINLEVEL_OUTOF10: 5
PAINLEVEL_OUTOF10: 4
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
## 2023-06-20 NOTE — PROGRESS NOTES
MD ERIK Yadav Board Certified in Sleep Medicine  Certified in [address] [place] [address] Certified in Neurology Joe Courtney Collins [address] [place] [address],  Kulwinder Richards   [phone]   [address] [place] [address], [address] [place] [address]                      [address] [facility] [address]  [address] [address] [phone]    Subjective:     Patient ID: Mirza Peralta is a 81 y.o. male. Chief Complaint   Patient presents with    Follow-up    Sleep Apnea       HPI:        Mirza Peralta is a 81 y.o. male was seen today as a follow for mild obstructive sleep apnea, 11/23/2021 mild WILL AHI 8.9 /hr with lowest O2 of 75% (5.3 minutes below 90%)  He used the machine on and off in the last 1.5 years in the rehab, he quit also using the O2. The Patient scored Raymond Sleepiness Score: 9 on Raymond Sleepiness Scale ( more than 10 is indicative of daytime sleepiness)   HIS MACHINE OLD AND HAS A RECALL. HTN, COPD, and CHF are stable. Has lost 70 pounds since last visit. was 331 pounds 12/02/2020. Last echo 12/16/2022  Summary   Poor quality due to body habitus and poor acoustic window. Left ventricular cavity size is normal with normal left ventricular wall   thickness. Overall left ventricular systolic function appears normal.   Ejection fraction is estimated to be 50-55%. No regional wall motion abnormalities   Grade I diastolic dysfunction with normal LV filling pressures. Trivial to mild tricuspid regurgitation.    Aortic valve sclerosis, without stenosis     DOT/CDL - N/A        Previous Report(s)Reviewed: historical medical records         Social History     Socioeconomic History    Marital status:      Spouse name: Not on file    Number of children: Not on file    Years of education: Not on file    Highest education level: Not on

## 2023-06-20 NOTE — DISCHARGE INSTRUCTIONS
hours and cannot wait until we are again available, contact your PCP or go to the hospital emergency room. PLEASE NOTE: IF YOU ARE UNABLE TO OBTAIN WOUND SUPPLIES, CONTINUE TO USE THE SUPPLIES YOU HAVE AVAILABLE UNTIL YOU ARE ABLE TO REACH US. IT IS MOST IMPORTANT TO KEEP THE WOUND COVERED AT ALL TIMES.          Physician Signature:_______________________    Date: ___________ Time:  ____________          Dr Sunita Lake

## 2023-06-22 ENCOUNTER — TELEPHONE (OUTPATIENT)
Dept: WOUND CARE | Age: 73
End: 2023-06-22

## 2023-06-22 DIAGNOSIS — M14.672 CHARCOT ANKLE, LEFT: ICD-10-CM

## 2023-06-22 DIAGNOSIS — E11.622 DIABETES MELLITUS WITH SKIN ULCER (HCC): ICD-10-CM

## 2023-06-22 DIAGNOSIS — L97.402 DIABETIC ULCER OF HEEL ASSOCIATED WITH DIABETES MELLITUS DUE TO UNDERLYING CONDITION, WITH FAT LAYER EXPOSED, UNSPECIFIED LATERALITY (HCC): ICD-10-CM

## 2023-06-22 DIAGNOSIS — M86.272 SUBACUTE OSTEOMYELITIS OF LEFT FOOT (HCC): ICD-10-CM

## 2023-06-22 DIAGNOSIS — E08.621 DIABETIC ULCER OF HEEL ASSOCIATED WITH DIABETES MELLITUS DUE TO UNDERLYING CONDITION, WITH FAT LAYER EXPOSED, UNSPECIFIED LATERALITY (HCC): ICD-10-CM

## 2023-06-22 DIAGNOSIS — E11.42 DIABETIC POLYNEUROPATHY ASSOCIATED WITH TYPE 2 DIABETES MELLITUS (HCC): ICD-10-CM

## 2023-06-22 DIAGNOSIS — T87.9 BKA STUMP COMPLICATION (HCC): ICD-10-CM

## 2023-06-22 DIAGNOSIS — Z74.09 IMPAIRED MOBILITY: ICD-10-CM

## 2023-06-22 DIAGNOSIS — L97.529 TYPE 2 DIABETES MELLITUS WITH LEFT DIABETIC FOOT ULCER (HCC): ICD-10-CM

## 2023-06-22 DIAGNOSIS — L97.424 DIABETIC ULCER OF LEFT MIDFOOT ASSOCIATED WITH TYPE 2 DIABETES MELLITUS, WITH NECROSIS OF BONE (HCC): ICD-10-CM

## 2023-06-22 DIAGNOSIS — T23.201S: ICD-10-CM

## 2023-06-22 DIAGNOSIS — L97.922 NON-PRESSURE CHRONIC ULCER OF LEFT LOWER LEG WITH FAT LAYER EXPOSED (HCC): ICD-10-CM

## 2023-06-22 DIAGNOSIS — I70.244 ATHEROSCLEROSIS OF NATIVE ARTERIES OF LEFT LEG WITH ULCERATION OF HEEL AND MIDFOOT (HCC): ICD-10-CM

## 2023-06-22 DIAGNOSIS — I83.012 VENOUS STASIS ULCER OF RIGHT CALF WITH FAT LAYER EXPOSED WITH VARICOSE VEINS (HCC): ICD-10-CM

## 2023-06-22 DIAGNOSIS — E11.621 DIABETIC ULCER OF LEFT MIDFOOT ASSOCIATED WITH TYPE 2 DIABETES MELLITUS, WITH NECROSIS OF BONE (HCC): ICD-10-CM

## 2023-06-22 DIAGNOSIS — E11.621 TYPE 2 DIABETES MELLITUS WITH LEFT DIABETIC FOOT ULCER (HCC): ICD-10-CM

## 2023-06-22 DIAGNOSIS — L97.212 VENOUS STASIS ULCER OF RIGHT CALF WITH FAT LAYER EXPOSED WITH VARICOSE VEINS (HCC): ICD-10-CM

## 2023-06-22 DIAGNOSIS — L98.499 DIABETES MELLITUS WITH SKIN ULCER (HCC): ICD-10-CM

## 2023-06-22 DIAGNOSIS — R60.9 PERIPHERAL EDEMA: ICD-10-CM

## 2023-06-22 DIAGNOSIS — E11.21 DIABETIC NEPHROPATHY ASSOCIATED WITH TYPE 2 DIABETES MELLITUS (HCC): ICD-10-CM

## 2023-06-22 DIAGNOSIS — E11.8 DIABETIC FOOT (HCC): Primary | ICD-10-CM

## 2023-06-22 NOTE — TELEPHONE ENCOUNTER
Patient called to notify staff he was unable to schedule his vascular studies until August 8th and is inquiring if staff is able to call to central scheduling to see if any other appointment may be available sooner. Call was made per patient's request, per Central Scheduling no appointments are available at [facility] or [place] locations until early August, however there is availability at [facility]. for Thursday July 6th at 8am, patient would need to arrive by 7:45am to receive this test. Patient is agreeable to this change of time and location. Details for appointment were given, patient verbalized understanding. No other needs were expressed at this time. Patient follow up with Dr. Cristi Patiño MD scheduled for 6/27/23.   Electronically signed by Rob Estrada RN on 6/22/2023 at 10:04 AM

## 2023-06-27 ENCOUNTER — HOSPITAL ENCOUNTER (OUTPATIENT)
Dept: WOUND CARE | Age: 73
Discharge: HOME | End: 2023-06-27
Payer: MEDICARE

## 2023-06-27 VITALS
SYSTOLIC BLOOD PRESSURE: 86 MMHG | HEART RATE: 52 BPM | DIASTOLIC BLOOD PRESSURE: 49 MMHG | RESPIRATION RATE: 18 BRPM | TEMPERATURE: 97.9 F

## 2023-06-27 DIAGNOSIS — I83.012 VENOUS STASIS ULCER OF RIGHT CALF WITH FAT LAYER EXPOSED WITH VARICOSE VEINS (HCC): ICD-10-CM

## 2023-06-27 DIAGNOSIS — L97.922 NON-PRESSURE CHRONIC ULCER OF LEFT LOWER LEG WITH FAT LAYER EXPOSED (HCC): ICD-10-CM

## 2023-06-27 DIAGNOSIS — M14.672 CHARCOT ANKLE, LEFT: ICD-10-CM

## 2023-06-27 DIAGNOSIS — T23.201S: ICD-10-CM

## 2023-06-27 DIAGNOSIS — M86.272 SUBACUTE OSTEOMYELITIS OF LEFT FOOT (HCC): ICD-10-CM

## 2023-06-27 DIAGNOSIS — Z74.09 IMPAIRED MOBILITY: ICD-10-CM

## 2023-06-27 DIAGNOSIS — E11.42 DIABETIC POLYNEUROPATHY ASSOCIATED WITH TYPE 2 DIABETES MELLITUS (HCC): ICD-10-CM

## 2023-06-27 DIAGNOSIS — L97.529 TYPE 2 DIABETES MELLITUS WITH LEFT DIABETIC FOOT ULCER (HCC): ICD-10-CM

## 2023-06-27 DIAGNOSIS — I70.244 ATHEROSCLEROSIS OF NATIVE ARTERIES OF LEFT LEG WITH ULCERATION OF HEEL AND MIDFOOT (HCC): ICD-10-CM

## 2023-06-27 DIAGNOSIS — T87.9 BKA STUMP COMPLICATION (HCC): ICD-10-CM

## 2023-06-27 DIAGNOSIS — E11.622 DIABETES MELLITUS WITH SKIN ULCER (HCC): ICD-10-CM

## 2023-06-27 DIAGNOSIS — L97.402 DIABETIC ULCER OF HEEL ASSOCIATED WITH DIABETES MELLITUS DUE TO UNDERLYING CONDITION, WITH FAT LAYER EXPOSED, UNSPECIFIED LATERALITY (HCC): ICD-10-CM

## 2023-06-27 DIAGNOSIS — L98.499 DIABETES MELLITUS WITH SKIN ULCER (HCC): ICD-10-CM

## 2023-06-27 DIAGNOSIS — E11.21 DIABETIC NEPHROPATHY ASSOCIATED WITH TYPE 2 DIABETES MELLITUS (HCC): ICD-10-CM

## 2023-06-27 DIAGNOSIS — E11.8 DIABETIC FOOT (HCC): Primary | ICD-10-CM

## 2023-06-27 DIAGNOSIS — L97.424 DIABETIC ULCER OF LEFT MIDFOOT ASSOCIATED WITH TYPE 2 DIABETES MELLITUS, WITH NECROSIS OF BONE (HCC): ICD-10-CM

## 2023-06-27 DIAGNOSIS — E11.621 DIABETIC ULCER OF LEFT MIDFOOT ASSOCIATED WITH TYPE 2 DIABETES MELLITUS, WITH NECROSIS OF BONE (HCC): ICD-10-CM

## 2023-06-27 DIAGNOSIS — E11.621 TYPE 2 DIABETES MELLITUS WITH LEFT DIABETIC FOOT ULCER (HCC): ICD-10-CM

## 2023-06-27 DIAGNOSIS — R60.9 PERIPHERAL EDEMA: ICD-10-CM

## 2023-06-27 DIAGNOSIS — L97.212 VENOUS STASIS ULCER OF RIGHT CALF WITH FAT LAYER EXPOSED WITH VARICOSE VEINS (HCC): ICD-10-CM

## 2023-06-27 DIAGNOSIS — E08.621 DIABETIC ULCER OF HEEL ASSOCIATED WITH DIABETES MELLITUS DUE TO UNDERLYING CONDITION, WITH FAT LAYER EXPOSED, UNSPECIFIED LATERALITY (HCC): ICD-10-CM

## 2023-06-27 PROCEDURE — 11042 DBRDMT SUBQ TIS 1ST 20SQCM/<: CPT | Performed by: EMERGENCY MEDICINE

## 2023-06-27 PROCEDURE — 99213 OFFICE O/P EST LOW 20 MIN: CPT | Performed by: EMERGENCY MEDICINE

## 2023-06-27 PROCEDURE — 11042 DBRDMT SUBQ TIS 1ST 20SQCM/<: CPT

## 2023-06-27 RX ORDER — SODIUM CHLOR/HYPOCHLOROUS ACID 0.033 %
SOLUTION, IRRIGATION IRRIGATION ONCE
OUTPATIENT
Start: 2023-06-27 | End: 2023-06-27

## 2023-06-27 RX ORDER — LIDOCAINE HYDROCHLORIDE 20 MG/ML
JELLY TOPICAL ONCE
OUTPATIENT
Start: 2023-06-27 | End: 2023-06-27

## 2023-06-27 RX ORDER — LIDOCAINE 40 MG/G
CREAM TOPICAL ONCE
Status: COMPLETED | OUTPATIENT
Start: 2023-06-27 | End: 2023-06-27

## 2023-06-27 RX ORDER — BETAMETHASONE DIPROPIONATE 0.05 %
OINTMENT (GRAM) TOPICAL ONCE
OUTPATIENT
Start: 2023-06-27 | End: 2023-06-27

## 2023-06-27 RX ORDER — LIDOCAINE HYDROCHLORIDE 40 MG/ML
SOLUTION TOPICAL ONCE
OUTPATIENT
Start: 2023-06-27 | End: 2023-06-27

## 2023-06-27 RX ORDER — CLOBETASOL PROPIONATE 0.5 MG/G
OINTMENT TOPICAL ONCE
OUTPATIENT
Start: 2023-06-27 | End: 2023-06-27

## 2023-06-27 RX ORDER — GINSENG 100 MG
CAPSULE ORAL ONCE
OUTPATIENT
Start: 2023-06-27 | End: 2023-06-27

## 2023-06-27 RX ORDER — LIDOCAINE 40 MG/G
CREAM TOPICAL ONCE
OUTPATIENT
Start: 2023-06-27 | End: 2023-06-27

## 2023-06-27 RX ORDER — LIDOCAINE 50 MG/G
OINTMENT TOPICAL ONCE
OUTPATIENT
Start: 2023-06-27 | End: 2023-06-27

## 2023-06-27 RX ORDER — IBUPROFEN 200 MG
TABLET ORAL ONCE
OUTPATIENT
Start: 2023-06-27 | End: 2023-06-27

## 2023-06-27 RX ORDER — BACITRACIN ZINC AND POLYMYXIN B SULFATE 500; 1000 [USP'U]/G; [USP'U]/G
OINTMENT TOPICAL ONCE
OUTPATIENT
Start: 2023-06-27 | End: 2023-06-27

## 2023-06-27 RX ORDER — GENTAMICIN SULFATE 1 MG/G
OINTMENT TOPICAL ONCE
OUTPATIENT
Start: 2023-06-27 | End: 2023-06-27

## 2023-06-27 RX ADMIN — LIDOCAINE: 40 CREAM TOPICAL at 09:05

## 2023-06-27 ASSESSMENT — PAIN SCALES - GENERAL
PAINLEVEL_OUTOF10: 6
PAINLEVEL_OUTOF10: 5

## 2023-06-27 ASSESSMENT — PAIN DESCRIPTION - PAIN TYPE
TYPE: ACUTE PAIN
TYPE: ACUTE PAIN

## 2023-06-27 ASSESSMENT — PAIN DESCRIPTION - LOCATION
LOCATION: WRIST;OTHER (COMMENT)
LOCATION: HAND

## 2023-06-27 ASSESSMENT — PAIN DESCRIPTION - ONSET
ONSET: ON-GOING
ONSET: ON-GOING

## 2023-06-27 ASSESSMENT — PAIN DESCRIPTION - DESCRIPTORS
DESCRIPTORS: THROBBING
DESCRIPTORS: THROBBING

## 2023-06-27 ASSESSMENT — PAIN DESCRIPTION - FREQUENCY
FREQUENCY: CONTINUOUS
FREQUENCY: CONTINUOUS

## 2023-06-27 ASSESSMENT — PAIN DESCRIPTION - ORIENTATION
ORIENTATION: RIGHT
ORIENTATION: LEFT;RIGHT

## 2023-06-30 ENCOUNTER — NURSE ONLY (OUTPATIENT)
Dept: CARDIOLOGY CLINIC | Age: 73
End: 2023-06-30
Payer: MEDICARE

## 2023-06-30 DIAGNOSIS — I48.0 PAROXYSMAL ATRIAL FIBRILLATION (HCC): ICD-10-CM

## 2023-06-30 DIAGNOSIS — I50.22 CHRONIC SYSTOLIC HEART FAILURE (HCC): ICD-10-CM

## 2023-06-30 DIAGNOSIS — Z95.810 CARDIAC RESYNCHRONIZATION THERAPY DEFIBRILLATOR (CRT-D) IN PLACE: Primary | ICD-10-CM

## 2023-06-30 DIAGNOSIS — I42.0 DILATED CARDIOMYOPATHY (HCC): ICD-10-CM

## 2023-06-30 PROCEDURE — 93297 REM INTERROG DEV EVAL ICPMS: CPT | Performed by: INTERNAL MEDICINE

## 2023-06-30 PROCEDURE — G2066 INTER DEVC REMOTE 30D: HCPCS | Performed by: INTERNAL MEDICINE

## 2023-07-03 ENCOUNTER — OFFICE VISIT (OUTPATIENT)
Dept: CARDIOLOGY CLINIC | Age: 73
End: 2023-07-03
Payer: MEDICARE

## 2023-07-03 VITALS
HEART RATE: 62 BPM | DIASTOLIC BLOOD PRESSURE: 80 MMHG | BODY MASS INDEX: 31.78 KG/M2 | HEIGHT: 76 IN | WEIGHT: 261 LBS | SYSTOLIC BLOOD PRESSURE: 130 MMHG

## 2023-07-03 DIAGNOSIS — I10 ESSENTIAL HYPERTENSION: ICD-10-CM

## 2023-07-03 PROCEDURE — 3075F SYST BP GE 130 - 139MM HG: CPT | Performed by: NURSE PRACTITIONER

## 2023-07-03 PROCEDURE — 1123F ACP DISCUSS/DSCN MKR DOCD: CPT | Performed by: NURSE PRACTITIONER

## 2023-07-03 PROCEDURE — 99214 OFFICE O/P EST MOD 30 MIN: CPT | Performed by: NURSE PRACTITIONER

## 2023-07-03 PROCEDURE — 3079F DIAST BP 80-89 MM HG: CPT | Performed by: NURSE PRACTITIONER

## 2023-07-03 RX ORDER — FUROSEMIDE 80 MG
80 TABLET ORAL DAILY
Qty: 90 TABLET | Refills: 3 | Status: SHIPPED | OUTPATIENT
Start: 2023-07-03

## 2023-07-03 RX ORDER — AMIODARONE HYDROCHLORIDE 100 MG/1
100 TABLET ORAL DAILY
Qty: 90 TABLET | Refills: 3 | Status: SHIPPED | OUTPATIENT
Start: 2023-07-03

## 2023-07-03 RX ORDER — LISINOPRIL 2.5 MG/1
2.5 TABLET ORAL DAILY
Qty: 90 TABLET | Refills: 3 | Status: SHIPPED | OUTPATIENT
Start: 2023-07-03

## 2023-07-03 RX ORDER — CARVEDILOL 3.12 MG/1
3.12 TABLET ORAL 2 TIMES DAILY
Qty: 60 TABLET | Refills: 5 | Status: SHIPPED | OUTPATIENT
Start: 2023-07-03

## 2023-07-03 RX ORDER — SPIRONOLACTONE 25 MG/1
12.5 TABLET ORAL DAILY
Qty: 90 TABLET | Refills: 3 | Status: SHIPPED | OUTPATIENT
Start: 2023-07-03

## 2023-07-03 NOTE — PROGRESS NOTES
[facility]     Outpatient Follow Up Note  Dr Savanna Woodall MD,  Bernarda Rico RN, APRN,CNP CVNP      CHIEF COMPLAINT / HPI:  Rian Linn is 81 y.o. male who presents today with a history of HFpEF, pafib. s/p DCCV  HTN HLD CRD DMT2 / BiV ICD in situ / obesity / WILL uses CPAP  states getting new CPA machine   AICD interrogation are with  Dr April Chow office routinely   sCr 1.4 5/2023 stable     3/2023 neg stress test     12/2022 TTE   Left ventricular cavity size is normal with normal left ventricular wall thickness. Overall left ventricular systolic function appears normal.  Ejection fraction is estimated to be 50-55%. No regional wall motion abnormalities  Grade I diastolic dysfunction with normal LV filling pressures. Trivial to mild tricuspid regurgitation. Aortic valve sclerosis, without stenosis     Interval history:   VSS  / going to wound clinic for right lower leg wound    Going to PT for cane walking   Was in ED 5/2023 with peripheral edema / taking 80 mg daily on aldactone and lisinopril   Doing well no c/v complaints   HR regular with PAF/ on Eliquis / no NSAIDS preferred only Tylenol PRN     Will get labs today check potassium   Has fu with EP remote checks and PCP and with us in Epic  & on AVS   Recommend to see Dr Cherie Fairchild  for CRD     I spent a total of 50 minutes and greater than 50% of the time was spent counseling with patient  coordinating care regarding her diagnosis, reviewing labs, cardiac work up, treatments and plan of care.     Past Medical History:   Diagnosis Date    Atrial fibrillation (HCC)     Back pain     Cardiomyopathy     CHF (congestive heart failure) (HCC)     COPD (chronic obstructive pulmonary disease) (HCC)     Dyslipidemia     GERD (gastroesophageal reflux disease)     Hyperlipidemia     Hypertension     Hypothyroidism     Leg edema     MRSA (methicillin resistant staph aureus) culture positive 10/05/2015    foot/bone    MRSA nasal colonization 05/05/2017

## 2023-07-06 ENCOUNTER — HOSPITAL ENCOUNTER (OUTPATIENT)
Dept: VASCULAR LAB | Age: 73
Discharge: HOME | End: 2023-07-06

## 2023-07-06 DIAGNOSIS — L97.921 DIABETIC ULCER OF LEFT LOWER LEG, LIMITED TO BREAKDOWN OF SKIN (HCC): ICD-10-CM

## 2023-07-06 DIAGNOSIS — E11.622 DIABETIC ULCER OF LEFT LOWER LEG, LIMITED TO BREAKDOWN OF SKIN (HCC): ICD-10-CM

## 2023-07-06 DIAGNOSIS — I70.232 ATHEROSCLEROSIS OF NATIVE ARTERIES OF RIGHT LEG WITH ULCERATION OF CALF (HCC): ICD-10-CM

## 2023-07-06 DIAGNOSIS — L97.212 VENOUS STASIS ULCER OF RIGHT CALF WITH FAT LAYER EXPOSED WITH VARICOSE VEINS (HCC): ICD-10-CM

## 2023-07-06 DIAGNOSIS — I83.012 VENOUS STASIS ULCER OF RIGHT CALF WITH FAT LAYER EXPOSED WITH VARICOSE VEINS (HCC): ICD-10-CM

## 2023-07-06 ASSESSMENT — ENCOUNTER SYMPTOMS
RESPIRATORY NEGATIVE: 1
GASTROINTESTINAL NEGATIVE: 1

## 2023-07-06 NOTE — PROGRESS NOTES
Rate and Rhythm: Normal rate and regular rhythm. Heart sounds: Normal heart sounds. Pulmonary:      Effort: Pulmonary effort is normal.      Breath sounds: Normal breath sounds. Abdominal:      General: Bowel sounds are normal.      Palpations: Abdomen is soft. There is no mass. Musculoskeletal:      Cervical back: Normal range of motion and neck supple. Comments: Leg edema noted. Lymphadenopathy:      Cervical: No cervical adenopathy. Skin:     General: Skin is warm and dry. Neurological:      Mental Status: He is alert and oriented to person, place, and time. Deep Tendon Reflexes: Reflexes are normal and symmetric. Psychiatric:         Behavior: Behavior normal.         Thought Content: Thought content normal.         Judgment: Judgment normal.       Vitals:    06/08/23 1147   BP: 110/62   Pulse: 70   SpO2: 97%       Assessment:   Diagnosis Orders   1. Diabetes mellitus type 2 with complications (HCC)  Diet, monitoring, physical activity as tolerated, med compliance stressed. 2. Hypothyroidism  Clinically euthyroid. Continue same synthroid dose. 3. Stage 3 chronic kidney disease. MAGNESIUM  Risk factor control stressed. Avoidance of nephrotoxins discussed. BASIC METABOLIC PANEL   4. Mixed hyperlipidemia  Lipid Panel  Heart healthy diet and statin and zetia compliance discussed. 5. Essential hypertension  Continue stated med regimen. DASH and low sodium diet discussed. 6.     H/O BKA. PAD. Wears prothesis. Doing well. 7.     HFrEF: continue lasix at 40 mg daily. Heart healthy lifestyle and compliance stressed. 8.     ED: diet, proper rest, risk factor control. PD-5-I prescription. Plan:  See plans above.

## 2023-07-10 ENCOUNTER — TELEPHONE (OUTPATIENT)
Dept: CARDIOLOGY CLINIC | Age: 73
End: 2023-07-10

## 2023-07-10 DIAGNOSIS — Z79.899 LONG TERM CURRENT USE OF AMIODARONE: Primary | ICD-10-CM

## 2023-07-10 NOTE — TELEPHONE ENCOUNTER
He will need to check thyroid function and Hepatic (liver) panel in September 2023. We last had check on these lab in March 6, 2023. Will need to have them checked every 6 months while on amiodarone    Orders have been placed please call patient and notify.

## 2023-07-10 NOTE — TELEPHONE ENCOUNTER
Shy Kirsten is calling stating he was to get some labs drawn due to him taking amiodarone and he is needing lab orders ane would like a call back to talk about what labs he needs.   States he has not had labs in 2 years      Please call him at  [phone]    Next appt with Mario Holt is 8/22/23

## 2023-07-11 ENCOUNTER — HOSPITAL ENCOUNTER (OUTPATIENT)
Dept: WOUND CARE | Age: 73
Discharge: HOME | End: 2023-07-11

## 2023-07-12 ENCOUNTER — HOSPITAL ENCOUNTER (OUTPATIENT)
Dept: VASCULAR LAB | Age: 73
Discharge: HOME | End: 2023-07-12
Payer: MEDICARE

## 2023-07-12 PROCEDURE — 93926 LOWER EXTREMITY STUDY: CPT

## 2023-07-18 ENCOUNTER — HOSPITAL ENCOUNTER (OUTPATIENT)
Dept: WOUND CARE | Age: 73
Discharge: HOME | End: 2023-07-18
Payer: MEDICARE

## 2023-07-18 VITALS
TEMPERATURE: 97.5 F | HEART RATE: 64 BPM | DIASTOLIC BLOOD PRESSURE: 74 MMHG | SYSTOLIC BLOOD PRESSURE: 125 MMHG | RESPIRATION RATE: 20 BRPM

## 2023-07-18 DIAGNOSIS — E11.621 TYPE 2 DIABETES MELLITUS WITH LEFT DIABETIC FOOT ULCER (HCC): ICD-10-CM

## 2023-07-18 DIAGNOSIS — R60.9 PERIPHERAL EDEMA: ICD-10-CM

## 2023-07-18 DIAGNOSIS — L97.212 VENOUS STASIS ULCER OF RIGHT CALF WITH FAT LAYER EXPOSED WITH VARICOSE VEINS (HCC): ICD-10-CM

## 2023-07-18 DIAGNOSIS — I83.012 VENOUS STASIS ULCER OF RIGHT CALF WITH FAT LAYER EXPOSED WITH VARICOSE VEINS (HCC): ICD-10-CM

## 2023-07-18 DIAGNOSIS — M86.272 SUBACUTE OSTEOMYELITIS OF LEFT FOOT (HCC): ICD-10-CM

## 2023-07-18 DIAGNOSIS — L97.402 DIABETIC ULCER OF HEEL ASSOCIATED WITH DIABETES MELLITUS DUE TO UNDERLYING CONDITION, WITH FAT LAYER EXPOSED, UNSPECIFIED LATERALITY (HCC): ICD-10-CM

## 2023-07-18 DIAGNOSIS — T23.201S: ICD-10-CM

## 2023-07-18 DIAGNOSIS — L98.499 DIABETES MELLITUS WITH SKIN ULCER (HCC): ICD-10-CM

## 2023-07-18 DIAGNOSIS — L97.529 TYPE 2 DIABETES MELLITUS WITH LEFT DIABETIC FOOT ULCER (HCC): ICD-10-CM

## 2023-07-18 DIAGNOSIS — M14.672 CHARCOT ANKLE, LEFT: ICD-10-CM

## 2023-07-18 DIAGNOSIS — E11.21 DIABETIC NEPHROPATHY ASSOCIATED WITH TYPE 2 DIABETES MELLITUS (HCC): ICD-10-CM

## 2023-07-18 DIAGNOSIS — E11.8 DIABETIC FOOT (HCC): Primary | ICD-10-CM

## 2023-07-18 DIAGNOSIS — L97.424 DIABETIC ULCER OF LEFT MIDFOOT ASSOCIATED WITH TYPE 2 DIABETES MELLITUS, WITH NECROSIS OF BONE (HCC): ICD-10-CM

## 2023-07-18 DIAGNOSIS — E11.622 DIABETES MELLITUS WITH SKIN ULCER (HCC): ICD-10-CM

## 2023-07-18 DIAGNOSIS — E11.621 DIABETIC ULCER OF LEFT MIDFOOT ASSOCIATED WITH TYPE 2 DIABETES MELLITUS, WITH NECROSIS OF BONE (HCC): ICD-10-CM

## 2023-07-18 DIAGNOSIS — I70.244 ATHEROSCLEROSIS OF NATIVE ARTERIES OF LEFT LEG WITH ULCERATION OF HEEL AND MIDFOOT (HCC): ICD-10-CM

## 2023-07-18 DIAGNOSIS — E11.42 DIABETIC POLYNEUROPATHY ASSOCIATED WITH TYPE 2 DIABETES MELLITUS (HCC): ICD-10-CM

## 2023-07-18 DIAGNOSIS — E08.621 DIABETIC ULCER OF HEEL ASSOCIATED WITH DIABETES MELLITUS DUE TO UNDERLYING CONDITION, WITH FAT LAYER EXPOSED, UNSPECIFIED LATERALITY (HCC): ICD-10-CM

## 2023-07-18 DIAGNOSIS — Z74.09 IMPAIRED MOBILITY: ICD-10-CM

## 2023-07-18 DIAGNOSIS — L97.922 NON-PRESSURE CHRONIC ULCER OF LEFT LOWER LEG WITH FAT LAYER EXPOSED (HCC): ICD-10-CM

## 2023-07-18 DIAGNOSIS — T87.9 BKA STUMP COMPLICATION (HCC): ICD-10-CM

## 2023-07-18 PROCEDURE — 11045 DBRDMT SUBQ TISS EACH ADDL: CPT | Performed by: EMERGENCY MEDICINE

## 2023-07-18 PROCEDURE — 11042 DBRDMT SUBQ TIS 1ST 20SQCM/<: CPT | Performed by: EMERGENCY MEDICINE

## 2023-07-18 PROCEDURE — 11045 DBRDMT SUBQ TISS EACH ADDL: CPT

## 2023-07-18 PROCEDURE — 11042 DBRDMT SUBQ TIS 1ST 20SQCM/<: CPT

## 2023-07-18 RX ORDER — GINSENG 100 MG
CAPSULE ORAL ONCE
OUTPATIENT
Start: 2023-07-18 | End: 2023-07-18

## 2023-07-18 RX ORDER — BETAMETHASONE DIPROPIONATE 0.05 %
OINTMENT (GRAM) TOPICAL ONCE
OUTPATIENT
Start: 2023-07-18 | End: 2023-07-18

## 2023-07-18 RX ORDER — LIDOCAINE HYDROCHLORIDE 20 MG/ML
JELLY TOPICAL ONCE
OUTPATIENT
Start: 2023-07-18 | End: 2023-07-18

## 2023-07-18 RX ORDER — BACITRACIN ZINC AND POLYMYXIN B SULFATE 500; 1000 [USP'U]/G; [USP'U]/G
OINTMENT TOPICAL ONCE
OUTPATIENT
Start: 2023-07-18 | End: 2023-07-18

## 2023-07-18 RX ORDER — IBUPROFEN 200 MG
TABLET ORAL ONCE
OUTPATIENT
Start: 2023-07-18 | End: 2023-07-18

## 2023-07-18 RX ORDER — GENTAMICIN SULFATE 1 MG/G
OINTMENT TOPICAL ONCE
OUTPATIENT
Start: 2023-07-18 | End: 2023-07-18

## 2023-07-18 RX ORDER — LIDOCAINE HYDROCHLORIDE 40 MG/ML
SOLUTION TOPICAL ONCE
OUTPATIENT
Start: 2023-07-18 | End: 2023-07-18

## 2023-07-18 RX ORDER — LIDOCAINE 40 MG/G
CREAM TOPICAL ONCE
OUTPATIENT
Start: 2023-07-18 | End: 2023-07-18

## 2023-07-18 RX ORDER — LIDOCAINE 50 MG/G
OINTMENT TOPICAL ONCE
OUTPATIENT
Start: 2023-07-18 | End: 2023-07-18

## 2023-07-18 RX ORDER — LIDOCAINE 40 MG/G
CREAM TOPICAL ONCE
Status: COMPLETED | OUTPATIENT
Start: 2023-07-18 | End: 2023-07-18

## 2023-07-18 RX ORDER — CLOBETASOL PROPIONATE 0.5 MG/G
OINTMENT TOPICAL ONCE
OUTPATIENT
Start: 2023-07-18 | End: 2023-07-18

## 2023-07-18 RX ORDER — SODIUM CHLOR/HYPOCHLOROUS ACID 0.033 %
SOLUTION, IRRIGATION IRRIGATION ONCE
OUTPATIENT
Start: 2023-07-18 | End: 2023-07-18

## 2023-07-18 RX ADMIN — LIDOCAINE: 40 CREAM TOPICAL at 10:42

## 2023-07-18 ASSESSMENT — PAIN SCALES - GENERAL
PAINLEVEL_OUTOF10: 0
PAINLEVEL_OUTOF10: 0

## 2023-07-18 NOTE — PLAN OF CARE
[place] Application   Below Knee    NAME:  Audie Jennings  YOB: 1950  MEDICAL RECORD NUMBER:  [id number]  DATE:  7/18/2023    Marai Ca Akshat boot: Applied moisturizing agent to dry skin as needed. Appied primary and secondary dressing as ordered. Applied Unna roll from toes to knee overlapping each time. Applied ace wrap or coban from toes to below the knee. Secured with tape and/or metal clips covered with tape. Instructed patient/caregiver to keep dressing dry and intact. DO NOT REMOVE DRESSING. Instructed pt/family/caregiver to report excessive draining, loose bandage, wet dressing, severe pain or tingling in toes. Applied [place] dressing below the knee to right lower leg. Unna Boot(s) were applied per  Guidelines.      Electronically signed by Lori Maxwell RN on 7/18/2023 at 11:14 AM

## 2023-07-18 NOTE — PROGRESS NOTES
doctor. : ROB    Electronically signed by Aleyda Locke RN on 7/18/2023 at 10:57 AM        [address] [place] [address] Information: Should you experience any significant changes in your wound(s) or have questions about your wound care, please contact the [address] [place] [address] at [address] [place] [address] 8:00 am - 4:30 pm and Friday 8:00 am - 12:30 pm.  If you need help with your wound outside these hours and cannot wait until we are again available, contact your PCP or go to the hospital emergency room. PLEASE NOTE: IF YOU ARE UNABLE TO OBTAIN WOUND SUPPLIES, CONTINUE TO USE THE SUPPLIES YOU HAVE AVAILABLE UNTIL YOU ARE ABLE TO REACH US. IT IS MOST IMPORTANT TO KEEP THE WOUND COVERED AT ALL TIMES.            Physician Signature:_______________________     Date: ___________ Time:  ____________                                  Dr Marco Mccurdy           Electronically signed by Marco Mccurdy MD on 7/18/2023 at 11:05 AM

## 2023-07-19 ENCOUNTER — HOSPITAL ENCOUNTER (OUTPATIENT)
Dept: SLEEP CENTER | Age: 73
Discharge: HOME | End: 2023-07-19
Payer: MEDICARE

## 2023-07-19 DIAGNOSIS — J44.9 OVERLAP SYNDROME OF OBSTRUCTIVE SLEEP APNEA AND CHRONIC OBSTRUCTIVE PULMONARY DISEASE (HCC): ICD-10-CM

## 2023-07-19 DIAGNOSIS — G47.33 OSA ON CPAP: ICD-10-CM

## 2023-07-19 DIAGNOSIS — G47.33 OVERLAP SYNDROME OF OBSTRUCTIVE SLEEP APNEA AND CHRONIC OBSTRUCTIVE PULMONARY DISEASE (HCC): ICD-10-CM

## 2023-07-19 DIAGNOSIS — Z99.89 OSA ON CPAP: ICD-10-CM

## 2023-07-19 DIAGNOSIS — Z86.79 H/O CHF: ICD-10-CM

## 2023-07-19 PROCEDURE — 95811 POLYSOM 6/>YRS CPAP 4/> PARM: CPT

## 2023-07-20 NOTE — DISCHARGE INSTRUCTIONS
[address] [place] Physician Orders and Discharge [address] [place], [place] [address], [place] [facility], [address] [place] [address] Drive  Telephone: [phone] [address] 8:00 am - 4:30 pm and Friday 8:00 am - 12:00 pm.          NAME:  Adryan Stewart  YOB: 1950  MEDICAL RECORD NUMBER:  [id number]  DATE:  7/25/2023        Return Appointment:  [x] Return Appointment: With Brittany Wade MD  in  1 Week(s)  [x] Wound and dressing supply provider: [facility]   [] ECF or Home Healthcare:  [] Wound Assessment:         [] Physician or NP scheduled for Wound Assessment:   [] Orders placed during your visit:         Important Reminders:   Please wash hands with soap and water before and after every dressing change. Do not scrub wounds. Keep wounds dry in shower unless otherwise instructed by the physician. SMOKING can slow would healing. Stop smoking as soon as possible to improve healing and prevent further complications associated with smoking. Michelle-Wound Topical Treatments:  Do not apply lotions, creams, or ointments to wound bed unless directed. [] Apply moisturizing lotion to skin surrounding the wound prior to dressing change.  [] Apply antifungal ointment to skin surrounding the wound prior to dressing change.  [] Apply thin film of no sting moisture barrier ointment to skin immediately around      wound.   [x] Other: EUCERIN CREAM TO LEFT STUMP DAILY     Wound Location: RIGHT MEDIAL LEG     Wound Cleansing: Normal Saline     Primary Dressing:  [x] HYDROFERA BLUE TRANSFER     Secondary Dressing:  [x] Aicha Scripture  []         Dressing Frequency:  []   [x] Do Not Change Dressing              Compression and Edema Control: APPLY CO-FLEX WITH CALAMINE TO RIGHT LOWER LEG  [] Wear Home Compression Stockings   [] Spandagrip to: Right Leg   Size: []Low compression 5-10 mm/Hg                     []Medium compression 10-20 mm/Hg           []High

## 2023-07-21 ENCOUNTER — TELEPHONE (OUTPATIENT)
Dept: PULMONOLOGY | Age: 73
End: 2023-07-21

## 2023-07-21 NOTE — TELEPHONE ENCOUNTER
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 AHI was 8.9  per hr. And O2 Desaturations to 88%. Dr Rian Loredo:  If Arrie Velasquez therapy is utilized a pressure   of 14/10 cm would be suggested. 2. Follow up with the patient's sleep   physician to discuss results  3. Heated Humidification. 4. Periodic follow up to document compliance. 5. Sleep hygiene  6. Avoid sedatives, alcohol and caffeinated drinks at bedtime. 7.  Avoid driving while sleepy.     8.  Weight loss is also recommended as long-term intervention    LMOM

## 2023-07-25 ENCOUNTER — HOSPITAL ENCOUNTER (OUTPATIENT)
Dept: WOUND CARE | Age: 73
Discharge: HOME | End: 2023-07-25
Payer: MEDICARE

## 2023-07-25 VITALS
TEMPERATURE: 97.3 F | RESPIRATION RATE: 20 BRPM | SYSTOLIC BLOOD PRESSURE: 96 MMHG | DIASTOLIC BLOOD PRESSURE: 59 MMHG | HEART RATE: 69 BPM

## 2023-07-25 DIAGNOSIS — E08.621 DIABETIC ULCER OF HEEL ASSOCIATED WITH DIABETES MELLITUS DUE TO UNDERLYING CONDITION, WITH FAT LAYER EXPOSED, UNSPECIFIED LATERALITY (HCC): ICD-10-CM

## 2023-07-25 DIAGNOSIS — T87.9 BKA STUMP COMPLICATION (HCC): ICD-10-CM

## 2023-07-25 DIAGNOSIS — L97.212 VENOUS STASIS ULCER OF RIGHT CALF WITH FAT LAYER EXPOSED WITH VARICOSE VEINS (HCC): ICD-10-CM

## 2023-07-25 DIAGNOSIS — M14.672 CHARCOT ANKLE, LEFT: ICD-10-CM

## 2023-07-25 DIAGNOSIS — Z74.09 IMPAIRED MOBILITY: ICD-10-CM

## 2023-07-25 DIAGNOSIS — E11.621 DIABETIC ULCER OF LEFT MIDFOOT ASSOCIATED WITH TYPE 2 DIABETES MELLITUS, WITH NECROSIS OF BONE (HCC): ICD-10-CM

## 2023-07-25 DIAGNOSIS — E11.622 DIABETES MELLITUS WITH SKIN ULCER (HCC): ICD-10-CM

## 2023-07-25 DIAGNOSIS — E11.621 TYPE 2 DIABETES MELLITUS WITH LEFT DIABETIC FOOT ULCER (HCC): ICD-10-CM

## 2023-07-25 DIAGNOSIS — L97.402 DIABETIC ULCER OF HEEL ASSOCIATED WITH DIABETES MELLITUS DUE TO UNDERLYING CONDITION, WITH FAT LAYER EXPOSED, UNSPECIFIED LATERALITY (HCC): ICD-10-CM

## 2023-07-25 DIAGNOSIS — E11.42 DIABETIC POLYNEUROPATHY ASSOCIATED WITH TYPE 2 DIABETES MELLITUS (HCC): ICD-10-CM

## 2023-07-25 DIAGNOSIS — L97.529 TYPE 2 DIABETES MELLITUS WITH LEFT DIABETIC FOOT ULCER (HCC): ICD-10-CM

## 2023-07-25 DIAGNOSIS — L98.499 DIABETES MELLITUS WITH SKIN ULCER (HCC): ICD-10-CM

## 2023-07-25 DIAGNOSIS — I70.244 ATHEROSCLEROSIS OF NATIVE ARTERIES OF LEFT LEG WITH ULCERATION OF HEEL AND MIDFOOT (HCC): ICD-10-CM

## 2023-07-25 DIAGNOSIS — E11.8 DIABETIC FOOT (HCC): Primary | ICD-10-CM

## 2023-07-25 DIAGNOSIS — L97.424 DIABETIC ULCER OF LEFT MIDFOOT ASSOCIATED WITH TYPE 2 DIABETES MELLITUS, WITH NECROSIS OF BONE (HCC): ICD-10-CM

## 2023-07-25 DIAGNOSIS — I83.012 VENOUS STASIS ULCER OF RIGHT CALF WITH FAT LAYER EXPOSED WITH VARICOSE VEINS (HCC): ICD-10-CM

## 2023-07-25 DIAGNOSIS — M86.272 SUBACUTE OSTEOMYELITIS OF LEFT FOOT (HCC): ICD-10-CM

## 2023-07-25 DIAGNOSIS — T23.201S: ICD-10-CM

## 2023-07-25 DIAGNOSIS — E11.21 DIABETIC NEPHROPATHY ASSOCIATED WITH TYPE 2 DIABETES MELLITUS (HCC): ICD-10-CM

## 2023-07-25 DIAGNOSIS — L97.922 NON-PRESSURE CHRONIC ULCER OF LEFT LOWER LEG WITH FAT LAYER EXPOSED (HCC): ICD-10-CM

## 2023-07-25 DIAGNOSIS — R60.9 PERIPHERAL EDEMA: ICD-10-CM

## 2023-07-25 PROCEDURE — 11045 DBRDMT SUBQ TISS EACH ADDL: CPT | Performed by: EMERGENCY MEDICINE

## 2023-07-25 PROCEDURE — 11042 DBRDMT SUBQ TIS 1ST 20SQCM/<: CPT

## 2023-07-25 PROCEDURE — 11045 DBRDMT SUBQ TISS EACH ADDL: CPT

## 2023-07-25 PROCEDURE — 11042 DBRDMT SUBQ TIS 1ST 20SQCM/<: CPT | Performed by: EMERGENCY MEDICINE

## 2023-07-25 RX ORDER — LIDOCAINE 50 MG/G
OINTMENT TOPICAL ONCE
OUTPATIENT
Start: 2023-07-25 | End: 2023-07-25

## 2023-07-25 RX ORDER — CLOBETASOL PROPIONATE 0.5 MG/G
OINTMENT TOPICAL ONCE
OUTPATIENT
Start: 2023-07-25 | End: 2023-07-25

## 2023-07-25 RX ORDER — LIDOCAINE HYDROCHLORIDE 20 MG/ML
JELLY TOPICAL ONCE
OUTPATIENT
Start: 2023-07-25 | End: 2023-07-25

## 2023-07-25 RX ORDER — BETAMETHASONE DIPROPIONATE 0.05 %
OINTMENT (GRAM) TOPICAL ONCE
OUTPATIENT
Start: 2023-07-25 | End: 2023-07-25

## 2023-07-25 RX ORDER — GINSENG 100 MG
CAPSULE ORAL ONCE
OUTPATIENT
Start: 2023-07-25 | End: 2023-07-25

## 2023-07-25 RX ORDER — LIDOCAINE 40 MG/G
CREAM TOPICAL ONCE
Status: COMPLETED | OUTPATIENT
Start: 2023-07-25 | End: 2023-07-25

## 2023-07-25 RX ORDER — IBUPROFEN 200 MG
TABLET ORAL ONCE
OUTPATIENT
Start: 2023-07-25 | End: 2023-07-25

## 2023-07-25 RX ORDER — GENTAMICIN SULFATE 1 MG/G
OINTMENT TOPICAL ONCE
OUTPATIENT
Start: 2023-07-25 | End: 2023-07-25

## 2023-07-25 RX ORDER — LIDOCAINE HYDROCHLORIDE 40 MG/ML
SOLUTION TOPICAL ONCE
OUTPATIENT
Start: 2023-07-25 | End: 2023-07-25

## 2023-07-25 RX ORDER — LIDOCAINE 40 MG/G
CREAM TOPICAL ONCE
OUTPATIENT
Start: 2023-07-25 | End: 2023-07-25

## 2023-07-25 RX ORDER — SODIUM CHLOR/HYPOCHLOROUS ACID 0.033 %
SOLUTION, IRRIGATION IRRIGATION ONCE
OUTPATIENT
Start: 2023-07-25 | End: 2023-07-25

## 2023-07-25 RX ORDER — BACITRACIN ZINC AND POLYMYXIN B SULFATE 500; 1000 [USP'U]/G; [USP'U]/G
OINTMENT TOPICAL ONCE
OUTPATIENT
Start: 2023-07-25 | End: 2023-07-25

## 2023-07-25 RX ADMIN — LIDOCAINE: 40 CREAM TOPICAL at 10:20

## 2023-07-25 ASSESSMENT — PAIN SCALES - GENERAL
PAINLEVEL_OUTOF10: 0
PAINLEVEL_OUTOF10: 0

## 2023-07-25 NOTE — PLAN OF CARE
[place] Application   Below Knee    NAME:  Bridget Ocampo  YOB: 1950  MEDICAL RECORD NUMBER:  [id number]  DATE:  7/25/2023    Cagle Cone boot: Applied moisturizing agent to dry skin as needed. Appied primary and secondary dressing as ordered. Applied Unna roll from toes to knee overlapping each time. Applied ace wrap or coban from toes to below the knee. Secured with tape and/or metal clips covered with tape. Instructed patient/caregiver to keep dressing dry and intact. DO NOT REMOVE DRESSING. Instructed pt/family/caregiver to report excessive draining, loose bandage, wet dressing, severe pain or tingling in toes. Applied [place] dressing below the knee to right lower leg. Unna Boot(s) were applied per  Guidelines.      Electronically signed by Bryant Kumar RN on 7/25/2023 at 10:49 AM
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
## 2023-07-27 NOTE — DISCHARGE INSTRUCTIONS
[address] [place] Physician Orders and Discharge [address] [place] [address]  [address], [address], [address]  Telephone: [phone] [address] 8:00 am - 4:30 pm and Friday 8:00 am - 12:00 pm.          NAME:  Shan Nielson  YOB: 1950  MEDICAL RECORD NUMBER:  [id number]  DATE:  8/1/2023        Return Appointment:  [x] Return Appointment: With Santos Joaquin MD  in  1 Week(s)  [x] Wound and dressing supply provider: [facility]   [] ECF or Home Healthcare:  [] Wound Assessment:         [] Physician or NP scheduled for Wound Assessment:   [] Orders placed during your visit:         Important Reminders:   Please wash hands with soap and water before and after every dressing change. Do not scrub wounds. Keep wounds dry in shower unless otherwise instructed by the physician. SMOKING can slow would healing. Stop smoking as soon as possible to improve healing and prevent further complications associated with smoking. Michelle-Wound Topical Treatments:  Do not apply lotions, creams, or ointments to wound bed unless directed. [] Apply moisturizing lotion to skin surrounding the wound prior to dressing change.  [] Apply antifungal ointment to skin surrounding the wound prior to dressing change.  [] Apply thin film of no sting moisture barrier ointment to skin immediately around      wound.   [x] Other: EUCERIN CREAM TO LEFT STUMP DAILY     Wound Location: RIGHT MEDIAL LEG     Wound Cleansing: Normal Saline     Primary Dressing:  [x] HYDROFERA BLUE TRANSFER     Secondary Dressing:  [x] Nelta Mayank  []         Dressing Frequency:  []   [x] Do Not Change Dressing              Compression and Edema Control: APPLY CO-FLEX WITH CALAMINE TO RIGHT LOWER LEG  [] Wear Home Compression Stockings   [] Spandagrip to: Right Leg   Size: []Low compression 5-10 mm/Hg                     []Medium compression 10-20 mm/Hg           []High

## 2023-08-01 ENCOUNTER — HOSPITAL ENCOUNTER (OUTPATIENT)
Dept: WOUND CARE | Age: 73
Discharge: HOME | End: 2023-08-01
Payer: MEDICARE

## 2023-08-01 VITALS
TEMPERATURE: 97.5 F | DIASTOLIC BLOOD PRESSURE: 74 MMHG | SYSTOLIC BLOOD PRESSURE: 138 MMHG | HEART RATE: 76 BPM | RESPIRATION RATE: 20 BRPM

## 2023-08-01 DIAGNOSIS — E11.8 DIABETIC FOOT (HCC): Primary | ICD-10-CM

## 2023-08-01 DIAGNOSIS — E11.621 DIABETIC ULCER OF LEFT MIDFOOT ASSOCIATED WITH TYPE 2 DIABETES MELLITUS, WITH NECROSIS OF BONE (HCC): ICD-10-CM

## 2023-08-01 DIAGNOSIS — E08.621 DIABETIC ULCER OF HEEL ASSOCIATED WITH DIABETES MELLITUS DUE TO UNDERLYING CONDITION, WITH FAT LAYER EXPOSED, UNSPECIFIED LATERALITY (HCC): ICD-10-CM

## 2023-08-01 DIAGNOSIS — L97.212 VENOUS STASIS ULCER OF RIGHT CALF WITH FAT LAYER EXPOSED WITH VARICOSE VEINS (HCC): ICD-10-CM

## 2023-08-01 DIAGNOSIS — E11.42 DIABETIC POLYNEUROPATHY ASSOCIATED WITH TYPE 2 DIABETES MELLITUS (HCC): ICD-10-CM

## 2023-08-01 DIAGNOSIS — L97.402 DIABETIC ULCER OF HEEL ASSOCIATED WITH DIABETES MELLITUS DUE TO UNDERLYING CONDITION, WITH FAT LAYER EXPOSED, UNSPECIFIED LATERALITY (HCC): ICD-10-CM

## 2023-08-01 DIAGNOSIS — T23.201S: ICD-10-CM

## 2023-08-01 DIAGNOSIS — I70.244 ATHEROSCLEROSIS OF NATIVE ARTERIES OF LEFT LEG WITH ULCERATION OF HEEL AND MIDFOOT (HCC): ICD-10-CM

## 2023-08-01 DIAGNOSIS — Z74.09 IMPAIRED MOBILITY: ICD-10-CM

## 2023-08-01 DIAGNOSIS — E11.621 TYPE 2 DIABETES MELLITUS WITH LEFT DIABETIC FOOT ULCER (HCC): ICD-10-CM

## 2023-08-01 DIAGNOSIS — I83.012 VENOUS STASIS ULCER OF RIGHT CALF WITH FAT LAYER EXPOSED WITH VARICOSE VEINS (HCC): ICD-10-CM

## 2023-08-01 DIAGNOSIS — L97.424 DIABETIC ULCER OF LEFT MIDFOOT ASSOCIATED WITH TYPE 2 DIABETES MELLITUS, WITH NECROSIS OF BONE (HCC): ICD-10-CM

## 2023-08-01 DIAGNOSIS — L98.499 DIABETES MELLITUS WITH SKIN ULCER (HCC): ICD-10-CM

## 2023-08-01 DIAGNOSIS — T87.9 BKA STUMP COMPLICATION (HCC): ICD-10-CM

## 2023-08-01 DIAGNOSIS — L97.922 NON-PRESSURE CHRONIC ULCER OF LEFT LOWER LEG WITH FAT LAYER EXPOSED (HCC): ICD-10-CM

## 2023-08-01 DIAGNOSIS — M86.272 SUBACUTE OSTEOMYELITIS OF LEFT FOOT (HCC): ICD-10-CM

## 2023-08-01 DIAGNOSIS — E11.622 DIABETES MELLITUS WITH SKIN ULCER (HCC): ICD-10-CM

## 2023-08-01 DIAGNOSIS — E11.21 DIABETIC NEPHROPATHY ASSOCIATED WITH TYPE 2 DIABETES MELLITUS (HCC): ICD-10-CM

## 2023-08-01 DIAGNOSIS — M14.672 CHARCOT ANKLE, LEFT: ICD-10-CM

## 2023-08-01 DIAGNOSIS — R60.9 PERIPHERAL EDEMA: ICD-10-CM

## 2023-08-01 DIAGNOSIS — L97.529 TYPE 2 DIABETES MELLITUS WITH LEFT DIABETIC FOOT ULCER (HCC): ICD-10-CM

## 2023-08-01 PROCEDURE — 11042 DBRDMT SUBQ TIS 1ST 20SQCM/<: CPT | Performed by: EMERGENCY MEDICINE

## 2023-08-01 PROCEDURE — 11042 DBRDMT SUBQ TIS 1ST 20SQCM/<: CPT

## 2023-08-01 PROCEDURE — 11045 DBRDMT SUBQ TISS EACH ADDL: CPT

## 2023-08-01 PROCEDURE — 11045 DBRDMT SUBQ TISS EACH ADDL: CPT | Performed by: EMERGENCY MEDICINE

## 2023-08-01 RX ORDER — GINSENG 100 MG
CAPSULE ORAL ONCE
OUTPATIENT
Start: 2023-08-01 | End: 2023-08-01

## 2023-08-01 RX ORDER — SODIUM CHLOR/HYPOCHLOROUS ACID 0.033 %
SOLUTION, IRRIGATION IRRIGATION ONCE
OUTPATIENT
Start: 2023-08-01 | End: 2023-08-01

## 2023-08-01 RX ORDER — LIDOCAINE HYDROCHLORIDE 20 MG/ML
JELLY TOPICAL ONCE
OUTPATIENT
Start: 2023-08-01 | End: 2023-08-01

## 2023-08-01 RX ORDER — BACITRACIN ZINC AND POLYMYXIN B SULFATE 500; 1000 [USP'U]/G; [USP'U]/G
OINTMENT TOPICAL ONCE
OUTPATIENT
Start: 2023-08-01 | End: 2023-08-01

## 2023-08-01 RX ORDER — GENTAMICIN SULFATE 1 MG/G
OINTMENT TOPICAL ONCE
OUTPATIENT
Start: 2023-08-01 | End: 2023-08-01

## 2023-08-01 RX ORDER — CLOBETASOL PROPIONATE 0.5 MG/G
OINTMENT TOPICAL ONCE
OUTPATIENT
Start: 2023-08-01 | End: 2023-08-01

## 2023-08-01 RX ORDER — LIDOCAINE 40 MG/G
CREAM TOPICAL ONCE
OUTPATIENT
Start: 2023-08-01 | End: 2023-08-01

## 2023-08-01 RX ORDER — BETAMETHASONE DIPROPIONATE 0.05 %
OINTMENT (GRAM) TOPICAL ONCE
OUTPATIENT
Start: 2023-08-01 | End: 2023-08-01

## 2023-08-01 RX ORDER — LIDOCAINE 50 MG/G
OINTMENT TOPICAL ONCE
OUTPATIENT
Start: 2023-08-01 | End: 2023-08-01

## 2023-08-01 RX ORDER — LIDOCAINE 40 MG/G
CREAM TOPICAL ONCE
Status: COMPLETED | OUTPATIENT
Start: 2023-08-01 | End: 2023-08-01

## 2023-08-01 RX ORDER — LIDOCAINE HYDROCHLORIDE 40 MG/ML
SOLUTION TOPICAL ONCE
OUTPATIENT
Start: 2023-08-01 | End: 2023-08-01

## 2023-08-01 RX ORDER — IBUPROFEN 200 MG
TABLET ORAL ONCE
OUTPATIENT
Start: 2023-08-01 | End: 2023-08-01

## 2023-08-01 RX ADMIN — LIDOCAINE 1 G: 40 CREAM TOPICAL at 10:12

## 2023-08-01 ASSESSMENT — PAIN SCALES - GENERAL
PAINLEVEL_OUTOF10: 0
PAINLEVEL_OUTOF10: 0

## 2023-08-01 NOTE — PROGRESS NOTES
dressing supply provider: [facility]   [] ECF or Home Healthcare:  [] Wound Assessment:         [] Physician or NP scheduled for Wound Assessment:   [] Orders placed during your visit:         Important Reminders:   Please wash hands with soap and water before and after every dressing change. Do not scrub wounds. Keep wounds dry in shower unless otherwise instructed by the physician. SMOKING can slow would healing. Stop smoking as soon as possible to improve healing and prevent further complications associated with smoking. Michelle-Wound Topical Treatments:  Do not apply lotions, creams, or ointments to wound bed unless directed. [] Apply moisturizing lotion to skin surrounding the wound prior to dressing change.  [] Apply antifungal ointment to skin surrounding the wound prior to dressing change.  [] Apply thin film of no sting moisture barrier ointment to skin immediately around      wound. [x] Other: EUCERIN CREAM TO LEFT STUMP DAILY     Wound Location: RIGHT MEDIAL LEG     Wound Cleansing: Normal Saline     Primary Dressing:  [x] HYDROFERA BLUE TRANSFER     Secondary Dressing:  [x] Trenia Model  []         Dressing Frequency:  []   [x] Do Not Change Dressing              Compression and Edema Control: APPLY CO-FLEX WITH CALAMINE TO RIGHT LOWER LEG  [] Wear Home Compression Stockings   [] Spandagrip to: Right Leg   Size: []Low compression 5-10 mm/Hg                     []Medium compression 10-20 mm/Hg           []High compression  20-30 mm/Hg  [] Ace Wrap Toes to Knee to               Do not get leg(s) with wrap wet. If wraps become too tight call the center or completely remove the wrap. [x] Assistive Devices   WALKER  Use as instructed by the provider        Activity: Activity as Tolerated        Dietary:   Continue your diet as tolerated. Protein is a key nutrient in helping to repair damaged tissue and promote new tissue growth.  Good sources of protein

## 2023-08-01 NOTE — PLAN OF CARE
[place] Application   Below Knee    NAME:  Guillermina Ng  YOB: 1950  MEDICAL RECORD NUMBER:  [id number]  DATE:  8/1/2023    Kenny Gallagher boot: Applied moisturizing agent to dry skin as needed. Appied primary and secondary dressing as ordered. Applied Unna roll from toes to knee overlapping each time. Applied ace wrap or coban from toes to below the knee. Secured with tape and/or metal clips covered with tape. Instructed patient/caregiver to keep dressing dry and intact. DO NOT REMOVE DRESSING. Instructed pt/family/caregiver to report excessive draining, loose bandage, wet dressing, severe pain or tingling in toes. Applied [place] dressing below the knee to right lower leg. Unna Boot(s) were applied per  Guidelines.      Electronically signed by Miky Kramer RN on 8/1/2023 at 11:43 AM

## 2023-08-03 ENCOUNTER — TELEPHONE (OUTPATIENT)
Dept: INTERNAL MEDICINE CLINIC | Age: 73
End: 2023-08-03

## 2023-08-03 DIAGNOSIS — H93.13 RINGING IN EAR, BILATERAL: Primary | ICD-10-CM

## 2023-08-03 NOTE — DISCHARGE INSTRUCTIONS
[address] [place] Physician Orders and Discharge [address] [place] [address]  [address], [address] [facility],[address], [address]  Telephone: [phone] [address] 8:00 am - 4:30 pm and Friday 8:00 am - 12:00 pm.          NAME:  Doria Boast  YOB: 1950  MEDICAL RECORD NUMBER:  [id number]  DATE:  8/8/2023        Return Appointment:  [x] Return Appointment: With Gabby Barrera MD  in  1 Week(s)  [x] Wound and dressing supply provider: [facility]   [] ECF or Home Healthcare:  [] Wound Assessment:         [] Physician or NP scheduled for Wound Assessment:   [] Orders placed during your visit:         Important Reminders:   Please wash hands with soap and water before and after every dressing change. Do not scrub wounds. Keep wounds dry in shower unless otherwise instructed by the physician. SMOKING can slow would healing. Stop smoking as soon as possible to improve healing and prevent further complications associated with smoking. Michelle-Wound Topical Treatments:  Do not apply lotions, creams, or ointments to wound bed unless directed. [] Apply moisturizing lotion to skin surrounding the wound prior to dressing change.  [] Apply antifungal ointment to skin surrounding the wound prior to dressing change.  [] Apply thin film of no sting moisture barrier ointment to skin immediately around      wound.   [x] Other: EUCERIN CREAM TO LEFT STUMP DAILY     Wound Location: RIGHT MEDIAL LEG     Wound Cleansing: Normal Saline     Primary Dressing:  [x] HYDROFERA BLUE TRANSFER     Secondary Dressing:  [x] Christine Risen  []         Dressing Frequency:  []   [x] Do Not Change Dressing              Compression and Edema Control: APPLY CO-FLEX WITH CALAMINE TO RIGHT LOWER LEG  [] Wear Home Compression Stockings   [] Spandagrip to: Right Leg   Size: []Low compression 5-10 mm/Hg                     []Medium compression 10-20 mm/Hg           []High
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
## 2023-08-08 ENCOUNTER — HOSPITAL ENCOUNTER (OUTPATIENT)
Dept: WOUND CARE | Age: 73
Discharge: HOME | End: 2023-08-08
Payer: MEDICARE

## 2023-08-08 VITALS
RESPIRATION RATE: 20 BRPM | DIASTOLIC BLOOD PRESSURE: 65 MMHG | SYSTOLIC BLOOD PRESSURE: 119 MMHG | TEMPERATURE: 98.1 F | HEART RATE: 60 BPM

## 2023-08-08 DIAGNOSIS — M86.272 SUBACUTE OSTEOMYELITIS OF LEFT FOOT (HCC): ICD-10-CM

## 2023-08-08 DIAGNOSIS — Z74.09 IMPAIRED MOBILITY: ICD-10-CM

## 2023-08-08 DIAGNOSIS — E11.621 TYPE 2 DIABETES MELLITUS WITH LEFT DIABETIC FOOT ULCER (HCC): ICD-10-CM

## 2023-08-08 DIAGNOSIS — L97.922 NON-PRESSURE CHRONIC ULCER OF LEFT LOWER LEG WITH FAT LAYER EXPOSED (HCC): ICD-10-CM

## 2023-08-08 DIAGNOSIS — M14.672 CHARCOT ANKLE, LEFT: ICD-10-CM

## 2023-08-08 DIAGNOSIS — E11.621 DIABETIC ULCER OF LEFT MIDFOOT ASSOCIATED WITH TYPE 2 DIABETES MELLITUS, WITH NECROSIS OF BONE (HCC): ICD-10-CM

## 2023-08-08 DIAGNOSIS — I70.244 ATHEROSCLEROSIS OF NATIVE ARTERIES OF LEFT LEG WITH ULCERATION OF HEEL AND MIDFOOT (HCC): ICD-10-CM

## 2023-08-08 DIAGNOSIS — L97.212 VENOUS STASIS ULCER OF RIGHT CALF WITH FAT LAYER EXPOSED WITH VARICOSE VEINS (HCC): ICD-10-CM

## 2023-08-08 DIAGNOSIS — E11.21 DIABETIC NEPHROPATHY ASSOCIATED WITH TYPE 2 DIABETES MELLITUS (HCC): ICD-10-CM

## 2023-08-08 DIAGNOSIS — L97.402 DIABETIC ULCER OF HEEL ASSOCIATED WITH DIABETES MELLITUS DUE TO UNDERLYING CONDITION, WITH FAT LAYER EXPOSED, UNSPECIFIED LATERALITY (HCC): ICD-10-CM

## 2023-08-08 DIAGNOSIS — L98.499 DIABETES MELLITUS WITH SKIN ULCER (HCC): ICD-10-CM

## 2023-08-08 DIAGNOSIS — T23.201S: ICD-10-CM

## 2023-08-08 DIAGNOSIS — L97.424 DIABETIC ULCER OF LEFT MIDFOOT ASSOCIATED WITH TYPE 2 DIABETES MELLITUS, WITH NECROSIS OF BONE (HCC): ICD-10-CM

## 2023-08-08 DIAGNOSIS — E08.621 DIABETIC ULCER OF HEEL ASSOCIATED WITH DIABETES MELLITUS DUE TO UNDERLYING CONDITION, WITH FAT LAYER EXPOSED, UNSPECIFIED LATERALITY (HCC): ICD-10-CM

## 2023-08-08 DIAGNOSIS — E11.42 DIABETIC POLYNEUROPATHY ASSOCIATED WITH TYPE 2 DIABETES MELLITUS (HCC): ICD-10-CM

## 2023-08-08 DIAGNOSIS — R60.9 PERIPHERAL EDEMA: ICD-10-CM

## 2023-08-08 DIAGNOSIS — T87.9 BKA STUMP COMPLICATION (HCC): ICD-10-CM

## 2023-08-08 DIAGNOSIS — L97.529 TYPE 2 DIABETES MELLITUS WITH LEFT DIABETIC FOOT ULCER (HCC): ICD-10-CM

## 2023-08-08 DIAGNOSIS — E11.622 DIABETES MELLITUS WITH SKIN ULCER (HCC): ICD-10-CM

## 2023-08-08 DIAGNOSIS — I83.012 VENOUS STASIS ULCER OF RIGHT CALF WITH FAT LAYER EXPOSED WITH VARICOSE VEINS (HCC): ICD-10-CM

## 2023-08-08 DIAGNOSIS — E11.8 DIABETIC FOOT (HCC): Primary | ICD-10-CM

## 2023-08-08 PROCEDURE — 11042 DBRDMT SUBQ TIS 1ST 20SQCM/<: CPT

## 2023-08-08 PROCEDURE — 11045 DBRDMT SUBQ TISS EACH ADDL: CPT

## 2023-08-08 RX ORDER — LIDOCAINE HYDROCHLORIDE 20 MG/ML
JELLY TOPICAL ONCE
OUTPATIENT
Start: 2023-08-08 | End: 2023-08-08

## 2023-08-08 RX ORDER — SODIUM CHLOR/HYPOCHLOROUS ACID 0.033 %
SOLUTION, IRRIGATION IRRIGATION ONCE
OUTPATIENT
Start: 2023-08-08 | End: 2023-08-08

## 2023-08-08 RX ORDER — LIDOCAINE 50 MG/G
OINTMENT TOPICAL ONCE
OUTPATIENT
Start: 2023-08-08 | End: 2023-08-08

## 2023-08-08 RX ORDER — LIDOCAINE HYDROCHLORIDE 40 MG/ML
SOLUTION TOPICAL ONCE
OUTPATIENT
Start: 2023-08-08 | End: 2023-08-08

## 2023-08-08 RX ORDER — BACITRACIN ZINC AND POLYMYXIN B SULFATE 500; 1000 [USP'U]/G; [USP'U]/G
OINTMENT TOPICAL ONCE
OUTPATIENT
Start: 2023-08-08 | End: 2023-08-08

## 2023-08-08 RX ORDER — CLOBETASOL PROPIONATE 0.5 MG/G
OINTMENT TOPICAL ONCE
OUTPATIENT
Start: 2023-08-08 | End: 2023-08-08

## 2023-08-08 RX ORDER — IBUPROFEN 200 MG
TABLET ORAL ONCE
OUTPATIENT
Start: 2023-08-08 | End: 2023-08-08

## 2023-08-08 RX ORDER — GENTAMICIN SULFATE 1 MG/G
OINTMENT TOPICAL ONCE
OUTPATIENT
Start: 2023-08-08 | End: 2023-08-08

## 2023-08-08 RX ORDER — LIDOCAINE 40 MG/G
CREAM TOPICAL ONCE
OUTPATIENT
Start: 2023-08-08 | End: 2023-08-08

## 2023-08-08 RX ORDER — BETAMETHASONE DIPROPIONATE 0.05 %
OINTMENT (GRAM) TOPICAL ONCE
OUTPATIENT
Start: 2023-08-08 | End: 2023-08-08

## 2023-08-08 RX ORDER — GINSENG 100 MG
CAPSULE ORAL ONCE
OUTPATIENT
Start: 2023-08-08 | End: 2023-08-08

## 2023-08-08 RX ORDER — LIDOCAINE HYDROCHLORIDE 40 MG/ML
SOLUTION TOPICAL ONCE
Status: COMPLETED | OUTPATIENT
Start: 2023-08-08 | End: 2023-08-08

## 2023-08-08 RX ORDER — AMIODARONE HYDROCHLORIDE 100 MG/1
100 TABLET ORAL DAILY
Qty: 90 TABLET | Refills: 1 | Status: SHIPPED | OUTPATIENT
Start: 2023-08-08

## 2023-08-08 RX ADMIN — LIDOCAINE HYDROCHLORIDE 5 ML: 40 SOLUTION TOPICAL at 10:41

## 2023-08-08 ASSESSMENT — PAIN SCALES - GENERAL
PAINLEVEL_OUTOF10: 0
PAINLEVEL_OUTOF10: 0

## 2023-08-08 NOTE — PLAN OF CARE
[place] Application   Below Knee    NAME:  Myra Laguerre  YOB: 1950  MEDICAL RECORD NUMBER:  [id number]  DATE:  8/8/2023    Curtis Rodriguez boot: Applied moisturizing agent to dry skin as needed. Appied primary and secondary dressing as ordered. Applied Unna roll from toes to knee overlapping each time. Applied ace wrap or coban from toes to below the knee. Secured with tape and/or metal clips covered with tape. Instructed patient/caregiver to keep dressing dry and intact. DO NOT REMOVE DRESSING. Instructed pt/family/caregiver to report excessive draining, loose bandage, wet dressing, severe pain or tingling in toes. Applied [place] dressing below the knee to right lower leg. CoFlex with calamine    Unna Boot(s) were applied per  Guidelines.      Electronically signed by Lesa Torres RN on 8/8/2023 at 11:48 AM

## 2023-08-08 NOTE — PROGRESS NOTES
Home Compression Stockings   [] Spandagrip to: Right Leg   Size: []Low compression 5-10 mm/Hg                     []Medium compression 10-20 mm/Hg           []High compression  20-30 mm/Hg  [] Ace Wrap Toes to Knee to               Do not get leg(s) with wrap wet. If wraps become too tight call the center or completely remove the wrap. [x] Assistive Devices   WALKER  Use as instructed by the provider        Activity: Activity as Tolerated        Dietary:   Continue your diet as tolerated. Protein is a key nutrient in helping to repair damaged tissue and promote new tissue growth. Good sources of protein include milk, yogurt, cheese, fish, lean meat and beans. If you are DIABETIC, having diabetes can make it hard for wounds to heal. Try to keep your blood sugar within it's target range. Limit Sodium, Alcohol and Sugar. Pain:   Please Note some pain, drainage and/or bleeding might be expected after seeing the provider. TO HELP ALLEVIATE PAIN WE RECOMMEND THE FOLLOWING  Elevate the affected limb. Use over the counter medications as permitted by your family doctor. For Persistent Pain not relieved by the above interventions, please notify your family doctor. : ROB    Electronically signed by Emilia Willis RN on 8/8/2023 at 10:48 AM        [address] [place] [address] Information: Should you experience any significant changes in your wound(s) or have questions about your wound care, please contact the [facility] [address] at [address] [place] [address] Avenue 8:00 am - 4:30 pm and Friday 8:00 am - 12:30 pm.  If you need help with your wound outside these hours and cannot wait until we are again available, contact your PCP or go to the hospital emergency room. PLEASE NOTE: IF YOU ARE UNABLE TO OBTAIN WOUND SUPPLIES, CONTINUE TO USE THE SUPPLIES YOU HAVE AVAILABLE UNTIL YOU ARE ABLE TO REACH US.  IT IS MOST IMPORTANT TO KEEP THE WOUND COVERED AT ALL

## 2023-08-08 NOTE — TELEPHONE ENCOUNTER
Kristina Barbosa called the office stating that he dropped his Amiodarone medication in the sink. He is wanting to know if he can receive a refill on the medication.     If so, please send to:   [facility] [id number] [address] [phone]       Medication Refill    Medication needing refilled:  amiodarone (PACERONE)     Dosage of the medication:  100 MG tablet     How are you taking this medication (QD, BID, TID, QID, PRN):  Take 1 tablet by mouth daily    30 or 90 day supply called in:  90 day supply    When will you run out of your medication:  Dropped medication in sink    Which Pharmacy are we sending the medication to?:  [facility] [id number] [address] [phone]

## 2023-08-09 NOTE — TELEPHONE ENCOUNTER
Deidre Mohs called in this afternoon, he states he should only need two weeks worth of medication(amiodarone) until his insurance kicks in. He can be reached at [phone].

## 2023-08-10 NOTE — DISCHARGE INSTRUCTIONS
10-20 mm/Hg           []High compression  20-30 mm/Hg  [] Ace Wrap Toes to Knee to               Do not get leg(s) with wrap wet. If wraps become too tight call the center or completely remove the wrap. [x] Assistive Devices   WALKER  Use as instructed by the provider        Activity: Activity as Tolerated        Dietary:   Continue your diet as tolerated. Protein is a key nutrient in helping to repair damaged tissue and promote new tissue growth. Good sources of protein include milk, yogurt, cheese, fish, lean meat and beans. If you are DIABETIC, having diabetes can make it hard for wounds to heal. Try to keep your blood sugar within it's target range. Limit Sodium, Alcohol and Sugar. Pain:   Please Note some pain, drainage and/or bleeding might be expected after seeing the provider. TO HELP ALLEVIATE PAIN WE RECOMMEND THE FOLLOWING  Elevate the affected limb. Use over the counter medications as permitted by your family doctor. For Persistent Pain not relieved by the above interventions, please notify your family doctor. : ROB     Electronically signed by Iram Herrmann RN on 8/15/2023 at 10:49 [address] [place] [address] Information: Should you experience any significant changes in your wound(s) or have questions about your wound care, please contact the [facility] at [address] [place] [address] Avenue 8:00 am - 4:30 pm and Friday 8:00 am - 12:30 pm.  If you need help with your wound outside these hours and cannot wait until we are again available, contact your PCP or go to the hospital emergency room. PLEASE NOTE: IF YOU ARE UNABLE TO OBTAIN WOUND SUPPLIES, CONTINUE TO USE THE SUPPLIES YOU HAVE AVAILABLE UNTIL YOU ARE ABLE TO REACH US. IT IS MOST IMPORTANT TO KEEP THE WOUND COVERED AT ALL TIMES.            Physician Signature:_______________________     Date: ___________ Time:  ____________

## 2023-08-11 ENCOUNTER — TELEPHONE (OUTPATIENT)
Dept: INTERNAL MEDICINE CLINIC | Age: 73
End: 2023-08-11

## 2023-08-11 NOTE — TELEPHONE ENCOUNTER
Pharmacy had to Change prescription for freestyle sri to [place] [facility] due to insurance and also are needing a new script for the reader please advise.

## 2023-08-15 ENCOUNTER — HOSPITAL ENCOUNTER (OUTPATIENT)
Dept: WOUND CARE | Age: 73
Discharge: HOME | End: 2023-08-15
Payer: MEDICARE

## 2023-08-15 VITALS
SYSTOLIC BLOOD PRESSURE: 115 MMHG | TEMPERATURE: 97.3 F | HEART RATE: 76 BPM | DIASTOLIC BLOOD PRESSURE: 69 MMHG | RESPIRATION RATE: 20 BRPM

## 2023-08-15 DIAGNOSIS — L97.922 NON-PRESSURE CHRONIC ULCER OF LEFT LOWER LEG WITH FAT LAYER EXPOSED (HCC): ICD-10-CM

## 2023-08-15 DIAGNOSIS — I83.012 VENOUS STASIS ULCER OF RIGHT CALF WITH FAT LAYER EXPOSED WITH VARICOSE VEINS (HCC): ICD-10-CM

## 2023-08-15 DIAGNOSIS — T87.9 BKA STUMP COMPLICATION (HCC): ICD-10-CM

## 2023-08-15 DIAGNOSIS — M86.272 SUBACUTE OSTEOMYELITIS OF LEFT FOOT (HCC): ICD-10-CM

## 2023-08-15 DIAGNOSIS — E11.21 DIABETIC NEPHROPATHY ASSOCIATED WITH TYPE 2 DIABETES MELLITUS (HCC): ICD-10-CM

## 2023-08-15 DIAGNOSIS — E11.42 DIABETIC POLYNEUROPATHY ASSOCIATED WITH TYPE 2 DIABETES MELLITUS (HCC): ICD-10-CM

## 2023-08-15 DIAGNOSIS — Z74.09 IMPAIRED MOBILITY: ICD-10-CM

## 2023-08-15 DIAGNOSIS — I70.244 ATHEROSCLEROSIS OF NATIVE ARTERIES OF LEFT LEG WITH ULCERATION OF HEEL AND MIDFOOT (HCC): ICD-10-CM

## 2023-08-15 DIAGNOSIS — E11.621 DIABETIC ULCER OF LEFT MIDFOOT ASSOCIATED WITH TYPE 2 DIABETES MELLITUS, WITH NECROSIS OF BONE (HCC): ICD-10-CM

## 2023-08-15 DIAGNOSIS — L97.424 DIABETIC ULCER OF LEFT MIDFOOT ASSOCIATED WITH TYPE 2 DIABETES MELLITUS, WITH NECROSIS OF BONE (HCC): ICD-10-CM

## 2023-08-15 DIAGNOSIS — E11.622 DIABETES MELLITUS WITH SKIN ULCER (HCC): ICD-10-CM

## 2023-08-15 DIAGNOSIS — R60.9 PERIPHERAL EDEMA: ICD-10-CM

## 2023-08-15 DIAGNOSIS — L97.529 TYPE 2 DIABETES MELLITUS WITH LEFT DIABETIC FOOT ULCER (HCC): ICD-10-CM

## 2023-08-15 DIAGNOSIS — L97.402 DIABETIC ULCER OF HEEL ASSOCIATED WITH DIABETES MELLITUS DUE TO UNDERLYING CONDITION, WITH FAT LAYER EXPOSED, UNSPECIFIED LATERALITY (HCC): ICD-10-CM

## 2023-08-15 DIAGNOSIS — L97.212 VENOUS STASIS ULCER OF RIGHT CALF WITH FAT LAYER EXPOSED WITH VARICOSE VEINS (HCC): ICD-10-CM

## 2023-08-15 DIAGNOSIS — E11.621 TYPE 2 DIABETES MELLITUS WITH LEFT DIABETIC FOOT ULCER (HCC): ICD-10-CM

## 2023-08-15 DIAGNOSIS — E11.8 DIABETIC FOOT (HCC): Primary | ICD-10-CM

## 2023-08-15 DIAGNOSIS — T23.201S: ICD-10-CM

## 2023-08-15 DIAGNOSIS — E08.621 DIABETIC ULCER OF HEEL ASSOCIATED WITH DIABETES MELLITUS DUE TO UNDERLYING CONDITION, WITH FAT LAYER EXPOSED, UNSPECIFIED LATERALITY (HCC): ICD-10-CM

## 2023-08-15 DIAGNOSIS — M14.672 CHARCOT ANKLE, LEFT: ICD-10-CM

## 2023-08-15 DIAGNOSIS — L98.499 DIABETES MELLITUS WITH SKIN ULCER (HCC): ICD-10-CM

## 2023-08-15 PROCEDURE — 11042 DBRDMT SUBQ TIS 1ST 20SQCM/<: CPT

## 2023-08-15 RX ORDER — BETAMETHASONE DIPROPIONATE 0.05 %
OINTMENT (GRAM) TOPICAL ONCE
OUTPATIENT
Start: 2023-08-15 | End: 2023-08-15

## 2023-08-15 RX ORDER — LIDOCAINE 40 MG/G
CREAM TOPICAL ONCE
OUTPATIENT
Start: 2023-08-15 | End: 2023-08-15

## 2023-08-15 RX ORDER — LIDOCAINE 40 MG/G
CREAM TOPICAL ONCE
Status: COMPLETED | OUTPATIENT
Start: 2023-08-15 | End: 2023-08-15

## 2023-08-15 RX ORDER — SODIUM CHLOR/HYPOCHLOROUS ACID 0.033 %
SOLUTION, IRRIGATION IRRIGATION ONCE
OUTPATIENT
Start: 2023-08-15 | End: 2023-08-15

## 2023-08-15 RX ORDER — LIDOCAINE HYDROCHLORIDE 20 MG/ML
JELLY TOPICAL ONCE
OUTPATIENT
Start: 2023-08-15 | End: 2023-08-15

## 2023-08-15 RX ORDER — LIDOCAINE HYDROCHLORIDE 40 MG/ML
SOLUTION TOPICAL ONCE
OUTPATIENT
Start: 2023-08-15 | End: 2023-08-15

## 2023-08-15 RX ORDER — GINSENG 100 MG
CAPSULE ORAL ONCE
OUTPATIENT
Start: 2023-08-15 | End: 2023-08-15

## 2023-08-15 RX ORDER — IBUPROFEN 200 MG
TABLET ORAL ONCE
OUTPATIENT
Start: 2023-08-15 | End: 2023-08-15

## 2023-08-15 RX ORDER — CLOBETASOL PROPIONATE 0.5 MG/G
OINTMENT TOPICAL ONCE
OUTPATIENT
Start: 2023-08-15 | End: 2023-08-15

## 2023-08-15 RX ORDER — BACITRACIN ZINC AND POLYMYXIN B SULFATE 500; 1000 [USP'U]/G; [USP'U]/G
OINTMENT TOPICAL ONCE
OUTPATIENT
Start: 2023-08-15 | End: 2023-08-15

## 2023-08-15 RX ORDER — LIDOCAINE 50 MG/G
OINTMENT TOPICAL ONCE
OUTPATIENT
Start: 2023-08-15 | End: 2023-08-15

## 2023-08-15 RX ORDER — GENTAMICIN SULFATE 1 MG/G
OINTMENT TOPICAL ONCE
OUTPATIENT
Start: 2023-08-15 | End: 2023-08-15

## 2023-08-15 RX ADMIN — LIDOCAINE: 40 CREAM TOPICAL at 10:42

## 2023-08-15 ASSESSMENT — PAIN SCALES - GENERAL
PAINLEVEL_OUTOF10: 0
PAINLEVEL_OUTOF10: 0

## 2023-08-15 NOTE — PLAN OF CARE
[place] Application   Below Knee    NAME:  Stacia Berry  YOB: 1950  MEDICAL RECORD NUMBER:  [id number]  DATE:  8/15/2023    Nancy Adame boot: Applied moisturizing agent to dry skin as needed. Appied primary and secondary dressing as ordered. Applied Unna roll from toes to knee overlapping each time. Applied ace wrap or coban from toes to below the knee. Secured with tape and/or metal clips covered with tape. Instructed patient/caregiver to keep dressing dry and intact. DO NOT REMOVE DRESSING. Instructed pt/family/caregiver to report excessive draining, loose bandage, wet dressing, severe pain or tingling in toes. Applied [place] dressing below the knee to right lower leg. Unna Boot(s) were applied per  Guidelines.      Electronically signed by Germán Abrams RN on 8/15/2023 at 11:33 AM

## 2023-08-15 NOTE — PROGRESS NOTES
blistering and ulcerations. Patient is known to the wound care center for previous wounds with last evaluation in 2023. He is returning to our care for new wounds to the right lower extremity. On arrival to the wound center, the patient is also noted to have ulcerations to his left residual BKA limb. Pertinent associated symptoms: drainage , redness, swelling, and impaired mobility    Medical Decision Makin-year-old male with PMH of atrial fibrillation on Eliquis, CHF, diabetes mellitus type 2, peripheral arterial disease, status post left BKA, who is returning to the wound care center for new wounds to left lower extremity residual limb and right lower extremity. Had hospitalization for right lower extremity edema, anasarca, fluid overload and was given Augmentin as well as Lasix. Wounds as below. Assessment required other independent historian(s): No. Historian: patient . Comorbid conditions affecting wound healing: As noted in [facility] which was reviewed. Pertinent labs reviewed. Review of medical records and external note (s) from other providers was done for this visit.     Problem List Items Addressed This Visit          Circulatory    Atherosclerosis of native arteries of left leg with ulceration of heel and midfoot ([address])    Relevant Orders    Initiate Outpatient Wound Care Protocol    Venous stasis ulcer of right calf with fat layer exposed with varicose veins (HCC)    Relevant Orders    Initiate Outpatient Wound Care Protocol       Endocrine    Diabetic foot ulcer ([address])    Relevant Orders    Initiate Outpatient Wound Care Protocol    Initiate Outpatient Wound Care Protocol    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 ([address])    Relevant Orders    Initiate Outpatient Wound Care Protocol    Diabetic foot ([address]) - Primary    Relevant Orders    Initiate Outpatient Wound Care Protocol    Type 2 diabetes mellitus with left diabetic foot ulcer ([address])    Relevant Orders    Initiate

## 2023-08-17 ENCOUNTER — HOSPITAL ENCOUNTER (OUTPATIENT)
Dept: PHYSICAL THERAPY | Age: 73
Setting detail: THERAPIES SERIES
Discharge: HOME | End: 2023-08-17
Payer: MEDICARE

## 2023-08-17 PROCEDURE — 97162 PT EVAL MOD COMPLEX 30 MIN: CPT

## 2023-08-17 PROCEDURE — 97110 THERAPEUTIC EXERCISES: CPT

## 2023-08-17 NOTE — PROGRESS NOTES
Physical Therapy: Initial Evaluation    Patient: Doria Boast (26 y.o. male)   Examination Date:   Plan of Care Certification [id number] to        :  1950 ;    Confirmed: Yes MRN: [id number]  CSN: [id number]   Insurance: Payor: Cassie Joseph / Plan: Trey Crump ESSENTIAL/PLUS / Product Type: *No Product type* /   Insurance ID: [id number] - (Medicare Managed) Secondary Insurance (if applicable):    Referring Physician: MD Laura Shea MD   PCP: Laura Munguia MD Visits to Date/Visits Approved:     No Show/Cancelled Appts:      Medical Diagnosis: Hx of BKA, left (HCC) [Z89.512] Hx of BKA, left ([address]) (Z89.512), LE weakness  Treatment Diagnosis: Decreased functional capacity due to decreased strength and activity tolerance     PERTINENT MEDICAL HISTORY   Patient Assessed for Rehabilitation Services: Yes  Self reported health status[de-identified] Good    Medical History: Chart Reviewed: Yes    Past Medical History:   Diagnosis Date    Atrial fibrillation ([address])     Back pain     Cardiomyopathy     CHF (congestive heart failure) (HCC)     COPD (chronic obstructive pulmonary disease) (HCC)     Dyslipidemia     GERD (gastroesophageal reflux disease)     Hyperlipidemia     Hypertension     Hypothyroidism     Leg edema     MRSA (methicillin resistant staph aureus) culture positive 10/05/2015    foot/bone    MRSA nasal colonization 2017    Obesity     Prolonged emergence from general anesthesia     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     Sleep apnea     Stroke [facility])     Thyroid disease     Type 2 diabetes mellitus without complication ([address])     Type II or unspecified type diabetes mellitus without mention of complication, not stated as uncontrolled      Surgical History:   Past Surgical History:   Procedure Laterality Date    [address]  2017    Dr. Edi Davila N/A 3/8/2019    COLONOSCOPY

## 2023-08-18 DIAGNOSIS — Z79.899 LONG TERM CURRENT USE OF AMIODARONE: ICD-10-CM

## 2023-08-18 DIAGNOSIS — I10 ESSENTIAL HYPERTENSION: ICD-10-CM

## 2023-08-18 LAB
25(OH)D3 SERPL-MCNC: 33.5 NG/ML
ALBUMIN SERPL-MCNC: 3.6 G/DL (ref 3.4–5)
ALBUMIN/GLOB SERPL: 1.4 {RATIO} (ref 1.1–2.2)
ALP SERPL-CCNC: 91 U/L (ref 40–129)
ALT SERPL-CCNC: 22 U/L (ref 10–40)
ANION GAP SERPL CALCULATED.3IONS-SCNC: 12 MMOL/L (ref 3–16)
AST SERPL-CCNC: 20 U/L (ref 15–37)
BILIRUB DIRECT SERPL-MCNC: <0.2 MG/DL (ref 0–0.3)
BILIRUB INDIRECT SERPL-MCNC: ABNORMAL MG/DL (ref 0–1)
BILIRUB SERPL-MCNC: 0.5 MG/DL (ref 0–1)
BUN SERPL-MCNC: 14 MG/DL (ref 7–20)
CALCIUM SERPL-MCNC: 8.8 MG/DL (ref 8.3–10.6)
CHLORIDE SERPL-SCNC: 110 MMOL/L (ref 99–110)
CO2 SERPL-SCNC: 20 MMOL/L (ref 21–32)
CREAT SERPL-MCNC: 1.2 MG/DL (ref 0.8–1.3)
GFR SERPLBLD CREATININE-BSD FMLA CKD-EPI: >60 ML/MIN/{1.73_M2}
GLUCOSE SERPL-MCNC: 90 MG/DL (ref 70–99)
MAGNESIUM SERPL-MCNC: 2.1 MG/DL (ref 1.8–2.4)
POTASSIUM SERPL-SCNC: 4 MMOL/L (ref 3.5–5.1)
PROT SERPL-MCNC: 6.2 G/DL (ref 6.4–8.2)
SODIUM SERPL-SCNC: 142 MMOL/L (ref 136–145)
TSH SERPL DL<=0.005 MIU/L-ACNC: 1.2 UIU/ML (ref 0.27–4.2)

## 2023-08-21 NOTE — PROGRESS NOTES
the vitamin D level was to high.  Yes Historical Provider, MD   Insulin Syringe-Needle U-100 (INSULIN SYRINGE .5CC/31GX5/16\") 31G X 5/16\" 0.5 ML MISC Patient tests bid Yes Raisa Garcia MD   therapeutic multivitamin-minerals [facility]) tablet Take 1 tablet by mouth daily Yes Historical Provider, MD        Past Medical History:  Past Medical History:   Diagnosis Date    Atrial fibrillation ([address])     Back pain     Cardiomyopathy     CHF (congestive heart failure) (HCC)     COPD (chronic obstructive pulmonary disease) (HCC)     Dyslipidemia     GERD (gastroesophageal reflux disease)     Hyperlipidemia     Hypertension     Hypothyroidism     Leg edema     MRSA (methicillin resistant staph aureus) culture positive 10/05/2015    foot/bone    MRSA nasal colonization 05/05/2017    Obesity     Prolonged emergence from general anesthesia     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     Sleep apnea     Stroke [facility])     Thyroid disease     Type 2 diabetes mellitus without complication ([address])     Type II or unspecified type diabetes mellitus without mention of complication, not stated as uncontrolled        Past Surgical History:   Past Surgical History:   Procedure Laterality Date    [address] [facility] [address]  09/05/2017    Dr. Maria Luisa Miller 3/8/2019    COLONOSCOPY WITH MAC, UNASYN (3gm) performed by Himanshu Lockwood MD at [address] [facility] [address] 8/9/2019    COLONOSCOPY POLYPECTOMY SNARE/COLD BIOPSY performed by Himanshu Lockwood MD at [address] [facility]  8/9/2019    COLONOSCOPY WITH BIOPSY performed by Himanshu Lockwood MD at [address] Right 8/28/2019    INCISION AND INCISIONAL DEBRIDEMENT OPEN FRACTURE RIGHT 2ND TOE SKIN AND BONE performed by Nilsa Curtis MD at [address] [facility] Left 7/22/2020    LEFT FOOT INCISION AND DRAINAGE WITH BONE BIOPSY AND REMOVAL OF FREE FLOATING BONE FRAGMENT performed by Jessenia Ruelas DPM at

## 2023-08-22 ENCOUNTER — NURSE ONLY (OUTPATIENT)
Dept: CARDIOLOGY CLINIC | Age: 73
End: 2023-08-22

## 2023-08-22 ENCOUNTER — HOSPITAL ENCOUNTER (OUTPATIENT)
Dept: WOUND CARE | Age: 73
Discharge: HOME | End: 2023-08-22
Payer: MEDICARE

## 2023-08-22 ENCOUNTER — HOSPITAL ENCOUNTER (OUTPATIENT)
Dept: PHYSICAL THERAPY | Age: 73
Setting detail: THERAPIES SERIES
Discharge: HOME | End: 2023-08-22
Payer: MEDICARE

## 2023-08-22 ENCOUNTER — OFFICE VISIT (OUTPATIENT)
Dept: CARDIOLOGY CLINIC | Age: 73
End: 2023-08-22
Payer: MEDICARE

## 2023-08-22 VITALS
SYSTOLIC BLOOD PRESSURE: 136 MMHG | DIASTOLIC BLOOD PRESSURE: 75 MMHG | RESPIRATION RATE: 20 BRPM | HEART RATE: 76 BPM | TEMPERATURE: 97.3 F

## 2023-08-22 VITALS
SYSTOLIC BLOOD PRESSURE: 130 MMHG | HEIGHT: 76 IN | BODY MASS INDEX: 32.39 KG/M2 | DIASTOLIC BLOOD PRESSURE: 74 MMHG | HEART RATE: 61 BPM | OXYGEN SATURATION: 97 % | WEIGHT: 266 LBS

## 2023-08-22 DIAGNOSIS — Z74.09 IMPAIRED MOBILITY: ICD-10-CM

## 2023-08-22 DIAGNOSIS — Z95.810 CARDIAC RESYNCHRONIZATION THERAPY DEFIBRILLATOR (CRT-D) IN PLACE: ICD-10-CM

## 2023-08-22 DIAGNOSIS — M14.672 CHARCOT ANKLE, LEFT: ICD-10-CM

## 2023-08-22 DIAGNOSIS — I48.92 ATRIAL FIBRILLATION AND FLUTTER (HCC): ICD-10-CM

## 2023-08-22 DIAGNOSIS — L97.922 NON-PRESSURE CHRONIC ULCER OF LEFT LOWER LEG WITH FAT LAYER EXPOSED (HCC): ICD-10-CM

## 2023-08-22 DIAGNOSIS — L97.212 VENOUS STASIS ULCER OF RIGHT CALF WITH FAT LAYER EXPOSED WITH VARICOSE VEINS (HCC): ICD-10-CM

## 2023-08-22 DIAGNOSIS — I48.91 ATRIAL FIBRILLATION AND FLUTTER (HCC): ICD-10-CM

## 2023-08-22 DIAGNOSIS — E08.621 DIABETIC ULCER OF HEEL ASSOCIATED WITH DIABETES MELLITUS DUE TO UNDERLYING CONDITION, WITH FAT LAYER EXPOSED, UNSPECIFIED LATERALITY (HCC): ICD-10-CM

## 2023-08-22 DIAGNOSIS — I47.29 NSVT (NONSUSTAINED VENTRICULAR TACHYCARDIA) (HCC): ICD-10-CM

## 2023-08-22 DIAGNOSIS — E11.621 TYPE 2 DIABETES MELLITUS WITH LEFT DIABETIC FOOT ULCER (HCC): ICD-10-CM

## 2023-08-22 DIAGNOSIS — E11.621 DIABETIC ULCER OF LEFT MIDFOOT ASSOCIATED WITH TYPE 2 DIABETES MELLITUS, WITH NECROSIS OF BONE (HCC): ICD-10-CM

## 2023-08-22 DIAGNOSIS — T23.201S: ICD-10-CM

## 2023-08-22 DIAGNOSIS — M86.272 SUBACUTE OSTEOMYELITIS OF LEFT FOOT (HCC): ICD-10-CM

## 2023-08-22 DIAGNOSIS — L97.402 DIABETIC ULCER OF HEEL ASSOCIATED WITH DIABETES MELLITUS DUE TO UNDERLYING CONDITION, WITH FAT LAYER EXPOSED, UNSPECIFIED LATERALITY (HCC): ICD-10-CM

## 2023-08-22 DIAGNOSIS — I42.0 DILATED CARDIOMYOPATHY (HCC): ICD-10-CM

## 2023-08-22 DIAGNOSIS — I50.22 CHRONIC SYSTOLIC HEART FAILURE (HCC): ICD-10-CM

## 2023-08-22 DIAGNOSIS — T87.9 BKA STUMP COMPLICATION (HCC): ICD-10-CM

## 2023-08-22 DIAGNOSIS — L98.499 DIABETES MELLITUS WITH SKIN ULCER (HCC): ICD-10-CM

## 2023-08-22 DIAGNOSIS — R60.9 PERIPHERAL EDEMA: ICD-10-CM

## 2023-08-22 DIAGNOSIS — I48.0 PAROXYSMAL ATRIAL FIBRILLATION (HCC): Primary | ICD-10-CM

## 2023-08-22 DIAGNOSIS — L97.529 TYPE 2 DIABETES MELLITUS WITH LEFT DIABETIC FOOT ULCER (HCC): ICD-10-CM

## 2023-08-22 DIAGNOSIS — E11.21 DIABETIC NEPHROPATHY ASSOCIATED WITH TYPE 2 DIABETES MELLITUS (HCC): ICD-10-CM

## 2023-08-22 DIAGNOSIS — L97.424 DIABETIC ULCER OF LEFT MIDFOOT ASSOCIATED WITH TYPE 2 DIABETES MELLITUS, WITH NECROSIS OF BONE (HCC): ICD-10-CM

## 2023-08-22 DIAGNOSIS — I48.0 PAROXYSMAL ATRIAL FIBRILLATION (HCC): ICD-10-CM

## 2023-08-22 DIAGNOSIS — Z95.810 CARDIAC RESYNCHRONIZATION THERAPY DEFIBRILLATOR (CRT-D) IN PLACE: Primary | ICD-10-CM

## 2023-08-22 DIAGNOSIS — E11.622 DIABETES MELLITUS WITH SKIN ULCER (HCC): ICD-10-CM

## 2023-08-22 DIAGNOSIS — E11.42 DIABETIC POLYNEUROPATHY ASSOCIATED WITH TYPE 2 DIABETES MELLITUS (HCC): ICD-10-CM

## 2023-08-22 DIAGNOSIS — I42.9 CARDIOMYOPATHY, UNSPECIFIED TYPE (HCC): ICD-10-CM

## 2023-08-22 DIAGNOSIS — I70.244 ATHEROSCLEROSIS OF NATIVE ARTERIES OF LEFT LEG WITH ULCERATION OF HEEL AND MIDFOOT (HCC): ICD-10-CM

## 2023-08-22 DIAGNOSIS — E11.8 DIABETIC FOOT (HCC): Primary | ICD-10-CM

## 2023-08-22 DIAGNOSIS — I83.012 VENOUS STASIS ULCER OF RIGHT CALF WITH FAT LAYER EXPOSED WITH VARICOSE VEINS (HCC): ICD-10-CM

## 2023-08-22 PROCEDURE — 1123F ACP DISCUSS/DSCN MKR DOCD: CPT | Performed by: NURSE PRACTITIONER

## 2023-08-22 PROCEDURE — 93000 ELECTROCARDIOGRAM COMPLETE: CPT | Performed by: NURSE PRACTITIONER

## 2023-08-22 PROCEDURE — 3075F SYST BP GE 130 - 139MM HG: CPT | Performed by: NURSE PRACTITIONER

## 2023-08-22 PROCEDURE — 99214 OFFICE O/P EST MOD 30 MIN: CPT | Performed by: NURSE PRACTITIONER

## 2023-08-22 PROCEDURE — 11042 DBRDMT SUBQ TIS 1ST 20SQCM/<: CPT | Performed by: EMERGENCY MEDICINE

## 2023-08-22 PROCEDURE — 97110 THERAPEUTIC EXERCISES: CPT

## 2023-08-22 PROCEDURE — 11042 DBRDMT SUBQ TIS 1ST 20SQCM/<: CPT

## 2023-08-22 PROCEDURE — 3078F DIAST BP <80 MM HG: CPT | Performed by: NURSE PRACTITIONER

## 2023-08-22 RX ORDER — LIDOCAINE HYDROCHLORIDE 40 MG/ML
SOLUTION TOPICAL ONCE
OUTPATIENT
Start: 2023-08-22 | End: 2023-08-22

## 2023-08-22 RX ORDER — CLOBETASOL PROPIONATE 0.5 MG/G
OINTMENT TOPICAL ONCE
OUTPATIENT
Start: 2023-08-22 | End: 2023-08-22

## 2023-08-22 RX ORDER — LIDOCAINE 40 MG/G
CREAM TOPICAL ONCE
Status: COMPLETED | OUTPATIENT
Start: 2023-08-22 | End: 2023-08-22

## 2023-08-22 RX ORDER — LIDOCAINE 50 MG/G
OINTMENT TOPICAL ONCE
OUTPATIENT
Start: 2023-08-22 | End: 2023-08-22

## 2023-08-22 RX ORDER — SODIUM CHLOR/HYPOCHLOROUS ACID 0.033 %
SOLUTION, IRRIGATION IRRIGATION ONCE
OUTPATIENT
Start: 2023-08-22 | End: 2023-08-22

## 2023-08-22 RX ORDER — LIDOCAINE 40 MG/G
CREAM TOPICAL ONCE
OUTPATIENT
Start: 2023-08-22 | End: 2023-08-22

## 2023-08-22 RX ORDER — BACITRACIN ZINC AND POLYMYXIN B SULFATE 500; 1000 [USP'U]/G; [USP'U]/G
OINTMENT TOPICAL ONCE
OUTPATIENT
Start: 2023-08-22 | End: 2023-08-22

## 2023-08-22 RX ORDER — GENTAMICIN SULFATE 1 MG/G
OINTMENT TOPICAL ONCE
OUTPATIENT
Start: 2023-08-22 | End: 2023-08-22

## 2023-08-22 RX ORDER — BETAMETHASONE DIPROPIONATE 0.05 %
OINTMENT (GRAM) TOPICAL ONCE
OUTPATIENT
Start: 2023-08-22 | End: 2023-08-22

## 2023-08-22 RX ORDER — LIDOCAINE HYDROCHLORIDE 20 MG/ML
JELLY TOPICAL ONCE
OUTPATIENT
Start: 2023-08-22 | End: 2023-08-22

## 2023-08-22 RX ORDER — IBUPROFEN 200 MG
TABLET ORAL ONCE
OUTPATIENT
Start: 2023-08-22 | End: 2023-08-22

## 2023-08-22 RX ORDER — GINSENG 100 MG
CAPSULE ORAL ONCE
OUTPATIENT
Start: 2023-08-22 | End: 2023-08-22

## 2023-08-22 RX ORDER — AMIODARONE HYDROCHLORIDE 200 MG/1
200 TABLET ORAL DAILY
Qty: 90 TABLET | Refills: 3 | Status: SHIPPED | OUTPATIENT
Start: 2023-08-22

## 2023-08-22 RX ADMIN — LIDOCAINE 1 G: 40 CREAM TOPICAL at 11:16

## 2023-08-22 ASSESSMENT — ENCOUNTER SYMPTOMS
ABDOMINAL PAIN: 0
COUGH: 0
BACK PAIN: 0
TROUBLE SWALLOWING: 0
DIARRHEA: 0
CONSTIPATION: 0
SORE THROAT: 0
VOMITING: 0
SHORTNESS OF BREATH: 0
SINUS PRESSURE: 0
BLOOD IN STOOL: 0
NAUSEA: 0
COLOR CHANGE: 0
WHEEZING: 0

## 2023-08-22 ASSESSMENT — PAIN SCALES - GENERAL
PAINLEVEL_OUTOF10: 0
PAINLEVEL_OUTOF10: 0

## 2023-08-22 NOTE — PLAN OF CARE
[employer] Application   Below Knee    NAME:  Roxy Arriaga  YOB: 1950  MEDICAL RECORD NUMBER:  [id number]  DATE:  8/22/2023    Katie Toro boot: Applied moisturizing agent to dry skin as needed. Appied primary and secondary dressing as ordered. Applied Unna roll from toes to knee overlapping each time. Applied ace wrap or coban from toes to below the knee. Secured with tape and/or metal clips covered with tape. Instructed patient/caregiver to keep dressing dry and intact. DO NOT REMOVE DRESSING. Instructed pt/family/caregiver to report excessive draining, loose bandage, wet dressing, severe pain or tingling in toes. Applied [employer] dressing below the knee to right lower leg. Unna Boot(s) were applied per  Guidelines.      Electronically signed by Juanell Cooks, RN on 8/22/2023 at 11:32 AM

## 2023-08-22 NOTE — PROGRESS NOTES
flexed. Exercise 5: Standing at RW X 10 BLEs marching alteranting and X 10 mini squats CGA for balance, cues for body positioning and posture throughout. Exercise 6: Sit <-> stand X 2 trials CGA increased effort and time promoting increased use of BLEs, LUE only during transfer    Limitations addressed: Mobility, Strength, Activity tolerance  Therapist provided: Assistance, Verbal cuing, Tactile cuing  Progressed: Repetitions, Complexity of movement  Functional ability(s) targeted: strenthening of bilateral hips to improve posture, gait, and transfers with increased use of BLEs; pt tolerated well at this time   1:1 Time (minutes): 43                 Pt Education: PT Education: Goals, PT Role, Plan of Care, Evaluative findings, Home Exercise Program, Transfer Training, Posture, Functional Mobility Training, Equipment, General Safety  Patient Education: HEP and recommendations for exercises  Current home exercise program (HEP): [email or web address]    Access Code: [email or web address]       ASSESSMENT     Assessment: Assessment: Pttolerated exercises well this date, very receptive to all education and training. Progressing with overall activity tolerance increased participation in HEP. COntinues with bilateral hip weakness limiting mobility with strong use of BUE for transfers and ambulation with RW. Pt benefit from skilled PT to address these impairments promoting improved functional capacity with decreased use of DME to maintaing mobility independence. Body Structures, Functions, Activity Limitations Requiring Skilled Therapeutic Intervention: Decreased functional mobility , Decreased ROM, Decreased body mechanics, Decreased strength, Decreased posture, Decreased balance, Decreased endurance    Post-Treatment Pain Level:       Activity Tolerance: Patient tolerated evaluation without incident; Patient tolerated treatment well; Patient limited by endurance    Therapy Prognosis: Good       GOALS   Patient Goals : \"walk with a

## 2023-08-22 NOTE — PROGRESS NOTES
Appearance/Constitutional: alert and in no acute distress. Nontoxic. Skin: Positive wound per LDA documentation if applicable. No jaundice. Head: Normocephalic and atraumatic. HEENT: Atraumatic. Unremarkable. Extraocular eye movements intact, sclera anicteric. Pulmonary: no chest wall tenderness. No respiratory distress. [] Clear anteriorly  [x] Even and unlabored breathing  Cardiovascular:    [] NA   [x] No signs of acute cardiac distress  [] Normal rate and regular rhythm.   GI: abdomen soft, non-tender and benign  Musculoskeletal: Nontender calves  [] Cyanosis  [x] Edema present  [] No edema   [] Pulses palpable/normal  [] Pulses weakly palpable  [] Pulses nonpalpable    Neurologic: No focal deficits  [x] Mental status normal  [] Confused     PAST MEDICAL HISTORY        Diagnosis Date    Atrial fibrillation (HCC)     Back pain     Cardiomyopathy     CHF (congestive heart failure) (HCC)     COPD (chronic obstructive pulmonary disease) (HCC)     Dyslipidemia     GERD (gastroesophageal reflux disease)     Hyperlipidemia     Hypertension     Hypothyroidism     Leg edema     MRSA (methicillin resistant staph aureus) culture positive 10/05/2015    foot/bone    MRSA nasal colonization 05/05/2017    Obesity     Prolonged emergence from general anesthesia     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     Sleep apnea     Stroke [facility])     Thyroid disease     Type 2 diabetes mellitus without complication ([address])     Type II or unspecified type diabetes mellitus without mention of complication, not stated as uncontrolled        PAST SURGICAL HISTORY  Past Surgical History:   Procedure Laterality Date    ADRENAL GLAND SURGERY  1970    CARDIAC DEFIBRILLATOR PLACEMENT  09/05/2017    Dr. Darryle Haines 3/8/2019    COLONOSCOPY WITH MAC, UNASYN (3gm) performed by Miguel Viramontes MD at [address] N/A 8/9/2019    COLONOSCOPY POLYPECTOMY SNARE/COLD BIOPSY performed by Miguel Viramontes MD at [facility]

## 2023-08-24 ENCOUNTER — HOSPITAL ENCOUNTER (OUTPATIENT)
Dept: PHYSICAL THERAPY | Age: 73
Setting detail: THERAPIES SERIES
Discharge: HOME | End: 2023-08-24
Payer: MEDICARE

## 2023-08-24 PROCEDURE — 9990000010 HC NO CHARGE VISIT

## 2023-08-24 NOTE — PROGRESS NOTES
Physical Therapy  Cancellation/No-show Note  Patient Name:  Sarabjit Curtis  :  1950   Date:  2023  Cancelled visits to date: 0  No-shows to date: 1    For today's appointment patient:  [x]  Cancelled  []  Rescheduled appointment  [x]  No-show     Reason given by patient:  []  Patient ill  []  Conflicting appointment  []  No transportation    []  Conflict with work  []  No reason given  [x]  Other:     Comments:  NO messaged received, called patient 1330. Pt informed PT at this time that emergency with plumbing at home and unable to attend due. States he did call and leave a message earlier this date.      Electronically signed by:  Guido Bagley, MILE [id number] 23 1:51 PM

## 2023-08-24 NOTE — DISCHARGE INSTRUCTIONS
[address] [facility] Physician Orders and Discharge [address] [place] [address]  [address], [address] [place] [address], [address] [place], [place] [address]  Telephone: [phone] [address] 8:00 am - 4:30 pm and Friday 8:00 am - 12:00 pm.          NAME:  Bri Rodriguez  YOB: 1950  MEDICAL RECORD NUMBER:  [id number]  DATE:  8/29/2023        Return Appointment:  [x] Return Appointment: With Sofy Colin MD  in  1 Week(s)  [x] Wound and dressing supply provider: [facility]   [] ECF or Home Healthcare:  [] Wound Assessment:         [] Physician or NP scheduled for Wound Assessment:   [] Orders placed during your visit:         Important Reminders:   Please wash hands with soap and water before and after every dressing change. Do not scrub wounds. Keep wounds dry in shower unless otherwise instructed by the physician. SMOKING can slow would healing. Stop smoking as soon as possible to improve healing and prevent further complications associated with smoking. Michelle-Wound Topical Treatments:  Do not apply lotions, creams, or ointments to wound bed unless directed. [] Apply moisturizing lotion to skin surrounding the wound prior to dressing change.  [] Apply antifungal ointment to skin surrounding the wound prior to dressing change.  [] Apply thin film of no sting moisture barrier ointment to skin immediately around      wound.   [x] Other: EUCERIN CREAM TO LEFT STUMP DAILY     Wound Location: RIGHT MEDIAL LEG     Wound Cleansing: Normal Saline     Primary Dressing:  [x] HYDROFERA BLUE DAMPENED WITH NORMAL SALINE     Secondary Dressing:  [x] Galian Pont  []         Dressing Frequency:  []   [x] Do Not Change Dressing              Compression and Edema Control: APPLY CO-FLEX WITH CALAMINE TO RIGHT LOWER LEG  [] Wear Home Compression Stockings   [] Spandagrip to: Right Leg   Size: []Low compression 5-10 mm/Hg                     []Medium compression 10-20 mm/Hg

## 2023-08-29 ENCOUNTER — HOSPITAL ENCOUNTER (OUTPATIENT)
Dept: PHYSICAL THERAPY | Age: 73
Setting detail: THERAPIES SERIES
Discharge: HOME | End: 2023-08-29
Payer: MEDICARE

## 2023-08-29 ENCOUNTER — HOSPITAL ENCOUNTER (OUTPATIENT)
Dept: WOUND CARE | Age: 73
Discharge: HOME | End: 2023-08-29
Payer: MEDICARE

## 2023-08-29 VITALS
HEART RATE: 60 BPM | SYSTOLIC BLOOD PRESSURE: 99 MMHG | DIASTOLIC BLOOD PRESSURE: 62 MMHG | TEMPERATURE: 97.9 F | RESPIRATION RATE: 20 BRPM

## 2023-08-29 DIAGNOSIS — L98.499 DIABETES MELLITUS WITH SKIN ULCER (HCC): ICD-10-CM

## 2023-08-29 DIAGNOSIS — T23.201S: ICD-10-CM

## 2023-08-29 DIAGNOSIS — R60.9 PERIPHERAL EDEMA: ICD-10-CM

## 2023-08-29 DIAGNOSIS — M86.272 SUBACUTE OSTEOMYELITIS OF LEFT FOOT (HCC): ICD-10-CM

## 2023-08-29 DIAGNOSIS — E11.42 DIABETIC POLYNEUROPATHY ASSOCIATED WITH TYPE 2 DIABETES MELLITUS (HCC): ICD-10-CM

## 2023-08-29 DIAGNOSIS — E11.21 DIABETIC NEPHROPATHY ASSOCIATED WITH TYPE 2 DIABETES MELLITUS (HCC): ICD-10-CM

## 2023-08-29 DIAGNOSIS — I70.244 ATHEROSCLEROSIS OF NATIVE ARTERIES OF LEFT LEG WITH ULCERATION OF HEEL AND MIDFOOT (HCC): ICD-10-CM

## 2023-08-29 DIAGNOSIS — L97.922 NON-PRESSURE CHRONIC ULCER OF LEFT LOWER LEG WITH FAT LAYER EXPOSED (HCC): ICD-10-CM

## 2023-08-29 DIAGNOSIS — E08.621 DIABETIC ULCER OF HEEL ASSOCIATED WITH DIABETES MELLITUS DUE TO UNDERLYING CONDITION, WITH FAT LAYER EXPOSED, UNSPECIFIED LATERALITY (HCC): ICD-10-CM

## 2023-08-29 DIAGNOSIS — I83.012 VENOUS STASIS ULCER OF RIGHT CALF WITH FAT LAYER EXPOSED WITH VARICOSE VEINS (HCC): ICD-10-CM

## 2023-08-29 DIAGNOSIS — E11.8 DIABETIC FOOT (HCC): Primary | ICD-10-CM

## 2023-08-29 DIAGNOSIS — L97.424 DIABETIC ULCER OF LEFT MIDFOOT ASSOCIATED WITH TYPE 2 DIABETES MELLITUS, WITH NECROSIS OF BONE (HCC): ICD-10-CM

## 2023-08-29 DIAGNOSIS — L97.402 DIABETIC ULCER OF HEEL ASSOCIATED WITH DIABETES MELLITUS DUE TO UNDERLYING CONDITION, WITH FAT LAYER EXPOSED, UNSPECIFIED LATERALITY (HCC): ICD-10-CM

## 2023-08-29 DIAGNOSIS — T87.9 BKA STUMP COMPLICATION (HCC): ICD-10-CM

## 2023-08-29 DIAGNOSIS — Z74.09 IMPAIRED MOBILITY: ICD-10-CM

## 2023-08-29 DIAGNOSIS — L97.529 TYPE 2 DIABETES MELLITUS WITH LEFT DIABETIC FOOT ULCER (HCC): ICD-10-CM

## 2023-08-29 DIAGNOSIS — E11.621 DIABETIC ULCER OF LEFT MIDFOOT ASSOCIATED WITH TYPE 2 DIABETES MELLITUS, WITH NECROSIS OF BONE (HCC): ICD-10-CM

## 2023-08-29 DIAGNOSIS — E11.621 TYPE 2 DIABETES MELLITUS WITH LEFT DIABETIC FOOT ULCER (HCC): ICD-10-CM

## 2023-08-29 DIAGNOSIS — M14.672 CHARCOT ANKLE, LEFT: ICD-10-CM

## 2023-08-29 DIAGNOSIS — L97.212 VENOUS STASIS ULCER OF RIGHT CALF WITH FAT LAYER EXPOSED WITH VARICOSE VEINS (HCC): ICD-10-CM

## 2023-08-29 DIAGNOSIS — E11.622 DIABETES MELLITUS WITH SKIN ULCER (HCC): ICD-10-CM

## 2023-08-29 PROCEDURE — 11042 DBRDMT SUBQ TIS 1ST 20SQCM/<: CPT

## 2023-08-29 PROCEDURE — 11042 DBRDMT SUBQ TIS 1ST 20SQCM/<: CPT | Performed by: EMERGENCY MEDICINE

## 2023-08-29 PROCEDURE — 97110 THERAPEUTIC EXERCISES: CPT

## 2023-08-29 RX ORDER — IBUPROFEN 200 MG
TABLET ORAL ONCE
OUTPATIENT
Start: 2023-08-29 | End: 2023-08-29

## 2023-08-29 RX ORDER — BETAMETHASONE DIPROPIONATE 0.05 %
OINTMENT (GRAM) TOPICAL ONCE
OUTPATIENT
Start: 2023-08-29 | End: 2023-08-29

## 2023-08-29 RX ORDER — GINSENG 100 MG
CAPSULE ORAL ONCE
OUTPATIENT
Start: 2023-08-29 | End: 2023-08-29

## 2023-08-29 RX ORDER — LIDOCAINE HYDROCHLORIDE 20 MG/ML
JELLY TOPICAL ONCE
OUTPATIENT
Start: 2023-08-29 | End: 2023-08-29

## 2023-08-29 RX ORDER — LIDOCAINE 50 MG/G
OINTMENT TOPICAL ONCE
OUTPATIENT
Start: 2023-08-29 | End: 2023-08-29

## 2023-08-29 RX ORDER — LIDOCAINE 40 MG/G
CREAM TOPICAL ONCE
Status: COMPLETED | OUTPATIENT
Start: 2023-08-29 | End: 2023-08-29

## 2023-08-29 RX ORDER — GENTAMICIN SULFATE 1 MG/G
OINTMENT TOPICAL ONCE
OUTPATIENT
Start: 2023-08-29 | End: 2023-08-29

## 2023-08-29 RX ORDER — LIDOCAINE HYDROCHLORIDE 40 MG/ML
SOLUTION TOPICAL ONCE
OUTPATIENT
Start: 2023-08-29 | End: 2023-08-29

## 2023-08-29 RX ORDER — BACITRACIN ZINC AND POLYMYXIN B SULFATE 500; 1000 [USP'U]/G; [USP'U]/G
OINTMENT TOPICAL ONCE
OUTPATIENT
Start: 2023-08-29 | End: 2023-08-29

## 2023-08-29 RX ORDER — SODIUM CHLOR/HYPOCHLOROUS ACID 0.033 %
SOLUTION, IRRIGATION IRRIGATION ONCE
OUTPATIENT
Start: 2023-08-29 | End: 2023-08-29

## 2023-08-29 RX ORDER — CLOBETASOL PROPIONATE 0.5 MG/G
OINTMENT TOPICAL ONCE
OUTPATIENT
Start: 2023-08-29 | End: 2023-08-29

## 2023-08-29 RX ORDER — LIDOCAINE 40 MG/G
CREAM TOPICAL ONCE
OUTPATIENT
Start: 2023-08-29 | End: 2023-08-29

## 2023-08-29 RX ADMIN — LIDOCAINE 1 G: 40 CREAM TOPICAL at 10:57

## 2023-08-29 ASSESSMENT — PAIN SCALES - GENERAL
PAINLEVEL_OUTOF10: 0

## 2023-08-29 NOTE — PROGRESS NOTES
glucose test strips (ONE TOUCH TEST STRIPS) strip 1 each by In Vitro route 3 times daily As needed. 300 each 3    ONETOUCH ULTRA strip USE WITH GLUCOSE METER THREE TIMES DAILY AND AS NEEDED 300 strip 3    ferrous sulfate (FE TABS 325) 325 (65 Fe) MG EC tablet Take 1 tablet by mouth every other day      Continuous Blood Gluc  (FREESTYLE KWESI 14 DAY READER) ANITA Use as Directed Dx E11.9 1 Device 0    Blood Glucose Monitoring Suppl (ONE TOUCH ULTRA MINI) w/Device KIT 1 kit by Does not apply route three times daily 1 kit 0    acetaminophen (TYLENOL) 325 MG tablet Take 2 tablets by mouth every 4 hours as needed for Pain 120 tablet 3    Coenzyme Q10 (CO Q 10) 100 MG CAPS Take 1 capsule by mouth daily      Cinnamon 500 MG CAPS Take 1 capsule by mouth daily      Cholecalciferol (VITAMIN D) 125 MCG (5000 UT) CAPS Take 5,000 Units by mouth three times a week Indications: cut back on it due to pcp stating the vitamin D level was to high. Insulin Syringe-Needle U-100 (INSULIN SYRINGE .5CC/31GX5/16\") 31G X 5/16\" 0.5 ML MISC Patient tests bid 100 Syringe 5    therapeutic multivitamin-minerals (THERAGRAN-M) tablet Take 1 tablet by mouth daily       No current facility-administered medications on file prior to encounter. REVIEW OF SYSTEMS  A comprehensive review of systems was negative except noted in HPI/wound narrative and/or updates above. Written patient dismissal instructions given to patient and signed by patient or POA. Patient voiced understanding that the importance of adherence to instructions is paramount to wound healing improvement or success.      Discharge [facility] Physician Orders and Discharge [address] [place] [address] Street  [address] [place] [address], [address] [place] [address], [address]  Telephone: [phone] [address] [facility] 8:00 am - 4:30 pm and Friday 8:00 am - 12:00 pm.          NAME:  Karis Levin

## 2023-08-29 NOTE — PLAN OF CARE
[employer] Application   Below Knee    NAME:  Aleyda Gaston OF BIRTH:  1950  MEDICAL RECORD NUMBER:  [id number]  DATE:  8/29/2023    Arlen Hickey boot: Appied primary and secondary dressing as ordered. Applied Unna roll from toes to knee overlapping each time. Applied ace wrap or coban from toes to below the knee. Secured with tape and/or metal clips covered with tape. Instructed patient/caregiver to keep dressing dry and intact. DO NOT REMOVE DRESSING. Instructed pt/family/caregiver to report excessive draining, loose bandage, wet dressing, severe pain or tingling in toes. Applied [employer] dressing below the knee to right lower leg. CoFlex with calamine    Unna Boot(s) were applied per  Guidelines.      Electronically signed by Emily Storey RN on 8/29/2023 at 11:42 AM

## 2023-08-29 NOTE — PROGRESS NOTES
Physical Therapy: Daily Note   Patient: Jesenia Raymundo (32 y.o. male)   Examination Date: 2023  No data recorded  No data recorded   :  1950 # of Visits since [facility]:   3   MRN: [id number]  CSN: [id number] Start of Care Date:   2023   Insurance: Payor: Delores Prior / Plan: Ron Young ESSENTIAL/PLUS / Product Type: *No Product type* /   Insurance ID: [id number] - (Medicare Managed) Secondary Insurance (if applicable):    Referring Physician: MD Mirtha Mcdonnell MD   PCP: Mirtha Tamayo MD Visits to Date/Visits Approved: 3 / 30    No Show/Cancelled Appts: 0      Medical Diagnosis: Hx of BKA, left ([address]) [Z89.512] Hx of BKA, left ([address]) (Z89.512), LE weakness  Treatment Diagnosis: Decreased functional capacity due to decreased strength and activity tolerance        SUBJECTIVE EXAMINATION   Pain Level: Pain Screening  Patient Currently in Pain: Denies  Pain Level: 0    Patient Comments: Subjective: Pt reports some difficulty with HEP due to difficulty getting in correct positioning. Overall feeling like physical activity is approving. Agreeable to PT Treatment session    HEP Compliance: Good  Any changes to allergies, medications, or have you had any medical procedures/ER visits since your last visit?: No     OBJECTIVE EXAMINATION   Restrictions:  No data recorded No data recorded No data recorded              TREATMENT     Exercises:  Therapeutic exercise (CPT 87754)   Treatment Reasoning    Exercise 1: Supine exercises BLEs 2lb ankle weight   each (VC and TC throughout for proper sequencing and body positioning) SLR (3x15), hip abduction knee straight (3x15), heel slides (2 X 10), SAQ 3\" hold (2 X 15), and  bilateral hook lying with hip adduction sets 5\" (2 X 15) hold.   Exercise 2: Side lying clamshells BLE 2 X 15 each  Exercise 3: NuStep 6 minutes, level 6 resistance, BLEs only seat 13 and BUE 13, average SPM: 46  Exercise 4: Seated exercises BLEs 2 X 15 each including LAQ,

## 2023-08-31 ENCOUNTER — APPOINTMENT (OUTPATIENT)
Dept: PHYSICAL THERAPY | Age: 73
End: 2023-08-31
Payer: MEDICARE

## 2023-08-31 NOTE — DISCHARGE INSTRUCTIONS
[address] [place] Physician Orders and Discharge [address] [place] [address]  [address], 1 [facility], [address]  Telephone: [phone] [address] 8:00 am - 4:30 pm and Friday 8:00 am - 12:00 pm.          NAME:  Cesario Beck  YOB: 1950  MEDICAL RECORD NUMBER:  [id number]  DATE:  9/5/2023        Return Appointment:  [x] Return Appointment: With Marilynn Thurman MD  in  1 Week(s)  [x] Wound and dressing supply provider: [facility]   [] ECF or Home Healthcare:  [] Wound Assessment:         [] Physician or NP scheduled for Wound Assessment:   [] Orders placed during your visit:         Important Reminders:   Please wash hands with soap and water before and after every dressing change. Do not scrub wounds. Keep wounds dry in shower unless otherwise instructed by the physician. SMOKING can slow would healing. Stop smoking as soon as possible to improve healing and prevent further complications associated with smoking. Michelle-Wound Topical Treatments:  Do not apply lotions, creams, or ointments to wound bed unless directed. [] Apply moisturizing lotion to skin surrounding the wound prior to dressing change.  [] Apply antifungal ointment to skin surrounding the wound prior to dressing change.  [] Apply thin film of no sting moisture barrier ointment to skin immediately around      wound.   [x] Other: EUCERIN CREAM TO LEFT STUMP DAILY     Wound Location: RIGHT MEDIAL LEG     Wound Cleansing: Normal Saline     Primary Dressing:  [x] HYDROFERA BLUE DAMPENED WITH NORMAL SALINE     Secondary Dressing:  [x] Genice Sevin  []         Dressing Frequency:  []   [x] Do Not Change Dressing              Compression and Edema Control: APPLY CO-FLEX WITH CALAMINE TO RIGHT LOWER LEG  [] Wear Home Compression Stockings   [] Spandagrip to: Right Leg   Size: []Low compression 5-10 mm/Hg                     []Medium compression 10-20

## 2023-09-05 ENCOUNTER — HOSPITAL ENCOUNTER (OUTPATIENT)
Dept: PHYSICAL THERAPY | Age: 73
Setting detail: THERAPIES SERIES
Discharge: HOME | End: 2023-09-05
Payer: MEDICARE

## 2023-09-05 ENCOUNTER — HOSPITAL ENCOUNTER (OUTPATIENT)
Dept: WOUND CARE | Age: 73
Discharge: HOME | End: 2023-09-05

## 2023-09-05 PROCEDURE — 97116 GAIT TRAINING THERAPY: CPT

## 2023-09-05 PROCEDURE — 97110 THERAPEUTIC EXERCISES: CPT

## 2023-09-05 NOTE — DISCHARGE INSTRUCTIONS
NAME:  Doria Boast  YOB: 1950  MEDICAL RECORD NUMBER:  [id number]  DATE:  9/6/2023        Return Appointment:  [x] Return Appointment: With Gabby Barrera MD  in  1 Week(s)  [x] Wound and dressing supply provider: [facility]   [] ECF or Home Healthcare:  [] Wound Assessment:         [] Physician or NP scheduled for Wound Assessment:   [] Orders placed during your visit:         Important Reminders:   Please wash hands with soap and water before and after every dressing change. Do not scrub wounds. Keep wounds dry in shower unless otherwise instructed by the physician. SMOKING can slow would healing. Stop smoking as soon as possible to improve healing and prevent further complications associated with smoking. Michelle-Wound Topical Treatments:  Do not apply lotions, creams, or ointments to wound bed unless directed. [] Apply moisturizing lotion to skin surrounding the wound prior to dressing change.  [] Apply antifungal ointment to skin surrounding the wound prior to dressing change.  [] Apply thin film of no sting moisture barrier ointment to skin immediately around      wound. [x] Other: EUCERIN CREAM TO LEFT STUMP DAILY     Wound Location: RIGHT MEDIAL LEG     Wound Cleansing: Normal Saline     Primary Dressing:  [x] Adaptic     Secondary Dressing:  []   []         Dressing Frequency:  []   [x] Do Not Change Dressing              Compression and Edema Control: APPLY CO-FLEX WITH CALAMINE TO RIGHT LOWER LEG  [] Wear Home Compression Stockings   [] Spandagrip to: Right Leg   Size: []Low compression 5-10 mm/Hg                     []Medium compression 10-20 mm/Hg           []High compression  20-30 mm/Hg  [] Ace Wrap Toes to Knee to               Do not get leg(s) with wrap wet. If wraps become too tight call the center or completely remove the wrap.                                            [x] Assistive Devices   WALKER  Use as instructed by the provider        Activity: Activity as Tolerated/

## 2023-09-05 NOTE — PROGRESS NOTES
training this date with CGA-min assist short distance increased effort and time to perform . Pt benefit from skilled PT to address these impairments promoting improved functional capacity with decreased use of DME to maintaing mobility independence. Body Structures, Functions, Activity Limitations Requiring Skilled Therapeutic Intervention: Decreased functional mobility , Decreased ROM, Decreased body mechanics, Decreased strength, Decreased posture, Decreased balance, Decreased endurance    Post-Treatment Pain Level: Activity Tolerance: Patient tolerated evaluation without incident; Patient tolerated treatment well; Patient limited by endurance    Therapy Prognosis: Good       GOALS   Patient Goals : \"walk with a cane, get stronger\"  Short Term Goals Completed by 6 weeks Current Status Goal Status   Improved Bilateral hip strength 4-/5 improving functional mobility decreased use of BUE   In progress   Perform TUG < 20 demonstrating improvement in functional mobility capacity. In progress   Complete sit <-> stand < 20 seconds promoting improved functional mobility capacity. In progress   Improved LEFS > 40 improving self perceived functional mobility capcity. In progress                                                       Long Term Goals Completed by 10-12 weeks Current Status Goal Status   Improved Bilateral hip strength 4+/5 improving functional mobility decreased use of BUE ambulating with SPC 50 ft SBA   In progress   Perform TUG < 15 demonstrating improvement in functional mobility capacity. In progress   Complete sit <-> stand ~ 13 seconds promoting improved functional mobility capacity. In progress   Improved LEFS > 50 improving self perceived functional mobility capcity.    In progress   Independent with % of the time promote continued profression of functioanl mobility tasks with improved use of BLEs   In progress                                                TREATMENT PLAN   PT

## 2023-09-06 ENCOUNTER — HOSPITAL ENCOUNTER (OUTPATIENT)
Dept: WOUND CARE | Age: 73
Discharge: HOME | End: 2023-09-06
Payer: MEDICARE

## 2023-09-06 VITALS
HEART RATE: 72 BPM | DIASTOLIC BLOOD PRESSURE: 73 MMHG | RESPIRATION RATE: 20 BRPM | SYSTOLIC BLOOD PRESSURE: 123 MMHG | TEMPERATURE: 97.3 F

## 2023-09-06 DIAGNOSIS — Z74.09 IMPAIRED MOBILITY: ICD-10-CM

## 2023-09-06 DIAGNOSIS — L97.402 DIABETIC ULCER OF HEEL ASSOCIATED WITH DIABETES MELLITUS DUE TO UNDERLYING CONDITION, WITH FAT LAYER EXPOSED, UNSPECIFIED LATERALITY (HCC): ICD-10-CM

## 2023-09-06 DIAGNOSIS — R60.9 PERIPHERAL EDEMA: ICD-10-CM

## 2023-09-06 DIAGNOSIS — E08.621 DIABETIC ULCER OF HEEL ASSOCIATED WITH DIABETES MELLITUS DUE TO UNDERLYING CONDITION, WITH FAT LAYER EXPOSED, UNSPECIFIED LATERALITY (HCC): ICD-10-CM

## 2023-09-06 DIAGNOSIS — I83.012 VENOUS STASIS ULCER OF RIGHT CALF WITH FAT LAYER EXPOSED WITH VARICOSE VEINS (HCC): ICD-10-CM

## 2023-09-06 DIAGNOSIS — T87.9 BKA STUMP COMPLICATION (HCC): ICD-10-CM

## 2023-09-06 DIAGNOSIS — T23.201S: ICD-10-CM

## 2023-09-06 DIAGNOSIS — L98.499 DIABETES MELLITUS WITH SKIN ULCER (HCC): ICD-10-CM

## 2023-09-06 DIAGNOSIS — E11.621 TYPE 2 DIABETES MELLITUS WITH LEFT DIABETIC FOOT ULCER (HCC): ICD-10-CM

## 2023-09-06 DIAGNOSIS — E11.622 DIABETES MELLITUS WITH SKIN ULCER (HCC): ICD-10-CM

## 2023-09-06 DIAGNOSIS — L97.212 VENOUS STASIS ULCER OF RIGHT CALF WITH FAT LAYER EXPOSED WITH VARICOSE VEINS (HCC): ICD-10-CM

## 2023-09-06 DIAGNOSIS — M86.272 SUBACUTE OSTEOMYELITIS OF LEFT FOOT (HCC): ICD-10-CM

## 2023-09-06 DIAGNOSIS — L97.922 NON-PRESSURE CHRONIC ULCER OF LEFT LOWER LEG WITH FAT LAYER EXPOSED (HCC): ICD-10-CM

## 2023-09-06 DIAGNOSIS — E11.621 DIABETIC ULCER OF LEFT MIDFOOT ASSOCIATED WITH TYPE 2 DIABETES MELLITUS, WITH NECROSIS OF BONE (HCC): ICD-10-CM

## 2023-09-06 DIAGNOSIS — M14.672 CHARCOT ANKLE, LEFT: ICD-10-CM

## 2023-09-06 DIAGNOSIS — I70.244 ATHEROSCLEROSIS OF NATIVE ARTERIES OF LEFT LEG WITH ULCERATION OF HEEL AND MIDFOOT (HCC): ICD-10-CM

## 2023-09-06 DIAGNOSIS — E11.8 DIABETIC FOOT (HCC): Primary | ICD-10-CM

## 2023-09-06 DIAGNOSIS — E11.21 DIABETIC NEPHROPATHY ASSOCIATED WITH TYPE 2 DIABETES MELLITUS (HCC): ICD-10-CM

## 2023-09-06 DIAGNOSIS — L97.424 DIABETIC ULCER OF LEFT MIDFOOT ASSOCIATED WITH TYPE 2 DIABETES MELLITUS, WITH NECROSIS OF BONE (HCC): ICD-10-CM

## 2023-09-06 DIAGNOSIS — E11.42 DIABETIC POLYNEUROPATHY ASSOCIATED WITH TYPE 2 DIABETES MELLITUS (HCC): ICD-10-CM

## 2023-09-06 DIAGNOSIS — L97.529 TYPE 2 DIABETES MELLITUS WITH LEFT DIABETIC FOOT ULCER (HCC): ICD-10-CM

## 2023-09-06 PROCEDURE — 11042 DBRDMT SUBQ TIS 1ST 20SQCM/<: CPT

## 2023-09-06 RX ORDER — LIDOCAINE 40 MG/G
CREAM TOPICAL ONCE
Status: COMPLETED | OUTPATIENT
Start: 2023-09-06 | End: 2023-09-06

## 2023-09-06 RX ORDER — GENTAMICIN SULFATE 1 MG/G
OINTMENT TOPICAL ONCE
OUTPATIENT
Start: 2023-09-06 | End: 2023-09-06

## 2023-09-06 RX ORDER — GINSENG 100 MG
CAPSULE ORAL ONCE
OUTPATIENT
Start: 2023-09-06 | End: 2023-09-06

## 2023-09-06 RX ORDER — CLOBETASOL PROPIONATE 0.5 MG/G
OINTMENT TOPICAL ONCE
OUTPATIENT
Start: 2023-09-06 | End: 2023-09-06

## 2023-09-06 RX ORDER — IBUPROFEN 200 MG
TABLET ORAL ONCE
OUTPATIENT
Start: 2023-09-06 | End: 2023-09-06

## 2023-09-06 RX ORDER — LIDOCAINE HYDROCHLORIDE 40 MG/ML
SOLUTION TOPICAL ONCE
OUTPATIENT
Start: 2023-09-06 | End: 2023-09-06

## 2023-09-06 RX ORDER — SODIUM CHLOR/HYPOCHLOROUS ACID 0.033 %
SOLUTION, IRRIGATION IRRIGATION ONCE
OUTPATIENT
Start: 2023-09-06 | End: 2023-09-06

## 2023-09-06 RX ORDER — LIDOCAINE HYDROCHLORIDE 20 MG/ML
JELLY TOPICAL ONCE
OUTPATIENT
Start: 2023-09-06 | End: 2023-09-06

## 2023-09-06 RX ORDER — LIDOCAINE 40 MG/G
CREAM TOPICAL ONCE
OUTPATIENT
Start: 2023-09-06 | End: 2023-09-06

## 2023-09-06 RX ORDER — BETAMETHASONE DIPROPIONATE 0.05 %
OINTMENT (GRAM) TOPICAL ONCE
OUTPATIENT
Start: 2023-09-06 | End: 2023-09-06

## 2023-09-06 RX ORDER — BACITRACIN ZINC AND POLYMYXIN B SULFATE 500; 1000 [USP'U]/G; [USP'U]/G
OINTMENT TOPICAL ONCE
OUTPATIENT
Start: 2023-09-06 | End: 2023-09-06

## 2023-09-06 RX ORDER — LIDOCAINE 50 MG/G
OINTMENT TOPICAL ONCE
OUTPATIENT
Start: 2023-09-06 | End: 2023-09-06

## 2023-09-06 RX ADMIN — LIDOCAINE: 40 CREAM TOPICAL at 10:27

## 2023-09-06 ASSESSMENT — PAIN SCALES - GENERAL: PAINLEVEL_OUTOF10: 0

## 2023-09-06 NOTE — PLAN OF CARE
[employer] Application   Below Knee    NAME:  Jaye Smith OF BIRTH:  1950  MEDICAL RECORD NUMBER:  [id number]  DATE:  9/6/2023    Carie Branch boot: Appied primary and secondary dressing as ordered. Applied Unna roll from toes to knee overlapping each time. Applied ace wrap or coban from toes to below the knee. Secured with tape and/or metal clips covered with tape. Instructed patient/caregiver to keep dressing dry and intact. DO NOT REMOVE DRESSING. Instructed pt/family/caregiver to report excessive draining, loose bandage, wet dressing, severe pain or tingling in toes. Applied [employer] dressing below the knee to right lower leg. Unna Boot(s) were applied per  Guidelines.      Electronically signed by Pritesh Curtis RN on 9/6/2023 at 11:23 AM

## 2023-09-07 ENCOUNTER — APPOINTMENT (OUTPATIENT)
Dept: PHYSICAL THERAPY | Age: 73
End: 2023-09-07
Payer: MEDICARE

## 2023-09-08 ENCOUNTER — OFFICE VISIT (OUTPATIENT)
Dept: INTERNAL MEDICINE CLINIC | Age: 73
End: 2023-09-08

## 2023-09-08 VITALS
DIASTOLIC BLOOD PRESSURE: 82 MMHG | SYSTOLIC BLOOD PRESSURE: 138 MMHG | WEIGHT: 265 LBS | OXYGEN SATURATION: 98 % | BODY MASS INDEX: 32.26 KG/M2 | HEART RATE: 79 BPM

## 2023-09-08 DIAGNOSIS — N52.01 ERECTILE DYSFUNCTION DUE TO ARTERIAL INSUFFICIENCY: ICD-10-CM

## 2023-09-08 DIAGNOSIS — I50.20 HFREF (HEART FAILURE WITH REDUCED EJECTION FRACTION) (HCC): ICD-10-CM

## 2023-09-08 DIAGNOSIS — E03.9 HYPOTHYROIDISM (ACQUIRED): ICD-10-CM

## 2023-09-08 DIAGNOSIS — E78.2 MIXED HYPERLIPIDEMIA: ICD-10-CM

## 2023-09-08 DIAGNOSIS — E29.1 HYPOGONADISM IN MALE: Primary | ICD-10-CM

## 2023-09-08 DIAGNOSIS — N18.30 STAGE 3 CHRONIC KIDNEY DISEASE, UNSPECIFIED WHETHER STAGE 3A OR 3B CKD (HCC): ICD-10-CM

## 2023-09-08 DIAGNOSIS — E11.8 DIABETES MELLITUS TYPE 2 WITH COMPLICATIONS (HCC): ICD-10-CM

## 2023-09-08 DIAGNOSIS — I73.9 PAD (PERIPHERAL ARTERY DISEASE) (HCC): ICD-10-CM

## 2023-09-08 LAB
CHP ED QC CHECK: NORMAL
GLUCOSE BLD-MCNC: 93 MG/DL
HBA1C MFR BLD: 5.7 %

## 2023-09-08 RX ORDER — FINASTERIDE 5 MG/1
TABLET, FILM COATED ORAL
COMMUNITY
Start: 2023-06-24

## 2023-09-08 NOTE — PATIENT INSTRUCTIONS
[facility] Laboratory Locations - No appointment necessary. ? indicates the location is open Saturdays in addition to Monday through Friday. Call your preferred location for test preparation, business hours and other information you need. [employer] accepts [employer]. [facility]    ? [place] [id number] [facility]. [facility], [address]    Ph: [address] [facility], [address]    Ph: [phone]   ? [address] [facility].,    [place], [address] [facility]    Ph: [address] [place], [address] [facility]    Ph: [phone] ? [place]   [address]   Ph: [phone]  ? [address] [place] [address], [address] [facility]    Ph: [place] [address], [address] [facility]   Ph: [phone]    NORTH    ? [facility] [address]    Ph: [phone]  [facility]   [address] [place] [place] [id number]   Ph: Ramakrishna Coburn. [facility] [address]    [address] [facility]: [address] [place] [address] [address]    Ph: [address] [facility]. [facility] [address]    Ph: [phone]     1000 iu or 25 mcg Vit D3 daily. [employer]. Dietary recommendation. Vaccines: Flu. Covid, RSV.

## 2023-09-08 NOTE — PROGRESS NOTES
Patient: Rian Linn is a 81 y.o. male who presents today with the following Chief Complaint(s):    Chief Complaint   Patient presents with    Follow-up    Diabetes         HPIhas lost weight. Bariatric in the past. Was not eating a lot since rehab which feeds last.   Eats earlier also. Prothesis rehab also. Blister on right leg treated in wound clinic. Helping. Wrapped. Loves CGM. Stage 2 CKD. Improved. Urology BPH, ok otherwise. Last psa.  1/2+ leg edema, right leg. H and L ok. Current Outpatient Medications   Medication Sig Dispense Refill    finasteride (PROSCAR) 5 MG tablet       amiodarone (CORDARONE) 200 MG tablet Take 1 tablet by mouth daily 90 tablet 3    Continuous Blood Gluc  (FREESTYLE KWESI 2 READER) ANITA Use as directed to monitor blood sugar. 1 each 3    carvedilol (COREG) 3.125 MG tablet Take 1 tablet by mouth 2 times daily 60 tablet 5    furosemide (LASIX) 80 MG tablet Take 1 tablet by mouth daily 90 tablet 3    lisinopril (PRINIVIL;ZESTRIL) 2.5 MG tablet Take 1 tablet by mouth daily 90 tablet 3    spironolactone (ALDACTONE) 25 MG tablet Take 0.5 tablets by mouth daily . 5 tab  daily 90 tablet 3    sildenafil (VIAGRA) 100 MG tablet Take 1 tablet by mouth as needed for Erectile Dysfunction 30 tablet 3    blood glucose test strips (ASCENSIA AUTODISC VI;ONE TOUCH ULTRA TEST VI) strip 1 each by In Vitro route daily Test as directed,Dispense according to insurance formulary 100 each 3    Lancets MISC 1 each by Does not apply route daily Test as directed, Dispense according to insurance formulary 100 each 3    oxybutynin (DITROPAN) 5 MG tablet Take 1 tablet by mouth nightly Take 5 mg by mouth nightly 90 tablet 3    levothyroxine (SYNTHROID) 25 MCG tablet TAKE 1 TABLET BY MOUTH DAILY 90 tablet 3    apixaban (ELIQUIS) 5 MG TABS tablet TAKE 1 TABLET BY MOUTH TWICE DAILY 60 tablet 11    FARXIGA 10 MG tablet TAKE 1 TABLET BY MOUTH EVERY MORNING 90 tablet 3    Continuous Blood Gluc Sensor

## 2023-09-12 ENCOUNTER — PROCEDURE VISIT (OUTPATIENT)
Dept: AUDIOLOGY | Age: 73
End: 2023-09-12
Payer: MEDICARE

## 2023-09-12 ENCOUNTER — HOSPITAL ENCOUNTER (OUTPATIENT)
Dept: PHYSICAL THERAPY | Age: 73
Setting detail: THERAPIES SERIES
Discharge: HOME | End: 2023-09-12
Payer: MEDICARE

## 2023-09-12 ENCOUNTER — HOSPITAL ENCOUNTER (OUTPATIENT)
Dept: WOUND CARE | Age: 73
Discharge: HOME | End: 2023-09-12
Payer: MEDICARE

## 2023-09-12 ENCOUNTER — OFFICE VISIT (OUTPATIENT)
Dept: ENT CLINIC | Age: 73
End: 2023-09-12
Payer: MEDICARE

## 2023-09-12 VITALS
HEIGHT: 76 IN | DIASTOLIC BLOOD PRESSURE: 76 MMHG | SYSTOLIC BLOOD PRESSURE: 125 MMHG | WEIGHT: 266.2 LBS | TEMPERATURE: 98 F | HEART RATE: 75 BPM | BODY MASS INDEX: 32.42 KG/M2

## 2023-09-12 VITALS
SYSTOLIC BLOOD PRESSURE: 138 MMHG | RESPIRATION RATE: 18 BRPM | DIASTOLIC BLOOD PRESSURE: 77 MMHG | TEMPERATURE: 97.9 F | HEART RATE: 72 BPM

## 2023-09-12 DIAGNOSIS — E11.622 DIABETES MELLITUS WITH SKIN ULCER (HCC): ICD-10-CM

## 2023-09-12 DIAGNOSIS — H93.13 TINNITUS OF BOTH EARS: Primary | ICD-10-CM

## 2023-09-12 DIAGNOSIS — E11.621 TYPE 2 DIABETES MELLITUS WITH LEFT DIABETIC FOOT ULCER (HCC): ICD-10-CM

## 2023-09-12 DIAGNOSIS — T23.201S: ICD-10-CM

## 2023-09-12 DIAGNOSIS — E11.42 DIABETIC POLYNEUROPATHY ASSOCIATED WITH TYPE 2 DIABETES MELLITUS (HCC): ICD-10-CM

## 2023-09-12 DIAGNOSIS — H90.3 SENSORINEURAL HEARING LOSS, BILATERAL: Primary | ICD-10-CM

## 2023-09-12 DIAGNOSIS — I83.012 VENOUS STASIS ULCER OF RIGHT CALF WITH FAT LAYER EXPOSED WITH VARICOSE VEINS (HCC): ICD-10-CM

## 2023-09-12 DIAGNOSIS — L97.424 DIABETIC ULCER OF LEFT MIDFOOT ASSOCIATED WITH TYPE 2 DIABETES MELLITUS, WITH NECROSIS OF BONE (HCC): ICD-10-CM

## 2023-09-12 DIAGNOSIS — E11.621 DIABETIC ULCER OF LEFT MIDFOOT ASSOCIATED WITH TYPE 2 DIABETES MELLITUS, WITH NECROSIS OF BONE (HCC): ICD-10-CM

## 2023-09-12 DIAGNOSIS — I70.244 ATHEROSCLEROSIS OF NATIVE ARTERIES OF LEFT LEG WITH ULCERATION OF HEEL AND MIDFOOT (HCC): ICD-10-CM

## 2023-09-12 DIAGNOSIS — H91.90 HEARING LOSS, UNSPECIFIED HEARING LOSS TYPE, UNSPECIFIED LATERALITY: Primary | ICD-10-CM

## 2023-09-12 DIAGNOSIS — H93.13 TINNITUS OF BOTH EARS: ICD-10-CM

## 2023-09-12 DIAGNOSIS — L97.212 VENOUS STASIS ULCER OF RIGHT CALF WITH FAT LAYER EXPOSED WITH VARICOSE VEINS (HCC): ICD-10-CM

## 2023-09-12 DIAGNOSIS — L97.922 NON-PRESSURE CHRONIC ULCER OF LEFT LOWER LEG WITH FAT LAYER EXPOSED (HCC): ICD-10-CM

## 2023-09-12 DIAGNOSIS — Z74.09 IMPAIRED MOBILITY: ICD-10-CM

## 2023-09-12 DIAGNOSIS — E11.21 DIABETIC NEPHROPATHY ASSOCIATED WITH TYPE 2 DIABETES MELLITUS (HCC): ICD-10-CM

## 2023-09-12 DIAGNOSIS — E11.8 DIABETIC FOOT (HCC): Primary | ICD-10-CM

## 2023-09-12 DIAGNOSIS — R60.9 PERIPHERAL EDEMA: ICD-10-CM

## 2023-09-12 DIAGNOSIS — L97.529 TYPE 2 DIABETES MELLITUS WITH LEFT DIABETIC FOOT ULCER (HCC): ICD-10-CM

## 2023-09-12 DIAGNOSIS — M14.672 CHARCOT ANKLE, LEFT: ICD-10-CM

## 2023-09-12 DIAGNOSIS — L97.402 DIABETIC ULCER OF HEEL ASSOCIATED WITH DIABETES MELLITUS DUE TO UNDERLYING CONDITION, WITH FAT LAYER EXPOSED, UNSPECIFIED LATERALITY (HCC): ICD-10-CM

## 2023-09-12 DIAGNOSIS — T87.9 BKA STUMP COMPLICATION (HCC): ICD-10-CM

## 2023-09-12 DIAGNOSIS — M86.272 SUBACUTE OSTEOMYELITIS OF LEFT FOOT (HCC): ICD-10-CM

## 2023-09-12 DIAGNOSIS — H90.3 SENSORINEURAL HEARING LOSS (SNHL) OF BOTH EARS: ICD-10-CM

## 2023-09-12 DIAGNOSIS — L98.499 DIABETES MELLITUS WITH SKIN ULCER (HCC): ICD-10-CM

## 2023-09-12 DIAGNOSIS — E08.621 DIABETIC ULCER OF HEEL ASSOCIATED WITH DIABETES MELLITUS DUE TO UNDERLYING CONDITION, WITH FAT LAYER EXPOSED, UNSPECIFIED LATERALITY (HCC): ICD-10-CM

## 2023-09-12 PROCEDURE — 11042 DBRDMT SUBQ TIS 1ST 20SQCM/<: CPT

## 2023-09-12 PROCEDURE — 1123F ACP DISCUSS/DSCN MKR DOCD: CPT | Performed by: OTOLARYNGOLOGY

## 2023-09-12 PROCEDURE — 97110 THERAPEUTIC EXERCISES: CPT

## 2023-09-12 PROCEDURE — 92567 TYMPANOMETRY: CPT

## 2023-09-12 PROCEDURE — 3074F SYST BP LT 130 MM HG: CPT | Performed by: OTOLARYNGOLOGY

## 2023-09-12 PROCEDURE — 97116 GAIT TRAINING THERAPY: CPT

## 2023-09-12 PROCEDURE — 99203 OFFICE O/P NEW LOW 30 MIN: CPT | Performed by: OTOLARYNGOLOGY

## 2023-09-12 PROCEDURE — 92557 COMPREHENSIVE HEARING TEST: CPT

## 2023-09-12 PROCEDURE — 11042 DBRDMT SUBQ TIS 1ST 20SQCM/<: CPT | Performed by: EMERGENCY MEDICINE

## 2023-09-12 PROCEDURE — 3078F DIAST BP <80 MM HG: CPT | Performed by: OTOLARYNGOLOGY

## 2023-09-12 PROCEDURE — 11045 DBRDMT SUBQ TISS EACH ADDL: CPT

## 2023-09-12 PROCEDURE — 11045 DBRDMT SUBQ TISS EACH ADDL: CPT | Performed by: EMERGENCY MEDICINE

## 2023-09-12 RX ORDER — BACITRACIN ZINC AND POLYMYXIN B SULFATE 500; 1000 [USP'U]/G; [USP'U]/G
OINTMENT TOPICAL ONCE
OUTPATIENT
Start: 2023-09-12 | End: 2023-09-12

## 2023-09-12 RX ORDER — GENTAMICIN SULFATE 1 MG/G
OINTMENT TOPICAL ONCE
OUTPATIENT
Start: 2023-09-12 | End: 2023-09-12

## 2023-09-12 RX ORDER — LIDOCAINE HYDROCHLORIDE 20 MG/ML
JELLY TOPICAL ONCE
OUTPATIENT
Start: 2023-09-12 | End: 2023-09-12

## 2023-09-12 RX ORDER — CLOBETASOL PROPIONATE 0.5 MG/G
OINTMENT TOPICAL ONCE
OUTPATIENT
Start: 2023-09-12 | End: 2023-09-12

## 2023-09-12 RX ORDER — IBUPROFEN 200 MG
TABLET ORAL ONCE
OUTPATIENT
Start: 2023-09-12 | End: 2023-09-12

## 2023-09-12 RX ORDER — LIDOCAINE 50 MG/G
OINTMENT TOPICAL ONCE
OUTPATIENT
Start: 2023-09-12 | End: 2023-09-12

## 2023-09-12 RX ORDER — SODIUM CHLOR/HYPOCHLOROUS ACID 0.033 %
SOLUTION, IRRIGATION IRRIGATION ONCE
OUTPATIENT
Start: 2023-09-12 | End: 2023-09-12

## 2023-09-12 RX ORDER — LIDOCAINE 40 MG/G
CREAM TOPICAL ONCE
Status: COMPLETED | OUTPATIENT
Start: 2023-09-12 | End: 2023-09-12

## 2023-09-12 RX ORDER — LIDOCAINE 40 MG/G
CREAM TOPICAL ONCE
OUTPATIENT
Start: 2023-09-12 | End: 2023-09-12

## 2023-09-12 RX ORDER — LIDOCAINE HYDROCHLORIDE 40 MG/ML
SOLUTION TOPICAL ONCE
OUTPATIENT
Start: 2023-09-12 | End: 2023-09-12

## 2023-09-12 RX ORDER — GINSENG 100 MG
CAPSULE ORAL ONCE
OUTPATIENT
Start: 2023-09-12 | End: 2023-09-12

## 2023-09-12 RX ORDER — BETAMETHASONE DIPROPIONATE 0.05 %
OINTMENT (GRAM) TOPICAL ONCE
OUTPATIENT
Start: 2023-09-12 | End: 2023-09-12

## 2023-09-12 RX ADMIN — LIDOCAINE: 40 CREAM TOPICAL at 10:49

## 2023-09-12 ASSESSMENT — ENCOUNTER SYMPTOMS
NAUSEA: 0
COUGH: 0
SINUS PRESSURE: 0
FACIAL SWELLING: 0
DIARRHEA: 0
SINUS PAIN: 0
VOICE CHANGE: 0
CHOKING: 0
EYE PAIN: 0
SHORTNESS OF BREATH: 0
RHINORRHEA: 0
SORE THROAT: 0
EYE ITCHING: 0
TROUBLE SWALLOWING: 0
EYE REDNESS: 0

## 2023-09-12 ASSESSMENT — PAIN SCALES - GENERAL: PAINLEVEL_OUTOF10: 0

## 2023-09-12 NOTE — PLAN OF CARE
[employer] Application   Below Knee    NAME:  Zayra Titus  YOB: 1950  MEDICAL RECORD NUMBER:  [id number]  DATE:  9/12/2023    Sravanthi Ates boot: Applied moisturizing agent to dry skin as needed. Appied primary and secondary dressing as ordered. Applied Unna roll from toes to knee overlapping each time. Applied ace wrap or coban from toes to below the knee. Secured with tape and/or metal clips covered with tape. Instructed patient/caregiver to keep dressing dry and intact. DO NOT REMOVE DRESSING. Instructed pt/family/caregiver to report excessive draining, loose bandage, wet dressing, severe pain or tingling in toes. Applied [place] dressing below the knee to right lower leg. Unna Boot(s) were applied per  Guidelines.      Electronically signed by Román Corey RN on 9/12/2023 at 11:52 AM

## 2023-09-12 NOTE — PROGRESS NOTES
weeks: 10-12  Current Treatment Recommendations: Strengthening, Balance training, Functional mobility training, Transfer training, Neuromuscular re-education, Stair training, Gait training, Home exercise program, Safety education & training, Patient/Caregiver education & training, Therapeutic activities, Endurance training, Equipment evaluation, education, & procurement, Positioning   Requires PT Follow-Up: Yes       Therapy Time  Individual Time In: [facility]: 1350  Minutes: 40  Timed Code Treatment Minutes: Fortunato Hightower PT, [place] [id number] 09/12/23 1:52 PM    Electronically signed by Joshua Young PT  on 9/12/2023 at 1:52 PM   POC NOTE

## 2023-09-12 NOTE — PATIENT INSTRUCTIONS
NAME:  Guillermina Ng  YOB: 1950  MEDICAL RECORD NUMBER:  [id number]  DATE:  9/12/2023        Return Appointment:  [x] Return Appointment: With Amisha Harris MD  in  1 Week(s)  [x] Wound and dressing supply provider: [facility]   [] ECF or Home Healthcare:  [] Wound Assessment:         [] Physician or NP scheduled for Wound Assessment:   [] Orders placed during your visit:         Important Reminders:   Please wash hands with soap and water before and after every dressing change. Do not scrub wounds. Keep wounds dry in shower unless otherwise instructed by the physician. SMOKING can slow would healing. Stop smoking as soon as possible to improve healing and prevent further complications associated with smoking. Michelle-Wound Topical Treatments:  Do not apply lotions, creams, or ointments to wound bed unless directed. [] Apply moisturizing lotion to skin surrounding the wound prior to dressing change.  [] Apply antifungal ointment to skin surrounding the wound prior to dressing change.  [] Apply thin film of no sting moisture barrier ointment to skin immediately around      wound. [x] Other: EUCERIN CREAM TO LEFT STUMP DAILY     Wound Location: RIGHT MEDIAL LEG     Wound Cleansing: Normal Saline     Primary Dressing:  [x] HYDROFERA BLUE CLASSIC MOISTENED WITH SALINE     Secondary Dressing:  [x] DRAWTEX THEN OPTILOCK  []         Dressing Frequency:  []   [x] Do Not Change Dressing              Compression and Edema Control: APPLY CO-FLEX WITH CALAMINE TO RIGHT LOWER LEG  [] Wear Home Compression Stockings   [] Spandagrip to: Right Leg   Size: []Low compression 5-10 mm/Hg                     []Medium compression 10-20 mm/Hg           []High compression  20-30 mm/Hg  [] Ace Wrap Toes to Knee to               Do not get leg(s) with wrap wet. If wraps become too tight call the center or completely remove the wrap.                                            [x] Assistive Devices   WALKER  Use as instructed

## 2023-09-12 NOTE — PROGRESS NOTES
function. LEFT EAR:  Hearing Sensitivity: Normal sloping to moderately severe sensorineural hearing loss   Speech Recognition Threshold: 25 dB HL  Word Recognition: Excellent 96%, based on NU-6 25-word list at 65 dBHL using recorded speech stimuli. Tympanometry: Normal peak pressure and compliance, Type A tympanogram, consistent with normal middle ear function. Reliability: Good   Transducer: Inserts    See scanned audiogram dated 9/12/2023 for results. PATIENT EDUCATION:       The following items were discussed with the patient:   - Good Communication Strategies  - Hearing Loss and Hearing Aids  - Tinnitus Management Strategies      Educational information was shared in the After Visit Summary. RECOMMENDATIONS:                                                                                                                                                                                                                                                            The following items are recommended based on patient report and results from today's appointment:   - Continue medical follow-up with Dom Anderson MD, PhD.   - Ranulfo Moulder hearing as medically indicated and/or sooner if a change in hearing is noted. - If desired, schedule a Hearing Aid Evaluation (HAE) appointment to discuss hearing aid options. - Utilize \"Good Communication Strategies\" as discussed to assist in speech understanding with communication partners.     Pee Aguilera  Audiologist    Chart CC'd to: Dom Anderson MD, PhD      Degree of   Hearing Sensitivity dB Range   Within Normal Limits (WNL) 0 - 20   Mild 20 - 40   Moderate 40 - 55   Moderately-Severe 55 - 70   Severe 70 - 90   Profound 90 +
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
## 2023-09-14 ENCOUNTER — TELEPHONE (OUTPATIENT)
Dept: INTERNAL MEDICINE CLINIC | Age: 73
End: 2023-09-14

## 2023-09-14 ENCOUNTER — HOSPITAL ENCOUNTER (OUTPATIENT)
Dept: PHYSICAL THERAPY | Age: 73
Setting detail: THERAPIES SERIES
Discharge: HOME | End: 2023-09-14
Payer: MEDICARE

## 2023-09-14 DIAGNOSIS — N40.1 BENIGN PROSTATIC HYPERPLASIA WITH WEAK URINARY STREAM: Primary | ICD-10-CM

## 2023-09-14 DIAGNOSIS — R39.12 BENIGN PROSTATIC HYPERPLASIA WITH WEAK URINARY STREAM: Primary | ICD-10-CM

## 2023-09-14 PROCEDURE — 97110 THERAPEUTIC EXERCISES: CPT

## 2023-09-14 PROCEDURE — 97116 GAIT TRAINING THERAPY: CPT

## 2023-09-14 RX ORDER — TAMSULOSIN HYDROCHLORIDE 0.4 MG/1
0.4 CAPSULE ORAL DAILY
Qty: 90 CAPSULE | Refills: 3 | Status: SHIPPED | OUTPATIENT
Start: 2023-09-14

## 2023-09-14 NOTE — TELEPHONE ENCOUNTER
Medication:   Requested Prescriptions     Pending Prescriptions Disp Refills    tamsulosin (FLOMAX) 0.4 MG capsule 90 capsule 3     Sig: Take 1 capsule by mouth daily        Last Filled:  09/01/22    Patient Phone Number: [phone] (home)     Last appt: 9/8/2023   Next appt: 12/19/2023

## 2023-09-14 NOTE — PROGRESS NOTES
Physical Therapy: Daily Note   Patient: Miguel Helm (88 y.o. male)   Examination Date: 2023  No data recorded  No data recorded   :  1950 # of Visits since [facility]:   6   MRN: [id number]  CSN: [id number] Start of Care Date:   2023   Insurance: Payor: Nicole Birchise / Plan: [facility] ESSENTIAL/PLUS / Product Type: *No Product type* /   Insurance ID: [id number] - (Medicare Managed) Secondary Insurance (if applicable):    Referring Physician: MD Yumiko Howard MD   PCP: Yumiko Valladares MD Visits to Date/Visits Approved:     No Show/Cancelled Appts: 0      Medical Diagnosis: Hx of BKA, left ([address]) [Z89.512] Hx of BKA, left ([address]) (A00.132), LE weakness  Treatment Diagnosis: Decreased functional capacity due to decreased strength and activity tolerance        SUBJECTIVE EXAMINATION   Pain Level: Pain Screening  Patient Currently in Pain: Denies    Patient Comments: Subjective: Pt reports continued weakness with BLEs, reports feeling off today due to not sleeping well last night. REports progressing with exercises at home. Agreeable to PT Treatment session    HEP Compliance: Good  Any changes to allergies, medications, or have you had any medical procedures/ER visits since your last visit?: No     OBJECTIVE EXAMINATION   Restrictions:  No data recorded No data recorded No data recorded              TREATMENT     Exercises:  Therapeutic exercise (CPT 33629)   Treatment Reasoning    Exercise 1: Supine exercises BLEs 2lb ankle weight   each (VC and TC throughout for proper sequencing and body positioning) SLR (3x15), hip abduction knee straight (3x15), heel slides (3 X 15), SAQ 3\" hold (3 X 15), and  bilateral hook lying with hip adduction sets 5\" (3 X 15) hold. Slight increased effort due to fatigue this date.   Exercise 2: Side lying clamshells BLE 3 X 15 each green t-band (bed mobility rolling each direction mod I)  Exercise 3: NuStep 6 minutes, level 5 resistance, BLEs only

## 2023-09-19 ENCOUNTER — HOSPITAL ENCOUNTER (OUTPATIENT)
Dept: PHYSICAL THERAPY | Age: 73
Setting detail: THERAPIES SERIES
Discharge: HOME | End: 2023-09-19
Payer: MEDICARE

## 2023-09-19 ENCOUNTER — HOSPITAL ENCOUNTER (OUTPATIENT)
Dept: WOUND CARE | Age: 73
Discharge: HOME | End: 2023-09-19
Payer: MEDICARE

## 2023-09-19 VITALS
SYSTOLIC BLOOD PRESSURE: 150 MMHG | TEMPERATURE: 98.3 F | RESPIRATION RATE: 16 BRPM | DIASTOLIC BLOOD PRESSURE: 87 MMHG | HEART RATE: 76 BPM

## 2023-09-19 DIAGNOSIS — E11.42 DIABETIC POLYNEUROPATHY ASSOCIATED WITH TYPE 2 DIABETES MELLITUS (HCC): ICD-10-CM

## 2023-09-19 DIAGNOSIS — M86.272 SUBACUTE OSTEOMYELITIS OF LEFT FOOT (HCC): ICD-10-CM

## 2023-09-19 DIAGNOSIS — L97.424 DIABETIC ULCER OF LEFT MIDFOOT ASSOCIATED WITH TYPE 2 DIABETES MELLITUS, WITH NECROSIS OF BONE (HCC): ICD-10-CM

## 2023-09-19 DIAGNOSIS — M14.672 CHARCOT ANKLE, LEFT: ICD-10-CM

## 2023-09-19 DIAGNOSIS — E11.622 DIABETES MELLITUS WITH SKIN ULCER (HCC): ICD-10-CM

## 2023-09-19 DIAGNOSIS — L97.529 TYPE 2 DIABETES MELLITUS WITH LEFT DIABETIC FOOT ULCER (HCC): ICD-10-CM

## 2023-09-19 DIAGNOSIS — L97.212 VENOUS STASIS ULCER OF RIGHT CALF WITH FAT LAYER EXPOSED WITH VARICOSE VEINS (HCC): ICD-10-CM

## 2023-09-19 DIAGNOSIS — E08.621 DIABETIC ULCER OF HEEL ASSOCIATED WITH DIABETES MELLITUS DUE TO UNDERLYING CONDITION, WITH FAT LAYER EXPOSED, UNSPECIFIED LATERALITY (HCC): ICD-10-CM

## 2023-09-19 DIAGNOSIS — E11.621 TYPE 2 DIABETES MELLITUS WITH LEFT DIABETIC FOOT ULCER (HCC): ICD-10-CM

## 2023-09-19 DIAGNOSIS — I83.012 VENOUS STASIS ULCER OF RIGHT CALF WITH FAT LAYER EXPOSED WITH VARICOSE VEINS (HCC): ICD-10-CM

## 2023-09-19 DIAGNOSIS — Z74.09 IMPAIRED MOBILITY: ICD-10-CM

## 2023-09-19 DIAGNOSIS — L98.499 DIABETES MELLITUS WITH SKIN ULCER (HCC): ICD-10-CM

## 2023-09-19 DIAGNOSIS — E11.8 DIABETIC FOOT (HCC): Primary | ICD-10-CM

## 2023-09-19 DIAGNOSIS — T23.201S: ICD-10-CM

## 2023-09-19 DIAGNOSIS — I70.244 ATHEROSCLEROSIS OF NATIVE ARTERIES OF LEFT LEG WITH ULCERATION OF HEEL AND MIDFOOT (HCC): ICD-10-CM

## 2023-09-19 DIAGNOSIS — E11.621 DIABETIC ULCER OF LEFT MIDFOOT ASSOCIATED WITH TYPE 2 DIABETES MELLITUS, WITH NECROSIS OF BONE (HCC): ICD-10-CM

## 2023-09-19 DIAGNOSIS — L97.922 NON-PRESSURE CHRONIC ULCER OF LEFT LOWER LEG WITH FAT LAYER EXPOSED (HCC): ICD-10-CM

## 2023-09-19 DIAGNOSIS — T87.9 BKA STUMP COMPLICATION (HCC): ICD-10-CM

## 2023-09-19 DIAGNOSIS — R60.9 PERIPHERAL EDEMA: ICD-10-CM

## 2023-09-19 DIAGNOSIS — E11.21 DIABETIC NEPHROPATHY ASSOCIATED WITH TYPE 2 DIABETES MELLITUS (HCC): ICD-10-CM

## 2023-09-19 DIAGNOSIS — L97.402 DIABETIC ULCER OF HEEL ASSOCIATED WITH DIABETES MELLITUS DUE TO UNDERLYING CONDITION, WITH FAT LAYER EXPOSED, UNSPECIFIED LATERALITY (HCC): ICD-10-CM

## 2023-09-19 PROCEDURE — 97110 THERAPEUTIC EXERCISES: CPT

## 2023-09-19 PROCEDURE — 11042 DBRDMT SUBQ TIS 1ST 20SQCM/<: CPT | Performed by: EMERGENCY MEDICINE

## 2023-09-19 PROCEDURE — 97116 GAIT TRAINING THERAPY: CPT

## 2023-09-19 PROCEDURE — 11042 DBRDMT SUBQ TIS 1ST 20SQCM/<: CPT

## 2023-09-19 RX ORDER — BACITRACIN ZINC AND POLYMYXIN B SULFATE 500; 1000 [USP'U]/G; [USP'U]/G
OINTMENT TOPICAL ONCE
OUTPATIENT
Start: 2023-09-19 | End: 2023-09-19

## 2023-09-19 RX ORDER — LIDOCAINE HYDROCHLORIDE 20 MG/ML
JELLY TOPICAL ONCE
OUTPATIENT
Start: 2023-09-19 | End: 2023-09-19

## 2023-09-19 RX ORDER — GINSENG 100 MG
CAPSULE ORAL ONCE
OUTPATIENT
Start: 2023-09-19 | End: 2023-09-19

## 2023-09-19 RX ORDER — LIDOCAINE 50 MG/G
OINTMENT TOPICAL ONCE
OUTPATIENT
Start: 2023-09-19 | End: 2023-09-19

## 2023-09-19 RX ORDER — IBUPROFEN 200 MG
TABLET ORAL ONCE
OUTPATIENT
Start: 2023-09-19 | End: 2023-09-19

## 2023-09-19 RX ORDER — LIDOCAINE 40 MG/G
CREAM TOPICAL ONCE
Status: COMPLETED | OUTPATIENT
Start: 2023-09-19 | End: 2023-09-19

## 2023-09-19 RX ORDER — LIDOCAINE 40 MG/G
CREAM TOPICAL ONCE
OUTPATIENT
Start: 2023-09-19 | End: 2023-09-19

## 2023-09-19 RX ORDER — CLOBETASOL PROPIONATE 0.5 MG/G
OINTMENT TOPICAL ONCE
OUTPATIENT
Start: 2023-09-19 | End: 2023-09-19

## 2023-09-19 RX ORDER — GENTAMICIN SULFATE 1 MG/G
OINTMENT TOPICAL ONCE
OUTPATIENT
Start: 2023-09-19 | End: 2023-09-19

## 2023-09-19 RX ORDER — SODIUM CHLOR/HYPOCHLOROUS ACID 0.033 %
SOLUTION, IRRIGATION IRRIGATION ONCE
OUTPATIENT
Start: 2023-09-19 | End: 2023-09-19

## 2023-09-19 RX ORDER — TRIAMCINOLONE ACETONIDE 1 MG/G
OINTMENT TOPICAL ONCE
OUTPATIENT
Start: 2023-09-19 | End: 2023-09-19

## 2023-09-19 RX ORDER — BETAMETHASONE DIPROPIONATE 0.05 %
OINTMENT (GRAM) TOPICAL ONCE
OUTPATIENT
Start: 2023-09-19 | End: 2023-09-19

## 2023-09-19 RX ORDER — LIDOCAINE HYDROCHLORIDE 40 MG/ML
SOLUTION TOPICAL ONCE
OUTPATIENT
Start: 2023-09-19 | End: 2023-09-19

## 2023-09-19 RX ADMIN — LIDOCAINE: 40 CREAM TOPICAL at 10:41

## 2023-09-19 ASSESSMENT — PAIN SCALES - GENERAL: PAINLEVEL_OUTOF10: 0

## 2023-09-19 NOTE — PROGRESS NOTES
Sedation Pre-Procedure Note    Patient Name: Ramila Newman  YOB: 1942  Room/Bed: -02/002-A  Medical Record Number: [id number]  Date: 01/24/18      Indication:  Evaluation and/or treatment of cervical and cerebral vasculature    Consent: I have discussed with the patient and/or the patient representative the indication, alternatives, and the possible risks and/or complications of the planned procedure and the anesthesia methods. The patient and/or patient representative appear to understand and agree to proceed. Vital Signs: There were no vitals filed for this visit. Past Medical History:      Diagnosis Date    Atrial fibrillation [facility]) 2008    History of blood transfusion     Hyperlipidemia     Mild cognitive impairment     [facility]    TIA        Past Surgical History:         Procedure Laterality Date    JOINT REPLACEMENT Bilateral 2014    knees    KNEE SURGERY Bilateral 2014    THYROID LOBECTOMY  1987       Medications:   No current facility-administered medications on file prior to encounter. Current Outpatient Prescriptions on File Prior to Encounter   Medication Sig Dispense Refill    atorvastatin (LIPITOR) 40 MG tablet Take 40 mg by mouth daily      clopidogrel (PLAVIX) 75 MG tablet Take 75 mg by mouth daily         Coumadin Use Last 7 Days: no  Antiplatelet drug therapy use last 7 days: yes  Other anticoagulant use last 7 days: no  Additional Medication Information: no        Pre-Sedation Documentation and Exam:   I have personally completed a history, physical exam & review of systems for this patient (see notes).     Mallampati Airway Assessment:  Mallampati Class II - (soft palate, fauces & uvula are visible)    Prior History of Anesthesia Complications:   none     ASA Classification:  Class 2  A normal healthy patient with mild systemic disease    Sedation/ Anesthesia Plan:   intravenous mobility tasks with improved use of BLEs   In progress                                                TREATMENT PLAN   PT Equipment Recommendations  Equipment Needed: No  Plan Frequency: 2 X per week  Plan weeks: 10-12  Current Treatment Recommendations: Strengthening, Balance training, Functional mobility training, Transfer training, Neuromuscular re-education, Stair training, Gait training, Home exercise program, Safety education & training, Patient/Caregiver education & training, Therapeutic activities, Endurance training, Equipment evaluation, education, & procurement, Positioning           Therapy Time  Individual Time In: 1300       Individual Time Out: 8251  Minutes: 45  Timed Code Treatment Minutes: Fortunato Hightower PT, [place] [id number] 09/19/23 1:50 PM    Electronically signed by Dee Millan PT  on 9/19/2023 at 1:50 PM   POC NOTE

## 2023-09-19 NOTE — PLAN OF CARE
[employer] Application   Below Knee    NAME:  Jesenia Raymundo  YOB: 1950  MEDICAL RECORD NUMBER:  [id number]  DATE:  9/19/2023    [place] boot: Applied moisturizing agent to dry skin as needed. Appied primary and secondary dressing as ordered. Applied Unna roll from toes to knee overlapping each time. Applied ace wrap or coban from toes to below the knee. Secured with tape and/or metal clips covered with tape. Instructed patient/caregiver to keep dressing dry and intact. DO NOT REMOVE DRESSING. Instructed pt/family/caregiver to report excessive draining, loose bandage, wet dressing, severe pain or tingling in toes. Applied [employer] dressing below the knee to right lower leg. Unna Boot(s) were applied per  Guidelines.      Electronically signed by Melany Velasco RN on 9/19/2023 at 11:33 AM

## 2023-09-19 NOTE — PATIENT INSTRUCTIONS
NAME:  Fidelia Jean  YOB: 1950  MEDICAL RECORD NUMBER:  [id number]  DATE:  9/19/2023        Return Appointment:  [x] Return Appointment: With Chapis Hanson MD  in  1 Week(s)  [x] Wound and dressing supply provider: [facility]   [] ECF or Home Healthcare:  [] Wound Assessment:         [] Physician or NP scheduled for Wound Assessment:   [] Orders placed during your visit:         Important Reminders:   Please wash hands with soap and water before and after every dressing change. Do not scrub wounds. Keep wounds dry in shower unless otherwise instructed by the physician. SMOKING can slow would healing. Stop smoking as soon as possible to improve healing and prevent further complications associated with smoking. Michelle-Wound Topical Treatments:  Do not apply lotions, creams, or ointments to wound bed unless directed. [] Apply moisturizing lotion to skin surrounding the wound prior to dressing change.  [] Apply antifungal ointment to skin surrounding the wound prior to dressing change.  [] Apply thin film of no sting moisture barrier ointment to skin immediately around      wound. [x] Other: EUCERIN CREAM TO LEFT STUMP DAILY     Wound Location: RIGHT MEDIAL LEG     Wound Cleansing: Normal Saline     Primary Dressing:  [x] HYDROFERA BLUE CLASSIC MOISTENED WITH SALINE     Secondary Dressing:  [x] DRAWTEX THEN OPTILOCK  []         Dressing Frequency:  []   [x] Do Not Change Dressing              Compression and Edema Control: APPLY CO-FLEX WITH CALAMINE TO RIGHT LOWER LEG  [] Wear Home Compression Stockings   [] Spandagrip to: Right Leg   Size: []Low compression 5-10 mm/Hg                     []Medium compression 10-20 mm/Hg           []High compression  20-30 mm/Hg  [] Ace Wrap Toes to Knee to               Do not get leg(s) with wrap wet. If wraps become too tight call the center or completely remove the wrap.                                            [x] Assistive Devices   WALKER  Use as instructed

## 2023-09-21 ENCOUNTER — HOSPITAL ENCOUNTER (OUTPATIENT)
Dept: PHYSICAL THERAPY | Age: 73
Setting detail: THERAPIES SERIES
Discharge: HOME | End: 2023-09-21
Payer: MEDICARE

## 2023-09-21 PROCEDURE — 97110 THERAPEUTIC EXERCISES: CPT

## 2023-09-21 PROCEDURE — 97116 GAIT TRAINING THERAPY: CPT

## 2023-09-21 NOTE — PROGRESS NOTES
initially very guarded with mobility improving with training. Gait Training 2: TUG X 3 trials with RW support to promote increased speed of ambulation with SBA, cues to improve body positioning within RW for safety Limitations addressed: Mobility, Strength, Balance, Coordination  Therapist provided: Assistance, Verbal cuing  Progressed: Complexity of movement  Functional ability(s) targeted: Ambulating community distances   1:1 Time (minutes): 10               Pt Education: PT Education: Goals, PT Role, Plan of Care, Evaluative findings, Home Exercise Program, Transfer Training, Posture, Functional Mobility Training, Equipment, General Safety  Current home exercise program (HEP): [email or web address]    Access Code: [email or web address] Jose; reviewed this date       ASSESSMENT     Assessment: Assessment: Pt tolerated exercises well this date, very receptive to all education and training. Improved sit <-> stand X 5 with less time but increased time average TUG, slight improvement of LEFS by one, and increased strength BLEs with MMT this date. Progressing with overall activity tolerance increased participation in HEP. Continues with bilateral hip weakness limiting mobility with strong use of BUE for transfers and ambulation with RW but making significant progression short use of LBQC increased distances with improve gisselle responding to gait training this date with CGA short distance increased effort and time to perform increased activity tolerance; pt progressing with all aspects demonstrating high motivation. Pt benefit from skilled PT to address these impairments promoting improved functional capacity with decreased use of DME to maintaing mobility independence.   Body Structures, Functions, Activity Limitations Requiring Skilled Therapeutic Intervention: Decreased functional mobility , Decreased ROM, Decreased body mechanics, Decreased strength, Decreased posture, Decreased balance, Decreased endurance    Post-Treatment Pain

## 2023-09-26 ENCOUNTER — TELEPHONE (OUTPATIENT)
Dept: CARDIOLOGY CLINIC | Age: 73
End: 2023-09-26

## 2023-09-26 ENCOUNTER — HOSPITAL ENCOUNTER (OUTPATIENT)
Dept: PHYSICAL THERAPY | Age: 73
Setting detail: THERAPIES SERIES
Discharge: HOME | End: 2023-09-26
Payer: MEDICARE

## 2023-09-26 ENCOUNTER — HOSPITAL ENCOUNTER (OUTPATIENT)
Dept: WOUND CARE | Age: 73
Discharge: HOME | End: 2023-09-26
Payer: MEDICARE

## 2023-09-26 VITALS
RESPIRATION RATE: 20 BRPM | TEMPERATURE: 97.4 F | HEART RATE: 76 BPM | SYSTOLIC BLOOD PRESSURE: 126 MMHG | DIASTOLIC BLOOD PRESSURE: 76 MMHG

## 2023-09-26 DIAGNOSIS — E08.621 DIABETIC ULCER OF HEEL ASSOCIATED WITH DIABETES MELLITUS DUE TO UNDERLYING CONDITION, WITH FAT LAYER EXPOSED, UNSPECIFIED LATERALITY (HCC): ICD-10-CM

## 2023-09-26 DIAGNOSIS — T87.9 BKA STUMP COMPLICATION (HCC): ICD-10-CM

## 2023-09-26 DIAGNOSIS — L97.529 TYPE 2 DIABETES MELLITUS WITH LEFT DIABETIC FOOT ULCER (HCC): ICD-10-CM

## 2023-09-26 DIAGNOSIS — E11.21 DIABETIC NEPHROPATHY ASSOCIATED WITH TYPE 2 DIABETES MELLITUS (HCC): ICD-10-CM

## 2023-09-26 DIAGNOSIS — L97.212 VENOUS STASIS ULCER OF RIGHT CALF WITH FAT LAYER EXPOSED WITH VARICOSE VEINS (HCC): ICD-10-CM

## 2023-09-26 DIAGNOSIS — E11.621 DIABETIC ULCER OF LEFT MIDFOOT ASSOCIATED WITH TYPE 2 DIABETES MELLITUS, WITH NECROSIS OF BONE (HCC): ICD-10-CM

## 2023-09-26 DIAGNOSIS — E11.621 TYPE 2 DIABETES MELLITUS WITH LEFT DIABETIC FOOT ULCER (HCC): ICD-10-CM

## 2023-09-26 DIAGNOSIS — T23.201S: ICD-10-CM

## 2023-09-26 DIAGNOSIS — R60.9 PERIPHERAL EDEMA: ICD-10-CM

## 2023-09-26 DIAGNOSIS — Z74.09 IMPAIRED MOBILITY: ICD-10-CM

## 2023-09-26 DIAGNOSIS — L97.922 NON-PRESSURE CHRONIC ULCER OF LEFT LOWER LEG WITH FAT LAYER EXPOSED (HCC): ICD-10-CM

## 2023-09-26 DIAGNOSIS — M14.672 CHARCOT ANKLE, LEFT: ICD-10-CM

## 2023-09-26 DIAGNOSIS — E11.42 DIABETIC POLYNEUROPATHY ASSOCIATED WITH TYPE 2 DIABETES MELLITUS (HCC): ICD-10-CM

## 2023-09-26 DIAGNOSIS — E11.8 DIABETIC FOOT (HCC): Primary | ICD-10-CM

## 2023-09-26 DIAGNOSIS — L97.402 DIABETIC ULCER OF HEEL ASSOCIATED WITH DIABETES MELLITUS DUE TO UNDERLYING CONDITION, WITH FAT LAYER EXPOSED, UNSPECIFIED LATERALITY (HCC): ICD-10-CM

## 2023-09-26 DIAGNOSIS — L97.424 DIABETIC ULCER OF LEFT MIDFOOT ASSOCIATED WITH TYPE 2 DIABETES MELLITUS, WITH NECROSIS OF BONE (HCC): ICD-10-CM

## 2023-09-26 DIAGNOSIS — L98.499 DIABETES MELLITUS WITH SKIN ULCER (HCC): ICD-10-CM

## 2023-09-26 DIAGNOSIS — I83.012 VENOUS STASIS ULCER OF RIGHT CALF WITH FAT LAYER EXPOSED WITH VARICOSE VEINS (HCC): ICD-10-CM

## 2023-09-26 DIAGNOSIS — E11.622 DIABETES MELLITUS WITH SKIN ULCER (HCC): ICD-10-CM

## 2023-09-26 DIAGNOSIS — M86.272 SUBACUTE OSTEOMYELITIS OF LEFT FOOT (HCC): ICD-10-CM

## 2023-09-26 DIAGNOSIS — I70.244 ATHEROSCLEROSIS OF NATIVE ARTERIES OF LEFT LEG WITH ULCERATION OF HEEL AND MIDFOOT (HCC): ICD-10-CM

## 2023-09-26 PROCEDURE — 99213 OFFICE O/P EST LOW 20 MIN: CPT | Performed by: EMERGENCY MEDICINE

## 2023-09-26 PROCEDURE — 97110 THERAPEUTIC EXERCISES: CPT

## 2023-09-26 PROCEDURE — 97116 GAIT TRAINING THERAPY: CPT

## 2023-09-26 PROCEDURE — 29580 STRAPPING UNNA BOOT: CPT

## 2023-09-26 RX ORDER — CLOBETASOL PROPIONATE 0.5 MG/G
OINTMENT TOPICAL ONCE
OUTPATIENT
Start: 2023-09-26 | End: 2023-09-26

## 2023-09-26 RX ORDER — IBUPROFEN 200 MG
TABLET ORAL ONCE
OUTPATIENT
Start: 2023-09-26 | End: 2023-09-26

## 2023-09-26 RX ORDER — LIDOCAINE 40 MG/G
CREAM TOPICAL ONCE
Status: COMPLETED | OUTPATIENT
Start: 2023-09-26 | End: 2023-09-26

## 2023-09-26 RX ORDER — GINSENG 100 MG
CAPSULE ORAL ONCE
OUTPATIENT
Start: 2023-09-26 | End: 2023-09-26

## 2023-09-26 RX ORDER — SODIUM CHLOR/HYPOCHLOROUS ACID 0.033 %
SOLUTION, IRRIGATION IRRIGATION ONCE
OUTPATIENT
Start: 2023-09-26 | End: 2023-09-26

## 2023-09-26 RX ORDER — LIDOCAINE HYDROCHLORIDE 20 MG/ML
JELLY TOPICAL ONCE
OUTPATIENT
Start: 2023-09-26 | End: 2023-09-26

## 2023-09-26 RX ORDER — TRIAMCINOLONE ACETONIDE 1 MG/G
OINTMENT TOPICAL ONCE
OUTPATIENT
Start: 2023-09-26 | End: 2023-09-26

## 2023-09-26 RX ORDER — BACITRACIN ZINC AND POLYMYXIN B SULFATE 500; 1000 [USP'U]/G; [USP'U]/G
OINTMENT TOPICAL ONCE
OUTPATIENT
Start: 2023-09-26 | End: 2023-09-26

## 2023-09-26 RX ORDER — BETAMETHASONE DIPROPIONATE 0.05 %
OINTMENT (GRAM) TOPICAL ONCE
OUTPATIENT
Start: 2023-09-26 | End: 2023-09-26

## 2023-09-26 RX ORDER — LIDOCAINE 50 MG/G
OINTMENT TOPICAL ONCE
OUTPATIENT
Start: 2023-09-26 | End: 2023-09-26

## 2023-09-26 RX ORDER — LIDOCAINE 40 MG/G
CREAM TOPICAL ONCE
OUTPATIENT
Start: 2023-09-26 | End: 2023-09-26

## 2023-09-26 RX ORDER — GENTAMICIN SULFATE 1 MG/G
OINTMENT TOPICAL ONCE
OUTPATIENT
Start: 2023-09-26 | End: 2023-09-26

## 2023-09-26 RX ORDER — LIDOCAINE HYDROCHLORIDE 40 MG/ML
SOLUTION TOPICAL ONCE
OUTPATIENT
Start: 2023-09-26 | End: 2023-09-26

## 2023-09-26 RX ADMIN — LIDOCAINE: 40 CREAM TOPICAL at 10:48

## 2023-09-26 ASSESSMENT — PAIN SCALES - GENERAL
PAINLEVEL_OUTOF10: 0
PAINLEVEL_OUTOF10: 0

## 2023-09-26 NOTE — PROGRESS NOTES
use of BLEs initially CGA progressing to min-mod assist with cues due to fatigue. Limitations addressed: Mobility, Strength, Activity tolerance  Therapist provided: Assistance, Verbal cuing, Tactile cuing  Progressed: Repetitions, Complexity of movement  Functional ability(s) targeted: strenthening of bilateral hips to improve posture, gait, and transfers with increased use of BLEs; pt tolerated well at this time   1:1 Time (minutes): 30                 Gait: (CPT J6180206)  Treatment Reasoning    Gait Training 1: Pt ambulated with Large base quad cane on right initially 2 point gait improved gisselle slowing back to 3 point gait sequencing no ' X 2 trials with turns (difficulty with turns placing cane midline for stability) with turns increased effort and time to perform. Needs frequency cues to readjust sequencing slower speeds this date due to fatigue. Cues for weight shifting posture and proper sequencing with cane, initially very guarded with mobility improving with training. First attempt 3' 17\", 2nd trial 3' 13\" ( total 6' 30\") mild fatigue receptive to education of household ambulation with [facility] when having good moments at home. Gait Training 2: Lateral ambulation with UE support on railing 5' X 5 each direction SBA Limitations addressed: Mobility, Strength, Balance, Coordination  Therapist provided: Assistance, Verbal cuing  Progressed: Complexity of movement  Functional ability(s) targeted: Ambulating community distances   1:1 Time (minutes): 10               Pt Education: PT Education: Goals, PT Role, Plan of Care, Evaluative findings, Home Exercise Program, Transfer Training, Posture, Functional Mobility Training, Equipment, General Safety  Patient Education: HEP and recommendations for exercises  Current home exercise program (HEP): [email or web address]    Access Code: Eleanor Fontanez; reviewed this date       ASSESSMENT     Assessment: Assessment: Pt tolerated exercises well this date, very receptive

## 2023-09-26 NOTE — PATIENT INSTRUCTIONS
by the provider        Activity: Activity as Tolerated/ PATIENT MAY PARTICIPATE IN PHYSICAL THERAPY PER DR. Leda ANTOINE MD        Dietary:   Continue your diet as tolerated. Protein is a key nutrient in helping to repair damaged tissue and promote new tissue growth. Good sources of protein include milk, yogurt, cheese, fish, lean meat and beans. If you are DIABETIC, having diabetes can make it hard for wounds to heal. Try to keep your blood sugar within it's target range. Limit Sodium, Alcohol and Sugar. Pain:   Please Note some pain, drainage and/or bleeding might be expected after seeing the provider. TO HELP ALLEVIATE PAIN WE RECOMMEND THE FOLLOWING  Elevate the affected limb. Use over the counter medications as permitted by your family doctor. For Persistent Pain not relieved by the above interventions, please notify your family doctor. : ROB      Electronically signed by Zahida Lang RN on 9/26/2023 at 10:51 [id number] [address] Information: Should you experience any significant changes in your wound(s) or have questions about your wound care, please contact the [address] [place] [address] at [address] [place] [address] 8:00 am - 4:30 pm and Friday 8:00 am - 12:30 pm.  If you need help with your wound outside these hours and cannot wait until we are again available, contact your PCP or go to the hospital emergency room. PLEASE NOTE: IF YOU ARE UNABLE TO OBTAIN WOUND SUPPLIES, CONTINUE TO USE THE SUPPLIES YOU HAVE AVAILABLE UNTIL YOU ARE ABLE TO REACH US. IT IS MOST IMPORTANT TO KEEP THE WOUND COVERED AT ALL TIMES.            Physician Signature:_______________________     Date: ___________ Time:  ____________                                  Dr Kailash Cano

## 2023-09-26 NOTE — PROGRESS NOTES
the hospital emergency room. PLEASE NOTE: IF YOU ARE UNABLE TO OBTAIN WOUND SUPPLIES, CONTINUE TO USE THE SUPPLIES YOU HAVE AVAILABLE UNTIL YOU ARE ABLE TO REACH US. IT IS MOST IMPORTANT TO KEEP THE WOUND COVERED AT ALL TIMES.            Physician Signature:_______________________     Date: ___________ Time:  ____________                                  Dr Kentrell Xavier       Electronically signed by Kentrell Xavier MD on 9/26/2023 at 11:43 AM

## 2023-09-26 NOTE — TELEPHONE ENCOUNTER
Nik Nguyen called the office wanting to relay that he is doing well on the 200 MG of Amiodarone. He did not have anything else to report. Per Jaden Webb RN he will f/u in 6 months while on the Amiodarone.

## 2023-09-26 NOTE — PLAN OF CARE
[employer] Application   Below Knee    NAME:  Felipa Ivan OF BIRTH:  1950  MEDICAL RECORD NUMBER:  [id number]  DATE:  9/26/2023    Blinda Boga boot: Appied primary and secondary dressing as ordered. Applied Unna roll from toes to knee overlapping each time. Applied ace wrap or coban from toes to below the knee. Secured with tape and/or metal clips covered with tape. Instructed patient/caregiver to keep dressing dry and intact. DO NOT REMOVE DRESSING. Instructed pt/family/caregiver to report excessive draining, loose bandage, wet dressing, severe pain or tingling in toes. Applied [place] dressing below the knee to right lower leg. Unna Boot(s) were applied per  Guidelines.      Electronically signed by Luther Martin RN on 9/26/2023 at 12:17 PM

## 2023-09-28 ENCOUNTER — HOSPITAL ENCOUNTER (OUTPATIENT)
Dept: PHYSICAL THERAPY | Age: 73
Setting detail: THERAPIES SERIES
Discharge: HOME | End: 2023-09-28
Payer: MEDICARE

## 2023-09-28 PROCEDURE — 97116 GAIT TRAINING THERAPY: CPT

## 2023-09-28 PROCEDURE — 97110 THERAPEUTIC EXERCISES: CPT

## 2023-09-28 NOTE — PROGRESS NOTES
Physical Therapy: Daily Note   Patient: Josue Ariza (35 y.o. male)   Examination Date: 2023  No data recorded  No data recorded   :  1950 # of Visits since [facility]:   10   MRN: [id number]  CSN: [id number] Start of Care Date:   2023   Insurance: Payor: Ángel Dumont / Plan: Marilynn Borja ESSENTIAL/PLUS / Product Type: *No Product type* /   Insurance ID: [id number] - (Medicare Managed) Secondary Insurance (if applicable):    Referring Physician: MD Jamaal Arnold MD   PCP: Jamaal Foote MD Visits to Date/Visits Approved:     No Show/Cancelled Appts: 0      Medical Diagnosis: Hx of BKA, left ([address]) [Z89.512] Hx of BKA, left ([address]) (J56.263), LE weakness  Treatment Diagnosis: Decreased functional capacity due to decreased strength and activity tolerance        SUBJECTIVE EXAMINATION   Pain Level: Pain Screening  Patient Currently in Pain: Denies    Patient Comments: Subjective: Pt increased tolerance of exercises with increased activity tolerance at home no significant changes at this time. Agreeable to PT Treatment session    HEP Compliance: Good  Any changes to allergies, medications, or have you had any medical procedures/ER visits since your last visit?: No     OBJECTIVE EXAMINATION   Restrictions:  No data recorded No data recorded No data recorded              TREATMENT     Exercises:  Therapeutic exercise (CPT 31749)   Treatment Reasoning    Exercise 1: Supine exercises BLEs 2lb ankle weight each (VC and TC throughout for proper sequencing and body positioning) SLR (3x15), hip abduction knee straight (3x15), heel slides (3 X 15), SAQ 3\" hold (3 X 15). Slight increased effort due to fatigue this date.   Exercise 2: Side lying clamshells BLE 3 X 15 (bed mobility rolling each direction mod I)  Exercise 3: NuStep 6 minutes, level 6 resistance, BLEs only seat 14 and BUE 14 , average SPM: 55  Exercise 7: Bridging bilateral hook lying with hip adduction sets 5\" (3 X 15)

## 2023-10-03 ENCOUNTER — HOSPITAL ENCOUNTER (OUTPATIENT)
Dept: WOUND CARE | Age: 73
Discharge: HOME | End: 2023-10-03

## 2023-10-03 ENCOUNTER — APPOINTMENT (OUTPATIENT)
Dept: PHYSICAL THERAPY | Age: 73
End: 2023-10-03
Payer: MEDICARE

## 2023-10-04 DIAGNOSIS — E11.8 TYPE 2 DIABETES MELLITUS WITH COMPLICATION, WITH LONG-TERM CURRENT USE OF INSULIN (HCC): ICD-10-CM

## 2023-10-04 DIAGNOSIS — Z79.4 TYPE 2 DIABETES MELLITUS WITH COMPLICATION, WITH LONG-TERM CURRENT USE OF INSULIN (HCC): ICD-10-CM

## 2023-10-05 ENCOUNTER — HOSPITAL ENCOUNTER (OUTPATIENT)
Dept: PHYSICAL THERAPY | Age: 73
Setting detail: THERAPIES SERIES
Discharge: HOME | End: 2023-10-05

## 2023-10-05 PROCEDURE — 9990000010 HC NO CHARGE VISIT

## 2023-10-05 NOTE — PROGRESS NOTES
Physical Therapy  Cancellation/No-show Note  Patient Name:  Doria Boast  :  1950   Date:  10/5/2023  Cancelled visits to date: 2  No-shows to date: 0    For today's appointment patient:  [x]  Cancelled  []  Rescheduled appointment  []  No-show     Reason given by patient:  [x]  Patient ill  []  Conflicting appointment  []  No transportation    []  Conflict with work  []  No reason given  []  Other:     Comments:  Pt reports pulling muscle in his back unable to make it into therapy this date.      Electronically signed by:  Guido Nuñez, MILE [id number] 10/05/23 12:56 PM

## 2023-10-06 ENCOUNTER — HOSPITAL ENCOUNTER (OUTPATIENT)
Dept: WOUND CARE | Age: 73
Discharge: HOME | End: 2023-10-06
Payer: MEDICARE

## 2023-10-06 VITALS
RESPIRATION RATE: 20 BRPM | SYSTOLIC BLOOD PRESSURE: 138 MMHG | DIASTOLIC BLOOD PRESSURE: 73 MMHG | TEMPERATURE: 97.3 F | HEART RATE: 61 BPM

## 2023-10-06 PROCEDURE — 29580 STRAPPING UNNA BOOT: CPT

## 2023-10-06 PROCEDURE — 99213 OFFICE O/P EST LOW 20 MIN: CPT | Performed by: NURSE PRACTITIONER

## 2023-10-06 RX ORDER — ATORVASTATIN CALCIUM 40 MG/1
40 TABLET, FILM COATED ORAL DAILY
Qty: 90 TABLET | Refills: 3 | Status: SHIPPED | OUTPATIENT
Start: 2023-10-06

## 2023-10-06 ASSESSMENT — PAIN SCALES - GENERAL
PAINLEVEL_OUTOF10: 0
PAINLEVEL_OUTOF10: 0

## 2023-10-06 NOTE — PATIENT INSTRUCTIONS
[facility] Physician Orders   [facility] ORTHOPEDIC AND SPINE \[facility]\"" AT [address], [address] [place], [place] [address]  Telephone: [phone]    NAME:  Vitaly Bravo OF BIRTH:  1950  MEDICAL RECORD NUMBER:  [id number]  DATE:  10/6/2023    Congratulations! You have completed your treatment. Return to your Primary Care Physician for all your health issues. Resume your ordinary activities as tolerated. Take your medications as prescribed by your primary care physician. Check your skin daily for cracks, bruises, sores, or dryness. Use a moisturizer as needed. Clean and dry your skin, using mild soap and warm water (not hot). Avoid alcohol and caffeine and do not smoke. Maintain a nutritious diet. Avoid pressure on your wound site. Keep your legs elevated above the level of the heart whenever possible. Continue to use wraps/stockings/compression as prescribed. Replace compression stockings every four to six months as needed to ensure proper fit. Wear well-fitting shoes and leg garments. Other: Prescription given today for compression stocking right lower leg. Appointment with Gildardo Dawn CNP at [address] at 10:45am on 3/11/23 Wednesday             Apply calamine unna boot to right lower leg in clinic today, removal will be at appointment with Gildardo Dawn on 3/11.       Physician Signature:______________________    Date: ___________ Time:  ____________    Gildardo Dawn CNP            Electronically signed by Jose Neville on 10/6/2023 at 10:03 AM

## 2023-10-06 NOTE — TELEPHONE ENCOUNTER
Medication:   Requested Prescriptions     Pending Prescriptions Disp Refills    atorvastatin (LIPITOR) 40 MG tablet [Pharmacy Med Name: ATORVASTATIN 40 MG TABLET] 90 tablet 3     Sig: TAKE 1 TABLET BY MOUTH DAILY        Last Filled:      Patient Phone Number: [phone] (home)     Last appt: 9/8/2023   Next appt: 12/19/2023    Last OARRS:        No data to display

## 2023-10-06 NOTE — PLAN OF CARE
[facility] Application   Below Knee    NAME:  Angelia Bates  YOB: 1950  MEDICAL RECORD NUMBER:  [id number]  DATE:  10/6/2023    Jeri Riverbank boot: Applied moisturizing agent to dry skin as needed. Appied primary and secondary dressing as ordered. Applied Unna roll from toes to knee overlapping each time. Applied ace wrap or coban from toes to below the knee. Instructed patient/caregiver to keep dressing dry and intact. DO NOT REMOVE DRESSING. Instructed pt/family/caregiver to report excessive draining, loose bandage, wet dressing, severe pain or tingling in toes. Applied [employer] dressing below the knee to right lower leg. Unna Boot(s) were applied per  Guidelines.      Electronically signed by John Penn RN on 10/6/2023 at 11:36 AM

## 2023-10-08 PROBLEM — L97.211 VENOUS STASIS ULCER OF RIGHT CALF LIMITED TO BREAKDOWN OF SKIN (HCC): Status: ACTIVE | Noted: 2023-06-20

## 2023-10-08 PROBLEM — I87.2 CHRONIC VENOUS INSUFFICIENCY OF LOWER EXTREMITY: Status: ACTIVE | Noted: 2023-10-08

## 2023-10-08 PROBLEM — M79.89 SWELLING OF LIMB: Status: ACTIVE | Noted: 2023-10-08

## 2023-10-08 ASSESSMENT — ENCOUNTER SYMPTOMS
GASTROINTESTINAL NEGATIVE: 1
RESPIRATORY NEGATIVE: 1

## 2023-10-10 ENCOUNTER — HOSPITAL ENCOUNTER (OUTPATIENT)
Dept: PHYSICAL THERAPY | Age: 73
Setting detail: THERAPIES SERIES
Discharge: HOME | End: 2023-10-10
Payer: MEDICARE

## 2023-10-10 PROCEDURE — 9990000010 HC NO CHARGE VISIT

## 2023-10-10 NOTE — PROGRESS NOTES
Physical Therapy  Cancellation/No-show Note  Patient Name:  Angelia Bates  :  1950   Date:  10/10/2023  Cancelled visits to date: 3  No-shows to date: 0    For today's appointment patient:  [x]  Cancelled  []  Rescheduled appointment  []  No-show     Reason given by patient:  []  Patient ill  []  Conflicting appointment  []  No transportation    []  Conflict with work  []  No reason given  [x]  Other:     Comments:  Due to continued pain with back, needs one additional day of rest.     Electronically signed by:  Karmen Escalona, [address] [place] [address] PT, DPT [id number] 10/10/23 1:08 PM

## 2023-10-11 ENCOUNTER — OFFICE VISIT (OUTPATIENT)
Dept: VASCULAR SURGERY | Age: 73
End: 2023-10-11
Payer: MEDICARE

## 2023-10-11 VITALS
WEIGHT: 266 LBS | SYSTOLIC BLOOD PRESSURE: 152 MMHG | HEIGHT: 76 IN | DIASTOLIC BLOOD PRESSURE: 78 MMHG | HEART RATE: 73 BPM | BODY MASS INDEX: 32.39 KG/M2

## 2023-10-11 DIAGNOSIS — M79.89 RIGHT LEG SWELLING: Primary | ICD-10-CM

## 2023-10-11 PROCEDURE — 29580 STRAPPING UNNA BOOT: CPT | Performed by: NURSE PRACTITIONER

## 2023-10-11 NOTE — PATIENT INSTRUCTIONS
Return in about 1 week (around 10/18/2023) for your new compression stocking. PATIENT EDUCATION focused on need for continued Unna boot therapy for compression. It supports vascular problems, helps to heal leg ulcers and controls leg swelling. The dressing can be worn up to a week before it needs changed. Patient must keep the Unna boot dry.

## 2023-10-12 ENCOUNTER — HOSPITAL ENCOUNTER (OUTPATIENT)
Dept: PHYSICAL THERAPY | Age: 73
Setting detail: THERAPIES SERIES
Discharge: HOME | End: 2023-10-12
Payer: MEDICARE

## 2023-10-12 PROCEDURE — 97110 THERAPEUTIC EXERCISES: CPT

## 2023-10-12 NOTE — PROGRESS NOTES
X 15 each including LAQ. Exercise 7: Bridging bilateral hook lying with hip adduction sets 3 X 15    Limitations addressed: Mobility, Strength, Activity tolerance  Therapist provided: Assistance, Verbal cuing, Tactile cuing  Progressed: Repetitions, Complexity of movement  Functional ability(s) targeted: increased effort and time this date due to increased pain and fatigue   1:1 Time (minutes): 40                 Pt Education: PT Education: Goals, PT Role, Plan of Care, Evaluative findings, Home Exercise Program, Transfer Training, Posture, Functional Mobility Training, Equipment, General Safety  Patient Education: HEP and recommendations for exercises  Current home exercise program (HEP): [email or web address]    Access Code: Morris Wright; reviewed this date       ASSESSMENT     Assessment: Assessment: Limitations this date due to increased low back pain/soreness with activity. limited to exercises this date due to back unable to attempt ambulation with [facility] at this time. Continues with bilateral hip weakness limiting mobility with strong use of BUE for transfers and ambulation with RW. Pt still highly motivated to work with PT but limited due to back pain. Pt benefit from skilled PT to address these impairments promoting improved functional capacity with decreased use of DME to maintaing mobility independence. Body Structures, Functions, Activity Limitations Requiring Skilled Therapeutic Intervention: Decreased functional mobility , Decreased ROM, Decreased body mechanics, Decreased strength, Decreased posture, Decreased balance, Decreased endurance    Post-Treatment Pain Level:       Activity Tolerance: Patient tolerated evaluation without incident; Patient tolerated treatment well; Patient limited by endurance    Therapy Prognosis: Good       GOALS   Patient Goals : \"walk with a cane, get stronger\"  Short Term Goals Completed by 6 weeks Current Status Goal Status   Improved Bilateral hip strength 4-/5 improving

## 2023-10-12 NOTE — PROGRESS NOTES
(VIAGRA) 100 MG tablet Take 1 tablet by mouth as needed for Erectile Dysfunction Yes Alivia Gutiérrez MD   blood glucose test strips (ASCENSIA AUTODISC VI;ONE TOUCH ULTRA TEST VI) strip 1 each by In Vitro route daily Test as directed,Dispense according to insurance formulary Yes Alivia Gutiérrez MD   Lancets MISC 1 each by Does not apply route daily Test as directed, Dispense according to insurance formulary Yes Alivia Gutiérrez MD   oxybutynin (DITROPAN) 5 MG tablet Take 1 tablet by mouth nightly Take 5 mg by mouth nightly Yes Alivia Gutiérrez MD   levothyroxine (SYNTHROID) 25 MCG tablet TAKE 1 TABLET BY MOUTH DAILY Yes Alivia Gutiérrez MD   apixaban (ELIQUIS) 5 MG TABS tablet TAKE 1 TABLET BY MOUTH TWICE DAILY Yes KENNY Silva - CNP   FARXIGA 10 MG tablet TAKE 1 TABLET BY MOUTH EVERY MORNING Yes Alivia Gutiérrez MD   Continuous Blood Gluc Sensor (FREESTYLE KWESI 14 DAY SENSOR) MISC Use as directed Dx E11.9 Yes Alivia Gutiérrez MD   Lactobacillus Rhamnosus, GG, (RA PROBIOTIC DIGESTIVE CARE) CAPS Take 1 capsule by mouth daily Yes Provider, MD Ky   docusate (COLACE, DULCOLAX) 100 MG CAPS Take 100 mg by mouth daily as needed (constipation) Yes Provider, MD Ky   NOVOLOG FLEXPEN 100 UNIT/ML injection pen ADMINISTER 8 TO 10 UNITS UNDER THE SKIN THREE TIMES DAILY BEFORE MEALS  Patient taking differently: 0-2 Units 2 times daily (with meals) Sliding scale Yes Alivia Gutiérrez MD   Blood Glucose Monitoring Suppl ([facility]) w/Device KIT 1 each by Does not apply route 2 times daily Yes Alivia Gutiérrez MD   Handicap Placard MISC by Does not apply route Diagnosis: heart failure. Expires: 12/7/24. Yes Alivia Gutiérrez MD   Insulin Pen Needle (B-D UF III MINI PEN NEEDLES) 31G X 5 MM MISC Inject 1 each into the skin daily Yes Alivia Gutiérrez MD   blood glucose test strips (ONE TOUCH TEST STRIPS) strip 1 each by In Vitro route 3 times daily As needed.  Yes Alivia Gutiérrez MD   [facility] ULTRA strip USE

## 2023-10-17 ENCOUNTER — HOSPITAL ENCOUNTER (OUTPATIENT)
Dept: PHYSICAL THERAPY | Age: 73
Setting detail: THERAPIES SERIES
Discharge: HOME | End: 2023-10-17
Payer: MEDICARE

## 2023-10-17 PROCEDURE — 97110 THERAPEUTIC EXERCISES: CPT

## 2023-10-17 PROCEDURE — 97116 GAIT TRAINING THERAPY: CPT

## 2023-10-17 ASSESSMENT — PAIN SCALES - GENERAL: PAINLEVEL_OUTOF10: 5

## 2023-10-17 NOTE — PROGRESS NOTES
Physical Therapy: Daily Note   Patient: Myra Lagurere (79 y.o. male)   Examination Date: 10/17/2023  No data recorded  No data recorded   :  1950 # of Visits since [facility]:   12   MRN: [id number]  CSN: [id number] Start of Care Date:   2023   Insurance: Payor: [facility] / Plan: Shelia Correaelizabeth ESSENTIAL/PLUS / Product Type: *No Product type* /   Insurance ID: [id number] - (Medicare Managed) Secondary Insurance (if applicable):    Referring Physician: MD Leann Zuniga MD   PCP: Leann Wilson MD Visits to Date/Visits Approved:     No Show/Cancelled Appts: 0 / 3     Medical Diagnosis: Hx of BKA, left ([address]) [Z89.512] Hx of BKA, left ([address]) (Z89.512), LE weakness  Treatment Diagnosis: Decreased functional capacity due to decreased strength and activity tolerance        SUBJECTIVE EXAMINATION   Pain Level: Pain Screening  Patient Currently in Pain: Yes  Pain Level: 5    Patient Comments: Subjective: Pt reports feeling aches and soreness this date ~ 5/10 generalized this date but back overall improving daily. Agreeable to PT Treatment session    HEP Compliance: Good  Any changes to allergies, medications, or have you had any medical procedures/ER visits since your last visit?: No     OBJECTIVE EXAMINATION   Restrictions:  No data recorded No data recorded No data recorded              TREATMENT     Exercises:  Therapeutic exercise (CPT 63126)   Treatment Reasoning    Exercise 1: Supine exercises BLEs 0lb weight to avoid strain on back this date ankle weight each (VC and TC throughout for proper sequencing and body positioning) SLR (3x15), hip abduction knee straight (3x15), heel slides (3 X 15), SAQ 3\" hold (3 X 15). Slight increased effort due to fatigue this date.   Exercise 2: Side lying clamshells BLE 3 X 15 (bed mobility rolling each direction mod I)  Exercise 3: NuStep 8 minutes, level 6 resistance, BLEs only seat 14 and BUE 14 , average SPM: 51  Exercise 4: Seated exercises

## 2023-10-18 ENCOUNTER — OFFICE VISIT (OUTPATIENT)
Dept: VASCULAR SURGERY | Age: 73
End: 2023-10-18
Payer: MEDICARE

## 2023-10-18 VITALS
WEIGHT: 262.2 LBS | HEIGHT: 76 IN | DIASTOLIC BLOOD PRESSURE: 76 MMHG | SYSTOLIC BLOOD PRESSURE: 124 MMHG | BODY MASS INDEX: 31.93 KG/M2

## 2023-10-18 DIAGNOSIS — I87.2 CHRONIC VENOUS INSUFFICIENCY OF LOWER EXTREMITY: ICD-10-CM

## 2023-10-18 DIAGNOSIS — M79.89 RIGHT LEG SWELLING: Primary | ICD-10-CM

## 2023-10-18 PROCEDURE — 99213 OFFICE O/P EST LOW 20 MIN: CPT | Performed by: NURSE PRACTITIONER

## 2023-10-18 PROCEDURE — 3074F SYST BP LT 130 MM HG: CPT | Performed by: NURSE PRACTITIONER

## 2023-10-18 PROCEDURE — 3078F DIAST BP <80 MM HG: CPT | Performed by: NURSE PRACTITIONER

## 2023-10-18 PROCEDURE — 1123F ACP DISCUSS/DSCN MKR DOCD: CPT | Performed by: NURSE PRACTITIONER

## 2023-10-18 NOTE — PATIENT INSTRUCTIONS
PATIENT EDUCATION focused on the need for compression stockings. They are specially designed hosiery that improve blood flow in the legs, prevent blood clotting and inhibit the progression of a variety of venous disorders. They are designed to be tighter around the ankles with gradually less pressure moving up the lower limbs toward the knees. Working with the calf muscle, the stocking is designed to help squeeze the venous blood back up toward the heart, to enhance circulation. Return if symptoms worsen or fail to improve.

## 2023-10-18 NOTE — PROGRESS NOTES
Nikolas Helm (:  1950) is a 81 y.o. male,Established patient, here for evaluation of the following chief complaint(s):  Leg Swelling (Compression stocking fitting)    Pain Assessment  The patient is currently not experiencing any pain at this time. HPI    Edema  The edema is located in the right lower extremity. Mr. Deonna Merchant has been seen at the [facility]. His RLE ulcer had healed and last week he was measured for a custom compression stocking to control right leg swelling to prevent reoccurrence of leg ulcers. An Unna boot was applied to control leg swelling while waiting on his new custom stocking, 30-40 mmHg. Review of Systems   Constitutional:  Negative for chills and fever. Cardiovascular:  Positive for leg swelling (RLE - controlled with Unna boot). Skin:  Negative for wound. All other systems reviewed and are negative. No Known Allergies      Prior to Visit Medications    Medication Sig Taking? Authorizing Provider   atorvastatin (LIPITOR) 40 MG tablet TAKE 1 TABLET BY MOUTH DAILY Yes Dwayne Miguel MD   tamsulosin (FLOMAX) 0.4 MG capsule Take 1 capsule by mouth daily Yes Dwayne Miguel MD   finasteride (PROSCAR) 5 MG tablet  Yes ProviderKy MD   amiodarone (CORDARONE) 200 MG tablet Take 1 tablet by mouth daily Yes KENNY Cuba CNP   Continuous Blood Gluc  (FREESTYLE KWESI 2 READER) ANITA Use as directed to monitor blood sugar. Yes Dwayne Miguel MD   carvedilol (COREG) 3.125 MG tablet Take 1 tablet by mouth 2 times daily Yes KENNY Chun CNP   furosemide (LASIX) 80 MG tablet Take 1 tablet by mouth daily Yes KENNY Chun CNP   lisinopril (PRINIVIL;ZESTRIL) 2.5 MG tablet Take 1 tablet by mouth daily Yes KENNY Chun CNP   spironolactone (ALDACTONE) 25 MG tablet Take 0.5 tablets by mouth daily . 5 tab  daily Yes KENNY Chun CNP   sildenafil (VIAGRA) 100 MG tablet Take 1 tablet by mouth

## 2023-10-19 ENCOUNTER — HOSPITAL ENCOUNTER (OUTPATIENT)
Dept: PHYSICAL THERAPY | Age: 73
Setting detail: THERAPIES SERIES
Discharge: HOME | End: 2023-10-19
Payer: MEDICARE

## 2023-10-19 PROCEDURE — 97116 GAIT TRAINING THERAPY: CPT

## 2023-10-19 PROCEDURE — 97110 THERAPEUTIC EXERCISES: CPT

## 2023-10-19 NOTE — PROGRESS NOTES
Physical Therapy: Daily Note   Patient: Shan Biggsole (82 y.o. male)   Examination Date: 10/19/2023  No data recorded  No data recorded   :  1950 # of Visits since [facility]:   13   MRN: [id number]  CSN: [id number] Start of Care Date:   2023   Insurance: Payor: Ange Siegel / Plan: Blinda Spencer ESSENTIAL/PLUS / Product Type: *No Product type* /   Insurance ID: [id number] - (Medicare Managed) Secondary Insurance (if applicable):    Referring Physician: Sallee Essex, MD Sallee Essex, MD   PCP: Sallee Essex, MD Visits to Date/Visits Approved: 10 / 30    No Show/Cancelled Appts: 0 / 3     Medical Diagnosis: Hx of BKA, left ([address]) [Z89.512] Hx of BKA, left ([address]) (Q33.656), LE weakness  Treatment Diagnosis: Decreased functional capacity due to decreased strength and activity tolerance        SUBJECTIVE EXAMINATION   Pain Level: Pain Screening  Patient Currently in Pain: Denies    Patient Comments: Subjective: Denies back pain this date reports feeling well overall no new concerns . Agreeable to PT Treatment session    HEP Compliance: Good  Any changes to allergies, medications, or have you had any medical procedures/ER visits since your last visit?: No     OBJECTIVE EXAMINATION   Restrictions:  No data recorded No data recorded No data recorded          Outcome Measure(s) Completed:   Lower Extremity Functional Scale (LEFS)  Any of your usual work, housework, or school activities: Moderate Difficulty  Your usual hobbies, recreational, or sporting activities: Moderate Difficulty  Getting into or out of the bath: Moderate Difficulty  Walking between rooms: A Little Bit of Difficulty  Putting on your shoes or socks: Moderate Difficulty  Squatting: A Little Bit of Difficulty  Lifting an object, like a bag of groceries from the floor: Moderate Difficulty  Performing light activities around your home: A Little Bit of Difficulty  Performing heavy activities around your home:  Moderate Difficulty  Getting into

## 2023-10-19 NOTE — PLAN OF CARE
[facility]  [facility] [address]  Dept: [phone]  Loc: [phone]    PHYSICAL THERAPY PLAN OF CARE: PROGRESS NOTE    Patient: Jonny Cruz (44 y.o. male)   Examination Date:   Plan of Care Certification Period: 10/19/2023 to        :  1950  MRN: [id number]  CSN: [id number]   Insurance: Payor: Serina Livingston / Plan: Liset Comp ESSENTIAL/PLUS / Product Type: *No Product type* /   Insurance ID: [id number] - (Medicare Managed) Secondary Insurance (if applicable):    Referring Physician: MD Wilfredo Daniels MD   PCP: Wilfredo Galan MD Visits to Date/Visits Approved: 10 / 30    No Show/Cancelled Appts: 0 / 3     Medical Diagnosis: Hx of BKA, left ([address]) [Z89.512] Hx of BKA, left ([address]) (E04.936), LE weakness  Treatment Diagnosis: Decreased functional capacity due to decreased strength and activity tolerance       ASSESSMENT     Assessment: Assessment: Pt demonstrating significant improvement in ambulation distances decreased assistance, improved sit <-> stand decreased time, and TUG decreased average time. Limitations this date due to increased low back pain/soreness with activity. limited to exercises this date due to back improved tolerance of ambulation with [facility] this date progressing to community distances with decreased time and assistance overall. Continues with bilateral hip weakness limiting mobility with strong use of BUE for transfers and ambulation with RW. Pt still highly motivated to work with PT but limited due to back pain. Pt benefit from skilled PT to address these impairments promoting improved functional capacity with decreased use of DME to maintaing mobility independence.   Body Structures, Functions, Activity Limitations Requiring Skilled Therapeutic Intervention: Decreased functional mobility , Decreased ROM, Decreased body mechanics, Decreased strength, Decreased posture, Decreased balance,

## 2023-10-24 ENCOUNTER — HOSPITAL ENCOUNTER (OUTPATIENT)
Dept: PHYSICAL THERAPY | Age: 73
Setting detail: THERAPIES SERIES
Discharge: HOME | End: 2023-10-24
Payer: MEDICARE

## 2023-10-24 PROCEDURE — 97110 THERAPEUTIC EXERCISES: CPT

## 2023-10-24 PROCEDURE — 97116 GAIT TRAINING THERAPY: CPT

## 2023-10-24 NOTE — PROGRESS NOTES
Physical Therapy: Daily Note   Patient: Cesario Beck (43 y.o. male)   Examination Date: 10/24/2023  No data recorded  No data recorded   :  1950 # of Visits since [facility]:   14   MRN: [id number]  CSN: [id number] Start of Care Date:   2023   Insurance: Payor: [employer] / Plan: Yousuf Ast ESSENTIAL/PLUS / Product Type: *No Product type* /   Insurance ID: [id number] - (Medicare Managed) Secondary Insurance (if applicable):    Referring Physician: MD Jennifer Carrero MD   PCP: Jennifer Lua MD Visits to Date/Visits Approved:     No Show/Cancelled Appts: 0 / 3     Medical Diagnosis: Hx of BKA, left ([address]) [Z89.512] Hx of BKA, left ([address]) (Z89.512), LE weakness  Treatment Diagnosis: Decreased functional capacity due to decreased strength and activity tolerance        SUBJECTIVE EXAMINATION   Pain Level: Pain Screening  Patient Currently in Pain: Denies    Patient Comments: Subjective: Denies back pain this date reports feeling well overall no new concerns, some difficulty with sleep last night. Agreeable to PT Treatment session    HEP Compliance: Good  Any changes to allergies, medications, or have you had any medical procedures/ER visits since your last visit?: No     OBJECTIVE EXAMINATION   Restrictions:  No data recorded No data recorded No data recorded              TREATMENT     Exercises:  Therapeutic exercise (CPT 03577)   Treatment Reasoning    Exercise 1: Supine exercises BLEs 2lb weight to avoid strain on back this date ankle weight each (VC and TC throughout for proper sequencing and body positioning) SLR (3x10), hip abduction knee straight (3x10), heel slides (3 X 10), Slight increased effort due to fatigue this date.   Exercise 2: Side lying clamshells BLE 3 X 15 (bed mobility rolling each direction mod I) 2 lb ankle weight placed at knee  Exercise 3: NuStep 9 minutes, level 6 resistance, BLEs only seat 14 and BUE 14 , average SPM: 49  Exercise 6: Sit <-> stand 2 X 5

## 2023-10-26 ENCOUNTER — HOSPITAL ENCOUNTER (OUTPATIENT)
Dept: PHYSICAL THERAPY | Age: 73
Setting detail: THERAPIES SERIES
Discharge: HOME | End: 2023-10-26
Payer: MEDICARE

## 2023-10-26 PROCEDURE — 97110 THERAPEUTIC EXERCISES: CPT

## 2023-10-26 PROCEDURE — 97116 GAIT TRAINING THERAPY: CPT

## 2023-10-26 NOTE — PROGRESS NOTES
Assessment: Assessment: Pt demonstrating increased tolerance with addition of weight with supine exercises this date, improved daily gait speed with [facility] [place] [place] demonstrating improve confidence and tolerance. limited to exercises this date due to back improved tolerance of ambulation with [facility] [place] [place] this date progressing to community distances with decreased time and assistance overall. Continues with bilateral hip weakness limiting mobility with strong use of BUE for transfers and ambulation with RW. Pt still highly motivated to work with PT but limited due to back pain. Pt benefit from skilled PT to address these impairments promoting improved functional capacity with decreased use of DME to maintaing mobility independence. Body Structures, Functions, Activity Limitations Requiring Skilled Therapeutic Intervention: Decreased functional mobility , Decreased ROM, Decreased body mechanics, Decreased strength, Decreased posture, Decreased balance, Decreased endurance    Post-Treatment Pain Level: Activity Tolerance: Patient tolerated evaluation without incident; Patient tolerated treatment well; Patient limited by endurance    Therapy Prognosis: Good       GOALS   Patient Goals : \"walk with a cane, get stronger\"  Short Term Goals Completed by 6 weeks Current Status Goal Status   Improved Bilateral hip strength 4-/5 improving functional mobility decreased use of BUE   Met   Perform TUG < 30 demonstrating improvement in functional mobility capacity. In progress, Not Met, Updated   Complete sit <-> stand < 25 seconds promoting improved functional mobility capacity. In progress, Updated, Met   Improved LEFS > 40 improving self perceived functional mobility capcity.    Met (Met 2/4 STG)                                                       Long Term Goals Completed by 10-12 weeks Current Status Goal Status   Improved Bilateral hip strength 4+/5 improving functional mobility decreased use of BUE ambulating with SPC 50 ft

## 2023-10-31 ENCOUNTER — HOSPITAL ENCOUNTER (OUTPATIENT)
Dept: PHYSICAL THERAPY | Age: 73
Setting detail: THERAPIES SERIES
Discharge: HOME | End: 2023-10-31
Payer: MEDICARE

## 2023-10-31 PROCEDURE — 9990000010 HC NO CHARGE VISIT

## 2023-10-31 NOTE — PROGRESS NOTES
Physical Therapy  Cancellation/No-show Note  Patient Name:  Bridget Ocampo  :  1950   Date:  10/31/2023  Cancelled visits to date: 4  No-shows to date: 0    For today's appointment patient:  [x]  Cancelled  []  Rescheduled appointment  []  No-show     Reason given by patient:  []  Patient ill  []  Conflicting appointment  []  No transportation    []  Conflict with work  [x]  No reason given  []  Other:     Comments:  Pt called left message, unable to attend therapy this date but will plan to return  per scheduled appointment.      Electronically signed by:  Guido Andrew, MILE [id number] 10/31/23 1:19 PM

## 2023-11-02 ENCOUNTER — HOSPITAL ENCOUNTER (OUTPATIENT)
Dept: PHYSICAL THERAPY | Age: 73
Setting detail: THERAPIES SERIES
Discharge: HOME | End: 2023-11-02

## 2023-11-02 PROCEDURE — 9990000010 HC NO CHARGE VISIT

## 2023-11-02 NOTE — PROGRESS NOTES
Chief complaint:   Chief Complaint   Patient presents with   • Office Visit     possible stye on left eye lid   • Eye Problem     onset of lump = 1 month ago - red today       Vitals:  Visit Vitals  /64   Pulse 80   Temp 98.1 °F (36.7 °C)   Resp 16   Ht 6' 3\" (1.905 m)   Wt 88.9 kg   BMI 24.50 kg/m²       HISTORY OF PRESENT ILLNESS     Kurt Calixto is a 29 year old male that presents with right eye redness, drainage and swelling. He states he had a stye the right upper eye lid for about a month. Yesterday he noticed some irritation to his eye and last night had a lot of drainage. This morning his right eye was \"glued shut\" and now having some swelling and redness underneath his right eye. Left eye feels slightly irritated and red.      Other significant problems:  Patient Active Problem List    Diagnosis Date Noted   • Attention deficit hyperactivity disorder, combined type 07/16/2020     Priority: Low   • Anxiety disorder 10/07/2019     Priority: Low       PAST MEDICAL, FAMILY AND SOCIAL HISTORY     Medications:  Current Outpatient Medications   Medication   • [START ON 8/16/2021] lisdexamfetamine (Vyvanse) 40 MG capsule   • [START ON 7/17/2021] lisdexamfetamine (Vyvanse) 40 MG capsule   • [START ON 8/16/2021] lisdexamfetamine (VYVANSE) 30 MG capsule   • [START ON 7/17/2021] lisdexamfetamine (VYVANSE) 30 MG capsule   • finasteride (PROPECIA) 1 MG tablet   • rizatriptan (MAXALT) 5 MG tablet   • minoxidil (LONITEN) 2.5 MG tablet   • diclofenac (VOLTAREN) 50 MG EC tablet   • sildenafil (REVATIO) 20 MG tablet   • ketoconazole (NIZORAL) 2 % shampoo   • loratadine (CLARITIN) 10 MG tablet   • polymyxin b-trimethoprim (Polytrim) 35738-8.1 UNIT/ML-% ophthalmic solution   • doxycycline monohydrate (ADOXA) 100 MG tablet     No current facility-administered medications for this visit.       Allergies:  ALLERGIES:  No Known Allergies    Past Medical  History/Surgeries:  Past Medical History:   Diagnosis Date   •  Physical Therapy  Cancellation/No-show Note  Patient Name:  Fidelia Jean  :  1950   Date:  2023  Cancelled visits to date: 5  No-shows to date: 0    For today's appointment patient:  [x]  Cancelled  []  Rescheduled appointment  [x]  No-show     Reason given by patient:  []  Patient ill  []  Conflicting appointment  []  No transportation    []  Conflict with work  []  No reason given  [x]  Other:     Comments:  per wife on phone, continued difficulty due to back pain. Attempted to call patient multiple time at time of appointment but unable to reach.      Electronically signed by:  Guido Jorge, MILE [id number] 23 1:34 PM Anxiety    • Depressive disorder        No past surgical history on file.    Family History:  Family History   Problem Relation Age of Onset   • Diabetes Mother    • Diabetes Father    • Myocardial Infarction Father        Social History:  Social History     Tobacco Use   • Smoking status: Former Smoker     Packs/day: 0.00     Years: 10.00     Pack years: 0.00     Types: Cigarettes   • Smokeless tobacco: Never Used   Substance Use Topics   • Alcohol use: Yes     Comment: 1-2/month       REVIEW OF SYSTEMS     Review of Systems   All other systems reviewed and are negative.      PHYSICAL EXAM     Physical Exam  Vitals and nursing note reviewed.   Constitutional:       General: He is not in acute distress.     Appearance: Normal appearance. He is normal weight. He is not ill-appearing, toxic-appearing or diaphoretic.   Eyes:      Conjunctiva/sclera:      Right eye: Right conjunctiva is injected.      Left eye: Left conjunctiva is injected.        Comments: There is a small stye to right upper inner lid. There is erythema and swelling underneath right eye also. Scant purulent drainage bilaterally   Neurological:      Mental Status: He is alert.         ASSESSMENT/PLAN     Infection of right eye  (primary encounter diagnosis)  Comment:   Plan: doxycycline monohydrate (ADOXA) 100 MG tablet            Conjunctivitis of both eyes, unspecified conjunctivitis type  Comment:   Plan: polymyxin b-trimethoprim (Polytrim) 32517-1.1         UNIT/ML-% ophthalmic solution          Warm compresses  Follow up with eye doctor if symptoms don't improve    Risks, benefits, alternatives, expected outcomes, limitations, and possible complications of treatment/procedure were reviewed.   Verbalizes consent, agreement, and understanding.   Criteria to return to the urgent care or emergency medicine department discussed along with home care tips.   Patient education instructions printed and reviewed with patient.  All questions were answered  prior to discharge.

## 2023-11-03 ENCOUNTER — TELEPHONE (OUTPATIENT)
Dept: INTERNAL MEDICINE CLINIC | Age: 73
End: 2023-11-03

## 2023-11-03 RX ORDER — DAPAGLIFLOZIN 10 MG/1
10 TABLET, FILM COATED ORAL EVERY MORNING
Qty: 90 TABLET | Refills: 3 | OUTPATIENT
Start: 2023-11-03

## 2023-11-03 RX ORDER — FLASH GLUCOSE SENSOR
KIT MISCELLANEOUS
Qty: 6 EACH | Refills: 3 | Status: SHIPPED | OUTPATIENT
Start: 2023-11-03

## 2023-11-03 NOTE — TELEPHONE ENCOUNTER
Medication:   Requested Prescriptions     Pending Prescriptions Disp Refills    dapagliflozin (FARXIGA) 10 MG tablet 90 tablet 3     Sig: Take 1 tablet by mouth every morning    Continuous Blood Gluc Sensor ([place]ESI 14 DAY SENSOR) MISC 6 each 3     Sig: Use as directed Dx E11.9       Last Filled:  10/13/22    Patient Phone Number: [phone] (home)     Last appt: 9/8/2023   Next appt: 12/19/2023    Last Labs DM:   Lab Results   Component Value Date/Time    LABA1C 5.7 09/08/2023 01:03 PM

## 2023-11-07 ENCOUNTER — HOSPITAL ENCOUNTER (OUTPATIENT)
Dept: PHYSICAL THERAPY | Age: 73
Setting detail: THERAPIES SERIES
Discharge: HOME | End: 2023-11-07
Payer: MEDICARE

## 2023-11-07 ENCOUNTER — APPOINTMENT (OUTPATIENT)
Dept: PHYSICAL THERAPY | Age: 73
End: 2023-11-07
Payer: MEDICARE

## 2023-11-07 PROCEDURE — 9990000010 HC NO CHARGE VISIT

## 2023-11-07 PROCEDURE — G2066 INTER DEVC REMOTE 30D: HCPCS | Performed by: NURSE PRACTITIONER

## 2023-11-07 PROCEDURE — 93297 REM INTERROG DEV EVAL ICPMS: CPT | Performed by: NURSE PRACTITIONER

## 2023-11-07 NOTE — PROGRESS NOTES
Physical Therapy  Cancellation/No-show Note  Patient Name:  Ernestina Stinson  :  1950   Date:  2023  Cancelled visits to date: 10  No-shows to date: 0    For today's appointment patient:  [x]  Cancelled  []  Rescheduled appointment  []  No-show     Reason given by patient:  [x]  Patient ill  []  Conflicting appointment  []  No transportation    []  Conflict with work  []  No reason given  [x]  Other:     Comments:  Pt with continued complaints of back pain limiting ability to participate with outpatient therapy this date. Educated patient on recommendations to possible hold neuro/mobility training therapy for possibility of outpatient PT for back pain or medical follow up. Pt states he would like one additional attempt for this  participation in this therapy session pending pain. Will plan to reassess Thursday.      Electronically signed by:  Guido Gutiérrez DPT [id number] 23 9:59 AM

## 2023-11-08 NOTE — PLAN OF CARE
November 8, 2023    Kristin Crespo  [address]  [place] [address]             [facility] Ped[facility]  Pediatric Neurology  [address]  [facility] [address]  Phone: [phone]   November 8, 2023     Patient: Kristin Crespo   YOB: 2008   Date of Visit: 11/8/2023       To Whom it May Concern:    Kristin Crespo was seen in my clinic on 11/8/2023. She may return to school on 11/9/2023 .    Please excuse her from any classes or work missed.    If you have any questions or concerns, please don't hesitate to call.    Sincerely,         Franki Johnston III, MD     
O2 sat 99 on room air. Goal: Patient will achieve/maintain normal respiratory rate/effort  Description  Respiratory rate and effort will be within normal limits for the patient  8/30/2019 5140 by Brneda Mendez RN  Outcome: Ongoing  Note:   Patient will achieve/maintain normal respiratory rate/effort. Electronically signed by Brenda Mendez RN on 8/30/2019 at 7:23 AM    8/30/2019 0041 by Danuta Kolb RN  Outcome: Ongoing  Note:   Patient respiratory rate wnl. Respirations easy and even. Problem: Discharge Planning:  Goal: Discharged to appropriate level of care  Description  Discharged to appropriate level of care  8/30/2019 0722 by Brenda Mendez RN  Outcome: Ongoing  Note:   Discharged to appropriate level of care. Electronically signed by Brenda Mendez RN on 8/30/2019 at 7:23 AM    8/30/2019 0041 by Danuta Kolb RN  Outcome: Ongoing  Note:   Patient plan to go home when discharged. Problem: Activity Intolerance:  Goal: Ability to tolerate increased activity will improve  Description  Ability to tolerate increased activity will improve  8/30/2019 0722 by Brenda Mendez RN  Outcome: Ongoing  Note:   Ability to tolerate increased activity will improve. Electronically signed by Brenda Mendez RN on 8/30/2019 at 7:24 AM    8/30/2019 0041 by Danuta Kolb RN  Outcome: Ongoing  Note:   Patient ambulating to bathroom in room. Patient tolerates this activity well. Problem: Airway Clearance - Ineffective:  Goal: Ability to maintain a clear airway will improve  Description  Ability to maintain a clear airway will improve  8/30/2019 0722 by Brenda Mendez RN  Outcome: Ongoing  Note:   Ability to maintain a clear airway will improve. Electronically signed by Brenda Mendez RN on 8/30/2019 at 7:24 AM    8/30/2019 0041 by Danuta Kolb RN  Outcome: Ongoing  Note:   Pt continue to have a clear airway.       Problem: Breathing Pattern - Ineffective:  Goal: Ability to achieve and maintain a

## 2023-11-09 ENCOUNTER — HOSPITAL ENCOUNTER (OUTPATIENT)
Dept: PHYSICAL THERAPY | Age: 73
Setting detail: THERAPIES SERIES
Discharge: HOME | End: 2023-11-09
Payer: MEDICARE

## 2023-11-09 PROCEDURE — 97110 THERAPEUTIC EXERCISES: CPT

## 2023-11-09 PROCEDURE — 97116 GAIT TRAINING THERAPY: CPT

## 2023-11-09 NOTE — PROGRESS NOTES
Physical Therapy: Daily Note   Patient: Fidelia Jean (84 y.o. male)   Examination Date: 2023  No data recorded  No data recorded   :  1950 # of Visits since [facility]:   17   MRN: [id number]  CSN: [id number] Start of Care Date:   2023   Insurance: Payor: [employer] / Plan: [facility] ESSENTIAL/PLUS / Product Type: *No Product type* /   Insurance ID: [id number] - (Medicare Managed) Secondary Insurance (if applicable):    Referring Physician: MD Dimitry Brian MD   PCP: Dimitry Carrera MD Visits to Date/Visits Approved:     No Show/Cancelled Appts:      Medical Diagnosis: Hx of BKA, left ([address]) [Z89.512] Hx of BKA, left ([address]) (Z89.512), LE weakness  Treatment Diagnosis: Decreased functional capacity due to decreased strength and activity tolerance        SUBJECTIVE EXAMINATION   Pain Level: Pain Screening  Patient Currently in Pain: Denies    Patient Comments: Subjective: Denies back pain this date reports feeling well episodes of back pain with too much exercise especially heel slide, some difficulty with sleep last night. Agreeable to PT Treatment session    HEP Compliance: Good  Any changes to allergies, medications, or have you had any medical procedures/ER visits since your last visit?: No     OBJECTIVE EXAMINATION   Restrictions:  No data recorded No data recorded No data recorded              TREATMENT     Exercises:  Therapeutic exercise (CPT 62370)   Treatment Reasoning    Exercise 1: Supine exercises BLEs olb weight this date (VC and TC throughout for proper sequencing and body positioning) SLR (2x10), hip abduction knee straight (2x10), and SAQ 3x10 black bolster; Slight increased effort due to fatigue this date.   Exercise 2: Side lying clamshells BLE 2 X 10 (bed mobility rolling each direction mod I)  Exercise 3: NuStep 8 minutes, level 6 resistance, BLEs only seat 14 and BUE 14 , average SPM: 54  Exercise 4: Seated exercises BLEs 3x10 each including

## 2023-11-14 ENCOUNTER — HOSPITAL ENCOUNTER (OUTPATIENT)
Dept: PHYSICAL THERAPY | Age: 73
Setting detail: THERAPIES SERIES
Discharge: HOME | End: 2023-11-14
Payer: MEDICARE

## 2023-11-14 PROCEDURE — 97110 THERAPEUTIC EXERCISES: CPT

## 2023-11-14 PROCEDURE — 97116 GAIT TRAINING THERAPY: CPT

## 2023-11-14 NOTE — PROGRESS NOTES
Physical Therapy: Daily Note   Patient: Rian Linn (89 y.o. male)   Examination Date: 2023  No data recorded  No data recorded   :  1950 # of Visits since [facility]:   18   MRN: [id number]  CSN: [id number] Start of Care Date:   2023   Insurance: Payor: [employer] / Plan: Pearl Mage ESSENTIAL/PLUS / Product Type: *No Product type* /   Insurance ID: [id number] - (Medicare Managed) Secondary Insurance (if applicable):    Referring Physician: MD Julito Spence MD   PCP: Julito Harrison MD Visits to Date/Visits Approved:     No Show/Cancelled Appts:      Medical Diagnosis: Hx of BKA, left ([address]) [Z89.512] Hx of BKA, left ([address]) (Z89.512), LE weakness  Treatment Diagnosis: Decreased functional capacity due to decreased strength and activity tolerance        SUBJECTIVE EXAMINATION   Pain Level: Pain Screening  Patient Currently in Pain: Denies    Patient Comments: Subjective: Pt reports feeling much better this date with less back pain overall, feeling like he is improving daily. Agreeable to PT Treatment session    HEP Compliance: Good  Any changes to allergies, medications, or have you had any medical procedures/ER visits since your last visit?: No     OBJECTIVE EXAMINATION   Restrictions:  No data recorded No data recorded No data recorded              TREATMENT     Exercises:  Therapeutic exercise (CPT 02350)   Treatment Reasoning    Exercise 1: Supine exercises BLEs olb weight this date (VC and TC throughout for proper sequencing and body positioning) SLR (3x15), hip abduction knee straight (2x15), and SAQ 3x15 black bolster; Slight increased effort due to fatigue this date. Exercise 2: Side lying clamshells BLE 3x15  (bed mobility rolling each direction mod I)  Exercise 3: NuStep 8 minutes, level 6 resistance, BLEs only seat 14 and BUE 14 , average SPM: 54  Exercise 4: Seated exercises BLEs 3x10 each including LAQ.   Exercise 5: Standing at RW X 10 BLEs toe

## 2023-11-16 ENCOUNTER — HOSPITAL ENCOUNTER (OUTPATIENT)
Dept: PHYSICAL THERAPY | Age: 73
Setting detail: THERAPIES SERIES
Discharge: HOME | End: 2023-11-16
Payer: MEDICARE

## 2023-11-16 PROCEDURE — 9990000010 HC NO CHARGE VISIT

## 2023-11-16 NOTE — PROGRESS NOTES
Physical Therapy  Cancellation/No-show Note  Patient Name:  Jonny Cruz  :  1950   Date:  2023  Cancelled visits to date: 7  No-shows to date: 0    For today's appointment patient:  [x]  Cancelled  []  Rescheduled appointment  []  No-show     Reason given by patient:  [x]  Patient ill  []  Conflicting appointment  []  No transportation    []  Conflict with work  []  No reason given  []  Other:     Comments:  Pt called to cancel due to low Blood Pressure this date.      Electronically signed by:  Guido Amado DPT [id number] 23 12:15 PM

## 2023-11-21 ENCOUNTER — HOSPITAL ENCOUNTER (OUTPATIENT)
Dept: PHYSICAL THERAPY | Age: 73
Setting detail: THERAPIES SERIES
Discharge: HOME | End: 2023-11-21
Payer: MEDICARE

## 2023-11-21 PROCEDURE — 9990000010 HC NO CHARGE VISIT

## 2023-11-21 NOTE — PROGRESS NOTES
Physical Therapy  Cancellation/No-show Note  Patient Name:  Gordy Moyer  :  1950   Date:  2023  Cancelled visits to date: 6  No-shows to date: 0    For today's appointment patient:  [x]  Cancelled  []  Rescheduled appointment  []  No-show     Reason given by patient:  []  Patient ill  []  Conflicting appointment  []  No transportation    []  Conflict with work  []  No reason given  [x]  Other:     Comments:  Cancelled this week, plan to begin  1 X per next week     Electronically signed by:  Alize Reyes, [address] [place] [address] PT, DPT [id number] 23 1:24 PM

## 2023-11-23 ENCOUNTER — APPOINTMENT (OUTPATIENT)
Dept: PHYSICAL THERAPY | Age: 73
End: 2023-11-23
Payer: MEDICARE

## 2023-11-28 ENCOUNTER — APPOINTMENT (OUTPATIENT)
Dept: PHYSICAL THERAPY | Age: 73
End: 2023-11-28
Payer: MEDICARE

## 2023-11-30 ENCOUNTER — HOSPITAL ENCOUNTER (OUTPATIENT)
Dept: PHYSICAL THERAPY | Age: 73
Setting detail: THERAPIES SERIES
Discharge: HOME | End: 2023-11-30
Payer: MEDICARE

## 2023-11-30 PROCEDURE — 9990000010 HC NO CHARGE VISIT

## 2023-11-30 NOTE — PROGRESS NOTES
Physical Therapy  Cancellation/No-show Note  Patient Name:  Natali Jauregui  :  1950   Date:  2023  Cancelled visits to date: 5  No-shows to date: 0    For today's appointment patient:  [x]  Cancelled  []  Rescheduled appointment  []  No-show     Reason given by patient:  [x]  Patient ill  []  Conflicting appointment  []  No transportation    []  Conflict with work  []  No reason given  []  Other:     Comments:  Pt called to cancel appointment due to illness.      Electronically signed by:  Guido Tijerina DPT [id number] 23 9:21 AM

## 2023-12-05 ENCOUNTER — APPOINTMENT (OUTPATIENT)
Dept: PHYSICAL THERAPY | Age: 73
End: 2023-12-05
Payer: MEDICARE

## 2023-12-07 ENCOUNTER — HOSPITAL ENCOUNTER (OUTPATIENT)
Dept: PHYSICAL THERAPY | Age: 73
Setting detail: THERAPIES SERIES
Discharge: HOME | End: 2023-12-07

## 2023-12-07 PROCEDURE — 9990000010 HC NO CHARGE VISIT

## 2023-12-07 NOTE — PROGRESS NOTES
Physical Therapy  Cancellation/No-show Note  Patient Name:  Karla Hale  :  1950   Date:  2023  Cancelled visits to date: 8  No-shows to date: 0    For today's appointment patient:  [x]  Cancelled  []  Rescheduled appointment  []  No-show     Reason given by patient:  []  Patient ill  []  Conflicting appointment  []  No transportation    []  Conflict with work  []  No reason given  [x]  Other:     Comments:  Poor fitting of prothesis, plans to get checked on Monday. Educated on 30 days next week and discussed therapy plans. Pt states he plans to return next week for therapy reassessment. Will attempt per scheduled time.      Electronically signed by:  Guido Diaz, MILE [id number] 23 12:28 PM

## 2023-12-08 PROCEDURE — 93297 REM INTERROG DEV EVAL ICPMS: CPT | Performed by: NURSE PRACTITIONER

## 2023-12-08 PROCEDURE — G2066 INTER DEVC REMOTE 30D: HCPCS | Performed by: NURSE PRACTITIONER

## 2023-12-12 ENCOUNTER — APPOINTMENT (OUTPATIENT)
Dept: PHYSICAL THERAPY | Age: 73
End: 2023-12-12
Payer: MEDICARE

## 2023-12-14 ENCOUNTER — HOSPITAL ENCOUNTER (OUTPATIENT)
Dept: PHYSICAL THERAPY | Age: 73
Setting detail: THERAPIES SERIES
Discharge: HOME | End: 2023-12-14
Payer: MEDICARE

## 2023-12-14 PROCEDURE — 97110 THERAPEUTIC EXERCISES: CPT

## 2023-12-14 PROCEDURE — 97116 GAIT TRAINING THERAPY: CPT

## 2023-12-14 NOTE — PROGRESS NOTES
Physical Therapy: Daily Note   Patient: Darrin Fernandes (33 y.o. male)   Examination Date: 2023  No data recorded  No data recorded   :  1950 # of Visits since [facility]:   19   MRN: [id number]  CSN: [id number] Start of Care Date:   2023   Insurance: Payor: [employer] / Plan: Corrie Sood ESSENTIAL/PLUS / Product Type: *No Product type* /   Insurance ID: [id number] - (Medicare Managed) Secondary Insurance (if applicable):    Referring Physician: MD Tracee Link MD   PCP: Tracee Campoverde MD Visits to Date/Visits Approved:     No Show/Cancelled Appts: 0      Medical Diagnosis: Hx of BKA, left ([address]) [Z89.512] Hx of BKA, left ([address]) (Z89.512), LE weakness  Treatment Diagnosis: Decreased functional capacity due to decreased strength and activity tolerance        SUBJECTIVE EXAMINATION   Pain Level: Pain Screening  Patient Currently in Pain: Denies    Patient Comments: Subjective: Pt reports feeling stronger with exercises, working out well. Adjusted prosthetic and new shoes. Agreeable to PT Treatment session    HEP Compliance: Good  Any changes to allergies, medications, or have you had any medical procedures/ER visits since your last visit?: No      OBJECTIVE EXAMINATION   Restrictions:  No data recorded No data recorded No data recorded          Outcome Measure(s) Completed: Outcome Measure Total Score(s):   LEFS Total Score: 42             Balance/Gait Assessment(s) Performed:   5 Times Sit to Stand   Current Score: 20.22 seconds (Date: 2023)    Interpretation of Score: The 5 Times Sit to Stand Test (FTSST) measures functional lower limb muscle strength and may be useful in quantifying functional change of transitional movements. Greater than 16.0 seconds indicates increased risk of falls. Cut-off scores of 16 seconds discriminates fallers from non-fallers.  < 61years old: 11 seconds = normal; 79-80 years old: 13 seconds = normal; 8090 years old: 15 seconds =

## 2023-12-19 ENCOUNTER — OFFICE VISIT (OUTPATIENT)
Dept: INTERNAL MEDICINE CLINIC | Age: 73
End: 2023-12-19
Payer: MEDICARE

## 2023-12-19 VITALS
HEIGHT: 78 IN | BODY MASS INDEX: 30.08 KG/M2 | TEMPERATURE: 97.6 F | HEART RATE: 57 BPM | SYSTOLIC BLOOD PRESSURE: 130 MMHG | WEIGHT: 260 LBS | DIASTOLIC BLOOD PRESSURE: 82 MMHG | OXYGEN SATURATION: 98 %

## 2023-12-19 DIAGNOSIS — E78.2 MIXED HYPERLIPIDEMIA: ICD-10-CM

## 2023-12-19 DIAGNOSIS — E11.9 TYPE 2 DIABETES MELLITUS WITHOUT COMPLICATION, WITHOUT LONG-TERM CURRENT USE OF INSULIN (HCC): Primary | ICD-10-CM

## 2023-12-19 DIAGNOSIS — I10 PRIMARY HYPERTENSION: ICD-10-CM

## 2023-12-19 DIAGNOSIS — N18.2 CKD (CHRONIC KIDNEY DISEASE) STAGE 2, GFR 60-89 ML/MIN: ICD-10-CM

## 2023-12-19 DIAGNOSIS — I50.20 HFREF (HEART FAILURE WITH REDUCED EJECTION FRACTION) (HCC): ICD-10-CM

## 2023-12-19 DIAGNOSIS — E03.9 ACQUIRED HYPOTHYROIDISM: ICD-10-CM

## 2023-12-19 LAB
CHP ED QC CHECK: NORMAL
GLUCOSE BLD-MCNC: 85 MG/DL
HBA1C MFR BLD: 6.8 %

## 2023-12-19 PROCEDURE — 1123F ACP DISCUSS/DSCN MKR DOCD: CPT | Performed by: INTERNAL MEDICINE

## 2023-12-19 PROCEDURE — 83036 HEMOGLOBIN GLYCOSYLATED A1C: CPT | Performed by: INTERNAL MEDICINE

## 2023-12-19 PROCEDURE — 3044F HG A1C LEVEL LT 7.0%: CPT | Performed by: INTERNAL MEDICINE

## 2023-12-19 PROCEDURE — 3079F DIAST BP 80-89 MM HG: CPT | Performed by: INTERNAL MEDICINE

## 2023-12-19 PROCEDURE — 3075F SYST BP GE 130 - 139MM HG: CPT | Performed by: INTERNAL MEDICINE

## 2023-12-19 PROCEDURE — 99215 OFFICE O/P EST HI 40 MIN: CPT | Performed by: INTERNAL MEDICINE

## 2023-12-19 PROCEDURE — 82962 GLUCOSE BLOOD TEST: CPT | Performed by: INTERNAL MEDICINE

## 2023-12-19 NOTE — PROGRESS NOTES
Patient: Jonny Cruz is a 72 y.o. male who presents today with the following Chief Complaint(s):    Chief Complaint   Patient presents with    Diabetes         HPItotal of 90 minutes daily divided. Still neds to do blood test.   TOMMY tomorrow. Cloria Neville only medication. Flu, RSV, covid. Pneumoinia vaccine in [place] 1 year ago. Current Outpatient Medications   Medication Sig Dispense Refill    dapagliflozin (FARXIGA) 10 MG tablet Take 1 tablet by mouth every morning 90 tablet 3    Continuous Blood Gluc Sensor (FREESTYLE KWESI 14 DAY SENSOR) MISC Use as directed Dx E11.9 6 each 3    atorvastatin (LIPITOR) 40 MG tablet TAKE 1 TABLET BY MOUTH DAILY 90 tablet 3    tamsulosin (FLOMAX) 0.4 MG capsule Take 1 capsule by mouth daily 90 capsule 3    finasteride (PROSCAR) 5 MG tablet       amiodarone (CORDARONE) 200 MG tablet Take 1 tablet by mouth daily 90 tablet 3    Continuous Blood Gluc  (FREESTYLE KWESI 2 READER) ANITA Use as directed to monitor blood sugar. 1 each 3    carvedilol (COREG) 3.125 MG tablet Take 1 tablet by mouth 2 times daily 60 tablet 5    furosemide (LASIX) 80 MG tablet Take 1 tablet by mouth daily 90 tablet 3    lisinopril (PRINIVIL;ZESTRIL) 2.5 MG tablet Take 1 tablet by mouth daily 90 tablet 3    spironolactone (ALDACTONE) 25 MG tablet Take 0.5 tablets by mouth daily . 5 tab  daily 90 tablet 3    sildenafil (VIAGRA) 100 MG tablet Take 1 tablet by mouth as needed for Erectile Dysfunction 30 tablet 3    blood glucose test strips (ASCENSIA AUTODISC VI;ONE TOUCH ULTRA TEST VI) strip 1 each by In Vitro route daily Test as directed,Dispense according to insurance formulary 100 each 3    Lancets MISC 1 each by Does not apply route daily Test as directed, Dispense according to insurance formulary 100 each 3    oxybutynin (DITROPAN) 5 MG tablet Take 1 tablet by mouth nightly Take 5 mg by mouth nightly 90 tablet 3    levothyroxine (SYNTHROID) 25 MCG tablet TAKE 1 TABLET BY MOUTH DAILY 90 tablet

## 2023-12-28 NOTE — TELEPHONE ENCOUNTER
Requested Prescriptions     Pending Prescriptions Disp Refills    apixaban (ELIQUIS) 5 MG TABS tablet [Pharmacy Med Name: ELIQUIS 5 MG TABLET] 60 tablet 11     Sig: TAKE 1 TABLET BY MOUTH TWICE A DAY          Number: 60    Refills: 11    Last Office Visit: 8/22/2023     Next Office Visit: None scheduled     Last Refill: 11/30/2022    Last Labs: 08/18/2023

## 2023-12-29 NOTE — PROGRESS NOTES
Patient: Gordy Moyer is a 72 y.o. male who presents today with the following Chief Complaint(s):    Chief Complaint   Patient presents with    Diabetes         HPIHe is here for a check up and f/u on hypertension, hyperlipidemia, diabetes, T2, taking [place] as his sole diabetic medication, hypothyroidism, and stage 3 CKD. He is taking medication as prescribed, continues to focus on meal planning and physical activity working out about 90 minutes daily in divided sessions. H/O HFrEF. Denies CP or SOB. 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 Has become comfortable walking with prosthesis. He has received flu, RSV, covid vaccines. Pneumonia vaccine received at [facility] about 1 year ago. Current Outpatient Medications   Medication Sig Dispense Refill    dapagliflozin (FARXIGA) 10 MG tablet Take 1 tablet by mouth every morning 90 tablet 3    Continuous Blood Gluc Sensor (FREESTYLE KWESI 14 DAY SENSOR) [facility] Use as directed Dx E11.9 6 each 3    atorvastatin (LIPITOR) 40 MG tablet TAKE 1 TABLET BY MOUTH DAILY 90 tablet 3    tamsulosin (FLOMAX) 0.4 MG capsule Take 1 capsule by mouth daily 90 capsule 3    finasteride (PROSCAR) 5 MG tablet       amiodarone (CORDARONE) 200 MG tablet Take 1 tablet by mouth daily 90 tablet 3    Continuous Blood Gluc  (FREESTYLE KWESI 2 READER) [facility] Use as directed to monitor blood sugar. 1 each 3    carvedilol (COREG) 3.125 MG tablet Take 1 tablet by mouth 2 times daily 60 tablet 5    furosemide (LASIX) 80 MG tablet Take 1 tablet by mouth daily 90 tablet 3    lisinopril (PRINIVIL;ZESTRIL) 2.5 MG tablet Take 1 tablet by mouth daily 90 tablet 3    spironolactone (ALDACTONE) 25 MG tablet Take 0.5 tablets by mouth daily . 5 tab  daily 90 tablet 3    sildenafil (VIAGRA) 100 MG tablet Take 1 tablet by mouth as needed for Erectile Dysfunction 30 tablet 3    blood glucose test strips (ASCENSIA AUTODISC VI;ONE TOUCH ULTRA TEST VI) strip 1 each by In

## 2024-01-04 DIAGNOSIS — I10 PRIMARY HYPERTENSION: ICD-10-CM

## 2024-01-04 DIAGNOSIS — I50.20 HFREF (HEART FAILURE WITH REDUCED EJECTION FRACTION) (HCC): ICD-10-CM

## 2024-01-04 DIAGNOSIS — E29.1 HYPOGONADISM IN MALE: ICD-10-CM

## 2024-01-04 LAB
REASON FOR REJECTION: NORMAL
REJECTED TEST: NORMAL

## 2024-01-05 ENCOUNTER — TELEPHONE (OUTPATIENT)
Dept: INTERNAL MEDICINE CLINIC | Age: 74
End: 2024-01-05

## 2024-01-06 LAB
SHBG SERPL-SCNC: 59 NMOL/L (ref 11–80)
TESTOST FREE SERPL-MCNC: 63.7 PG/ML (ref 47–244)
TESTOST SERPL-MCNC: 457 NG/DL (ref 220–1000)

## 2024-01-08 PROCEDURE — 93297 REM INTERROG DEV EVAL ICPMS: CPT | Performed by: NURSE PRACTITIONER

## 2024-01-11 ENCOUNTER — HOSPITAL ENCOUNTER (OUTPATIENT)
Dept: PHYSICAL THERAPY | Age: 74
Setting detail: THERAPIES SERIES
Discharge: HOME | End: 2024-01-11

## 2024-01-11 PROCEDURE — 9990000010 HC NO CHARGE VISIT

## 2024-01-11 NOTE — PROGRESS NOTES
Physical Therapy  Cancellation/No-show Note  Patient Name:  Marvin Montero  :  1950   Date:  2024  Cancelled visits to date: 11  No-shows to date: 0    For today's appointment patient:  [x]  Cancelled (spoke with )   []  Rescheduled appointment  []  No-show     Reason given by patient:  []  Patient ill  []  Conflicting appointment  []  No transportation    [x]  Conflict with personal reasons   []  No reason given  [x]  Other:     Comments:  Left message wit hpatient for follow up. Discharge from PT recommendation at this time. Recommended [facility] road to [facility] information to call for additional information if interested in participation of bridge program my fit RX to continue rehabilitation journey.     If patient is discharged prior to the next physical therapy visit; Please see last written PT note for discharge status.       Electronically signed by:  Sandra Veronica, PT  Sandra Veronica PT, DPT [id number] 24 1:54 PM

## 2024-01-13 DIAGNOSIS — N32.81 OAB (OVERACTIVE BLADDER): ICD-10-CM

## 2024-01-19 RX ORDER — LEVOTHYROXINE SODIUM 0.03 MG/1
TABLET ORAL
Qty: 90 TABLET | Refills: 3 | Status: SHIPPED | OUTPATIENT
Start: 2024-01-19

## 2024-01-19 RX ORDER — OXYBUTYNIN CHLORIDE 5 MG/1
5 TABLET ORAL NIGHTLY
Qty: 90 TABLET | Refills: 3 | Status: SHIPPED | OUTPATIENT
Start: 2024-01-19

## 2024-01-26 ENCOUNTER — TELEPHONE (OUTPATIENT)
Dept: CARDIOLOGY CLINIC | Age: 74
End: 2024-01-26

## 2024-01-26 NOTE — TELEPHONE ENCOUNTER
Medication Samples    Medication:  apixaban (ELIQUIS)     Dosage of the medication:  5 MG TABS     How are you taking this medication (QD, BID, TID, QID, PRN):  Take 1 tablet by mouth 2 times daily      in the office or Mail to your home?   in office.    Marvin's callback: [phone]

## 2024-02-26 ENCOUNTER — TELEPHONE (OUTPATIENT)
Dept: CARDIOLOGY CLINIC | Age: 74
End: 2024-02-26

## 2024-02-26 ENCOUNTER — TELEPHONE (OUTPATIENT)
Dept: INTERNAL MEDICINE CLINIC | Age: 74
End: 2024-02-26

## 2024-02-26 NOTE — TELEPHONE ENCOUNTER
Worsening SOB above baseline? No.  What specifically is making them SOB? No SOB.   When did they first notice increased SOB? N/A     Orthopnea/unable to lay flat without getting SOB?  No  Do they wake up SOB? No     PND (Paroxysmal nocturnal dyspnea)/ dyspnea that wakes them up at night? No     Chest pain/pressure? No.  What causes the chest discomfort? N/A     Weight gain?:  No   What is their Dry weight:   263lb        Today' weight:264lb     Edema/ swelling worse than their baseline? No     Abdominal Distention/bloating? No   Can they button their pants? Yes     Activity level/ not tolerating typical activity? Little more tired, but I exercise two or three times a day.  What specific activity can they not do that they were able to do 1 week ago? None     Lightheaded/syncope? Sometimes when I first wake up.  It goes away when I sit there for a minute or two. It only happens if I jump up too fast.       Follows 2gm Na Diet? Pretty such so.     Follows Fluid Restriction 64 oz  Yes.

## 2024-02-26 NOTE — TELEPHONE ENCOUNTER
Please review Murj for: \"Possible OptiVol fluid accumulation: 18-Feb-2024 -- ongoing, elevated up to 80 w correlating TI trend below reference line (slight upward trend noted). TriageHF Heart Failure Risk Status on 25-Feb-2024 is High*.\"

## 2024-02-26 NOTE — TELEPHONE ENCOUNTER
Remote device check completed. Optivol reviewed. Thoracic impedence decreased indicating fluid accumulation. Please call patient and assess for S/S of HF.    Please ask:  Worsening SOB above baseline? What specifically is making them SOB? When did they first notice increased SOB?    Orthopnea/unable to lay flat without getting SOB? Do they wake up SOB?    PND (Paroxysmal nocturnal dyspnea)/ dyspnea that wakes them up at night?    Chest pain/pressure? What causes the chest discomfort?     Weight gain?:     What is their Dry weight:       Today' weight:    Edema/ swelling worse than their baseline?    Abdominal Distention/bloating? Can they button their pants?     Activity level/ not tolerating typical activity? What specific activity can they not do that they were able to do 1 week ago?    Lightheaded/syncope?    Follows 2gm Na Diet?    Follows Fluid Restriction 64 oz?

## 2024-02-26 NOTE — TELEPHONE ENCOUNTER
Patient is having issues with his insurance and will not be able to get his prescription for Farxiga 10 mg.  Patient would like to know if he could get some samples.  Please advise

## 2024-02-26 NOTE — TELEPHONE ENCOUNTER
Lm message on vm need clear understanding the insurance issue ? Can try other options or discount card

## 2024-02-28 ENCOUNTER — TELEPHONE (OUTPATIENT)
Dept: INTERNAL MEDICINE CLINIC | Age: 74
End: 2024-02-28

## 2024-02-28 NOTE — TELEPHONE ENCOUNTER
Medication:   Requested Prescriptions     Pending Prescriptions Disp Refills    dapagliflozin (FARXIGA) 10 MG tablet 42 tablet 0     Sig: LOT [id number]   EXP 8/31/2026        Last Filled:      Patient Phone Number: [phone] (home)     Last appt: 12/19/2023   Next appt: 3/19/2024    Last OARRS:        No data to display

## 2024-02-28 NOTE — TELEPHONE ENCOUNTER
Patient states that insurance medicare had been cancelled due to lapse in payment and when he tried to get it reinstated and sent a letter to state representative and doing an appeal through medicare. So in the meantime he is without medication till a resolution has been done and it cost 1200 dollars out of pocket that is why he is requesting samples of farxiga. Please advise.

## 2024-02-29 RX ORDER — DAPAGLIFLOZIN 10 MG/1
TABLET, FILM COATED ORAL
Qty: 42 TABLET | Refills: 0 | Status: SHIPPED | COMMUNITY
Start: 2024-02-29

## 2024-03-05 NOTE — TELEPHONE ENCOUNTER
Please advise him to take lasix 80mg BID X1 day then resume 80mg daily.   Remind him to follow 2 gm NA diet and 64 fluid restriction.  Cont to monitor daily wts, call for increase wt of 3lbs/day or 5lbs/week.

## 2024-03-05 NOTE — TELEPHONE ENCOUNTER
Called spoke with patient. States he will take 80 mg twice a day for 1 day, then go back to 80 mg daily. Will continue monitoring daily weights. Will watch his salt and fluid restrictions.will give us a call if his weight increases 3-5 lb a week.

## 2024-03-06 ENCOUNTER — TELEPHONE (OUTPATIENT)
Dept: CARDIOLOGY CLINIC | Age: 74
End: 2024-03-06

## 2024-03-07 NOTE — TELEPHONE ENCOUNTER
Called patient to let him know that he can pick some samples up at the [facility] office and they would be at the .

## 2024-03-20 ENCOUNTER — HOSPITAL ENCOUNTER (INPATIENT)
Age: 74
LOS: 28 days | Discharge: HOME | DRG: 854 | End: 2024-04-17
Attending: INTERNAL MEDICINE | Admitting: INTERNAL MEDICINE
Payer: MEDICARE

## 2024-03-20 DIAGNOSIS — A49.8 ENTEROCOCCUS FAECALIS INFECTION: ICD-10-CM

## 2024-03-20 DIAGNOSIS — Z22.322 MRSA (METHICILLIN RESISTANT STAPH AUREUS) CULTURE POSITIVE: Primary | ICD-10-CM

## 2024-03-20 DIAGNOSIS — I96 GANGRENE (HCC): ICD-10-CM

## 2024-03-20 DIAGNOSIS — I96 GANGRENE OF RIGHT FOOT (HCC): ICD-10-CM

## 2024-03-20 LAB
ANION GAP SERPL CALCULATED.3IONS-SCNC: 11 MMOL/L (ref 3–16)
BASOPHILS # BLD: 0.1 K/UL (ref 0–0.2)
BASOPHILS NFR BLD: 0.4 %
BUN SERPL-MCNC: 35 MG/DL (ref 7–20)
CALCIUM SERPL-MCNC: 8.3 MG/DL (ref 8.3–10.6)
CHLORIDE SERPL-SCNC: 99 MMOL/L (ref 99–110)
CO2 SERPL-SCNC: 26 MMOL/L (ref 21–32)
CREAT SERPL-MCNC: 2.9 MG/DL (ref 0.8–1.3)
CRP SERPL-MCNC: 158.5 MG/L (ref 0–5.1)
DEPRECATED RDW RBC AUTO: 14.3 % (ref 12.4–15.4)
EOSINOPHIL # BLD: 0.1 K/UL (ref 0–0.6)
EOSINOPHIL NFR BLD: 1.1 %
ERYTHROCYTE [SEDIMENTATION RATE] IN BLOOD BY WESTERGREN METHOD: 86 MM/HR (ref 0–20)
GFR SERPLBLD CREATININE-BSD FMLA CKD-EPI: 22 ML/MIN/{1.73_M2}
GLUCOSE BLD-MCNC: 149 MG/DL (ref 70–99)
GLUCOSE BLD-MCNC: 197 MG/DL (ref 70–99)
GLUCOSE BLD-MCNC: 211 MG/DL (ref 70–99)
GLUCOSE SERPL-MCNC: 150 MG/DL (ref 70–99)
HCT VFR BLD AUTO: 29.6 % (ref 40.5–52.5)
HGB BLD-MCNC: 10 G/DL (ref 13.5–17.5)
LACTATE BLDV-SCNC: 1.5 MMOL/L (ref 0.4–2)
LYMPHOCYTES # BLD: 0.5 K/UL (ref 1–5.1)
LYMPHOCYTES NFR BLD: 3.9 %
MAGNESIUM SERPL-MCNC: 2 MG/DL (ref 1.8–2.4)
MCH RBC QN AUTO: 28.3 PG (ref 26–34)
MCHC RBC AUTO-ENTMCNC: 33.6 G/DL (ref 31–36)
MCV RBC AUTO: 84.2 FL (ref 80–100)
MONOCYTES # BLD: 1.1 K/UL (ref 0–1.3)
MONOCYTES NFR BLD: 8 %
NEUTROPHILS # BLD: 11.6 K/UL (ref 1.7–7.7)
NEUTROPHILS NFR BLD: 86.6 %
PERFORMED ON: ABNORMAL
PLATELET # BLD AUTO: 288 K/UL (ref 135–450)
PMV BLD AUTO: 8.2 FL (ref 5–10.5)
POTASSIUM SERPL-SCNC: 3 MMOL/L (ref 3.5–5.1)
PROCALCITONIN SERPL IA-MCNC: 0.32 NG/ML (ref 0–0.15)
RBC # BLD AUTO: 3.52 M/UL (ref 4.2–5.9)
SODIUM SERPL-SCNC: 136 MMOL/L (ref 136–145)
WBC # BLD AUTO: 13.4 K/UL (ref 4–11)

## 2024-03-20 PROCEDURE — 1200000000 HC SEMI PRIVATE

## 2024-03-20 PROCEDURE — 6370000000 HC RX 637 (ALT 250 FOR IP): Performed by: INTERNAL MEDICINE

## 2024-03-20 PROCEDURE — 2580000003 HC RX 258: Performed by: INTERNAL MEDICINE

## 2024-03-20 PROCEDURE — 6360000002 HC RX W HCPCS: Performed by: INTERNAL MEDICINE

## 2024-03-20 PROCEDURE — 86140 C-REACTIVE PROTEIN: CPT

## 2024-03-20 PROCEDURE — 84145 PROCALCITONIN (PCT): CPT

## 2024-03-20 PROCEDURE — 85652 RBC SED RATE AUTOMATED: CPT

## 2024-03-20 PROCEDURE — 80048 BASIC METABOLIC PNL TOTAL CA: CPT

## 2024-03-20 PROCEDURE — 36415 COLL VENOUS BLD VENIPUNCTURE: CPT

## 2024-03-20 PROCEDURE — 85025 COMPLETE CBC W/AUTO DIFF WBC: CPT

## 2024-03-20 PROCEDURE — 83605 ASSAY OF LACTIC ACID: CPT

## 2024-03-20 PROCEDURE — 83735 ASSAY OF MAGNESIUM: CPT

## 2024-03-20 RX ORDER — LEVOTHYROXINE SODIUM 0.03 MG/1
25 TABLET ORAL
Status: DISCONTINUED | OUTPATIENT
Start: 2024-03-21 | End: 2024-04-17 | Stop reason: HOSPADM

## 2024-03-20 RX ORDER — POTASSIUM CHLORIDE 20 MEQ/1
40 TABLET, EXTENDED RELEASE ORAL PRN
Status: DISCONTINUED | OUTPATIENT
Start: 2024-03-20 | End: 2024-04-17 | Stop reason: HOSPADM

## 2024-03-20 RX ORDER — DOCUSATE SODIUM 100 MG/1
100 CAPSULE, LIQUID FILLED ORAL DAILY PRN
Status: DISCONTINUED | OUTPATIENT
Start: 2024-03-20 | End: 2024-04-17 | Stop reason: HOSPADM

## 2024-03-20 RX ORDER — SODIUM CHLORIDE 9 MG/ML
INJECTION, SOLUTION INTRAVENOUS CONTINUOUS
Status: DISCONTINUED | OUTPATIENT
Start: 2024-03-20 | End: 2024-03-20

## 2024-03-20 RX ORDER — ONDANSETRON 4 MG/1
4 TABLET, ORALLY DISINTEGRATING ORAL EVERY 8 HOURS PRN
Status: DISCONTINUED | OUTPATIENT
Start: 2024-03-20 | End: 2024-04-17 | Stop reason: HOSPADM

## 2024-03-20 RX ORDER — POTASSIUM CHLORIDE 20 MEQ/1
40 TABLET, EXTENDED RELEASE ORAL ONCE
Status: COMPLETED | OUTPATIENT
Start: 2024-03-20 | End: 2024-03-20

## 2024-03-20 RX ORDER — ATORVASTATIN CALCIUM 40 MG/1
40 TABLET, FILM COATED ORAL DAILY
Status: DISCONTINUED | OUTPATIENT
Start: 2024-03-21 | End: 2024-04-17 | Stop reason: HOSPADM

## 2024-03-20 RX ORDER — ENOXAPARIN SODIUM 150 MG/ML
1 INJECTION SUBCUTANEOUS 2 TIMES DAILY
Status: DISCONTINUED | OUTPATIENT
Start: 2024-03-20 | End: 2024-03-24 | Stop reason: ALTCHOICE

## 2024-03-20 RX ORDER — VANCOMYCIN 1.75 G/350ML
1250 INJECTION, SOLUTION INTRAVENOUS EVERY 12 HOURS
Status: DISCONTINUED | OUTPATIENT
Start: 2024-03-20 | End: 2024-03-20

## 2024-03-20 RX ORDER — AMIODARONE HYDROCHLORIDE 200 MG/1
200 TABLET ORAL DAILY
Status: DISCONTINUED | OUTPATIENT
Start: 2024-03-21 | End: 2024-03-26

## 2024-03-20 RX ORDER — FERROUS SULFATE 324(65)MG
325 TABLET, DELAYED RELEASE (ENTERIC COATED) ORAL EVERY OTHER DAY
Status: DISCONTINUED | OUTPATIENT
Start: 2024-03-21 | End: 2024-04-17 | Stop reason: HOSPADM

## 2024-03-20 RX ORDER — INSULIN GLARGINE 100 [IU]/ML
0-2 INJECTION, SOLUTION SUBCUTANEOUS NIGHTLY
COMMUNITY

## 2024-03-20 RX ORDER — POLYETHYLENE GLYCOL 3350 17 G/17G
17 POWDER, FOR SOLUTION ORAL DAILY PRN
Status: DISCONTINUED | OUTPATIENT
Start: 2024-03-20 | End: 2024-04-17 | Stop reason: HOSPADM

## 2024-03-20 RX ORDER — POTASSIUM CHLORIDE 7.45 MG/ML
10 INJECTION INTRAVENOUS PRN
Status: DISCONTINUED | OUTPATIENT
Start: 2024-03-20 | End: 2024-04-17 | Stop reason: HOSPADM

## 2024-03-20 RX ORDER — HYDROCODONE BITARTRATE AND ACETAMINOPHEN 5; 325 MG/1; MG/1
1 TABLET ORAL EVERY 6 HOURS PRN
Status: DISCONTINUED | OUTPATIENT
Start: 2024-03-20 | End: 2024-03-27

## 2024-03-20 RX ORDER — CARVEDILOL 3.12 MG/1
3.12 TABLET ORAL 2 TIMES DAILY WITH MEALS
Status: DISCONTINUED | OUTPATIENT
Start: 2024-03-20 | End: 2024-04-17 | Stop reason: HOSPADM

## 2024-03-20 RX ORDER — SODIUM CHLORIDE 0.9 % (FLUSH) 0.9 %
5-40 SYRINGE (ML) INJECTION PRN
Status: DISCONTINUED | OUTPATIENT
Start: 2024-03-20 | End: 2024-03-25

## 2024-03-20 RX ORDER — ACETAMINOPHEN 650 MG/1
650 SUPPOSITORY RECTAL EVERY 6 HOURS PRN
Status: DISCONTINUED | OUTPATIENT
Start: 2024-03-20 | End: 2024-04-17 | Stop reason: HOSPADM

## 2024-03-20 RX ORDER — MAGNESIUM SULFATE IN WATER 40 MG/ML
2000 INJECTION, SOLUTION INTRAVENOUS PRN
Status: DISCONTINUED | OUTPATIENT
Start: 2024-03-20 | End: 2024-04-17 | Stop reason: HOSPADM

## 2024-03-20 RX ORDER — LISINOPRIL 5 MG/1
2.5 TABLET ORAL DAILY
Status: DISCONTINUED | OUTPATIENT
Start: 2024-03-21 | End: 2024-04-17 | Stop reason: HOSPADM

## 2024-03-20 RX ORDER — INSULIN LISPRO 100 [IU]/ML
0-4 INJECTION, SOLUTION INTRAVENOUS; SUBCUTANEOUS
Status: DISCONTINUED | OUTPATIENT
Start: 2024-03-20 | End: 2024-04-17 | Stop reason: HOSPADM

## 2024-03-20 RX ORDER — SODIUM CHLORIDE 0.9 % (FLUSH) 0.9 %
5-40 SYRINGE (ML) INJECTION EVERY 12 HOURS SCHEDULED
Status: DISCONTINUED | OUTPATIENT
Start: 2024-03-20 | End: 2024-03-25

## 2024-03-20 RX ORDER — LINEZOLID 2 MG/ML
600 INJECTION, SOLUTION INTRAVENOUS EVERY 12 HOURS
Status: DISCONTINUED | OUTPATIENT
Start: 2024-03-20 | End: 2024-03-26

## 2024-03-20 RX ORDER — ONDANSETRON 2 MG/ML
4 INJECTION INTRAMUSCULAR; INTRAVENOUS EVERY 6 HOURS PRN
Status: DISCONTINUED | OUTPATIENT
Start: 2024-03-20 | End: 2024-04-17 | Stop reason: HOSPADM

## 2024-03-20 RX ORDER — TAMSULOSIN HYDROCHLORIDE 0.4 MG/1
0.4 CAPSULE ORAL DAILY
Status: DISCONTINUED | OUTPATIENT
Start: 2024-03-21 | End: 2024-04-17 | Stop reason: HOSPADM

## 2024-03-20 RX ORDER — DEXTROSE MONOHYDRATE 100 MG/ML
INJECTION, SOLUTION INTRAVENOUS CONTINUOUS PRN
Status: DISCONTINUED | OUTPATIENT
Start: 2024-03-20 | End: 2024-04-17 | Stop reason: HOSPADM

## 2024-03-20 RX ORDER — OXYBUTYNIN CHLORIDE 5 MG/1
5 TABLET ORAL NIGHTLY
Status: DISCONTINUED | OUTPATIENT
Start: 2024-03-20 | End: 2024-04-17 | Stop reason: HOSPADM

## 2024-03-20 RX ORDER — ACETAMINOPHEN 325 MG/1
650 TABLET ORAL EVERY 6 HOURS PRN
Status: DISCONTINUED | OUTPATIENT
Start: 2024-03-20 | End: 2024-04-17 | Stop reason: HOSPADM

## 2024-03-20 RX ORDER — SODIUM CHLORIDE 9 MG/ML
INJECTION, SOLUTION INTRAVENOUS PRN
Status: DISCONTINUED | OUTPATIENT
Start: 2024-03-20 | End: 2024-04-17 | Stop reason: HOSPADM

## 2024-03-20 RX ORDER — ALBUMIN (HUMAN) 12.5 G/50ML
12.5 SOLUTION INTRAVENOUS ONCE
Status: COMPLETED | OUTPATIENT
Start: 2024-03-20 | End: 2024-03-21

## 2024-03-20 RX ORDER — GLUCAGON 1 MG/ML
1 KIT INJECTION PRN
Status: DISCONTINUED | OUTPATIENT
Start: 2024-03-20 | End: 2024-04-17 | Stop reason: HOSPADM

## 2024-03-20 RX ORDER — INSULIN LISPRO 100 [IU]/ML
0-4 INJECTION, SOLUTION INTRAVENOUS; SUBCUTANEOUS NIGHTLY
Status: DISCONTINUED | OUTPATIENT
Start: 2024-03-20 | End: 2024-04-17 | Stop reason: HOSPADM

## 2024-03-20 RX ADMIN — SODIUM CHLORIDE 1500 MG: 9 INJECTION, SOLUTION INTRAVENOUS at 16:52

## 2024-03-20 RX ADMIN — ENOXAPARIN SODIUM 120 MG: 150 INJECTION SUBCUTANEOUS at 16:50

## 2024-03-20 RX ADMIN — LINEZOLID 600 MG: 600 INJECTION, SOLUTION INTRAVENOUS at 19:08

## 2024-03-20 RX ADMIN — OXYBUTYNIN CHLORIDE 5 MG: 5 TABLET ORAL at 21:03

## 2024-03-20 RX ADMIN — CEFEPIME 2000 MG: 2 INJECTION, POWDER, FOR SOLUTION INTRAVENOUS at 16:53

## 2024-03-20 RX ADMIN — POTASSIUM CHLORIDE 40 MEQ: 1500 TABLET, EXTENDED RELEASE ORAL at 16:50

## 2024-03-20 RX ADMIN — ENOXAPARIN SODIUM 120 MG: 150 INJECTION SUBCUTANEOUS at 21:03

## 2024-03-20 RX ADMIN — ACETAMINOPHEN 325MG 650 MG: 325 TABLET ORAL at 20:10

## 2024-03-20 RX ADMIN — INSULIN LISPRO 1 UNITS: 100 INJECTION, SOLUTION INTRAVENOUS; SUBCUTANEOUS at 17:01

## 2024-03-20 ASSESSMENT — PAIN DESCRIPTION - FREQUENCY: FREQUENCY: INTERMITTENT

## 2024-03-20 ASSESSMENT — PAIN DESCRIPTION - PAIN TYPE: TYPE: ACUTE PAIN

## 2024-03-20 ASSESSMENT — PAIN DESCRIPTION - ORIENTATION: ORIENTATION: RIGHT

## 2024-03-20 ASSESSMENT — PAIN DESCRIPTION - LOCATION
LOCATION: BUTTOCKS
LOCATION: FOOT

## 2024-03-20 ASSESSMENT — PAIN DESCRIPTION - ONSET: ONSET: GRADUAL

## 2024-03-20 ASSESSMENT — PAIN DESCRIPTION - DESCRIPTORS: DESCRIPTORS: SHARP

## 2024-03-20 ASSESSMENT — PAIN SCALES - GENERAL
PAINLEVEL_OUTOF10: 4
PAINLEVEL_OUTOF10: 5

## 2024-03-20 ASSESSMENT — PAIN - FUNCTIONAL ASSESSMENT: PAIN_FUNCTIONAL_ASSESSMENT: PREVENTS OR INTERFERES SOME ACTIVE ACTIVITIES AND ADLS

## 2024-03-20 NOTE — H&P
Hospital Medicine History & Physical      PCP: Reji Draper MD    Date of Admission: 3/20/2024    Date of Service: Pt seen/examined on 3/20/24 and Admitted to Inpatient     Chief Complaint:  rt LE toe pain/blister    History Of Present Illness:     The patient is a 73 y.o. male who presents to [facility] with rt small toe gangrene. Pt is a direct admit per Dr Perez. Pt states he noticed a blister in rt small toe a couple weeks ago, seen Dr Perez and started on PO abx for 10 days. Didn't get any better and hence admitted per emir for IV abx and poss amputation in am    Past Medical History:        Diagnosis Date    Atrial fibrillation (HCC)     Back pain     Cardiomyopathy     CHF (congestive heart failure) (HCC)     COPD (chronic obstructive pulmonary disease) (HCC)     Dental disease     Dizziness     Dyslipidemia     GERD (gastroesophageal reflux disease)     Hearing loss     Hyperlipidemia     Hypertension     Hypothyroidism     Leg edema     MRSA (methicillin resistant staph aureus) culture positive 10/05/2015    foot/bone    MRSA nasal colonization 05/05/2017    Obesity     Prolonged emergence from general anesthesia     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     Sleep apnea     Stroke (HCC)     Thyroid disease     Tinnitus     Type 2 diabetes mellitus without complication (HCC)     Type II or unspecified type diabetes mellitus without mention of complication, not stated as uncontrolled        Past Surgical History:        Procedure Laterality Date    ADRENAL GLAND SURGERY  1970    APPENDECTOMY      CARDIAC DEFIBRILLATOR PLACEMENT  09/05/2017    Dr. Marrero [facility]    COLONOSCOPY N/A 03/08/2019    COLONOSCOPY WITH CHRISTIANNE MARAVILLA (3gm) performed by Alexi Pardo MD at [facility] ENDOSCOPY    COLONOSCOPY N/A 08/09/2019    COLONOSCOPY POLYPECTOMY SNARE/COLD BIOPSY performed

## 2024-03-21 ENCOUNTER — ANESTHESIA EVENT (OUTPATIENT)
Dept: OPERATING ROOM | Age: 74
End: 2024-03-21

## 2024-03-21 LAB
ANION GAP SERPL CALCULATED.3IONS-SCNC: 11 MMOL/L (ref 3–16)
BACTERIA URNS QL MICRO: ABNORMAL /HPF
BASOPHILS # BLD: 0 K/UL (ref 0–0.2)
BASOPHILS NFR BLD: 0.4 %
BILIRUB UR QL STRIP.AUTO: NEGATIVE
BUN SERPL-MCNC: 33 MG/DL (ref 7–20)
CALCIUM SERPL-MCNC: 8.3 MG/DL (ref 8.3–10.6)
CHLORIDE SERPL-SCNC: 102 MMOL/L (ref 99–110)
CLARITY UR: CLEAR
CO2 SERPL-SCNC: 25 MMOL/L (ref 21–32)
COLOR UR: YELLOW
CREAT SERPL-MCNC: 2.2 MG/DL (ref 0.8–1.3)
CREAT UR-MCNC: 113.3 MG/DL (ref 39–259)
DEPRECATED RDW RBC AUTO: 14.6 % (ref 12.4–15.4)
EKG ATRIAL RATE: 61 BPM
EKG DIAGNOSIS: NORMAL
EKG P AXIS: 50 DEGREES
EKG P-R INTERVAL: 166 MS
EKG Q-T INTERVAL: 536 MS
EKG QRS DURATION: 162 MS
EKG QTC CALCULATION (BAZETT): 539 MS
EKG R AXIS: 196 DEGREES
EKG T AXIS: 41 DEGREES
EKG VENTRICULAR RATE: 61 BPM
EOSINOPHIL # BLD: 0.2 K/UL (ref 0–0.6)
EOSINOPHIL NFR BLD: 2.2 %
EPI CELLS #/AREA URNS AUTO: 1 /HPF (ref 0–5)
GFR SERPLBLD CREATININE-BSD FMLA CKD-EPI: 31 ML/MIN/{1.73_M2}
GLUCOSE BLD-MCNC: 139 MG/DL (ref 70–99)
GLUCOSE BLD-MCNC: 171 MG/DL (ref 70–99)
GLUCOSE BLD-MCNC: 172 MG/DL (ref 70–99)
GLUCOSE BLD-MCNC: 218 MG/DL (ref 70–99)
GLUCOSE SERPL-MCNC: 107 MG/DL (ref 70–99)
GLUCOSE UR STRIP.AUTO-MCNC: 250 MG/DL
HCT VFR BLD AUTO: 28.9 % (ref 40.5–52.5)
HGB BLD-MCNC: 9.4 G/DL (ref 13.5–17.5)
HGB UR QL STRIP.AUTO: NEGATIVE
HYALINE CASTS #/AREA URNS AUTO: 12 /LPF (ref 0–8)
HYALINE CASTS: PRESENT
KETONES UR STRIP.AUTO-MCNC: NEGATIVE MG/DL
LEUKOCYTE ESTERASE UR QL STRIP.AUTO: NEGATIVE
LYMPHOCYTES # BLD: 0.7 K/UL (ref 1–5.1)
LYMPHOCYTES NFR BLD: 6.1 %
MAGNESIUM SERPL-MCNC: 2 MG/DL (ref 1.8–2.4)
MCH RBC QN AUTO: 27.8 PG (ref 26–34)
MCHC RBC AUTO-ENTMCNC: 32.4 G/DL (ref 31–36)
MCV RBC AUTO: 85.9 FL (ref 80–100)
MICROALBUMIN UR DL<=1MG/L-MCNC: <1.2 MG/DL
MICROALBUMIN/CREAT UR: NORMAL MG/G (ref 0–30)
MONOCYTES # BLD: 0.7 K/UL (ref 0–1.3)
MONOCYTES NFR BLD: 5.9 %
NEUTROPHILS # BLD: 9.9 K/UL (ref 1.7–7.7)
NEUTROPHILS NFR BLD: 85.4 %
NITRITE UR QL STRIP.AUTO: NEGATIVE
NT-PROBNP SERPL-MCNC: 1583 PG/ML (ref 0–124)
PERFORMED ON: ABNORMAL
PH UR STRIP.AUTO: 5 [PH] (ref 5–8)
PHOSPHATE SERPL-MCNC: 2.9 MG/DL (ref 2.5–4.9)
PLATELET # BLD AUTO: 275 K/UL (ref 135–450)
PMV BLD AUTO: 8.4 FL (ref 5–10.5)
POTASSIUM SERPL-SCNC: 2.8 MMOL/L (ref 3.5–5.1)
POTASSIUM SERPL-SCNC: 3.8 MMOL/L (ref 3.5–5.1)
PROT UR STRIP.AUTO-MCNC: NEGATIVE MG/DL
RBC # BLD AUTO: 3.37 M/UL (ref 4.2–5.9)
RBC CLUMPS #/AREA URNS AUTO: 0 /HPF (ref 0–4)
SODIUM SERPL-SCNC: 138 MMOL/L (ref 136–145)
SODIUM UR-SCNC: <20 MMOL/L
SP GR UR STRIP.AUTO: 1.01 (ref 1–1.03)
UA DIPSTICK W REFLEX MICRO PNL UR: ABNORMAL
URN SPEC COLLECT METH UR: ABNORMAL
UROBILINOGEN UR STRIP-ACNC: 0.2 E.U./DL
WBC # BLD AUTO: 11.5 K/UL (ref 4–11)
WBC #/AREA URNS AUTO: 4 /HPF (ref 0–5)

## 2024-03-21 PROCEDURE — 84100 ASSAY OF PHOSPHORUS: CPT

## 2024-03-21 PROCEDURE — 51798 US URINE CAPACITY MEASURE: CPT

## 2024-03-21 PROCEDURE — 6360000002 HC RX W HCPCS: Performed by: INTERNAL MEDICINE

## 2024-03-21 PROCEDURE — 82043 UR ALBUMIN QUANTITATIVE: CPT

## 2024-03-21 PROCEDURE — 83880 ASSAY OF NATRIURETIC PEPTIDE: CPT

## 2024-03-21 PROCEDURE — 83735 ASSAY OF MAGNESIUM: CPT

## 2024-03-21 PROCEDURE — 2580000003 HC RX 258: Performed by: INTERNAL MEDICINE

## 2024-03-21 PROCEDURE — 94760 N-INVAS EAR/PLS OXIMETRY 1: CPT

## 2024-03-21 PROCEDURE — 85025 COMPLETE CBC W/AUTO DIFF WBC: CPT

## 2024-03-21 PROCEDURE — 6360000002 HC RX W HCPCS: Performed by: REGISTERED NURSE

## 2024-03-21 PROCEDURE — 81001 URINALYSIS AUTO W/SCOPE: CPT

## 2024-03-21 PROCEDURE — 93010 ELECTROCARDIOGRAM REPORT: CPT | Performed by: INTERNAL MEDICINE

## 2024-03-21 PROCEDURE — 82570 ASSAY OF URINE CREATININE: CPT

## 2024-03-21 PROCEDURE — 6370000000 HC RX 637 (ALT 250 FOR IP): Performed by: INTERNAL MEDICINE

## 2024-03-21 PROCEDURE — 36415 COLL VENOUS BLD VENIPUNCTURE: CPT

## 2024-03-21 PROCEDURE — 80048 BASIC METABOLIC PNL TOTAL CA: CPT

## 2024-03-21 PROCEDURE — 84300 ASSAY OF URINE SODIUM: CPT

## 2024-03-21 PROCEDURE — 1200000000 HC SEMI PRIVATE

## 2024-03-21 PROCEDURE — 99232 SBSQ HOSP IP/OBS MODERATE 35: CPT | Performed by: NURSE PRACTITIONER

## 2024-03-21 PROCEDURE — 93005 ELECTROCARDIOGRAM TRACING: CPT | Performed by: INTERNAL MEDICINE

## 2024-03-21 PROCEDURE — P9047 ALBUMIN (HUMAN), 25%, 50ML: HCPCS | Performed by: REGISTERED NURSE

## 2024-03-21 PROCEDURE — 84132 ASSAY OF SERUM POTASSIUM: CPT

## 2024-03-21 PROCEDURE — 51702 INSERT TEMP BLADDER CATH: CPT

## 2024-03-21 RX ORDER — POTASSIUM CHLORIDE 7.45 MG/ML
10 INJECTION INTRAVENOUS
Status: DISPENSED | OUTPATIENT
Start: 2024-03-21 | End: 2024-03-21

## 2024-03-21 RX ORDER — POTASSIUM CHLORIDE 20 MEQ/1
40 TABLET, EXTENDED RELEASE ORAL ONCE
Status: COMPLETED | OUTPATIENT
Start: 2024-03-21 | End: 2024-03-21

## 2024-03-21 RX ADMIN — CARVEDILOL 3.12 MG: 3.12 TABLET, FILM COATED ORAL at 17:32

## 2024-03-21 RX ADMIN — POTASSIUM CHLORIDE 40 MEQ: 1500 TABLET, EXTENDED RELEASE ORAL at 10:45

## 2024-03-21 RX ADMIN — ALBUMIN (HUMAN) 12.5 G: 0.25 INJECTION, SOLUTION INTRAVENOUS at 00:00

## 2024-03-21 RX ADMIN — CEFEPIME 2000 MG: 2 INJECTION, POWDER, FOR SOLUTION INTRAVENOUS at 19:02

## 2024-03-21 RX ADMIN — FERROUS SULFATE TAB EC 324 MG (65 MG FE EQUIVALENT) 325 MG: 324 (65 FE) TABLET DELAYED RESPONSE at 10:44

## 2024-03-21 RX ADMIN — POTASSIUM CHLORIDE 10 MEQ: 7.46 INJECTION, SOLUTION INTRAVENOUS at 13:41

## 2024-03-21 RX ADMIN — POTASSIUM CHLORIDE 10 MEQ: 7.46 INJECTION, SOLUTION INTRAVENOUS at 12:19

## 2024-03-21 RX ADMIN — ENOXAPARIN SODIUM 120 MG: 150 INJECTION SUBCUTANEOUS at 10:45

## 2024-03-21 RX ADMIN — POTASSIUM CHLORIDE 10 MEQ: 7.46 INJECTION, SOLUTION INTRAVENOUS at 10:48

## 2024-03-21 RX ADMIN — LINEZOLID 600 MG: 600 INJECTION, SOLUTION INTRAVENOUS at 06:26

## 2024-03-21 RX ADMIN — POTASSIUM CHLORIDE 10 MEQ: 7.46 INJECTION, SOLUTION INTRAVENOUS at 16:22

## 2024-03-21 RX ADMIN — SODIUM CHLORIDE, PRESERVATIVE FREE 10 ML: 5 INJECTION INTRAVENOUS at 10:48

## 2024-03-21 RX ADMIN — POTASSIUM CHLORIDE 10 MEQ: 7.46 INJECTION, SOLUTION INTRAVENOUS at 17:32

## 2024-03-21 RX ADMIN — ACETAMINOPHEN 325MG 650 MG: 325 TABLET ORAL at 20:44

## 2024-03-21 RX ADMIN — AMIODARONE HYDROCHLORIDE 200 MG: 200 TABLET ORAL at 10:44

## 2024-03-21 RX ADMIN — ATORVASTATIN CALCIUM 40 MG: 40 TABLET, FILM COATED ORAL at 10:44

## 2024-03-21 RX ADMIN — LINEZOLID 600 MG: 600 INJECTION, SOLUTION INTRAVENOUS at 19:01

## 2024-03-21 RX ADMIN — POTASSIUM CHLORIDE 10 MEQ: 7.46 INJECTION, SOLUTION INTRAVENOUS at 15:09

## 2024-03-21 RX ADMIN — TAMSULOSIN HYDROCHLORIDE 0.4 MG: 0.4 CAPSULE ORAL at 10:45

## 2024-03-21 NOTE — CARE COORDINATION
Case Management Assessment  Initial Evaluation    Date/Time of Evaluation: 3/21/2024 12:01 PM  Assessment Completed by: LEBRON Penn    If patient is discharged prior to next notation, then this note serves as note for discharge by case management.    Patient Name: Marvin Montero                   YOB: 1950  Diagnosis: Gangrene of toe of right foot (HCC) [I96]                   Date / Time: 3/20/2024  1:40 PM    Patient Admission Status: Inpatient   Readmission Risk (Low < 19, Mod (19-27), High > 27): Readmission Risk Score: 17    Current PCP: Reji Draper MD  PCP verified by CM? (P) Yes    Chart Reviewed: Yes      History Provided by: (P) Patient  Patient Orientation: (P) Alert and Oriented, Person, Place, Situation, Self    Patient Cognition: (P) Alert    Hospitalization in the last 30 days (Readmission):  No    If yes, Readmission Assessment in  Navigator will be completed.    Advance Directives:      Code Status: Full Code   Patient's Primary Decision Maker is: (P) Legal Next of Kin    Primary Decision Maker: Ar Montero - Spouse - [phone]    Discharge Planning:    Patient lives with: (P) Spouse/Significant Other Type of Home: (P) House  Primary Care Giver: (P) Self  Patient Support Systems include: (P) Spouse/Significant Other   Current Financial resources: (P) None  Current community resources: (P) Other (Comment) ([place] [facility] A[facility])  Current services prior to admission: (P) C-pap, Durable Medical Equipment            Current DME: (P) Walker, Shower Chair, Wheelchair            Type of Home Care services:  (P) None    ADLS  Prior functional level: (P) Independent in ADLs/IADLs  Current functional level: (P) Independent in ADLs/IADLs    PT AM-PAC:   /24  OT AM-PAC:   /24    Family can provide assistance at DC: (P) Yes  Would you like Case Management to discuss the discharge plan with any other family members/significant others, and if so, who? (P) Yes (If needed; Wife-

## 2024-03-21 NOTE — ACP (ADVANCE CARE PLANNING)
Advance Care Planning     Advance Care Planning Activator (Inpatient)  Conversation Note      Date of ACP Conversation: 3/21/2024     Conversation Conducted with: Patient with Decision Making Capacity    ACP Activator: LEBRON Penn      Health Care Decision Maker:     Current Designated Health Care Decision Maker:     Primary Decision Maker: Ar Montero - Spouse - [phone]    Today we documented Decision Maker(s) consistent with Legal Next of Kin hierarchy.    Care Preferences    Ventilation:  \"If you were in your present state of health and suddenly became very ill and were unable to breathe on your own, what would your preference be about the use of a ventilator (breathing machine) if it were available to you?\"      Would the patient desire the use of ventilator (breathing machine)?: yes    \"If your health worsens and it becomes clear that your chance of recovery is unlikely, what would your preference be about the use of a ventilator (breathing machine) if it were available to you?\"     Would the patient desire the use of ventilator (breathing machine)?: Yes      Resuscitation  \"CPR works best to restart the heart when there is a sudden event, like a heart attack, in someone who is otherwise healthy. Unfortunately, CPR does not typically restart the heart for people who have serious health conditions or who are very sick.\"    \"In the event your heart stopped as a result of an underlying serious health condition, would you want attempts to be made to restart your heart (answer \"yes\" for attempt to resuscitate) or would you prefer a natural death (answer \"no\" for do not attempt to resuscitate)?\" yes       [] Yes   [] No   Educated Patient / Decision Maker regarding differences between Advance Directives and portable DNR orders.    Length of ACP Conversation in minutes:  5 Minutes    Conversation Outcomes:  ACP discussion completed    Follow-up plan:    [] Schedule follow-up conversation to continue

## 2024-03-22 ENCOUNTER — ANESTHESIA (OUTPATIENT)
Dept: OPERATING ROOM | Age: 74
End: 2024-03-22

## 2024-03-22 LAB
ANION GAP SERPL CALCULATED.3IONS-SCNC: 8 MMOL/L (ref 3–16)
BASOPHILS # BLD: 0 K/UL (ref 0–0.2)
BASOPHILS NFR BLD: 0.4 %
BUN SERPL-MCNC: 27 MG/DL (ref 7–20)
CALCIUM SERPL-MCNC: 7.9 MG/DL (ref 8.3–10.6)
CHLORIDE SERPL-SCNC: 104 MMOL/L (ref 99–110)
CO2 SERPL-SCNC: 24 MMOL/L (ref 21–32)
CREAT SERPL-MCNC: 1.5 MG/DL (ref 0.8–1.3)
DEPRECATED RDW RBC AUTO: 14.6 % (ref 12.4–15.4)
EOSINOPHIL # BLD: 0.2 K/UL (ref 0–0.6)
EOSINOPHIL NFR BLD: 2.4 %
GFR SERPLBLD CREATININE-BSD FMLA CKD-EPI: 49 ML/MIN/{1.73_M2}
GLUCOSE BLD-MCNC: 107 MG/DL (ref 70–99)
GLUCOSE BLD-MCNC: 109 MG/DL (ref 70–99)
GLUCOSE BLD-MCNC: 109 MG/DL (ref 70–99)
GLUCOSE BLD-MCNC: 127 MG/DL (ref 70–99)
GLUCOSE BLD-MCNC: 143 MG/DL (ref 70–99)
GLUCOSE BLD-MCNC: 266 MG/DL (ref 70–99)
GLUCOSE SERPL-MCNC: 101 MG/DL (ref 70–99)
HCT VFR BLD AUTO: 29.2 % (ref 40.5–52.5)
HGB BLD-MCNC: 9.6 G/DL (ref 13.5–17.5)
LYMPHOCYTES # BLD: 0.6 K/UL (ref 1–5.1)
LYMPHOCYTES NFR BLD: 6.5 %
MAGNESIUM SERPL-MCNC: 2 MG/DL (ref 1.8–2.4)
MCH RBC QN AUTO: 28.1 PG (ref 26–34)
MCHC RBC AUTO-ENTMCNC: 33 G/DL (ref 31–36)
MCV RBC AUTO: 85.1 FL (ref 80–100)
MONOCYTES # BLD: 0.7 K/UL (ref 0–1.3)
MONOCYTES NFR BLD: 6.7 %
NEUTROPHILS # BLD: 8.2 K/UL (ref 1.7–7.7)
NEUTROPHILS NFR BLD: 84 %
PERFORMED ON: ABNORMAL
PLATELET # BLD AUTO: 279 K/UL (ref 135–450)
PMV BLD AUTO: 7.9 FL (ref 5–10.5)
POTASSIUM SERPL-SCNC: 3.4 MMOL/L (ref 3.5–5.1)
RBC # BLD AUTO: 3.43 M/UL (ref 4.2–5.9)
SODIUM SERPL-SCNC: 136 MMOL/L (ref 136–145)
WBC # BLD AUTO: 9.8 K/UL (ref 4–11)

## 2024-03-22 PROCEDURE — 6370000000 HC RX 637 (ALT 250 FOR IP)

## 2024-03-22 PROCEDURE — 87186 SC STD MICRODIL/AGAR DIL: CPT

## 2024-03-22 PROCEDURE — 7100000000 HC PACU RECOVERY - FIRST 15 MIN: Performed by: PODIATRIST

## 2024-03-22 PROCEDURE — 87070 CULTURE OTHR SPECIMN AEROBIC: CPT

## 2024-03-22 PROCEDURE — 3700000001 HC ADD 15 MINUTES (ANESTHESIA): Performed by: PODIATRIST

## 2024-03-22 PROCEDURE — 0QBL0ZX EXCISION OF RIGHT TARSAL, OPEN APPROACH, DIAGNOSTIC: ICD-10-PCS | Performed by: PODIATRIST

## 2024-03-22 PROCEDURE — 2580000003 HC RX 258: Performed by: INTERNAL MEDICINE

## 2024-03-22 PROCEDURE — 87077 CULTURE AEROBIC IDENTIFY: CPT

## 2024-03-22 PROCEDURE — 7100000001 HC PACU RECOVERY - ADDTL 15 MIN: Performed by: PODIATRIST

## 2024-03-22 PROCEDURE — 2500000003 HC RX 250 WO HCPCS: Performed by: PODIATRIST

## 2024-03-22 PROCEDURE — 83735 ASSAY OF MAGNESIUM: CPT

## 2024-03-22 PROCEDURE — 6360000002 HC RX W HCPCS

## 2024-03-22 PROCEDURE — 3600000012 HC SURGERY LEVEL 2 ADDTL 15MIN: Performed by: PODIATRIST

## 2024-03-22 PROCEDURE — 88305 TISSUE EXAM BY PATHOLOGIST: CPT

## 2024-03-22 PROCEDURE — 94760 N-INVAS EAR/PLS OXIMETRY 1: CPT

## 2024-03-22 PROCEDURE — 87076 CULTURE ANAEROBE IDENT EACH: CPT

## 2024-03-22 PROCEDURE — 2580000003 HC RX 258

## 2024-03-22 PROCEDURE — 80048 BASIC METABOLIC PNL TOTAL CA: CPT

## 2024-03-22 PROCEDURE — 36415 COLL VENOUS BLD VENIPUNCTURE: CPT

## 2024-03-22 PROCEDURE — 2500000003 HC RX 250 WO HCPCS

## 2024-03-22 PROCEDURE — 85025 COMPLETE CBC W/AUTO DIFF WBC: CPT

## 2024-03-22 PROCEDURE — 2709999900 HC NON-CHARGEABLE SUPPLY: Performed by: PODIATRIST

## 2024-03-22 PROCEDURE — 99232 SBSQ HOSP IP/OBS MODERATE 35: CPT | Performed by: INTERNAL MEDICINE

## 2024-03-22 PROCEDURE — 87205 SMEAR GRAM STAIN: CPT

## 2024-03-22 PROCEDURE — 6360000002 HC RX W HCPCS: Performed by: INTERNAL MEDICINE

## 2024-03-22 PROCEDURE — 1200000000 HC SEMI PRIVATE

## 2024-03-22 PROCEDURE — 88311 DECALCIFY TISSUE: CPT

## 2024-03-22 PROCEDURE — 3600000002 HC SURGERY LEVEL 2 BASE: Performed by: PODIATRIST

## 2024-03-22 PROCEDURE — 2580000003 HC RX 258: Performed by: PODIATRIST

## 2024-03-22 PROCEDURE — A4217 STERILE WATER/SALINE, 500 ML: HCPCS | Performed by: PODIATRIST

## 2024-03-22 PROCEDURE — 6370000000 HC RX 637 (ALT 250 FOR IP): Performed by: INTERNAL MEDICINE

## 2024-03-22 PROCEDURE — 6360000002 HC RX W HCPCS: Performed by: PODIATRIST

## 2024-03-22 PROCEDURE — 87075 CULTR BACTERIA EXCEPT BLOOD: CPT

## 2024-03-22 PROCEDURE — 3700000000 HC ANESTHESIA ATTENDED CARE: Performed by: PODIATRIST

## 2024-03-22 RX ORDER — MAGNESIUM HYDROXIDE 1200 MG/15ML
LIQUID ORAL CONTINUOUS PRN
Status: COMPLETED | OUTPATIENT
Start: 2024-03-22 | End: 2024-03-22

## 2024-03-22 RX ORDER — SODIUM CHLORIDE 0.9 % (FLUSH) 0.9 %
5-40 SYRINGE (ML) INJECTION EVERY 12 HOURS SCHEDULED
Status: DISCONTINUED | OUTPATIENT
Start: 2024-03-22 | End: 2024-03-22 | Stop reason: HOSPADM

## 2024-03-22 RX ORDER — BUPIVACAINE HYDROCHLORIDE 5 MG/ML
INJECTION, SOLUTION EPIDURAL; INTRACAUDAL
Status: COMPLETED | OUTPATIENT
Start: 2024-03-22 | End: 2024-03-22

## 2024-03-22 RX ORDER — SODIUM CHLORIDE 9 MG/ML
INJECTION, SOLUTION INTRAVENOUS PRN
Status: DISCONTINUED | OUTPATIENT
Start: 2024-03-22 | End: 2024-03-22 | Stop reason: HOSPADM

## 2024-03-22 RX ORDER — PROPOFOL 10 MG/ML
INJECTION, EMULSION INTRAVENOUS PRN
Status: DISCONTINUED | OUTPATIENT
Start: 2024-03-22 | End: 2024-03-22 | Stop reason: SDUPTHER

## 2024-03-22 RX ORDER — FENTANYL CITRATE 0.05 MG/ML
50 INJECTION, SOLUTION INTRAMUSCULAR; INTRAVENOUS EVERY 5 MIN PRN
Status: DISCONTINUED | OUTPATIENT
Start: 2024-03-22 | End: 2024-03-22 | Stop reason: HOSPADM

## 2024-03-22 RX ORDER — SODIUM CHLORIDE 0.9 % (FLUSH) 0.9 %
5-40 SYRINGE (ML) INJECTION PRN
Status: DISCONTINUED | OUTPATIENT
Start: 2024-03-22 | End: 2024-03-22 | Stop reason: HOSPADM

## 2024-03-22 RX ORDER — PROCHLORPERAZINE EDISYLATE 5 MG/ML
5 INJECTION INTRAMUSCULAR; INTRAVENOUS
Status: DISCONTINUED | OUTPATIENT
Start: 2024-03-22 | End: 2024-03-22 | Stop reason: HOSPADM

## 2024-03-22 RX ORDER — FENTANYL CITRATE 50 UG/ML
INJECTION, SOLUTION INTRAMUSCULAR; INTRAVENOUS PRN
Status: DISCONTINUED | OUTPATIENT
Start: 2024-03-22 | End: 2024-03-22 | Stop reason: SDUPTHER

## 2024-03-22 RX ORDER — GLYCOPYRROLATE 0.2 MG/ML
INJECTION INTRAMUSCULAR; INTRAVENOUS PRN
Status: DISCONTINUED | OUTPATIENT
Start: 2024-03-22 | End: 2024-03-22 | Stop reason: SDUPTHER

## 2024-03-22 RX ORDER — LIDOCAINE HYDROCHLORIDE 10 MG/ML
INJECTION, SOLUTION EPIDURAL; INFILTRATION; INTRACAUDAL; PERINEURAL
Status: COMPLETED | OUTPATIENT
Start: 2024-03-22 | End: 2024-03-22

## 2024-03-22 RX ORDER — PHENYLEPHRINE HCL IN 0.9% NACL 1 MG/10 ML
SYRINGE (ML) INTRAVENOUS PRN
Status: DISCONTINUED | OUTPATIENT
Start: 2024-03-22 | End: 2024-03-22 | Stop reason: SDUPTHER

## 2024-03-22 RX ORDER — ONDANSETRON 2 MG/ML
4 INJECTION INTRAMUSCULAR; INTRAVENOUS
Status: DISCONTINUED | OUTPATIENT
Start: 2024-03-22 | End: 2024-03-22 | Stop reason: HOSPADM

## 2024-03-22 RX ORDER — IPRATROPIUM BROMIDE AND ALBUTEROL SULFATE 2.5; .5 MG/3ML; MG/3ML
1 SOLUTION RESPIRATORY (INHALATION)
Status: DISCONTINUED | OUTPATIENT
Start: 2024-03-22 | End: 2024-03-22 | Stop reason: HOSPADM

## 2024-03-22 RX ORDER — LIDOCAINE HYDROCHLORIDE 20 MG/ML
INJECTION, SOLUTION EPIDURAL; INFILTRATION; INTRACAUDAL; PERINEURAL PRN
Status: DISCONTINUED | OUTPATIENT
Start: 2024-03-22 | End: 2024-03-22 | Stop reason: SDUPTHER

## 2024-03-22 RX ORDER — FENTANYL CITRATE 0.05 MG/ML
25 INJECTION, SOLUTION INTRAMUSCULAR; INTRAVENOUS EVERY 5 MIN PRN
Status: DISCONTINUED | OUTPATIENT
Start: 2024-03-22 | End: 2024-03-22 | Stop reason: HOSPADM

## 2024-03-22 RX ADMIN — CEFEPIME 2000 MG: 2 INJECTION, POWDER, FOR SOLUTION INTRAVENOUS at 20:54

## 2024-03-22 RX ADMIN — HYDROCODONE BITARTRATE AND ACETAMINOPHEN 1 TABLET: 5; 325 TABLET ORAL at 14:46

## 2024-03-22 RX ADMIN — CEFEPIME 2000 MG: 2 INJECTION, POWDER, FOR SOLUTION INTRAVENOUS at 08:55

## 2024-03-22 RX ADMIN — PROPOFOL 50 MG: 10 INJECTION, EMULSION INTRAVENOUS at 13:15

## 2024-03-22 RX ADMIN — LINEZOLID 600 MG: 600 INJECTION, SOLUTION INTRAVENOUS at 18:06

## 2024-03-22 RX ADMIN — SODIUM CHLORIDE: 9 INJECTION, SOLUTION INTRAVENOUS at 13:03

## 2024-03-22 RX ADMIN — PROPOFOL 60 MG: 10 INJECTION, EMULSION INTRAVENOUS at 13:13

## 2024-03-22 RX ADMIN — FENTANYL CITRATE 25 MCG: 50 INJECTION INTRAMUSCULAR; INTRAVENOUS at 13:24

## 2024-03-22 RX ADMIN — SODIUM CHLORIDE, PRESERVATIVE FREE 10 ML: 5 INJECTION INTRAVENOUS at 20:51

## 2024-03-22 RX ADMIN — HYDROCODONE BITARTRATE AND ACETAMINOPHEN 1 TABLET: 5; 325 TABLET ORAL at 23:35

## 2024-03-22 RX ADMIN — FENTANYL CITRATE 25 MCG: 50 INJECTION INTRAMUSCULAR; INTRAVENOUS at 13:18

## 2024-03-22 RX ADMIN — TAMSULOSIN HYDROCHLORIDE 0.4 MG: 0.4 CAPSULE ORAL at 08:50

## 2024-03-22 RX ADMIN — CEFEPIME 2000 MG: 2 INJECTION, POWDER, FOR SOLUTION INTRAVENOUS at 13:16

## 2024-03-22 RX ADMIN — GLYCOPYRROLATE 0.1 MG: 0.2 INJECTION, SOLUTION INTRAMUSCULAR; INTRAVENOUS at 13:13

## 2024-03-22 RX ADMIN — CARVEDILOL 3.12 MG: 3.12 TABLET, FILM COATED ORAL at 17:59

## 2024-03-22 RX ADMIN — PROPOFOL 125 MCG/KG/MIN: 10 INJECTION, EMULSION INTRAVENOUS at 13:16

## 2024-03-22 RX ADMIN — POTASSIUM CHLORIDE 40 MEQ: 1500 TABLET, EXTENDED RELEASE ORAL at 08:50

## 2024-03-22 RX ADMIN — ATORVASTATIN CALCIUM 40 MG: 40 TABLET, FILM COATED ORAL at 08:50

## 2024-03-22 RX ADMIN — FENTANYL CITRATE 25 MCG: 50 INJECTION INTRAMUSCULAR; INTRAVENOUS at 13:16

## 2024-03-22 RX ADMIN — LINEZOLID 600 MG: 600 INJECTION, SOLUTION INTRAVENOUS at 07:00

## 2024-03-22 RX ADMIN — Medication 100 MCG: at 13:24

## 2024-03-22 RX ADMIN — LIDOCAINE HYDROCHLORIDE 100 MG: 20 INJECTION, SOLUTION EPIDURAL; INFILTRATION; INTRACAUDAL; PERINEURAL at 13:13

## 2024-03-22 RX ADMIN — Medication 100 MCG: at 13:21

## 2024-03-22 RX ADMIN — FENTANYL CITRATE 25 MCG: 50 INJECTION INTRAMUSCULAR; INTRAVENOUS at 13:14

## 2024-03-22 ASSESSMENT — PAIN SCALES - GENERAL
PAINLEVEL_OUTOF10: 7
PAINLEVEL_OUTOF10: 0

## 2024-03-22 ASSESSMENT — LIFESTYLE VARIABLES: SMOKING_STATUS: 0

## 2024-03-22 ASSESSMENT — PAIN DESCRIPTION - DESCRIPTORS
DESCRIPTORS: ACHING;DISCOMFORT
DESCRIPTORS: ACHING;SORE

## 2024-03-22 ASSESSMENT — PAIN - FUNCTIONAL ASSESSMENT
PAIN_FUNCTIONAL_ASSESSMENT: PREVENTS OR INTERFERES SOME ACTIVE ACTIVITIES AND ADLS
PAIN_FUNCTIONAL_ASSESSMENT: NONE - DENIES PAIN

## 2024-03-22 ASSESSMENT — PAIN DESCRIPTION - LOCATION
LOCATION: FOOT
LOCATION: FOOT

## 2024-03-22 ASSESSMENT — PAIN DESCRIPTION - ORIENTATION
ORIENTATION: RIGHT
ORIENTATION: RIGHT

## 2024-03-22 NOTE — CARE COORDINATION
Discharge Planning:  SW met with pt and pts spouse at length to discuss d/c planning needs.  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  Pt stated he was provided with [employer] and then at the age of 67, he applied for Medicare part B.   Pt stated he paid for the Medicare part B quarterly and then became ill and missed the payment.  Pt stated he then paid three times the amount and his Medicare B was still ended d/t his missed payment.  Pt stated that because his Medicare B was ended, so was his [employer].  Pt stated he has been working with Medicare for the past two month to have his insurance reinstated and is under the impression that his insurance should be reinstated soon.    Per Podiatry, Patient will require further podiatric surgical intervention during this hospitalization.    SW will continue to follow to assist with d/c planning needs.   DUNCAN Quispe RICK  [phone]  Electronically signed by DUNCAN Ceballos on 3/22/2024 at 3:45 PM

## 2024-03-22 NOTE — BRIEF OP NOTE
Brief Postoperative Note      Patient: Marvin Montero  YOB: 1950  MRN: [id number]    Date of Procedure: 3/22/2024    Pre-Op Diagnosis Codes:     * Gangrene of right foot (HCC) [I96]    Post-Op Diagnosis: Same       Procedure(s):  INCISION AND DRAINAGE WITH AMPUTATION OF RIGHT FIFTH TOE AND BONE BIOPSY    Surgeon(s):  Frank Bills DPM    Assistant:  Surgical Assistant: Rogerio Hill    Anesthesia: Monitor Anesthesia Care    Estimated Blood Loss (mL): Minimal    Complications: Other: Further infection    Hemostasis: anatomic dissection     Injectables: Pre-Op 10 cc of 1% Lidocaine plain and Post-Op 10 cc of 0.5 % Marcaine plain    Materials: 2-0 Nylon    Implants: 1/4 inch Iodoform packing      Specimens:   ID Type Source Tests Collected by Time Destination   1 : 1. Swab fluid right foot Specimen Foot CULTURE, SURGICAL Frank Bills DPM 3/22/2024 1322    2 : 2. bone biopsy culture right foot Specimen Foot CULTURE, SURGICAL Frank Bills DPM 3/22/2024 1323    3 : 3. Deep space abscess right foot Specimen Foot CULTURE, SURGICAL Frank Bills DPM 3/22/2024 1328    A : A. Fifth toe right foot Tissue Tissue SURGICAL PATHOLOGY Frank Bills DPM 3/22/2024 1325        Implants:  * No implants in log *      Drains:   Negative Pressure Wound Therapy Foot Left (Active)       Urinary Catheter 03/21/24 Schilling (Active)   $ Urethral catheter insertion $ Not inserted for procedure 03/21/24 1641   Catheter Indications Urinary retention (acute or chronic), continuous bladder irrigation or bladder outlet obstruction 03/22/24 0900   Site Assessment No urethral drainage 03/22/24 0900   Urine Color Yellow 03/22/24 0900   Urine Appearance Clear 03/22/24 0900   Urine Odor Malodorous 03/22/24 0555   Collection Container Standard 03/22/24 0555   Securement Method Securing device (Describe) 03/22/24 0555   Catheter Care  Perineal wipes 03/22/24 0555   Catheter Best Practices  Drainage tube clipped to

## 2024-03-22 NOTE — ANESTHESIA PRE PROCEDURE
7/3/23   Nimco Lemons APRN - CNP   lisinopril (PRINIVIL;ZESTRIL) 2.5 MG tablet Take 1 tablet by mouth daily 7/3/23   Nimco Lemons APRN - CNP   spironolactone (ALDACTONE) 25 MG tablet Take 0.5 tablets by mouth daily .5 tab  daily 7/3/23   Nimco Lemons APRN - CNP   sildenafil (VIAGRA) 100 MG tablet Take 1 tablet by mouth as needed for Erectile Dysfunction 6/8/23   Reji Draper MD   blood glucose test strips (ASCENSIA AUTODISC VI;ONE TOUCH ULTRA TEST VI) strip 1 each by In Vitro route daily Test as directed,Dispense according to insurance formulary 5/1/23   Reji Draper MD   Lancets MISC 1 each by Does not apply route daily Test as directed, Dispense according to insurance formulary 5/1/23   Reji Draper MD   ezetimibe (ZETIA) 10 MG tablet Take 1 tablet by mouth daily 10/5/22 12/19/23  Reji Draper MD   Lactobacillus Rhamnosus, GG, (RA PROBIOTIC DIGESTIVE CARE) CAPS Take 1 capsule by mouth daily 10/7/21   Ky Blanc MD   docusate (COLACE, DULCOLAX) 100 MG CAPS Take 100 mg by mouth daily as needed (constipation) 2/9/22   Ky Blanc MD   NOVOLOG FLEXPEN 100 UNIT/ML injection pen ADMINISTER 8 TO 10 UNITS UNDER THE SKIN THREE TIMES DAILY BEFORE MEALS  Patient not taking: Reported on 3/20/2024 3/8/22   Reji Draper MD   Blood Glucose Monitoring Suppl (ONETOUCH VERIO) w/Device KIT 1 each by Does not apply route 2 times daily 12/21/21   Reji Draper MD   Handicap Placard MISC by Does not apply route Diagnosis: heart failure.    Expires: 12/7/24. 12/17/21   Reji Draper MD   Insulin Pen Needle (B-D UF III MINI PEN NEEDLES) 31G X 5 MM MISC Inject 1 each into the skin daily 7/8/21   Reji Draper MD   blood glucose test strips (ONE TOUCH TEST STRIPS) strip 1 each by In Vitro route 3 times daily As needed. 4/14/21   Reji Draper MD   ONETOUCH ULTRA strip USE WITH GLUCOSE METER THREE TIMES DAILY AND AS NEEDED 2/1/21   Reji Draper MD   ferrous sulfate (FE TABS 325) 325

## 2024-03-22 NOTE — ANESTHESIA POSTPROCEDURE EVALUATION
Department of Anesthesiology  Postprocedure Note    Patient: Marvin Montero  MRN: [id number]  YOB: 1950  Date of evaluation: 3/22/2024    Procedure Summary       Date: 03/22/24 Room / Location: [address]    Anesthesia Start: 1310 Anesthesia Stop: 1350    Procedure: INCISION AND DRAINAGE WITH AMPUTATION OF RIGHT FIFTH TOE AND BONE BIOPSY (Right: Foot) Diagnosis:       Gangrene of right foot (HCC)      (Gangrene of right foot (HCC) [I96])    Surgeons: Frank Bills DPM Responsible Provider: José Luis Read MD    Anesthesia Type: MAC ASA Status: 3            Anesthesia Type: MAC    Perez Phase I: Perez Score: 8    Perez Phase II:      Anesthesia Post Evaluation    Patient location during evaluation: PACU  Patient participation: complete - patient participated  Level of consciousness: awake  Airway patency: patent  Nausea & Vomiting: no nausea and no vomiting  Cardiovascular status: hemodynamically stable and blood pressure returned to baseline  Respiratory status: spontaneous ventilation, nonlabored ventilation and room air  Hydration status: stable  Comments: Mr. Montero was seen resting comfortably following his procedure. Plan to transport directly to cath lab for planned vascular study.  Pain management: adequate    No notable events documented.

## 2024-03-22 NOTE — CARE COORDINATION
Discharge Planning:  ESE contacted [facility] and was able to confirm this pt is active with [employer] and the only service he has at this time is rental for a ramp leading into the home.   Pt is currently off the floor for a test/procedure.      SW will attempt to follow up with pt later today when he comes back to the floor.    Concerns for d/c include the fact this pt does not have any health insurance at this time and it is unclear if this pt will need IV abx upon d/c.  If IV Abx are indicated, this will need to be arranged at the out infusion center as pt currently has no insurance.    ESE will continue to follow to assist with d/c planning needs.   DUNCAN Quispe  [phone]  Electronically signed by DUNCAN Ceballos on 3/22/2024 at 11:29 AM

## 2024-03-23 PROBLEM — R70.0 ELEVATED ERYTHROCYTE SEDIMENTATION RATE: Status: ACTIVE | Noted: 2024-03-23

## 2024-03-23 PROBLEM — E11.628 TYPE 2 DIABETES MELLITUS WITH RIGHT DIABETIC FOOT INFECTION (HCC): Status: ACTIVE | Noted: 2021-08-24

## 2024-03-23 PROBLEM — Z71.3 WEIGHT LOSS COUNSELING, ENCOUNTER FOR: Status: ACTIVE | Noted: 2024-03-23

## 2024-03-23 PROBLEM — Z71.89 DIABETES EDUCATION, ENCOUNTER FOR: Status: ACTIVE | Noted: 2024-03-23

## 2024-03-23 PROBLEM — E11.69 DIABETES MELLITUS TYPE 2 IN OBESE (HCC): Status: ACTIVE | Noted: 2024-03-23

## 2024-03-23 PROBLEM — I73.9 PAD (PERIPHERAL ARTERY DISEASE) (HCC): Status: ACTIVE | Noted: 2024-03-23

## 2024-03-23 PROBLEM — L08.9 TYPE 2 DIABETES MELLITUS WITH RIGHT DIABETIC FOOT INFECTION (HCC): Status: ACTIVE | Noted: 2021-08-24

## 2024-03-23 PROBLEM — Z86.73 HISTORY OF STROKE: Status: ACTIVE | Noted: 2024-03-23

## 2024-03-23 PROBLEM — E66.9 DIABETES MELLITUS TYPE 2 IN OBESE: Status: ACTIVE | Noted: 2024-03-23

## 2024-03-23 PROBLEM — A49.8 ENTEROCOCCUS FAECALIS INFECTION: Status: ACTIVE | Noted: 2024-03-23

## 2024-03-23 LAB
ANION GAP SERPL CALCULATED.3IONS-SCNC: 8 MMOL/L (ref 3–16)
BASOPHILS # BLD: 0 K/UL (ref 0–0.2)
BASOPHILS NFR BLD: 0.3 %
BUN SERPL-MCNC: 22 MG/DL (ref 7–20)
CALCIUM SERPL-MCNC: 8.2 MG/DL (ref 8.3–10.6)
CHLORIDE SERPL-SCNC: 108 MMOL/L (ref 99–110)
CO2 SERPL-SCNC: 25 MMOL/L (ref 21–32)
CREAT SERPL-MCNC: 1.2 MG/DL (ref 0.8–1.3)
DEPRECATED RDW RBC AUTO: 14.6 % (ref 12.4–15.4)
EOSINOPHIL # BLD: 0.3 K/UL (ref 0–0.6)
EOSINOPHIL NFR BLD: 2.2 %
GFR SERPLBLD CREATININE-BSD FMLA CKD-EPI: >60 ML/MIN/{1.73_M2}
GLUCOSE BLD-MCNC: 108 MG/DL (ref 70–99)
GLUCOSE BLD-MCNC: 133 MG/DL (ref 70–99)
GLUCOSE BLD-MCNC: 149 MG/DL (ref 70–99)
GLUCOSE BLD-MCNC: 156 MG/DL (ref 70–99)
GLUCOSE SERPL-MCNC: 105 MG/DL (ref 70–99)
HCT VFR BLD AUTO: 29.2 % (ref 40.5–52.5)
HGB BLD-MCNC: 9.4 G/DL (ref 13.5–17.5)
LYMPHOCYTES # BLD: 0.5 K/UL (ref 1–5.1)
LYMPHOCYTES NFR BLD: 4.4 %
MCH RBC QN AUTO: 27.6 PG (ref 26–34)
MCHC RBC AUTO-ENTMCNC: 32.3 G/DL (ref 31–36)
MCV RBC AUTO: 85.5 FL (ref 80–100)
MONOCYTES # BLD: 0.8 K/UL (ref 0–1.3)
MONOCYTES NFR BLD: 6.4 %
NEUTROPHILS # BLD: 10.5 K/UL (ref 1.7–7.7)
NEUTROPHILS NFR BLD: 86.7 %
PERFORMED ON: ABNORMAL
PLATELET # BLD AUTO: 277 K/UL (ref 135–450)
PMV BLD AUTO: 8.1 FL (ref 5–10.5)
POTASSIUM SERPL-SCNC: 4 MMOL/L (ref 3.5–5.1)
RBC # BLD AUTO: 3.41 M/UL (ref 4.2–5.9)
SODIUM SERPL-SCNC: 141 MMOL/L (ref 136–145)
WBC # BLD AUTO: 12.1 K/UL (ref 4–11)

## 2024-03-23 PROCEDURE — 6370000000 HC RX 637 (ALT 250 FOR IP)

## 2024-03-23 PROCEDURE — 6370000000 HC RX 637 (ALT 250 FOR IP): Performed by: INTERNAL MEDICINE

## 2024-03-23 PROCEDURE — 85025 COMPLETE CBC W/AUTO DIFF WBC: CPT

## 2024-03-23 PROCEDURE — 99223 1ST HOSP IP/OBS HIGH 75: CPT | Performed by: INTERNAL MEDICINE

## 2024-03-23 PROCEDURE — 99233 SBSQ HOSP IP/OBS HIGH 50: CPT | Performed by: NURSE PRACTITIONER

## 2024-03-23 PROCEDURE — 2580000003 HC RX 258

## 2024-03-23 PROCEDURE — 1200000000 HC SEMI PRIVATE

## 2024-03-23 PROCEDURE — 80048 BASIC METABOLIC PNL TOTAL CA: CPT

## 2024-03-23 PROCEDURE — 36415 COLL VENOUS BLD VENIPUNCTURE: CPT

## 2024-03-23 PROCEDURE — 94760 N-INVAS EAR/PLS OXIMETRY 1: CPT

## 2024-03-23 PROCEDURE — 6360000002 HC RX W HCPCS

## 2024-03-23 RX ORDER — LACTOBACILLUS RHAMNOSUS GG 10B CELL
1 CAPSULE ORAL 2 TIMES DAILY WITH MEALS
Status: DISCONTINUED | OUTPATIENT
Start: 2024-03-23 | End: 2024-04-17 | Stop reason: HOSPADM

## 2024-03-23 RX ADMIN — TAMSULOSIN HYDROCHLORIDE 0.4 MG: 0.4 CAPSULE ORAL at 09:43

## 2024-03-23 RX ADMIN — LINEZOLID 600 MG: 600 INJECTION, SOLUTION INTRAVENOUS at 19:22

## 2024-03-23 RX ADMIN — LEVOTHYROXINE SODIUM 25 MCG: 0.03 TABLET ORAL at 06:00

## 2024-03-23 RX ADMIN — AMIODARONE HYDROCHLORIDE 200 MG: 200 TABLET ORAL at 10:34

## 2024-03-23 RX ADMIN — HYDROCODONE BITARTRATE AND ACETAMINOPHEN 1 TABLET: 5; 325 TABLET ORAL at 20:34

## 2024-03-23 RX ADMIN — LINEZOLID 600 MG: 600 INJECTION, SOLUTION INTRAVENOUS at 06:30

## 2024-03-23 RX ADMIN — SODIUM CHLORIDE, PRESERVATIVE FREE 10 ML: 5 INJECTION INTRAVENOUS at 20:35

## 2024-03-23 RX ADMIN — ATORVASTATIN CALCIUM 40 MG: 40 TABLET, FILM COATED ORAL at 09:43

## 2024-03-23 RX ADMIN — CARVEDILOL 3.12 MG: 3.12 TABLET, FILM COATED ORAL at 16:36

## 2024-03-23 RX ADMIN — HYDROCODONE BITARTRATE AND ACETAMINOPHEN 1 TABLET: 5; 325 TABLET ORAL at 13:37

## 2024-03-23 RX ADMIN — FERROUS SULFATE TAB EC 324 MG (65 MG FE EQUIVALENT) 325 MG: 324 (65 FE) TABLET DELAYED RESPONSE at 09:43

## 2024-03-23 RX ADMIN — Medication 1 CAPSULE: at 16:36

## 2024-03-23 RX ADMIN — ACETAMINOPHEN 325MG 650 MG: 325 TABLET ORAL at 02:59

## 2024-03-23 RX ADMIN — HYDROCODONE BITARTRATE AND ACETAMINOPHEN 1 TABLET: 5; 325 TABLET ORAL at 06:00

## 2024-03-23 RX ADMIN — CEFEPIME 2000 MG: 2 INJECTION, POWDER, FOR SOLUTION INTRAVENOUS at 09:42

## 2024-03-23 ASSESSMENT — PAIN SCALES - GENERAL
PAINLEVEL_OUTOF10: 5
PAINLEVEL_OUTOF10: 7
PAINLEVEL_OUTOF10: 4
PAINLEVEL_OUTOF10: 2
PAINLEVEL_OUTOF10: 4
PAINLEVEL_OUTOF10: 3

## 2024-03-23 ASSESSMENT — PAIN DESCRIPTION - DESCRIPTORS
DESCRIPTORS: SORE
DESCRIPTORS: ACHING
DESCRIPTORS: ACHING;DISCOMFORT
DESCRIPTORS: ACHING

## 2024-03-23 ASSESSMENT — PAIN - FUNCTIONAL ASSESSMENT
PAIN_FUNCTIONAL_ASSESSMENT: PREVENTS OR INTERFERES SOME ACTIVE ACTIVITIES AND ADLS

## 2024-03-23 ASSESSMENT — PAIN DESCRIPTION - LOCATION
LOCATION: FOOT

## 2024-03-23 ASSESSMENT — PAIN DESCRIPTION - ONSET: ONSET: GRADUAL

## 2024-03-23 ASSESSMENT — PAIN DESCRIPTION - ORIENTATION
ORIENTATION: RIGHT

## 2024-03-23 ASSESSMENT — PAIN DESCRIPTION - PAIN TYPE: TYPE: SURGICAL PAIN

## 2024-03-23 ASSESSMENT — PAIN DESCRIPTION - FREQUENCY: FREQUENCY: INTERMITTENT

## 2024-03-24 LAB
ANION GAP SERPL CALCULATED.3IONS-SCNC: 8 MMOL/L (ref 3–16)
ANTI-XA UNFRAC HEPARIN: 0.24 IU/ML (ref 0.3–0.7)
ANTI-XA UNFRAC HEPARIN: 0.27 IU/ML (ref 0.3–0.7)
BASOPHILS # BLD: 0 K/UL (ref 0–0.2)
BASOPHILS NFR BLD: 0.3 %
BUN SERPL-MCNC: 19 MG/DL (ref 7–20)
CALCIUM SERPL-MCNC: 8.2 MG/DL (ref 8.3–10.6)
CHLORIDE SERPL-SCNC: 107 MMOL/L (ref 99–110)
CO2 SERPL-SCNC: 24 MMOL/L (ref 21–32)
CREAT SERPL-MCNC: 1 MG/DL (ref 0.8–1.3)
DEPRECATED RDW RBC AUTO: 14.4 % (ref 12.4–15.4)
EOSINOPHIL # BLD: 0.3 K/UL (ref 0–0.6)
EOSINOPHIL NFR BLD: 2.7 %
GFR SERPLBLD CREATININE-BSD FMLA CKD-EPI: >60 ML/MIN/{1.73_M2}
GLUCOSE BLD-MCNC: 109 MG/DL (ref 70–99)
GLUCOSE BLD-MCNC: 114 MG/DL (ref 70–99)
GLUCOSE BLD-MCNC: 125 MG/DL (ref 70–99)
GLUCOSE BLD-MCNC: 132 MG/DL (ref 70–99)
GLUCOSE SERPL-MCNC: 83 MG/DL (ref 70–99)
HCT VFR BLD AUTO: 29 % (ref 40.5–52.5)
HGB BLD-MCNC: 9.8 G/DL (ref 13.5–17.5)
INR PPP: 1.21 (ref 0.84–1.16)
LYMPHOCYTES # BLD: 0.8 K/UL (ref 1–5.1)
LYMPHOCYTES NFR BLD: 8.4 %
MCH RBC QN AUTO: 28.7 PG (ref 26–34)
MCHC RBC AUTO-ENTMCNC: 33.9 G/DL (ref 31–36)
MCV RBC AUTO: 84.6 FL (ref 80–100)
MONOCYTES # BLD: 0.6 K/UL (ref 0–1.3)
MONOCYTES NFR BLD: 6.5 %
NEUTROPHILS # BLD: 7.8 K/UL (ref 1.7–7.7)
NEUTROPHILS NFR BLD: 82.1 %
PERFORMED ON: ABNORMAL
PLATELET # BLD AUTO: 267 K/UL (ref 135–450)
PMV BLD AUTO: 7.9 FL (ref 5–10.5)
POTASSIUM SERPL-SCNC: 3.8 MMOL/L (ref 3.5–5.1)
PROTHROMBIN TIME: 15.3 SEC (ref 11.5–14.8)
RBC # BLD AUTO: 3.42 M/UL (ref 4.2–5.9)
SODIUM SERPL-SCNC: 139 MMOL/L (ref 136–145)
WBC # BLD AUTO: 9.6 K/UL (ref 4–11)

## 2024-03-24 PROCEDURE — 2500000003 HC RX 250 WO HCPCS: Performed by: INTERNAL MEDICINE

## 2024-03-24 PROCEDURE — 36415 COLL VENOUS BLD VENIPUNCTURE: CPT

## 2024-03-24 PROCEDURE — 94760 N-INVAS EAR/PLS OXIMETRY 1: CPT

## 2024-03-24 PROCEDURE — 85610 PROTHROMBIN TIME: CPT

## 2024-03-24 PROCEDURE — 6370000000 HC RX 637 (ALT 250 FOR IP): Performed by: INTERNAL MEDICINE

## 2024-03-24 PROCEDURE — 85025 COMPLETE CBC W/AUTO DIFF WBC: CPT

## 2024-03-24 PROCEDURE — 2580000003 HC RX 258

## 2024-03-24 PROCEDURE — 80048 BASIC METABOLIC PNL TOTAL CA: CPT

## 2024-03-24 PROCEDURE — 5A09557 ASSISTANCE WITH RESPIRATORY VENTILATION, GREATER THAN 96 CONSECUTIVE HOURS, CONTINUOUS POSITIVE AIRWAY PRESSURE: ICD-10-PCS

## 2024-03-24 PROCEDURE — 6370000000 HC RX 637 (ALT 250 FOR IP)

## 2024-03-24 PROCEDURE — 6360000002 HC RX W HCPCS: Performed by: INTERNAL MEDICINE

## 2024-03-24 PROCEDURE — 99233 SBSQ HOSP IP/OBS HIGH 50: CPT | Performed by: INTERNAL MEDICINE

## 2024-03-24 PROCEDURE — 1200000000 HC SEMI PRIVATE

## 2024-03-24 PROCEDURE — 85520 HEPARIN ASSAY: CPT

## 2024-03-24 PROCEDURE — 6360000002 HC RX W HCPCS

## 2024-03-24 PROCEDURE — 94660 CPAP INITIATION&MGMT: CPT

## 2024-03-24 PROCEDURE — 99233 SBSQ HOSP IP/OBS HIGH 50: CPT | Performed by: NURSE PRACTITIONER

## 2024-03-24 RX ORDER — HEPARIN SODIUM 1000 [USP'U]/ML
2000 INJECTION, SOLUTION INTRAVENOUS; SUBCUTANEOUS ONCE
Status: COMPLETED | OUTPATIENT
Start: 2024-03-24 | End: 2024-03-24

## 2024-03-24 RX ORDER — HEPARIN SODIUM 10000 [USP'U]/100ML
0-4000 INJECTION, SOLUTION INTRAVENOUS CONTINUOUS
Status: DISCONTINUED | OUTPATIENT
Start: 2024-03-24 | End: 2024-03-25

## 2024-03-24 RX ADMIN — LINEZOLID 600 MG: 600 INJECTION, SOLUTION INTRAVENOUS at 22:29

## 2024-03-24 RX ADMIN — CARVEDILOL 3.12 MG: 3.12 TABLET, FILM COATED ORAL at 08:51

## 2024-03-24 RX ADMIN — HYDROCODONE BITARTRATE AND ACETAMINOPHEN 1 TABLET: 5; 325 TABLET ORAL at 06:46

## 2024-03-24 RX ADMIN — HEPARIN SODIUM 1000 UNITS/HR: 10000 INJECTION, SOLUTION INTRAVENOUS at 10:35

## 2024-03-24 RX ADMIN — CARVEDILOL 3.12 MG: 3.12 TABLET, FILM COATED ORAL at 17:06

## 2024-03-24 RX ADMIN — SODIUM CHLORIDE, PRESERVATIVE FREE 10 ML: 5 INJECTION INTRAVENOUS at 21:22

## 2024-03-24 RX ADMIN — HEPARIN SODIUM 2000 UNITS: 1000 INJECTION INTRAVENOUS; SUBCUTANEOUS at 18:12

## 2024-03-24 RX ADMIN — Medication 1 CAPSULE: at 08:51

## 2024-03-24 RX ADMIN — AMIODARONE HYDROCHLORIDE 200 MG: 200 TABLET ORAL at 08:51

## 2024-03-24 RX ADMIN — HEPARIN SODIUM 1240 UNITS/HR: 10000 INJECTION, SOLUTION INTRAVENOUS at 18:17

## 2024-03-24 RX ADMIN — LINEZOLID 600 MG: 600 INJECTION, SOLUTION INTRAVENOUS at 06:44

## 2024-03-24 RX ADMIN — Medication 1 CAPSULE: at 17:06

## 2024-03-24 RX ADMIN — LEVOTHYROXINE SODIUM 25 MCG: 0.03 TABLET ORAL at 06:44

## 2024-03-24 RX ADMIN — HEPARIN SODIUM 2000 UNITS: 1000 INJECTION INTRAVENOUS; SUBCUTANEOUS at 10:29

## 2024-03-24 RX ADMIN — TAMSULOSIN HYDROCHLORIDE 0.4 MG: 0.4 CAPSULE ORAL at 08:51

## 2024-03-24 RX ADMIN — ATORVASTATIN CALCIUM 40 MG: 40 TABLET, FILM COATED ORAL at 08:52

## 2024-03-24 ASSESSMENT — PAIN DESCRIPTION - ONSET: ONSET: GRADUAL

## 2024-03-24 ASSESSMENT — PAIN SCALES - GENERAL
PAINLEVEL_OUTOF10: 0
PAINLEVEL_OUTOF10: 7
PAINLEVEL_OUTOF10: 3
PAINLEVEL_OUTOF10: 7
PAINLEVEL_OUTOF10: 3

## 2024-03-24 ASSESSMENT — PAIN DESCRIPTION - LOCATION
LOCATION: FOOT
LOCATION: FOOT;BACK
LOCATION: FOOT

## 2024-03-24 ASSESSMENT — PAIN DESCRIPTION - DESCRIPTORS
DESCRIPTORS: ACHING;SORE
DESCRIPTORS: SORE

## 2024-03-24 ASSESSMENT — PAIN DESCRIPTION - PAIN TYPE: TYPE: SURGICAL PAIN

## 2024-03-24 ASSESSMENT — PAIN DESCRIPTION - ORIENTATION
ORIENTATION: RIGHT
ORIENTATION: RIGHT;LOWER

## 2024-03-24 ASSESSMENT — PAIN DESCRIPTION - FREQUENCY: FREQUENCY: INTERMITTENT

## 2024-03-25 ENCOUNTER — APPOINTMENT (OUTPATIENT)
Dept: CARDIAC CATH/INVASIVE PROCEDURES | Age: 74
DRG: 854 | End: 2024-03-25
Attending: INTERNAL MEDICINE
Payer: MEDICARE

## 2024-03-25 LAB
ANION GAP SERPL CALCULATED.3IONS-SCNC: 8 MMOL/L (ref 3–16)
ANTI-XA UNFRAC HEPARIN: 0.23 IU/ML (ref 0.3–0.7)
ANTI-XA UNFRAC HEPARIN: 0.34 IU/ML (ref 0.3–0.7)
BASOPHILS # BLD: 0 K/UL (ref 0–0.2)
BASOPHILS NFR BLD: 0.2 %
BUN SERPL-MCNC: 14 MG/DL (ref 7–20)
CALCIUM SERPL-MCNC: 8.2 MG/DL (ref 8.3–10.6)
CHLORIDE SERPL-SCNC: 108 MMOL/L (ref 99–110)
CO2 SERPL-SCNC: 25 MMOL/L (ref 21–32)
CREAT SERPL-MCNC: 1 MG/DL (ref 0.8–1.3)
DEPRECATED RDW RBC AUTO: 14.4 % (ref 12.4–15.4)
EOSINOPHIL # BLD: 0.2 K/UL (ref 0–0.6)
EOSINOPHIL NFR BLD: 2.7 %
GFR SERPLBLD CREATININE-BSD FMLA CKD-EPI: >60 ML/MIN/{1.73_M2}
GLUCOSE BLD-MCNC: 116 MG/DL (ref 70–99)
GLUCOSE BLD-MCNC: 124 MG/DL (ref 70–99)
GLUCOSE BLD-MCNC: 130 MG/DL (ref 70–99)
GLUCOSE BLD-MCNC: 94 MG/DL (ref 70–99)
GLUCOSE SERPL-MCNC: 97 MG/DL (ref 70–99)
HCT VFR BLD AUTO: 27.7 % (ref 40.5–52.5)
HGB BLD-MCNC: 9.3 G/DL (ref 13.5–17.5)
LYMPHOCYTES # BLD: 0.9 K/UL (ref 1–5.1)
LYMPHOCYTES NFR BLD: 10.6 %
MCH RBC QN AUTO: 28.5 PG (ref 26–34)
MCHC RBC AUTO-ENTMCNC: 33.6 G/DL (ref 31–36)
MCV RBC AUTO: 84.9 FL (ref 80–100)
MONOCYTES # BLD: 0.6 K/UL (ref 0–1.3)
MONOCYTES NFR BLD: 7.7 %
NEUTROPHILS # BLD: 6.5 K/UL (ref 1.7–7.7)
NEUTROPHILS NFR BLD: 78.8 %
PERFORMED ON: ABNORMAL
PERFORMED ON: NORMAL
PLATELET # BLD AUTO: 250 K/UL (ref 135–450)
PMV BLD AUTO: 7.8 FL (ref 5–10.5)
POC ACT LR: 286 SEC
POTASSIUM SERPL-SCNC: 4 MMOL/L (ref 3.5–5.1)
RBC # BLD AUTO: 3.26 M/UL (ref 4.2–5.9)
SODIUM SERPL-SCNC: 141 MMOL/L (ref 136–145)
WBC # BLD AUTO: 8.2 K/UL (ref 4–11)

## 2024-03-25 PROCEDURE — 6360000004 HC RX CONTRAST MEDICATION: Performed by: INTERNAL MEDICINE

## 2024-03-25 PROCEDURE — 2709999900 HC NON-CHARGEABLE SUPPLY: Performed by: INTERNAL MEDICINE

## 2024-03-25 PROCEDURE — B41F1ZZ FLUOROSCOPY OF RIGHT LOWER EXTREMITY ARTERIES USING LOW OSMOLAR CONTRAST: ICD-10-PCS | Performed by: INTERNAL MEDICINE

## 2024-03-25 PROCEDURE — 6370000000 HC RX 637 (ALT 250 FOR IP): Performed by: INTERNAL MEDICINE

## 2024-03-25 PROCEDURE — 99233 SBSQ HOSP IP/OBS HIGH 50: CPT | Performed by: INTERNAL MEDICINE

## 2024-03-25 PROCEDURE — 2720000010 HC SURG SUPPLY STERILE: Performed by: INTERNAL MEDICINE

## 2024-03-25 PROCEDURE — 94660 CPAP INITIATION&MGMT: CPT

## 2024-03-25 PROCEDURE — 99153 MOD SED SAME PHYS/QHP EA: CPT

## 2024-03-25 PROCEDURE — C1751 CATH, INF, PER/CENT/MIDLINE: HCPCS | Performed by: INTERNAL MEDICINE

## 2024-03-25 PROCEDURE — 04FM3ZZ FRAGMENTATION OF RIGHT POPLITEAL ARTERY, PERCUTANEOUS APPROACH: ICD-10-PCS | Performed by: INTERNAL MEDICINE

## 2024-03-25 PROCEDURE — 85347 COAGULATION TIME ACTIVATED: CPT

## 2024-03-25 PROCEDURE — 04FT3ZZ FRAGMENTATION OF RIGHT PERONEAL ARTERY, PERCUTANEOUS APPROACH: ICD-10-PCS | Performed by: INTERNAL MEDICINE

## 2024-03-25 PROCEDURE — 047T3ZZ DILATION OF RIGHT PERONEAL ARTERY, PERCUTANEOUS APPROACH: ICD-10-PCS | Performed by: INTERNAL MEDICINE

## 2024-03-25 PROCEDURE — 2580000003 HC RX 258

## 2024-03-25 PROCEDURE — 85520 HEPARIN ASSAY: CPT

## 2024-03-25 PROCEDURE — 85025 COMPLETE CBC W/AUTO DIFF WBC: CPT

## 2024-03-25 PROCEDURE — 37224 HC FEM POP TERRITORY PLASTY: CPT

## 2024-03-25 PROCEDURE — C1725 CATH, TRANSLUMIN NON-LASER: HCPCS | Performed by: INTERNAL MEDICINE

## 2024-03-25 PROCEDURE — 37232 PR REVSC OPN/PRQ TIB/PERO W/ANGIOPLASTY UNI EA VSL: CPT | Performed by: INTERNAL MEDICINE

## 2024-03-25 PROCEDURE — 6370000000 HC RX 637 (ALT 250 FOR IP)

## 2024-03-25 PROCEDURE — 99152 MOD SED SAME PHYS/QHP 5/>YRS: CPT

## 2024-03-25 PROCEDURE — C1760 CLOSURE DEV, VASC: HCPCS | Performed by: INTERNAL MEDICINE

## 2024-03-25 PROCEDURE — 1200000000 HC SEMI PRIVATE

## 2024-03-25 PROCEDURE — 75710 ARTERY X-RAYS ARM/LEG: CPT

## 2024-03-25 PROCEDURE — 75710 ARTERY X-RAYS ARM/LEG: CPT | Performed by: INTERNAL MEDICINE

## 2024-03-25 PROCEDURE — 37228 PR REVSC OPN/PRQ TIB/PERO W/ANGIOPLASTY UNI: CPT | Performed by: INTERNAL MEDICINE

## 2024-03-25 PROCEDURE — 37232 HC TIB PER TERR ADDL PLASTY: CPT

## 2024-03-25 PROCEDURE — C1894 INTRO/SHEATH, NON-LASER: HCPCS | Performed by: INTERNAL MEDICINE

## 2024-03-25 PROCEDURE — 04FN3ZZ FRAGMENTATION OF LEFT POPLITEAL ARTERY, PERCUTANEOUS APPROACH: ICD-10-PCS | Performed by: INTERNAL MEDICINE

## 2024-03-25 PROCEDURE — 04FU3ZZ FRAGMENTATION OF LEFT PERONEAL ARTERY, PERCUTANEOUS APPROACH: ICD-10-PCS | Performed by: INTERNAL MEDICINE

## 2024-03-25 PROCEDURE — 2500000003 HC RX 250 WO HCPCS

## 2024-03-25 PROCEDURE — 6360000002 HC RX W HCPCS

## 2024-03-25 PROCEDURE — 047M3ZZ DILATION OF RIGHT POPLITEAL ARTERY, PERCUTANEOUS APPROACH: ICD-10-PCS | Performed by: INTERNAL MEDICINE

## 2024-03-25 PROCEDURE — 36415 COLL VENOUS BLD VENIPUNCTURE: CPT

## 2024-03-25 PROCEDURE — 6360000002 HC RX W HCPCS: Performed by: INTERNAL MEDICINE

## 2024-03-25 PROCEDURE — 37185 PRIM ART M-THRMBC SBSQ VSL: CPT

## 2024-03-25 PROCEDURE — C1887 CATHETER, GUIDING: HCPCS | Performed by: INTERNAL MEDICINE

## 2024-03-25 PROCEDURE — 37224 PR REVSC OPN/PRG FEM/POP W/ANGIOPLASTY UNI: CPT | Performed by: INTERNAL MEDICINE

## 2024-03-25 PROCEDURE — C1769 GUIDE WIRE: HCPCS | Performed by: INTERNAL MEDICINE

## 2024-03-25 PROCEDURE — 80048 BASIC METABOLIC PNL TOTAL CA: CPT

## 2024-03-25 PROCEDURE — 37184 PRIM ART M-THRMBC 1ST VSL: CPT

## 2024-03-25 PROCEDURE — 37228 HC TIB PER TERRITORY PLASTY: CPT

## 2024-03-25 PROCEDURE — 2580000003 HC RX 258: Performed by: INTERNAL MEDICINE

## 2024-03-25 PROCEDURE — 2500000003 HC RX 250 WO HCPCS: Performed by: INTERNAL MEDICINE

## 2024-03-25 PROCEDURE — 75774 ARTERY X-RAY EACH VESSEL: CPT

## 2024-03-25 PROCEDURE — 04CM3ZZ EXTIRPATION OF MATTER FROM RIGHT POPLITEAL ARTERY, PERCUTANEOUS APPROACH: ICD-10-PCS | Performed by: INTERNAL MEDICINE

## 2024-03-25 RX ORDER — SODIUM CHLORIDE 9 MG/ML
INJECTION, SOLUTION INTRAVENOUS PRN
Status: DISCONTINUED | OUTPATIENT
Start: 2024-03-25 | End: 2024-04-17 | Stop reason: HOSPADM

## 2024-03-25 RX ORDER — ACETAMINOPHEN 325 MG/1
650 TABLET ORAL EVERY 4 HOURS PRN
Status: DISCONTINUED | OUTPATIENT
Start: 2024-03-25 | End: 2024-03-25

## 2024-03-25 RX ORDER — SODIUM CHLORIDE 0.9 % (FLUSH) 0.9 %
5-40 SYRINGE (ML) INJECTION PRN
Status: DISCONTINUED | OUTPATIENT
Start: 2024-03-25 | End: 2024-04-17 | Stop reason: HOSPADM

## 2024-03-25 RX ORDER — HEPARIN SODIUM 1000 [USP'U]/ML
2000 INJECTION, SOLUTION INTRAVENOUS; SUBCUTANEOUS ONCE
Status: COMPLETED | OUTPATIENT
Start: 2024-03-25 | End: 2024-03-25

## 2024-03-25 RX ORDER — ONDANSETRON 2 MG/ML
4 INJECTION INTRAMUSCULAR; INTRAVENOUS EVERY 6 HOURS PRN
Status: DISCONTINUED | OUTPATIENT
Start: 2024-03-25 | End: 2024-03-25

## 2024-03-25 RX ORDER — SODIUM CHLORIDE 0.9 % (FLUSH) 0.9 %
5-40 SYRINGE (ML) INJECTION EVERY 12 HOURS SCHEDULED
Status: DISCONTINUED | OUTPATIENT
Start: 2024-03-25 | End: 2024-04-17 | Stop reason: HOSPADM

## 2024-03-25 RX ADMIN — TAMSULOSIN HYDROCHLORIDE 0.4 MG: 0.4 CAPSULE ORAL at 09:40

## 2024-03-25 RX ADMIN — ATORVASTATIN CALCIUM 40 MG: 40 TABLET, FILM COATED ORAL at 09:41

## 2024-03-25 RX ADMIN — HYDROCODONE BITARTRATE AND ACETAMINOPHEN 1 TABLET: 5; 325 TABLET ORAL at 22:43

## 2024-03-25 RX ADMIN — LINEZOLID 600 MG: 600 INJECTION, SOLUTION INTRAVENOUS at 07:55

## 2024-03-25 RX ADMIN — LINEZOLID 600 MG: 600 INJECTION, SOLUTION INTRAVENOUS at 18:53

## 2024-03-25 RX ADMIN — AMIODARONE HYDROCHLORIDE 200 MG: 200 TABLET ORAL at 09:41

## 2024-03-25 RX ADMIN — SODIUM CHLORIDE, PRESERVATIVE FREE 10 ML: 5 INJECTION INTRAVENOUS at 20:22

## 2024-03-25 RX ADMIN — FERROUS SULFATE TAB EC 324 MG (65 MG FE EQUIVALENT) 325 MG: 324 (65 FE) TABLET DELAYED RESPONSE at 09:41

## 2024-03-25 RX ADMIN — Medication 1 CAPSULE: at 17:50

## 2024-03-25 RX ADMIN — SODIUM CHLORIDE, PRESERVATIVE FREE 10 ML: 5 INJECTION INTRAVENOUS at 09:40

## 2024-03-25 RX ADMIN — HEPARIN SODIUM 2000 UNITS: 1000 INJECTION INTRAVENOUS; SUBCUTANEOUS at 01:51

## 2024-03-25 RX ADMIN — CARVEDILOL 3.12 MG: 3.12 TABLET, FILM COATED ORAL at 09:40

## 2024-03-25 RX ADMIN — HEPARIN SODIUM 1480 UNITS/HR: 10000 INJECTION, SOLUTION INTRAVENOUS at 01:53

## 2024-03-25 RX ADMIN — LEVOTHYROXINE SODIUM 25 MCG: 0.03 TABLET ORAL at 05:46

## 2024-03-25 RX ADMIN — HYDROCODONE BITARTRATE AND ACETAMINOPHEN 1 TABLET: 5; 325 TABLET ORAL at 01:50

## 2024-03-25 RX ADMIN — IOPAMIDOL 180 ML: 755 INJECTION, SOLUTION INTRAVENOUS at 15:57

## 2024-03-25 RX ADMIN — Medication 1 CAPSULE: at 09:41

## 2024-03-25 ASSESSMENT — PAIN DESCRIPTION - LOCATION
LOCATION: LEG;FOOT
LOCATION: FOOT;BACK
LOCATION: LEG
LOCATION: FOOT

## 2024-03-25 ASSESSMENT — PAIN SCALES - GENERAL
PAINLEVEL_OUTOF10: 7
PAINLEVEL_OUTOF10: 6
PAINLEVEL_OUTOF10: 8
PAINLEVEL_OUTOF10: 4
PAINLEVEL_OUTOF10: 7

## 2024-03-25 ASSESSMENT — PAIN DESCRIPTION - ORIENTATION
ORIENTATION: RIGHT;LOWER
ORIENTATION: RIGHT

## 2024-03-25 ASSESSMENT — PAIN - FUNCTIONAL ASSESSMENT: PAIN_FUNCTIONAL_ASSESSMENT: PREVENTS OR INTERFERES SOME ACTIVE ACTIVITIES AND ADLS

## 2024-03-25 ASSESSMENT — PAIN DESCRIPTION - DESCRIPTORS
DESCRIPTORS: ACHING;DISCOMFORT
DESCRIPTORS: ACHING;DISCOMFORT
DESCRIPTORS: ACHING;SORE

## 2024-03-25 NOTE — PROCEDURES
start times above).         ANGIOGRAM/ARTERIOGRAM:   and Procedure details    Right common iliac artery right external iliac artery patent  Right profunda femoris right common femoral artery patent  Occluded distal right superficial femoral artery and popliteal arteries  Occluded right anterior tibial artery  Patent right peroneal artery  Right posterior tibial artery ostial 90% stenosis.    Summary    Successful revascularization of distal right superficial femoral artery popliteal artery.  Revascularization right tibioperoneal trunk and right posterior tibial artery with good flow.      RECOMMENDATION:  .    1. Recommend wound care     Kevin Christy MD FACC

## 2024-03-26 ENCOUNTER — APPOINTMENT (OUTPATIENT)
Dept: GENERAL RADIOLOGY | Age: 74
DRG: 854 | End: 2024-03-26
Attending: INTERNAL MEDICINE
Payer: MEDICARE

## 2024-03-26 PROBLEM — Z22.322 MRSA (METHICILLIN RESISTANT STAPH AUREUS) CULTURE POSITIVE: Status: ACTIVE | Noted: 2024-03-26

## 2024-03-26 LAB
CRP SERPL-MCNC: 64 MG/L (ref 0–5.1)
DEPRECATED RDW RBC AUTO: 14.3 % (ref 12.4–15.4)
ERYTHROCYTE [SEDIMENTATION RATE] IN BLOOD BY WESTERGREN METHOD: 71 MM/HR (ref 0–20)
GLUCOSE BLD-MCNC: 111 MG/DL (ref 70–99)
GLUCOSE BLD-MCNC: 112 MG/DL (ref 70–99)
GLUCOSE BLD-MCNC: 139 MG/DL (ref 70–99)
GLUCOSE BLD-MCNC: 98 MG/DL (ref 70–99)
HCT VFR BLD AUTO: 27.8 % (ref 40.5–52.5)
HGB BLD-MCNC: 9.3 G/DL (ref 13.5–17.5)
LACTOFERRIN STL QL IA: ABNORMAL
MCH RBC QN AUTO: 28.4 PG (ref 26–34)
MCHC RBC AUTO-ENTMCNC: 33.3 G/DL (ref 31–36)
MCV RBC AUTO: 85.2 FL (ref 80–100)
PERFORMED ON: ABNORMAL
PERFORMED ON: NORMAL
PLATELET # BLD AUTO: 238 K/UL (ref 135–450)
PMV BLD AUTO: 7.8 FL (ref 5–10.5)
RBC # BLD AUTO: 3.27 M/UL (ref 4.2–5.9)
WBC # BLD AUTO: 7 K/UL (ref 4–11)

## 2024-03-26 PROCEDURE — 1200000000 HC SEMI PRIVATE

## 2024-03-26 PROCEDURE — 94660 CPAP INITIATION&MGMT: CPT

## 2024-03-26 PROCEDURE — 6370000000 HC RX 637 (ALT 250 FOR IP): Performed by: INTERNAL MEDICINE

## 2024-03-26 PROCEDURE — 6370000000 HC RX 637 (ALT 250 FOR IP)

## 2024-03-26 PROCEDURE — 51702 INSERT TEMP BLADDER CATH: CPT

## 2024-03-26 PROCEDURE — 85652 RBC SED RATE AUTOMATED: CPT

## 2024-03-26 PROCEDURE — 94760 N-INVAS EAR/PLS OXIMETRY 1: CPT

## 2024-03-26 PROCEDURE — 73620 X-RAY EXAM OF FOOT: CPT

## 2024-03-26 PROCEDURE — 2580000003 HC RX 258: Performed by: INTERNAL MEDICINE

## 2024-03-26 PROCEDURE — 6360000002 HC RX W HCPCS

## 2024-03-26 PROCEDURE — 86140 C-REACTIVE PROTEIN: CPT

## 2024-03-26 PROCEDURE — 83630 LACTOFERRIN FECAL (QUAL): CPT

## 2024-03-26 PROCEDURE — 85027 COMPLETE CBC AUTOMATED: CPT

## 2024-03-26 PROCEDURE — 99232 SBSQ HOSP IP/OBS MODERATE 35: CPT | Performed by: INTERNAL MEDICINE

## 2024-03-26 PROCEDURE — 36415 COLL VENOUS BLD VENIPUNCTURE: CPT

## 2024-03-26 RX ORDER — AMIODARONE HYDROCHLORIDE 200 MG/1
100 TABLET ORAL DAILY
Status: DISCONTINUED | OUTPATIENT
Start: 2024-03-26 | End: 2024-04-17 | Stop reason: HOSPADM

## 2024-03-26 RX ORDER — LINEZOLID 600 MG/1
600 TABLET, FILM COATED ORAL EVERY 12 HOURS SCHEDULED
Status: COMPLETED | OUTPATIENT
Start: 2024-03-26 | End: 2024-04-14

## 2024-03-26 RX ORDER — LOPERAMIDE HYDROCHLORIDE 2 MG/1
2 CAPSULE ORAL 3 TIMES DAILY PRN
Status: DISCONTINUED | OUTPATIENT
Start: 2024-03-26 | End: 2024-04-17 | Stop reason: HOSPADM

## 2024-03-26 RX ORDER — AMIODARONE HYDROCHLORIDE 200 MG/1
100 TABLET ORAL DAILY
Status: DISCONTINUED | OUTPATIENT
Start: 2024-03-27 | End: 2024-03-26

## 2024-03-26 RX ADMIN — LOPERAMIDE HYDROCHLORIDE 2 MG: 2 CAPSULE ORAL at 13:49

## 2024-03-26 RX ADMIN — ATORVASTATIN CALCIUM 40 MG: 40 TABLET, FILM COATED ORAL at 09:53

## 2024-03-26 RX ADMIN — LEVOTHYROXINE SODIUM 25 MCG: 0.03 TABLET ORAL at 06:43

## 2024-03-26 RX ADMIN — HYDROCODONE BITARTRATE AND ACETAMINOPHEN 1 TABLET: 5; 325 TABLET ORAL at 20:45

## 2024-03-26 RX ADMIN — APIXABAN 5 MG: 5 TABLET, FILM COATED ORAL at 20:39

## 2024-03-26 RX ADMIN — LINEZOLID 600 MG: 600 INJECTION, SOLUTION INTRAVENOUS at 06:43

## 2024-03-26 RX ADMIN — APIXABAN 5 MG: 5 TABLET, FILM COATED ORAL at 09:53

## 2024-03-26 RX ADMIN — TAMSULOSIN HYDROCHLORIDE 0.4 MG: 0.4 CAPSULE ORAL at 09:53

## 2024-03-26 RX ADMIN — Medication 1 CAPSULE: at 09:53

## 2024-03-26 RX ADMIN — SODIUM CHLORIDE, PRESERVATIVE FREE 10 ML: 5 INJECTION INTRAVENOUS at 20:39

## 2024-03-26 RX ADMIN — LINEZOLID 600 MG: 600 TABLET, FILM COATED ORAL at 20:39

## 2024-03-26 RX ADMIN — SODIUM CHLORIDE, PRESERVATIVE FREE 10 ML: 5 INJECTION INTRAVENOUS at 09:55

## 2024-03-26 RX ADMIN — CARVEDILOL 3.12 MG: 3.12 TABLET, FILM COATED ORAL at 09:53

## 2024-03-26 RX ADMIN — CARVEDILOL 3.12 MG: 3.12 TABLET, FILM COATED ORAL at 17:53

## 2024-03-26 RX ADMIN — Medication 1 CAPSULE: at 17:53

## 2024-03-26 RX ADMIN — AMIODARONE HYDROCHLORIDE 100 MG: 200 TABLET ORAL at 13:49

## 2024-03-26 ASSESSMENT — PAIN SCALES - GENERAL
PAINLEVEL_OUTOF10: 0
PAINLEVEL_OUTOF10: 8

## 2024-03-26 ASSESSMENT — PAIN DESCRIPTION - LOCATION: LOCATION: FOOT;TOE (COMMENT WHICH ONE)

## 2024-03-26 ASSESSMENT — PAIN DESCRIPTION - ORIENTATION: ORIENTATION: RIGHT

## 2024-03-26 ASSESSMENT — PAIN DESCRIPTION - DESCRIPTORS: DESCRIPTORS: THROBBING

## 2024-03-26 NOTE — CARE COORDINATION
Discussed discharge plan with patient. Patient's goal is to return home. Requested PT/OT eval as patient hasn't been OOB since surgery and still listed as bedrest. Patient said RN told him he could bear weight on the heel of his foot.  Patient says he is hopeful his insurance will be reinstated on April 1st. Patient aware his discharge plan would be limited without insurance.   Await further Podiatry recs, note says may require further surgical intervention. ID following, watch for IVAB need at discharge.  Will continue to follow for discharge needs.    Electronically signed by Dalila Evans RN Case Management on 3/26/2024 at 3:02 PM

## 2024-03-27 LAB
BACTERIA SPEC AEROBE CULT: ABNORMAL
BACTERIA SPEC ANAEROBE CULT: ABNORMAL
GLUCOSE BLD-MCNC: 157 MG/DL (ref 70–99)
GLUCOSE BLD-MCNC: 177 MG/DL (ref 70–99)
GLUCOSE BLD-MCNC: 185 MG/DL (ref 70–99)
GLUCOSE BLD-MCNC: 88 MG/DL (ref 70–99)
GRAM STN SPEC: ABNORMAL
ORGANISM: ABNORMAL
PERFORMED ON: ABNORMAL
PERFORMED ON: NORMAL
REASON FOR REJECTION: NORMAL
REJECTED TEST: NORMAL

## 2024-03-27 PROCEDURE — 6370000000 HC RX 637 (ALT 250 FOR IP): Performed by: INTERNAL MEDICINE

## 2024-03-27 PROCEDURE — 51702 INSERT TEMP BLADDER CATH: CPT

## 2024-03-27 PROCEDURE — 97166 OT EVAL MOD COMPLEX 45 MIN: CPT

## 2024-03-27 PROCEDURE — 6370000000 HC RX 637 (ALT 250 FOR IP)

## 2024-03-27 PROCEDURE — 97530 THERAPEUTIC ACTIVITIES: CPT

## 2024-03-27 PROCEDURE — 99232 SBSQ HOSP IP/OBS MODERATE 35: CPT | Performed by: INTERNAL MEDICINE

## 2024-03-27 PROCEDURE — 97162 PT EVAL MOD COMPLEX 30 MIN: CPT

## 2024-03-27 PROCEDURE — 94760 N-INVAS EAR/PLS OXIMETRY 1: CPT

## 2024-03-27 PROCEDURE — 94660 CPAP INITIATION&MGMT: CPT

## 2024-03-27 PROCEDURE — 2580000003 HC RX 258: Performed by: INTERNAL MEDICINE

## 2024-03-27 PROCEDURE — 1200000000 HC SEMI PRIVATE

## 2024-03-27 PROCEDURE — 6360000002 HC RX W HCPCS: Performed by: NURSE PRACTITIONER

## 2024-03-27 RX ORDER — HYDROCODONE BITARTRATE AND ACETAMINOPHEN 5; 325 MG/1; MG/1
1 TABLET ORAL EVERY 4 HOURS PRN
Status: DISCONTINUED | OUTPATIENT
Start: 2024-03-27 | End: 2024-04-17 | Stop reason: HOSPADM

## 2024-03-27 RX ORDER — ENOXAPARIN SODIUM 150 MG/ML
1 INJECTION SUBCUTANEOUS 2 TIMES DAILY
Status: DISCONTINUED | OUTPATIENT
Start: 2024-03-27 | End: 2024-04-07

## 2024-03-27 RX ORDER — METRONIDAZOLE 500 MG/1
500 TABLET ORAL EVERY 8 HOURS SCHEDULED
Status: COMPLETED | OUTPATIENT
Start: 2024-03-27 | End: 2024-04-14

## 2024-03-27 RX ADMIN — LEVOTHYROXINE SODIUM 25 MCG: 0.03 TABLET ORAL at 06:15

## 2024-03-27 RX ADMIN — CARVEDILOL 3.12 MG: 3.12 TABLET, FILM COATED ORAL at 08:18

## 2024-03-27 RX ADMIN — LOPERAMIDE HYDROCHLORIDE 2 MG: 2 CAPSULE ORAL at 08:17

## 2024-03-27 RX ADMIN — SODIUM CHLORIDE, PRESERVATIVE FREE 10 ML: 5 INJECTION INTRAVENOUS at 08:20

## 2024-03-27 RX ADMIN — ENOXAPARIN SODIUM 120 MG: 150 INJECTION SUBCUTANEOUS at 23:29

## 2024-03-27 RX ADMIN — ATORVASTATIN CALCIUM 40 MG: 40 TABLET, FILM COATED ORAL at 08:18

## 2024-03-27 RX ADMIN — LINEZOLID 600 MG: 600 TABLET, FILM COATED ORAL at 22:15

## 2024-03-27 RX ADMIN — LOPERAMIDE HYDROCHLORIDE 2 MG: 2 CAPSULE ORAL at 16:44

## 2024-03-27 RX ADMIN — HYDROCODONE BITARTRATE AND ACETAMINOPHEN 1 TABLET: 5; 325 TABLET ORAL at 10:20

## 2024-03-27 RX ADMIN — HYDROCODONE BITARTRATE AND ACETAMINOPHEN 1 TABLET: 5; 325 TABLET ORAL at 04:21

## 2024-03-27 RX ADMIN — HYDROCODONE BITARTRATE AND ACETAMINOPHEN 1 TABLET: 5; 325 TABLET ORAL at 16:44

## 2024-03-27 RX ADMIN — Medication 1 CAPSULE: at 16:44

## 2024-03-27 RX ADMIN — FERROUS SULFATE TAB EC 324 MG (65 MG FE EQUIVALENT) 325 MG: 324 (65 FE) TABLET DELAYED RESPONSE at 08:18

## 2024-03-27 RX ADMIN — CARVEDILOL 3.12 MG: 3.12 TABLET, FILM COATED ORAL at 16:44

## 2024-03-27 RX ADMIN — SODIUM CHLORIDE, PRESERVATIVE FREE 10 ML: 5 INJECTION INTRAVENOUS at 22:18

## 2024-03-27 RX ADMIN — LINEZOLID 600 MG: 600 TABLET, FILM COATED ORAL at 08:18

## 2024-03-27 RX ADMIN — TAMSULOSIN HYDROCHLORIDE 0.4 MG: 0.4 CAPSULE ORAL at 08:17

## 2024-03-27 RX ADMIN — METRONIDAZOLE 500 MG: 500 TABLET ORAL at 22:15

## 2024-03-27 RX ADMIN — METRONIDAZOLE 500 MG: 500 TABLET ORAL at 16:44

## 2024-03-27 RX ADMIN — HYDROCODONE BITARTRATE AND ACETAMINOPHEN 1 TABLET: 5; 325 TABLET ORAL at 23:29

## 2024-03-27 RX ADMIN — Medication 1 CAPSULE: at 08:18

## 2024-03-27 RX ADMIN — APIXABAN 5 MG: 5 TABLET, FILM COATED ORAL at 08:17

## 2024-03-27 RX ADMIN — AMIODARONE HYDROCHLORIDE 100 MG: 200 TABLET ORAL at 08:18

## 2024-03-27 ASSESSMENT — PAIN DESCRIPTION - LOCATION
LOCATION: FOOT

## 2024-03-27 ASSESSMENT — PAIN DESCRIPTION - DESCRIPTORS
DESCRIPTORS: THROBBING
DESCRIPTORS: ACHING
DESCRIPTORS: ACHING;DISCOMFORT
DESCRIPTORS: ACHING;DISCOMFORT

## 2024-03-27 ASSESSMENT — PAIN SCALES - GENERAL
PAINLEVEL_OUTOF10: 8
PAINLEVEL_OUTOF10: 8
PAINLEVEL_OUTOF10: 7
PAINLEVEL_OUTOF10: 7

## 2024-03-27 ASSESSMENT — PAIN DESCRIPTION - ORIENTATION
ORIENTATION: RIGHT

## 2024-03-27 ASSESSMENT — PAIN - FUNCTIONAL ASSESSMENT
PAIN_FUNCTIONAL_ASSESSMENT: ACTIVITIES ARE NOT PREVENTED
PAIN_FUNCTIONAL_ASSESSMENT: PREVENTS OR INTERFERES WITH ALL ACTIVE AND SOME PASSIVE ACTIVITIES
PAIN_FUNCTIONAL_ASSESSMENT: PREVENTS OR INTERFERES WITH MANY ACTIVE NOT PASSIVE ACTIVITIES

## 2024-03-28 LAB
ALBUMIN SERPL-MCNC: 2.5 G/DL (ref 3.4–5)
ANION GAP SERPL CALCULATED.3IONS-SCNC: 7 MMOL/L (ref 3–16)
BUN SERPL-MCNC: 13 MG/DL (ref 7–20)
CALCIUM SERPL-MCNC: 8.2 MG/DL (ref 8.3–10.6)
CHLORIDE SERPL-SCNC: 107 MMOL/L (ref 99–110)
CO2 SERPL-SCNC: 27 MMOL/L (ref 21–32)
CREAT SERPL-MCNC: 1 MG/DL (ref 0.8–1.3)
DEPRECATED RDW RBC AUTO: 14.5 % (ref 12.4–15.4)
GFR SERPLBLD CREATININE-BSD FMLA CKD-EPI: 80 ML/MIN/{1.73_M2}
GLUCOSE BLD-MCNC: 112 MG/DL (ref 70–99)
GLUCOSE BLD-MCNC: 113 MG/DL (ref 70–99)
GLUCOSE BLD-MCNC: 217 MG/DL (ref 70–99)
GLUCOSE BLD-MCNC: 85 MG/DL (ref 70–99)
GLUCOSE SERPL-MCNC: 85 MG/DL (ref 70–99)
HCT VFR BLD AUTO: 28.1 % (ref 40.5–52.5)
HGB BLD-MCNC: 9.2 G/DL (ref 13.5–17.5)
MCH RBC QN AUTO: 28 PG (ref 26–34)
MCHC RBC AUTO-ENTMCNC: 32.9 G/DL (ref 31–36)
MCV RBC AUTO: 85.1 FL (ref 80–100)
PERFORMED ON: ABNORMAL
PERFORMED ON: NORMAL
PHOSPHATE SERPL-MCNC: 3.1 MG/DL (ref 2.5–4.9)
PLATELET # BLD AUTO: 205 K/UL (ref 135–450)
PMV BLD AUTO: 7.6 FL (ref 5–10.5)
POTASSIUM SERPL-SCNC: 4.2 MMOL/L (ref 3.5–5.1)
RBC # BLD AUTO: 3.29 M/UL (ref 4.2–5.9)
SODIUM SERPL-SCNC: 141 MMOL/L (ref 136–145)
WBC # BLD AUTO: 5.1 K/UL (ref 4–11)

## 2024-03-28 PROCEDURE — 6370000000 HC RX 637 (ALT 250 FOR IP): Performed by: INTERNAL MEDICINE

## 2024-03-28 PROCEDURE — 99232 SBSQ HOSP IP/OBS MODERATE 35: CPT | Performed by: INTERNAL MEDICINE

## 2024-03-28 PROCEDURE — 85027 COMPLETE CBC AUTOMATED: CPT

## 2024-03-28 PROCEDURE — 2580000003 HC RX 258: Performed by: INTERNAL MEDICINE

## 2024-03-28 PROCEDURE — 36415 COLL VENOUS BLD VENIPUNCTURE: CPT

## 2024-03-28 PROCEDURE — 1200000000 HC SEMI PRIVATE

## 2024-03-28 PROCEDURE — 6370000000 HC RX 637 (ALT 250 FOR IP)

## 2024-03-28 PROCEDURE — 80069 RENAL FUNCTION PANEL: CPT

## 2024-03-28 PROCEDURE — 6360000002 HC RX W HCPCS: Performed by: NURSE PRACTITIONER

## 2024-03-28 PROCEDURE — 94660 CPAP INITIATION&MGMT: CPT

## 2024-03-28 PROCEDURE — 94760 N-INVAS EAR/PLS OXIMETRY 1: CPT

## 2024-03-28 RX ORDER — SIMETHICONE 80 MG
80 TABLET,CHEWABLE ORAL EVERY 6 HOURS PRN
Status: DISCONTINUED | OUTPATIENT
Start: 2024-03-28 | End: 2024-04-17 | Stop reason: HOSPADM

## 2024-03-28 RX ADMIN — CARVEDILOL 3.12 MG: 3.12 TABLET, FILM COATED ORAL at 09:27

## 2024-03-28 RX ADMIN — METRONIDAZOLE 500 MG: 500 TABLET ORAL at 06:24

## 2024-03-28 RX ADMIN — SODIUM CHLORIDE, PRESERVATIVE FREE 10 ML: 5 INJECTION INTRAVENOUS at 21:05

## 2024-03-28 RX ADMIN — INSULIN LISPRO 1 UNITS: 100 INJECTION, SOLUTION INTRAVENOUS; SUBCUTANEOUS at 18:22

## 2024-03-28 RX ADMIN — ENOXAPARIN SODIUM 120 MG: 150 INJECTION SUBCUTANEOUS at 09:29

## 2024-03-28 RX ADMIN — Medication 1 CAPSULE: at 17:15

## 2024-03-28 RX ADMIN — SODIUM CHLORIDE, PRESERVATIVE FREE 10 ML: 5 INJECTION INTRAVENOUS at 09:29

## 2024-03-28 RX ADMIN — ENOXAPARIN SODIUM 120 MG: 150 INJECTION SUBCUTANEOUS at 21:00

## 2024-03-28 RX ADMIN — LINEZOLID 600 MG: 600 TABLET, FILM COATED ORAL at 09:26

## 2024-03-28 RX ADMIN — METRONIDAZOLE 500 MG: 500 TABLET ORAL at 14:35

## 2024-03-28 RX ADMIN — LEVOTHYROXINE SODIUM 25 MCG: 0.03 TABLET ORAL at 06:24

## 2024-03-28 RX ADMIN — CARVEDILOL 3.12 MG: 3.12 TABLET, FILM COATED ORAL at 17:14

## 2024-03-28 RX ADMIN — LINEZOLID 600 MG: 600 TABLET, FILM COATED ORAL at 21:00

## 2024-03-28 RX ADMIN — HYDROCODONE BITARTRATE AND ACETAMINOPHEN 1 TABLET: 5; 325 TABLET ORAL at 21:00

## 2024-03-28 RX ADMIN — ATORVASTATIN CALCIUM 40 MG: 40 TABLET, FILM COATED ORAL at 09:26

## 2024-03-28 RX ADMIN — Medication 1 CAPSULE: at 09:27

## 2024-03-28 RX ADMIN — TAMSULOSIN HYDROCHLORIDE 0.4 MG: 0.4 CAPSULE ORAL at 09:27

## 2024-03-28 RX ADMIN — METRONIDAZOLE 500 MG: 500 TABLET ORAL at 22:21

## 2024-03-28 RX ADMIN — AMIODARONE HYDROCHLORIDE 100 MG: 200 TABLET ORAL at 09:27

## 2024-03-28 ASSESSMENT — PAIN - FUNCTIONAL ASSESSMENT: PAIN_FUNCTIONAL_ASSESSMENT: ACTIVITIES ARE NOT PREVENTED

## 2024-03-28 ASSESSMENT — PAIN DESCRIPTION - DESCRIPTORS: DESCRIPTORS: THROBBING

## 2024-03-28 ASSESSMENT — PAIN DESCRIPTION - ORIENTATION: ORIENTATION: RIGHT

## 2024-03-28 ASSESSMENT — PAIN DESCRIPTION - LOCATION: LOCATION: FOOT

## 2024-03-28 ASSESSMENT — PAIN SCALES - GENERAL: PAINLEVEL_OUTOF10: 8

## 2024-03-28 NOTE — CARE COORDINATION
Podiatry plans for further surgical intervention likely within the next 48 hours.   ID says oral antibiotics and already switched to oral inpatient.   PT/OT recs for SNF noted and patient open to this if needed but currently discharge plan is limited because patient is without insurance. Patient says insurance should be reinstated on 4/1/24.   Will continue to follow for discharge needs but will not be able to go to SNF without insurance. Not eligible for Medicaid due to being over income per financial notes.  Discharge plan to be determined.     Electronically signed by Dalila Evans RN Case Management on 3/28/2024 at 10:59 AM

## 2024-03-29 LAB
GLUCOSE BLD-MCNC: 100 MG/DL (ref 70–99)
GLUCOSE BLD-MCNC: 112 MG/DL (ref 70–99)
GLUCOSE BLD-MCNC: 97 MG/DL (ref 70–99)
GLUCOSE BLD-MCNC: 97 MG/DL (ref 70–99)
PERFORMED ON: ABNORMAL
PERFORMED ON: ABNORMAL
PERFORMED ON: NORMAL
PERFORMED ON: NORMAL

## 2024-03-29 PROCEDURE — 97530 THERAPEUTIC ACTIVITIES: CPT

## 2024-03-29 PROCEDURE — 1200000000 HC SEMI PRIVATE

## 2024-03-29 PROCEDURE — 6370000000 HC RX 637 (ALT 250 FOR IP): Performed by: INTERNAL MEDICINE

## 2024-03-29 PROCEDURE — 6370000000 HC RX 637 (ALT 250 FOR IP)

## 2024-03-29 PROCEDURE — 94660 CPAP INITIATION&MGMT: CPT

## 2024-03-29 PROCEDURE — 6360000002 HC RX W HCPCS: Performed by: NURSE PRACTITIONER

## 2024-03-29 PROCEDURE — 97535 SELF CARE MNGMENT TRAINING: CPT

## 2024-03-29 PROCEDURE — 97110 THERAPEUTIC EXERCISES: CPT

## 2024-03-29 PROCEDURE — 2580000003 HC RX 258: Performed by: INTERNAL MEDICINE

## 2024-03-29 PROCEDURE — 99232 SBSQ HOSP IP/OBS MODERATE 35: CPT | Performed by: INTERNAL MEDICINE

## 2024-03-29 PROCEDURE — 94760 N-INVAS EAR/PLS OXIMETRY 1: CPT

## 2024-03-29 RX ADMIN — CARVEDILOL 3.12 MG: 3.12 TABLET, FILM COATED ORAL at 09:24

## 2024-03-29 RX ADMIN — METRONIDAZOLE 500 MG: 500 TABLET ORAL at 21:36

## 2024-03-29 RX ADMIN — SIMETHICONE 80 MG: 80 TABLET, CHEWABLE ORAL at 01:51

## 2024-03-29 RX ADMIN — ENOXAPARIN SODIUM 120 MG: 150 INJECTION SUBCUTANEOUS at 09:25

## 2024-03-29 RX ADMIN — SODIUM CHLORIDE, PRESERVATIVE FREE 10 ML: 5 INJECTION INTRAVENOUS at 21:39

## 2024-03-29 RX ADMIN — METRONIDAZOLE 500 MG: 500 TABLET ORAL at 06:04

## 2024-03-29 RX ADMIN — SIMETHICONE 80 MG: 80 TABLET, CHEWABLE ORAL at 13:15

## 2024-03-29 RX ADMIN — LEVOTHYROXINE SODIUM 25 MCG: 0.03 TABLET ORAL at 06:04

## 2024-03-29 RX ADMIN — ENOXAPARIN SODIUM 120 MG: 150 INJECTION SUBCUTANEOUS at 21:36

## 2024-03-29 RX ADMIN — FERROUS SULFATE TAB EC 324 MG (65 MG FE EQUIVALENT) 325 MG: 324 (65 FE) TABLET DELAYED RESPONSE at 09:24

## 2024-03-29 RX ADMIN — SIMETHICONE 80 MG: 80 TABLET, CHEWABLE ORAL at 21:36

## 2024-03-29 RX ADMIN — SODIUM CHLORIDE, PRESERVATIVE FREE 10 ML: 5 INJECTION INTRAVENOUS at 09:23

## 2024-03-29 RX ADMIN — Medication 1 CAPSULE: at 09:24

## 2024-03-29 RX ADMIN — LINEZOLID 600 MG: 600 TABLET, FILM COATED ORAL at 09:24

## 2024-03-29 RX ADMIN — HYDROCODONE BITARTRATE AND ACETAMINOPHEN 1 TABLET: 5; 325 TABLET ORAL at 21:36

## 2024-03-29 RX ADMIN — HYDROCODONE BITARTRATE AND ACETAMINOPHEN 1 TABLET: 5; 325 TABLET ORAL at 13:06

## 2024-03-29 RX ADMIN — METRONIDAZOLE 500 MG: 500 TABLET ORAL at 13:10

## 2024-03-29 RX ADMIN — CARVEDILOL 3.12 MG: 3.12 TABLET, FILM COATED ORAL at 17:25

## 2024-03-29 RX ADMIN — ATORVASTATIN CALCIUM 40 MG: 40 TABLET, FILM COATED ORAL at 09:25

## 2024-03-29 RX ADMIN — Medication 1 CAPSULE: at 17:26

## 2024-03-29 RX ADMIN — AMIODARONE HYDROCHLORIDE 100 MG: 200 TABLET ORAL at 09:25

## 2024-03-29 RX ADMIN — LINEZOLID 600 MG: 600 TABLET, FILM COATED ORAL at 21:36

## 2024-03-29 RX ADMIN — TAMSULOSIN HYDROCHLORIDE 0.4 MG: 0.4 CAPSULE ORAL at 09:26

## 2024-03-29 ASSESSMENT — PAIN - FUNCTIONAL ASSESSMENT
PAIN_FUNCTIONAL_ASSESSMENT: PREVENTS OR INTERFERES SOME ACTIVE ACTIVITIES AND ADLS
PAIN_FUNCTIONAL_ASSESSMENT: PREVENTS OR INTERFERES SOME ACTIVE ACTIVITIES AND ADLS
PAIN_FUNCTIONAL_ASSESSMENT: ACTIVITIES ARE NOT PREVENTED

## 2024-03-29 ASSESSMENT — PAIN DESCRIPTION - FREQUENCY: FREQUENCY: INTERMITTENT

## 2024-03-29 ASSESSMENT — PAIN DESCRIPTION - PAIN TYPE: TYPE: SURGICAL PAIN

## 2024-03-29 ASSESSMENT — PAIN DESCRIPTION - DESCRIPTORS
DESCRIPTORS: THROBBING

## 2024-03-29 ASSESSMENT — PAIN DESCRIPTION - ORIENTATION
ORIENTATION: RIGHT
ORIENTATION: RIGHT

## 2024-03-29 ASSESSMENT — PAIN SCALES - GENERAL
PAINLEVEL_OUTOF10: 8
PAINLEVEL_OUTOF10: 0
PAINLEVEL_OUTOF10: 8
PAINLEVEL_OUTOF10: 5
PAINLEVEL_OUTOF10: 8

## 2024-03-29 ASSESSMENT — PAIN DESCRIPTION - LOCATION
LOCATION: FOOT

## 2024-03-29 ASSESSMENT — PAIN DESCRIPTION - ONSET: ONSET: ON-GOING

## 2024-03-29 NOTE — OR NURSING
Up in chair =to br with steedy and 2 staff ,,  xlarge BM  c/o  weak ?   118/62   pulse 69   oxygen 98 on room air ...  requested to get in bed which he did ,

## 2024-03-30 LAB
CRP SERPL-MCNC: 23.6 MG/L (ref 0–5.1)
DEPRECATED RDW RBC AUTO: 14.4 % (ref 12.4–15.4)
ERYTHROCYTE [SEDIMENTATION RATE] IN BLOOD BY WESTERGREN METHOD: 74 MM/HR (ref 0–20)
GLUCOSE BLD-MCNC: 208 MG/DL (ref 70–99)
GLUCOSE BLD-MCNC: 76 MG/DL (ref 70–99)
GLUCOSE BLD-MCNC: 88 MG/DL (ref 70–99)
GLUCOSE BLD-MCNC: 98 MG/DL (ref 70–99)
HCT VFR BLD AUTO: 26.9 % (ref 40.5–52.5)
HGB BLD-MCNC: 9.1 G/DL (ref 13.5–17.5)
MCH RBC QN AUTO: 28.7 PG (ref 26–34)
MCHC RBC AUTO-ENTMCNC: 33.9 G/DL (ref 31–36)
MCV RBC AUTO: 84.7 FL (ref 80–100)
PERFORMED ON: ABNORMAL
PERFORMED ON: NORMAL
PLATELET # BLD AUTO: 186 K/UL (ref 135–450)
PMV BLD AUTO: 7.8 FL (ref 5–10.5)
RBC # BLD AUTO: 3.18 M/UL (ref 4.2–5.9)
WBC # BLD AUTO: 4.9 K/UL (ref 4–11)

## 2024-03-30 PROCEDURE — 2580000003 HC RX 258: Performed by: INTERNAL MEDICINE

## 2024-03-30 PROCEDURE — 6370000000 HC RX 637 (ALT 250 FOR IP): Performed by: INTERNAL MEDICINE

## 2024-03-30 PROCEDURE — 51702 INSERT TEMP BLADDER CATH: CPT

## 2024-03-30 PROCEDURE — 6370000000 HC RX 637 (ALT 250 FOR IP)

## 2024-03-30 PROCEDURE — 85027 COMPLETE CBC AUTOMATED: CPT

## 2024-03-30 PROCEDURE — 6360000002 HC RX W HCPCS: Performed by: NURSE PRACTITIONER

## 2024-03-30 PROCEDURE — 85652 RBC SED RATE AUTOMATED: CPT

## 2024-03-30 PROCEDURE — 94760 N-INVAS EAR/PLS OXIMETRY 1: CPT

## 2024-03-30 PROCEDURE — 1200000000 HC SEMI PRIVATE

## 2024-03-30 PROCEDURE — 86140 C-REACTIVE PROTEIN: CPT

## 2024-03-30 PROCEDURE — 36415 COLL VENOUS BLD VENIPUNCTURE: CPT

## 2024-03-30 PROCEDURE — 94660 CPAP INITIATION&MGMT: CPT

## 2024-03-30 RX ADMIN — HYDROCODONE BITARTRATE AND ACETAMINOPHEN 1 TABLET: 5; 325 TABLET ORAL at 22:05

## 2024-03-30 RX ADMIN — ATORVASTATIN CALCIUM 40 MG: 40 TABLET, FILM COATED ORAL at 08:08

## 2024-03-30 RX ADMIN — METRONIDAZOLE 500 MG: 500 TABLET ORAL at 13:20

## 2024-03-30 RX ADMIN — LEVOTHYROXINE SODIUM 25 MCG: 0.03 TABLET ORAL at 06:13

## 2024-03-30 RX ADMIN — Medication 1 CAPSULE: at 08:08

## 2024-03-30 RX ADMIN — TAMSULOSIN HYDROCHLORIDE 0.4 MG: 0.4 CAPSULE ORAL at 08:08

## 2024-03-30 RX ADMIN — SODIUM CHLORIDE, PRESERVATIVE FREE 10 ML: 5 INJECTION INTRAVENOUS at 20:35

## 2024-03-30 RX ADMIN — ENOXAPARIN SODIUM 120 MG: 150 INJECTION SUBCUTANEOUS at 08:07

## 2024-03-30 RX ADMIN — Medication 1 CAPSULE: at 16:43

## 2024-03-30 RX ADMIN — LINEZOLID 600 MG: 600 TABLET, FILM COATED ORAL at 20:35

## 2024-03-30 RX ADMIN — LINEZOLID 600 MG: 600 TABLET, FILM COATED ORAL at 08:08

## 2024-03-30 RX ADMIN — METRONIDAZOLE 500 MG: 500 TABLET ORAL at 06:13

## 2024-03-30 RX ADMIN — SODIUM CHLORIDE, PRESERVATIVE FREE 10 ML: 5 INJECTION INTRAVENOUS at 08:08

## 2024-03-30 RX ADMIN — LOPERAMIDE HYDROCHLORIDE 2 MG: 2 CAPSULE ORAL at 09:54

## 2024-03-30 RX ADMIN — SIMETHICONE 80 MG: 80 TABLET, CHEWABLE ORAL at 20:35

## 2024-03-30 RX ADMIN — AMIODARONE HYDROCHLORIDE 100 MG: 200 TABLET ORAL at 08:08

## 2024-03-30 RX ADMIN — ENOXAPARIN SODIUM 120 MG: 150 INJECTION SUBCUTANEOUS at 20:35

## 2024-03-30 RX ADMIN — CARVEDILOL 3.12 MG: 3.12 TABLET, FILM COATED ORAL at 08:08

## 2024-03-30 RX ADMIN — METRONIDAZOLE 500 MG: 500 TABLET ORAL at 22:05

## 2024-03-30 RX ADMIN — CARVEDILOL 3.12 MG: 3.12 TABLET, FILM COATED ORAL at 16:42

## 2024-03-30 RX ADMIN — HYDROCODONE BITARTRATE AND ACETAMINOPHEN 1 TABLET: 5; 325 TABLET ORAL at 03:03

## 2024-03-30 ASSESSMENT — PAIN DESCRIPTION - ONSET: ONSET: ON-GOING

## 2024-03-30 ASSESSMENT — PAIN - FUNCTIONAL ASSESSMENT
PAIN_FUNCTIONAL_ASSESSMENT: PREVENTS OR INTERFERES SOME ACTIVE ACTIVITIES AND ADLS
PAIN_FUNCTIONAL_ASSESSMENT: ACTIVITIES ARE NOT PREVENTED

## 2024-03-30 ASSESSMENT — PAIN DESCRIPTION - DESCRIPTORS
DESCRIPTORS: THROBBING
DESCRIPTORS: THROBBING

## 2024-03-30 ASSESSMENT — PAIN SCALES - GENERAL
PAINLEVEL_OUTOF10: 8
PAINLEVEL_OUTOF10: 7

## 2024-03-30 ASSESSMENT — PAIN DESCRIPTION - LOCATION
LOCATION: FOOT
LOCATION: FOOT

## 2024-03-30 ASSESSMENT — PAIN DESCRIPTION - FREQUENCY: FREQUENCY: INTERMITTENT

## 2024-03-30 ASSESSMENT — PAIN DESCRIPTION - ORIENTATION
ORIENTATION: RIGHT
ORIENTATION: RIGHT

## 2024-03-30 ASSESSMENT — PAIN DESCRIPTION - PAIN TYPE: TYPE: SURGICAL PAIN

## 2024-03-31 LAB
ALBUMIN SERPL-MCNC: 2.4 G/DL (ref 3.4–5)
ANION GAP SERPL CALCULATED.3IONS-SCNC: 6 MMOL/L (ref 3–16)
BUN SERPL-MCNC: 8 MG/DL (ref 7–20)
CALCIUM SERPL-MCNC: 7.9 MG/DL (ref 8.3–10.6)
CHLORIDE SERPL-SCNC: 108 MMOL/L (ref 99–110)
CO2 SERPL-SCNC: 25 MMOL/L (ref 21–32)
CREAT SERPL-MCNC: 0.9 MG/DL (ref 0.8–1.3)
GFR SERPLBLD CREATININE-BSD FMLA CKD-EPI: >90 ML/MIN/{1.73_M2}
GLUCOSE BLD-MCNC: 109 MG/DL (ref 70–99)
GLUCOSE BLD-MCNC: 137 MG/DL (ref 70–99)
GLUCOSE BLD-MCNC: 85 MG/DL (ref 70–99)
GLUCOSE BLD-MCNC: 96 MG/DL (ref 70–99)
GLUCOSE SERPL-MCNC: 85 MG/DL (ref 70–99)
MAGNESIUM SERPL-MCNC: 1.9 MG/DL (ref 1.8–2.4)
PERFORMED ON: ABNORMAL
PERFORMED ON: ABNORMAL
PERFORMED ON: NORMAL
PERFORMED ON: NORMAL
PHOSPHATE SERPL-MCNC: 3.1 MG/DL (ref 2.5–4.9)
POTASSIUM SERPL-SCNC: 4.2 MMOL/L (ref 3.5–5.1)
SODIUM SERPL-SCNC: 139 MMOL/L (ref 136–145)

## 2024-03-31 PROCEDURE — 94760 N-INVAS EAR/PLS OXIMETRY 1: CPT

## 2024-03-31 PROCEDURE — 6370000000 HC RX 637 (ALT 250 FOR IP): Performed by: INTERNAL MEDICINE

## 2024-03-31 PROCEDURE — 1200000000 HC SEMI PRIVATE

## 2024-03-31 PROCEDURE — 36415 COLL VENOUS BLD VENIPUNCTURE: CPT

## 2024-03-31 PROCEDURE — 83735 ASSAY OF MAGNESIUM: CPT

## 2024-03-31 PROCEDURE — 80069 RENAL FUNCTION PANEL: CPT

## 2024-03-31 PROCEDURE — 6360000002 HC RX W HCPCS: Performed by: NURSE PRACTITIONER

## 2024-03-31 PROCEDURE — 6370000000 HC RX 637 (ALT 250 FOR IP)

## 2024-03-31 PROCEDURE — 94660 CPAP INITIATION&MGMT: CPT

## 2024-03-31 PROCEDURE — 2580000003 HC RX 258: Performed by: INTERNAL MEDICINE

## 2024-03-31 RX ADMIN — SIMETHICONE 80 MG: 80 TABLET, CHEWABLE ORAL at 13:46

## 2024-03-31 RX ADMIN — LEVOTHYROXINE SODIUM 25 MCG: 0.03 TABLET ORAL at 05:54

## 2024-03-31 RX ADMIN — ATORVASTATIN CALCIUM 40 MG: 40 TABLET, FILM COATED ORAL at 08:08

## 2024-03-31 RX ADMIN — AMIODARONE HYDROCHLORIDE 100 MG: 200 TABLET ORAL at 08:08

## 2024-03-31 RX ADMIN — METRONIDAZOLE 500 MG: 500 TABLET ORAL at 21:39

## 2024-03-31 RX ADMIN — HYDROCODONE BITARTRATE AND ACETAMINOPHEN 1 TABLET: 5; 325 TABLET ORAL at 20:59

## 2024-03-31 RX ADMIN — SIMETHICONE 80 MG: 80 TABLET, CHEWABLE ORAL at 05:54

## 2024-03-31 RX ADMIN — LINEZOLID 600 MG: 600 TABLET, FILM COATED ORAL at 20:59

## 2024-03-31 RX ADMIN — Medication 1 CAPSULE: at 17:57

## 2024-03-31 RX ADMIN — CARVEDILOL 3.12 MG: 3.12 TABLET, FILM COATED ORAL at 17:57

## 2024-03-31 RX ADMIN — LINEZOLID 600 MG: 600 TABLET, FILM COATED ORAL at 08:08

## 2024-03-31 RX ADMIN — TAMSULOSIN HYDROCHLORIDE 0.4 MG: 0.4 CAPSULE ORAL at 08:08

## 2024-03-31 RX ADMIN — Medication 1 CAPSULE: at 08:08

## 2024-03-31 RX ADMIN — FERROUS SULFATE TAB EC 324 MG (65 MG FE EQUIVALENT) 325 MG: 324 (65 FE) TABLET DELAYED RESPONSE at 08:08

## 2024-03-31 RX ADMIN — METRONIDAZOLE 500 MG: 500 TABLET ORAL at 13:44

## 2024-03-31 RX ADMIN — ENOXAPARIN SODIUM 120 MG: 150 INJECTION SUBCUTANEOUS at 08:08

## 2024-03-31 RX ADMIN — SODIUM CHLORIDE, PRESERVATIVE FREE 10 ML: 5 INJECTION INTRAVENOUS at 20:58

## 2024-03-31 RX ADMIN — METRONIDAZOLE 500 MG: 500 TABLET ORAL at 05:54

## 2024-03-31 RX ADMIN — ENOXAPARIN SODIUM 120 MG: 150 INJECTION SUBCUTANEOUS at 20:58

## 2024-03-31 RX ADMIN — SODIUM CHLORIDE, PRESERVATIVE FREE 10 ML: 5 INJECTION INTRAVENOUS at 08:09

## 2024-03-31 RX ADMIN — CARVEDILOL 3.12 MG: 3.12 TABLET, FILM COATED ORAL at 08:08

## 2024-03-31 ASSESSMENT — PAIN DESCRIPTION - FREQUENCY: FREQUENCY: INTERMITTENT

## 2024-03-31 ASSESSMENT — PAIN - FUNCTIONAL ASSESSMENT: PAIN_FUNCTIONAL_ASSESSMENT: PREVENTS OR INTERFERES SOME ACTIVE ACTIVITIES AND ADLS

## 2024-03-31 ASSESSMENT — PAIN SCALES - GENERAL
PAINLEVEL_OUTOF10: 8
PAINLEVEL_OUTOF10: 8

## 2024-03-31 ASSESSMENT — PAIN DESCRIPTION - ORIENTATION: ORIENTATION: RIGHT

## 2024-03-31 ASSESSMENT — PAIN DESCRIPTION - ONSET: ONSET: ON-GOING

## 2024-03-31 ASSESSMENT — PAIN DESCRIPTION - LOCATION: LOCATION: FOOT

## 2024-03-31 ASSESSMENT — PAIN DESCRIPTION - PAIN TYPE: TYPE: SURGICAL PAIN

## 2024-03-31 ASSESSMENT — PAIN DESCRIPTION - DESCRIPTORS: DESCRIPTORS: THROBBING

## 2024-04-01 LAB
ALBUMIN SERPL-MCNC: 2.3 G/DL (ref 3.4–5)
ANION GAP SERPL CALCULATED.3IONS-SCNC: 9 MMOL/L (ref 3–16)
BUN SERPL-MCNC: 8 MG/DL (ref 7–20)
CALCIUM SERPL-MCNC: 8.1 MG/DL (ref 8.3–10.6)
CHLORIDE SERPL-SCNC: 109 MMOL/L (ref 99–110)
CO2 SERPL-SCNC: 24 MMOL/L (ref 21–32)
CREAT SERPL-MCNC: 0.8 MG/DL (ref 0.8–1.3)
DEPRECATED RDW RBC AUTO: 14.4 % (ref 12.4–15.4)
GFR SERPLBLD CREATININE-BSD FMLA CKD-EPI: >90 ML/MIN/{1.73_M2}
GLUCOSE BLD-MCNC: 104 MG/DL (ref 70–99)
GLUCOSE BLD-MCNC: 105 MG/DL (ref 70–99)
GLUCOSE BLD-MCNC: 109 MG/DL (ref 70–99)
GLUCOSE BLD-MCNC: 91 MG/DL (ref 70–99)
GLUCOSE SERPL-MCNC: 84 MG/DL (ref 70–99)
HCT VFR BLD AUTO: 27.7 % (ref 40.5–52.5)
HGB BLD-MCNC: 9.2 G/DL (ref 13.5–17.5)
MCH RBC QN AUTO: 28.2 PG (ref 26–34)
MCHC RBC AUTO-ENTMCNC: 33.3 G/DL (ref 31–36)
MCV RBC AUTO: 84.5 FL (ref 80–100)
PERFORMED ON: ABNORMAL
PERFORMED ON: NORMAL
PHOSPHATE SERPL-MCNC: 3.2 MG/DL (ref 2.5–4.9)
PLATELET # BLD AUTO: 168 K/UL (ref 135–450)
PMV BLD AUTO: 8.3 FL (ref 5–10.5)
POTASSIUM SERPL-SCNC: 4.2 MMOL/L (ref 3.5–5.1)
RBC # BLD AUTO: 3.27 M/UL (ref 4.2–5.9)
SODIUM SERPL-SCNC: 142 MMOL/L (ref 136–145)
WBC # BLD AUTO: 4.2 K/UL (ref 4–11)

## 2024-04-01 PROCEDURE — 94761 N-INVAS EAR/PLS OXIMETRY MLT: CPT

## 2024-04-01 PROCEDURE — 80069 RENAL FUNCTION PANEL: CPT

## 2024-04-01 PROCEDURE — 94660 CPAP INITIATION&MGMT: CPT

## 2024-04-01 PROCEDURE — 85027 COMPLETE CBC AUTOMATED: CPT

## 2024-04-01 PROCEDURE — 6370000000 HC RX 637 (ALT 250 FOR IP)

## 2024-04-01 PROCEDURE — 99232 SBSQ HOSP IP/OBS MODERATE 35: CPT | Performed by: INTERNAL MEDICINE

## 2024-04-01 PROCEDURE — 6370000000 HC RX 637 (ALT 250 FOR IP): Performed by: INTERNAL MEDICINE

## 2024-04-01 PROCEDURE — 1200000000 HC SEMI PRIVATE

## 2024-04-01 PROCEDURE — 36415 COLL VENOUS BLD VENIPUNCTURE: CPT

## 2024-04-01 PROCEDURE — 2580000003 HC RX 258: Performed by: INTERNAL MEDICINE

## 2024-04-01 PROCEDURE — 6360000002 HC RX W HCPCS: Performed by: NURSE PRACTITIONER

## 2024-04-01 RX ORDER — FINASTERIDE 5 MG/1
5 TABLET, FILM COATED ORAL DAILY
Status: DISCONTINUED | OUTPATIENT
Start: 2024-04-01 | End: 2024-04-17 | Stop reason: HOSPADM

## 2024-04-01 RX ADMIN — Medication 1 CAPSULE: at 17:53

## 2024-04-01 RX ADMIN — SODIUM CHLORIDE, PRESERVATIVE FREE 10 ML: 5 INJECTION INTRAVENOUS at 10:50

## 2024-04-01 RX ADMIN — LEVOTHYROXINE SODIUM 25 MCG: 0.03 TABLET ORAL at 05:20

## 2024-04-01 RX ADMIN — TAMSULOSIN HYDROCHLORIDE 0.4 MG: 0.4 CAPSULE ORAL at 10:43

## 2024-04-01 RX ADMIN — AMIODARONE HYDROCHLORIDE 100 MG: 200 TABLET ORAL at 10:43

## 2024-04-01 RX ADMIN — CARVEDILOL 3.12 MG: 3.12 TABLET, FILM COATED ORAL at 10:43

## 2024-04-01 RX ADMIN — HYDROCODONE BITARTRATE AND ACETAMINOPHEN 1 TABLET: 5; 325 TABLET ORAL at 21:24

## 2024-04-01 RX ADMIN — LINEZOLID 600 MG: 600 TABLET, FILM COATED ORAL at 21:19

## 2024-04-01 RX ADMIN — LINEZOLID 600 MG: 600 TABLET, FILM COATED ORAL at 10:43

## 2024-04-01 RX ADMIN — METRONIDAZOLE 500 MG: 500 TABLET ORAL at 14:38

## 2024-04-01 RX ADMIN — ATORVASTATIN CALCIUM 40 MG: 40 TABLET, FILM COATED ORAL at 10:43

## 2024-04-01 RX ADMIN — METRONIDAZOLE 500 MG: 500 TABLET ORAL at 21:19

## 2024-04-01 RX ADMIN — ENOXAPARIN SODIUM 120 MG: 150 INJECTION SUBCUTANEOUS at 10:58

## 2024-04-01 RX ADMIN — SODIUM CHLORIDE, PRESERVATIVE FREE 10 ML: 5 INJECTION INTRAVENOUS at 21:23

## 2024-04-01 RX ADMIN — SIMETHICONE 80 MG: 80 TABLET, CHEWABLE ORAL at 03:51

## 2024-04-01 RX ADMIN — Medication 1 CAPSULE: at 10:43

## 2024-04-01 RX ADMIN — METRONIDAZOLE 500 MG: 500 TABLET ORAL at 06:10

## 2024-04-01 RX ADMIN — SIMETHICONE 80 MG: 80 TABLET, CHEWABLE ORAL at 21:19

## 2024-04-01 RX ADMIN — FINASTERIDE 5 MG: 5 TABLET, FILM COATED ORAL at 10:49

## 2024-04-01 RX ADMIN — HYDROCODONE BITARTRATE AND ACETAMINOPHEN 1 TABLET: 5; 325 TABLET ORAL at 14:43

## 2024-04-01 RX ADMIN — CARVEDILOL 3.12 MG: 3.12 TABLET, FILM COATED ORAL at 17:53

## 2024-04-01 ASSESSMENT — PAIN DESCRIPTION - FREQUENCY
FREQUENCY: CONTINUOUS
FREQUENCY: CONTINUOUS

## 2024-04-01 ASSESSMENT — PAIN DESCRIPTION - ORIENTATION
ORIENTATION: RIGHT
ORIENTATION: RIGHT

## 2024-04-01 ASSESSMENT — PAIN DESCRIPTION - ONSET
ONSET: ON-GOING
ONSET: ON-GOING

## 2024-04-01 ASSESSMENT — PAIN SCALES - GENERAL
PAINLEVEL_OUTOF10: 7
PAINLEVEL_OUTOF10: 5
PAINLEVEL_OUTOF10: 7
PAINLEVEL_OUTOF10: 3
PAINLEVEL_OUTOF10: 4

## 2024-04-01 ASSESSMENT — PAIN DESCRIPTION - LOCATION
LOCATION: FOOT
LOCATION: FOOT

## 2024-04-01 ASSESSMENT — PAIN DESCRIPTION - PAIN TYPE
TYPE: ACUTE PAIN
TYPE: ACUTE PAIN

## 2024-04-01 ASSESSMENT — PAIN DESCRIPTION - DESCRIPTORS
DESCRIPTORS: THROBBING
DESCRIPTORS: THROBBING

## 2024-04-01 NOTE — CARE COORDINATION
Spoke to admitting, requested they search for active insurance for patient as he had said his insurance was supposed to be reinstated on 4/1/24.   Rep wasn't able to find active insurance for patient.     Patient scheduled for transmetatarsal amputation on right foot tomorrow per Podiatry. Will need PT/OT eval post op and see if ARU rec'd. If able to tolerate ARU will get a consult and see if eligible for hiro ARU. Otherwise would be private pay at SNF which would likely be too costly for patient.   Will continue to follow for discharge needs.     Electronically signed by Dalila Evans RN Case Management on 4/1/2024 at 1:09 PM

## 2024-04-02 ENCOUNTER — ANESTHESIA (OUTPATIENT)
Dept: OPERATING ROOM | Age: 74
End: 2024-04-02
Payer: MEDICARE

## 2024-04-02 ENCOUNTER — ANESTHESIA EVENT (OUTPATIENT)
Dept: OPERATING ROOM | Age: 74
End: 2024-04-02
Payer: MEDICARE

## 2024-04-02 ENCOUNTER — APPOINTMENT (OUTPATIENT)
Dept: GENERAL RADIOLOGY | Age: 74
DRG: 854 | End: 2024-04-02
Attending: INTERNAL MEDICINE
Payer: MEDICARE

## 2024-04-02 LAB
ANION GAP SERPL CALCULATED.3IONS-SCNC: 7 MMOL/L (ref 3–16)
BUN SERPL-MCNC: 8 MG/DL (ref 7–20)
CALCIUM SERPL-MCNC: 7.9 MG/DL (ref 8.3–10.6)
CHLORIDE SERPL-SCNC: 108 MMOL/L (ref 99–110)
CO2 SERPL-SCNC: 25 MMOL/L (ref 21–32)
CREAT SERPL-MCNC: 0.9 MG/DL (ref 0.8–1.3)
GFR SERPLBLD CREATININE-BSD FMLA CKD-EPI: >90 ML/MIN/{1.73_M2}
GLUCOSE BLD-MCNC: 201 MG/DL (ref 70–99)
GLUCOSE BLD-MCNC: 268 MG/DL (ref 70–99)
GLUCOSE BLD-MCNC: 86 MG/DL (ref 70–99)
GLUCOSE BLD-MCNC: 88 MG/DL (ref 70–99)
GLUCOSE BLD-MCNC: 91 MG/DL (ref 70–99)
GLUCOSE BLD-MCNC: 96 MG/DL (ref 70–99)
GLUCOSE SERPL-MCNC: 80 MG/DL (ref 70–99)
PERFORMED ON: ABNORMAL
PERFORMED ON: ABNORMAL
PERFORMED ON: NORMAL
POTASSIUM SERPL-SCNC: 4.3 MMOL/L (ref 3.5–5.1)
SODIUM SERPL-SCNC: 140 MMOL/L (ref 136–145)

## 2024-04-02 PROCEDURE — 0Y6M0ZD DETACHMENT AT RIGHT FOOT, PARTIAL 4TH RAY, OPEN APPROACH: ICD-10-PCS | Performed by: PODIATRIST

## 2024-04-02 PROCEDURE — 87070 CULTURE OTHR SPECIMN AEROBIC: CPT

## 2024-04-02 PROCEDURE — 7100000000 HC PACU RECOVERY - FIRST 15 MIN: Performed by: PODIATRIST

## 2024-04-02 PROCEDURE — 0Y6M0ZF DETACHMENT AT RIGHT FOOT, PARTIAL 5TH RAY, OPEN APPROACH: ICD-10-PCS | Performed by: PODIATRIST

## 2024-04-02 PROCEDURE — 2500000003 HC RX 250 WO HCPCS

## 2024-04-02 PROCEDURE — 73630 X-RAY EXAM OF FOOT: CPT

## 2024-04-02 PROCEDURE — 6360000002 HC RX W HCPCS: Performed by: PODIATRIST

## 2024-04-02 PROCEDURE — 2709999900 HC NON-CHARGEABLE SUPPLY: Performed by: PODIATRIST

## 2024-04-02 PROCEDURE — 6360000002 HC RX W HCPCS

## 2024-04-02 PROCEDURE — 2500000003 HC RX 250 WO HCPCS: Performed by: PODIATRIST

## 2024-04-02 PROCEDURE — 6370000000 HC RX 637 (ALT 250 FOR IP)

## 2024-04-02 PROCEDURE — 88305 TISSUE EXAM BY PATHOLOGIST: CPT

## 2024-04-02 PROCEDURE — 94660 CPAP INITIATION&MGMT: CPT

## 2024-04-02 PROCEDURE — 2580000003 HC RX 258

## 2024-04-02 PROCEDURE — 3600000004 HC SURGERY LEVEL 4 BASE: Performed by: PODIATRIST

## 2024-04-02 PROCEDURE — A4217 STERILE WATER/SALINE, 500 ML: HCPCS | Performed by: PODIATRIST

## 2024-04-02 PROCEDURE — 87077 CULTURE AEROBIC IDENTIFY: CPT

## 2024-04-02 PROCEDURE — 87205 SMEAR GRAM STAIN: CPT

## 2024-04-02 PROCEDURE — 2700000000 HC OXYGEN THERAPY PER DAY

## 2024-04-02 PROCEDURE — 36415 COLL VENOUS BLD VENIPUNCTURE: CPT

## 2024-04-02 PROCEDURE — 0L8N0ZZ DIVISION OF RIGHT LOWER LEG TENDON, OPEN APPROACH: ICD-10-PCS | Performed by: PODIATRIST

## 2024-04-02 PROCEDURE — 0Y6X0Z0 DETACHMENT AT RIGHT 5TH TOE, COMPLETE, OPEN APPROACH: ICD-10-PCS | Performed by: PODIATRIST

## 2024-04-02 PROCEDURE — 99232 SBSQ HOSP IP/OBS MODERATE 35: CPT | Performed by: INTERNAL MEDICINE

## 2024-04-02 PROCEDURE — 3600000014 HC SURGERY LEVEL 4 ADDTL 15MIN: Performed by: PODIATRIST

## 2024-04-02 PROCEDURE — 0Y6M0Z9 DETACHMENT AT RIGHT FOOT, PARTIAL 1ST RAY, OPEN APPROACH: ICD-10-PCS | Performed by: PODIATRIST

## 2024-04-02 PROCEDURE — 3700000001 HC ADD 15 MINUTES (ANESTHESIA): Performed by: PODIATRIST

## 2024-04-02 PROCEDURE — 87075 CULTR BACTERIA EXCEPT BLOOD: CPT

## 2024-04-02 PROCEDURE — 80048 BASIC METABOLIC PNL TOTAL CA: CPT

## 2024-04-02 PROCEDURE — 0Y6M0ZB DETACHMENT AT RIGHT FOOT, PARTIAL 2ND RAY, OPEN APPROACH: ICD-10-PCS | Performed by: PODIATRIST

## 2024-04-02 PROCEDURE — 1200000000 HC SEMI PRIVATE

## 2024-04-02 PROCEDURE — 2580000003 HC RX 258: Performed by: PODIATRIST

## 2024-04-02 PROCEDURE — 6370000000 HC RX 637 (ALT 250 FOR IP): Performed by: INTERNAL MEDICINE

## 2024-04-02 PROCEDURE — 0Y6M0ZC DETACHMENT AT RIGHT FOOT, PARTIAL 3RD RAY, OPEN APPROACH: ICD-10-PCS | Performed by: PODIATRIST

## 2024-04-02 PROCEDURE — 3700000000 HC ANESTHESIA ATTENDED CARE: Performed by: PODIATRIST

## 2024-04-02 PROCEDURE — 87186 SC STD MICRODIL/AGAR DIL: CPT

## 2024-04-02 PROCEDURE — 94761 N-INVAS EAR/PLS OXIMETRY MLT: CPT

## 2024-04-02 PROCEDURE — 7100000001 HC PACU RECOVERY - ADDTL 15 MIN: Performed by: PODIATRIST

## 2024-04-02 RX ORDER — MAGNESIUM HYDROXIDE 1200 MG/15ML
LIQUID ORAL CONTINUOUS PRN
Status: COMPLETED | OUTPATIENT
Start: 2024-04-02 | End: 2024-04-02

## 2024-04-02 RX ORDER — PROPOFOL 10 MG/ML
INJECTION, EMULSION INTRAVENOUS PRN
Status: DISCONTINUED | OUTPATIENT
Start: 2024-04-02 | End: 2024-04-02 | Stop reason: SDUPTHER

## 2024-04-02 RX ORDER — LIDOCAINE HYDROCHLORIDE 20 MG/ML
INJECTION, SOLUTION EPIDURAL; INFILTRATION; INTRACAUDAL; PERINEURAL PRN
Status: DISCONTINUED | OUTPATIENT
Start: 2024-04-02 | End: 2024-04-02 | Stop reason: SDUPTHER

## 2024-04-02 RX ORDER — SUCCINYLCHOLINE/SOD CL,ISO/PF 200MG/10ML
SYRINGE (ML) INTRAVENOUS PRN
Status: DISCONTINUED | OUTPATIENT
Start: 2024-04-02 | End: 2024-04-02 | Stop reason: SDUPTHER

## 2024-04-02 RX ORDER — LABETALOL HYDROCHLORIDE 5 MG/ML
10 INJECTION, SOLUTION INTRAVENOUS
Status: DISCONTINUED | OUTPATIENT
Start: 2024-04-02 | End: 2024-04-02 | Stop reason: HOSPADM

## 2024-04-02 RX ORDER — GLYCOPYRROLATE 0.2 MG/ML
INJECTION INTRAMUSCULAR; INTRAVENOUS PRN
Status: DISCONTINUED | OUTPATIENT
Start: 2024-04-02 | End: 2024-04-02 | Stop reason: SDUPTHER

## 2024-04-02 RX ORDER — DIPHENHYDRAMINE HYDROCHLORIDE 50 MG/ML
12.5 INJECTION INTRAMUSCULAR; INTRAVENOUS
Status: DISCONTINUED | OUTPATIENT
Start: 2024-04-02 | End: 2024-04-02 | Stop reason: HOSPADM

## 2024-04-02 RX ORDER — LIDOCAINE HYDROCHLORIDE 10 MG/ML
INJECTION, SOLUTION EPIDURAL; INFILTRATION; INTRACAUDAL; PERINEURAL
Status: COMPLETED | OUTPATIENT
Start: 2024-04-02 | End: 2024-04-02

## 2024-04-02 RX ORDER — SODIUM CHLORIDE 9 MG/ML
INJECTION, SOLUTION INTRAVENOUS PRN
Status: DISCONTINUED | OUTPATIENT
Start: 2024-04-02 | End: 2024-04-02 | Stop reason: HOSPADM

## 2024-04-02 RX ORDER — OXYCODONE HYDROCHLORIDE 10 MG/1
10 TABLET ORAL PRN
Status: DISCONTINUED | OUTPATIENT
Start: 2024-04-02 | End: 2024-04-02 | Stop reason: HOSPADM

## 2024-04-02 RX ORDER — DEXAMETHASONE SODIUM PHOSPHATE 4 MG/ML
INJECTION, SOLUTION INTRA-ARTICULAR; INTRALESIONAL; INTRAMUSCULAR; INTRAVENOUS; SOFT TISSUE PRN
Status: DISCONTINUED | OUTPATIENT
Start: 2024-04-02 | End: 2024-04-02 | Stop reason: SDUPTHER

## 2024-04-02 RX ORDER — SODIUM CHLORIDE 0.9 % (FLUSH) 0.9 %
5-40 SYRINGE (ML) INJECTION PRN
Status: DISCONTINUED | OUTPATIENT
Start: 2024-04-02 | End: 2024-04-02 | Stop reason: HOSPADM

## 2024-04-02 RX ORDER — FENTANYL CITRATE 50 UG/ML
INJECTION, SOLUTION INTRAMUSCULAR; INTRAVENOUS PRN
Status: DISCONTINUED | OUTPATIENT
Start: 2024-04-02 | End: 2024-04-02 | Stop reason: SDUPTHER

## 2024-04-02 RX ORDER — PHENYLEPHRINE HCL IN 0.9% NACL 1 MG/10 ML
SYRINGE (ML) INTRAVENOUS PRN
Status: DISCONTINUED | OUTPATIENT
Start: 2024-04-02 | End: 2024-04-02 | Stop reason: SDUPTHER

## 2024-04-02 RX ORDER — BUPIVACAINE HYDROCHLORIDE 5 MG/ML
INJECTION, SOLUTION EPIDURAL; INTRACAUDAL
Status: COMPLETED | OUTPATIENT
Start: 2024-04-02 | End: 2024-04-02

## 2024-04-02 RX ORDER — ONDANSETRON 2 MG/ML
4 INJECTION INTRAMUSCULAR; INTRAVENOUS
Status: DISCONTINUED | OUTPATIENT
Start: 2024-04-02 | End: 2024-04-02 | Stop reason: HOSPADM

## 2024-04-02 RX ORDER — NALOXONE HYDROCHLORIDE 0.4 MG/ML
INJECTION, SOLUTION INTRAMUSCULAR; INTRAVENOUS; SUBCUTANEOUS PRN
Status: DISCONTINUED | OUTPATIENT
Start: 2024-04-02 | End: 2024-04-02 | Stop reason: HOSPADM

## 2024-04-02 RX ORDER — ONDANSETRON 2 MG/ML
INJECTION INTRAMUSCULAR; INTRAVENOUS PRN
Status: DISCONTINUED | OUTPATIENT
Start: 2024-04-02 | End: 2024-04-02 | Stop reason: SDUPTHER

## 2024-04-02 RX ORDER — SODIUM CHLORIDE 0.9 % (FLUSH) 0.9 %
5-40 SYRINGE (ML) INJECTION EVERY 12 HOURS SCHEDULED
Status: DISCONTINUED | OUTPATIENT
Start: 2024-04-02 | End: 2024-04-02 | Stop reason: HOSPADM

## 2024-04-02 RX ORDER — FENTANYL CITRATE 0.05 MG/ML
25 INJECTION, SOLUTION INTRAMUSCULAR; INTRAVENOUS EVERY 5 MIN PRN
Status: DISCONTINUED | OUTPATIENT
Start: 2024-04-02 | End: 2024-04-02 | Stop reason: HOSPADM

## 2024-04-02 RX ORDER — DROPERIDOL 2.5 MG/ML
0.62 INJECTION, SOLUTION INTRAMUSCULAR; INTRAVENOUS
Status: DISCONTINUED | OUTPATIENT
Start: 2024-04-02 | End: 2024-04-02 | Stop reason: HOSPADM

## 2024-04-02 RX ORDER — OXYCODONE HYDROCHLORIDE 5 MG/1
5 TABLET ORAL PRN
Status: DISCONTINUED | OUTPATIENT
Start: 2024-04-02 | End: 2024-04-02 | Stop reason: HOSPADM

## 2024-04-02 RX ADMIN — Medication 100 MCG: at 10:52

## 2024-04-02 RX ADMIN — PROPOFOL 50 MG: 10 INJECTION, EMULSION INTRAVENOUS at 11:22

## 2024-04-02 RX ADMIN — ONDANSETRON 4 MG: 2 INJECTION INTRAMUSCULAR; INTRAVENOUS at 10:24

## 2024-04-02 RX ADMIN — PROPOFOL 30 MG: 10 INJECTION, EMULSION INTRAVENOUS at 10:42

## 2024-04-02 RX ADMIN — PROPOFOL 50 MG: 10 INJECTION, EMULSION INTRAVENOUS at 11:17

## 2024-04-02 RX ADMIN — INSULIN LISPRO 1 UNITS: 100 INJECTION, SOLUTION INTRAVENOUS; SUBCUTANEOUS at 17:20

## 2024-04-02 RX ADMIN — FENTANYL CITRATE 25 MCG: 50 INJECTION INTRAMUSCULAR; INTRAVENOUS at 10:42

## 2024-04-02 RX ADMIN — Medication 140 MG: at 10:16

## 2024-04-02 RX ADMIN — SODIUM CHLORIDE, PRESERVATIVE FREE 10 ML: 5 INJECTION INTRAVENOUS at 21:21

## 2024-04-02 RX ADMIN — Medication 100 MCG: at 11:05

## 2024-04-02 RX ADMIN — Medication 100 MCG: at 10:49

## 2024-04-02 RX ADMIN — HYDROCODONE BITARTRATE AND ACETAMINOPHEN 1 TABLET: 5; 325 TABLET ORAL at 12:46

## 2024-04-02 RX ADMIN — GLYCOPYRROLATE 0.2 MG: 0.2 INJECTION, SOLUTION INTRAMUSCULAR; INTRAVENOUS at 10:13

## 2024-04-02 RX ADMIN — ENOXAPARIN SODIUM 120 MG: 150 INJECTION SUBCUTANEOUS at 21:21

## 2024-04-02 RX ADMIN — PROPOFOL 150 MG: 10 INJECTION, EMULSION INTRAVENOUS at 10:16

## 2024-04-02 RX ADMIN — Medication 100 MCG: at 11:01

## 2024-04-02 RX ADMIN — ENOXAPARIN SODIUM 120 MG: 150 INJECTION SUBCUTANEOUS at 12:46

## 2024-04-02 RX ADMIN — METRONIDAZOLE 500 MG: 500 TABLET ORAL at 14:06

## 2024-04-02 RX ADMIN — FINASTERIDE 5 MG: 5 TABLET, FILM COATED ORAL at 12:45

## 2024-04-02 RX ADMIN — AMIODARONE HYDROCHLORIDE 100 MG: 200 TABLET ORAL at 12:46

## 2024-04-02 RX ADMIN — METRONIDAZOLE 500 MG: 500 TABLET ORAL at 21:21

## 2024-04-02 RX ADMIN — Medication 100 MCG: at 10:55

## 2024-04-02 RX ADMIN — FERROUS SULFATE TAB EC 324 MG (65 MG FE EQUIVALENT) 325 MG: 324 (65 FE) TABLET DELAYED RESPONSE at 12:46

## 2024-04-02 RX ADMIN — HYDROCODONE BITARTRATE AND ACETAMINOPHEN 1 TABLET: 5; 325 TABLET ORAL at 21:21

## 2024-04-02 RX ADMIN — TAMSULOSIN HYDROCHLORIDE 0.4 MG: 0.4 CAPSULE ORAL at 12:46

## 2024-04-02 RX ADMIN — SODIUM CHLORIDE: 9 INJECTION, SOLUTION INTRAVENOUS at 10:10

## 2024-04-02 RX ADMIN — HYDROCODONE BITARTRATE AND ACETAMINOPHEN 1 TABLET: 5; 325 TABLET ORAL at 17:20

## 2024-04-02 RX ADMIN — LIDOCAINE HYDROCHLORIDE 100 MG: 20 INJECTION, SOLUTION EPIDURAL; INFILTRATION; INTRACAUDAL; PERINEURAL at 10:16

## 2024-04-02 RX ADMIN — FENTANYL CITRATE 25 MCG: 50 INJECTION INTRAMUSCULAR; INTRAVENOUS at 10:13

## 2024-04-02 RX ADMIN — Medication 1 CAPSULE: at 17:20

## 2024-04-02 RX ADMIN — DEXAMETHASONE SODIUM PHOSPHATE 4 MG: 4 INJECTION, SOLUTION INTRAMUSCULAR; INTRAVENOUS at 10:24

## 2024-04-02 RX ADMIN — CARVEDILOL 3.12 MG: 3.12 TABLET, FILM COATED ORAL at 17:20

## 2024-04-02 RX ADMIN — ATORVASTATIN CALCIUM 40 MG: 40 TABLET, FILM COATED ORAL at 12:45

## 2024-04-02 RX ADMIN — LINEZOLID 600 MG: 600 TABLET, FILM COATED ORAL at 09:15

## 2024-04-02 RX ADMIN — LINEZOLID 600 MG: 600 TABLET, FILM COATED ORAL at 21:21

## 2024-04-02 ASSESSMENT — PAIN DESCRIPTION - PAIN TYPE: TYPE: ACUTE PAIN;SURGICAL PAIN

## 2024-04-02 ASSESSMENT — PAIN DESCRIPTION - DESCRIPTORS
DESCRIPTORS: ACHING;DISCOMFORT
DESCRIPTORS: THROBBING
DESCRIPTORS: ACHING;DISCOMFORT

## 2024-04-02 ASSESSMENT — PAIN SCALES - GENERAL
PAINLEVEL_OUTOF10: 8
PAINLEVEL_OUTOF10: 0
PAINLEVEL_OUTOF10: 8
PAINLEVEL_OUTOF10: 9
PAINLEVEL_OUTOF10: 8

## 2024-04-02 ASSESSMENT — PAIN DESCRIPTION - LOCATION
LOCATION: FOOT

## 2024-04-02 ASSESSMENT — PAIN - FUNCTIONAL ASSESSMENT
PAIN_FUNCTIONAL_ASSESSMENT: ADULT NONVERBAL PAIN SCALE (NPVS)
PAIN_FUNCTIONAL_ASSESSMENT: PREVENTS OR INTERFERES SOME ACTIVE ACTIVITIES AND ADLS

## 2024-04-02 ASSESSMENT — PAIN DESCRIPTION - FREQUENCY: FREQUENCY: CONTINUOUS

## 2024-04-02 ASSESSMENT — PAIN DESCRIPTION - ORIENTATION
ORIENTATION: RIGHT
ORIENTATION: RIGHT

## 2024-04-02 ASSESSMENT — PAIN DESCRIPTION - ONSET: ONSET: ON-GOING

## 2024-04-02 NOTE — BRIEF OP NOTE
Brief Postoperative Note      Patient: Marvin Motnero  YOB: 1950  MRN: [id number]    Date of Procedure: 4/2/2024    Pre-Op Diagnosis Codes:     * Gangrene (HCC) [I96]    Post-Op Diagnosis: Same       Procedure(s):  TRANSMETATARSAL  AMPUTATION RIGHT FOOT  ACHILLES TENDON LENGTHENING RIGHT FOOT    Surgeon(s):  Frank Bills DPM    Assistant:  Surgical Assistant: Allan Romero RN    Anesthesia: General    Estimated Blood Loss (mL): less than 50     Complications: None    Hemostasis: anatomic dissection and electrocautery, pneumatic ankle tourniquet at 250 for 45 minutes    Injectables: Pre-Op 10 cc of 1% Lidocaine plain and Post-Op 10 cc of 0.5 % Marcaine plain    Materials: 2-0 Vicryl, 3-0 Nylon    Implants: None      Specimens:   ID Type Source Tests Collected by Time Destination   1 : 1- Right Foot Abscess for culture Specimen Foot CULTURE, SURGICAL Frank Bills DPM 4/2/2024 1043    A : A- Right Forefoot Specimen Foot SURGICAL PATHOLOGY Frank Bills DPM 4/2/2024 1051        Implants:  * No implants in log *      Drains:   Urinary Catheter 03/21/24 Schilling (Active)   $ Urethral catheter insertion $ Not inserted for procedure 03/30/24 1537   Catheter Indications Urinary retention (acute or chronic), continuous bladder irrigation or bladder outlet obstruction 04/01/24 2319   Site Assessment No urethral drainage 04/01/24 2319   Urine Color Cherie 04/01/24 2319   Urine Appearance Clear 04/01/24 2319   Urine Odor Malodorous 04/01/24 2319   Collection Container Standard 04/01/24 2319   Securement Method Securing device (Describe) 04/01/24 2319   Catheter Care  Perineal wipes 04/01/24 1007   Catheter Best Practices  Drainage tube clipped to bed;Catheter secured to thigh;Tamper seal intact;Bag below bladder;Bag not on floor;Lack of dependent loop in tubing;Drainage bag less than half full 04/01/24 2319   Status Draining;Patent 04/01/24 2319   Output (mL) 300 mL 04/02/24 0636       [REMOVED]

## 2024-04-02 NOTE — ANESTHESIA POSTPROCEDURE EVALUATION
[facility] Department of Anesthesiology  Post-Anesthesia Note       Name:  Marvin Montero                                  Age:  73 y.o.  MRN:  [id number]     Last Vitals & Oxygen Saturation: /69   Pulse 68   Temp 97.5 °F (36.4 °C) (Oral)   Resp 17   Ht 1.93 m (6' 4\")   Wt 120.4 kg (265 lb 6.9 oz)   SpO2 98%   BMI 32.31 kg/m²   Patient Vitals for the past 2 hrs:   Resp   04/02/24 1316 17       Level of consciousness:  Awake, alert    Respiratory: Respirations easy, no distress. Stable.    Cardiovascular: Hemodynamically stable.    Hydration: Adequate.    PONV: Adequately managed.    Post-op pain: Adequately controlled.    Post-op assessment: Tolerated anesthetic well without complication.    Complications:  None.    Electronically signed by YORDY ZARAGOZA MD on 4/2/2024 at 3:05 PM

## 2024-04-02 NOTE — ANESTHESIA PRE PROCEDURE
(Current):   Vitals:    04/02/24 0310 04/02/24 0636 04/02/24 0733 04/02/24 0853   BP:  129/68 135/64 135/63   Pulse: 60 70 62 66   Resp: 23 17 20 14   Temp:  98 °F (36.7 °C) 97.5 °F (36.4 °C) 99 °F (37.2 °C)   TempSrc:  Axillary Axillary Temporal   SpO2: 94% 94% 96% 95%   Weight:  120.4 kg (265 lb 6.9 oz)     Height:                                                  BP Readings from Last 3 Encounters:   04/02/24 135/63   12/19/23 130/82   10/18/23 124/76       NPO Status: Time of last liquid consumption: 2100                        Time of last solid consumption: 2100                        Date of last liquid consumption: 04/01/24                        Date of last solid food consumption: 04/01/24    BMI:   Wt Readings from Last 3 Encounters:   04/02/24 120.4 kg (265 lb 6.9 oz)   12/19/23 117.9 kg (260 lb)   10/18/23 118.9 kg (262 lb 3.2 oz)     Body mass index is 32.31 kg/m².    CBC:   Lab Results   Component Value Date/Time    WBC 4.2 04/01/2024 07:00 AM    RBC 3.27 04/01/2024 07:00 AM    HGB 9.2 04/01/2024 07:00 AM    HCT 27.7 04/01/2024 07:00 AM    MCV 84.5 04/01/2024 07:00 AM    RDW 14.4 04/01/2024 07:00 AM     04/01/2024 07:00 AM       CMP:   Lab Results   Component Value Date/Time     04/02/2024 04:34 AM    K 4.3 04/02/2024 04:34 AM     04/02/2024 04:34 AM    CO2 25 04/02/2024 04:34 AM    BUN 8 04/02/2024 04:34 AM    CREATININE 0.9 04/02/2024 04:34 AM    GFRAA >60 10/03/2022 12:36 PM    GFRAA >60 05/15/2013 11:32 AM    AGRATIO 1.4 08/18/2023 12:15 PM    LABGLOM >90 04/02/2024 04:34 AM    GLUCOSE 80 04/02/2024 04:34 AM    PROT 6.2 08/18/2023 12:15 PM    PROT 6.8 11/27/2012 01:41 PM    CALCIUM 7.9 04/02/2024 04:34 AM    BILITOT 0.5 08/18/2023 12:15 PM    ALKPHOS 91 08/18/2023 12:15 PM    AST 20 08/18/2023 12:15 PM    ALT 22 08/18/2023 12:15 PM       POC Tests:   Recent Labs     04/02/24  0849   POCGLU 86       Coags:   Lab Results   Component Value Date/Time    PROTIME 15.3 03/24/2024 09:24

## 2024-04-02 NOTE — CARE COORDINATION
Chart review done, nursing rounds completed.   Pt had procedure today for: TRANSMETATARSAL  AMPUTATION RIGHT FOOT, ACHILLES TENDON LENGTHENING RIGHT FOOT.    I&D was done on 3/22. Pt likely to here for a few days. From previous CM note: Will need PT/OT eval post op and see if ARU rec'd. If able to tolerate ARU will get a consult and see if eligible for hiro ARU. Otherwise would be private pay at SNF which would likely be too costly for patient.     Awaiting results from therapy when able to see pt.     Terrell Blankenship, SISI, [facility] Social Work Case Management   Phone: [phone]  Fax: [phone]

## 2024-04-03 LAB
CRP SERPL-MCNC: 7.9 MG/L (ref 0–5.1)
DEPRECATED RDW RBC AUTO: 14.6 % (ref 12.4–15.4)
ERYTHROCYTE [SEDIMENTATION RATE] IN BLOOD BY WESTERGREN METHOD: 58 MM/HR (ref 0–20)
GLUCOSE BLD-MCNC: 117 MG/DL (ref 70–99)
GLUCOSE BLD-MCNC: 124 MG/DL (ref 70–99)
GLUCOSE BLD-MCNC: 136 MG/DL (ref 70–99)
GLUCOSE BLD-MCNC: 162 MG/DL (ref 70–99)
HCT VFR BLD AUTO: 26.9 % (ref 40.5–52.5)
HGB BLD-MCNC: 9 G/DL (ref 13.5–17.5)
MCH RBC QN AUTO: 28.3 PG (ref 26–34)
MCHC RBC AUTO-ENTMCNC: 33.4 G/DL (ref 31–36)
MCV RBC AUTO: 84.8 FL (ref 80–100)
PERFORMED ON: ABNORMAL
PLATELET # BLD AUTO: 160 K/UL (ref 135–450)
PMV BLD AUTO: 8.6 FL (ref 5–10.5)
RBC # BLD AUTO: 3.18 M/UL (ref 4.2–5.9)
WBC # BLD AUTO: 6 K/UL (ref 4–11)

## 2024-04-03 PROCEDURE — 6370000000 HC RX 637 (ALT 250 FOR IP)

## 2024-04-03 PROCEDURE — 51798 US URINE CAPACITY MEASURE: CPT

## 2024-04-03 PROCEDURE — 97168 OT RE-EVAL EST PLAN CARE: CPT

## 2024-04-03 PROCEDURE — 36415 COLL VENOUS BLD VENIPUNCTURE: CPT

## 2024-04-03 PROCEDURE — 97164 PT RE-EVAL EST PLAN CARE: CPT

## 2024-04-03 PROCEDURE — 99232 SBSQ HOSP IP/OBS MODERATE 35: CPT | Performed by: INTERNAL MEDICINE

## 2024-04-03 PROCEDURE — 97530 THERAPEUTIC ACTIVITIES: CPT

## 2024-04-03 PROCEDURE — 94760 N-INVAS EAR/PLS OXIMETRY 1: CPT

## 2024-04-03 PROCEDURE — 2580000003 HC RX 258

## 2024-04-03 PROCEDURE — 86140 C-REACTIVE PROTEIN: CPT

## 2024-04-03 PROCEDURE — 94660 CPAP INITIATION&MGMT: CPT

## 2024-04-03 PROCEDURE — 85652 RBC SED RATE AUTOMATED: CPT

## 2024-04-03 PROCEDURE — 97535 SELF CARE MNGMENT TRAINING: CPT

## 2024-04-03 PROCEDURE — 1200000000 HC SEMI PRIVATE

## 2024-04-03 PROCEDURE — 6360000002 HC RX W HCPCS

## 2024-04-03 PROCEDURE — 85027 COMPLETE CBC AUTOMATED: CPT

## 2024-04-03 RX ORDER — LINEZOLID 600 MG/1
600 TABLET, FILM COATED ORAL EVERY 12 HOURS SCHEDULED
Qty: 28 TABLET | Refills: 0 | Status: SHIPPED | OUTPATIENT
Start: 2024-04-03 | End: 2024-04-17

## 2024-04-03 RX ORDER — HYDROCODONE BITARTRATE AND ACETAMINOPHEN 5; 325 MG/1; MG/1
1 TABLET ORAL EVERY 8 HOURS PRN
Qty: 15 TABLET | Refills: 0 | Status: SHIPPED | OUTPATIENT
Start: 2024-04-03 | End: 2024-04-08

## 2024-04-03 RX ORDER — METRONIDAZOLE 500 MG/1
500 TABLET ORAL EVERY 8 HOURS SCHEDULED
Qty: 30 TABLET | Refills: 0 | Status: SHIPPED | OUTPATIENT
Start: 2024-04-03 | End: 2024-04-13

## 2024-04-03 RX ORDER — AMIODARONE HYDROCHLORIDE 100 MG/1
100 TABLET ORAL DAILY
Qty: 30 TABLET | Refills: 2 | Status: SHIPPED | OUTPATIENT
Start: 2024-04-04

## 2024-04-03 RX ADMIN — LINEZOLID 600 MG: 600 TABLET, FILM COATED ORAL at 09:01

## 2024-04-03 RX ADMIN — ENOXAPARIN SODIUM 120 MG: 150 INJECTION SUBCUTANEOUS at 09:01

## 2024-04-03 RX ADMIN — Medication 1 CAPSULE: at 17:03

## 2024-04-03 RX ADMIN — TAMSULOSIN HYDROCHLORIDE 0.4 MG: 0.4 CAPSULE ORAL at 09:01

## 2024-04-03 RX ADMIN — FINASTERIDE 5 MG: 5 TABLET, FILM COATED ORAL at 09:01

## 2024-04-03 RX ADMIN — HYDROCODONE BITARTRATE AND ACETAMINOPHEN 1 TABLET: 5; 325 TABLET ORAL at 20:16

## 2024-04-03 RX ADMIN — METRONIDAZOLE 500 MG: 500 TABLET ORAL at 13:52

## 2024-04-03 RX ADMIN — ATORVASTATIN CALCIUM 40 MG: 40 TABLET, FILM COATED ORAL at 09:01

## 2024-04-03 RX ADMIN — SODIUM CHLORIDE, PRESERVATIVE FREE 10 ML: 5 INJECTION INTRAVENOUS at 09:02

## 2024-04-03 RX ADMIN — AMIODARONE HYDROCHLORIDE 100 MG: 200 TABLET ORAL at 09:01

## 2024-04-03 RX ADMIN — CARVEDILOL 3.12 MG: 3.12 TABLET, FILM COATED ORAL at 17:04

## 2024-04-03 RX ADMIN — ENOXAPARIN SODIUM 120 MG: 150 INJECTION SUBCUTANEOUS at 20:16

## 2024-04-03 RX ADMIN — SIMETHICONE 80 MG: 80 TABLET, CHEWABLE ORAL at 20:54

## 2024-04-03 RX ADMIN — SODIUM CHLORIDE, PRESERVATIVE FREE 10 ML: 5 INJECTION INTRAVENOUS at 20:16

## 2024-04-03 RX ADMIN — HYDROCODONE BITARTRATE AND ACETAMINOPHEN 1 TABLET: 5; 325 TABLET ORAL at 05:49

## 2024-04-03 RX ADMIN — SIMETHICONE 80 MG: 80 TABLET, CHEWABLE ORAL at 06:09

## 2024-04-03 RX ADMIN — METRONIDAZOLE 500 MG: 500 TABLET ORAL at 05:49

## 2024-04-03 RX ADMIN — METRONIDAZOLE 500 MG: 500 TABLET ORAL at 21:37

## 2024-04-03 RX ADMIN — LINEZOLID 600 MG: 600 TABLET, FILM COATED ORAL at 20:15

## 2024-04-03 RX ADMIN — LEVOTHYROXINE SODIUM 25 MCG: 0.03 TABLET ORAL at 05:49

## 2024-04-03 RX ADMIN — Medication 1 CAPSULE: at 09:01

## 2024-04-03 ASSESSMENT — PAIN DESCRIPTION - DESCRIPTORS
DESCRIPTORS: THROBBING
DESCRIPTORS: THROBBING
DESCRIPTORS: ACHING

## 2024-04-03 ASSESSMENT — PAIN DESCRIPTION - ORIENTATION
ORIENTATION: RIGHT
ORIENTATION: RIGHT
ORIENTATION: MID

## 2024-04-03 ASSESSMENT — PAIN SCALES - GENERAL
PAINLEVEL_OUTOF10: 7
PAINLEVEL_OUTOF10: 0
PAINLEVEL_OUTOF10: 8
PAINLEVEL_OUTOF10: 5

## 2024-04-03 ASSESSMENT — PAIN DESCRIPTION - FREQUENCY
FREQUENCY: CONTINUOUS
FREQUENCY: CONTINUOUS

## 2024-04-03 ASSESSMENT — PAIN DESCRIPTION - PAIN TYPE
TYPE: ACUTE PAIN;SURGICAL PAIN
TYPE: ACUTE PAIN;SURGICAL PAIN

## 2024-04-03 ASSESSMENT — PAIN DESCRIPTION - LOCATION
LOCATION: ABDOMEN
LOCATION: FOOT
LOCATION: FOOT

## 2024-04-03 ASSESSMENT — PAIN SCALES - WONG BAKER: WONGBAKER_NUMERICALRESPONSE: NO HURT

## 2024-04-03 ASSESSMENT — PAIN DESCRIPTION - ONSET
ONSET: ON-GOING
ONSET: ON-GOING

## 2024-04-03 NOTE — CARE COORDINATION
Chart review done, nursing rounds completed. PT/OT worked with patient, scoring a 10/24, and a 13/24 on AMPAC. ARU consult in, reviewing, pt still has no insurance, but has applied for his MCR to be reinstated which can take a couple months.  Following for needs.     Terrell Blankenship LMSW, [facility] Social Work Case Management   Phone: [phone]  Fax: [phone]

## 2024-04-03 NOTE — CONSULTS
Patient ID: Marvin Montero  Referring/ Physician: Kassandra Mitchell MD      Summary:   Marvin Montero is being seen by nephrology for RODERICK.     Reason for admission: foot wound    HPI  Marvin Montero is a 73 y.o. male with past medical history significant for hypertension, type 2 diabetes, left leg amputation, sleep apnea, stroke, CHF, COPD, atrial fibrillation who presented to hospital with a nonhealing right toe wound.  He had been on outpatient antibiotics for about a week prior to admission.  The wound was not healing.  And had a foul smell.  He also was told to double his diuretics about 5 days ago per the cardiology clinic because he was retaining fluid.  He has noticed some edema and shortness of breath with laying flat.  He has however not gained any weight in fact he has lost weight.  He said he usually weighs around 265-270 and is now weighing 245.  He had a postvoid bladder scan showing about 500 cc.  He has noticed his urine is darker.  Has not had any hematuria or foamy frothy urine.  Prior to admission he was on ACE inhibitor and farxiga.    X-ray right foot concerning for possible osteomyelitis.  Postvoid bladder scan 500 cc  Sodium 138 potassium 2.8 bicarb 25 BUN 33 creatinine 2.2  BNP 1583  Urinalysis showed 12 hyaline cast 4 WBCs no RBCs  Negative for albuminuria.    Blood pressure 99/58  Blood pressure was as low as 86/44  On room air  Urine output 725 cc past day      Interval Hx:       Assessment/Plan:   -If bladder scan repeat shows postvoid residual greater than 200 cc and Schilling placement.  -Hold diuretics, lisinopril and SGL 2 inhibitor.  -taking PO so will hold off on IVFs for now. Cr is coming down.   - replacing potassium       Acute kidney injury  Likely has underlying diabetic nephropathy with a baseline creatinine around 1.2-1.4.  He presenting with possible osteomyelitis of the right foot, low blood pressure, on higher doses of diuretics prior to 
       Consulting Physician: Albaro Bay MD     Reason for Consult: acute urinary retention    History of Present Illness: Marvin Montero is a 73 y.o. w/ complex PMH listed below. Currently hospitalized for management of right osteomyelitis of 5th metatarsal, wound cultures positive for MRSA and enterococcus. Previously underwent I&D, amputation of right 5th toe w/ bone biopsy w/ Dr Shaw on 3/22/24. Planning to undergo transmetatarsal amputation of right foot tomorrow morning. Urology has been consulted for acute urinary retention. Ascencio catheter placed 3/21 for urinary retention, volume 450cc. He took flomax prior to hospitalization. There has been no voiding trial yet this admission. He has had no  imaging this admission. UA reveals mild glycosuria, no urine culture ordered. Last PSA was 3 in 10/2022. Seems to be having daily BM per charting. Denies issues voiding prior to admission, has been on flomax for several years. Denies GH, recent UTIs. Has not required ascencio catheter in the past fore retention.    Past Medical History:   Past Medical History:   Diagnosis Date    Atrial fibrillation (HCC)     Back pain     Cardiomyopathy     CHF (congestive heart failure) (HCC)     COPD (chronic obstructive pulmonary disease) (HCC)     Dental disease     Dizziness     Dyslipidemia     GERD (gastroesophageal reflux disease)     Hearing loss     Hyperlipidemia     Hypertension     Hypothyroidism     Leg edema     MRSA (methicillin resistant staph aureus) culture positive 10/05/2015    foot/bone    MRSA nasal colonization 05/05/2017    Obesity     Prolonged emergence from general anesthesia     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     Sleep apnea     Stroke (HCC)     Thyroid disease     Tinnitus     Type 2 diabetes mellitus without complication ([facility])     Type II or unspecified type diabetes mellitus without mention of complication, not stated as uncontrolled        Past Surgical History:  Past Surgical 
    Infectious Diseases   Consult Note        Admission Date: 3/20/2024  Hospital Day: Hospital Day: 4   Attending: Kassandra Mitchell MD  Date of service: 3/23/24     Reason for admission: Gangrene of toe of right foot (HCC) [I96]    Chief complaint/ Reason for consult: Complicated right diabetic foot infection    Microbiology:        I have reviewed allavailable micro lab data and cultures    Right foot surgical culture  - collected on 3/22/2024: Staphylococcus aureus, Enterococcus faecalis      Antibiotics and immunizations:       Current antibiotics: All antibiotics and their doses were reviewed by me    Recent Abx Admin                     ceFEPIme (MAXIPIME) 2,000 mg in sodium chloride 0.9 % 100 mL IVPB (mini-bag) (mg) 2,000 mg New Bag 03/23/24 0942      Restarted  0042      Restarted 03/22/24 2206     2,000 mg New Bag  2054    linezolid (ZYVOX) IVPB 600 mg ()  Restarted 03/23/24 0646     600 mg New Bag  0630     600 mg New Bag 03/22/24 1806                      Immunization History: All immunization history was reviewed by me today.    Immunization History   Administered Date(s) Administered    COVID-19, MODERNA BLUE border, Primary or Immunocompromised, (age 12y+), IM, 100 mcg/0.5mL 03/15/2021, 05/01/2021    COVID-19, PFIZER PURPLE top, DILUTE for use, (age 12 y+), 30mcg/0.3mL 03/03/2021, 03/31/2021, 10/27/2021    COVID-19, PFIZER, (2023-24 formula), (age 12y+), IM, 30mcg/0.3mL 12/11/2023    COVID-19, [place] Vaccine, Vaccine Unspecified 11/12/2022    Influenza 10/20/2013    Influenza Nasal 09/26/2022    Influenza Virus Vaccine 10/20/2013, 10/16/2015    Influenza, FLUZONE (age 65 y+), High Dose, 0.7mL 10/12/2021, 12/11/2023    Influenza, High Dose (Fluzone 65 yrs and older) 10/31/2017, 09/19/2018    Influenza, Intradermal, Preservative free 11/21/2016    Influenza, Triv, inactivated, subunit, adjuvanted, IM (Fluad 65 yrs and older) 11/28/2020    PPD Test 10/16/2015, 10/29/2015, 10/08/2021, 10/18/2021    
Consult Note  Physical Medicine and Rehabilitation    Patient: Marvin Montero  [id number]  Date: 4/3/2024      Chief Complaint: difficulty with mobility    History of Present Illness/Hospital Course:  Patient is a 74 yo male with pmh Type 2 DM with peripheral neuropathy, CVA with residual RLE weakness, HTN, CKD 3, and prior Left BKA who initially presented on 3/20/24 as a direct admit from Dr. Perez with right 5th toe gangrene. Diagnostic work-up revealed MRSA and enterococcus infection. He underwent I&D ritgh 5th digit with bone biopsy which revealed positive margins (3/22). He underwent angiogram with successful revascularization of the distal right superficial femoral artery, popliteal artery, tibioperoneal trunk and right posterior tibial artery (3/25).  Eventually underwent right TMA and Achilles tendon lengthening (4/2 with Dr. Bills).  Now nonweightbearing on the right lower extremity.  He remains on oral antibiotics per ID recommendations.  Course has been complicated by RODERICK, hypokalemia, urinary retention. Currently, patient reports RLE pain is controlled. He is having significant difficulty related to NWB status RLE and prior left BKA. Also reports baseline mild RLE weakness due to prior stroke.     Prior Level of Function:  Independent for mobility with RW and left BKA prosthesis, ADLs, and IADLs  Active     Current Level of Function:  Max assist x 2 for mobility  Dependent for full ADL    Pertinent Social History:  Support: lives with spouse  Home set-up: Multi level house with ramped entrance, but not wheelchair accessible due to narrow doorways    Past Medical History:   Diagnosis Date    Atrial fibrillation (HCC)     Back pain     Cardiomyopathy     CHF (congestive heart failure) (HCC)     COPD (chronic obstructive pulmonary disease) (HCC)     Dental disease     Dizziness     Dyslipidemia     GERD (gastroesophageal reflux disease)     Hearing loss     Hyperlipidemia     Hypertension     
  INTEGUMENT/BREAST:  positive for skin lesion(s), dryness, skin color change, and changes in lesion  ENDOCRINE:  positive for diabetic symptoms including neither foot ulcerations nor neuropathy  MUSCULOSKELETAL:  positive for  joint swelling and decreased range of motion  NEUROLOGICAL:  positive for gait problems and numbness  PHYSICAL EXAM:      Vitals:    /61   Pulse 79   Temp 97.8 °F (36.6 °C) (Oral)   Resp 18   Ht 1.93 m (6' 4\")   Wt 111.5 kg (245 lb 12.8 oz)   SpO2 96%   BMI 29.92 kg/m²     LABS:   Recent Labs     03/20/24  1437   WBC 13.4*   HGB 10.0*   HCT 29.6*        No results for input(s): \"NA\", \"K\", \"CL\", \"CO2\", \"PHOS\", \"BUN\", \"CREATININE\", \"CA\" in the last 72 hours.  No results for input(s): \"PROT\", \"INR\", \"APTT\" in the last 72 hours.    LOWER EXTREMITY EXAMINATION   SKIN:    RIGHT foot area of 5th digit with open ulceration to bone with ragged, fibrotic tissue, with odor of infection, serous drainage, visible capsule to metatarsal phalangeal joint    NEUROLOGIC:    Pain sensation isdecreased Right.  Light touch is decreasedRight. positive history of paresthesia Right. negativehistory of burning Right. Deep tendon reflexes are 1 to include patellar and achilles Right. [place]--Coretta 5.07 monofilament sensitivity is abnormal 5 to 5 spots tested Right.    VASCULAR:    right Dorsalis pedis is 1. right Posterior tibial pulse is 1. 1+ edema right. none Varicosities right.    MUSCULOSKELETAL:  [place] is abnormal  due to wound, BKA LEFT . Muscle strength is 4/5 to all groups tested to include dorsiflexion, plantarflexion, inversion, eversion, and digital Right . The ranges of motion of all joints tested from ankle distal is decreased there is crepitus noted Right.    RIGHT foot:  FINDINGS:  There is no acute fracture or dislocation.  Status post partial resection of  the 2nd mid phalanx.  The bones are demineralized.  There is new cortical  irregularity involving the 1st distal tuft no

## 2024-04-03 NOTE — OP NOTE
Operative Note      Patient: Marvin Montero  YOB: 1950  MRN: [id number]     Date of Procedure: 3/22/2024     Pre-Op Diagnosis Codes:     * Gangrene of right foot (HCC) [I96]     Post-Op Diagnosis: Same       Procedure(s):  INCISION AND DRAINAGE WITH AMPUTATION OF RIGHT FIFTH TOE AND BONE BIOPSY     Surgeon(s):  Frank iBlls DPM     Assistant:  Surgical Assistant: Rogerio Hill     Anesthesia: Monitor Anesthesia Care     Estimated Blood Loss (mL): Minimal     Complications: Other: Further infection     Hemostasis: anatomic dissection      Injectables: Pre-Op 10 cc of 1% Lidocaine plain and Post-Op 10 cc of 0.5 % Marcaine plain     Materials: 2-0 Nylon     Implants: 1/4 inch Iodoform packing       Specimens:   ID Type Source Tests Collected by Time Destination   1 : 1. Swab fluid right foot Specimen Foot CULTURE, SURGICAL Frank Bills DPM 3/22/2024 1322     2 : 2. bone biopsy culture right foot Specimen Foot CULTURE, SURGICAL Frank Bills DPM 3/22/2024 1323     3 : 3. Deep space abscess right foot Specimen Foot CULTURE, SURGICAL Frank Bills DPM 3/22/2024 1328     A : A. Fifth toe right foot Tissue Tissue SURGICAL PATHOLOGY Frank Bills DPM 3/22/2024 1325           Implants:  * No implants in log *      Drains:        Negative Pressure Wound Therapy Foot Left (Active)       Urinary Catheter 03/21/24 Schilling (Active)   $ Urethral catheter insertion $ Not inserted for procedure 03/21/24 1641   Catheter Indications Urinary retention (acute or chronic), continuous bladder irrigation or bladder outlet obstruction 03/22/24 0900   Site Assessment No urethral drainage 03/22/24 0900   Urine Color Yellow 03/22/24 0900   Urine Appearance Clear 03/22/24 0900   Urine Odor Malodorous 03/22/24 0555   Collection Container Standard 03/22/24 0555   Securement Method Securing device (Describe) 03/22/24 0555   Catheter Care  Perineal wipes 03/22/24 0555   Catheter Best Practices  Drainage tube 
  [REMOVED] Negative Pressure Wound Therapy Foot Left (Removed)         Findings: Full thickness surgical incision appreciated to the lateral and media aspect of the right foot at the time of surgery. No further purulence appreciated. Wound base was noted to be granular with health bones for metatarsal 1-3. 4th and 5th metatarsal were felt to be soft and crumbly. Full thickness ulceration appreciated to the plantar central aspect of the right foot that was closed primarily in the OR today. TMA site was fully closed in today's procedure.     INDICATIONS FOR PROCEDURE: This patient has signs and symptoms clinically and radiographically consistent with the above mentioned preoperative diagnosis. Having failed conservative treatment, it was determined that the patient would benefit from surgical intervention. All potential risks, benefits, and complications were discussed with the patient prior to the scheduling of surgery. All the patient's questions were answered and no guarantees were given. The patient wished to proceed with surgery, and informed written consent was obtained.     DETAILS OF PROCEDURE: The patient was brought from the pre-operative area and placed on the operating table in the supine position. A pneumatic ankle tourniquet was placed around the patient's well-padded right lower extremity. Following IV sedation, a local anesthetic block was then injected proximal to the incision site consisting of 10 cc of 1% Lidocaine plain. The right lower extremity was then scrubbed, prepped, and draped in the usual sterile fashion. A time-out was performed. The patient, procedure, and operative site were confirmed. The tourniquet was then inflated to 250 mmHg and the following procedure was performed.    Details of Procedure #1: Right foot Tendoachilles Lengthening   At this time, attention was directed to patients right posterior Achilles tendon region. At this time with the use of an #15 blade, three stab

## 2024-04-04 LAB
GLUCOSE BLD-MCNC: 101 MG/DL (ref 70–99)
GLUCOSE BLD-MCNC: 106 MG/DL (ref 70–99)
GLUCOSE BLD-MCNC: 125 MG/DL (ref 70–99)
GLUCOSE BLD-MCNC: 75 MG/DL (ref 70–99)
PERFORMED ON: ABNORMAL
PERFORMED ON: NORMAL

## 2024-04-04 PROCEDURE — 2580000003 HC RX 258

## 2024-04-04 PROCEDURE — 1200000000 HC SEMI PRIVATE

## 2024-04-04 PROCEDURE — 94660 CPAP INITIATION&MGMT: CPT

## 2024-04-04 PROCEDURE — 6370000000 HC RX 637 (ALT 250 FOR IP)

## 2024-04-04 PROCEDURE — 6360000002 HC RX W HCPCS

## 2024-04-04 PROCEDURE — 6370000000 HC RX 637 (ALT 250 FOR IP): Performed by: INTERNAL MEDICINE

## 2024-04-04 PROCEDURE — 94760 N-INVAS EAR/PLS OXIMETRY 1: CPT

## 2024-04-04 RX ORDER — TRAMADOL HYDROCHLORIDE 50 MG/1
50 TABLET ORAL EVERY 6 HOURS PRN
Status: DISCONTINUED | OUTPATIENT
Start: 2024-04-04 | End: 2024-04-17 | Stop reason: HOSPADM

## 2024-04-04 RX ADMIN — ATORVASTATIN CALCIUM 40 MG: 40 TABLET, FILM COATED ORAL at 08:24

## 2024-04-04 RX ADMIN — CARVEDILOL 3.12 MG: 3.12 TABLET, FILM COATED ORAL at 17:02

## 2024-04-04 RX ADMIN — METRONIDAZOLE 500 MG: 500 TABLET ORAL at 21:30

## 2024-04-04 RX ADMIN — SODIUM CHLORIDE, PRESERVATIVE FREE 10 ML: 5 INJECTION INTRAVENOUS at 08:31

## 2024-04-04 RX ADMIN — HYDROCODONE BITARTRATE AND ACETAMINOPHEN 1 TABLET: 5; 325 TABLET ORAL at 18:53

## 2024-04-04 RX ADMIN — ENOXAPARIN SODIUM 120 MG: 150 INJECTION SUBCUTANEOUS at 20:34

## 2024-04-04 RX ADMIN — AMIODARONE HYDROCHLORIDE 100 MG: 200 TABLET ORAL at 08:23

## 2024-04-04 RX ADMIN — CARVEDILOL 3.12 MG: 3.12 TABLET, FILM COATED ORAL at 08:24

## 2024-04-04 RX ADMIN — FINASTERIDE 5 MG: 5 TABLET, FILM COATED ORAL at 08:26

## 2024-04-04 RX ADMIN — METRONIDAZOLE 500 MG: 500 TABLET ORAL at 13:39

## 2024-04-04 RX ADMIN — LINEZOLID 600 MG: 600 TABLET, FILM COATED ORAL at 20:34

## 2024-04-04 RX ADMIN — TRAMADOL HYDROCHLORIDE 50 MG: 50 TABLET ORAL at 21:30

## 2024-04-04 RX ADMIN — LINEZOLID 600 MG: 600 TABLET, FILM COATED ORAL at 08:24

## 2024-04-04 RX ADMIN — METRONIDAZOLE 500 MG: 500 TABLET ORAL at 05:15

## 2024-04-04 RX ADMIN — HYDROCODONE BITARTRATE AND ACETAMINOPHEN 1 TABLET: 5; 325 TABLET ORAL at 01:11

## 2024-04-04 RX ADMIN — LEVOTHYROXINE SODIUM 25 MCG: 0.03 TABLET ORAL at 05:15

## 2024-04-04 RX ADMIN — HYDROCODONE BITARTRATE AND ACETAMINOPHEN 1 TABLET: 5; 325 TABLET ORAL at 05:15

## 2024-04-04 RX ADMIN — TAMSULOSIN HYDROCHLORIDE 0.4 MG: 0.4 CAPSULE ORAL at 08:24

## 2024-04-04 RX ADMIN — HYDROCODONE BITARTRATE AND ACETAMINOPHEN 1 TABLET: 5; 325 TABLET ORAL at 12:15

## 2024-04-04 RX ADMIN — Medication 1 CAPSULE: at 17:01

## 2024-04-04 RX ADMIN — SODIUM CHLORIDE, PRESERVATIVE FREE 10 ML: 5 INJECTION INTRAVENOUS at 20:37

## 2024-04-04 RX ADMIN — FERROUS SULFATE TAB EC 324 MG (65 MG FE EQUIVALENT) 325 MG: 324 (65 FE) TABLET DELAYED RESPONSE at 08:25

## 2024-04-04 RX ADMIN — ENOXAPARIN SODIUM 120 MG: 150 INJECTION SUBCUTANEOUS at 08:23

## 2024-04-04 RX ADMIN — Medication 1 CAPSULE: at 08:23

## 2024-04-04 ASSESSMENT — PAIN DESCRIPTION - LOCATION
LOCATION: FOOT;LEG
LOCATION: LEG
LOCATION: FOOT
LOCATION: FOOT;LEG
LOCATION: FOOT;LEG
LOCATION: LEG

## 2024-04-04 ASSESSMENT — PAIN SCALES - GENERAL
PAINLEVEL_OUTOF10: 7
PAINLEVEL_OUTOF10: 0
PAINLEVEL_OUTOF10: 7
PAINLEVEL_OUTOF10: 8
PAINLEVEL_OUTOF10: 6
PAINLEVEL_OUTOF10: 7

## 2024-04-04 ASSESSMENT — PAIN - FUNCTIONAL ASSESSMENT
PAIN_FUNCTIONAL_ASSESSMENT: PREVENTS OR INTERFERES SOME ACTIVE ACTIVITIES AND ADLS

## 2024-04-04 ASSESSMENT — PAIN DESCRIPTION - ONSET
ONSET: ON-GOING

## 2024-04-04 ASSESSMENT — PAIN DESCRIPTION - ORIENTATION
ORIENTATION: RIGHT
ORIENTATION: RIGHT;LEFT
ORIENTATION: RIGHT;LEFT
ORIENTATION: RIGHT

## 2024-04-04 ASSESSMENT — PAIN DESCRIPTION - PAIN TYPE
TYPE: SURGICAL PAIN

## 2024-04-04 ASSESSMENT — PAIN DESCRIPTION - FREQUENCY
FREQUENCY: INTERMITTENT

## 2024-04-04 ASSESSMENT — PAIN DESCRIPTION - DESCRIPTORS
DESCRIPTORS: ACHING

## 2024-04-04 NOTE — CARE COORDINATION
2:45pm, meet with patient, and gave list of SNFs. He will review. He thinks that his insurance is reinstated, and that he was told it would take a couple days to hit all platforms (Its [facility]). SW will reach Registration to find out- it is not showing up, will call again tomorrow.   [id number]- is admitting registration.      ESE reviewed ARU consult note, and Dr. Khan feels patient could likely benefit from ARU program, but goals would be limited to transfer techniques and wheelchair mobility given NWB RLE and prior left BKA.      Unfortunately, his home is not wheelchair accessible due to narrow door ways. If he came to ARU, we could potentially work on his ability to transfer from bed>bedside commode to function within 1 room of house. However he would not be able to safely enter/exit home in an emergency or for his follow-up appointments which will be necessary.      Normally in this situation, would recommend SNF until he is able to start weight bearing again on the RLE. Recognize insurance issue is a barrier.      Dr. Khan discussed all of this with patient.   He would like to discuss further with Podiatry, spouse, and SW.      Also will not have bed available in ARU for at least a couple of days and would need admin approval prior to admission if deemed appropriate.     SW will meet with patient, and follow for needs.     Terrell Blankenship, SISI, [facility] Social Work Case Management   Phone: [phone]  Fax: [phone]

## 2024-04-04 NOTE — DISCHARGE INSTR - COC
transfer of Marvin Montero  is necessary for the continuing treatment of the diagnosis listed and that he requires {Admit to Appropriate Level of Care:23057} for {GREATER/LESS:026141984} 30 days.     Update Admission H&P: {CHP DME Changes in HandP:[id number]}    PHYSICIAN SIGNATURE:  {Esignature:[id number]}

## 2024-04-05 LAB
GLUCOSE BLD-MCNC: 115 MG/DL (ref 70–99)
GLUCOSE BLD-MCNC: 116 MG/DL (ref 70–99)
GLUCOSE BLD-MCNC: 117 MG/DL (ref 70–99)
GLUCOSE BLD-MCNC: 75 MG/DL (ref 70–99)
PERFORMED ON: ABNORMAL
PERFORMED ON: NORMAL

## 2024-04-05 PROCEDURE — 6370000000 HC RX 637 (ALT 250 FOR IP)

## 2024-04-05 PROCEDURE — 97530 THERAPEUTIC ACTIVITIES: CPT

## 2024-04-05 PROCEDURE — 94660 CPAP INITIATION&MGMT: CPT

## 2024-04-05 PROCEDURE — 6360000002 HC RX W HCPCS

## 2024-04-05 PROCEDURE — 97535 SELF CARE MNGMENT TRAINING: CPT

## 2024-04-05 PROCEDURE — 2580000003 HC RX 258

## 2024-04-05 PROCEDURE — 6370000000 HC RX 637 (ALT 250 FOR IP): Performed by: INTERNAL MEDICINE

## 2024-04-05 PROCEDURE — 1200000000 HC SEMI PRIVATE

## 2024-04-05 PROCEDURE — 94760 N-INVAS EAR/PLS OXIMETRY 1: CPT

## 2024-04-05 RX ADMIN — LISINOPRIL 2.5 MG: 5 TABLET ORAL at 08:01

## 2024-04-05 RX ADMIN — LOPERAMIDE HYDROCHLORIDE 2 MG: 2 CAPSULE ORAL at 20:40

## 2024-04-05 RX ADMIN — METRONIDAZOLE 500 MG: 500 TABLET ORAL at 05:37

## 2024-04-05 RX ADMIN — CARVEDILOL 3.12 MG: 3.12 TABLET, FILM COATED ORAL at 08:01

## 2024-04-05 RX ADMIN — Medication 1 CAPSULE: at 17:07

## 2024-04-05 RX ADMIN — METRONIDAZOLE 500 MG: 500 TABLET ORAL at 14:25

## 2024-04-05 RX ADMIN — ATORVASTATIN CALCIUM 40 MG: 40 TABLET, FILM COATED ORAL at 08:00

## 2024-04-05 RX ADMIN — ENOXAPARIN SODIUM 120 MG: 150 INJECTION SUBCUTANEOUS at 08:00

## 2024-04-05 RX ADMIN — AMIODARONE HYDROCHLORIDE 100 MG: 200 TABLET ORAL at 08:01

## 2024-04-05 RX ADMIN — CARVEDILOL 3.12 MG: 3.12 TABLET, FILM COATED ORAL at 17:07

## 2024-04-05 RX ADMIN — Medication 1 CAPSULE: at 08:00

## 2024-04-05 RX ADMIN — LINEZOLID 600 MG: 600 TABLET, FILM COATED ORAL at 20:40

## 2024-04-05 RX ADMIN — SODIUM CHLORIDE, PRESERVATIVE FREE 10 ML: 5 INJECTION INTRAVENOUS at 20:41

## 2024-04-05 RX ADMIN — LEVOTHYROXINE SODIUM 25 MCG: 0.03 TABLET ORAL at 05:37

## 2024-04-05 RX ADMIN — METRONIDAZOLE 500 MG: 500 TABLET ORAL at 21:58

## 2024-04-05 RX ADMIN — FINASTERIDE 5 MG: 5 TABLET, FILM COATED ORAL at 08:01

## 2024-04-05 RX ADMIN — HYDROCODONE BITARTRATE AND ACETAMINOPHEN 1 TABLET: 5; 325 TABLET ORAL at 21:58

## 2024-04-05 RX ADMIN — ENOXAPARIN SODIUM 120 MG: 150 INJECTION SUBCUTANEOUS at 20:40

## 2024-04-05 RX ADMIN — TRAMADOL HYDROCHLORIDE 50 MG: 50 TABLET ORAL at 05:37

## 2024-04-05 RX ADMIN — HYDROCODONE BITARTRATE AND ACETAMINOPHEN 1 TABLET: 5; 325 TABLET ORAL at 10:24

## 2024-04-05 RX ADMIN — LINEZOLID 600 MG: 600 TABLET, FILM COATED ORAL at 08:00

## 2024-04-05 RX ADMIN — HYDROCODONE BITARTRATE AND ACETAMINOPHEN 1 TABLET: 5; 325 TABLET ORAL at 01:03

## 2024-04-05 RX ADMIN — TAMSULOSIN HYDROCHLORIDE 0.4 MG: 0.4 CAPSULE ORAL at 08:00

## 2024-04-05 RX ADMIN — SODIUM CHLORIDE, PRESERVATIVE FREE 10 ML: 5 INJECTION INTRAVENOUS at 08:01

## 2024-04-05 ASSESSMENT — PAIN SCALES - GENERAL
PAINLEVEL_OUTOF10: 8
PAINLEVEL_OUTOF10: 5
PAINLEVEL_OUTOF10: 7
PAINLEVEL_OUTOF10: 7
PAINLEVEL_OUTOF10: 0
PAINLEVEL_OUTOF10: 0

## 2024-04-05 ASSESSMENT — PAIN DESCRIPTION - LOCATION
LOCATION: FOOT;LEG
LOCATION: FOOT
LOCATION: LEG;FOOT

## 2024-04-05 ASSESSMENT — PAIN DESCRIPTION - FREQUENCY
FREQUENCY: INTERMITTENT

## 2024-04-05 ASSESSMENT — PAIN DESCRIPTION - DESCRIPTORS
DESCRIPTORS: ACHING
DESCRIPTORS: ACHING;DISCOMFORT
DESCRIPTORS: ACHING;DISCOMFORT
DESCRIPTORS: ACHING
DESCRIPTORS: ACHING;DISCOMFORT;SORE

## 2024-04-05 ASSESSMENT — PAIN DESCRIPTION - PAIN TYPE
TYPE: SURGICAL PAIN

## 2024-04-05 ASSESSMENT — PAIN DESCRIPTION - ORIENTATION
ORIENTATION: RIGHT

## 2024-04-05 ASSESSMENT — PAIN - FUNCTIONAL ASSESSMENT
PAIN_FUNCTIONAL_ASSESSMENT: PREVENTS OR INTERFERES SOME ACTIVE ACTIVITIES AND ADLS
PAIN_FUNCTIONAL_ASSESSMENT: ACTIVITIES ARE NOT PREVENTED
PAIN_FUNCTIONAL_ASSESSMENT: PREVENTS OR INTERFERES SOME ACTIVE ACTIVITIES AND ADLS
PAIN_FUNCTIONAL_ASSESSMENT: PREVENTS OR INTERFERES SOME ACTIVE ACTIVITIES AND ADLS

## 2024-04-05 ASSESSMENT — PAIN SCALES - WONG BAKER: WONGBAKER_NUMERICALRESPONSE: NO HURT

## 2024-04-05 ASSESSMENT — PAIN DESCRIPTION - ONSET
ONSET: ON-GOING

## 2024-04-05 NOTE — CARE COORDINATION
Chart review done, nursing rounds completed. Was able to check with registration and they did inform that part B has populated, but part A has not.   Still waiting.  Informed patient to check to see when part A might populate.   Sent referrals to SNFs in the area of home as requested.     [facility]- no  [facility]- reviewing  [facility]- sent  [facility]- sent      Terrell Blankenship, SISI, [facility] Social Work Case Management   Phone: [phone]  Fax: [phone]

## 2024-04-06 LAB
GLUCOSE BLD-MCNC: 133 MG/DL (ref 70–99)
GLUCOSE BLD-MCNC: 135 MG/DL (ref 70–99)
GLUCOSE BLD-MCNC: 144 MG/DL (ref 70–99)
GLUCOSE BLD-MCNC: 74 MG/DL (ref 70–99)
PERFORMED ON: ABNORMAL
PERFORMED ON: NORMAL

## 2024-04-06 PROCEDURE — 6370000000 HC RX 637 (ALT 250 FOR IP)

## 2024-04-06 PROCEDURE — 94660 CPAP INITIATION&MGMT: CPT

## 2024-04-06 PROCEDURE — 94760 N-INVAS EAR/PLS OXIMETRY 1: CPT

## 2024-04-06 PROCEDURE — 6360000002 HC RX W HCPCS

## 2024-04-06 PROCEDURE — 6370000000 HC RX 637 (ALT 250 FOR IP): Performed by: INTERNAL MEDICINE

## 2024-04-06 PROCEDURE — 1200000000 HC SEMI PRIVATE

## 2024-04-06 PROCEDURE — 2580000003 HC RX 258

## 2024-04-06 RX ADMIN — ENOXAPARIN SODIUM 120 MG: 150 INJECTION SUBCUTANEOUS at 08:32

## 2024-04-06 RX ADMIN — HYDROCODONE BITARTRATE AND ACETAMINOPHEN 1 TABLET: 5; 325 TABLET ORAL at 06:16

## 2024-04-06 RX ADMIN — TAMSULOSIN HYDROCHLORIDE 0.4 MG: 0.4 CAPSULE ORAL at 08:33

## 2024-04-06 RX ADMIN — LOPERAMIDE HYDROCHLORIDE 2 MG: 2 CAPSULE ORAL at 21:03

## 2024-04-06 RX ADMIN — LINEZOLID 600 MG: 600 TABLET, FILM COATED ORAL at 21:06

## 2024-04-06 RX ADMIN — METRONIDAZOLE 500 MG: 500 TABLET ORAL at 21:03

## 2024-04-06 RX ADMIN — CARVEDILOL 3.12 MG: 3.12 TABLET, FILM COATED ORAL at 16:53

## 2024-04-06 RX ADMIN — LISINOPRIL 2.5 MG: 5 TABLET ORAL at 08:33

## 2024-04-06 RX ADMIN — AMIODARONE HYDROCHLORIDE 100 MG: 200 TABLET ORAL at 08:33

## 2024-04-06 RX ADMIN — SODIUM CHLORIDE, PRESERVATIVE FREE 10 ML: 5 INJECTION INTRAVENOUS at 08:33

## 2024-04-06 RX ADMIN — SODIUM CHLORIDE, PRESERVATIVE FREE 10 ML: 5 INJECTION INTRAVENOUS at 21:05

## 2024-04-06 RX ADMIN — LEVOTHYROXINE SODIUM 25 MCG: 0.03 TABLET ORAL at 06:16

## 2024-04-06 RX ADMIN — FINASTERIDE 5 MG: 5 TABLET, FILM COATED ORAL at 08:34

## 2024-04-06 RX ADMIN — TRAMADOL HYDROCHLORIDE 50 MG: 50 TABLET ORAL at 14:04

## 2024-04-06 RX ADMIN — ATORVASTATIN CALCIUM 40 MG: 40 TABLET, FILM COATED ORAL at 08:33

## 2024-04-06 RX ADMIN — HYDROCODONE BITARTRATE AND ACETAMINOPHEN 1 TABLET: 5; 325 TABLET ORAL at 21:04

## 2024-04-06 RX ADMIN — Medication 1 CAPSULE: at 08:33

## 2024-04-06 RX ADMIN — LINEZOLID 600 MG: 600 TABLET, FILM COATED ORAL at 08:33

## 2024-04-06 RX ADMIN — METRONIDAZOLE 500 MG: 500 TABLET ORAL at 15:11

## 2024-04-06 RX ADMIN — METRONIDAZOLE 500 MG: 500 TABLET ORAL at 06:16

## 2024-04-06 RX ADMIN — CARVEDILOL 3.12 MG: 3.12 TABLET, FILM COATED ORAL at 08:32

## 2024-04-06 RX ADMIN — ENOXAPARIN SODIUM 120 MG: 150 INJECTION SUBCUTANEOUS at 21:04

## 2024-04-06 RX ADMIN — FERROUS SULFATE TAB EC 324 MG (65 MG FE EQUIVALENT) 325 MG: 324 (65 FE) TABLET DELAYED RESPONSE at 08:32

## 2024-04-06 RX ADMIN — Medication 1 CAPSULE: at 16:53

## 2024-04-06 ASSESSMENT — PAIN DESCRIPTION - ORIENTATION
ORIENTATION: RIGHT

## 2024-04-06 ASSESSMENT — PAIN SCALES - GENERAL
PAINLEVEL_OUTOF10: 8
PAINLEVEL_OUTOF10: 3
PAINLEVEL_OUTOF10: 5
PAINLEVEL_OUTOF10: 8
PAINLEVEL_OUTOF10: 8

## 2024-04-06 ASSESSMENT — PAIN DESCRIPTION - PAIN TYPE: TYPE: ACUTE PAIN;SURGICAL PAIN

## 2024-04-06 ASSESSMENT — PAIN - FUNCTIONAL ASSESSMENT
PAIN_FUNCTIONAL_ASSESSMENT: PREVENTS OR INTERFERES SOME ACTIVE ACTIVITIES AND ADLS

## 2024-04-06 ASSESSMENT — PAIN DESCRIPTION - DESCRIPTORS
DESCRIPTORS: THROBBING;ACHING
DESCRIPTORS: ACHING;DISCOMFORT;SORE
DESCRIPTORS: ACHING;DISCOMFORT;SORE

## 2024-04-06 ASSESSMENT — PAIN DESCRIPTION - ONSET: ONSET: ON-GOING

## 2024-04-06 ASSESSMENT — PAIN DESCRIPTION - LOCATION
LOCATION: FOOT

## 2024-04-06 ASSESSMENT — PAIN SCALES - WONG BAKER: WONGBAKER_NUMERICALRESPONSE: 0;2

## 2024-04-06 ASSESSMENT — PAIN DESCRIPTION - FREQUENCY: FREQUENCY: INTERMITTENT

## 2024-04-06 NOTE — CARE COORDINATION
Noted dc order, reviewed chart.  Pt currently needs SNF per therapy notes but pt doesn't have insurance to cover it.  Per notes, pt isn't eligible for medicaid but is in process of getting medicare.  SNF referrals have been sent but no accepting at this time.  SW will work on finding SNF as time allows this weekend.  Electronically signed by MARYCRUZ Diaz on 4/6/2024 at 9:36 AM

## 2024-04-07 LAB
BACTERIA SPEC AEROBE CULT: ABNORMAL
BACTERIA SPEC ANAEROBE CULT: ABNORMAL
GLUCOSE BLD-MCNC: 152 MG/DL (ref 70–99)
GLUCOSE BLD-MCNC: 191 MG/DL (ref 70–99)
GRAM STN SPEC: ABNORMAL
ORGANISM: ABNORMAL
PERFORMED ON: ABNORMAL
PERFORMED ON: ABNORMAL

## 2024-04-07 PROCEDURE — 2580000003 HC RX 258

## 2024-04-07 PROCEDURE — 6360000002 HC RX W HCPCS

## 2024-04-07 PROCEDURE — 6370000000 HC RX 637 (ALT 250 FOR IP)

## 2024-04-07 PROCEDURE — 6370000000 HC RX 637 (ALT 250 FOR IP): Performed by: INTERNAL MEDICINE

## 2024-04-07 PROCEDURE — 94760 N-INVAS EAR/PLS OXIMETRY 1: CPT

## 2024-04-07 PROCEDURE — 94660 CPAP INITIATION&MGMT: CPT

## 2024-04-07 PROCEDURE — 1200000000 HC SEMI PRIVATE

## 2024-04-07 RX ORDER — ENOXAPARIN SODIUM 150 MG/ML
120 INJECTION SUBCUTANEOUS 2 TIMES DAILY
Status: DISCONTINUED | OUTPATIENT
Start: 2024-04-07 | End: 2024-04-10 | Stop reason: ALTCHOICE

## 2024-04-07 RX ADMIN — FINASTERIDE 5 MG: 5 TABLET, FILM COATED ORAL at 08:27

## 2024-04-07 RX ADMIN — SIMETHICONE 80 MG: 80 TABLET, CHEWABLE ORAL at 21:35

## 2024-04-07 RX ADMIN — SODIUM CHLORIDE, PRESERVATIVE FREE 10 ML: 5 INJECTION INTRAVENOUS at 08:29

## 2024-04-07 RX ADMIN — HYDROCODONE BITARTRATE AND ACETAMINOPHEN 1 TABLET: 5; 325 TABLET ORAL at 01:30

## 2024-04-07 RX ADMIN — HYDROCODONE BITARTRATE AND ACETAMINOPHEN 1 TABLET: 5; 325 TABLET ORAL at 21:35

## 2024-04-07 RX ADMIN — METRONIDAZOLE 500 MG: 500 TABLET ORAL at 06:17

## 2024-04-07 RX ADMIN — LINEZOLID 600 MG: 600 TABLET, FILM COATED ORAL at 08:26

## 2024-04-07 RX ADMIN — HYDROCODONE BITARTRATE AND ACETAMINOPHEN 1 TABLET: 5; 325 TABLET ORAL at 06:17

## 2024-04-07 RX ADMIN — METRONIDAZOLE 500 MG: 500 TABLET ORAL at 21:35

## 2024-04-07 RX ADMIN — TAMSULOSIN HYDROCHLORIDE 0.4 MG: 0.4 CAPSULE ORAL at 08:26

## 2024-04-07 RX ADMIN — Medication 1 CAPSULE: at 08:26

## 2024-04-07 RX ADMIN — LISINOPRIL 2.5 MG: 5 TABLET ORAL at 08:26

## 2024-04-07 RX ADMIN — ATORVASTATIN CALCIUM 40 MG: 40 TABLET, FILM COATED ORAL at 08:26

## 2024-04-07 RX ADMIN — Medication 1 CAPSULE: at 18:09

## 2024-04-07 RX ADMIN — SODIUM CHLORIDE, PRESERVATIVE FREE 10 ML: 5 INJECTION INTRAVENOUS at 21:36

## 2024-04-07 RX ADMIN — ENOXAPARIN SODIUM 120 MG: 150 INJECTION SUBCUTANEOUS at 12:52

## 2024-04-07 RX ADMIN — LINEZOLID 600 MG: 600 TABLET, FILM COATED ORAL at 21:35

## 2024-04-07 RX ADMIN — METRONIDAZOLE 500 MG: 500 TABLET ORAL at 14:49

## 2024-04-07 RX ADMIN — AMIODARONE HYDROCHLORIDE 100 MG: 200 TABLET ORAL at 08:26

## 2024-04-07 RX ADMIN — CARVEDILOL 3.12 MG: 3.12 TABLET, FILM COATED ORAL at 08:26

## 2024-04-07 RX ADMIN — LEVOTHYROXINE SODIUM 25 MCG: 0.03 TABLET ORAL at 06:17

## 2024-04-07 RX ADMIN — CARVEDILOL 3.12 MG: 3.12 TABLET, FILM COATED ORAL at 18:09

## 2024-04-07 ASSESSMENT — PAIN DESCRIPTION - DESCRIPTORS
DESCRIPTORS: SHARP
DESCRIPTORS: THROBBING
DESCRIPTORS: SORE

## 2024-04-07 ASSESSMENT — PAIN DESCRIPTION - LOCATION
LOCATION: FOOT

## 2024-04-07 ASSESSMENT — PAIN SCALES - GENERAL
PAINLEVEL_OUTOF10: 7
PAINLEVEL_OUTOF10: 4
PAINLEVEL_OUTOF10: 7
PAINLEVEL_OUTOF10: 8

## 2024-04-07 ASSESSMENT — PAIN DESCRIPTION - ORIENTATION
ORIENTATION: RIGHT

## 2024-04-07 ASSESSMENT — PAIN - FUNCTIONAL ASSESSMENT
PAIN_FUNCTIONAL_ASSESSMENT: ACTIVITIES ARE NOT PREVENTED
PAIN_FUNCTIONAL_ASSESSMENT: ACTIVITIES ARE NOT PREVENTED

## 2024-04-07 NOTE — CARE COORDINATION
Ese spoke with Elizabeth of [facility], who informed that his wife did tour the facility, and informed that his MCR was a anthem plan, and that she will get in touch with ESE about if wanting to go to [place] or not. nAaya stated that she could check tomorrow the anthem, plan and see if it goes through and will approve.   ESE following for needs.     Terrell Blankenship, SISI, [facility] Social Work Case Management   Phone: [phone]  Fax: [phone]

## 2024-04-08 LAB
GLUCOSE BLD-MCNC: 135 MG/DL (ref 70–99)
GLUCOSE BLD-MCNC: 153 MG/DL (ref 70–99)
GLUCOSE BLD-MCNC: 191 MG/DL (ref 70–99)
GLUCOSE BLD-MCNC: 99 MG/DL (ref 70–99)
PERFORMED ON: ABNORMAL
PERFORMED ON: NORMAL

## 2024-04-08 PROCEDURE — 6370000000 HC RX 637 (ALT 250 FOR IP)

## 2024-04-08 PROCEDURE — 94660 CPAP INITIATION&MGMT: CPT

## 2024-04-08 PROCEDURE — 1200000000 HC SEMI PRIVATE

## 2024-04-08 PROCEDURE — 6370000000 HC RX 637 (ALT 250 FOR IP): Performed by: INTERNAL MEDICINE

## 2024-04-08 PROCEDURE — 2700000000 HC OXYGEN THERAPY PER DAY

## 2024-04-08 PROCEDURE — 2580000003 HC RX 258

## 2024-04-08 PROCEDURE — 6360000002 HC RX W HCPCS

## 2024-04-08 PROCEDURE — 94760 N-INVAS EAR/PLS OXIMETRY 1: CPT

## 2024-04-08 RX ADMIN — LEVOTHYROXINE SODIUM 25 MCG: 0.03 TABLET ORAL at 05:47

## 2024-04-08 RX ADMIN — FERROUS SULFATE TAB EC 324 MG (65 MG FE EQUIVALENT) 325 MG: 324 (65 FE) TABLET DELAYED RESPONSE at 08:40

## 2024-04-08 RX ADMIN — LISINOPRIL 2.5 MG: 5 TABLET ORAL at 08:40

## 2024-04-08 RX ADMIN — ENOXAPARIN SODIUM 120 MG: 150 INJECTION SUBCUTANEOUS at 08:52

## 2024-04-08 RX ADMIN — FINASTERIDE 5 MG: 5 TABLET, FILM COATED ORAL at 08:41

## 2024-04-08 RX ADMIN — ATORVASTATIN CALCIUM 40 MG: 40 TABLET, FILM COATED ORAL at 08:40

## 2024-04-08 RX ADMIN — HYDROCODONE BITARTRATE AND ACETAMINOPHEN 1 TABLET: 5; 325 TABLET ORAL at 21:40

## 2024-04-08 RX ADMIN — METRONIDAZOLE 500 MG: 500 TABLET ORAL at 14:34

## 2024-04-08 RX ADMIN — TAMSULOSIN HYDROCHLORIDE 0.4 MG: 0.4 CAPSULE ORAL at 08:40

## 2024-04-08 RX ADMIN — CARVEDILOL 3.12 MG: 3.12 TABLET, FILM COATED ORAL at 17:28

## 2024-04-08 RX ADMIN — Medication 1 CAPSULE: at 17:29

## 2024-04-08 RX ADMIN — HYDROCODONE BITARTRATE AND ACETAMINOPHEN 1 TABLET: 5; 325 TABLET ORAL at 12:49

## 2024-04-08 RX ADMIN — METRONIDAZOLE 500 MG: 500 TABLET ORAL at 05:47

## 2024-04-08 RX ADMIN — LINEZOLID 600 MG: 600 TABLET, FILM COATED ORAL at 08:40

## 2024-04-08 RX ADMIN — SODIUM CHLORIDE, PRESERVATIVE FREE 10 ML: 5 INJECTION INTRAVENOUS at 21:47

## 2024-04-08 RX ADMIN — Medication 1 CAPSULE: at 08:40

## 2024-04-08 RX ADMIN — ENOXAPARIN SODIUM 120 MG: 150 INJECTION SUBCUTANEOUS at 21:40

## 2024-04-08 RX ADMIN — LINEZOLID 600 MG: 600 TABLET, FILM COATED ORAL at 21:40

## 2024-04-08 RX ADMIN — METRONIDAZOLE 500 MG: 500 TABLET ORAL at 21:40

## 2024-04-08 RX ADMIN — CARVEDILOL 3.12 MG: 3.12 TABLET, FILM COATED ORAL at 08:40

## 2024-04-08 RX ADMIN — SODIUM CHLORIDE, PRESERVATIVE FREE 10 ML: 5 INJECTION INTRAVENOUS at 08:41

## 2024-04-08 RX ADMIN — AMIODARONE HYDROCHLORIDE 100 MG: 200 TABLET ORAL at 08:40

## 2024-04-08 RX ADMIN — HYDROCODONE BITARTRATE AND ACETAMINOPHEN 1 TABLET: 5; 325 TABLET ORAL at 05:47

## 2024-04-08 ASSESSMENT — PAIN SCALES - GENERAL
PAINLEVEL_OUTOF10: 7
PAINLEVEL_OUTOF10: 4
PAINLEVEL_OUTOF10: 0
PAINLEVEL_OUTOF10: 7
PAINLEVEL_OUTOF10: 7

## 2024-04-08 ASSESSMENT — PAIN DESCRIPTION - DESCRIPTORS
DESCRIPTORS: DISCOMFORT
DESCRIPTORS: SORE
DESCRIPTORS: SORE

## 2024-04-08 ASSESSMENT — PAIN SCALES - WONG BAKER: WONGBAKER_NUMERICALRESPONSE: HURTS A LITTLE BIT

## 2024-04-08 ASSESSMENT — PAIN DESCRIPTION - ORIENTATION
ORIENTATION: LEFT
ORIENTATION: RIGHT
ORIENTATION: RIGHT

## 2024-04-08 ASSESSMENT — PAIN DESCRIPTION - LOCATION
LOCATION: FOOT
LOCATION: FOOT

## 2024-04-08 NOTE — CARE COORDINATION
Chart review done, nursing rounds completed. Was able to check with registration,  and they did inform that part B has populated, but part A has not.   Still waiting.  Informed patient to check to see when part A might populate.   Sent referrals to SNFs in the area of home as requested.      [facility]- no  [facility]- reviewing; however they do need his part A. Asked wife for an old MCR ID number to see if anything populates.   [facility]- sent  [facility]- sent     Barriers:   insurance for snf  Cannot go home; wc does not fit in doorways, or have any family he can dc too.    Weight bearing status? Can he bear weight to move with in home; I.e. stairs (2), bathroom and doorways?   Does not meet criteria for [facility].       Terrell Blankenship LMSW, [facility] Social Work Case Management   Phone: [phone]  Fax: [phone]

## 2024-04-09 LAB
ALBUMIN SERPL-MCNC: 2.6 G/DL (ref 3.4–5)
ALBUMIN/GLOB SERPL: 0.8 {RATIO} (ref 1.1–2.2)
ALP SERPL-CCNC: 105 U/L (ref 40–129)
ALT SERPL-CCNC: 11 U/L (ref 10–40)
ANION GAP SERPL CALCULATED.3IONS-SCNC: 8 MMOL/L (ref 3–16)
AST SERPL-CCNC: 11 U/L (ref 15–37)
BILIRUB SERPL-MCNC: <0.2 MG/DL (ref 0–1)
BUN SERPL-MCNC: 13 MG/DL (ref 7–20)
CALCIUM SERPL-MCNC: 8.3 MG/DL (ref 8.3–10.6)
CHLORIDE SERPL-SCNC: 105 MMOL/L (ref 99–110)
CO2 SERPL-SCNC: 28 MMOL/L (ref 21–32)
CREAT SERPL-MCNC: 0.9 MG/DL (ref 0.8–1.3)
DEPRECATED RDW RBC AUTO: 15 % (ref 12.4–15.4)
GFR SERPLBLD CREATININE-BSD FMLA CKD-EPI: >90 ML/MIN/{1.73_M2}
GLUCOSE BLD-MCNC: 121 MG/DL (ref 70–99)
GLUCOSE BLD-MCNC: 129 MG/DL (ref 70–99)
GLUCOSE BLD-MCNC: 269 MG/DL (ref 70–99)
GLUCOSE BLD-MCNC: 95 MG/DL (ref 70–99)
GLUCOSE SERPL-MCNC: 107 MG/DL (ref 70–99)
HCT VFR BLD AUTO: 27.6 % (ref 40.5–52.5)
HGB BLD-MCNC: 8.9 G/DL (ref 13.5–17.5)
MCH RBC QN AUTO: 27.6 PG (ref 26–34)
MCHC RBC AUTO-ENTMCNC: 32.3 G/DL (ref 31–36)
MCV RBC AUTO: 85.3 FL (ref 80–100)
PERFORMED ON: ABNORMAL
PERFORMED ON: NORMAL
PLATELET # BLD AUTO: 157 K/UL (ref 135–450)
PMV BLD AUTO: 9.5 FL (ref 5–10.5)
POTASSIUM SERPL-SCNC: 4.5 MMOL/L (ref 3.5–5.1)
PROT SERPL-MCNC: 5.9 G/DL (ref 6.4–8.2)
RBC # BLD AUTO: 3.24 M/UL (ref 4.2–5.9)
SODIUM SERPL-SCNC: 141 MMOL/L (ref 136–145)
WBC # BLD AUTO: 4 K/UL (ref 4–11)

## 2024-04-09 PROCEDURE — 6360000002 HC RX W HCPCS

## 2024-04-09 PROCEDURE — 6370000000 HC RX 637 (ALT 250 FOR IP): Performed by: INTERNAL MEDICINE

## 2024-04-09 PROCEDURE — 6370000000 HC RX 637 (ALT 250 FOR IP)

## 2024-04-09 PROCEDURE — 94760 N-INVAS EAR/PLS OXIMETRY 1: CPT

## 2024-04-09 PROCEDURE — 2580000003 HC RX 258

## 2024-04-09 PROCEDURE — 97530 THERAPEUTIC ACTIVITIES: CPT

## 2024-04-09 PROCEDURE — 2700000000 HC OXYGEN THERAPY PER DAY

## 2024-04-09 PROCEDURE — 85027 COMPLETE CBC AUTOMATED: CPT

## 2024-04-09 PROCEDURE — 36415 COLL VENOUS BLD VENIPUNCTURE: CPT

## 2024-04-09 PROCEDURE — 1200000000 HC SEMI PRIVATE

## 2024-04-09 PROCEDURE — 94660 CPAP INITIATION&MGMT: CPT

## 2024-04-09 PROCEDURE — 80053 COMPREHEN METABOLIC PANEL: CPT

## 2024-04-09 RX ADMIN — LISINOPRIL 2.5 MG: 5 TABLET ORAL at 09:12

## 2024-04-09 RX ADMIN — LEVOTHYROXINE SODIUM 25 MCG: 0.03 TABLET ORAL at 06:37

## 2024-04-09 RX ADMIN — CARVEDILOL 3.12 MG: 3.12 TABLET, FILM COATED ORAL at 09:12

## 2024-04-09 RX ADMIN — LINEZOLID 600 MG: 600 TABLET, FILM COATED ORAL at 09:12

## 2024-04-09 RX ADMIN — TRAMADOL HYDROCHLORIDE 50 MG: 50 TABLET ORAL at 21:25

## 2024-04-09 RX ADMIN — SODIUM CHLORIDE, PRESERVATIVE FREE 10 ML: 5 INJECTION INTRAVENOUS at 09:13

## 2024-04-09 RX ADMIN — LINEZOLID 600 MG: 600 TABLET, FILM COATED ORAL at 21:25

## 2024-04-09 RX ADMIN — SIMETHICONE 80 MG: 80 TABLET, CHEWABLE ORAL at 11:17

## 2024-04-09 RX ADMIN — ENOXAPARIN SODIUM 120 MG: 150 INJECTION SUBCUTANEOUS at 09:11

## 2024-04-09 RX ADMIN — TAMSULOSIN HYDROCHLORIDE 0.4 MG: 0.4 CAPSULE ORAL at 09:12

## 2024-04-09 RX ADMIN — AMIODARONE HYDROCHLORIDE 100 MG: 200 TABLET ORAL at 09:12

## 2024-04-09 RX ADMIN — METRONIDAZOLE 500 MG: 500 TABLET ORAL at 21:25

## 2024-04-09 RX ADMIN — METRONIDAZOLE 500 MG: 500 TABLET ORAL at 13:04

## 2024-04-09 RX ADMIN — HYDROCODONE BITARTRATE AND ACETAMINOPHEN 1 TABLET: 5; 325 TABLET ORAL at 13:04

## 2024-04-09 RX ADMIN — ATORVASTATIN CALCIUM 40 MG: 40 TABLET, FILM COATED ORAL at 09:12

## 2024-04-09 RX ADMIN — HYDROCODONE BITARTRATE AND ACETAMINOPHEN 1 TABLET: 5; 325 TABLET ORAL at 02:28

## 2024-04-09 RX ADMIN — FINASTERIDE 5 MG: 5 TABLET, FILM COATED ORAL at 09:13

## 2024-04-09 RX ADMIN — ENOXAPARIN SODIUM 120 MG: 150 INJECTION SUBCUTANEOUS at 21:24

## 2024-04-09 RX ADMIN — METRONIDAZOLE 500 MG: 500 TABLET ORAL at 06:37

## 2024-04-09 RX ADMIN — CARVEDILOL 3.12 MG: 3.12 TABLET, FILM COATED ORAL at 17:22

## 2024-04-09 RX ADMIN — Medication 10 ML: at 21:33

## 2024-04-09 RX ADMIN — SIMETHICONE 80 MG: 80 TABLET, CHEWABLE ORAL at 21:25

## 2024-04-09 RX ADMIN — HYDROCODONE BITARTRATE AND ACETAMINOPHEN 1 TABLET: 5; 325 TABLET ORAL at 06:37

## 2024-04-09 RX ADMIN — Medication 1 CAPSULE: at 09:12

## 2024-04-09 RX ADMIN — Medication 1 CAPSULE: at 17:22

## 2024-04-09 ASSESSMENT — PAIN DESCRIPTION - ORIENTATION
ORIENTATION: RIGHT
ORIENTATION: RIGHT

## 2024-04-09 ASSESSMENT — PAIN SCALES - WONG BAKER
WONGBAKER_NUMERICALRESPONSE: HURTS A LITTLE BIT
WONGBAKER_NUMERICALRESPONSE: HURTS A LITTLE BIT

## 2024-04-09 ASSESSMENT — PAIN DESCRIPTION - LOCATION
LOCATION: LEG
LOCATION: ANKLE

## 2024-04-09 ASSESSMENT — PAIN SCALES - GENERAL
PAINLEVEL_OUTOF10: 7
PAINLEVEL_OUTOF10: 0
PAINLEVEL_OUTOF10: 7

## 2024-04-09 ASSESSMENT — PAIN DESCRIPTION - DESCRIPTORS
DESCRIPTORS: ACHING
DESCRIPTORS: SHARP

## 2024-04-09 NOTE — CARE COORDINATION
Patient provide this worker with [place] [employer] Care however it appears that it was issued in 2020. Copy of card sent to [facility] and admitting to see if [employer] Medicare is now active.   Electronically signed by MARYCRUZ Aviles on 4/9/2024 at 2:13 PM  [phone]

## 2024-04-10 LAB
GLUCOSE BLD-MCNC: 100 MG/DL (ref 70–99)
GLUCOSE BLD-MCNC: 119 MG/DL (ref 70–99)
GLUCOSE BLD-MCNC: 121 MG/DL (ref 70–99)
GLUCOSE BLD-MCNC: 175 MG/DL (ref 70–99)
PERFORMED ON: ABNORMAL

## 2024-04-10 PROCEDURE — 6370000000 HC RX 637 (ALT 250 FOR IP)

## 2024-04-10 PROCEDURE — 6360000002 HC RX W HCPCS

## 2024-04-10 PROCEDURE — 1200000000 HC SEMI PRIVATE

## 2024-04-10 PROCEDURE — 2580000003 HC RX 258

## 2024-04-10 PROCEDURE — 6370000000 HC RX 637 (ALT 250 FOR IP): Performed by: INTERNAL MEDICINE

## 2024-04-10 PROCEDURE — 94660 CPAP INITIATION&MGMT: CPT

## 2024-04-10 PROCEDURE — 2700000000 HC OXYGEN THERAPY PER DAY

## 2024-04-10 PROCEDURE — 94761 N-INVAS EAR/PLS OXIMETRY MLT: CPT

## 2024-04-10 RX ADMIN — METRONIDAZOLE 500 MG: 500 TABLET ORAL at 05:45

## 2024-04-10 RX ADMIN — LINEZOLID 600 MG: 600 TABLET, FILM COATED ORAL at 09:01

## 2024-04-10 RX ADMIN — SODIUM CHLORIDE, PRESERVATIVE FREE 10 ML: 5 INJECTION INTRAVENOUS at 08:58

## 2024-04-10 RX ADMIN — METRONIDAZOLE 500 MG: 500 TABLET ORAL at 21:45

## 2024-04-10 RX ADMIN — CARVEDILOL 3.12 MG: 3.12 TABLET, FILM COATED ORAL at 17:10

## 2024-04-10 RX ADMIN — TRAMADOL HYDROCHLORIDE 50 MG: 50 TABLET ORAL at 21:45

## 2024-04-10 RX ADMIN — Medication 1 CAPSULE: at 09:01

## 2024-04-10 RX ADMIN — APIXABAN 5 MG: 5 TABLET, FILM COATED ORAL at 21:45

## 2024-04-10 RX ADMIN — LISINOPRIL 2.5 MG: 5 TABLET ORAL at 08:59

## 2024-04-10 RX ADMIN — LEVOTHYROXINE SODIUM 25 MCG: 0.03 TABLET ORAL at 05:45

## 2024-04-10 RX ADMIN — SIMETHICONE 80 MG: 80 TABLET, CHEWABLE ORAL at 21:45

## 2024-04-10 RX ADMIN — FINASTERIDE 5 MG: 5 TABLET, FILM COATED ORAL at 09:02

## 2024-04-10 RX ADMIN — LINEZOLID 600 MG: 600 TABLET, FILM COATED ORAL at 21:45

## 2024-04-10 RX ADMIN — ENOXAPARIN SODIUM 120 MG: 150 INJECTION SUBCUTANEOUS at 08:57

## 2024-04-10 RX ADMIN — SODIUM CHLORIDE, PRESERVATIVE FREE 10 ML: 5 INJECTION INTRAVENOUS at 21:47

## 2024-04-10 RX ADMIN — AMIODARONE HYDROCHLORIDE 100 MG: 200 TABLET ORAL at 08:59

## 2024-04-10 RX ADMIN — SIMETHICONE 80 MG: 80 TABLET, CHEWABLE ORAL at 06:30

## 2024-04-10 RX ADMIN — ATORVASTATIN CALCIUM 40 MG: 40 TABLET, FILM COATED ORAL at 08:59

## 2024-04-10 RX ADMIN — FERROUS SULFATE TAB EC 324 MG (65 MG FE EQUIVALENT) 325 MG: 324 (65 FE) TABLET DELAYED RESPONSE at 09:02

## 2024-04-10 RX ADMIN — HYDROCODONE BITARTRATE AND ACETAMINOPHEN 1 TABLET: 5; 325 TABLET ORAL at 02:52

## 2024-04-10 RX ADMIN — METRONIDAZOLE 500 MG: 500 TABLET ORAL at 13:20

## 2024-04-10 RX ADMIN — TAMSULOSIN HYDROCHLORIDE 0.4 MG: 0.4 CAPSULE ORAL at 08:58

## 2024-04-10 RX ADMIN — CARVEDILOL 3.12 MG: 3.12 TABLET, FILM COATED ORAL at 08:58

## 2024-04-10 RX ADMIN — Medication 1 CAPSULE: at 17:10

## 2024-04-10 ASSESSMENT — PAIN SCALES - GENERAL: PAINLEVEL_OUTOF10: 8

## 2024-04-10 NOTE — CARE COORDINATION
Spoke with Prabha at [facility] who states that patient is still only has Medicare part B. Prabha recommended making referral to [facility] ([facility]). Referral made to see if facility can accept. Will need to discuss with family/patient if facility is able to accept.   Electronically signed by MARYCRUZ Aviles on 4/10/2024 at 5:13 PM  [phone]

## 2024-04-10 NOTE — CARE COORDINATION
[place] card provided by patient. Insurance card faxed to admitting and [facility].   Electronically signed by MARYCRUZ Aviles on 4/10/2024 at 2:46 PM  [phone]

## 2024-04-11 LAB
GLUCOSE BLD-MCNC: 108 MG/DL (ref 70–99)
GLUCOSE BLD-MCNC: 119 MG/DL (ref 70–99)
GLUCOSE BLD-MCNC: 122 MG/DL (ref 70–99)
GLUCOSE BLD-MCNC: 81 MG/DL (ref 70–99)
PERFORMED ON: ABNORMAL
PERFORMED ON: NORMAL

## 2024-04-11 PROCEDURE — 6370000000 HC RX 637 (ALT 250 FOR IP)

## 2024-04-11 PROCEDURE — 6370000000 HC RX 637 (ALT 250 FOR IP): Performed by: INTERNAL MEDICINE

## 2024-04-11 PROCEDURE — 94761 N-INVAS EAR/PLS OXIMETRY MLT: CPT

## 2024-04-11 PROCEDURE — 97110 THERAPEUTIC EXERCISES: CPT

## 2024-04-11 PROCEDURE — 2700000000 HC OXYGEN THERAPY PER DAY

## 2024-04-11 PROCEDURE — 94660 CPAP INITIATION&MGMT: CPT

## 2024-04-11 PROCEDURE — 1200000000 HC SEMI PRIVATE

## 2024-04-11 PROCEDURE — 97530 THERAPEUTIC ACTIVITIES: CPT

## 2024-04-11 RX ADMIN — HYDROCODONE BITARTRATE AND ACETAMINOPHEN 1 TABLET: 5; 325 TABLET ORAL at 00:12

## 2024-04-11 RX ADMIN — APIXABAN 5 MG: 5 TABLET, FILM COATED ORAL at 08:26

## 2024-04-11 RX ADMIN — ATORVASTATIN CALCIUM 40 MG: 40 TABLET, FILM COATED ORAL at 08:26

## 2024-04-11 RX ADMIN — TRAMADOL HYDROCHLORIDE 50 MG: 50 TABLET ORAL at 23:16

## 2024-04-11 RX ADMIN — DOCUSATE SODIUM 100 MG: 100 CAPSULE, LIQUID FILLED ORAL at 10:25

## 2024-04-11 RX ADMIN — METRONIDAZOLE 500 MG: 500 TABLET ORAL at 13:47

## 2024-04-11 RX ADMIN — FINASTERIDE 5 MG: 5 TABLET, FILM COATED ORAL at 08:26

## 2024-04-11 RX ADMIN — LISINOPRIL 2.5 MG: 5 TABLET ORAL at 08:27

## 2024-04-11 RX ADMIN — CARVEDILOL 3.12 MG: 3.12 TABLET, FILM COATED ORAL at 08:26

## 2024-04-11 RX ADMIN — TAMSULOSIN HYDROCHLORIDE 0.4 MG: 0.4 CAPSULE ORAL at 08:26

## 2024-04-11 RX ADMIN — AMIODARONE HYDROCHLORIDE 100 MG: 200 TABLET ORAL at 08:26

## 2024-04-11 RX ADMIN — HYDROCODONE BITARTRATE AND ACETAMINOPHEN 1 TABLET: 5; 325 TABLET ORAL at 21:22

## 2024-04-11 RX ADMIN — Medication 1 CAPSULE: at 08:26

## 2024-04-11 RX ADMIN — LEVOTHYROXINE SODIUM 25 MCG: 0.03 TABLET ORAL at 06:14

## 2024-04-11 RX ADMIN — APIXABAN 5 MG: 5 TABLET, FILM COATED ORAL at 21:24

## 2024-04-11 RX ADMIN — TRAMADOL HYDROCHLORIDE 50 MG: 50 TABLET ORAL at 06:17

## 2024-04-11 RX ADMIN — LINEZOLID 600 MG: 600 TABLET, FILM COATED ORAL at 21:24

## 2024-04-11 RX ADMIN — LINEZOLID 600 MG: 600 TABLET, FILM COATED ORAL at 08:26

## 2024-04-11 RX ADMIN — HYDROCODONE BITARTRATE AND ACETAMINOPHEN 1 TABLET: 5; 325 TABLET ORAL at 13:51

## 2024-04-11 RX ADMIN — METRONIDAZOLE 500 MG: 500 TABLET ORAL at 06:14

## 2024-04-11 RX ADMIN — Medication 1 CAPSULE: at 18:34

## 2024-04-11 RX ADMIN — METRONIDAZOLE 500 MG: 500 TABLET ORAL at 21:23

## 2024-04-11 ASSESSMENT — PAIN SCALES - GENERAL
PAINLEVEL_OUTOF10: 7
PAINLEVEL_OUTOF10: 8
PAINLEVEL_OUTOF10: 3
PAINLEVEL_OUTOF10: 8
PAINLEVEL_OUTOF10: 7

## 2024-04-11 ASSESSMENT — PAIN DESCRIPTION - LOCATION
LOCATION: FOOT
LOCATION: FOOT
LOCATION: LEG

## 2024-04-11 ASSESSMENT — PAIN DESCRIPTION - DESCRIPTORS
DESCRIPTORS: BURNING
DESCRIPTORS: ACHING
DESCRIPTORS: STABBING

## 2024-04-11 ASSESSMENT — PAIN DESCRIPTION - ORIENTATION
ORIENTATION: RIGHT
ORIENTATION: RIGHT

## 2024-04-11 ASSESSMENT — PAIN SCALES - WONG BAKER: WONGBAKER_NUMERICALRESPONSE: HURTS A LITTLE BIT

## 2024-04-11 NOTE — CARE COORDINATION
Discharge Planning:    After discussion of discharge planning challenges with Case Managerment Director and Manager, this RN CM placed referral to [facility] in an effort to explore all discharge opportunities for this patient. [facility] reviewing patient at this time and will reach back out with determination.    Call placed to Libby, admissions coordinator with [facility], to check on status of referral placed yesterday. Left message requesting return call; awaiting response at this time.    CM to provide updates to patient as soon as possible.    Electronically signed by LYNN CHOPRA RN on 4/11/2024 at 12:16 PM    This RN CM spoke with patient at length re: insurance issues. It was determined that patient has Medicare part B and [place] Medicare Preferred PPO. Patient verbalized agreement with referral being placed to [facility]. Updated insurance information provided to [facility] rep. Patient's benefits under review at this time. CM to continue to follow.    Received call back from Libby with [facility]. Patient is OON with [facility] and [facility]. Updated insurance information provided to Libby as well and she will send to other facilities that might be in-network, in case SNF level of care is needed.    Electronically signed by LYNN CHOPRA RN on 4/11/2024 at 2:13 PM    Call placed to Noemi, clinical liaison with [facility], who confirmed that patient has been accepted and a precert has been started. CM to continue to follow.    Electronically signed by LYNN CHOPRA RN on 4/11/2024 at 4:23 PM

## 2024-04-12 LAB
GLUCOSE BLD-MCNC: 105 MG/DL (ref 70–99)
GLUCOSE BLD-MCNC: 124 MG/DL (ref 70–99)
GLUCOSE BLD-MCNC: 140 MG/DL (ref 70–99)
GLUCOSE BLD-MCNC: 98 MG/DL (ref 70–99)
PERFORMED ON: ABNORMAL
PERFORMED ON: NORMAL

## 2024-04-12 PROCEDURE — 6370000000 HC RX 637 (ALT 250 FOR IP)

## 2024-04-12 PROCEDURE — 6370000000 HC RX 637 (ALT 250 FOR IP): Performed by: INTERNAL MEDICINE

## 2024-04-12 PROCEDURE — 1200000000 HC SEMI PRIVATE

## 2024-04-12 PROCEDURE — 94761 N-INVAS EAR/PLS OXIMETRY MLT: CPT

## 2024-04-12 PROCEDURE — 94660 CPAP INITIATION&MGMT: CPT

## 2024-04-12 RX ADMIN — ATORVASTATIN CALCIUM 40 MG: 40 TABLET, FILM COATED ORAL at 09:06

## 2024-04-12 RX ADMIN — SIMETHICONE 80 MG: 80 TABLET, CHEWABLE ORAL at 12:30

## 2024-04-12 RX ADMIN — TAMSULOSIN HYDROCHLORIDE 0.4 MG: 0.4 CAPSULE ORAL at 09:06

## 2024-04-12 RX ADMIN — LINEZOLID 600 MG: 600 TABLET, FILM COATED ORAL at 20:55

## 2024-04-12 RX ADMIN — HYDROCODONE BITARTRATE AND ACETAMINOPHEN 1 TABLET: 5; 325 TABLET ORAL at 22:09

## 2024-04-12 RX ADMIN — HYDROCODONE BITARTRATE AND ACETAMINOPHEN 1 TABLET: 5; 325 TABLET ORAL at 05:32

## 2024-04-12 RX ADMIN — HYDROCODONE BITARTRATE AND ACETAMINOPHEN 1 TABLET: 5; 325 TABLET ORAL at 12:30

## 2024-04-12 RX ADMIN — APIXABAN 5 MG: 5 TABLET, FILM COATED ORAL at 09:07

## 2024-04-12 RX ADMIN — LINEZOLID 600 MG: 600 TABLET, FILM COATED ORAL at 09:06

## 2024-04-12 RX ADMIN — DOCUSATE SODIUM 100 MG: 100 CAPSULE, LIQUID FILLED ORAL at 12:30

## 2024-04-12 RX ADMIN — TRAMADOL HYDROCHLORIDE 50 MG: 50 TABLET ORAL at 15:25

## 2024-04-12 RX ADMIN — Medication 1 CAPSULE: at 16:56

## 2024-04-12 RX ADMIN — METRONIDAZOLE 500 MG: 500 TABLET ORAL at 14:05

## 2024-04-12 RX ADMIN — FERROUS SULFATE TAB EC 324 MG (65 MG FE EQUIVALENT) 325 MG: 324 (65 FE) TABLET DELAYED RESPONSE at 09:06

## 2024-04-12 RX ADMIN — FINASTERIDE 5 MG: 5 TABLET, FILM COATED ORAL at 09:07

## 2024-04-12 RX ADMIN — LEVOTHYROXINE SODIUM 25 MCG: 0.03 TABLET ORAL at 05:29

## 2024-04-12 RX ADMIN — AMIODARONE HYDROCHLORIDE 100 MG: 200 TABLET ORAL at 09:06

## 2024-04-12 RX ADMIN — Medication 1 CAPSULE: at 09:06

## 2024-04-12 RX ADMIN — METRONIDAZOLE 500 MG: 500 TABLET ORAL at 05:29

## 2024-04-12 RX ADMIN — APIXABAN 5 MG: 5 TABLET, FILM COATED ORAL at 20:55

## 2024-04-12 RX ADMIN — LISINOPRIL 2.5 MG: 5 TABLET ORAL at 09:06

## 2024-04-12 RX ADMIN — CARVEDILOL 3.12 MG: 3.12 TABLET, FILM COATED ORAL at 09:06

## 2024-04-12 RX ADMIN — METRONIDAZOLE 500 MG: 500 TABLET ORAL at 20:55

## 2024-04-12 ASSESSMENT — PAIN DESCRIPTION - LOCATION
LOCATION: FOOT
LOCATION: FOOT;ANKLE

## 2024-04-12 ASSESSMENT — PAIN DESCRIPTION - DESCRIPTORS
DESCRIPTORS: BURNING;STABBING
DESCRIPTORS: STABBING;BURNING
DESCRIPTORS: BURNING;STABBING

## 2024-04-12 ASSESSMENT — PAIN SCALES - GENERAL
PAINLEVEL_OUTOF10: 7
PAINLEVEL_OUTOF10: 7
PAINLEVEL_OUTOF10: 5
PAINLEVEL_OUTOF10: 7
PAINLEVEL_OUTOF10: 7

## 2024-04-12 ASSESSMENT — PAIN DESCRIPTION - ORIENTATION
ORIENTATION: RIGHT

## 2024-04-12 NOTE — CARE COORDINATION
04/12/24 1400   IMM Letter   IMM Letter given to Patient/Family/Significant other/Guardian/POA/by: Provided to patient at bedside by Lynn Preciado RN CM. Education provided to patient, patient reported no questions and verbalized understanding. Patient aware of 4 hours allotted time to determine if they choose to pursue Medicare appeal process. Patient verbalized readiness for discharge.   IMM Letter date given: 04/12/24   IMM Letter time given: 1345     Electronically signed by LYNN PRECIADO RN on 4/12/2024 at 2:00 PM

## 2024-04-12 NOTE — CARE COORDINATION
Discharge Planning:    Call placed to Noemi, admissions coordinator with [facility], to check on status of precert. Noemi confirmed that patient's precert remains pending at this time. CM to continue to follow.    Electronically signed by LYNN CHOPRA RN on 4/12/2024 at 9:19 AM    This RN CM spoke with patient at bedside re: update on discharge plan. Patient verbalized understanding that a precert is pending for him to discharge to [facility]. CM to continue to follow.    Electronically signed by LYNN CHOPRA RN on 4/12/2024 at 1:59 PM

## 2024-04-13 LAB
CHOLEST SERPL-MCNC: 77 MG/DL (ref 0–199)
GLUCOSE BLD-MCNC: 109 MG/DL (ref 70–99)
GLUCOSE BLD-MCNC: 126 MG/DL (ref 70–99)
GLUCOSE BLD-MCNC: 130 MG/DL (ref 70–99)
GLUCOSE BLD-MCNC: 87 MG/DL (ref 70–99)
HDLC SERPL-MCNC: 48 MG/DL (ref 40–60)
LDLC SERPL CALC-MCNC: 22 MG/DL
PERFORMED ON: ABNORMAL
PERFORMED ON: NORMAL
TRIGL SERPL-MCNC: 35 MG/DL (ref 0–150)
VLDLC SERPL CALC-MCNC: 7 MG/DL

## 2024-04-13 PROCEDURE — 6370000000 HC RX 637 (ALT 250 FOR IP): Performed by: INTERNAL MEDICINE

## 2024-04-13 PROCEDURE — 6370000000 HC RX 637 (ALT 250 FOR IP)

## 2024-04-13 PROCEDURE — 94760 N-INVAS EAR/PLS OXIMETRY 1: CPT

## 2024-04-13 PROCEDURE — 36415 COLL VENOUS BLD VENIPUNCTURE: CPT

## 2024-04-13 PROCEDURE — 94660 CPAP INITIATION&MGMT: CPT

## 2024-04-13 PROCEDURE — 1200000000 HC SEMI PRIVATE

## 2024-04-13 PROCEDURE — 80061 LIPID PANEL: CPT

## 2024-04-13 RX ADMIN — HYDROCODONE BITARTRATE AND ACETAMINOPHEN 1 TABLET: 5; 325 TABLET ORAL at 13:32

## 2024-04-13 RX ADMIN — SIMETHICONE 80 MG: 80 TABLET, CHEWABLE ORAL at 13:31

## 2024-04-13 RX ADMIN — TRAMADOL HYDROCHLORIDE 50 MG: 50 TABLET ORAL at 05:41

## 2024-04-13 RX ADMIN — CARVEDILOL 3.12 MG: 3.12 TABLET, FILM COATED ORAL at 17:39

## 2024-04-13 RX ADMIN — LEVOTHYROXINE SODIUM 25 MCG: 0.03 TABLET ORAL at 05:41

## 2024-04-13 RX ADMIN — LISINOPRIL 2.5 MG: 5 TABLET ORAL at 09:24

## 2024-04-13 RX ADMIN — HYDROCODONE BITARTRATE AND ACETAMINOPHEN 1 TABLET: 5; 325 TABLET ORAL at 21:55

## 2024-04-13 RX ADMIN — METRONIDAZOLE 500 MG: 500 TABLET ORAL at 13:32

## 2024-04-13 RX ADMIN — LINEZOLID 600 MG: 600 TABLET, FILM COATED ORAL at 21:56

## 2024-04-13 RX ADMIN — APIXABAN 5 MG: 5 TABLET, FILM COATED ORAL at 21:56

## 2024-04-13 RX ADMIN — CARVEDILOL 3.12 MG: 3.12 TABLET, FILM COATED ORAL at 09:24

## 2024-04-13 RX ADMIN — METRONIDAZOLE 500 MG: 500 TABLET ORAL at 21:55

## 2024-04-13 RX ADMIN — APIXABAN 5 MG: 5 TABLET, FILM COATED ORAL at 09:24

## 2024-04-13 RX ADMIN — ATORVASTATIN CALCIUM 40 MG: 40 TABLET, FILM COATED ORAL at 09:24

## 2024-04-13 RX ADMIN — TAMSULOSIN HYDROCHLORIDE 0.4 MG: 0.4 CAPSULE ORAL at 09:24

## 2024-04-13 RX ADMIN — SIMETHICONE 80 MG: 80 TABLET, CHEWABLE ORAL at 05:45

## 2024-04-13 RX ADMIN — METRONIDAZOLE 500 MG: 500 TABLET ORAL at 05:41

## 2024-04-13 RX ADMIN — FINASTERIDE 5 MG: 5 TABLET, FILM COATED ORAL at 09:25

## 2024-04-13 RX ADMIN — Medication 1 CAPSULE: at 09:24

## 2024-04-13 RX ADMIN — Medication 1 CAPSULE: at 17:39

## 2024-04-13 RX ADMIN — LOPERAMIDE HYDROCHLORIDE 2 MG: 2 CAPSULE ORAL at 21:56

## 2024-04-13 RX ADMIN — AMIODARONE HYDROCHLORIDE 100 MG: 200 TABLET ORAL at 09:24

## 2024-04-13 RX ADMIN — LINEZOLID 600 MG: 600 TABLET, FILM COATED ORAL at 09:24

## 2024-04-13 ASSESSMENT — PAIN DESCRIPTION - DESCRIPTORS
DESCRIPTORS: THROBBING
DESCRIPTORS: POUNDING;PRESSURE

## 2024-04-13 ASSESSMENT — PAIN SCALES - GENERAL
PAINLEVEL_OUTOF10: 4
PAINLEVEL_OUTOF10: 7
PAINLEVEL_OUTOF10: 4
PAINLEVEL_OUTOF10: 4

## 2024-04-13 ASSESSMENT — PAIN - FUNCTIONAL ASSESSMENT
PAIN_FUNCTIONAL_ASSESSMENT: PREVENTS OR INTERFERES WITH MANY ACTIVE NOT PASSIVE ACTIVITIES
PAIN_FUNCTIONAL_ASSESSMENT: PREVENTS OR INTERFERES SOME ACTIVE ACTIVITIES AND ADLS
PAIN_FUNCTIONAL_ASSESSMENT: ACTIVITIES ARE NOT PREVENTED

## 2024-04-13 ASSESSMENT — PAIN DESCRIPTION - FREQUENCY: FREQUENCY: INTERMITTENT

## 2024-04-13 ASSESSMENT — PAIN DESCRIPTION - PAIN TYPE: TYPE: ACUTE PAIN;CHRONIC PAIN

## 2024-04-13 ASSESSMENT — PAIN DESCRIPTION - LOCATION
LOCATION: FOOT
LOCATION: ANKLE
LOCATION: ANKLE

## 2024-04-13 ASSESSMENT — PAIN DESCRIPTION - ORIENTATION
ORIENTATION: RIGHT
ORIENTATION: RIGHT

## 2024-04-13 ASSESSMENT — PAIN DESCRIPTION - ONSET: ONSET: ON-GOING

## 2024-04-13 NOTE — CARE COORDINATION
Discharge Planning:  ESE called [facility] and spoke with Joanna [phone], she advised that precert is still pending and she will call ESE back if obtained.

## 2024-04-14 LAB
GLUCOSE BLD-MCNC: 100 MG/DL (ref 70–99)
GLUCOSE BLD-MCNC: 199 MG/DL (ref 70–99)
GLUCOSE BLD-MCNC: 204 MG/DL (ref 70–99)
GLUCOSE BLD-MCNC: 210 MG/DL (ref 70–99)
PERFORMED ON: ABNORMAL

## 2024-04-14 PROCEDURE — 6370000000 HC RX 637 (ALT 250 FOR IP): Performed by: INTERNAL MEDICINE

## 2024-04-14 PROCEDURE — 94761 N-INVAS EAR/PLS OXIMETRY MLT: CPT

## 2024-04-14 PROCEDURE — 6370000000 HC RX 637 (ALT 250 FOR IP)

## 2024-04-14 PROCEDURE — 1200000000 HC SEMI PRIVATE

## 2024-04-14 PROCEDURE — 94660 CPAP INITIATION&MGMT: CPT

## 2024-04-14 RX ADMIN — INSULIN LISPRO 1 UNITS: 100 INJECTION, SOLUTION INTRAVENOUS; SUBCUTANEOUS at 12:45

## 2024-04-14 RX ADMIN — HYDROCODONE BITARTRATE AND ACETAMINOPHEN 1 TABLET: 5; 325 TABLET ORAL at 02:00

## 2024-04-14 RX ADMIN — CARVEDILOL 3.12 MG: 3.12 TABLET, FILM COATED ORAL at 18:49

## 2024-04-14 RX ADMIN — HYDROCODONE BITARTRATE AND ACETAMINOPHEN 1 TABLET: 5; 325 TABLET ORAL at 21:46

## 2024-04-14 RX ADMIN — AMIODARONE HYDROCHLORIDE 100 MG: 200 TABLET ORAL at 08:03

## 2024-04-14 RX ADMIN — LINEZOLID 600 MG: 600 TABLET, FILM COATED ORAL at 08:03

## 2024-04-14 RX ADMIN — Medication 1 CAPSULE: at 18:49

## 2024-04-14 RX ADMIN — APIXABAN 5 MG: 5 TABLET, FILM COATED ORAL at 08:03

## 2024-04-14 RX ADMIN — METRONIDAZOLE 500 MG: 500 TABLET ORAL at 13:49

## 2024-04-14 RX ADMIN — ATORVASTATIN CALCIUM 40 MG: 40 TABLET, FILM COATED ORAL at 08:04

## 2024-04-14 RX ADMIN — CARVEDILOL 3.12 MG: 3.12 TABLET, FILM COATED ORAL at 08:02

## 2024-04-14 RX ADMIN — APIXABAN 5 MG: 5 TABLET, FILM COATED ORAL at 21:30

## 2024-04-14 RX ADMIN — LEVOTHYROXINE SODIUM 25 MCG: 0.03 TABLET ORAL at 05:35

## 2024-04-14 RX ADMIN — LINEZOLID 600 MG: 600 TABLET, FILM COATED ORAL at 21:30

## 2024-04-14 RX ADMIN — SIMETHICONE 80 MG: 80 TABLET, CHEWABLE ORAL at 12:44

## 2024-04-14 RX ADMIN — TAMSULOSIN HYDROCHLORIDE 0.4 MG: 0.4 CAPSULE ORAL at 08:04

## 2024-04-14 RX ADMIN — FINASTERIDE 5 MG: 5 TABLET, FILM COATED ORAL at 08:04

## 2024-04-14 RX ADMIN — METRONIDAZOLE 500 MG: 500 TABLET ORAL at 05:35

## 2024-04-14 RX ADMIN — METRONIDAZOLE 500 MG: 500 TABLET ORAL at 21:30

## 2024-04-14 RX ADMIN — FERROUS SULFATE TAB EC 324 MG (65 MG FE EQUIVALENT) 325 MG: 324 (65 FE) TABLET DELAYED RESPONSE at 08:03

## 2024-04-14 RX ADMIN — Medication 1 CAPSULE: at 08:02

## 2024-04-14 ASSESSMENT — PAIN DESCRIPTION - LOCATION
LOCATION: ANKLE
LOCATION: ANKLE

## 2024-04-14 ASSESSMENT — PAIN DESCRIPTION - PAIN TYPE
TYPE: SURGICAL PAIN
TYPE: SURGICAL PAIN

## 2024-04-14 ASSESSMENT — PAIN DESCRIPTION - ORIENTATION
ORIENTATION: RIGHT
ORIENTATION: RIGHT

## 2024-04-14 ASSESSMENT — PAIN SCALES - GENERAL
PAINLEVEL_OUTOF10: 2
PAINLEVEL_OUTOF10: 0
PAINLEVEL_OUTOF10: 7
PAINLEVEL_OUTOF10: 7

## 2024-04-14 ASSESSMENT — PAIN - FUNCTIONAL ASSESSMENT
PAIN_FUNCTIONAL_ASSESSMENT: PREVENTS OR INTERFERES WITH MANY ACTIVE NOT PASSIVE ACTIVITIES
PAIN_FUNCTIONAL_ASSESSMENT: PREVENTS OR INTERFERES WITH MANY ACTIVE NOT PASSIVE ACTIVITIES

## 2024-04-14 ASSESSMENT — PAIN DESCRIPTION - ONSET
ONSET: ON-GOING
ONSET: ON-GOING

## 2024-04-14 ASSESSMENT — PAIN DESCRIPTION - FREQUENCY
FREQUENCY: INTERMITTENT
FREQUENCY: INTERMITTENT

## 2024-04-14 ASSESSMENT — PAIN DESCRIPTION - DESCRIPTORS
DESCRIPTORS: THROBBING
DESCRIPTORS: THROBBING

## 2024-04-15 LAB
GLUCOSE BLD-MCNC: 110 MG/DL (ref 70–99)
GLUCOSE BLD-MCNC: 111 MG/DL (ref 70–99)
GLUCOSE BLD-MCNC: 124 MG/DL (ref 70–99)
GLUCOSE BLD-MCNC: 91 MG/DL (ref 70–99)
PERFORMED ON: ABNORMAL
PERFORMED ON: NORMAL

## 2024-04-15 PROCEDURE — 6370000000 HC RX 637 (ALT 250 FOR IP): Performed by: INTERNAL MEDICINE

## 2024-04-15 PROCEDURE — 97530 THERAPEUTIC ACTIVITIES: CPT

## 2024-04-15 PROCEDURE — 6370000000 HC RX 637 (ALT 250 FOR IP)

## 2024-04-15 PROCEDURE — 1200000000 HC SEMI PRIVATE

## 2024-04-15 PROCEDURE — 94760 N-INVAS EAR/PLS OXIMETRY 1: CPT

## 2024-04-15 PROCEDURE — 97535 SELF CARE MNGMENT TRAINING: CPT

## 2024-04-15 PROCEDURE — 94660 CPAP INITIATION&MGMT: CPT

## 2024-04-15 RX ADMIN — TAMSULOSIN HYDROCHLORIDE 0.4 MG: 0.4 CAPSULE ORAL at 09:34

## 2024-04-15 RX ADMIN — CARVEDILOL 3.12 MG: 3.12 TABLET, FILM COATED ORAL at 18:26

## 2024-04-15 RX ADMIN — Medication 1 CAPSULE: at 09:34

## 2024-04-15 RX ADMIN — LEVOTHYROXINE SODIUM 25 MCG: 0.03 TABLET ORAL at 05:19

## 2024-04-15 RX ADMIN — HYDROCODONE BITARTRATE AND ACETAMINOPHEN 1 TABLET: 5; 325 TABLET ORAL at 20:30

## 2024-04-15 RX ADMIN — Medication 1 CAPSULE: at 18:26

## 2024-04-15 RX ADMIN — FINASTERIDE 5 MG: 5 TABLET, FILM COATED ORAL at 09:35

## 2024-04-15 RX ADMIN — CARVEDILOL 3.12 MG: 3.12 TABLET, FILM COATED ORAL at 09:34

## 2024-04-15 RX ADMIN — LISINOPRIL 2.5 MG: 5 TABLET ORAL at 09:34

## 2024-04-15 RX ADMIN — AMIODARONE HYDROCHLORIDE 100 MG: 200 TABLET ORAL at 09:33

## 2024-04-15 RX ADMIN — SIMETHICONE 80 MG: 80 TABLET, CHEWABLE ORAL at 09:43

## 2024-04-15 RX ADMIN — APIXABAN 5 MG: 5 TABLET, FILM COATED ORAL at 20:30

## 2024-04-15 RX ADMIN — APIXABAN 5 MG: 5 TABLET, FILM COATED ORAL at 09:34

## 2024-04-15 RX ADMIN — HYDROCODONE BITARTRATE AND ACETAMINOPHEN 1 TABLET: 5; 325 TABLET ORAL at 02:00

## 2024-04-15 RX ADMIN — ATORVASTATIN CALCIUM 40 MG: 40 TABLET, FILM COATED ORAL at 09:34

## 2024-04-15 ASSESSMENT — PAIN DESCRIPTION - PAIN TYPE
TYPE: SURGICAL PAIN
TYPE: ACUTE PAIN

## 2024-04-15 ASSESSMENT — PAIN - FUNCTIONAL ASSESSMENT
PAIN_FUNCTIONAL_ASSESSMENT: PREVENTS OR INTERFERES WITH MANY ACTIVE NOT PASSIVE ACTIVITIES
PAIN_FUNCTIONAL_ASSESSMENT: PREVENTS OR INTERFERES WITH ALL ACTIVE AND SOME PASSIVE ACTIVITIES

## 2024-04-15 ASSESSMENT — PAIN DESCRIPTION - ORIENTATION
ORIENTATION: RIGHT
ORIENTATION: RIGHT

## 2024-04-15 ASSESSMENT — PAIN DESCRIPTION - LOCATION
LOCATION: ANKLE
LOCATION: ANKLE

## 2024-04-15 ASSESSMENT — PAIN DESCRIPTION - FREQUENCY
FREQUENCY: INTERMITTENT
FREQUENCY: INTERMITTENT

## 2024-04-15 ASSESSMENT — PAIN SCALES - GENERAL
PAINLEVEL_OUTOF10: 7
PAINLEVEL_OUTOF10: 7

## 2024-04-15 ASSESSMENT — PAIN DESCRIPTION - DESCRIPTORS
DESCRIPTORS: THROBBING
DESCRIPTORS: THROBBING

## 2024-04-15 ASSESSMENT — PAIN DESCRIPTION - ONSET
ONSET: ON-GOING
ONSET: ON-GOING

## 2024-04-15 NOTE — CARE COORDINATION
Red call from Joanna [phone] at [facility] reporting pt has been denied.  Spoke with MD, peer to peer not being completed and appeal was requested to be started.  Pt has already signed paperwork for appeal and [facility] is starting the appeal process  We do not have any accepting SNF at this time as a back up plan but referrals were sent last week.  Pt has [place] medicare preferred (PPO) and medicare part B.  Electronically signed by MARYCRUZ Diaz on 4/15/2024 at 1:02 PM    Spoke with pt at bedside.  He reports he would like to get onto therapy asap.  We talked about a back up plan if pt is unable to go to [facility] as he is hoping.  SNF list provided and pt would like referrals to [facility], [facility], [facility] and [facility].  Referrals have been sent to many of this places prior but I was unsure if it was before pts insurance was reinstated or not.  Pt not has [place] PPO and medicare B.    Electronically signed by MARYCRUZ Diaz on 4/15/2024 at 3:05 PM    [facility]  [facility]-out of network  [facility]-faxed insurance cards, lady quevedo  Spoke with admitted and faxed insurance cards so they can be loaded into [employer].  Electronically signed by MARYCRUZ Diaz on 4/15/2024 at 3:52 PM    [facility], unable to accept  Electronically signed by MARYCRUZ Diaz on 4/15/2024 at 3:56 PM

## 2024-04-16 LAB
GLUCOSE BLD-MCNC: 159 MG/DL (ref 70–99)
GLUCOSE BLD-MCNC: 312 MG/DL (ref 70–99)
GLUCOSE BLD-MCNC: 76 MG/DL (ref 70–99)
GLUCOSE BLD-MCNC: 86 MG/DL (ref 70–99)
PERFORMED ON: ABNORMAL
PERFORMED ON: ABNORMAL
PERFORMED ON: NORMAL
PERFORMED ON: NORMAL

## 2024-04-16 PROCEDURE — 6370000000 HC RX 637 (ALT 250 FOR IP): Performed by: INTERNAL MEDICINE

## 2024-04-16 PROCEDURE — 6370000000 HC RX 637 (ALT 250 FOR IP)

## 2024-04-16 PROCEDURE — 94660 CPAP INITIATION&MGMT: CPT

## 2024-04-16 PROCEDURE — 1200000000 HC SEMI PRIVATE

## 2024-04-16 PROCEDURE — 94760 N-INVAS EAR/PLS OXIMETRY 1: CPT

## 2024-04-16 RX ADMIN — ATORVASTATIN CALCIUM 40 MG: 40 TABLET, FILM COATED ORAL at 09:59

## 2024-04-16 RX ADMIN — HYDROCODONE BITARTRATE AND ACETAMINOPHEN 1 TABLET: 5; 325 TABLET ORAL at 02:18

## 2024-04-16 RX ADMIN — INSULIN LISPRO 3 UNITS: 100 INJECTION, SOLUTION INTRAVENOUS; SUBCUTANEOUS at 18:30

## 2024-04-16 RX ADMIN — CARVEDILOL 3.12 MG: 3.12 TABLET, FILM COATED ORAL at 18:30

## 2024-04-16 RX ADMIN — Medication 1 CAPSULE: at 18:30

## 2024-04-16 RX ADMIN — FINASTERIDE 5 MG: 5 TABLET, FILM COATED ORAL at 10:01

## 2024-04-16 RX ADMIN — AMIODARONE HYDROCHLORIDE 100 MG: 200 TABLET ORAL at 09:58

## 2024-04-16 RX ADMIN — Medication 1 CAPSULE: at 09:58

## 2024-04-16 RX ADMIN — HYDROCODONE BITARTRATE AND ACETAMINOPHEN 1 TABLET: 5; 325 TABLET ORAL at 20:56

## 2024-04-16 RX ADMIN — TAMSULOSIN HYDROCHLORIDE 0.4 MG: 0.4 CAPSULE ORAL at 09:58

## 2024-04-16 RX ADMIN — LEVOTHYROXINE SODIUM 25 MCG: 0.03 TABLET ORAL at 05:38

## 2024-04-16 RX ADMIN — LISINOPRIL 2.5 MG: 5 TABLET ORAL at 09:59

## 2024-04-16 RX ADMIN — LOPERAMIDE HYDROCHLORIDE 2 MG: 2 CAPSULE ORAL at 12:13

## 2024-04-16 RX ADMIN — CARVEDILOL 3.12 MG: 3.12 TABLET, FILM COATED ORAL at 09:59

## 2024-04-16 RX ADMIN — FERROUS SULFATE TAB EC 324 MG (65 MG FE EQUIVALENT) 325 MG: 324 (65 FE) TABLET DELAYED RESPONSE at 09:58

## 2024-04-16 RX ADMIN — APIXABAN 5 MG: 5 TABLET, FILM COATED ORAL at 09:59

## 2024-04-16 RX ADMIN — SIMETHICONE 80 MG: 80 TABLET, CHEWABLE ORAL at 12:13

## 2024-04-16 RX ADMIN — APIXABAN 5 MG: 5 TABLET, FILM COATED ORAL at 20:56

## 2024-04-16 ASSESSMENT — PAIN DESCRIPTION - DESCRIPTORS
DESCRIPTORS: THROBBING
DESCRIPTORS: THROBBING

## 2024-04-16 ASSESSMENT — PAIN - FUNCTIONAL ASSESSMENT
PAIN_FUNCTIONAL_ASSESSMENT: PREVENTS OR INTERFERES WITH ALL ACTIVE AND SOME PASSIVE ACTIVITIES
PAIN_FUNCTIONAL_ASSESSMENT: PREVENTS OR INTERFERES WITH ALL ACTIVE AND SOME PASSIVE ACTIVITIES

## 2024-04-16 ASSESSMENT — PAIN DESCRIPTION - PAIN TYPE
TYPE: SURGICAL PAIN
TYPE: SURGICAL PAIN

## 2024-04-16 ASSESSMENT — PAIN SCALES - GENERAL
PAINLEVEL_OUTOF10: 5
PAINLEVEL_OUTOF10: 7
PAINLEVEL_OUTOF10: 7

## 2024-04-16 ASSESSMENT — PAIN DESCRIPTION - FREQUENCY
FREQUENCY: INTERMITTENT
FREQUENCY: INTERMITTENT

## 2024-04-16 ASSESSMENT — PAIN DESCRIPTION - ORIENTATION
ORIENTATION: RIGHT
ORIENTATION: RIGHT

## 2024-04-16 ASSESSMENT — PAIN DESCRIPTION - ONSET
ONSET: ON-GOING
ONSET: ON-GOING

## 2024-04-16 ASSESSMENT — PAIN DESCRIPTION - LOCATION
LOCATION: ANKLE
LOCATION: ANKLE

## 2024-04-16 NOTE — CARE COORDINATION
Chart review done, nursing rounds completed. Pt is pending appeal with [facility] ARMENTAU,   [facility] still following, will need other referrals if [facility] cannot take.     Following,     Terrell Blankenship LMSW, [facility] Social Work Case Management   Phone: [phone]  Fax: [phone]

## 2024-04-17 VITALS
WEIGHT: 257.06 LBS | DIASTOLIC BLOOD PRESSURE: 66 MMHG | HEIGHT: 76 IN | SYSTOLIC BLOOD PRESSURE: 109 MMHG | HEART RATE: 64 BPM | RESPIRATION RATE: 18 BRPM | BODY MASS INDEX: 31.3 KG/M2 | TEMPERATURE: 97.8 F | OXYGEN SATURATION: 98 %

## 2024-04-17 LAB
GLUCOSE BLD-MCNC: 98 MG/DL (ref 70–99)
PERFORMED ON: NORMAL

## 2024-04-17 PROCEDURE — 6370000000 HC RX 637 (ALT 250 FOR IP)

## 2024-04-17 PROCEDURE — 94760 N-INVAS EAR/PLS OXIMETRY 1: CPT

## 2024-04-17 PROCEDURE — 6370000000 HC RX 637 (ALT 250 FOR IP): Performed by: INTERNAL MEDICINE

## 2024-04-17 PROCEDURE — 94660 CPAP INITIATION&MGMT: CPT

## 2024-04-17 RX ADMIN — Medication 1 CAPSULE: at 09:07

## 2024-04-17 RX ADMIN — SIMETHICONE 80 MG: 80 TABLET, CHEWABLE ORAL at 10:44

## 2024-04-17 RX ADMIN — AMIODARONE HYDROCHLORIDE 100 MG: 200 TABLET ORAL at 09:07

## 2024-04-17 RX ADMIN — TAMSULOSIN HYDROCHLORIDE 0.4 MG: 0.4 CAPSULE ORAL at 09:07

## 2024-04-17 RX ADMIN — HYDROCODONE BITARTRATE AND ACETAMINOPHEN 1 TABLET: 5; 325 TABLET ORAL at 02:02

## 2024-04-17 RX ADMIN — APIXABAN 5 MG: 5 TABLET, FILM COATED ORAL at 09:07

## 2024-04-17 RX ADMIN — CARVEDILOL 3.12 MG: 3.12 TABLET, FILM COATED ORAL at 09:07

## 2024-04-17 RX ADMIN — FINASTERIDE 5 MG: 5 TABLET, FILM COATED ORAL at 09:08

## 2024-04-17 RX ADMIN — LEVOTHYROXINE SODIUM 25 MCG: 0.03 TABLET ORAL at 05:32

## 2024-04-17 RX ADMIN — SIMETHICONE 80 MG: 80 TABLET, CHEWABLE ORAL at 02:02

## 2024-04-17 RX ADMIN — LISINOPRIL 2.5 MG: 5 TABLET ORAL at 09:07

## 2024-04-17 RX ADMIN — ATORVASTATIN CALCIUM 40 MG: 40 TABLET, FILM COATED ORAL at 09:07

## 2024-04-17 ASSESSMENT — PAIN DESCRIPTION - FREQUENCY: FREQUENCY: INTERMITTENT

## 2024-04-17 ASSESSMENT — PAIN - FUNCTIONAL ASSESSMENT: PAIN_FUNCTIONAL_ASSESSMENT: PREVENTS OR INTERFERES WITH ALL ACTIVE AND SOME PASSIVE ACTIVITIES

## 2024-04-17 ASSESSMENT — PAIN DESCRIPTION - PAIN TYPE: TYPE: SURGICAL PAIN

## 2024-04-17 ASSESSMENT — PAIN DESCRIPTION - ORIENTATION: ORIENTATION: RIGHT

## 2024-04-17 ASSESSMENT — PAIN DESCRIPTION - DESCRIPTORS: DESCRIPTORS: THROBBING

## 2024-04-17 ASSESSMENT — PAIN SCALES - GENERAL: PAINLEVEL_OUTOF10: 7

## 2024-04-17 ASSESSMENT — PAIN DESCRIPTION - ONSET: ONSET: ON-GOING

## 2024-04-17 ASSESSMENT — PAIN DESCRIPTION - LOCATION: LOCATION: ANKLE

## 2024-04-17 NOTE — PLAN OF CARE
Problem: Discharge Planning  Goal: Discharge to home or other facility with appropriate resources  3/21/2024 1326 by Roslyn Fagan RN  Outcome: Progressing  3/21/2024 0322 by Cassie Bonds RN  Outcome: Progressing  Flowsheets (Taken 3/21/2024 0322)  Discharge to home or other facility with appropriate resources:   Identify barriers to discharge with patient and caregiver   Arrange for needed discharge resources and transportation as appropriate   Identify discharge learning needs (meds, wound care, etc)   Arrange for interpreters to assist at discharge as needed   Refer to discharge planning if patient needs post-hospital services based on physician order or complex needs related to functional status, cognitive ability or social support system     Problem: Safety - Adult  Goal: Free from fall injury  3/21/2024 1326 by Roslyn Fagan RN  Outcome: Progressing  3/21/2024 0322 by Cassie Bonds RN  Outcome: Progressing     Problem: Pain  Goal: Verbalizes/displays adequate comfort level or baseline comfort level  3/21/2024 1326 by Roslyn Fagan RN  Outcome: Progressing  3/21/2024 0322 by Cassie Bonds RN  Outcome: Progressing  Flowsheets (Taken 3/21/2024 0322)  Verbalizes/displays adequate comfort level or baseline comfort level:   Assess pain using appropriate pain scale   Encourage patient to monitor pain and request assistance   Administer analgesics based on type and severity of pain and evaluate response   Implement non-pharmacological measures as appropriate and evaluate response   Consider cultural and social influences on pain and pain management   Notify Licensed Independent Practitioner if interventions unsuccessful or patient reports new pain     Problem: ABCDS Injury Assessment  Goal: Absence of physical injury  Outcome: Progressing     
  Problem: Discharge Planning  Goal: Discharge to home or other facility with appropriate resources  3/22/2024 1124 by Dominic Guy RN  Outcome: Progressing  Flowsheets (Taken 3/22/2024 1124)  Discharge to home or other facility with appropriate resources: Identify barriers to discharge with patient and caregiver     Problem: Safety - Adult  Goal: Free from fall injury  3/22/2024 1124 by Dominic Guy RN  Outcome: Progressing  Flowsheets (Taken 3/22/2024 1124)  Free From Fall Injury: Instruct family/caregiver on patient safety  Note: Potential fall hazards identified and removed accordingly. Pt educated to use call light for assistance. Bed locked, in lowest position with alarm on.      Problem: Pain  Goal: Verbalizes/displays adequate comfort level or baseline comfort level  3/22/2024 1124 by Dominic Guy RN  Outcome: Progressing  Flowsheets (Taken 3/22/2024 1124)  Verbalizes/displays adequate comfort level or baseline comfort level:   Encourage patient to monitor pain and request assistance   Administer analgesics based on type and severity of pain and evaluate response   Assess pain using appropriate pain scale   Implement non-pharmacological measures as appropriate and evaluate response  Note: Pt educated on 0-10 pain scale. Pt educated on non-pharmacological pain interventions. Pain medications given as needed per MAR.      Problem: ABCDS Injury Assessment  Goal: Absence of physical injury  Outcome: Progressing  Flowsheets (Taken 3/22/2024 1124)  Absence of Physical Injury: Implement safety measures based on patient assessment  Note: Pt educated on use of call light for assistance. Pt educated to wait for assistance. Appropriate ambulatory aid provided and used. Pt verbalizes understanding.      
  Problem: Discharge Planning  Goal: Discharge to home or other facility with appropriate resources  3/22/2024 2311 by Teodora Lopez RN  Outcome: Progressing  3/22/2024 1124 by Dominic Guy RN  Outcome: Progressing  Flowsheets (Taken 3/22/2024 1124)  Discharge to home or other facility with appropriate resources: Identify barriers to discharge with patient and caregiver     Problem: Safety - Adult  Goal: Free from fall injury  3/22/2024 2311 by Teodora Lopez RN  Outcome: Progressing  3/22/2024 1124 by Dominic Guy RN  Outcome: Progressing  Flowsheets (Taken 3/22/2024 1124)  Free From Fall Injury: Instruct family/caregiver on patient safety  Note: Potential fall hazards identified and removed accordingly. Pt educated to use call light for assistance. Bed locked, in lowest position with alarm on.      Problem: Pain  Goal: Verbalizes/displays adequate comfort level or baseline comfort level  3/22/2024 2311 by Teodora Lopez RN  Outcome: Progressing  3/22/2024 1124 by Dominic Guy RN  Outcome: Progressing  Flowsheets (Taken 3/22/2024 1124)  Verbalizes/displays adequate comfort level or baseline comfort level:   Encourage patient to monitor pain and request assistance   Administer analgesics based on type and severity of pain and evaluate response   Assess pain using appropriate pain scale   Implement non-pharmacological measures as appropriate and evaluate response  Note: Pt educated on 0-10 pain scale. Pt educated on non-pharmacological pain interventions. Pain medications given as needed per MAR.      Problem: ABCDS Injury Assessment  Goal: Absence of physical injury  3/22/2024 2311 by Teodora Lopez RN  Outcome: Progressing  Flowsheets (Taken 3/22/2024 2309)  Absence of Physical Injury: Implement safety measures based on patient assessment  3/22/2024 1124 by Dominic Guy RN  Outcome: Progressing  Flowsheets (Taken 3/22/2024 1124)  Absence of Physical Injury: Implement safety measures based on patient 
  Problem: Discharge Planning  Goal: Discharge to home or other facility with appropriate resources  3/23/2024 0926 by Dominic Guy RN  Outcome: Progressing  Flowsheets (Taken 3/23/2024 0926)  Discharge to home or other facility with appropriate resources: Identify barriers to discharge with patient and caregiver     Problem: Safety - Adult  Goal: Free from fall injury  3/23/2024 0926 by Dominic Guy RN  Outcome: Progressing  Flowsheets (Taken 3/23/2024 0926)  Free From Fall Injury: Instruct family/caregiver on patient safety  Note: Potential fall hazards identified and removed accordingly. Pt educated to use call light for assistance. Bed locked, in lowest position with alarm on.      Problem: Pain  Goal: Verbalizes/displays adequate comfort level or baseline comfort level  3/23/2024 0926 by Dominic Guy RN  Outcome: Progressing  Flowsheets (Taken 3/23/2024 0926)  Verbalizes/displays adequate comfort level or baseline comfort level:   Encourage patient to monitor pain and request assistance   Administer analgesics based on type and severity of pain and evaluate response   Assess pain using appropriate pain scale   Implement non-pharmacological measures as appropriate and evaluate response  Note: Pt educated on 0-10 pain scale. Pt educated on non-pharmacological pain interventions. Pain medications given as needed per MAR.      Problem: ABCDS Injury Assessment  Goal: Absence of physical injury  3/23/2024 0926 by Dominic Guy RN  Outcome: Progressing  Note: Pt educated on use of call light for assistance. Pt educated to wait for assistance. Appropriate ambulatory aid provided and used. Pt verbalizes understanding.      Problem: Skin/Tissue Integrity - Adult  Goal: Skin integrity remains intact  Outcome: Progressing  Flowsheets (Taken 3/23/2024 0926)  Skin Integrity Remains Intact: Monitor for areas of redness and/or skin breakdown  Note: Skin assessment done per shift. Pt educated on importance of frequent 
  Problem: Discharge Planning  Goal: Discharge to home or other facility with appropriate resources  3/24/2024 1215 by Lupe Riley  Outcome: Progressing  3/24/2024 0005 by Teodora Lopez RN  Outcome: Progressing     Problem: Safety - Adult  Goal: Free from fall injury  3/24/2024 1215 by Lupe Riley  Outcome: Progressing  3/24/2024 0005 by Teodora Lopez RN  Outcome: Progressing     Problem: Pain  Goal: Verbalizes/displays adequate comfort level or baseline comfort level  3/24/2024 1215 by Lupe Riley  Outcome: Progressing  3/24/2024 0005 by Teodora Lopez RN  Outcome: Progressing     Problem: ABCDS Injury Assessment  Goal: Absence of physical injury  3/24/2024 1215 by Lupe Riley  Outcome: Progressing  3/24/2024 0005 by Teodora Lopez RN  Outcome: Progressing     Problem: Chronic Conditions and Co-morbidities  Goal: Patient's chronic conditions and co-morbidity symptoms are monitored and maintained or improved  Outcome: Progressing     Problem: Neurosensory - Adult  Goal: Achieves stable or improved neurological status  Outcome: Progressing  Goal: Achieves maximal functionality and self care  Outcome: Progressing     Problem: Cardiovascular - Adult  Goal: Maintains optimal cardiac output and hemodynamic stability  Outcome: Progressing  Goal: Absence of cardiac dysrhythmias or at baseline  Outcome: Progressing     Problem: Skin/Tissue Integrity - Adult  Goal: Skin integrity remains intact  3/24/2024 1215 by Lupe Riley  Outcome: Progressing  3/24/2024 0005 by Teodora Lopez RN  Outcome: Progressing  Goal: Incisions, wounds, or drain sites healing without S/S of infection  Outcome: Progressing  Goal: Oral mucous membranes remain intact  Outcome: Progressing     Problem: Musculoskeletal - Adult  Goal: Return mobility to safest level of function  3/24/2024 1215 by Lupe Riley  Outcome: Progressing  3/24/2024 0005 by Teodora Lopez RN  Outcome: Progressing  Goal: Maintain proper alignment of affected 
  Problem: Discharge Planning  Goal: Discharge to home or other facility with appropriate resources  3/24/2024 2141 by Teodora Lopez RN  Outcome: Progressing  3/24/2024 1215 by Lupe Riley  Outcome: Progressing     Problem: Safety - Adult  Goal: Free from fall injury  3/24/2024 2141 by Teodora Lopez RN  Outcome: Progressing  3/24/2024 1215 by Lupe Riley  Outcome: Progressing     Problem: Pain  Goal: Verbalizes/displays adequate comfort level or baseline comfort level  3/24/2024 2141 by Teodora Lopez RN  Outcome: Progressing  3/24/2024 1215 by Lupe Riley  Outcome: Progressing     Problem: ABCDS Injury Assessment  Goal: Absence of physical injury  3/24/2024 2141 by Teodora Lopez RN  Outcome: Progressing  Flowsheets (Taken 3/24/2024 2138)  Absence of Physical Injury: Implement safety measures based on patient assessment  3/24/2024 1215 by Lupe Riley  Outcome: Progressing     Problem: Chronic Conditions and Co-morbidities  Goal: Patient's chronic conditions and co-morbidity symptoms are monitored and maintained or improved  3/24/2024 2141 by Teodora Lopez RN  Outcome: Progressing  3/24/2024 1215 by Lupe Riley  Outcome: Progressing     Problem: Neurosensory - Adult  Goal: Achieves stable or improved neurological status  3/24/2024 1215 by Lupe Riley  Outcome: Progressing  Goal: Achieves maximal functionality and self care  3/24/2024 1215 by Lupe Riley  Outcome: Progressing     Problem: Cardiovascular - Adult  Goal: Maintains optimal cardiac output and hemodynamic stability  3/24/2024 1215 by Lupe Riley  Outcome: Progressing  Goal: Absence of cardiac dysrhythmias or at baseline  3/24/2024 1215 by Lupe Riley  Outcome: Progressing     Problem: Skin/Tissue Integrity - Adult  Goal: Skin integrity remains intact  3/24/2024 2141 by Teodora Lopze RN  Outcome: Progressing  3/24/2024 1215 by Lupe Riley  Outcome: Progressing  Goal: Incisions, wounds, or drain sites healing without S/S 
  Problem: Discharge Planning  Goal: Discharge to home or other facility with appropriate resources  3/26/2024 2324 by Jackeline Kulkarni RN  Outcome: Progressing  3/26/2024 1455 by Angelia Kent RN  Outcome: Progressing     Problem: Safety - Adult  Goal: Free from fall injury  3/26/2024 2324 by Jackeline Kulkarni RN  Outcome: Progressing  3/26/2024 1455 by Angelia Kent RN  Outcome: Progressing     Problem: Pain  Goal: Verbalizes/displays adequate comfort level or baseline comfort level  3/26/2024 2324 by Jackeline Kulkarni RN  Outcome: Progressing  3/26/2024 1455 by Angelia Kent RN  Outcome: Progressing     Problem: ABCDS Injury Assessment  Goal: Absence of physical injury  3/26/2024 2324 by Jackeline Kulkarni RN  Outcome: Progressing  3/26/2024 1455 by Angelia Kent RN  Outcome: Progressing     Problem: Chronic Conditions and Co-morbidities  Goal: Patient's chronic conditions and co-morbidity symptoms are monitored and maintained or improved  3/26/2024 2324 by Jackeline Kulkarni RN  Outcome: Progressing  3/26/2024 1455 by Angelia Kent RN  Outcome: Progressing     Problem: Neurosensory - Adult  Goal: Achieves stable or improved neurological status  3/26/2024 2324 by Jackeline Kulkarni RN  Outcome: Progressing  3/26/2024 1455 by Angelia Kent RN  Outcome: Progressing  Goal: Achieves maximal functionality and self care  3/26/2024 2324 by Jackeline Kulkarni RN  Outcome: Progressing  3/26/2024 1455 by Angelia Kent RN  Outcome: Progressing     Problem: Cardiovascular - Adult  Goal: Maintains optimal cardiac output and hemodynamic stability  Outcome: Progressing  Goal: Absence of cardiac dysrhythmias or at baseline  Outcome: Progressing     Problem: Skin/Tissue Integrity - Adult  Goal: Skin integrity remains intact  Outcome: Progressing  Goal: Incisions, wounds, or drain sites healing without S/S of infection  Outcome: Progressing  Goal: Oral mucous membranes remain intact  Outcome: Progressing     Problem: Musculoskeletal - Adult  Goal: Return 
  Problem: Discharge Planning  Goal: Discharge to home or other facility with appropriate resources  3/27/2024 1016 by Yessenia Chahal RN  Outcome: Progressing  3/26/2024 2324 by Jackeline Kulkarni RN  Outcome: Progressing     Problem: Safety - Adult  Goal: Free from fall injury  3/27/2024 1016 by Yessenia Chahal RN  Outcome: Progressing  Note: Fall precautions in place. Bed locked and in lowest position. Alarm on. Call light within reach.   3/26/2024 2324 by Jackeline Kulkarni RN  Outcome: Progressing     Problem: Pain  Goal: Verbalizes/displays adequate comfort level or baseline comfort level  3/27/2024 1016 by Yessenia Chahal RN  Outcome: Progressing  3/26/2024 2324 by Jackeline Kulkarni RN  Outcome: Progressing     Problem: ABCDS Injury Assessment  Goal: Absence of physical injury  3/27/2024 1016 by Yessenia Chahal RN  Outcome: Progressing  3/26/2024 2324 by Jackeline Kulkarni RN  Outcome: Progressing     Problem: Chronic Conditions and Co-morbidities  Goal: Patient's chronic conditions and co-morbidity symptoms are monitored and maintained or improved  3/27/2024 1016 by Yessenia Chahal RN  Outcome: Progressing  3/26/2024 2324 by Jackeline Kulkarni RN  Outcome: Progressing     Problem: Neurosensory - Adult  Goal: Achieves stable or improved neurological status  3/27/2024 1016 by Yessenia Chahal RN  Outcome: Progressing  3/26/2024 2324 by Jackeline Kulkarni RN  Outcome: Progressing  Goal: Achieves maximal functionality and self care  3/27/2024 1016 by Yessenia Chahal RN  Outcome: Progressing  3/26/2024 2324 by Jackeline Kulkarni RN  Outcome: Progressing     Problem: Cardiovascular - Adult  Goal: Maintains optimal cardiac output and hemodynamic stability  3/27/2024 1016 by Yessenia Chahal RN  Outcome: Progressing  3/26/2024 2324 by Jackeline Kulkarni RN  Outcome: Progressing  Goal: Absence of cardiac dysrhythmias or at baseline  3/27/2024 1016 by Yessenia Chahal RN  Outcome: Progressing  3/26/2024 2324 by Jackeline Kulkarni RN  Outcome: Progressing     Problem: 
  Problem: Discharge Planning  Goal: Discharge to home or other facility with appropriate resources  3/28/2024 1414 by Angelia Krishnan RN  Outcome: Progressing     Problem: Safety - Adult  Goal: Free from fall injury  3/28/2024 1414 by Angelia Krishnan RN  Outcome: Progressing     Problem: Pain  Goal: Verbalizes/displays adequate comfort level or baseline comfort level  3/28/2024 1414 by Angelia Krishnan RN  Outcome: Progressing     Problem: ABCDS Injury Assessment  Goal: Absence of physical injury  3/28/2024 1414 by Angelia Krishnan RN  Outcome: Progressing     Problem: Chronic Conditions and Co-morbidities  Goal: Patient's chronic conditions and co-morbidity symptoms are monitored and maintained or improved  3/28/2024 1414 by Angelia Krishnan RN  Outcome: Progressing     Problem: Neurosensory - Adult  Goal: Achieves stable or improved neurological status  3/28/2024 1414 by Angelia Krishnan RN  Outcome: Progressing     Problem: Neurosensory - Adult  Goal: Achieves maximal functionality and self care  3/28/2024 1414 by Angelia Krishnan RN  Outcome: Progressing     Problem: Cardiovascular - Adult  Goal: Maintains optimal cardiac output and hemodynamic stability  3/28/2024 1414 by Angelia Krishnan RN  Outcome: Progressing     Problem: Skin/Tissue Integrity - Adult  Goal: Incisions, wounds, or drain sites healing without S/S of infection  3/28/2024 1414 by Angelia Krishnan RN  Outcome: Progressing     Problem: Skin/Tissue Integrity - Adult  Goal: Oral mucous membranes remain intact  3/28/2024 1414 by Angelia Krishnan RN  Outcome: Progressing     
  Problem: Discharge Planning  Goal: Discharge to home or other facility with appropriate resources  3/31/2024 0831 by Baljeet Almodovar RN  Outcome: Progressing     Problem: Safety - Adult  Goal: Free from fall injury  3/31/2024 0831 by Baljeet Almodovar RN  Outcome: Progressing     Problem: Pain  Goal: Verbalizes/displays adequate comfort level or baseline comfort level  3/31/2024 0831 by Baljeet Almodovar RN  Outcome: Progressing     Problem: ABCDS Injury Assessment  Goal: Absence of physical injury  3/31/2024 0831 by aBljeet Almodovar RN  Outcome: Progressing     Problem: Chronic Conditions and Co-morbidities  Goal: Patient's chronic conditions and co-morbidity symptoms are monitored and maintained or improved  3/31/2024 0831 by Baljeet Almodovar RN  Outcome: Progressing     Problem: Neurosensory - Adult  Goal: Achieves stable or improved neurological status  3/31/2024 0831 by Baljeet Almodovar RN  Outcome: Progressing     Problem: Neurosensory - Adult  Goal: Achieves maximal functionality and self care  Outcome: Progressing     Problem: Cardiovascular - Adult  Goal: Maintains optimal cardiac output and hemodynamic stability  3/31/2024 0831 by Baljeet Almodovar RN  Outcome: Progressing     Problem: Cardiovascular - Adult  Goal: Absence of cardiac dysrhythmias or at baseline  Outcome: Progressing     
  Problem: Discharge Planning  Goal: Discharge to home or other facility with appropriate resources  3/31/2024 2116 by Lenora Johns RN  Outcome: Progressing  Flowsheets (Taken 3/31/2024 2034)  Discharge to home or other facility with appropriate resources:   Identify barriers to discharge with patient and caregiver   Arrange for needed discharge resources and transportation as appropriate   Identify discharge learning needs (meds, wound care, etc)   Refer to discharge planning if patient needs post-hospital services based on physician order or complex needs related to functional status, cognitive ability or social support system  3/31/2024 0831 by Baljeet Almodovar RN  Outcome: Progressing     Problem: Safety - Adult  Goal: Free from fall injury  3/31/2024 2116 by Lenora Johns RN  Outcome: Progressing  3/31/2024 0831 by Baljeet Almodovar RN  Outcome: Progressing     Problem: Pain  Goal: Verbalizes/displays adequate comfort level or baseline comfort level  3/31/2024 2116 by Lenora Johns RN  Outcome: Progressing  Flowsheets (Taken 3/31/2024 2029)  Verbalizes/displays adequate comfort level or baseline comfort level:   Encourage patient to monitor pain and request assistance   Assess pain using appropriate pain scale   Administer analgesics based on type and severity of pain and evaluate response   Implement non-pharmacological measures as appropriate and evaluate response   Notify Licensed Independent Practitioner if interventions unsuccessful or patient reports new pain  3/31/2024 0831 by Baljeet Almodovar RN  Outcome: Progressing     Problem: ABCDS Injury Assessment  Goal: Absence of physical injury  3/31/2024 2116 by Lenora Johns RN  Outcome: Progressing  3/31/2024 0831 by Baljeet Almodovar RN  Outcome: Progressing     Problem: Chronic Conditions and Co-morbidities  Goal: Patient's chronic conditions and co-morbidity symptoms are monitored and maintained or improved  3/31/2024 2116 by Lenora Johns RN  Outcome: 
  Problem: Discharge Planning  Goal: Discharge to home or other facility with appropriate resources  4/10/2024 0930 by Jonh Verduzco  Outcome: Progressing  4/9/2024 2308 by Bridget Medrano RN  Outcome: Progressing     Problem: Safety - Adult  Goal: Free from fall injury  4/10/2024 0930 by Jonh Verduzco  Outcome: Progressing  4/9/2024 2308 by Bridget Medrano RN  Outcome: Progressing     Problem: Pain  Goal: Verbalizes/displays adequate comfort level or baseline comfort level  4/10/2024 0930 by Jonh Verduzco  Outcome: Progressing  4/9/2024 2308 by Bridget Medrano RN  Outcome: Progressing     Problem: ABCDS Injury Assessment  Goal: Absence of physical injury  4/10/2024 0930 by Jonh Verduzco  Outcome: Progressing  4/9/2024 2308 by Bridget Medrano RN  Outcome: Progressing     Problem: Chronic Conditions and Co-morbidities  Goal: Patient's chronic conditions and co-morbidity symptoms are monitored and maintained or improved  4/10/2024 0930 by Jonh Verduzco  Outcome: Progressing  4/9/2024 2308 by Bridget Medrano RN  Outcome: Progressing     Problem: Neurosensory - Adult  Goal: Achieves maximal functionality and self care  4/10/2024 0930 by Jonh Verduzco  Outcome: Progressing  4/9/2024 2308 by Bridget Medrano RN  Outcome: Progressing     Problem: Skin/Tissue Integrity - Adult  Goal: Skin integrity remains intact  4/10/2024 0930 by Jonh Verduzco  Outcome: Progressing  4/9/2024 2308 by Bridget Medrano RN  Outcome: Progressing  Flowsheets (Taken 4/9/2024 2307)  Skin Integrity Remains Intact:   Monitor for areas of redness and/or skin breakdown   Assess vascular access sites hourly  Goal: Incisions, wounds, or drain sites healing without S/S of infection  4/10/2024 0930 by Jonh Verduzco  Outcome: Progressing  4/9/2024 2308 by Bridget Medrano RN  Outcome: Progressing  Goal: Oral mucous membranes remain intact  4/9/2024 2308 by Bridget Medrano RN  Outcome: Progressing     Problem: 
  Problem: Discharge Planning  Goal: Discharge to home or other facility with appropriate resources  4/10/2024 2232 by Bridget Medrano RN  Outcome: Progressing  4/10/2024 0930 by Jonh Verduzco  Outcome: Progressing     Problem: Safety - Adult  Goal: Free from fall injury  4/10/2024 2232 by Bridget Medrano RN  Outcome: Progressing  4/10/2024 0930 by Jonh Verduzco  Outcome: Progressing     Problem: Pain  Goal: Verbalizes/displays adequate comfort level or baseline comfort level  4/10/2024 2232 by Bridget Medrano RN  Outcome: Progressing  4/10/2024 0930 by Jonh Verduzco  Outcome: Progressing     Problem: ABCDS Injury Assessment  Goal: Absence of physical injury  4/10/2024 2232 by Bridget Medrano RN  Outcome: Progressing  4/10/2024 0930 by Jonh Verduzco  Outcome: Progressing     Problem: Chronic Conditions and Co-morbidities  Goal: Patient's chronic conditions and co-morbidity symptoms are monitored and maintained or improved  4/10/2024 2232 by Bridget Medrano RN  Outcome: Progressing  4/10/2024 0930 by Jonh Verduzco  Outcome: Progressing     Problem: Neurosensory - Adult  Goal: Achieves maximal functionality and self care  4/10/2024 2232 by Bridget Medrano RN  Outcome: Progressing  4/10/2024 0930 by Jonh Verduzco  Outcome: Progressing     Problem: Skin/Tissue Integrity - Adult  Goal: Skin integrity remains intact  4/10/2024 2232 by Bridegt Medrano RN  Outcome: Progressing  Flowsheets  Taken 4/10/2024 2231 by Bridget Medrano RN  Skin Integrity Remains Intact:   Monitor for areas of redness and/or skin breakdown   Assess vascular access sites hourly  Taken 4/10/2024 0931 by Jonh Verduzco  Skin Integrity Remains Intact: Monitor for areas of redness and/or skin breakdown  4/10/2024 0930 by Jonh Verduzco  Outcome: Progressing  Goal: Incisions, wounds, or drain sites healing without S/S of infection  4/10/2024 2232 by Bridget Medrano RN  Outcome: Progressing  Flowsheets (Taken 
  Problem: Discharge Planning  Goal: Discharge to home or other facility with appropriate resources  4/11/2024 0908 by Heidi Robles RN  Flowsheets (Taken 4/11/2024 0908)  Discharge to home or other facility with appropriate resources:   Identify barriers to discharge with patient and caregiver   Arrange for needed discharge resources and transportation as appropriate   Identify discharge learning needs (meds, wound care, etc)     Problem: Pain  Goal: Verbalizes/displays adequate comfort level or baseline comfort level  4/11/2024 0908 by Heidi Robles RN  Flowsheets (Taken 4/11/2024 0908)  Verbalizes/displays adequate comfort level or baseline comfort level:   Encourage patient to monitor pain and request assistance   Assess pain using appropriate pain scale     Problem: ABCDS Injury Assessment  Goal: Absence of physical injury  4/11/2024 0908 by Heidi Robles RN  Flowsheets (Taken 4/11/2024 0908)  Absence of Physical Injury: Implement safety measures based on patient assessment     Problem: Skin/Tissue Integrity  Goal: Absence of new skin breakdown  Description: 1.  Monitor for areas of redness and/or skin breakdown  2.  Assess vascular access sites hourly  3.  Every 4-6 hours minimum:  Change oxygen saturation probe site  4.  Every 4-6 hours:  If on nasal continuous positive airway pressure, respiratory therapy assess nares and determine need for appliance change or resting period.  4/11/2024 0908 by Heidi Robles RN  Outcome: Progressing     
  Problem: Discharge Planning  Goal: Discharge to home or other facility with appropriate resources  4/13/2024 1717 by Jackeline Prieto RN  Outcome: Progressing  4/13/2024 0634 by Cassie Bonds RN  Outcome: Progressing  Flowsheets (Taken 4/12/2024 0243)  Discharge to home or other facility with appropriate resources:   Identify barriers to discharge with patient and caregiver   Arrange for needed discharge resources and transportation as appropriate   Identify discharge learning needs (meds, wound care, etc)   Arrange for interpreters to assist at discharge as needed   Refer to discharge planning if patient needs post-hospital services based on physician order or complex needs related to functional status, cognitive ability or social support system     Problem: Safety - Adult  Goal: Free from fall injury  Outcome: Progressing     Problem: Pain  Goal: Verbalizes/displays adequate comfort level or baseline comfort level  4/13/2024 1717 by Jackeline Prieto RN  Outcome: Progressing  4/13/2024 0634 by Cassie Bonds RN  Outcome: Progressing  Flowsheets (Taken 4/12/2024 0243)  Verbalizes/displays adequate comfort level or baseline comfort level:   Encourage patient to monitor pain and request assistance   Assess pain using appropriate pain scale   Administer analgesics based on type and severity of pain and evaluate response   Implement non-pharmacological measures as appropriate and evaluate response   Consider cultural and social influences on pain and pain management   Notify Licensed Independent Practitioner if interventions unsuccessful or patient reports new pain     Problem: ABCDS Injury Assessment  Goal: Absence of physical injury  4/13/2024 1717 by Jackeline Prieto RN  Outcome: Progressing  4/13/2024 0634 by Cassie Bonds RN  Outcome: Progressing     Problem: Chronic Conditions and Co-morbidities  Goal: Patient's chronic conditions and co-morbidity symptoms are monitored and maintained or improved  Outcome: 
  Problem: Discharge Planning  Goal: Discharge to home or other facility with appropriate resources  4/16/2024 1010 by Paula Yip RN  Outcome: Adequate for Discharge  4/16/2024 1009 by Paula Yip RN  Outcome: Progressing  4/15/2024 2118 by Cayla Madrigal RN  Outcome: Progressing     Problem: ABCDS Injury Assessment  Goal: Absence of physical injury  4/16/2024 1010 by Paula Yip RN  Outcome: Adequate for Discharge  4/16/2024 1009 by Paula Yip RN  Outcome: Progressing  4/15/2024 2118 by Cayla Madrigal RN  Outcome: Progressing     Problem: Chronic Conditions and Co-morbidities  Goal: Patient's chronic conditions and co-morbidity symptoms are monitored and maintained or improved  4/16/2024 1010 by Paula Yip RN  Outcome: Adequate for Discharge  4/16/2024 1009 by Paula Yip RN  Outcome: Progressing  4/15/2024 2118 by Cayla Madrigal RN  Outcome: Progressing     Problem: Neurosensory - Adult  Goal: Achieves maximal functionality and self care  4/16/2024 1010 by Paula Yip RN  Outcome: Adequate for Discharge  4/16/2024 1009 by Paula Yip RN  Outcome: Progressing  Flowsheets (Taken 4/16/2024 0951)  Achieves maximal functionality and self care:   Monitor swallowing and airway patency with patient fatigue and changes in neurological status   Encourage and assist patient to increase activity and self care with guidance from physical therapy/occupational therapy  4/15/2024 2118 by Cayla Madrigal RN  Outcome: Progressing     Problem: Skin/Tissue Integrity - Adult  Goal: Skin integrity remains intact  4/16/2024 1010 by Paula Yip RN  Outcome: Adequate for Discharge  4/16/2024 1009 by Paula Yip RN  Outcome: Progressing  4/15/2024 2118 by Cayla Madrigal RN  Outcome: Progressing  Flowsheets (Taken 4/15/2024 2117)  Skin Integrity Remains Intact:   Monitor for areas of redness and/or skin breakdown   Assess vascular 
  Problem: Discharge Planning  Goal: Discharge to home or other facility with appropriate resources  4/16/2024 2138 by Cayla Madrigal RN  Outcome: Progressing  4/16/2024 1010 by Paula Yip RN  Outcome: Adequate for Discharge  4/16/2024 1009 by Paula Yip RN  Outcome: Progressing     Problem: ABCDS Injury Assessment  Goal: Absence of physical injury  4/16/2024 2138 by Cayla Madrigal RN  Outcome: Progressing  4/16/2024 1010 by Paula Yip RN  Outcome: Adequate for Discharge  4/16/2024 1009 by Paula Yip RN  Outcome: Progressing     Problem: Chronic Conditions and Co-morbidities  Goal: Patient's chronic conditions and co-morbidity symptoms are monitored and maintained or improved  4/16/2024 2138 by Cayla Madrigal RN  Outcome: Progressing  4/16/2024 1010 by Paula Yip RN  Outcome: Adequate for Discharge  4/16/2024 1009 by Paula Yip RN  Outcome: Progressing     Problem: Neurosensory - Adult  Goal: Achieves maximal functionality and self care  4/16/2024 2138 by Cayla Madrigal RN  Outcome: Progressing  4/16/2024 1010 by Paula Yip RN  Outcome: Adequate for Discharge  4/16/2024 1009 by Paula Yip RN  Outcome: Progressing  Flowsheets (Taken 4/16/2024 0951)  Achieves maximal functionality and self care:   Monitor swallowing and airway patency with patient fatigue and changes in neurological status   Encourage and assist patient to increase activity and self care with guidance from physical therapy/occupational therapy     Problem: Skin/Tissue Integrity - Adult  Goal: Skin integrity remains intact  4/16/2024 2138 by Cayla Madrigal RN  Outcome: Progressing  Flowsheets (Taken 4/16/2024 2138)  Skin Integrity Remains Intact:   Monitor for areas of redness and/or skin breakdown   Assess vascular access sites hourly  4/16/2024 1010 by Paula Yip RN  Outcome: Adequate for Discharge  4/16/2024 1009 by Paula Yip 
  Problem: Discharge Planning  Goal: Discharge to home or other facility with appropriate resources  4/17/2024 1031 by Jorge Zimmer RN  Outcome: Completed  4/16/2024 2138 by Cayla Madrigal RN  Outcome: Progressing     Problem: ABCDS Injury Assessment  Goal: Absence of physical injury  4/17/2024 1031 by Jorge Zimmer RN  Outcome: Completed  4/16/2024 2138 by Cayla Madrigal RN  Outcome: Progressing     Problem: Chronic Conditions and Co-morbidities  Goal: Patient's chronic conditions and co-morbidity symptoms are monitored and maintained or improved  4/17/2024 1031 by Jorge Zimmer RN  Outcome: Completed  4/16/2024 2138 by Cayla Madrigal RN  Outcome: Progressing     Problem: Neurosensory - Adult  Goal: Achieves maximal functionality and self care  4/17/2024 1031 by Jorge Zimmer RN  Outcome: Completed  4/16/2024 2138 by Cayla Madrigal RN  Outcome: Progressing     Problem: Skin/Tissue Integrity - Adult  Goal: Skin integrity remains intact  4/17/2024 1031 by Jorge Zimmer RN  Outcome: Completed  4/16/2024 2138 by Cayla Madrigal RN  Outcome: Progressing  Flowsheets (Taken 4/16/2024 2138)  Skin Integrity Remains Intact:   Monitor for areas of redness and/or skin breakdown   Assess vascular access sites hourly  Goal: Incisions, wounds, or drain sites healing without S/S of infection  4/17/2024 1031 by Jorge Zimmer RN  Outcome: Completed  4/16/2024 2138 by Cayla Madrigal RN  Outcome: Progressing  Flowsheets (Taken 4/16/2024 2138)  Incisions, Wounds, or Drain Sites Healing Without Sign and Symptoms of Infection: TWICE DAILY: Assess and document dressing/incision, wound bed, drain sites and surrounding tissue  Goal: Oral mucous membranes remain intact  4/17/2024 1031 by Jorge Zimmer RN  Outcome: Completed  4/16/2024 2138 by Cayla Madrigal RN  Outcome: Progressing  Flowsheets (Taken 4/16/2024 2138)  Oral Mucous Membranes Remain Intact:   Assess oral mucosa and hygiene practices   Implement oral 
  Problem: Discharge Planning  Goal: Discharge to home or other facility with appropriate resources  4/2/2024 2153 by Suellen Moreno RN  Outcome: Progressing  Flowsheets (Taken 4/2/2024 2054)  Discharge to home or other facility with appropriate resources:   Identify barriers to discharge with patient and caregiver   Arrange for needed discharge resources and transportation as appropriate     Problem: Safety - Adult  Goal: Free from fall injury  4/2/2024 2153 by Suellen Moreno RN  Outcome: Progressing     Problem: Pain  Goal: Verbalizes/displays adequate comfort level or baseline comfort level  4/2/2024 2153 by Suellen Moreno RN  Outcome: Progressing  Flowsheets (Taken 4/2/2024 2121)  Verbalizes/displays adequate comfort level or baseline comfort level:   Encourage patient to monitor pain and request assistance   Assess pain using appropriate pain scale   Administer analgesics based on type and severity of pain and evaluate response   Implement non-pharmacological measures as appropriate and evaluate response     Problem: ABCDS Injury Assessment  Goal: Absence of physical injury  4/2/2024 2153 by Suellen Moreno RN  Outcome: Progressing  Flowsheets (Taken 4/2/2024 2132)  Absence of Physical Injury: Implement safety measures based on patient assessment     Problem: Chronic Conditions and Co-morbidities  Goal: Patient's chronic conditions and co-morbidity symptoms are monitored and maintained or improved  Outcome: Progressing  Flowsheets (Taken 4/2/2024 2054)  Care Plan - Patient's Chronic Conditions and Co-Morbidity Symptoms are Monitored and Maintained or Improved: Monitor and assess patient's chronic conditions and comorbid symptoms for stability, deterioration, or improvement     Problem: Neurosensory - Adult  Goal: Achieves stable or improved neurological status  Outcome: Progressing     Problem: Cardiovascular - Adult  Goal: Maintains optimal cardiac output and hemodynamic 
  Problem: Discharge Planning  Goal: Discharge to home or other facility with appropriate resources  4/3/2024 1028 by Heidi Robles RN  Flowsheets (Taken 4/3/2024 1028)  Discharge to home or other facility with appropriate resources:   Identify barriers to discharge with patient and caregiver   Arrange for needed discharge resources and transportation as appropriate   Identify discharge learning needs (meds, wound care, etc)     Problem: Safety - Adult  Goal: Free from fall injury  4/3/2024 1028 by Heidi Robles RN  Flowsheets (Taken 4/3/2024 1028)  Free From Fall Injury: Instruct family/caregiver on patient safety     Problem: Pain  Goal: Verbalizes/displays adequate comfort level or baseline comfort level  4/3/2024 1028 by Heidi Robles RN  Flowsheets (Taken 4/3/2024 1028)  Verbalizes/displays adequate comfort level or baseline comfort level:   Encourage patient to monitor pain and request assistance   Assess pain using appropriate pain scale     Problem: ABCDS Injury Assessment  Goal: Absence of physical injury  4/3/2024 1028 by Heidi Robles RN  Flowsheets (Taken 4/3/2024 1028)  Absence of Physical Injury: Implement safety measures based on patient assessment     
  Problem: Discharge Planning  Goal: Discharge to home or other facility with appropriate resources  4/4/2024 0956 by Lupe Riley  Outcome: Progressing  4/4/2024 0107 by Maria Fernanda King RN  Outcome: Progressing     Problem: Safety - Adult  Goal: Free from fall injury  4/4/2024 0956 by Lupe Riley  Outcome: Progressing  4/4/2024 0107 by Maria Fernanda King RN  Outcome: Progressing     Problem: Pain  Goal: Verbalizes/displays adequate comfort level or baseline comfort level  4/4/2024 0956 by Lupe Riley  Outcome: Progressing  4/4/2024 0107 by Maria Fernanda King RN  Outcome: Progressing     Problem: ABCDS Injury Assessment  Goal: Absence of physical injury  4/4/2024 0956 by Lupe Riley  Outcome: Progressing  4/4/2024 0107 by Maria Fernanda King RN  Outcome: Progressing     Problem: Chronic Conditions and Co-morbidities  Goal: Patient's chronic conditions and co-morbidity symptoms are monitored and maintained or improved  4/4/2024 0956 by Lupe Riley  Outcome: Progressing  4/4/2024 0107 by Maria Fernanda King RN  Outcome: Progressing     Problem: Neurosensory - Adult  Goal: Achieves stable or improved neurological status  Outcome: Progressing  Goal: Achieves maximal functionality and self care  Outcome: Progressing     Problem: Cardiovascular - Adult  Goal: Maintains optimal cardiac output and hemodynamic stability  Outcome: Progressing  Goal: Absence of cardiac dysrhythmias or at baseline  Outcome: Progressing     Problem: Skin/Tissue Integrity - Adult  Goal: Skin integrity remains intact  Outcome: Progressing  Goal: Incisions, wounds, or drain sites healing without S/S of infection  Outcome: Progressing  Goal: Oral mucous membranes remain intact  Outcome: Progressing     Problem: Musculoskeletal - Adult  Goal: Return mobility to safest level of function  Outcome: Progressing  Goal: Maintain proper alignment of affected body part  Outcome: Progressing  Goal: Return ADL status to a safe level of 
  Problem: Discharge Planning  Goal: Discharge to home or other facility with appropriate resources  4/4/2024 1934 by Maria Fernanda King RN  Outcome: Progressing     Problem: Safety - Adult  Goal: Free from fall injury  4/4/2024 1934 by Maria Fernanda King RN  Outcome: Progressing     Problem: Pain  Goal: Verbalizes/displays adequate comfort level or baseline comfort level  4/4/2024 1934 by Maria Fernanda King RN  Outcome: Progressing     Problem: ABCDS Injury Assessment  Goal: Absence of physical injury  4/4/2024 1934 by Maria Fernanda King RN  Outcome: Progressing     Problem: Chronic Conditions and Co-morbidities  Goal: Patient's chronic conditions and co-morbidity symptoms are monitored and maintained or improved  4/4/2024 1934 by Maria Fernanda King RN  Outcome: Progressing     Problem: Neurosensory - Adult  Goal: Achieves stable or improved neurological status  4/4/2024 1934 by Maria Fernanda King RN  Outcome: Progressing     
  Problem: Discharge Planning  Goal: Discharge to home or other facility with appropriate resources  4/6/2024 1144 by Shahnaz Meehan RN  Outcome: Progressing  4/6/2024 0155 by Vivian Rosas RN  Outcome: Progressing  Flowsheets (Taken 4/5/2024 2038)  Discharge to home or other facility with appropriate resources: Identify barriers to discharge with patient and caregiver     Problem: Safety - Adult  Goal: Free from fall injury  4/6/2024 1144 by Shahnaz Meehan RN  Outcome: Progressing  Flowsheets (Taken 4/6/2024 0204 by Vivian Rosas RN)  Free From Fall Injury: Instruct family/caregiver on patient safety  4/6/2024 0155 by Vivian Rosas RN  Outcome: Progressing     Problem: Pain  Goal: Verbalizes/displays adequate comfort level or baseline comfort level  4/6/2024 1144 by Shahnaz Meehan RN  Outcome: Progressing  4/6/2024 0155 by Vivian Rosas RN  Outcome: Progressing     Problem: ABCDS Injury Assessment  Goal: Absence of physical injury  4/6/2024 1144 by Shahnaz Meehan RN  Outcome: Progressing  Flowsheets (Taken 4/6/2024 0204 by Vivian Rosas RN)  Absence of Physical Injury: Implement safety measures based on patient assessment  4/6/2024 0155 by Vivian Rosas RN  Outcome: Progressing     Problem: Neurosensory - Adult  Goal: Achieves stable or improved neurological status  4/6/2024 1144 by Shahnaz Meehan RN  Outcome: Progressing  4/6/2024 0155 by Vivian Rosas RN  Outcome: Progressing  Goal: Achieves maximal functionality and self care  4/6/2024 1144 by Shahnaz Meehan RN  Outcome: Progressing  4/6/2024 0155 by Vivian Rosas RN  Outcome: Progressing     Problem: Cardiovascular - Adult  Goal: Maintains optimal cardiac output and hemodynamic stability  4/6/2024 1144 by Shahnaz Meehan RN  Outcome: Progressing  4/6/2024 0155 by Vivian Rosas RN  Outcome: Progressing  Flowsheets (Taken 4/5/2024 2038)  Maintains optimal cardiac output and hemodynamic stability: 
  Problem: Discharge Planning  Goal: Discharge to home or other facility with appropriate resources  4/7/2024 1556 by Shahnaz Meehan RN  Outcome: Progressing  4/7/2024 0526 by Zohra Wiggins RN  Outcome: Progressing     Problem: Safety - Adult  Goal: Free from fall injury  4/7/2024 1556 by Shahnaz Meehan RN  Outcome: Progressing  4/7/2024 0526 by Zohra Wiggins RN  Outcome: Progressing     Problem: Pain  Goal: Verbalizes/displays adequate comfort level or baseline comfort level  4/7/2024 1556 by Shahnaz Meehan RN  Outcome: Progressing  4/7/2024 0526 by Zohra Wiggins RN  Outcome: Progressing     Problem: ABCDS Injury Assessment  Goal: Absence of physical injury  4/7/2024 1556 by Shahnaz Meehan RN  Outcome: Progressing  4/7/2024 0526 by Zohra Wiggins RN  Outcome: Progressing     Problem: Chronic Conditions and Co-morbidities  Goal: Patient's chronic conditions and co-morbidity symptoms are monitored and maintained or improved  4/7/2024 1556 by Shahnaz Meehan RN  Outcome: Progressing  4/7/2024 0526 by Zohra Wiggins RN  Outcome: Progressing     Problem: Neurosensory - Adult  Goal: Achieves stable or improved neurological status  4/7/2024 1556 by Shahnaz Meehan RN  Outcome: Progressing  4/7/2024 0526 by Zohra Wiggins RN  Outcome: Progressing  Goal: Achieves maximal functionality and self care  4/7/2024 1556 by Shahnaz Meehan RN  Outcome: Progressing  4/7/2024 0526 by Zohra Wiggins RN  Outcome: Progressing     Problem: Cardiovascular - Adult  Goal: Maintains optimal cardiac output and hemodynamic stability  4/7/2024 1556 by Shahnaz Meehan RN  Outcome: Progressing  4/7/2024 0526 by Zohra Wiggins RN  Outcome: Progressing  Goal: Absence of cardiac dysrhythmias or at baseline  4/7/2024 1556 by Shahnaz Meehan, RN  Outcome: Progressing  4/7/2024 0526 by Zohra Wiggins RN  Outcome: Progressing     Problem: Skin/Tissue 
  Problem: Discharge Planning  Goal: Discharge to home or other facility with appropriate resources  4/8/2024 0503 by Zohra Wiggins RN  Outcome: Progressing     Problem: Safety - Adult  Goal: Free from fall injury  4/8/2024 0503 by Zohra Wiggins RN  Outcome: Progressing     Problem: Pain  Goal: Verbalizes/displays adequate comfort level or baseline comfort level  4/8/2024 0503 by Zohra Wiggins RN  Outcome: Progressing     Problem: ABCDS Injury Assessment  Goal: Absence of physical injury  4/8/2024 0503 by Zohra Wiggins RN  Outcome: Progressing     Problem: Chronic Conditions and Co-morbidities  Goal: Patient's chronic conditions and co-morbidity symptoms are monitored and maintained or improved  4/8/2024 0503 by Zohra Wiggins RN  Outcome: Progressing       Problem: Cardiovascular - Adult  Goal: Maintains optimal cardiac output and hemodynamic stability  4/8/2024 0503 by Zohra Wiggins RN  Outcome: Progressing     Problem: Skin/Tissue Integrity - Adult  Goal: Skin integrity remains intact  4/8/2024 0503 by Zohra Wiggins RN  Outcome: Progressing     Problem: Skin/Tissue Integrity - Adult  Goal: Incisions, wounds, or drain sites healing without S/S of infection  4/8/2024 0503 by Zohra Wiggins RN  Outcome: Progressing     Problem: Musculoskeletal - Adult  Goal: Return mobility to safest level of function  4/8/2024 0503 by Zohra Wiggins RN  Outcome: Progressing     Problem: Musculoskeletal - Adult  Goal: Maintain proper alignment of affected body part  4/8/2024 0503 by Zohra Wiggins RN  Outcome: Progressing     Problem: Musculoskeletal - Adult  Goal: Return ADL status to a safe level of function  4/8/2024 0503 by Zohra Wiggins RN  Outcome: Progressing     Problem: Skin/Tissue Integrity  Goal: Absence of new skin breakdown  Description: 1.  Monitor for areas of redness and/or skin breakdown  2.  Assess vascular access sites hourly  3.  
  Problem: Discharge Planning  Goal: Discharge to home or other facility with appropriate resources  Outcome: Progressing     Problem: ABCDS Injury Assessment  Goal: Absence of physical injury  4/15/2024 2118 by Cayla Madrigal, RN  Outcome: Progressing  4/15/2024 1808 by Bridget Ji, RN  Outcome: Progressing     Problem: Chronic Conditions and Co-morbidities  Goal: Patient's chronic conditions and co-morbidity symptoms are monitored and maintained or improved  Outcome: Progressing     Problem: Neurosensory - Adult  Goal: Achieves maximal functionality and self care  Outcome: Progressing     Problem: Skin/Tissue Integrity - Adult  Goal: Skin integrity remains intact  Outcome: Progressing  Flowsheets (Taken 4/15/2024 2117)  Skin Integrity Remains Intact:   Monitor for areas of redness and/or skin breakdown   Assess vascular access sites hourly  Goal: Incisions, wounds, or drain sites healing without S/S of infection  Outcome: Progressing  Flowsheets (Taken 4/15/2024 2117)  Incisions, Wounds, or Drain Sites Healing Without Sign and Symptoms of Infection: TWICE DAILY: Assess and document dressing/incision, wound bed, drain sites and surrounding tissue  Goal: Oral mucous membranes remain intact  Outcome: Progressing  Flowsheets (Taken 4/15/2024 2117)  Oral Mucous Membranes Remain Intact:   Assess oral mucosa and hygiene practices   Implement oral medicated treatments as ordered   Implement preventative oral hygiene regimen     Problem: Musculoskeletal - Adult  Goal: Return mobility to safest level of function  Outcome: Progressing  Goal: Maintain proper alignment of affected body part  Outcome: Progressing  Goal: Return ADL status to a safe level of function  Outcome: Progressing     Problem: Skin/Tissue Integrity  Goal: Absence of new skin breakdown  Description: 1.  Monitor for areas of redness and/or skin breakdown  2.  Assess vascular access sites hourly  3.  Every 4-6 hours minimum:  Change oxygen saturation 
  Problem: Discharge Planning  Goal: Discharge to home or other facility with appropriate resources  Outcome: Progressing     Problem: Safety - Adult  Goal: Free from fall injury  4/9/2024 2308 by Bridget Medrano, RN  Outcome: Progressing  4/9/2024 1012 by Thelma Sadler RN  Outcome: Progressing     Problem: Pain  Goal: Verbalizes/displays adequate comfort level or baseline comfort level  Outcome: Progressing     Problem: ABCDS Injury Assessment  Goal: Absence of physical injury  Outcome: Progressing     Problem: Chronic Conditions and Co-morbidities  Goal: Patient's chronic conditions and co-morbidity symptoms are monitored and maintained or improved  Outcome: Progressing     Problem: Neurosensory - Adult  Goal: Achieves maximal functionality and self care  Outcome: Progressing     Problem: Skin/Tissue Integrity - Adult  Goal: Skin integrity remains intact  Outcome: Progressing  Flowsheets (Taken 4/9/2024 2307)  Skin Integrity Remains Intact:   Monitor for areas of redness and/or skin breakdown   Assess vascular access sites hourly  Goal: Incisions, wounds, or drain sites healing without S/S of infection  Outcome: Progressing  Goal: Oral mucous membranes remain intact  Outcome: Progressing     Problem: Musculoskeletal - Adult  Goal: Return mobility to safest level of function  Outcome: Progressing  Goal: Maintain proper alignment of affected body part  Outcome: Progressing  Goal: Return ADL status to a safe level of function  Outcome: Progressing     Problem: Skin/Tissue Integrity  Goal: Absence of new skin breakdown  Description: 1.  Monitor for areas of redness and/or skin breakdown  2.  Assess vascular access sites hourly  3.  Every 4-6 hours minimum:  Change oxygen saturation probe site  4.  Every 4-6 hours:  If on nasal continuous positive airway pressure, respiratory therapy assess nares and determine need for appliance change or resting period.  Outcome: Progressing     
  Problem: Discharge Planning  Goal: Discharge to home or other facility with appropriate resources  Outcome: Progressing     Problem: Safety - Adult  Goal: Free from fall injury  Outcome: Progressing     Problem: Pain  Goal: Verbalizes/displays adequate comfort level or baseline comfort level  Outcome: Progressing     Problem: ABCDS Injury Assessment  Goal: Absence of physical injury  Outcome: Progressing     Problem: Chronic Conditions and Co-morbidities  Goal: Patient's chronic conditions and co-morbidity symptoms are monitored and maintained or improved  Outcome: Progressing     
  Problem: Discharge Planning  Goal: Discharge to home or other facility with appropriate resources  Outcome: Progressing     Problem: Safety - Adult  Goal: Free from fall injury  Outcome: Progressing     Problem: Pain  Goal: Verbalizes/displays adequate comfort level or baseline comfort level  Outcome: Progressing     Problem: ABCDS Injury Assessment  Goal: Absence of physical injury  Outcome: Progressing     Problem: Chronic Conditions and Co-morbidities  Goal: Patient's chronic conditions and co-morbidity symptoms are monitored and maintained or improved  Outcome: Progressing     Problem: Cardiovascular - Adult  Goal: Maintains optimal cardiac output and hemodynamic stability  Outcome: Progressing     Problem: Skin/Tissue Integrity - Adult  Goal: Skin integrity remains intact  Outcome: Progressing     Problem: Infection - Adult  Goal: Absence of infection at discharge  Outcome: Progressing     Problem: Genitourinary - Adult  Goal: Absence of urinary retention  Outcome: Progressing     Problem: Metabolic/Fluid and Electrolytes - Adult  Goal: Electrolytes maintained within normal limits  Outcome: Progressing     Problem: Hematologic - Adult  Goal: Maintains hematologic stability  Outcome: Progressing     Problem: Skin/Tissue Integrity  Goal: Absence of new skin breakdown  Description: 1.  Monitor for areas of redness and/or skin breakdown  2.  Assess vascular access sites hourly  3.  Every 4-6 hours minimum:  Change oxygen saturation probe site  4.  Every 4-6 hours:  If on nasal continuous positive airway pressure, respiratory therapy assess nares and determine need for appliance change or resting period.  Outcome: Progressing     Problem: Nutrition Deficit:  Goal: Optimize nutritional status  Outcome: Progressing     
  Problem: Discharge Planning  Goal: Discharge to home or other facility with appropriate resources  Outcome: Progressing     Problem: Safety - Adult  Goal: Free from fall injury  Outcome: Progressing     Problem: Pain  Goal: Verbalizes/displays adequate comfort level or baseline comfort level  Outcome: Progressing     Problem: ABCDS Injury Assessment  Goal: Absence of physical injury  Outcome: Progressing     Problem: Chronic Conditions and Co-morbidities  Goal: Patient's chronic conditions and co-morbidity symptoms are monitored and maintained or improved  Outcome: Progressing     Problem: Neurosensory - Adult  Goal: Achieves maximal functionality and self care  Outcome: Progressing     Problem: Skin/Tissue Integrity - Adult  Goal: Skin integrity remains intact  Outcome: Progressing     Problem: Skin/Tissue Integrity - Adult  Goal: Incisions, wounds, or drain sites healing without S/S of infection  Outcome: Progressing     Problem: Skin/Tissue Integrity - Adult  Goal: Oral mucous membranes remain intact  Outcome: Progressing     Problem: Musculoskeletal - Adult  Goal: Return mobility to safest level of function  Outcome: Progressing     Problem: Musculoskeletal - Adult  Goal: Maintain proper alignment of affected body part  Outcome: Progressing     Problem: Musculoskeletal - Adult  Goal: Return ADL status to a safe level of function  Outcome: Progressing     Problem: Skin/Tissue Integrity  Goal: Absence of new skin breakdown  Description: 1.  Monitor for areas of redness and/or skin breakdown  2.  Assess vascular access sites hourly  3.  Every 4-6 hours minimum:  Change oxygen saturation probe site  4.  Every 4-6 hours:  If on nasal continuous positive airway pressure, respiratory therapy assess nares and determine need for appliance change or resting period.  Outcome: Progressing     
  Problem: Discharge Planning  Goal: Discharge to home or other facility with appropriate resources  Outcome: Progressing     Problem: Safety - Adult  Goal: Free from fall injury  Outcome: Progressing     Problem: Pain  Goal: Verbalizes/displays adequate comfort level or baseline comfort level  Outcome: Progressing     Problem: ABCDS Injury Assessment  Goal: Absence of physical injury  Outcome: Progressing     Problem: Chronic Conditions and Co-morbidities  Goal: Patient's chronic conditions and co-morbidity symptoms are monitored and maintained or improved  Outcome: Progressing     Problem: Neurosensory - Adult  Goal: Achieves maximal functionality and self care  Outcome: Progressing     Problem: Skin/Tissue Integrity - Adult  Goal: Skin integrity remains intact  Outcome: Progressing  Flowsheets  Taken 4/14/2024 2213 by Cayla Madrigal RN  Skin Integrity Remains Intact:   Monitor for areas of redness and/or skin breakdown   Assess vascular access sites hourly  Taken 4/14/2024 1510 by Jackeline Prieto, RN  Skin Integrity Remains Intact:   Assess vascular access sites hourly   Every 4-6 hours minimum: Change oxygen saturation probe site   Monitor for areas of redness and/or skin breakdown  Goal: Incisions, wounds, or drain sites healing without S/S of infection  Outcome: Progressing  Flowsheets  Taken 4/14/2024 2213 by Cayla Madrigal RN  Incisions, Wounds, or Drain Sites Healing Without Sign and Symptoms of Infection: TWICE DAILY: Assess and document dressing/incision, wound bed, drain sites and surrounding tissue  Taken 4/14/2024 1510 by Jackeline Prieto RN  Incisions, Wounds, or Drain Sites Healing Without Sign and Symptoms of Infection:   ADMISSION and DAILY: Assess and document risk factors for pressure ulcer development   TWICE DAILY: Assess and document skin integrity   TWICE DAILY: Assess and document dressing/incision, wound bed, drain sites and surrounding tissue   Implement wound care per orders  Goal: Oral 
  Problem: Discharge Planning  Goal: Discharge to home or other facility with appropriate resources  Outcome: Progressing     Problem: Safety - Adult  Goal: Free from fall injury  Outcome: Progressing     Problem: Pain  Goal: Verbalizes/displays adequate comfort level or baseline comfort level  Outcome: Progressing     Problem: ABCDS Injury Assessment  Goal: Absence of physical injury  Outcome: Progressing     Problem: Chronic Conditions and Co-morbidities  Goal: Patient's chronic conditions and co-morbidity symptoms are monitored and maintained or improved  Outcome: Progressing     Problem: Neurosensory - Adult  Goal: Achieves stable or improved neurological status  Outcome: Progressing     Problem: Neurosensory - Adult  Goal: Achieves maximal functionality and self care  Outcome: Progressing     Problem: Cardiovascular - Adult  Goal: Maintains optimal cardiac output and hemodynamic stability  Outcome: Progressing     Problem: Cardiovascular - Adult  Goal: Absence of cardiac dysrhythmias or at baseline  Outcome: Progressing     
  Problem: Discharge Planning  Goal: Discharge to home or other facility with appropriate resources  Outcome: Progressing     Problem: Safety - Adult  Goal: Free from fall injury  Outcome: Progressing     Problem: Pain  Goal: Verbalizes/displays adequate comfort level or baseline comfort level  Outcome: Progressing     Problem: ABCDS Injury Assessment  Goal: Absence of physical injury  Outcome: Progressing     Problem: Chronic Conditions and Co-morbidities  Goal: Patient's chronic conditions and co-morbidity symptoms are monitored and maintained or improved  Outcome: Progressing     Problem: Neurosensory - Adult  Goal: Achieves stable or improved neurological status  Outcome: Progressing     Problem: Neurosensory - Adult  Goal: Achieves maximal functionality and self care  Outcome: Progressing     Problem: Cardiovascular - Adult  Goal: Maintains optimal cardiac output and hemodynamic stability  Outcome: Progressing     Problem: Cardiovascular - Adult  Goal: Absence of cardiac dysrhythmias or at baseline  Outcome: Progressing     Problem: Skin/Tissue Integrity - Adult  Goal: Skin integrity remains intact  Outcome: Progressing     Problem: Skin/Tissue Integrity - Adult  Goal: Incisions, wounds, or drain sites healing without S/S of infection  Outcome: Progressing     Problem: Skin/Tissue Integrity - Adult  Goal: Oral mucous membranes remain intact  Outcome: Progressing     Problem: Musculoskeletal - Adult  Goal: Return mobility to safest level of function  Outcome: Progressing     Problem: Musculoskeletal - Adult  Goal: Maintain proper alignment of affected body part  Outcome: Progressing     Problem: Musculoskeletal - Adult  Goal: Return ADL status to a safe level of function  Outcome: Progressing     Problem: Genitourinary - Adult  Goal: Absence of urinary retention  Outcome: Progressing     Problem: Genitourinary - Adult  Goal: Urinary catheter remains patent  Outcome: Progressing     Problem: Infection - 
  Problem: Discharge Planning  Goal: Discharge to home or other facility with appropriate resources  Outcome: Progressing     Problem: Safety - Adult  Goal: Free from fall injury  Outcome: Progressing     Problem: Pain  Goal: Verbalizes/displays adequate comfort level or baseline comfort level  Outcome: Progressing     Problem: ABCDS Injury Assessment  Goal: Absence of physical injury  Outcome: Progressing     Problem: Chronic Conditions and Co-morbidities  Goal: Patient's chronic conditions and co-morbidity symptoms are monitored and maintained or improved  Outcome: Progressing     Problem: Neurosensory - Adult  Goal: Achieves stable or improved neurological status  Outcome: Progressing  Goal: Achieves maximal functionality and self care  Outcome: Progressing     
  Problem: Discharge Planning  Goal: Discharge to home or other facility with appropriate resources  Outcome: Progressing     Problem: Safety - Adult  Goal: Free from fall injury  Outcome: Progressing     Problem: Pain  Goal: Verbalizes/displays adequate comfort level or baseline comfort level  Outcome: Progressing     Problem: ABCDS Injury Assessment  Goal: Absence of physical injury  Outcome: Progressing     Problem: Chronic Conditions and Co-morbidities  Goal: Patient's chronic conditions and co-morbidity symptoms are monitored and maintained or improved  Outcome: Progressing     Problem: Neurosensory - Adult  Goal: Achieves stable or improved neurological status  Outcome: Progressing  Goal: Achieves maximal functionality and self care  Outcome: Progressing     Problem: Cardiovascular - Adult  Goal: Maintains optimal cardiac output and hemodynamic stability  Outcome: Progressing  Goal: Absence of cardiac dysrhythmias or at baseline  Outcome: Progressing     Problem: Skin/Tissue Integrity - Adult  Goal: Skin integrity remains intact  Outcome: Progressing  Goal: Incisions, wounds, or drain sites healing without S/S of infection  Outcome: Progressing  Goal: Oral mucous membranes remain intact  Outcome: Progressing     Problem: Musculoskeletal - Adult  Goal: Return mobility to safest level of function  Outcome: Progressing  Goal: Maintain proper alignment of affected body part  Outcome: Progressing  Goal: Return ADL status to a safe level of function  Outcome: Progressing     Problem: Genitourinary - Adult  Goal: Absence of urinary retention  Outcome: Progressing  Goal: Urinary catheter remains patent  Outcome: Progressing     Problem: Infection - Adult  Goal: Absence of infection at discharge  Outcome: Progressing  Goal: Absence of infection during hospitalization  Outcome: Progressing  Goal: Absence of fever/infection during anticipated neutropenic period  Outcome: Progressing     Problem: Metabolic/Fluid and 
  Problem: Discharge Planning  Goal: Discharge to home or other facility with appropriate resources  Outcome: Progressing     Problem: Safety - Adult  Goal: Free from fall injury  Outcome: Progressing     Problem: Pain  Goal: Verbalizes/displays adequate comfort level or baseline comfort level  Outcome: Progressing     Problem: ABCDS Injury Assessment  Goal: Absence of physical injury  Outcome: Progressing     Problem: Skin/Tissue Integrity - Adult  Goal: Skin integrity remains intact  Outcome: Progressing     Problem: Musculoskeletal - Adult  Goal: Return mobility to safest level of function  Outcome: Progressing     
  Problem: Discharge Planning  Goal: Discharge to home or other facility with appropriate resources  Outcome: Progressing  Flowsheets (Taken 3/21/2024 0322)  Discharge to home or other facility with appropriate resources:   Identify barriers to discharge with patient and caregiver   Arrange for needed discharge resources and transportation as appropriate   Identify discharge learning needs (meds, wound care, etc)   Arrange for interpreters to assist at discharge as needed   Refer to discharge planning if patient needs post-hospital services based on physician order or complex needs related to functional status, cognitive ability or social support system     Problem: Pain  Goal: Verbalizes/displays adequate comfort level or baseline comfort level  Outcome: Progressing  Flowsheets (Taken 3/21/2024 0322)  Verbalizes/displays adequate comfort level or baseline comfort level:   Assess pain using appropriate pain scale   Encourage patient to monitor pain and request assistance   Administer analgesics based on type and severity of pain and evaluate response   Implement non-pharmacological measures as appropriate and evaluate response   Consider cultural and social influences on pain and pain management   Notify Licensed Independent Practitioner if interventions unsuccessful or patient reports new pain     Problem: Safety - Adult  Goal: Free from fall injury  Outcome: Progressing     
  Problem: Discharge Planning  Goal: Discharge to home or other facility with appropriate resources  Outcome: Progressing  Flowsheets (Taken 3/21/2024 0322)  Discharge to home or other facility with appropriate resources:   Identify barriers to discharge with patient and caregiver   Arrange for needed discharge resources and transportation as appropriate   Identify discharge learning needs (meds, wound care, etc)   Arrange for interpreters to assist at discharge as needed   Refer to discharge planning if patient needs post-hospital services based on physician order or complex needs related to functional status, cognitive ability or social support system     Problem: Safety - Adult  Goal: Free from fall injury  Outcome: Progressing     Problem: Pain  Goal: Verbalizes/displays adequate comfort level or baseline comfort level  Outcome: Progressing  Flowsheets (Taken 3/21/2024 0322)  Verbalizes/displays adequate comfort level or baseline comfort level:   Assess pain using appropriate pain scale   Encourage patient to monitor pain and request assistance   Administer analgesics based on type and severity of pain and evaluate response   Implement non-pharmacological measures as appropriate and evaluate response   Consider cultural and social influences on pain and pain management   Notify Licensed Independent Practitioner if interventions unsuccessful or patient reports new pain     
  Problem: Discharge Planning  Goal: Discharge to home or other facility with appropriate resources  Outcome: Progressing  Flowsheets (Taken 4/1/2024 0834)  Discharge to home or other facility with appropriate resources: Identify barriers to discharge with patient and caregiver     Problem: Safety - Adult  Goal: Free from fall injury  Outcome: Progressing     Problem: Pain  Goal: Verbalizes/displays adequate comfort level or baseline comfort level  Outcome: Progressing     Problem: ABCDS Injury Assessment  Goal: Absence of physical injury  Outcome: Progressing  Flowsheets (Taken 4/1/2024 1834)  Absence of Physical Injury: Implement safety measures based on patient assessment     Problem: Chronic Conditions and Co-morbidities  Goal: Patient's chronic conditions and co-morbidity symptoms are monitored and maintained or improved  Outcome: Progressing  Flowsheets (Taken 4/1/2024 0834)  Care Plan - Patient's Chronic Conditions and Co-Morbidity Symptoms are Monitored and Maintained or Improved: Monitor and assess patient's chronic conditions and comorbid symptoms for stability, deterioration, or improvement     Problem: Neurosensory - Adult  Goal: Achieves stable or improved neurological status  Outcome: Progressing     Problem: Neurosensory - Adult  Goal: Achieves maximal functionality and self care  Outcome: Progressing     Problem: Cardiovascular - Adult  Goal: Maintains optimal cardiac output and hemodynamic stability  Outcome: Progressing  Flowsheets (Taken 4/1/2024 0834)  Maintains optimal cardiac output and hemodynamic stability:   Monitor blood pressure and heart rate   Monitor urine output and notify Licensed Independent Practitioner for values outside of normal range     Problem: Cardiovascular - Adult  Goal: Absence of cardiac dysrhythmias or at baseline  Outcome: Progressing  Flowsheets (Taken 4/1/2024 0834)  Absence of cardiac dysrhythmias or at baseline: Monitor cardiac rate and rhythm     Problem: 
  Problem: Discharge Planning  Goal: Discharge to home or other facility with appropriate resources  Outcome: Progressing  Flowsheets (Taken 4/12/2024 0243)  Discharge to home or other facility with appropriate resources:   Identify barriers to discharge with patient and caregiver   Arrange for needed discharge resources and transportation as appropriate   Identify discharge learning needs (meds, wound care, etc)   Arrange for interpreters to assist at discharge as needed   Refer to discharge planning if patient needs post-hospital services based on physician order or complex needs related to functional status, cognitive ability or social support system     Problem: Pain  Goal: Verbalizes/displays adequate comfort level or baseline comfort level  Outcome: Progressing  Flowsheets (Taken 4/12/2024 0243)  Verbalizes/displays adequate comfort level or baseline comfort level:   Encourage patient to monitor pain and request assistance   Assess pain using appropriate pain scale   Administer analgesics based on type and severity of pain and evaluate response   Implement non-pharmacological measures as appropriate and evaluate response   Consider cultural and social influences on pain and pain management   Notify Licensed Independent Practitioner if interventions unsuccessful or patient reports new pain     Problem: ABCDS Injury Assessment  Goal: Absence of physical injury  Outcome: Progressing     Problem: Skin/Tissue Integrity - Adult  Goal: Skin integrity remains intact  Outcome: Progressing     
  Problem: Pain  Goal: Verbalizes/displays adequate comfort level or baseline comfort level  4/9/2024 0350 by Zohra Wiggins RN  Outcome: Progressing     
  Problem: Safety - Adult  Goal: Free from fall injury  4/1/2024 2326 by Zoila Daniel RN  Outcome: Progressing  4/1/2024 1836 by Christie Drew RN  Outcome: Progressing     Problem: Pain  Goal: Verbalizes/displays adequate comfort level or baseline comfort level  4/1/2024 2326 by Zoila Daniel RN  Outcome: Progressing  4/1/2024 1836 by Christie Drew RN  Outcome: Progressing     Problem: ABCDS Injury Assessment  Goal: Absence of physical injury  4/1/2024 2326 by Zoila Daniel RN  Outcome: Progressing  4/1/2024 1836 by Christie Drew RN  Outcome: Progressing  Flowsheets (Taken 4/1/2024 1834)  Absence of Physical Injury: Implement safety measures based on patient assessment     Problem: Chronic Conditions and Co-morbidities  Goal: Patient's chronic conditions and co-morbidity symptoms are monitored and maintained or improved  4/1/2024 2326 by Zoila Daniel RN  Outcome: Progressing  4/1/2024 1836 by Christie Drew RN  Outcome: Progressing  Flowsheets (Taken 4/1/2024 0834)  Care Plan - Patient's Chronic Conditions and Co-Morbidity Symptoms are Monitored and Maintained or Improved: Monitor and assess patient's chronic conditions and comorbid symptoms for stability, deterioration, or improvement     Problem: Neurosensory - Adult  Goal: Achieves stable or improved neurological status  4/1/2024 2326 by Zoila Daniel RN  Outcome: Progressing  4/1/2024 1836 by Christie Drew RN  Outcome: Progressing     Problem: Neurosensory - Adult  Goal: Achieves maximal functionality and self care  4/1/2024 2326 by Zoila Daniel RN  Outcome: Progressing  4/1/2024 1836 by Christie Drew RN  Outcome: Progressing     Problem: Cardiovascular - Adult  Goal: Maintains optimal cardiac output and hemodynamic stability  4/1/2024 2326 by Zoila Daniel RN  Outcome: Progressing  4/1/2024 1836 by Christie Drew RN  Outcome: Progressing  Flowsheets (Taken 4/1/2024 0834)  Maintains optimal cardiac output and hemodynamic stability:   
  Problem: Safety - Adult  Goal: Free from fall injury  4/8/2024 0834 by Thelma Sadelr, RN  Outcome: Progressing  4/8/2024 0503 by Zohra Wiggins, RN  Outcome: Progressing     
  Problem: Safety - Adult  Goal: Free from fall injury  Outcome: Completed     
Electrolytes - Adult  Goal: Electrolytes maintained within normal limits  Outcome: Progressing  Goal: Hemodynamic stability and optimal renal function maintained  Outcome: Progressing  Goal: Glucose maintained within prescribed range  Outcome: Progressing     Problem: Hematologic - Adult  Goal: Maintains hematologic stability  Outcome: Progressing     Problem: Skin/Tissue Integrity  Goal: Absence of new skin breakdown  Description: 1.  Monitor for areas of redness and/or skin breakdown  2.  Assess vascular access sites hourly  3.  Every 4-6 hours minimum:  Change oxygen saturation probe site  4.  Every 4-6 hours:  If on nasal continuous positive airway pressure, respiratory therapy assess nares and determine need for appliance change or resting period.  Outcome: Progressing     
Electrolytes - Adult  Goal: Electrolytes maintained within normal limits  Outcome: Progressing  Goal: Hemodynamic stability and optimal renal function maintained  Outcome: Progressing  Goal: Glucose maintained within prescribed range  Outcome: Progressing     Problem: Hematologic - Adult  Goal: Maintains hematologic stability  Outcome: Progressing     Problem: Skin/Tissue Integrity  Goal: Absence of new skin breakdown  Description: 1.  Monitor for areas of redness and/or skin breakdown  2.  Assess vascular access sites hourly  3.  Every 4-6 hours minimum:  Change oxygen saturation probe site  4.  Every 4-6 hours:  If on nasal continuous positive airway pressure, respiratory therapy assess nares and determine need for appliance change or resting period.  Outcome: Progressing     Problem: Nutrition Deficit:  Goal: Optimize nutritional status  Outcome: Progressing     
Progressing  3/25/2024 1241 by Lupe Riley  Outcome: Progressing  Goal: Incisions, wounds, or drain sites healing without S/S of infection  3/25/2024 2158 by Jackeline Kulkarni RN  Outcome: Progressing  3/25/2024 1241 by Lupe Riley  Outcome: Progressing  Goal: Oral mucous membranes remain intact  3/25/2024 2158 by Jackeline Kulkarni RN  Outcome: Progressing  3/25/2024 1241 by Lupe Riley  Outcome: Progressing     Problem: Musculoskeletal - Adult  Goal: Return mobility to safest level of function  3/25/2024 2158 by Jackeline Kulkarni RN  Outcome: Progressing  3/25/2024 1241 by Lupe Riley  Outcome: Progressing  Goal: Maintain proper alignment of affected body part  3/25/2024 2158 by Jackeline Kulkarni RN  Outcome: Progressing  3/25/2024 1241 by Lupe Riley  Outcome: Progressing  Goal: Return ADL status to a safe level of function  3/25/2024 2158 by Jackeline Kulkarni RN  Outcome: Progressing  3/25/2024 1241 by Lupe Riley  Outcome: Progressing     Problem: Genitourinary - Adult  Goal: Absence of urinary retention  3/25/2024 2158 by Jackeline Kulkarni RN  Outcome: Progressing  3/25/2024 1241 by Lupe Riley  Outcome: Progressing  Goal: Urinary catheter remains patent  3/25/2024 2158 by Jackeline Kulkarni RN  Outcome: Progressing  3/25/2024 1241 by Lupe Riley  Outcome: Progressing     Problem: Infection - Adult  Goal: Absence of infection at discharge  3/25/2024 2158 by Jackeline Kulkarni RN  Outcome: Progressing  3/25/2024 1241 by Lupe Riley  Outcome: Progressing  Goal: Absence of infection during hospitalization  3/25/2024 2158 by Jackeline Kulkarni RN  Outcome: Progressing  3/25/2024 1241 by Lupe Riley  Outcome: Progressing  Goal: Absence of fever/infection during anticipated neutropenic period  3/25/2024 2158 by Jackeline Kulkarni RN  Outcome: Progressing  3/25/2024 1241 by Lupe Riley  Outcome: Progressing     Problem: Metabolic/Fluid and Electrolytes - Adult  Goal: Electrolytes maintained within normal 
Progressing  Flowsheets (Taken 4/2/2024 1430)  Return Mobility to Safest Level of Function:   Assess patient stability and activity tolerance for standing, transferring and ambulating with or without assistive devices   Assist with transfers and ambulation using safe patient handling equipment as needed   Ensure adequate protection for wounds/incisions during mobilization   Obtain physical therapy/occupational therapy consults as needed   Instruct patient/family in ordered activity level     Problem: Genitourinary - Adult  Goal: Absence of urinary retention  Outcome: Progressing  Flowsheets (Taken 4/2/2024 1430)  Absence of urinary retention:   Assess patient’s ability to void and empty bladder   Monitor intake/output and perform bladder scan as needed     Problem: Infection - Adult  Goal: Absence of infection at discharge  Outcome: Progressing  Flowsheets (Taken 4/2/2024 1430)  Absence of infection at discharge:   Assess and monitor for signs and symptoms of infection   Monitor lab/diagnostic results   Monitor all insertion sites i.e., indwelling lines, tubes and drains  Note: Pt educated on importance of antibiotic treatment. Pt educated on proper hand hygiene. Pt verbalizes understanding.      Problem: Skin/Tissue Integrity  Goal: Absence of new skin breakdown  Description: 1.  Monitor for areas of redness and/or skin breakdown  2.  Assess vascular access sites hourly  3.  Every 4-6 hours minimum:  Change oxygen saturation probe site  4.  Every 4-6 hours:  If on nasal continuous positive airway pressure, respiratory therapy assess nares and determine need for appliance change or resting period.  Outcome: Progressing  Note: Skin assessment done per shift. Pt educated on importance of frequent positioning. Pt verbalizes understanding.      
RN  Outcome: Progressing  Flowsheets (Taken 4/13/2024 2245)  Skin Integrity Remains Intact:   Monitor for areas of redness and/or skin breakdown   Assess vascular access sites hourly  4/13/2024 1717 by Jackeline Prieto RN  Outcome: Progressing  Flowsheets (Taken 4/13/2024 1058)  Skin Integrity Remains Intact:   Monitor for areas of redness and/or skin breakdown   Assess vascular access sites hourly   Every 4-6 hours minimum: Change oxygen saturation probe site  Goal: Incisions, wounds, or drain sites healing without S/S of infection  4/13/2024 2246 by Cayla Madrigal RN  Outcome: Progressing  Flowsheets (Taken 4/13/2024 2245)  Incisions, Wounds, or Drain Sites Healing Without Sign and Symptoms of Infection: TWICE DAILY: Assess and document dressing/incision, wound bed, drain sites and surrounding tissue  4/13/2024 1717 by Jackeline Prieto, RN  Outcome: Progressing  Flowsheets (Taken 4/13/2024 1058)  Incisions, Wounds, or Drain Sites Healing Without Sign and Symptoms of Infection:   ADMISSION and DAILY: Assess and document risk factors for pressure ulcer development   TWICE DAILY: Assess and document skin integrity   TWICE DAILY: Assess and document dressing/incision, wound bed, drain sites and surrounding tissue   Implement wound care per orders   Initiate isolation precautions as appropriate   Initiate pressure ulcer prevention bundle as indicated  Goal: Oral mucous membranes remain intact  4/13/2024 2246 by Cayla Madrigal, RN  Outcome: Progressing  Flowsheets (Taken 4/13/2024 2245)  Oral Mucous Membranes Remain Intact:   Assess oral mucosa and hygiene practices   Implement oral medicated treatments as ordered   Implement preventative oral hygiene regimen  4/13/2024 1717 by Jackeline Prieto, RN  Outcome: Progressing  Flowsheets (Taken 4/13/2024 1058)  Oral Mucous Membranes Remain Intact: Assess oral mucosa and hygiene practices     Problem: Musculoskeletal - Adult  Goal: Return mobility to safest level of 
hours.     Problem: Musculoskeletal - Adult  Goal: Return mobility to safest level of function  Outcome: Progressing  Goal: Maintain proper alignment of affected body part  Outcome: Progressing  Goal: Return ADL status to a safe level of function  Outcome: Progressing   Pt free from falls this shift. Fall precautions in place at all times. Call light always within reach. Pt able and agreeable to contact for safety appropriately.    Problem: Genitourinary - Adult  Goal: Absence of urinary retention  Outcome: Progressing  Goal: Urinary catheter remains patent  Outcome: Progressing     Problem: Hematologic - Adult  Goal: Maintains hematologic stability  Outcome: Progressing     Problem: Skin/Tissue Integrity  Goal: Absence of new skin breakdown  Description: 1.  Monitor for areas of redness and/or skin breakdown  2.  Assess vascular access sites hourly  3.  Every 4-6 hours minimum:  Change oxygen saturation probe site  4.  Every 4-6 hours:  If on nasal continuous positive airway pressure, respiratory therapy assess nares and determine need for appliance change or resting period.  Outcome: Progressing   Dressing intact to RLE.    Problem: Nutrition Deficit:  Goal: Optimize nutritional status  Outcome: Progressing       
improved  Outcome: Progressing

## 2024-04-17 NOTE — CARE COORDINATION
Case Management Discharge Note          Date / Time of Note: 4/17/2024 9:05 AM                  Patient Name: Marvin Montero   YOB: 1950  Diagnosis: Gangrene of toe of right foot (HCC) [I96]   Date / Time: 3/20/2024  1:40 PM    Financial:  Payor: MEDICARE / Plan: MEDICARE PART B / Product Type: *No Product type* /      Pharmacy:    [id number] [place], [place] - [address] - P [phone] - F [phone]  [address]  [facility] [address]  Phone: [phone] Fax: [phone]    [id number] [place], [place] - [address] - P [phone] - F [phone]  [address]  [facility] [address]  Phone: [phone] Fax: [phone]      Assistance purchasing medications?: Potential Assistance Purchasing Medications: Yes  Assistance provided by Case Management: None at this time    DISCHARGE Disposition: Discharging to Facility/ Agency   Name:   [facility]   Phone:  [phone]   Fax:  [phone]     LOC at discharge: Skilled Nursing  TONYA Completed: Yes             NURSING REPORT NUMBER: [phone]               NURSING FAX NUMBER: [phone]    Transportation:  Transportation PLAN for discharge: EMS transportation   Mode of Transport: Ambulance stretcher - BLS  Reason for medical transport: Other: 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   Name of Transport Company: [facility]  Phone: [phone]  Time of Transport: 11:30am    Transport form completed: Yes    IMM Completed:   Yes, Case management has presented and reviewed IMM letter #2.   IMM Letter given to Patient/Family/Significant other/Guardian/POA/by:: Provided to patient at bedside by Tanika Preciado RN CM. Education provided to patient, patient reported no questions and verbalized understanding. Patient aware of 4 hours allotted time to determine if they choose to pursue Medicare appeal

## 2024-04-17 NOTE — PROGRESS NOTES
Infectious Disease Follow up Notes  Admit Date: 3/20/2024  Hospital Day: 14    Antibiotics :   ORAL Linezolid   Oral Flagyl      CHIEF COMPLAINT:     MRSA infection   RODERICK  Enterococcus infection  WBC elevation  CRP elevation   PVD         Subjective interval History :  73 y.o. man with a significant history for diabetes, left below-knee amputation, sleep apnea BMI 32 CHF COPD atrial fibrillation admitted to hospital secondary to right foot infection ongoing drainage cellulitis and oral.  Wound culture now positive for MRSA and Enterococcus seen by podiatry underwent incision and drainage with amputation of the right fifth toe and bone biopsy.  Pathology margins still positive for osteomyelitis , wound culture positive for anaerobes as well oral Flagyl added    Status post transmetatarsal amputation right foot with Achilles tendon lengthening operative culture in process.  Dressing present tolerating antibiotic therapy okay        Past Medical History:    Past Medical History:   Diagnosis Date    Atrial fibrillation ([facility])     Back pain     Cardiomyopathy     CHF (congestive heart failure) (HCC)     COPD (chronic obstructive pulmonary disease) ([facility])     Dental disease     Dizziness     Dyslipidemia     GERD (gastroesophageal reflux disease)     Hearing loss     Hyperlipidemia     Hypertension     Hypothyroidism     Leg edema     MRSA (methicillin resistant staph aureus) culture positive 10/05/2015    foot/bone    MRSA nasal colonization 05/05/2017    Obesity     Prolonged emergence from general anesthesia     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     Sleep apnea     Stroke (HCC)     Thyroid disease     Tinnitus     Type 2 diabetes mellitus without complication ([facility])     Type II or unspecified type diabetes mellitus without mention of complication, not stated as uncontrolled        Past Surgical History:    Past Surgical History: 
                                                                                Infectious Disease Follow up Notes  Admit Date: 3/20/2024  Hospital Day: 8    Antibiotics :   ORAL Linezolid   Oral Flagyl      CHIEF COMPLAINT:     MRSA infection   RODERICK  Enterococcus infection  WBC elevation  CRP elevation          Subjective interval History :  73 y.o. man with a significant history for diabetes, left below-knee amputation, sleep apnea BMI 32 CHF COPD atrial fibrillation admitted to hospital secondary to right foot infection ongoing drainage cellulitis and oral.  Wound culture now positive for MRSA and Enterococcus seen by podiatry underwent incision and drainage with amputation of the right fifth toe and bone biopsy.  Pathology margins still positive for osteomyelitis , wound culture positive for anaerobes as well oral Flagyl added    Podiatry following is a plan for repeat incision and drainage possible possible more amputation discussed with family at bedside during rounds    Past Medical History:    Past Medical History:   Diagnosis Date    Atrial fibrillation (HCC)     Back pain     Cardiomyopathy     CHF (congestive heart failure) (HCC)     COPD (chronic obstructive pulmonary disease) (HCC)     Dental disease     Dizziness     Dyslipidemia     GERD (gastroesophageal reflux disease)     Hearing loss     Hyperlipidemia     Hypertension     Hypothyroidism     Leg edema     MRSA (methicillin resistant staph aureus) culture positive 10/05/2015    foot/bone    MRSA nasal colonization 05/05/2017    Obesity     Prolonged emergence from general anesthesia     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     Sleep apnea     Stroke (HCC)     Thyroid disease     Tinnitus     Type 2 diabetes mellitus without complication (HCC)     Type II or unspecified type diabetes mellitus without mention of complication, not stated as uncontrolled        Past Surgical History:    Past Surgical History:   Procedure Laterality Date    ADRENAL 
                          Patient ID: Marvin Montero  Referring/ Physician: Kassandra Mitchell MD      Summary:   Marvin Montero is being seen by nephrology for RODERICK.     Reason for admission: foot wound    HPI  Marvin Montero is a 73 y.o. male with past medical history significant for hypertension, type 2 diabetes, left leg amputation, sleep apnea, stroke, CHF, COPD, atrial fibrillation who presented to hospital with a nonhealing right toe wound.  He had been on outpatient antibiotics for about a week prior to admission.  The wound was not healing.  And had a foul smell.  He also was told to double his diuretics about 5 days ago per the cardiology clinic because he was retaining fluid.  He has noticed some edema and shortness of breath with laying flat.  He has however not gained any weight in fact he has lost weight.  He said he usually weighs around 265-270 and is now weighing 245.  He had a postvoid bladder scan showing about 500 cc.  He has noticed his urine is darker.  Has not had any hematuria or foamy frothy urine.  Prior to admission he was on ACE inhibitor and farxiga.    X-ray right foot concerning for possible osteomyelitis.  Postvoid bladder scan 500 cc    Interval Hx:   Patient was seen and examined at bedside.  He is awake and alert.  He is going to have to have further surgery on his right foot.  He has no chest pain or shortness of breath.  He is lying flat and has no orthopnea no edema.    Blood pressure 102/51  Urine output 325 cc, has a Schilling because of urine retention   On room air.  99%.    Sodium 141 potassium 4.2 bicarb 27 BUN 13 creatinine 1    Assessment/Plan:   -His renal function remained stable, at baseline with no acute electrolyte normalities  -The blood pressure is acceptable on carvedilol, lisinopril being held  -He had a Schilling placed for urinary retention, on Flomax.  Will need a voiding trial prior to discharge.  -He has a history of issues with fluid overload and heart 
                          Patient ID: Marvin Montero  Referring/ Physician: Kassandra Mitchell MD      Summary:   Marvin Montero is being seen by nephrology for RODERICK.     Reason for admission: foot wound    HPI  Marvin Montero is a 73 y.o. male with past medical history significant for hypertension, type 2 diabetes, left leg amputation, sleep apnea, stroke, CHF, COPD, atrial fibrillation who presented to hospital with a nonhealing right toe wound.  He had been on outpatient antibiotics for about a week prior to admission.  The wound was not healing.  And had a foul smell.  He also was told to double his diuretics about 5 days ago per the cardiology clinic because he was retaining fluid.  He has noticed some edema and shortness of breath with laying flat.  He has however not gained any weight in fact he has lost weight.  He said he usually weighs around 265-270 and is now weighing 245.  He had a postvoid bladder scan showing about 500 cc.  He has noticed his urine is darker.  Has not had any hematuria or foamy frothy urine.  Prior to admission he was on ACE inhibitor and farxiga.    X-ray right foot concerning for possible osteomyelitis.  Postvoid bladder scan 500 cc    Interval Hx:   Seen at bedside.  Awake and alert.  He has no complaints no chest pain or shortness of breath  He does have some little bit of trace swelling in his right lower extremity which is where his right foot wound is.  He said he feels better now than he has in a while.    Blood pressure 113/58  On room air  Has a Schilling catheter, 2 L out    Sodium 141 potassium 4 bicarb 25 BUN 14 creatinine 1 calcium 8.2 hemoglobin 9.3  Albumin creatinine ratio negative      Assessment/Plan:   -Renal function back to baseline has no albuminuria.  His blood pressure is controlled and he has no signs of fluid overload.  -Will continue to hold his lisinopril, diuretics and SGLT2 inhibitor until discharge.    Acute kidney injury  Likely has underlying 
      Hospitalist Progress Note      PCP: Reij Draper MD    Date of Admission: 3/20/2024    Subjective: renal fn improved, cont to be hypotensive    Medications:  Reviewed    Infusion Medications    sodium chloride      dextrose       Scheduled Medications    amiodarone  200 mg Oral Daily    atorvastatin  40 mg Oral Daily    carvedilol  3.125 mg Oral BID WC    ferrous sulfate  325 mg Oral Every Other Day    levothyroxine  25 mcg Oral QAM AC    [Held by provider] lisinopril  2.5 mg Oral Daily    [Held by provider] oxyBUTYnin  5 mg Oral Nightly    tamsulosin  0.4 mg Oral Daily    sodium chloride flush  5-40 mL IntraVENous 2 times per day    [Held by provider] enoxaparin  1 mg/kg (Order-Specific) SubCUTAneous BID    insulin lispro  0-4 Units SubCUTAneous TID WC    insulin lispro  0-4 Units SubCUTAneous Nightly    cefepime  2,000 mg IntraVENous Q24H    linezolid  600 mg IntraVENous Q12H     PRN Meds: docusate sodium, sodium chloride flush, sodium chloride, potassium chloride **OR** potassium alternative oral replacement **OR** potassium chloride, magnesium sulfate, ondansetron **OR** ondansetron, polyethylene glycol, acetaminophen **OR** acetaminophen, HYDROcodone 5 mg - acetaminophen, glucose, dextrose bolus **OR** dextrose bolus, glucagon (rDNA), dextrose      Intake/Output Summary (Last 24 hours) at 3/21/2024 1723  Last data filed at 3/21/2024 1130  Gross per 24 hour   Intake 240 ml   Output 1125 ml   Net -885 ml       Physical Exam Performed:    BP (!) 99/58   Pulse 61   Temp 98.1 °F (36.7 °C) (Oral)   Resp 16   Ht 1.93 m (6' 4\")   Wt 114.1 kg (251 lb 8.7 oz)   SpO2 94%   BMI 30.62 kg/m²       General appearance: NAD  Lungs: Clear to auscultation, bilaterally without Rales/Wheezes/Rhonchi with good respiratory effort.  Heart: Regular rate and rhythm with Normal S1/S2 without murmurs  Abdomen: Soft, non-tender or non-distended without rigidity or guarding and positive bowel sounds   Extremities: No 
      Hospitalist Progress Note      PCP: Reji Draper MD    Date of Admission: 3/20/2024    Subjective     No overnight events ; no complaints this am     Medications:  Reviewed    Infusion Medications    sodium chloride      sodium chloride Stopped (04/02/24 1133)    dextrose       Scheduled Medications    finasteride  5 mg Oral Daily    metroNIDAZOLE  500 mg Oral 3 times per day    linezolid  600 mg Oral 2 times per day    amiodarone  100 mg Oral Daily    sodium chloride flush  5-40 mL IntraVENous 2 times per day    apixaban  5 mg Oral BID    lactobacillus  1 capsule Oral BID WC    atorvastatin  40 mg Oral Daily    carvedilol  3.125 mg Oral BID WC    ferrous sulfate  325 mg Oral Every Other Day    levothyroxine  25 mcg Oral QAM AC    lisinopril  2.5 mg Oral Daily    [Held by provider] oxyBUTYnin  5 mg Oral Nightly    tamsulosin  0.4 mg Oral Daily    insulin lispro  0-4 Units SubCUTAneous TID WC    insulin lispro  0-4 Units SubCUTAneous Nightly     PRN Meds: traMADol, simethicone, HYDROcodone 5 mg - acetaminophen, loperamide, sodium chloride flush, sodium chloride, docusate sodium, sodium chloride, potassium chloride **OR** potassium alternative oral replacement **OR** potassium chloride, magnesium sulfate, ondansetron **OR** ondansetron, polyethylene glycol, acetaminophen **OR** acetaminophen, glucose, dextrose bolus **OR** dextrose bolus, glucagon (rDNA), dextrose      Intake/Output Summary (Last 24 hours) at 4/13/2024 1537  Last data filed at 4/13/2024 0935  Gross per 24 hour   Intake 240 ml   Output 200 ml   Net 40 ml         Physical Exam Performed:    BP (!) 123/58   Pulse 60   Temp 97.7 °F (36.5 °C) (Axillary)   Resp 18   Ht 1.93 m (6' 4\")   Wt 115.9 kg (255 lb 8.2 oz)   SpO2 100%   BMI 31.10 kg/m²     General: NAD  Eyes: EOMI  ENT: neck supple  Cardiovascular: Regular rate.  Respiratory: Clear to auscultation  Gastrointestinal: Soft, non tender  Genitourinary: no suprapubic 
      Hospitalist Progress Note      PCP: Reji Draper MD    Date of Admission: 3/20/2024    Subjective     No overnight events ; no complaints this am   No chest pain or sob    Medications:  Reviewed    Infusion Medications    sodium chloride      sodium chloride Stopped (04/02/24 1133)    dextrose       Scheduled Medications    finasteride  5 mg Oral Daily    amiodarone  100 mg Oral Daily    sodium chloride flush  5-40 mL IntraVENous 2 times per day    apixaban  5 mg Oral BID    lactobacillus  1 capsule Oral BID WC    atorvastatin  40 mg Oral Daily    carvedilol  3.125 mg Oral BID WC    ferrous sulfate  325 mg Oral Every Other Day    levothyroxine  25 mcg Oral QAM AC    lisinopril  2.5 mg Oral Daily    [Held by provider] oxyBUTYnin  5 mg Oral Nightly    tamsulosin  0.4 mg Oral Daily    insulin lispro  0-4 Units SubCUTAneous TID WC    insulin lispro  0-4 Units SubCUTAneous Nightly     PRN Meds: traMADol, simethicone, HYDROcodone 5 mg - acetaminophen, loperamide, sodium chloride flush, sodium chloride, docusate sodium, sodium chloride, potassium chloride **OR** potassium alternative oral replacement **OR** potassium chloride, magnesium sulfate, ondansetron **OR** ondansetron, polyethylene glycol, acetaminophen **OR** acetaminophen, glucose, dextrose bolus **OR** dextrose bolus, glucagon (rDNA), dextrose      Intake/Output Summary (Last 24 hours) at 4/15/2024 1151  Last data filed at 4/15/2024 0925  Gross per 24 hour   Intake 1170 ml   Output 800 ml   Net 370 ml         Physical Exam Performed:    BP (!) 105/58   Pulse 70   Temp 97.3 °F (36.3 °C) (Oral)   Resp 18   Ht 1.93 m (6' 4\")   Wt 118.2 kg (260 lb 9.3 oz)   SpO2 96%   BMI 31.72 kg/m²     General: NAD  Eyes: EOMI  ENT: neck supple  Cardiovascular: Regular rate.  Respiratory: Clear to auscultation  Gastrointestinal: Soft, non tender  Genitourinary: no suprapubic tenderness  Musculoskeletal: Left BKA, right foot covered with dressing but dressing is 
      Hospitalist Progress Note      PCP: Reji Draper MD    Date of Admission: 3/20/2024    Subjective     No overnight events ; no complaints this am   No chest pain or sob    Medications:  Reviewed    Infusion Medications    sodium chloride      sodium chloride Stopped (04/02/24 1133)    dextrose       Scheduled Medications    finasteride  5 mg Oral Daily    amiodarone  100 mg Oral Daily    sodium chloride flush  5-40 mL IntraVENous 2 times per day    apixaban  5 mg Oral BID    lactobacillus  1 capsule Oral BID WC    atorvastatin  40 mg Oral Daily    carvedilol  3.125 mg Oral BID WC    ferrous sulfate  325 mg Oral Every Other Day    levothyroxine  25 mcg Oral QAM AC    lisinopril  2.5 mg Oral Daily    [Held by provider] oxyBUTYnin  5 mg Oral Nightly    tamsulosin  0.4 mg Oral Daily    insulin lispro  0-4 Units SubCUTAneous TID WC    insulin lispro  0-4 Units SubCUTAneous Nightly     PRN Meds: traMADol, simethicone, HYDROcodone 5 mg - acetaminophen, loperamide, sodium chloride flush, sodium chloride, docusate sodium, sodium chloride, potassium chloride **OR** potassium alternative oral replacement **OR** potassium chloride, magnesium sulfate, ondansetron **OR** ondansetron, polyethylene glycol, acetaminophen **OR** acetaminophen, glucose, dextrose bolus **OR** dextrose bolus, glucagon (rDNA), dextrose      Intake/Output Summary (Last 24 hours) at 4/16/2024 1255  Last data filed at 4/16/2024 0959  Gross per 24 hour   Intake 720 ml   Output 365 ml   Net 355 ml         Physical Exam Performed:    /67   Pulse 66   Temp 98.4 °F (36.9 °C) (Oral)   Resp 18   Ht 1.93 m (6' 4\")   Wt 116.7 kg (257 lb 4.4 oz)   SpO2 98%   BMI 31.32 kg/m²     General: NAD  Eyes: EOMI  ENT: neck supple  Cardiovascular: Regular rate.  Respiratory: Clear to auscultation  Gastrointestinal: Soft, non tender  Genitourinary: no suprapubic tenderness  Musculoskeletal: Left BKA, right foot covered with dressing but dressing is soaked 
      Hospitalist Progress Note      PCP: Reji Draper MD    Date of Admission: 3/20/2024    Subjective: denies any complaints, pain controlled    Medications:  Reviewed    Infusion Medications    sodium chloride      sodium chloride Stopped (04/02/24 1133)    dextrose       Scheduled Medications    enoxaparin  120 mg SubCUTAneous BID    finasteride  5 mg Oral Daily    metroNIDAZOLE  500 mg Oral 3 times per day    linezolid  600 mg Oral 2 times per day    amiodarone  100 mg Oral Daily    sodium chloride flush  5-40 mL IntraVENous 2 times per day    [Held by provider] apixaban  5 mg Oral BID    lactobacillus  1 capsule Oral BID WC    atorvastatin  40 mg Oral Daily    carvedilol  3.125 mg Oral BID WC    ferrous sulfate  325 mg Oral Every Other Day    levothyroxine  25 mcg Oral QAM AC    lisinopril  2.5 mg Oral Daily    [Held by provider] oxyBUTYnin  5 mg Oral Nightly    tamsulosin  0.4 mg Oral Daily    insulin lispro  0-4 Units SubCUTAneous TID WC    insulin lispro  0-4 Units SubCUTAneous Nightly     PRN Meds: traMADol, simethicone, HYDROcodone 5 mg - acetaminophen, loperamide, sodium chloride flush, sodium chloride, docusate sodium, sodium chloride, potassium chloride **OR** potassium alternative oral replacement **OR** potassium chloride, magnesium sulfate, ondansetron **OR** ondansetron, polyethylene glycol, acetaminophen **OR** acetaminophen, glucose, dextrose bolus **OR** dextrose bolus, glucagon (rDNA), dextrose      Intake/Output Summary (Last 24 hours) at 4/7/2024 1618  Last data filed at 4/7/2024 1302  Gross per 24 hour   Intake 645 ml   Output --   Net 645 ml       Physical Exam Performed:    BP (!) 118/58   Pulse 63   Temp 97.6 °F (36.4 °C) (Axillary)   Resp 15   Ht 1.93 m (6' 4\")   Wt 122.8 kg (270 lb 11.6 oz)   SpO2 97%   BMI 32.95 kg/m²     General: NAD  Eyes: EOMI  ENT: neck supple  Cardiovascular: Regular rate.  Respiratory: Clear to auscultation  Gastrointestinal: Soft, non 
      Hospitalist Progress Note      PCP: Reji Draper MD    Date of Admission: 3/20/2024    Subjective: diarrhea improved, pain not well controlled    Medications:  Reviewed    Infusion Medications    sodium chloride      sodium chloride      dextrose       Scheduled Medications    metroNIDAZOLE  500 mg Oral 3 times per day    linezolid  600 mg Oral 2 times per day    amiodarone  100 mg Oral Daily    sodium chloride flush  5-40 mL IntraVENous 2 times per day    apixaban  5 mg Oral BID    lactobacillus  1 capsule Oral BID WC    atorvastatin  40 mg Oral Daily    carvedilol  3.125 mg Oral BID WC    ferrous sulfate  325 mg Oral Every Other Day    levothyroxine  25 mcg Oral QAM AC    [Held by provider] lisinopril  2.5 mg Oral Daily    [Held by provider] oxyBUTYnin  5 mg Oral Nightly    tamsulosin  0.4 mg Oral Daily    insulin lispro  0-4 Units SubCUTAneous TID WC    insulin lispro  0-4 Units SubCUTAneous Nightly     PRN Meds: HYDROcodone 5 mg - acetaminophen, loperamide, sodium chloride flush, sodium chloride, docusate sodium, sodium chloride, potassium chloride **OR** potassium alternative oral replacement **OR** potassium chloride, magnesium sulfate, ondansetron **OR** ondansetron, polyethylene glycol, acetaminophen **OR** acetaminophen, glucose, dextrose bolus **OR** dextrose bolus, glucagon (rDNA), dextrose      Intake/Output Summary (Last 24 hours) at 3/27/2024 1601  Last data filed at 3/27/2024 1319  Gross per 24 hour   Intake 480 ml   Output 976 ml   Net -496 ml       Physical Exam Performed:    BP (!) 101/51   Pulse 69   Temp 98.5 °F (36.9 °C) (Oral)   Resp 21   Ht 1.93 m (6' 4\")   Wt 120.4 kg (265 lb 6.9 oz)   SpO2 100%   BMI 32.31 kg/m²     General appearance: NAD  Lungs: Clear to auscultation, bilaterally without Rales/Wheezes/Rhonchi with good respiratory effort.  Heart: Regular rate and rhythm with Normal S1/S2 without murmurs  Abdomen: Soft, non-tender or non-distended without rigidity or 
      Hospitalist Progress Note      PCP: Reji Draper MD    Date of Admission: 3/20/2024    Subjective: had surgery done yesterday, working with therapy today, pain fairly controlled    Medications:  Reviewed    Infusion Medications    sodium chloride      sodium chloride Stopped (04/02/24 1133)    dextrose       Scheduled Medications    finasteride  5 mg Oral Daily    metroNIDAZOLE  500 mg Oral 3 times per day    enoxaparin  1 mg/kg SubCUTAneous BID    linezolid  600 mg Oral 2 times per day    amiodarone  100 mg Oral Daily    sodium chloride flush  5-40 mL IntraVENous 2 times per day    [Held by provider] apixaban  5 mg Oral BID    lactobacillus  1 capsule Oral BID WC    atorvastatin  40 mg Oral Daily    carvedilol  3.125 mg Oral BID WC    ferrous sulfate  325 mg Oral Every Other Day    levothyroxine  25 mcg Oral QAM AC    [Held by provider] lisinopril  2.5 mg Oral Daily    [Held by provider] oxyBUTYnin  5 mg Oral Nightly    tamsulosin  0.4 mg Oral Daily    insulin lispro  0-4 Units SubCUTAneous TID WC    insulin lispro  0-4 Units SubCUTAneous Nightly     PRN Meds: simethicone, HYDROcodone 5 mg - acetaminophen, loperamide, sodium chloride flush, sodium chloride, docusate sodium, sodium chloride, potassium chloride **OR** potassium alternative oral replacement **OR** potassium chloride, magnesium sulfate, ondansetron **OR** ondansetron, polyethylene glycol, acetaminophen **OR** acetaminophen, glucose, dextrose bolus **OR** dextrose bolus, glucagon (rDNA), dextrose      Intake/Output Summary (Last 24 hours) at 4/3/2024 1651  Last data filed at 4/3/2024 1400  Gross per 24 hour   Intake 600 ml   Output 975 ml   Net -375 ml       Physical Exam Performed:    /60   Pulse 72   Temp 98.7 °F (37.1 °C) (Oral)   Resp 15   Ht 1.93 m (6' 4\")   Wt 120 kg (264 lb 8.8 oz)   SpO2 90%   BMI 32.20 kg/m²     General: NAD  Eyes: EOMI  ENT: neck supple  Cardiovascular: Regular rate.  Respiratory: Clear to 
      Hospitalist Progress Note      PCP: Reji Draper MD    Date of Admission: 3/20/2024    Subjective: no acute events overnight    Medications:  Reviewed    Infusion Medications    sodium chloride      sodium chloride Stopped (04/02/24 1133)    dextrose       Scheduled Medications    enoxaparin  120 mg SubCUTAneous BID    finasteride  5 mg Oral Daily    metroNIDAZOLE  500 mg Oral 3 times per day    linezolid  600 mg Oral 2 times per day    amiodarone  100 mg Oral Daily    sodium chloride flush  5-40 mL IntraVENous 2 times per day    [Held by provider] apixaban  5 mg Oral BID    lactobacillus  1 capsule Oral BID WC    atorvastatin  40 mg Oral Daily    carvedilol  3.125 mg Oral BID WC    ferrous sulfate  325 mg Oral Every Other Day    levothyroxine  25 mcg Oral QAM AC    lisinopril  2.5 mg Oral Daily    [Held by provider] oxyBUTYnin  5 mg Oral Nightly    tamsulosin  0.4 mg Oral Daily    insulin lispro  0-4 Units SubCUTAneous TID WC    insulin lispro  0-4 Units SubCUTAneous Nightly     PRN Meds: traMADol, simethicone, HYDROcodone 5 mg - acetaminophen, loperamide, sodium chloride flush, sodium chloride, docusate sodium, sodium chloride, potassium chloride **OR** potassium alternative oral replacement **OR** potassium chloride, magnesium sulfate, ondansetron **OR** ondansetron, polyethylene glycol, acetaminophen **OR** acetaminophen, glucose, dextrose bolus **OR** dextrose bolus, glucagon (rDNA), dextrose      Intake/Output Summary (Last 24 hours) at 4/9/2024 1726  Last data filed at 4/9/2024 0636  Gross per 24 hour   Intake 420 ml   Output 700 ml   Net -280 ml       Physical Exam Performed:    /74   Pulse 68   Temp 98.1 °F (36.7 °C) (Oral)   Resp 18   Ht 1.93 m (6' 4\")   Wt 118.3 kg (260 lb 12.9 oz)   SpO2 98%   BMI 31.75 kg/m²     General: NAD  Eyes: EOMI  ENT: neck supple  Cardiovascular: Regular rate.  Respiratory: Clear to auscultation  Gastrointestinal: Soft, non tender  Genitourinary: no 
      Hospitalist Progress Note      PCP: Reji Draper MD    Date of Admission: 3/20/2024    Subjective: no acute events overnight    Medications:  Reviewed    Infusion Medications    sodium chloride      sodium chloride Stopped (04/02/24 1133)    dextrose       Scheduled Medications    finasteride  5 mg Oral Daily    metroNIDAZOLE  500 mg Oral 3 times per day    enoxaparin  1 mg/kg SubCUTAneous BID    linezolid  600 mg Oral 2 times per day    amiodarone  100 mg Oral Daily    sodium chloride flush  5-40 mL IntraVENous 2 times per day    [Held by provider] apixaban  5 mg Oral BID    lactobacillus  1 capsule Oral BID WC    atorvastatin  40 mg Oral Daily    carvedilol  3.125 mg Oral BID WC    ferrous sulfate  325 mg Oral Every Other Day    levothyroxine  25 mcg Oral QAM AC    lisinopril  2.5 mg Oral Daily    [Held by provider] oxyBUTYnin  5 mg Oral Nightly    tamsulosin  0.4 mg Oral Daily    insulin lispro  0-4 Units SubCUTAneous TID WC    insulin lispro  0-4 Units SubCUTAneous Nightly     PRN Meds: traMADol, simethicone, HYDROcodone 5 mg - acetaminophen, loperamide, sodium chloride flush, sodium chloride, docusate sodium, sodium chloride, potassium chloride **OR** potassium alternative oral replacement **OR** potassium chloride, magnesium sulfate, ondansetron **OR** ondansetron, polyethylene glycol, acetaminophen **OR** acetaminophen, glucose, dextrose bolus **OR** dextrose bolus, glucagon (rDNA), dextrose      Intake/Output Summary (Last 24 hours) at 4/6/2024 1645  Last data filed at 4/5/2024 2158  Gross per 24 hour   Intake 250 ml   Output 300 ml   Net -50 ml       Physical Exam Performed:    /64   Pulse 69   Temp 98.3 °F (36.8 °C) (Oral)   Resp 18   Ht 1.93 m (6' 4\")   Wt 122.9 kg (270 lb 15.1 oz)   SpO2 94%   BMI 32.98 kg/m²     General: NAD  Eyes: EOMI  ENT: neck supple  Cardiovascular: Regular rate.  Respiratory: Clear to auscultation  Gastrointestinal: Soft, non tender  Genitourinary: no 
      Hospitalist Progress Note      PCP: Reji Draper MD    Date of Admission: 3/20/2024    Subjective: no acute events overnight    Medications:  Reviewed    Infusion Medications    sodium chloride      sodium chloride Stopped (04/02/24 1133)    dextrose       Scheduled Medications    finasteride  5 mg Oral Daily    metroNIDAZOLE  500 mg Oral 3 times per day    linezolid  600 mg Oral 2 times per day    amiodarone  100 mg Oral Daily    sodium chloride flush  5-40 mL IntraVENous 2 times per day    apixaban  5 mg Oral BID    lactobacillus  1 capsule Oral BID WC    atorvastatin  40 mg Oral Daily    carvedilol  3.125 mg Oral BID WC    ferrous sulfate  325 mg Oral Every Other Day    levothyroxine  25 mcg Oral QAM AC    lisinopril  2.5 mg Oral Daily    [Held by provider] oxyBUTYnin  5 mg Oral Nightly    tamsulosin  0.4 mg Oral Daily    insulin lispro  0-4 Units SubCUTAneous TID WC    insulin lispro  0-4 Units SubCUTAneous Nightly     PRN Meds: traMADol, simethicone, HYDROcodone 5 mg - acetaminophen, loperamide, sodium chloride flush, sodium chloride, docusate sodium, sodium chloride, potassium chloride **OR** potassium alternative oral replacement **OR** potassium chloride, magnesium sulfate, ondansetron **OR** ondansetron, polyethylene glycol, acetaminophen **OR** acetaminophen, glucose, dextrose bolus **OR** dextrose bolus, glucagon (rDNA), dextrose      Intake/Output Summary (Last 24 hours) at 4/10/2024 2310  Last data filed at 4/10/2024 2152  Gross per 24 hour   Intake --   Output 500 ml   Net -500 ml       Physical Exam Performed:    /82   Pulse 68   Temp 98.8 °F (37.1 °C) (Oral)   Resp 22   Ht 1.93 m (6' 4\")   Wt 117.5 kg (259 lb 0.7 oz)   SpO2 99%   BMI 31.53 kg/m²     General: NAD  Eyes: EOMI  ENT: neck supple  Cardiovascular: Regular rate.  Respiratory: Clear to auscultation  Gastrointestinal: Soft, non tender  Genitourinary: no suprapubic tenderness  Musculoskeletal: Left BKA, right foot covered 
      Hospitalist Progress Note      PCP: Reji Draper MD    Date of Admission: 3/20/2024    Subjective: pain controlled, renal fn improved    Medications:  Reviewed    Infusion Medications    heparin (PORCINE) Infusion 1,000 Units/hr (03/24/24 1035)    sodium chloride      dextrose       Scheduled Medications    lactobacillus  1 capsule Oral BID WC    amiodarone  200 mg Oral Daily    atorvastatin  40 mg Oral Daily    carvedilol  3.125 mg Oral BID WC    ferrous sulfate  325 mg Oral Every Other Day    levothyroxine  25 mcg Oral QAM AC    [Held by provider] lisinopril  2.5 mg Oral Daily    [Held by provider] oxyBUTYnin  5 mg Oral Nightly    tamsulosin  0.4 mg Oral Daily    sodium chloride flush  5-40 mL IntraVENous 2 times per day    insulin lispro  0-4 Units SubCUTAneous TID WC    insulin lispro  0-4 Units SubCUTAneous Nightly    linezolid  600 mg IntraVENous Q12H     PRN Meds: docusate sodium, sodium chloride flush, sodium chloride, potassium chloride **OR** potassium alternative oral replacement **OR** potassium chloride, magnesium sulfate, ondansetron **OR** ondansetron, polyethylene glycol, acetaminophen **OR** acetaminophen, HYDROcodone 5 mg - acetaminophen, glucose, dextrose bolus **OR** dextrose bolus, glucagon (rDNA), dextrose      Intake/Output Summary (Last 24 hours) at 3/24/2024 1734  Last data filed at 3/24/2024 1703  Gross per 24 hour   Intake 460 ml   Output 2000 ml   Net -1540 ml       Physical Exam Performed:    /65   Pulse 80   Temp 97.7 °F (36.5 °C) (Oral)   Resp 16   Ht 1.93 m (6' 4\")   Wt 119.9 kg (264 lb 5.3 oz)   SpO2 96%   BMI 32.18 kg/m²       General appearance: NAD  Lungs: Clear to auscultation, bilaterally without Rales/Wheezes/Rhonchi with good respiratory effort.  Heart: Regular rate and rhythm with Normal S1/S2 without murmurs  Abdomen: Soft, non-tender or non-distended without rigidity or guarding and positive bowel sounds   Extremities: No clubbing, cyanosis, or edema 
      Hospitalist Progress Note      PCP: Reji Draper MD    Date of Admission: 3/20/2024    Subjective: pain controlled, renal fn improved    Medications:  Reviewed    Infusion Medications    sodium chloride      dextrose       Scheduled Medications    cefepime  2,000 mg IntraVENous Q12H    amiodarone  200 mg Oral Daily    atorvastatin  40 mg Oral Daily    carvedilol  3.125 mg Oral BID WC    ferrous sulfate  325 mg Oral Every Other Day    levothyroxine  25 mcg Oral QAM AC    [Held by provider] lisinopril  2.5 mg Oral Daily    [Held by provider] oxyBUTYnin  5 mg Oral Nightly    tamsulosin  0.4 mg Oral Daily    sodium chloride flush  5-40 mL IntraVENous 2 times per day    [Held by provider] enoxaparin  1 mg/kg (Order-Specific) SubCUTAneous BID    insulin lispro  0-4 Units SubCUTAneous TID WC    insulin lispro  0-4 Units SubCUTAneous Nightly    linezolid  600 mg IntraVENous Q12H     PRN Meds: docusate sodium, sodium chloride flush, sodium chloride, potassium chloride **OR** potassium alternative oral replacement **OR** potassium chloride, magnesium sulfate, ondansetron **OR** ondansetron, polyethylene glycol, acetaminophen **OR** acetaminophen, HYDROcodone 5 mg - acetaminophen, glucose, dextrose bolus **OR** dextrose bolus, glucagon (rDNA), dextrose      Intake/Output Summary (Last 24 hours) at 3/22/2024 1445  Last data filed at 3/22/2024 1340  Gross per 24 hour   Intake 985 ml   Output 1150 ml   Net -165 ml       Physical Exam Performed:    /75   Pulse 61   Temp 97.7 °F (36.5 °C) (Oral)   Resp 18   Ht 1.93 m (6' 4\")   Wt 117.6 kg (259 lb 4.2 oz)   SpO2 98%   BMI 31.56 kg/m²         General appearance: NAD  Lungs: Clear to auscultation, bilaterally without Rales/Wheezes/Rhonchi with good respiratory effort.  Heart: Regular rate and rhythm with Normal S1/S2 without murmurs  Abdomen: Soft, non-tender or non-distended without rigidity or guarding and positive bowel sounds   Extremities: No clubbing, 
      Hospitalist Progress Note      PCP: Reji Draper MD    Date of Admission: 3/20/2024    Subjective: pain well controlled    Medications:  Reviewed    Infusion Medications    sodium chloride      sodium chloride Stopped (04/02/24 1133)    dextrose       Scheduled Medications    enoxaparin  120 mg SubCUTAneous BID    finasteride  5 mg Oral Daily    metroNIDAZOLE  500 mg Oral 3 times per day    linezolid  600 mg Oral 2 times per day    amiodarone  100 mg Oral Daily    sodium chloride flush  5-40 mL IntraVENous 2 times per day    [Held by provider] apixaban  5 mg Oral BID    lactobacillus  1 capsule Oral BID WC    atorvastatin  40 mg Oral Daily    carvedilol  3.125 mg Oral BID WC    ferrous sulfate  325 mg Oral Every Other Day    levothyroxine  25 mcg Oral QAM AC    lisinopril  2.5 mg Oral Daily    [Held by provider] oxyBUTYnin  5 mg Oral Nightly    tamsulosin  0.4 mg Oral Daily    insulin lispro  0-4 Units SubCUTAneous TID WC    insulin lispro  0-4 Units SubCUTAneous Nightly     PRN Meds: traMADol, simethicone, HYDROcodone 5 mg - acetaminophen, loperamide, sodium chloride flush, sodium chloride, docusate sodium, sodium chloride, potassium chloride **OR** potassium alternative oral replacement **OR** potassium chloride, magnesium sulfate, ondansetron **OR** ondansetron, polyethylene glycol, acetaminophen **OR** acetaminophen, glucose, dextrose bolus **OR** dextrose bolus, glucagon (rDNA), dextrose      Intake/Output Summary (Last 24 hours) at 4/8/2024 1719  Last data filed at 4/8/2024 0833  Gross per 24 hour   Intake 420 ml   Output 775 ml   Net -355 ml       Physical Exam Performed:    /75   Pulse 68   Temp 98 °F (36.7 °C) (Oral)   Resp 16   Ht 1.93 m (6' 4\")   Wt 120.7 kg (266 lb 1.5 oz)   SpO2 97%   BMI 32.39 kg/m²     General: NAD  Eyes: EOMI  ENT: neck supple  Cardiovascular: Regular rate.  Respiratory: Clear to auscultation  Gastrointestinal: Soft, non tender  Genitourinary: no suprapubic 
      Hospitalist Progress Note      PCP: Reji Draper MD    Date of Admission: 3/20/2024    Subjective: pt cont to have diarrhea    Medications:  Reviewed    Infusion Medications    sodium chloride      sodium chloride      dextrose       Scheduled Medications    linezolid  600 mg Oral 2 times per day    amiodarone  100 mg Oral Daily    sodium chloride flush  5-40 mL IntraVENous 2 times per day    apixaban  5 mg Oral BID    lactobacillus  1 capsule Oral BID WC    atorvastatin  40 mg Oral Daily    carvedilol  3.125 mg Oral BID WC    ferrous sulfate  325 mg Oral Every Other Day    levothyroxine  25 mcg Oral QAM AC    [Held by provider] lisinopril  2.5 mg Oral Daily    [Held by provider] oxyBUTYnin  5 mg Oral Nightly    tamsulosin  0.4 mg Oral Daily    insulin lispro  0-4 Units SubCUTAneous TID WC    insulin lispro  0-4 Units SubCUTAneous Nightly     PRN Meds: loperamide, sodium chloride flush, sodium chloride, docusate sodium, sodium chloride, potassium chloride **OR** potassium alternative oral replacement **OR** potassium chloride, magnesium sulfate, ondansetron **OR** ondansetron, polyethylene glycol, acetaminophen **OR** acetaminophen, HYDROcodone 5 mg - acetaminophen, glucose, dextrose bolus **OR** dextrose bolus, glucagon (rDNA), dextrose      Intake/Output Summary (Last 24 hours) at 3/26/2024 1541  Last data filed at 3/26/2024 0614  Gross per 24 hour   Intake --   Output 655 ml   Net -655 ml       Physical Exam Performed:    /62   Pulse 60   Temp 98 °F (36.7 °C) (Oral)   Resp 18   Ht 1.93 m (6' 4\")   Wt 120.7 kg (266 lb 1.5 oz)   SpO2 98%   BMI 32.39 kg/m²     General appearance: NAD  Lungs: Clear to auscultation, bilaterally without Rales/Wheezes/Rhonchi with good respiratory effort.  Heart: Regular rate and rhythm with Normal S1/S2 without murmurs  Abdomen: Soft, non-tender or non-distended without rigidity or guarding and positive bowel sounds   Extremities: No clubbing, cyanosis, or edema 
      Hospitalist Progress Note      PCP: Reji Draper MD    Date of Admission: 3/20/2024    Subjective: states pain slightly increased today    Medications:  Reviewed    Infusion Medications    sodium chloride      sodium chloride Stopped (04/02/24 1133)    dextrose       Scheduled Medications    finasteride  5 mg Oral Daily    metroNIDAZOLE  500 mg Oral 3 times per day    enoxaparin  1 mg/kg SubCUTAneous BID    linezolid  600 mg Oral 2 times per day    amiodarone  100 mg Oral Daily    sodium chloride flush  5-40 mL IntraVENous 2 times per day    [Held by provider] apixaban  5 mg Oral BID    lactobacillus  1 capsule Oral BID WC    atorvastatin  40 mg Oral Daily    carvedilol  3.125 mg Oral BID WC    ferrous sulfate  325 mg Oral Every Other Day    levothyroxine  25 mcg Oral QAM AC    [Held by provider] lisinopril  2.5 mg Oral Daily    [Held by provider] oxyBUTYnin  5 mg Oral Nightly    tamsulosin  0.4 mg Oral Daily    insulin lispro  0-4 Units SubCUTAneous TID WC    insulin lispro  0-4 Units SubCUTAneous Nightly     PRN Meds: simethicone, HYDROcodone 5 mg - acetaminophen, loperamide, sodium chloride flush, sodium chloride, docusate sodium, sodium chloride, potassium chloride **OR** potassium alternative oral replacement **OR** potassium chloride, magnesium sulfate, ondansetron **OR** ondansetron, polyethylene glycol, acetaminophen **OR** acetaminophen, glucose, dextrose bolus **OR** dextrose bolus, glucagon (rDNA), dextrose      Intake/Output Summary (Last 24 hours) at 4/4/2024 1724  Last data filed at 4/4/2024 1625  Gross per 24 hour   Intake 240 ml   Output 2700 ml   Net -2460 ml       Physical Exam Performed:    /63   Pulse 76   Temp 98.2 °F (36.8 °C) (Oral)   Resp 18   Ht 1.93 m (6' 4\")   Wt 125.5 kg (276 lb 10.8 oz)   SpO2 95%   BMI 33.68 kg/m²     General: NAD  Eyes: EOMI  ENT: neck supple  Cardiovascular: Regular rate.  Respiratory: Clear to auscultation  Gastrointestinal: Soft, non 
      Hospitalist Progress Note      PCP: Reji Draper MD    Date of Admission: 3/20/2024    Subjective: still with some diarrhea, c/o gas pains    Medications:  Reviewed    Infusion Medications    sodium chloride      sodium chloride      dextrose       Scheduled Medications    metroNIDAZOLE  500 mg Oral 3 times per day    enoxaparin  1 mg/kg SubCUTAneous BID    linezolid  600 mg Oral 2 times per day    amiodarone  100 mg Oral Daily    sodium chloride flush  5-40 mL IntraVENous 2 times per day    [Held by provider] apixaban  5 mg Oral BID    lactobacillus  1 capsule Oral BID WC    atorvastatin  40 mg Oral Daily    carvedilol  3.125 mg Oral BID WC    ferrous sulfate  325 mg Oral Every Other Day    levothyroxine  25 mcg Oral QAM AC    [Held by provider] lisinopril  2.5 mg Oral Daily    [Held by provider] oxyBUTYnin  5 mg Oral Nightly    tamsulosin  0.4 mg Oral Daily    insulin lispro  0-4 Units SubCUTAneous TID WC    insulin lispro  0-4 Units SubCUTAneous Nightly     PRN Meds: simethicone, HYDROcodone 5 mg - acetaminophen, loperamide, sodium chloride flush, sodium chloride, docusate sodium, sodium chloride, potassium chloride **OR** potassium alternative oral replacement **OR** potassium chloride, magnesium sulfate, ondansetron **OR** ondansetron, polyethylene glycol, acetaminophen **OR** acetaminophen, glucose, dextrose bolus **OR** dextrose bolus, glucagon (rDNA), dextrose      Intake/Output Summary (Last 24 hours) at 3/28/2024 1557  Last data filed at 3/28/2024 1015  Gross per 24 hour   Intake 360 ml   Output 775 ml   Net -415 ml       Physical Exam Performed:    /61   Pulse 64   Temp 98 °F (36.7 °C) (Oral)   Resp 20   Ht 1.93 m (6' 4\")   Wt 124.1 kg (273 lb 9.5 oz)   SpO2 97%   BMI 33.30 kg/m²     General appearance: NAD  Lungs: Clear to auscultation, bilaterally without Rales/Wheezes/Rhonchi with good respiratory effort.  Heart: Regular rate and rhythm with Normal S1/S2 without murmurs  Abdomen: 
      [facility]   Daily Progress Note      Admit Date:  3/20/2024    CC: Critical limb ischemia    HPI:   Marvin Montero is a 73 y.o. male with PMH PAD, LLE amputation, HTN, DM2, WILL, CVA, HF, COPD, AF    Recently noted to have decreased TI on device interrogation and advised to increase lasix.     Presented to [facility] with gangrene/non healing ulcer R small toe. Direct admit per Dr. Perez.   First noticed blister a few weeks ago and started on antibiotics without improvement.    Pt also tells me he noticed \"labored breathing\" and increased edema RLE about 10 days prior.   SOB and edema have improved with lasix.   Now with RODERICK- Neph consulted.     Review of Systems:   General: Denies fever, chills,  Skin: Denies skin changes, rash, itching, lesions.  HEENT: Denies headache, dizziness, vision changes, nosebleeds, sore throat, nasal drainage  RESP: Denies cough, sputum, wheeze, snoring  CARD: Denies palpitations,  murmur  GI:Denies nausea, vomiting, heartburn, loss of appetite, change in bowels  : Denies frequency, pain, incontinence, polyuria  VASC: Denies claudication, leg cramps, clots  MUSC/SKEL: Denies pain, stiffness, arthritis  PSYCH: Denies anxiety, depression, stress  NEURO: Denies numbness, tingling, weakness,change in mood or memory  HEME: Denies abn bruising, bleeding, anemia  ENDO: Denies intolerance to heat, cold, excessive thirst or hunger, hx thyroid disease    Objective:   BP (!) 99/58   Pulse 61   Temp 98.1 °F (36.7 °C) (Oral)   Resp 16   Ht 1.93 m (6' 4\")   Wt 114.1 kg (251 lb 8.7 oz)   SpO2 94%   BMI 30.62 kg/m²         Intake/Output Summary (Last 24 hours) at 3/21/2024 1355  Last data filed at 3/21/2024 1130  Gross per 24 hour   Intake 240 ml   Output 1125 ml   Net -885 ml     I/O since adm:     WEIGHT:Admit Weight - Scale: 111.5 kg (245 lb 12.8 oz)         Today  Weight - Scale: 114.1 kg (251 lb 8.7 oz)   DRY WEIGHT:  Wt Readings from Last 3 Encounters:   03/21/24 114.1 kg (251 
    Infectious Diseases   Progress Note      Admission Date: 3/20/2024  Hospital Day: Hospital Day: 5   Attending: Kassandra Mitchell MD  Date of service: 3/24/2024     Chief complaint/ Reason for consult:     Complicated right diabetic foot infection  Status post right fifth toe surgical I&D and bone biopsy on 3/22/2024-cultures are growing Staphylococcus aureus and Enterococcus faecalis, sensitivities are awaited  Longstanding type 2 diabetes mellitus  Elevated ESR of 86 on admission    Microbiology:        I have reviewed allavailable micro lab data and cultures    Right foot surgical culture  - collected on 3/20/2024: MRSA, Enterococcus faecalis    Susceptibility    Methicillin-Resistant Staphylococcus aureus (1)    Antibiotic Interpretation Microscan  Method Status    ceFAZolin Resistant <=8 mcg/mL BACTERIAL SUSCEPTIBILITY PANEL BY KATHRYN     clindamycin Sensitive <=0.25 mcg/mL BACTERIAL SUSCEPTIBILITY PANEL BY KATHRYN     DAPTOmycin Sensitive <=0.5 mcg/mL BACTERIAL SUSCEPTIBILITY PANEL BY KATHRYN     erythromycin Resistant >4 mcg/mL BACTERIAL SUSCEPTIBILITY PANEL BY KATHRYN     levofloxacin Resistant 4 mcg/mL BACTERIAL SUSCEPTIBILITY PANEL BY KATHRYN     linezolid Sensitive 2 mcg/mL BACTERIAL SUSCEPTIBILITY PANEL BY KATHRYN     oxacillin Resistant >2 mcg/mL BACTERIAL SUSCEPTIBILITY PANEL BY KATHRYN     tetracycline Sensitive <=4 mcg/mL BACTERIAL SUSCEPTIBILITY PANEL BY KATHRYN     trimethoprim-sulfamethoxazole Sensitive <=0.5/9.5 mcg/mL BACTERIAL SUSCEPTIBILITY PANEL BY KATHRYN     vancomycin Sensitive 1 mcg/mL BACTERIAL SUSCEPTIBILITY PANEL BY KATHRYN     Enterococcus faecalis (2)    Antibiotic Interpretation Microscan  Method Status    ampicillin Sensitive <=2 mcg/mL BACTERIAL SUSCEPTIBILITY PANEL BY KATHRYN     vancomycin Sensitive 1 mcg/mL BACTERIAL SUSCEPTIBILITY PANEL BY KATHRYN      Condensed View         Antibiotics and immunizations:       Current antibiotics: All antibiotics and their doses were reviewed by me    Recent Abx Admin                
    PRE-PROCEDURE      DATE: 3/25/2024 ARRIVAL TO CATH LAB: 1:25 PM    ADMIT SOURCE: Inpatient    ID & ALLERGY BAND: On    CONSENT: Yes    NPO SINCE: Midnight      LAST DOSE (if applicable):  ASA: na  P2Y12 (Plavix, Effient, Brilinta): na  Anticoagulants (Coumadin, Xarelto, Eliquis): na  Other Blood Thinners: heparin drip 1480u/hr  To bay A / call light in reach / aware to call for any needs / questions about procedure answered / denies any pain / waiting for procedure  
    Pt Name: Marvin Montero  Medical Record Number: [id number]  Date of Birth 1950   Today's Date: 4/4/2024      Subjective:  NAEON. PVRs minimal. Denies difficulty voiding.    ROS: Constitutional: No fever    Vitals:  Vitals:    04/03/24 2025 04/03/24 2342 04/04/24 0410 04/04/24 0600   BP: 122/60      Pulse: 56 67 59    Resp: 18 16 19    Temp: 98.8 °F (37.1 °C)      TempSrc: Oral      SpO2: 96% 98% 95%    Weight:    125.5 kg (276 lb 10.8 oz)   Height:         I/O last 3 completed shifts:  In: 840 [P.O.:840]  Out: 2525 [Urine:2525]    Exam:  General: Awake, oriented, no acute distress  Respiratory: Nonlabored breathing  Abdomen: Soft, non-tender, non-distended, no masses  : ascencio catheter in place draining yellow clear urine  Skin: Skin color, texture, turgor normal, no rashes or lesions  Neurologic: no gross deficits    CURRENT MEDICATIONS   Scheduled Meds:   finasteride  5 mg Oral Daily    metroNIDAZOLE  500 mg Oral 3 times per day    enoxaparin  1 mg/kg SubCUTAneous BID    linezolid  600 mg Oral 2 times per day    amiodarone  100 mg Oral Daily    sodium chloride flush  5-40 mL IntraVENous 2 times per day    [Held by provider] apixaban  5 mg Oral BID    lactobacillus  1 capsule Oral BID WC    atorvastatin  40 mg Oral Daily    carvedilol  3.125 mg Oral BID WC    ferrous sulfate  325 mg Oral Every Other Day    levothyroxine  25 mcg Oral QAM AC    [Held by provider] lisinopril  2.5 mg Oral Daily    [Held by provider] oxyBUTYnin  5 mg Oral Nightly    tamsulosin  0.4 mg Oral Daily    insulin lispro  0-4 Units SubCUTAneous TID WC    insulin lispro  0-4 Units SubCUTAneous Nightly     Continuous Infusions:   sodium chloride      sodium chloride Stopped (04/02/24 1133)    dextrose       PRN Meds:.simethicone, HYDROcodone 5 mg - acetaminophen, loperamide, sodium chloride flush, sodium chloride, docusate sodium, sodium chloride, potassium chloride **OR** potassium alternative oral replacement **OR** potassium 
    V2.0  [facility] Daily Progress Note      Name:  Marvin Montero /Age/Sex: 1950  (73 y.o. male)   MRN & CSN:  [id number] & [id number] Encounter Date/Time: 2024 1:09 PM EDT    Location:  [id number] PCP: Reji Draper MD       Hospital Day: 13    Assessment and Plan:   Marvin Montero is a 73 y.o. male with medical history significant for who was admitted with right foot cellulitis s/p 5th toe incision and drainage (3/22), packed open with bone biopsy by podiatry.  Positive osteomyelitis 5th metatarsal.   History of left lower extremity BKA two years ago at [facility].  Wound culture positive for MRSA and Enterococcus.  Pathology margins still positive for osteomyelitis and wound culture positive for anaerobes,  Metronidazole added to linezolid per infectious disease.  Peripheral vascular disease with calcifications noted on X-rays s/p angiogram.  Patient is scheduled for right foot transmetatarsal amputation on Tuesday by podiatry.    Assessment/Plan :   Gangrene and osteomyelitis right 5th toe s/p incision and drainage (3/22), packed open with bone biopsy by podiatry.  Positive osteomyelitis 5th metatarsal.  Wound culture positive for MRSA and Enterococcus.  Pathology margins still positive for osteomyelitis and wound culture positive for anaerobes,  Metronidazole added to linezolid per infectious disease.  CRP is trending downward.  He is scheduled for right foot transmetatarsal amputations on Tuesday.  Peripheral vascular disease with calcifications noted on X-rays s/p angiogram.    Type 2 Diabetes: Cover with a \"sliding scale\" lispro moderate scale prandial correction insulin.    Primary hypertension:  Hypotensive, lisinopril held.  Resumed carvedilol 3.125 mg BID.   Atrial fibrillation:  Continues on amiodarone.  Apixaban held for surgery.  Bridging with enoxaparin 1 mg/kg sub-Q q12h  Acute kidney injury on CKD stage 3.  Creatinine has normalized.    WILL with BiPAP at night.   Diarrhea 
  Pharmacy Note - Renal Dosing    Cefepime 2000mg Q12h for treatment of Skin and soft tissue infection. Per [facility] Extended Infusion Beta-Lactam Policy, cefepime will be changed to 2000mg load followed by 2000mg Q24h extended infusion    Estimated Creatinine Clearance: Estimated Creatinine Clearance: 32 mL/min (A) (based on SCr of 2.9 mg/dL (H)).    BMI: Body mass index is 29.92 kg/m².    Please call with any questions.    Thank you,    Everette Yadav RPH    
  Physician Progress Note      PATIENT:               ZEESHAN CUEVAS  [facility] #:                  [id number]  :                       1950  ADMIT DATE:       3/20/2024 1:40 PM  DISCH DATE:  RESPONDING  PROVIDER #:        Kassandra Mitchell MD          QUERY TEXT:    Pt admitted with right 5th toe gangrene with osteomyelitis and right leg   cellulitis.  Also noted to have RODERICK.  WBC 13.4, procalcitonin 0.32, .5.    Being treated with Maxipime and Zyvox with urgent toe amputation pending.    If possible, please document in the progress notes and discharge summary if   you are evaluating and /or treating any of the following:    The medical record reflects the following:  Risk Factors: DM, right 5th toe gangrene, osteomyelitis, right leg cellulitis  Clinical Indicators:  WBC 13.4, procalcitonin 0.32, .5, RODERICK  Treatment:  Maxipime, Zyvox, urgent toe amputation pending  Options provided:  -- Sepsis due to right 5th toe gangrene with osteomyelitis and right leg   cellulitis present on admission  -- Right 5th toe gangrene with osteomyelitis and right leg cellulitis with out   sepsis  -- Other - I will add my own diagnosis  -- Disagree - Not applicable / Not valid  -- Disagree - Clinically unable to determine / Unknown  -- Refer to Clinical Documentation Reviewer    PROVIDER RESPONSE TEXT:    Sepsis due to right 5th toe gangrene with osteomyelitis and right leg   cellulitis present on admission.    Query created by: Senia Staley on 3/21/2024 8:26 AM      Electronically signed by:  Kassandra Mitchell MD 3/22/2024 1:24 PM          
1346 pt arrived from OR, vitals stable on 3L NC. Pt arouses to voice, denies pain. Right foot dressing clean, dry, and intact- coban, gauze. No toes visible to assess right foot cap refill, unable to access right pedal pulse due to dressing as well.   
4 Eyes Skin Assessment     NAME:  Marvin Montero  YOB: 1950  MEDICAL RECORD NUMBER:  [id number]    The patient is being assessed for  Transfer to New Unit    I agree that at least one RN has performed a thorough Head to Toe Skin Assessment on the patient. ALL assessment sites listed below have been assessed.      Areas assessed by both nurses:    Head, Face, Ears, Shoulders, Back, Chest, Arms, Elbows, Hands, Sacrum. Buttock, Coccyx, Ischium, Legs. Feet and Heels, and Under Medical Devices         Does the Patient have a Wound? No noted wound(s)       Charly Prevention initiated by RN: Yes  Wound Care Orders initiated by RN: No    Pressure Injury (Stage 3,4, Unstageable, DTI, NWPT, and Complex wounds) if present, place Wound referral order by RN under : No    New Ostomies, if present place, Ostomy referral order under : No     Nurse 1 eSignature: Electronically signed by Dominic Guy RN on 4/2/24 at 2:23 PM EDT    **SHARE this note so that the co-signing nurse can place an eSignature**    Nurse 2 eSignature: Electronically signed by Isela Cordoba RN on 4/2/24 at 2:36 PM EDT    
4 Eyes Skin Assessment     NAME:  Marvin Montero  YOB: 1950  MEDICAL RECORD NUMBER:  [id number]    The patient is being assessed for  Admission    I agree that at least one RN has performed a thorough Head to Toe Skin Assessment on the patient. ALL assessment sites listed below have been assessed.      Areas assessed by both nurses:    Head, Face, Ears, Shoulders, Back, Chest, Arms, Elbows, Hands, Sacrum. Buttock, Coccyx, Ischium, Legs. Feet and Heels, and Under Medical Devices         Does the Patient have a Wound? Yes wound(s) were present on assessment. LDA wound assessment was Initiated and completed by RN       Charly Prevention initiated by RN: Yes  Wound Care Orders initiated by RN: Yes    Pressure Injury (Stage 3,4, Unstageable, DTI, NWPT, and Complex wounds) if present, place Wound referral order by RN under : No    New Ostomies, if present place, Ostomy referral order under : No     Nurse 1 eSignature: Electronically signed by Roslyn Travis RN on 3/20/24 at 6:56 PM EDT    **SHARE this note so that the co-signing nurse can place an eSignature**    Nurse 2 eSignature: Electronically signed by Santhosh Franco RN on 3/20/24 at 6:56 PM EDT    
Attempted second peripheral IV access x2 nurses without success. Electronically signed by Shuanna Paul RN on 3/24/2024 at 6:34 PM   
Bladder scan pt retaining 260 ml urine. Notified Dr. Davila, orders to place ascencio catheter. Pt agreeable. Place ascencio catheter without complications, pt tolerated fairly well.  Electronically signed by Roslyn Travis RN on 3/21/2024 at 4:42 PM    
Cath lab procedure cancelled, transferred to unit by cath lab staff, report called to NILESH Alfaro on floor.  
[facility]   Cardiology Progress Note     Date: 3/23/2024  Admit Date: 3/20/2024     Reason for consultation:     No chief complaint on file.      History of Present Illness: History obtained from patient and medical record.     Marvin Montero is a 73 y.o. male with PMH PAD, LLE amputation, HTN, DM2, WILL, CVA, HF, COPD, AF  Recently noted to have decreased TI on device interrogation and advised to increase lasix.   Presented to [facility] with gangrene/non healing ulcer R small toe. Direct admit per Dr. Perez.   First noticed blister a few weeks ago and started on antibiotics without improvement.  He noticed \"labored breathing\" and increased edema RLE about 10 days prior.   SOB and edema have improved with lasix.   Now with RODERICK- Neph consulted.     Interval Hx: Today, he is feeling ok. No specific concerns.     Patient seen and examined. Clinical notes reviewed. Telemetry reviewed.  No new complaints today. No major events overnight.   Denies having chest pain, palpitations, shortness of breath, orthopnea/PND, cough, or dizziness at the time of this visit.      Past Medical History:  Past Medical History:   Diagnosis Date    Atrial fibrillation (HCC)     Back pain     Cardiomyopathy     CHF (congestive heart failure) (HCC)     COPD (chronic obstructive pulmonary disease) (HCC)     Dental disease     Dizziness     Dyslipidemia     GERD (gastroesophageal reflux disease)     Hearing loss     Hyperlipidemia     Hypertension     Hypothyroidism     Leg edema     MRSA (methicillin resistant staph aureus) culture positive 10/05/2015    foot/bone    MRSA nasal colonization 05/05/2017    Obesity     Prolonged emergence from general anesthesia     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     Sleep apnea     Stroke (HCC)     Thyroid disease     Tinnitus     Type 2 diabetes mellitus without complication (HCC)     Type II or unspecified type diabetes mellitus without mention of complication, not stated as uncontrolled     
Clinical Pharmacy Note  Heparin Dosing       Lab Results   Component Value Date/Time    ANTIXAUHEP 0.23 03/25/2024 12:28 AM      Lab Results   Component Value Date/Time    HGB 9.8 03/24/2024 05:44 AM    HCT 29.0 03/24/2024 05:44 AM     03/24/2024 05:44 AM    INR 1.21 03/24/2024 09:24 AM       Current Infusion Rate: 1240 units/hr    Plan:  Bolus: 2000 units  Rate: increase to 1480 units/hr  Next anti-Xa level: 0800 3/25/24    Pharmacy will continue to monitor and adjust based on anti-Xa results.  Chris Paula, PharmD  
Clinical Pharmacy Note  Heparin Dosing       Lab Results   Component Value Date/Time    ANTIXAUHEP 0.34 03/25/2024 07:06 AM      Lab Results   Component Value Date/Time    HGB 9.3 03/25/2024 07:06 AM    HCT 27.7 03/25/2024 07:06 AM     03/25/2024 07:06 AM    INR 1.21 03/24/2024 09:24 AM       Current Infusion Rate: 1240 units/hr    Plan:  Continue rate of 1480 units/hr  Next anti-Xa level: 1300 3/25/24    Pharmacy will continue to monitor and adjust based on anti-Xa results.  MARCY Akers PharmD   03/25/24 8:08 AM    
Clinical Pharmacy Note  Heparin Dosing Consult    Marvin Montero is a 73 y.o. male ordered heparin per CAD/STEMI/NSTEMI/UA/AFIB nomogram by Dr. Mitchell.     Lab Results   Component Value Date/Time    ANTIXAUHEP 0.24 03/24/2024 09:24 AM      Lab Results   Component Value Date/Time    HGB 9.8 03/24/2024 05:44 AM    HCT 29.0 03/24/2024 05:44 AM     03/24/2024 05:44 AM    INR 1.21 03/24/2024 09:24 AM       Ht Readings from Last 1 Encounters:   03/20/24 1.93 m (6' 4\")        Wt Readings from Last 1 Encounters:   03/24/24 119.9 kg (264 lb 5.3 oz)        Assessment/Plan:  Initial bolus: 2000 units  Initial infusion rate: 1000 units/hr  Next anti-Xa:: 1630 3/24/24    Pharmacy will continue to monitor adjust heparin based on anti-Xa results using nomogram below:     CAD/STEMI/NSTEMI/UA/AFIB Heparin Nomogram     Initial Bolus: 60 units/kg Max Bolus: 4,000 units       Initial Rate: 12 units/kg/hr Max Initial Rate: 1,000 units/hr     anti-Xa Bolus Titration   < 0.1 Heparin 60 units/kg bolus Increase drip by 4 units/kg/hr   0.1 - 0.29 Heparin 30 units/kg bolus Increase drip by 2 units/kg/hr   0.3 - 0.7 No Bolus No Change   0.71 - 0.8 No Bolus Decrease drip by 1 units/kg/hr   0.81 - 0.99 No Bolus Decrease drip by 2 units/kg/hr   > 1 Hold Heparin for 1 hour Decrease drip by 3 units/kg/hr     Obtain anti-Xa 6 hours after initial bolus and 6 hours after any dose change until two consecutive therapeutic anti-Xa levels are achieved - then daily.   
Comprehensive Nutrition Assessment    Type and Reason for Visit:  Initial, RD Nutrition Re-Screen/LOS    Nutrition Recommendations/Plan:   Carb control diet, patient eating protein each meal  Also agreeable to Glucerna vanilla or strawberry with meals  Will monitor nutritional adequacy, nutrition-related labs, weights, BMs, and clinical progress      Malnutrition Assessment:  Malnutrition Status:  No malnutrition (03/29/24 1405)      Nutrition Assessment:    LOS assessment.  Patient admitted with gangrene of right foot, surgery next week possible per patient.  Currently on a carb control diet.  Po intake greater than 75% meals.  Patient agreeable to Glucerna vanilla or strawberry with meals.  Will monitor nutrition progress and plan of care.    Nutrition Related Findings:    BG mostly less than 200 mg/dl; Wound Type: Surgical Incision       Current Nutrition Intake & Therapies:    Average Meal Intake: 51-75%, %  Average Supplements Intake: Unable to assess  ADULT DIET; Regular; 4 carb choices (60 gm/meal)  ADULT ORAL NUTRITION SUPPLEMENT; Breakfast, Lunch, Dinner; Diabetic Oral Supplement    Anthropometric Measures:  Height: 193 cm (6' 4\")  Ideal Body Weight (IBW): 202 lbs (92 kg)       Current Body Weight: 123.8 kg (272 lb 14.9 oz),   IBW. Weight Source: Bed Scale  Current BMI (kg/m2): 33.2                          BMI Categories: Obese Class 1 (BMI 30.0-34.9)    Estimated Daily Nutrient Needs:  Energy Requirements Based On: Kcal/kg  Weight Used for Energy Requirements: Ideal  Energy (kcal/day): 0152-4707 (20-22 kcal/91.8 kg)  Weight Used for Protein Requirements: Ideal  Protein (g/day): 110-138 (1.2-1.5 g/91.8 kg)  Method Used for Fluid Requirements: 1 ml/kcal  Fluid (ml/day):      Nutrition Diagnosis:   Increased nutrient needs related to increase demand for energy/nutrients as evidenced by  (current condition, possible surgery increasing protein needs)    Nutrition Interventions:   Food and/or Nutrient 
Comprehensive Nutrition Assessment    Type and Reason for Visit:  Reassess    Nutrition Recommendations/Plan:   Continue current diet - Regular CCC (4)   Continue ONS BID - Rocco   Recommend 2 MVI daily s/p gastric bypass - continue outpatient     Malnutrition Assessment:  Malnutrition Status:  No malnutrition (04/12/24 1121)    Context:  Chronic Illness     Findings of the 6 clinical characteristics of malnutrition:  Energy Intake:  No significant decrease in energy intake  Weight Loss:  No significant weight loss     Body Fat Loss:  No significant body fat loss     Muscle Mass Loss:  No significant muscle mass loss    Fluid Accumulation:  No significant fluid accumulation     Strength:  Not Performed    Nutrition Assessment:    f/u - Pt tolerating CCC (4). Pt has hx of gastric bypass 2016, and L BKA. S/P TMA right foot. Pt states he normally has BM 3X/day. Reports abnormal BMs while inpatient, attributes to sedentary activity, medications, and having to use bedpan. LBM 4/9 and 4/11. Denies NV, emesis noted 4/11. Tolerating and drinking ONS. +ferrous sulfate. Reports decent appetite, but less than at home d/t change in taste buds and unseasoned hospital food. States taste is improving, intake adequate. Per pt, he is medically cleared, but waiting for placement to d/c. Will continue to monitor while inpatient.    Nutrition Related Findings:    BG 81-269mg/dL 4/9-4/12. LBM 4/11 per pt. Wound Type: Surgical Incision       Current Nutrition Intake & Therapies:    Average Meal Intake: %  Average Supplements Intake: %  ADULT DIET; Regular; 4 carb choices (60 gm/meal)  ADULT ORAL NUTRITION SUPPLEMENT; Breakfast, Lunch; Wound Healing Oral Supplement    Anthropometric Measures:  Height: 193 cm (6' 4\")  Ideal Body Weight (IBW): 202 lbs (92 kg)    Admission Body Weight: 111.6 kg (246 lb)  Current Body Weight: 120.7 kg (266 lb),   IBW. Weight Source: Bed Scale  Current BMI (kg/m2): 32.4           Total Adjusted 
Consult received.  Will need evaluation for tibial vessel disease and severe PAD   Will get Doppler and plan for angiogram     Kevin Christy MD     
Department of Physical Medicine & Rehabilitation  Progress Note    Patient Identification:  Marvin Montero  [id number]  : 1950  Admit date: 3/20/2024    Chief Complaint: Gangrene of right foot (HCC)    Subjective:   Patient states that he is feeling well and denies any new onset complaints. Asks about rehab plan, plan to discuss update .    ROS: No f/c, n/v, cp     Objective:  Patient Vitals for the past 24 hrs:   BP Temp Temp src Pulse Resp SpO2 Weight   24 2311 -- -- -- 64 22 100 % --   24 2043 (!) 105/59 98.9 °F (37.2 °C) Oral 62 18 96 % --   24 1402 -- -- -- -- 16 -- --   24 1332 -- -- -- -- 18 -- --   24 0851 -- -- -- -- -- 100 % --   24 0824 (!) 123/58 97.7 °F (36.5 °C) Axillary 60 18 97 % --   24 0645 -- -- -- -- -- -- 115.9 kg (255 lb 8.2 oz)   24 0541 -- -- -- -- 18 -- --   24 0351 -- -- -- -- 20 98 % --     Const: Alert. No distress, pleasant.   HEENT: Normocephalic, atraumatic. Normal sclera/conjunctiva. MMM.   CV: Regular rate and rhythm.   Resp: No respiratory distress.  Abd: Soft, nontender, nondistended  Ext:  s/p right TMA, site dressed. S/p left BKA.   Neuro: Alert, oriented, appropriately interactive.   Psych: Cooperative, appropriate mood and affect    Laboratory data: Available via EMR.   Last 24 hour lab  Recent Results (from the past 24 hour(s))   Lipid Panel    Collection Time: 24  7:06 AM   Result Value Ref Range    Cholesterol, Total 77 0 - 199 mg/dL    Triglycerides 35 0 - 150 mg/dL    HDL 48 40 - 60 mg/dL    LDL Calculated 22 <100 mg/dL    VLDL Cholesterol Calculated 7 Not Established mg/dL   POCT Glucose    Collection Time: 24  8:20 AM   Result Value Ref Range    POC Glucose 87 70 - 99 mg/dl    Performed on ACCU-CHEK    POCT Glucose    Collection Time: 24 11:47 AM   Result Value Ref Range    POC Glucose 130 (H) 70 - 99 mg/dl    Performed on ACCU-CHEK    POCT Glucose    Collection Time: 24  4:39 PM 
Department of Physical Medicine & Rehabilitation  Progress Note    Patient Identification:  Marvin Montero  [id number]  : 1950  Admit date: 3/20/2024    Chief Complaint: Gangrene of right foot (HCC)    Subjective:   No acute events overnight.   Patient seen this afternoon resting in bed. Reports fatigue from frequent sleep interruptions since hospitalization. Right foot pain is overall controlled with medications. He reports his insurance has been confirmed. He is agreeable to SNF but has not picked specific facility yet  Labs reviewed.     ROS: No f/c, n/v, cp     Objective:  Patient Vitals for the past 24 hrs:   BP Temp Temp src Pulse Resp SpO2 Weight   24 1054 -- -- -- -- 17 -- --   24 0841 -- -- -- -- -- 97 % --   24 0839 (!) 114/51 98.1 °F (36.7 °C) Oral 76 16 100 % --   24 0538 -- -- -- -- -- -- 125.1 kg (275 lb 12.7 oz)   249 -- -- -- -- -- 100 % --   24 117/63 99.1 °F (37.3 °C) Oral 72 18 96 % --   24 -- -- -- -- -- 95 % --   24 1702 116/63 -- -- 76 -- -- --     Const: Alert. No distress, pleasant.   HEENT: Normocephalic, atraumatic. Normal sclera/conjunctiva. MMM.   CV: Regular rate and rhythm.   Resp: No respiratory distress.  Abd: Soft, nontender, nondistended  Ext:  s/p right TMA, site dressed. S/p left BKA.   Neuro: Alert, oriented, appropriately interactive.   Psych: Cooperative, appropriate mood and affect    Laboratory data: Available via EMR.   Last 24 hour lab  Recent Results (from the past 24 hour(s))   POCT Glucose    Collection Time: 24  4:22 PM   Result Value Ref Range    POC Glucose 106 (H) 70 - 99 mg/dl    Performed on ACCU-CHEK    POCT Glucose    Collection Time: 24  8:25 PM   Result Value Ref Range    POC Glucose 125 (H) 70 - 99 mg/dl    Performed on ACCU-CHEK    POCT Glucose    Collection Time: 24  7:39 AM   Result Value Ref Range    POC Glucose 75 70 - 99 mg/dl    Performed on ACCU-CHEK    POCT 
Department of Physical Medicine & Rehabilitation  Progress Note    Patient Identification:  Marvin Montero  [id number]  : 1950  Admit date: 3/20/2024    Chief Complaint: Gangrene of right foot (HCC)    Subjective:   No acute events overnight.   Patient seen this afternoon, resting in bed.  He reports right foot pain is intermittent and overall controlled.  He remains motivated to work with therapies and improve his functioning.  Labs reviewed.     ROS: No f/c, n/v, cp     Objective:  Patient Vitals for the past 24 hrs:   BP Temp Temp src Pulse Resp SpO2 Weight   24 0851 -- -- -- -- 18 98 % --   24 0814 121/67 98.4 °F (36.9 °C) Oral 66 19 97 % --   24 0539 -- -- -- -- -- -- 116.7 kg (257 lb 4.4 oz)   24 0355 -- -- -- 61 16 97 % --   24 0041 -- -- -- 64 19 99 % --   04/15/24 2255 -- -- -- 64 25 98 % --   04/15/24 1930 110/60 98.7 °F (37.1 °C) Oral 68 17 97 % --     Const: Alert. No distress, pleasant.   HEENT: Normocephalic, atraumatic. Normal sclera/conjunctiva. MMM.   CV: Regular rate and rhythm.   Resp: No respiratory distress.  Abd: Soft, nontender, nondistended  Ext:  s/p right TMA, site dressed. S/p left BKA.   Neuro: Alert, oriented, appropriately interactive.   Psych: Cooperative, appropriate mood and affect    Laboratory data: Available via EMR.   Last 24 hour lab  Recent Results (from the past 24 hour(s))   POCT Glucose    Collection Time: 04/15/24 12:10 PM   Result Value Ref Range    POC Glucose 91 70 - 99 mg/dl    Performed on ACCU-CHEK    POCT Glucose    Collection Time: 04/15/24  5:14 PM   Result Value Ref Range    POC Glucose 110 (H) 70 - 99 mg/dl    Performed on ACCU-CHEK    POCT Glucose    Collection Time: 04/15/24  7:44 PM   Result Value Ref Range    POC Glucose 124 (H) 70 - 99 mg/dl    Performed on ACCU-CHEK    POCT Glucose    Collection Time: 24  8:17 AM   Result Value Ref Range    POC Glucose 76 70 - 99 mg/dl    Performed on ACCU-CHEK        Therapy 
Department of Physical Medicine & Rehabilitation  Progress Note    Patient Identification:  Marvin Montero  [id number]  : 1950  Admit date: 3/20/2024    Chief Complaint: Gangrene of right foot (HCC)    Subjective:   Patient states that he feels well, Discussed patient placement status continues pending.    ROS: No f/c, n/v, cp     Objective:  Patient Vitals for the past 24 hrs:   BP Temp Temp src Pulse Resp SpO2 Weight   24 1948 106/64 98.1 °F (36.7 °C) Oral 66 18 98 % --   24 1849 (!) 104/52 -- -- 74 -- -- --   24 1305 -- -- -- -- -- 98 % --   24 0923 -- -- -- -- -- 99 % --   24 0808 (!) 105/56 97.4 °F (36.3 °C) Oral 66 18 99 % --   24 0530 -- -- -- -- -- -- 118.6 kg (261 lb 7.5 oz)   24 0230 -- -- -- -- 19 -- --   24 2311 -- -- -- 64 22 100 % --   24 2225 -- -- -- -- 20 -- --     Const: Alert. No distress, pleasant.   HEENT: Normocephalic, atraumatic. Normal sclera/conjunctiva. MMM.   CV: Regular rate and rhythm.   Resp: No respiratory distress.  Abd: Soft, nontender, nondistended  Ext:  s/p right TMA, site dressed. S/p left BKA.   Neuro: Alert, oriented, appropriately interactive.   Psych: Cooperative, appropriate mood and affect    Laboratory data: Available via EMR.   Last 24 hour lab  Recent Results (from the past 24 hour(s))   POCT Glucose    Collection Time: 24  9:22 PM   Result Value Ref Range    POC Glucose 126 (H) 70 - 99 mg/dl    Performed on ACCU-CHEK    POCT Glucose    Collection Time: 24  8:29 AM   Result Value Ref Range    POC Glucose 204 (H) 70 - 99 mg/dl    Performed on ACCU-CHEK    POCT Glucose    Collection Time: 24 11:57 AM   Result Value Ref Range    POC Glucose 210 (H) 70 - 99 mg/dl    Performed on ACCU-CHEK    POCT Glucose    Collection Time: 24  4:30 PM   Result Value Ref Range    POC Glucose 100 (H) 70 - 99 mg/dl    Performed on ACCU-CHEK    POCT Glucose    Collection Time: 24  7:30 PM   Result 
Department of Podiatry Note      HISTORY OF PRESENT ILLNESS:                The patient is a 73 y.o. male - S/P 5th Ray I&D, packed open with bone biopsiy, dressings intact, in bed, slight pain, but relieved by pain medication     with significant past medical history of Diabetes with peripheral neuropathy and previous BKA of LEFT leg,  CHF with COPD, Obesity and Stroke     Abdominal cramping, but of good spirits for tomorrow's procedure for RIGHT foot    Past Medical History:        Diagnosis Date    Atrial fibrillation ([facility])     Back pain     Cardiomyopathy     CHF (congestive heart failure) ([facility])     COPD (chronic obstructive pulmonary disease) ([facility])     Dental disease     Dizziness     Dyslipidemia     GERD (gastroesophageal reflux disease)     Hearing loss     Hyperlipidemia     Hypertension     Hypothyroidism     Leg edema     MRSA (methicillin resistant staph aureus) culture positive 10/05/2015    foot/bone    MRSA nasal colonization 05/05/2017    Obesity     Prolonged emergence from general anesthesia     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     Sleep apnea     Stroke ([facility])     Thyroid disease     Tinnitus     Type 2 diabetes mellitus without complication ([facility])     Type II or unspecified type diabetes mellitus without mention of complication, not stated as uncontrolled        Current Medications:    Current Facility-Administered Medications: finasteride (PROSCAR) tablet 5 mg, 5 mg, Oral, Daily  simethicone (MYLICON) chewable tablet 80 mg, 80 mg, Oral, Q6H PRN  HYDROcodone-acetaminophen (NORCO) 5-325 MG per tablet 1 tablet, 1 tablet, Oral, Q4H PRN  metroNIDAZOLE (FLAGYL) tablet 500 mg, 500 mg, Oral, 3 times per day  enoxaparin (LOVENOX) injection 120 mg, 1 mg/kg, SubCUTAneous, BID  linezolid (ZYVOX) tablet 600 mg, 600 mg, Oral, 2 times per day  amiodarone (CORDARONE) tablet 100 mg, 100 mg, Oral, Daily  loperamide (IMODIUM) capsule 2 mg, 2 mg, Oral, TID PRN  sodium chloride flush 0.9 % injection 5-40 mL, 
Department of Podiatry Note      HISTORY OF PRESENT ILLNESS:                The patient is a 73 y.o. male - S/P 5[employer] I&D, packed open with bone biopsiy, dressings intact, in bed, slight pain, but relieved by pain medication     with significant past medical history of Diabetes with peripheral neuropathy and previous BKA of LEFT leg,  CHF with COPD, Obesity and Stroke     Awake this morning with good spirits, anticipation of surgery early next week    Past Medical History:        Diagnosis Date    Atrial fibrillation (HCC)     Back pain     Cardiomyopathy     CHF (congestive heart failure) ([facility])     COPD (chronic obstructive pulmonary disease) ([facility])     Dental disease     Dizziness     Dyslipidemia     GERD (gastroesophageal reflux disease)     Hearing loss     Hyperlipidemia     Hypertension     Hypothyroidism     Leg edema     MRSA (methicillin resistant staph aureus) culture positive 10/05/2015    foot/bone    MRSA nasal colonization 05/05/2017    Obesity     Prolonged emergence from general anesthesia     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     Sleep apnea     Stroke ([facility])     Thyroid disease     Tinnitus     Type 2 diabetes mellitus without complication ([facility])     Type II or unspecified type diabetes mellitus without mention of complication, not stated as uncontrolled        Current Medications:    Current Facility-Administered Medications: simethicone (MYLICON) chewable tablet 80 mg, 80 mg, Oral, Q6H PRN  HYDROcodone-acetaminophen (NORCO) 5-325 MG per tablet 1 tablet, 1 tablet, Oral, Q4H PRN  metroNIDAZOLE (FLAGYL) tablet 500 mg, 500 mg, Oral, 3 times per day  enoxaparin (LOVENOX) injection 120 mg, 1 mg/kg, SubCUTAneous, BID  linezolid (ZYVOX) tablet 600 mg, 600 mg, Oral, 2 times per day  amiodarone (CORDARONE) tablet 100 mg, 100 mg, Oral, Daily  loperamide (IMODIUM) capsule 2 mg, 2 mg, Oral, TID PRN  sodium chloride flush 0.9 % injection 5-40 mL, 5-40 mL, IntraVENous, 2 times per day  sodium chloride 
Department of Podiatry Note      HISTORY OF PRESENT ILLNESS:                The patient is a 73 y.o. male - S/P 5th Ray I&D, packed open with bone biopsiy, dressings intact, in bed, slight pain, but relieved by pain medication     with significant past medical history of Diabetes with peripheral neuropathy and previous BKA of LEFT leg,  CHF with COPD, Obesity and Stroke     Past Medical History:        Diagnosis Date    Atrial fibrillation (HCC)     Back pain     Cardiomyopathy     CHF (congestive heart failure) ([facility])     COPD (chronic obstructive pulmonary disease) ([facility])     Dental disease     Dizziness     Dyslipidemia     GERD (gastroesophageal reflux disease)     Hearing loss     Hyperlipidemia     Hypertension     Hypothyroidism     Leg edema     MRSA (methicillin resistant staph aureus) culture positive 10/05/2015    foot/bone    MRSA nasal colonization 05/05/2017    Obesity     Prolonged emergence from general anesthesia     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     Sleep apnea     Stroke ([facility])     Thyroid disease     Tinnitus     Type 2 diabetes mellitus without complication ([facility])     Type II or unspecified type diabetes mellitus without mention of complication, not stated as uncontrolled        Current Medications:    Current Facility-Administered Medications: linezolid (ZYVOX) tablet 600 mg, 600 mg, Oral, 2 times per day  amiodarone (CORDARONE) tablet 100 mg, 100 mg, Oral, Daily  loperamide (IMODIUM) capsule 2 mg, 2 mg, Oral, TID PRN  sodium chloride flush 0.9 % injection 5-40 mL, 5-40 mL, IntraVENous, 2 times per day  sodium chloride flush 0.9 % injection 5-40 mL, 5-40 mL, IntraVENous, PRN  0.9 % sodium chloride infusion, , IntraVENous, PRN  apixaban (ELIQUIS) tablet 5 mg, 5 mg, Oral, BID  lactobacillus (CULTURELLE) capsule 1 capsule, 1 capsule, Oral, BID WC  atorvastatin (LIPITOR) tablet 40 mg, 40 mg, Oral, Daily  carvedilol (COREG) tablet 3.125 mg, 3.125 mg, Oral, BID WC  docusate sodium (COLACE) 
Department of Podiatry Note      HISTORY OF PRESENT ILLNESS:                The patient is a 73 y.o. male - S/P 5[employer] Ray I&D, packed open with bone biopsiy, dressings intact, in bed, slight pain, but relieved by pain medication     with significant past medical history of Diabetes with peripheral neuropathy and previous BKA of LEFT leg,  CHF with COPD, Obesity and Stroke     Past Medical History:        Diagnosis Date    Atrial fibrillation (HCC)     Back pain     Cardiomyopathy     CHF (congestive heart failure) ([facility])     COPD (chronic obstructive pulmonary disease) ([facility])     Dental disease     Dizziness     Dyslipidemia     GERD (gastroesophageal reflux disease)     Hearing loss     Hyperlipidemia     Hypertension     Hypothyroidism     Leg edema     MRSA (methicillin resistant staph aureus) culture positive 10/05/2015    foot/bone    MRSA nasal colonization 05/05/2017    Obesity     Prolonged emergence from general anesthesia     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     Sleep apnea     Stroke ([facility])     Thyroid disease     Tinnitus     Type 2 diabetes mellitus without complication ([facility])     Type II or unspecified type diabetes mellitus without mention of complication, not stated as uncontrolled        Current Medications:    Current Facility-Administered Medications: linezolid (ZYVOX) tablet 600 mg, 600 mg, Oral, 2 times per day  amiodarone (CORDARONE) tablet 100 mg, 100 mg, Oral, Daily  loperamide (IMODIUM) capsule 2 mg, 2 mg, Oral, TID PRN  sodium chloride flush 0.9 % injection 5-40 mL, 5-40 mL, IntraVENous, 2 times per day  sodium chloride flush 0.9 % injection 5-40 mL, 5-40 mL, IntraVENous, PRN  0.9 % sodium chloride infusion, , IntraVENous, PRN  apixaban (ELIQUIS) tablet 5 mg, 5 mg, Oral, BID  lactobacillus (CULTURELLE) capsule 1 capsule, 1 capsule, Oral, BID WC  atorvastatin (LIPITOR) tablet 40 mg, 40 mg, Oral, Daily  carvedilol (COREG) tablet 3.125 mg, 3.125 mg, Oral, BID WC  docusate sodium (COLACE) 
Department of Podiatry Note      HISTORY OF PRESENT ILLNESS:                The patient is a 73 y.o. male - S/P 5[employer] I[employer]D, packed open with bone biopsiy, dressings intact, in bed, slight pain, but relieved by pain medication     with significant past medical history of Diabetes with peripheral neuropathy and previous BKA of LEFT leg,  CHF with COPD, Obesity and Stroke     Past Medical History:        Diagnosis Date    Atrial fibrillation (HCC)     Back pain     Cardiomyopathy     CHF (congestive heart failure) ([facility])     COPD (chronic obstructive pulmonary disease) ([facility])     Dental disease     Dizziness     Dyslipidemia     GERD (gastroesophageal reflux disease)     Hearing loss     Hyperlipidemia     Hypertension     Hypothyroidism     Leg edema     MRSA (methicillin resistant staph aureus) culture positive 10/05/2015    foot/bone    MRSA nasal colonization 05/05/2017    Obesity     Prolonged emergence from general anesthesia     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     Sleep apnea     Stroke ([facility])     Thyroid disease     Tinnitus     Type 2 diabetes mellitus without complication ([facility])     Type II or unspecified type diabetes mellitus without mention of complication, not stated as uncontrolled        Current Medications:    Current Facility-Administered Medications: HYDROcodone-acetaminophen (NORCO) 5-325 MG per tablet 1 tablet, 1 tablet, Oral, Q4H PRN  metroNIDAZOLE (FLAGYL) tablet 500 mg, 500 mg, Oral, 3 times per day  enoxaparin (LOVENOX) injection 120 mg, 1 mg/kg, SubCUTAneous, BID  linezolid (ZYVOX) tablet 600 mg, 600 mg, Oral, 2 times per day  amiodarone (CORDARONE) tablet 100 mg, 100 mg, Oral, Daily  loperamide (IMODIUM) capsule 2 mg, 2 mg, Oral, TID PRN  sodium chloride flush 0.9 % injection 5-40 mL, 5-40 mL, IntraVENous, 2 times per day  sodium chloride flush 0.9 % injection 5-40 mL, 5-40 mL, IntraVENous, PRN  0.9 % sodium chloride infusion, , IntraVENous, PRN  [Held by provider] apixaban (ELIQUIS) 
Department of Podiatry Note      HISTORY OF PRESENT ILLNESS:                The patient is a 73 y.o. male - S/P TMA right foot , packed open with bone biopsiy, dressings intact, in bed, slight pain, but relieved by pain medication     with significant past medical history of Diabetes with peripheral neuropathy and previous BKA of LEFT leg,  CHF with COPD, Obesity and Stroke     Patient relates concerned for going home - no pressure is the correct protocol on right foot post-op, and obvious a challenge with left bka.     Past Medical History:        Diagnosis Date    Atrial fibrillation ([facility])     Back pain     Cardiomyopathy     CHF (congestive heart failure) ([facility])     COPD (chronic obstructive pulmonary disease) ([facility])     Dental disease     Dizziness     Dyslipidemia     GERD (gastroesophageal reflux disease)     Hearing loss     Hyperlipidemia     Hypertension     Hypothyroidism     Leg edema     MRSA (methicillin resistant staph aureus) culture positive 10/05/2015    foot/bone    MRSA nasal colonization 05/05/2017    Obesity     Prolonged emergence from general anesthesia     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     Sleep apnea     Stroke ([facility])     Thyroid disease     Tinnitus     Type 2 diabetes mellitus without complication ([facility])     Type II or unspecified type diabetes mellitus without mention of complication, not stated as uncontrolled        Current Medications:    Current Facility-Administered Medications: traMADol (ULTRAM) tablet 50 mg, 50 mg, Oral, Q6H PRN  finasteride (PROSCAR) tablet 5 mg, 5 mg, Oral, Daily  simethicone (MYLICON) chewable tablet 80 mg, 80 mg, Oral, Q6H PRN  HYDROcodone-acetaminophen (NORCO) 5-325 MG per tablet 1 tablet, 1 tablet, Oral, Q4H PRN  metroNIDAZOLE (FLAGYL) tablet 500 mg, 500 mg, Oral, 3 times per day  enoxaparin (LOVENOX) injection 120 mg, 1 mg/kg, SubCUTAneous, BID  linezolid (ZYVOX) tablet 600 mg, 600 mg, Oral, 2 times per day  amiodarone (CORDARONE) tablet 100 mg, 100 mg, 
Department of Podiatry Note      HISTORY OF PRESENT ILLNESS:                The patient is a 73 y.o. male - S/P TMA right foot , packed open with bone biopsiy, dressings intact, in bed, slight pain, but relieved by pain medication     with significant past medical history of Diabetes with peripheral neuropathy and previous BKA of LEFT leg,  CHF with COPD, Obesity and Stroke     Patient relates concerned for going home - no pressure is the correct protocol on right foot post-op, and obvious a challenge with left bka.     Past Medical History:        Diagnosis Date    Atrial fibrillation ([facility])     Back pain     Cardiomyopathy     CHF (congestive heart failure) ([facility])     COPD (chronic obstructive pulmonary disease) ([facility])     Dental disease     Dizziness     Dyslipidemia     GERD (gastroesophageal reflux disease)     Hearing loss     Hyperlipidemia     Hypertension     Hypothyroidism     Leg edema     MRSA (methicillin resistant staph aureus) culture positive 10/05/2015    foot/bone    MRSA nasal colonization 05/05/2017    Obesity     Prolonged emergence from general anesthesia     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     Sleep apnea     Stroke ([facility])     Thyroid disease     Tinnitus     Type 2 diabetes mellitus without complication ([facility])     Type II or unspecified type diabetes mellitus without mention of complication, not stated as uncontrolled        Current Medications:    Current Facility-Administered Medications: traMADol (ULTRAM) tablet 50 mg, 50 mg, Oral, Q6H PRN  finasteride (PROSCAR) tablet 5 mg, 5 mg, Oral, Daily  simethicone (MYLICON) chewable tablet 80 mg, 80 mg, Oral, Q6H PRN  HYDROcodone-acetaminophen (NORCO) 5-325 MG per tablet 1 tablet, 1 tablet, Oral, Q4H PRN  metroNIDAZOLE (FLAGYL) tablet 500 mg, 500 mg, Oral, 3 times per day  linezolid (ZYVOX) tablet 600 mg, 600 mg, Oral, 2 times per day  amiodarone (CORDARONE) tablet 100 mg, 100 mg, Oral, Daily  loperamide (IMODIUM) capsule 2 mg, 2 mg, Oral, TID 
Department of Podiatry Note      HISTORY OF PRESENT ILLNESS:                The patient is a 73 y.o. male - S/P TMA right foot , packed open with bone biopsiy, dressings intact, in bed, slight pain, but relieved by pain medication     with significant past medical history of Diabetes with peripheral neuropathy and previous BKA of LEFT leg,  CHF with COPD, Obesity and Stroke     Patient relates concerned for going home - no pressure is the correct protocol on right foot post-op, and obvious a challenge with left bka.     Past Medical History:        Diagnosis Date    Atrial fibrillation ([facility])     Back pain     Cardiomyopathy     CHF (congestive heart failure) ([facility])     COPD (chronic obstructive pulmonary disease) ([facility])     Dental disease     Dizziness     Dyslipidemia     GERD (gastroesophageal reflux disease)     Hearing loss     Hyperlipidemia     Hypertension     Hypothyroidism     Leg edema     MRSA (methicillin resistant staph aureus) culture positive 10/05/2015    foot/bone    MRSA nasal colonization 05/05/2017    Obesity     Prolonged emergence from general anesthesia     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     Sleep apnea     Stroke ([facility])     Thyroid disease     Tinnitus     Type 2 diabetes mellitus without complication ([facility])     Type II or unspecified type diabetes mellitus without mention of complication, not stated as uncontrolled        Current Medications:    Current Facility-Administered Medications: enoxaparin (LOVENOX) injection 120 mg, 120 mg, SubCUTAneous, BID  traMADol (ULTRAM) tablet 50 mg, 50 mg, Oral, Q6H PRN  finasteride (PROSCAR) tablet 5 mg, 5 mg, Oral, Daily  simethicone (MYLICON) chewable tablet 80 mg, 80 mg, Oral, Q6H PRN  HYDROcodone-acetaminophen (NORCO) 5-325 MG per tablet 1 tablet, 1 tablet, Oral, Q4H PRN  metroNIDAZOLE (FLAGYL) tablet 500 mg, 500 mg, Oral, 3 times per day  linezolid (ZYVOX) tablet 600 mg, 600 mg, Oral, 2 times per day  amiodarone (CORDARONE) tablet 100 mg, 100 mg, 
Department of Podiatry Note      HISTORY OF PRESENT ILLNESS:                The patient is a 73 y.o. male - S/P TMA right foot , packed open with bone biopsiy, dressings intact, in bed, slight pain, but relieved by pain medication     with significant past medical history of Diabetes with peripheral neuropathy and previous BKA of LEFT leg,  CHF with COPD, Obesity and Stroke     Patient reports no pain in the right lower extremity.  He is concerned about how it looks and how long he will have to stay off his foot. He is looking forward to d/c.    Past Medical History:        Diagnosis Date    Atrial fibrillation ([facility])     Back pain     Cardiomyopathy     CHF (congestive heart failure) ([facility])     COPD (chronic obstructive pulmonary disease) ([facility])     Dental disease     Dizziness     Dyslipidemia     GERD (gastroesophageal reflux disease)     Hearing loss     Hyperlipidemia     Hypertension     Hypothyroidism     Leg edema     MRSA (methicillin resistant staph aureus) culture positive 10/05/2015    foot/bone    MRSA nasal colonization 05/05/2017    Obesity     Prolonged emergence from general anesthesia     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     Sleep apnea     Stroke ([facility])     Thyroid disease     Tinnitus     Type 2 diabetes mellitus without complication ([facility])     Type II or unspecified type diabetes mellitus without mention of complication, not stated as uncontrolled        Current Medications:    Current Facility-Administered Medications: traMADol (ULTRAM) tablet 50 mg, 50 mg, Oral, Q6H PRN  finasteride (PROSCAR) tablet 5 mg, 5 mg, Oral, Daily  simethicone (MYLICON) chewable tablet 80 mg, 80 mg, Oral, Q6H PRN  HYDROcodone-acetaminophen (NORCO) 5-325 MG per tablet 1 tablet, 1 tablet, Oral, Q4H PRN  metroNIDAZOLE (FLAGYL) tablet 500 mg, 500 mg, Oral, 3 times per day  linezolid (ZYVOX) tablet 600 mg, 600 mg, Oral, 2 times per day  amiodarone (CORDARONE) tablet 100 mg, 100 mg, Oral, Daily  loperamide (IMODIUM) 
Department of Podiatry Note    Attending     HISTORY OF PRESENT ILLNESS:                The patient is a 73 y.o. male - S/P 5[employer] I[employer]D, packed open with bone biopsiy, dressings intact, in bed, slight pain, but relieved by pain medication     with significant past medical history of Diabetes with peripheral neuropathy and previous BKA of LEFT leg,  CHF with COPD, Obesity and Stroke     Past Medical History:        Diagnosis Date    Atrial fibrillation (HCC)     Back pain     Cardiomyopathy     CHF (congestive heart failure) ([facility])     COPD (chronic obstructive pulmonary disease) ([facility])     Dental disease     Dizziness     Dyslipidemia     GERD (gastroesophageal reflux disease)     Hearing loss     Hyperlipidemia     Hypertension     Hypothyroidism     Leg edema     MRSA (methicillin resistant staph aureus) culture positive 10/05/2015    foot/bone    MRSA nasal colonization 05/05/2017    Obesity     Prolonged emergence from general anesthesia     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     Sleep apnea     Stroke ([facility])     Thyroid disease     Tinnitus     Type 2 diabetes mellitus without complication ([facility])     Type II or unspecified type diabetes mellitus without mention of complication, not stated as uncontrolled        Current Medications:    Current Facility-Administered Medications: heparin 25,000 unit in sodium chloride 0.45% 250 mL (premix) infusion, 0-4,000 Units/hr, IntraVENous, Continuous  lactobacillus (CULTURELLE) capsule 1 capsule, 1 capsule, Oral, BID WC  amiodarone (CORDARONE) tablet 200 mg, 200 mg, Oral, Daily  atorvastatin (LIPITOR) tablet 40 mg, 40 mg, Oral, Daily  carvedilol (COREG) tablet 3.125 mg, 3.125 mg, Oral, BID WC  docusate sodium (COLACE) capsule 100 mg, 100 mg, Oral, Daily PRN  ferrous sulfate EC tablet 325 mg, 325 mg, Oral, Every Other Day  levothyroxine (SYNTHROID) tablet 25 mcg, 25 mcg, Oral, QAM AC  [Held by provider] lisinopril (PRINIVIL;ZESTRIL) tablet 2.5 mg, 2.5 mg, Oral, Daily  [Held 
Department of Podiatry Note  Milind Russell DPGIBSON Attending     HISTORY OF PRESENT ILLNESS:                The patient is a 73 y.o. male - S/P 5th Ray I&D, packed open with bone biopsiy, dressings intact, in bed, slight pain, but relieved by pain medication     with significant past medical history of Diabetes with peripheral neuropathy and previous BKA of LEFT leg,  CHF with COPD, Obesity and Stroke     Past Medical History:        Diagnosis Date    Atrial fibrillation (HCC)     Back pain     Cardiomyopathy     CHF (congestive heart failure) (HCC)     COPD (chronic obstructive pulmonary disease) (HCC)     Dental disease     Dizziness     Dyslipidemia     GERD (gastroesophageal reflux disease)     Hearing loss     Hyperlipidemia     Hypertension     Hypothyroidism     Leg edema     MRSA (methicillin resistant staph aureus) culture positive 10/05/2015    foot/bone    MRSA nasal colonization 05/05/2017    Obesity     Prolonged emergence from general anesthesia     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     Sleep apnea     Stroke (HCC)     Thyroid disease     Tinnitus     Type 2 diabetes mellitus without complication (HCC)     Type II or unspecified type diabetes mellitus without mention of complication, not stated as uncontrolled      Past Surgical History:        Procedure Laterality Date    ADRENAL GLAND SURGERY  1970    APPENDECTOMY      CARDIAC DEFIBRILLATOR PLACEMENT  09/05/2017    Dr. Janes Cyr Wellpinit    COLONOSCOPY N/A 03/08/2019    COLONOSCOPY WITH MAC, UNASYN (3gm) performed by Alexi Pardo MD at [facility] ENDOSCOPY    COLONOSCOPY N/A 08/09/2019    COLONOSCOPY POLYPECTOMY SNARE/COLD BIOPSY performed by Alexi Pardo MD at [facility] ENDOSCOPY    COLONOSCOPY  08/09/2019    COLONOSCOPY WITH BIOPSY performed by Alexi Pardo MD at [facility] ENDOSCOPY    FOOT DEBRIDEMENT Right 08/28/2019    INCISION AND INCISIONAL DEBRIDEMENT OPEN FRACTURE RIGHT 2ND TOE SKIN AND BONE performed by Alaina Jenkins MD at [facility] OR    FOOT DEBRIDEMENT Left 
Handoff report given to Roslyn GALLOWAY . All questions answered. Electronically signed by Zoila Daniel RN on 4/2/2024 at 6:54 AM   
Hospitalist Progress Note  3/31/2024 11:23 AM  Subjective:   Admit Date: 3/20/2024  PCP: Reji Draper MD Status: Inpatient   Interval History: Hospital Day: 12, planned transmetatarsal amputation right foot, anticipated Tuesday. BiPAP overnight for WILL without problems.      History of present illness:  Admitted with right foot cellulitis s/p 5th toe incision and drainage (3/22), packed open with bone biopsy by podiatry.  Positive osteomyelitis 5th metatarsal.   History of left lower extremity BKA two years ago at [facility].  Wound culture positive for MRSA and Enterococcus.  Pathology margins still positive for osteomyelitis and wound culture positive for anaerobes,  Metronidazole added to linezolid per infectious disease.  Peripheral vascular disease with calcifications noted on X-rays s/p angiogram.  Pending right forefoot intervention by podiatry, likely on Monday or Tuesday.  Telemetry notes 25 beats of V-tach earlier today, asymptomatic.      Diet: controlled carbohydrate (60 gm/meal)  Right forearm peripheral IV (3/20, day #12)  Urinary catheter (3/21, day #11)    Medications:     metronidazole  500 mg Oral 3 times per day   enoxaparin  1 mg/kg SubCUTAneous BID   linezolid  600 mg Oral 2 times per day   amiodarone  100 mg Oral Daily   apixaban  5 mg Oral BID [held]   lactobacillus  1 capsule Oral BID WC   atorvastatin  40 mg Oral Daily   carvedilol  3.125 mg Oral BID WC   ferrous sulfate  325 mg Oral Every Other Day   levothyroxine  25 mcg Oral QAM AC   lisinopril  2.5 mg Oral Daily [held]   oxybutynin  5 mg Oral Nightly [held]   tamsulosin  0.4 mg Oral Daily   insulin lispro SSI  0-4 Units SubCUTAneous TID WC     Recent Labs     03/30/24  0651   WBC 4.9   HGB 9.1*      MCV 84.7     Recent Labs     03/31/24  0710      K 4.2      CO2 25   BUN 8   CREATININE 0.9   GLUCOSE 85     CRP (3/20) 158.5, (3/26) 64.0, (3/30) 23.6 mg/L  ESR (3/20) 86, (3/26) 71, (3/30) 74 mm/hr  Magnesium 
Infectious disease consult received.  The patient was seen and examined at bedside.  Orders placed in the chart.    IV Linezolid for now for E fecalis and staph aureus coverage. Will stop iv cefepime. F/u on sensitivities and surgical path report from 3/22/24    Full ID consult note to follow.    Thank you for involving me in the care of your patient. I will continue to follow.       Von Hebert MD, MPH, FACP, FIDSA  3/23/2024, 1:52 PM  [facility]   [address]  [place] [address]  Office: [phone]  Fax: [phone]  In-person Clinic days:  Tuesday & Thursday a.m.  Virtual clinic days: Monday, Wednesday & Friday a.m.        
MD Truong notified of pt IV access. Pt refused IV access and per MD it is okay not to have IV access.     Electronically signed by Heidi Robles RN on 4/11/2024 at 8:00 AM    
[facility]  Cardiology Consult    Marvin Montero  1950    March 22, 2024      Reason for Referral: CLI    CC: foot ulcer       Subjective:     History of Present Illness:    Marvin Montero is a 73 y.o. patient with a PMH significant for PAD, HTN, CKD presented with complaints of foot ulcer.          Past Medical History:   has a past medical history of Atrial fibrillation (HCC), Back pain, Cardiomyopathy, CHF (congestive heart failure) (HCC), COPD (chronic obstructive pulmonary disease) (HCC), Dental disease, Dizziness, Dyslipidemia, GERD (gastroesophageal reflux disease), Hearing loss, Hyperlipidemia, Hypertension, Hypothyroidism, Leg edema, MRSA (methicillin resistant staph aureus) culture positive, MRSA nasal colonization, Obesity, Prolonged emergence from general anesthesia, 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, Sleep apnea, Stroke (HCC), Thyroid disease, Tinnitus, Type 2 diabetes mellitus without complication (HCC), and Type II or unspecified type diabetes mellitus without mention of complication, not stated as uncontrolled.    Surgical History:   has a past surgical history that includes Foot surgery (01/02/2010); Adrenal gland surgery (1970); Gastric bypass surgery; other surgical history (10/06/2015); other surgical history (Right, 10/08/2015); Cardiac defibrillator placement (09/05/2017); pacemaker placement; Colonoscopy (N/A, 03/08/2019); Upper gastrointestinal endoscopy (N/A, 03/08/2019); Colonoscopy (N/A, 08/09/2019); Colonoscopy (08/09/2019); Foot Debridement (Right, 08/28/2019); Foot Debridement (Left, 07/22/2020); Tonsillectomy; Appendectomy; and Foot Debridement (Right, 3/22/2024).     Social History:   reports that he quit smoking about 7 years ago. His smoking use included cigarettes. He started smoking about 11 years ago. He has a 4.0 pack-year smoking history. He has been exposed to tobacco smoke. He has never used smokeless tobacco. He reports current alcohol use. He 
Morning assessments and vital signs complete. Patient given scheduled medications as prescribed. He denies pain at this time. Dressing is clean, dry, and intact to RLE. Call light is within reach.  
Occupational Therapy    Marvin Montero  3/21/2024    OT orders received and chart reviewed. Noted in Dr. Bills's podiatry note 3/20 plan is for Urgent amputation of RIGHT 5th toe with further Vascular Intervention during this hospital course. Awaiting OR time. Will hold eval, and will need new therapy orders with WB status when appropriate s/p surgery.     Jackeline Multani, OTR/L [id number]  
Occupational Therapy  Facility/Department: [address] MED SURG  Occupational Therapy Daily Treatment    Name: Marvin Montero  : 1950  MRN: [id number]  Date of Service: 2024    Discharge Recommendations:  Continue to assess pending progress, Patient would benefit from continued therapy after discharge, 3-5 sessions per week     Marvin Montero scored a 16/24 on the AM-PAC ADL Inpatient form. Current research shows that an AM-PAC score of 17 or less is typically not associated with a discharge to the patient's home setting. Based on the patient's AM-PAC score and their current ADL deficits, it is recommended that the patient have 3-5 sessions per week of Occupational Therapy at d/c to increase the patient's independence.  Please see assessment section for further patient specific details.    If patient discharges prior to next session this note will serve as a discharge summary.  Please see below for the latest assessment towards goals.      Patient Diagnosis(es): The primary encounter diagnosis was MRSA (methicillin resistant staph aureus) culture positive. Diagnoses of Gangrene of right foot (HCC), Gangrene (HCC), and Enterococcus faecalis infection were also pertinent to this visit.  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 
Occupational Therapy  Facility/Department: [address] MED SURG  Occupational Therapy Daily Treatment    Name: Marvin Montero  : 1950  MRN: [id number]  Date of Service: 2024    Discharge Recommendations:  Continue to assess pending progress, Patient would benefit from continued therapy after discharge, 3-5 sessions per week     Marvin Montero scored a 16/24 on the AM-PAC ADL Inpatient form. Current research shows that an AM-PAC score of 17 or less is typically not associated with a discharge to the patient's home setting. Based on the patient's AM-PAC score and their current ADL deficits, it is recommended that the patient have 3-5 sessions per week of Occupational Therapy at d/c to increase the patient's independence.  Please see assessment section for further patient specific details.    If patient discharges prior to next session this note will serve as a discharge summary.  Please see below for the latest assessment towards goals.      Patient Diagnosis(es): The primary encounter diagnosis was MRSA (methicillin resistant staph aureus) culture positive. Diagnoses of Gangrene of right foot (HCC), Gangrene (HCC), and Enterococcus faecalis infection were also pertinent to this visit.  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 
Occupational Therapy  Facility/Department: [address] MED SURG  Occupational Therapy Daily Treatment    Name: Marvin Montero  : 1950  MRN: [id number]  Date of Service: 4/3/2024    Discharge Recommendations:  Continue to assess pending progress, Patient would benefit from continued therapy after discharge, 5-7 sessions per week     Marvin Montero scored a 16/24 on the AM-PAC ADL Inpatient form. Current research shows that an AM-PAC score of 17 or less is typically not associated with a discharge to the patient's home setting. Based on the patient's AM-PAC score and their current ADL deficits, it is recommended that the patient have 5-7 sessions per week of Occupational Therapy at d/c to increase the patient's independence.  At this time, this patient demonstrates complex nursing, medical, and rehabilitative needs, and would benefit from intensive rehabilitation services upon discharge from the Inpatient setting.  This patient demonstrates the ability to participate in and benefit from an intensive therapy program with a coordinated interdisciplinary team approach to foster frequent, structured, and documented communication among disciplines, who will work together to establish, prioritize, and achieve treatment goals. Please see assessment section for further patient specific details.    If patient discharges prior to next session this note will serve as a discharge summary.  Please see below for the latest assessment towards goals.      Patient Diagnosis(es): Diagnoses of Gangrene of right foot (HCC) and Gangrene (HCC) were pertinent to this visit.  Past Medical History:  has a past medical history of Atrial fibrillation (HCC), Back pain, Cardiomyopathy, CHF (congestive heart failure) (HCC), COPD (chronic obstructive pulmonary disease) (HCC), Dental disease, Dizziness, Dyslipidemia, GERD (gastroesophageal reflux disease), Hearing loss, Hyperlipidemia, Hypertension, Hypothyroidism, Leg edema, MRSA 
Occupational Therapy  Facility/Department: [address] MED SURG  Occupational Therapy Daily Treatment    Name: Marvin Montero  : 1950  MRN: [id number]  Date of Service: 2024    Discharge Recommendations:  Patient would benefit from continued therapy after discharge, 3-5 sessions per week, Continue to assess pending progress     Marvin Montero scored a 16/24 on the AM-PAC ADL Inpatient form. Current research shows that an AM-PAC score of 17 or less is typically not associated with a discharge to the patient's home setting. Based on the patient's AM-PAC score and their current ADL deficits, it is recommended that the patient have 3-5 sessions per week of Occupational Therapy at d/c to increase the patient's independence.  Please see assessment section for further patient specific details.    If patient discharges prior to next session this note will serve as a discharge summary.  Please see below for the latest assessment towards goals.      Patient Diagnosis(es): The primary encounter diagnosis was MRSA (methicillin resistant staph aureus) culture positive. Diagnoses of Gangrene of right foot (HCC), Gangrene (HCC), and Enterococcus faecalis infection were also pertinent to this visit.  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 
Occupational Therapy  Unable to see pt at this time due to pt off floor transmetatarsal amp R foot. Pt will need new OT orders to resume OT services. Will sign off at this time.    Edi Pickering, OTR/L     
Patient A/O x4. VSS on room air. Denies pain. Morning medications administered. Heparin started at this time per order. Patient educated on medication. Schilling draining yellow urine. R foot dressing clean, dry and intact with no breakthrough drainage. Elevated on pillow. In bed in locked and lowest position with bed alarm on. Call light within reach. No needs stated at this time.  
Patient IV has infilltrated at this time. Patient is alert and oriented at this time. Patient states he is a possible discharge tomorrow and is refusing to have access placed. Message sent to on call provider at this time.     Electronically signed by Bridget Medrano RN on 4/10/2024 at 9:59 PM    
Patient admitted to room 4116 from ED.  Oriented to room, call light, and floor policies.  Plan of care reviewed with patient/family.  Pt is resting in bed and 4/10pain at this time, declines need for pain medication; no s/s of distress noted. Assessment completed; VSS. Pt rates a high fall risk; bed alarm to be placed on the bed/chair.  Safety precautions in place; call light and bedside table within reach.  Pt encouraged to call for needs or ambulation.  Pt VU.  Will continue to monitor.  Electronically signed by Roslyn Travis RN on 3/20/2024 at 1:51 PM    
Patient alert and oriented X 4. Vitals Recorded. Patient Education performed. Pt aware to be NPO at midnight. Pt verbalizes understanding of education.  Fall precautions in place. Bed in lowest position, side rails X2. Bed locks on. Bed alarm on. Needed items within reach. Call light within reach. No further needs at this time.   
Patient belongings brought up to 5254 including phone, , wallet, clothing, glasses and prosthetic leg. Wife updated with new room number on phone. Answered all questions. Attempted to call report but nurse was on the line. Will attempt again later. Electronically signed by Shaunna Paul RN on 3/25/2024 at 3:49 PM   
Patient complaints of pain 8/10 on the surgical site/ leg. PRN Norco given.Instructed patient to maintain leg elevated. Ice pack given on the affected site.Patient complaints of pain 10/10 at 2300H on the same site. Kendy Alcantar-NICKOLAS informed.    Electronically signed by Maria Fernanda King RN on 4/3/2024 at 11:54 PM   
Patient currently in bed. On heparin drip. Infusing appropriately. Schilling draining okay. Right leg dressing clean dry and intact. Patient cleaned up after use of bedpan. Repositioned with call light in reach. Bed alarm on.  
Patient doing well this morning, alert and orientated x4 - states all needs are meet at this time. Was able to use the bedpan this morning for BM - able to take all morning medications without complications. Patient updated on POC and agreeable - all questions answered. Will continue with POC.     Electronically signed by Baljeet Almodovar RN on 3/30/2024 at 9:18 AM    
Patient doing well this morning, took all morning medications without complications. VSS on RA - wore BiPaP without issues overnight. All questions answered and all needs meet at this time. Will continue with POC.     Electronically signed by Baljeet Almodovar RN on 3/31/2024 at 8:32 AM    
Patient had 25 beats of V-tach while getting washed up with the PCA - patient asymptomatic and vss on RA. MD [place] was notified.     Electronically signed by Baljeet Almodovar RN on 3/30/2024 at 11:29 AM    
Patient received scheduled medications. Cup of ice water given. Bed alarm on and call light in reach. No other needs at this time.  
Patient requesting to use a CPAP tonight as he uses one at home. RT aware. NP enrico Alcantar notified via secure message for an order for the use of CPAP. Care on going.  
Patient to angiogram via hospital bed at this time. Wife updated via phone per patient request. Heparin infusing at ordered rate. Electronically signed by Shaunna Paul RN on 3/25/2024 at 1:14 PM   
Patient's chart having a sepsis alert score >5. Vitals stable this evening. NP Kendy Alcantar notified via secure message. Awaiting response. Care on going.  
Physical Therapy  Attempt    Pt scheduled for Transmetatarsal amputation 10:00 am this morning.  Will need new order to re-initiate therapy if/when appropriate.     Electronically signed by Curtis Harvey, PT [id number] on 4/2/2024 at 8:16 AM    
Physical Therapy  Facility/Department: [address] PROGRESSIVE CARE  Physical Therapy Treatment session     Name: Marvin Montero  : 1950  MRN: [id number]  Date of Service: 3/29/2024    Discharge Recommendations:  Therapy recommended at discharge, Continue to assess pending progress   PT Equipment Recommendations  Equipment Needed: No    Marvin Montero scored a 10/24 on the AM-PAC short mobility form. Current research shows that an AM-PAC score of 17 or less is typically not associated with a discharge to the patient's home setting. Based on the patient's AM-PAC score and their current functional mobility deficits, it is recommended that the patient have 3-5 sessions per week of Physical Therapy at d/c to increase the patient's independence.  Please see assessment section for further patient specific details.    If patient discharges prior to next session this note will serve as a discharge summary.  Please see below for the latest assessment towards goals.     Patient Diagnosis(es): The encounter diagnosis was Gangrene of right foot (HCC).  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  Past Surgical History:  has a past surgical history that includes Foot surgery (2010); Adrenal gland surgery (); Gastric bypass 
Physical Therapy  Facility/Department: [address] PROGRESSIVE CARE  Physical Therapy Initial Assessment    Name: Marvin Montero  : 1950  MRN: [id number]  Date of Service: 3/27/2024    Discharge Recommendations:  Therapy recommended at discharge, Continue to assess pending progress    Marvin Montero scored a 10/24 on the AM-PAC short mobility form. Current research shows that an AM-PAC score of 17 or less is typically not associated with a discharge to the patient's home setting. Based on the patient's AM-PAC score and their current functional mobility deficits, it is recommended that the patient have 3-5 sessions per week of Physical Therapy at d/c to increase the patient's independence.  Please see assessment section for further patient specific details.    If patient discharges prior to next session this note will serve as a discharge summary.  Please see below for the latest assessment towards goals.        Patient Diagnosis(es): The encounter diagnosis was Gangrene of right foot (HCC).  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  Past Surgical History:  has a past surgical history that includes Foot surgery (2010); Adrenal gland surgery (); Gastric bypass surgery; other surgical history (10/06/2015); 
Physical Therapy  Facility/Department: [address] MED SURG  Physical Therapy Re-Assessment    Name: Marvin Montero  : 1950  MRN: [id number]  Date of Service: 4/3/2024    Discharge Recommendations:  Therapy recommended at discharge, Continue to assess pending progress   PT Equipment Recommendations  Equipment Needed: No    Marvin Montero scored a 10 on the AM-PAC short mobility form. Current research shows that an AM-PAC score of 17 or less is typically not associated with a discharge to the patient's home setting. Based on the patient's AM-PAC score and their current functional mobility deficits, it is recommended that the patient have 5-7 sessions per week of Physical Therapy at d/c to increase the patient's independence.  At this time, this patient demonstrates complex nursing, medical, and rehabilitative needs, and would benefit from intensive rehabilitation services upon discharge from the Inpatient setting.  This patient demonstrates the ability to participate in and benefit from an intensive therapy program with a coordinated interdisciplinary team approach to foster frequent, structured, and documented communication among disciplines, who will work together to establish, prioritize, and achieve treatment goals. Please see assessment section for further patient specific details.    If patient discharges prior to next session this note will serve as a discharge summary.  Please see below for the latest assessment towards goals.        Patient Diagnosis(es): Diagnoses of Gangrene of right foot (HCC) and Gangrene (HCC) were pertinent to this visit.  Past Medical History:  has a past medical history of Atrial fibrillation (HCC), Back pain, Cardiomyopathy, CHF (congestive heart failure) (HCC), COPD (chronic obstructive pulmonary disease) (HCC), Dental disease, Dizziness, Dyslipidemia, GERD (gastroesophageal reflux disease), Hearing loss, Hyperlipidemia, Hypertension, Hypothyroidism, Leg edema, MRSA 
Physical Therapy  Facility/Department: [address] MED SURG  Physical Therapy Treatment Note    Name: Marvin Montero  : 1950  MRN: [id number]  Date of Service: 2024    Discharge Recommendations:  Therapy recommended at discharge, Continue to assess pending progress   PT Equipment Recommendations  Equipment Needed: No    Marvin Montero scored a 10/24 on the AM-PAC short mobility form. Current research shows that an AM-PAC score of 17 or less is typically not associated with a discharge to the patient's home setting. Based on the patient's AM-PAC score and their current functional mobility deficits, it is recommended that the patient have 3-5 sessions per week of Physical Therapy at d/c to increase the patient's independence.  Please see assessment section for further patient specific details.    If patient discharges prior to next session this note will serve as a discharge summary.  Please see below for the latest assessment towards goals.        Patient Diagnosis(es): The primary encounter diagnosis was MRSA (methicillin resistant staph aureus) culture positive. Diagnoses of Gangrene of right foot (HCC), Gangrene (HCC), and Enterococcus faecalis infection were also pertinent to this visit.  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 
Physical Therapy  Facility/Department: [address] MED SURG  Physical Therapy Treatment Note    Name: Marvin Montero  : 1950  MRN: [id number]  Date of Service: 4/15/2024    Discharge Recommendations:  Therapy recommended at discharge, Continue to assess pending progress   PT Equipment Recommendations  Equipment Needed: No    Marvin Montero scored a  on the AM-PAC short mobility form. Current research shows that an AM-PAC score of 17 or less is typically not associated with a discharge to the patient's home setting. Based on the patient's AM-PAC score and their current functional mobility deficits, it is recommended that the patient have 3-5 sessions per week of Physical Therapy at d/c to increase the patient's independence.  Please see assessment section for further patient specific details.    If patient discharges prior to next session this note will serve as a discharge summary.  Please see below for the latest assessment towards goals.        Patient Diagnosis(es): The primary encounter diagnosis was MRSA (methicillin resistant staph aureus) culture positive. Diagnoses of Gangrene of right foot (HCC), Gangrene (HCC), and Enterococcus faecalis infection were also pertinent to this visit.  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 
Physical Therapy  Marvin Montero    Received orders for PT eval and treat.  Per chart, Urgent amputation of RIGHT 5th toe once OR time available.  Will sign off.  Will need new orders after surgery for PT eval and treat.    Electronically signed by Kelly Dean PT [id number] on 3/21/2024 at 7:27 AM       
Physical Therapy  PT treatment  Marvin Montero    PT to room at RN's request to assist with putting pt back to bed due to pt almost sliding out of chair.  Upon arrival, RN and 2 PCAs in room.  PT educated RN and PCAs on safe technique for getting pt repositioned in the chair.  Total assist for scooting back in chair.  Total assist transferring pt chair to bed via maxi-move.  Pt left in the care of RN and PCAs at end of session.    Second Session Therapy Time     Individual Co-treatment   Time In 1250     Time Out 1300     Minutes 10          Electronically signed by Kelly Dean, PT [id number] on 4/9/2024 at 1:06 PM      
Pt AAO x4.  C/o pain in right foot 9/10 on pain scale.  Describing as throbbing.  Medicated with PRN Norco.  Right leg elevated on a pillow.  Fresh ice pack applied.  Assessment completed.  Dressing to right foot CDI.  No c/o numbness or tingling.  Took PM meds without any difficulty.  Denies any needs.  Call light in reach.  Will monitor.   
Pt BP 86/44 with map of 58. [employer] sent to Kendy Alcantar, New orders placed.   
Pt alert and oriented, vitals stable on room air, pt denies pain. Dressing unchanged. Taken to cath lab for procedure.  
Pt arouses on arrival to PACU. Denies pain at present. O2 at 4L. Pt falls quickly back to sleep.  
Pt brought to room 4279 by PACU staff, wife at bedside. Pt A&O4. Pt c/o right foot pain, 9/10. PRN pain medication given per MAR. Pt able to feel sensation, move RLE, and warm to touch. Old Cath lab site assessed, C/D/I. Pt tolerating oral intake/medications. All other needs met at this time. Call light in reach, bed locked in lowest position with alarm on. Electronically signed by Dominic Guy RN on 4/2/2024 at 2:23 PM    
Pt c/o right foot pain, 7/10. PRN Norco given. Surgical dressing assessed, no drainage/strikethrough noted. Pt able to move foot, and feel sensation. All other needs met at this time. Call light in reach, bed locked in lowest position with alarm on. Electronically signed by Dominic Guy RN on 3/23/2024 at 4:15 PM    
Pt complains of abdominal cramping since beginning of the shift and tried to use a bedpan twice but able to pass only gas.    At 0351 he had Simethicone PRN.  
Pt denies pain. VSS. Pt awakens and asks to see his wife. Will call report.  
Pt falls asleep easily. O2 sat 89-90% on R/A. Replaced O2 at 2L.  
Pt is alert and oriented X4. VSS. Pt states [ain 5/10 but does not want anything for it. Skin assessed, no pressure injury noted. Zinc cream applied to buttocks. RN educated pt on the importance of turning every 2 hours. Pt states he can do it on his own. Call light within reach. Bed low and locked. No needs a this time.  
Pt post void bladder scan volume 111. MD notified.     Electronically signed by Heidi Robles RN on 4/3/2024 at 7:11 PM    
Pt provided d/c instructions, all questions answered. Pt belongings gathered and sent with the pt. Attempted to call report to [facility], left call back phone number. Electronically signed by Jorge Zimmer RN on 4/17/2024 at 11:21 AM    
Pt return to room 4116 from PACU. Pt A&O4, c/o right foot pain, 7/10. Refused PO pain medication, states will \"try to hold off\". RN instructed pt to call when ready for pain medication, pt verbalized understanding. Right foot assessed, dressing in place. Unable to check pulse/cap refill d/t dressing, but pt able to feel touch, move foot and denies numbness. Call light in reach, bed locked in lowest position with alarm on. Electronically signed by Dominic Guy RN on 3/22/2024 at 2:43 PM    
Pt sleepy but awakens to self. Called for x-rays. Elevated right foot and ice pack to right ankle.  
Pt up to chair using stedy x 2 for several hours this AM.  Pt continues with intermittent ls stool- PRN imodium administered.  Schilling in place for retention.   Breakthrough drainage noted to R foot- dressing reinforced per order.  Per Podiatry noted- plan for surgery in next 48 hrs.  Pain to RLE managed with PRN Norco.  Wife updated on POC.  Electronically signed by Yessenia Chahal RN on 3/27/24 at 5:49 PM EDT    
Pt's bp 94/53 and , pt due for carvedilol. Notified Dr. Mitchell. Orders to hold dose of carvedilol.  Electronically signed by Roslyn Travis RN on 3/20/2024 at 4:49 PM    
Removed ascencio per order. No complications.     Electronically signed by Heidi Robles RN on 4/3/2024 at 10:53 AM      
Renal function normal   No albuminuria.     Nephrology has signed off.   Can resume home meds except lasix if he is not fluid overloaded.     Faith Davila MD    
Report called to Dominic. Will prepare pt for transfer to room [facility].  
Shift assessment done. All night time medications given and patient tolerated well. Patient instructed to be NPO starting 12 midnight, patient verbalized understanding. All fall precautions implemented. All needs attended. Electronically signed by Zoila Daniel RN on 4/1/2024 at 11:29 PM   
X-rays done. FS 91. Covered with warm blankets. O2 off.  
  Increased nutrient needs related to increase demand for energy/nutrients as evidenced by wounds    Nutrition Interventions:   Food and/or Nutrient Delivery: Continue Current Diet, Continue Oral Nutrition Supplement  Nutrition Education/Counseling: Education not indicated  Coordination of Nutrition Care: Continue to monitor while inpatient  Plan of Care discussed with: patient    Goals:  Previous Goal Met: Goal(s) Achieved  Goals: Meet at least 75% of estimated needs, by next RD assessment       Nutrition Monitoring and Evaluation:   Behavioral-Environmental Outcomes: None Identified  Food/Nutrient Intake Outcomes: Food and Nutrient Intake, Supplement Intake  Physical Signs/Symptoms Outcomes: Biochemical Data, Constipation, Diarrhea, Fluid Status or Edema, Weight, Skin    Discharge Planning:    Continue current diet, Continue Oral Nutrition Supplement     Reilly Sequeira RD, LD  Contact: [phone]    
500 cc so some urine retention might be at play as well.     Profound hypokalemia  Potassium 2.8 at time of consult  Likely secondary to diuretic use.  Getting replacement as needed.    CHF  EF 50-55%  G1DD  Current wt 251 pounds, 114 kilos.   On coreg 3.125 mg BID.   Hold lisinopril and Farxiga     Urine retention   Hold oxybutynin   Bladder scan.   flomax    [facility] would like to thank you for the opportunity to serve this patient. Please call with any questions or concerns.    [facility]  [address] [place] [address]  Fax: [phone]  Office: [phone]    HPI  Marvin Montero is a 73 y.o. male  with  has a past medical history of Atrial fibrillation (HCC), Back pain, Cardiomyopathy, CHF (congestive heart failure) (HCC), COPD (chronic obstructive pulmonary disease) (HCC), Dental disease, Dizziness, Dyslipidemia, GERD (gastroesophageal reflux disease), Hearing loss, Hyperlipidemia, Hypertension, Hypothyroidism, Leg edema, MRSA (methicillin resistant staph aureus) culture positive, MRSA nasal colonization, Obesity, Prolonged emergence from general anesthesia, 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, Sleep apnea, Stroke (HCC), Thyroid disease, Tinnitus, Type 2 diabetes mellitus without complication (HCC), and Type II or unspecified type diabetes mellitus without mention of complication, not stated as uncontrolled. who presented with foot wound.      Review of Systems:   As above.     PMH/SH/FH:    Medical Hx: reviewed and updated as appropriate  Past Medical History:   Diagnosis Date    Atrial fibrillation (HCC)     Back pain     Cardiomyopathy     CHF (congestive heart failure) (HCC)     COPD (chronic obstructive pulmonary disease) (HCC)     Dental disease     Dizziness     Dyslipidemia     GERD (gastroesophageal reflux disease)     Hearing loss     Hyperlipidemia     Hypertension     Hypothyroidism     Leg edema     MRSA (methicillin resistant staph aureus) 
Albumin/Globulin Ratio 0.8 (L) 1.1 - 2.2    Total Bilirubin <0.2 0.0 - 1.0 mg/dL    Alkaline Phosphatase 105 40 - 129 U/L    ALT 11 10 - 40 U/L    AST 11 (L) 15 - 37 U/L   CBC    Collection Time: 04/09/24  1:08 PM   Result Value Ref Range    WBC 4.0 4.0 - 11.0 K/uL    RBC 3.24 (L) 4.20 - 5.90 M/uL    Hemoglobin 8.9 (L) 13.5 - 17.5 g/dL    Hematocrit 27.6 (L) 40.5 - 52.5 %    MCV 85.3 80.0 - 100.0 fL    MCH 27.6 26.0 - 34.0 pg    MCHC 32.3 31.0 - 36.0 g/dL    RDW 15.0 12.4 - 15.4 %    Platelets 157 135 - 450 K/uL    MPV 9.5 5.0 - 10.5 fL   POCT Glucose    Collection Time: 04/09/24  4:25 PM   Result Value Ref Range    POC Glucose 121 (H) 70 - 99 mg/dl    Performed on ACCU-CHEK    POCT Glucose    Collection Time: 04/09/24  8:21 PM   Result Value Ref Range    POC Glucose 269 (H) 70 - 99 mg/dl    Performed on ACCU-CHEK    POCT Glucose    Collection Time: 04/10/24  8:03 AM   Result Value Ref Range    POC Glucose 100 (H) 70 - 99 mg/dl    Performed on ACCU-CHEK        Therapy progress:  Physical therapy:  Bed Mobility:     Sit>supine:     Supine>sit:     Transfers:     Sit>stand:     Stand>sit:     Bed<>chair     Stand Pivot:     Lateral transfer:     Car transfer:     Ambulation:     Stairs:     Curb:     Wheelchair:       Occupational therapy:   Feeding     Grooming/Oral Hygiene     UE Bathing     LE Bathing     UE Dressing     LE Dressing     Putting On/Taking Off Footwear     Toileting       Speech therapy:    ADULT DIET; Regular; 4 carb choices (60 gm/meal)  ADULT ORAL NUTRITION SUPPLEMENT; Breakfast, Lunch; Wound Healing Oral Supplement        Body mass index is 31.53 kg/m².    Assessment:  Complicated right diabetic foot infection  Right 5th digit osteomyelitis -s/p right TMA (4/2 with Dr. Bills). NWB RLE.   PVD -s/p angiogram and revascularization right superficial femoral artery, popliteal artery, tibioperoneal trunk, and posterior tibial artery (3/25)  H/o prior left BKA -uses prosthesis  DM2 with 
assessment       Nutrition Monitoring and Evaluation:   Behavioral-Environmental Outcomes: None Identified  Food/Nutrient Intake Outcomes: Supplement Intake, Food and Nutrient Intake  Physical Signs/Symptoms Outcomes: Biochemical Data, Weight, Nutrition Focused Physical Findings    Discharge Planning:    No discharge needs at this time     Kirk Lal RD  Contact: [phone]    
covered with dressing but dressing is soaked with drainage  Skin: warm, dry  Neuro: Alert,moves all four extremities .  Psych: Mood appropriate    Labs:   Recent Labs     04/09/24  1308   WBC 4.0   HGB 8.9*   HCT 27.6*        Recent Labs     04/09/24  1308      K 4.5      CO2 28   BUN 13   CREATININE 0.9   CALCIUM 8.3     Recent Labs     04/09/24  1308   AST 11*   ALT 11   BILITOT <0.2   ALKPHOS 105     No results for input(s): \"INR\" in the last 72 hours.  No results for input(s): \"CKTOTAL\", \"TROPHS\" in the last 72 hours.    Urinalysis:      Lab Results   Component Value Date/Time    NITRU Negative 03/21/2024 06:00 AM    WBCUA 4 03/21/2024 06:00 AM    BACTERIA None Seen 03/21/2024 06:00 AM    RBCUA 0 03/21/2024 06:00 AM    BLOODU Negative 03/21/2024 06:00 AM    SPECGRAV 1.012 03/21/2024 06:00 AM    GLUCOSEU 250 03/21/2024 06:00 AM       Radiology:  XR FOOT RIGHT (MIN 3 VIEWS)   Final Result   Surgical changes without complication.         XR FOOT RIGHT (2 VIEWS)   Final Result   1. Postsurgical changes related to prior resection of the 5th digit at the   level of the proximal phalanx.   2. Interval osseous proliferation in the regions of the 1st and 5th MTP   joints.   3. Fracture of the distal 4th metatarsal and possibly the distal 5th   metatarsal.   4. Soft tissue swelling and soft tissue gas in the midfoot and forefoot, the   latter of which may be secondary to recent instrumentation, trauma, or   infection with gas-forming organisms.  MRI could be performed for further   evaluation for osteomyelitis as clinically warranted.         VL DUP LOWER EXTREMITY ARTERIES RIGHT   Final Result          IP CONSULT TO PODIATRY  IP CONSULT TO NEPHROLOGY  IP CONSULT TO CARDIOLOGY  IP CONSULT TO INFECTIOUS DISEASES  IP CONSULT TO UROLOGY  IP CONSULT TO PHYSICAL MEDICINE REHAB    Assessment/Plan:    Active Hospital Problems    Diagnosis     MRSA (methicillin resistant staph aureus) culture positive [Z22.322] 
murmurs  Abdomen: Soft, non-tender or non-distended without rigidity or guarding and positive bowel sounds   Extremities: No clubbing, cyanosis, or edema bilaterally. Left BKA, rt small toe gangrene.  Neurologic: Alert and oriented X 3, neurovascularly intact with sensory/motor intact upper extremities/lower extremities, bilaterally.  Cranial nerves: II-XII intact, grossly non-focal.  Mental status: Alert, oriented  Capillary Refill: Acceptable  < 3 seconds  Peripheral Pulses: +3 Easily felt, not easily obliterated with pressure    Labs:   Recent Labs     03/28/24  0632   WBC 5.1   HGB 9.2*   HCT 28.1*        Recent Labs     03/28/24  0632      K 4.2      CO2 27   BUN 13   CREATININE 1.0   CALCIUM 8.2*   PHOS 3.1     No results for input(s): \"AST\", \"ALT\", \"BILIDIR\", \"BILITOT\", \"ALKPHOS\" in the last 72 hours.  No results for input(s): \"INR\" in the last 72 hours.  No results for input(s): \"CKTOTAL\", \"TROPHS\" in the last 72 hours.    Urinalysis:      Lab Results   Component Value Date/Time    NITRU Negative 03/21/2024 06:00 AM    WBCUA 4 03/21/2024 06:00 AM    BACTERIA None Seen 03/21/2024 06:00 AM    RBCUA 0 03/21/2024 06:00 AM    BLOODU Negative 03/21/2024 06:00 AM    SPECGRAV 1.012 03/21/2024 06:00 AM    GLUCOSEU 250 03/21/2024 06:00 AM       Radiology:  XR FOOT RIGHT (2 VIEWS)   Final Result   1. Postsurgical changes related to prior resection of the 5th digit at the   level of the proximal phalanx.   2. Interval osseous proliferation in the regions of the 1st and 5th MTP   joints.   3. Fracture of the distal 4th metatarsal and possibly the distal 5th   metatarsal.   4. Soft tissue swelling and soft tissue gas in the midfoot and forefoot, the   latter of which may be secondary to recent instrumentation, trauma, or   infection with gas-forming organisms.  MRI could be performed for further   evaluation for osteomyelitis as clinically warranted.         VL DUP LOWER EXTREMITY ARTERIES RIGHT   Final 
sensitive to linezolid.      Stool (3/26) WBC positive     POC Glucose:   Recent Labs     03/29/24  1717 03/29/24  1946 03/30/24  0811 03/30/24  1212   POCGLU 97 112* 76 98     Right foot X-ray (3/26) Postsurgical changes related to prior resection of the 5th digit at the level of the proximal phalanx. Interval osseous proliferation in the regions of the 1st and 5th MTP joints. Fracture of the distal 4th metatarsal and possibly the distal 5th metatarsal. Soft tissue swelling and soft tissue gas in the midfoot and forefoot, the latter of which may be secondary to recent instrumentation, trauma, or infection with gas-forming organisms.  MRI could be performed for further evaluation for osteomyelitis as clinically warranted.    Right lower extremity arterial duplex (3/21) Right CUATE 0.81. This is consistent with mild PAD at rest. However, due to severe calcific plaque in the tibial arteries this may be falsely elevated. Occlusion of the right mid to distal anterior tibial artery. The flow reconstitutes at the dorsalis pedis in the foot. Monophasic, hyperemic flow noted in the right popliteal, posterior tibial and peroneal arteries. Possible short segment occlusion of the right distal popliteal and tibial / peroneal trunk. Limited visualization of this area due to severe acoustic shadowing and body habitus.    Objective:   Vitals:  /49   Pulse 61   Temp 98 °F (oral)   Resp 24   Ht 6' 4\"  Wt 122.5 kg  SpO2 95% on RA  BMI 32.87 kg/m²   General appearance: alert and cooperative with exam  Lungs: clear to auscultation bilaterally  Heart: regular rate and rhythm, S1, S2 normal, no murmur, click, rub or gallop  Abdomen: soft, non-tender; bowel sounds normal; no masses,  no organomegaly  Extremities:  Left BKA site without erythema or drainage, right foot wrapped, neurovascular status intact  Neurologic: No obvious focal neurologic deficits.    Assessment and Plan:   Gangrene and osteomyelitis right 5th toe s/p 
stated as uncontrolled.  Past Surgical History:  has a past surgical history that includes Foot surgery (01/02/2010); Adrenal gland surgery (1970); Gastric bypass surgery; other surgical history (10/06/2015); other surgical history (Right, 10/08/2015); Cardiac defibrillator placement (09/05/2017); pacemaker placement; Colonoscopy (N/A, 03/08/2019); Upper gastrointestinal endoscopy (N/A, 03/08/2019); Colonoscopy (N/A, 08/09/2019); Colonoscopy (08/09/2019); Foot Debridement (Right, 08/28/2019); Foot Debridement (Left, 07/22/2020); Tonsillectomy; Appendectomy; Foot Debridement (Right, 3/22/2024); Toe amputation (Right, 4/2/2024); and Achilles tendon surgery (Right, 4/2/2024).    Assessment   Body Structures, Functions, Activity Limitations Requiring Skilled Therapeutic Intervention: Decreased functional mobility ;Decreased strength;Decreased endurance;Decreased balance  Assessment: The patient is a 73 y.o. male who presents to [facility] on 3/20/24 with rt small toe gangrene. Pt underwent I&D and amputation of R small toe on 3/22/24. TMA right foot with achilles tendon lengthening on 4/2/24, now NWB to RLE. Prior to admission, pt living with spouse in multi-level home setting, independent with ADLs and ambuation with RW. Pt currently functioning well below baseline, limited by WB restriction. Recommend continued skilled therapy at high intensity to maximize independence and safety with functional mobility.  Treatment Diagnosis: impaired mobility  Therapy Prognosis: Fair  Decision Making: Medium Complexity  History: see above  Exam: see below  Clinical Presentation: evolving  Activity Tolerance  Activity Tolerance: Patient tolerated treatment well     Plan   Physical Therapy Plan  General Plan: 3-5 times per week  Current Treatment Recommendations: Strengthening, Balance training, Functional mobility training, Transfer training, Gait training, Endurance training, Neuromuscular re-education, Patient/Caregiver 
tender  Genitourinary: no suprapubic tenderness  Musculoskeletal: Left BKA, right foot covered with dressing but dressing is soaked with drainage  Skin: warm, dry  Neuro: Alert,moves all four extremities .  Psych: Mood appropriate    Labs:   Recent Labs     04/01/24  0700   WBC 4.2   HGB 9.2*   HCT 27.7*        Recent Labs     03/31/24  0710 04/01/24  0700 04/02/24  0434    142 140   K 4.2 4.2 4.3    109 108   CO2 25 24 25   BUN 8 8 8   CREATININE 0.9 0.8 0.9   CALCIUM 7.9* 8.1* 7.9*   PHOS 3.1 3.2  --      No results for input(s): \"AST\", \"ALT\", \"BILIDIR\", \"BILITOT\", \"ALKPHOS\" in the last 72 hours.  No results for input(s): \"INR\" in the last 72 hours.  No results for input(s): \"CKTOTAL\", \"TROPHS\" in the last 72 hours.    Urinalysis:      Lab Results   Component Value Date/Time    NITRU Negative 03/21/2024 06:00 AM    WBCUA 4 03/21/2024 06:00 AM    BACTERIA None Seen 03/21/2024 06:00 AM    RBCUA 0 03/21/2024 06:00 AM    BLOODU Negative 03/21/2024 06:00 AM    SPECGRAV 1.012 03/21/2024 06:00 AM    GLUCOSEU 250 03/21/2024 06:00 AM       Radiology:  XR FOOT RIGHT (MIN 3 VIEWS)   Final Result   Surgical changes without complication.         XR FOOT RIGHT (2 VIEWS)   Final Result   1. Postsurgical changes related to prior resection of the 5th digit at the   level of the proximal phalanx.   2. Interval osseous proliferation in the regions of the 1st and 5th MTP   joints.   3. Fracture of the distal 4th metatarsal and possibly the distal 5th   metatarsal.   4. Soft tissue swelling and soft tissue gas in the midfoot and forefoot, the   latter of which may be secondary to recent instrumentation, trauma, or   infection with gas-forming organisms.  MRI could be performed for further   evaluation for osteomyelitis as clinically warranted.         VL DUP LOWER EXTREMITY ARTERIES RIGHT   Final Result          IP CONSULT TO PODIATRY  IP CONSULT TO NEPHROLOGY  IP CONSULT TO CARDIOLOGY  IP CONSULT TO INFECTIOUS 
uncontrolled.  Past Surgical History:  has a past surgical history that includes Foot surgery (01/02/2010); Adrenal gland surgery (1970); Gastric bypass surgery; other surgical history (10/06/2015); other surgical history (Right, 10/08/2015); Cardiac defibrillator placement (09/05/2017); pacemaker placement; Colonoscopy (N/A, 03/08/2019); Upper gastrointestinal endoscopy (N/A, 03/08/2019); Colonoscopy (N/A, 08/09/2019); Colonoscopy (08/09/2019); Foot Debridement (Right, 08/28/2019); Foot Debridement (Left, 07/22/2020); Tonsillectomy; Appendectomy; Foot Debridement (Right, 3/22/2024); Toe amputation (Right, 4/2/2024); and Achilles tendon surgery (Right, 4/2/2024).    Treatment Diagnosis: Decreased: ADLs, functional transfers/mobility      Assessment   Performance deficits / Impairments: Decreased functional mobility ;Decreased strength;Decreased endurance;Decreased ADL status;Decreased balance;Decreased high-level IADLs  Assessment: Pt is a 74 yo M who presented with right diabetic foot infection. S/p TMA R foot 4/2, NWB R LE. PTA pt from home where pt was Ind with mobility and ADLs with use of RW and w/c. Pt remains below baseline, requiring max Ax2 for sit<>stand transfers and calvin stedy LIFT for safe mobility. He was able to don prosthetic w/ setup but anticipate up to max A for full ADL. Anticipate need for ongoing skilled OT 3-5x/wk at d/c to improve his safety, independence, and promote eventual return home w/ wife.  Treatment Diagnosis: Decreased: ADLs, functional transfers/mobility  Prognosis: Fair;Good  REQUIRES OT FOLLOW-UP: Yes  Activity Tolerance  Activity Tolerance: Patient limited by fatigue        Plan   Occupational Therapy Plan  Times Per Week: 3-5x  Current Treatment Recommendations: Strengthening, Balance training, Functional mobility training, Endurance training, Patient/Caregiver education & training, Safety education & training, Self-Care / ADL 
infection [A49.8]     Gangrene of right foot (HCC) [I96]     Type 2 diabetes mellitus with right diabetic foot infection (HCC) [E11.628, L08.9]     Staphylococcus aureus infection [A49.01]            Gangrene rt small toe - admit on med-surg floor with tele, started on IV abx, podiatry consulted. Check labs, verify home meds and resume.. Check arterial doppler . Cardio consulted for PVD, needs LE angio - poss monday. S/p OR 3/22 for amputation     DM II - fairly controlled, accucheck/SSI     HTN - pt hypotensive, hold coreg/lisinopril      A fib par - cont amio, hold eliquis and switched to lovenox     ARF on ?CKD III - creat was 1.2 - 1.4 per records. Creat today 2.9-->2.2-->1.5-->1.2. hold diuretics. Consulted nephrology           DVT Prophylaxis: lovenox  Diet: ADULT DIET; Regular; 4 carb choices (60 gm/meal)  Code Status: Full Code  PT/OT Eval Status: ordered    Dispo - cont care, poss angio LE monday      Kassandra Mitchell MD   
mL IVPB premix, 2,000 mg, IntraVENous, PRN  enoxaparin (LOVENOX) injection 120 mg, 1 mg/kg (Order-Specific), SubCUTAneous, BID  ondansetron (ZOFRAN-ODT) disintegrating tablet 4 mg, 4 mg, Oral, Q8H PRN **OR** ondansetron (ZOFRAN) injection 4 mg, 4 mg, IntraVENous, Q6H PRN  polyethylene glycol (GLYCOLAX) packet 17 g, 17 g, Oral, Daily PRN  acetaminophen (TYLENOL) tablet 650 mg, 650 mg, Oral, Q6H PRN **OR** acetaminophen (TYLENOL) suppository 650 mg, 650 mg, Rectal, Q6H PRN  HYDROcodone-acetaminophen (NORCO) 5-325 MG per tablet 1 tablet, 1 tablet, Oral, Q6H PRN  insulin lispro (HUMALOG) injection vial 0-4 Units, 0-4 Units, SubCUTAneous, TID WC  insulin lispro (HUMALOG) injection vial 0-4 Units, 0-4 Units, SubCUTAneous, Nightly  glucose chewable tablet 16 g, 4 tablet, Oral, PRN  dextrose bolus 10% 125 mL, 125 mL, IntraVENous, PRN **OR** dextrose bolus 10% 250 mL, 250 mL, IntraVENous, PRN  glucagon injection 1 mg, 1 mg, SubCUTAneous, PRN  dextrose 10 % infusion, , IntraVENous, Continuous PRN  [COMPLETED] ceFEPIme (MAXIPIME) 2,000 mg in sodium chloride 0.9 % 100 mL IVPB (mini-bag), 2,000 mg, IntraVENous, Once **FOLLOWED BY** ceFEPIme (MAXIPIME) 2,000 mg in sodium chloride 0.9 % 100 mL IVPB (mini-bag), 2,000 mg, IntraVENous, Q24H  linezolid (ZYVOX) IVPB 600 mg, 600 mg, IntraVENous, Q12H  Allergies:   Patient has no known allergies.  Social History:    MARITAL STATUS:    Patient currently lives with family wife  Family History:       Problem Relation Age of Onset    Hypertension Mother     Heart Disease Father     Hypertension Maternal Grandmother     Diabetes Maternal Grandmother      REVIEW OF SYSTEMS:    CONSTITUTIONAL:  positive for  loss of appetite   INTEGUMENT/BREAST:  positive for skin lesion(s), dryness, skin color change, and changes in lesion  ENDOCRINE:  positive for diabetic symptoms including neither foot ulcerations nor neuropathy  MUSCULOSKELETAL:  positive for  joint swelling and decreased range of 
with right diabetic foot infection (HCC) [E11.628, L08.9]     Staphylococcus aureus infection [A49.01]            Gangrene rt small toe - admit on med-surg floor with tele, started on IV abx, podiatry consulted. Check labs, verify home meds and resume.. Check arterial doppler . Cardio consulted for PVD, plan LE angio - today. S/p OR 3/22 for amputation     DM II - fairly controlled, accucheck/SSI     HTN - pt hypotensive, hold coreg/lisinopril, resume when BP better     A fib par - cont amio, hold eliquis and switched to lovenox     ARF on ?CKD III - creat was 1.2 - 1.4 per records. Creat today 2.9-->2.2-->1.5-->1.2-->1.0. hold diuretics. Consulted nephrology           DVT Prophylaxis: lovenox  Diet: Diet NPO Exceptions are: Sips of Water with Meds  Code Status: Full Code  PT/OT Eval Status: ordered    Dispo - cont care      Kassandra Mitchell MD   
 Wt 119.6 kg (263 lb 10.7 oz)   SpO2 96%   BMI 32.09 kg/m²     LABS:   Recent Labs     03/21/24  0607 03/22/24  0528   WBC 11.5* 9.8   HGB 9.4* 9.6*   HCT 28.9* 29.2*    279       Recent Labs     03/21/24  0607 03/21/24  1919 03/22/24  0527     --  136   K 2.8*   < > 3.4*     --  104   CO2 25  --  24   PHOS 2.9  --   --    BUN 33*  --  27*   CREATININE 2.2*  --  1.5*    < > = values in this interval not displayed.       No results for input(s): \"PROT\", \"INR\", \"APTT\" in the last 72 hours.    LOWER EXTREMITY EXAMINATION     SKIN:   Surgical Dressings intact on right foot     Historic Findiings:   NEUROLOGIC:    Pain sensation isdecreased Right.  Light touch is decreasedRight. positive history of paresthesia Right. negativehistory of burning Right. Deep tendon reflexes are 1 to include patellar and achilles Right. [place] 5.07 monofilament sensitivity is abnormal 5 to 5 spots tested Right.    VASCULAR:    right Dorsalis pedis is 1. right Posterior tibial pulse is 1. 1+ edema right. none Varicosities right.    MUSCULOSKELETAL:  [place] is abnormal  due to wound, BKA LEFT . Muscle strength is 4/5 to all groups tested to include dorsiflexion, plantarflexion, inversion, eversion, and digital Right . The ranges of motion of all joints tested from ankle distal is decreased there is crepitus noted Right.    RIGHT foot:  FINDINGS:  There is no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 clean hopefully able to choose oral antibiotic for discharge planning.  Blood glucose is much improved creatinine normalized      Labs, Microbiology, Radiology and all the pertinent results from current hospitalization and  care every where were reviewed  by me as a part of the evaluation   Plan:   Cont oral  Linezolid x  600 mg  12 hr  Cont  Flagyl 500 mg every 8 hours for anaerobic coverage  ESR 71 , CRP 64 trend down to 23.6  Wound cx noted   Cont locla care  PVD intervention completed..   May go for repeat incision and drainage   Watch platelets may be drifting down  Creatinine has improved  Able to choose oral linezolid 600 mg every 12 hrs  for 14 days at discharge along with oral Flagyl   Await  podiatry intervention possible TMA tomorrow  Discussed with patient/Family and Nursing staff     Medical Decision Making:  The following items were considered in medical decision making:  Discussion of patient care with other providers  Reviewed clinical lab tests  Reviewed radiology tests  Reviewed other diagnostic tests/interventions  Independent review of radiologic images  Microbiology cultures and other micro tests reviewed     Risk of Complications/Morbidity: High      Illness(es)/ Infection present that pose threat to bodily function.   There is potential for severe exacerbation of infection/side effects of treatment.  Therapy requires intensive monitoring for antimicrobial agent toxicity.   Thanks for allowing me to participate in your patient's care and please call me with any questions or concerns.    Dr.Ravindhar Sharri CRAFT  Infectious Disease  [facility] Physician  Phone: [phone]   Fax : [phone]    
choose oral antibiotic for discharge planning    Podiatry notes indicate possible right foot TMA if margins are clean hopefully able to choose oral antibiotic for discharge planning.  Blood glucose is much improved creatinine normalized    Status post right foot TMA Achilles tendon lengthening operative culture Enterococcus noted bone pathology margins clear okay for discharge on oral antibiotics from ID standpoint          Labs, Microbiology, Radiology and all the pertinent results from current hospitalization and  care every where were reviewed  by me as a part of the evaluation   Plan:   Cont oral  Linezolid x  600 mg  12 hr x stop date x  4/14  Cont  Flagyl 500 mg every 8 hours for anaerobic coverage x stop 4/14  ESR 71 , CRP  trend down to 23.6  Wound cx noted   Cont locla care  PVD intervention completed..   S/p  repeat incision and drainage   Watch platelets may be drifting down  Creatinine has improved  Bone path pending - margins clean  Ok for d/c  Check CBC with diff, ESR, CRP in 1 week orders in place     Will sign off     Discussed with patient/Family and Nursing staff     Medical Decision Making:  The following items were considered in medical decision making:  Discussion of patient care with other providers  Reviewed clinical lab tests  Reviewed radiology tests  Reviewed other diagnostic tests/interventions  Independent review of radiologic images  Microbiology cultures and other micro tests reviewed     Risk of Complications/Morbidity: High      Illness(es)/ Infection present that pose threat to bodily function.   There is potential for severe exacerbation of infection/side effects of treatment.  Therapy requires intensive monitoring for antimicrobial agent toxicity.   Thanks for allowing me to participate in your patient's care and please call me with any questions or concerns.    Dr.Ravindhar Sharri CRAFT  Infectious Disease  [facility] Physician  Phone: [phone]   Fax : [phone]

## 2024-04-18 ENCOUNTER — TELEPHONE (OUTPATIENT)
Dept: INFECTIOUS DISEASES | Age: 74
End: 2024-04-18

## 2024-04-18 ENCOUNTER — TELEPHONE (OUTPATIENT)
Dept: INTERNAL MEDICINE CLINIC | Age: 74
End: 2024-04-18

## 2024-04-18 NOTE — TELEPHONE ENCOUNTER
[facility] Transitions Initial Follow Up Call    Outreach made within 2 business days of discharge: Yes    Patient: Marvin Montero Patient : 1950   MRN: [id number]  Reason for Admission: REMOVE TOES   Discharge Date: 24       Spoke with: MARK MONTERO    Discharge department/facility: [facility] Interactive Patient Contact:  Was patient able to fill all prescriptions: Yes  Was patient instructed to bring all medications to the follow-up visit: Yes  Is patient taking all medications as directed in the discharge summary? Yes  Does patient understand their discharge instructions: Yes  Does patient have questions or concerns that need addressed prior to 7-14 day follow up office visit: no    Scheduled appointment with PCP within 7-14 days    Follow Up  Future Appointments   Date Time Provider Department Center   2024  1:00 PM Bernice Camarillo AuD KW AUDIO [facility]   2024  1:30 PM Praneeth Richards MD [id number] [facility]       Kailyn Ortiz MA

## 2024-04-18 NOTE — TELEPHONE ENCOUNTER
On 4/4/24 you wrote Cont oral  Linezolid x  600 mg  12 hr x stop date x  4/14  Cont  Flagyl 500 mg every 8 hours for anaerobic coverage x stop 4/14. You wanted labs in a week. Pt just discharged from [facility] yesterday 4-17. Abx completed. You still want labs in a week? Please advise.    Thanks.

## 2024-04-19 ENCOUNTER — TELEPHONE (OUTPATIENT)
Dept: INTERNAL MEDICINE CLINIC | Age: 74
End: 2024-04-19

## 2024-05-06 ENCOUNTER — TELEPHONE (OUTPATIENT)
Dept: INTERNAL MEDICINE CLINIC | Age: 74
End: 2024-05-06

## 2024-05-06 NOTE — TELEPHONE ENCOUNTER
Patient is requesting to change appointment to virtual on Thursday due to him just getting out of the hospital having foot procedure done.

## 2024-05-07 ENCOUNTER — TELEPHONE (OUTPATIENT)
Dept: INTERNAL MEDICINE CLINIC | Age: 74
End: 2024-05-07

## 2024-05-07 ENCOUNTER — TELEPHONE (OUTPATIENT)
Dept: PULMONOLOGY | Age: 74
End: 2024-05-07

## 2024-05-07 NOTE — TELEPHONE ENCOUNTER
Dolores with Andrea moreau, [employer] [facility] bs in ref to cpap compliance and reorder . They will need clinical notes to be submitted along with patient name  and insurance id. [phone]

## 2024-05-07 NOTE — TELEPHONE ENCOUNTER
No. Will need new prescription if still needing to use the basal insulin. Find out when he last used insulin and what has his fasting blood sugars been.

## 2024-05-08 NOTE — TELEPHONE ENCOUNTER
SPOKE WITH PT STATES HE DO NOT TAKE BASAL INSULIN. HE IS NOT TAKING ANYTHING. PT BS HAVE BEEN BETWEEN 70-90 EVERY MORNING.

## 2024-05-10 ENCOUNTER — TELEPHONE (OUTPATIENT)
Dept: PULMONOLOGY | Age: 74
End: 2024-05-10

## 2024-05-10 NOTE — TELEPHONE ENCOUNTER
[employer] calling on behalf of [employer] that PA for the cpap compliance and reorder supplies have been denied due to lack of records provided. There can be a peer to peer  [phone]   
See prev message- pt needs appt   
present

## 2024-06-04 ENCOUNTER — TELEPHONE (OUTPATIENT)
Dept: INTERNAL MEDICINE CLINIC | Age: 74
End: 2024-06-04

## 2024-06-04 DIAGNOSIS — N32.81 OAB (OVERACTIVE BLADDER): ICD-10-CM

## 2024-06-04 RX ORDER — DAPAGLIFLOZIN 10 MG/1
10 TABLET, FILM COATED ORAL EVERY MORNING
Qty: 90 TABLET | Refills: 3 | Status: SHIPPED | OUTPATIENT
Start: 2024-06-04

## 2024-06-04 RX ORDER — UREA 10 %
325 LOTION (ML) TOPICAL EVERY OTHER DAY
Qty: 90 TABLET | Refills: 0 | Status: SHIPPED | OUTPATIENT
Start: 2024-06-04

## 2024-06-04 RX ORDER — OXYBUTYNIN CHLORIDE 5 MG/1
5 TABLET ORAL NIGHTLY
Qty: 90 TABLET | Refills: 3 | Status: SHIPPED | OUTPATIENT
Start: 2024-06-04

## 2024-06-04 NOTE — TELEPHONE ENCOUNTER
Medication:   Requested Prescriptions     Pending Prescriptions Disp Refills    oxyBUTYnin (DITROPAN) 5 MG tablet 90 tablet 3     Sig: Take 1 tablet by mouth nightly    dapagliflozin (FARXIGA) 10 MG tablet 90 tablet 0     Sig: Take 1 tablet by mouth every morning LOT [id number]   EXP 8/31/2026    ferrous sulfate (FE TABS 325) 325 (65 Fe) MG EC tablet 90 tablet 0     Sig: Take 1 tablet by mouth every other day     Last Filled:  farxiga 42 tabs on 2/29/24, oxybutin # 90 with 3 refills on  1/19/24 and iron was never filled by Dr. Draper     Last appt: 12/19/2023   Next appt: 6/12/2024    Last OARRS:        No data to display

## 2024-06-04 NOTE — TELEPHONE ENCOUNTER
Patient is needing refill on medications  farxiga, oxybutynin and ferrous sulfate 325mg. Please advise.

## 2024-06-12 ENCOUNTER — OFFICE VISIT (OUTPATIENT)
Dept: INTERNAL MEDICINE CLINIC | Age: 74
End: 2024-06-12
Payer: MEDICARE

## 2024-06-12 VITALS
BODY MASS INDEX: 28.49 KG/M2 | SYSTOLIC BLOOD PRESSURE: 100 MMHG | HEART RATE: 77 BPM | HEIGHT: 76 IN | OXYGEN SATURATION: 98 % | WEIGHT: 234 LBS | DIASTOLIC BLOOD PRESSURE: 54 MMHG

## 2024-06-12 DIAGNOSIS — Z12.5 PROSTATE CANCER SCREENING: ICD-10-CM

## 2024-06-12 DIAGNOSIS — E03.9 ACQUIRED HYPOTHYROIDISM: ICD-10-CM

## 2024-06-12 DIAGNOSIS — I10 PRIMARY HYPERTENSION: ICD-10-CM

## 2024-06-12 DIAGNOSIS — E11.8 TYPE 2 DIABETES WITH COMPLICATION (HCC): Primary | ICD-10-CM

## 2024-06-12 DIAGNOSIS — R79.0 LOW MAGNESIUM LEVEL: ICD-10-CM

## 2024-06-12 DIAGNOSIS — N18.2 CKD (CHRONIC KIDNEY DISEASE) STAGE 2, GFR 60-89 ML/MIN: ICD-10-CM

## 2024-06-12 DIAGNOSIS — I50.20 HFREF (HEART FAILURE WITH REDUCED EJECTION FRACTION) (HCC): ICD-10-CM

## 2024-06-12 DIAGNOSIS — Z09 HOSPITAL DISCHARGE FOLLOW-UP: ICD-10-CM

## 2024-06-12 DIAGNOSIS — E78.2 MIXED HYPERLIPIDEMIA: ICD-10-CM

## 2024-06-12 DIAGNOSIS — I73.9 PAD (PERIPHERAL ARTERY DISEASE) (HCC): ICD-10-CM

## 2024-06-12 LAB
CHP ED QC CHECK: NORMAL
GLUCOSE BLD-MCNC: 86 MG/DL
HBA1C MFR BLD: 5.3 %

## 2024-06-12 PROCEDURE — 3074F SYST BP LT 130 MM HG: CPT | Performed by: INTERNAL MEDICINE

## 2024-06-12 PROCEDURE — 82962 GLUCOSE BLOOD TEST: CPT | Performed by: INTERNAL MEDICINE

## 2024-06-12 PROCEDURE — 1111F DSCHRG MED/CURRENT MED MERGE: CPT | Performed by: INTERNAL MEDICINE

## 2024-06-12 PROCEDURE — 99215 OFFICE O/P EST HI 40 MIN: CPT | Performed by: INTERNAL MEDICINE

## 2024-06-12 PROCEDURE — 3078F DIAST BP <80 MM HG: CPT | Performed by: INTERNAL MEDICINE

## 2024-06-12 PROCEDURE — 3044F HG A1C LEVEL LT 7.0%: CPT | Performed by: INTERNAL MEDICINE

## 2024-06-12 PROCEDURE — 1123F ACP DISCUSS/DSCN MKR DOCD: CPT | Performed by: INTERNAL MEDICINE

## 2024-06-12 PROCEDURE — 83036 HEMOGLOBIN GLYCOSYLATED A1C: CPT | Performed by: INTERNAL MEDICINE

## 2024-06-12 PROCEDURE — G2211 COMPLEX E/M VISIT ADD ON: HCPCS | Performed by: INTERNAL MEDICINE

## 2024-06-12 RX ORDER — INSULIN DEGLUDEC 100 U/ML
INJECTION, SOLUTION SUBCUTANEOUS
COMMUNITY
End: 2024-07-11

## 2024-06-12 RX ORDER — METOPROLOL SUCCINATE 25 MG/1
25 TABLET, EXTENDED RELEASE ORAL DAILY
COMMUNITY
End: 2024-06-20 | Stop reason: SDUPTHER

## 2024-06-12 RX ORDER — SIMETHICONE 80 MG
TABLET,CHEWABLE ORAL
COMMUNITY
Start: 2024-05-15

## 2024-06-12 RX ORDER — POTASSIUM CHLORIDE 1500 MG/1
40 TABLET, EXTENDED RELEASE ORAL DAILY
COMMUNITY
Start: 2024-05-15 | End: 2024-06-20 | Stop reason: SDUPTHER

## 2024-06-12 RX ORDER — HYDROCODONE BITARTRATE AND ACETAMINOPHEN 5; 325 MG/1; MG/1
TABLET ORAL
COMMUNITY
Start: 2024-04-29

## 2024-06-12 SDOH — ECONOMIC STABILITY: FOOD INSECURITY: WITHIN THE PAST 12 MONTHS, YOU WORRIED THAT YOUR FOOD WOULD RUN OUT BEFORE YOU GOT MONEY TO BUY MORE.: NEVER TRUE

## 2024-06-12 SDOH — ECONOMIC STABILITY: FOOD INSECURITY: WITHIN THE PAST 12 MONTHS, THE FOOD YOU BOUGHT JUST DIDN'T LAST AND YOU DIDN'T HAVE MONEY TO GET MORE.: NEVER TRUE

## 2024-06-12 SDOH — ECONOMIC STABILITY: INCOME INSECURITY: HOW HARD IS IT FOR YOU TO PAY FOR THE VERY BASICS LIKE FOOD, HOUSING, MEDICAL CARE, AND HEATING?: NOT HARD AT ALL

## 2024-06-12 ASSESSMENT — PATIENT HEALTH QUESTIONNAIRE - PHQ9
SUM OF ALL RESPONSES TO PHQ QUESTIONS 1-9: 0
SUM OF ALL RESPONSES TO PHQ QUESTIONS 1-9: 0
1. LITTLE INTEREST OR PLEASURE IN DOING THINGS: NOT AT ALL
SUM OF ALL RESPONSES TO PHQ QUESTIONS 1-9: 0
SUM OF ALL RESPONSES TO PHQ9 QUESTIONS 1 & 2: 0
2. FEELING DOWN, DEPRESSED OR HOPELESS: NOT AT ALL
SUM OF ALL RESPONSES TO PHQ QUESTIONS 1-9: 0

## 2024-06-12 NOTE — PROGRESS NOTES
Patient: Marvin Montero is a 73 y.o. male who presents today with the following Chief Complaint(s):    Chief Complaint   Patient presents with    Follow-Up from [facility] podiatry. Right baby toe. Amputated. All toes. Healing now. Finished pt at [facility] for walking. 1 hour and 10m daily. Started lasix back once weekly. Weight loss, 264 in hosp, now 234. Eating less. Lots of berries, salomn twice weekly, leafy greens lots of beans. Reading and following the literature he is reading. Right leg 1 + leg edema. Doing weight also.  Podiatry says foot is healing appropriately.   Diabetes, farxiga. Now to 5 mg.   To schedule susie eye exam.  Had pneu vaccine.  Had covid and RSV \he thinks.   Current Outpatient Medications   Medication Sig Dispense Refill    metoprolol succinate (TOPROL XL) 25 MG extended release tablet Take 1 tablet by mouth daily      potassium chloride (K-TAB) 20 MEQ TBCR extended release tablet Take 2 tablets by mouth daily      HYDROcodone-acetaminophen (NORCO) 5-325 MG per tablet TAKE 1 TABLET BY MOUTH FOUR TIMES DAILY AS NEEDED FOR SEVERE PAIN      simethicone (MYLICON) 80 MG chewable tablet CHEW AND SWALLOW 1 TABLET BY MOUTH EVERY 6 HOURS AS NEEDED FOR GAS OR FLATULENCE      Insulin Degludec (TRESIBA) 100 UNIT/ML SOLN Inject into the skin      oxyBUTYnin (DITROPAN) 5 MG tablet Take 1 tablet by mouth nightly 90 tablet 3    dapagliflozin (FARXIGA) 10 MG tablet Take 1 tablet by mouth every morning LOT [id number]   EXP 8/31/2026 (Patient taking differently: Take 0.5 tablets by mouth every morning LOT [id number]   EXP 8/31/2026) 90 tablet 3    ferrous sulfate (FE TABS 325) 325 (65 Fe) MG EC tablet Take 1 tablet by mouth every other day 90 tablet 0    amiodarone (PACERONE) 100 MG tablet Take 1 tablet by mouth daily 30 tablet 2    apixaban (ELIQUIS) 5 MG TABS tablet Take 1 tablet by mouth 2 times daily TAKE 1 TABLET BY MOUTH TWICE A DAY 42 tablet 0    levothyroxine (SYNTHROID) 25 MCG tablet TAKE 1

## 2024-06-18 DIAGNOSIS — Z79.4 TYPE 2 DIABETES MELLITUS WITH COMPLICATION, WITH LONG-TERM CURRENT USE OF INSULIN (HCC): ICD-10-CM

## 2024-06-18 DIAGNOSIS — R39.12 BENIGN PROSTATIC HYPERPLASIA WITH WEAK URINARY STREAM: ICD-10-CM

## 2024-06-18 DIAGNOSIS — E11.8 TYPE 2 DIABETES MELLITUS WITH COMPLICATION, WITH LONG-TERM CURRENT USE OF INSULIN (HCC): ICD-10-CM

## 2024-06-18 DIAGNOSIS — I10 ESSENTIAL HYPERTENSION: ICD-10-CM

## 2024-06-18 DIAGNOSIS — N40.1 BENIGN PROSTATIC HYPERPLASIA WITH WEAK URINARY STREAM: ICD-10-CM

## 2024-06-18 DIAGNOSIS — E78.2 MIXED HYPERLIPIDEMIA: ICD-10-CM

## 2024-06-18 NOTE — TELEPHONE ENCOUNTER
Pt called in the matter of refill     Pt called in the regards of medication   -apixaban (ELIQUIS) 5 MG TABS tablet [[id number]]  -atorvastatin (LIPITOR) 40 MG tablet [[id number]]  -finasteride (PROSCAR) 5 MG tablet [[id number]]  -ezetimibe (ZETIA) 10 MG tablet [[id number]]    finasteride (PROSCAR) 5 MG tablet [[id number]]   lisinopril (PRINIVIL;ZESTRIL) 2.5 MG tablet [[id number]]  metoprolol succinate (TOPROL XL) 25 MG extended release tablet [[id number]]  potassium chloride (K-TAB) 20 MEQ TBCR extended release tablet [[id number]]  tamsulosin (FLOMAX) 0.4 MG capsule [[id number]]            Please send medication to   [id number] - [place], [place] - [address] - P [phone] - F [phone]

## 2024-06-20 NOTE — TELEPHONE ENCOUNTER
Medication:   Requested Prescriptions     Pending Prescriptions Disp Refills    apixaban (ELIQUIS) 5 MG TABS tablet 42 tablet 0     Sig: Take 1 tablet by mouth 2 times daily TAKE 1 TABLET BY MOUTH TWICE A DAY    atorvastatin (LIPITOR) 40 MG tablet 90 tablet 3     Sig: Take 1 tablet by mouth daily    ezetimibe (ZETIA) 10 MG tablet 90 tablet 3     Sig: Take 1 tablet by mouth daily    finasteride (PROSCAR) 5 MG tablet 30 tablet      Sig: Take 1 tablet by mouth daily    lisinopril (PRINIVIL;ZESTRIL) 2.5 MG tablet 90 tablet 3     Sig: Take 1 tablet by mouth daily    metoprolol succinate (TOPROL XL) 25 MG extended release tablet 30 tablet      Sig: Take 1 tablet by mouth daily    potassium chloride (K-TAB) 20 MEQ TBCR extended release tablet 60 tablet      Sig: Take 2 tablets by mouth daily    tamsulosin (FLOMAX) 0.4 MG capsule 90 capsule 3     Sig: Take 1 capsule by mouth daily        Last Filled:      Patient Phone Number: [phone] (home)     Last appt: 6/12/2024   Next appt: 9/10/2024    Last OARRS:        No data to display

## 2024-06-21 RX ORDER — POTASSIUM CHLORIDE 1500 MG/1
TABLET, EXTENDED RELEASE ORAL
Qty: 90 TABLET | Refills: 3 | Status: SHIPPED | OUTPATIENT
Start: 2024-06-21

## 2024-06-21 RX ORDER — METOPROLOL SUCCINATE 25 MG/1
25 TABLET, EXTENDED RELEASE ORAL DAILY
Qty: 90 TABLET | Refills: 3 | Status: SHIPPED | OUTPATIENT
Start: 2024-06-21

## 2024-06-21 RX ORDER — ATORVASTATIN CALCIUM 40 MG/1
40 TABLET, FILM COATED ORAL DAILY
Qty: 90 TABLET | Refills: 3 | Status: SHIPPED | OUTPATIENT
Start: 2024-06-21

## 2024-06-21 RX ORDER — DAPAGLIFLOZIN 5 MG/1
5 TABLET, FILM COATED ORAL EVERY MORNING
Qty: 90 TABLET | Refills: 3 | Status: SHIPPED | OUTPATIENT
Start: 2024-06-21

## 2024-06-21 RX ORDER — EZETIMIBE 10 MG/1
10 TABLET ORAL DAILY
Qty: 90 TABLET | Refills: 3 | Status: SHIPPED | OUTPATIENT
Start: 2024-06-21

## 2024-06-21 RX ORDER — LISINOPRIL 2.5 MG/1
2.5 TABLET ORAL DAILY
Qty: 90 TABLET | Refills: 3 | Status: SHIPPED | OUTPATIENT
Start: 2024-06-21

## 2024-06-21 RX ORDER — FINASTERIDE 5 MG/1
5 TABLET, FILM COATED ORAL DAILY
Qty: 90 TABLET | Refills: 3 | Status: SHIPPED | OUTPATIENT
Start: 2024-06-21

## 2024-06-21 RX ORDER — TAMSULOSIN HYDROCHLORIDE 0.4 MG/1
0.4 CAPSULE ORAL DAILY
Qty: 90 CAPSULE | Refills: 3 | Status: SHIPPED | OUTPATIENT
Start: 2024-06-21

## 2024-06-27 ENCOUNTER — TELEPHONE (OUTPATIENT)
Dept: INTERNAL MEDICINE CLINIC | Age: 74
End: 2024-06-27

## 2024-06-27 DIAGNOSIS — E11.8 DIABETES MELLITUS TYPE 2 WITH COMPLICATIONS (HCC): Primary | ICD-10-CM

## 2024-06-27 DIAGNOSIS — E11.8 DIABETES MELLITUS TYPE 2 WITH COMPLICATIONS (HCC): ICD-10-CM

## 2024-06-27 RX ORDER — DAPAGLIFLOZIN 5 MG/1
5 TABLET, FILM COATED ORAL EVERY MORNING
Qty: 90 TABLET | Refills: 3 | Status: SHIPPED | OUTPATIENT
Start: 2024-06-27 | End: 2024-06-27 | Stop reason: SDUPTHER

## 2024-06-27 RX ORDER — DAPAGLIFLOZIN 5 MG/1
5 TABLET, FILM COATED ORAL EVERY MORNING
Qty: 90 TABLET | Refills: 3 | Status: SHIPPED | OUTPATIENT
Start: 2024-06-27

## 2024-06-27 RX ORDER — FLASH GLUCOSE SENSOR
KIT MISCELLANEOUS
Qty: 6 EACH | Refills: 3 | Status: SHIPPED | OUTPATIENT
Start: 2024-06-27

## 2024-06-27 NOTE — TELEPHONE ENCOUNTER
Pt medication was sent to the wrong pharmacy I called and cancel at the [facility] pharmacy and called them in the correct pharmacy which was  [facility]

## 2024-06-27 NOTE — TELEPHONE ENCOUNTER
Medication:   Requested Prescriptions     Pending Prescriptions Disp Refills    Continuous Glucose Sensor (FREESTYLE KWESI 14 DAY SENSOR) MISC 6 each 3     Sig: Use as directed Dx E11.9        Last Filled:      Patient Phone Number: [phone] (home)     Last appt: 6/12/2024   Next appt: 9/10/2024    Last OARRS:        No data to display

## 2024-06-27 NOTE — TELEPHONE ENCOUNTER
Pt is asking to get Farxiga re filled       Pt stated pharmacy  said that the pt needs a new Prescription to be called into the pharmacy    Yo

## 2024-06-27 NOTE — TELEPHONE ENCOUNTER
Pt is asking for medication to be sent to [facility]     Pt never received the list of medication that was sent out because it was sent to [facility] .Please resend medication to correct Pharmacy .     Pt is also asking for Continuous Blood Gluc Sensor (FREESTYLE KWESI 14 DAY SENSOR) [facility] [[id number]]   To be filled       Pt want medication to  go to   [id number] - [place], [place] - [address] - P [phone] - F [phone]

## 2024-07-03 DIAGNOSIS — R68.82 DECREASED LIBIDO: Primary | ICD-10-CM

## 2024-07-11 ASSESSMENT — ENCOUNTER SYMPTOMS
RESPIRATORY NEGATIVE: 1
GASTROINTESTINAL NEGATIVE: 1

## 2024-07-11 NOTE — PROGRESS NOTES
Patient: Marvin Montero is a 73 y.o. male who presents today with the following Chief Complaint(s):    Chief Complaint   Patient presents with    [facility] and [facility]/u on hypertension, hyperlipidemia, diabetes, T2, taking Farxiga as his sole diabetic medication, hypothyroidism, and stage 3 CKD. He is taking medication as prescribed, continues to focus on meal planning and physical activity working out about 90 minutes daily in divided sessions.   H/O HFrEF. Denies CP or SOB.          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 Has become comfortable walking with prosthesis.          He has had covid and RSV vaccines.              Current Outpatient Medications   Medication Sig Dispense Refill    HYDROcodone-acetaminophen (NORCO) 5-325 MG per tablet TAKE 1 TABLET BY MOUTH FOUR TIMES DAILY AS NEEDED FOR SEVERE PAIN      simethicone (MYLICON) 80 MG chewable tablet CHEW AND SWALLOW 1 TABLET BY MOUTH EVERY 6 HOURS AS NEEDED FOR GAS OR FLATULENCE      Insulin Degludec (TRESIBA) 100 UNIT/ML SOLN Inject into the skin      oxyBUTYnin (DITROPAN) 5 MG tablet Take 1 tablet by mouth nightly 90 tablet 3    ferrous sulfate (FE TABS 325) 325 (65 Fe) MG EC tablet Take 1 tablet by mouth every other day 90 tablet 0    amiodarone (PACERONE) 100 MG tablet Take 1 tablet by mouth daily 30 tablet 2    levothyroxine (SYNTHROID) 25 MCG tablet TAKE 1 TABLET BY MOUTH DAILY 90 tablet 3    Continuous Blood Gluc  (FREESTYLE KWESI 2 READER) ANITA Use as directed to monitor blood sugar. 1 each 3    blood glucose test strips (ASCENSIA AUTODISC VI;ONE TOUCH ULTRA TEST VI) strip 1 each by In Vitro route daily Test as directed,Dispense according to insurance formulary 100 each 3    Lancets MISC 1 each by Does not apply route daily Test as directed, Dispense according to insurance formulary 100 each 3    Lactobacillus Rhamnosus, GG, (RA PROBIOTIC DIGESTIVE CARE) CAPS Take 1 capsule by mouth daily

## 2024-07-17 RX ORDER — LEVOTHYROXINE SODIUM 0.03 MG/1
TABLET ORAL
Qty: 90 TABLET | Refills: 3 | Status: SHIPPED | OUTPATIENT
Start: 2024-07-17

## 2024-07-19 ENCOUNTER — TELEPHONE (OUTPATIENT)
Dept: CARDIOLOGY CLINIC | Age: 74
End: 2024-07-19

## 2024-07-19 NOTE — TELEPHONE ENCOUNTER
Pt with elevated optivol on recent remote transmission.    Please call patient and ask the following:  If no symptoms, please reinforce fluid and Na restrictions and to monitor wt     Shortness of breath?  Orthopnea?  Waking up gasping for air?  Chest pain or pressure?  Change in weight?    Have they been weighing themselves daily?  Edema?  Change in activity level or tolerance?    Have they been following low sodium diet and fluid restrictions?    Any change in medications since last seen in office?

## 2024-07-19 NOTE — TELEPHONE ENCOUNTER
Please review Murj for: \"Possible OptiVol fluid accumulation: 13-Jul-2024 -- ongoing, elevated up to 80 w correlating TI trend below reference line. Triage™ Heart Failure Risk Status on 18-Jul-2024 is High*.\"

## 2024-07-22 NOTE — TELEPHONE ENCOUNTER
Patient/Family phoned and discussed findings.  Responses as listed below.    Shortness of breath?  NO  Orthopnea?  No  Waking up gasping for air? No    Chest pain or pressure?  No    Change in weight?  No              Have they been weighing themselves daily?  Yes  Edema?  Left leg prosthesis, right leg swelling, wears compression stockings    Any lightheadedness/Syncope?  No    Any cough?  No    Change in activity level or tolerance?  No     Have they been following low sodium diet and fluid restrictions?  Yes     Any change in medications since last seen in office? He was taken off furosemide while in the hospital having his right toes amputated. Has been been taking on his own for 2 weeks, takes it two times a week, 80 mg, does report some dizziness when he takes the furosemide.  Has had low b/p. He was taken off his spironolactone and is taking lisinopril by pcp order to take when b/p runs high. It has been running low.     Reinforced with patient/family regarding 64 fluid oz restriction, Na restrictions and to monitor wt   Instructed to phone with any symptoms or concerns. Patient agreeable.

## 2024-07-31 NOTE — TELEPHONE ENCOUNTER
Marvin called in he would like a return call,he states he was told he would get a follow up call.      He can be reached at [phone].

## 2024-07-31 NOTE — TELEPHONE ENCOUNTER
Patient was told at the time of the original call and questions asked that if he did not hear back from me then nothing would be changing, if it needed changing, I would phone him back.    Phoned patient, he v/u he would not have gotten a call, as stated prior, nothing had changed.    Patient then had a question regarding either his pacemaker or optival device, it is not clear which.

## 2024-08-01 NOTE — TELEPHONE ENCOUNTER
Patient called back returning call for Dariana.     Also stated that the light came on and it said to call the company, but when he plugged it back in the light went off.     Please advise.     Callback:[phone]

## 2024-08-05 ENCOUNTER — TELEPHONE (OUTPATIENT)
Dept: PULMONOLOGY | Age: 74
End: 2024-08-05

## 2024-08-05 NOTE — TELEPHONE ENCOUNTER
[facility] calling for a Peer to Peer request. Ongoing Bipap and supplies  scheduled for [facility].  Regarding adherence to therapy assigned as use of PAP for at least 4 hours per night on 70 percent of nights. During a consecutive 30 day period. They need records of health improvement.   Call for a Peer to Peer  #  [phone]

## 2024-08-07 NOTE — TELEPHONE ENCOUNTER
Patient needs appt.  This is same message as 5/2024 and patient has yet to come in.  Dr Galvan said he could d/c the machine but he wanted to continue using it and said he would call back for an appt

## 2024-08-14 DIAGNOSIS — I10 PRIMARY HYPERTENSION: ICD-10-CM

## 2024-08-14 DIAGNOSIS — Z12.5 PROSTATE CANCER SCREENING: ICD-10-CM

## 2024-08-14 DIAGNOSIS — R79.0 LOW MAGNESIUM LEVEL: ICD-10-CM

## 2024-08-14 DIAGNOSIS — R68.82 DECREASED LIBIDO: ICD-10-CM

## 2024-08-14 DIAGNOSIS — E78.2 MIXED HYPERLIPIDEMIA: ICD-10-CM

## 2024-08-14 LAB
ALBUMIN SERPL-MCNC: 3.5 G/DL (ref 3.4–5)
ALBUMIN/GLOB SERPL: 1.4 {RATIO} (ref 1.1–2.2)
ALP SERPL-CCNC: 78 U/L (ref 40–129)
ALT SERPL-CCNC: 42 U/L (ref 10–40)
ANION GAP SERPL CALCULATED.3IONS-SCNC: 10 MMOL/L (ref 3–16)
AST SERPL-CCNC: 29 U/L (ref 15–37)
BILIRUB SERPL-MCNC: 0.4 MG/DL (ref 0–1)
BUN SERPL-MCNC: 32 MG/DL (ref 7–20)
CALCIUM SERPL-MCNC: 8.5 MG/DL (ref 8.3–10.6)
CHLORIDE SERPL-SCNC: 110 MMOL/L (ref 99–110)
CHOLEST SERPL-MCNC: 161 MG/DL (ref 0–199)
CO2 SERPL-SCNC: 23 MMOL/L (ref 21–32)
CREAT SERPL-MCNC: 1.4 MG/DL (ref 0.8–1.3)
GFR SERPLBLD CREATININE-BSD FMLA CKD-EPI: 53 ML/MIN/{1.73_M2}
GLUCOSE SERPL-MCNC: 79 MG/DL (ref 70–99)
HDLC SERPL-MCNC: 79 MG/DL (ref 40–60)
LDLC SERPL CALC-MCNC: 75 MG/DL
MAGNESIUM SERPL-MCNC: 2.06 MG/DL (ref 1.8–2.4)
POTASSIUM SERPL-SCNC: 4 MMOL/L (ref 3.5–5.1)
PROT SERPL-MCNC: 6 G/DL (ref 6.4–8.2)
PSA SERPL DL<=0.01 NG/ML-MCNC: 1.29 NG/ML (ref 0–4)
SODIUM SERPL-SCNC: 143 MMOL/L (ref 136–145)
TRIGL SERPL-MCNC: 37 MG/DL (ref 0–150)
TSH SERPL DL<=0.005 MIU/L-ACNC: 1.47 UIU/ML (ref 0.27–4.2)
VLDLC SERPL CALC-MCNC: 7 MG/DL

## 2024-08-16 LAB
SHBG SERPL-SCNC: 65 NMOL/L (ref 19–76)
TESTOST FREE SERPL-MCNC: 117.9 PG/ML (ref 47–244)
TESTOST SERPL-MCNC: 820 NG/DL (ref 193–740)

## 2024-08-19 ENCOUNTER — TELEPHONE (OUTPATIENT)
Dept: INTERNAL MEDICINE CLINIC | Age: 74
End: 2024-08-19

## 2024-08-28 PROCEDURE — 93297 REM INTERROG DEV EVAL ICPMS: CPT | Performed by: NURSE PRACTITIONER

## 2024-09-10 ENCOUNTER — OFFICE VISIT (OUTPATIENT)
Dept: INTERNAL MEDICINE CLINIC | Age: 74
End: 2024-09-10
Payer: MEDICARE

## 2024-09-10 VITALS
HEART RATE: 75 BPM | WEIGHT: 242 LBS | DIASTOLIC BLOOD PRESSURE: 66 MMHG | BODY MASS INDEX: 29.46 KG/M2 | OXYGEN SATURATION: 96 % | SYSTOLIC BLOOD PRESSURE: 113 MMHG

## 2024-09-10 DIAGNOSIS — Z00.00 MEDICARE ANNUAL WELLNESS VISIT, SUBSEQUENT: Primary | ICD-10-CM

## 2024-09-10 DIAGNOSIS — E11.8 TYPE 2 DIABETES MELLITUS WITH COMPLICATION, WITH LONG-TERM CURRENT USE OF INSULIN (HCC): ICD-10-CM

## 2024-09-10 DIAGNOSIS — Z79.4 TYPE 2 DIABETES MELLITUS WITH COMPLICATION, WITH LONG-TERM CURRENT USE OF INSULIN (HCC): ICD-10-CM

## 2024-09-10 DIAGNOSIS — I10 PRIMARY HYPERTENSION: ICD-10-CM

## 2024-09-10 DIAGNOSIS — Z23 FLU VACCINE NEED: ICD-10-CM

## 2024-09-10 DIAGNOSIS — I50.20 HFREF (HEART FAILURE WITH REDUCED EJECTION FRACTION) (HCC): ICD-10-CM

## 2024-09-10 DIAGNOSIS — E55.9 VITAMIN D DEFICIENCY: ICD-10-CM

## 2024-09-10 LAB
CHP ED QC CHECK: NORMAL
GLUCOSE BLD-MCNC: 101 MG/DL
HBA1C MFR BLD: 5.2 %

## 2024-09-10 PROCEDURE — 90653 IIV ADJUVANT VACCINE IM: CPT | Performed by: INTERNAL MEDICINE

## 2024-09-10 PROCEDURE — 3044F HG A1C LEVEL LT 7.0%: CPT | Performed by: INTERNAL MEDICINE

## 2024-09-10 PROCEDURE — 82962 GLUCOSE BLOOD TEST: CPT | Performed by: INTERNAL MEDICINE

## 2024-09-10 PROCEDURE — 3078F DIAST BP <80 MM HG: CPT | Performed by: INTERNAL MEDICINE

## 2024-09-10 PROCEDURE — G0439 PPPS, SUBSEQ VISIT: HCPCS | Performed by: INTERNAL MEDICINE

## 2024-09-10 PROCEDURE — 3074F SYST BP LT 130 MM HG: CPT | Performed by: INTERNAL MEDICINE

## 2024-09-10 PROCEDURE — 1123F ACP DISCUSS/DSCN MKR DOCD: CPT | Performed by: INTERNAL MEDICINE

## 2024-09-10 PROCEDURE — 83036 HEMOGLOBIN GLYCOSYLATED A1C: CPT | Performed by: INTERNAL MEDICINE

## 2024-09-10 PROCEDURE — G0008 ADMIN INFLUENZA VIRUS VAC: HCPCS | Performed by: INTERNAL MEDICINE

## 2024-09-10 PROCEDURE — 99497 ADVNCD CARE PLAN 30 MIN: CPT | Performed by: INTERNAL MEDICINE

## 2024-09-10 ASSESSMENT — PATIENT HEALTH QUESTIONNAIRE - PHQ9
SUM OF ALL RESPONSES TO PHQ9 QUESTIONS 1 & 2: 0
2. FEELING DOWN, DEPRESSED OR HOPELESS: NOT AT ALL
1. LITTLE INTEREST OR PLEASURE IN DOING THINGS: NOT AT ALL
SUM OF ALL RESPONSES TO PHQ QUESTIONS 1-9: 0

## 2024-09-10 ASSESSMENT — LIFESTYLE VARIABLES: HOW OFTEN DO YOU HAVE A DRINK CONTAINING ALCOHOL: NEVER

## 2024-09-11 DIAGNOSIS — I50.20 HFREF (HEART FAILURE WITH REDUCED EJECTION FRACTION) (HCC): ICD-10-CM

## 2024-09-11 DIAGNOSIS — I50.22 CHRONIC SYSTOLIC HF (HEART FAILURE) (HCC): ICD-10-CM

## 2024-09-11 DIAGNOSIS — E55.9 VITAMIN D DEFICIENCY: ICD-10-CM

## 2024-09-11 DIAGNOSIS — I10 PRIMARY HYPERTENSION: ICD-10-CM

## 2024-09-11 DIAGNOSIS — Z95.810 CARDIAC RESYNCHRONIZATION THERAPY DEFIBRILLATOR (CRT-D) IN PLACE: ICD-10-CM

## 2024-09-11 LAB
25(OH)D3 SERPL-MCNC: 25.6 NG/ML
ANION GAP SERPL CALCULATED.3IONS-SCNC: 10 MMOL/L (ref 3–16)
BUN SERPL-MCNC: 18 MG/DL (ref 7–20)
CALCIUM SERPL-MCNC: 8.7 MG/DL (ref 8.3–10.6)
CHLORIDE SERPL-SCNC: 109 MMOL/L (ref 99–110)
CO2 SERPL-SCNC: 21 MMOL/L (ref 21–32)
CREAT SERPL-MCNC: 1.1 MG/DL (ref 0.8–1.3)
GFR SERPLBLD CREATININE-BSD FMLA CKD-EPI: 71 ML/MIN/{1.73_M2}
GLUCOSE SERPL-MCNC: 68 MG/DL (ref 70–99)
NT-PROBNP SERPL-MCNC: 295 PG/ML (ref 0–124)
POTASSIUM SERPL-SCNC: 4 MMOL/L (ref 3.5–5.1)
SODIUM SERPL-SCNC: 140 MMOL/L (ref 136–145)

## 2024-09-16 ENCOUNTER — TELEPHONE (OUTPATIENT)
Dept: INTERNAL MEDICINE CLINIC | Age: 74
End: 2024-09-16

## 2024-09-16 NOTE — TELEPHONE ENCOUNTER
Patient called to give doctor a message that he completed his test and will be waiting on his call like they discussed.

## 2024-09-24 ENCOUNTER — TELEPHONE (OUTPATIENT)
Dept: CARDIOLOGY CLINIC | Age: 74
End: 2024-09-24

## 2024-09-30 RX ORDER — AMIODARONE HYDROCHLORIDE 100 MG/1
100 TABLET ORAL DAILY
Qty: 90 TABLET | OUTPATIENT
Start: 2024-09-30

## 2024-10-01 NOTE — TELEPHONE ENCOUNTER
Medication:   Requested Prescriptions     Pending Prescriptions Disp Refills    amiodarone (PACERONE) 100 MG tablet 30 tablet 2     Sig: Take 1 tablet by mouth daily        Last Filled:      Patient Phone Number: [phone] (home)     Last appt: 9/10/2024   Next appt: 12/11/2024    Last OARRS:        No data to display

## 2024-10-01 NOTE — TELEPHONE ENCOUNTER
Patient called to speak with doctor about lab results. Patient would also like a medication refill on Amiodarone. Patient would like the medication sent to [facility].

## 2024-10-02 ENCOUNTER — TELEPHONE (OUTPATIENT)
Dept: CARDIOLOGY CLINIC | Age: 74
End: 2024-10-02

## 2024-10-02 ENCOUNTER — TELEPHONE (OUTPATIENT)
Dept: INTERNAL MEDICINE CLINIC | Age: 74
End: 2024-10-02

## 2024-10-02 PROCEDURE — 93297 REM INTERROG DEV EVAL ICPMS: CPT | Performed by: NURSE PRACTITIONER

## 2024-10-02 RX ORDER — AMIODARONE HYDROCHLORIDE 100 MG/1
100 TABLET ORAL DAILY
Qty: 30 TABLET | Refills: 2 | OUTPATIENT
Start: 2024-10-02

## 2024-10-02 NOTE — TELEPHONE ENCOUNTER
Spoke with patient regarding another issue. Patient states he needs a yearly follow up with the EP doctor.   Please phone patient and schedule for a 30 minute appointment with Dr. Pemberton if patient is agreeable with date. If only 15 minute appointments are available, per Radha it is ok to use a 15 minute.  Dr. Pemberton would prefer the 30 though.  Seen last by ITZ Flowers 8/2023

## 2024-10-03 RX ORDER — AMIODARONE HYDROCHLORIDE 100 MG/1
100 TABLET ORAL DAILY
Qty: 90 TABLET | Refills: 0 | Status: SHIPPED | OUTPATIENT
Start: 2024-10-03

## 2024-10-03 NOTE — TELEPHONE ENCOUNTER
Last OV: 08/22/2023 (Mirtha Flowers, CNP)   Next OV: 11/07/2024  Most recent Labs: 08/14/2024 tsh and cmp   Last EKG (if needed): 03/21/2024

## 2024-10-03 NOTE — TELEPHONE ENCOUNTER
Medication Refill    When was your last appointment with cardiology?    (If 1 yr or longer, please schedule appointment)    (If patient has been told they do not need to follow-up - medications should be filled by PCP)  8/23    When did you last have labs drawn?     Medication needing refilled?  amiodarone (PACERONE)     Dosage of the medication?  100 MG tablet     How are you taking this medication (QD, BID, TID, QID, PRN)?  Take 1 tablet by mouth daily     Do you want a 30 or 90 day supply?  30    When will you run out of your medication?     Which Pharmacy are we sending this medication to?  [facility] #[id number] - [place], [place] - [address]      Pharmacy Phone:[phone]   Pharmacy Fax:[phone]

## 2024-10-08 PROCEDURE — 93296 REM INTERROG EVL PM/IDS: CPT | Performed by: INTERNAL MEDICINE

## 2024-10-08 PROCEDURE — 93295 DEV INTERROG REMOTE 1/2/MLT: CPT | Performed by: INTERNAL MEDICINE

## 2024-11-01 ENCOUNTER — TELEPHONE (OUTPATIENT)
Dept: INTERNAL MEDICINE CLINIC | Age: 74
End: 2024-11-01

## 2024-11-01 NOTE — TELEPHONE ENCOUNTER
[facility] did a re-certification on patient today. They are requesting a new verbal consent for a skilled nurse for 9 weeks.  Nurse would also like a callback to confirm and discuss Furosamide dosage patient should be taking. The number she provided is [phone].

## 2024-11-06 ENCOUNTER — TELEPHONE (OUTPATIENT)
Dept: CARDIOLOGY CLINIC | Age: 74
End: 2024-11-06

## 2024-11-06 ENCOUNTER — TELEPHONE (OUTPATIENT)
Dept: INTERNAL MEDICINE CLINIC | Age: 74
End: 2024-11-06

## 2024-11-06 NOTE — PROGRESS NOTES
confirm that the note above accurately reflects all work, treatment, procedures, and medical decision making performed by me.    Curtis Pemberton MD  [facility]   Office: [phone]  Fax: [phone]

## 2024-11-06 NOTE — TELEPHONE ENCOUNTER
Second attempt.  [employer] did a re-certification on patient today. They are requesting a new verbal consent for a skilled nurse for 9 weeks.  Nurse would also like a callback to confirm and discuss Furosamide dosage patient should be taking. The number she provided is [phone].

## 2024-11-06 NOTE — TELEPHONE ENCOUNTER
[facility] alert transmission received d/t RRT reached 11.06.24. Report sent to [facility] for review via Murj. Pt currently has upcoming appt 11.07.24/device & CMV.

## 2024-11-07 ENCOUNTER — TELEPHONE (OUTPATIENT)
Dept: CARDIOLOGY CLINIC | Age: 74
End: 2024-11-07

## 2024-11-07 ENCOUNTER — OFFICE VISIT (OUTPATIENT)
Dept: CARDIOLOGY CLINIC | Age: 74
End: 2024-11-07

## 2024-11-07 ENCOUNTER — NURSE ONLY (OUTPATIENT)
Dept: CARDIOLOGY CLINIC | Age: 74
End: 2024-11-07

## 2024-11-07 VITALS
HEART RATE: 84 BPM | SYSTOLIC BLOOD PRESSURE: 122 MMHG | BODY MASS INDEX: 30.24 KG/M2 | DIASTOLIC BLOOD PRESSURE: 60 MMHG | WEIGHT: 248.4 LBS | OXYGEN SATURATION: 99 %

## 2024-11-07 DIAGNOSIS — I48.0 PAROXYSMAL ATRIAL FIBRILLATION (HCC): Primary | ICD-10-CM

## 2024-11-07 DIAGNOSIS — I48.92 ATRIAL FIBRILLATION AND FLUTTER (HCC): ICD-10-CM

## 2024-11-07 DIAGNOSIS — I50.22 CHRONIC SYSTOLIC HEART FAILURE (HCC): ICD-10-CM

## 2024-11-07 DIAGNOSIS — I42.0 DILATED CARDIOMYOPATHY (HCC): ICD-10-CM

## 2024-11-07 DIAGNOSIS — Z79.01 CURRENT USE OF ANTICOAGULANT THERAPY: ICD-10-CM

## 2024-11-07 DIAGNOSIS — I42.8 NICM (NONISCHEMIC CARDIOMYOPATHY) (HCC): ICD-10-CM

## 2024-11-07 DIAGNOSIS — I48.0 PAROXYSMAL ATRIAL FIBRILLATION (HCC): ICD-10-CM

## 2024-11-07 DIAGNOSIS — I42.9 CARDIOMYOPATHY, UNSPECIFIED TYPE (HCC): ICD-10-CM

## 2024-11-07 DIAGNOSIS — I48.91 ATRIAL FIBRILLATION AND FLUTTER (HCC): ICD-10-CM

## 2024-11-07 DIAGNOSIS — Z95.810 CARDIAC RESYNCHRONIZATION THERAPY DEFIBRILLATOR (CRT-D) IN PLACE: Primary | ICD-10-CM

## 2024-11-07 RX ORDER — FUROSEMIDE 80 MG/1
40 TABLET ORAL DAILY
Qty: 90 TABLET | Refills: 0 | Status: SHIPPED | OUTPATIENT
Start: 2024-11-07

## 2024-11-07 RX ORDER — AMIODARONE HYDROCHLORIDE 200 MG/1
200 TABLET ORAL DAILY
Qty: 90 TABLET | Refills: 0 | Status: SHIPPED | OUTPATIENT
Start: 2024-11-07

## 2024-11-07 NOTE — TELEPHONE ENCOUNTER
Received a call from Shaunna with [employer], needing clarification on patient's Lasix medication. Per patient's meds list, Lasix has been discontinued repeatedly.     Patient reported taking during 9/24 tele encounter.    Relayed pt has an appt today, where it can be clarified.    Please advise.    Shaunna's callback: [phone], can LVM if no response

## 2024-11-07 NOTE — PATIENT INSTRUCTIONS
Hold Lisinopril if top number on blood pressure is less than 100 and call office.     ---------------------------------------------------------------------------------------------------------------------       If you have any question regarding your procedure please contact Dr. Pemberton's Nurse Radha at [phone].    BIV ICD Generator Change  Pre procedure Instructions    Date:______________________________    Arrive at:__________________________    Procedure time:____________________    The morning of your procedure you will park in the hospital parking lot and report directly to the cath lab to check in.   At the information desk stay right and go all the way to the end of the lima, this will take you directly to your check in desk for the cath lab.    Pre-Procedure Instructions   You will need to fast (nothing to eat or drink) after midnight the day of your procedure  Do NOT chew gum or eat mints the day of your procedure  You may hold your Furosemide the morning of the procedure for comfort to decrease urinary urgency.  Hold your lisinopril the morning the the procedure.   You will need to hold all diabetic medications including, Farxiga the morning of the procedure.  If you take Lantus/Levemir only take ½ your normal dose the evening before.  Do not use any lotions, creams or perfume the morning of procedure.   You will need to complete pre-procedure lab work 5-7 days prior to your procedure.  Please have a responsible adult to drive you home after procedure, you should go home same day, but there is always a possibility of an overnight stay.  Cath lab will provide you with all post procedure instructions                                          [facility]     [facility]  [address]  [address]   [place] [address]  Phone: [phone]  The hours are Mon -Fri.  6:30 am - 5:00 pm   Saturday 8:00 am - noon  No appointment necessary

## 2024-11-07 NOTE — TELEPHONE ENCOUNTER
Called Shaunna at [facility] no answer left message advising that we sent in refill for Furosemide  40 mg daily with additional 40 mg daily as needed for weight gain, increase swelling and SOB.  I advised we also schedule him to see Glenn Cyr MD for management of HF on 11/26 and to call back with any questions or concerns.

## 2024-11-07 NOTE — TELEPHONE ENCOUNTER
Patient follows with Dr. Gómez for his CHF, last seen 04/2023.   He is scheduled to see Dr. Pemberton in office today. Will discuss at that time.

## 2024-11-07 NOTE — TELEPHONE ENCOUNTER
Please reach out to patient and schedule BIV ICD generator change with Dr. Pemberton at [facility].     SEE EP CASE REQUEST    BIV ICD Generator Change  Pre procedure Instructions    Date:______________________________    Arrive at:__________________________    Procedure time:____________________    The morning of your procedure you will park in the hospital parking lot and report directly to the cath lab to check in.   At the information desk stay right and go all the way to the end of the lima, this will take you directly to your check in desk for the cath lab.    Pre-Procedure Instructions   You will need to fast (nothing to eat or drink) after midnight the day of your procedure  Do NOT chew gum or eat mints the day of your procedure  You may hold your Furosemide the morning of the procedure for comfort to decrease urinary urgency.  Hold your lisinopril the morning the the procedure.   You will need to hold all diabetic medications including, Farxiga the morning of the procedure.  If you take Lantus/Levemir only take ½ your normal dose the evening before.  Do not use any lotions, creams or perfume the morning of procedure.   You will need to complete pre-procedure lab work 5-7 days prior to your procedure.  Please have a responsible adult to drive you home after procedure, you should go home same day, but there is always a possibility of an overnight stay.  Cath lab will provide you with all post procedure instructions                                          [facility]     [facility]  [address]  [address]   [place] [address]  Phone: [phone]  The hours are Mon -Fri.  6:30 am - 5:00 pm   Saturday 8:00 am - noon  No appointment necessary

## 2024-11-21 NOTE — TELEPHONE ENCOUNTER
Called and lmom for the patient to call back to get scheduled for his procedure. Will try again later.

## 2024-11-25 NOTE — PROGRESS NOTES
mild tricuspid regurgitation.  Aortic valve sclerosis, without stenosis    NM Stress Test 3/6/23  No evidence of reversible ischemia.  Tracer uptake is somewhat patchy and gating was not performed. There is a small fixed defect at the apex but no reversible defects noted. Unable to assess wall motion or EF as study not gated.    Peripheral Angiogram 3/25/24  Successful revascularization of distal right superficial femoral artery popliteal artery.  Revascularization right tibioperoneal trunk and right posterior tibial artery with good flow.    Assessment/Plan:     1) Chronic systolic heart failure/nonischemic cardiomyopathy. NYHA class II-III. EF <20%, improved to 50-55% (12/2022). S/p BiV ICD (2017). Last device interrogation 11/7/24 and patient seen in office with Dr. Pemberton. Pt appears euvolemic today, but reports chronic dyspnea. BNP lower than prior (9/11/24). Optivol WNL (11/7/24). Aldactone and Coreg previously discontinued secondary to hypotension. Advised to start taking Toprol XL 25 mg daily. Continue with medical therapy including lisinopril 2.5 mg daily, Farxiga 5 mg daily, and Lasix 40 mg daily. Kidney function stable (9/11/24). Will repeat an echocardiogram. If EF further reduced, will attempt to titrate current CHF therapies versus restartinf Aldactone 12.5 mg daily. Encouraged a low sodium diet and daily weights. Advised to call with any new/worsening symptoms.     2) Paroxysmal atrial fibrillation. Reports vague symptoms of fatigue/dyspnea.  No ECG today but pulse feels irregular.  CHADS-Vasc is at least 5 (CHF,HTN,DM,PAD,Age). Treatment options for atrial fibrillation discussed including rate control, anticoagulation options, antiarrhythmics, cardioversion and ablation. Currently in process of scheduling generator change with Dr. Pemberton. Continue Eliquis 5 mg BID and amiodarone 200 mg daily. TSH normal (8/14/24). Management per Dr. Pemberton.     3) Essential hypertension. Controlled. Goal BP

## 2024-11-25 NOTE — TELEPHONE ENCOUNTER
Chris calling back to schedule procedure.     He does have an appt tmrw at 11am - will be unavailable at that time.    Please call: [phone]

## 2024-11-26 ENCOUNTER — OFFICE VISIT (OUTPATIENT)
Dept: CARDIOLOGY CLINIC | Age: 74
End: 2024-11-26
Payer: MEDICARE

## 2024-11-26 ENCOUNTER — TELEPHONE (OUTPATIENT)
Dept: CARDIOLOGY CLINIC | Age: 74
End: 2024-11-26

## 2024-11-26 VITALS
HEIGHT: 76 IN | DIASTOLIC BLOOD PRESSURE: 58 MMHG | HEART RATE: 88 BPM | WEIGHT: 252 LBS | SYSTOLIC BLOOD PRESSURE: 102 MMHG | BODY MASS INDEX: 30.69 KG/M2 | OXYGEN SATURATION: 100 %

## 2024-11-26 DIAGNOSIS — I50.22 CHRONIC SYSTOLIC HEART FAILURE (HCC): Primary | ICD-10-CM

## 2024-11-26 DIAGNOSIS — I42.8 NONISCHEMIC CARDIOMYOPATHY (HCC): ICD-10-CM

## 2024-11-26 DIAGNOSIS — I10 ESSENTIAL HYPERTENSION: ICD-10-CM

## 2024-11-26 DIAGNOSIS — I48.0 PAF (PAROXYSMAL ATRIAL FIBRILLATION) (HCC): ICD-10-CM

## 2024-11-26 PROCEDURE — 3074F SYST BP LT 130 MM HG: CPT | Performed by: INTERNAL MEDICINE

## 2024-11-26 PROCEDURE — 99214 OFFICE O/P EST MOD 30 MIN: CPT | Performed by: INTERNAL MEDICINE

## 2024-11-26 PROCEDURE — 1159F MED LIST DOCD IN RCRD: CPT | Performed by: INTERNAL MEDICINE

## 2024-11-26 PROCEDURE — 3078F DIAST BP <80 MM HG: CPT | Performed by: INTERNAL MEDICINE

## 2024-11-26 PROCEDURE — 1123F ACP DISCUSS/DSCN MKR DOCD: CPT | Performed by: INTERNAL MEDICINE

## 2024-11-26 RX ORDER — METOPROLOL SUCCINATE 25 MG/1
25 TABLET, EXTENDED RELEASE ORAL DAILY
Qty: 30 TABLET | Refills: 3 | Status: SHIPPED | OUTPATIENT
Start: 2024-11-26

## 2024-11-26 NOTE — TELEPHONE ENCOUNTER
Ar (spouse) forgot to ask in clinic about Marvin taking 5 HR Energy drinks. He drinks about 4-5/week (57ML Extra strength). She would like to know if that is OK for Chris to drink due to lack of energy or what other recommendations Klarissa has?    Please advise    Ar's callback: [phone]

## 2024-11-26 NOTE — TELEPHONE ENCOUNTER
Chris was seen in office today with Dr. Cyr. Please call his primary number and leave a VM with your call back number if he does not answer. His wife requested that you call her, if you are unable to reach Chris. Her number is provided in Epic. Thanks!

## 2024-11-26 NOTE — TELEPHONE ENCOUNTER
Date Of Procedure:  12/23/24    Time Of Arrival:  6:30am     Procedure Time: 7:30am        Called and spoke to patient and he/she is agreeable to the date and time. Reviewed the Pre-Procedure instructions and they verbalized understanding. Encouraged to call with any questions or concerns.       Published on [employer] / emailed to Cath lab / note in chart

## 2024-11-26 NOTE — TELEPHONE ENCOUNTER
Would advise to use moderation with caffeine intake. Patient should use his symptoms as a guide as well. Patient was seen and evaluated in office today by Dr. Cyr and we discussed fatigue. Recommendations were made from his standpoint. I also asked Dr. Pemberton, there is not objective data on caffeine intake with afib. According to the [facility], about 400 milligrams of caffeine is generally safe for most adults. Patient should also follow up with his PCP. Thanks.

## 2024-12-08 LAB
BACTERIA SPEC AEROBE CULT: ABNORMAL
GRAM STN SPEC: ABNORMAL
ORGANISM: ABNORMAL

## 2024-12-11 ENCOUNTER — OFFICE VISIT (OUTPATIENT)
Dept: INTERNAL MEDICINE CLINIC | Age: 74
End: 2024-12-11
Payer: MEDICARE

## 2024-12-11 VITALS
DIASTOLIC BLOOD PRESSURE: 73 MMHG | WEIGHT: 250 LBS | HEART RATE: 75 BPM | BODY MASS INDEX: 30.43 KG/M2 | OXYGEN SATURATION: 96 % | SYSTOLIC BLOOD PRESSURE: 126 MMHG

## 2024-12-11 DIAGNOSIS — Z79.4 TYPE 2 DIABETES MELLITUS WITH COMPLICATION, WITH LONG-TERM CURRENT USE OF INSULIN (HCC): Primary | ICD-10-CM

## 2024-12-11 DIAGNOSIS — D64.9 CHRONIC ANEMIA: ICD-10-CM

## 2024-12-11 DIAGNOSIS — N18.2 STAGE 2 CHRONIC KIDNEY DISEASE: ICD-10-CM

## 2024-12-11 DIAGNOSIS — E55.9 VITAMIN D DEFICIENCY: ICD-10-CM

## 2024-12-11 DIAGNOSIS — Z89.512 HX OF LEFT BKA (HCC): ICD-10-CM

## 2024-12-11 DIAGNOSIS — E11.8 DIABETES MELLITUS TYPE 2 WITH COMPLICATIONS (HCC): ICD-10-CM

## 2024-12-11 DIAGNOSIS — E03.9 ACQUIRED HYPOTHYROIDISM: ICD-10-CM

## 2024-12-11 DIAGNOSIS — E11.8 TYPE 2 DIABETES MELLITUS WITH COMPLICATION, WITH LONG-TERM CURRENT USE OF INSULIN (HCC): Primary | ICD-10-CM

## 2024-12-11 DIAGNOSIS — E78.2 MIXED HYPERLIPIDEMIA: ICD-10-CM

## 2024-12-11 LAB
CHP ED QC CHECK: NORMAL
GLUCOSE BLD-MCNC: 165 MG/DL
HBA1C MFR BLD: 5.7 %

## 2024-12-11 PROCEDURE — 3078F DIAST BP <80 MM HG: CPT | Performed by: INTERNAL MEDICINE

## 2024-12-11 PROCEDURE — 83036 HEMOGLOBIN GLYCOSYLATED A1C: CPT | Performed by: INTERNAL MEDICINE

## 2024-12-11 PROCEDURE — 99214 OFFICE O/P EST MOD 30 MIN: CPT | Performed by: INTERNAL MEDICINE

## 2024-12-11 PROCEDURE — 1123F ACP DISCUSS/DSCN MKR DOCD: CPT | Performed by: INTERNAL MEDICINE

## 2024-12-11 PROCEDURE — G2211 COMPLEX E/M VISIT ADD ON: HCPCS | Performed by: INTERNAL MEDICINE

## 2024-12-11 PROCEDURE — 3074F SYST BP LT 130 MM HG: CPT | Performed by: INTERNAL MEDICINE

## 2024-12-11 PROCEDURE — 3044F HG A1C LEVEL LT 7.0%: CPT | Performed by: INTERNAL MEDICINE

## 2024-12-11 PROCEDURE — 82962 GLUCOSE BLOOD TEST: CPT | Performed by: INTERNAL MEDICINE

## 2024-12-11 RX ORDER — DAPAGLIFLOZIN 5 MG/1
5 TABLET, FILM COATED ORAL EVERY MORNING
Qty: 90 TABLET | Refills: 3 | Status: SHIPPED | OUTPATIENT
Start: 2024-12-11

## 2024-12-11 NOTE — PROGRESS NOTES
history, surgical history, family history, medications,and allergies  were all reviewed and updated as appropriate today.      Review of Systems      Physical Exam    Vitals:    12/11/24 1200   BP: 126/73   Pulse: 75   SpO2: 96%       Assessment:  Encounter Diagnosis   Name Primary?    Type 2 diabetes mellitus with complication, with long-term current use of insulin (HCC) Yes       Plan:  1. Type 2 diabetes mellitus with complication, with long-term current use of insulin (HCC)  ***  - POCT Glucose  - POCT glycosylated hemoglobin (Hb A1C)

## 2024-12-11 NOTE — TELEPHONE ENCOUNTER
Called.
Would like patient to call him when he gets a chance please advise.
The patient has been re-examined and I agree with the above assessment or I updated with my findings.

## 2024-12-17 ENCOUNTER — TELEPHONE (OUTPATIENT)
Dept: INTERNAL MEDICINE CLINIC | Age: 74
End: 2024-12-17

## 2024-12-17 NOTE — TELEPHONE ENCOUNTER
Nurse from [facility] called to add a nurse visit for next week. Will fax papers over.

## 2024-12-18 DIAGNOSIS — I48.0 PAROXYSMAL ATRIAL FIBRILLATION (HCC): ICD-10-CM

## 2024-12-18 DIAGNOSIS — A49.8 ENTEROCOCCUS FAECALIS INFECTION: ICD-10-CM

## 2024-12-18 DIAGNOSIS — Z22.322 MRSA (METHICILLIN RESISTANT STAPH AUREUS) CULTURE POSITIVE: ICD-10-CM

## 2024-12-18 DIAGNOSIS — E55.9 VITAMIN D DEFICIENCY: ICD-10-CM

## 2024-12-18 DIAGNOSIS — D64.9 CHRONIC ANEMIA: ICD-10-CM

## 2024-12-18 DIAGNOSIS — E11.8 DIABETES MELLITUS TYPE 2 WITH COMPLICATIONS (HCC): ICD-10-CM

## 2024-12-18 LAB
25(OH)D3 SERPL-MCNC: 36.8 NG/ML
ANION GAP SERPL CALCULATED.3IONS-SCNC: 10 MMOL/L (ref 3–16)
BUN SERPL-MCNC: 15 MG/DL (ref 7–20)
CALCIUM SERPL-MCNC: 8.9 MG/DL (ref 8.3–10.6)
CHLORIDE SERPL-SCNC: 106 MMOL/L (ref 99–110)
CO2 SERPL-SCNC: 26 MMOL/L (ref 21–32)
CREAT SERPL-MCNC: 1.2 MG/DL (ref 0.8–1.3)
CRP SERPL-MCNC: 13.4 MG/L (ref 0–5.1)
DEPRECATED RDW RBC AUTO: 15 % (ref 12.4–15.4)
ERYTHROCYTE [SEDIMENTATION RATE] IN BLOOD BY WESTERGREN METHOD: 59 MM/HR (ref 0–20)
FERRITIN SERPL IA-MCNC: 34.7 NG/ML (ref 30–400)
GFR SERPLBLD CREATININE-BSD FMLA CKD-EPI: 64 ML/MIN/{1.73_M2}
GLUCOSE SERPL-MCNC: 76 MG/DL (ref 70–99)
HCT VFR BLD AUTO: 34.8 % (ref 40.5–52.5)
HGB BLD-MCNC: 11 G/DL (ref 13.5–17.5)
IRON SATN MFR SERPL: 14 % (ref 20–50)
IRON SERPL-MCNC: 36 UG/DL (ref 59–158)
MCH RBC QN AUTO: 27.2 PG (ref 26–34)
MCHC RBC AUTO-ENTMCNC: 31.6 G/DL (ref 31–36)
MCV RBC AUTO: 86.2 FL (ref 80–100)
PLATELET # BLD AUTO: 183 K/UL (ref 135–450)
PMV BLD AUTO: 9.8 FL (ref 5–10.5)
POTASSIUM SERPL-SCNC: 4.5 MMOL/L (ref 3.5–5.1)
RBC # BLD AUTO: 4.04 M/UL (ref 4.2–5.9)
SODIUM SERPL-SCNC: 142 MMOL/L (ref 136–145)
TIBC SERPL-MCNC: 254 UG/DL (ref 260–445)
WBC # BLD AUTO: 4.4 K/UL (ref 4–11)

## 2024-12-20 LAB — FRUCTOSAMINE SERPL-SCNC: 308 UMOL/L (ref 205–285)

## 2024-12-23 ENCOUNTER — TELEPHONE (OUTPATIENT)
Dept: CARDIOLOGY CLINIC | Age: 74
End: 2024-12-23

## 2024-12-23 ENCOUNTER — HOSPITAL ENCOUNTER (OUTPATIENT)
Age: 74
Setting detail: OUTPATIENT SURGERY
Discharge: HOME | End: 2024-12-23
Attending: INTERNAL MEDICINE | Admitting: INTERNAL MEDICINE
Payer: MEDICARE

## 2024-12-23 VITALS
HEIGHT: 76 IN | TEMPERATURE: 97.3 F | OXYGEN SATURATION: 99 % | DIASTOLIC BLOOD PRESSURE: 67 MMHG | HEART RATE: 62 BPM | SYSTOLIC BLOOD PRESSURE: 116 MMHG | BODY MASS INDEX: 30.44 KG/M2 | WEIGHT: 250 LBS

## 2024-12-23 DIAGNOSIS — I50.22 CHRONIC SYSTOLIC HEART FAILURE (HCC): ICD-10-CM

## 2024-12-23 LAB — ECHO BSA: 2.47 M2

## 2024-12-23 PROCEDURE — 99153 MOD SED SAME PHYS/QHP EA: CPT | Performed by: INTERNAL MEDICINE

## 2024-12-23 PROCEDURE — C1889 IMPLANT/INSERT DEVICE, NOC: HCPCS | Performed by: INTERNAL MEDICINE

## 2024-12-23 PROCEDURE — 6360000002 HC RX W HCPCS: Performed by: INTERNAL MEDICINE

## 2024-12-23 PROCEDURE — C1882 AICD, OTHER THAN SING/DUAL: HCPCS | Performed by: INTERNAL MEDICINE

## 2024-12-23 PROCEDURE — 33231 INSRT PULSE GEN W/MULT LEADS: CPT | Performed by: INTERNAL MEDICINE

## 2024-12-23 PROCEDURE — 99152 MOD SED SAME PHYS/QHP 5/>YRS: CPT | Performed by: INTERNAL MEDICINE

## 2024-12-23 PROCEDURE — 7100000010 HC PHASE II RECOVERY - FIRST 15 MIN: Performed by: INTERNAL MEDICINE

## 2024-12-23 PROCEDURE — 2580000003 HC RX 258: Performed by: INTERNAL MEDICINE

## 2024-12-23 PROCEDURE — 7100000011 HC PHASE II RECOVERY - ADDTL 15 MIN: Performed by: INTERNAL MEDICINE

## 2024-12-23 DEVICE — DEFIBRILLATOR CRD 40 J 74X51X13 MM 35.5 CC 83 GM COBALT XT: Type: IMPLANTABLE DEVICE | Status: FUNCTIONAL

## 2024-12-23 DEVICE — ENVELOPE CMRM6133 ABSORB LRG MR
Type: IMPLANTABLE DEVICE | Status: FUNCTIONAL
Brand: TYRX™

## 2024-12-23 RX ORDER — MIDAZOLAM HYDROCHLORIDE 1 MG/ML
INJECTION, SOLUTION INTRAMUSCULAR; INTRAVENOUS PRN
Status: DISCONTINUED | OUTPATIENT
Start: 2024-12-23 | End: 2024-12-23 | Stop reason: HOSPADM

## 2024-12-23 RX ORDER — SODIUM CHLORIDE 9 MG/ML
INJECTION, SOLUTION INTRAVENOUS PRN
Status: DISCONTINUED | OUTPATIENT
Start: 2024-12-23 | End: 2024-12-23 | Stop reason: HOSPADM

## 2024-12-23 RX ORDER — FENTANYL CITRATE 50 UG/ML
INJECTION, SOLUTION INTRAMUSCULAR; INTRAVENOUS PRN
Status: DISCONTINUED | OUTPATIENT
Start: 2024-12-23 | End: 2024-12-23 | Stop reason: HOSPADM

## 2024-12-23 RX ORDER — SODIUM CHLORIDE 0.9 % (FLUSH) 0.9 %
5-40 SYRINGE (ML) INJECTION EVERY 12 HOURS SCHEDULED
Status: DISCONTINUED | OUTPATIENT
Start: 2024-12-23 | End: 2024-12-23 | Stop reason: HOSPADM

## 2024-12-23 RX ORDER — SODIUM CHLORIDE 0.9 % (FLUSH) 0.9 %
5-40 SYRINGE (ML) INJECTION PRN
Status: DISCONTINUED | OUTPATIENT
Start: 2024-12-23 | End: 2024-12-23 | Stop reason: HOSPADM

## 2024-12-23 RX ORDER — BUPIVACAINE HYDROCHLORIDE 5 MG/ML
INJECTION, SOLUTION EPIDURAL; INTRACAUDAL PRN
Status: DISCONTINUED | OUTPATIENT
Start: 2024-12-23 | End: 2024-12-23 | Stop reason: HOSPADM

## 2024-12-23 RX ORDER — CEFAZOLIN SODIUM 1 G/3ML
INJECTION, POWDER, FOR SOLUTION INTRAMUSCULAR; INTRAVENOUS PRN
Status: DISCONTINUED | OUTPATIENT
Start: 2024-12-23 | End: 2024-12-23 | Stop reason: HOSPADM

## 2024-12-23 RX ADMIN — SODIUM CHLORIDE: 9 INJECTION, SOLUTION INTRAVENOUS at 07:12

## 2024-12-23 NOTE — PROGRESS NOTES
Patient received post procedure in stable condition.   Pt is alert and oriented x4, No distress noted. VSS-see flowsheet.   Post-cath restrictions/instructions provided  Patient provided with snack/drink.   No further needs at this time, call light within reach.   Family at bedside  Chest dressing dry and intact    0930 dressing with 2x2 inch pink drainage. Dressing removed and quik clot placed with new outer dressing.    1000 site remain without drainage on dressing. Ready for dc home  Office to call for vascular appt.    1005 dc to home per wc to car with family

## 2024-12-23 NOTE — H&P
[facility]          Electrophysiology H&P Note       Date of Procedure: 12/23/2024  Patient's Name: Marvin Montero  YOB: 1950   Medical Record Number: [id number]    Indication:  Biventricular ICD, device at [facility]    Consent:   I have discussed with the patient and/or the patient representative the indication, alternatives, and the possible risks and/or complications of the planned procedure and the anesthesia methods. The patient and/or patient representative appear to understand and agree to proceed.    Past Medical History:   Diagnosis Date    Atrial fibrillation (HCC)     Back pain     Cardiomyopathy     CHF (congestive heart failure) (HCC)     COPD (chronic obstructive pulmonary disease) (HCC)     Dental disease     Dizziness     Dyslipidemia     GERD (gastroesophageal reflux disease)     Hearing loss     Hyperlipidemia     Hypertension     Hypothyroidism     Leg edema     MRSA (methicillin resistant staph aureus) culture positive 10/05/2015    foot/bone    MRSA nasal colonization 05/05/2017    Obesity     Prolonged emergence from general anesthesia     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     Sleep apnea     Stroke (HCC)     Thyroid disease     Tinnitus     Type 2 diabetes mellitus without complication (HCC)     Type II or unspecified type diabetes mellitus without mention of complication, not stated as uncontrolled        Past Surgical History:   Procedure Laterality Date    ACHILLES TENDON SURGERY Right 4/2/2024    ACHILLES TENDON LENGTHENING RIGHT FOOT performed by Frank Bills DPM at [facility] OR    ADRENAL GLAND SURGERY  1970    APPENDECTOMY      CARDIAC DEFIBRILLATOR PLACEMENT  09/05/2017    Dr. Janes Cyr [place]    COLONOSCOPY N/A 03/08/2019    COLONOSCOPY WITH MAC, UNASYN (3gm) performed by Alexi Pardo MD at [facility] ENDOSCOPY    COLONOSCOPY N/A 08/09/2019    COLONOSCOPY POLYPECTOMY SNARE/COLD BIOPSY performed by Alexi Pardo MD at [facility] ENDOSCOPY    COLONOSCOPY  08/09/2019

## 2024-12-23 NOTE — TELEPHONE ENCOUNTER
Patient here today for procedure with Dr. Pemberton. Per Dr. Pemberton he would like patient seen by vascular for his PAD.    Last procedure was done by Dr. Christy 3/25/24. Instructed patient [facility] will call to schedule office visit.

## 2024-12-23 NOTE — PROGRESS NOTES
Prepped and ready for procedure, call light in reach and instructed to call for any needs or concerns, family at bedside.

## 2024-12-23 NOTE — DISCHARGE INSTRUCTIONS
ICD/PACEMAKER GENERATOR REPLACEMENT DISCHARGE INSTRUCTIONS    No Driving for 24 hours.  We strongly recommend that a responsible adult stay with you for the next 24 hours.  Do not make important / legal decisions within 24 hours post procedure.  Leave dressing in place for one week.  If dressing gets wet, you should remove the gauze but leave the steri strips in place.  Do not get the incision wet for one week.  You may be sore, bruised and have a small amount of drainage around the incision site.    Please contact the office if you notice any signs of infection:  Redness / swelling at the incision site  Fever  Yellow drainage at the site  Pain        Electronically signed by Alexis Hutton RN on 12/23/2024 at 9:24 AM

## 2024-12-30 ASSESSMENT — ENCOUNTER SYMPTOMS
RESPIRATORY NEGATIVE: 1
GASTROINTESTINAL NEGATIVE: 1

## 2024-12-30 NOTE — PROGRESS NOTES
pcp stating the vitamin D level was to high.      Insulin Syringe-Needle U-100 (INSULIN SYRINGE .5CC/31GX5/16\") 31G X 5/16\" 0.5 ML MISC Patient tests bid 100 Syringe 5    therapeutic multivitamin-minerals (THERAGRAN-M) tablet Take 1 tablet by mouth daily       No current facility-administered medications for this visit.       Patient's past medical history, surgical history, family history, medications,and allergies  were all reviewed and updated as appropriate today.      Review of Systems   Constitutional:  Positive for fatigue.        Weight loss by design. Eating is good. Consumes, fruits, berries, salmon twice weekly, leafy greens, beans.    HENT: Negative.     Eyes:         To schedule diabetic eye exam.    Respiratory: Negative.     Cardiovascular:         Hypertension, treated with B blker, diuretic, ACEi.    Cardiomyopathy with H/O HFrEF. H/O ICD. Denies CP and does get SOB with extended exertion.    Gastrointestinal: Negative.    Endocrine:        Hyperlipidemia, treated with statin and ezetimibe. LDL 75.    Diabetes, type 2, treated with SGLT2i,  A1c 5.7.     Hypothyroidism, TSH 1.20. Treated with synthroid.    Genitourinary:         Stage 2 CKD. B/Cr 18/1.12. GFR 71..    .    Musculoskeletal:         H/O left BKA with prosthesis.    Right foot. All toes amputated. Latest right 5th toe. Healing without complication. Treated at [facility].     Finished PT at [facility] for gait training.  Walking 1 hour and 10 minutes daily.    Skin: Negative.    Neurological: Negative.    Hematological:         H/O chronic anemia.    Psychiatric/Behavioral: Negative.           Physical Exam  Constitutional:       General: He is not in acute distress.     Appearance: He is well-developed.   HENT:      Head: Normocephalic and atraumatic.      Right Ear: External ear normal.      Left Ear: External ear normal.      Nose: Nose normal.   Eyes:      General: No scleral icterus.     Conjunctiva/sclera: Conjunctivae normal.

## 2024-12-31 ENCOUNTER — TELEPHONE (OUTPATIENT)
Dept: INTERNAL MEDICINE CLINIC | Age: 74
End: 2024-12-31

## 2024-12-31 NOTE — TELEPHONE ENCOUNTER
Patient called returning Dr.Robinson call that he missed yesterday. Patient states he was instructed to give office a callback to discuss labs. Patient provided doctor with cell phone number that he can be reached at anytime. The number is [phone].

## 2024-12-31 NOTE — TELEPHONE ENCOUNTER
Ok to certify for continued nurse assistance in the home.  He has otc iron pills which are ok to take.

## 2024-12-31 NOTE — TELEPHONE ENCOUNTER
Nurse called to get verbal orders to get nursing re-certification for 8 weeks. Nurse would also like to clarify if patient should be taking iron pills per doctors order.

## 2025-01-13 ENCOUNTER — TELEPHONE (OUTPATIENT)
Dept: INTERNAL MEDICINE CLINIC | Age: 75
End: 2025-01-13

## 2025-01-14 ENCOUNTER — NURSE ONLY (OUTPATIENT)
Dept: CARDIOLOGY CLINIC | Age: 75
End: 2025-01-14

## 2025-01-14 ENCOUNTER — HOSPITAL ENCOUNTER (OUTPATIENT)
Dept: WOUND CARE | Age: 75
Discharge: HOME | End: 2025-01-14
Attending: EMERGENCY MEDICINE
Payer: MEDICARE

## 2025-01-14 VITALS — DIASTOLIC BLOOD PRESSURE: 66 MMHG | HEART RATE: 75 BPM | TEMPERATURE: 97.3 F | SYSTOLIC BLOOD PRESSURE: 141 MMHG

## 2025-01-14 DIAGNOSIS — E66.9 TYPE 2 DIABETES MELLITUS WITH OBESITY (HCC): ICD-10-CM

## 2025-01-14 DIAGNOSIS — E11.628 DIABETIC FOOT INFECTION (HCC): Primary | ICD-10-CM

## 2025-01-14 DIAGNOSIS — L97.413 DIABETIC ULCER OF RIGHT MIDFOOT ASSOCIATED WITH TYPE 2 DIABETES MELLITUS, WITH NECROSIS OF MUSCLE (HCC): ICD-10-CM

## 2025-01-14 DIAGNOSIS — I42.8 NICM (NONISCHEMIC CARDIOMYOPATHY) (HCC): ICD-10-CM

## 2025-01-14 DIAGNOSIS — I47.29 NSVT (NONSUSTAINED VENTRICULAR TACHYCARDIA) (HCC): ICD-10-CM

## 2025-01-14 DIAGNOSIS — I50.22 CHRONIC SYSTOLIC HEART FAILURE (HCC): ICD-10-CM

## 2025-01-14 DIAGNOSIS — Z95.810 CARDIAC RESYNCHRONIZATION THERAPY DEFIBRILLATOR (CRT-D) IN PLACE: Primary | ICD-10-CM

## 2025-01-14 DIAGNOSIS — I48.0 PAROXYSMAL ATRIAL FIBRILLATION (HCC): ICD-10-CM

## 2025-01-14 DIAGNOSIS — I42.8 NONISCHEMIC CARDIOMYOPATHY (HCC): ICD-10-CM

## 2025-01-14 DIAGNOSIS — I70.234 ATHEROSCLEROSIS OF NATIVE ARTERY OF RIGHT LOWER EXTREMITY WITH ULCERATION OF MIDFOOT (HCC): ICD-10-CM

## 2025-01-14 DIAGNOSIS — L08.9 TYPE 2 DIABETES MELLITUS WITH RIGHT DIABETIC FOOT INFECTION (HCC): ICD-10-CM

## 2025-01-14 DIAGNOSIS — Z74.09 IMPAIRED MOBILITY: ICD-10-CM

## 2025-01-14 DIAGNOSIS — E11.69 TYPE 2 DIABETES MELLITUS WITH OBESITY (HCC): ICD-10-CM

## 2025-01-14 DIAGNOSIS — I87.321 IDIOPATHIC CHRONIC VENOUS HYPERTENSION OF RIGHT LEG WITH INFLAMMATION: ICD-10-CM

## 2025-01-14 DIAGNOSIS — L08.9 DIABETIC FOOT INFECTION (HCC): Primary | ICD-10-CM

## 2025-01-14 DIAGNOSIS — E11.628 TYPE 2 DIABETES MELLITUS WITH RIGHT DIABETIC FOOT INFECTION (HCC): ICD-10-CM

## 2025-01-14 DIAGNOSIS — R60.0 LOCALIZED EDEMA: ICD-10-CM

## 2025-01-14 DIAGNOSIS — I42.0 DILATED CARDIOMYOPATHY (HCC): ICD-10-CM

## 2025-01-14 DIAGNOSIS — E11.621 DIABETIC ULCER OF RIGHT MIDFOOT ASSOCIATED WITH TYPE 2 DIABETES MELLITUS, WITH NECROSIS OF MUSCLE (HCC): ICD-10-CM

## 2025-01-14 DIAGNOSIS — R09.89 DECREASED PULSES IN FEET: ICD-10-CM

## 2025-01-14 DIAGNOSIS — I48.0 PAF (PAROXYSMAL ATRIAL FIBRILLATION) (HCC): ICD-10-CM

## 2025-01-14 PROCEDURE — 99213 OFFICE O/P EST LOW 20 MIN: CPT

## 2025-01-14 PROCEDURE — 11046 DBRDMT MUSC&/FSCA EA ADDL: CPT

## 2025-01-14 PROCEDURE — 11046 DBRDMT MUSC&/FSCA EA ADDL: CPT | Performed by: EMERGENCY MEDICINE

## 2025-01-14 PROCEDURE — 11043 DBRDMT MUSC&/FSCA 1ST 20/<: CPT

## 2025-01-14 PROCEDURE — 11045 DBRDMT SUBQ TISS EACH ADDL: CPT

## 2025-01-14 PROCEDURE — 11042 DBRDMT SUBQ TIS 1ST 20SQCM/<: CPT

## 2025-01-14 PROCEDURE — 99214 OFFICE O/P EST MOD 30 MIN: CPT | Performed by: EMERGENCY MEDICINE

## 2025-01-14 PROCEDURE — 11043 DBRDMT MUSC&/FSCA 1ST 20/<: CPT | Performed by: EMERGENCY MEDICINE

## 2025-01-14 RX ORDER — BETAMETHASONE DIPROPIONATE 0.5 MG/G
CREAM TOPICAL ONCE
OUTPATIENT
Start: 2025-01-14 | End: 2025-01-14

## 2025-01-14 RX ORDER — MUPIROCIN 20 MG/G
OINTMENT TOPICAL ONCE
OUTPATIENT
Start: 2025-01-14 | End: 2025-01-14

## 2025-01-14 RX ORDER — LIDOCAINE HYDROCHLORIDE 40 MG/ML
SOLUTION TOPICAL ONCE
Status: COMPLETED | OUTPATIENT
Start: 2025-01-14 | End: 2025-01-14

## 2025-01-14 RX ORDER — CLOBETASOL PROPIONATE 0.5 MG/G
OINTMENT TOPICAL ONCE
OUTPATIENT
Start: 2025-01-14 | End: 2025-01-14

## 2025-01-14 RX ORDER — LIDOCAINE HYDROCHLORIDE 20 MG/ML
JELLY TOPICAL ONCE
OUTPATIENT
Start: 2025-01-14 | End: 2025-01-14

## 2025-01-14 RX ORDER — SILVER SULFADIAZINE 10 MG/G
CREAM TOPICAL ONCE
OUTPATIENT
Start: 2025-01-14 | End: 2025-01-14

## 2025-01-14 RX ORDER — NEOMYCIN/BACITRACIN/POLYMYXINB 3.5-400-5K
OINTMENT (GRAM) TOPICAL ONCE
OUTPATIENT
Start: 2025-01-14 | End: 2025-01-14

## 2025-01-14 RX ORDER — SILVER SULFADIAZINE 10 MG/G
CREAM TOPICAL
Qty: 1000 G | Refills: 1 | Status: SHIPPED | OUTPATIENT
Start: 2025-01-14

## 2025-01-14 RX ORDER — SODIUM CHLOR/HYPOCHLOROUS ACID 0.033 %
SOLUTION, IRRIGATION IRRIGATION ONCE
OUTPATIENT
Start: 2025-01-14 | End: 2025-01-14

## 2025-01-14 RX ORDER — LIDOCAINE 50 MG/G
OINTMENT TOPICAL ONCE
OUTPATIENT
Start: 2025-01-14 | End: 2025-01-14

## 2025-01-14 RX ORDER — GINSENG 100 MG
CAPSULE ORAL ONCE
OUTPATIENT
Start: 2025-01-14 | End: 2025-01-14

## 2025-01-14 RX ORDER — LIDOCAINE 40 MG/G
CREAM TOPICAL ONCE
OUTPATIENT
Start: 2025-01-14 | End: 2025-01-14

## 2025-01-14 RX ORDER — BACITRACIN ZINC AND POLYMYXIN B SULFATE 500; 1000 [USP'U]/G; [USP'U]/G
OINTMENT TOPICAL ONCE
OUTPATIENT
Start: 2025-01-14 | End: 2025-01-14

## 2025-01-14 RX ORDER — GENTAMICIN SULFATE 1 MG/G
OINTMENT TOPICAL ONCE
OUTPATIENT
Start: 2025-01-14 | End: 2025-01-14

## 2025-01-14 RX ORDER — TRIAMCINOLONE ACETONIDE 1 MG/G
OINTMENT TOPICAL ONCE
OUTPATIENT
Start: 2025-01-14 | End: 2025-01-14

## 2025-01-14 RX ORDER — LIDOCAINE HYDROCHLORIDE 40 MG/ML
SOLUTION TOPICAL ONCE
OUTPATIENT
Start: 2025-01-14 | End: 2025-01-14

## 2025-01-14 RX ADMIN — LIDOCAINE HYDROCHLORIDE: 40 SOLUTION TOPICAL at 11:41

## 2025-01-14 NOTE — PATIENT INSTRUCTIONS
[facility] Wound Care Physician Orders and Discharge Instructions  [facility]  [address], [address]  [place], [place] [address]  Telephone: [phone]      FAX [phone]  MONDAY - THURSDAY 8:00 am - 4:30 pm and Friday 8:00 am - 12:00 pm.        NAME:  Marvin Montero  YOB: 1950  MEDICAL RECORD NUMBER:  [id number]  DATE:  1/14/2025      Return Appointment:  [x] Return Appointment: With Dr Cece Alejandre  in  1 Week(s)  [] Wound and dressing supply provider:   [x] ECF or Home Healthcare: [facility]  [] Wound Assessment: [] Physician or NP scheduled for Wound Assessment:   [x] Orders placed during your visit: MRI RIGHT FOOT; SILVADENE ORDERED TO YOUR [facility]    **MAY USE HIBICLENS TO CLEANSE WOUND PRIOR TO DRESSING CHANGE- MAY RINSE WOUND PRIOR TO APPLYING DRESSING**    **MAKE APPOINTMENT TO SEE DR. MOISES MD FOR CARDIOLOGY TO INQUIRE IF PATIENT MAY BE A CANDIDATE FOR HBO**    Important Reminders:   Please wash hands with soap and water before and after every dressing change.  Do not scrub wounds.  Keep wounds dry in shower unless otherwise instructed by the physician.  SMOKING can slow would healing. Stop smoking as soon as possible to improve healing and prevent further complications associated with smoking.      Michelle-Wound Topical Treatments:  Do not apply lotions, creams, or ointments to wound bed unless directed.   [] Apply moisturizing lotion to skin surrounding the wound prior to dressing change.  [] Apply antifungal ointment to skin surrounding the wound prior to dressing change.  [] Apply thin film of no sting moisture barrier ointment to skin immediately around      wound.  [] Other:       Wound Location: RIGHT LATERAL TMA    Wound Cleansing: Vashe soak for 5 minutes prior to dressing change    Primary Dressing:  [x] SILVADENE   [x] BETADINE TO MICHELLE-WOUND    Secondary Dressing:  [x] GAUZE, ABD  [x] KERLIX      Dressing

## 2025-01-14 NOTE — PROGRESS NOTES
See cardiology tab    Dressing and SS removed from left chest device site prior to visit. Incision site appears to be healed. Pt informed to call office if any redness, swelling, fever, chills, or drainage from site. Pt voiced an understanding.

## 2025-01-15 NOTE — TELEPHONE ENCOUNTER
Let him know that a PSA of 1.29 was completed on 8/14/24. Why does he need another one on such a short interval? Does he need it sent to the urologist.

## 2025-01-17 ENCOUNTER — HOSPITAL ENCOUNTER (OUTPATIENT)
Age: 75
Discharge: HOME | End: 2025-01-19
Attending: INTERNAL MEDICINE
Payer: MEDICARE

## 2025-01-17 VITALS
DIASTOLIC BLOOD PRESSURE: 61 MMHG | WEIGHT: 250 LBS | SYSTOLIC BLOOD PRESSURE: 115 MMHG | HEIGHT: 76 IN | BODY MASS INDEX: 30.44 KG/M2

## 2025-01-17 DIAGNOSIS — I50.22 CHRONIC SYSTOLIC HEART FAILURE (HCC): ICD-10-CM

## 2025-01-17 DIAGNOSIS — I42.8 NONISCHEMIC CARDIOMYOPATHY (HCC): ICD-10-CM

## 2025-01-17 LAB
ECHO AO ROOT DIAM: 3.8 CM
ECHO AO ROOT INDEX: 1.56 CM/M2
ECHO AR MAX VEL PISA: 3.3 M/S
ECHO AV PEAK GRADIENT: 9 MMHG
ECHO AV PEAK VELOCITY: 1.5 M/S
ECHO AV REGURGITANT PHT: 590 MS
ECHO AV VELOCITY RATIO: 0.53
ECHO BSA: 2.47 M2
ECHO EST RA PRESSURE: 3 MMHG
ECHO LA AREA 2C: 24 CM2
ECHO LA AREA 4C: 17.2 CM2
ECHO LA DIAMETER INDEX: 1.97 CM/M2
ECHO LA DIAMETER: 4.8 CM
ECHO LA MAJOR AXIS: 4.7 CM
ECHO LA MINOR AXIS: 5.4 CM
ECHO LA TO AORTIC ROOT RATIO: 1.26
ECHO LA VOL BP: 69 ML (ref 18–58)
ECHO LA VOL MOD A2C: 83 ML (ref 18–58)
ECHO LA VOL MOD A4C: 50 ML (ref 18–58)
ECHO LA VOL/BSA BIPLANE: 28 ML/M2 (ref 16–34)
ECHO LA VOLUME INDEX MOD A2C: 34 ML/M2 (ref 16–34)
ECHO LA VOLUME INDEX MOD A4C: 20 ML/M2 (ref 16–34)
ECHO LV E' LATERAL VELOCITY: 3.64 CM/S
ECHO LV E' SEPTAL VELOCITY: 4.74 CM/S
ECHO LV EF PHYSICIAN: 55 %
ECHO LV FRACTIONAL SHORTENING: 28 % (ref 28–44)
ECHO LV INTERNAL DIMENSION DIASTOLE INDEX: 2.5 CM/M2
ECHO LV INTERNAL DIMENSION DIASTOLIC: 6.1 CM (ref 4.2–5.9)
ECHO LV INTERNAL DIMENSION SYSTOLIC INDEX: 1.8 CM/M2
ECHO LV INTERNAL DIMENSION SYSTOLIC: 4.4 CM
ECHO LV IVSD: 1.1 CM (ref 0.6–1)
ECHO LV MASS 2D: 304.9 G (ref 88–224)
ECHO LV MASS INDEX 2D: 125 G/M2 (ref 49–115)
ECHO LV POSTERIOR WALL DIASTOLIC: 1.2 CM (ref 0.6–1)
ECHO LV RELATIVE WALL THICKNESS RATIO: 0.39
ECHO LVOT PEAK GRADIENT: 2 MMHG
ECHO LVOT PEAK VELOCITY: 0.8 M/S
ECHO MV A VELOCITY: 1 M/S
ECHO MV E DECELERATION TIME (DT): 182 MS
ECHO MV E VELOCITY: 0.75 M/S
ECHO MV E/A RATIO: 0.75
ECHO MV E/E' LATERAL: 20.6
ECHO MV E/E' RATIO (AVERAGED): 18.21
ECHO MV E/E' SEPTAL: 15.82
ECHO PULMONARY ARTERY END DIASTOLIC PRESSURE: 8 MMHG
ECHO PV MAX VELOCITY: 0.8 M/S
ECHO PV PEAK GRADIENT: 2 MMHG
ECHO PV REGURGITANT MAX VELOCITY: 1.4 M/S
ECHO RA AREA 4C: 10.6 CM2
ECHO RA END SYSTOLIC VOLUME APICAL 4 CHAMBER INDEX BSA: 9 ML/M2
ECHO RA VOLUME: 21 ML
ECHO RIGHT VENTRICULAR SYSTOLIC PRESSURE (RVSP): 36 MMHG
ECHO RV FREE WALL PEAK S': 10.3 CM/S
ECHO RV INTERNAL DIMENSION: 3.3 CM
ECHO RV TAPSE: 2 CM (ref 1.7–?)
ECHO TV REGURGITANT MAX VELOCITY: 2.87 M/S
ECHO TV REGURGITANT PEAK GRADIENT: 33 MMHG

## 2025-01-17 PROCEDURE — 93306 TTE W/DOPPLER COMPLETE: CPT

## 2025-01-17 PROCEDURE — 93306 TTE W/DOPPLER COMPLETE: CPT | Performed by: INTERNAL MEDICINE

## 2025-01-21 ENCOUNTER — HOSPITAL ENCOUNTER (OUTPATIENT)
Dept: WOUND CARE | Age: 75
Discharge: HOME | End: 2025-01-21
Attending: EMERGENCY MEDICINE
Payer: MEDICARE

## 2025-01-21 VITALS
SYSTOLIC BLOOD PRESSURE: 145 MMHG | HEART RATE: 80 BPM | RESPIRATION RATE: 18 BRPM | DIASTOLIC BLOOD PRESSURE: 82 MMHG | TEMPERATURE: 96.8 F

## 2025-01-21 DIAGNOSIS — E66.9 TYPE 2 DIABETES MELLITUS WITH OBESITY (HCC): ICD-10-CM

## 2025-01-21 DIAGNOSIS — L97.413 DIABETIC ULCER OF RIGHT MIDFOOT ASSOCIATED WITH TYPE 2 DIABETES MELLITUS, WITH NECROSIS OF MUSCLE (HCC): ICD-10-CM

## 2025-01-21 DIAGNOSIS — Z74.09 IMPAIRED MOBILITY: ICD-10-CM

## 2025-01-21 DIAGNOSIS — R60.0 LOCALIZED EDEMA: ICD-10-CM

## 2025-01-21 DIAGNOSIS — I70.234 ATHEROSCLEROSIS OF NATIVE ARTERY OF RIGHT LOWER EXTREMITY WITH ULCERATION OF MIDFOOT (HCC): Primary | ICD-10-CM

## 2025-01-21 DIAGNOSIS — E11.628 TYPE 2 DIABETES MELLITUS WITH RIGHT DIABETIC FOOT INFECTION (HCC): ICD-10-CM

## 2025-01-21 DIAGNOSIS — E11.69 TYPE 2 DIABETES MELLITUS WITH OBESITY (HCC): ICD-10-CM

## 2025-01-21 DIAGNOSIS — L08.9 TYPE 2 DIABETES MELLITUS WITH RIGHT DIABETIC FOOT INFECTION (HCC): ICD-10-CM

## 2025-01-21 DIAGNOSIS — I87.321 IDIOPATHIC CHRONIC VENOUS HYPERTENSION OF RIGHT LEG WITH INFLAMMATION: ICD-10-CM

## 2025-01-21 DIAGNOSIS — E11.621 DIABETIC ULCER OF RIGHT MIDFOOT ASSOCIATED WITH TYPE 2 DIABETES MELLITUS, WITH NECROSIS OF MUSCLE (HCC): ICD-10-CM

## 2025-01-21 PROCEDURE — 11046 DBRDMT MUSC&/FSCA EA ADDL: CPT

## 2025-01-21 PROCEDURE — 11043 DBRDMT MUSC&/FSCA 1ST 20/<: CPT | Performed by: EMERGENCY MEDICINE

## 2025-01-21 PROCEDURE — 11043 DBRDMT MUSC&/FSCA 1ST 20/<: CPT

## 2025-01-21 PROCEDURE — 11046 DBRDMT MUSC&/FSCA EA ADDL: CPT | Performed by: EMERGENCY MEDICINE

## 2025-01-21 RX ORDER — TRIAMCINOLONE ACETONIDE 1 MG/G
OINTMENT TOPICAL ONCE
OUTPATIENT
Start: 2025-01-21 | End: 2025-01-21

## 2025-01-21 RX ORDER — LIDOCAINE HYDROCHLORIDE 20 MG/ML
JELLY TOPICAL ONCE
OUTPATIENT
Start: 2025-01-21 | End: 2025-01-21

## 2025-01-21 RX ORDER — CLOBETASOL PROPIONATE 0.5 MG/G
OINTMENT TOPICAL ONCE
OUTPATIENT
Start: 2025-01-21 | End: 2025-01-21

## 2025-01-21 RX ORDER — GENTAMICIN SULFATE 1 MG/G
OINTMENT TOPICAL ONCE
OUTPATIENT
Start: 2025-01-21 | End: 2025-01-21

## 2025-01-21 RX ORDER — BETAMETHASONE DIPROPIONATE 0.5 MG/G
CREAM TOPICAL ONCE
OUTPATIENT
Start: 2025-01-21 | End: 2025-01-21

## 2025-01-21 RX ORDER — BACITRACIN ZINC AND POLYMYXIN B SULFATE 500; 1000 [USP'U]/G; [USP'U]/G
OINTMENT TOPICAL ONCE
OUTPATIENT
Start: 2025-01-21 | End: 2025-01-21

## 2025-01-21 RX ORDER — SODIUM CHLOR/HYPOCHLOROUS ACID 0.033 %
SOLUTION, IRRIGATION IRRIGATION ONCE
OUTPATIENT
Start: 2025-01-21 | End: 2025-01-21

## 2025-01-21 RX ORDER — MUPIROCIN 20 MG/G
OINTMENT TOPICAL ONCE
OUTPATIENT
Start: 2025-01-21 | End: 2025-01-21

## 2025-01-21 RX ORDER — GINSENG 100 MG
CAPSULE ORAL ONCE
OUTPATIENT
Start: 2025-01-21 | End: 2025-01-21

## 2025-01-21 RX ORDER — LIDOCAINE 40 MG/G
CREAM TOPICAL ONCE
OUTPATIENT
Start: 2025-01-21 | End: 2025-01-21

## 2025-01-21 RX ORDER — LIDOCAINE 50 MG/G
OINTMENT TOPICAL ONCE
OUTPATIENT
Start: 2025-01-21 | End: 2025-01-21

## 2025-01-21 RX ORDER — NEOMYCIN/BACITRACIN/POLYMYXINB 3.5-400-5K
OINTMENT (GRAM) TOPICAL ONCE
OUTPATIENT
Start: 2025-01-21 | End: 2025-01-21

## 2025-01-21 RX ORDER — LIDOCAINE HYDROCHLORIDE 40 MG/ML
SOLUTION TOPICAL ONCE
OUTPATIENT
Start: 2025-01-21 | End: 2025-01-21

## 2025-01-21 RX ORDER — SILVER SULFADIAZINE 10 MG/G
CREAM TOPICAL ONCE
OUTPATIENT
Start: 2025-01-21 | End: 2025-01-21

## 2025-01-21 RX ORDER — LIDOCAINE 40 MG/G
CREAM TOPICAL ONCE
Status: COMPLETED | OUTPATIENT
Start: 2025-01-21 | End: 2025-01-21

## 2025-01-21 RX ADMIN — LIDOCAINE: 40 CREAM TOPICAL at 11:01

## 2025-01-21 NOTE — PATIENT INSTRUCTIONS
[facility] Wound Care Physician Orders and Discharge Instructions  [facility]  [address], [address]  [place], [place] [address]  Telephone: [phone]      FAX [phone]  MONDAY - THURSDAY 8:00 am - 4:30 pm and Friday 8:00 am - 12:00 pm.        NAME:  Marvin Montero  YOB: 1950  MEDICAL RECORD NUMBER:  [id number]  DATE:  1/21/2025      Return Appointment:  [x] Return Appointment: With Dr Cece Alejandre  in  1 Week(s) FOR WOUND CARE AND HBO ASSESSMENT  [] Wound and dressing supply provider:   [x] ECF or Home Healthcare: [facility]  [] Wound Assessment: [] Physician or NP scheduled for Wound Assessment:   [] Orders placed during your visit:     **MAY USE HIBICLENS TO CLEANSE WOUND PRIOR TO DRESSING CHANGE- MAY RINSE WOUND PRIOR TO APPLYING DRESSING**    **MAKE APPOINTMENT TO SEE DR. MOISES MD FOR CARDIOLOGY TO INQUIRE IF PATIENT MAY BE A CANDIDATE FOR HBO**    Important Reminders:   Please wash hands with soap and water before and after every dressing change.  Do not scrub wounds.  Keep wounds dry in shower unless otherwise instructed by the physician.  SMOKING can slow would healing. Stop smoking as soon as possible to improve healing and prevent further complications associated with smoking.      Michelle-Wound Topical Treatments:  Do not apply lotions, creams, or ointments to wound bed unless directed.   [] Apply moisturizing lotion to skin surrounding the wound prior to dressing change.  [] Apply antifungal ointment to skin surrounding the wound prior to dressing change.  [] Apply thin film of no sting moisture barrier ointment to skin immediately around      wound.  [] Other:       Wound Location: RIGHT LATERAL TMA    Wound Cleansing: Vashe soak for 5 minutes prior to dressing change    Primary Dressing:  [x] SILVADENE   [x] ZINC OXIDE/DESITIN CREAM    Secondary Dressing:  [x] GAUZE, ABD (EXTRA CUSHION TO ANKLE WITH ABD PAD)  [x] KERLIX      Dressing

## 2025-01-21 NOTE — PROGRESS NOTES
[facility]     Note Type: Medical Staff Progress Note    Referring Provider: Self Referral  Reason for Referral:   Chief Complaint   Patient presents with    Wound Check     Follow up for right foot wound        Marvin Montero  MEDICAL RECORD NUMBER:  [id number]  AGE: 74 y.o.   GENDER: male  : 1950  EPISODE DATE:  2025    Chief complaint and reason for visit:     Chief Complaint   Patient presents with    Wound Check     Follow up for right foot wound         HPI/Wound Narrative:      Marvin Montero is a 74 y.o. male who presents today for an evaluation of a wound/ulcer. Wound duration:  2024 .    25: had his echo. EF about 55%. No changes in medications recommended by cardiology.    25: 74-year-old with past medical history notable for hypertension, type 2 diabetes mellitus, hyperlipidemia, atrial fibrillation on Eliquis, diastolic congestive heart failure, COPD, hypothyroidism, peripheral arterial disease status post left BKA who is known to me for wounds to his right lower extremity back in .  These were healing very well but for some reason he was lost to follow-up. Since I saw him last, he has had admission to the hospital.  He developed an abscess to his right foot in 2024.  At that time he had an extensive workup done including vascular evaluation.  Patient has noted decreased blood flow to right lower extremity and has an appointment with Dr. Christy.  He is status post TMA and surgical debridement and has been followed by podiatry since then.  Unfortunately has not achieved healing and his wife feels that sexually getting much worse.  Patient has been using [place] to the area.  They have come here as a self-referral for second opinion.  Pertinent associated symptoms: drainage , redness, swelling, induration, and impaired mobility    Medical Decision Making:     Historian(s): patient .  74-year-old male with a nonhealing right

## 2025-01-28 ENCOUNTER — HOSPITAL ENCOUNTER (OUTPATIENT)
Dept: WOUND CARE | Age: 75
Discharge: HOME | End: 2025-01-28
Attending: EMERGENCY MEDICINE
Payer: MEDICARE

## 2025-01-28 VITALS
TEMPERATURE: 98.1 F | DIASTOLIC BLOOD PRESSURE: 55 MMHG | HEART RATE: 62 BPM | RESPIRATION RATE: 20 BRPM | SYSTOLIC BLOOD PRESSURE: 151 MMHG

## 2025-01-28 DIAGNOSIS — L97.413 DIABETIC ULCER OF RIGHT MIDFOOT ASSOCIATED WITH TYPE 2 DIABETES MELLITUS, WITH NECROSIS OF MUSCLE (HCC): ICD-10-CM

## 2025-01-28 DIAGNOSIS — I87.321 IDIOPATHIC CHRONIC VENOUS HYPERTENSION OF RIGHT LEG WITH INFLAMMATION: ICD-10-CM

## 2025-01-28 DIAGNOSIS — E11.69 TYPE 2 DIABETES MELLITUS WITH OBESITY (HCC): ICD-10-CM

## 2025-01-28 DIAGNOSIS — E66.9 TYPE 2 DIABETES MELLITUS WITH OBESITY (HCC): ICD-10-CM

## 2025-01-28 DIAGNOSIS — E11.621 DIABETIC ULCER OF RIGHT MIDFOOT ASSOCIATED WITH TYPE 2 DIABETES MELLITUS, WITH NECROSIS OF MUSCLE (HCC): ICD-10-CM

## 2025-01-28 DIAGNOSIS — R60.0 LOCALIZED EDEMA: ICD-10-CM

## 2025-01-28 DIAGNOSIS — I70.234 ATHEROSCLEROSIS OF NATIVE ARTERY OF RIGHT LOWER EXTREMITY WITH ULCERATION OF MIDFOOT (HCC): Primary | ICD-10-CM

## 2025-01-28 DIAGNOSIS — Z74.09 IMPAIRED MOBILITY: ICD-10-CM

## 2025-01-28 DIAGNOSIS — E11.628 TYPE 2 DIABETES MELLITUS WITH RIGHT DIABETIC FOOT INFECTION (HCC): ICD-10-CM

## 2025-01-28 DIAGNOSIS — L08.9 TYPE 2 DIABETES MELLITUS WITH RIGHT DIABETIC FOOT INFECTION (HCC): ICD-10-CM

## 2025-01-28 PROCEDURE — 87205 SMEAR GRAM STAIN: CPT

## 2025-01-28 PROCEDURE — 11043 DBRDMT MUSC&/FSCA 1ST 20/<: CPT

## 2025-01-28 PROCEDURE — 99213 OFFICE O/P EST LOW 20 MIN: CPT

## 2025-01-28 PROCEDURE — 99214 OFFICE O/P EST MOD 30 MIN: CPT | Performed by: EMERGENCY MEDICINE

## 2025-01-28 PROCEDURE — 87070 CULTURE OTHR SPECIMN AEROBIC: CPT

## 2025-01-28 PROCEDURE — 87077 CULTURE AEROBIC IDENTIFY: CPT

## 2025-01-28 PROCEDURE — 11046 DBRDMT MUSC&/FSCA EA ADDL: CPT

## 2025-01-28 PROCEDURE — 11043 DBRDMT MUSC&/FSCA 1ST 20/<: CPT | Performed by: EMERGENCY MEDICINE

## 2025-01-28 PROCEDURE — 87186 SC STD MICRODIL/AGAR DIL: CPT

## 2025-01-28 PROCEDURE — 11046 DBRDMT MUSC&/FSCA EA ADDL: CPT | Performed by: EMERGENCY MEDICINE

## 2025-01-28 RX ORDER — LIDOCAINE HYDROCHLORIDE 20 MG/ML
JELLY TOPICAL ONCE
OUTPATIENT
Start: 2025-01-28 | End: 2025-01-28

## 2025-01-28 RX ORDER — SILVER SULFADIAZINE 10 MG/G
CREAM TOPICAL ONCE
OUTPATIENT
Start: 2025-01-28 | End: 2025-01-28

## 2025-01-28 RX ORDER — BACITRACIN ZINC AND POLYMYXIN B SULFATE 500; 1000 [USP'U]/G; [USP'U]/G
OINTMENT TOPICAL ONCE
OUTPATIENT
Start: 2025-01-28 | End: 2025-01-28

## 2025-01-28 RX ORDER — LIDOCAINE HYDROCHLORIDE 40 MG/ML
SOLUTION TOPICAL ONCE
OUTPATIENT
Start: 2025-01-28 | End: 2025-01-28

## 2025-01-28 RX ORDER — BETAMETHASONE DIPROPIONATE 0.5 MG/G
CREAM TOPICAL ONCE
OUTPATIENT
Start: 2025-01-28 | End: 2025-01-28

## 2025-01-28 RX ORDER — LIDOCAINE 50 MG/G
OINTMENT TOPICAL ONCE
OUTPATIENT
Start: 2025-01-28 | End: 2025-01-28

## 2025-01-28 RX ORDER — TRIAMCINOLONE ACETONIDE 1 MG/G
OINTMENT TOPICAL ONCE
OUTPATIENT
Start: 2025-01-28 | End: 2025-01-28

## 2025-01-28 RX ORDER — LIDOCAINE 40 MG/G
CREAM TOPICAL ONCE
OUTPATIENT
Start: 2025-01-28 | End: 2025-01-28

## 2025-01-28 RX ORDER — GENTAMICIN SULFATE 1 MG/G
OINTMENT TOPICAL ONCE
OUTPATIENT
Start: 2025-01-28 | End: 2025-01-28

## 2025-01-28 RX ORDER — NEOMYCIN/BACITRACIN/POLYMYXINB 3.5-400-5K
OINTMENT (GRAM) TOPICAL ONCE
OUTPATIENT
Start: 2025-01-28 | End: 2025-01-28

## 2025-01-28 RX ORDER — LIDOCAINE 40 MG/G
CREAM TOPICAL ONCE
Status: COMPLETED | OUTPATIENT
Start: 2025-01-28 | End: 2025-01-28

## 2025-01-28 RX ORDER — GINSENG 100 MG
CAPSULE ORAL ONCE
OUTPATIENT
Start: 2025-01-28 | End: 2025-01-28

## 2025-01-28 RX ORDER — CLOBETASOL PROPIONATE 0.5 MG/G
OINTMENT TOPICAL ONCE
OUTPATIENT
Start: 2025-01-28 | End: 2025-01-28

## 2025-01-28 RX ORDER — SODIUM CHLOR/HYPOCHLOROUS ACID 0.033 %
SOLUTION, IRRIGATION IRRIGATION ONCE
OUTPATIENT
Start: 2025-01-28 | End: 2025-01-28

## 2025-01-28 RX ORDER — MUPIROCIN 20 MG/G
OINTMENT TOPICAL ONCE
OUTPATIENT
Start: 2025-01-28 | End: 2025-01-28

## 2025-01-28 RX ADMIN — LIDOCAINE: 40 CREAM TOPICAL at 11:15

## 2025-01-28 ASSESSMENT — PAIN SCALES - GENERAL
PAINLEVEL_OUTOF10: 0
PAINLEVEL_OUTOF10: 0

## 2025-01-28 NOTE — PROGRESS NOTES
measurements:  Total Surface Area Debrided: 36 sq cm   Estimated Blood Loss:  Minimal  Hemostasis Achieved:  by pressure  Procedural Pain:  0  / 10   Post Procedural Pain:  0 / 10   Response to treatment:  Well tolerated by patient.      Objective:      BP (!) 151/55   Pulse 62   Temp 98.1 °F (36.7 °C) (Temporal)   Resp 20   Wt Readings from Last 3 Encounters:   01/17/25 113.4 kg (250 lb)   12/23/24 113.4 kg (250 lb)   12/11/24 113.4 kg (250 lb)       PHYSICAL EXAM  General Appearance/Constitutional: Alert and in no acute distress. Nontoxic.  Skin: Positive wound per LDA documentation if applicable. No jaundice.  Head: Normocephalic and atraumatic.  HEENT: Atraumatic. Unremarkable. Extraocular eye movements intact, sclera anicteric.  Pulmonary: No respiratory distress and Even and unlabored breathing  Cardiovascular: No signs of acute cardiac distress  GI: Abdomen soft, Non-tender and benign  Musculoskeletal: Edema present 3+ and dopplerable pulses only     Neurologic: Mental status normal    PAST MEDICAL HISTORY      Diagnosis Date    Atrial fibrillation ([facility])     Back pain     Cardiomyopathy     CHF (congestive heart failure) ([facility])     COPD (chronic obstructive pulmonary disease) ([facility])     Dental disease     Dizziness     Dyslipidemia     GERD (gastroesophageal reflux disease)     Hearing loss     Hyperlipidemia     Hypertension     Hypothyroidism     Leg edema     MRSA (methicillin resistant staph aureus) culture positive 10/05/2015    foot/bone    MRSA nasal colonization 05/05/2017    Obesity     Prolonged emergence from general anesthesia     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     Sleep apnea     Stroke (HCC)     Thyroid disease     Tinnitus     Type 2 diabetes mellitus without complication ([facility])     Type II or unspecified type diabetes mellitus without mention of complication, not stated as uncontrolled        PAST SURGICAL HISTORY  Past Surgical History:   Procedure Laterality Date    ACHILLES TENDON

## 2025-01-28 NOTE — PLAN OF CARE
RN HYPERBARIC OXYGEN THERAPY RISK ASSESSMENT TOOL   [facility]     Marvin Montero  MEDICAL RECORD NUMBER:  [id number]  AGE: 74 y.o.   GENDER: male  : 1950  EPISODE DATE:  2025       PAST MEDICAL HISTORY      Diagnosis Date    Atrial fibrillation (HCC)     Back pain     Cardiomyopathy     CHF (congestive heart failure) (HCC)     COPD (chronic obstructive pulmonary disease) (HCC)     Dental disease     Dizziness     Dyslipidemia     GERD (gastroesophageal reflux disease)     Hearing loss     Hyperlipidemia     Hypertension     Hypothyroidism     Leg edema     MRSA (methicillin resistant staph aureus) culture positive 10/05/2015    foot/bone    MRSA nasal colonization 2017    Obesity     Prolonged emergence from general anesthesia     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     Sleep apnea     Stroke (HCC)     Thyroid disease     Tinnitus     Type 2 diabetes mellitus without complication (HCC)     Type II or unspecified type diabetes mellitus without mention of complication, not stated as uncontrolled        PAST SURGICAL HISTORY  Past Surgical History:   Procedure Laterality Date    ACHILLES TENDON SURGERY Right 2024    ACHILLES TENDON LENGTHENING RIGHT FOOT performed by Frank Bills DPM at [facility] OR    ADRENAL GLAND SURGERY  1970    APPENDECTOMY      CARDIAC DEFIBRILLATOR PLACEMENT  2017    Dr. Janes Cyr Noble    COLONOSCOPY N/A 2019    COLONOSCOPY WITH MAC, UNASYN (3gm) performed by Alexi Pardo MD at [facility] ENDOSCOPY    COLONOSCOPY N/A 2019    COLONOSCOPY POLYPECTOMY SNARE/COLD BIOPSY performed by Alexi Pardo MD at [facility] ENDOSCOPY    COLONOSCOPY  2019    COLONOSCOPY WITH BIOPSY performed by Alexi Pardo MD at [facility] ENDOSCOPY    EP DEVICE PROCEDURE N/A 2024    Remove & replace ICD biv multi leads performed by Curtis Pemberton MD at [facility] CARDIAC CATH LAB    FOOT DEBRIDEMENT Right 2019    INCISION AND INCISIONAL

## 2025-01-28 NOTE — H&P
Hyperbaric Oxygen Therapy Consultation  History and Physical    NAME: Marvin Montero  MEDICAL RECORD NUMBER:  [id number]  AGE: 74 y.o.   GENDER: male  : 1950  EPISODE DATE:  2025  REFERRING PROVIDER: self    Reason for Consult: Assess need for Hyperbaric Oxygen Therapy     HISTORY of PRESENT ILLNESS:  This patient is being evaluated at our Wound and [facility] regarding the use of hyperbaric oxygen as adjunctive therapy in the treatment of Diabetic Lower Extremity Ulcer.     74-year-old with past medical history notable for hypertension, type 2 diabetes mellitus, hyperlipidemia, atrial fibrillation on Eliquis, diastolic congestive heart failure, COPD, hypothyroidism, peripheral arterial disease status post left BKA who is known to me for wounds to his right lower extremity back in .  These were healing very well but for some reason he was lost to follow-up. Since I saw him last, he has had admission to the hospital.  He developed an abscess to his right foot in 2024.  At that time he had an extensive workup done including vascular evaluation.  Patient has noted decreased blood flow to right lower extremity and has an appointment with Dr. Christy.  He is status post TMA and surgical debridement and has been followed by podiatry since then.  Unfortunately has not achieved healing and his wife feels that sexually getting much worse.  Patient had been using Santyl to the area.  They have come here as a self-referral for second opinion.  Pertinent associated symptoms: drainage , redness, swelling, induration, and impaired mobility. Wound is responding to current treatment but given comorbid conditions with peripheral arterial disease and nonhealing necrotic diabetic ulcer, patient is in need of hyperbaric oxygen therapy to help heal as well as for limb salvage.      Mr. Montero has a past medical history of Atrial fibrillation (HCC), Back pain, Cardiomyopathy, CHF (congestive heart failure)

## 2025-01-28 NOTE — PATIENT INSTRUCTIONS
techniques prior to your first treatment \"dive.\"  Although the pressure in the chamber is not felt on the body, you do feel changes in the ears.  The eardrum is normally flat while you are at ground level.  Pressure changes in the atmosphere around you will usually be felt in the ears.  The eardrum tends to bow inwardly and unless you take positive action, fullness or pain will be felt.  In order to avoid this, you will be taught how to force air into the middle ear during the few minutes that it takes to pressurize the chamber.     To help prevent pain or injury to your lungs, please make sure to notify a staff member if you have any upper respiratory infection and/or congestion.  It is very important not to hold your breath during your treatment \"dive\", just breath normally.       PATIENTS WITH DIABETES ONLY Yes  If you have diabetes your blood sugar level will be tested before each and every treatment.  If your blood sugar level is too low, we will attempt to help you raise it by providing a diabetic nutritional shake.  We will retest your blood sugar shortly thereafter.  If your blood sugar level is still low, we may not be able to provide a treatment \"dive\" that day.  If your blood sugar levels is too high, we also may not be able to provide a treatment \"dive\" that day.      If your blood sugar level continues to be too high or too low, not allowing you to continue with the hyperbaric treatments, you will need to contact your primary physician to help manage your blood sugar more effectively.       ABOUT YOUR TREATMENT INFORMATION REVIEWED: Yes   If you are feeling nervous or anxious about these treatments, please let a staff member know.  We are here to help you.  Before each and every treatment, please let us know of any changes regarding:    your medication, especially cancer medication  any possibility of being pregnant  any new implants or devices    Temporary vision changes may occur during hyperbaric

## 2025-01-30 ENCOUNTER — TELEPHONE (OUTPATIENT)
Dept: CARDIOLOGY CLINIC | Age: 75
End: 2025-01-30

## 2025-01-30 ENCOUNTER — OFFICE VISIT (OUTPATIENT)
Dept: CARDIOLOGY CLINIC | Age: 75
End: 2025-01-30
Payer: MEDICARE

## 2025-01-30 VITALS
OXYGEN SATURATION: 98 % | DIASTOLIC BLOOD PRESSURE: 58 MMHG | BODY MASS INDEX: 30.69 KG/M2 | HEIGHT: 76 IN | HEART RATE: 65 BPM | RESPIRATION RATE: 14 BRPM | WEIGHT: 252 LBS | SYSTOLIC BLOOD PRESSURE: 118 MMHG

## 2025-01-30 DIAGNOSIS — I50.22 CHRONIC SYSTOLIC HEART FAILURE (HCC): ICD-10-CM

## 2025-01-30 DIAGNOSIS — I70.223 CRITICAL LIMB ISCHEMIA OF BOTH LOWER EXTREMITIES (HCC): ICD-10-CM

## 2025-01-30 DIAGNOSIS — I48.0 PAF (PAROXYSMAL ATRIAL FIBRILLATION) (HCC): ICD-10-CM

## 2025-01-30 DIAGNOSIS — E78.2 MIXED HYPERLIPIDEMIA: ICD-10-CM

## 2025-01-30 DIAGNOSIS — I73.9 PAD (PERIPHERAL ARTERY DISEASE) (HCC): Primary | ICD-10-CM

## 2025-01-30 DIAGNOSIS — I10 BENIGN ESSENTIAL HTN: ICD-10-CM

## 2025-01-30 PROCEDURE — 3078F DIAST BP <80 MM HG: CPT | Performed by: INTERNAL MEDICINE

## 2025-01-30 PROCEDURE — 1123F ACP DISCUSS/DSCN MKR DOCD: CPT | Performed by: INTERNAL MEDICINE

## 2025-01-30 PROCEDURE — 3074F SYST BP LT 130 MM HG: CPT | Performed by: INTERNAL MEDICINE

## 2025-01-30 PROCEDURE — 99214 OFFICE O/P EST MOD 30 MIN: CPT | Performed by: INTERNAL MEDICINE

## 2025-01-30 NOTE — PROGRESS NOTES
reviewed    Lab Results   Component Value Date/Time    WBC 4.4 12/18/2024 11:34 AM    RBC 4.04 12/18/2024 11:34 AM    HGB 11.0 12/18/2024 11:34 AM    HCT 34.8 12/18/2024 11:34 AM    MCV 86.2 12/18/2024 11:34 AM    RDW 15.0 12/18/2024 11:34 AM     12/18/2024 11:34 AM     Lab Results   Component Value Date/Time     12/18/2024 11:34 AM    K 4.5 12/18/2024 11:34 AM    K 4.3 04/02/2024 04:34 AM     12/18/2024 11:34 AM    CO2 26 12/18/2024 11:34 AM    BUN 15 12/18/2024 11:34 AM    CREATININE 1.2 12/18/2024 11:34 AM    GFRAA >60 10/03/2022 12:36 PM    GFRAA >60 05/15/2013 11:32 AM    AGRATIO 1.4 08/14/2024 12:20 PM    LABGLOM 64 12/18/2024 11:34 AM    LABGLOM >90 04/09/2024 01:08 PM    GLUCOSE 76 12/18/2024 11:34 AM    CALCIUM 8.9 12/18/2024 11:34 AM    BILITOT 0.4 08/14/2024 12:20 PM    ALKPHOS 78 08/14/2024 12:20 PM    AST 29 08/14/2024 12:20 PM    ALT 42 08/14/2024 12:20 PM     No results found for: \"PTINR\"  Lab Results   Component Value Date    LABA1C 5.7 12/11/2024     Lab Results   Component Value Date    CKTOTAL 46 04/30/2012    TROPONINI <0.01 07/30/2022       Cardiac, Vascular and Imaging Data all Personally Reviewed in Detail by Myself      EKG:     Echocardiogram:   ECHO 12/16/2022  Poor quality due to body habitus and poor acoustic window.  Left ventricular cavity size is normal with normal left ventricular wall  thickness.  Overall left ventricular systolic function appears normal.  Ejection fraction is estimated to be 50-55%.  No regional wall motion abnormalities  Grade I diastolic dysfunction with normal LV filling pressures.  Trivial to mild tricuspid regurgitation.  Aortic valve sclerosis, without stenosis    Stress Test:   Stress test 3/6/2023  No evidence of reversible ischemia.  Tracer uptake is somewhat patchy and gating was not performed. There is a  small fixed defect at the apex but no reversible defects noted.  Unable to assess wall motion or EF as study not

## 2025-01-30 NOTE — TELEPHONE ENCOUNTER
Patient in the office today and says that they are waiting on a form for MRI. Can you please check on this if we do not have please call MRI department and update patient.

## 2025-01-31 ENCOUNTER — HOSPITAL ENCOUNTER (OUTPATIENT)
Dept: GENERAL RADIOLOGY | Age: 75
Discharge: HOME | End: 2025-01-31
Attending: EMERGENCY MEDICINE
Payer: MEDICARE

## 2025-01-31 ENCOUNTER — HOSPITAL ENCOUNTER (OUTPATIENT)
Dept: VASCULAR LAB | Age: 75
Discharge: HOME | End: 2025-01-31
Attending: INTERNAL MEDICINE
Payer: MEDICARE

## 2025-01-31 ENCOUNTER — HOSPITAL ENCOUNTER (OUTPATIENT)
Age: 75
Discharge: HOME | End: 2025-01-31
Attending: INTERNAL MEDICINE
Payer: MEDICARE

## 2025-01-31 DIAGNOSIS — I73.9 PAD (PERIPHERAL ARTERY DISEASE) (HCC): ICD-10-CM

## 2025-01-31 DIAGNOSIS — R09.89 DECREASED PULSES IN FEET: Primary | ICD-10-CM

## 2025-01-31 DIAGNOSIS — I70.223 CRITICAL LIMB ISCHEMIA OF BOTH LOWER EXTREMITIES (HCC): ICD-10-CM

## 2025-01-31 DIAGNOSIS — E11.69 TYPE 2 DIABETES MELLITUS WITH OBESITY (HCC): ICD-10-CM

## 2025-01-31 DIAGNOSIS — E66.9 TYPE 2 DIABETES MELLITUS WITH OBESITY (HCC): ICD-10-CM

## 2025-01-31 LAB
VAS RIGHT ATA DIST PSV: 0 CM/S
VAS RIGHT ATA MID PSV: 0 CM/S
VAS RIGHT CFA DIST PSV: 102.7 CM/S
VAS RIGHT CFA PROX PSV: 128.3 CM/S
VAS RIGHT PERONEAL MID PSV: 80.8 CM/S
VAS RIGHT PFA PROX PSV: 119 CM/S
VAS RIGHT POP A DIST PSV: 91.8 CM/S
VAS RIGHT POP A PROX PSV: 108.2 CM/S
VAS RIGHT POP A PROX VEL RATIO: 0.98
VAS RIGHT PTA DIST PSV: 108 CM/S
VAS RIGHT PTA MID PSV: 110 CM/S
VAS RIGHT PTA PROX PSV: 89 CM/S
VAS RIGHT SFA DIST PSV: 110 CM/S
VAS RIGHT SFA DIST VEL RATIO: 1.13
VAS RIGHT SFA MID PSV: 97.2 CM/S
VAS RIGHT SFA MID VEL RATIO: 0.8
VAS RIGHT SFA PROX PSV: 121 CM/S
VAS RIGHT SFA PROX VEL RATIO: 0.9

## 2025-01-31 PROCEDURE — 93926 LOWER EXTREMITY STUDY: CPT

## 2025-01-31 PROCEDURE — 71046 X-RAY EXAM CHEST 2 VIEWS: CPT

## 2025-01-31 PROCEDURE — 93926 LOWER EXTREMITY STUDY: CPT | Performed by: SURGERY

## 2025-01-31 NOTE — TELEPHONE ENCOUNTER
Tried to call pt but VM full. Pt is not MRI safe until 6 weeks post implant which would be after 2/3/25. MRI department was notified of this.

## 2025-02-02 LAB
BACTERIA SPEC AEROBE CULT: ABNORMAL
BACTERIA SPEC ANAEROBE CULT: ABNORMAL
GRAM STN SPEC: ABNORMAL
ORGANISM: ABNORMAL

## 2025-02-03 ENCOUNTER — TELEPHONE (OUTPATIENT)
Dept: CARDIOLOGY CLINIC | Age: 75
End: 2025-02-03

## 2025-02-03 DIAGNOSIS — I73.9 PAD (PERIPHERAL ARTERY DISEASE) (HCC): Primary | ICD-10-CM

## 2025-02-03 DIAGNOSIS — I48.0 PAF (PAROXYSMAL ATRIAL FIBRILLATION) (HCC): ICD-10-CM

## 2025-02-03 NOTE — TELEPHONE ENCOUNTER
Please schedule peripheral angiogram (right) with Dr Espinosa      The morning of your procedure you will park in the hospital parking lot and report directly to the cath lab to check in.     Pre-Procedure Instructions   You will need to fast for at least 8 hours prior to procedure. No caffeine the morning of.   You will need to hold your anticoagulation, Elqiuis for 2 days prior.   Hold your diuretic, Lasix the morning of procedure.   Hold all diabetic medications including, Metfomin.  If you take Lantus/Levemir only take ½ your normal dose the evening before.  All other medications can be taken in the morning with sips of water.   You will need to take 325 mg aspirin the morning of.  If you are currently taking 81 mg please take 4 tablets that morning.   Do not use any lotions, creams or perfume the morning of procedure.   Pre-procedure lab work will need to be completed 5-7 days prior to procedure.   Please have a responsible adult to drive you home after procedure. We advise you have someone to stay with you for 24 hours following procedure for precautionary measures. Depending on procedure you may require an overnight stay.   Cath lab will provide you with all post procedure instructions.     If you have any questions regarding the procedure itself or medications, please call [phone] and ask to speak with a nurse.

## 2025-02-04 ENCOUNTER — HOSPITAL ENCOUNTER (OUTPATIENT)
Dept: WOUND CARE | Age: 75
Discharge: HOME | End: 2025-02-04
Attending: EMERGENCY MEDICINE
Payer: MEDICARE

## 2025-02-04 VITALS
HEART RATE: 64 BPM | SYSTOLIC BLOOD PRESSURE: 135 MMHG | TEMPERATURE: 97.5 F | DIASTOLIC BLOOD PRESSURE: 67 MMHG | RESPIRATION RATE: 16 BRPM

## 2025-02-04 DIAGNOSIS — E11.621 DIABETIC ULCER OF RIGHT HEEL ASSOCIATED WITH TYPE 2 DIABETES MELLITUS, WITH NECROSIS OF MUSCLE (HCC): ICD-10-CM

## 2025-02-04 DIAGNOSIS — E66.9 TYPE 2 DIABETES MELLITUS WITH OBESITY (HCC): ICD-10-CM

## 2025-02-04 DIAGNOSIS — Z74.09 IMPAIRED MOBILITY: ICD-10-CM

## 2025-02-04 DIAGNOSIS — E11.628 TYPE 2 DIABETES MELLITUS WITH RIGHT DIABETIC FOOT INFECTION (HCC): ICD-10-CM

## 2025-02-04 DIAGNOSIS — L97.413 DIABETIC ULCER OF RIGHT MIDFOOT ASSOCIATED WITH TYPE 2 DIABETES MELLITUS, WITH NECROSIS OF MUSCLE (HCC): ICD-10-CM

## 2025-02-04 DIAGNOSIS — R60.0 LOCALIZED EDEMA: ICD-10-CM

## 2025-02-04 DIAGNOSIS — E11.621 DIABETIC ULCER OF RIGHT MIDFOOT ASSOCIATED WITH TYPE 2 DIABETES MELLITUS, WITH NECROSIS OF MUSCLE (HCC): ICD-10-CM

## 2025-02-04 DIAGNOSIS — L97.413 DIABETIC ULCER OF RIGHT HEEL ASSOCIATED WITH TYPE 2 DIABETES MELLITUS, WITH NECROSIS OF MUSCLE (HCC): ICD-10-CM

## 2025-02-04 DIAGNOSIS — I70.234 ATHEROSCLEROSIS OF NATIVE ARTERY OF RIGHT LOWER EXTREMITY WITH ULCERATION OF MIDFOOT (HCC): Primary | ICD-10-CM

## 2025-02-04 DIAGNOSIS — I87.321 IDIOPATHIC CHRONIC VENOUS HYPERTENSION OF RIGHT LEG WITH INFLAMMATION: ICD-10-CM

## 2025-02-04 DIAGNOSIS — E11.69 TYPE 2 DIABETES MELLITUS WITH OBESITY (HCC): ICD-10-CM

## 2025-02-04 DIAGNOSIS — L08.9 TYPE 2 DIABETES MELLITUS WITH RIGHT DIABETIC FOOT INFECTION (HCC): ICD-10-CM

## 2025-02-04 PROCEDURE — 11046 DBRDMT MUSC&/FSCA EA ADDL: CPT

## 2025-02-04 PROCEDURE — 11043 DBRDMT MUSC&/FSCA 1ST 20/<: CPT | Performed by: EMERGENCY MEDICINE

## 2025-02-04 PROCEDURE — 11046 DBRDMT MUSC&/FSCA EA ADDL: CPT | Performed by: EMERGENCY MEDICINE

## 2025-02-04 PROCEDURE — 11043 DBRDMT MUSC&/FSCA 1ST 20/<: CPT

## 2025-02-04 RX ORDER — LIDOCAINE 40 MG/G
CREAM TOPICAL ONCE
OUTPATIENT
Start: 2025-02-04 | End: 2025-02-04

## 2025-02-04 RX ORDER — SILVER SULFADIAZINE 10 MG/G
CREAM TOPICAL ONCE
OUTPATIENT
Start: 2025-02-04 | End: 2025-02-04

## 2025-02-04 RX ORDER — LIDOCAINE 40 MG/G
CREAM TOPICAL ONCE
Status: COMPLETED | OUTPATIENT
Start: 2025-02-04 | End: 2025-02-04

## 2025-02-04 RX ORDER — NEOMYCIN/BACITRACIN/POLYMYXINB 3.5-400-5K
OINTMENT (GRAM) TOPICAL ONCE
OUTPATIENT
Start: 2025-02-04 | End: 2025-02-04

## 2025-02-04 RX ORDER — TRIAMCINOLONE ACETONIDE 1 MG/G
OINTMENT TOPICAL ONCE
OUTPATIENT
Start: 2025-02-04 | End: 2025-02-04

## 2025-02-04 RX ORDER — SODIUM CHLOR/HYPOCHLOROUS ACID 0.033 %
SOLUTION, IRRIGATION IRRIGATION ONCE
OUTPATIENT
Start: 2025-02-04 | End: 2025-02-04

## 2025-02-04 RX ORDER — MUPIROCIN 20 MG/G
OINTMENT TOPICAL ONCE
OUTPATIENT
Start: 2025-02-04 | End: 2025-02-04

## 2025-02-04 RX ORDER — LIDOCAINE 50 MG/G
OINTMENT TOPICAL ONCE
OUTPATIENT
Start: 2025-02-04 | End: 2025-02-04

## 2025-02-04 RX ORDER — LIDOCAINE HYDROCHLORIDE 40 MG/ML
SOLUTION TOPICAL ONCE
OUTPATIENT
Start: 2025-02-04 | End: 2025-02-04

## 2025-02-04 RX ORDER — BACITRACIN ZINC AND POLYMYXIN B SULFATE 500; 1000 [USP'U]/G; [USP'U]/G
OINTMENT TOPICAL ONCE
OUTPATIENT
Start: 2025-02-04 | End: 2025-02-04

## 2025-02-04 RX ORDER — BETAMETHASONE DIPROPIONATE 0.5 MG/G
CREAM TOPICAL ONCE
OUTPATIENT
Start: 2025-02-04 | End: 2025-02-04

## 2025-02-04 RX ORDER — GINSENG 100 MG
CAPSULE ORAL ONCE
OUTPATIENT
Start: 2025-02-04 | End: 2025-02-04

## 2025-02-04 RX ORDER — GENTAMICIN SULFATE 1 MG/G
OINTMENT TOPICAL ONCE
OUTPATIENT
Start: 2025-02-04 | End: 2025-02-04

## 2025-02-04 RX ORDER — CLOBETASOL PROPIONATE 0.5 MG/G
OINTMENT TOPICAL ONCE
OUTPATIENT
Start: 2025-02-04 | End: 2025-02-04

## 2025-02-04 RX ORDER — LIDOCAINE HYDROCHLORIDE 20 MG/ML
JELLY TOPICAL ONCE
OUTPATIENT
Start: 2025-02-04 | End: 2025-02-04

## 2025-02-04 RX ADMIN — LIDOCAINE: 40 CREAM TOPICAL at 10:32

## 2025-02-04 ASSESSMENT — PAIN SCALES - GENERAL
PAINLEVEL_OUTOF10: 0
PAINLEVEL_OUTOF10: 0

## 2025-02-04 NOTE — PROGRESS NOTES
than 30 degrees.                                      [x] Assistive Devices   PODIATRY SHOE  Use as instructed by the provider      Activity: Other WEIGHT BEARING STATUS PER DR. BIJU DPM      Dietary:   Continue your diet as tolerated.  Protein is a key nutrient in helping to repair damaged tissue and promote new tissue growth. Good sources of protein include milk, yogurt, cheese, fish, lean meat and beans.  If you are DIABETIC, having diabetes can make it hard for wounds to heal. Try to keep your blood sugar within it's target range.  Limit Sodium, Alcohol and Sugar.    Pain:   Please Note some pain, drainage and/or bleeding might be expected after seeing the provider. TO HELP ALLEVIATE PAIN WE RECOMMEND THE FOLLOWING  Elevate the affected limb.  Use over the counter medications as permitted by your family doctor.  For Persistent Pain not relieved by the above interventions, please notify your family doctor.        : FAITH     Electronically signed by Faith Guerrier RN on 2/4/2025 at 10:44 AM       Wound Care Center Information: Should you experience any significant changes in your wound(s) or have questions about your wound care, please contact the [facility] Center at [phone] MONDAY - THURSDAY 8:00 am - 4:30 pm and Friday 8:00 am - 12:30 pm.  If you need help with your wound outside these hours and cannot wait until we are again available, contact your PCP or go to the hospital emergency room.     PLEASE NOTE: IF YOU ARE UNABLE TO OBTAIN WOUND SUPPLIES, CONTINUE TO USE THE SUPPLIES YOU HAVE AVAILABLE UNTIL YOU ARE ABLE TO REACH US. IT IS MOST IMPORTANT TO KEEP THE WOUND COVERED AT ALL TIMES.         Physician Signature:_______________________    Date: ___________ Time:  ____________          Dr Fabio Antoine            Electronically signed by FABIO ANTOINE MD on 2/4/2025 at 10:59 AM

## 2025-02-04 NOTE — PATIENT INSTRUCTIONS
[facility] Wound Care Physician Orders and Discharge Instructions  [facility]  [address], [address]  [place], [place] [address]  Telephone: [phone]      FAX [phone]  MONDAY - THURSDAY 8:00 am - 4:30 pm and Friday 8:00 am - 12:00 pm.        NAME:  Marvin Montero  YOB: 1950  MEDICAL RECORD NUMBER:  [id number]  DATE:  2/4/2025      Return Appointment:  [x] Return Appointment: With Dr Cece Alejandre  in  1 Week(s)   [] Wound and dressing supply provider:   [x] ECF or Home Healthcare: [facility]  [] Wound Assessment: [] Physician or NP scheduled for Wound Assessment:   [] Orders placed during your visit:     **MAY USE HIBICLENS TO CLEANSE WOUND PRIOR TO DRESSING CHANGE- MAY RINSE WOUND PRIOR TO APPLYING DRESSING**    **MAKE APPOINTMENT TO SEE DR. BOOTH IN INFECTIOUS DISEASE AS SOON AS POSSIBLE FOR POSSIBLE ANTIBIOTIC THERAPY**    Important Reminders:   Please wash hands with soap and water before and after every dressing change.  Do not scrub wounds.  Keep wounds dry in shower unless otherwise instructed by the physician.  SMOKING can slow would healing. Stop smoking as soon as possible to improve healing and prevent further complications associated with smoking.      Fransico-Wound Topical Treatments:  Do not apply lotions, creams, or ointments to wound bed unless directed.   [] Apply moisturizing lotion to skin surrounding the wound prior to dressing change.  [] Apply antifungal ointment to skin surrounding the wound prior to dressing change.  [] Apply thin film of no sting moisture barrier ointment to skin immediately around      wound.  [] Other:       Wound Location: RIGHT LATERAL TMA    Wound Cleansing: Vashe soak for 5 minutes prior to dressing change    Primary Dressing:  [x] SILVADENE   [x] BETADINE TO FRANSICO-WOUND ALLOW TO DRY THEN APPLY SILVER ALGINATE TO MACERATED AREAS    Secondary Dressing:  [x] GAUZE, ABD (EXTRA CUSHION TO ANKLE WITH ABD

## 2025-02-04 NOTE — TELEPHONE ENCOUNTER
Date of Procedure: Thursday 2/6/25 @ [facility] with Dr. Espinosa     Time of arrival: 9:00 am     Procedure time: 10:30 am     Called and spoke to Chris and he is agreeable to date and time. Reviewed and sent Chris a [employer] message with his procedure date, time and instructions and he verbalized understanding. Encouraged to call with any questions or concerns.     Published on [employer] and e-mail to Diana.

## 2025-02-05 ENCOUNTER — HOSPITAL ENCOUNTER (OUTPATIENT)
Age: 75
Discharge: HOME | End: 2025-02-05
Payer: MEDICARE

## 2025-02-05 DIAGNOSIS — I48.0 PAF (PAROXYSMAL ATRIAL FIBRILLATION) (HCC): ICD-10-CM

## 2025-02-05 DIAGNOSIS — I73.9 PAD (PERIPHERAL ARTERY DISEASE) (HCC): ICD-10-CM

## 2025-02-05 LAB
ANION GAP SERPL CALCULATED.3IONS-SCNC: 6 MMOL/L (ref 3–16)
BUN SERPL-MCNC: 17 MG/DL (ref 7–20)
CALCIUM SERPL-MCNC: 8.6 MG/DL (ref 8.3–10.6)
CHLORIDE SERPL-SCNC: 112 MMOL/L (ref 99–110)
CO2 SERPL-SCNC: 24 MMOL/L (ref 21–32)
CREAT SERPL-MCNC: 1.4 MG/DL (ref 0.8–1.3)
DEPRECATED RDW RBC AUTO: 17 % (ref 12.4–15.4)
GFR SERPLBLD CREATININE-BSD FMLA CKD-EPI: 53 ML/MIN/{1.73_M2}
GLUCOSE SERPL-MCNC: 83 MG/DL (ref 70–99)
HCT VFR BLD AUTO: 34.7 % (ref 40.5–52.5)
HGB BLD-MCNC: 11.5 G/DL (ref 13.5–17.5)
MCH RBC QN AUTO: 28.8 PG (ref 26–34)
MCHC RBC AUTO-ENTMCNC: 33.3 G/DL (ref 31–36)
MCV RBC AUTO: 86.5 FL (ref 80–100)
PLATELET # BLD AUTO: 143 K/UL (ref 135–450)
PMV BLD AUTO: 9.7 FL (ref 5–10.5)
POTASSIUM SERPL-SCNC: 4.1 MMOL/L (ref 3.5–5.1)
RBC # BLD AUTO: 4.01 M/UL (ref 4.2–5.9)
SODIUM SERPL-SCNC: 142 MMOL/L (ref 136–145)
WBC # BLD AUTO: 4.3 K/UL (ref 4–11)

## 2025-02-05 PROCEDURE — 36415 COLL VENOUS BLD VENIPUNCTURE: CPT

## 2025-02-05 PROCEDURE — 80048 BASIC METABOLIC PNL TOTAL CA: CPT

## 2025-02-05 PROCEDURE — 85027 COMPLETE CBC AUTOMATED: CPT

## 2025-02-05 NOTE — H&P
H&P Update -  [facility]  Cardiology Consult     Marvin Montero  1950        Reason for Referral: Peripheral arterial disease     CC: \"My leg is not healing.\"        Subjective:      History of Present Illness:     Marvin Montero is a 74 y.o. patient with a PMH significant for peripheral arterial disease s/p left BKA, atrial fibrillation s/p DCCV (2017), CHF s/p [employer] BiV ICD (2017) and WILL.      Today, he is here for follow up. He has a wound on his right foot that is not healing. He has been following with Dr Bills and wound care. He uses a walker for ambulation. Patient denies exertional chest pain/pressure, dyspnea at rest, MOSS, PND, orthopnea, palpitations, lightheadedness, weight changes, and syncope. Patient reports compliance to his medications.         Past Medical History:   has a past medical history of Atrial fibrillation (HCC), Back pain, Cardiomyopathy, CHF (congestive heart failure) (HCC), COPD (chronic obstructive pulmonary disease) (HCC), Dental disease, Dizziness, Dyslipidemia, GERD (gastroesophageal reflux disease), Hearing loss, Hyperlipidemia, Hypertension, Hypothyroidism, Leg edema, MRSA (methicillin resistant staph aureus) culture positive, MRSA nasal colonization, Obesity, Prolonged emergence from general anesthesia, 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, Sleep apnea, Stroke (HCC), Thyroid disease, Tinnitus, Type 2 diabetes mellitus without complication (HCC), and Type II or unspecified type diabetes mellitus without mention of complication, not stated as uncontrolled.     Surgical History:   has a past surgical history that includes Foot surgery (01/02/2010); Adrenal gland surgery (1970); Gastric bypass surgery; other surgical history (10/06/2015); other surgical history (Right, 10/08/2015); Cardiac defibrillator placement (09/05/2017); pacemaker placement; Colonoscopy (N/A, 03/08/2019); Upper gastrointestinal endoscopy (N/A, 03/08/2019); Colonoscopy (N/A,

## 2025-02-06 ENCOUNTER — TELEPHONE (OUTPATIENT)
Dept: CARDIOLOGY CLINIC | Age: 75
End: 2025-02-06

## 2025-02-06 ENCOUNTER — HOSPITAL ENCOUNTER (OUTPATIENT)
Age: 75
Setting detail: OUTPATIENT SURGERY
Discharge: HOME | End: 2025-02-06
Attending: INTERNAL MEDICINE | Admitting: INTERNAL MEDICINE
Payer: MEDICARE

## 2025-02-06 VITALS
HEIGHT: 76 IN | TEMPERATURE: 98.1 F | SYSTOLIC BLOOD PRESSURE: 150 MMHG | OXYGEN SATURATION: 100 % | BODY MASS INDEX: 30.69 KG/M2 | WEIGHT: 252 LBS | HEART RATE: 61 BPM | DIASTOLIC BLOOD PRESSURE: 62 MMHG

## 2025-02-06 DIAGNOSIS — I73.9 PAD (PERIPHERAL ARTERY DISEASE) (HCC): ICD-10-CM

## 2025-02-06 PROCEDURE — C1769 GUIDE WIRE: HCPCS | Performed by: INTERNAL MEDICINE

## 2025-02-06 PROCEDURE — 7100000010 HC PHASE II RECOVERY - FIRST 15 MIN: Performed by: INTERNAL MEDICINE

## 2025-02-06 PROCEDURE — 37226 HC FEM POP TERR PLASTY AND STENT: CPT | Performed by: INTERNAL MEDICINE

## 2025-02-06 PROCEDURE — 2580000003 HC RX 258: Performed by: INTERNAL MEDICINE

## 2025-02-06 PROCEDURE — 2709999900 HC NON-CHARGEABLE SUPPLY: Performed by: INTERNAL MEDICINE

## 2025-02-06 PROCEDURE — 99153 MOD SED SAME PHYS/QHP EA: CPT | Performed by: INTERNAL MEDICINE

## 2025-02-06 PROCEDURE — 76937 US GUIDE VASCULAR ACCESS: CPT | Performed by: INTERNAL MEDICINE

## 2025-02-06 PROCEDURE — C1887 CATHETER, GUIDING: HCPCS | Performed by: INTERNAL MEDICINE

## 2025-02-06 PROCEDURE — 7100000011 HC PHASE II RECOVERY - ADDTL 15 MIN: Performed by: INTERNAL MEDICINE

## 2025-02-06 PROCEDURE — 99152 MOD SED SAME PHYS/QHP 5/>YRS: CPT | Performed by: INTERNAL MEDICINE

## 2025-02-06 PROCEDURE — 6360000002 HC RX W HCPCS: Performed by: INTERNAL MEDICINE

## 2025-02-06 PROCEDURE — C1894 INTRO/SHEATH, NON-LASER: HCPCS | Performed by: INTERNAL MEDICINE

## 2025-02-06 PROCEDURE — C1725 CATH, TRANSLUMIN NON-LASER: HCPCS | Performed by: INTERNAL MEDICINE

## 2025-02-06 PROCEDURE — 37230 HC TIB PER TERR STENT AND PLASTY: CPT | Performed by: INTERNAL MEDICINE

## 2025-02-06 PROCEDURE — C1874 STENT, COATED/COV W/DEL SYS: HCPCS | Performed by: INTERNAL MEDICINE

## 2025-02-06 PROCEDURE — 75710 ARTERY X-RAYS ARM/LEG: CPT | Performed by: INTERNAL MEDICINE

## 2025-02-06 PROCEDURE — C1760 CLOSURE DEV, VASC: HCPCS | Performed by: INTERNAL MEDICINE

## 2025-02-06 PROCEDURE — 6360000004 HC RX CONTRAST MEDICATION: Performed by: INTERNAL MEDICINE

## 2025-02-06 DEVICE — ESPRIT™ BTK EVEROLIMUS ELUTING RESORBABLE SCAFFOLD SYSTEM 3.75MM X 28MM RAPID-EXCHANGE
Type: IMPLANTABLE DEVICE | Status: FUNCTIONAL
Brand: ESPRIT™

## 2025-02-06 DEVICE — ESPRIT™ BTK EVEROLIMUS ELUTING RESORBABLE SCAFFOLD SYSTEM 3.75MM X 38MM RAPID-EXCHANGE
Type: IMPLANTABLE DEVICE | Status: FUNCTIONAL
Brand: ESPRIT™

## 2025-02-06 RX ORDER — MIDAZOLAM HYDROCHLORIDE 1 MG/ML
INJECTION, SOLUTION INTRAMUSCULAR; INTRAVENOUS PRN
Status: DISCONTINUED | OUTPATIENT
Start: 2025-02-06 | End: 2025-02-06 | Stop reason: HOSPADM

## 2025-02-06 RX ORDER — HEPARIN SODIUM 1000 [USP'U]/ML
INJECTION, SOLUTION INTRAVENOUS; SUBCUTANEOUS PRN
Status: DISCONTINUED | OUTPATIENT
Start: 2025-02-06 | End: 2025-02-06 | Stop reason: HOSPADM

## 2025-02-06 RX ORDER — ACETAMINOPHEN 325 MG/1
650 TABLET ORAL EVERY 4 HOURS PRN
Status: DISCONTINUED | OUTPATIENT
Start: 2025-02-06 | End: 2025-02-06 | Stop reason: HOSPADM

## 2025-02-06 RX ORDER — ASPIRIN 325 MG
325 TABLET ORAL ONCE
Status: DISCONTINUED | OUTPATIENT
Start: 2025-02-06 | End: 2025-02-06 | Stop reason: HOSPADM

## 2025-02-06 RX ORDER — SODIUM CHLORIDE 0.9 % (FLUSH) 0.9 %
5-40 SYRINGE (ML) INJECTION EVERY 12 HOURS SCHEDULED
Status: DISCONTINUED | OUTPATIENT
Start: 2025-02-06 | End: 2025-02-06 | Stop reason: HOSPADM

## 2025-02-06 RX ORDER — SODIUM CHLORIDE 9 MG/ML
INJECTION, SOLUTION INTRAVENOUS PRN
Status: DISCONTINUED | OUTPATIENT
Start: 2025-02-06 | End: 2025-02-06 | Stop reason: HOSPADM

## 2025-02-06 RX ORDER — IOPAMIDOL 612 MG/ML
INJECTION, SOLUTION INTRAVASCULAR PRN
Status: DISCONTINUED | OUTPATIENT
Start: 2025-02-06 | End: 2025-02-06 | Stop reason: HOSPADM

## 2025-02-06 RX ORDER — SODIUM CHLORIDE 9 MG/ML
INJECTION, SOLUTION INTRAVENOUS CONTINUOUS
Status: DISCONTINUED | OUTPATIENT
Start: 2025-02-06 | End: 2025-02-06 | Stop reason: HOSPADM

## 2025-02-06 RX ORDER — SODIUM CHLORIDE 0.9 % (FLUSH) 0.9 %
5-40 SYRINGE (ML) INJECTION PRN
Status: DISCONTINUED | OUTPATIENT
Start: 2025-02-06 | End: 2025-02-06 | Stop reason: HOSPADM

## 2025-02-06 RX ORDER — FENTANYL CITRATE 50 UG/ML
INJECTION, SOLUTION INTRAMUSCULAR; INTRAVENOUS PRN
Status: DISCONTINUED | OUTPATIENT
Start: 2025-02-06 | End: 2025-02-06 | Stop reason: HOSPADM

## 2025-02-06 RX ADMIN — SODIUM CHLORIDE: 9 INJECTION, SOLUTION INTRAVENOUS at 09:20

## 2025-02-06 NOTE — DISCHARGE INSTRUCTIONS
PERIPHERAL ANGIOGRAM    Care of your puncture site:  Remove bandage 24 hours after the procedure.  May shower in 24 hours but do not sit in a bathtub/pool of water for 5 days or until the wound is healed.  Gently clean groin using soap and water.  Dry thoroughly and apply a Band-Aid that covers the entire site. Use Band-Aid until skin heals over in about 3-5 days.  Do not apply powder or lotion.      Normal Observations:  Soreness or tenderness which may last one week.  Possible bruising that could last 2 weeks.    Activity:  You may resume driving 24 hours following the procedure.  Do not make important / legal decisions within 24 hours after procedure.  You may resume normal activity in 5 days or after the wound heals.  Avoid lifting more than 10 pounds for 5 days or until the wound heals.  Avoid strenuous exercise or activity for 1 week.      Nutrition:  Regular diet  Drink at least 8 to 10 glasses of decaffeinated, non-alcoholic fluid for the next 24 hours to flush the x-ray dye used for your angiogram out of your body.    Call your doctor immediately if your condition worsens, for any other concerns, for a follow-up appointment or if you experience any of the following:  Increased swelling on the groin or leg.  Unusual pain, numbness, or tingling of the groin or down the leg.  Any signs of infection such as: redness, yellow drainage at the site, swelling or pain.    IF GROIN STARTS BLEEDING SIGNIFICANTLY:   LAY FLAT, HOLD FIRM DIRECT PRESSURE, AND CALL 911    Other Instructions:  Hold Metformin or Metformin containing drugs for 48 hours after procedure.

## 2025-02-06 NOTE — PROGRESS NOTES
Patient received post procedure in stable condition.   Pt is alert and oriented x4, No distress noted. VSS-see flowsheet.   Post-cath restrictions/instructions provided  Patient provided with snack/drink.   No further needs at this time, call light within reach.   Family at bedside  Dr. Espinosa spoke with pt and wife, restart eliquis tomorrow and 2-3 week f/u with Dr Christy    1700 up in room, dressed with asst per wife, ambulate to WC with steady gait.    1715 dc to car per wc to car with family

## 2025-02-06 NOTE — PROGRESS NOTES
Prepped and ready for procedure, call light in reach and instructed to call for any needs or concerns, family at bedside.  Aware of possible delay. Dr Espinosa aware of Creat. 1.4 and ivf to 200ml/hr    1100 updated on delay of procedure

## 2025-02-07 ENCOUNTER — TELEPHONE (OUTPATIENT)
Dept: WOUND CARE | Age: 75
End: 2025-02-07

## 2025-02-07 NOTE — TELEPHONE ENCOUNTER
Staff attempted to obtain prior authorization for Hyperbaric Oxygen Therapy from [employer], per Availity this authorization must be obtained through [facility].    Date: 2/7/25  Time: 1100    Service Provider: [place]/ Woody  Phone #: [phone]  Sp[facility]: Singh HENRY  Authorization Approval Number: [id number]  CPT:   Quantity: 120 units  Dates for Approval: 2/12/2025-4/13/2025  ICD 10 Code: E11.621    Per Singh OLIVER. Representative from [facility] the patient is approved to receive Hyperbaric Oxygen Therapy. Patient called and notified, is agreeable to start HBO therapy on Wednesday 2/12/25 at 1015. No other questions or concerns expressed at this time. Patient scheduled to follow up for wound care with Dr. Pili MD on 2/11/15.    Electronically signed by Faith Guerrier RN on 2/7/2025 at 11:33 AM

## 2025-02-09 LAB — ECHO BSA: 2.48 M2

## 2025-02-11 ENCOUNTER — HOSPITAL ENCOUNTER (OUTPATIENT)
Dept: WOUND CARE | Age: 75
Discharge: HOME | End: 2025-02-11
Attending: EMERGENCY MEDICINE
Payer: MEDICARE

## 2025-02-11 VITALS
HEART RATE: 63 BPM | TEMPERATURE: 96.9 F | SYSTOLIC BLOOD PRESSURE: 130 MMHG | RESPIRATION RATE: 16 BRPM | DIASTOLIC BLOOD PRESSURE: 56 MMHG

## 2025-02-11 DIAGNOSIS — E11.621 DIABETIC ULCER OF RIGHT MIDFOOT ASSOCIATED WITH TYPE 2 DIABETES MELLITUS, WITH NECROSIS OF MUSCLE (HCC): ICD-10-CM

## 2025-02-11 DIAGNOSIS — Z74.09 IMPAIRED MOBILITY: ICD-10-CM

## 2025-02-11 DIAGNOSIS — L08.9 TYPE 2 DIABETES MELLITUS WITH RIGHT DIABETIC FOOT INFECTION (HCC): ICD-10-CM

## 2025-02-11 DIAGNOSIS — E66.9 TYPE 2 DIABETES MELLITUS WITH OBESITY (HCC): ICD-10-CM

## 2025-02-11 DIAGNOSIS — I70.234 ATHEROSCLEROSIS OF NATIVE ARTERY OF RIGHT LOWER EXTREMITY WITH ULCERATION OF MIDFOOT (HCC): Primary | ICD-10-CM

## 2025-02-11 DIAGNOSIS — E11.69 TYPE 2 DIABETES MELLITUS WITH OBESITY (HCC): ICD-10-CM

## 2025-02-11 DIAGNOSIS — R60.0 LOCALIZED EDEMA: ICD-10-CM

## 2025-02-11 DIAGNOSIS — L97.413 DIABETIC ULCER OF RIGHT MIDFOOT ASSOCIATED WITH TYPE 2 DIABETES MELLITUS, WITH NECROSIS OF MUSCLE (HCC): ICD-10-CM

## 2025-02-11 DIAGNOSIS — E11.628 TYPE 2 DIABETES MELLITUS WITH RIGHT DIABETIC FOOT INFECTION (HCC): ICD-10-CM

## 2025-02-11 DIAGNOSIS — I87.321 IDIOPATHIC CHRONIC VENOUS HYPERTENSION OF RIGHT LEG WITH INFLAMMATION: ICD-10-CM

## 2025-02-11 PROCEDURE — 11046 DBRDMT MUSC&/FSCA EA ADDL: CPT

## 2025-02-11 PROCEDURE — 11046 DBRDMT MUSC&/FSCA EA ADDL: CPT | Performed by: EMERGENCY MEDICINE

## 2025-02-11 PROCEDURE — 11043 DBRDMT MUSC&/FSCA 1ST 20/<: CPT | Performed by: EMERGENCY MEDICINE

## 2025-02-11 PROCEDURE — 11043 DBRDMT MUSC&/FSCA 1ST 20/<: CPT

## 2025-02-11 RX ORDER — BACITRACIN ZINC AND POLYMYXIN B SULFATE 500; 1000 [USP'U]/G; [USP'U]/G
OINTMENT TOPICAL ONCE
OUTPATIENT
Start: 2025-02-11 | End: 2025-02-11

## 2025-02-11 RX ORDER — LIDOCAINE 50 MG/G
OINTMENT TOPICAL ONCE
OUTPATIENT
Start: 2025-02-11 | End: 2025-02-11

## 2025-02-11 RX ORDER — GENTAMICIN SULFATE 1 MG/G
OINTMENT TOPICAL ONCE
OUTPATIENT
Start: 2025-02-11 | End: 2025-02-11

## 2025-02-11 RX ORDER — SILVER SULFADIAZINE 10 MG/G
CREAM TOPICAL ONCE
OUTPATIENT
Start: 2025-02-11 | End: 2025-02-11

## 2025-02-11 RX ORDER — MUPIROCIN 20 MG/G
OINTMENT TOPICAL ONCE
OUTPATIENT
Start: 2025-02-11 | End: 2025-02-11

## 2025-02-11 RX ORDER — LIDOCAINE HYDROCHLORIDE 20 MG/ML
JELLY TOPICAL ONCE
OUTPATIENT
Start: 2025-02-11 | End: 2025-02-11

## 2025-02-11 RX ORDER — SODIUM CHLOR/HYPOCHLOROUS ACID 0.033 %
SOLUTION, IRRIGATION IRRIGATION ONCE
OUTPATIENT
Start: 2025-02-11 | End: 2025-02-11

## 2025-02-11 RX ORDER — GINSENG 100 MG
CAPSULE ORAL ONCE
OUTPATIENT
Start: 2025-02-11 | End: 2025-02-11

## 2025-02-11 RX ORDER — LIDOCAINE HYDROCHLORIDE 40 MG/ML
SOLUTION TOPICAL ONCE
OUTPATIENT
Start: 2025-02-11 | End: 2025-02-11

## 2025-02-11 RX ORDER — TRIAMCINOLONE ACETONIDE 1 MG/G
OINTMENT TOPICAL ONCE
OUTPATIENT
Start: 2025-02-11 | End: 2025-02-11

## 2025-02-11 RX ORDER — LIDOCAINE 40 MG/G
CREAM TOPICAL ONCE
OUTPATIENT
Start: 2025-02-11 | End: 2025-02-11

## 2025-02-11 RX ORDER — NEOMYCIN/BACITRACIN/POLYMYXINB 3.5-400-5K
OINTMENT (GRAM) TOPICAL ONCE
OUTPATIENT
Start: 2025-02-11 | End: 2025-02-11

## 2025-02-11 RX ORDER — BETAMETHASONE DIPROPIONATE 0.5 MG/G
CREAM TOPICAL ONCE
OUTPATIENT
Start: 2025-02-11 | End: 2025-02-11

## 2025-02-11 RX ORDER — LIDOCAINE 40 MG/G
CREAM TOPICAL ONCE
Status: COMPLETED | OUTPATIENT
Start: 2025-02-11 | End: 2025-02-11

## 2025-02-11 RX ORDER — CLOBETASOL PROPIONATE 0.5 MG/G
OINTMENT TOPICAL ONCE
OUTPATIENT
Start: 2025-02-11 | End: 2025-02-11

## 2025-02-11 RX ADMIN — LIDOCAINE: 40 CREAM TOPICAL at 09:46

## 2025-02-11 ASSESSMENT — PAIN DESCRIPTION - DESCRIPTORS: DESCRIPTORS: ACHING

## 2025-02-11 ASSESSMENT — PAIN - FUNCTIONAL ASSESSMENT: PAIN_FUNCTIONAL_ASSESSMENT: PREVENTS OR INTERFERES SOME ACTIVE ACTIVITIES AND ADLS

## 2025-02-11 ASSESSMENT — PAIN SCALES - GENERAL
PAINLEVEL_OUTOF10: 4
PAINLEVEL_OUTOF10: 0

## 2025-02-11 ASSESSMENT — PAIN DESCRIPTION - ORIENTATION: ORIENTATION: RIGHT

## 2025-02-11 ASSESSMENT — PAIN DESCRIPTION - FREQUENCY: FREQUENCY: INTERMITTENT

## 2025-02-11 ASSESSMENT — PAIN DESCRIPTION - ONSET: ONSET: GRADUAL

## 2025-02-11 ASSESSMENT — PAIN DESCRIPTION - LOCATION: LOCATION: FOOT

## 2025-02-11 ASSESSMENT — PAIN DESCRIPTION - PAIN TYPE: TYPE: ACUTE PAIN

## 2025-02-11 NOTE — PROGRESS NOTES
[facility]     Note Type: Medical Staff Progress Note    Referring Provider: Self Referral  Reason for Referral:   Chief Complaint   Patient presents with    Wound Check     Follow-up visit for a wound to the right lateral foot.        Marvin Montero  MEDICAL RECORD NUMBER:  [id number]  AGE: 74 y.o.   GENDER: male  : 1950  EPISODE DATE:  2025    Chief complaint and reason for visit:     Chief Complaint   Patient presents with    Wound Check     Follow-up visit for a wound to the right lateral foot.         HPI/Wound Narrative:      Marvin Montero is a 74 y.o. male who presents today for an evaluation of a wound/ulcer. Wound duration:  2024 .    25: no new issues. Had vascular intervention.    25: Patient has no specific complaints.  Does have angiogram with intervention scheduled for 2025.  He is also going to be getting his MRI soon.    25: no new issues. Has appt with cardio soon. MRI won't be done until after 2/3 due to recent pacer/defibrillator placed in 2024.    25: had his echo. EF about 55%. No changes in medications recommended by cardiology.    25: 74-year-old with past medical history notable for hypertension, type 2 diabetes mellitus, hyperlipidemia, atrial fibrillation on Eliquis, diastolic congestive heart failure, COPD, hypothyroidism, peripheral arterial disease status post left BKA who is known to me for wounds to his right lower extremity back in .  These were healing very well but for some reason he was lost to follow-up. Since I saw him last, he has had admission to the hospital.  He developed an abscess to his right foot in 2024.  At that time he had an extensive workup done including vascular evaluation.  Patient has noted decreased blood flow to right lower extremity and has an appointment with Dr. Christy.  He is status post TMA and surgical debridement and has been followed by podiatry since

## 2025-02-11 NOTE — PATIENT INSTRUCTIONS
[facility] Wound Care Physician Orders and Discharge Instructions  [facility]  [address], [address]  [place], [place] [address]  Telephone: [phone]      FAX [phone]  MONDAY - THURSDAY 8:00 am - 4:30 pm and Friday 8:00 am - 12:00 pm.        NAME:  Marvin Montero  YOB: 1950  MEDICAL RECORD NUMBER:  [id number]  DATE:  2/11/2025      Return Appointment:  [x] Return Appointment: With Dr Cece Alejandre  in  1 Week(s)   [] Wound and dressing supply provider:   [x] ECF or Home Healthcare: [facility]  [] Wound Assessment: [] Physician or NP scheduled for Wound Assessment:   [] Orders placed during your visit:     **MAY USE HIBICLENS TO CLEANSE WOUND PRIOR TO DRESSING CHANGE- MAY RINSE WOUND PRIOR TO APPLYING DRESSING**    **MAKE APPOINTMENT TO SEE DR. BOOTH IN INFECTIOUS DISEASE AS SOON AS POSSIBLE FOR POSSIBLE ANTIBIOTIC THERAPY**    Important Reminders:   Please wash hands with soap and water before and after every dressing change.  Do not scrub wounds.  Keep wounds dry in shower unless otherwise instructed by the physician.  SMOKING can slow would healing. Stop smoking as soon as possible to improve healing and prevent further complications associated with smoking.      Fransico-Wound Topical Treatments:  Do not apply lotions, creams, or ointments to wound bed unless directed.   [] Apply moisturizing lotion to skin surrounding the wound prior to dressing change.  [] Apply antifungal ointment to skin surrounding the wound prior to dressing change.  [] Apply thin film of no sting moisture barrier ointment to skin immediately around      wound.  [] Other:       Wound Location: RIGHT LATERAL TMA    Wound Cleansing: Vashe soak for 5 minutes prior to dressing change    Primary Dressing:  [x] SILVADENE   [x] BETADINE TO FRANSICO-WOUND ALLOW TO DRY THEN APPLY SILVER ALGINATE TO MACERATED AREAS    Secondary Dressing:  [x] GAUZE, ABD (EXTRA CUSHION TO ANKLE WITH ABD

## 2025-02-12 ENCOUNTER — PROCEDURE VISIT (OUTPATIENT)
Dept: AUDIOLOGY | Age: 75
End: 2025-02-12
Payer: MEDICARE

## 2025-02-12 ENCOUNTER — OFFICE VISIT (OUTPATIENT)
Dept: ENT CLINIC | Age: 75
End: 2025-02-12

## 2025-02-12 ENCOUNTER — HOSPITAL ENCOUNTER (OUTPATIENT)
Dept: HYPERBARIC MEDICINE | Age: 75
Discharge: HOME | End: 2025-02-12
Payer: MEDICARE

## 2025-02-12 VITALS
SYSTOLIC BLOOD PRESSURE: 119 MMHG | RESPIRATION RATE: 16 BRPM | TEMPERATURE: 97.1 F | HEART RATE: 66 BPM | DIASTOLIC BLOOD PRESSURE: 63 MMHG

## 2025-02-12 VITALS
WEIGHT: 252 LBS | OXYGEN SATURATION: 98 % | HEART RATE: 66 BPM | SYSTOLIC BLOOD PRESSURE: 149 MMHG | HEIGHT: 76 IN | DIASTOLIC BLOOD PRESSURE: 78 MMHG | BODY MASS INDEX: 30.69 KG/M2

## 2025-02-12 DIAGNOSIS — H93.8X3 SENSATION OF FULLNESS IN BOTH EARS: ICD-10-CM

## 2025-02-12 DIAGNOSIS — H90.3 SENSORINEURAL HEARING LOSS (SNHL) OF BOTH EARS: Primary | ICD-10-CM

## 2025-02-12 DIAGNOSIS — H69.93 DYSFUNCTION OF BOTH EUSTACHIAN TUBES: ICD-10-CM

## 2025-02-12 DIAGNOSIS — H90.3 SENSORINEURAL HEARING LOSS (SNHL) OF BOTH EARS: ICD-10-CM

## 2025-02-12 DIAGNOSIS — H90.3 ASYMMETRIC SNHL (SENSORINEURAL HEARING LOSS): ICD-10-CM

## 2025-02-12 DIAGNOSIS — E11.621 DIABETIC ULCER OF LEFT MIDFOOT ASSOCIATED WITH TYPE 2 DIABETES MELLITUS, WITH NECROSIS OF BONE (HCC): Primary | ICD-10-CM

## 2025-02-12 DIAGNOSIS — H93.13 TINNITUS OF BOTH EARS: Primary | ICD-10-CM

## 2025-02-12 DIAGNOSIS — T70.0XXA BAROTRAUMA, OTIC, INITIAL ENCOUNTER: ICD-10-CM

## 2025-02-12 DIAGNOSIS — H91.90 HEARING LOSS, UNSPECIFIED HEARING LOSS TYPE, UNSPECIFIED LATERALITY: Primary | ICD-10-CM

## 2025-02-12 DIAGNOSIS — L97.424 DIABETIC ULCER OF LEFT MIDFOOT ASSOCIATED WITH TYPE 2 DIABETES MELLITUS, WITH NECROSIS OF BONE (HCC): Primary | ICD-10-CM

## 2025-02-12 LAB
GLUCOSE BLD-MCNC: 139 MG/DL (ref 70–99)
GLUCOSE BLD-MCNC: 68 MG/DL (ref 70–99)
GLUCOSE BLD-MCNC: 88 MG/DL (ref 70–99)
GLUCOSE BLD-MCNC: 88 MG/DL (ref 70–99)
PERFORMED ON: ABNORMAL
PERFORMED ON: ABNORMAL
PERFORMED ON: NORMAL
PERFORMED ON: NORMAL

## 2025-02-12 PROCEDURE — 92557 COMPREHENSIVE HEARING TEST: CPT

## 2025-02-12 PROCEDURE — 99213 OFFICE O/P EST LOW 20 MIN: CPT

## 2025-02-12 PROCEDURE — 92567 TYMPANOMETRY: CPT

## 2025-02-12 RX ORDER — OXYMETAZOLINE HYDROCHLORIDE 0.05 G/100ML
SPRAY NASAL
Qty: 1 EACH | Refills: 3 | Status: SHIPPED | OUTPATIENT
Start: 2025-02-12

## 2025-02-12 RX ORDER — FLUTICASONE PROPIONATE 50 MCG
2 SPRAY, SUSPENSION (ML) NASAL 2 TIMES DAILY
Qty: 2 EACH | Refills: 5 | Status: SHIPPED | OUTPATIENT
Start: 2025-02-12

## 2025-02-12 ASSESSMENT — PAIN SCALES - GENERAL
PAINLEVEL_OUTOF10: 0
PAINLEVEL_OUTOF10: 0

## 2025-02-12 NOTE — PROGRESS NOTES
Marvin Montero   1950, 74 y.o. male   [id number]       Referring Provider: David Arambula MD  Referral Type: In an order in Epic    Reason for Visit: Evaluation of suspected change in hearing, tinnitus, or balance.    ADULT AUDIOLOGIC EVALUATION      Marvin Montero is a 74 y.o. male seen today, 2/12/2025 , for a recheck audiologic evaluation.  Patient was seen by David Arambula MD following today's evaluation.    AUDIOLOGIC AND OTHER PERTINENT MEDICAL HISTORY:      Marvin Montero reports significant pressure in both ears, right worse than left, during his first hyperbaric oxygen chamber therapy today.  He denied any other changes or new concerns for his hearing since his last test on 9/12/2023.    Case history and results from previous audiogram on 9/12/2023  Marvin Montero reports humming sound bilaterally that started 3 months ago and was told by his PCP that he has tinnitus. He also says after he eats, his energy drops and feels like his hearing is related to this. Tinnitus starts when he gets up in the morning, but denies that is bothersome. He has a history of diabetes and would like to obtain a baseline test.      He denied otalgia, aural fullness, otorrhea, dizziness, history of noise exposure, history of head trauma, history of ear surgery, and family history of hearing loss    Testing revealed a mild sloping to moderately severe sensorineural hearing loss in the right ear and a normal sloping to moderately severe sensorineural in the left ear.    Date: 2/12/2025     IMPRESSIONS:      Today's results revealed a Normal sloping to Severe Sensorineural hearing loss in the right ear and a Normal sloping to Moderately-Severe Sensorineural hearing loss in the left ear. Excellent speech understanding when in quiet. Tympanometry indicates normal middle ear function. Discussed test results and implications with patient. Discussed possible benefits of amplification.     Follow medical recommendations of David

## 2025-02-12 NOTE — PROGRESS NOTES
[facility]  DIVISION OF OTOLARYNGOLOGY- HEAD & NECK SURGERY  CONSULT      Marvin Montero (:  1950) is a 74 y.o. male, here for evaluation of the following chief complaint(s):  Tinnitus (HBO tx)      ASSESSMENT/PLAN:  1. Tinnitus of both ears  2. Sensorineural hearing loss (SNHL) of both ears  3. Dysfunction of both eustachian tubes  4. Sensation of fullness in both ears         This is a very pleasant 74 y.o. male here today for evaluation of the the above-noted complaints.      Assessment & Plan  1. Hearing loss, eustachian tube dysfunction and barotrauma in the setting of hyperbaric oxygen treatment.    He reports no worsening of hearing loss after the hyperbaric oxygen treatment. Examination reveals a small area of fullness of the blood vessels behind the eardrum, but no significant blood accumulation. The potential risks and benefits of ear tubes were discussed, including the possibility of worsened hearing, echoing, or no change at all. The tubes would stay in for about 12 to 18 months and usually fall out on their own. The risks of having the ear tube is if it falls out then he could have a hole in the eardrum, and his hearing could be permanently made worse. He was advised to gently pop his ears during hyperbaric treatments by holding his nose and blowing, as well as swallowing and yawning to stretch the eustachian tube. A prescription for Afrin nasal spray will be provided, to be used prior to each hyperbaric oxygen treatment. Additionally, a daily nasal steroid spray will be prescribed to facilitate the opening of the eustachian tube, with the aim of preventing the necessity for ear tubes and future rupture of blood vessels. If he can not tolerate the hyperbaric treatment with the sprays, he will return next Tuesday for potential ear tube placement.    2. Tinnitus.  He reports no current ringing in the ears.    Follow-up  The patient will follow up next Tuesday.    Medical Decision Making:  The

## 2025-02-12 NOTE — PROGRESS NOTES
HBO PROGRESS NOTE      NAME: Marvin Montero  MEDICAL RECORD NUMBER:  [id number]  AGE: 74 y.o.   GENDER: male  : 1950  EPISODE DATE:  2025     Subjective     HBO Treatment Number: 1 out of Total Treatments: 30    HBO Diagnosis:             Indications: Lower Extremity Diabetic Wound ___(site) (Right Foot)    Safety checks performed prior to treatment.  See doc flowsheets for documentation.    Objective        Recent Labs     25  1144 25  1201   POCGLU 68* 88     Pre treatment Vital Signs       Temp: 97.2 °F (36.2 °C)     Pulse: 61     Respirations: 16     BP: 104/62       Post treatment Vital Signs  Temp: 97.1 °F (36.2 °C)  Pulse: 66  Respirations: 16  BP: 119/63    Assessment        HBO Diagnosis:   Problem List Items Addressed This Visit          Endocrine    Diabetic ulcer of left midfoot associated with type 2 diabetes mellitus, with necrosis of bone (HCC) - Primary    Relevant Orders    Hypoglycemial protocol    POCT glucose    Care order/instruction    Care order/instruction       Physical Exam        General Appearance:  alert and oriented to person, place and time, well-developed and well-nourished, in no acute distress    Pre Tympanic Membrane Assessment:  Right: Normal (Per Dr. Joann MD)  Left: Normal (per Dr. Joann MD)    Post Tympanic Membrane Assessment:          Pulmonary/Chest:  clear to auscultation bilaterally- no wheezes, rales or rhonchi, normal air movement, no respiratory distress    Cardiovascular:  regular rate and rhythm    Plan        Patient placed in a full body Monoplace Chamber #: [id number]  Treatment Start Time: 1057        MINGO : 2  Number of Air Breaks:  Treatment Status: No Air break     Decompression Time: 1106   Treatment End Time: 1109  Length of Treatment: 90 Minutes  Symptoms Noted During Treatment: Other (Comment) (pressure/ fullness in ears)  Total Treatment Time (min): 12    Treatment Completion Status: Treatment aborted due to issue    I was

## 2025-02-12 NOTE — PLAN OF CARE
HBO NURSING DOCUMENTATION          [facility]    NAME:  Marvin Montero  YOB: 1950  MEDICAL RECORD NUMBER:  [id number]  DATE:  2/12/2025    Patient A/O x4, pre-assessment and vitals completed, noted BS less than 130, (was 88) notified Dr. David and patient given 8oz Glucernca. BS reassessed 20 minutes later, noted 139. Patient evaluated by Dr. David and cleared to start HBO, stated at 1057, traveling 1psi/ min patient reached 6 psi and stated ears were starting to feel pressure at 1101. Patient stated he was able to relieve the pressure with staff instruction, denied any pain and attempted to continue compression. At 1104 patient again notified staff of increased pressure in ears, notified Dr. Joann MD arrived to chamber side and decision to terminate treatment at this time 1106. Patient was decompressed and removed from chamber, reassessment, vitals and BS completed.     Post- treatment BS was 68, patient asymptomatic, notified provider who recommended glucerna, patient declined another glucerna, was agreeable to 4oz orange juice, provider agreeable. After another 15 minutes BS elevated to 88, cleared for D/C.     Post- treatment ears noted reddened, c/o pressure but states improving as the ears begin to \"pop\", patient seen by Dr. David, noted TEED 1 in bilateral ears. Dr. David ordered patient see ENT for evaluation and possible need for PE tubes. Staff called to [facility], opening available at 2:30PM, patient agreeable to be seen at this time.      Electronically signed by Faith Guerrier RN on 2/12/2025 at 12:14 PM

## 2025-02-13 NOTE — PROGRESS NOTES
('blackouts', 'faints', 'collapse') or dizziness.   Respiratory: No cough or wheezing, no sputum production. No hematemesis.    Gastrointestinal: No abdominal pain, appetite loss, blood in stools. No change in bowel or bladder habits.  Genitourinary: No dysuria, trouble voiding, or hematuria.  Musculoskeletal:  No gait disturbance, no joint complaints.  Integumentary: No rash or pruritis.  Neurological: No headache, diplopia, change in muscle strength, numbness or tingling.   Psychiatric: No anxiety or depression.  Endocrine: No temperature intolerance. No excessive thirst, fluid intake, or urination. No tremor.  Hematologic/Lymphatic: No abnormal bruising or bleeding, blood clots or swollen lymph nodes.  Allergic/Immunologic: No nasal congestion or hives.      Objective:     PHYSICAL EXAM:      Vitals:    02/28/25 1456   BP: 118/62   Pulse: 85   Resp: 14   SpO2: 97%      Weight - Scale: 114.8 kg (253 lb)       General Appearance:  Alert, cooperative, no distress, appears stated age.   Head:  Normocephalic, without obvious abnormality, atraumatic.   Eyes:  Pupils equal and round. No scleral icterus.   Mouth: Moist mucosa, no pharyngeal erythema.   Nose: Nares normal. No drainage or sinus tenderness.   Neck: Supple, symmetrical, trachea midline. No adenopathy. No tenderness/mass/nodules. No carotid bruit or elevated JVD.   Lungs:   Respiratory Effort: Normal   Auscultation: Clear to auscultation bilaterally, respirations unlabored. No wheeze, rales   Chest Wall:  No tenderness or deformity.   Cardiovascular:    Pulses  Palpation: normal   Ascultation: Regular rate, S1/ S2 normal. No murmur, rub, or gallop.  2+ radial and pedal pulses, symmetric  Carotid  Femoral   Abdomen and Gastrointestinal:   Soft, non-tender, bowel sounds active.  Liver and Spleen  Masses   Musculoskeletal: No muscle wasting  Back  Gait   Extremities: Extremities normal, atraumatic. No cyanosis or edema. No cyanosis clubbing       Skin:

## 2025-02-18 ENCOUNTER — HOSPITAL ENCOUNTER (OUTPATIENT)
Dept: WOUND CARE | Age: 75
Discharge: HOME | End: 2025-02-18
Attending: EMERGENCY MEDICINE
Payer: MEDICARE

## 2025-02-18 VITALS
SYSTOLIC BLOOD PRESSURE: 122 MMHG | TEMPERATURE: 98.2 F | RESPIRATION RATE: 18 BRPM | DIASTOLIC BLOOD PRESSURE: 55 MMHG | HEART RATE: 73 BPM

## 2025-02-18 DIAGNOSIS — I70.234 ATHEROSCLEROSIS OF NATIVE ARTERY OF RIGHT LOWER EXTREMITY WITH ULCERATION OF MIDFOOT (HCC): Primary | ICD-10-CM

## 2025-02-18 DIAGNOSIS — E11.621 DIABETIC ULCER OF RIGHT MIDFOOT ASSOCIATED WITH TYPE 2 DIABETES MELLITUS, WITH NECROSIS OF MUSCLE (HCC): ICD-10-CM

## 2025-02-18 DIAGNOSIS — L97.413 DIABETIC ULCER OF RIGHT MIDFOOT ASSOCIATED WITH TYPE 2 DIABETES MELLITUS, WITH NECROSIS OF MUSCLE (HCC): ICD-10-CM

## 2025-02-18 DIAGNOSIS — L08.9 TYPE 2 DIABETES MELLITUS WITH RIGHT DIABETIC FOOT INFECTION (HCC): ICD-10-CM

## 2025-02-18 DIAGNOSIS — E11.69 TYPE 2 DIABETES MELLITUS WITH OBESITY (HCC): ICD-10-CM

## 2025-02-18 DIAGNOSIS — R60.0 LOCALIZED EDEMA: ICD-10-CM

## 2025-02-18 DIAGNOSIS — Z74.09 IMPAIRED MOBILITY: ICD-10-CM

## 2025-02-18 DIAGNOSIS — E11.628 TYPE 2 DIABETES MELLITUS WITH RIGHT DIABETIC FOOT INFECTION (HCC): ICD-10-CM

## 2025-02-18 DIAGNOSIS — I87.321 IDIOPATHIC CHRONIC VENOUS HYPERTENSION OF RIGHT LEG WITH INFLAMMATION: ICD-10-CM

## 2025-02-18 DIAGNOSIS — E66.9 TYPE 2 DIABETES MELLITUS WITH OBESITY (HCC): ICD-10-CM

## 2025-02-18 PROBLEM — H92.03 EAR PAIN, BILATERAL: Status: ACTIVE | Noted: 2025-02-18

## 2025-02-18 PROCEDURE — 11043 DBRDMT MUSC&/FSCA 1ST 20/<: CPT

## 2025-02-18 PROCEDURE — 11046 DBRDMT MUSC&/FSCA EA ADDL: CPT

## 2025-02-18 PROCEDURE — 99213 OFFICE O/P EST LOW 20 MIN: CPT | Performed by: EMERGENCY MEDICINE

## 2025-02-18 PROCEDURE — 11043 DBRDMT MUSC&/FSCA 1ST 20/<: CPT | Performed by: EMERGENCY MEDICINE

## 2025-02-18 PROCEDURE — 11046 DBRDMT MUSC&/FSCA EA ADDL: CPT | Performed by: EMERGENCY MEDICINE

## 2025-02-18 RX ORDER — GENTAMICIN SULFATE 1 MG/G
OINTMENT TOPICAL ONCE
OUTPATIENT
Start: 2025-02-18 | End: 2025-02-18

## 2025-02-18 RX ORDER — SILVER SULFADIAZINE 10 MG/G
CREAM TOPICAL ONCE
OUTPATIENT
Start: 2025-02-18 | End: 2025-02-18

## 2025-02-18 RX ORDER — LIDOCAINE HYDROCHLORIDE 20 MG/ML
JELLY TOPICAL ONCE
OUTPATIENT
Start: 2025-02-18 | End: 2025-02-18

## 2025-02-18 RX ORDER — LIDOCAINE 50 MG/G
OINTMENT TOPICAL ONCE
OUTPATIENT
Start: 2025-02-18 | End: 2025-02-18

## 2025-02-18 RX ORDER — TRIAMCINOLONE ACETONIDE 1 MG/G
OINTMENT TOPICAL ONCE
OUTPATIENT
Start: 2025-02-18 | End: 2025-02-18

## 2025-02-18 RX ORDER — BACITRACIN ZINC AND POLYMYXIN B SULFATE 500; 1000 [USP'U]/G; [USP'U]/G
OINTMENT TOPICAL ONCE
OUTPATIENT
Start: 2025-02-18 | End: 2025-02-18

## 2025-02-18 RX ORDER — NEOMYCIN/BACITRACIN/POLYMYXINB 3.5-400-5K
OINTMENT (GRAM) TOPICAL ONCE
OUTPATIENT
Start: 2025-02-18 | End: 2025-02-18

## 2025-02-18 RX ORDER — SODIUM CHLOR/HYPOCHLOROUS ACID 0.033 %
SOLUTION, IRRIGATION IRRIGATION ONCE
OUTPATIENT
Start: 2025-02-18 | End: 2025-02-18

## 2025-02-18 RX ORDER — CLOBETASOL PROPIONATE 0.5 MG/G
OINTMENT TOPICAL ONCE
OUTPATIENT
Start: 2025-02-18 | End: 2025-02-18

## 2025-02-18 RX ORDER — LIDOCAINE 40 MG/G
CREAM TOPICAL ONCE
Status: COMPLETED | OUTPATIENT
Start: 2025-02-18 | End: 2025-02-18

## 2025-02-18 RX ORDER — BETAMETHASONE DIPROPIONATE 0.5 MG/G
CREAM TOPICAL ONCE
OUTPATIENT
Start: 2025-02-18 | End: 2025-02-18

## 2025-02-18 RX ORDER — MUPIROCIN 20 MG/G
OINTMENT TOPICAL ONCE
OUTPATIENT
Start: 2025-02-18 | End: 2025-02-18

## 2025-02-18 RX ORDER — GINSENG 100 MG
CAPSULE ORAL ONCE
OUTPATIENT
Start: 2025-02-18 | End: 2025-02-18

## 2025-02-18 RX ORDER — LIDOCAINE HYDROCHLORIDE 40 MG/ML
SOLUTION TOPICAL ONCE
OUTPATIENT
Start: 2025-02-18 | End: 2025-02-18

## 2025-02-18 RX ORDER — LIDOCAINE 40 MG/G
CREAM TOPICAL ONCE
OUTPATIENT
Start: 2025-02-18 | End: 2025-02-18

## 2025-02-18 RX ADMIN — LIDOCAINE: 40 CREAM TOPICAL at 09:15

## 2025-02-18 ASSESSMENT — PAIN SCALES - GENERAL
PAINLEVEL_OUTOF10: 0
PAINLEVEL_OUTOF10: 0

## 2025-02-18 NOTE — PATIENT INSTRUCTIONS
[facility] Wound Care Physician Orders and Discharge Instructions  [facility]  [address]  [place], [place] [address]  Telephone: [phone]      FAX [phone]  MONDAY - THURSDAY 8:00 am - 4:30 pm and Friday 8:00 am - 12:00 pm.        NAME:  Marvin Montero  YOB: 1950  MEDICAL RECORD NUMBER:  [id number]  DATE:  2/18/2025      Return Appointment:  [x] Return Appointment: With Dr Cece Alejandre  in  1 Week(s)   [] Wound and dressing supply provider:   [x] ECF or Home Healthcare: [facility]  [] Wound Assessment: [] Physician or NP scheduled for Wound Assessment:   [x] Orders placed during your visit: RECOMMEND AFRIN FOR NASAL SPRAY FOR USE AS NEEDED PRIOR TO HYPERBARIC OXYGEN THERAPY     **MAY USE HIBICLENS TO CLEANSE WOUND PRIOR TO DRESSING CHANGE- MAY RINSE WOUND PRIOR TO APPLYING DRESSING**    **MAKE APPOINTMENT TO SEE DR. BOOTH IN INFECTIOUS DISEASE AS SOON AS POSSIBLE FOR POSSIBLE ANTIBIOTIC THERAPY**    Important Reminders:   Please wash hands with soap and water before and after every dressing change.  Do not scrub wounds.  Keep wounds dry in shower unless otherwise instructed by the physician.  SMOKING can slow would healing. Stop smoking as soon as possible to improve healing and prevent further complications associated with smoking.      Fransico-Wound Topical Treatments:  Do not apply lotions, creams, or ointments to wound bed unless directed.   [] Apply moisturizing lotion to skin surrounding the wound prior to dressing change.  [] Apply antifungal ointment to skin surrounding the wound prior to dressing change.  [] Apply thin film of no sting moisture barrier ointment to skin immediately around      wound.  [] Other:       Wound Location: RIGHT LATERAL TMA    Wound Cleansing: Vashe soak for 5 minutes prior to dressing change    Primary Dressing:  [x] SILVADENE   [x] BETADINE TO FRANSICO-WOUND ALLOW TO DRY THEN APPLY SILVER ALGINATE TO

## 2025-02-18 NOTE — PROGRESS NOTES
[facility] Wound Care Center     Note Type: Medical Staff Progress Note    Referring Provider: Self Referral  Reason for Referral:   Chief Complaint   Patient presents with    Wound Check     Follow-up visit for a wound to the right foot.        Marvin Montero  MEDICAL RECORD NUMBER:  [id number]  AGE: 74 y.o.   GENDER: male  : 1950  EPISODE DATE:  2025    Chief complaint and reason for visit:     Chief Complaint   Patient presents with    Wound Check     Follow-up visit for a wound to the right foot.         HPI/Wound Narrative:      Marvin Montero is a 74 y.o. male who presents today for an evaluation of a wound/ulcer. Wound duration:  2024 .    25: Has ENT appointment scheduled for 2 PM today with possible ET tubes but the patient was also given Flonase.  Unfortunately cannot trial the Flonase and at this time he does want to trial this next week.      25: no new issues. Had vascular intervention.    25: Patient has no specific complaints.  Does have angiogram with intervention scheduled for 2025.  He is also going to be getting his MRI soon.    25: no new issues. Has appt with cardio soon. MRI won't be done until after 2/3 due to recent pacer/defibrillator placed in 2024.    25: had his echo. EF about 55%. No changes in medications recommended by cardiology.    25: 74-year-old with past medical history notable for hypertension, type 2 diabetes mellitus, hyperlipidemia, atrial fibrillation on Eliquis, diastolic congestive heart failure, COPD, hypothyroidism, peripheral arterial disease status post left BKA who is known to me for wounds to his right lower extremity back in .  These were healing very well but for some reason he was lost to follow-up. Since I saw him last, he has had admission to the hospital.  He developed an abscess to his right foot in 2024.  At that time he had an extensive workup done including vascular

## 2025-02-20 ENCOUNTER — TELEPHONE (OUTPATIENT)
Dept: INTERNAL MEDICINE CLINIC | Age: 75
End: 2025-02-20

## 2025-02-20 NOTE — TELEPHONE ENCOUNTER
Nurse wanted to Notify provider that patient stopped taking medication fargixa due to blood sugars being in the 60's through to 69,and patient is doing hyperbaric wound treatment and bs have to be between 130 to 250. Nurse wants to know if medication can be discontinued please advise.

## 2025-02-21 ENCOUNTER — HOSPITAL ENCOUNTER (OUTPATIENT)
Dept: MRI IMAGING | Age: 75
Discharge: HOME | End: 2025-02-21
Attending: EMERGENCY MEDICINE
Payer: MEDICARE

## 2025-02-21 VITALS — HEART RATE: 60 BPM | OXYGEN SATURATION: 92 % | DIASTOLIC BLOOD PRESSURE: 67 MMHG | SYSTOLIC BLOOD PRESSURE: 148 MMHG

## 2025-02-21 DIAGNOSIS — L08.9 DIABETIC FOOT INFECTION (HCC): ICD-10-CM

## 2025-02-21 DIAGNOSIS — E11.621 DIABETIC ULCER OF RIGHT MIDFOOT ASSOCIATED WITH TYPE 2 DIABETES MELLITUS, WITH NECROSIS OF MUSCLE (HCC): ICD-10-CM

## 2025-02-21 DIAGNOSIS — L97.413 DIABETIC ULCER OF RIGHT MIDFOOT ASSOCIATED WITH TYPE 2 DIABETES MELLITUS, WITH NECROSIS OF MUSCLE (HCC): ICD-10-CM

## 2025-02-21 DIAGNOSIS — E11.628 DIABETIC FOOT INFECTION (HCC): ICD-10-CM

## 2025-02-21 PROCEDURE — 6360000004 HC RX CONTRAST MEDICATION: Performed by: EMERGENCY MEDICINE

## 2025-02-21 PROCEDURE — 73720 MRI LWR EXTREMITY W/O&W/DYE: CPT

## 2025-02-21 PROCEDURE — A9579 GAD-BASE MR CONTRAST NOS,1ML: HCPCS | Performed by: EMERGENCY MEDICINE

## 2025-02-21 RX ADMIN — GADOTERIDOL 20 ML: 279.3 INJECTION, SOLUTION INTRAVENOUS at 15:03

## 2025-02-25 ENCOUNTER — OFFICE VISIT (OUTPATIENT)
Dept: ENT CLINIC | Age: 75
End: 2025-02-25
Payer: MEDICARE

## 2025-02-25 ENCOUNTER — HOSPITAL ENCOUNTER (OUTPATIENT)
Dept: HYPERBARIC MEDICINE | Age: 75
Discharge: HOME | End: 2025-02-25
Payer: MEDICARE

## 2025-02-25 ENCOUNTER — HOSPITAL ENCOUNTER (OUTPATIENT)
Dept: WOUND CARE | Age: 75
Discharge: HOME | End: 2025-02-25
Attending: EMERGENCY MEDICINE
Payer: MEDICARE

## 2025-02-25 VITALS — HEART RATE: 83 BPM | DIASTOLIC BLOOD PRESSURE: 72 MMHG | SYSTOLIC BLOOD PRESSURE: 137 MMHG

## 2025-02-25 VITALS
DIASTOLIC BLOOD PRESSURE: 46 MMHG | HEART RATE: 62 BPM | SYSTOLIC BLOOD PRESSURE: 108 MMHG | RESPIRATION RATE: 18 BRPM | TEMPERATURE: 98.1 F

## 2025-02-25 VITALS
SYSTOLIC BLOOD PRESSURE: 133 MMHG | HEART RATE: 62 BPM | DIASTOLIC BLOOD PRESSURE: 66 MMHG | RESPIRATION RATE: 16 BRPM | TEMPERATURE: 97.5 F

## 2025-02-25 DIAGNOSIS — Z74.09 IMPAIRED MOBILITY: ICD-10-CM

## 2025-02-25 DIAGNOSIS — E11.69 TYPE 2 DIABETES MELLITUS WITH OBESITY (HCC): ICD-10-CM

## 2025-02-25 DIAGNOSIS — I70.234 ATHEROSCLEROSIS OF NATIVE ARTERY OF RIGHT LOWER EXTREMITY WITH ULCERATION OF MIDFOOT (HCC): Primary | ICD-10-CM

## 2025-02-25 DIAGNOSIS — L97.413 DIABETIC ULCER OF RIGHT MIDFOOT ASSOCIATED WITH TYPE 2 DIABETES MELLITUS, WITH NECROSIS OF MUSCLE (HCC): ICD-10-CM

## 2025-02-25 DIAGNOSIS — H69.93 DYSFUNCTION OF BOTH EUSTACHIAN TUBES: Primary | ICD-10-CM

## 2025-02-25 DIAGNOSIS — R60.0 LOCALIZED EDEMA: ICD-10-CM

## 2025-02-25 DIAGNOSIS — E66.9 TYPE 2 DIABETES MELLITUS WITH OBESITY (HCC): ICD-10-CM

## 2025-02-25 DIAGNOSIS — H93.8X3 SENSATION OF FULLNESS IN BOTH EARS: ICD-10-CM

## 2025-02-25 DIAGNOSIS — E11.628 TYPE 2 DIABETES MELLITUS WITH RIGHT DIABETIC FOOT INFECTION (HCC): ICD-10-CM

## 2025-02-25 DIAGNOSIS — E11.621 DIABETIC ULCER OF RIGHT MIDFOOT ASSOCIATED WITH TYPE 2 DIABETES MELLITUS, WITH NECROSIS OF MUSCLE (HCC): ICD-10-CM

## 2025-02-25 DIAGNOSIS — H65.193 ACUTE EFFUSION OF BOTH MIDDLE EARS: ICD-10-CM

## 2025-02-25 DIAGNOSIS — I87.321 IDIOPATHIC CHRONIC VENOUS HYPERTENSION OF RIGHT LEG WITH INFLAMMATION: ICD-10-CM

## 2025-02-25 DIAGNOSIS — L08.9 TYPE 2 DIABETES MELLITUS WITH RIGHT DIABETIC FOOT INFECTION (HCC): ICD-10-CM

## 2025-02-25 DIAGNOSIS — T70.29XD BAROTRAUMA, SUBSEQUENT ENCOUNTER: ICD-10-CM

## 2025-02-25 DIAGNOSIS — E11.621 DIABETIC ULCER OF LEFT MIDFOOT ASSOCIATED WITH TYPE 2 DIABETES MELLITUS, WITH NECROSIS OF BONE (HCC): Primary | ICD-10-CM

## 2025-02-25 DIAGNOSIS — L97.424 DIABETIC ULCER OF LEFT MIDFOOT ASSOCIATED WITH TYPE 2 DIABETES MELLITUS, WITH NECROSIS OF BONE (HCC): Primary | ICD-10-CM

## 2025-02-25 LAB
GLUCOSE BLD-MCNC: 113 MG/DL (ref 70–99)
GLUCOSE BLD-MCNC: 125 MG/DL (ref 70–99)
GLUCOSE BLD-MCNC: 135 MG/DL (ref 70–99)
GLUCOSE BLD-MCNC: 96 MG/DL (ref 70–99)
PERFORMED ON: ABNORMAL
PERFORMED ON: NORMAL

## 2025-02-25 PROCEDURE — 99213 OFFICE O/P EST LOW 20 MIN: CPT | Performed by: EMERGENCY MEDICINE

## 2025-02-25 PROCEDURE — 3075F SYST BP GE 130 - 139MM HG: CPT | Performed by: STUDENT IN AN ORGANIZED HEALTH CARE EDUCATION/TRAINING PROGRAM

## 2025-02-25 PROCEDURE — 1123F ACP DISCUSS/DSCN MKR DOCD: CPT | Performed by: STUDENT IN AN ORGANIZED HEALTH CARE EDUCATION/TRAINING PROGRAM

## 2025-02-25 PROCEDURE — 11043 DBRDMT MUSC&/FSCA 1ST 20/<: CPT

## 2025-02-25 PROCEDURE — 11046 DBRDMT MUSC&/FSCA EA ADDL: CPT | Performed by: EMERGENCY MEDICINE

## 2025-02-25 PROCEDURE — 11043 DBRDMT MUSC&/FSCA 1ST 20/<: CPT | Performed by: EMERGENCY MEDICINE

## 2025-02-25 PROCEDURE — 69433 CREATE EARDRUM OPENING: CPT | Performed by: STUDENT IN AN ORGANIZED HEALTH CARE EDUCATION/TRAINING PROGRAM

## 2025-02-25 PROCEDURE — 99213 OFFICE O/P EST LOW 20 MIN: CPT | Performed by: STUDENT IN AN ORGANIZED HEALTH CARE EDUCATION/TRAINING PROGRAM

## 2025-02-25 PROCEDURE — 99183 HYPERBARIC OXYGEN THERAPY: CPT | Performed by: EMERGENCY MEDICINE

## 2025-02-25 PROCEDURE — 3078F DIAST BP <80 MM HG: CPT | Performed by: STUDENT IN AN ORGANIZED HEALTH CARE EDUCATION/TRAINING PROGRAM

## 2025-02-25 PROCEDURE — 11046 DBRDMT MUSC&/FSCA EA ADDL: CPT

## 2025-02-25 PROCEDURE — 1159F MED LIST DOCD IN RCRD: CPT | Performed by: STUDENT IN AN ORGANIZED HEALTH CARE EDUCATION/TRAINING PROGRAM

## 2025-02-25 PROCEDURE — G0277 HBOT, FULL BODY CHAMBER, 30M: HCPCS

## 2025-02-25 RX ORDER — BACITRACIN ZINC AND POLYMYXIN B SULFATE 500; 1000 [USP'U]/G; [USP'U]/G
OINTMENT TOPICAL ONCE
OUTPATIENT
Start: 2025-02-25 | End: 2025-02-25

## 2025-02-25 RX ORDER — LIDOCAINE HYDROCHLORIDE 40 MG/ML
SOLUTION TOPICAL ONCE
OUTPATIENT
Start: 2025-02-25 | End: 2025-02-25

## 2025-02-25 RX ORDER — CLOBETASOL PROPIONATE 0.5 MG/G
OINTMENT TOPICAL ONCE
OUTPATIENT
Start: 2025-02-25 | End: 2025-02-25

## 2025-02-25 RX ORDER — LIDOCAINE 50 MG/G
OINTMENT TOPICAL ONCE
OUTPATIENT
Start: 2025-02-25 | End: 2025-02-25

## 2025-02-25 RX ORDER — SODIUM CHLOR/HYPOCHLOROUS ACID 0.033 %
SOLUTION, IRRIGATION IRRIGATION ONCE
OUTPATIENT
Start: 2025-02-25 | End: 2025-02-25

## 2025-02-25 RX ORDER — TRIAMCINOLONE ACETONIDE 1 MG/G
OINTMENT TOPICAL ONCE
OUTPATIENT
Start: 2025-02-25 | End: 2025-02-25

## 2025-02-25 RX ORDER — GENTAMICIN SULFATE 1 MG/G
OINTMENT TOPICAL ONCE
OUTPATIENT
Start: 2025-02-25 | End: 2025-02-25

## 2025-02-25 RX ORDER — LIDOCAINE HYDROCHLORIDE 20 MG/ML
JELLY TOPICAL ONCE
OUTPATIENT
Start: 2025-02-25 | End: 2025-02-25

## 2025-02-25 RX ORDER — MUPIROCIN 20 MG/G
OINTMENT TOPICAL ONCE
OUTPATIENT
Start: 2025-02-25 | End: 2025-02-25

## 2025-02-25 RX ORDER — LIDOCAINE 40 MG/G
CREAM TOPICAL ONCE
Status: COMPLETED | OUTPATIENT
Start: 2025-02-25 | End: 2025-02-25

## 2025-02-25 RX ORDER — GINSENG 100 MG
CAPSULE ORAL ONCE
OUTPATIENT
Start: 2025-02-25 | End: 2025-02-25

## 2025-02-25 RX ORDER — OFLOXACIN 3 MG/ML
5 SOLUTION AURICULAR (OTIC) 2 TIMES DAILY
Qty: 10 ML | Refills: 0 | Status: SHIPPED | OUTPATIENT
Start: 2025-02-25 | End: 2025-03-04

## 2025-02-25 RX ORDER — NEOMYCIN/BACITRACIN/POLYMYXINB 3.5-400-5K
OINTMENT (GRAM) TOPICAL ONCE
OUTPATIENT
Start: 2025-02-25 | End: 2025-02-25

## 2025-02-25 RX ORDER — BETAMETHASONE DIPROPIONATE 0.5 MG/G
CREAM TOPICAL ONCE
OUTPATIENT
Start: 2025-02-25 | End: 2025-02-25

## 2025-02-25 RX ORDER — SILVER SULFADIAZINE 10 MG/G
CREAM TOPICAL ONCE
OUTPATIENT
Start: 2025-02-25 | End: 2025-02-25

## 2025-02-25 RX ORDER — LIDOCAINE 40 MG/G
CREAM TOPICAL ONCE
OUTPATIENT
Start: 2025-02-25 | End: 2025-02-25

## 2025-02-25 RX ADMIN — LIDOCAINE: 40 CREAM TOPICAL at 08:43

## 2025-02-25 ASSESSMENT — PAIN SCALES - GENERAL: PAINLEVEL_OUTOF10: 0

## 2025-02-25 NOTE — PATIENT INSTRUCTIONS
[facility] Wound Care Physician Orders and Discharge Instructions  [facility]  [address]  [place], [place] [address]  Telephone: [phone]      FAX [phone]  MONDAY - THURSDAY 8:00 am - 4:30 pm and Friday 8:00 am - 12:00 pm.        NAME:  Marvin Montero  YOB: 1950  MEDICAL RECORD NUMBER:  [id number]  DATE:  2/25/2025      Return Appointment:  [x] Return Appointment: With Dr Cece Alejandre  in  1 Week(s)   [] Wound and dressing supply provider:   [x] ECF or Home Healthcare: [facility]  [] Wound Assessment: [] Physician or NP scheduled for Wound Assessment:   [x] Orders placed during your visit: RECOMMEND AFRIN FOR NASAL SPRAY FOR USE AS NEEDED PRIOR TO HYPERBARIC OXYGEN THERAPY     **MAY USE HIBICLENS TO CLEANSE WOUND PRIOR TO DRESSING CHANGE- MAY RINSE WOUND PRIOR TO APPLYING DRESSING**    **MAKE APPOINTMENT TO SEE DR. BOOTH IN INFECTIOUS DISEASE AS SOON AS POSSIBLE FOR POSSIBLE ANTIBIOTIC THERAPY**    Important Reminders:   Please wash hands with soap and water before and after every dressing change.  Do not scrub wounds.  Keep wounds dry in shower unless otherwise instructed by the physician.  SMOKING can slow would healing. Stop smoking as soon as possible to improve healing and prevent further complications associated with smoking.      Michelle-Wound Topical Treatments:  Do not apply lotions, creams, or ointments to wound bed unless directed.   [] Apply moisturizing lotion to skin surrounding the wound prior to dressing change.  [] Apply antifungal ointment to skin surrounding the wound prior to dressing change.  [] Apply thin film of no sting moisture barrier ointment to skin immediately around      wound.  [] Other:     **SILVER ALGINATE AND MEPILEX BORDER TO SMALL AREA ON RIGHT ANTERIOR LOWER LEG**    Wound Location: RIGHT LATERAL TMA    Wound Cleansing: Vashe soak for 5 minutes prior to dressing change    Primary Dressing:  [x]

## 2025-02-25 NOTE — PLAN OF CARE
HBO NURSING DOCUMENTATION          [facility]    NAME:  Marvin Montero  YOB: 1950  MEDICAL RECORD NUMBER:  [id number]  DATE:  2/25/2025    Patient blood sugar was checked prior to HBO treatment.    0915 he was 113 and 8 oz of Glucerna was given.  0934 he was 125 and was given another 8 oz of Glucerna.  0949 Rechecked blood sugar and was 135.    Dr. Alejandre would like to proceed with HBO treatment with patient bringing 8 oz of Glucerna in the chamber. Dr. Alejandre will reevaluate after 30 minutes of treatment, whether he should continue treatment.    Electronically signed by Bhavani Hall RN on 2/25/2025 at 10:42 AM

## 2025-02-25 NOTE — PROGRESS NOTES
[facility] Wound [facility]     Note Type: Medical Staff Progress Note    Referring Provider: Self Referral  Reason for Referral:   Chief Complaint   Patient presents with    Wound Check     Follow-up visit for a right foot wound.        Marvin Montero  MEDICAL RECORD NUMBER:  [id number]  AGE: 74 y.o.   GENDER: male  : 1950  EPISODE DATE:  2025    Chief complaint and reason for visit:     Chief Complaint   Patient presents with    Wound Check     Follow-up visit for a right foot wound.         HPI/Wound Narrative:      Marvin Montero is a 74 y.o. male who presents today for an evaluation of a wound/ulcer. Wound duration:  2024 .    25: no new issues. Plans on HBOT today.    25: Has ENT appointment scheduled for 2 PM today with possible ET tubes but the patient was also given Flonase.  Unfortunately cannot trial the Flonase and at this time he does want to trial this next week.      25: no new issues. Had vascular intervention.    25: Patient has no specific complaints.  Does have angiogram with intervention scheduled for 2025.  He is also going to be getting his MRI soon.    25: no new issues. Has appt with cardio soon. MRI won't be done until after 2/3 due to recent pacer/defibrillator placed in 2024.    25: had his echo. EF about 55%. No changes in medications recommended by cardiology.    25: 74-year-old with past medical history notable for hypertension, type 2 diabetes mellitus, hyperlipidemia, atrial fibrillation on Eliquis, diastolic congestive heart failure, COPD, hypothyroidism, peripheral arterial disease status post left BKA who is known to me for wounds to his right lower extremity back in .  These were healing very well but for some reason he was lost to follow-up. Since I saw him last, he has had admission to the hospital.  He developed an abscess to his right foot in 2024.  At that time he had an extensive

## 2025-02-25 NOTE — PROGRESS NOTES
HBO PROGRESS NOTE      NAME: Marvin Montero  MEDICAL RECORD NUMBER:  [id number]  AGE: 74 y.o.   GENDER: male  : 1950  EPISODE DATE:  2025     Subjective     HBO Treatment Number: 1 out of Total Treatments: 30    HBO Diagnosis:             Indications: Lower Extremity Diabetic Wound ___(site) (Right Foot)    Safety checks performed prior to treatment.  See doc flowsheets for documentation.    Objective        Recent Labs     25  0949 25  1205   POCGLU 135* 96     Pre treatment Vital Signs       Temp: 98.2 °F (36.8 °C)     Pulse: 60     Respirations: 16     BP: (!) 110/55       Post treatment Vital Signs  Temp: 97.5 °F (36.4 °C)  Pulse: 62  Respirations: 16  BP: 133/66    Assessment        HBO Diagnosis:   Problem List Items Addressed This Visit          Endocrine    Diabetic ulcer of left midfoot associated with type 2 diabetes mellitus, with necrosis of bone (HCC) - Primary    Relevant Orders    Notify physician (specify)    Hyperbaric Oxygen Therapy    Hypoglycemial protocol    POCT glucose    Care order/instruction    Care order/instruction       Physical Exam        General Appearance:  alert and oriented to person, place and time, well-developed and well-nourished, in no acute distress    Pre Tympanic Membrane Assessment:  Right: Normal (Per Dr. Alejandre)  Left: Normal (per Dr Alejandre)    Post Tympanic Membrane Assessment: no issues/symptoms per HBO RN  Left: Normal (Denies any ear problems; Dr. Alejandre did assess ears)  Right: Normal (Denies any ear problems; Dr. Alejandre did assess ears)    Pulmonary/Chest:  clear to auscultation bilaterally- no wheezes, rales or rhonchi, normal air movement, no respiratory distress    Cardiovascular:  regular rate and rhythm    Plan        Patient placed in a full body Monoplace Chamber #: [id number]  Treatment Start Time: 1005     Pressure Reached Time: 1020  MINGO : 2  Number of Air Breaks:  Treatment Status: No Air break     Decompression Time: 1150   Treatment End

## 2025-02-25 NOTE — PROGRESS NOTES
[facility]  DIVISION OF OTOLARYNGOLOGY- HEAD & NECK SURGERY        Marvin Montero (:  1950) is a 74 y.o. male, here for evaluation of the following chief complaint(s):  Ear Problem and Follow-up      ASSESSMENT/PLAN:  1. Dysfunction of both eustachian tubes  -     ofloxacin (FLOXIN) 0.3 % otic solution; Place 5 drops in ear(s) 2 times daily for 7 days, Disp-10 mL, R-0Normal  2. Barotrauma, subsequent encounter  -     ofloxacin (FLOXIN) 0.3 % otic solution; Place 5 drops in ear(s) 2 times daily for 7 days, Disp-10 mL, R-0Normal  3. Sensation of fullness in both ears  4. Acute effusion of both middle ears         This is a very pleasant 74 y.o. male here today for evaluation of the the above-noted complaints.      Assessment & Plan  1. Hearing loss, eustachian tube dysfunction and barotrauma in the setting of hyperbaric oxygen treatment.    -Obvious fluid and inflammation of the bilateral tympanic membranes on exam today.  Tympanostomy tubes placed 2025  -Begin ofloxacin  -Cleared to resume hyperbaric oxygen treatment  -Can stop nasal sprays and less ear pain persist with treatment  -Follow-up in 3 months      Medical Decision Making:  The following items were considered in medical decision making:  Independent review of images  Review / order clinical lab tests  Review / order radiology tests  Decision to obtain old records  Review and summation of [facility]    SUBJECTIVE/OBJECTIVE:  HPI    History of Present Illness  The patient is a 74-year-old gentleman who presents today for hearing loss, tinnitus, and issues following hyperbaric oxygen treatment.    He is currently undergoing treatment for a foot injury, with his first round of hyperbaric oxygen therapy administered today. During this session, he experienced an unexpected response in his ears, which did not equalize as anticipated. This led to a prolonged exposure to the hyperbaric environment.

## 2025-02-27 ENCOUNTER — HOSPITAL ENCOUNTER (OUTPATIENT)
Dept: HYPERBARIC MEDICINE | Age: 75
Discharge: HOME | End: 2025-02-27
Payer: MEDICARE

## 2025-02-27 VITALS
TEMPERATURE: 97.1 F | DIASTOLIC BLOOD PRESSURE: 67 MMHG | SYSTOLIC BLOOD PRESSURE: 148 MMHG | HEART RATE: 68 BPM | RESPIRATION RATE: 18 BRPM

## 2025-02-27 DIAGNOSIS — E11.621 DIABETIC ULCER OF LEFT MIDFOOT ASSOCIATED WITH TYPE 2 DIABETES MELLITUS, WITH NECROSIS OF BONE (HCC): Primary | ICD-10-CM

## 2025-02-27 DIAGNOSIS — L97.424 DIABETIC ULCER OF LEFT MIDFOOT ASSOCIATED WITH TYPE 2 DIABETES MELLITUS, WITH NECROSIS OF BONE (HCC): Primary | ICD-10-CM

## 2025-02-27 LAB
GLUCOSE BLD-MCNC: 158 MG/DL (ref 70–99)
GLUCOSE BLD-MCNC: 99 MG/DL (ref 70–99)
PERFORMED ON: ABNORMAL
PERFORMED ON: NORMAL

## 2025-02-27 PROCEDURE — G0277 HBOT, FULL BODY CHAMBER, 30M: HCPCS

## 2025-02-27 PROCEDURE — 99183 HYPERBARIC OXYGEN THERAPY: CPT | Performed by: NURSE PRACTITIONER

## 2025-02-28 ENCOUNTER — OFFICE VISIT (OUTPATIENT)
Dept: CARDIOLOGY CLINIC | Age: 75
End: 2025-02-28
Payer: MEDICARE

## 2025-02-28 ENCOUNTER — HOSPITAL ENCOUNTER (OUTPATIENT)
Dept: HYPERBARIC MEDICINE | Age: 75
Discharge: HOME | End: 2025-02-28
Payer: MEDICARE

## 2025-02-28 VITALS
SYSTOLIC BLOOD PRESSURE: 123 MMHG | HEART RATE: 68 BPM | DIASTOLIC BLOOD PRESSURE: 69 MMHG | TEMPERATURE: 97.3 F | RESPIRATION RATE: 18 BRPM

## 2025-02-28 VITALS
WEIGHT: 253 LBS | SYSTOLIC BLOOD PRESSURE: 118 MMHG | HEART RATE: 85 BPM | RESPIRATION RATE: 14 BRPM | OXYGEN SATURATION: 97 % | DIASTOLIC BLOOD PRESSURE: 62 MMHG | BODY MASS INDEX: 30.81 KG/M2 | HEIGHT: 76 IN

## 2025-02-28 DIAGNOSIS — I10 ESSENTIAL HYPERTENSION: ICD-10-CM

## 2025-02-28 DIAGNOSIS — I50.22 CHRONIC SYSTOLIC HEART FAILURE (HCC): ICD-10-CM

## 2025-02-28 DIAGNOSIS — L97.424 DIABETIC ULCER OF LEFT MIDFOOT ASSOCIATED WITH TYPE 2 DIABETES MELLITUS, WITH NECROSIS OF BONE (HCC): Primary | ICD-10-CM

## 2025-02-28 DIAGNOSIS — I48.0 PAF (PAROXYSMAL ATRIAL FIBRILLATION) (HCC): ICD-10-CM

## 2025-02-28 DIAGNOSIS — I73.9 PAD (PERIPHERAL ARTERY DISEASE): Primary | ICD-10-CM

## 2025-02-28 DIAGNOSIS — E11.621 DIABETIC ULCER OF LEFT MIDFOOT ASSOCIATED WITH TYPE 2 DIABETES MELLITUS, WITH NECROSIS OF BONE (HCC): Primary | ICD-10-CM

## 2025-02-28 DIAGNOSIS — E78.2 MIXED HYPERLIPIDEMIA: ICD-10-CM

## 2025-02-28 LAB
GLUCOSE BLD-MCNC: 100 MG/DL (ref 70–99)
GLUCOSE BLD-MCNC: 182 MG/DL (ref 70–99)
GLUCOSE BLD-MCNC: 82 MG/DL (ref 70–99)
PERFORMED ON: ABNORMAL
PERFORMED ON: ABNORMAL
PERFORMED ON: NORMAL

## 2025-02-28 PROCEDURE — 3078F DIAST BP <80 MM HG: CPT | Performed by: INTERNAL MEDICINE

## 2025-02-28 PROCEDURE — 1123F ACP DISCUSS/DSCN MKR DOCD: CPT | Performed by: INTERNAL MEDICINE

## 2025-02-28 PROCEDURE — G0277 HBOT, FULL BODY CHAMBER, 30M: HCPCS

## 2025-02-28 PROCEDURE — 99214 OFFICE O/P EST MOD 30 MIN: CPT | Performed by: INTERNAL MEDICINE

## 2025-02-28 PROCEDURE — 99183 HYPERBARIC OXYGEN THERAPY: CPT

## 2025-02-28 PROCEDURE — 3074F SYST BP LT 130 MM HG: CPT | Performed by: INTERNAL MEDICINE

## 2025-02-28 RX ORDER — TORSEMIDE 20 MG/1
20 TABLET ORAL DAILY
Qty: 90 TABLET | Refills: 3 | Status: SHIPPED | OUTPATIENT
Start: 2025-02-28

## 2025-02-28 ASSESSMENT — PAIN SCALES - GENERAL
PAINLEVEL_OUTOF10: 0
PAINLEVEL_OUTOF10: 0

## 2025-02-28 NOTE — PROGRESS NOTES
HBO PROGRESS NOTE      NAME: Marvin Montero  MEDICAL RECORD NUMBER:  [id number]  AGE: 74 y.o.   GENDER: male  : 1950  EPISODE DATE:  2025     Subjective     HBO Treatment Number: 2 out of Total Treatments: 30    HBO Diagnosis:             Indications: Lower Extremity Diabetic Wound ___(site) (right foot)    Safety checks performed prior to treatment.  See doc flowsheets for documentation.    Objective        Recent Labs     25  0916 25  1134   POCGLU 158* 99     Pre treatment Vital Signs       Temp: 97.1 °F (36.2 °C)     Pulse: 66     Respirations: 16     BP: 116/64       Post treatment Vital Signs  Temp: 97.1 °F (36.2 °C)  Pulse: 68  Respirations: 18  BP: (!) 148/67    Assessment        HBO Diagnosis:   Problem List Items Addressed This Visit       Diabetic ulcer of left midfoot associated with type 2 diabetes mellitus, with necrosis of bone (HCC) - Primary    Relevant Orders    Notify physician (specify)    Hyperbaric Oxygen Therapy    Hypoglycemial protocol    POCT glucose    Care order/instruction       Physical Exam:  General Appearance:  alert and oriented to person, place and time, well-developed and well-nourished, in no acute distress    Pre Tympanic Membrane Assessment:  Right: Normal (PE tube visible, Per Aaron Cuadra, CNP)  Left: Normal (PE tube visible, per Aaron Cuadra, CNP)    Post Tympanic Membrane Assessment:  Left: Normal (denies ear problems, PE tube visible)  Right: Normal (denies ear problems, PE tube visible)    Pulmonary/Chest:  clear to auscultation bilaterally- no wheezes, rales or rhonchi, normal air movement, no respiratory distress    Cardiovascular:  regular rate and rhythm    Plan        Patient placed in a full body Monoplace Chamber #: [id number]  Treatment Start Time: 0939     Pressure Reached Time: 0948  MINGO : 2  Number of Air Breaks:  Treatment Status: No Air break     Decompression Time: 1118   Treatment End Time: 1128  Length of Treatment: 90

## 2025-02-28 NOTE — PLAN OF CARE
HBO NURSING DOCUMENTATION          [facility]    NAME:  Marvin Montero  YOB: 1950  MEDICAL RECORD NUMBER:  [id number]  DATE:  2/28/2025    Post treatment, patient's Blood glucose, 82. Per protocol assessed for signs and symptoms of hypoglycemia. Patient denies any symptoms but did drink 4 oz of Glucerna while getting dressed. Rechecked blood glucose, 100. Per protocol, patient discharged. RN notified Donis Avila. CNP.     Electronically signed by Connie Rojas RN on 2/28/2025 at 11:35 AM

## 2025-02-28 NOTE — TELEPHONE ENCOUNTER
Spoke with sierra she states pt been stop taking it due to him taking hyperbaric treatment and it have to be between 130-250 and it keep dropping.

## 2025-02-28 NOTE — PROGRESS NOTES
HBO PROGRESS NOTE      NAME: Marvin Montero  MEDICAL RECORD NUMBER:  [id number]  AGE: 74 y.o.   GENDER: male  : 1950  EPISODE DATE:  2025     Subjective     HBO Treatment Number: 3 out of Total Treatments: 30    HBO Diagnosis:             Indications: Lower Extremity Diabetic Wound ___(site) (right foot)    Safety checks performed prior to treatment.  See doc flowsheets for documentation.    Objective        Recent Labs     25  1129 25  1139   POCGLU 82 100*     Pre treatment Vital Signs       Temp: 97 °F (36.1 °C)     Pulse: 95     Respirations: 18     BP: 103/60       Post treatment Vital Signs  Temp: 97.3 °F (36.3 °C)  Pulse: 68  Respirations: 18  BP: 123/69    Assessment        HBO Diagnosis:   Problem List Items Addressed This Visit          Endocrine    Diabetic ulcer of left midfoot associated with type 2 diabetes mellitus, with necrosis of bone (HCC) - Primary    Relevant Orders    Notify physician (specify)    Hyperbaric Oxygen Therapy    Hypoglycemial protocol    POCT glucose    Care order/instruction    Care order/instruction    Care order/instruction    Care order/instruction    Care order/instruction    Care order/instruction    Care order/instruction    Care order/instruction       Physical Exam:  General Appearance:  alert and oriented to person, place and time, well-developed and well-nourished, in no acute distress    Pre Tympanic Membrane Assessment:  Right: Normal (Per Donis Avila CNP)  Left: Normal (Per Donis Avila CNP)    Post Tympanic Membrane Assessment:  Left: Normal (Pe tube visible, denies any ear issues)  Right: Normal (Pe tube visible, denies any ear issues)    Pulmonary/Chest:  clear to auscultation bilaterally- no wheezes, rales or rhonchi, normal air movement, no respiratory distress    Cardiovascular:  regular rate and rhythm, no murmurs rubs or gallops    Plan        Patient placed in a full body Monoplace Chamber #: [id number]  Treatment Start Time:

## 2025-03-03 ENCOUNTER — HOSPITAL ENCOUNTER (OUTPATIENT)
Dept: HYPERBARIC MEDICINE | Age: 75
Discharge: HOME | End: 2025-03-03
Payer: MEDICARE

## 2025-03-03 VITALS
TEMPERATURE: 97 F | DIASTOLIC BLOOD PRESSURE: 60 MMHG | RESPIRATION RATE: 16 BRPM | SYSTOLIC BLOOD PRESSURE: 144 MMHG | HEART RATE: 60 BPM

## 2025-03-03 DIAGNOSIS — L97.424 DIABETIC ULCER OF LEFT MIDFOOT ASSOCIATED WITH TYPE 2 DIABETES MELLITUS, WITH NECROSIS OF BONE (HCC): Primary | ICD-10-CM

## 2025-03-03 DIAGNOSIS — E11.621 DIABETIC ULCER OF LEFT MIDFOOT ASSOCIATED WITH TYPE 2 DIABETES MELLITUS, WITH NECROSIS OF BONE (HCC): Primary | ICD-10-CM

## 2025-03-03 LAB
GLUCOSE BLD-MCNC: 102 MG/DL (ref 70–99)
GLUCOSE BLD-MCNC: 213 MG/DL (ref 70–99)
PERFORMED ON: ABNORMAL
PERFORMED ON: ABNORMAL

## 2025-03-03 PROCEDURE — G0277 HBOT, FULL BODY CHAMBER, 30M: HCPCS

## 2025-03-03 PROCEDURE — 99183 HYPERBARIC OXYGEN THERAPY: CPT | Performed by: NURSE PRACTITIONER

## 2025-03-03 ASSESSMENT — PAIN SCALES - GENERAL
PAINLEVEL_OUTOF10: 0
PAINLEVEL_OUTOF10: 0

## 2025-03-03 NOTE — PROGRESS NOTES
HBO PROGRESS NOTE      NAME: Marvin Montero  MEDICAL RECORD NUMBER:  [id number]  AGE: 74 y.o.   GENDER: male  : 1950  EPISODE DATE:  3/3/2025     Subjective     HBO Treatment Number: 4 out of Total Treatments: 30    HBO Diagnosis:             Indications: Lower Extremity Diabetic Wound ___(site) (Right foot)    Safety checks performed prior to treatment.  See doc flowsheets for documentation.    Objective        Recent Labs     25  0927 25  1158   POCGLU 213* 102*     Pre treatment Vital Signs       Temp: 97 °F (36.1 °C)     Pulse: 78     Respirations: 16     BP: (!) 110/58       Post treatment Vital Signs  Temp: 97 °F (36.1 °C)  Pulse: 60  Respirations: 16  BP: (!) 144/60    Assessment        HBO Diagnosis:   Problem List Items Addressed This Visit       Diabetic ulcer of left midfoot associated with type 2 diabetes mellitus, with necrosis of bone (HCC) - Primary    Relevant Orders    Notify physician (specify)    Hyperbaric Oxygen Therapy    Hypoglycemial protocol    POCT glucose    Care order/instruction    Care order/instruction       Physical Exam:  General Appearance:  alert and oriented to person, place and time, well-developed and well-nourished, in no acute distress    Pre Tympanic Membrane Assessment:  Right: Normal (PE tube visible.  Denies any ear problems)  Left: Normal (PE tube visible with mild erythema above tube.  Denies any ear problems)    Post Tympanic Membrane Assessment:  Left: Normal (PE tube visible with mild erythema above tube.  Denies any ear problems)  Right: Normal (PE tube visible. Denies any ear problems)    Pulmonary/Chest:  clear to auscultation bilaterally- no wheezes, rales or rhonchi, normal air movement, no respiratory distress    Cardiovascular:  regular rate and rhythm      Plan        Patient placed in a full body Monoplace Chamber #: [id number]  Treatment Start Time: 0959     Pressure Reached Time: 1014  MINGO : 2  Number of Air Breaks:  Treatment Status:

## 2025-03-04 ENCOUNTER — HOSPITAL ENCOUNTER (OUTPATIENT)
Dept: HYPERBARIC MEDICINE | Age: 75
Discharge: HOME | End: 2025-03-04
Payer: MEDICARE

## 2025-03-04 ENCOUNTER — HOSPITAL ENCOUNTER (OUTPATIENT)
Dept: WOUND CARE | Age: 75
Discharge: HOME | End: 2025-03-04
Attending: EMERGENCY MEDICINE
Payer: MEDICARE

## 2025-03-04 ENCOUNTER — APPOINTMENT (OUTPATIENT)
Dept: HYPERBARIC MEDICINE | Age: 75
End: 2025-03-04
Payer: MEDICARE

## 2025-03-04 VITALS
DIASTOLIC BLOOD PRESSURE: 55 MMHG | SYSTOLIC BLOOD PRESSURE: 113 MMHG | TEMPERATURE: 97.4 F | RESPIRATION RATE: 18 BRPM | HEART RATE: 77 BPM

## 2025-03-04 VITALS
SYSTOLIC BLOOD PRESSURE: 143 MMHG | RESPIRATION RATE: 16 BRPM | DIASTOLIC BLOOD PRESSURE: 67 MMHG | HEART RATE: 63 BPM | TEMPERATURE: 97 F

## 2025-03-04 DIAGNOSIS — I87.321 IDIOPATHIC CHRONIC VENOUS HYPERTENSION OF RIGHT LEG WITH INFLAMMATION: ICD-10-CM

## 2025-03-04 DIAGNOSIS — E11.69 TYPE 2 DIABETES MELLITUS WITH OBESITY (HCC): ICD-10-CM

## 2025-03-04 DIAGNOSIS — R60.0 LOCALIZED EDEMA: ICD-10-CM

## 2025-03-04 DIAGNOSIS — E11.621 DIABETIC ULCER OF LEFT MIDFOOT ASSOCIATED WITH TYPE 2 DIABETES MELLITUS, WITH NECROSIS OF BONE (HCC): Primary | ICD-10-CM

## 2025-03-04 DIAGNOSIS — Z74.09 IMPAIRED MOBILITY: ICD-10-CM

## 2025-03-04 DIAGNOSIS — L08.9 TYPE 2 DIABETES MELLITUS WITH RIGHT DIABETIC FOOT INFECTION (HCC): ICD-10-CM

## 2025-03-04 DIAGNOSIS — L97.413 DIABETIC ULCER OF RIGHT MIDFOOT ASSOCIATED WITH TYPE 2 DIABETES MELLITUS, WITH NECROSIS OF MUSCLE (HCC): ICD-10-CM

## 2025-03-04 DIAGNOSIS — I70.234 ATHEROSCLEROSIS OF NATIVE ARTERY OF RIGHT LOWER EXTREMITY WITH ULCERATION OF MIDFOOT (HCC): Primary | ICD-10-CM

## 2025-03-04 DIAGNOSIS — E66.9 TYPE 2 DIABETES MELLITUS WITH OBESITY (HCC): ICD-10-CM

## 2025-03-04 DIAGNOSIS — L97.424 DIABETIC ULCER OF LEFT MIDFOOT ASSOCIATED WITH TYPE 2 DIABETES MELLITUS, WITH NECROSIS OF BONE (HCC): Primary | ICD-10-CM

## 2025-03-04 DIAGNOSIS — E11.621 DIABETIC ULCER OF RIGHT MIDFOOT ASSOCIATED WITH TYPE 2 DIABETES MELLITUS, WITH NECROSIS OF MUSCLE (HCC): ICD-10-CM

## 2025-03-04 DIAGNOSIS — E11.628 TYPE 2 DIABETES MELLITUS WITH RIGHT DIABETIC FOOT INFECTION (HCC): ICD-10-CM

## 2025-03-04 LAB
GLUCOSE BLD-MCNC: 111 MG/DL (ref 70–99)
GLUCOSE BLD-MCNC: 130 MG/DL (ref 70–99)
GLUCOSE BLD-MCNC: 134 MG/DL (ref 70–99)
PERFORMED ON: ABNORMAL

## 2025-03-04 PROCEDURE — 99183 HYPERBARIC OXYGEN THERAPY: CPT | Performed by: EMERGENCY MEDICINE

## 2025-03-04 PROCEDURE — 11046 DBRDMT MUSC&/FSCA EA ADDL: CPT | Performed by: EMERGENCY MEDICINE

## 2025-03-04 PROCEDURE — G0277 HBOT, FULL BODY CHAMBER, 30M: HCPCS

## 2025-03-04 PROCEDURE — 11046 DBRDMT MUSC&/FSCA EA ADDL: CPT

## 2025-03-04 PROCEDURE — 11043 DBRDMT MUSC&/FSCA 1ST 20/<: CPT | Performed by: EMERGENCY MEDICINE

## 2025-03-04 PROCEDURE — 11043 DBRDMT MUSC&/FSCA 1ST 20/<: CPT

## 2025-03-04 RX ORDER — LIDOCAINE 40 MG/G
CREAM TOPICAL ONCE
OUTPATIENT
Start: 2025-03-04 | End: 2025-03-04

## 2025-03-04 RX ORDER — MUPIROCIN 20 MG/G
OINTMENT TOPICAL ONCE
OUTPATIENT
Start: 2025-03-04 | End: 2025-03-04

## 2025-03-04 RX ORDER — LIDOCAINE HYDROCHLORIDE 20 MG/ML
JELLY TOPICAL ONCE
OUTPATIENT
Start: 2025-03-04 | End: 2025-03-04

## 2025-03-04 RX ORDER — CLOBETASOL PROPIONATE 0.5 MG/G
OINTMENT TOPICAL ONCE
OUTPATIENT
Start: 2025-03-04 | End: 2025-03-04

## 2025-03-04 RX ORDER — LIDOCAINE 40 MG/G
CREAM TOPICAL ONCE
Status: COMPLETED | OUTPATIENT
Start: 2025-03-04 | End: 2025-03-04

## 2025-03-04 RX ORDER — LIDOCAINE 50 MG/G
OINTMENT TOPICAL ONCE
OUTPATIENT
Start: 2025-03-04 | End: 2025-03-04

## 2025-03-04 RX ORDER — TRIAMCINOLONE ACETONIDE 1 MG/G
OINTMENT TOPICAL ONCE
OUTPATIENT
Start: 2025-03-04 | End: 2025-03-04

## 2025-03-04 RX ORDER — SODIUM CHLOR/HYPOCHLOROUS ACID 0.033 %
SOLUTION, IRRIGATION IRRIGATION ONCE
OUTPATIENT
Start: 2025-03-04 | End: 2025-03-04

## 2025-03-04 RX ORDER — BETAMETHASONE DIPROPIONATE 0.5 MG/G
CREAM TOPICAL ONCE
OUTPATIENT
Start: 2025-03-04 | End: 2025-03-04

## 2025-03-04 RX ORDER — GINSENG 100 MG
CAPSULE ORAL ONCE
OUTPATIENT
Start: 2025-03-04 | End: 2025-03-04

## 2025-03-04 RX ORDER — BACITRACIN ZINC AND POLYMYXIN B SULFATE 500; 1000 [USP'U]/G; [USP'U]/G
OINTMENT TOPICAL ONCE
OUTPATIENT
Start: 2025-03-04 | End: 2025-03-04

## 2025-03-04 RX ORDER — GENTAMICIN SULFATE 1 MG/G
OINTMENT TOPICAL ONCE
OUTPATIENT
Start: 2025-03-04 | End: 2025-03-04

## 2025-03-04 RX ORDER — LIDOCAINE HYDROCHLORIDE 40 MG/ML
SOLUTION TOPICAL ONCE
OUTPATIENT
Start: 2025-03-04 | End: 2025-03-04

## 2025-03-04 RX ORDER — SILVER SULFADIAZINE 10 MG/G
CREAM TOPICAL ONCE
OUTPATIENT
Start: 2025-03-04 | End: 2025-03-04

## 2025-03-04 RX ORDER — NEOMYCIN/BACITRACIN/POLYMYXINB 3.5-400-5K
OINTMENT (GRAM) TOPICAL ONCE
OUTPATIENT
Start: 2025-03-04 | End: 2025-03-04

## 2025-03-04 RX ADMIN — LIDOCAINE: 40 CREAM TOPICAL at 09:31

## 2025-03-04 ASSESSMENT — PAIN SCALES - GENERAL
PAINLEVEL_OUTOF10: 0

## 2025-03-04 NOTE — PATIENT INSTRUCTIONS
Information: Should you experience any significant changes in your wound(s) or have questions about your wound care, please contact the [facility] at [phone] MONDAY - THURSDAY 8:00 am - 4:30 pm and Friday 8:00 am - 12:30 pm.  If you need help with your wound outside these hours and cannot wait until we are again available, contact your PCP or go to the hospital emergency room.     PLEASE NOTE: IF YOU ARE UNABLE TO OBTAIN WOUND SUPPLIES, CONTINUE TO USE THE SUPPLIES YOU HAVE AVAILABLE UNTIL YOU ARE ABLE TO REACH US. IT IS MOST IMPORTANT TO KEEP THE WOUND COVERED AT ALL TIMES.         Physician Signature:_______________________    Date: ___________ Time:  ____________          Dr Cece Alejandre

## 2025-03-04 NOTE — PROGRESS NOTES
Time: 1246  Length of Treatment: 90 Minutes  Symptoms Noted During Treatment: None  Total Treatment Time (min): 107    Treatment Completion Status: Treatment completed without issue    I was present on these premises and immediately available to furnish assistance & direction throughout the HBO Treatment.     Marvin Montero is a 74 y.o. male  did successfully complete today's hyperbaric oxygen treatment at [facility] and HBO therapy.    In my clinical judgement, ongoing HBO therapy is  necessary at this time to assist with wound healing, preservation of limb, life, or function.    Supervision and attendance of Hyperbaric Oxygen Therapy provided. Continue HBO treatment as outlined in the treatment plan.    Hyperbaric Oxygen: Mr. Montero tolerated: Treatment Number: 5 without  issue. But his glucose requires close monitoring.    Discharge Instructions were explained and given to Mr. Montero     Electronically signed by FABIO ANTOINE MD on 3/4/2025 at 2:16 PM

## 2025-03-04 NOTE — PLAN OF CARE
[facility] NURSING DOCUMENTATION          [facility]    NAME:  Marvin Montero  YOB: 1950  MEDICAL RECORD NUMBER:  [id number]  DATE:  3/4/2025    [facility], pre-treatment blood sugar noted 111, notified Dr. Pili MD and patient given 8oz Glucerna. BS rechecked after 15 minutes, BS noted 130, notified Dr. Pili MD, orders to proceed with plans for HBO therapy this AM, patient given additional 8oz Glucerna to take with him into the chamber, provider agreeable. VSS, safety checklists completed, patient expressed no other concerns at this time. Patient treatment initiated at 1059.    Electronically signed by Faith Guerrier RN on 3/4/2025 at 11:11 AM

## 2025-03-04 NOTE — TELEPHONE ENCOUNTER
"Ryan Trevizo is a 57 y.o. male here for a non-provider visit for:   FLU    Reason for immunization: Annual Flu Vaccine  Immunization records indicate need for vaccine: Yes, confirmed with Epic  Minimum interval has been met for this vaccine: Yes  ABN completed: Not Indicated    Order and dose verified by: RAJEEV  VIS Dated  08/07/15 was given to patient: Yes  All IAC Questionnaire questions were answered \"No.\"    Patient tolerated injection and no adverse effects were observed or reported: Yes    Pt scheduled for next dose in series: Not Indicated  " I would only be concerned if the blood sugar falls below 60 then let me know. Otherwise I think it is medically ok to take the Farxiga. If there are further questions, they can contact me.

## 2025-03-04 NOTE — PROGRESS NOTES
03/04/25 0928   Wound Healing % -60 03/04/25 0928   Post-Procedure Length (cm) 7.2 cm 03/04/25 0955   Post-Procedure Width (cm) 2.8 cm 03/04/25 0955   Post-Procedure Depth (cm) 0.6 cm 03/04/25 0955   Post-Procedure Surface Area (cm^2) 20.16 cm^2 03/04/25 0955   Post-Procedure Volume (cm^3) 12.096 cm^3 03/04/25 0955   Wound Assessment Granulation tissue;Slough;Hyper granulation tissue 03/04/25 0928   Drainage Amount Large (50-75% saturated) 03/04/25 0928   Drainage Description Thick;Brown;Serosanguinous 03/04/25 0928   Odor Mild 03/04/25 0928   Michelle-wound Assessment Maceration;Hyperkeratosis (callous) 03/04/25 0928   Margins Attached edges 03/04/25 0928   Wound Thickness Description not for Pressure Injury Full thickness 03/04/25 0928   Number of days: 49     Incision 03/22/24 Foot Right (Active)   Number of days: 346       Incision 04/02/24 Foot Right (Active)   Number of days: 336       Incision 07/22/20 Foot Anterior;Left (Active)   Number of days: 1685     Procedures done this visit:   Procedure Note  Indications:  Based on my examination of this patient's wound(s)/ulcer(s) today, debridement is required to promote healing and evaluate the wound base.  Performed by: FABIO ANTOINE MD  Consent obtained:  Yes  Time out taken:  Yes  Pain Control: Anesthetic  Anesthetic: 4% Lidocaine Liquid Topical   Debridement: Excisional Debridement  Using #15 blade scalpel the wound(s)/ulcer(s) was/were debrided down through and including the removal of muscle/fascia.      Devitalized Tissue Debrided:  fibrin, biofilm, slough, necrotic/eschar, and exudate  Pre Debridement Measurements:  Are located in the Wound/Ulcer Documentation Flow Sheet  Diabetic/Pressure/Non Pressure Ulcers only:  Ulcer: Diabetic ulcer, muscle necrosis   Wound/Ulcer #: 1  Post Debridement Measurements:  Wound/Ulcer Descriptions are Pre Debridement except measurements:  Total Surface Area Debrided: 20.16 sq cm   Estimated Blood Loss:  Minimal  Hemostasis

## 2025-03-05 ENCOUNTER — OFFICE VISIT (OUTPATIENT)
Dept: INFECTIOUS DISEASES | Age: 75
End: 2025-03-05

## 2025-03-05 ENCOUNTER — HOSPITAL ENCOUNTER (OUTPATIENT)
Dept: HYPERBARIC MEDICINE | Age: 75
Discharge: HOME | End: 2025-03-05
Payer: MEDICARE

## 2025-03-05 VITALS
RESPIRATION RATE: 16 BRPM | DIASTOLIC BLOOD PRESSURE: 64 MMHG | SYSTOLIC BLOOD PRESSURE: 131 MMHG | HEART RATE: 65 BPM | TEMPERATURE: 97.2 F

## 2025-03-05 VITALS
DIASTOLIC BLOOD PRESSURE: 60 MMHG | WEIGHT: 256.4 LBS | BODY MASS INDEX: 31.22 KG/M2 | HEIGHT: 76 IN | HEART RATE: 68 BPM | SYSTOLIC BLOOD PRESSURE: 122 MMHG | OXYGEN SATURATION: 94 % | TEMPERATURE: 97 F

## 2025-03-05 DIAGNOSIS — M86.9 DIABETIC FOOT ULCER WITH OSTEOMYELITIS (HCC): Primary | ICD-10-CM

## 2025-03-05 DIAGNOSIS — L97.424 DIABETIC ULCER OF LEFT MIDFOOT ASSOCIATED WITH TYPE 2 DIABETES MELLITUS, WITH NECROSIS OF BONE (HCC): Primary | ICD-10-CM

## 2025-03-05 DIAGNOSIS — I70.244 ATHEROSCLEROSIS OF NATIVE ARTERIES OF LEFT LEG WITH ULCERATION OF HEEL AND MIDFOOT (HCC): ICD-10-CM

## 2025-03-05 DIAGNOSIS — I42.0 DILATED CARDIOMYOPATHY (HCC): ICD-10-CM

## 2025-03-05 DIAGNOSIS — E11.8 DIABETIC FOOT (HCC): ICD-10-CM

## 2025-03-05 DIAGNOSIS — L97.514 RIGHT FOOT ULCER, WITH NECROSIS OF BONE (HCC): ICD-10-CM

## 2025-03-05 DIAGNOSIS — I73.9 PAD (PERIPHERAL ARTERY DISEASE): ICD-10-CM

## 2025-03-05 DIAGNOSIS — M86.171 OTHER ACUTE OSTEOMYELITIS OF RIGHT FOOT (HCC): ICD-10-CM

## 2025-03-05 DIAGNOSIS — E11.621 DIABETIC FOOT ULCER WITH OSTEOMYELITIS (HCC): Primary | ICD-10-CM

## 2025-03-05 DIAGNOSIS — Z95.810 AICD (AUTOMATIC CARDIOVERTER/DEFIBRILLATOR) PRESENT: ICD-10-CM

## 2025-03-05 DIAGNOSIS — L97.509 DIABETIC FOOT ULCER WITH OSTEOMYELITIS (HCC): Primary | ICD-10-CM

## 2025-03-05 DIAGNOSIS — Z98.84 H/O GASTRIC BYPASS: ICD-10-CM

## 2025-03-05 DIAGNOSIS — M86.271 SUBACUTE OSTEOMYELITIS OF RIGHT FOOT (HCC): Primary | ICD-10-CM

## 2025-03-05 DIAGNOSIS — G47.33 OSA ON CPAP: ICD-10-CM

## 2025-03-05 DIAGNOSIS — E11.69 DIABETIC FOOT ULCER WITH OSTEOMYELITIS (HCC): Primary | ICD-10-CM

## 2025-03-05 DIAGNOSIS — I50.22 CHRONIC SYSTOLIC HEART FAILURE (HCC): ICD-10-CM

## 2025-03-05 DIAGNOSIS — M79.89 RIGHT LEG SWELLING: ICD-10-CM

## 2025-03-05 DIAGNOSIS — Z95.810 CARDIAC RESYNCHRONIZATION THERAPY DEFIBRILLATOR (CRT-D) IN PLACE: ICD-10-CM

## 2025-03-05 DIAGNOSIS — E11.621 DIABETIC ULCER OF LEFT MIDFOOT ASSOCIATED WITH TYPE 2 DIABETES MELLITUS, WITH NECROSIS OF BONE (HCC): Primary | ICD-10-CM

## 2025-03-05 LAB
GLUCOSE BLD-MCNC: 104 MG/DL (ref 70–99)
GLUCOSE BLD-MCNC: 122 MG/DL (ref 70–99)
GLUCOSE BLD-MCNC: 138 MG/DL (ref 70–99)
PERFORMED ON: ABNORMAL

## 2025-03-05 PROCEDURE — 99183 HYPERBARIC OXYGEN THERAPY: CPT | Performed by: SURGERY

## 2025-03-05 PROCEDURE — G0277 HBOT, FULL BODY CHAMBER, 30M: HCPCS

## 2025-03-05 ASSESSMENT — PAIN SCALES - GENERAL: PAINLEVEL_OUTOF10: 0

## 2025-03-05 NOTE — PLAN OF CARE
HBO NURSING DOCUMENTATION          [facility]    NAME:  Marvin Montero  YOB: 1950  MEDICAL RECORD NUMBER:  [id number]  DATE:  3/5/2025    Patient arrived for scheduled Hyperbaric Oxygen Therapy treatment, pre-treatment blood sugar noted 104, patient given 8oz glucerna. Provider, Dr. Yoseph David MD, notified of blood sugar less than 130; also notified provider of BP of 98/46. Patient denies any s/s of low blood pressure, per Dr. David okay to proceed with plan for treatment today. Recheck blood sugar after 15 minutes, noted 138, provider at chamber side to see patient for pre-treatment evaluation. All other VSS, safety checklist completed, patient expressed no other concerns at this time. Patient treatment initiated at 1134.    Electronically signed by Faith Guerrier RN on 3/5/2025 at 11:42 AM

## 2025-03-05 NOTE — PROGRESS NOTES
HBO PROGRESS NOTE      NAME: Marvin Montero  MEDICAL RECORD NUMBER:  [id number]  AGE: 74 y.o.   GENDER: male  : 1950  EPISODE DATE:  3/5/2025     Subjective     HBO Treatment Number: 6 out of Total Treatments: 30    HBO Diagnosis:             Indications: Lower Extremity Diabetic Wound ___(site) (right foot)    Safety checks performed prior to treatment.  See doc flowsheets for documentation.    Objective        Recent Labs     25  1126 25  1326   POCGLU 138* 122*     Pre treatment Vital Signs       Temp: 97.2 °F (36.2 °C)     Pulse: 70     Respirations: 16     BP: (!) 98/46 (notified Dr. Joann MD, pt asymptomatic. per provider okay to continue with HBO)       Post treatment Vital Signs  Temp: 97.2 °F (36.2 °C)  Pulse: 65  Respirations: 16  BP: 131/64    Assessment        HBO Diagnosis:   Problem List Items Addressed This Visit          Endocrine    Diabetic ulcer of left midfoot associated with type 2 diabetes mellitus, with necrosis of bone (HCC) - Primary    Relevant Orders    Notify physician (specify)    Hyperbaric Oxygen Therapy    Hypoglycemial protocol    POCT glucose    Care order/instruction    Care order/instruction    Care order/instruction       Physical Exam        General Appearance:  alert and oriented to person, place and time, well-developed and well-nourished, in no acute distress    Pre Tympanic Membrane Assessment:  Right: Normal (per Dr. Joann MD)  Left: Normal (per Dr. Joann MD)    Post Tympanic Membrane Assessment:  Left: Normal (Denies any ear problems)  Right: Normal (Denies any ear problems)    Pulmonary/Chest:  clear to auscultation bilaterally- no wheezes, rales or rhonchi, normal air movement, no respiratory distress    Cardiovascular:  regular rate and rhythm    Plan        Patient placed in a full body Monoplace Chamber #: [id number]  Treatment Start Time: 1134     Pressure Reached Time: 1143  MINGO : 2  Number of Air Breaks:  Treatment Status: No Air

## 2025-03-05 NOTE — PROGRESS NOTES
Infectious Diseases Outpatient Follow-up Note       Primary Care Physician:  Reji Draper MD       History Obtained From:   Patient, EPIC    CHIEF COMPLAINT / ID problem:    Chief Complaint   Patient presents with    New Patient     Right Diabetic Foot Infection -nk  Referred by: [facility]       HISTORY OF PRESENT ILLNESS / Interval history: 74-year-old male here for evaluation of right foot infection nonhealing wound concern for osteomyelitis.  Patient has significant history for diabetes left below-knee amputation sleep apnea CHF COPD atrial fibrillation was admitted to [facility] back in March 2024, was seen by infectious disease back then for ongoing wound infection and started off on the right fifth toe with osteomyelitis required surgery status post transmetatarsal amputation of the right foot and Achilles tendon lengthening patient has significant history for peripheral vascular disease with vascular intervention in the past.  He is now currently followed up with wound care center.  He does have AICD in place.  Patient underwent revascularization in March 2024 by Dr. Christy MRI of the right foot now indicating osteomyelitis involving the residual fifth and distal fourth metatarsal from 1/14/25..  Right foot recent wound culture positive for Alcaligenes and Enterococcus faecalis, he did have Pseudomonas on the previous culture.  He is here for evaluation of IV antibiotics his wife was with him in the clinic today provide additional details.        Past Medical History:    Past Medical History:   Diagnosis Date    Atrial fibrillation (HCC)     Back pain     Cardiomyopathy     CHF (congestive heart failure) (HCC)     COPD (chronic obstructive pulmonary disease) (HCC)     Dental disease     Dizziness     Dyslipidemia     GERD (gastroesophageal reflux disease)     Hearing loss     Hyperlipidemia     Hypertension     Hypothyroidism     Leg edema     MRSA (methicillin resistant staph aureus) culture

## 2025-03-06 ENCOUNTER — TELEPHONE (OUTPATIENT)
Dept: INFECTIOUS DISEASES | Age: 75
End: 2025-03-06

## 2025-03-06 ENCOUNTER — HOSPITAL ENCOUNTER (OUTPATIENT)
Dept: HYPERBARIC MEDICINE | Age: 75
Discharge: HOME | End: 2025-03-06
Payer: MEDICARE

## 2025-03-06 VITALS
HEART RATE: 61 BPM | SYSTOLIC BLOOD PRESSURE: 124 MMHG | RESPIRATION RATE: 18 BRPM | DIASTOLIC BLOOD PRESSURE: 64 MMHG | TEMPERATURE: 97 F

## 2025-03-06 DIAGNOSIS — L97.424 DIABETIC ULCER OF LEFT MIDFOOT ASSOCIATED WITH TYPE 2 DIABETES MELLITUS, WITH NECROSIS OF BONE (HCC): Primary | ICD-10-CM

## 2025-03-06 DIAGNOSIS — E11.621 DIABETIC ULCER OF LEFT MIDFOOT ASSOCIATED WITH TYPE 2 DIABETES MELLITUS, WITH NECROSIS OF BONE (HCC): Primary | ICD-10-CM

## 2025-03-06 LAB
GLUCOSE BLD-MCNC: 173 MG/DL (ref 70–99)
GLUCOSE BLD-MCNC: 90 MG/DL (ref 70–99)
PERFORMED ON: ABNORMAL
PERFORMED ON: NORMAL

## 2025-03-06 PROCEDURE — G0277 HBOT, FULL BODY CHAMBER, 30M: HCPCS

## 2025-03-06 PROCEDURE — 99183 HYPERBARIC OXYGEN THERAPY: CPT | Performed by: NURSE PRACTITIONER

## 2025-03-06 RX ORDER — SODIUM CHLORIDE 9 MG/ML
5-250 INJECTION, SOLUTION INTRAVENOUS PRN
Status: CANCELLED | OUTPATIENT
Start: 2025-03-06

## 2025-03-06 RX ORDER — ALBUTEROL SULFATE 90 UG/1
4 INHALANT RESPIRATORY (INHALATION) PRN
Status: CANCELLED | OUTPATIENT
Start: 2025-03-06

## 2025-03-06 RX ORDER — DIPHENHYDRAMINE HYDROCHLORIDE 50 MG/ML
50 INJECTION INTRAMUSCULAR; INTRAVENOUS
Status: CANCELLED | OUTPATIENT
Start: 2025-03-06

## 2025-03-06 RX ORDER — ACETAMINOPHEN 325 MG/1
650 TABLET ORAL
Status: CANCELLED | OUTPATIENT
Start: 2025-03-06

## 2025-03-06 RX ORDER — EPINEPHRINE 1 MG/ML
0.3 INJECTION, SOLUTION, CONCENTRATE INTRAVENOUS PRN
Status: CANCELLED | OUTPATIENT
Start: 2025-03-06

## 2025-03-06 RX ORDER — ONDANSETRON 2 MG/ML
8 INJECTION INTRAMUSCULAR; INTRAVENOUS
Status: CANCELLED | OUTPATIENT
Start: 2025-03-06

## 2025-03-06 RX ORDER — HYDROCORTISONE SODIUM SUCCINATE 100 MG/2ML
100 INJECTION INTRAMUSCULAR; INTRAVENOUS
Status: CANCELLED | OUTPATIENT
Start: 2025-03-06

## 2025-03-06 RX ORDER — HEPARIN SODIUM (PORCINE) LOCK FLUSH IV SOLN 100 UNIT/ML 100 UNIT/ML
500 SOLUTION INTRAVENOUS PRN
Status: CANCELLED | OUTPATIENT
Start: 2025-03-06

## 2025-03-06 RX ORDER — SODIUM CHLORIDE 9 MG/ML
INJECTION, SOLUTION INTRAVENOUS CONTINUOUS
Status: CANCELLED | OUTPATIENT
Start: 2025-03-06

## 2025-03-06 ASSESSMENT — PAIN SCALES - GENERAL
PAINLEVEL_OUTOF10: 0
PAINLEVEL_OUTOF10: 0

## 2025-03-06 NOTE — PROGRESS NOTES
Treatment: None  Total Treatment Time (min): 108    Treatment Completion Status: Treatment completed without issue    I was present on these premises and immediately available to furnish assistance & direction throughout the HBO Treatment.     Marvin Montero is a 74 y.o. male  did successfully complete today's hyperbaric oxygen treatment at [facility] and HBO therapy.    In my clinical judgement, ongoing HBO therapy is  necessary at this time to assist with wound healing, preservation of limb, life, or function.    Supervision and attendance of Hyperbaric Oxygen Therapy provided. Continue HBO treatment as outlined in the treatment plan.    Hyperbaric Oxygen: Mr. Montero tolerated: Treatment Number: 7 without  issue.    Discharge Instructions were explained and given to Mr. Montero     Electronically signed by KENNY Guerrero CNP on 3/6/2025 at 4:18 PM

## 2025-03-06 NOTE — TELEPHONE ENCOUNTER
Sera pharmacist at [facility] verbalized understanding of OPAT orders: IV Cefepime 2 gm Q 12 hr X 6 weeks (4-18)  CBC w/diff, CMP, ESR, CRP weekly

## 2025-03-07 ENCOUNTER — HOSPITAL ENCOUNTER (OUTPATIENT)
Dept: INFUSION THERAPY | Age: 75
Setting detail: INFUSION SERIES
Discharge: HOME | End: 2025-03-07
Payer: MEDICARE

## 2025-03-07 ENCOUNTER — HOSPITAL ENCOUNTER (OUTPATIENT)
Dept: HYPERBARIC MEDICINE | Age: 75
Discharge: HOME | End: 2025-03-07
Payer: MEDICARE

## 2025-03-07 ENCOUNTER — HOSPITAL ENCOUNTER (OUTPATIENT)
Dept: INTERVENTIONAL RADIOLOGY/VASCULAR | Age: 75
Discharge: HOME | End: 2025-03-07
Attending: INTERNAL MEDICINE
Payer: MEDICARE

## 2025-03-07 VITALS
SYSTOLIC BLOOD PRESSURE: 127 MMHG | RESPIRATION RATE: 16 BRPM | DIASTOLIC BLOOD PRESSURE: 66 MMHG | TEMPERATURE: 97 F | HEART RATE: 64 BPM

## 2025-03-07 VITALS
TEMPERATURE: 97.5 F | SYSTOLIC BLOOD PRESSURE: 135 MMHG | OXYGEN SATURATION: 100 % | RESPIRATION RATE: 16 BRPM | HEART RATE: 61 BPM | DIASTOLIC BLOOD PRESSURE: 62 MMHG

## 2025-03-07 DIAGNOSIS — L97.424 DIABETIC ULCER OF LEFT MIDFOOT ASSOCIATED WITH TYPE 2 DIABETES MELLITUS, WITH NECROSIS OF BONE (HCC): Primary | ICD-10-CM

## 2025-03-07 DIAGNOSIS — M86.271 SUBACUTE OSTEOMYELITIS OF RIGHT FOOT (HCC): ICD-10-CM

## 2025-03-07 DIAGNOSIS — E11.621 DIABETIC ULCER OF LEFT MIDFOOT ASSOCIATED WITH TYPE 2 DIABETES MELLITUS, WITH NECROSIS OF BONE (HCC): Primary | ICD-10-CM

## 2025-03-07 DIAGNOSIS — M86.9 OSTEOMYELITIS OF RIGHT FOOT, UNSPECIFIED TYPE (HCC): Primary | ICD-10-CM

## 2025-03-07 LAB
GLUCOSE BLD-MCNC: 119 MG/DL (ref 70–99)
GLUCOSE BLD-MCNC: 140 MG/DL (ref 70–99)
PERFORMED ON: ABNORMAL
PERFORMED ON: ABNORMAL

## 2025-03-07 PROCEDURE — 6360000002 HC RX W HCPCS: Performed by: INTERNAL MEDICINE

## 2025-03-07 PROCEDURE — 36569 INSJ PICC 5 YR+ W/O IMAGING: CPT

## 2025-03-07 PROCEDURE — 2500000003 HC RX 250 WO HCPCS: Performed by: INTERNAL MEDICINE

## 2025-03-07 PROCEDURE — 96365 THER/PROPH/DIAG IV INF INIT: CPT

## 2025-03-07 PROCEDURE — 2580000003 HC RX 258: Performed by: INTERNAL MEDICINE

## 2025-03-07 PROCEDURE — 76937 US GUIDE VASCULAR ACCESS: CPT

## 2025-03-07 PROCEDURE — 99183 HYPERBARIC OXYGEN THERAPY: CPT

## 2025-03-07 PROCEDURE — C1751 CATH, INF, PER/CENT/MIDLINE: HCPCS

## 2025-03-07 RX ORDER — SODIUM CHLORIDE 9 MG/ML
INJECTION, SOLUTION INTRAVENOUS CONTINUOUS
Status: CANCELLED | OUTPATIENT
Start: 2025-03-07

## 2025-03-07 RX ORDER — ACETAMINOPHEN 325 MG/1
650 TABLET ORAL
Status: CANCELLED | OUTPATIENT
Start: 2025-03-07

## 2025-03-07 RX ORDER — ALBUTEROL SULFATE 90 UG/1
4 INHALANT RESPIRATORY (INHALATION) PRN
Status: CANCELLED | OUTPATIENT
Start: 2025-03-07

## 2025-03-07 RX ORDER — DIPHENHYDRAMINE HYDROCHLORIDE 50 MG/ML
50 INJECTION INTRAMUSCULAR; INTRAVENOUS
Status: CANCELLED | OUTPATIENT
Start: 2025-03-07

## 2025-03-07 RX ORDER — EPINEPHRINE 1 MG/ML
0.3 INJECTION, SOLUTION, CONCENTRATE INTRAVENOUS PRN
Status: CANCELLED | OUTPATIENT
Start: 2025-03-07

## 2025-03-07 RX ORDER — SODIUM CHLORIDE 0.9 % (FLUSH) 0.9 %
5-40 SYRINGE (ML) INJECTION PRN
Status: DISCONTINUED | OUTPATIENT
Start: 2025-03-07 | End: 2025-03-08 | Stop reason: HOSPADM

## 2025-03-07 RX ORDER — SODIUM CHLORIDE 9 MG/ML
5-250 INJECTION, SOLUTION INTRAVENOUS PRN
Status: CANCELLED | OUTPATIENT
Start: 2025-03-07

## 2025-03-07 RX ORDER — HEPARIN SODIUM (PORCINE) LOCK FLUSH IV SOLN 100 UNIT/ML 100 UNIT/ML
500 SOLUTION INTRAVENOUS PRN
Status: CANCELLED | OUTPATIENT
Start: 2025-03-07

## 2025-03-07 RX ORDER — ONDANSETRON 2 MG/ML
8 INJECTION INTRAMUSCULAR; INTRAVENOUS
Status: CANCELLED | OUTPATIENT
Start: 2025-03-07

## 2025-03-07 RX ORDER — SODIUM CHLORIDE 0.9 % (FLUSH) 0.9 %
5-40 SYRINGE (ML) INJECTION PRN
Status: CANCELLED | OUTPATIENT
Start: 2025-03-07

## 2025-03-07 RX ORDER — HYDROCORTISONE SODIUM SUCCINATE 100 MG/2ML
100 INJECTION INTRAMUSCULAR; INTRAVENOUS
Status: CANCELLED | OUTPATIENT
Start: 2025-03-07

## 2025-03-07 RX ADMIN — Medication 10 ML: at 14:30

## 2025-03-07 RX ADMIN — CEFEPIME 2000 MG: 2 INJECTION, POWDER, FOR SOLUTION INTRAVENOUS at 14:38

## 2025-03-07 RX ADMIN — Medication 10 ML: at 15:12

## 2025-03-07 ASSESSMENT — PAIN SCALES - GENERAL
PAINLEVEL_OUTOF10: 0
PAINLEVEL_OUTOF10: 0

## 2025-03-07 NOTE — DISCHARGE INSTRUCTIONS
Outpatient Infusion Discharge Instructions  [facility]  [address] [place], [place] [address]  Telephone: [phone]      FAX [phone]    NAME:  Marvin Montero  YOB: 1950  MEDICAL RECORD NUMBER:  [id number]  DATE:  3/7/25    Reason for Outpatient Infusion Visit: Cefepime IV antibiotic    If you develop any these symptoms please contact you Doctor    [x] Nausea and/or vomiting not relieved with medication   [x] Swelling, redness, and/or bleeding at injection or IV site    [x] Fever or chills  [x] Rash or itching   [x] Shortness of breath  [x] Please review After Visit Summary (AVS) information on    [] Other      Outpatient Infusion Center Information: Should you experience any significant changes in your health or have questions about your care please contact the [facility] at [phone] MONDAY - FRIDAY 8:00 am - 4:00 pm.  If you need help outside these hours and cannot wait until we are again available, contact your Primary Care Physician or go to the hospital emergency room.       Electronically signed by Bonita Taveras RN on 3/7/2025 at 2:49 PM

## 2025-03-07 NOTE — PROGRESS NOTES
Outpatient Infusion Center  [facility]    IV Antibiotic Visit    NAME:  Marvin Montero  YOB: 1950  MEDICAL RECORD NUMBER:  [id number]  DATE:  3/7/2025    Patient arrived to Outpatient Infusion Center   [x] per wheelchair   [] ambulatory   Has had all toes removed from right foot. Using hyperbaric chamber now to try and heal wounds without more surgery. Wears a wooden shoe on that foot.  Itching: No  Rash: No  Mouth Sores: No  Nausea: No  Vomiting: No  Diarrhea: No  Night Sweats: No  Fever: No    Administered: [] Peripheral access    [x] PICC access    [] Port access    No Known Allergies    Name of Antibiotic Administered: Cefepime  Dose of Antibiotic Administered: 2000 mg.    Indication for Antibiotic: osteo of right foot      Response to treatment:  Well tolerated by patient.       Scheduled to return for next antibiotic dose not to return to us but is going to do home antibiotics with his wife's assistance.     Electronically signed by Bonita Taveras RN on 3/7/2025 at 3:21 PM

## 2025-03-07 NOTE — PROGRESS NOTES
Treatment: None  Total Treatment Time (min): 108    Treatment Completion Status: Treatment completed without issue    I was present on these premises and immediately available to furnish assistance & direction throughout the HBO Treatment.     Marvin Montero is a 74 y.o. male  did successfully complete today's hyperbaric oxygen treatment at [facility] and HBO therapy.    In my clinical judgement, ongoing HBO therapy is  necessary at this time to assist with wound healing, preservation of limb, life, or function.    Supervision and attendance of Hyperbaric Oxygen Therapy provided. Continue HBO treatment as outlined in the treatment plan.    Hyperbaric Oxygen: Mr. Montero tolerated: Treatment Number: 8 without  issue.    Discharge Instructions were explained and given to Mr. Montero     Electronically signed by KENNY Carlisle CNP on 3/7/2025 at 1:06 PM

## 2025-03-07 NOTE — PROGRESS NOTES
Arrived to place PICC line. Pre-procedure and timeout done with RN Bossman, discussed limitations of placement and allergies. Consent confirmed. Vital signs stable. Labs, allergies, medications, and code status reviewed. No contraindications noted.    Procedure explained to pt, including the risk and benefits of the procedure. All questions answered. Pt verbalizes understanding of the procedure and states no more questions.     Pt's basilic, brachial, cephalic are all easily collapsible with no indication for a clot. Vein selected is large enough for catheter. Pt tolerated sterile procedure well, with no difficulty accessing brachial vein, when accessed - blood was free flowing and non-pulsatile. Guidewire, introducer, and catheter went in smoothly.     PICC line verified with ECG:  Please replace all existing IV tubing with new IV tubing prior to using the PICC for current IV infusions.  Please remove any PIVs from PICC arm.  All of the above may be sources of infection or an increase chance of a clot.      Post procedure - reorganized pt table, placed pt in lowest position, with call light and educated on line care. Instructed pt/RN not to use arm for at least 30min to avoid bleeding. Reported off to bedside RN.        [phone]

## 2025-03-08 DIAGNOSIS — I48.0 PAF (PAROXYSMAL ATRIAL FIBRILLATION) (HCC): Primary | ICD-10-CM

## 2025-03-10 ENCOUNTER — HOSPITAL ENCOUNTER (OUTPATIENT)
Dept: HYPERBARIC MEDICINE | Age: 75
Discharge: HOME | End: 2025-03-10
Payer: MEDICARE

## 2025-03-10 ENCOUNTER — HOSPITAL ENCOUNTER (OUTPATIENT)
Dept: VASCULAR LAB | Age: 75
Discharge: HOME | End: 2025-03-12
Attending: INTERNAL MEDICINE
Payer: MEDICARE

## 2025-03-10 VITALS
HEART RATE: 80 BPM | RESPIRATION RATE: 16 BRPM | TEMPERATURE: 97.4 F | DIASTOLIC BLOOD PRESSURE: 68 MMHG | SYSTOLIC BLOOD PRESSURE: 134 MMHG

## 2025-03-10 DIAGNOSIS — L97.424 DIABETIC ULCER OF LEFT MIDFOOT ASSOCIATED WITH TYPE 2 DIABETES MELLITUS, WITH NECROSIS OF BONE (HCC): Primary | ICD-10-CM

## 2025-03-10 DIAGNOSIS — E11.621 DIABETIC ULCER OF LEFT MIDFOOT ASSOCIATED WITH TYPE 2 DIABETES MELLITUS, WITH NECROSIS OF BONE (HCC): Primary | ICD-10-CM

## 2025-03-10 DIAGNOSIS — I73.9 PAD (PERIPHERAL ARTERY DISEASE): ICD-10-CM

## 2025-03-10 LAB
VAS RIGHT ATA DIST PSV: 0 CM/S
VAS RIGHT ATA MID PSV: 4.7 CM/S
VAS RIGHT CFA DIST PSV: 108.2 CM/S
VAS RIGHT CFA PROX PSV: 108.2 CM/S
VAS RIGHT PFA PROX PSV: 97 CM/S
VAS RIGHT POP A DIST PSV: 192.4 CM/S
VAS RIGHT POP A PROX PSV: 140.5 CM/S
VAS RIGHT POP A PROX VEL RATIO: 0.76
VAS RIGHT PTA DIST PSV: 81 CM/S
VAS RIGHT PTA MID PSV: 127.6 CM/S
VAS RIGHT SFA DIST PSV: 184.6 CM/S
VAS RIGHT SFA DIST VEL RATIO: 1.33
VAS RIGHT SFA MID PSV: 139.2 CM/S
VAS RIGHT SFA MID VEL RATIO: 1
VAS RIGHT SFA PROX PSV: 142.8 CM/S
VAS RIGHT SFA PROX VEL RATIO: 1.3

## 2025-03-10 PROCEDURE — 93926 LOWER EXTREMITY STUDY: CPT

## 2025-03-10 PROCEDURE — 93926 LOWER EXTREMITY STUDY: CPT | Performed by: INTERNAL MEDICINE

## 2025-03-10 PROCEDURE — G0277 HBOT, FULL BODY CHAMBER, 30M: HCPCS

## 2025-03-10 PROCEDURE — 99183 HYPERBARIC OXYGEN THERAPY: CPT | Performed by: NURSE PRACTITIONER

## 2025-03-10 RX ORDER — AMIODARONE HYDROCHLORIDE 200 MG/1
200 TABLET ORAL DAILY
Qty: 30 TABLET | Refills: 0 | Status: SHIPPED | OUTPATIENT
Start: 2025-03-10

## 2025-03-10 NOTE — PROGRESS NOTES
HBO PROGRESS NOTE      NAME: Marvin Montero  MEDICAL RECORD NUMBER:  [id number]  AGE: 74 y.o.   GENDER: male  : 1950  EPISODE DATE:  3/10/2025     Subjective     HBO Treatment Number: 9 out of Total Treatments: 30    HBO Diagnosis:             Indications: Lower Extremity Diabetic Wound ___(site) (Right Foot)    Safety checks performed prior to treatment.  See doc flowsheets for documentation.    Objective        No results for input(s): \"POCGLU\" in the last 72 hours.  Pre treatment Vital Signs       Temp: 97.4 °F (36.3 °C)     Pulse: 70     Respirations: 18     BP: (!) 123/57       Post treatment Vital Signs  Temp: 97.4 °F (36.3 °C)  Pulse: 80  Respirations: 16  BP: 134/68    Assessment        HBO Diagnosis:   Problem List Items Addressed This Visit       Diabetic ulcer of left midfoot associated with type 2 diabetes mellitus, with necrosis of bone (HCC) - Primary    Relevant Orders    Notify physician (specify)    Hyperbaric Oxygen Therapy    Hypoglycemial protocol    POCT glucose    Care order/instruction    Care order/instruction       Physical Exam:  General Appearance:  alert and oriented to person, place and time, well-developed and well-nourished, in no acute distress    Pre Tympanic Membrane Assessment:  Right: Normal (PE Tubes Visible per Aaron Cuadra CNP)  Left: Normal (PE Tubes Visible per Aaron Cuadra CNP)    Post Tympanic Membrane Assessment:  Left: Normal (Denies any ear problems; PE Tubes Visible)  Right: Normal (Denies any ear problems; PE Tubes Visible)    Pulmonary/Chest:  clear to auscultation bilaterally- no wheezes, rales or rhonchi, normal air movement, no respiratory distress    Cardiovascular:  no murmurs, rubs, or gallops    Plan        Patient placed in a full body Monoplace Chamber #: [id number]  Treatment Start Time: 0942     Pressure Reached Time: 0952  MINGO : 2  Number of Air Breaks:  Treatment Status: No Air break     Decompression Time: 1122   Treatment End Time:

## 2025-03-10 NOTE — TELEPHONE ENCOUNTER
Last O/V:  2/28/2025  Next O/V:  4/1/2025    Last Labs:  BMP:  2/5/2025    Called/spoke to PT and advised that updated lab work is needed for this medication. PT V/U and states he will be in soon to complete labs.

## 2025-03-11 ENCOUNTER — HOSPITAL ENCOUNTER (OUTPATIENT)
Dept: WOUND CARE | Age: 75
Discharge: HOME | End: 2025-03-11
Attending: EMERGENCY MEDICINE
Payer: MEDICARE

## 2025-03-11 ENCOUNTER — HOSPITAL ENCOUNTER (OUTPATIENT)
Dept: HYPERBARIC MEDICINE | Age: 75
Discharge: HOME | End: 2025-03-11
Payer: MEDICARE

## 2025-03-11 VITALS
TEMPERATURE: 97.5 F | SYSTOLIC BLOOD PRESSURE: 131 MMHG | RESPIRATION RATE: 16 BRPM | DIASTOLIC BLOOD PRESSURE: 60 MMHG | HEART RATE: 78 BPM

## 2025-03-11 VITALS
SYSTOLIC BLOOD PRESSURE: 146 MMHG | DIASTOLIC BLOOD PRESSURE: 67 MMHG | TEMPERATURE: 97.1 F | RESPIRATION RATE: 20 BRPM | HEART RATE: 60 BPM

## 2025-03-11 DIAGNOSIS — I96 GANGRENE OF RIGHT FOOT (HCC): ICD-10-CM

## 2025-03-11 DIAGNOSIS — M86.9 DIABETIC FOOT ULCER WITH OSTEOMYELITIS (HCC): ICD-10-CM

## 2025-03-11 DIAGNOSIS — E66.9 TYPE 2 DIABETES MELLITUS WITH OBESITY (HCC): ICD-10-CM

## 2025-03-11 DIAGNOSIS — E11.69 DIABETIC FOOT ULCER WITH OSTEOMYELITIS (HCC): ICD-10-CM

## 2025-03-11 DIAGNOSIS — L97.509 DIABETIC FOOT ULCER WITH OSTEOMYELITIS (HCC): ICD-10-CM

## 2025-03-11 DIAGNOSIS — L97.413 DIABETIC ULCER OF RIGHT MIDFOOT ASSOCIATED WITH TYPE 2 DIABETES MELLITUS, WITH NECROSIS OF MUSCLE (HCC): ICD-10-CM

## 2025-03-11 DIAGNOSIS — L97.514 RIGHT FOOT ULCER, WITH NECROSIS OF BONE (HCC): ICD-10-CM

## 2025-03-11 DIAGNOSIS — I70.234 ATHEROSCLEROSIS OF NATIVE ARTERY OF RIGHT LOWER EXTREMITY WITH ULCERATION OF MIDFOOT (HCC): ICD-10-CM

## 2025-03-11 DIAGNOSIS — L97.414 DIABETIC ULCER OF RIGHT MIDFOOT ASSOCIATED WITH TYPE 2 DIABETES MELLITUS, WITH NECROSIS OF BONE (HCC): ICD-10-CM

## 2025-03-11 DIAGNOSIS — I73.9 PAD (PERIPHERAL ARTERY DISEASE): ICD-10-CM

## 2025-03-11 DIAGNOSIS — E11.621 DIABETIC ULCER OF LEFT MIDFOOT ASSOCIATED WITH TYPE 2 DIABETES MELLITUS, WITH NECROSIS OF BONE (HCC): Primary | ICD-10-CM

## 2025-03-11 DIAGNOSIS — R60.0 LOCALIZED EDEMA: ICD-10-CM

## 2025-03-11 DIAGNOSIS — L97.424 DIABETIC ULCER OF LEFT MIDFOOT ASSOCIATED WITH TYPE 2 DIABETES MELLITUS, WITH NECROSIS OF BONE (HCC): Primary | ICD-10-CM

## 2025-03-11 DIAGNOSIS — E11.69 TYPE 2 DIABETES MELLITUS WITH OBESITY (HCC): ICD-10-CM

## 2025-03-11 DIAGNOSIS — E11.621 DIABETIC ULCER OF RIGHT MIDFOOT ASSOCIATED WITH TYPE 2 DIABETES MELLITUS, WITH NECROSIS OF MUSCLE (HCC): ICD-10-CM

## 2025-03-11 DIAGNOSIS — L97.514 ULCER OF TOE OF RIGHT FOOT, WITH NECROSIS OF BONE (HCC): ICD-10-CM

## 2025-03-11 DIAGNOSIS — I70.234 ATHEROSCLEROSIS OF NATIVE ARTERY OF RIGHT LOWER EXTREMITY WITH ULCERATION OF MIDFOOT (HCC): Primary | ICD-10-CM

## 2025-03-11 DIAGNOSIS — Z74.09 IMPAIRED MOBILITY: ICD-10-CM

## 2025-03-11 DIAGNOSIS — E11.621 DIABETIC FOOT ULCER WITH OSTEOMYELITIS (HCC): ICD-10-CM

## 2025-03-11 DIAGNOSIS — L08.9 TYPE 2 DIABETES MELLITUS WITH RIGHT DIABETIC FOOT INFECTION (HCC): ICD-10-CM

## 2025-03-11 DIAGNOSIS — I87.321 IDIOPATHIC CHRONIC VENOUS HYPERTENSION OF RIGHT LEG WITH INFLAMMATION: ICD-10-CM

## 2025-03-11 DIAGNOSIS — E11.628 TYPE 2 DIABETES MELLITUS WITH RIGHT DIABETIC FOOT INFECTION (HCC): ICD-10-CM

## 2025-03-11 DIAGNOSIS — E11.621 DIABETIC ULCER OF RIGHT MIDFOOT ASSOCIATED WITH TYPE 2 DIABETES MELLITUS, WITH NECROSIS OF BONE (HCC): ICD-10-CM

## 2025-03-11 PROBLEM — L97.921 DIABETIC ULCER OF LEFT LOWER LEG, LIMITED TO BREAKDOWN OF SKIN (HCC): Status: RESOLVED | Noted: 2023-01-17 | Resolved: 2025-03-11

## 2025-03-11 PROBLEM — E11.622 DIABETIC ULCER OF LEFT LOWER LEG, LIMITED TO BREAKDOWN OF SKIN (HCC): Status: RESOLVED | Noted: 2023-01-17 | Resolved: 2025-03-11

## 2025-03-11 LAB
ALBUMIN SERPL-MCNC: 3.3 G/DL (ref 3.4–5)
ALBUMIN/GLOB SERPL: 1.2 {RATIO} (ref 1.1–2.2)
ALP SERPL-CCNC: 89 U/L (ref 40–129)
ALT SERPL-CCNC: 94 U/L (ref 10–40)
ANION GAP SERPL CALCULATED.3IONS-SCNC: 10 MMOL/L (ref 3–16)
AST SERPL-CCNC: 77 U/L (ref 15–37)
BASOPHILS # BLD: 0 K/UL (ref 0–0.2)
BASOPHILS NFR BLD: 1 %
BILIRUB SERPL-MCNC: <0.2 MG/DL (ref 0–1)
BUN SERPL-MCNC: 23 MG/DL (ref 7–20)
CALCIUM SERPL-MCNC: 8.4 MG/DL (ref 8.3–10.6)
CHLORIDE SERPL-SCNC: 106 MMOL/L (ref 99–110)
CO2 SERPL-SCNC: 23 MMOL/L (ref 21–32)
CREAT SERPL-MCNC: 1.2 MG/DL (ref 0.8–1.3)
CRP SERPL-MCNC: 9.9 MG/L (ref 0–5.1)
DEPRECATED RDW RBC AUTO: 15.6 % (ref 12.4–15.4)
EOSINOPHIL # BLD: 0.2 K/UL (ref 0–0.6)
EOSINOPHIL NFR BLD: 4.9 %
ERYTHROCYTE [SEDIMENTATION RATE] IN BLOOD BY WESTERGREN METHOD: 31 MM/HR (ref 0–20)
GFR SERPLBLD CREATININE-BSD FMLA CKD-EPI: 64 ML/MIN/{1.73_M2}
GLUCOSE BLD-MCNC: 102 MG/DL (ref 70–99)
GLUCOSE BLD-MCNC: 114 MG/DL (ref 70–99)
GLUCOSE BLD-MCNC: 121 MG/DL (ref 70–99)
GLUCOSE BLD-MCNC: 231 MG/DL (ref 70–99)
GLUCOSE BLD-MCNC: 84 MG/DL (ref 70–99)
GLUCOSE SERPL-MCNC: 268 MG/DL (ref 70–99)
HCT VFR BLD AUTO: 32.7 % (ref 40.5–52.5)
HGB BLD-MCNC: 10.6 G/DL (ref 13.5–17.5)
LYMPHOCYTES # BLD: 0.9 K/UL (ref 1–5.1)
LYMPHOCYTES NFR BLD: 22.5 %
MCH RBC QN AUTO: 28 PG (ref 26–34)
MCHC RBC AUTO-ENTMCNC: 32.4 G/DL (ref 31–36)
MCV RBC AUTO: 86.6 FL (ref 80–100)
MONOCYTES # BLD: 0.3 K/UL (ref 0–1.3)
MONOCYTES NFR BLD: 9.1 %
NEUTROPHILS # BLD: 2.4 K/UL (ref 1.7–7.7)
NEUTROPHILS NFR BLD: 62.5 %
PERFORMED ON: ABNORMAL
PERFORMED ON: NORMAL
PLATELET # BLD AUTO: 128 K/UL (ref 135–450)
PMV BLD AUTO: 10.1 FL (ref 5–10.5)
POTASSIUM SERPL-SCNC: 5 MMOL/L (ref 3.5–5.1)
PROT SERPL-MCNC: 6 G/DL (ref 6.4–8.2)
RBC # BLD AUTO: 3.77 M/UL (ref 4.2–5.9)
SODIUM SERPL-SCNC: 139 MMOL/L (ref 136–145)
WBC # BLD AUTO: 3.8 K/UL (ref 4–11)

## 2025-03-11 PROCEDURE — 11046 DBRDMT MUSC&/FSCA EA ADDL: CPT | Performed by: EMERGENCY MEDICINE

## 2025-03-11 PROCEDURE — G0277 HBOT, FULL BODY CHAMBER, 30M: HCPCS

## 2025-03-11 PROCEDURE — 99183 HYPERBARIC OXYGEN THERAPY: CPT | Performed by: EMERGENCY MEDICINE

## 2025-03-11 PROCEDURE — 11043 DBRDMT MUSC&/FSCA 1ST 20/<: CPT

## 2025-03-11 PROCEDURE — 11043 DBRDMT MUSC&/FSCA 1ST 20/<: CPT | Performed by: EMERGENCY MEDICINE

## 2025-03-11 PROCEDURE — 11046 DBRDMT MUSC&/FSCA EA ADDL: CPT

## 2025-03-11 RX ORDER — CLOBETASOL PROPIONATE 0.5 MG/G
OINTMENT TOPICAL ONCE
OUTPATIENT
Start: 2025-03-11 | End: 2025-03-11

## 2025-03-11 RX ORDER — TRIAMCINOLONE ACETONIDE 1 MG/G
OINTMENT TOPICAL ONCE
OUTPATIENT
Start: 2025-03-11 | End: 2025-03-11

## 2025-03-11 RX ORDER — SODIUM CHLOR/HYPOCHLOROUS ACID 0.033 %
SOLUTION, IRRIGATION IRRIGATION ONCE
OUTPATIENT
Start: 2025-03-11 | End: 2025-03-11

## 2025-03-11 RX ORDER — BACITRACIN ZINC AND POLYMYXIN B SULFATE 500; 1000 [USP'U]/G; [USP'U]/G
OINTMENT TOPICAL ONCE
OUTPATIENT
Start: 2025-03-11 | End: 2025-03-11

## 2025-03-11 RX ORDER — GENTAMICIN SULFATE 1 MG/G
OINTMENT TOPICAL ONCE
OUTPATIENT
Start: 2025-03-11 | End: 2025-03-11

## 2025-03-11 RX ORDER — LIDOCAINE 50 MG/G
OINTMENT TOPICAL ONCE
OUTPATIENT
Start: 2025-03-11 | End: 2025-03-11

## 2025-03-11 RX ORDER — LIDOCAINE HYDROCHLORIDE 20 MG/ML
JELLY TOPICAL ONCE
OUTPATIENT
Start: 2025-03-11 | End: 2025-03-11

## 2025-03-11 RX ORDER — LIDOCAINE 40 MG/G
CREAM TOPICAL ONCE
OUTPATIENT
Start: 2025-03-11 | End: 2025-03-11

## 2025-03-11 RX ORDER — BETAMETHASONE DIPROPIONATE 0.5 MG/G
CREAM TOPICAL ONCE
OUTPATIENT
Start: 2025-03-11 | End: 2025-03-11

## 2025-03-11 RX ORDER — SILVER SULFADIAZINE 10 MG/G
CREAM TOPICAL ONCE
OUTPATIENT
Start: 2025-03-11 | End: 2025-03-11

## 2025-03-11 RX ORDER — GINSENG 100 MG
CAPSULE ORAL ONCE
OUTPATIENT
Start: 2025-03-11 | End: 2025-03-11

## 2025-03-11 RX ORDER — LIDOCAINE HYDROCHLORIDE 40 MG/ML
SOLUTION TOPICAL ONCE
OUTPATIENT
Start: 2025-03-11 | End: 2025-03-11

## 2025-03-11 RX ORDER — MUPIROCIN 20 MG/G
OINTMENT TOPICAL ONCE
OUTPATIENT
Start: 2025-03-11 | End: 2025-03-11

## 2025-03-11 RX ORDER — NEOMYCIN/BACITRACIN/POLYMYXINB 3.5-400-5K
OINTMENT (GRAM) TOPICAL ONCE
OUTPATIENT
Start: 2025-03-11 | End: 2025-03-11

## 2025-03-11 RX ORDER — LIDOCAINE 40 MG/G
CREAM TOPICAL ONCE
Status: COMPLETED | OUTPATIENT
Start: 2025-03-11 | End: 2025-03-11

## 2025-03-11 RX ADMIN — LIDOCAINE: 40 CREAM TOPICAL at 09:46

## 2025-03-11 ASSESSMENT — PAIN SCALES - GENERAL
PAINLEVEL_OUTOF10: 0

## 2025-03-11 NOTE — PATIENT INSTRUCTIONS
[facility] Wound Care Physician Orders and Discharge Instructions  [facility]  [address], [address]  [place], [place] [address]  Telephone: [phone]      FAX [phone]  MONDAY - THURSDAY 8:00 am - 4:30 pm and Friday 8:00 am - 12:00 pm.        NAME:  Marvin Montero  YOB: 1950  MEDICAL RECORD NUMBER:  [id number]  DATE:  3/11/2025      Return Appointment:  [x] Return Appointment: With Dr Cece Alejandre  in  1 Week(s)   [] Wound and dressing supply provider:   [x] ECF or Home Healthcare: [facility]  [] Wound Assessment: [] Physician or NP scheduled for Wound Assessment:   [x] Orders placed during your visit: MAKE APPOINTMENT TO SEE VASCULAR SURGERY- REFERRAL PLACED FOR DR. WASHINGTON AT [facility]    **MAY USE HIBICLENS TO CLEANSE WOUND PRIOR TO DRESSING CHANGE- MAY RINSE WOUND PRIOR TO APPLYING DRESSING**    Important Reminders:   Please wash hands with soap and water before and after every dressing change.  Do not scrub wounds.  Keep wounds dry in shower unless otherwise instructed by the physician.  SMOKING can slow would healing. Stop smoking as soon as possible to improve healing and prevent further complications associated with smoking.      Fransico-Wound Topical Treatments:  Do not apply lotions, creams, or ointments to wound bed unless directed.   [] Apply moisturizing lotion to skin surrounding the wound prior to dressing change.  [] Apply antifungal ointment to skin surrounding the wound prior to dressing change.  [] Apply thin film of no sting moisture barrier ointment to skin immediately around      wound.  [] Other:     **MEPILEX BORDER TO SMALL AREA ON RIGHT ANTERIOR LOWER LEG**    Wound Location: RIGHT LATERAL TMA    Wound Cleansing: Vashe soak for 5 minutes prior to dressing change    Primary Dressing:  [x] SILVADENE   [x] BETADINE TO FRANSICO-WOUND     Secondary Dressing:  [x] GAUZE, ABD (EXTRA CUSHION TO ANKLE WITH ABD PAD)  [x]

## 2025-03-11 NOTE — PROGRESS NOTES
CATH LAB    OTHER SURGICAL HISTORY  10/06/2015    INCISION AND DRAINAGE RIGHT FOOT          OTHER SURGICAL HISTORY Right 10/08/2015    INCISION AND DRAINAGE RIGHT FOOT      PACEMAKER PLACEMENT      TOE AMPUTATION Right 2024    TRANSMETATARSAL  AMPUTATION RIGHT FOOT performed by Frank Bills DPM at [facility] OR    TONSILLECTOMY      UPPER GASTROINTESTINAL ENDOSCOPY N/A 2019    EGD BIOPSY GASTRIC H. PYLORI performed by Alexi Pardo MD at [facility] ENDOSCOPY       FAMILY HISTORY  Family History   Problem Relation Age of Onset    Hypertension Mother     Heart Disease Father     Hypertension Maternal Grandmother     Diabetes Maternal Grandmother        SOCIAL HISTORY  Social History     Tobacco Use    Smoking status: Former     Current packs/day: 0.00     Average packs/day: 1 pack/day for 4.0 years (4.0 ttl pk-yrs)     Types: Cigarettes     Start date: 2013     Quit date: 2017     Years since quittin.1     Passive exposure: Past    Smokeless tobacco: Never   Vaping Use    Vaping status: Never Used   Substance Use Topics    Alcohol use: Yes     Comment: 2-3 times per year    Drug use: No       ALLERGIES  No Known Allergies    MEDICATIONS  Current Outpatient Medications on File Prior to Encounter   Medication Sig Dispense Refill    amiodarone (CORDARONE) 200 MG tablet TAKE 1 TABLET BY MOUTH DAILY 30 tablet 0    amoxicillin-clavulanate (AUGMENTIN) 875-125 MG per tablet Take 1 tablet by mouth 2 times daily 60 tablet 0    cefepime (MAXIPIME) infusion Infuse 2,000 mg intravenously in the morning and 2,000 mg in the evening. Compound per protocol. 168 g 0    torsemide (DEMADEX) 20 MG tablet Take 1 tablet by mouth daily 90 tablet 3    oxymetazoline (12 HOUR NASAL SPRAY) 0.05 % nasal spray Apply 2 sprays to each nostril 30 minutes prior to hyperbaric oxygen treatment 1 each 3    fluticasone (FLONASE) 50 MCG/ACT nasal spray 2 sprays by Nasal route 2 times daily One month supply equals 2 bottles 2 each 5    silver

## 2025-03-11 NOTE — PROGRESS NOTES
Patients FSBS = 84 @ 0950 and given 8 ounces of Glucerna.  Repeat FABS @ 1022 = 121.  Dr Alejandre notified and verbal orders received to have patient proceed with HBO treatment and have patient take another 8 ounces of Glucerna into HBO chamber to drink during treament.

## 2025-03-11 NOTE — PLAN OF CARE
Insurance Verification Request            Ordering Center:    [facility]  [address]  [place] [address]  Telephone: [phone]       FAX [phone]    Facility NPI: [id number]  Facility Tax ID [id number]    Faith Guerrier RN      Patient Demographics:      Marvin Montero  [address] [address] [phone]   : 1950  AGE: 74 y.o.     GENDER: male   TODAYS DATE:  3/11/2025      Insurance Information:     PRIMARY INSURANCE:  Plan: [employer] MEDIBLUE ESSENTIAL/PLUS  Coverage: BCBS MEDICARE  Effective Date: 2021  Group Number: [unfilled]  Subscriber Number: [id number] - (Medicare Managed)    Payer/Plan Subscr  Sex Relation Sub. Ins. ID Effective Group Num   1. MEDICARE - ME* MARVIN MONTERO 1950 Male Self [id number]                                    [address] [address]   2. BCBS MEDICARE* MARVIN MONTERO 1950 Male Self [id number]                                   [address]       Application/Product Information:    Benefit Verification Request:    Reason for Request: New Wound    [employer] Fax # [phone] and [employer] ([employer]) Fax # [phone]    Face/Hands/Feet 23593 (1st 25 sq cm) and Face/Hands/Feet 43958 (additional 25 sq cm)    Apligraft, Yue, and TheraSkin    Has patient been treated with any other Skin Substitute for this Wound:   No    If yes, How many previous applications:  N/A    WOUND #: 1    Total Square CM: 22.5 cm2    Procedure setting: Hospital Outpatient Department POS 22    Is the Patient currently in a skilled nursing facility: No     Is the wound work related: No    Procedure date: 3/18/25        Clinical Information:    Dx Codes:   Diabetic Ulcer [x] Yes   [] No,   Venous Ulcer [] Yes   [] No,   Other [x] Yes   [] No    Wound ICD-10 Code: E11.621, L97.519; I70.234    Problem List Items Addressed This Visit          Circulatory    PAD (peripheral artery

## 2025-03-11 NOTE — PROGRESS NOTES
HBO PROGRESS NOTE      NAME: Marvin Montero  MEDICAL RECORD NUMBER:  [id number]  AGE: 74 y.o.   GENDER: male  : 1950  EPISODE DATE:  3/11/2025     Subjective     HBO Treatment Number: 10 out of Total Treatments: 30    HBO Diagnosis:             Indications: Lower Extremity Diabetic Wound ___(site) (Right foot)    Safety checks performed prior to treatment.  See doc flowsheets for documentation.    Objective        Recent Labs     25  1239 25  1052   POCGLU 114* 121*     Pre treatment Vital Signs       Temp: 97.5 °F (36.4 °C)     Pulse: 78     Respirations: 16     BP: 131/60       Post treatment Vital Signs  Temp: 97.1 °F (36.2 °C)  Pulse: 60  Respirations: 20  BP: (!) 146/67    Assessment        HBO Diagnosis:   Problem List Items Addressed This Visit          Circulatory    Atherosclerosis of native artery of right lower extremity with ulceration of midfoot (HCC)       Endocrine    Diabetic foot ulcer with osteomyelitis (HCC)    Diabetic ulcer of right foot associated with type 2 diabetes mellitus (HCC)    Diabetic ulcer of left midfoot associated with type 2 diabetes mellitus, with necrosis of bone (HCC) - Primary    Relevant Orders    POCT glucose       Musculoskeletal and Integument    Right foot ulcer, with necrosis of bone (HCC)       Other    Gangrene of right foot (HCC)    Localized edema     Other Visit Diagnoses         Ulcer of toe of right foot, with necrosis of bone (HCC)                Physical Exam        General Appearance:  alert and oriented to person, place and time, well-developed and well-nourished, in no acute distress    Pre Tympanic Membrane Assessment:  Right: Normal (per Dr Alejandre) ETT  Left: Normal (per Dr Alejandre) ETT    Post Tympanic Membrane Assessment:  Left: Normal (Denies any ear problems. PE tube visible)  Right: Normal (Denies any ear problems. PE tube visible)    Pulmonary/Chest:  clear to auscultation bilaterally- no wheezes, rales or rhonchi, normal air movement,

## 2025-03-12 ENCOUNTER — HOSPITAL ENCOUNTER (OUTPATIENT)
Dept: HYPERBARIC MEDICINE | Age: 75
Discharge: HOME | End: 2025-03-12
Payer: MEDICARE

## 2025-03-12 VITALS
DIASTOLIC BLOOD PRESSURE: 54 MMHG | RESPIRATION RATE: 16 BRPM | SYSTOLIC BLOOD PRESSURE: 132 MMHG | HEART RATE: 62 BPM | TEMPERATURE: 97.1 F

## 2025-03-12 DIAGNOSIS — L97.509 DIABETIC FOOT ULCER WITH OSTEOMYELITIS (HCC): Primary | ICD-10-CM

## 2025-03-12 DIAGNOSIS — L97.514 RIGHT FOOT ULCER, WITH NECROSIS OF BONE (HCC): ICD-10-CM

## 2025-03-12 DIAGNOSIS — E11.621 DIABETIC FOOT ULCER WITH OSTEOMYELITIS (HCC): Primary | ICD-10-CM

## 2025-03-12 DIAGNOSIS — L97.514 ULCER OF TOE OF RIGHT FOOT, WITH NECROSIS OF BONE (HCC): ICD-10-CM

## 2025-03-12 DIAGNOSIS — M86.9 DIABETIC FOOT ULCER WITH OSTEOMYELITIS (HCC): Primary | ICD-10-CM

## 2025-03-12 DIAGNOSIS — E11.69 DIABETIC FOOT ULCER WITH OSTEOMYELITIS (HCC): Primary | ICD-10-CM

## 2025-03-12 DIAGNOSIS — I70.234 ATHEROSCLEROSIS OF NATIVE ARTERY OF RIGHT LOWER EXTREMITY WITH ULCERATION OF MIDFOOT (HCC): ICD-10-CM

## 2025-03-12 LAB
GLUCOSE BLD-MCNC: 121 MG/DL (ref 70–99)
GLUCOSE BLD-MCNC: 144 MG/DL (ref 70–99)
GLUCOSE BLD-MCNC: 96 MG/DL (ref 70–99)
PERFORMED ON: ABNORMAL
PERFORMED ON: ABNORMAL
PERFORMED ON: NORMAL

## 2025-03-12 PROCEDURE — 99183 HYPERBARIC OXYGEN THERAPY: CPT | Performed by: SURGERY

## 2025-03-12 PROCEDURE — G0277 HBOT, FULL BODY CHAMBER, 30M: HCPCS

## 2025-03-12 ASSESSMENT — PAIN SCALES - GENERAL: PAINLEVEL_OUTOF10: 0

## 2025-03-12 NOTE — PROGRESS NOTES
HBO PROGRESS NOTE      NAME: Marvin Montero  MEDICAL RECORD NUMBER:  [id number]  AGE: 74 y.o.   GENDER: male  : 1950  EPISODE DATE:  3/12/2025     Subjective     HBO Treatment Number: 11 out of Total Treatments: 30    HBO Diagnosis:             Indications: Lower Extremity Diabetic Wound ___(site) (right foot)    Safety checks performed prior to treatment.  See doc flowsheets for documentation.    Objective        Recent Labs     25  1112 25  1309   POCGLU 144* 96     Pre treatment Vital Signs       Temp: 97 °F (36.1 °C)     Pulse: 86     Respirations: 18     BP: (!) 139/58       Post treatment Vital Signs  Temp: 97.1 °F (36.2 °C)  Pulse: 62  Respirations: 16  BP: (!) 132/54    Assessment        HBO Diagnosis:   Problem List Items Addressed This Visit          Circulatory    Atherosclerosis of native artery of right lower extremity with ulceration of midfoot (HCC)    Relevant Orders    Notify physician (specify)    Hyperbaric Oxygen Therapy    Hypoglycemial protocol    POCT glucose    Care order/instruction    Care order/instruction    Care order/instruction       Endocrine    Diabetic foot ulcer with osteomyelitis (HCC) - Primary    Relevant Orders    Notify physician (specify)    Hyperbaric Oxygen Therapy    Hypoglycemial protocol    POCT glucose    Care order/instruction    Care order/instruction    Care order/instruction       Musculoskeletal and Integument    Right foot ulcer, with necrosis of bone (HCC)    Relevant Orders    Notify physician (specify)    Hyperbaric Oxygen Therapy     Other Visit Diagnoses         Ulcer of toe of right foot, with necrosis of bone (HCC)        Relevant Orders    Hypoglycemial protocol    POCT glucose    Care order/instruction    Care order/instruction    Care order/instruction            Physical Exam        General Appearance:  alert and oriented to person, place and time, well-developed and well-nourished, in no acute distress    Pre Tympanic Membrane

## 2025-03-13 ENCOUNTER — HOSPITAL ENCOUNTER (OUTPATIENT)
Dept: HYPERBARIC MEDICINE | Age: 75
Discharge: HOME | End: 2025-03-13
Payer: MEDICARE

## 2025-03-13 VITALS
RESPIRATION RATE: 18 BRPM | DIASTOLIC BLOOD PRESSURE: 60 MMHG | TEMPERATURE: 97.1 F | SYSTOLIC BLOOD PRESSURE: 136 MMHG | HEART RATE: 64 BPM

## 2025-03-13 DIAGNOSIS — E11.69 DIABETIC FOOT ULCER WITH OSTEOMYELITIS (HCC): Primary | ICD-10-CM

## 2025-03-13 DIAGNOSIS — I70.234 ATHEROSCLEROSIS OF NATIVE ARTERY OF RIGHT LOWER EXTREMITY WITH ULCERATION OF MIDFOOT (HCC): ICD-10-CM

## 2025-03-13 DIAGNOSIS — M86.9 DIABETIC FOOT ULCER WITH OSTEOMYELITIS (HCC): Primary | ICD-10-CM

## 2025-03-13 DIAGNOSIS — E11.621 DIABETIC FOOT ULCER WITH OSTEOMYELITIS (HCC): Primary | ICD-10-CM

## 2025-03-13 DIAGNOSIS — L97.509 DIABETIC FOOT ULCER WITH OSTEOMYELITIS (HCC): Primary | ICD-10-CM

## 2025-03-13 DIAGNOSIS — L97.514 RIGHT FOOT ULCER, WITH NECROSIS OF BONE (HCC): ICD-10-CM

## 2025-03-13 DIAGNOSIS — L97.514 ULCER OF TOE OF RIGHT FOOT, WITH NECROSIS OF BONE (HCC): ICD-10-CM

## 2025-03-13 LAB
GLUCOSE BLD-MCNC: 117 MG/DL (ref 70–99)
GLUCOSE BLD-MCNC: 118 MG/DL (ref 70–99)
GLUCOSE BLD-MCNC: 119 MG/DL (ref 70–99)
PERFORMED ON: ABNORMAL

## 2025-03-13 PROCEDURE — G0277 HBOT, FULL BODY CHAMBER, 30M: HCPCS

## 2025-03-13 PROCEDURE — 99183 HYPERBARIC OXYGEN THERAPY: CPT | Performed by: NURSE PRACTITIONER

## 2025-03-13 RX ORDER — DAPAGLIFLOZIN 5 MG/1
5 TABLET, FILM COATED ORAL EVERY MORNING
COMMUNITY

## 2025-03-13 ASSESSMENT — PAIN SCALES - GENERAL
PAINLEVEL_OUTOF10: 0
PAINLEVEL_OUTOF10: 0

## 2025-03-13 NOTE — PROGRESS NOTES
HBO PROGRESS NOTE      NAME: Marvin Montero  MEDICAL RECORD NUMBER:  [id number]  AGE: 74 y.o.   GENDER: male  : 1950  EPISODE DATE:  3/13/2025     Subjective     HBO Treatment Number: 12 out of Total Treatments: 30    HBO Diagnosis:             Indications: Lower Extremity Diabetic Wound ___(site) (right foot)    Safety checks performed prior to treatment.  See doc flowsheets for documentation.    Objective        Recent Labs     25  1111 25  1309   POCGLU 117* 118*     Pre treatment Vital Signs       Temp: 97.1 °F (36.2 °C)     Pulse: 64     Respirations: 18     BP: 119/63       Post treatment Vital Signs  Temp: 97.1 °F (36.2 °C)  Pulse: 64  Respirations: 18  BP: 136/60    Assessment        HBO Diagnosis:   Problem List Items Addressed This Visit       Diabetic foot ulcer with osteomyelitis (HCC) - Primary    Relevant Medications    dapagliflozin (FARXIGA) 5 MG tablet    Other Relevant Orders    Notify physician (specify)    Hyperbaric Oxygen Therapy    Hypoglycemial protocol    POCT glucose    Care order/instruction    Care order/instruction    Atherosclerosis of native artery of right lower extremity with ulceration of midfoot (HCC)    Relevant Orders    Notify physician (specify)    Hyperbaric Oxygen Therapy    Hypoglycemial protocol    POCT glucose    Care order/instruction    Care order/instruction    Right foot ulcer, with necrosis of bone (HCC)    Relevant Orders    Notify physician (specify)    Hyperbaric Oxygen Therapy     Other Visit Diagnoses         Ulcer of toe of right foot, with necrosis of bone (HCC)        Relevant Orders    Hypoglycemial protocol    POCT glucose    Care order/instruction    Care order/instruction            Physical Exam:  General Appearance:  alert and oriented to person, place and time, well-developed and well-nourished, in no acute distress    Pre Tympanic Membrane Assessment:  Right: Normal (PE tubes visible, denies any ear problems, per Aaron

## 2025-03-13 NOTE — PLAN OF CARE
HBO NURSING DOCUMENTATION          [facility]    NAME:  Marvin Montero  YOB: 1950  MEDICAL RECORD NUMBER:  [id number]  DATE:  3/13/2025    Patient A/Ox4, pre-assessment and vitals completed. Pre-treatment BS noted to be 119, 8oz glucerna provided and notified provider, Aaron Cuadra CNP. After at least 15 minutes BS was re-assessed and noted to be 117. Notified provider who came to chamber side to discuss with patient. Patient expressed he would like to proceed with plan for HBO therapy treatment today but inquired if he could bring 2 glucerna's into the chamber. Provider is agreeable to this, safety checklist were completed and treatment was started at 1119. No additional needs, questions or concerns expressed by the patient at this time.       Electronically signed by Faith Guerrier RN on 3/13/2025 at 11:21 AM

## 2025-03-14 ENCOUNTER — HOSPITAL ENCOUNTER (OUTPATIENT)
Dept: HYPERBARIC MEDICINE | Age: 75
Discharge: HOME | End: 2025-03-14
Payer: MEDICARE

## 2025-03-14 VITALS
SYSTOLIC BLOOD PRESSURE: 141 MMHG | HEART RATE: 72 BPM | RESPIRATION RATE: 18 BRPM | DIASTOLIC BLOOD PRESSURE: 58 MMHG | TEMPERATURE: 97.5 F

## 2025-03-14 DIAGNOSIS — I70.234 ATHEROSCLEROSIS OF NATIVE ARTERY OF RIGHT LOWER EXTREMITY WITH ULCERATION OF MIDFOOT (HCC): ICD-10-CM

## 2025-03-14 DIAGNOSIS — L97.514 ULCER OF TOE OF RIGHT FOOT, WITH NECROSIS OF BONE (HCC): ICD-10-CM

## 2025-03-14 DIAGNOSIS — M86.9 DIABETIC FOOT ULCER WITH OSTEOMYELITIS (HCC): Primary | ICD-10-CM

## 2025-03-14 DIAGNOSIS — L97.509 DIABETIC FOOT ULCER WITH OSTEOMYELITIS (HCC): Primary | ICD-10-CM

## 2025-03-14 DIAGNOSIS — L97.514 RIGHT FOOT ULCER, WITH NECROSIS OF BONE (HCC): ICD-10-CM

## 2025-03-14 DIAGNOSIS — E11.621 DIABETIC FOOT ULCER WITH OSTEOMYELITIS (HCC): Primary | ICD-10-CM

## 2025-03-14 DIAGNOSIS — E11.69 DIABETIC FOOT ULCER WITH OSTEOMYELITIS (HCC): Primary | ICD-10-CM

## 2025-03-14 LAB
GLUCOSE BLD-MCNC: 115 MG/DL (ref 70–99)
GLUCOSE BLD-MCNC: 119 MG/DL (ref 70–99)
GLUCOSE BLD-MCNC: 136 MG/DL (ref 70–99)
PERFORMED ON: ABNORMAL

## 2025-03-14 PROCEDURE — 99183 HYPERBARIC OXYGEN THERAPY: CPT

## 2025-03-14 PROCEDURE — G0277 HBOT, FULL BODY CHAMBER, 30M: HCPCS

## 2025-03-14 RX ORDER — TRIAMCINOLONE ACETONIDE 1 MG/G
OINTMENT TOPICAL ONCE
OUTPATIENT
Start: 2025-03-14 | End: 2025-03-14

## 2025-03-14 RX ORDER — CLOBETASOL PROPIONATE 0.5 MG/G
OINTMENT TOPICAL ONCE
OUTPATIENT
Start: 2025-03-14 | End: 2025-03-14

## 2025-03-14 RX ORDER — GINSENG 100 MG
CAPSULE ORAL ONCE
OUTPATIENT
Start: 2025-03-14 | End: 2025-03-14

## 2025-03-14 RX ORDER — LIDOCAINE HYDROCHLORIDE 40 MG/ML
SOLUTION TOPICAL ONCE
OUTPATIENT
Start: 2025-03-14 | End: 2025-03-14

## 2025-03-14 RX ORDER — BETAMETHASONE DIPROPIONATE 0.5 MG/G
CREAM TOPICAL ONCE
OUTPATIENT
Start: 2025-03-14 | End: 2025-03-14

## 2025-03-14 RX ORDER — LIDOCAINE 50 MG/G
OINTMENT TOPICAL ONCE
OUTPATIENT
Start: 2025-03-14 | End: 2025-03-14

## 2025-03-14 RX ORDER — LIDOCAINE HYDROCHLORIDE 20 MG/ML
JELLY TOPICAL ONCE
OUTPATIENT
Start: 2025-03-14 | End: 2025-03-14

## 2025-03-14 RX ORDER — LIDOCAINE 40 MG/G
CREAM TOPICAL ONCE
OUTPATIENT
Start: 2025-03-14 | End: 2025-03-14

## 2025-03-14 RX ORDER — GENTAMICIN SULFATE 1 MG/G
OINTMENT TOPICAL ONCE
OUTPATIENT
Start: 2025-03-14 | End: 2025-03-14

## 2025-03-14 RX ORDER — MUPIROCIN 20 MG/G
OINTMENT TOPICAL ONCE
OUTPATIENT
Start: 2025-03-14 | End: 2025-03-14

## 2025-03-14 RX ORDER — SILVER SULFADIAZINE 10 MG/G
CREAM TOPICAL ONCE
OUTPATIENT
Start: 2025-03-14 | End: 2025-03-14

## 2025-03-14 RX ORDER — SODIUM CHLOR/HYPOCHLOROUS ACID 0.033 %
SOLUTION, IRRIGATION IRRIGATION ONCE
OUTPATIENT
Start: 2025-03-14 | End: 2025-03-14

## 2025-03-14 RX ORDER — BACITRACIN ZINC AND POLYMYXIN B SULFATE 500; 1000 [USP'U]/G; [USP'U]/G
OINTMENT TOPICAL ONCE
OUTPATIENT
Start: 2025-03-14 | End: 2025-03-14

## 2025-03-14 RX ORDER — NEOMYCIN/BACITRACIN/POLYMYXINB 3.5-400-5K
OINTMENT (GRAM) TOPICAL ONCE
OUTPATIENT
Start: 2025-03-14 | End: 2025-03-14

## 2025-03-14 ASSESSMENT — PAIN SCALES - GENERAL
PAINLEVEL_OUTOF10: 0
PAINLEVEL_OUTOF10: 0

## 2025-03-14 NOTE — PROGRESS NOTES
HBO PROGRESS NOTE      NAME: Marvin Montero  MEDICAL RECORD NUMBER:  [id number]  AGE: 74 y.o.   GENDER: male  : 1950  EPISODE DATE:  3/14/2025     Subjective     HBO Treatment Number: 13 out of Total Treatments: 30    HBO Diagnosis:             Indications: Lower Extremity Diabetic Wound ___(site) (right foot)    Safety checks performed prior to treatment.  See doc flowsheets for documentation.    Objective        Recent Labs     25  1058 25  1256   POCGLU 136* 115*     Pre treatment Vital Signs       Temp: 97.2 °F (36.2 °C)     Pulse: 77     Respirations: 18     BP: 102/75       Post treatment Vital Signs  Temp: 97.5 °F (36.4 °C)  Pulse: 72  Respirations: 18  BP: (!) 141/58    Assessment        HBO Diagnosis:   Problem List Items Addressed This Visit          Circulatory    Atherosclerosis of native artery of right lower extremity with ulceration of midfoot (HCC)    Relevant Orders    Notify physician (specify)    Hyperbaric Oxygen Therapy    Hypoglycemial protocol    POCT glucose    Care order/instruction    Care order/instruction    Care order/instruction    Care order/instruction    Care order/instruction    Care order/instruction    Care order/instruction    Care order/instruction       Endocrine    Diabetic foot ulcer with osteomyelitis (HCC) - Primary    Relevant Orders    Notify physician (specify)    Hyperbaric Oxygen Therapy    Hypoglycemial protocol    POCT glucose    Care order/instruction    Care order/instruction    Care order/instruction    Care order/instruction    Care order/instruction    Care order/instruction    Care order/instruction    Care order/instruction       Musculoskeletal and Integument    Right foot ulcer, with necrosis of bone (HCC)    Relevant Orders    Notify physician (specify)    Hyperbaric Oxygen Therapy    Ulcer of toe of right foot, with necrosis of bone (HCC)    Relevant Orders    Hypoglycemial protocol    POCT glucose    Care order/instruction    Care

## 2025-03-17 ENCOUNTER — TELEPHONE (OUTPATIENT)
Dept: FAMILY MEDICINE CLINIC | Age: 75
End: 2025-03-17

## 2025-03-17 ENCOUNTER — HOSPITAL ENCOUNTER (OUTPATIENT)
Dept: HYPERBARIC MEDICINE | Age: 75
Discharge: HOME | End: 2025-03-17
Payer: MEDICARE

## 2025-03-17 VITALS
SYSTOLIC BLOOD PRESSURE: 110 MMHG | HEART RATE: 68 BPM | DIASTOLIC BLOOD PRESSURE: 62 MMHG | RESPIRATION RATE: 18 BRPM | TEMPERATURE: 97.2 F

## 2025-03-17 DIAGNOSIS — E11.69 DIABETIC FOOT ULCER WITH OSTEOMYELITIS (HCC): Primary | ICD-10-CM

## 2025-03-17 DIAGNOSIS — I70.234 ATHEROSCLEROSIS OF NATIVE ARTERY OF RIGHT LOWER EXTREMITY WITH ULCERATION OF MIDFOOT: ICD-10-CM

## 2025-03-17 DIAGNOSIS — M86.9 DIABETIC FOOT ULCER WITH OSTEOMYELITIS (HCC): Primary | ICD-10-CM

## 2025-03-17 DIAGNOSIS — L97.514 ULCER OF TOE OF RIGHT FOOT, WITH NECROSIS OF BONE (HCC): ICD-10-CM

## 2025-03-17 DIAGNOSIS — L97.509 DIABETIC FOOT ULCER WITH OSTEOMYELITIS (HCC): Primary | ICD-10-CM

## 2025-03-17 DIAGNOSIS — E11.621 DIABETIC FOOT ULCER WITH OSTEOMYELITIS (HCC): Primary | ICD-10-CM

## 2025-03-17 DIAGNOSIS — L97.514 RIGHT FOOT ULCER, WITH NECROSIS OF BONE (HCC): ICD-10-CM

## 2025-03-17 LAB
ALBUMIN SERPL-MCNC: 3.6 G/DL (ref 3.4–5)
ALBUMIN/GLOB SERPL: 1.2 {RATIO} (ref 1.1–2.2)
ALP SERPL-CCNC: 85 U/L (ref 40–129)
ALT SERPL-CCNC: 105 U/L (ref 10–40)
ANION GAP SERPL CALCULATED.3IONS-SCNC: 12 MMOL/L (ref 3–16)
AST SERPL-CCNC: 74 U/L (ref 15–37)
BASOPHILS # BLD: 0 K/UL (ref 0–0.2)
BASOPHILS NFR BLD: 0.8 %
BILIRUB SERPL-MCNC: <0.2 MG/DL (ref 0–1)
BUN SERPL-MCNC: 47 MG/DL (ref 7–20)
CALCIUM SERPL-MCNC: 9.3 MG/DL (ref 8.3–10.6)
CHLORIDE SERPL-SCNC: 101 MMOL/L (ref 99–110)
CO2 SERPL-SCNC: 26 MMOL/L (ref 21–32)
CREAT SERPL-MCNC: 1.5 MG/DL (ref 0.8–1.3)
CRP SERPL-MCNC: <3 MG/L (ref 0–5.1)
DEPRECATED RDW RBC AUTO: 15.3 % (ref 12.4–15.4)
EOSINOPHIL # BLD: 0.2 K/UL (ref 0–0.6)
EOSINOPHIL NFR BLD: 4.2 %
ERYTHROCYTE [SEDIMENTATION RATE] IN BLOOD BY WESTERGREN METHOD: 47 MM/HR (ref 0–20)
GFR SERPLBLD CREATININE-BSD FMLA CKD-EPI: 49 ML/MIN/{1.73_M2}
GLUCOSE BLD-MCNC: 102 MG/DL (ref 70–99)
GLUCOSE BLD-MCNC: 171 MG/DL (ref 70–99)
GLUCOSE SERPL-MCNC: 207 MG/DL (ref 70–99)
HCT VFR BLD AUTO: 35.4 % (ref 40.5–52.5)
HGB BLD-MCNC: 11.4 G/DL (ref 13.5–17.5)
LYMPHOCYTES # BLD: 1 K/UL (ref 1–5.1)
LYMPHOCYTES NFR BLD: 23.8 %
MCH RBC QN AUTO: 27.8 PG (ref 26–34)
MCHC RBC AUTO-ENTMCNC: 32.2 G/DL (ref 31–36)
MCV RBC AUTO: 86.3 FL (ref 80–100)
MONOCYTES # BLD: 0.5 K/UL (ref 0–1.3)
MONOCYTES NFR BLD: 12.2 %
NEUTROPHILS # BLD: 2.5 K/UL (ref 1.7–7.7)
NEUTROPHILS NFR BLD: 59 %
PERFORMED ON: ABNORMAL
PERFORMED ON: ABNORMAL
PLATELET # BLD AUTO: 140 K/UL (ref 135–450)
PMV BLD AUTO: 10.4 FL (ref 5–10.5)
POTASSIUM SERPL-SCNC: 4.8 MMOL/L (ref 3.5–5.1)
PROT SERPL-MCNC: 6.6 G/DL (ref 6.4–8.2)
RBC # BLD AUTO: 4.11 M/UL (ref 4.2–5.9)
SODIUM SERPL-SCNC: 139 MMOL/L (ref 136–145)
WBC # BLD AUTO: 4.3 K/UL (ref 4–11)

## 2025-03-17 PROCEDURE — 99183 HYPERBARIC OXYGEN THERAPY: CPT | Performed by: NURSE PRACTITIONER

## 2025-03-17 PROCEDURE — G0277 HBOT, FULL BODY CHAMBER, 30M: HCPCS

## 2025-03-17 NOTE — TELEPHONE ENCOUNTER
After-hours call communication:    Patient concern: Patient called requesting a medication refill and to schedule an appt with Dr. Draper. Message that was sent did not indicate which medication he was requesting.     Advice given: I called patient twice and left a message, as patient did not answer, asking him to call back if he needed a urgent medication refill this evening. If not urgent, asked that he call his office tomorrow to request refill. Also asked that he call office tomorrow to schedule appt.     DISCHARGE

## 2025-03-18 ENCOUNTER — HOSPITAL ENCOUNTER (OUTPATIENT)
Dept: HYPERBARIC MEDICINE | Age: 75
Discharge: HOME | End: 2025-03-18
Payer: MEDICARE

## 2025-03-18 ENCOUNTER — RESULTS FOLLOW-UP (OUTPATIENT)
Dept: VASCULAR LAB | Age: 75
End: 2025-03-18

## 2025-03-18 ENCOUNTER — HOSPITAL ENCOUNTER (OUTPATIENT)
Dept: WOUND CARE | Age: 75
Discharge: HOME | End: 2025-03-18
Attending: EMERGENCY MEDICINE
Payer: MEDICARE

## 2025-03-18 VITALS
RESPIRATION RATE: 18 BRPM | DIASTOLIC BLOOD PRESSURE: 59 MMHG | HEART RATE: 79 BPM | SYSTOLIC BLOOD PRESSURE: 126 MMHG | TEMPERATURE: 97.8 F

## 2025-03-18 VITALS
HEART RATE: 72 BPM | SYSTOLIC BLOOD PRESSURE: 147 MMHG | RESPIRATION RATE: 18 BRPM | TEMPERATURE: 97.4 F | DIASTOLIC BLOOD PRESSURE: 81 MMHG

## 2025-03-18 DIAGNOSIS — E11.628 TYPE 2 DIABETES MELLITUS WITH RIGHT DIABETIC FOOT INFECTION (HCC): ICD-10-CM

## 2025-03-18 DIAGNOSIS — R60.0 LOCALIZED EDEMA: ICD-10-CM

## 2025-03-18 DIAGNOSIS — L97.509 DIABETIC FOOT ULCER WITH OSTEOMYELITIS (HCC): ICD-10-CM

## 2025-03-18 DIAGNOSIS — I70.234 ATHEROSCLEROSIS OF NATIVE ARTERY OF RIGHT LOWER EXTREMITY WITH ULCERATION OF MIDFOOT: Primary | ICD-10-CM

## 2025-03-18 DIAGNOSIS — L08.9 TYPE 2 DIABETES MELLITUS WITH RIGHT DIABETIC FOOT INFECTION (HCC): ICD-10-CM

## 2025-03-18 DIAGNOSIS — E11.69 DIABETIC FOOT ULCER WITH OSTEOMYELITIS (HCC): ICD-10-CM

## 2025-03-18 DIAGNOSIS — E11.621 DIABETIC ULCER OF RIGHT MIDFOOT ASSOCIATED WITH TYPE 2 DIABETES MELLITUS, WITH NECROSIS OF MUSCLE: ICD-10-CM

## 2025-03-18 DIAGNOSIS — L97.514 ULCER OF TOE OF RIGHT FOOT, WITH NECROSIS OF BONE (HCC): Primary | ICD-10-CM

## 2025-03-18 DIAGNOSIS — I70.234 ATHEROSCLEROSIS OF NATIVE ARTERY OF RIGHT LOWER EXTREMITY WITH ULCERATION OF MIDFOOT: ICD-10-CM

## 2025-03-18 DIAGNOSIS — L97.514 RIGHT FOOT ULCER, WITH NECROSIS OF BONE (HCC): ICD-10-CM

## 2025-03-18 DIAGNOSIS — M86.9 DIABETIC FOOT ULCER WITH OSTEOMYELITIS (HCC): ICD-10-CM

## 2025-03-18 DIAGNOSIS — I87.321 IDIOPATHIC CHRONIC VENOUS HYPERTENSION OF RIGHT LEG WITH INFLAMMATION: ICD-10-CM

## 2025-03-18 DIAGNOSIS — E11.69 TYPE 2 DIABETES MELLITUS WITH OBESITY (HCC): ICD-10-CM

## 2025-03-18 DIAGNOSIS — Z74.09 IMPAIRED MOBILITY: ICD-10-CM

## 2025-03-18 DIAGNOSIS — L97.413 DIABETIC ULCER OF RIGHT MIDFOOT ASSOCIATED WITH TYPE 2 DIABETES MELLITUS, WITH NECROSIS OF MUSCLE: ICD-10-CM

## 2025-03-18 DIAGNOSIS — E66.9 TYPE 2 DIABETES MELLITUS WITH OBESITY (HCC): ICD-10-CM

## 2025-03-18 DIAGNOSIS — E11.621 DIABETIC FOOT ULCER WITH OSTEOMYELITIS (HCC): ICD-10-CM

## 2025-03-18 LAB
GLUCOSE BLD-MCNC: 101 MG/DL (ref 70–99)
GLUCOSE BLD-MCNC: 243 MG/DL (ref 70–99)
GLUCOSE BLD-MCNC: 80 MG/DL (ref 70–99)
PERFORMED ON: ABNORMAL
PERFORMED ON: ABNORMAL
PERFORMED ON: NORMAL

## 2025-03-18 PROCEDURE — 11046 DBRDMT MUSC&/FSCA EA ADDL: CPT | Performed by: EMERGENCY MEDICINE

## 2025-03-18 PROCEDURE — G0277 HBOT, FULL BODY CHAMBER, 30M: HCPCS

## 2025-03-18 PROCEDURE — 11046 DBRDMT MUSC&/FSCA EA ADDL: CPT

## 2025-03-18 PROCEDURE — 11043 DBRDMT MUSC&/FSCA 1ST 20/<: CPT

## 2025-03-18 PROCEDURE — 11043 DBRDMT MUSC&/FSCA 1ST 20/<: CPT | Performed by: EMERGENCY MEDICINE

## 2025-03-18 PROCEDURE — 99183 HYPERBARIC OXYGEN THERAPY: CPT | Performed by: EMERGENCY MEDICINE

## 2025-03-18 RX ORDER — SILVER SULFADIAZINE 10 MG/G
CREAM TOPICAL ONCE
OUTPATIENT
Start: 2025-03-18 | End: 2025-03-18

## 2025-03-18 RX ORDER — NEOMYCIN/BACITRACIN/POLYMYXINB 3.5-400-5K
OINTMENT (GRAM) TOPICAL ONCE
OUTPATIENT
Start: 2025-03-18 | End: 2025-03-18

## 2025-03-18 RX ORDER — TRIAMCINOLONE ACETONIDE 1 MG/G
OINTMENT TOPICAL ONCE
OUTPATIENT
Start: 2025-03-18 | End: 2025-03-18

## 2025-03-18 RX ORDER — LIDOCAINE HYDROCHLORIDE 20 MG/ML
JELLY TOPICAL ONCE
OUTPATIENT
Start: 2025-03-18 | End: 2025-03-18

## 2025-03-18 RX ORDER — LIDOCAINE 50 MG/G
OINTMENT TOPICAL ONCE
OUTPATIENT
Start: 2025-03-18 | End: 2025-03-18

## 2025-03-18 RX ORDER — BACITRACIN ZINC AND POLYMYXIN B SULFATE 500; 1000 [USP'U]/G; [USP'U]/G
OINTMENT TOPICAL ONCE
OUTPATIENT
Start: 2025-03-18 | End: 2025-03-18

## 2025-03-18 RX ORDER — LIDOCAINE HYDROCHLORIDE 40 MG/ML
SOLUTION TOPICAL ONCE
OUTPATIENT
Start: 2025-03-18 | End: 2025-03-18

## 2025-03-18 RX ORDER — SODIUM CHLOR/HYPOCHLOROUS ACID 0.033 %
SOLUTION, IRRIGATION IRRIGATION ONCE
OUTPATIENT
Start: 2025-03-18 | End: 2025-03-18

## 2025-03-18 RX ORDER — LIDOCAINE 40 MG/G
CREAM TOPICAL ONCE
Status: COMPLETED | OUTPATIENT
Start: 2025-03-18 | End: 2025-03-18

## 2025-03-18 RX ORDER — BETAMETHASONE DIPROPIONATE 0.5 MG/G
CREAM TOPICAL ONCE
OUTPATIENT
Start: 2025-03-18 | End: 2025-03-18

## 2025-03-18 RX ORDER — CLOBETASOL PROPIONATE 0.5 MG/G
OINTMENT TOPICAL ONCE
OUTPATIENT
Start: 2025-03-18 | End: 2025-03-18

## 2025-03-18 RX ORDER — LIDOCAINE 40 MG/G
CREAM TOPICAL ONCE
OUTPATIENT
Start: 2025-03-18 | End: 2025-03-18

## 2025-03-18 RX ORDER — MUPIROCIN 20 MG/G
OINTMENT TOPICAL ONCE
OUTPATIENT
Start: 2025-03-18 | End: 2025-03-18

## 2025-03-18 RX ORDER — GENTAMICIN SULFATE 1 MG/G
OINTMENT TOPICAL ONCE
OUTPATIENT
Start: 2025-03-18 | End: 2025-03-18

## 2025-03-18 RX ORDER — GINSENG 100 MG
CAPSULE ORAL ONCE
OUTPATIENT
Start: 2025-03-18 | End: 2025-03-18

## 2025-03-18 RX ADMIN — LIDOCAINE: 40 CREAM TOPICAL at 09:43

## 2025-03-18 ASSESSMENT — PAIN SCALES - GENERAL
PAINLEVEL_OUTOF10: 0

## 2025-03-18 NOTE — PATIENT INSTRUCTIONS
4:30 pm and Friday 8:00 am - 12:30 pm.  If you need help with your wound outside these hours and cannot wait until we are again available, contact your PCP or go to the hospital emergency room.     PLEASE NOTE: IF YOU ARE UNABLE TO OBTAIN WOUND SUPPLIES, CONTINUE TO USE THE SUPPLIES YOU HAVE AVAILABLE UNTIL YOU ARE ABLE TO REACH US. IT IS MOST IMPORTANT TO KEEP THE WOUND COVERED AT ALL TIMES.         Physician Signature:_______________________    Date: ___________ Time:  ____________          Dr Cece Alejandre

## 2025-03-18 NOTE — PROGRESS NOTES
HBO PROGRESS NOTE      NAME: Marvin Montero  MEDICAL RECORD NUMBER:  [id number]  AGE: 74 y.o.   GENDER: male  : 1950  EPISODE DATE:  3/18/2025     Subjective     HBO Treatment Number: 15 out of Total Treatments: 30    HBO Diagnosis:             Indications: Lower Extremity Diabetic Wound ___(site) (right foot)    Safety checks performed prior to treatment.  See doc flowsheets for documentation.    Objective        Recent Labs     25  0951 25  1234   POCGLU 243* 80*     Pre treatment Vital Signs       Temp: 97.8 °F (36.3 °C)     Pulse: 81     Respirations: 18     BP: 126/59       Post treatment Vital Signs  Temp: 97.4 °F (36.3 °C)  Pulse: 72  Respirations: 18  BP: (!) 147/81    Assessment        HBO Diagnosis:   Problem List Items Addressed This Visit          Circulatory    Atherosclerosis of native artery of right lower extremity with ulceration of midfoot (HCC)    Relevant Orders    POCT glucose       Endocrine    Diabetic foot ulcer with osteomyelitis (HCC)    Relevant Orders    POCT glucose       Musculoskeletal and Integument    Right foot ulcer, with necrosis of bone (HCC)    Ulcer of toe of right foot, with necrosis of bone (HCC) - Primary    Relevant Orders    POCT glucose       Other    Localized edema       Physical Exam        General Appearance:  alert and oriented to person, place and time, well-developed and well-nourished, in no acute distress    Pre Tympanic Membrane Assessment:  Right: Normal (PE tubes visible, tympanic membrane unremarkable per Dr. Cece Alejandre) ETT  Left: Normal (PE tubes visible, tympanic membrane unremarkable per Dr. Cece Alejandre) ETT    Post Tympanic Membrane Assessment:  Left: Normal (denies any ear problems, PE tube visible)  Right: Normal (denies any ear problems, PE tube visible)    Pulmonary/Chest:  clear to auscultation bilaterally- no wheezes, rales or rhonchi, normal air movement, no respiratory distress    Cardiovascular:  regular rate and

## 2025-03-18 NOTE — PROGRESS NOTES
[facility]     Note Type: Medical Staff Progress Note    Referring Provider: Self Referral  Reason for Referral:   Chief Complaint   Patient presents with    Wound Check     Follow up for right foot wound.        Marvin Montero  MEDICAL RECORD NUMBER:  [id number]  AGE: 74 y.o.   GENDER: male  : 1950  EPISODE DATE:  3/18/2025    Chief complaint and reason for visit:     Chief Complaint   Patient presents with    Wound Check     Follow up for right foot wound.         HPI/Wound Narrative:      Marvin Montero is a 74 y.o. male who presents today for an evaluation of a wound/ulcer. Wound duration:  2024 .    3/18/25: doing better overall, edema improving with diuretics    3/11/25: did not take his diuretic yesterday nor today and leg edema is much worse than last visit.    3/4/25: no new issues. Plans on HBOT today. Diuretic changed and helping with edema control much better. Eating better.     25: Has ENT appointment scheduled for 2 PM today with possible ET tubes but the patient was also given Flonase.  Unfortunately cannot trial the Flonase and at this time he does want to trial this next week.      25: no new issues. Had vascular intervention.    25: Patient has no specific complaints.  Does have angiogram with intervention scheduled for 2025.  He is also going to be getting his MRI soon.    25: no new issues. Has appt with cardio soon. MRI won't be done until after 2/3 due to recent pacer/defibrillator placed in 2024.    25: had his echo. EF about 55%. No changes in medications recommended by cardiology.    25: 74-year-old with past medical history notable for hypertension, type 2 diabetes mellitus, hyperlipidemia, atrial fibrillation on Eliquis, diastolic congestive heart failure, COPD, hypothyroidism, peripheral arterial disease status post left BKA who is known to me for wounds to his right lower extremity back in .

## 2025-03-18 NOTE — PLAN OF CARE
HBO NURSING DOCUMENTATION          [facility]    NAME:  Marvin Montero  YOB: 1950  MEDICAL RECORD NUMBER:  [id number]  DATE:  3/18/2025    RN checked blood glucose level post treatment, 80. Patient relayed he was feeling slightly \"whoozy\" but he wasn't sure if it was because of laying down for so long or his sugar. RN gave patient an 8 oz Glucerna, and rechecked sugar 15 minutes later. Blood glucose was 101 and patient denies any symptoms and no long feel \"whoozy,\" and he report he will be having lunch with his wife right after this. Rn notified Dr ANTOINE and discharge patient.      Electronically signed by Connie Rojas RN on 3/18/2025 at 12:55 PM

## 2025-03-18 NOTE — TELEPHONE ENCOUNTER
Patient called for med refills of Levothyroxine and Viagra. Patient would like those sent to [facility] on [address].

## 2025-03-18 NOTE — PROGRESS NOTES
KENNY Cuadra - CNP on 3/17/2025 at 11:45 PM

## 2025-03-19 ENCOUNTER — HOSPITAL ENCOUNTER (OUTPATIENT)
Dept: HYPERBARIC MEDICINE | Age: 75
Discharge: HOME | End: 2025-03-19
Payer: MEDICARE

## 2025-03-19 VITALS
DIASTOLIC BLOOD PRESSURE: 54 MMHG | HEART RATE: 63 BPM | RESPIRATION RATE: 18 BRPM | TEMPERATURE: 97 F | SYSTOLIC BLOOD PRESSURE: 134 MMHG

## 2025-03-19 DIAGNOSIS — M86.9 DIABETIC FOOT ULCER WITH OSTEOMYELITIS (HCC): Primary | ICD-10-CM

## 2025-03-19 DIAGNOSIS — L97.514 RIGHT FOOT ULCER, WITH NECROSIS OF BONE (HCC): ICD-10-CM

## 2025-03-19 DIAGNOSIS — L97.509 DIABETIC FOOT ULCER WITH OSTEOMYELITIS (HCC): Primary | ICD-10-CM

## 2025-03-19 DIAGNOSIS — E11.621 DIABETIC FOOT ULCER WITH OSTEOMYELITIS (HCC): Primary | ICD-10-CM

## 2025-03-19 DIAGNOSIS — L97.514 ULCER OF TOE OF RIGHT FOOT, WITH NECROSIS OF BONE (HCC): ICD-10-CM

## 2025-03-19 DIAGNOSIS — E11.69 DIABETIC FOOT ULCER WITH OSTEOMYELITIS (HCC): Primary | ICD-10-CM

## 2025-03-19 DIAGNOSIS — I70.234 ATHEROSCLEROSIS OF NATIVE ARTERY OF RIGHT LOWER EXTREMITY WITH ULCERATION OF MIDFOOT: ICD-10-CM

## 2025-03-19 LAB
GLUCOSE BLD-MCNC: 103 MG/DL (ref 70–99)
GLUCOSE BLD-MCNC: 122 MG/DL (ref 70–99)
GLUCOSE BLD-MCNC: 97 MG/DL (ref 70–99)
PERFORMED ON: ABNORMAL
PERFORMED ON: ABNORMAL
PERFORMED ON: NORMAL

## 2025-03-19 PROCEDURE — 99183 HYPERBARIC OXYGEN THERAPY: CPT | Performed by: SURGERY

## 2025-03-19 PROCEDURE — G0277 HBOT, FULL BODY CHAMBER, 30M: HCPCS

## 2025-03-19 RX ORDER — LEVOTHYROXINE SODIUM 25 UG/1
25 TABLET ORAL DAILY
Qty: 90 TABLET | Refills: 3 | Status: SHIPPED | OUTPATIENT
Start: 2025-03-19 | End: 2025-03-21

## 2025-03-19 ASSESSMENT — PAIN SCALES - GENERAL
PAINLEVEL_OUTOF10: 0
PAINLEVEL_OUTOF10: 0

## 2025-03-19 NOTE — PROGRESS NOTES
HBO PROGRESS NOTE      NAME: Marvin Montero  MEDICAL RECORD NUMBER:  [id number]  AGE: 74 y.o.   GENDER: male  : 1950  EPISODE DATE:  3/19/2025     Subjective     HBO Treatment Number: 16 out of Total Treatments: 30    HBO Diagnosis:             Indications: Lower Extremity Diabetic Wound ___(site) (right foot)    Safety checks performed prior to treatment.  See doc flowsheets for documentation.    Objective        Recent Labs     25  1104 25  1307   POCGLU 122* 103*     Pre treatment Vital Signs       Temp: 97.1 °F (36.2 °C)     Pulse: 68     Respirations: 16     BP: 106/62       Post treatment Vital Signs  Temp: 97.1 °F (36.2 °C)  Pulse: 68  Respirations: 16  BP: 106/62    Assessment        HBO Diagnosis:   Problem List Items Addressed This Visit          Circulatory    Atherosclerosis of native artery of right lower extremity with ulceration of midfoot (HCC)    Relevant Orders    Notify physician (specify)    Hyperbaric Oxygen Therapy    POCT glucose    Care order/instruction       Endocrine    Diabetic foot ulcer with osteomyelitis (HCC) - Primary    Relevant Orders    Notify physician (specify)    Hyperbaric Oxygen Therapy    POCT glucose    Care order/instruction       Musculoskeletal and Integument    Right foot ulcer, with necrosis of bone (HCC)    Relevant Orders    Notify physician (specify)    Hyperbaric Oxygen Therapy    Ulcer of toe of right foot, with necrosis of bone (HCC)    Relevant Orders    POCT glucose    Care order/instruction       Physical Exam        General Appearance:  alert and oriented to person, place and time, well-developed and well-nourished, in no acute distress    Pre Tympanic Membrane Assessment:  Right: Normal (Per Dr. Joann MD)  Left: Normal (per Dr. Joann MD)    Post Tympanic Membrane Assessment:          Pulmonary/Chest:  clear to auscultation bilaterally- no wheezes, rales or rhonchi, normal air movement, no respiratory distress    Cardiovascular:

## 2025-03-20 ENCOUNTER — TELEPHONE (OUTPATIENT)
Dept: INTERNAL MEDICINE CLINIC | Age: 75
End: 2025-03-20

## 2025-03-20 ENCOUNTER — HOSPITAL ENCOUNTER (OUTPATIENT)
Dept: HYPERBARIC MEDICINE | Age: 75
Discharge: HOME | End: 2025-03-20
Payer: MEDICARE

## 2025-03-20 VITALS
DIASTOLIC BLOOD PRESSURE: 73 MMHG | RESPIRATION RATE: 18 BRPM | SYSTOLIC BLOOD PRESSURE: 145 MMHG | HEART RATE: 67 BPM | TEMPERATURE: 97 F

## 2025-03-20 DIAGNOSIS — L97.514 ULCER OF TOE OF RIGHT FOOT, WITH NECROSIS OF BONE (HCC): ICD-10-CM

## 2025-03-20 DIAGNOSIS — L97.509 DIABETIC FOOT ULCER WITH OSTEOMYELITIS (HCC): Primary | ICD-10-CM

## 2025-03-20 DIAGNOSIS — L97.514 RIGHT FOOT ULCER, WITH NECROSIS OF BONE (HCC): ICD-10-CM

## 2025-03-20 DIAGNOSIS — E55.9 VITAMIN D DEFICIENCY: Primary | ICD-10-CM

## 2025-03-20 DIAGNOSIS — M86.9 DIABETIC FOOT ULCER WITH OSTEOMYELITIS (HCC): Primary | ICD-10-CM

## 2025-03-20 DIAGNOSIS — E11.621 DIABETIC FOOT ULCER WITH OSTEOMYELITIS (HCC): Primary | ICD-10-CM

## 2025-03-20 DIAGNOSIS — E11.69 DIABETIC FOOT ULCER WITH OSTEOMYELITIS (HCC): Primary | ICD-10-CM

## 2025-03-20 DIAGNOSIS — E56.9 VITAMIN DEFICIENCY: ICD-10-CM

## 2025-03-20 DIAGNOSIS — I70.234 ATHEROSCLEROSIS OF NATIVE ARTERY OF RIGHT LOWER EXTREMITY WITH ULCERATION OF MIDFOOT: ICD-10-CM

## 2025-03-20 DIAGNOSIS — E78.2 MIXED HYPERLIPIDEMIA: ICD-10-CM

## 2025-03-20 LAB
GLUCOSE BLD-MCNC: 104 MG/DL (ref 70–99)
GLUCOSE BLD-MCNC: 110 MG/DL (ref 70–99)
GLUCOSE BLD-MCNC: 136 MG/DL (ref 70–99)
PERFORMED ON: ABNORMAL

## 2025-03-20 PROCEDURE — 99183 HYPERBARIC OXYGEN THERAPY: CPT | Performed by: NURSE PRACTITIONER

## 2025-03-20 PROCEDURE — G0277 HBOT, FULL BODY CHAMBER, 30M: HCPCS

## 2025-03-20 ASSESSMENT — PAIN SCALES - GENERAL
PAINLEVEL_OUTOF10: 0
PAINLEVEL_OUTOF10: 0

## 2025-03-20 NOTE — TELEPHONE ENCOUNTER
Patient is requesting orders for labs to be done on Vitamin d e and b12 before upcoming appointment please advise

## 2025-03-20 NOTE — PROGRESS NOTES
rhonchi, normal air movement, no respiratory distress    Cardiovascular:  regular rate and rhythm    Plan        Patient placed in a full body Monoplace Chamber #: [id number]  Treatment Start Time: 1116     Pressure Reached Time: 1125  MINGO : 2  Number of Air Breaks:  Treatment Status: No Air break     Decompression Time: 1255   Treatment End Time: 1304  Length of Treatment: 90 Minutes  Symptoms Noted During Treatment: None  Total Treatment Time (min): 108    Treatment Completion Status: Treatment completed without issue    I was present on these premises and immediately available to furnish assistance & direction throughout the HBO Treatment.     Marvin Montero is a 74 y.o. male  did successfully complete today's hyperbaric oxygen treatment at [facility] and HBO therapy.    In my clinical judgement, ongoing HBO therapy is  necessary at this time to assist with wound healing, preservation of limb, life, or function.    Supervision and attendance of Hyperbaric Oxygen Therapy provided. Continue HBO treatment as outlined in the treatment plan.    Hyperbaric Oxygen: Mr. Montero tolerated: Treatment Number: 17 without  issue.    Discharge Instructions were explained and given to Mr. Montero     Electronically signed by KENNY Guerrero CNP on 3/20/2025 at 2:19 PM

## 2025-03-21 ENCOUNTER — HOSPITAL ENCOUNTER (OUTPATIENT)
Dept: HYPERBARIC MEDICINE | Age: 75
Discharge: HOME | End: 2025-03-21

## 2025-03-21 VITALS
HEART RATE: 68 BPM | RESPIRATION RATE: 18 BRPM | DIASTOLIC BLOOD PRESSURE: 71 MMHG | TEMPERATURE: 97.2 F | SYSTOLIC BLOOD PRESSURE: 146 MMHG

## 2025-03-21 DIAGNOSIS — L97.514 RIGHT FOOT ULCER, WITH NECROSIS OF BONE (HCC): ICD-10-CM

## 2025-03-21 DIAGNOSIS — E11.621 DIABETIC FOOT ULCER WITH OSTEOMYELITIS (HCC): Primary | ICD-10-CM

## 2025-03-21 DIAGNOSIS — E11.69 DIABETIC FOOT ULCER WITH OSTEOMYELITIS (HCC): Primary | ICD-10-CM

## 2025-03-21 DIAGNOSIS — L97.509 DIABETIC FOOT ULCER WITH OSTEOMYELITIS (HCC): Primary | ICD-10-CM

## 2025-03-21 DIAGNOSIS — I70.234 ATHEROSCLEROSIS OF NATIVE ARTERY OF RIGHT LOWER EXTREMITY WITH ULCERATION OF MIDFOOT: ICD-10-CM

## 2025-03-21 DIAGNOSIS — M86.9 DIABETIC FOOT ULCER WITH OSTEOMYELITIS (HCC): Primary | ICD-10-CM

## 2025-03-21 DIAGNOSIS — L97.514 ULCER OF TOE OF RIGHT FOOT, WITH NECROSIS OF BONE (HCC): ICD-10-CM

## 2025-03-21 LAB
GLUCOSE BLD-MCNC: 125 MG/DL (ref 70–99)
GLUCOSE BLD-MCNC: 94 MG/DL (ref 70–99)
GLUCOSE BLD-MCNC: 95 MG/DL (ref 70–99)
PERFORMED ON: ABNORMAL
PERFORMED ON: NORMAL
PERFORMED ON: NORMAL

## 2025-03-21 RX ORDER — SILDENAFIL 100 MG/1
100 TABLET, FILM COATED ORAL DAILY PRN
Qty: 30 TABLET | Refills: 5 | Status: SHIPPED | OUTPATIENT
Start: 2025-03-21

## 2025-03-21 RX ORDER — LEVOTHYROXINE SODIUM 25 UG/1
25 TABLET ORAL DAILY
Qty: 90 TABLET | Refills: 3 | Status: SHIPPED | OUTPATIENT
Start: 2025-03-21

## 2025-03-21 ASSESSMENT — PAIN SCALES - GENERAL: PAINLEVEL_OUTOF10: 0

## 2025-03-21 NOTE — TELEPHONE ENCOUNTER
Medication:   Requested Prescriptions     Pending Prescriptions Disp Refills    levothyroxine (SYNTHROID) 25 MCG tablet [Pharmacy Med Name: LEVOTHYROXINE 25 MCG TABLET] 90 tablet 3     Sig: TAKE 1 TABLET BY MOUTH DAILY    sildenafil (VIAGRA) 100 MG tablet [Pharmacy Med Name: SILDENAFIL 100 MG TABLET] 30 tablet      Sig: TAKE 1 TABLET BY MOUTH DAILY AS NEEDED FOR ERECTILE DYSFUNCTION        Last Filled:      Patient Phone Number: [phone] (home)     Last appt: 12/11/2024   Next appt: 4/8/2025    Last OARRS:        No data to display

## 2025-03-21 NOTE — PROGRESS NOTES
issue.    Discharge Instructions were explained and given to  Montero     Electronically signed by KENNY Carlisle CNP on 3/21/2025 at 1:30 PM

## 2025-03-24 ENCOUNTER — HOSPITAL ENCOUNTER (OUTPATIENT)
Dept: HYPERBARIC MEDICINE | Age: 75
Discharge: HOME | End: 2025-03-24
Payer: MEDICARE

## 2025-03-24 DIAGNOSIS — L97.514 ULCER OF TOE OF RIGHT FOOT, WITH NECROSIS OF BONE (HCC): ICD-10-CM

## 2025-03-24 DIAGNOSIS — I70.234 ATHEROSCLEROSIS OF NATIVE ARTERY OF RIGHT LOWER EXTREMITY WITH ULCERATION OF MIDFOOT: ICD-10-CM

## 2025-03-24 DIAGNOSIS — E11.621 DIABETIC FOOT ULCER WITH OSTEOMYELITIS (HCC): Primary | ICD-10-CM

## 2025-03-24 DIAGNOSIS — M86.9 DIABETIC FOOT ULCER WITH OSTEOMYELITIS (HCC): Primary | ICD-10-CM

## 2025-03-24 DIAGNOSIS — E11.69 DIABETIC FOOT ULCER WITH OSTEOMYELITIS (HCC): Primary | ICD-10-CM

## 2025-03-24 DIAGNOSIS — L97.514 RIGHT FOOT ULCER, WITH NECROSIS OF BONE (HCC): ICD-10-CM

## 2025-03-24 DIAGNOSIS — L97.509 DIABETIC FOOT ULCER WITH OSTEOMYELITIS (HCC): Primary | ICD-10-CM

## 2025-03-24 LAB
ALBUMIN SERPL-MCNC: 3.6 G/DL (ref 3.4–5)
ALBUMIN/GLOB SERPL: 1.3 {RATIO} (ref 1.1–2.2)
ALP SERPL-CCNC: 94 U/L (ref 40–129)
ALT SERPL-CCNC: 105 U/L (ref 10–40)
ANION GAP SERPL CALCULATED.3IONS-SCNC: 12 MMOL/L (ref 3–16)
AST SERPL-CCNC: 58 U/L (ref 15–37)
BASOPHILS # BLD: 0 K/UL (ref 0–0.2)
BASOPHILS NFR BLD: 1.2 %
BILIRUB SERPL-MCNC: <0.2 MG/DL (ref 0–1)
BUN SERPL-MCNC: 39 MG/DL (ref 7–20)
CALCIUM SERPL-MCNC: 8.7 MG/DL (ref 8.3–10.6)
CHLORIDE SERPL-SCNC: 104 MMOL/L (ref 99–110)
CO2 SERPL-SCNC: 23 MMOL/L (ref 21–32)
CREAT SERPL-MCNC: 1.6 MG/DL (ref 0.8–1.3)
CRP SERPL-MCNC: <3 MG/L (ref 0–5.1)
DEPRECATED RDW RBC AUTO: 15.3 % (ref 12.4–15.4)
EOSINOPHIL # BLD: 0.2 K/UL (ref 0–0.6)
EOSINOPHIL NFR BLD: 4 %
ERYTHROCYTE [SEDIMENTATION RATE] IN BLOOD BY WESTERGREN METHOD: 35 MM/HR (ref 0–20)
GFR SERPLBLD CREATININE-BSD FMLA CKD-EPI: 45 ML/MIN/{1.73_M2}
GLUCOSE BLD-MCNC: 123 MG/DL (ref 70–99)
GLUCOSE BLD-MCNC: 195 MG/DL (ref 70–99)
GLUCOSE SERPL-MCNC: 237 MG/DL (ref 70–99)
HCT VFR BLD AUTO: 36.3 % (ref 40.5–52.5)
HGB BLD-MCNC: 11.6 G/DL (ref 13.5–17.5)
LYMPHOCYTES # BLD: 0.9 K/UL (ref 1–5.1)
LYMPHOCYTES NFR BLD: 22.5 %
MCH RBC QN AUTO: 27.5 PG (ref 26–34)
MCHC RBC AUTO-ENTMCNC: 31.9 G/DL (ref 31–36)
MCV RBC AUTO: 86.2 FL (ref 80–100)
MONOCYTES # BLD: 0.3 K/UL (ref 0–1.3)
MONOCYTES NFR BLD: 7.8 %
NEUTROPHILS # BLD: 2.7 K/UL (ref 1.7–7.7)
NEUTROPHILS NFR BLD: 64.5 %
PERFORMED ON: ABNORMAL
PERFORMED ON: ABNORMAL
PLATELET # BLD AUTO: 143 K/UL (ref 135–450)
PMV BLD AUTO: 10.3 FL (ref 5–10.5)
POTASSIUM SERPL-SCNC: 4.7 MMOL/L (ref 3.5–5.1)
PROT SERPL-MCNC: 6.4 G/DL (ref 6.4–8.2)
RBC # BLD AUTO: 4.21 M/UL (ref 4.2–5.9)
SODIUM SERPL-SCNC: 139 MMOL/L (ref 136–145)
WBC # BLD AUTO: 4.2 K/UL (ref 4–11)

## 2025-03-24 PROCEDURE — 99183 HYPERBARIC OXYGEN THERAPY: CPT | Performed by: SURGERY

## 2025-03-24 PROCEDURE — G0277 HBOT, FULL BODY CHAMBER, 30M: HCPCS

## 2025-03-24 ASSESSMENT — PAIN SCALES - GENERAL: PAINLEVEL_OUTOF10: 0

## 2025-03-24 NOTE — PROGRESS NOTES
HBO PROGRESS NOTE      NAME: Marvin Montero  MEDICAL RECORD NUMBER:  [id number]  AGE: 74 y.o.   GENDER: male  : 1950  EPISODE DATE:  3/24/2025     Subjective     HBO Treatment Number: 19 out of Total Treatments: 30    HBO Diagnosis:             Indications: Lower Extremity Diabetic Wound ___(site) (Right Foot)    Safety checks performed prior to treatment.  See doc flowsheets for documentation.    Objective        Recent Labs     25  1030 25  1241   POCGLU 195* 123*     Pre treatment Vital Signs       Temp: 97.1 °F (36.2 °C)     Pulse: 88     Respirations: 16     BP: 103/64       Post treatment Vital Signs  Temp: 97.1 °F (36.2 °C)  Pulse: 63  Respirations: (!) 136  BP: (!) 122/57 (Dr David aware of BP)    Assessment        HBO Diagnosis:   Problem List Items Addressed This Visit          Circulatory    Atherosclerosis of native artery of right lower extremity with ulceration of midfoot (HCC)    Relevant Orders    Hypoglycemial protocol    POCT glucose    Care order/instruction    Care order/instruction    Care order/instruction    Care order/instruction       Endocrine    Diabetic foot ulcer with osteomyelitis (HCC) - Primary    Relevant Orders    Hypoglycemial protocol    POCT glucose    Care order/instruction    Care order/instruction    Care order/instruction    Care order/instruction       Musculoskeletal and Integument    Right foot ulcer, with necrosis of bone (HCC)    Ulcer of toe of right foot, with necrosis of bone (HCC)    Relevant Orders    Hypoglycemial protocol    POCT glucose    Care order/instruction    Care order/instruction    Care order/instruction    Care order/instruction       Physical Exam        General Appearance:  alert and oriented to person, place and time, well-developed and well-nourished, in no acute distress    Pre Tympanic Membrane Assessment:  Right: Normal (PE Tubes Visible per Dr. David)  Left: Normal (PE Tubes Visible per Dr. David)    Post Tympanic

## 2025-03-25 ENCOUNTER — HOSPITAL ENCOUNTER (OUTPATIENT)
Dept: WOUND CARE | Age: 75
Discharge: HOME | End: 2025-03-25
Attending: EMERGENCY MEDICINE
Payer: MEDICARE

## 2025-03-25 ENCOUNTER — HOSPITAL ENCOUNTER (OUTPATIENT)
Dept: HYPERBARIC MEDICINE | Age: 75
Discharge: HOME | End: 2025-03-25
Payer: MEDICARE

## 2025-03-25 VITALS
TEMPERATURE: 97.3 F | HEART RATE: 65 BPM | DIASTOLIC BLOOD PRESSURE: 52 MMHG | RESPIRATION RATE: 18 BRPM | SYSTOLIC BLOOD PRESSURE: 113 MMHG

## 2025-03-25 VITALS
RESPIRATION RATE: 18 BRPM | TEMPERATURE: 97.1 F | SYSTOLIC BLOOD PRESSURE: 122 MMHG | HEART RATE: 63 BPM | DIASTOLIC BLOOD PRESSURE: 57 MMHG

## 2025-03-25 VITALS
TEMPERATURE: 97.1 F | DIASTOLIC BLOOD PRESSURE: 63 MMHG | SYSTOLIC BLOOD PRESSURE: 128 MMHG | RESPIRATION RATE: 20 BRPM | HEART RATE: 60 BPM

## 2025-03-25 DIAGNOSIS — E11.621 DIABETIC FOOT ULCER WITH OSTEOMYELITIS (HCC): Primary | ICD-10-CM

## 2025-03-25 DIAGNOSIS — E11.69 TYPE 2 DIABETES MELLITUS WITH OBESITY (HCC): ICD-10-CM

## 2025-03-25 DIAGNOSIS — E11.621 DIABETIC ULCER OF RIGHT MIDFOOT ASSOCIATED WITH TYPE 2 DIABETES MELLITUS, WITH NECROSIS OF MUSCLE: ICD-10-CM

## 2025-03-25 DIAGNOSIS — L08.9 TYPE 2 DIABETES MELLITUS WITH RIGHT DIABETIC FOOT INFECTION (HCC): ICD-10-CM

## 2025-03-25 DIAGNOSIS — L97.514 RIGHT FOOT ULCER, WITH NECROSIS OF BONE (HCC): ICD-10-CM

## 2025-03-25 DIAGNOSIS — E66.9 TYPE 2 DIABETES MELLITUS WITH OBESITY (HCC): ICD-10-CM

## 2025-03-25 DIAGNOSIS — Z74.09 IMPAIRED MOBILITY: ICD-10-CM

## 2025-03-25 DIAGNOSIS — E11.69 DIABETIC FOOT ULCER WITH OSTEOMYELITIS (HCC): Primary | ICD-10-CM

## 2025-03-25 DIAGNOSIS — L97.514 ULCER OF TOE OF RIGHT FOOT, WITH NECROSIS OF BONE (HCC): ICD-10-CM

## 2025-03-25 DIAGNOSIS — I70.234 ATHEROSCLEROSIS OF NATIVE ARTERY OF RIGHT LOWER EXTREMITY WITH ULCERATION OF MIDFOOT: Primary | ICD-10-CM

## 2025-03-25 DIAGNOSIS — I87.321 IDIOPATHIC CHRONIC VENOUS HYPERTENSION OF RIGHT LEG WITH INFLAMMATION: ICD-10-CM

## 2025-03-25 DIAGNOSIS — R60.0 LOCALIZED EDEMA: ICD-10-CM

## 2025-03-25 DIAGNOSIS — M86.9 DIABETIC FOOT ULCER WITH OSTEOMYELITIS (HCC): Primary | ICD-10-CM

## 2025-03-25 DIAGNOSIS — L97.509 DIABETIC FOOT ULCER WITH OSTEOMYELITIS (HCC): Primary | ICD-10-CM

## 2025-03-25 DIAGNOSIS — L97.413 DIABETIC ULCER OF RIGHT MIDFOOT ASSOCIATED WITH TYPE 2 DIABETES MELLITUS, WITH NECROSIS OF MUSCLE: ICD-10-CM

## 2025-03-25 DIAGNOSIS — I70.234 ATHEROSCLEROSIS OF NATIVE ARTERY OF RIGHT LOWER EXTREMITY WITH ULCERATION OF MIDFOOT: ICD-10-CM

## 2025-03-25 DIAGNOSIS — E11.628 TYPE 2 DIABETES MELLITUS WITH RIGHT DIABETIC FOOT INFECTION (HCC): ICD-10-CM

## 2025-03-25 LAB
GLUCOSE BLD-MCNC: 145 MG/DL (ref 70–99)
GLUCOSE BLD-MCNC: 147 MG/DL (ref 70–99)
GLUCOSE BLD-MCNC: 95 MG/DL (ref 70–99)
PERFORMED ON: ABNORMAL
PERFORMED ON: ABNORMAL
PERFORMED ON: NORMAL

## 2025-03-25 PROCEDURE — 11043 DBRDMT MUSC&/FSCA 1ST 20/<: CPT

## 2025-03-25 PROCEDURE — 11046 DBRDMT MUSC&/FSCA EA ADDL: CPT

## 2025-03-25 PROCEDURE — G0277 HBOT, FULL BODY CHAMBER, 30M: HCPCS

## 2025-03-25 PROCEDURE — 99183 HYPERBARIC OXYGEN THERAPY: CPT

## 2025-03-25 RX ORDER — GENTAMICIN SULFATE 1 MG/G
OINTMENT TOPICAL ONCE
OUTPATIENT
Start: 2025-03-25 | End: 2025-03-25

## 2025-03-25 RX ORDER — GINSENG 100 MG
CAPSULE ORAL ONCE
OUTPATIENT
Start: 2025-03-25 | End: 2025-03-25

## 2025-03-25 RX ORDER — LIDOCAINE HYDROCHLORIDE 40 MG/ML
SOLUTION TOPICAL ONCE
OUTPATIENT
Start: 2025-03-25 | End: 2025-03-25

## 2025-03-25 RX ORDER — LIDOCAINE HYDROCHLORIDE 20 MG/ML
JELLY TOPICAL ONCE
OUTPATIENT
Start: 2025-03-25 | End: 2025-03-25

## 2025-03-25 RX ORDER — NEOMYCIN/BACITRACIN/POLYMYXINB 3.5-400-5K
OINTMENT (GRAM) TOPICAL ONCE
OUTPATIENT
Start: 2025-03-25 | End: 2025-03-25

## 2025-03-25 RX ORDER — LIDOCAINE 40 MG/G
CREAM TOPICAL ONCE
OUTPATIENT
Start: 2025-03-25 | End: 2025-03-25

## 2025-03-25 RX ORDER — BETAMETHASONE DIPROPIONATE 0.5 MG/G
CREAM TOPICAL ONCE
OUTPATIENT
Start: 2025-03-25 | End: 2025-03-25

## 2025-03-25 RX ORDER — LIDOCAINE 50 MG/G
OINTMENT TOPICAL ONCE
OUTPATIENT
Start: 2025-03-25 | End: 2025-03-25

## 2025-03-25 RX ORDER — BACITRACIN ZINC AND POLYMYXIN B SULFATE 500; 1000 [USP'U]/G; [USP'U]/G
OINTMENT TOPICAL ONCE
OUTPATIENT
Start: 2025-03-25 | End: 2025-03-25

## 2025-03-25 RX ORDER — TRIAMCINOLONE ACETONIDE 1 MG/G
OINTMENT TOPICAL ONCE
OUTPATIENT
Start: 2025-03-25 | End: 2025-03-25

## 2025-03-25 RX ORDER — SILVER SULFADIAZINE 10 MG/G
CREAM TOPICAL ONCE
OUTPATIENT
Start: 2025-03-25 | End: 2025-03-25

## 2025-03-25 RX ORDER — MUPIROCIN 20 MG/G
OINTMENT TOPICAL ONCE
OUTPATIENT
Start: 2025-03-25 | End: 2025-03-25

## 2025-03-25 RX ORDER — LIDOCAINE HYDROCHLORIDE 40 MG/ML
SOLUTION TOPICAL ONCE
Status: COMPLETED | OUTPATIENT
Start: 2025-03-25 | End: 2025-03-25

## 2025-03-25 RX ORDER — SODIUM CHLOR/HYPOCHLOROUS ACID 0.033 %
SOLUTION, IRRIGATION IRRIGATION ONCE
OUTPATIENT
Start: 2025-03-25 | End: 2025-03-25

## 2025-03-25 RX ORDER — CLOBETASOL PROPIONATE 0.5 MG/G
OINTMENT TOPICAL ONCE
OUTPATIENT
Start: 2025-03-25 | End: 2025-03-25

## 2025-03-25 RX ADMIN — LIDOCAINE HYDROCHLORIDE: 40 SOLUTION TOPICAL at 10:04

## 2025-03-25 ASSESSMENT — PAIN SCALES - GENERAL
PAINLEVEL_OUTOF10: 0

## 2025-03-25 NOTE — PROGRESS NOTES
[facility]     Note Type: Medical Staff Progress Note    Referring Provider: Self Referral  Reason for Referral:   Chief Complaint   Patient presents with    Wound Check     Follow-up visit for a wound to the right foot.        Marvin Montero  MEDICAL RECORD NUMBER:  [id number]  AGE: 74 y.o.   GENDER: male  : 1950  EPISODE DATE:  3/25/2025    Chief complaint and reason for visit:     Chief Complaint   Patient presents with    Wound Check     Follow-up visit for a wound to the right foot.         HPI/Wound Narrative:      Marvin Montero is a 74 y.o. male who presents today for an evaluation of a wound/ulcer. Wound duration:  2024 .    3/25/2025: no issues or concerns reported.  Continue current treatment.  Follow-up in 1 week.    3/18/25: doing better overall, edema improving with diuretics    3/11/25: did not take his diuretic yesterday nor today and leg edema is much worse than last visit.    3/4/25: no new issues. Plans on HBOT today. Diuretic changed and helping with edema control much better. Eating better.     25: Has ENT appointment scheduled for 2 PM today with possible ET tubes but the patient was also given Flonase.  Unfortunately cannot trial the Flonase and at this time he does want to trial this next week.      25: no new issues. Had vascular intervention.    25: Patient has no specific complaints.  Does have angiogram with intervention scheduled for 2025.  He is also going to be getting his MRI soon.    25: no new issues. Has appt with cardio soon. MRI won't be done until after 2/3 due to recent pacer/defibrillator placed in 2024.    25: had his echo. EF about 55%. No changes in medications recommended by cardiology.    25: 74-year-old with past medical history notable for hypertension, type 2 diabetes mellitus, hyperlipidemia, atrial fibrillation on Eliquis, diastolic congestive heart failure, COPD,

## 2025-03-25 NOTE — PROGRESS NOTES
HBO PROGRESS NOTE      NAME: Marvin Montero  MEDICAL RECORD NUMBER:  [id number]  AGE: 74 y.o.   GENDER: male  : 1950  EPISODE DATE:  3/25/2025     Subjective     HBO Treatment Number: 20 out of Total Treatments: 30    HBO Diagnosis:             Indications: Lower Extremity Diabetic Wound ___(site) (Right foot)    Safety checks performed prior to treatment.  See doc flowsheets for documentation.    Objective        Recent Labs     25  1049 25  1306   POCGLU 145* 147*     Pre treatment Vital Signs       Temp: 97.1 °F (36.2 °C)     Pulse: 72     Respirations: 20     BP: (!) 93/52 (see focus note)       Post treatment Vital Signs  Temp: 97.1 °F (36.2 °C)  Pulse: 60  Respirations: 20  BP: 128/63    Assessment        HBO Diagnosis:   Problem List Items Addressed This Visit          Circulatory    Atherosclerosis of native artery of right lower extremity with ulceration of midfoot (HCC)    Relevant Orders    Notify physician (specify)    Hyperbaric Oxygen Therapy    Hypoglycemial protocol    POCT glucose    Care order/instruction    Care order/instruction       Endocrine    Diabetic foot ulcer with osteomyelitis (HCC) - Primary    Relevant Orders    Notify physician (specify)    Hyperbaric Oxygen Therapy    Hypoglycemial protocol    POCT glucose    Care order/instruction    Care order/instruction       Musculoskeletal and Integument    Right foot ulcer, with necrosis of bone (HCC)    Relevant Orders    Notify physician (specify)    Hyperbaric Oxygen Therapy    Ulcer of toe of right foot, with necrosis of bone (HCC)    Relevant Orders    Hypoglycemial protocol    POCT glucose    Care order/instruction    Care order/instruction       Physical Exam:  General Appearance:  alert and oriented to person, place and time, well-developed and well-nourished, in no acute distress    Pre Tympanic Membrane Assessment:  Right: Normal (per Joseline Avila CNP)  Left: Normal (per Joseline Avila CNP)    Post

## 2025-03-25 NOTE — PLAN OF CARE
muscle/fascia    Debridement Date: 3/25/25    Patient currently being seen by Home Health: [] Yes   [x] No    Duration for needed supplies:  []15  []30  []60  [x]90 Days    Provider Information:      PROVIDER'S NAME: KENNY Carlisle - CNP     NPI: [id number]

## 2025-03-25 NOTE — PATIENT INSTRUCTIONS
[facility] Wound Care Physician Orders and Discharge Instructions  [facility]  [address], [address]  [place], [place] [address]  Telephone: [phone]      FAX [phone]  MONDAY - THURSDAY 8:00 am - 4:30 pm and Friday 8:00 am - 12:00 pm.        NAME:  Marvin Montero  YOB: 1950  MEDICAL RECORD NUMBER:  [id number]  DATE:  3/25/2025      Return Appointment:  [x] Return Appointment: With Dr Cece Alejandre  in  1 Week(s)   [x] Wound and dressing supply provider: [facility]  [] ECF or Home Healthcare:   [] Wound Assessment: [] Physician or NP scheduled for Wound Assessment:   [] Orders placed during your visit:    **MAY USE HIBICLENS TO CLEANSE WOUND PRIOR TO DRESSING CHANGE- MAY RINSE WOUND PRIOR TO APPLYING DRESSING**    Important Reminders:   Please wash hands with soap and water before and after every dressing change.  Do not scrub wounds.  Keep wounds dry in shower unless otherwise instructed by the physician.  SMOKING can slow would healing. Stop smoking as soon as possible to improve healing and prevent further complications associated with smoking.      Fransico-Wound Topical Treatments:  Do not apply lotions, creams, or ointments to wound bed unless directed.   [] Apply moisturizing lotion to skin surrounding the wound prior to dressing change.  [] Apply antifungal ointment to skin surrounding the wound prior to dressing change.  [] Apply thin film of no sting moisture barrier ointment to skin immediately around      wound.  [] Other:     Wound Location: RIGHT LATERAL TMA    Wound Cleansing: Vashe soak for 5 minutes prior to dressing change    Primary Dressing:  [x] HYDROFERA BLUE SLIGHTLY DAMPENED WITH NORMAL SALINE  [x] BETADINE TO FRANSICO-WOUND     Secondary Dressing:  [x] GAUZE, ABD (EXTRA CUSHION TO ANKLE WITH ABD PAD)  [x] KERLIX      Dressing Frequency:  [x] THREE TIMES A WEEK  [] Do Not Change Dressing        Compression and Edema Control: RIGHT LOWER LEG  [] Wear

## 2025-03-25 NOTE — PROGRESS NOTES
FSBS @ 1033 = 95.  Patient given 8 ounces Glucerna.  Repeat FSBS @ 1048 = 145. Patient denies any symptoms of hypoglycemia.  Joseline Avila CNP notified and verbal orders received to proceed with HBO treatment but to send another 8 ounces of Glucerna in with patient.    BP @ 1045 = 87/46.  Repeat BP @ 1107 = 95/50.  Patient denies feeling dizzy or lightheaded - \" I just feel woozy like Im tired \"  and states he feels OK to do HBO treatment. Joseline Avila CNP notified of BP readings and verbal orders obtained to have patient proceed with HBO as long as he feels OK.  Patient again states he feels OK and wishes to do HBO treatment today.

## 2025-03-27 ENCOUNTER — HOSPITAL ENCOUNTER (OUTPATIENT)
Dept: HYPERBARIC MEDICINE | Age: 75
Discharge: HOME | End: 2025-03-27
Payer: MEDICARE

## 2025-03-27 VITALS
RESPIRATION RATE: 18 BRPM | SYSTOLIC BLOOD PRESSURE: 155 MMHG | TEMPERATURE: 97 F | HEART RATE: 66 BPM | DIASTOLIC BLOOD PRESSURE: 64 MMHG

## 2025-03-27 DIAGNOSIS — E11.621 DIABETIC FOOT ULCER WITH OSTEOMYELITIS (HCC): Primary | ICD-10-CM

## 2025-03-27 DIAGNOSIS — M86.9 DIABETIC FOOT ULCER WITH OSTEOMYELITIS (HCC): Primary | ICD-10-CM

## 2025-03-27 DIAGNOSIS — I70.234 ATHEROSCLEROSIS OF NATIVE ARTERY OF RIGHT LOWER EXTREMITY WITH ULCERATION OF MIDFOOT: ICD-10-CM

## 2025-03-27 DIAGNOSIS — L97.514 ULCER OF TOE OF RIGHT FOOT, WITH NECROSIS OF BONE (HCC): ICD-10-CM

## 2025-03-27 DIAGNOSIS — L97.509 DIABETIC FOOT ULCER WITH OSTEOMYELITIS (HCC): Primary | ICD-10-CM

## 2025-03-27 DIAGNOSIS — E11.69 DIABETIC FOOT ULCER WITH OSTEOMYELITIS (HCC): Primary | ICD-10-CM

## 2025-03-27 DIAGNOSIS — L97.514 RIGHT FOOT ULCER, WITH NECROSIS OF BONE (HCC): ICD-10-CM

## 2025-03-27 LAB
GLUCOSE BLD-MCNC: 112 MG/DL (ref 70–99)
GLUCOSE BLD-MCNC: 118 MG/DL (ref 70–99)
GLUCOSE BLD-MCNC: 167 MG/DL (ref 70–99)
PERFORMED ON: ABNORMAL

## 2025-03-27 PROCEDURE — G0277 HBOT, FULL BODY CHAMBER, 30M: HCPCS

## 2025-03-27 PROCEDURE — 99183 HYPERBARIC OXYGEN THERAPY: CPT

## 2025-03-27 ASSESSMENT — PAIN SCALES - GENERAL
PAINLEVEL_OUTOF10: 0
PAINLEVEL_OUTOF10: 0

## 2025-03-27 NOTE — PROGRESS NOTES
HBO PROGRESS NOTE      NAME: Marvin Montero  MEDICAL RECORD NUMBER:  [id number]  AGE: 74 y.o.   GENDER: male  : 1950  EPISODE DATE:  3/27/2025     Subjective     HBO Treatment Number: 21 out of Total Treatments: 30    HBO Diagnosis:             Indications: Lower Extremity Diabetic Wound ___(site) (right foot)    Safety checks performed prior to treatment.  See doc flowsheets for documentation.    Objective        Recent Labs     25  1054 25  1250   POCGLU 167* 112*     Pre treatment Vital Signs       Temp: 97.5 °F (36.4 °C)     Pulse: 65     Respirations: 18     BP: 104/70       Post treatment Vital Signs  Temp: 97 °F (36.1 °C)  Pulse: 66  Respirations: 18  BP: (!) 155/64    Assessment        HBO Diagnosis:   Problem List Items Addressed This Visit          Circulatory    Atherosclerosis of native artery of right lower extremity with ulceration of midfoot (HCC)    Relevant Orders    Notify physician (specify)    Hyperbaric Oxygen Therapy    POCT glucose    Care order/instruction    Care order/instruction       Endocrine    Diabetic foot ulcer with osteomyelitis (HCC) - Primary    Relevant Orders    Notify physician (specify)    Hyperbaric Oxygen Therapy    POCT glucose    Care order/instruction    Care order/instruction       Musculoskeletal and Integument    Right foot ulcer, with necrosis of bone (HCC)    Relevant Orders    Notify physician (specify)    Hyperbaric Oxygen Therapy    Ulcer of toe of right foot, with necrosis of bone (HCC)    Relevant Orders    POCT glucose    Care order/instruction    Care order/instruction       Physical Exam:  General Appearance:  alert and oriented to person, place and time, well-developed and well-nourished, in no acute distress    Pre Tympanic Membrane Assessment:  Right: Normal (Per Joseilne Avila CNP)  Left: Normal (per Joseline Avila CNP)    Post Tympanic Membrane Assessment:  Left: Normal (PE tube visible, denies ear problems)  Right: Normal (PE

## 2025-03-28 ENCOUNTER — HOSPITAL ENCOUNTER (OUTPATIENT)
Dept: HYPERBARIC MEDICINE | Age: 75
Discharge: HOME | End: 2025-03-28
Payer: MEDICARE

## 2025-03-28 VITALS
SYSTOLIC BLOOD PRESSURE: 156 MMHG | TEMPERATURE: 96.8 F | HEART RATE: 62 BPM | RESPIRATION RATE: 16 BRPM | DIASTOLIC BLOOD PRESSURE: 67 MMHG

## 2025-03-28 DIAGNOSIS — E11.621 DIABETIC FOOT ULCER WITH OSTEOMYELITIS (HCC): Primary | ICD-10-CM

## 2025-03-28 DIAGNOSIS — L97.514 RIGHT FOOT ULCER, WITH NECROSIS OF BONE (HCC): ICD-10-CM

## 2025-03-28 DIAGNOSIS — I70.234 ATHEROSCLEROSIS OF NATIVE ARTERY OF RIGHT LOWER EXTREMITY WITH ULCERATION OF MIDFOOT: ICD-10-CM

## 2025-03-28 DIAGNOSIS — E11.69 DIABETIC FOOT ULCER WITH OSTEOMYELITIS (HCC): Primary | ICD-10-CM

## 2025-03-28 DIAGNOSIS — M86.9 DIABETIC FOOT ULCER WITH OSTEOMYELITIS (HCC): Primary | ICD-10-CM

## 2025-03-28 DIAGNOSIS — L97.514 ULCER OF TOE OF RIGHT FOOT, WITH NECROSIS OF BONE (HCC): ICD-10-CM

## 2025-03-28 DIAGNOSIS — L97.509 DIABETIC FOOT ULCER WITH OSTEOMYELITIS (HCC): Primary | ICD-10-CM

## 2025-03-28 LAB
GLUCOSE BLD-MCNC: 107 MG/DL (ref 70–99)
GLUCOSE BLD-MCNC: 114 MG/DL (ref 70–99)
GLUCOSE BLD-MCNC: 155 MG/DL (ref 70–99)
PERFORMED ON: ABNORMAL

## 2025-03-28 PROCEDURE — 99183 HYPERBARIC OXYGEN THERAPY: CPT

## 2025-03-28 PROCEDURE — G0277 HBOT, FULL BODY CHAMBER, 30M: HCPCS

## 2025-03-28 NOTE — PROGRESS NOTES
HBO PROGRESS NOTE      NAME: Marvin Montero  MEDICAL RECORD NUMBER:  [id number]  AGE: 74 y.o.   GENDER: male  : 1950  EPISODE DATE:  3/28/2025     Subjective     HBO Treatment Number: 22 out of Total Treatments: 30    HBO Diagnosis:             Indications: Lower Extremity Diabetic Wound ___(site) (Right Foot)    Safety checks performed prior to treatment.  See doc flowsheets for documentation.    Objective        Recent Labs     25  1056 25  1254   POCGLU 155* 107*     Pre treatment Vital Signs       Temp: 97.2 °F (36.2 °C)     Pulse: 72     Respirations: 16     BP: 134/72       Post treatment Vital Signs  Temp: 96.8 °F (36 °C)  Pulse: 62  Respirations: 16  BP: (!) 156/67    Assessment        HBO Diagnosis:   Problem List Items Addressed This Visit          Circulatory    Atherosclerosis of native artery of right lower extremity with ulceration of midfoot (HCC)    Relevant Orders    Hypoglycemial protocol    POCT glucose    Care order/instruction    Care order/instruction    Notify physician (specify)    Hyperbaric Oxygen Therapy    Care order/instruction       Endocrine    Diabetic foot ulcer with osteomyelitis (HCC) - Primary    Relevant Orders    Hypoglycemial protocol    POCT glucose    Care order/instruction    Care order/instruction    Notify physician (specify)    Hyperbaric Oxygen Therapy    Care order/instruction       Musculoskeletal and Integument    Right foot ulcer, with necrosis of bone (HCC)    Relevant Orders    Notify physician (specify)    Hyperbaric Oxygen Therapy    Ulcer of toe of right foot, with necrosis of bone (HCC)    Relevant Orders    Hypoglycemial protocol    POCT glucose    Care order/instruction    Care order/instruction    Care order/instruction       Physical Exam:  General Appearance:  alert and oriented to person, place and time, well-developed and well-nourished, in no acute distress    Pre Tympanic Membrane Assessment:  Right: Normal (PE Tube Visible per

## 2025-03-31 ENCOUNTER — HOSPITAL ENCOUNTER (OUTPATIENT)
Dept: HYPERBARIC MEDICINE | Age: 75
Discharge: HOME | End: 2025-03-31
Payer: MEDICARE

## 2025-03-31 DIAGNOSIS — M86.9 DIABETIC FOOT ULCER WITH OSTEOMYELITIS (HCC): Primary | ICD-10-CM

## 2025-03-31 DIAGNOSIS — L97.509 DIABETIC FOOT ULCER WITH OSTEOMYELITIS (HCC): Primary | ICD-10-CM

## 2025-03-31 DIAGNOSIS — L97.514 RIGHT FOOT ULCER, WITH NECROSIS OF BONE (HCC): ICD-10-CM

## 2025-03-31 DIAGNOSIS — L97.514 ULCER OF TOE OF RIGHT FOOT, WITH NECROSIS OF BONE (HCC): ICD-10-CM

## 2025-03-31 DIAGNOSIS — E11.621 DIABETIC FOOT ULCER WITH OSTEOMYELITIS (HCC): Primary | ICD-10-CM

## 2025-03-31 DIAGNOSIS — E55.9 VITAMIN D DEFICIENCY: ICD-10-CM

## 2025-03-31 DIAGNOSIS — I48.0 PAF (PAROXYSMAL ATRIAL FIBRILLATION) (HCC): Primary | ICD-10-CM

## 2025-03-31 DIAGNOSIS — E11.69 DIABETIC FOOT ULCER WITH OSTEOMYELITIS (HCC): Primary | ICD-10-CM

## 2025-03-31 DIAGNOSIS — I70.234 ATHEROSCLEROSIS OF NATIVE ARTERY OF RIGHT LOWER EXTREMITY WITH ULCERATION OF MIDFOOT: ICD-10-CM

## 2025-03-31 LAB
25(OH)D3 SERPL-MCNC: 33.4 NG/ML
ALBUMIN SERPL-MCNC: 3.6 G/DL (ref 3.4–5)
ALBUMIN SERPL-MCNC: 3.7 G/DL (ref 3.4–5)
ALBUMIN/GLOB SERPL: 1.3 {RATIO} (ref 1.1–2.2)
ALP SERPL-CCNC: 84 U/L (ref 40–129)
ALP SERPL-CCNC: 89 U/L (ref 40–129)
ALT SERPL-CCNC: 88 U/L (ref 10–40)
ALT SERPL-CCNC: 89 U/L (ref 10–40)
ANION GAP SERPL CALCULATED.3IONS-SCNC: 8 MMOL/L (ref 3–16)
AST SERPL-CCNC: 41 U/L (ref 15–37)
AST SERPL-CCNC: 41 U/L (ref 15–37)
BASOPHILS # BLD: 0 K/UL (ref 0–0.2)
BASOPHILS NFR BLD: 1.2 %
BILIRUB DIRECT SERPL-MCNC: <0.1 MG/DL (ref 0–0.3)
BILIRUB INDIRECT SERPL-MCNC: ABNORMAL MG/DL (ref 0–1)
BILIRUB SERPL-MCNC: 0.3 MG/DL (ref 0–1)
BILIRUB SERPL-MCNC: <0.2 MG/DL (ref 0–1)
BUN SERPL-MCNC: 27 MG/DL (ref 7–20)
CALCIUM SERPL-MCNC: 9.1 MG/DL (ref 8.3–10.6)
CHLORIDE SERPL-SCNC: 109 MMOL/L (ref 99–110)
CO2 SERPL-SCNC: 23 MMOL/L (ref 21–32)
CREAT SERPL-MCNC: 1.4 MG/DL (ref 0.8–1.3)
CRP SERPL-MCNC: <3 MG/L (ref 0–5.1)
DEPRECATED RDW RBC AUTO: 15.4 % (ref 12.4–15.4)
EOSINOPHIL # BLD: 0.2 K/UL (ref 0–0.6)
EOSINOPHIL NFR BLD: 4.4 %
ERYTHROCYTE [SEDIMENTATION RATE] IN BLOOD BY WESTERGREN METHOD: 45 MM/HR (ref 0–20)
FOLATE SERPL-MCNC: 7.22 NG/ML (ref 4.78–24.2)
GFR SERPLBLD CREATININE-BSD FMLA CKD-EPI: 53 ML/MIN/{1.73_M2}
GLUCOSE BLD-MCNC: 126 MG/DL (ref 70–99)
GLUCOSE BLD-MCNC: 225 MG/DL (ref 70–99)
GLUCOSE BLD-MCNC: 84 MG/DL (ref 70–99)
GLUCOSE SERPL-MCNC: 120 MG/DL (ref 70–99)
HCT VFR BLD AUTO: 35.7 % (ref 40.5–52.5)
HGB BLD-MCNC: 11.6 G/DL (ref 13.5–17.5)
LYMPHOCYTES # BLD: 1.2 K/UL (ref 1–5.1)
LYMPHOCYTES NFR BLD: 33.3 %
MCH RBC QN AUTO: 28.1 PG (ref 26–34)
MCHC RBC AUTO-ENTMCNC: 32.5 G/DL (ref 31–36)
MCV RBC AUTO: 86.5 FL (ref 80–100)
MONOCYTES # BLD: 0.3 K/UL (ref 0–1.3)
MONOCYTES NFR BLD: 9.4 %
NEUTROPHILS # BLD: 1.9 K/UL (ref 1.7–7.7)
NEUTROPHILS NFR BLD: 51.7 %
PERFORMED ON: ABNORMAL
PERFORMED ON: ABNORMAL
PERFORMED ON: NORMAL
PLATELET # BLD AUTO: 128 K/UL (ref 135–450)
PMV BLD AUTO: 9.8 FL (ref 5–10.5)
POTASSIUM SERPL-SCNC: 4.6 MMOL/L (ref 3.5–5.1)
PROT SERPL-MCNC: 6.4 G/DL (ref 6.4–8.2)
PROT SERPL-MCNC: 6.6 G/DL (ref 6.4–8.2)
RBC # BLD AUTO: 4.13 M/UL (ref 4.2–5.9)
SODIUM SERPL-SCNC: 140 MMOL/L (ref 136–145)
TSH SERPL DL<=0.005 MIU/L-ACNC: 1.5 UIU/ML (ref 0.27–4.2)
VIT B12 SERPL-MCNC: 1077 PG/ML (ref 211–911)
WBC # BLD AUTO: 3.6 K/UL (ref 4–11)

## 2025-03-31 PROCEDURE — 99183 HYPERBARIC OXYGEN THERAPY: CPT | Performed by: SURGERY

## 2025-03-31 PROCEDURE — G0277 HBOT, FULL BODY CHAMBER, 30M: HCPCS

## 2025-03-31 NOTE — PROGRESS NOTES
HBO PROGRESS NOTE      NAME: Marvin Montero  MEDICAL RECORD NUMBER:  [id number]  AGE: 74 y.o.   GENDER: male  : 1950  EPISODE DATE:  3/31/2025     Subjective     HBO Treatment Number: 23 out of Total Treatments: 30    HBO Diagnosis:             Indications: Lower Extremity Diabetic Wound ___(site)    Safety checks performed prior to treatment.  See doc flowsheets for documentation.    Objective        Recent Labs     25  1057 25  1256   POCGLU 128* 112*     Pre treatment Vital Signs       Temp: 97.1 °F (36.2 °C)     Pulse: 75     Respirations: 16     BP: 131/61       Post treatment Vital Signs  Temp: 97 °F (36.1 °C)  Pulse: 75  Respirations: 18  BP: (!) 143/68    Assessment        HBO Diagnosis:   Problem List Items Addressed This Visit          Circulatory    Atherosclerosis of native artery of right lower extremity with ulceration of midfoot    Relevant Orders    POCT glucose       Endocrine    Diabetic foot ulcer with osteomyelitis (HCC) - Primary    Relevant Orders    POCT glucose       Musculoskeletal and Integument    Right foot ulcer, with necrosis of bone (HCC)    Ulcer of toe of right foot, with necrosis of bone (HCC)    Relevant Orders    POCT glucose       Physical Exam        General Appearance:  alert and oriented to person, place and time, well-developed and well-nourished, in no acute distress    Pre Tympanic Membrane Assessment:  Right: Normal (PE Tubes Visible per Dr. David)  Left: Normal (PE Tubes Visible per Dr. David)    Post Tympanic Membrane Assessment:  Left: Normal (PE tubes visible, patient denies discomfort.)  Right: Normal (PE tube visible, patient denies discomfort)    Pulmonary/Chest:  clear to auscultation bilaterally- no wheezes, rales or rhonchi, normal air movement, no respiratory distress    Cardiovascular:  regular rate and rhythm    Plan        Patient placed in a full body Monoplace Chamber #: [id number]  Treatment Start Time: 1057     Pressure Reached

## 2025-04-01 ENCOUNTER — HOSPITAL ENCOUNTER (OUTPATIENT)
Dept: HYPERBARIC MEDICINE | Age: 75
Discharge: HOME | End: 2025-04-01
Payer: MEDICARE

## 2025-04-01 ENCOUNTER — HOSPITAL ENCOUNTER (OUTPATIENT)
Dept: WOUND CARE | Age: 75
Discharge: HOME | End: 2025-04-01
Attending: EMERGENCY MEDICINE
Payer: MEDICARE

## 2025-04-01 ENCOUNTER — OFFICE VISIT (OUTPATIENT)
Dept: CARDIOLOGY CLINIC | Age: 75
End: 2025-04-01
Payer: MEDICARE

## 2025-04-01 ENCOUNTER — APPOINTMENT (OUTPATIENT)
Dept: HYPERBARIC MEDICINE | Age: 75
End: 2025-04-01
Payer: MEDICARE

## 2025-04-01 VITALS
RESPIRATION RATE: 18 BRPM | DIASTOLIC BLOOD PRESSURE: 70 MMHG | TEMPERATURE: 97 F | HEART RATE: 82 BPM | SYSTOLIC BLOOD PRESSURE: 142 MMHG

## 2025-04-01 VITALS
WEIGHT: 251 LBS | DIASTOLIC BLOOD PRESSURE: 72 MMHG | HEART RATE: 81 BPM | OXYGEN SATURATION: 97 % | HEIGHT: 76 IN | SYSTOLIC BLOOD PRESSURE: 138 MMHG | BODY MASS INDEX: 30.56 KG/M2

## 2025-04-01 VITALS
TEMPERATURE: 97.1 F | RESPIRATION RATE: 18 BRPM | DIASTOLIC BLOOD PRESSURE: 66 MMHG | HEART RATE: 60 BPM | SYSTOLIC BLOOD PRESSURE: 158 MMHG

## 2025-04-01 DIAGNOSIS — Z74.09 IMPAIRED MOBILITY: ICD-10-CM

## 2025-04-01 DIAGNOSIS — I87.321 IDIOPATHIC CHRONIC VENOUS HYPERTENSION OF RIGHT LEG WITH INFLAMMATION: ICD-10-CM

## 2025-04-01 DIAGNOSIS — E11.628 TYPE 2 DIABETES MELLITUS WITH RIGHT DIABETIC FOOT INFECTION (HCC): ICD-10-CM

## 2025-04-01 DIAGNOSIS — E66.9 TYPE 2 DIABETES MELLITUS WITH OBESITY (HCC): ICD-10-CM

## 2025-04-01 DIAGNOSIS — I48.21 PERMANENT ATRIAL FIBRILLATION (HCC): ICD-10-CM

## 2025-04-01 DIAGNOSIS — M86.9 DIABETIC FOOT ULCER WITH OSTEOMYELITIS (HCC): Primary | ICD-10-CM

## 2025-04-01 DIAGNOSIS — I70.234 ATHEROSCLEROSIS OF NATIVE ARTERY OF RIGHT LOWER EXTREMITY WITH ULCERATION OF MIDFOOT: Primary | ICD-10-CM

## 2025-04-01 DIAGNOSIS — E11.621 DIABETIC FOOT ULCER WITH OSTEOMYELITIS (HCC): Primary | ICD-10-CM

## 2025-04-01 DIAGNOSIS — E11.69 DIABETIC FOOT ULCER WITH OSTEOMYELITIS (HCC): Primary | ICD-10-CM

## 2025-04-01 DIAGNOSIS — E11.69 TYPE 2 DIABETES MELLITUS WITH OBESITY (HCC): ICD-10-CM

## 2025-04-01 DIAGNOSIS — L97.413 DIABETIC ULCER OF RIGHT MIDFOOT ASSOCIATED WITH TYPE 2 DIABETES MELLITUS, WITH NECROSIS OF MUSCLE: ICD-10-CM

## 2025-04-01 DIAGNOSIS — R60.0 LOCALIZED EDEMA: ICD-10-CM

## 2025-04-01 DIAGNOSIS — L97.514 RIGHT FOOT ULCER, WITH NECROSIS OF BONE (HCC): ICD-10-CM

## 2025-04-01 DIAGNOSIS — I10 ESSENTIAL HYPERTENSION: ICD-10-CM

## 2025-04-01 DIAGNOSIS — L97.514 ULCER OF TOE OF RIGHT FOOT, WITH NECROSIS OF BONE (HCC): ICD-10-CM

## 2025-04-01 DIAGNOSIS — I73.9 PAD (PERIPHERAL ARTERY DISEASE): Primary | ICD-10-CM

## 2025-04-01 DIAGNOSIS — E11.621 DIABETIC ULCER OF RIGHT MIDFOOT ASSOCIATED WITH TYPE 2 DIABETES MELLITUS, WITH NECROSIS OF MUSCLE: ICD-10-CM

## 2025-04-01 DIAGNOSIS — L08.9 TYPE 2 DIABETES MELLITUS WITH RIGHT DIABETIC FOOT INFECTION (HCC): ICD-10-CM

## 2025-04-01 DIAGNOSIS — L97.509 DIABETIC FOOT ULCER WITH OSTEOMYELITIS (HCC): Primary | ICD-10-CM

## 2025-04-01 DIAGNOSIS — I70.234 ATHEROSCLEROSIS OF NATIVE ARTERY OF RIGHT LOWER EXTREMITY WITH ULCERATION OF MIDFOOT: ICD-10-CM

## 2025-04-01 DIAGNOSIS — I50.32 CHRONIC DIASTOLIC HEART FAILURE (HCC): ICD-10-CM

## 2025-04-01 LAB
GLUCOSE BLD-MCNC: 122 MG/DL (ref 70–99)
PERFORMED ON: ABNORMAL

## 2025-04-01 PROCEDURE — 11046 DBRDMT MUSC&/FSCA EA ADDL: CPT | Performed by: EMERGENCY MEDICINE

## 2025-04-01 PROCEDURE — 99214 OFFICE O/P EST MOD 30 MIN: CPT | Performed by: NURSE PRACTITIONER

## 2025-04-01 PROCEDURE — 3075F SYST BP GE 130 - 139MM HG: CPT | Performed by: NURSE PRACTITIONER

## 2025-04-01 PROCEDURE — 11046 DBRDMT MUSC&/FSCA EA ADDL: CPT

## 2025-04-01 PROCEDURE — 1159F MED LIST DOCD IN RCRD: CPT | Performed by: NURSE PRACTITIONER

## 2025-04-01 PROCEDURE — 11043 DBRDMT MUSC&/FSCA 1ST 20/<: CPT

## 2025-04-01 PROCEDURE — G0277 HBOT, FULL BODY CHAMBER, 30M: HCPCS

## 2025-04-01 PROCEDURE — 99183 HYPERBARIC OXYGEN THERAPY: CPT | Performed by: EMERGENCY MEDICINE

## 2025-04-01 PROCEDURE — 1123F ACP DISCUSS/DSCN MKR DOCD: CPT | Performed by: NURSE PRACTITIONER

## 2025-04-01 PROCEDURE — G2211 COMPLEX E/M VISIT ADD ON: HCPCS | Performed by: NURSE PRACTITIONER

## 2025-04-01 PROCEDURE — 11043 DBRDMT MUSC&/FSCA 1ST 20/<: CPT | Performed by: EMERGENCY MEDICINE

## 2025-04-01 PROCEDURE — 3078F DIAST BP <80 MM HG: CPT | Performed by: NURSE PRACTITIONER

## 2025-04-01 PROCEDURE — 1160F RVW MEDS BY RX/DR IN RCRD: CPT | Performed by: NURSE PRACTITIONER

## 2025-04-01 RX ORDER — LIDOCAINE 50 MG/G
OINTMENT TOPICAL ONCE
OUTPATIENT
Start: 2025-04-01 | End: 2025-04-01

## 2025-04-01 RX ORDER — GENTAMICIN SULFATE 1 MG/G
OINTMENT TOPICAL ONCE
OUTPATIENT
Start: 2025-04-01 | End: 2025-04-01

## 2025-04-01 RX ORDER — CLOBETASOL PROPIONATE 0.5 MG/G
OINTMENT TOPICAL ONCE
OUTPATIENT
Start: 2025-04-01 | End: 2025-04-01

## 2025-04-01 RX ORDER — NEOMYCIN/BACITRACIN/POLYMYXINB 3.5-400-5K
OINTMENT (GRAM) TOPICAL ONCE
OUTPATIENT
Start: 2025-04-01 | End: 2025-04-01

## 2025-04-01 RX ORDER — MUPIROCIN 20 MG/G
OINTMENT TOPICAL ONCE
OUTPATIENT
Start: 2025-04-01 | End: 2025-04-01

## 2025-04-01 RX ORDER — BETAMETHASONE DIPROPIONATE 0.5 MG/G
CREAM TOPICAL ONCE
OUTPATIENT
Start: 2025-04-01 | End: 2025-04-01

## 2025-04-01 RX ORDER — LIDOCAINE HYDROCHLORIDE 20 MG/ML
JELLY TOPICAL ONCE
OUTPATIENT
Start: 2025-04-01 | End: 2025-04-01

## 2025-04-01 RX ORDER — LIDOCAINE 40 MG/G
CREAM TOPICAL ONCE
OUTPATIENT
Start: 2025-04-01 | End: 2025-04-01

## 2025-04-01 RX ORDER — GINSENG 100 MG
CAPSULE ORAL ONCE
OUTPATIENT
Start: 2025-04-01 | End: 2025-04-01

## 2025-04-01 RX ORDER — TRIAMCINOLONE ACETONIDE 1 MG/G
OINTMENT TOPICAL ONCE
OUTPATIENT
Start: 2025-04-01 | End: 2025-04-01

## 2025-04-01 RX ORDER — SODIUM CHLOR/HYPOCHLOROUS ACID 0.033 %
SOLUTION, IRRIGATION IRRIGATION ONCE
OUTPATIENT
Start: 2025-04-01 | End: 2025-04-01

## 2025-04-01 RX ORDER — LIDOCAINE 40 MG/G
CREAM TOPICAL ONCE
Status: COMPLETED | OUTPATIENT
Start: 2025-04-01 | End: 2025-04-01

## 2025-04-01 RX ORDER — SILVER SULFADIAZINE 10 MG/G
CREAM TOPICAL ONCE
OUTPATIENT
Start: 2025-04-01 | End: 2025-04-01

## 2025-04-01 RX ORDER — LIDOCAINE HYDROCHLORIDE 40 MG/ML
SOLUTION TOPICAL ONCE
OUTPATIENT
Start: 2025-04-01 | End: 2025-04-01

## 2025-04-01 RX ORDER — BACITRACIN ZINC AND POLYMYXIN B SULFATE 500; 1000 [USP'U]/G; [USP'U]/G
OINTMENT TOPICAL ONCE
OUTPATIENT
Start: 2025-04-01 | End: 2025-04-01

## 2025-04-01 RX ADMIN — LIDOCAINE: 40 CREAM TOPICAL at 09:34

## 2025-04-01 ASSESSMENT — PAIN SCALES - GENERAL
PAINLEVEL_OUTOF10: 0

## 2025-04-01 NOTE — PROGRESS NOTES
[facility]     Note Type: Medical Staff Progress Note    Referring Provider: Self Referral  Reason for Referral:   Chief Complaint   Patient presents with    Wound Check     Follow up for right foot wound.        Marvin Montero  MEDICAL RECORD NUMBER:  [id number]  AGE: 74 y.o.   GENDER: male  : 1950  EPISODE DATE:  2025    Chief complaint and reason for visit:     Chief Complaint   Patient presents with    Wound Check     Follow up for right foot wound.         HPI/Wound Narrative:      Marvin Montero is a 74 y.o. male who presents today for an evaluation of a wound/ulcer. Wound duration:  2024 .    25: no new issues but hydrofera sticking too much    3/18/25: doing better overall, edema improving with diuretics    3/11/25: did not take his diuretic yesterday nor today and leg edema is much worse than last visit.    3/4/25: no new issues. Plans on HBOT today. Diuretic changed and helping with edema control much better. Eating better.     25: Has ENT appointment scheduled for 2 PM today with possible ET tubes but the patient was also given Flonase.  Unfortunately cannot trial the Flonase and at this time he does want to trial this next week.      25: no new issues. Had vascular intervention.    25: Patient has no specific complaints.  Does have angiogram with intervention scheduled for 2025.  He is also going to be getting his MRI soon.    25: no new issues. Has appt with cardio soon. MRI won't be done until after 2/3 due to recent pacer/defibrillator placed in 2024.    25: had his echo. EF about 55%. No changes in medications recommended by cardiology.    25: 74-year-old with past medical history notable for hypertension, type 2 diabetes mellitus, hyperlipidemia, atrial fibrillation on Eliquis, diastolic congestive heart failure, COPD, hypothyroidism, peripheral arterial disease status post left BKA who is known to me

## 2025-04-01 NOTE — PATIENT INSTRUCTIONS
[facility] Wound Care Physician Orders and Discharge Instructions  [facility]  [address], [address]  [place], [place] [address]  Telephone: [phone]      FAX [phone]  MONDAY - THURSDAY 8:00 am - 4:30 pm and Friday 8:00 am - 12:00 pm.        NAME:  Marvin Montero  YOB: 1950  MEDICAL RECORD NUMBER:  [id number]  DATE:  4/1/2025      Return Appointment:  [x] Return Appointment: With Dr Cece Alejandre  in  1 Week(s)   [x] Wound and dressing supply provider: [facility]  [] ECF or Home Healthcare:   [] Wound Assessment: [] Physician or NP scheduled for Wound Assessment:   [] Orders placed during your visit:    **MAY USE HIBICLENS TO CLEANSE WOUND PRIOR TO DRESSING CHANGE- MAY RINSE WOUND PRIOR TO APPLYING DRESSING**    Important Reminders:   Please wash hands with soap and water before and after every dressing change.  Do not scrub wounds.  Keep wounds dry in shower unless otherwise instructed by the physician.  SMOKING can slow would healing. Stop smoking as soon as possible to improve healing and prevent further complications associated with smoking.      Fransico-Wound Topical Treatments:  Do not apply lotions, creams, or ointments to wound bed unless directed.   [] Apply moisturizing lotion to skin surrounding the wound prior to dressing change.  [] Apply antifungal ointment to skin surrounding the wound prior to dressing change.  [] Apply thin film of no sting moisture barrier ointment to skin immediately around      wound.  [] Other:     Wound Location: RIGHT LATERAL TMA    Wound Cleansing: Vashe soak for 5 minutes prior to dressing change    Primary Dressing:  [x] ANTIBIOTIC OINTMENT TO WOUND BED, HYDROFERA BLUE DAMPENED WITH NORMAL SALINE ON TOP  [x] BETADINE TO FRANSICO-WOUND     Secondary Dressing:  [x] GAUZE, ABD (EXTRA CUSHION TO ANKLE WITH ABD PAD)  [x] KERLIX      Dressing Frequency:  [x] THREE TIMES A WEEK  [] Do Not Change Dressing        Compression and Edema

## 2025-04-01 NOTE — PROGRESS NOTES
HBO PROGRESS NOTE      NAME: Marvin Montero  MEDICAL RECORD NUMBER:  [id number]  AGE: 74 y.o.   GENDER: male  : 1950  EPISODE DATE:  2025     Subjective     HBO Treatment Number: 24 out of Total Treatments: 30    HBO Diagnosis:             Indications: Lower Extremity Diabetic Wound ___(site) (right foot)    Safety checks performed prior to treatment.  See doc flowsheets for documentation.    Objective        Recent Labs     25  1306 25  0956   POCGLU 126* 122*     Pre treatment Vital Signs       Temp: 97.1 °F (36.2 °C)     Pulse: 72     Respirations: 18     BP: 139/67       Post treatment Vital Signs  Temp: 97.1 °F (36.2 °C)  Pulse: 60  Respirations: 18  BP: (!) 158/66    Assessment        HBO Diagnosis:   Problem List Items Addressed This Visit          Circulatory    Atherosclerosis of native artery of right lower extremity with ulceration of midfoot    Relevant Orders    Hypoglycemial protocol    POCT glucose    Care order/instruction    Care order/instruction    Care order/instruction    Care order/instruction    Care order/instruction    Care order/instruction    Care order/instruction    Care order/instruction    Notify physician (specify)    Hyperbaric Oxygen Therapy       Endocrine    Diabetic foot ulcer with osteomyelitis (HCC) - Primary    Relevant Orders    Hypoglycemial protocol    POCT glucose    Care order/instruction    Care order/instruction    Care order/instruction    Care order/instruction    Care order/instruction    Care order/instruction    Care order/instruction    Care order/instruction    Notify physician (specify)    Hyperbaric Oxygen Therapy       Musculoskeletal and Integument    Right foot ulcer, with necrosis of bone (HCC)    Relevant Orders    Notify physician (specify)    Hyperbaric Oxygen Therapy    Ulcer of toe of right foot, with necrosis of bone (HCC)    Relevant Orders    Hypoglycemial protocol    POCT glucose    Care order/instruction    Care

## 2025-04-01 NOTE — PROGRESS NOTES
[facility] [address]  [place] [address]  [phone]    PrimaryCare Doctor:  Reji Draper MD  Primary Cardiologist: [place]    Chief Complaint   Patient presents with    Follow-up     No CC       History of Present Illness:  Marvin Montero is a 74 y.o. male with PMH PAD s/p L BKA, LLE amputation, HTN, DM2, WILL, CVA, HF s/p medtronic BiV ICD 2017, COPD, AF s/p DCCV 2017     S/P balloon angioplasty R popliteal and posterior tibial artery, s/p stent R popliteal artery @ 3 and prox posterior tib 2/9/2025    Last seen by Dr. Christy 2/28/2025    Patient presents to [facility] cardiology for follow up for PAD.   Pt has been going to hyperbariatric wound clinic for treatment routinely. Seen by Dr. Canela.  R foot ulcer dressing change per protocol  Notes reviewed.   Wound is healing.     Today I assessed wound this visit  Appears pink, clean tissues, no drainage.  DP and PT pulses + with doppler  PT reports hx neuropathy, no pain.     Review of Systems:   General: Denies fever, chills, fatigue, weakness  RESP: Denies cough, sputum, dyspnea, wheeze, snoring  CARD: Denies palpitations,  murmur  GI:Denies nausea, vomiting, heartburn, loss of appetite, change in bowels  VASC: Denies claudication, leg cramps  NEURO: Denies numbness, tingling, weakness,change in mood or memory  HEME: Denies abn bruising, bleeding, anemia    /72 (BP Site: Left Upper Arm, Patient Position: Sitting, BP Cuff Size: Medium Adult)   Pulse 81   Ht 1.93 m (6' 4\")   Wt 113.9 kg (251 lb)   SpO2 97%   BMI 30.55 kg/m²   Wt Readings from Last 3 Encounters:   04/01/25 113.9 kg (251 lb)   03/05/25 116.3 kg (256 lb 6.4 oz)   02/28/25 114.8 kg (253 lb)       Physical Exam:  GEN: Appears well, no acute distress  SKIN: brown,, warm, dry.   LUNG: AP diameter normal. Clear bilateral. No wheeze, rales, or ronchi. Respiratory effort normal.  HEART: S1S2 A/R. No JVD. No carotid bruit. No murmur, rub or

## 2025-04-02 ENCOUNTER — APPOINTMENT (OUTPATIENT)
Dept: HYPERBARIC MEDICINE | Age: 75
End: 2025-04-02
Payer: MEDICARE

## 2025-04-02 ENCOUNTER — TELEPHONE (OUTPATIENT)
Dept: INFECTIOUS DISEASES | Age: 75
End: 2025-04-02

## 2025-04-02 ENCOUNTER — TELEPHONE (OUTPATIENT)
Dept: CARDIOLOGY CLINIC | Age: 75
End: 2025-04-02

## 2025-04-02 ENCOUNTER — OFFICE VISIT (OUTPATIENT)
Dept: INFECTIOUS DISEASES | Age: 75
End: 2025-04-02
Payer: MEDICARE

## 2025-04-02 VITALS
HEIGHT: 76 IN | SYSTOLIC BLOOD PRESSURE: 120 MMHG | TEMPERATURE: 97.3 F | WEIGHT: 251 LBS | OXYGEN SATURATION: 100 % | BODY MASS INDEX: 30.56 KG/M2 | HEART RATE: 74 BPM | DIASTOLIC BLOOD PRESSURE: 63 MMHG

## 2025-04-02 DIAGNOSIS — R70.0 ELEVATED ERYTHROCYTE SEDIMENTATION RATE: ICD-10-CM

## 2025-04-02 DIAGNOSIS — E11.42 DIABETIC POLYNEUROPATHY ASSOCIATED WITH TYPE 2 DIABETES MELLITUS: ICD-10-CM

## 2025-04-02 DIAGNOSIS — E11.69 DIABETIC FOOT ULCER WITH OSTEOMYELITIS (HCC): Primary | ICD-10-CM

## 2025-04-02 DIAGNOSIS — I50.22 CHRONIC SYSTOLIC HEART FAILURE (HCC): ICD-10-CM

## 2025-04-02 DIAGNOSIS — E11.621 DIABETIC FOOT ULCER WITH OSTEOMYELITIS (HCC): Primary | ICD-10-CM

## 2025-04-02 DIAGNOSIS — L97.509 DIABETIC FOOT ULCER WITH OSTEOMYELITIS (HCC): Primary | ICD-10-CM

## 2025-04-02 DIAGNOSIS — R79.82 CRP ELEVATED: ICD-10-CM

## 2025-04-02 DIAGNOSIS — Z95.810 AICD (AUTOMATIC CARDIOVERTER/DEFIBRILLATOR) PRESENT: ICD-10-CM

## 2025-04-02 DIAGNOSIS — N18.30 STAGE 3 CHRONIC KIDNEY DISEASE, UNSPECIFIED WHETHER STAGE 3A OR 3B CKD (HCC): ICD-10-CM

## 2025-04-02 DIAGNOSIS — Z95.810 CARDIAC RESYNCHRONIZATION THERAPY DEFIBRILLATOR (CRT-D) IN PLACE: ICD-10-CM

## 2025-04-02 DIAGNOSIS — I42.0 DILATED CARDIOMYOPATHY (HCC): ICD-10-CM

## 2025-04-02 DIAGNOSIS — I73.9 PAD (PERIPHERAL ARTERY DISEASE): Primary | ICD-10-CM

## 2025-04-02 DIAGNOSIS — M79.89 RIGHT LEG SWELLING: ICD-10-CM

## 2025-04-02 DIAGNOSIS — M86.9 DIABETIC FOOT ULCER WITH OSTEOMYELITIS (HCC): Primary | ICD-10-CM

## 2025-04-02 DIAGNOSIS — E11.21 DIABETIC NEPHROPATHY ASSOCIATED WITH TYPE 2 DIABETES MELLITUS: ICD-10-CM

## 2025-04-02 DIAGNOSIS — I70.244 ATHEROSCLEROSIS OF NATIVE ARTERIES OF LEFT LEG WITH ULCERATION OF HEEL AND MIDFOOT (HCC): ICD-10-CM

## 2025-04-02 DIAGNOSIS — M86.171 OTHER ACUTE OSTEOMYELITIS OF RIGHT FOOT: ICD-10-CM

## 2025-04-02 DIAGNOSIS — Z79.2 RECEIVING INTRAVENOUS ANTIBIOTIC TREATMENT AS OUTPATIENT: ICD-10-CM

## 2025-04-02 LAB
GLUCOSE BLD-MCNC: 112 MG/DL (ref 70–99)
GLUCOSE BLD-MCNC: 128 MG/DL (ref 70–99)
GLUCOSE BLD-MCNC: 88 MG/DL (ref 70–99)
PERFORMED ON: ABNORMAL
PERFORMED ON: ABNORMAL
PERFORMED ON: NORMAL

## 2025-04-02 PROCEDURE — 3074F SYST BP LT 130 MM HG: CPT | Performed by: INTERNAL MEDICINE

## 2025-04-02 PROCEDURE — 1123F ACP DISCUSS/DSCN MKR DOCD: CPT | Performed by: INTERNAL MEDICINE

## 2025-04-02 PROCEDURE — 3078F DIAST BP <80 MM HG: CPT | Performed by: INTERNAL MEDICINE

## 2025-04-02 PROCEDURE — 99215 OFFICE O/P EST HI 40 MIN: CPT | Performed by: INTERNAL MEDICINE

## 2025-04-02 PROCEDURE — 3044F HG A1C LEVEL LT 7.0%: CPT | Performed by: INTERNAL MEDICINE

## 2025-04-02 NOTE — TELEPHONE ENCOUNTER
Patient needs a repeat Doppler prior to his appointment with Dr Christy on 10/31. Please call to schedule patient is aware.

## 2025-04-02 NOTE — PROGRESS NOTES
Stability: Low Risk  (2025)    Housing Stability Vital Sign     Unable to Pay for Housing in the Last Year: No     Number of Times Moved in the Last Year: 0     Homeless in the Last Year: No     Social History     Tobacco Use   Smoking Status Former    Current packs/day: 0.00    Average packs/day: 1 pack/day for 4.0 years (4.0 ttl pk-yrs)    Types: Cigarettes    Start date: 2013    Quit date: 2017    Years since quittin.2    Passive exposure: Past   Smokeless Tobacco Never      Family History   Problem Relation Age of Onset    Hypertension Mother     Heart Disease Father     Heart Attack Father     Hypertension Father     Hypertension Maternal Grandmother     Diabetes Maternal Grandmother     Hypertension Paternal Uncle         REVIEW OF SYSTEMS:      CONSTITUTIONAL:  negative for fevers, chills, diaphoresis, activity change, appetite change, fatigue, night sweats and unexpected weight change.   EYES:  negative for blurred vision, eye discharge, visual disturbance and icterus  HEENT:  negative for hearing loss, tinnitus, ear drainage, sinus pressure, nasal congestion, epistaxis and snoring  RESPIRATORY:  No cough , no sob, no sputum production , no wheeze ,no hemoptysis   CARDIOVASCULAR:  negative for chest pain, palpitations, exertional chest pressure/discomfort, edema, syncope  GASTROINTESTINAL:  negative for nausea, vomiting, diarrhea, constipation, blood in stool and abdominal pain  GENITOURINARY:  negative for frequency, dysuria, urinary incontinence, decreased urine volume, and hematuria  HEMATOLOGIC/LYMPHATIC:  negative for easy bruising, bleeding and lymphadenopathy  ALLERGIC/IMMUNOLOGIC:  negative for recurrent infections, angioedema, anaphylaxis and drug reactions  ENDOCRINE:  negative for weight changes and diabetic symptoms including polyuria, polydipsia and polyphagia  MUSCULOSKELETAL: Right foot open wound with ulceration and drainage decreased range of motion and muscle

## 2025-04-03 ENCOUNTER — HOSPITAL ENCOUNTER (OUTPATIENT)
Dept: HYPERBARIC MEDICINE | Age: 75
Discharge: HOME | End: 2025-04-03
Payer: MEDICARE

## 2025-04-03 VITALS
HEART RATE: 80 BPM | DIASTOLIC BLOOD PRESSURE: 68 MMHG | RESPIRATION RATE: 18 BRPM | TEMPERATURE: 97 F | SYSTOLIC BLOOD PRESSURE: 143 MMHG

## 2025-04-03 VITALS
DIASTOLIC BLOOD PRESSURE: 67 MMHG | HEART RATE: 72 BPM | TEMPERATURE: 97.3 F | RESPIRATION RATE: 16 BRPM | SYSTOLIC BLOOD PRESSURE: 141 MMHG

## 2025-04-03 DIAGNOSIS — E11.69 DIABETIC FOOT ULCER WITH OSTEOMYELITIS (HCC): Primary | ICD-10-CM

## 2025-04-03 DIAGNOSIS — L97.509 DIABETIC FOOT ULCER WITH OSTEOMYELITIS (HCC): Primary | ICD-10-CM

## 2025-04-03 DIAGNOSIS — E11.621 DIABETIC FOOT ULCER WITH OSTEOMYELITIS (HCC): Primary | ICD-10-CM

## 2025-04-03 DIAGNOSIS — L97.514 ULCER OF TOE OF RIGHT FOOT, WITH NECROSIS OF BONE (HCC): ICD-10-CM

## 2025-04-03 DIAGNOSIS — L97.514 RIGHT FOOT ULCER, WITH NECROSIS OF BONE (HCC): ICD-10-CM

## 2025-04-03 DIAGNOSIS — M86.9 DIABETIC FOOT ULCER WITH OSTEOMYELITIS (HCC): Primary | ICD-10-CM

## 2025-04-03 DIAGNOSIS — I70.234 ATHEROSCLEROSIS OF NATIVE ARTERY OF RIGHT LOWER EXTREMITY WITH ULCERATION OF MIDFOOT: ICD-10-CM

## 2025-04-03 LAB
GLUCOSE BLD-MCNC: 150 MG/DL (ref 70–99)
GLUCOSE BLD-MCNC: 90 MG/DL (ref 70–99)
PERFORMED ON: ABNORMAL
PERFORMED ON: NORMAL

## 2025-04-03 PROCEDURE — G0277 HBOT, FULL BODY CHAMBER, 30M: HCPCS

## 2025-04-03 PROCEDURE — 99183 HYPERBARIC OXYGEN THERAPY: CPT | Performed by: NURSE PRACTITIONER

## 2025-04-03 ASSESSMENT — PAIN SCALES - GENERAL
PAINLEVEL_OUTOF10: 0
PAINLEVEL_OUTOF10: 0

## 2025-04-03 NOTE — PROGRESS NOTES
HBO PROGRESS NOTE      NAME: Marvin Montero  MEDICAL RECORD NUMBER:  [id number]  AGE: 74 y.o.   GENDER: male  : 1950  EPISODE DATE:  4/3/2025     Subjective     HBO Treatment Number: 25 out of Total Treatments: 30    HBO Diagnosis:             Indications: Lower Extremity Diabetic Wound ___(site) (right foot)    Safety checks performed prior to treatment.  See doc flowsheets for documentation.    Objective        Recent Labs     25  1018 25  1228   POCGLU 150* 90     Pre treatment Vital Signs       Temp: 97.1 °F (36.2 °C)     Pulse: 75     Respirations: 16     BP: (!) 112/57       Post treatment Vital Signs  Temp: 97.3 °F (36.3 °C)  Pulse: 72  Respirations: 16  BP: (!) 141/67    Assessment        HBO Diagnosis:   Problem List Items Addressed This Visit       Diabetic foot ulcer with osteomyelitis (HCC) - Primary    Relevant Orders    Notify physician (specify)    Hyperbaric Oxygen Therapy    POCT glucose    Care order/instruction    Atherosclerosis of native artery of right lower extremity with ulceration of midfoot    Relevant Orders    Notify physician (specify)    Hyperbaric Oxygen Therapy    POCT glucose    Care order/instruction    Right foot ulcer, with necrosis of bone (HCC)    Relevant Orders    Notify physician (specify)    Hyperbaric Oxygen Therapy    Ulcer of toe of right foot, with necrosis of bone (HCC)    Relevant Orders    POCT glucose    Care order/instruction       Physical Exam:  General Appearance:  alert and oriented to person, place and time, well-developed and well-nourished, in no acute distress    Pre Tympanic Membrane Assessment:  Right: Normal (PE tube visible, per Aaron Cuadra, CNP)  Left: Normal (PE tube visible, per Aaron Cuadra, CNP)    Post Tympanic Membrane Assessment:  Left: Normal (PE tube visible, denies ear problems)  Right: Normal (PE tube visible, denies ear problems)    Pulmonary/Chest:  clear to auscultation bilaterally- no wheezes, rales or

## 2025-04-03 NOTE — PLAN OF CARE
HBO NURSING DOCUMENTATION          [facility]    NAME:  Marvin Montero  YOB: 1950  MEDICAL RECORD NUMBER:  [id number]  DATE:  4/3/2025    Patient post treatment BS noted as 90, denies any s/s of hypoglycemia, per protocol patient was offered an 8 oz glucerna for BS less than 100, notified provider Aaron Cuadra CNP. Patient declined glucerna at this time but did finish all but 1 oz of the glucerna he was given to take into chamber at the beginning of treatment. Patient states he intends to go straight home and have lunch, states too many glucerna gives him an upset stomach. Provider is agreeable, 15 minutes after treatment end patient is still asymptomatic. Patient d/c to home with significant other. No other concerns at this time.     Electronically signed by Faith Guerrier RN on 4/3/2025 at 12:45 PM

## 2025-04-04 ENCOUNTER — HOSPITAL ENCOUNTER (OUTPATIENT)
Dept: HYPERBARIC MEDICINE | Age: 75
Discharge: HOME | End: 2025-04-04
Payer: MEDICARE

## 2025-04-04 VITALS
TEMPERATURE: 97.3 F | DIASTOLIC BLOOD PRESSURE: 69 MMHG | SYSTOLIC BLOOD PRESSURE: 142 MMHG | RESPIRATION RATE: 18 BRPM | HEART RATE: 61 BPM

## 2025-04-04 DIAGNOSIS — L97.514 ULCER OF TOE OF RIGHT FOOT, WITH NECROSIS OF BONE (HCC): Primary | ICD-10-CM

## 2025-04-04 LAB
GLUCOSE BLD-MCNC: 189 MG/DL (ref 70–99)
GLUCOSE BLD-MCNC: 88 MG/DL (ref 70–99)
PERFORMED ON: ABNORMAL
PERFORMED ON: NORMAL

## 2025-04-04 PROCEDURE — G0277 HBOT, FULL BODY CHAMBER, 30M: HCPCS

## 2025-04-04 PROCEDURE — 99183 HYPERBARIC OXYGEN THERAPY: CPT

## 2025-04-04 ASSESSMENT — PAIN SCALES - GENERAL
PAINLEVEL_OUTOF10: 0
PAINLEVEL_OUTOF10: 0

## 2025-04-04 NOTE — PLAN OF CARE
HBO NURSING DOCUMENTATION          [facility]    NAME:  Marvin Montero  YOB: 1950  MEDICAL RECORD NUMBER:  [id number]  DATE:  4/4/2025          Patient post treatment BS noted as 88, denies any s/s of hypoglycemia, per protocol patient was offered an 8 oz glucerna for BS less than 100, notified provider Donis Avila CNP. Patient declined glucerna at this time but did finish all but 4 oz of the glucerna he was given to take into chamber at the beginning of treatment. Patient states he intends to go straight home and have lunch and his target ranges is between , so he likes this reading. Provider is agreeable, 15 minutes after treatment end patient is still asymptomatic. Patient d/c to home with significant other. No other concerns at this time. Electronically signed by Connie Rojas RN on 4/4/2025 at 1:02 PM

## 2025-04-04 NOTE — PROGRESS NOTES
HBO PROGRESS NOTE      NAME: Marvin Montero  MEDICAL RECORD NUMBER:  [id number]  AGE: 74 y.o.   GENDER: male  : 1950  EPISODE DATE:  2025     Subjective     HBO Treatment Number: 26 out of Total Treatments: 30    HBO Diagnosis:             Indications: Lower Extremity Diabetic Wound ___(site) (right foot)    Safety checks performed prior to treatment.  See doc flowsheets for documentation.    Objective        Recent Labs     25  1025 25  1249   POCGLU 189* 88     Pre treatment Vital Signs       Temp: 97.1 °F (36.2 °C)     Pulse: 84     Respirations: 18     BP: (!) 122/58       Post treatment Vital Signs  Temp: 97.3 °F (36.3 °C)  Pulse: 61  Respirations: 18  BP: (!) 142/69    Assessment        HBO Diagnosis:   Problem List Items Addressed This Visit          Musculoskeletal and Integument    Ulcer of toe of right foot, with necrosis of bone (HCC) - Primary       Physical Exam:  General Appearance:  alert and oriented to person, place and time, well-developed and well-nourished, in no acute distress    Pre Tympanic Membrane Assessment:  Right: Normal (Per Donis Avila,CNP)  Left: Normal (Per Donis Avila CNP)    Post Tympanic Membrane Assessment:  Left: Normal (PE Tube visible, denies any ear problems)  Right: Normal (PE Tube visible, denies any ear problems)    Pulmonary/Chest:  clear to auscultation bilaterally- no wheezes, rales or rhonchi, normal air movement, no respiratory distress    Cardiovascular:  regular rate and rhythm, no murmurs rubs or gallops    Plan        Patient placed in a full body Monoplace Chamber #: [id number]  Treatment Start Time: 1053     Pressure Reached Time: 1103  MINGO : 2  Number of Air Breaks:  Treatment Status: No Air break     Decompression Time: 1233   Treatment End Time: 1241  Length of Treatment: 90 Minutes  Symptoms Noted During Treatment: None  Total Treatment Time (min): 108    Treatment Completion Status: Treatment completed without issue    I was

## 2025-04-07 ENCOUNTER — HOSPITAL ENCOUNTER (OUTPATIENT)
Dept: HYPERBARIC MEDICINE | Age: 75
Discharge: HOME | End: 2025-04-07
Payer: MEDICARE

## 2025-04-07 VITALS
TEMPERATURE: 97 F | DIASTOLIC BLOOD PRESSURE: 56 MMHG | HEART RATE: 60 BPM | SYSTOLIC BLOOD PRESSURE: 149 MMHG | RESPIRATION RATE: 18 BRPM

## 2025-04-07 DIAGNOSIS — E11.621 DIABETIC FOOT ULCER WITH OSTEOMYELITIS (HCC): Primary | ICD-10-CM

## 2025-04-07 DIAGNOSIS — E11.69 DIABETIC FOOT ULCER WITH OSTEOMYELITIS (HCC): Primary | ICD-10-CM

## 2025-04-07 DIAGNOSIS — L97.514 ULCER OF TOE OF RIGHT FOOT, WITH NECROSIS OF BONE (HCC): ICD-10-CM

## 2025-04-07 DIAGNOSIS — L97.509 DIABETIC FOOT ULCER WITH OSTEOMYELITIS (HCC): Primary | ICD-10-CM

## 2025-04-07 DIAGNOSIS — M86.9 DIABETIC FOOT ULCER WITH OSTEOMYELITIS (HCC): Primary | ICD-10-CM

## 2025-04-07 DIAGNOSIS — I70.234 ATHEROSCLEROSIS OF NATIVE ARTERY OF RIGHT LOWER EXTREMITY WITH ULCERATION OF MIDFOOT: ICD-10-CM

## 2025-04-07 DIAGNOSIS — L97.514 RIGHT FOOT ULCER, WITH NECROSIS OF BONE (HCC): ICD-10-CM

## 2025-04-07 LAB
ALBUMIN SERPL-MCNC: 3.6 G/DL (ref 3.4–5)
ALBUMIN/GLOB SERPL: 1.3 {RATIO} (ref 1.1–2.2)
ALP SERPL-CCNC: 91 U/L (ref 40–129)
ALT SERPL-CCNC: 83 U/L (ref 10–40)
ANION GAP SERPL CALCULATED.3IONS-SCNC: 8 MMOL/L (ref 3–16)
AST SERPL-CCNC: 39 U/L (ref 15–37)
BASOPHILS # BLD: 0.1 K/UL (ref 0–0.2)
BASOPHILS NFR BLD: 1.4 %
BILIRUB SERPL-MCNC: <0.2 MG/DL (ref 0–1)
BUN SERPL-MCNC: 25 MG/DL (ref 7–20)
CALCIUM SERPL-MCNC: 9 MG/DL (ref 8.3–10.6)
CHLORIDE SERPL-SCNC: 110 MMOL/L (ref 99–110)
CO2 SERPL-SCNC: 23 MMOL/L (ref 21–32)
CREAT SERPL-MCNC: 1.2 MG/DL (ref 0.8–1.3)
CRP SERPL-MCNC: <3 MG/L (ref 0–5.1)
DEPRECATED RDW RBC AUTO: 15.7 % (ref 12.4–15.4)
EOSINOPHIL # BLD: 0.2 K/UL (ref 0–0.6)
EOSINOPHIL NFR BLD: 5.5 %
ERYTHROCYTE [SEDIMENTATION RATE] IN BLOOD BY WESTERGREN METHOD: 34 MM/HR (ref 0–20)
GFR SERPLBLD CREATININE-BSD FMLA CKD-EPI: 64 ML/MIN/{1.73_M2}
GLUCOSE BLD-MCNC: 112 MG/DL (ref 70–99)
GLUCOSE BLD-MCNC: 172 MG/DL (ref 70–99)
GLUCOSE BLD-MCNC: 85 MG/DL (ref 70–99)
GLUCOSE SERPL-MCNC: 84 MG/DL (ref 70–99)
HCT VFR BLD AUTO: 36.3 % (ref 40.5–52.5)
HGB BLD-MCNC: 11.7 G/DL (ref 13.5–17.5)
LYMPHOCYTES # BLD: 1.2 K/UL (ref 1–5.1)
LYMPHOCYTES NFR BLD: 31.8 %
MCH RBC QN AUTO: 27.8 PG (ref 26–34)
MCHC RBC AUTO-ENTMCNC: 32.2 G/DL (ref 31–36)
MCV RBC AUTO: 86.4 FL (ref 80–100)
MONOCYTES # BLD: 0.4 K/UL (ref 0–1.3)
MONOCYTES NFR BLD: 10 %
NEUTROPHILS # BLD: 1.9 K/UL (ref 1.7–7.7)
NEUTROPHILS NFR BLD: 51.3 %
PERFORMED ON: ABNORMAL
PERFORMED ON: ABNORMAL
PERFORMED ON: NORMAL
PLATELET # BLD AUTO: 119 K/UL (ref 135–450)
PMV BLD AUTO: 10 FL (ref 5–10.5)
POTASSIUM SERPL-SCNC: 4.6 MMOL/L (ref 3.5–5.1)
PROT SERPL-MCNC: 6.3 G/DL (ref 6.4–8.2)
RBC # BLD AUTO: 4.21 M/UL (ref 4.2–5.9)
SODIUM SERPL-SCNC: 141 MMOL/L (ref 136–145)
WBC # BLD AUTO: 3.8 K/UL (ref 4–11)

## 2025-04-07 PROCEDURE — 99183 HYPERBARIC OXYGEN THERAPY: CPT | Performed by: NURSE PRACTITIONER

## 2025-04-07 PROCEDURE — G0277 HBOT, FULL BODY CHAMBER, 30M: HCPCS

## 2025-04-07 NOTE — PROGRESS NOTES
HBO PROGRESS NOTE      NAME: Marvin Montero  MEDICAL RECORD NUMBER:  [id number]  AGE: 74 y.o.   GENDER: male  : 1950  EPISODE DATE:  2025     Subjective     HBO Treatment Number: 27 out of Total Treatments: 30    HBO Diagnosis:             Indications: Lower Extremity Diabetic Wound ___(site) (Right Foot)    Safety checks performed prior to treatment.  See doc flowsheets for documentation.    Objective        Recent Labs     25  1247 25  1314   POCGLU 85 112*     Pre treatment Vital Signs       Temp: 97.2 °F (36.2 °C)     Pulse: 80     Respirations: 18     BP: (!) 125/55       Post treatment Vital Signs  Temp: 97 °F (36.1 °C)  Pulse: 60  Respirations: 18  BP: (!) 149/56    Assessment        HBO Diagnosis:   Problem List Items Addressed This Visit       Diabetic foot ulcer with osteomyelitis (HCC) - Primary    Relevant Orders    Notify physician (specify)    Hyperbaric Oxygen Therapy    Hypoglycemial protocol    POCT glucose    Care order/instruction    Hypoglycemial protocol    POCT glucose    Care order/instruction    Care order/instruction    Care order/instruction    Care order/instruction    Care order/instruction    Care order/instruction    Care order/instruction    Care order/instruction    Atherosclerosis of native artery of right lower extremity with ulceration of midfoot    Relevant Orders    Notify physician (specify)    Hyperbaric Oxygen Therapy    Hypoglycemial protocol    POCT glucose    Care order/instruction    Hypoglycemial protocol    POCT glucose    Care order/instruction    Care order/instruction    Care order/instruction    Care order/instruction    Care order/instruction    Care order/instruction    Care order/instruction    Care order/instruction    Right foot ulcer, with necrosis of bone (HCC)    Relevant Orders    Notify physician (specify)    Hyperbaric Oxygen Therapy    Ulcer of toe of right foot, with necrosis of bone (HCC)    Relevant Orders    Hypoglycemial

## 2025-04-08 ENCOUNTER — OFFICE VISIT (OUTPATIENT)
Dept: INTERNAL MEDICINE CLINIC | Age: 75
End: 2025-04-08

## 2025-04-08 ENCOUNTER — HOSPITAL ENCOUNTER (OUTPATIENT)
Dept: WOUND CARE | Age: 75
Discharge: HOME | End: 2025-04-08
Attending: EMERGENCY MEDICINE
Payer: MEDICARE

## 2025-04-08 ENCOUNTER — HOSPITAL ENCOUNTER (OUTPATIENT)
Dept: HYPERBARIC MEDICINE | Age: 75
Discharge: HOME | End: 2025-04-08
Payer: MEDICARE

## 2025-04-08 VITALS
DIASTOLIC BLOOD PRESSURE: 64 MMHG | HEART RATE: 70 BPM | BODY MASS INDEX: 31.04 KG/M2 | SYSTOLIC BLOOD PRESSURE: 128 MMHG | WEIGHT: 255 LBS | OXYGEN SATURATION: 98 %

## 2025-04-08 VITALS
HEART RATE: 65 BPM | DIASTOLIC BLOOD PRESSURE: 67 MMHG | SYSTOLIC BLOOD PRESSURE: 142 MMHG | RESPIRATION RATE: 16 BRPM | TEMPERATURE: 97.1 F

## 2025-04-08 VITALS
RESPIRATION RATE: 18 BRPM | SYSTOLIC BLOOD PRESSURE: 111 MMHG | HEART RATE: 86 BPM | DIASTOLIC BLOOD PRESSURE: 55 MMHG | TEMPERATURE: 97.8 F

## 2025-04-08 DIAGNOSIS — E66.9 TYPE 2 DIABETES MELLITUS WITH OBESITY (HCC): ICD-10-CM

## 2025-04-08 DIAGNOSIS — E11.69 DIABETIC FOOT ULCER WITH OSTEOMYELITIS (HCC): Primary | ICD-10-CM

## 2025-04-08 DIAGNOSIS — I70.234 ATHEROSCLEROSIS OF NATIVE ARTERY OF RIGHT LOWER EXTREMITY WITH ULCERATION OF MIDFOOT: Primary | ICD-10-CM

## 2025-04-08 DIAGNOSIS — I50.20 HFREF (HEART FAILURE WITH REDUCED EJECTION FRACTION) (HCC): ICD-10-CM

## 2025-04-08 DIAGNOSIS — E03.9 ACQUIRED HYPOTHYROIDISM: ICD-10-CM

## 2025-04-08 DIAGNOSIS — E11.621 DIABETIC FOOT ULCER WITH OSTEOMYELITIS (HCC): Primary | ICD-10-CM

## 2025-04-08 DIAGNOSIS — E78.2 MIXED HYPERLIPIDEMIA: ICD-10-CM

## 2025-04-08 DIAGNOSIS — Z11.59 ENCOUNTER FOR HEPATITIS C SCREENING TEST FOR LOW RISK PATIENT: ICD-10-CM

## 2025-04-08 DIAGNOSIS — Z74.09 IMPAIRED MOBILITY: ICD-10-CM

## 2025-04-08 DIAGNOSIS — R60.0 LOCALIZED EDEMA: ICD-10-CM

## 2025-04-08 DIAGNOSIS — L97.413 DIABETIC ULCER OF RIGHT MIDFOOT ASSOCIATED WITH TYPE 2 DIABETES MELLITUS, WITH NECROSIS OF MUSCLE: ICD-10-CM

## 2025-04-08 DIAGNOSIS — E11.628 TYPE 2 DIABETES MELLITUS WITH RIGHT DIABETIC FOOT INFECTION (HCC): ICD-10-CM

## 2025-04-08 DIAGNOSIS — N18.2 CKD (CHRONIC KIDNEY DISEASE) STAGE 2, GFR 60-89 ML/MIN: ICD-10-CM

## 2025-04-08 DIAGNOSIS — L97.509 DIABETIC FOOT ULCER WITH OSTEOMYELITIS (HCC): Primary | ICD-10-CM

## 2025-04-08 DIAGNOSIS — Z79.4 TYPE 2 DIABETES MELLITUS WITH COMPLICATION, WITH LONG-TERM CURRENT USE OF INSULIN (HCC): Primary | ICD-10-CM

## 2025-04-08 DIAGNOSIS — M86.9 DIABETIC FOOT ULCER WITH OSTEOMYELITIS (HCC): Primary | ICD-10-CM

## 2025-04-08 DIAGNOSIS — R53.83 OTHER FATIGUE: ICD-10-CM

## 2025-04-08 DIAGNOSIS — R79.89 ELEVATED LFTS: ICD-10-CM

## 2025-04-08 DIAGNOSIS — E11.621 DIABETIC ULCER OF RIGHT MIDFOOT ASSOCIATED WITH TYPE 2 DIABETES MELLITUS, WITH NECROSIS OF MUSCLE: ICD-10-CM

## 2025-04-08 DIAGNOSIS — L97.514 RIGHT FOOT ULCER, WITH NECROSIS OF BONE (HCC): ICD-10-CM

## 2025-04-08 DIAGNOSIS — I10 PRIMARY HYPERTENSION: ICD-10-CM

## 2025-04-08 DIAGNOSIS — I70.234 ATHEROSCLEROSIS OF NATIVE ARTERY OF RIGHT LOWER EXTREMITY WITH ULCERATION OF MIDFOOT: ICD-10-CM

## 2025-04-08 DIAGNOSIS — I87.321 IDIOPATHIC CHRONIC VENOUS HYPERTENSION OF RIGHT LEG WITH INFLAMMATION: ICD-10-CM

## 2025-04-08 DIAGNOSIS — E11.69 TYPE 2 DIABETES MELLITUS WITH OBESITY (HCC): ICD-10-CM

## 2025-04-08 DIAGNOSIS — L08.9 TYPE 2 DIABETES MELLITUS WITH RIGHT DIABETIC FOOT INFECTION (HCC): ICD-10-CM

## 2025-04-08 DIAGNOSIS — L97.514 ULCER OF TOE OF RIGHT FOOT, WITH NECROSIS OF BONE (HCC): ICD-10-CM

## 2025-04-08 DIAGNOSIS — E11.8 TYPE 2 DIABETES MELLITUS WITH COMPLICATION, WITH LONG-TERM CURRENT USE OF INSULIN (HCC): Primary | ICD-10-CM

## 2025-04-08 PROBLEM — N18.30 STAGE 3 CHRONIC KIDNEY DISEASE, UNSPECIFIED WHETHER STAGE 3A OR 3B CKD (HCC): Status: ACTIVE | Noted: 2025-04-08

## 2025-04-08 LAB
CHP ED QC CHECK: NORMAL
GLUCOSE BLD-MCNC: 106 MG/DL (ref 70–99)
GLUCOSE BLD-MCNC: 132 MG/DL
GLUCOSE BLD-MCNC: 149 MG/DL (ref 70–99)
GLUCOSE BLD-MCNC: 81 MG/DL (ref 70–99)
GLUCOSE BLD-MCNC: 89 MG/DL (ref 70–99)
HBA1C MFR BLD: 6.2 %
PERFORMED ON: ABNORMAL
PERFORMED ON: ABNORMAL
PERFORMED ON: NORMAL
PERFORMED ON: NORMAL

## 2025-04-08 PROCEDURE — 15275 SKIN SUB GRAFT FACE/NK/HF/G: CPT

## 2025-04-08 PROCEDURE — G0277 HBOT, FULL BODY CHAMBER, 30M: HCPCS

## 2025-04-08 PROCEDURE — 99183 HYPERBARIC OXYGEN THERAPY: CPT | Performed by: EMERGENCY MEDICINE

## 2025-04-08 RX ORDER — CLOBETASOL PROPIONATE 0.5 MG/G
OINTMENT TOPICAL ONCE
OUTPATIENT
Start: 2025-04-08 | End: 2025-04-08

## 2025-04-08 RX ORDER — TRIAMCINOLONE ACETONIDE 1 MG/G
OINTMENT TOPICAL ONCE
OUTPATIENT
Start: 2025-04-08 | End: 2025-04-08

## 2025-04-08 RX ORDER — BETAMETHASONE DIPROPIONATE 0.5 MG/G
CREAM TOPICAL ONCE
OUTPATIENT
Start: 2025-04-08 | End: 2025-04-08

## 2025-04-08 RX ORDER — LIDOCAINE HYDROCHLORIDE 40 MG/ML
SOLUTION TOPICAL ONCE
Status: COMPLETED | OUTPATIENT
Start: 2025-04-08 | End: 2025-04-08

## 2025-04-08 RX ORDER — NEOMYCIN/BACITRACIN/POLYMYXINB 3.5-400-5K
OINTMENT (GRAM) TOPICAL ONCE
OUTPATIENT
Start: 2025-04-08 | End: 2025-04-08

## 2025-04-08 RX ORDER — LIDOCAINE 40 MG/G
CREAM TOPICAL ONCE
OUTPATIENT
Start: 2025-04-08 | End: 2025-04-08

## 2025-04-08 RX ORDER — GENTAMICIN SULFATE 1 MG/G
OINTMENT TOPICAL ONCE
OUTPATIENT
Start: 2025-04-08 | End: 2025-04-08

## 2025-04-08 RX ORDER — SILVER SULFADIAZINE 10 MG/G
CREAM TOPICAL ONCE
OUTPATIENT
Start: 2025-04-08 | End: 2025-04-08

## 2025-04-08 RX ORDER — LIDOCAINE HYDROCHLORIDE 40 MG/ML
SOLUTION TOPICAL ONCE
OUTPATIENT
Start: 2025-04-08 | End: 2025-04-08

## 2025-04-08 RX ORDER — BACITRACIN ZINC AND POLYMYXIN B SULFATE 500; 1000 [USP'U]/G; [USP'U]/G
OINTMENT TOPICAL ONCE
OUTPATIENT
Start: 2025-04-08 | End: 2025-04-08

## 2025-04-08 RX ORDER — GINSENG 100 MG
CAPSULE ORAL ONCE
OUTPATIENT
Start: 2025-04-08 | End: 2025-04-08

## 2025-04-08 RX ORDER — LIDOCAINE 50 MG/G
OINTMENT TOPICAL ONCE
OUTPATIENT
Start: 2025-04-08 | End: 2025-04-08

## 2025-04-08 RX ORDER — SODIUM CHLOR/HYPOCHLOROUS ACID 0.033 %
SOLUTION, IRRIGATION IRRIGATION ONCE
OUTPATIENT
Start: 2025-04-08 | End: 2025-04-08

## 2025-04-08 RX ORDER — MUPIROCIN 20 MG/G
OINTMENT TOPICAL ONCE
OUTPATIENT
Start: 2025-04-08 | End: 2025-04-08

## 2025-04-08 RX ORDER — LIDOCAINE HYDROCHLORIDE 20 MG/ML
JELLY TOPICAL ONCE
OUTPATIENT
Start: 2025-04-08 | End: 2025-04-08

## 2025-04-08 RX ADMIN — LIDOCAINE HYDROCHLORIDE: 40 SOLUTION TOPICAL at 09:42

## 2025-04-08 SDOH — ECONOMIC STABILITY: FOOD INSECURITY: WITHIN THE PAST 12 MONTHS, YOU WORRIED THAT YOUR FOOD WOULD RUN OUT BEFORE YOU GOT MONEY TO BUY MORE.: NEVER TRUE

## 2025-04-08 SDOH — ECONOMIC STABILITY: FOOD INSECURITY: WITHIN THE PAST 12 MONTHS, THE FOOD YOU BOUGHT JUST DIDN'T LAST AND YOU DIDN'T HAVE MONEY TO GET MORE.: NEVER TRUE

## 2025-04-08 ASSESSMENT — PATIENT HEALTH QUESTIONNAIRE - PHQ9
2. FEELING DOWN, DEPRESSED OR HOPELESS: NOT AT ALL
SUM OF ALL RESPONSES TO PHQ QUESTIONS 1-9: 0
1. LITTLE INTEREST OR PLEASURE IN DOING THINGS: NOT AT ALL

## 2025-04-08 ASSESSMENT — PAIN SCALES - GENERAL
PAINLEVEL_OUTOF10: 0
PAINLEVEL_OUTOF10: 0

## 2025-04-08 NOTE — PROGRESS NOTES
[facility]  Hyperbaric Oxygen Therapy   Utilization Review    Marvin Montero  MEDICAL RECORD NUMBER:  [id number]  AGE: 74 y.o.   GENDER: male  : 1950  EPISODE DATE:  2025      HBO Diagnosis: HBO Diagnosis:       Indications: Lower Extremity Diabetic Wound ___(site) (Right Foot) right DFU Coffey grade 3    Marvin Montero is a 74 y.o. male  who is currently receiving hyperbaric oxygen therapy at [facility] for the above indication.    Mr. Montero has currently received: Treatment Number: 28 out of Total Treatments: 30    In my clinical judgement, ongoing HBO therapy is  necessary at this time.  Mr. Montero hasshown improvement with hyperbaric oxygen therapy as evidenced by a reduction in wound size, increased granular tissue, resolution of gangrenous changes, and resolving infection.             The patient/caregiver verbalized understanding of the Utilization Review for HBOT and has agreed to the plan.     Electronically signed by FABIO ANTOINE MD on 2025 at 5:23 PM

## 2025-04-08 NOTE — PROGRESS NOTES
HBO PROGRESS NOTE      NAME: Marvin Montero  MEDICAL RECORD NUMBER:  [id number]  AGE: 74 y.o.   GENDER: male  : 1950  EPISODE DATE:  2025     Subjective     HBO Treatment Number: 28 out of Total Treatments: 30    HBO Diagnosis:             Indications: Lower Extremity Diabetic Wound ___(site) (Right Foot) right DFU Coffey grade 3    Safety checks performed prior to treatment.  See doc flowsheets for documentation.    Objective        Recent Labs     25  1115 25  1320   POCGLU 149* 89     Pre treatment Vital Signs       Temp: 97.2 °F (36.2 °C)     Pulse: 66     Respirations: 18     BP: (!) 136/57       Post treatment Vital Signs  Temp: 97.1 °F (36.2 °C)  Pulse: 65  Respirations: 16  BP: (!) 142/67    Assessment        HBO Diagnosis:   Problem List Items Addressed This Visit          Circulatory    Atherosclerosis of native artery of right lower extremity with ulceration of midfoot    Relevant Orders    Notify physician (specify)    Hyperbaric Oxygen Therapy    Hypoglycemial protocol    POCT glucose    Care order/instruction    Care order/instruction       Endocrine    Diabetic foot ulcer with osteomyelitis (HCC) - Primary    Relevant Orders    Notify physician (specify)    Hyperbaric Oxygen Therapy    Hypoglycemial protocol    POCT glucose    Care order/instruction    Care order/instruction       Musculoskeletal and Integument    Right foot ulcer, with necrosis of bone (HCC)    Relevant Orders    Notify physician (specify)    Hyperbaric Oxygen Therapy    Ulcer of toe of right foot, with necrosis of bone (HCC)    Relevant Orders    Hypoglycemial protocol    POCT glucose    Care order/instruction    Care order/instruction       Physical Exam        General Appearance:  alert and oriented to person, place and time, well-developed and well-nourished, in no acute distress    Pre Tympanic Membrane Assessment:  Right: Normal (PE Tubes Visible per Dr. Alejandre)  Left: Normal (PE Tubes Visible per

## 2025-04-08 NOTE — PLAN OF CARE
HBO NURSING DOCUMENTATION          [facility]    NAME:  Marvin Montero  YOB: 1950  MEDICAL RECORD NUMBER:  [id number]  DATE:  4/8/2025     Patient A/Ox4, no s/s of distress, post treatment VSS, noted post treatment BS 89. Per protocol patient was offered 8 oz Glucerna, patient declined, stating took 2 Glucerna prior to treatment and was given a third Glucerna to sip on during treatment. Patient finished 3oz of Glucerna in chamber, patient consumed another 2 oz outside chamber. Patient states asymptomatic at this time, after 15 minutes patient remains asymptomatic, is agreeable to a packet of joaquin crackers. Notified provider Joseline Avila CNP of BS less than 100 and patient declined Glucerna, no new orders at this time. Patient to d/c home with significant other, ambulatory with walker.       Electronically signed by Faith Guerrier RN on 4/8/2025 at 1:35 PM

## 2025-04-08 NOTE — PROGRESS NOTES
readings.  Appears to be adequate for HBOT. Will try treatment today. Glucose this AM was 190.  Impaired mobility and/or Offloading needs discussed with education and counseling provided.   Weight-bearing status discussed with instruction and education provided and Pressure relief to help with skin perfusion discussed  Infectious concerns were discussed in detail with patient:   Acute signs of infection today: Yes  Wound culture obtained: Yes on 1/28/25. Results reviewed. Patient would benefit from infectious disease follow-up with IV antibiotics especially once angiogram with intervention done. Patient to make appt.  Imaging Ordered: Yes, MRI right foot ordered 1/14/25 given chronicity of wound. OM noted but wound is healing now with wound care, HBOT and abx.  Smoking cessation discussed: No  HBOT discussed with patient. Risk assessment done today. He follows with Dr. Cyr for cardiology issues.  Patient does have cardiac issues including congestive heart failure and a defibrillator in place which poses a risk regarding hyperbaric oxygen therapy.  I asked the patient to make an appointment with his cardiologist to get clearance for possible treatment. Echo done. EF 55%. HBOT started 2/12/25.  But patient had issues with ear discomfort.  His glucose also dropped low but was asymptomatic.  Because of this, HBO therapy was held until we can understand his glucose levels in relation to food intake a little bit better.  I have asked him to keep diary of his glucose levels as well as diet.  Extensive time spent today discussing all of this and education provided, along with review of his diary.  The patient has been using Flonase and Afrin. Tolerating HBOT now without issue.    Prescription drug management:  silvadene, hibiclens      Risk of complications and/or mortality of patient management:  This patient has a high risk of morbidity and mortality from additional diagnostic testing or treatment. This is due to the

## 2025-04-08 NOTE — PROGRESS NOTES
Patient: Marvin Montero is a 74 y.o. male who presents today with the following Chief Complaint(s):    Chief Complaint   Patient presents with    Follow-up    Diabetes         HPIgetting bariatric tx for wound clinic. Klarissa Dominguez PAD was addressed.   Current Outpatient Medications   Medication Sig Dispense Refill    amoxicillin-clavulanate (AUGMENTIN) 875-125 MG per tablet Take 1 tablet by mouth 2 times daily 84 tablet 1    cefepime (MAXIPIME) infusion Infuse 2,000 mg intravenously in the morning and 2,000 mg in the evening. Do all this for 28 days. Compound per protocol. 112 g 0    levothyroxine (SYNTHROID) 25 MCG tablet TAKE 1 TABLET BY MOUTH DAILY 90 tablet 3    sildenafil (VIAGRA) 100 MG tablet TAKE 1 TABLET BY MOUTH DAILY AS NEEDED FOR ERECTILE DYSFUNCTION 30 tablet 5    dapagliflozin (FARXIGA) 5 MG tablet Take 1 tablet by mouth every morning      amiodarone (CORDARONE) 200 MG tablet TAKE 1 TABLET BY MOUTH DAILY 30 tablet 0    torsemide (DEMADEX) 20 MG tablet Take 1 tablet by mouth daily 90 tablet 3    oxymetazoline (12 HOUR NASAL SPRAY) 0.05 % nasal spray Apply 2 sprays to each nostril 30 minutes prior to hyperbaric oxygen treatment 1 each 3    fluticasone (FLONASE) 50 MCG/ACT nasal spray 2 sprays by Nasal route 2 times daily One month supply equals 2 bottles 2 each 5    silver sulfADIAZINE (SILVADENE) 1 % cream Apply topically daily. 1000 g 1    metoprolol succinate (TOPROL XL) 25 MG extended release tablet Take 1 tablet by mouth daily 30 tablet 3    Continuous Glucose Sensor (FREESTYLE KWESI 14 DAY SENSOR) [facility] as directed Dx E11.9 6 each 3    atorvastatin (LIPITOR) 40 MG tablet Take 1 tablet by mouth daily 90 tablet 3    ezetimibe (ZETIA) 10 MG tablet Take 1 tablet by mouth daily 90 tablet 3    lisinopril (PRINIVIL;ZESTRIL) 2.5 MG tablet Take 1 tablet by mouth daily 90 tablet 3    potassium chloride (K-TAB) 20 MEQ TBCR extended release tablet Take 1 tablet by mouth daily as needed when taking

## 2025-04-08 NOTE — PATIENT INSTRUCTIONS
Manager: ROB     Electronically signed by Courtney Conteh RN on 4/8/2025 at 9:54 AM       Wound Care Center Information: Should you experience any significant changes in your wound(s) or have questions about your wound care, please contact the [facility] at [phone] MONDAY - THURSDAY 8:00 am - 4:30 pm and Friday 8:00 am - 12:30 pm.  If you need help with your wound outside these hours and cannot wait until we are again available, contact your PCP or go to the hospital emergency room.     PLEASE NOTE: IF YOU ARE UNABLE TO OBTAIN WOUND SUPPLIES, CONTINUE TO USE THE SUPPLIES YOU HAVE AVAILABLE UNTIL YOU ARE ABLE TO REACH US. IT IS MOST IMPORTANT TO KEEP THE WOUND COVERED AT ALL TIMES.         Physician Signature:_______________________    Date: ___________ Time:  ____________          Dr Cece Alejandre

## 2025-04-08 NOTE — PLAN OF CARE
TheraSkin Treatment Note    NAME:  Marvin Montero  YOB: 1950  MEDICAL RECORD NUMBER:  [id number]  DATE:  4/8/2025    Goal:  Patient will receive safe and proper application of skin substitute.  Patient will comply with caring for dressing, offloading and reporting complications.     Expiration date of TheraSkin checked immediately prior to use.  Package intact prior to use and no damage noted.  Transport temperature controlled and acceptable.  TheraSkin was prepared for application by removing from package. TheraSkin was rinsed 2 times in room temperature normal saline for 2 minutes each time. A 2nd saline rinse was left on the TheraSkin until the physician was ready to apply it within 120 minutes of thawing. White side goes against ulcer bed.  Graft may be thawed and soaked in its inner pouch.Place sterile solution normal saline of lactated ringer in the inner pouch and completely submerge the graft. Do not allow the solution to exceed 42 degree C. To thaw for 2 minutes. TheraSkin until the physician was ready to apply must be within 120 minutes of thawing. Replace the sterile solution in the inner pouch between the thaw and soak. Remove the double mesh lining prior to applying to patient.  TheraSkin was applied to RIGHT FOOT and affixed with steri-strips by the physician.  CUTIMED, STERI STRIPS, DRAWTEX, OPTILOCK, FOOTBALL DRESSING was applied on top of non-adherent dressing.  Coban or ace wrap was applied to secure graft and decrease edema.  The patient/caregiver was instructed not to remove the dressing and to keep it clean and dry.  The patient/family/caregiver was instructed on the need for offloading and elevation of the affected extremity and on using the prescribed offloading device.  The patient/family/caregiver was instructed on signs and symptoms of complication to report, such as draining through dressing, dressing falling down/slipping, getting wet, or severe pain or

## 2025-04-09 ENCOUNTER — HOSPITAL ENCOUNTER (OUTPATIENT)
Dept: HYPERBARIC MEDICINE | Age: 75
Discharge: HOME | End: 2025-04-09
Payer: MEDICARE

## 2025-04-09 ENCOUNTER — TELEPHONE (OUTPATIENT)
Dept: WOUND CARE | Age: 75
End: 2025-04-09

## 2025-04-09 VITALS
SYSTOLIC BLOOD PRESSURE: 159 MMHG | TEMPERATURE: 97.2 F | RESPIRATION RATE: 16 BRPM | DIASTOLIC BLOOD PRESSURE: 77 MMHG | HEART RATE: 60 BPM

## 2025-04-09 DIAGNOSIS — E11.69 DIABETIC FOOT ULCER WITH OSTEOMYELITIS (HCC): Primary | ICD-10-CM

## 2025-04-09 DIAGNOSIS — E11.621 DIABETIC FOOT ULCER WITH OSTEOMYELITIS (HCC): Primary | ICD-10-CM

## 2025-04-09 DIAGNOSIS — L97.424 DIABETIC ULCER OF LEFT MIDFOOT ASSOCIATED WITH TYPE 2 DIABETES MELLITUS, WITH NECROSIS OF BONE: Primary | ICD-10-CM

## 2025-04-09 DIAGNOSIS — M86.9 DIABETIC FOOT ULCER WITH OSTEOMYELITIS (HCC): Primary | ICD-10-CM

## 2025-04-09 DIAGNOSIS — L97.514 ULCER OF TOE OF RIGHT FOOT, WITH NECROSIS OF BONE (HCC): ICD-10-CM

## 2025-04-09 DIAGNOSIS — L97.514 RIGHT FOOT ULCER, WITH NECROSIS OF BONE (HCC): ICD-10-CM

## 2025-04-09 DIAGNOSIS — L97.509 DIABETIC FOOT ULCER WITH OSTEOMYELITIS (HCC): Primary | ICD-10-CM

## 2025-04-09 DIAGNOSIS — I70.234 ATHEROSCLEROSIS OF NATIVE ARTERY OF RIGHT LOWER EXTREMITY WITH ULCERATION OF MIDFOOT: ICD-10-CM

## 2025-04-09 DIAGNOSIS — E11.621 DIABETIC ULCER OF LEFT MIDFOOT ASSOCIATED WITH TYPE 2 DIABETES MELLITUS, WITH NECROSIS OF BONE: Primary | ICD-10-CM

## 2025-04-09 LAB
GLUCOSE BLD-MCNC: 146 MG/DL (ref 70–99)
GLUCOSE BLD-MCNC: 94 MG/DL (ref 70–99)
PERFORMED ON: ABNORMAL
PERFORMED ON: NORMAL

## 2025-04-09 PROCEDURE — 99183 HYPERBARIC OXYGEN THERAPY: CPT | Performed by: SURGERY

## 2025-04-09 PROCEDURE — G0277 HBOT, FULL BODY CHAMBER, 30M: HCPCS

## 2025-04-09 ASSESSMENT — PAIN SCALES - GENERAL: PAINLEVEL_OUTOF10: 0

## 2025-04-09 NOTE — PROGRESS NOTES
HBO PROGRESS NOTE      NAME: Marvin Montero  MEDICAL RECORD NUMBER:  [id number]  AGE: 74 y.o.   GENDER: male  : 1950  EPISODE DATE:  2025     Subjective     HBO Treatment Number: 29 out of Total Treatments: 30    HBO Diagnosis:             Indications: Lower Extremity Diabetic Wound ___(site) (right foot)    Safety checks performed prior to treatment.  See doc flowsheets for documentation.    Objective        Recent Labs     25  1040 25  1254   POCGLU 146* 94     Pre treatment Vital Signs       Temp: 97.2 °F (36.2 °C)     Pulse: 72     Respirations: 18     BP: 136/65       Post treatment Vital Signs  Temp: 97.2 °F (36.2 °C)  Pulse: 60  Respirations: 16  BP: (!) 159/77    Assessment        HBO Diagnosis:   Problem List Items Addressed This Visit          Circulatory    Atherosclerosis of native artery of right lower extremity with ulceration of midfoot    Relevant Orders    Notify physician (specify)    Hyperbaric Oxygen Therapy    POCT glucose    Care order/instruction    Care order/instruction       Endocrine    Diabetic foot ulcer with osteomyelitis (HCC) - Primary    Relevant Orders    Notify physician (specify)    Hyperbaric Oxygen Therapy    POCT glucose    Care order/instruction    Care order/instruction       Musculoskeletal and Integument    Right foot ulcer, with necrosis of bone (HCC)    Relevant Orders    Notify physician (specify)    Hyperbaric Oxygen Therapy    Ulcer of toe of right foot, with necrosis of bone (HCC)    Relevant Orders    POCT glucose    Care order/instruction    Care order/instruction       Physical Exam        General Appearance:  alert and oriented to person, place and time, well-developed and well-nourished, in no acute distress    Pre Tympanic Membrane Assessment:  Right: Normal (Per Dr. Joann MD)  Left: Normal (per Dr. Joann MD)    Post Tympanic Membrane Assessment:  Left: Normal (PE tube visible. Denies any ear problems)  Right: Normal (PE tube

## 2025-04-09 NOTE — TELEPHONE ENCOUNTER
[employer]erred, per Availity this authorization must be obtained through [facility]. No prior Authorization required for CPT Code 77491.     Date: 4/9/25  Time: 1115 (1015 local time for [facility] [place])     Service Provider: Bismakr/ Woody  Phone #: [phone]  Spokesperson for [facility]: Jeff MERINO  Authorization Approval Number: [id number]  CPT:   Quantity: 80 units  Dates for Approval: 4/11/25-6/10/25  ICD 10 Code: E11.621     Per Jeff MERINO Representative from [facility] the patient is approved to receive Hyperbaric Oxygen Therapy. Patient is agreeable to continue treatment at this time. No other needs or concerns expressed.    Electronically signed by Faith Guerrier RN on 4/9/2025 at 11:34 AM

## 2025-04-09 NOTE — PROGRESS NOTES
FSBS after HBO treatment = 94.  Patient denies any symptoms of hypoglycemia and refuses Glucerna but took pack of joaquin crackers with him and will eat when he gets home.  Dr David aware.

## 2025-04-10 ENCOUNTER — HOSPITAL ENCOUNTER (OUTPATIENT)
Dept: HYPERBARIC MEDICINE | Age: 75
Discharge: HOME | End: 2025-04-10
Payer: MEDICARE

## 2025-04-10 VITALS
HEART RATE: 60 BPM | DIASTOLIC BLOOD PRESSURE: 61 MMHG | RESPIRATION RATE: 20 BRPM | TEMPERATURE: 97.3 F | SYSTOLIC BLOOD PRESSURE: 131 MMHG

## 2025-04-10 DIAGNOSIS — E11.621 DIABETIC FOOT ULCER WITH OSTEOMYELITIS (HCC): Primary | ICD-10-CM

## 2025-04-10 DIAGNOSIS — I70.234 ATHEROSCLEROSIS OF NATIVE ARTERY OF RIGHT LOWER EXTREMITY WITH ULCERATION OF MIDFOOT: ICD-10-CM

## 2025-04-10 DIAGNOSIS — M86.9 DIABETIC FOOT ULCER WITH OSTEOMYELITIS (HCC): Primary | ICD-10-CM

## 2025-04-10 DIAGNOSIS — L97.514 RIGHT FOOT ULCER, WITH NECROSIS OF BONE (HCC): ICD-10-CM

## 2025-04-10 DIAGNOSIS — E11.69 DIABETIC FOOT ULCER WITH OSTEOMYELITIS (HCC): Primary | ICD-10-CM

## 2025-04-10 DIAGNOSIS — L97.514 ULCER OF TOE OF RIGHT FOOT, WITH NECROSIS OF BONE (HCC): ICD-10-CM

## 2025-04-10 DIAGNOSIS — L97.509 DIABETIC FOOT ULCER WITH OSTEOMYELITIS (HCC): Primary | ICD-10-CM

## 2025-04-10 LAB
GLUCOSE BLD-MCNC: 142 MG/DL (ref 70–99)
GLUCOSE BLD-MCNC: 188 MG/DL (ref 70–99)
GLUCOSE BLD-MCNC: 60 MG/DL (ref 70–99)
GLUCOSE BLD-MCNC: 65 MG/DL (ref 70–99)
PERFORMED ON: ABNORMAL

## 2025-04-10 PROCEDURE — G0277 HBOT, FULL BODY CHAMBER, 30M: HCPCS

## 2025-04-10 PROCEDURE — 93297 REM INTERROG DEV EVAL ICPMS: CPT | Performed by: NURSE PRACTITIONER

## 2025-04-10 PROCEDURE — 99183 HYPERBARIC OXYGEN THERAPY: CPT | Performed by: NURSE PRACTITIONER

## 2025-04-10 ASSESSMENT — PAIN SCALES - GENERAL
PAINLEVEL_OUTOF10: 0
PAINLEVEL_OUTOF10: 0

## 2025-04-10 NOTE — FLOWSHEET NOTE
FSBS after HBO treatment = 60.  Patient denies any symptoms of hypoglycemia.  Aaron Cuadra CNP notified and FSBS repeated = 65. Aaron Cuadra CNP present and aware of results.  Patient given peanut butter and joaquin crackers along with OJ x 2 ( 4 OUNCES EACH ) and patient completed his Glucerna that he did not drink during HBO treatment.  Repeat FSBS @ 1335 = 142.  Patient continues to deny any feelings of hypoglycemia.  Aaron Cuadra CNP notified of repeat FSBS and patient discharged home - states he will eat when he gets home.

## 2025-04-10 NOTE — PROGRESS NOTES
Assessment:  Left: Normal (PE tube visible. Denies any ear problems)  Right: Normal (PE tube visible. Denies any ear problems)    Pulmonary/Chest:  clear to auscultation bilaterally- no wheezes, rales or rhonchi, normal air movement, no respiratory distress    Cardiovascular:  regular rate and rhythm    Plan        Patient placed in a full body Monoplace Chamber #: [id number]  Treatment Start Time: 1117     Pressure Reached Time: 1127  MINGO : 2  Number of Air Breaks:  Treatment Status: No Air break     Decompression Time: 1257   Treatment End Time: 1307  Length of Treatment: 90 Minutes  Symptoms Noted During Treatment: None  Total Treatment Time (min): 110    Treatment Completion Status: Treatment completed without issue    I was present on these premises and immediately available to furnish assistance & direction throughout the HBO Treatment.     Marvin Montero is a 74 y.o. male  did successfully complete today's hyperbaric oxygen treatment at [facility] and HBO therapy.    In my clinical judgement, ongoing HBO therapy is  necessary at this time to assist with wound healing, preservation of limb, life, or function.    Supervision and attendance of Hyperbaric Oxygen Therapy provided. Continue HBO treatment as outlined in the treatment plan.    Hyperbaric Oxygen: Mr. Montero tolerated: Treatment Number: 30 without  issue.    Post-treatment his FSBS was 60.  A repeat FSBS was 65.  Patient was given peanut butter, joaquin crackers and 8 oz. of orange juice.  He also finished his Glucerna that he did not drink during his treatment.  His repeat FSBS at 1335 was 142.  Patient did not have any symptoms of hypoglycemia and was discharged home.  He plans to eat when he gets home.    Discharge Instructions were explained and given to Mr. Montero     Electronically signed by KENNY Guerrero CNP on 4/10/2025 at 5:07 PM

## 2025-04-11 ENCOUNTER — HOSPITAL ENCOUNTER (OUTPATIENT)
Dept: HYPERBARIC MEDICINE | Age: 75
Discharge: HOME | End: 2025-04-11
Payer: MEDICARE

## 2025-04-11 ENCOUNTER — HOSPITAL ENCOUNTER (OUTPATIENT)
Dept: WOUND CARE | Age: 75
Discharge: HOME | End: 2025-04-11
Attending: EMERGENCY MEDICINE

## 2025-04-11 VITALS
SYSTOLIC BLOOD PRESSURE: 142 MMHG | RESPIRATION RATE: 18 BRPM | DIASTOLIC BLOOD PRESSURE: 71 MMHG | TEMPERATURE: 97 F | HEART RATE: 68 BPM

## 2025-04-11 VITALS
SYSTOLIC BLOOD PRESSURE: 117 MMHG | HEART RATE: 85 BPM | RESPIRATION RATE: 18 BRPM | DIASTOLIC BLOOD PRESSURE: 64 MMHG | TEMPERATURE: 97.7 F

## 2025-04-11 DIAGNOSIS — E11.621 DIABETIC FOOT ULCER WITH OSTEOMYELITIS (HCC): Primary | ICD-10-CM

## 2025-04-11 DIAGNOSIS — Z74.09 IMPAIRED MOBILITY: ICD-10-CM

## 2025-04-11 DIAGNOSIS — L97.413 DIABETIC ULCER OF RIGHT MIDFOOT ASSOCIATED WITH TYPE 2 DIABETES MELLITUS, WITH NECROSIS OF MUSCLE: ICD-10-CM

## 2025-04-11 DIAGNOSIS — E11.69 DIABETIC FOOT ULCER WITH OSTEOMYELITIS (HCC): Primary | ICD-10-CM

## 2025-04-11 DIAGNOSIS — E11.628 TYPE 2 DIABETES MELLITUS WITH RIGHT DIABETIC FOOT INFECTION (HCC): ICD-10-CM

## 2025-04-11 DIAGNOSIS — L97.509 DIABETIC FOOT ULCER WITH OSTEOMYELITIS (HCC): Primary | ICD-10-CM

## 2025-04-11 DIAGNOSIS — E11.69 TYPE 2 DIABETES MELLITUS WITH OBESITY (HCC): ICD-10-CM

## 2025-04-11 DIAGNOSIS — I70.234 ATHEROSCLEROSIS OF NATIVE ARTERY OF RIGHT LOWER EXTREMITY WITH ULCERATION OF MIDFOOT: ICD-10-CM

## 2025-04-11 DIAGNOSIS — L97.514 RIGHT FOOT ULCER, WITH NECROSIS OF BONE (HCC): ICD-10-CM

## 2025-04-11 DIAGNOSIS — M86.9 DIABETIC FOOT ULCER WITH OSTEOMYELITIS (HCC): Primary | ICD-10-CM

## 2025-04-11 DIAGNOSIS — I87.321 IDIOPATHIC CHRONIC VENOUS HYPERTENSION OF RIGHT LEG WITH INFLAMMATION: ICD-10-CM

## 2025-04-11 DIAGNOSIS — E11.621 DIABETIC ULCER OF RIGHT MIDFOOT ASSOCIATED WITH TYPE 2 DIABETES MELLITUS, WITH NECROSIS OF MUSCLE: ICD-10-CM

## 2025-04-11 DIAGNOSIS — E66.9 TYPE 2 DIABETES MELLITUS WITH OBESITY (HCC): ICD-10-CM

## 2025-04-11 DIAGNOSIS — I70.234 ATHEROSCLEROSIS OF NATIVE ARTERY OF RIGHT LOWER EXTREMITY WITH ULCERATION OF MIDFOOT: Primary | ICD-10-CM

## 2025-04-11 DIAGNOSIS — L97.514 ULCER OF TOE OF RIGHT FOOT, WITH NECROSIS OF BONE (HCC): ICD-10-CM

## 2025-04-11 DIAGNOSIS — R60.0 LOCALIZED EDEMA: ICD-10-CM

## 2025-04-11 DIAGNOSIS — L08.9 TYPE 2 DIABETES MELLITUS WITH RIGHT DIABETIC FOOT INFECTION (HCC): ICD-10-CM

## 2025-04-11 LAB
GLUCOSE BLD-MCNC: 151 MG/DL (ref 70–99)
GLUCOSE BLD-MCNC: 85 MG/DL (ref 70–99)
PERFORMED ON: ABNORMAL
PERFORMED ON: NORMAL

## 2025-04-11 PROCEDURE — 99183 HYPERBARIC OXYGEN THERAPY: CPT

## 2025-04-11 RX ORDER — TRIAMCINOLONE ACETONIDE 1 MG/G
OINTMENT TOPICAL ONCE
OUTPATIENT
Start: 2025-04-11 | End: 2025-04-11

## 2025-04-11 RX ORDER — LIDOCAINE HYDROCHLORIDE 20 MG/ML
JELLY TOPICAL ONCE
OUTPATIENT
Start: 2025-04-11 | End: 2025-04-11

## 2025-04-11 RX ORDER — LIDOCAINE 40 MG/G
CREAM TOPICAL ONCE
OUTPATIENT
Start: 2025-04-11 | End: 2025-04-11

## 2025-04-11 RX ORDER — CLOBETASOL PROPIONATE 0.5 MG/G
OINTMENT TOPICAL ONCE
OUTPATIENT
Start: 2025-04-11 | End: 2025-04-11

## 2025-04-11 RX ORDER — GINSENG 100 MG
CAPSULE ORAL ONCE
OUTPATIENT
Start: 2025-04-11 | End: 2025-04-11

## 2025-04-11 RX ORDER — SILVER SULFADIAZINE 10 MG/G
CREAM TOPICAL ONCE
OUTPATIENT
Start: 2025-04-11 | End: 2025-04-11

## 2025-04-11 RX ORDER — AMIODARONE HYDROCHLORIDE 200 MG/1
200 TABLET ORAL DAILY
Qty: 90 TABLET | Refills: 0 | Status: SHIPPED | OUTPATIENT
Start: 2025-04-11

## 2025-04-11 RX ORDER — BACITRACIN ZINC AND POLYMYXIN B SULFATE 500; 1000 [USP'U]/G; [USP'U]/G
OINTMENT TOPICAL ONCE
OUTPATIENT
Start: 2025-04-11 | End: 2025-04-11

## 2025-04-11 RX ORDER — LIDOCAINE 50 MG/G
OINTMENT TOPICAL ONCE
OUTPATIENT
Start: 2025-04-11 | End: 2025-04-11

## 2025-04-11 RX ORDER — MUPIROCIN 20 MG/G
OINTMENT TOPICAL ONCE
OUTPATIENT
Start: 2025-04-11 | End: 2025-04-11

## 2025-04-11 RX ORDER — SODIUM CHLOR/HYPOCHLOROUS ACID 0.033 %
SOLUTION, IRRIGATION IRRIGATION ONCE
OUTPATIENT
Start: 2025-04-11 | End: 2025-04-11

## 2025-04-11 RX ORDER — LIDOCAINE HYDROCHLORIDE 40 MG/ML
SOLUTION TOPICAL ONCE
OUTPATIENT
Start: 2025-04-11 | End: 2025-04-11

## 2025-04-11 RX ORDER — NEOMYCIN/BACITRACIN/POLYMYXINB 3.5-400-5K
OINTMENT (GRAM) TOPICAL ONCE
OUTPATIENT
Start: 2025-04-11 | End: 2025-04-11

## 2025-04-11 RX ORDER — GENTAMICIN SULFATE 1 MG/G
OINTMENT TOPICAL ONCE
OUTPATIENT
Start: 2025-04-11 | End: 2025-04-11

## 2025-04-11 RX ORDER — BETAMETHASONE DIPROPIONATE 0.5 MG/G
CREAM TOPICAL ONCE
OUTPATIENT
Start: 2025-04-11 | End: 2025-04-11

## 2025-04-11 ASSESSMENT — PAIN SCALES - GENERAL
PAINLEVEL_OUTOF10: 0
PAINLEVEL_OUTOF10: 0

## 2025-04-11 NOTE — PLAN OF CARE
HBO NURSING DOCUMENTATION          [facility]    NAME:  Marvin Montero  YOB: 1950  MEDICAL RECORD NUMBER:  [id number]  DATE:  4/11/2025    Patient completed Hyperbaric Oxygen Therapy Treatment, VSS, post assessment checks completed. Patient BS post treatment noted 85, patient denies any s/s of hypoglycemia notified provider Joseline Avila CNP, no new orders, okay to d/c. Patient declined glucerna but accepted 4oz orange juice, after 15 minutes patient remains asymptomatic. Patient d/c to home with significant other, leaving clinic ambulatory. No other questions or concerns expressed at this time.    Electronically signed by Faith Guerrier RN on 4/11/2025 at 1:42 PM

## 2025-04-11 NOTE — PATIENT INSTRUCTIONS
[facility] Wound Care Physician Orders and Discharge Instructions  [facility]  [address], [address]  [place], [place] [address]  Telephone: [phone]      FAX [phone]  MONDAY - THURSDAY 8:00 am - 4:30 pm and Friday 8:00 am - 12:00 pm.        NAME:  Marvin Montero  YOB: 1950  MEDICAL RECORD NUMBER:  [id number]  DATE:  4/11/2025      Return Appointment:  [x] Return Appointment: With Dr Cece Alejandre  in  1 Week(s)   [x] Wound and dressing supply provider: [facility]  [] ECF or Home Healthcare:   [x] Wound Assessment:FRIDAY APRIL 11TH [] Physician or NP scheduled for Wound Assessment:   [] Orders placed during your visit:        Important Reminders:   Please wash hands with soap and water before and after every dressing change.  Do not scrub wounds.  Keep wounds dry in shower unless otherwise instructed by the physician.  SMOKING can slow would healing. Stop smoking as soon as possible to improve healing and prevent further complications associated with smoking.      Michelle-Wound Topical Treatments:  Do not apply lotions, creams, or ointments to wound bed unless directed.   [] Apply moisturizing lotion to skin surrounding the wound prior to dressing change.  [] Apply antifungal ointment to skin surrounding the wound prior to dressing change.  [] Apply thin film of no sting moisture barrier ointment to skin immediately around      wound.  [] Other:     Wound Location: RIGHT LATERAL TMA   **THERASKIN # 1 APPLIED 4/8/2025**    Wound Cleansing:    Primary Dressing:  [x]  THERASKIN MOISTENED WITH SALINE, CUTIMED AND STERI STRIPS APPLIED PER DR ALEJNADRE - LEAVE IN PLACE  []     Secondary Dressing:  [x] DRAWTEX AND OPTILOCK  [x] CAST PADDING, KERLIX AND COBAN ( FOOTBALL DRESSING )      Dressing Frequency:  [] THREE TIMES A WEEK  [x] Do Not Change Dressing        YOU HAVE HAD A THERASKIN PLACED ON YOUR WOUND TODAY. FOR BEST RESULTS, WE RECOMMEND YOU LIMIT YOUR ACTIVITY FOR THE NEXT

## 2025-04-11 NOTE — TELEPHONE ENCOUNTER
Last OV: 4/1/25  Next OV: 4/22/25  Last refill:  3/10/25 #30  Most recent Labs: 3/31/25  Last EKG (if needed): 11/7/24

## 2025-04-11 NOTE — TELEPHONE ENCOUNTER
Note change, if patient calls back:      OLD APPT: 4/16/2025  2:30 PM     NEW APPT: 10/16/2025 at 2:00 PM

## 2025-04-11 NOTE — PROGRESS NOTES
order/instruction    Care order/instruction    Care order/instruction    Care order/instruction    Care order/instruction    Care order/instruction    Care order/instruction       Physical Exam:  General Appearance:  alert and oriented to person, place and time, well-developed and well-nourished, in no acute distress    Pre Tympanic Membrane Assessment:  Right: Normal (Per Donis Avila. CNP)  Left: Normal (Per Donis Avila. CNP)    Post Tympanic Membrane Assessment:  Left: Normal (PE tube visible, denies ear problesm)  Right: Normal (PE tube visible, denies ear problems)    Pulmonary/Chest:  clear to auscultation bilaterally- no wheezes, rales or rhonchi, normal air movement, no respiratory distress    Cardiovascular:  regular rate and rhythm, no murmurs rubs or gallops    Plan        Patient placed in a full body Monoplace Chamber #: [id number]  Treatment Start Time: 1110     Pressure Reached Time: 1120  MINGO : 2  Number of Air Breaks:  Treatment Status: No Air break     Decompression Time: 1250   Treatment End Time: 1300  Length of Treatment: 90 Minutes  Symptoms Noted During Treatment: None  Total Treatment Time (min): 110    Treatment Completion Status: Treatment completed without issue    I was present on these premises and immediately available to furnish assistance & direction throughout the HBO Treatment.     Marvin Montero is a 74 y.o. male  did successfully complete today's hyperbaric oxygen treatment at [facility] and HBO therapy.    In my clinical judgement, ongoing HBO therapy is  necessary at this time to assist with wound healing, preservation of limb, life, or function.    Supervision and attendance of Hyperbaric Oxygen Therapy provided. Continue HBO treatment as outlined in the treatment plan.    Hyperbaric Oxygen: Mr. Montero tolerated: Treatment Number: 31 without  issue.    Discharge Instructions were explained and given to Mr. Montero     Electronically signed by

## 2025-04-14 DIAGNOSIS — M86.271 SUBACUTE OSTEOMYELITIS OF RIGHT FOOT (HCC): ICD-10-CM

## 2025-04-15 ENCOUNTER — HOSPITAL ENCOUNTER (OUTPATIENT)
Dept: HYPERBARIC MEDICINE | Age: 75
Discharge: HOME | End: 2025-04-15
Payer: MEDICARE

## 2025-04-15 ENCOUNTER — HOSPITAL ENCOUNTER (OUTPATIENT)
Dept: WOUND CARE | Age: 75
Discharge: HOME | End: 2025-04-15
Attending: EMERGENCY MEDICINE
Payer: MEDICARE

## 2025-04-15 VITALS
DIASTOLIC BLOOD PRESSURE: 82 MMHG | SYSTOLIC BLOOD PRESSURE: 144 MMHG | RESPIRATION RATE: 18 BRPM | HEART RATE: 76 BPM | TEMPERATURE: 97.1 F

## 2025-04-15 VITALS
SYSTOLIC BLOOD PRESSURE: 122 MMHG | RESPIRATION RATE: 18 BRPM | HEART RATE: 97 BPM | DIASTOLIC BLOOD PRESSURE: 63 MMHG | TEMPERATURE: 97.5 F

## 2025-04-15 DIAGNOSIS — E11.69 TYPE 2 DIABETES MELLITUS WITH OBESITY (HCC): ICD-10-CM

## 2025-04-15 DIAGNOSIS — I87.321 IDIOPATHIC CHRONIC VENOUS HYPERTENSION OF RIGHT LEG WITH INFLAMMATION: ICD-10-CM

## 2025-04-15 DIAGNOSIS — L97.509 DIABETIC FOOT ULCER WITH OSTEOMYELITIS (HCC): ICD-10-CM

## 2025-04-15 DIAGNOSIS — M86.271 SUBACUTE OSTEOMYELITIS OF RIGHT FOOT (HCC): Primary | ICD-10-CM

## 2025-04-15 DIAGNOSIS — L08.9 TYPE 2 DIABETES MELLITUS WITH RIGHT DIABETIC FOOT INFECTION (HCC): ICD-10-CM

## 2025-04-15 DIAGNOSIS — E11.621 DIABETIC FOOT ULCER WITH OSTEOMYELITIS (HCC): ICD-10-CM

## 2025-04-15 DIAGNOSIS — E11.621 DIABETIC ULCER OF RIGHT MIDFOOT ASSOCIATED WITH TYPE 2 DIABETES MELLITUS, WITH NECROSIS OF MUSCLE: ICD-10-CM

## 2025-04-15 DIAGNOSIS — I70.234 ATHEROSCLEROSIS OF NATIVE ARTERY OF RIGHT LOWER EXTREMITY WITH ULCERATION OF MIDFOOT: ICD-10-CM

## 2025-04-15 DIAGNOSIS — R60.0 LOCALIZED EDEMA: ICD-10-CM

## 2025-04-15 DIAGNOSIS — L97.514 RIGHT FOOT ULCER, WITH NECROSIS OF BONE (HCC): ICD-10-CM

## 2025-04-15 DIAGNOSIS — I70.234 ATHEROSCLEROSIS OF NATIVE ARTERY OF RIGHT LOWER EXTREMITY WITH ULCERATION OF MIDFOOT: Primary | ICD-10-CM

## 2025-04-15 DIAGNOSIS — L97.413 DIABETIC ULCER OF RIGHT MIDFOOT ASSOCIATED WITH TYPE 2 DIABETES MELLITUS, WITH NECROSIS OF MUSCLE: ICD-10-CM

## 2025-04-15 DIAGNOSIS — E66.9 TYPE 2 DIABETES MELLITUS WITH OBESITY (HCC): ICD-10-CM

## 2025-04-15 DIAGNOSIS — E11.69 DIABETIC FOOT ULCER WITH OSTEOMYELITIS (HCC): ICD-10-CM

## 2025-04-15 DIAGNOSIS — E11.628 TYPE 2 DIABETES MELLITUS WITH RIGHT DIABETIC FOOT INFECTION (HCC): ICD-10-CM

## 2025-04-15 DIAGNOSIS — Z74.09 IMPAIRED MOBILITY: ICD-10-CM

## 2025-04-15 DIAGNOSIS — M86.9 DIABETIC FOOT ULCER WITH OSTEOMYELITIS (HCC): ICD-10-CM

## 2025-04-15 DIAGNOSIS — L97.514 ULCER OF TOE OF RIGHT FOOT, WITH NECROSIS OF BONE (HCC): ICD-10-CM

## 2025-04-15 LAB
GLUCOSE BLD-MCNC: 178 MG/DL (ref 70–99)
GLUCOSE BLD-MCNC: 88 MG/DL (ref 70–99)
PERFORMED ON: ABNORMAL
PERFORMED ON: NORMAL

## 2025-04-15 PROCEDURE — 99213 OFFICE O/P EST LOW 20 MIN: CPT | Performed by: EMERGENCY MEDICINE

## 2025-04-15 PROCEDURE — 99183 HYPERBARIC OXYGEN THERAPY: CPT | Performed by: EMERGENCY MEDICINE

## 2025-04-15 PROCEDURE — 99213 OFFICE O/P EST LOW 20 MIN: CPT

## 2025-04-15 PROCEDURE — G2211 COMPLEX E/M VISIT ADD ON: HCPCS | Performed by: EMERGENCY MEDICINE

## 2025-04-15 PROCEDURE — G0277 HBOT, FULL BODY CHAMBER, 30M: HCPCS

## 2025-04-15 RX ORDER — LIDOCAINE HYDROCHLORIDE 40 MG/ML
SOLUTION TOPICAL ONCE
OUTPATIENT
Start: 2025-04-15 | End: 2025-04-15

## 2025-04-15 RX ORDER — BETAMETHASONE DIPROPIONATE 0.5 MG/G
CREAM TOPICAL ONCE
OUTPATIENT
Start: 2025-04-15 | End: 2025-04-15

## 2025-04-15 RX ORDER — SILVER SULFADIAZINE 10 MG/G
CREAM TOPICAL ONCE
OUTPATIENT
Start: 2025-04-15 | End: 2025-04-15

## 2025-04-15 RX ORDER — LIDOCAINE HYDROCHLORIDE 20 MG/ML
JELLY TOPICAL ONCE
OUTPATIENT
Start: 2025-04-15 | End: 2025-04-15

## 2025-04-15 RX ORDER — LIDOCAINE 40 MG/G
CREAM TOPICAL ONCE
OUTPATIENT
Start: 2025-04-15 | End: 2025-04-15

## 2025-04-15 RX ORDER — SODIUM CHLOR/HYPOCHLOROUS ACID 0.033 %
SOLUTION, IRRIGATION IRRIGATION ONCE
OUTPATIENT
Start: 2025-04-15 | End: 2025-04-15

## 2025-04-15 RX ORDER — GENTAMICIN SULFATE 1 MG/G
OINTMENT TOPICAL ONCE
OUTPATIENT
Start: 2025-04-15 | End: 2025-04-15

## 2025-04-15 RX ORDER — CLOBETASOL PROPIONATE 0.5 MG/G
OINTMENT TOPICAL ONCE
OUTPATIENT
Start: 2025-04-15 | End: 2025-04-15

## 2025-04-15 RX ORDER — BACITRACIN ZINC AND POLYMYXIN B SULFATE 500; 1000 [USP'U]/G; [USP'U]/G
OINTMENT TOPICAL ONCE
OUTPATIENT
Start: 2025-04-15 | End: 2025-04-15

## 2025-04-15 RX ORDER — GINSENG 100 MG
CAPSULE ORAL ONCE
OUTPATIENT
Start: 2025-04-15 | End: 2025-04-15

## 2025-04-15 RX ORDER — LIDOCAINE 50 MG/G
OINTMENT TOPICAL ONCE
OUTPATIENT
Start: 2025-04-15 | End: 2025-04-15

## 2025-04-15 RX ORDER — NEOMYCIN/BACITRACIN/POLYMYXINB 3.5-400-5K
OINTMENT (GRAM) TOPICAL ONCE
OUTPATIENT
Start: 2025-04-15 | End: 2025-04-15

## 2025-04-15 RX ORDER — MUPIROCIN 20 MG/G
OINTMENT TOPICAL ONCE
OUTPATIENT
Start: 2025-04-15 | End: 2025-04-15

## 2025-04-15 RX ORDER — TRIAMCINOLONE ACETONIDE 1 MG/G
OINTMENT TOPICAL ONCE
OUTPATIENT
Start: 2025-04-15 | End: 2025-04-15

## 2025-04-15 ASSESSMENT — PAIN SCALES - GENERAL
PAINLEVEL_OUTOF10: 0
PAINLEVEL_OUTOF10: 0

## 2025-04-15 NOTE — PROGRESS NOTES
[facility]     Note Type: Medical Staff Progress Note    Referring Provider: Self Referral  Reason for Referral:   Chief Complaint   Patient presents with    Wound Check     Follow-up visit for a wound to the right foot.        Marvin Montero  MEDICAL RECORD NUMBER:  [id number]  AGE: 74 y.o.   GENDER: male  : 1950  EPISODE DATE:  4/15/2025    Chief complaint and reason for visit:     Chief Complaint   Patient presents with    Wound Check     Follow-up visit for a wound to the right foot.         HPI/Wound Narrative:      Marvin Montero is a 74 y.o. male who presents today for an evaluation of a wound/ulcer. Wound duration:  2024 .    4/15/25: no new issues. Tolerating HBOT.    25: no new issues but hydrofera sticking too much    3/18/25: doing better overall, edema improving with diuretics    3/11/25: did not take his diuretic yesterday nor today and leg edema is much worse than last visit.    3/4/25: no new issues. Plans on HBOT today. Diuretic changed and helping with edema control much better. Eating better.     25: Has ENT appointment scheduled for 2 PM today with possible ET tubes but the patient was also given Flonase.  Unfortunately cannot trial the Flonase and at this time he does want to trial this next week.      25: no new issues. Had vascular intervention.    25: Patient has no specific complaints.  Does have angiogram with intervention scheduled for 2025.  He is also going to be getting his MRI soon.    25: no new issues. Has appt with cardio soon. MRI won't be done until after 2/3 due to recent pacer/defibrillator placed in 2024.    25: had his echo. EF about 55%. No changes in medications recommended by cardiology.    25: 74-year-old with past medical history notable for hypertension, type 2 diabetes mellitus, hyperlipidemia, atrial fibrillation on Eliquis, diastolic congestive heart failure, COPD,

## 2025-04-15 NOTE — PROGRESS NOTES
HBO PROGRESS NOTE      NAME: Marvin Montero  MEDICAL RECORD NUMBER:  [id number]  AGE: 74 y.o.   GENDER: male  : 1950  EPISODE DATE:  4/15/2025     Subjective     HBO Treatment Number: 32 out of Total Treatments: 50    HBO Diagnosis:             Indications: Lower Extremity Diabetic Wound ___(site) (right foot)    Safety checks performed prior to treatment.  See doc flowsheets for documentation.    Objective        Recent Labs     04/15/25  1039 04/15/25  1233   POCGLU 178* 88*     Pre treatment Vital Signs       Temp: 97 °F (36.1 °C)     Pulse: 71     Respirations: 18     BP: (!) 132/58       Post treatment Vital Signs  Temp: 97.1 °F (36.2 °C)  Pulse: 76  Respirations: 18  BP: (!) 144/82    Assessment        HBO Diagnosis:   Problem List Items Addressed This Visit          Circulatory    Atherosclerosis of native artery of right lower extremity with ulceration of midfoot    Relevant Orders    POCT glucose       Endocrine    Diabetic foot ulcer with osteomyelitis (HCC)    Relevant Orders    POCT glucose    Diabetic ulcer of right midfoot associated with type 2 diabetes mellitus, with necrosis of muscle       Musculoskeletal and Integument    Subacute osteomyelitis of right foot (HCC) - Primary    Right foot ulcer, with necrosis of bone (HCC)    RESOLVED: Ulcer of toe of right foot, with necrosis of bone (HCC)    Relevant Orders    POCT glucose       Physical Exam        General Appearance:  alert and oriented to person, place and time, well-developed and well-nourished, in no acute distress    Pre Tympanic Membrane Assessment:  Right: Normal (per Dr. Yadira Alejandre)  Left: Normal (per Dr. Cece Alejandre)    Post Tympanic Membrane Assessment:  Left: Normal (Tympanic membrane unremarkable, PE tube visible, patient denies discomfort)  Right: Normal (Tympanic membrane unremarkable, PE tube visible, patient denies discomfort)    Pulmonary/Chest:  clear to auscultation bilaterally- no wheezes, rales or rhonchi, normal air

## 2025-04-15 NOTE — PATIENT INSTRUCTIONS
[facility] Wound Care Physician Orders and Discharge Instructions  [facility]  [address], [address]  [place], [place] [address]  Telephone: [phone]      FAX [phone]  MONDAY - THURSDAY 8:00 am - 4:30 pm and Friday 8:00 am - 12:00 pm.        NAME:  Marvin Montero  YOB: 1950  MEDICAL RECORD NUMBER:  [id number]  DATE:  4/15/2025      Return Appointment:  [x] Return Appointment: With Dr Cece Alejandre  in  1 Week(s)   [x] [facility] dressing supply provider: [facility]  [] ECF or Home Healthcare:   [x] Wound Assessment:FRIDAY APRIL 11TH [] Physician or NP scheduled for Wound Assessment:   [] Orders placed during your visit:        Important Reminders:   Please wash hands with soap and water before and after every dressing change.  Do not scrub wounds.  Keep wounds dry in shower unless otherwise instructed by the physician.  SMOKING can slow would healing. Stop smoking as soon as possible to improve healing and prevent further complications associated with smoking.      Michelle-Wound Topical Treatments:  Do not apply lotions, creams, or ointments to wound bed unless directed.   [] Apply moisturizing lotion to skin surrounding the wound prior to dressing change.  [] Apply antifungal ointment to skin surrounding the wound prior to dressing change.  [] Apply thin film of no sting moisture barrier ointment to skin immediately around      wound.  [] Other:     Wound Location: RIGHT LATERAL TMA   **THERASKIN # 1 APPLIED 4/8/2025**    Wound Cleansing:    Primary Dressing:  [x]  THERASKIN MOISTENED WITH SALINE, MEPITEL AND STERI STRIPS APPLIED PER DR ALEJANDRE - LEAVE IN PLACE  []     Secondary Dressing:  [x] DRAWTEX AND OPTILOCK  [x] CAST PADDING, KERLIX AND COBAN ( FOOTBALL DRESSING )      Dressing Frequency:  [] THREE TIMES A WEEK  [x] Do Not Change Dressing        YOU HAVE HAD A THERASKIN PLACED ON YOUR WOUND TODAY. FOR BEST RESULTS, WE RECOMMEND YOU LIMIT YOUR ACTIVITY FOR THE NEXT

## 2025-04-16 ENCOUNTER — TELEPHONE (OUTPATIENT)
Dept: INFECTIOUS DISEASES | Age: 75
End: 2025-04-16

## 2025-04-16 ENCOUNTER — HOSPITAL ENCOUNTER (OUTPATIENT)
Dept: HYPERBARIC MEDICINE | Age: 75
Discharge: HOME | End: 2025-04-16
Payer: MEDICARE

## 2025-04-16 DIAGNOSIS — M86.9 DIABETIC FOOT ULCER WITH OSTEOMYELITIS (HCC): Primary | ICD-10-CM

## 2025-04-16 DIAGNOSIS — L97.509 DIABETIC FOOT ULCER WITH OSTEOMYELITIS (HCC): Primary | ICD-10-CM

## 2025-04-16 DIAGNOSIS — E11.621 DIABETIC FOOT ULCER WITH OSTEOMYELITIS (HCC): Primary | ICD-10-CM

## 2025-04-16 DIAGNOSIS — L97.514 ULCER OF TOE OF RIGHT FOOT, WITH NECROSIS OF BONE (HCC): ICD-10-CM

## 2025-04-16 DIAGNOSIS — L97.514 RIGHT FOOT ULCER, WITH NECROSIS OF BONE (HCC): ICD-10-CM

## 2025-04-16 DIAGNOSIS — E11.69 DIABETIC FOOT ULCER WITH OSTEOMYELITIS (HCC): Primary | ICD-10-CM

## 2025-04-16 DIAGNOSIS — I70.234 ATHEROSCLEROSIS OF NATIVE ARTERY OF RIGHT LOWER EXTREMITY WITH ULCERATION OF MIDFOOT: ICD-10-CM

## 2025-04-16 PROBLEM — E11.628 DIABETIC FOOT INFECTION (HCC): Status: RESOLVED | Noted: 2023-05-13 | Resolved: 2025-04-16

## 2025-04-16 PROBLEM — L97.529 TYPE 2 DIABETES MELLITUS WITH LEFT DIABETIC FOOT ULCER (HCC): Status: RESOLVED | Noted: 2020-07-21 | Resolved: 2025-04-16

## 2025-04-16 PROBLEM — L02.416 ABSCESS OF LEFT LEG: Status: RESOLVED | Noted: 2017-07-05 | Resolved: 2025-04-16

## 2025-04-16 PROBLEM — T23.201S: Status: RESOLVED | Noted: 2023-06-20 | Resolved: 2025-04-16

## 2025-04-16 PROBLEM — M79.89 RIGHT LEG SWELLING: Status: RESOLVED | Noted: 2023-10-08 | Resolved: 2025-04-16

## 2025-04-16 PROBLEM — M14.672 CHARCOT ANKLE, LEFT: Status: RESOLVED | Noted: 2021-08-24 | Resolved: 2025-04-16

## 2025-04-16 PROBLEM — L08.9 DIABETIC FOOT INFECTION (HCC): Status: RESOLVED | Noted: 2023-05-13 | Resolved: 2025-04-16

## 2025-04-16 PROBLEM — H92.03 EAR PAIN, BILATERAL: Status: RESOLVED | Noted: 2025-02-18 | Resolved: 2025-04-16

## 2025-04-16 PROBLEM — L02.619 CELLULITIS AND ABSCESS OF FOOT: Status: RESOLVED | Noted: 2020-07-20 | Resolved: 2025-04-16

## 2025-04-16 PROBLEM — R60.0 PERIPHERAL EDEMA: Status: RESOLVED | Noted: 2023-05-13 | Resolved: 2025-04-16

## 2025-04-16 PROBLEM — A49.8 ENTEROCOCCUS FAECALIS INFECTION: Status: RESOLVED | Noted: 2024-03-23 | Resolved: 2025-04-16

## 2025-04-16 PROBLEM — S92.919B: Status: RESOLVED | Noted: 2019-08-28 | Resolved: 2025-04-16

## 2025-04-16 PROBLEM — S92.501D: Status: RESOLVED | Noted: 2019-08-28 | Resolved: 2025-04-16

## 2025-04-16 PROBLEM — T87.9 BKA STUMP COMPLICATION (HCC): Status: RESOLVED | Noted: 2023-01-17 | Resolved: 2025-04-16

## 2025-04-16 PROBLEM — S92.911B: Status: RESOLVED | Noted: 2019-08-28 | Resolved: 2025-04-16

## 2025-04-16 PROBLEM — S88.119A BELOW-KNEE AMPUTATION (HCC): Status: RESOLVED | Noted: 2023-05-13 | Resolved: 2025-04-16

## 2025-04-16 PROBLEM — L03.119 CELLULITIS AND ABSCESS OF FOOT: Status: RESOLVED | Noted: 2020-07-20 | Resolved: 2025-04-16

## 2025-04-16 LAB
GLUCOSE BLD-MCNC: 115 MG/DL (ref 70–99)
GLUCOSE BLD-MCNC: 228 MG/DL (ref 70–99)
PERFORMED ON: ABNORMAL
PERFORMED ON: ABNORMAL

## 2025-04-16 PROCEDURE — 99183 HYPERBARIC OXYGEN THERAPY: CPT | Performed by: SURGERY

## 2025-04-16 PROCEDURE — G0277 HBOT, FULL BODY CHAMBER, 30M: HCPCS

## 2025-04-16 ASSESSMENT — PAIN SCALES - GENERAL: PAINLEVEL_OUTOF10: 0

## 2025-04-16 NOTE — PROGRESS NOTES
HBO PROGRESS NOTE      NAME: Marvin Montero  MEDICAL RECORD NUMBER:  [id number]  AGE: 74 y.o.   GENDER: male  : 1950  EPISODE DATE:  2025     Subjective     HBO Treatment Number: 33 out of Total Treatments: 50    HBO Diagnosis:             Indications: Lower Extremity Diabetic Wound ___(site) (right foot)    Safety checks performed prior to treatment.  See doc flowsheets for documentation.    Objective        Recent Labs     25  1036 25  1251   POCGLU 228* 115*     Pre treatment Vital Signs       Temp: 97 °F (36.1 °C)     Pulse: 63     Respirations: 18     BP: (!) 168/67       Post treatment Vital Signs  Temp: 97 °F (36.1 °C)  Pulse: 63  Respirations: 18  BP: (!) 168/    Assessment        HBO Diagnosis:   Problem List Items Addressed This Visit          Circulatory    Atherosclerosis of native artery of right lower extremity with ulceration of midfoot    Relevant Orders    Notify physician (specify)    Hyperbaric Oxygen Therapy    POCT glucose    Care order/instruction    Care order/instruction       Endocrine    Diabetic foot ulcer with osteomyelitis (HCC) - Primary    Relevant Orders    Notify physician (specify)    Hyperbaric Oxygen Therapy    POCT glucose    Care order/instruction    Care order/instruction       Musculoskeletal and Integument    Right foot ulcer, with necrosis of bone (HCC)    Relevant Orders    Notify physician (specify)    Hyperbaric Oxygen Therapy    Ulcer of toe of right foot, with necrosis of bone (HCC)    Relevant Orders    POCT glucose    Care order/instruction    Care order/instruction       Physical Exam        General Appearance:  alert and oriented to person, place and time, well-developed and well-nourished, in no acute distress    Pre Tympanic Membrane Assessment:  Right: Normal (per Dr. Joann MD)  Left: Normal (per Dr. Joann MD)    Post Tympanic Membrane Assessment:  Left: Normal (PE Tubes Visible; Denies any ear problems)  Right: Normal (PE

## 2025-04-16 NOTE — TELEPHONE ENCOUNTER
Called [facility] and spoke with Larissa. Larissa reports that RN attempted to get labs on Monday and was unsuccessful x 2. Per RN's note, ID office was notified and patient was to schedule to get labs outpatient.     I called and left a vm on patient's wife phone reminding patient to get labs at any [facility] facility as soon as possible. Left office number to return call with questions.

## 2025-04-17 ENCOUNTER — HOSPITAL ENCOUNTER (OUTPATIENT)
Dept: HYPERBARIC MEDICINE | Age: 75
Discharge: HOME | End: 2025-04-17
Payer: MEDICARE

## 2025-04-17 VITALS
RESPIRATION RATE: 18 BRPM | DIASTOLIC BLOOD PRESSURE: 67 MMHG | TEMPERATURE: 97 F | HEART RATE: 63 BPM | SYSTOLIC BLOOD PRESSURE: 168 MMHG

## 2025-04-17 VITALS
HEART RATE: 80 BPM | DIASTOLIC BLOOD PRESSURE: 85 MMHG | SYSTOLIC BLOOD PRESSURE: 141 MMHG | RESPIRATION RATE: 18 BRPM | TEMPERATURE: 97.2 F

## 2025-04-17 DIAGNOSIS — L97.514 ULCER OF TOE OF RIGHT FOOT, WITH NECROSIS OF BONE (HCC): ICD-10-CM

## 2025-04-17 DIAGNOSIS — E11.69 DIABETIC FOOT ULCER WITH OSTEOMYELITIS (HCC): Primary | ICD-10-CM

## 2025-04-17 DIAGNOSIS — M86.9 DIABETIC FOOT ULCER WITH OSTEOMYELITIS (HCC): Primary | ICD-10-CM

## 2025-04-17 DIAGNOSIS — L97.509 DIABETIC FOOT ULCER WITH OSTEOMYELITIS (HCC): Primary | ICD-10-CM

## 2025-04-17 DIAGNOSIS — I70.234 ATHEROSCLEROSIS OF NATIVE ARTERY OF RIGHT LOWER EXTREMITY WITH ULCERATION OF MIDFOOT: ICD-10-CM

## 2025-04-17 DIAGNOSIS — E11.621 DIABETIC FOOT ULCER WITH OSTEOMYELITIS (HCC): Primary | ICD-10-CM

## 2025-04-17 DIAGNOSIS — L97.514 RIGHT FOOT ULCER, WITH NECROSIS OF BONE (HCC): ICD-10-CM

## 2025-04-17 LAB
GLUCOSE BLD-MCNC: 113 MG/DL (ref 70–99)
GLUCOSE BLD-MCNC: 133 MG/DL (ref 70–99)
GLUCOSE BLD-MCNC: 156 MG/DL (ref 70–99)
GLUCOSE BLD-MCNC: 75 MG/DL (ref 70–99)
PERFORMED ON: ABNORMAL
PERFORMED ON: NORMAL

## 2025-04-17 PROCEDURE — G0277 HBOT, FULL BODY CHAMBER, 30M: HCPCS

## 2025-04-17 PROCEDURE — 99183 HYPERBARIC OXYGEN THERAPY: CPT

## 2025-04-17 ASSESSMENT — PAIN SCALES - GENERAL
PAINLEVEL_OUTOF10: 0
PAINLEVEL_OUTOF10: 0

## 2025-04-18 ENCOUNTER — RESULTS FOLLOW-UP (OUTPATIENT)
Dept: INFECTIOUS DISEASES | Age: 75
End: 2025-04-18

## 2025-04-18 DIAGNOSIS — I50.22 CHRONIC SYSTOLIC HF (HEART FAILURE) (HCC): ICD-10-CM

## 2025-04-18 DIAGNOSIS — E78.2 MIXED HYPERLIPIDEMIA: ICD-10-CM

## 2025-04-18 DIAGNOSIS — Z95.810 CARDIAC RESYNCHRONIZATION THERAPY DEFIBRILLATOR (CRT-D) IN PLACE: ICD-10-CM

## 2025-04-18 DIAGNOSIS — M86.271 SUBACUTE OSTEOMYELITIS OF RIGHT FOOT (HCC): ICD-10-CM

## 2025-04-18 DIAGNOSIS — Z11.59 ENCOUNTER FOR HEPATITIS C SCREENING TEST FOR LOW RISK PATIENT: ICD-10-CM

## 2025-04-18 LAB
ALBUMIN SERPL-MCNC: 3.7 G/DL (ref 3.4–5)
ALBUMIN/GLOB SERPL: 1.4 {RATIO} (ref 1.1–2.2)
ALP SERPL-CCNC: 93 U/L (ref 40–129)
ALT SERPL-CCNC: 97 U/L (ref 10–40)
ANION GAP SERPL CALCULATED.3IONS-SCNC: 11 MMOL/L (ref 3–16)
AST SERPL-CCNC: 55 U/L (ref 15–37)
BASOPHILS # BLD: 0 K/UL (ref 0–0.2)
BASOPHILS NFR BLD: 1 %
BILIRUB SERPL-MCNC: 0.3 MG/DL (ref 0–1)
BUN SERPL-MCNC: 25 MG/DL (ref 7–20)
CALCIUM SERPL-MCNC: 8.7 MG/DL (ref 8.3–10.6)
CHLORIDE SERPL-SCNC: 111 MMOL/L (ref 99–110)
CHOLEST SERPL-MCNC: 149 MG/DL (ref 0–199)
CO2 SERPL-SCNC: 22 MMOL/L (ref 21–32)
CREAT SERPL-MCNC: 1.3 MG/DL (ref 0.8–1.3)
CRP SERPL-MCNC: <3 MG/L (ref 0–5.1)
DEPRECATED RDW RBC AUTO: 15.6 % (ref 12.4–15.4)
EOSINOPHIL # BLD: 0.1 K/UL (ref 0–0.6)
EOSINOPHIL NFR BLD: 4.5 %
ERYTHROCYTE [SEDIMENTATION RATE] IN BLOOD BY WESTERGREN METHOD: 29 MM/HR (ref 0–20)
GFR SERPLBLD CREATININE-BSD FMLA CKD-EPI: 58 ML/MIN/{1.73_M2}
GLUCOSE SERPL-MCNC: 90 MG/DL (ref 70–99)
HCT VFR BLD AUTO: 37.7 % (ref 40.5–52.5)
HCV AB SERPL QL IA: NORMAL
HDLC SERPL-MCNC: 78 MG/DL (ref 40–60)
HGB BLD-MCNC: 12 G/DL (ref 13.5–17.5)
LDLC SERPL CALC-MCNC: 63 MG/DL
LYMPHOCYTES # BLD: 1.2 K/UL (ref 1–5.1)
LYMPHOCYTES NFR BLD: 35 %
MCH RBC QN AUTO: 27.7 PG (ref 26–34)
MCHC RBC AUTO-ENTMCNC: 32 G/DL (ref 31–36)
MCV RBC AUTO: 86.6 FL (ref 80–100)
MONOCYTES # BLD: 0.3 K/UL (ref 0–1.3)
MONOCYTES NFR BLD: 10.2 %
NEUTROPHILS # BLD: 1.6 K/UL (ref 1.7–7.7)
NEUTROPHILS NFR BLD: 49.3 %
PLATELET # BLD AUTO: 104 K/UL (ref 135–450)
PMV BLD AUTO: 10.5 FL (ref 5–10.5)
POTASSIUM SERPL-SCNC: 4.3 MMOL/L (ref 3.5–5.1)
PROT SERPL-MCNC: 6.4 G/DL (ref 6.4–8.2)
RBC # BLD AUTO: 4.35 M/UL (ref 4.2–5.9)
SODIUM SERPL-SCNC: 144 MMOL/L (ref 136–145)
TRIGL SERPL-MCNC: 41 MG/DL (ref 0–150)
VLDLC SERPL CALC-MCNC: 8 MG/DL
WBC # BLD AUTO: 3.3 K/UL (ref 4–11)

## 2025-04-18 NOTE — RESULT ENCOUNTER NOTE
Wbc NOW Trending down and Platelets are low likely from IV Cefepime   Will have to change to IV Meropenem x  1 g Q 12 HR x to finish the course this will be his first dose and monitor CBC with diff

## 2025-04-21 ENCOUNTER — HOSPITAL ENCOUNTER (OUTPATIENT)
Dept: HYPERBARIC MEDICINE | Age: 75
Discharge: HOME | End: 2025-04-21
Payer: MEDICARE

## 2025-04-21 ENCOUNTER — TELEPHONE (OUTPATIENT)
Dept: INFECTIOUS DISEASES | Age: 75
End: 2025-04-21

## 2025-04-21 VITALS
HEART RATE: 61 BPM | SYSTOLIC BLOOD PRESSURE: 143 MMHG | RESPIRATION RATE: 20 BRPM | DIASTOLIC BLOOD PRESSURE: 65 MMHG | TEMPERATURE: 97.2 F

## 2025-04-21 DIAGNOSIS — E11.621 DIABETIC FOOT ULCER WITH OSTEOMYELITIS (HCC): Primary | ICD-10-CM

## 2025-04-21 DIAGNOSIS — L97.514 ULCER OF TOE OF RIGHT FOOT, WITH NECROSIS OF BONE (HCC): ICD-10-CM

## 2025-04-21 DIAGNOSIS — I70.234 ATHEROSCLEROSIS OF NATIVE ARTERY OF RIGHT LOWER EXTREMITY WITH ULCERATION OF MIDFOOT (HCC): ICD-10-CM

## 2025-04-21 DIAGNOSIS — L97.514 RIGHT FOOT ULCER, WITH NECROSIS OF BONE (HCC): ICD-10-CM

## 2025-04-21 DIAGNOSIS — E11.69 DIABETIC FOOT ULCER WITH OSTEOMYELITIS (HCC): Primary | ICD-10-CM

## 2025-04-21 DIAGNOSIS — M86.9 DIABETIC FOOT ULCER WITH OSTEOMYELITIS (HCC): Primary | ICD-10-CM

## 2025-04-21 DIAGNOSIS — L97.509 DIABETIC FOOT ULCER WITH OSTEOMYELITIS (HCC): Primary | ICD-10-CM

## 2025-04-21 LAB
GLUCOSE BLD-MCNC: 118 MG/DL (ref 70–99)
GLUCOSE BLD-MCNC: 191 MG/DL (ref 70–99)
GLUCOSE BLD-MCNC: 88 MG/DL (ref 70–99)
PERFORMED ON: ABNORMAL
PERFORMED ON: ABNORMAL
PERFORMED ON: NORMAL

## 2025-04-21 PROCEDURE — G0277 HBOT, FULL BODY CHAMBER, 30M: HCPCS

## 2025-04-21 PROCEDURE — 99183 HYPERBARIC OXYGEN THERAPY: CPT | Performed by: NURSE PRACTITIONER

## 2025-04-21 ASSESSMENT — PAIN SCALES - GENERAL: PAINLEVEL_OUTOF10: 0

## 2025-04-21 NOTE — PROGRESS NOTES
FSBS  @ 1251 after Hyperbaric treatment = 88. Patient refuses Glucerna but agrees to take 4 ounces of cranberry juice.  Patient denies any symptoms of hypoglycemia.  Aaron Cuadra CNP notified .  FSBS @ 1310 = 118.  Aaron Cuadra CNP notified and states patient may go home . Patient states he will get food when he gets home.

## 2025-04-21 NOTE — PROGRESS NOTES
HBO PROGRESS NOTE      NAME: Marvin Montero  MEDICAL RECORD NUMBER:  [id number]  AGE: 74 y.o.   GENDER: male  : 1950  EPISODE DATE:  2025     Subjective     HBO Treatment Number: 35 out of Total Treatments: 50    HBO Diagnosis:             Indications: Lower Extremity Diabetic Wound ___(site) (Right Foot)    Safety checks performed prior to treatment.  See doc flowsheets for documentation.    Objective        Recent Labs     25  1252 25  1310   POCGLU 88 118*     Pre treatment Vital Signs       Temp: 97.1 °F (36.2 °C)     Pulse: 75     Respirations: 18     BP: (!) 123/59       Post treatment Vital Signs  Temp: 97.2 °F (36.2 °C)  Pulse: 61  Respirations: 20  BP: (!) 143/65    Assessment        HBO Diagnosis:   Problem List Items Addressed This Visit       Diabetic foot ulcer with osteomyelitis (HCC) - Primary    Relevant Orders    Notify physician (specify)    Hyperbaric Oxygen Therapy    Hypoglycemial protocol    POCT glucose    Care order/instruction    Care order/instruction    Atherosclerosis of native artery of right lower extremity with ulceration of midfoot    Relevant Orders    Notify physician (specify)    Hyperbaric Oxygen Therapy    Hypoglycemial protocol    POCT glucose    Care order/instruction    Care order/instruction    Right foot ulcer, with necrosis of bone (HCC)    Relevant Orders    Notify physician (specify)    Hyperbaric Oxygen Therapy     Other Visit Diagnoses         Ulcer of toe of right foot, with necrosis of bone (HCC)        Relevant Orders    Hypoglycemial protocol    POCT glucose    Care order/instruction    Care order/instruction            Physical Exam:  General Appearance:  alert and oriented to person, place and time, well-developed and well-nourished, in no acute distress    Pre Tympanic Membrane Assessment:  Right: Normal (PE Tubes Visible per Aaron Cuadra CNP)  Left: Normal (PE Tubes Visible per Aaron Cuadra CNP)    Post Tympanic Membrane

## 2025-04-21 NOTE — TELEPHONE ENCOUNTER
Anu pharmacist at [facility] verbalized understanding to dc IV Cefepime and change to IV Meropenem x 1 g Q 12 HR x 4-30-25.    Voicemail left for pt's wife Ar.return call requested.

## 2025-04-22 ENCOUNTER — OFFICE VISIT (OUTPATIENT)
Dept: CARDIOLOGY CLINIC | Age: 75
End: 2025-04-22
Payer: MEDICARE

## 2025-04-22 ENCOUNTER — APPOINTMENT (OUTPATIENT)
Dept: HYPERBARIC MEDICINE | Age: 75
End: 2025-04-22
Payer: MEDICARE

## 2025-04-22 ENCOUNTER — CLINICAL SUPPORT (OUTPATIENT)
Dept: CARDIOLOGY CLINIC | Age: 75
End: 2025-04-22

## 2025-04-22 ENCOUNTER — HOSPITAL ENCOUNTER (OUTPATIENT)
Dept: WOUND CARE | Age: 75
Discharge: HOME | End: 2025-04-22
Attending: EMERGENCY MEDICINE
Payer: MEDICARE

## 2025-04-22 VITALS
HEIGHT: 76 IN | BODY MASS INDEX: 31.03 KG/M2 | WEIGHT: 254.8 LBS | SYSTOLIC BLOOD PRESSURE: 104 MMHG | HEART RATE: 64 BPM | DIASTOLIC BLOOD PRESSURE: 60 MMHG | OXYGEN SATURATION: 99 %

## 2025-04-22 VITALS
HEART RATE: 77 BPM | RESPIRATION RATE: 18 BRPM | TEMPERATURE: 98 F | DIASTOLIC BLOOD PRESSURE: 59 MMHG | SYSTOLIC BLOOD PRESSURE: 129 MMHG

## 2025-04-22 DIAGNOSIS — Z74.09 IMPAIRED MOBILITY: ICD-10-CM

## 2025-04-22 DIAGNOSIS — I42.8 NONISCHEMIC CARDIOMYOPATHY (HCC): ICD-10-CM

## 2025-04-22 DIAGNOSIS — I87.321 IDIOPATHIC CHRONIC VENOUS HYPERTENSION OF RIGHT LEG WITH INFLAMMATION: ICD-10-CM

## 2025-04-22 DIAGNOSIS — I50.22 CHRONIC SYSTOLIC HEART FAILURE (HCC): ICD-10-CM

## 2025-04-22 DIAGNOSIS — E11.69 TYPE 2 DIABETES MELLITUS WITH OBESITY (HCC): ICD-10-CM

## 2025-04-22 DIAGNOSIS — L97.413 DIABETIC ULCER OF RIGHT MIDFOOT ASSOCIATED WITH TYPE 2 DIABETES MELLITUS, WITH NECROSIS OF MUSCLE (HCC): ICD-10-CM

## 2025-04-22 DIAGNOSIS — L08.9 TYPE 2 DIABETES MELLITUS WITH RIGHT DIABETIC FOOT INFECTION (HCC): ICD-10-CM

## 2025-04-22 DIAGNOSIS — Z95.810 AICD (AUTOMATIC CARDIOVERTER/DEFIBRILLATOR) PRESENT: Primary | ICD-10-CM

## 2025-04-22 DIAGNOSIS — Z79.899 ON AMIODARONE THERAPY: ICD-10-CM

## 2025-04-22 DIAGNOSIS — Z79.01 CURRENT USE OF ANTICOAGULANT THERAPY: ICD-10-CM

## 2025-04-22 DIAGNOSIS — E11.621 DIABETIC ULCER OF RIGHT MIDFOOT ASSOCIATED WITH TYPE 2 DIABETES MELLITUS, WITH NECROSIS OF MUSCLE (HCC): ICD-10-CM

## 2025-04-22 DIAGNOSIS — Z95.810 CARDIAC RESYNCHRONIZATION THERAPY DEFIBRILLATOR (CRT-D) IN PLACE: ICD-10-CM

## 2025-04-22 DIAGNOSIS — I70.234 ATHEROSCLEROSIS OF NATIVE ARTERY OF RIGHT LOWER EXTREMITY WITH ULCERATION OF MIDFOOT (HCC): Primary | ICD-10-CM

## 2025-04-22 DIAGNOSIS — I42.0 DILATED CARDIOMYOPATHY (HCC): ICD-10-CM

## 2025-04-22 DIAGNOSIS — I48.21 PERMANENT ATRIAL FIBRILLATION (HCC): ICD-10-CM

## 2025-04-22 DIAGNOSIS — I48.0 PAF (PAROXYSMAL ATRIAL FIBRILLATION) (HCC): Primary | ICD-10-CM

## 2025-04-22 DIAGNOSIS — E11.628 TYPE 2 DIABETES MELLITUS WITH RIGHT DIABETIC FOOT INFECTION (HCC): ICD-10-CM

## 2025-04-22 DIAGNOSIS — E66.9 TYPE 2 DIABETES MELLITUS WITH OBESITY (HCC): ICD-10-CM

## 2025-04-22 DIAGNOSIS — R60.0 LOCALIZED EDEMA: ICD-10-CM

## 2025-04-22 LAB
ALBUMIN SERPL-MCNC: 3.6 G/DL (ref 3.4–5)
ALBUMIN/GLOB SERPL: 1.3 {RATIO} (ref 1.1–2.2)
ALP SERPL-CCNC: 92 U/L (ref 40–129)
ALT SERPL-CCNC: 86 U/L (ref 10–40)
ANION GAP SERPL CALCULATED.3IONS-SCNC: 9 MMOL/L (ref 3–16)
AST SERPL-CCNC: 44 U/L (ref 15–37)
BASOPHILS # BLD: 0 K/UL (ref 0–0.2)
BASOPHILS NFR BLD: 1.1 %
BILIRUB SERPL-MCNC: 0.3 MG/DL (ref 0–1)
BUN SERPL-MCNC: 24 MG/DL (ref 7–20)
CALCIUM SERPL-MCNC: 8.7 MG/DL (ref 8.3–10.6)
CHLORIDE SERPL-SCNC: 107 MMOL/L (ref 99–110)
CO2 SERPL-SCNC: 23 MMOL/L (ref 21–32)
CREAT SERPL-MCNC: 1.2 MG/DL (ref 0.8–1.3)
CRP SERPL-MCNC: <3 MG/L (ref 0–5.1)
DEPRECATED RDW RBC AUTO: 15.5 % (ref 12.4–15.4)
EOSINOPHIL # BLD: 0.2 K/UL (ref 0–0.6)
EOSINOPHIL NFR BLD: 4.2 %
ERYTHROCYTE [SEDIMENTATION RATE] IN BLOOD BY WESTERGREN METHOD: 31 MM/HR (ref 0–20)
GFR SERPLBLD CREATININE-BSD FMLA CKD-EPI: 64 ML/MIN/{1.73_M2}
GLUCOSE BLD-MCNC: 89 MG/DL (ref 70–99)
GLUCOSE SERPL-MCNC: 75 MG/DL (ref 70–99)
HCT VFR BLD AUTO: 34.7 % (ref 40.5–52.5)
HGB BLD-MCNC: 11.4 G/DL (ref 13.5–17.5)
LYMPHOCYTES # BLD: 1.2 K/UL (ref 1–5.1)
LYMPHOCYTES NFR BLD: 27.9 %
MCH RBC QN AUTO: 27.7 PG (ref 26–34)
MCHC RBC AUTO-ENTMCNC: 32.7 G/DL (ref 31–36)
MCV RBC AUTO: 84.8 FL (ref 80–100)
MONOCYTES # BLD: 0.6 K/UL (ref 0–1.3)
MONOCYTES NFR BLD: 13.3 %
NEUTROPHILS # BLD: 2.3 K/UL (ref 1.7–7.7)
NEUTROPHILS NFR BLD: 53.5 %
PERFORMED ON: NORMAL
PLATELET # BLD AUTO: 114 K/UL (ref 135–450)
PMV BLD AUTO: 9.9 FL (ref 5–10.5)
POTASSIUM SERPL-SCNC: 4.6 MMOL/L (ref 3.5–5.1)
PROT SERPL-MCNC: 6.3 G/DL (ref 6.4–8.2)
RBC # BLD AUTO: 4.1 M/UL (ref 4.2–5.9)
SODIUM SERPL-SCNC: 139 MMOL/L (ref 136–145)
WBC # BLD AUTO: 4.3 K/UL (ref 4–11)

## 2025-04-22 PROCEDURE — G2211 COMPLEX E/M VISIT ADD ON: HCPCS | Performed by: INTERNAL MEDICINE

## 2025-04-22 PROCEDURE — 3078F DIAST BP <80 MM HG: CPT | Performed by: INTERNAL MEDICINE

## 2025-04-22 PROCEDURE — G2211 COMPLEX E/M VISIT ADD ON: HCPCS | Performed by: EMERGENCY MEDICINE

## 2025-04-22 PROCEDURE — 99214 OFFICE O/P EST MOD 30 MIN: CPT | Performed by: INTERNAL MEDICINE

## 2025-04-22 PROCEDURE — 3074F SYST BP LT 130 MM HG: CPT | Performed by: INTERNAL MEDICINE

## 2025-04-22 PROCEDURE — 99213 OFFICE O/P EST LOW 20 MIN: CPT | Performed by: EMERGENCY MEDICINE

## 2025-04-22 PROCEDURE — 93000 ELECTROCARDIOGRAM COMPLETE: CPT | Performed by: INTERNAL MEDICINE

## 2025-04-22 PROCEDURE — 99213 OFFICE O/P EST LOW 20 MIN: CPT

## 2025-04-22 PROCEDURE — 1123F ACP DISCUSS/DSCN MKR DOCD: CPT | Performed by: INTERNAL MEDICINE

## 2025-04-22 RX ORDER — LIDOCAINE HYDROCHLORIDE 20 MG/ML
JELLY TOPICAL ONCE
Status: CANCELLED | OUTPATIENT
Start: 2025-04-22 | End: 2025-04-22

## 2025-04-22 RX ORDER — SODIUM CHLOR/HYPOCHLOROUS ACID 0.033 %
SOLUTION, IRRIGATION IRRIGATION ONCE
Status: CANCELLED | OUTPATIENT
Start: 2025-04-22 | End: 2025-04-22

## 2025-04-22 RX ORDER — MUPIROCIN 20 MG/G
OINTMENT TOPICAL ONCE
Status: CANCELLED | OUTPATIENT
Start: 2025-04-22 | End: 2025-04-22

## 2025-04-22 RX ORDER — BETAMETHASONE DIPROPIONATE 0.5 MG/G
CREAM TOPICAL ONCE
Status: CANCELLED | OUTPATIENT
Start: 2025-04-22 | End: 2025-04-22

## 2025-04-22 RX ORDER — LIDOCAINE 40 MG/G
CREAM TOPICAL ONCE
Status: CANCELLED | OUTPATIENT
Start: 2025-04-22 | End: 2025-04-22

## 2025-04-22 RX ORDER — LIDOCAINE HYDROCHLORIDE 40 MG/ML
SOLUTION TOPICAL ONCE
Status: CANCELLED | OUTPATIENT
Start: 2025-04-22 | End: 2025-04-22

## 2025-04-22 RX ORDER — CLOBETASOL PROPIONATE 0.5 MG/G
OINTMENT TOPICAL ONCE
Status: CANCELLED | OUTPATIENT
Start: 2025-04-22 | End: 2025-04-22

## 2025-04-22 RX ORDER — BACITRACIN ZINC AND POLYMYXIN B SULFATE 500; 1000 [USP'U]/G; [USP'U]/G
OINTMENT TOPICAL ONCE
Status: CANCELLED | OUTPATIENT
Start: 2025-04-22 | End: 2025-04-22

## 2025-04-22 RX ORDER — TRIAMCINOLONE ACETONIDE 1 MG/G
OINTMENT TOPICAL ONCE
Status: CANCELLED | OUTPATIENT
Start: 2025-04-22 | End: 2025-04-22

## 2025-04-22 RX ORDER — NEOMYCIN/BACITRACIN/POLYMYXINB 3.5-400-5K
OINTMENT (GRAM) TOPICAL ONCE
Status: CANCELLED | OUTPATIENT
Start: 2025-04-22 | End: 2025-04-22

## 2025-04-22 RX ORDER — GENTAMICIN SULFATE 1 MG/G
OINTMENT TOPICAL ONCE
Status: CANCELLED | OUTPATIENT
Start: 2025-04-22 | End: 2025-04-22

## 2025-04-22 RX ORDER — LIDOCAINE 40 MG/G
CREAM TOPICAL ONCE
Status: COMPLETED | OUTPATIENT
Start: 2025-04-22 | End: 2025-04-22

## 2025-04-22 RX ORDER — GINSENG 100 MG
CAPSULE ORAL ONCE
Status: CANCELLED | OUTPATIENT
Start: 2025-04-22 | End: 2025-04-22

## 2025-04-22 RX ORDER — LIDOCAINE 50 MG/G
OINTMENT TOPICAL ONCE
Status: CANCELLED | OUTPATIENT
Start: 2025-04-22 | End: 2025-04-22

## 2025-04-22 RX ORDER — SILVER SULFADIAZINE 10 MG/G
CREAM TOPICAL ONCE
Status: CANCELLED | OUTPATIENT
Start: 2025-04-22 | End: 2025-04-22

## 2025-04-22 RX ADMIN — LIDOCAINE: 40 CREAM TOPICAL at 09:04

## 2025-04-22 ASSESSMENT — PAIN SCALES - GENERAL
PAINLEVEL_OUTOF10: 0
PAINLEVEL_OUTOF10: 0

## 2025-04-22 NOTE — PROGRESS NOTES
[facility]     Note Type: Medical Staff Progress Note    Referring Provider: Cece Alejandre MD  Reason for Referral:   Chief Complaint   Patient presents with    Wound Check     Follow-up visit for a wound to the right foot.       Marvin Montero  MEDICAL RECORD NUMBER:  [id number]  AGE: 74 y.o.   GENDER: male  : 1950  EPISODE DATE:  2025    Chief complaint and reason for visit:     Chief Complaint   Patient presents with    Wound Check     Follow-up visit for a wound to the right foot.        HPI/Wound Narrative:      Marvin Montero is a 74 y.o. male who presents today for an evaluation of a wound/ulcer. Wound duration:  2024 .    25: no new issues. Not doing HBOT today as he has another appt.    25: no new issues but hydrofera sticking too much    3/18/25: doing better overall, edema improving with diuretics    3/11/25: did not take his diuretic yesterday nor today and leg edema is much worse than last visit.    3/4/25: no new issues. Plans on HBOT today. Diuretic changed and helping with edema control much better. Eating better.     25: Has ENT appointment scheduled for 2 PM today with possible ET tubes but the patient was also given Flonase.  Unfortunately cannot trial the Flonase and at this time he does want to trial this next week.      25: no new issues. Had vascular intervention.    25: Patient has no specific complaints.  Does have angiogram with intervention scheduled for 2025.  He is also going to be getting his MRI soon.    25: no new issues. Has appt with cardio soon. MRI won't be done until after 2/3 due to recent pacer/defibrillator placed in 2024.    25: had his echo. EF about 55%. No changes in medications recommended by cardiology.    25: 74-year-old with past medical history notable for hypertension, type 2 diabetes mellitus, hyperlipidemia, atrial fibrillation on Eliquis, diastolic congestive

## 2025-04-22 NOTE — PROGRESS NOTES
[facility]   Electrophysiology Follow up     Date: 4/22/2025  I had the privilege of visiting Marvin Montero in the office.       CC: Atrial fibrillation      HPI: Marvin Montero is a 74 y.o. male with a history of obstructive sleep apnea, nonischemic cardiomyopathy with biventricular ICD implantation in 2017, generator change in December 2024, paroxysmal atrial fibrillation on amiodarone, left BKA, obesity with gastric bypass, hypertension, hyperlipidemia, peripheral artery disease, hypothyroidism on Synthroid, diabetes.    Patient presents today as a follow-up for the management of ICD and atrial fibrillation.  No significant recurrence with regards to atrial fibrillation by device interrogation since January.  Denies palpitations or racing heart.    Review of System:  [x] Full ROS obtained and negative except as mentioned in HPI      Prior to Admission medications    Medication Sig Start Date End Date Taking? Authorizing Provider   meropenem (MERREM) infusion Infuse 1,000 mg intravenously in the morning and 1,000 mg in the evening. Do all this for 9 days. Compound per protocol.. 4/21/25 4/30/25 Yes Keyur Harrell MD   amiodarone (CORDARONE) 200 MG tablet TAKE 1 TABLET BY MOUTH DAILY 4/11/25  Yes Curtis Pemberton MD   amoxicillin-clavulanate (AUGMENTIN) 875-125 MG per tablet Take 1 tablet by mouth 2 times daily 4/2/25 6/25/25 Yes Keyur Harrell MD   levothyroxine (SYNTHROID) 25 MCG tablet TAKE 1 TABLET BY MOUTH DAILY 3/21/25  Yes Reji Draper MD   dapagliflozin (FARXIGA) 5 MG tablet Take 1 tablet by mouth every morning   Yes Ky Blanc MD   torsemide (DEMADEX) 20 MG tablet Take 1 tablet by mouth daily 2/28/25  Yes Kevin Christy MD   fluticasone (FLONASE) 50 MCG/ACT nasal spray 2 sprays by Nasal route 2 times daily One month supply equals 2 bottles 2/12/25  Yes David Arambula MD   metoprolol succinate (TOPROL XL) 25 MG extended release tablet Take 1 tablet by mouth daily

## 2025-04-22 NOTE — PATIENT INSTRUCTIONS
WEEK  [x] Do Not Change Dressing        YOU HAVE HAD A THERASKIN PLACED ON YOUR WOUND TODAY. FOR BEST RESULTS, WE RECOMMEND YOU LIMIT YOUR ACTIVITY FOR THE NEXT WEEK AS MUCH AS POSSIBLE. AVOID LONG PERIODS OF TIME ON YOUR FEET. THIS ALSO INCLUDES ELEVATING YOUR LEGS AND FEET 3-4 TIMES DAILY FOR AT LEAST 20-30 MINUTES ON PILLOWS AND AT NIGHT SO THAT THEY ARE HIGHER THAN THE LEVEL OF YOUR HEART     Electronically signed by Faith Guerrier RN on 4/22/2025 at 9:08 AM        Compression and Edema Control: RIGHT LOWER LEG   [] Wear Home Compression Stockings   [x] Spandagrip to RIGHT LEG FROM ANKLE TO KNEE  Size: []Low compression 5-10 mm/Hg      [x]Medium compression 10-20 mm/Hg           []High compression  20-30 mm/Hg  [] Ace Wrap Toes to Knee to    [] Multilayer Compression Wrap:    Do not get leg(s) with wrap wet.  If wraps become too tight call the center or completely remove the wrap.         Pressure Relief and Off Loading:  [] Off-loading when [] walking  [] in bed [] sitting   Turn every 2 hours when in bed   Avoid putting direct pressure on the site of the wound. Limit side lying to 30 degree tilt. Limit elevating the head of the bed greater than 30 degrees.                                      [x] Assistive Devices   PODIATRY SHOE  Use as instructed by the provider      Activity: Other WEIGHT BEARING STATUS PER DR. IVY, NICA      Dietary:   Continue your diet as tolerated.  Protein is a key nutrient in helping to repair damaged tissue and promote new tissue growth. Good sources of protein include milk, yogurt, cheese, fish, lean meat and beans.  If you are DIABETIC, having diabetes can make it hard for wounds to heal. Try to keep your blood sugar within it's target range.  Limit Sodium, Alcohol and Sugar.    Pain:   Please Note some pain, drainage and/or bleeding might be expected after seeing the provider. TO HELP ALLEVIATE PAIN WE RECOMMEND THE FOLLOWING  Elevate the affected limb.  Use over the counter

## 2025-04-23 ENCOUNTER — HOSPITAL ENCOUNTER (OUTPATIENT)
Dept: HYPERBARIC MEDICINE | Age: 75
Discharge: HOME | End: 2025-04-23
Payer: MEDICARE

## 2025-04-23 VITALS
DIASTOLIC BLOOD PRESSURE: 77 MMHG | SYSTOLIC BLOOD PRESSURE: 153 MMHG | TEMPERATURE: 97.1 F | RESPIRATION RATE: 16 BRPM | HEART RATE: 65 BPM

## 2025-04-23 DIAGNOSIS — L97.509 DIABETIC FOOT ULCER WITH OSTEOMYELITIS (HCC): Primary | ICD-10-CM

## 2025-04-23 DIAGNOSIS — E11.621 DIABETIC FOOT ULCER WITH OSTEOMYELITIS (HCC): Primary | ICD-10-CM

## 2025-04-23 DIAGNOSIS — E11.69 DIABETIC FOOT ULCER WITH OSTEOMYELITIS (HCC): Primary | ICD-10-CM

## 2025-04-23 DIAGNOSIS — M86.9 DIABETIC FOOT ULCER WITH OSTEOMYELITIS (HCC): Primary | ICD-10-CM

## 2025-04-23 DIAGNOSIS — L97.514 ULCER OF TOE OF RIGHT FOOT, WITH NECROSIS OF BONE (HCC): ICD-10-CM

## 2025-04-23 DIAGNOSIS — L97.514 RIGHT FOOT ULCER, WITH NECROSIS OF BONE (HCC): ICD-10-CM

## 2025-04-23 DIAGNOSIS — I70.234 ATHEROSCLEROSIS OF NATIVE ARTERY OF RIGHT LOWER EXTREMITY WITH ULCERATION OF MIDFOOT (HCC): ICD-10-CM

## 2025-04-23 LAB
GLUCOSE BLD-MCNC: 143 MG/DL (ref 70–99)
GLUCOSE BLD-MCNC: 83 MG/DL (ref 70–99)
PERFORMED ON: ABNORMAL
PERFORMED ON: NORMAL

## 2025-04-23 PROCEDURE — 99183 HYPERBARIC OXYGEN THERAPY: CPT | Performed by: SURGERY

## 2025-04-23 PROCEDURE — G0277 HBOT, FULL BODY CHAMBER, 30M: HCPCS

## 2025-04-23 RX ORDER — LIDOCAINE 40 MG/G
CREAM TOPICAL ONCE
OUTPATIENT
Start: 2025-04-23 | End: 2025-04-23

## 2025-04-23 RX ORDER — GENTAMICIN SULFATE 1 MG/G
OINTMENT TOPICAL ONCE
OUTPATIENT
Start: 2025-04-23 | End: 2025-04-23

## 2025-04-23 RX ORDER — SILVER SULFADIAZINE 10 MG/G
CREAM TOPICAL ONCE
OUTPATIENT
Start: 2025-04-23 | End: 2025-04-23

## 2025-04-23 RX ORDER — LIDOCAINE HYDROCHLORIDE 20 MG/ML
JELLY TOPICAL ONCE
OUTPATIENT
Start: 2025-04-23 | End: 2025-04-23

## 2025-04-23 RX ORDER — LIDOCAINE 50 MG/G
OINTMENT TOPICAL ONCE
OUTPATIENT
Start: 2025-04-23 | End: 2025-04-23

## 2025-04-23 RX ORDER — SODIUM CHLOR/HYPOCHLOROUS ACID 0.033 %
SOLUTION, IRRIGATION IRRIGATION ONCE
OUTPATIENT
Start: 2025-04-23 | End: 2025-04-23

## 2025-04-23 RX ORDER — LIDOCAINE HYDROCHLORIDE 40 MG/ML
SOLUTION TOPICAL ONCE
OUTPATIENT
Start: 2025-04-23 | End: 2025-04-23

## 2025-04-23 RX ORDER — MUPIROCIN 20 MG/G
OINTMENT TOPICAL ONCE
OUTPATIENT
Start: 2025-04-23 | End: 2025-04-23

## 2025-04-23 RX ORDER — BACITRACIN ZINC AND POLYMYXIN B SULFATE 500; 1000 [USP'U]/G; [USP'U]/G
OINTMENT TOPICAL ONCE
OUTPATIENT
Start: 2025-04-23 | End: 2025-04-23

## 2025-04-23 RX ORDER — TRIAMCINOLONE ACETONIDE 1 MG/G
OINTMENT TOPICAL ONCE
OUTPATIENT
Start: 2025-04-23 | End: 2025-04-23

## 2025-04-23 RX ORDER — NEOMYCIN/BACITRACIN/POLYMYXINB 3.5-400-5K
OINTMENT (GRAM) TOPICAL ONCE
OUTPATIENT
Start: 2025-04-23 | End: 2025-04-23

## 2025-04-23 RX ORDER — BETAMETHASONE DIPROPIONATE 0.5 MG/G
CREAM TOPICAL ONCE
OUTPATIENT
Start: 2025-04-23 | End: 2025-04-23

## 2025-04-23 RX ORDER — CLOBETASOL PROPIONATE 0.5 MG/G
OINTMENT TOPICAL ONCE
OUTPATIENT
Start: 2025-04-23 | End: 2025-04-23

## 2025-04-23 RX ORDER — GINSENG 100 MG
CAPSULE ORAL ONCE
OUTPATIENT
Start: 2025-04-23 | End: 2025-04-23

## 2025-04-23 ASSESSMENT — PAIN SCALES - GENERAL: PAINLEVEL_OUTOF10: 0

## 2025-04-23 NOTE — PROGRESS NOTES
HBO PROGRESS NOTE      NAME: Marvin Montero  MEDICAL RECORD NUMBER:  [id number]  AGE: 74 y.o.   GENDER: male  : 1950  EPISODE DATE:  2025     Subjective     HBO Treatment Number: 36 out of Total Treatments: 50    HBO Diagnosis:             Indications: Lower Extremity Diabetic Wound ___(site) (right foot)    Safety checks performed prior to treatment.  See doc flowsheets for documentation.    Objective        Recent Labs     25  1034 25  1250   POCGLU 143* 83     Pre treatment Vital Signs       Temp: 97 °F (36.1 °C)     Pulse: 88     Respirations: 18     BP: 111/60       Post treatment Vital Signs  Temp: 97.1 °F (36.2 °C)  Pulse: 65  Respirations: 16  BP: (!) 153/77    Assessment        HBO Diagnosis:   Problem List Items Addressed This Visit          Circulatory    Atherosclerosis of native artery of right lower extremity with ulceration of midfoot    Relevant Orders    Notify physician (specify)    Hyperbaric Oxygen Therapy    POCT glucose    Care order/instruction       Endocrine    Diabetic foot ulcer with osteomyelitis (HCC) - Primary    Relevant Orders    Notify physician (specify)    Hyperbaric Oxygen Therapy    POCT glucose    Care order/instruction       Musculoskeletal and Integument    Right foot ulcer, with necrosis of bone (HCC)    Relevant Orders    Notify physician (specify)    Hyperbaric Oxygen Therapy     Other Visit Diagnoses         Ulcer of toe of right foot, with necrosis of bone (HCC)        Relevant Orders    POCT glucose    Care order/instruction            Physical Exam        General Appearance:  alert and oriented to person, place and time, well-developed and well-nourished, in no acute distress    Pre Tympanic Membrane Assessment:  Right: Normal (per Dr. Joann MD)  Left: Normal (per Dr. Joann MD)    Post Tympanic Membrane Assessment:  Left: Normal (PE Tubes Visible; Denies any ear problems)  Right: Normal (PE Tubes Visible; Denies any ear

## 2025-04-24 ENCOUNTER — HOSPITAL ENCOUNTER (OUTPATIENT)
Dept: HYPERBARIC MEDICINE | Age: 75
Discharge: HOME | End: 2025-04-24
Payer: MEDICARE

## 2025-04-24 VITALS
TEMPERATURE: 97 F | DIASTOLIC BLOOD PRESSURE: 49 MMHG | RESPIRATION RATE: 16 BRPM | HEART RATE: 63 BPM | SYSTOLIC BLOOD PRESSURE: 96 MMHG

## 2025-04-24 DIAGNOSIS — E11.69 DIABETIC FOOT ULCER WITH OSTEOMYELITIS (HCC): Primary | ICD-10-CM

## 2025-04-24 DIAGNOSIS — E11.621 DIABETIC FOOT ULCER WITH OSTEOMYELITIS (HCC): Primary | ICD-10-CM

## 2025-04-24 DIAGNOSIS — M86.9 DIABETIC FOOT ULCER WITH OSTEOMYELITIS (HCC): Primary | ICD-10-CM

## 2025-04-24 DIAGNOSIS — L97.509 DIABETIC FOOT ULCER WITH OSTEOMYELITIS (HCC): Primary | ICD-10-CM

## 2025-04-24 DIAGNOSIS — L97.514 ULCER OF TOE OF RIGHT FOOT, WITH NECROSIS OF BONE (HCC): ICD-10-CM

## 2025-04-24 DIAGNOSIS — I70.234 ATHEROSCLEROSIS OF NATIVE ARTERY OF RIGHT LOWER EXTREMITY WITH ULCERATION OF MIDFOOT (HCC): ICD-10-CM

## 2025-04-24 LAB
GLUCOSE BLD-MCNC: 114 MG/DL (ref 70–99)
GLUCOSE BLD-MCNC: 137 MG/DL (ref 70–99)
PERFORMED ON: ABNORMAL
PERFORMED ON: ABNORMAL

## 2025-04-24 PROCEDURE — 99212 OFFICE O/P EST SF 10 MIN: CPT

## 2025-04-24 ASSESSMENT — PAIN SCALES - GENERAL
PAINLEVEL_OUTOF10: 0
PAINLEVEL_OUTOF10: 0

## 2025-04-24 NOTE — PLAN OF CARE
HBO NURSING DOCUMENTATION          [facility]    NAME:  Marvin Montero  YOB: 1950  MEDICAL RECORD NUMBER:  [id number]  DATE:  4/24/2025    Patient arrived for scheduled Hyperbaric Oxygen Therapy, noted upon pre-treatment checks patient BS is less than 130 and patient BP is less than 100/60's. Notified provider Aaron Cuadra CNP who was bedside to see patient, orders for glucerna 8oz per protocol and recheck BP prior to beginning treatment.     Prior to treatment pt BS noted improved to 137, patient BP still less than 100/60 pt asymptomatic, BP noted 96/49. Notified provider and per provider patient does not meet BP minimum standard to safely receive HBO therapy today. Patient verbalized understanding. Patient assisted with redressing and is leaving clinic ambulatory for transport home with significant other. Plan to resume treatment tomorrow Friday 4/25/25, no other orders at this time.    Electronically signed by Faith Guerrier RN on 4/24/2025 at 11:21 AM

## 2025-04-24 NOTE — PROGRESS NOTES
Mr. Montero arrived with BS less than 130 and BP less than 100/60.  Patient was given 8 oz of Glucerna per protocol.  When rechecked, his BS had increased to 137, but his BP was only 96/49, which is still less than the required BP minimum of 100/60.  He does not meet the minimum standards to safely receive HBO therapy today.  Plan to resume HBO therapy tomorrow, 4/25/25.  Patient was transported home with his significant other.

## 2025-04-25 ENCOUNTER — HOSPITAL ENCOUNTER (OUTPATIENT)
Dept: WOUND CARE | Age: 75
Discharge: HOME | End: 2025-04-25
Attending: EMERGENCY MEDICINE
Payer: MEDICARE

## 2025-04-25 ENCOUNTER — HOSPITAL ENCOUNTER (OUTPATIENT)
Dept: HYPERBARIC MEDICINE | Age: 75
Discharge: HOME | End: 2025-04-25
Payer: MEDICARE

## 2025-04-25 VITALS
RESPIRATION RATE: 18 BRPM | TEMPERATURE: 97.3 F | SYSTOLIC BLOOD PRESSURE: 130 MMHG | DIASTOLIC BLOOD PRESSURE: 54 MMHG | HEART RATE: 72 BPM

## 2025-04-25 VITALS
RESPIRATION RATE: 18 BRPM | DIASTOLIC BLOOD PRESSURE: 71 MMHG | SYSTOLIC BLOOD PRESSURE: 161 MMHG | HEART RATE: 60 BPM | TEMPERATURE: 97 F

## 2025-04-25 DIAGNOSIS — R60.0 LOCALIZED EDEMA: ICD-10-CM

## 2025-04-25 DIAGNOSIS — M86.9 DIABETIC FOOT ULCER WITH OSTEOMYELITIS (HCC): Primary | ICD-10-CM

## 2025-04-25 DIAGNOSIS — E66.9 TYPE 2 DIABETES MELLITUS WITH OBESITY (HCC): ICD-10-CM

## 2025-04-25 DIAGNOSIS — L08.9 TYPE 2 DIABETES MELLITUS WITH RIGHT DIABETIC FOOT INFECTION (HCC): ICD-10-CM

## 2025-04-25 DIAGNOSIS — I70.234 ATHEROSCLEROSIS OF NATIVE ARTERY OF RIGHT LOWER EXTREMITY WITH ULCERATION OF MIDFOOT (HCC): ICD-10-CM

## 2025-04-25 DIAGNOSIS — E11.621 DIABETIC FOOT ULCER WITH OSTEOMYELITIS (HCC): Primary | ICD-10-CM

## 2025-04-25 DIAGNOSIS — L97.514 RIGHT FOOT ULCER, WITH NECROSIS OF BONE (HCC): ICD-10-CM

## 2025-04-25 DIAGNOSIS — E11.621 DIABETIC ULCER OF RIGHT MIDFOOT ASSOCIATED WITH TYPE 2 DIABETES MELLITUS, WITH NECROSIS OF MUSCLE (HCC): ICD-10-CM

## 2025-04-25 DIAGNOSIS — L97.514 ULCER OF TOE OF RIGHT FOOT, WITH NECROSIS OF BONE (HCC): ICD-10-CM

## 2025-04-25 DIAGNOSIS — I87.321 IDIOPATHIC CHRONIC VENOUS HYPERTENSION OF RIGHT LEG WITH INFLAMMATION: ICD-10-CM

## 2025-04-25 DIAGNOSIS — E11.69 DIABETIC FOOT ULCER WITH OSTEOMYELITIS (HCC): Primary | ICD-10-CM

## 2025-04-25 DIAGNOSIS — I70.234 ATHEROSCLEROSIS OF NATIVE ARTERY OF RIGHT LOWER EXTREMITY WITH ULCERATION OF MIDFOOT (HCC): Primary | ICD-10-CM

## 2025-04-25 DIAGNOSIS — E11.69 TYPE 2 DIABETES MELLITUS WITH OBESITY (HCC): ICD-10-CM

## 2025-04-25 DIAGNOSIS — L97.509 DIABETIC FOOT ULCER WITH OSTEOMYELITIS (HCC): Primary | ICD-10-CM

## 2025-04-25 DIAGNOSIS — L97.413 DIABETIC ULCER OF RIGHT MIDFOOT ASSOCIATED WITH TYPE 2 DIABETES MELLITUS, WITH NECROSIS OF MUSCLE (HCC): ICD-10-CM

## 2025-04-25 DIAGNOSIS — E11.628 TYPE 2 DIABETES MELLITUS WITH RIGHT DIABETIC FOOT INFECTION (HCC): ICD-10-CM

## 2025-04-25 DIAGNOSIS — Z74.09 IMPAIRED MOBILITY: ICD-10-CM

## 2025-04-25 LAB
GLUCOSE BLD-MCNC: 110 MG/DL (ref 70–99)
GLUCOSE BLD-MCNC: 121 MG/DL (ref 70–99)
GLUCOSE BLD-MCNC: 131 MG/DL (ref 70–99)
GLUCOSE BLD-MCNC: 141 MG/DL (ref 70–99)
GLUCOSE BLD-MCNC: 153 MG/DL (ref 70–99)
GLUCOSE BLD-MCNC: 74 MG/DL (ref 70–99)
PERFORMED ON: ABNORMAL
PERFORMED ON: NORMAL

## 2025-04-25 PROCEDURE — 99212 OFFICE O/P EST SF 10 MIN: CPT

## 2025-04-25 PROCEDURE — G0277 HBOT, FULL BODY CHAMBER, 30M: HCPCS

## 2025-04-25 RX ORDER — LIDOCAINE HYDROCHLORIDE 40 MG/ML
SOLUTION TOPICAL ONCE
OUTPATIENT
Start: 2025-04-25 | End: 2025-04-25

## 2025-04-25 RX ORDER — CLOBETASOL PROPIONATE 0.5 MG/G
OINTMENT TOPICAL ONCE
OUTPATIENT
Start: 2025-04-25 | End: 2025-04-25

## 2025-04-25 RX ORDER — TRIAMCINOLONE ACETONIDE 1 MG/G
OINTMENT TOPICAL ONCE
OUTPATIENT
Start: 2025-04-25 | End: 2025-04-25

## 2025-04-25 RX ORDER — LIDOCAINE HYDROCHLORIDE 20 MG/ML
JELLY TOPICAL ONCE
OUTPATIENT
Start: 2025-04-25 | End: 2025-04-25

## 2025-04-25 RX ORDER — SODIUM CHLOR/HYPOCHLOROUS ACID 0.033 %
SOLUTION, IRRIGATION IRRIGATION ONCE
OUTPATIENT
Start: 2025-04-25 | End: 2025-04-25

## 2025-04-25 RX ORDER — LIDOCAINE 40 MG/G
CREAM TOPICAL ONCE
OUTPATIENT
Start: 2025-04-25 | End: 2025-04-25

## 2025-04-25 RX ORDER — NEOMYCIN/BACITRACIN/POLYMYXINB 3.5-400-5K
OINTMENT (GRAM) TOPICAL ONCE
OUTPATIENT
Start: 2025-04-25 | End: 2025-04-25

## 2025-04-25 RX ORDER — LIDOCAINE 50 MG/G
OINTMENT TOPICAL ONCE
OUTPATIENT
Start: 2025-04-25 | End: 2025-04-25

## 2025-04-25 RX ORDER — SILVER SULFADIAZINE 10 MG/G
CREAM TOPICAL ONCE
OUTPATIENT
Start: 2025-04-25 | End: 2025-04-25

## 2025-04-25 RX ORDER — BETAMETHASONE DIPROPIONATE 0.5 MG/G
CREAM TOPICAL ONCE
OUTPATIENT
Start: 2025-04-25 | End: 2025-04-25

## 2025-04-25 RX ORDER — GINSENG 100 MG
CAPSULE ORAL ONCE
OUTPATIENT
Start: 2025-04-25 | End: 2025-04-25

## 2025-04-25 RX ORDER — BACITRACIN ZINC AND POLYMYXIN B SULFATE 500; 1000 [USP'U]/G; [USP'U]/G
OINTMENT TOPICAL ONCE
OUTPATIENT
Start: 2025-04-25 | End: 2025-04-25

## 2025-04-25 RX ORDER — GENTAMICIN SULFATE 1 MG/G
OINTMENT TOPICAL ONCE
OUTPATIENT
Start: 2025-04-25 | End: 2025-04-25

## 2025-04-25 RX ORDER — MUPIROCIN 20 MG/G
OINTMENT TOPICAL ONCE
OUTPATIENT
Start: 2025-04-25 | End: 2025-04-25

## 2025-04-25 ASSESSMENT — ENCOUNTER SYMPTOMS
GASTROINTESTINAL NEGATIVE: 1
RESPIRATORY NEGATIVE: 1

## 2025-04-25 ASSESSMENT — PAIN SCALES - GENERAL
PAINLEVEL_OUTOF10: 0

## 2025-04-25 NOTE — PROGRESS NOTES
HBO PROGRESS NOTE      NAME: Marvin Montero  MEDICAL RECORD NUMBER:  [id number]  AGE: 74 y.o.   GENDER: male  : 1950  EPISODE DATE:  2025     Subjective     HBO Treatment Number: 37 out of Total Treatments: 50    HBO Diagnosis:             Indications: Lower Extremity Diabetic Wound ___(site) (right foot)    Safety checks performed prior to treatment.  See doc flowsheets for documentation.    Objective        Recent Labs     25  1259 25  1316   POCGLU 74 131*     Pre treatment Vital Signs       Temp: 97.1 °F (36.2 °C)     Pulse: 64     Respirations: 16     BP: 122/62       Post treatment Vital Signs  Temp: 97 °F (36.1 °C)  Pulse: 60  Respirations: 18  BP: (!) 161/71    Assessment        HBO Diagnosis:   Problem List Items Addressed This Visit          Circulatory    Atherosclerosis of native artery of right lower extremity with ulceration of midfoot (HCC)    Relevant Orders    Notify physician (specify)    Hyperbaric Oxygen Therapy    POCT glucose    Care order/instruction       Endocrine    Diabetic foot ulcer with osteomyelitis (HCC) - Primary    Relevant Orders    Notify physician (specify)    Hyperbaric Oxygen Therapy    POCT glucose    Care order/instruction       Musculoskeletal and Integument    Right foot ulcer, with necrosis of bone (HCC)    Relevant Orders    Notify physician (specify)    Hyperbaric Oxygen Therapy     Other Visit Diagnoses         Ulcer of toe of right foot, with necrosis of bone (HCC)        Relevant Orders    POCT glucose    Care order/instruction            Physical Exam:  General Appearance:  alert and oriented to person, place and time, well-developed and well-nourished, in no acute distress    Pre Tympanic Membrane Assessment:  Right: Normal (per Joseline Avila CNP)  Left: Normal (per Joseline Avila CNP)    Post Tympanic Membrane Assessment:  Left: Normal (PE tube visible, denies ear problems)  Right: Normal (PE tube visible, denies ear

## 2025-04-25 NOTE — PATIENT INSTRUCTIONS
medications as permitted by your family doctor.  For Persistent Pain not relieved by the above interventions, please notify your family doctor.        : ROB     Electronically signed by Bhavani Hall RN on 4/25/2025 at 8:25 AM       Wound Care Center Information: Should you experience any significant changes in your wound(s) or have questions about your wound care, please contact the [facility] at [phone] MONDAY - THURSDAY 8:00 am - 4:30 pm and Friday 8:00 am - 12:30 pm.  If you need help with your wound outside these hours and cannot wait until we are again available, contact your PCP or go to the hospital emergency room.     PLEASE NOTE: IF YOU ARE UNABLE TO OBTAIN WOUND SUPPLIES, CONTINUE TO USE THE SUPPLIES YOU HAVE AVAILABLE UNTIL YOU ARE ABLE TO REACH US. IT IS MOST IMPORTANT TO KEEP THE WOUND COVERED AT ALL TIMES.         Physician Signature:_______________________    Date: ___________ Time:  ____________          Dr Cece Alejandre

## 2025-04-25 NOTE — PROGRESS NOTES
Patient: Marvin Montero is a 74 y.o. male who presents today with the following Chief Complaint(s):    Chief Complaint   Patient presents with    Follow-up    Diabetes         HPIHe is here for a check up and f/u on hypertension, hyperlipidemia, diabetes, T2, taking Farxiga as his sole diabetic medication, hypothyroidism, and stage 3 CKD. He is taking medication as prescribed, continues to focus on meal planning and physical activity working out about 90 minutes daily in divided sessions.   H/O HFrEF. Denies CP or SOB. F/U cardiology including ICD maintenance.           Became hypotensive while taking Coreg, Aldactone which were discontinued. B/P/heart meds include metoprolol, lisinopril, torsemide.          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 Has become comfortable walking with prosthesis.          He has had covid and RSV vaccines.              Current Outpatient Medications   Medication Sig Dispense Refill    amoxicillin-clavulanate (AUGMENTIN) 875-125 MG per tablet Take 1 tablet by mouth 2 times daily 84 tablet 1    levothyroxine (SYNTHROID) 25 MCG tablet TAKE 1 TABLET BY MOUTH DAILY 90 tablet 3    sildenafil (VIAGRA) 100 MG tablet TAKE 1 TABLET BY MOUTH DAILY AS NEEDED FOR ERECTILE DYSFUNCTION (Patient not taking: Reported on 4/22/2025) 30 tablet 5    dapagliflozin (FARXIGA) 5 MG tablet Take 1 tablet by mouth every morning      torsemide (DEMADEX) 20 MG tablet Take 1 tablet by mouth daily 90 tablet 3    oxymetazoline (12 HOUR NASAL SPRAY) 0.05 % nasal spray Apply 2 sprays to each nostril 30 minutes prior to hyperbaric oxygen treatment (Patient not taking: Reported on 4/22/2025) 1 each 3    fluticasone (FLONASE) 50 MCG/ACT nasal spray 2 sprays by Nasal route 2 times daily One month supply equals 2 bottles 2 each 5    metoprolol succinate (TOPROL XL) 25 MG extended release tablet Take 1 tablet by mouth daily 30 tablet 3    Continuous Glucose Sensor (FREESTYLE KWESI 14 DAY SENSOR) [facility] Use as directed Dx

## 2025-04-25 NOTE — PLAN OF CARE
HBO NURSING DOCUMENTATION          [facility]    NAME:  Marvin Montero  YOB: 1950  MEDICAL RECORD NUMBER:  [id number]  DATE:  4/25/2025    Patient arrived for Hyperbaric Oxygen Therapy, pre-treatment BS noted 121, patient given 8oz glucerna per protocol. Notified provider Joseline Avila CNP, 15 minute recheck noted BS at 110. Per provider patient given joaquin crackers with peanut butter and 8oz orange juice. Upon recheck 15 minutes later BS noted 141, provider notified and patient treatment initiated. Patient given second 8oz glucerna to take into chamber, staff at chamber side to monitor. No other patient needs expressed at this time.      Electronically signed by Faith Guerrier RN on 4/25/2025 at 11:40 AM

## 2025-04-28 ENCOUNTER — HOSPITAL ENCOUNTER (OUTPATIENT)
Dept: HYPERBARIC MEDICINE | Age: 75
Discharge: HOME | End: 2025-04-28
Payer: MEDICARE

## 2025-04-28 VITALS
RESPIRATION RATE: 16 BRPM | HEART RATE: 88 BPM | TEMPERATURE: 97.4 F | SYSTOLIC BLOOD PRESSURE: 107 MMHG | DIASTOLIC BLOOD PRESSURE: 45 MMHG

## 2025-04-28 LAB
ALBUMIN SERPL-MCNC: 3.8 G/DL (ref 3.4–5)
ALBUMIN/GLOB SERPL: 1.4 {RATIO} (ref 1.1–2.2)
ALP SERPL-CCNC: 97 U/L (ref 40–129)
ALT SERPL-CCNC: 133 U/L (ref 10–40)
ANION GAP SERPL CALCULATED.3IONS-SCNC: 10 MMOL/L (ref 3–16)
AST SERPL-CCNC: 87 U/L (ref 15–37)
BASOPHILS # BLD: 0.1 K/UL (ref 0–0.2)
BASOPHILS NFR BLD: 1.2 %
BILIRUB SERPL-MCNC: 0.3 MG/DL (ref 0–1)
BUN SERPL-MCNC: 20 MG/DL (ref 7–20)
CALCIUM SERPL-MCNC: 9.2 MG/DL (ref 8.3–10.6)
CHLORIDE SERPL-SCNC: 106 MMOL/L (ref 99–110)
CO2 SERPL-SCNC: 24 MMOL/L (ref 21–32)
CREAT SERPL-MCNC: 1.1 MG/DL (ref 0.8–1.3)
CRP SERPL-MCNC: <3 MG/L (ref 0–5.1)
DEPRECATED RDW RBC AUTO: 15.5 % (ref 12.4–15.4)
EOSINOPHIL # BLD: 0.2 K/UL (ref 0–0.6)
EOSINOPHIL NFR BLD: 3.5 %
ERYTHROCYTE [SEDIMENTATION RATE] IN BLOOD BY WESTERGREN METHOD: 53 MM/HR (ref 0–20)
GFR SERPLBLD CREATININE-BSD FMLA CKD-EPI: 71 ML/MIN/{1.73_M2}
GLUCOSE BLD-MCNC: 214 MG/DL (ref 70–99)
GLUCOSE SERPL-MCNC: 64 MG/DL (ref 70–99)
HCT VFR BLD AUTO: 37.8 % (ref 40.5–52.5)
HGB BLD-MCNC: 12.6 G/DL (ref 13.5–17.5)
LYMPHOCYTES # BLD: 1.5 K/UL (ref 1–5.1)
LYMPHOCYTES NFR BLD: 29.5 %
MCH RBC QN AUTO: 28.3 PG (ref 26–34)
MCHC RBC AUTO-ENTMCNC: 33.5 G/DL (ref 31–36)
MCV RBC AUTO: 84.6 FL (ref 80–100)
MONOCYTES # BLD: 0.5 K/UL (ref 0–1.3)
MONOCYTES NFR BLD: 10.4 %
NEUTROPHILS # BLD: 2.7 K/UL (ref 1.7–7.7)
NEUTROPHILS NFR BLD: 55.4 %
PERFORMED ON: ABNORMAL
PLATELET # BLD AUTO: 140 K/UL (ref 135–450)
PMV BLD AUTO: 9.7 FL (ref 5–10.5)
POTASSIUM SERPL-SCNC: 5 MMOL/L (ref 3.5–5.1)
PROT SERPL-MCNC: 6.6 G/DL (ref 6.4–8.2)
RBC # BLD AUTO: 4.47 M/UL (ref 4.2–5.9)
SODIUM SERPL-SCNC: 140 MMOL/L (ref 136–145)
WBC # BLD AUTO: 4.9 K/UL (ref 4–11)

## 2025-04-28 PROCEDURE — 99211 OFF/OP EST MAY X REQ PHY/QHP: CPT

## 2025-04-28 PROCEDURE — 99212 OFFICE O/P EST SF 10 MIN: CPT | Performed by: NURSE PRACTITIONER

## 2025-04-28 NOTE — PLAN OF CARE
HBO NURSING DOCUMENTATION          [facility]    NAME:  Marvin Montero  YOB: 1950  MEDICAL RECORD NUMBER:  [id number]  DATE:  4/28/2025    Patient being checked in for Hyperbaric treatment this morning. Patient's blood pressure continues to be low after 3x of checking it. Manual BP was 101/43. Aaron Cuadra CNP does not want patient to dive with such low blood pressure. Patient will check blood pressure throughout the day and notified PCP today.     Electronically signed by Bhavani Hall RN on 4/28/2025 at 10:52 AM

## 2025-04-28 NOTE — PROGRESS NOTES
Patient has low BP again today.  On 4/24/25, his BP was only 96/49.  Today it is only 101/43 after checking it manually 3X.  Mr. Montero is less than the required BP minimum of 100/60 to undergo HBO therapy.  Recommend he contact his PCP.  It appears he is on metoprolol succinate 25 mg, lisinopril 2.5 mg and torsemide 20 mg.

## 2025-04-29 ENCOUNTER — HOSPITAL ENCOUNTER (OUTPATIENT)
Dept: WOUND CARE | Age: 75
Discharge: HOME | End: 2025-04-29
Attending: EMERGENCY MEDICINE
Payer: MEDICARE

## 2025-04-29 ENCOUNTER — HOSPITAL ENCOUNTER (OUTPATIENT)
Dept: HYPERBARIC MEDICINE | Age: 75
Discharge: HOME | End: 2025-04-29
Payer: MEDICARE

## 2025-04-29 VITALS
SYSTOLIC BLOOD PRESSURE: 108 MMHG | TEMPERATURE: 98.6 F | DIASTOLIC BLOOD PRESSURE: 54 MMHG | HEART RATE: 86 BPM | RESPIRATION RATE: 18 BRPM

## 2025-04-29 VITALS
SYSTOLIC BLOOD PRESSURE: 157 MMHG | HEART RATE: 59 BPM | TEMPERATURE: 97.3 F | RESPIRATION RATE: 20 BRPM | DIASTOLIC BLOOD PRESSURE: 73 MMHG

## 2025-04-29 DIAGNOSIS — I70.244 ATHEROSCLEROSIS OF NATIVE ARTERIES OF LEFT LEG WITH ULCERATION OF HEEL AND MIDFOOT (HCC): Primary | ICD-10-CM

## 2025-04-29 DIAGNOSIS — E11.621 DIABETIC ULCER OF RIGHT MIDFOOT ASSOCIATED WITH TYPE 2 DIABETES MELLITUS, WITH NECROSIS OF MUSCLE (HCC): ICD-10-CM

## 2025-04-29 DIAGNOSIS — I70.234 ATHEROSCLEROSIS OF NATIVE ARTERY OF RIGHT LOWER EXTREMITY WITH ULCERATION OF MIDFOOT (HCC): Primary | ICD-10-CM

## 2025-04-29 DIAGNOSIS — E11.69 DIABETIC FOOT ULCER WITH OSTEOMYELITIS (HCC): ICD-10-CM

## 2025-04-29 DIAGNOSIS — E11.628 TYPE 2 DIABETES MELLITUS WITH RIGHT DIABETIC FOOT INFECTION (HCC): ICD-10-CM

## 2025-04-29 DIAGNOSIS — E11.69 TYPE 2 DIABETES MELLITUS WITH OBESITY (HCC): ICD-10-CM

## 2025-04-29 DIAGNOSIS — I87.321 IDIOPATHIC CHRONIC VENOUS HYPERTENSION OF RIGHT LEG WITH INFLAMMATION: ICD-10-CM

## 2025-04-29 DIAGNOSIS — Z74.09 IMPAIRED MOBILITY: ICD-10-CM

## 2025-04-29 DIAGNOSIS — M86.9 DIABETIC FOOT ULCER WITH OSTEOMYELITIS (HCC): ICD-10-CM

## 2025-04-29 DIAGNOSIS — L08.9 TYPE 2 DIABETES MELLITUS WITH RIGHT DIABETIC FOOT INFECTION (HCC): ICD-10-CM

## 2025-04-29 DIAGNOSIS — L97.514 RIGHT FOOT ULCER, WITH NECROSIS OF BONE (HCC): ICD-10-CM

## 2025-04-29 DIAGNOSIS — L97.413 DIABETIC ULCER OF RIGHT MIDFOOT ASSOCIATED WITH TYPE 2 DIABETES MELLITUS, WITH NECROSIS OF MUSCLE (HCC): ICD-10-CM

## 2025-04-29 DIAGNOSIS — L97.509 DIABETIC FOOT ULCER WITH OSTEOMYELITIS (HCC): ICD-10-CM

## 2025-04-29 DIAGNOSIS — E66.9 TYPE 2 DIABETES MELLITUS WITH OBESITY (HCC): ICD-10-CM

## 2025-04-29 DIAGNOSIS — I70.234 ATHEROSCLEROSIS OF NATIVE ARTERY OF RIGHT LOWER EXTREMITY WITH ULCERATION OF MIDFOOT (HCC): ICD-10-CM

## 2025-04-29 DIAGNOSIS — R60.0 LOCALIZED EDEMA: ICD-10-CM

## 2025-04-29 DIAGNOSIS — L97.514 ULCER OF TOE OF RIGHT FOOT, WITH NECROSIS OF BONE (HCC): ICD-10-CM

## 2025-04-29 DIAGNOSIS — E11.621 DIABETIC FOOT ULCER WITH OSTEOMYELITIS (HCC): ICD-10-CM

## 2025-04-29 LAB
GLUCOSE BLD-MCNC: 103 MG/DL (ref 70–99)
GLUCOSE BLD-MCNC: 130 MG/DL (ref 70–99)
GLUCOSE BLD-MCNC: 90 MG/DL (ref 70–99)
PERFORMED ON: ABNORMAL
PERFORMED ON: ABNORMAL
PERFORMED ON: NORMAL

## 2025-04-29 PROCEDURE — 99183 HYPERBARIC OXYGEN THERAPY: CPT | Performed by: EMERGENCY MEDICINE

## 2025-04-29 PROCEDURE — 11042 DBRDMT SUBQ TIS 1ST 20SQCM/<: CPT | Performed by: EMERGENCY MEDICINE

## 2025-04-29 PROCEDURE — 11045 DBRDMT SUBQ TISS EACH ADDL: CPT | Performed by: EMERGENCY MEDICINE

## 2025-04-29 PROCEDURE — 11042 DBRDMT SUBQ TIS 1ST 20SQCM/<: CPT

## 2025-04-29 PROCEDURE — 11045 DBRDMT SUBQ TISS EACH ADDL: CPT

## 2025-04-29 PROCEDURE — G0277 HBOT, FULL BODY CHAMBER, 30M: HCPCS

## 2025-04-29 RX ORDER — SODIUM CHLOR/HYPOCHLOROUS ACID 0.033 %
SOLUTION, IRRIGATION IRRIGATION ONCE
OUTPATIENT
Start: 2025-04-29 | End: 2025-04-29

## 2025-04-29 RX ORDER — TRIAMCINOLONE ACETONIDE 1 MG/G
OINTMENT TOPICAL ONCE
OUTPATIENT
Start: 2025-04-29 | End: 2025-04-29

## 2025-04-29 RX ORDER — BETAMETHASONE DIPROPIONATE 0.5 MG/G
CREAM TOPICAL ONCE
OUTPATIENT
Start: 2025-04-29 | End: 2025-04-29

## 2025-04-29 RX ORDER — LIDOCAINE HYDROCHLORIDE 40 MG/ML
SOLUTION TOPICAL ONCE
Status: COMPLETED | OUTPATIENT
Start: 2025-04-29 | End: 2025-04-29

## 2025-04-29 RX ORDER — LIDOCAINE 40 MG/G
CREAM TOPICAL ONCE
OUTPATIENT
Start: 2025-04-29 | End: 2025-04-29

## 2025-04-29 RX ORDER — BACITRACIN ZINC AND POLYMYXIN B SULFATE 500; 1000 [USP'U]/G; [USP'U]/G
OINTMENT TOPICAL ONCE
OUTPATIENT
Start: 2025-04-29 | End: 2025-04-29

## 2025-04-29 RX ORDER — NEOMYCIN/BACITRACIN/POLYMYXINB 3.5-400-5K
OINTMENT (GRAM) TOPICAL ONCE
OUTPATIENT
Start: 2025-04-29 | End: 2025-04-29

## 2025-04-29 RX ORDER — GINSENG 100 MG
CAPSULE ORAL ONCE
OUTPATIENT
Start: 2025-04-29 | End: 2025-04-29

## 2025-04-29 RX ORDER — MUPIROCIN 20 MG/G
OINTMENT TOPICAL ONCE
OUTPATIENT
Start: 2025-04-29 | End: 2025-04-29

## 2025-04-29 RX ORDER — LIDOCAINE HYDROCHLORIDE 20 MG/ML
JELLY TOPICAL ONCE
OUTPATIENT
Start: 2025-04-29 | End: 2025-04-29

## 2025-04-29 RX ORDER — CLOBETASOL PROPIONATE 0.5 MG/G
OINTMENT TOPICAL ONCE
OUTPATIENT
Start: 2025-04-29 | End: 2025-04-29

## 2025-04-29 RX ORDER — GENTAMICIN SULFATE 1 MG/G
OINTMENT TOPICAL ONCE
OUTPATIENT
Start: 2025-04-29 | End: 2025-04-29

## 2025-04-29 RX ORDER — LIDOCAINE 50 MG/G
OINTMENT TOPICAL ONCE
OUTPATIENT
Start: 2025-04-29 | End: 2025-04-29

## 2025-04-29 RX ORDER — LIDOCAINE HYDROCHLORIDE 40 MG/ML
SOLUTION TOPICAL ONCE
OUTPATIENT
Start: 2025-04-29 | End: 2025-04-29

## 2025-04-29 RX ORDER — SILVER SULFADIAZINE 10 MG/G
CREAM TOPICAL ONCE
OUTPATIENT
Start: 2025-04-29 | End: 2025-04-29

## 2025-04-29 RX ADMIN — LIDOCAINE HYDROCHLORIDE: 40 SOLUTION TOPICAL at 10:01

## 2025-04-29 ASSESSMENT — PAIN SCALES - GENERAL
PAINLEVEL_OUTOF10: 0

## 2025-04-29 NOTE — PROGRESS NOTES
HBO PROGRESS NOTE      NAME: Marvin Montero  MEDICAL RECORD NUMBER:  [id number]  AGE: 74 y.o.   GENDER: male  : 1950  EPISODE DATE:  2025     Subjective     HBO Treatment Number: 38 out of Total Treatments: 50    HBO Diagnosis:             Indications: Lower Extremity Diabetic Wound ___(site) (Right foot)    Safety checks performed prior to treatment.  See doc flowsheets for documentation.    Objective        Recent Labs     04/15/25  1039 04/15/25  1233   POCGLU 178* 88*     Pre treatment Vital Signs       Temp: 96.9 °F (36.1 °C)     Pulse: 72     Respirations: 20     BP: (!) 119/56 (Dr Alejandre aware)       Post treatment Vital Signs  Temp: 97.3 °F (36.3 °C)  Pulse: 59 (Aaron Cuadra CNP aware)  Respirations: 20  BP: (!) 157/73    Assessment        HBO Diagnosis:   Problem List Items Addressed This Visit          Circulatory    Atherosclerosis of native arteries of left leg with ulceration of heel and midfoot (HCC) - Primary    Atherosclerosis of native artery of right lower extremity with ulceration of midfoot (HCC)    Relevant Orders    Notify physician (specify)    Hyperbaric Oxygen Therapy    Hypoglycemial protocol    POCT glucose    Care order/instruction    Care order/instruction       Endocrine    Diabetic foot ulcer with osteomyelitis (HCC)    Relevant Orders    Notify physician (specify)    Hyperbaric Oxygen Therapy    Hypoglycemial protocol    POCT glucose    Care order/instruction    Care order/instruction    Diabetic ulcer of right midfoot associated with type 2 diabetes mellitus, with necrosis of muscle (HCC)    Type 2 diabetes mellitus with right diabetic foot infection (HCC)       Musculoskeletal and Integument    Right foot ulcer, with necrosis of bone (HCC)    Relevant Orders    Notify physician (specify)    Hyperbaric Oxygen Therapy     Other Visit Diagnoses         Ulcer of toe of right foot, with necrosis of bone (HCC)        Relevant Orders    Hypoglycemial protocol    POCT

## 2025-04-29 NOTE — PATIENT INSTRUCTIONS
[facility] Wound Care Physician Orders and Discharge Instructions  [facility]  [address], [address]  [place], [place] [address]  Telephone: [phone]      FAX [phone]  MONDAY - THURSDAY 8:00 am - 4:30 pm and Friday 8:00 am - 12:00 pm.        NAME:  Marvin Montero  YOB: 1950  MEDICAL RECORD NUMBER:  [id number]  DATE:  4/29/2025      Return Appointment:  [x] Return Appointment: With Dr Cece Alejandre  in  1 Week(s)   [x] Wound and dressing supply provider: [facility]  [] ECF or Home Healthcare:   [x] Wound Assessment:FRIDAY MAY 2ND- ONLY CHANGE OUTER DRESSING [x] Physician or NP scheduled for Wound Assessment: LOI HUNT CNP  [] Orders placed during your visit:        Important Reminders:   Please wash hands with soap and water before and after every dressing change.  Do not scrub wounds.  Keep wounds dry in shower unless otherwise instructed by the physician.  SMOKING can slow would healing. Stop smoking as soon as possible to improve healing and prevent further complications associated with smoking.      Michelle-Wound Topical Treatments:  Do not apply lotions, creams, or ointments to wound bed unless directed.   [] Apply moisturizing lotion to skin surrounding the wound prior to dressing change.  [] Apply antifungal ointment to skin surrounding the wound prior to dressing change.  [] Apply thin film of no sting moisture barrier ointment to skin immediately around      wound.  [] Other:     **APPLY MEPILEX BORDER TO ABRASION ON RIGHT LOWER LEG**      Wound Location: RIGHT LATERAL TMA   **THERASKIN # 1 APPLIED 4/8/2025**    Wound Cleansing:    Primary Dressing:  [x]  THERASKIN MOISTENED WITH SALINE, CUTIMED SORBACT AND STERI STRIPS - LEAVE IN PLACE  [x] PLAIN FOAM TO ANTERIOR AND LATERAL ANKLE    Secondary Dressing:  [x] DRAWTEX AND OPTILOCK  [x] CAST PADDING, KERLIX AND COBAN ( FOOTBALL DRESSING )      Dressing Frequency:  [] THREE TIMES A WEEK  [x] Do Not Change

## 2025-04-29 NOTE — PROGRESS NOTES
FSBS upon arrival to wound care / Hyperbarics = 103.  Patient given 8 ounces of Glucerna at 1042.  Repeat FSBA at 1100 = 130.  Dr Alejandre aware.  Patient to proceed with HBO treatment and take Glucerna into HBO chamber to drink during treatment.

## 2025-04-29 NOTE — PROGRESS NOTES
(FARXIGA) 5 MG tablet Take 1 tablet by mouth every morning      torsemide (DEMADEX) 20 MG tablet Take 1 tablet by mouth daily 90 tablet 3    oxymetazoline (12 HOUR NASAL SPRAY) 0.05 % nasal spray Apply 2 sprays to each nostril 30 minutes prior to hyperbaric oxygen treatment (Patient not taking: Reported on 4/22/2025) 1 each 3    fluticasone (FLONASE) 50 MCG/ACT nasal spray 2 sprays by Nasal route 2 times daily One month supply equals 2 bottles 2 each 5    metoprolol succinate (TOPROL XL) 25 MG extended release tablet Take 1 tablet by mouth daily 30 tablet 3    Continuous Glucose Sensor (FREESTYLE KWESI 14 DAY SENSOR) [facility] Use as directed Dx E11.9 (Patient not taking: Reported on 4/22/2025) 6 each 3    atorvastatin (LIPITOR) 40 MG tablet Take 1 tablet by mouth daily 90 tablet 3    ezetimibe (ZETIA) 10 MG tablet Take 1 tablet by mouth daily 90 tablet 3    lisinopril (PRINIVIL;ZESTRIL) 2.5 MG tablet Take 1 tablet by mouth daily 90 tablet 3    potassium chloride (K-TAB) 20 MEQ TBCR extended release tablet Take 1 tablet by mouth daily as needed when taking diuretic. 90 tablet 3    tamsulosin (FLOMAX) 0.4 MG capsule Take 1 capsule by mouth daily 90 capsule 3    apixaban (ELIQUIS) 5 MG TABS tablet TAKE 1 TABLET BY MOUTH TWICE A  tablet 3    oxyBUTYnin (DITROPAN) 5 MG tablet Take 1 tablet by mouth nightly 90 tablet 3    Continuous Blood Gluc  (FREESTYLE KWESI 2 READER) [facility] Use as directed to monitor blood sugar. (Patient not taking: Reported on 4/22/2025) 1 each 3    blood glucose test strips (ASCENSIA AUTODISC VI;ONE TOUCH ULTRA TEST VI) strip 1 each by In Vitro route daily Test as directed,Dispense according to insurance formulary 100 each 3    Lancets MISC 1 each by Does not apply route daily Test as directed, Dispense according to insurance formulary 100 each 3    docusate (COLACE, DULCOLAX) 100 MG CAPS Take 100 mg by mouth daily as needed (constipation)      Blood Glucose Monitoring Suppl (ONETOUCH

## 2025-04-30 ENCOUNTER — TELEPHONE (OUTPATIENT)
Dept: INFECTIOUS DISEASES | Age: 75
End: 2025-04-30

## 2025-04-30 ENCOUNTER — HOSPITAL ENCOUNTER (OUTPATIENT)
Dept: HYPERBARIC MEDICINE | Age: 75
End: 2025-04-30
Payer: MEDICARE

## 2025-04-30 ENCOUNTER — OFFICE VISIT (OUTPATIENT)
Dept: INFECTIOUS DISEASES | Age: 75
End: 2025-04-30
Payer: MEDICARE

## 2025-04-30 VITALS
BODY MASS INDEX: 30.93 KG/M2 | HEART RATE: 87 BPM | SYSTOLIC BLOOD PRESSURE: 111 MMHG | WEIGHT: 254 LBS | OXYGEN SATURATION: 97 % | HEIGHT: 76 IN | DIASTOLIC BLOOD PRESSURE: 59 MMHG | TEMPERATURE: 97 F

## 2025-04-30 DIAGNOSIS — Z95.810 AICD (AUTOMATIC CARDIOVERTER/DEFIBRILLATOR) PRESENT: ICD-10-CM

## 2025-04-30 DIAGNOSIS — E11.69 DIABETIC FOOT ULCER WITH OSTEOMYELITIS (HCC): ICD-10-CM

## 2025-04-30 DIAGNOSIS — M86.9 DIABETIC FOOT ULCER WITH OSTEOMYELITIS (HCC): Primary | ICD-10-CM

## 2025-04-30 DIAGNOSIS — E11.42 DIABETIC POLYNEUROPATHY ASSOCIATED WITH TYPE 2 DIABETES MELLITUS (HCC): ICD-10-CM

## 2025-04-30 DIAGNOSIS — R70.0 ELEVATED ERYTHROCYTE SEDIMENTATION RATE: ICD-10-CM

## 2025-04-30 DIAGNOSIS — I50.22 CHRONIC SYSTOLIC HEART FAILURE (HCC): ICD-10-CM

## 2025-04-30 DIAGNOSIS — M79.89 RIGHT LEG SWELLING: ICD-10-CM

## 2025-04-30 DIAGNOSIS — M86.171 OTHER ACUTE OSTEOMYELITIS OF RIGHT FOOT (HCC): ICD-10-CM

## 2025-04-30 DIAGNOSIS — E11.621 DIABETIC FOOT ULCER WITH OSTEOMYELITIS (HCC): ICD-10-CM

## 2025-04-30 DIAGNOSIS — E11.69 DIABETIC FOOT ULCER WITH OSTEOMYELITIS (HCC): Primary | ICD-10-CM

## 2025-04-30 DIAGNOSIS — L97.509 DIABETIC FOOT ULCER WITH OSTEOMYELITIS (HCC): Primary | ICD-10-CM

## 2025-04-30 DIAGNOSIS — L97.514 RIGHT FOOT ULCER, WITH NECROSIS OF BONE (HCC): ICD-10-CM

## 2025-04-30 DIAGNOSIS — E11.621 DIABETIC FOOT ULCER WITH OSTEOMYELITIS (HCC): Primary | ICD-10-CM

## 2025-04-30 DIAGNOSIS — I73.9 PAD (PERIPHERAL ARTERY DISEASE): ICD-10-CM

## 2025-04-30 DIAGNOSIS — Z79.2 RECEIVING INTRAVENOUS ANTIBIOTIC TREATMENT AS OUTPATIENT: ICD-10-CM

## 2025-04-30 DIAGNOSIS — M86.9 DIABETIC FOOT ULCER WITH OSTEOMYELITIS (HCC): ICD-10-CM

## 2025-04-30 DIAGNOSIS — I70.244 ATHEROSCLEROSIS OF NATIVE ARTERIES OF LEFT LEG WITH ULCERATION OF HEEL AND MIDFOOT (HCC): ICD-10-CM

## 2025-04-30 DIAGNOSIS — L97.509 DIABETIC FOOT ULCER WITH OSTEOMYELITIS (HCC): ICD-10-CM

## 2025-04-30 LAB — ERYTHROCYTE [SEDIMENTATION RATE] IN BLOOD BY WESTERGREN METHOD: 38 MM/HR (ref 0–20)

## 2025-04-30 PROCEDURE — 3074F SYST BP LT 130 MM HG: CPT | Performed by: INTERNAL MEDICINE

## 2025-04-30 PROCEDURE — 3044F HG A1C LEVEL LT 7.0%: CPT | Performed by: INTERNAL MEDICINE

## 2025-04-30 PROCEDURE — 3078F DIAST BP <80 MM HG: CPT | Performed by: INTERNAL MEDICINE

## 2025-04-30 PROCEDURE — 99215 OFFICE O/P EST HI 40 MIN: CPT | Performed by: INTERNAL MEDICINE

## 2025-04-30 PROCEDURE — 1123F ACP DISCUSS/DSCN MKR DOCD: CPT | Performed by: INTERNAL MEDICINE

## 2025-04-30 NOTE — PROGRESS NOTES
Session: 30 min   Housing Stability: Low Risk  (2025)    Housing Stability Vital Sign     Unable to Pay for Housing in the Last Year: No     Number of Times Moved in the Last Year: 0     Homeless in the Last Year: No     Social History     Tobacco Use   Smoking Status Former    Current packs/day: 0.00    Average packs/day: 1 pack/day for 4.0 years (4.0 ttl pk-yrs)    Types: Cigarettes    Start date: 2013    Quit date: 2017    Years since quittin.3    Passive exposure: Past   Smokeless Tobacco Never      Family History   Problem Relation Age of Onset    Hypertension Mother     Heart Disease Father     Heart Attack Father     Hypertension Father     Hypertension Maternal Grandmother     Diabetes Maternal Grandmother     Hypertension Paternal Uncle         REVIEW OF SYSTEMS:      CONSTITUTIONAL:  negative for fevers, chills, diaphoresis, activity change, appetite change, fatigue, night sweats and unexpected weight change.   EYES:  negative for blurred vision, eye discharge, visual disturbance and icterus  HEENT:  negative for hearing loss, tinnitus, ear drainage, sinus pressure, nasal congestion, epistaxis and snoring  RESPIRATORY:  No cough , no sob, no sputum production , no wheeze ,no hemoptysis   CARDIOVASCULAR:  negative for chest pain, palpitations, exertional chest pressure/discomfort, edema, syncope  GASTROINTESTINAL:  negative for nausea, vomiting, diarrhea, constipation, blood in stool and abdominal pain  GENITOURINARY:  negative for frequency, dysuria, urinary incontinence, decreased urine volume, and hematuria  HEMATOLOGIC/LYMPHATIC:  negative for easy bruising, bleeding and lymphadenopathy  ALLERGIC/IMMUNOLOGIC:  negative for recurrent infections, angioedema, anaphylaxis and drug reactions  ENDOCRINE:  negative for weight changes and diabetic symptoms including polyuria, polydipsia and polyphagia  MUSCULOSKELETAL:  Rt foot wound + +, nonhealing ulcer joint swelling, decreased range of

## 2025-05-01 ENCOUNTER — HOSPITAL ENCOUNTER (OUTPATIENT)
Dept: HYPERBARIC MEDICINE | Age: 75
Discharge: HOME | End: 2025-05-01
Payer: MEDICARE

## 2025-05-01 VITALS
DIASTOLIC BLOOD PRESSURE: 73 MMHG | RESPIRATION RATE: 18 BRPM | TEMPERATURE: 97 F | SYSTOLIC BLOOD PRESSURE: 154 MMHG | HEART RATE: 72 BPM

## 2025-05-01 DIAGNOSIS — L97.514 RIGHT FOOT ULCER, WITH NECROSIS OF BONE (HCC): ICD-10-CM

## 2025-05-01 DIAGNOSIS — I70.234 ATHEROSCLEROSIS OF NATIVE ARTERY OF RIGHT LOWER EXTREMITY WITH ULCERATION OF MIDFOOT (HCC): ICD-10-CM

## 2025-05-01 DIAGNOSIS — E11.69 DIABETIC FOOT ULCER WITH OSTEOMYELITIS (HCC): Primary | ICD-10-CM

## 2025-05-01 DIAGNOSIS — M86.9 DIABETIC FOOT ULCER WITH OSTEOMYELITIS (HCC): Primary | ICD-10-CM

## 2025-05-01 DIAGNOSIS — E11.621 DIABETIC FOOT ULCER WITH OSTEOMYELITIS (HCC): Primary | ICD-10-CM

## 2025-05-01 DIAGNOSIS — L97.509 DIABETIC FOOT ULCER WITH OSTEOMYELITIS (HCC): Primary | ICD-10-CM

## 2025-05-01 DIAGNOSIS — L97.514 ULCER OF TOE OF RIGHT FOOT, WITH NECROSIS OF BONE (HCC): ICD-10-CM

## 2025-05-01 LAB
GLUCOSE BLD-MCNC: 115 MG/DL (ref 70–99)
GLUCOSE BLD-MCNC: 205 MG/DL (ref 70–99)
PERFORMED ON: ABNORMAL
PERFORMED ON: ABNORMAL

## 2025-05-01 PROCEDURE — 99183 HYPERBARIC OXYGEN THERAPY: CPT | Performed by: NURSE PRACTITIONER

## 2025-05-01 PROCEDURE — G0277 HBOT, FULL BODY CHAMBER, 30M: HCPCS

## 2025-05-01 ASSESSMENT — PAIN SCALES - GENERAL
PAINLEVEL_OUTOF10: 0
PAINLEVEL_OUTOF10: 0

## 2025-05-01 NOTE — PROGRESS NOTES
HBO PROGRESS NOTE      NAME: Marvin Montero  MEDICAL RECORD NUMBER:  [id number]  AGE: 74 y.o.   GENDER: male  : 1950  EPISODE DATE:  2025     Subjective     HBO Treatment Number: 39 out of Total Treatments: 50    HBO Diagnosis:             Indications: Lower Extremity Diabetic Wound ___(site) (right foot)    Safety checks performed prior to treatment.  See doc flowsheets for documentation.    Objective        Recent Labs     25  1042 25  1256   POCGLU 205* 115*     Pre treatment Vital Signs       Temp: 97.1 °F (36.2 °C)     Pulse: 66     Respirations: 18     BP: (!) 104/52 (notified Aaron Cuadra CNP, patient asymptomatic, okay to proceed with treatment)       Post treatment Vital Signs  Temp: 97 °F (36.1 °C)  Pulse: 72  Respirations: 18  BP: (!) 154/73    Assessment        HBO Diagnosis:   Problem List Items Addressed This Visit       Diabetic foot ulcer with osteomyelitis (HCC) - Primary    Relevant Orders    Notify physician (specify)    Hyperbaric Oxygen Therapy    POCT glucose    Care order/instruction    Care order/instruction    Atherosclerosis of native artery of right lower extremity with ulceration of midfoot (HCC)    Relevant Orders    Notify physician (specify)    Hyperbaric Oxygen Therapy    POCT glucose    Care order/instruction    Care order/instruction    Right foot ulcer, with necrosis of bone (HCC)    Relevant Orders    Notify physician (specify)    Hyperbaric Oxygen Therapy     Other Visit Diagnoses         Ulcer of toe of right foot, with necrosis of bone (HCC)        Relevant Orders    POCT glucose    Care order/instruction    Care order/instruction            Physical Exam:  General Appearance:  alert and oriented to person, place and time, well-developed and well-nourished, in no acute distress    Pre Tympanic Membrane Assessment:  Right: Normal (PE Tubes Visible, per Aaron Cuadra CNP)  Left: Normal (PE Tubes Visible, per Aaron Cuadra CNP)    Post

## 2025-05-02 ENCOUNTER — HOSPITAL ENCOUNTER (OUTPATIENT)
Dept: HYPERBARIC MEDICINE | Age: 75
Discharge: HOME | End: 2025-05-02
Payer: MEDICARE

## 2025-05-02 ENCOUNTER — HOSPITAL ENCOUNTER (OUTPATIENT)
Dept: WOUND CARE | Age: 75
Discharge: HOME | End: 2025-05-02
Attending: EMERGENCY MEDICINE
Payer: MEDICARE

## 2025-05-02 VITALS
SYSTOLIC BLOOD PRESSURE: 147 MMHG | DIASTOLIC BLOOD PRESSURE: 65 MMHG | RESPIRATION RATE: 18 BRPM | HEART RATE: 65 BPM | TEMPERATURE: 97.2 F

## 2025-05-02 VITALS
HEART RATE: 71 BPM | RESPIRATION RATE: 18 BRPM | TEMPERATURE: 97.3 F | SYSTOLIC BLOOD PRESSURE: 113 MMHG | DIASTOLIC BLOOD PRESSURE: 57 MMHG

## 2025-05-02 DIAGNOSIS — R60.0 LOCALIZED EDEMA: ICD-10-CM

## 2025-05-02 DIAGNOSIS — M86.9 DIABETIC FOOT ULCER WITH OSTEOMYELITIS (HCC): Primary | ICD-10-CM

## 2025-05-02 DIAGNOSIS — E11.621 DIABETIC FOOT ULCER WITH OSTEOMYELITIS (HCC): Primary | ICD-10-CM

## 2025-05-02 DIAGNOSIS — L97.509 DIABETIC FOOT ULCER WITH OSTEOMYELITIS (HCC): Primary | ICD-10-CM

## 2025-05-02 DIAGNOSIS — E66.9 TYPE 2 DIABETES MELLITUS WITH OBESITY (HCC): ICD-10-CM

## 2025-05-02 DIAGNOSIS — Z74.09 IMPAIRED MOBILITY: ICD-10-CM

## 2025-05-02 DIAGNOSIS — L97.413 DIABETIC ULCER OF RIGHT MIDFOOT ASSOCIATED WITH TYPE 2 DIABETES MELLITUS, WITH NECROSIS OF MUSCLE (HCC): ICD-10-CM

## 2025-05-02 DIAGNOSIS — E11.69 DIABETIC FOOT ULCER WITH OSTEOMYELITIS (HCC): Primary | ICD-10-CM

## 2025-05-02 DIAGNOSIS — I70.234 ATHEROSCLEROSIS OF NATIVE ARTERY OF RIGHT LOWER EXTREMITY WITH ULCERATION OF MIDFOOT (HCC): Primary | ICD-10-CM

## 2025-05-02 DIAGNOSIS — I87.321 IDIOPATHIC CHRONIC VENOUS HYPERTENSION OF RIGHT LEG WITH INFLAMMATION: ICD-10-CM

## 2025-05-02 DIAGNOSIS — L97.514 RIGHT FOOT ULCER, WITH NECROSIS OF BONE (HCC): ICD-10-CM

## 2025-05-02 DIAGNOSIS — E11.628 TYPE 2 DIABETES MELLITUS WITH RIGHT DIABETIC FOOT INFECTION (HCC): ICD-10-CM

## 2025-05-02 DIAGNOSIS — L97.514 ULCER OF TOE OF RIGHT FOOT, WITH NECROSIS OF BONE (HCC): ICD-10-CM

## 2025-05-02 DIAGNOSIS — L08.9 TYPE 2 DIABETES MELLITUS WITH RIGHT DIABETIC FOOT INFECTION (HCC): ICD-10-CM

## 2025-05-02 DIAGNOSIS — I70.234 ATHEROSCLEROSIS OF NATIVE ARTERY OF RIGHT LOWER EXTREMITY WITH ULCERATION OF MIDFOOT (HCC): ICD-10-CM

## 2025-05-02 DIAGNOSIS — E11.69 TYPE 2 DIABETES MELLITUS WITH OBESITY (HCC): ICD-10-CM

## 2025-05-02 DIAGNOSIS — E11.621 DIABETIC ULCER OF RIGHT MIDFOOT ASSOCIATED WITH TYPE 2 DIABETES MELLITUS, WITH NECROSIS OF MUSCLE (HCC): ICD-10-CM

## 2025-05-02 LAB
GLUCOSE BLD-MCNC: 101 MG/DL (ref 70–99)
GLUCOSE BLD-MCNC: 144 MG/DL (ref 70–99)
GLUCOSE BLD-MCNC: 97 MG/DL (ref 70–99)
PERFORMED ON: ABNORMAL
PERFORMED ON: ABNORMAL
PERFORMED ON: NORMAL

## 2025-05-02 PROCEDURE — G0277 HBOT, FULL BODY CHAMBER, 30M: HCPCS

## 2025-05-02 PROCEDURE — 99212 OFFICE O/P EST SF 10 MIN: CPT

## 2025-05-02 RX ORDER — MUPIROCIN 20 MG/G
OINTMENT TOPICAL ONCE
OUTPATIENT
Start: 2025-05-02 | End: 2025-05-02

## 2025-05-02 RX ORDER — TRIAMCINOLONE ACETONIDE 1 MG/G
OINTMENT TOPICAL ONCE
OUTPATIENT
Start: 2025-05-02 | End: 2025-05-02

## 2025-05-02 RX ORDER — NEOMYCIN/BACITRACIN/POLYMYXINB 3.5-400-5K
OINTMENT (GRAM) TOPICAL ONCE
OUTPATIENT
Start: 2025-05-02 | End: 2025-05-02

## 2025-05-02 RX ORDER — LIDOCAINE 50 MG/G
OINTMENT TOPICAL ONCE
OUTPATIENT
Start: 2025-05-02 | End: 2025-05-02

## 2025-05-02 RX ORDER — GENTAMICIN SULFATE 1 MG/G
OINTMENT TOPICAL ONCE
OUTPATIENT
Start: 2025-05-02 | End: 2025-05-02

## 2025-05-02 RX ORDER — BACITRACIN ZINC AND POLYMYXIN B SULFATE 500; 1000 [USP'U]/G; [USP'U]/G
OINTMENT TOPICAL ONCE
OUTPATIENT
Start: 2025-05-02 | End: 2025-05-02

## 2025-05-02 RX ORDER — SILVER SULFADIAZINE 10 MG/G
CREAM TOPICAL ONCE
OUTPATIENT
Start: 2025-05-02 | End: 2025-05-02

## 2025-05-02 RX ORDER — BETAMETHASONE DIPROPIONATE 0.5 MG/G
CREAM TOPICAL ONCE
OUTPATIENT
Start: 2025-05-02 | End: 2025-05-02

## 2025-05-02 RX ORDER — LIDOCAINE 40 MG/G
CREAM TOPICAL ONCE
OUTPATIENT
Start: 2025-05-02 | End: 2025-05-02

## 2025-05-02 RX ORDER — CLOBETASOL PROPIONATE 0.5 MG/G
OINTMENT TOPICAL ONCE
OUTPATIENT
Start: 2025-05-02 | End: 2025-05-02

## 2025-05-02 RX ORDER — LIDOCAINE HYDROCHLORIDE 20 MG/ML
JELLY TOPICAL ONCE
OUTPATIENT
Start: 2025-05-02 | End: 2025-05-02

## 2025-05-02 RX ORDER — SODIUM CHLOR/HYPOCHLOROUS ACID 0.033 %
SOLUTION, IRRIGATION IRRIGATION ONCE
OUTPATIENT
Start: 2025-05-02 | End: 2025-05-02

## 2025-05-02 RX ORDER — LIDOCAINE HYDROCHLORIDE 40 MG/ML
SOLUTION TOPICAL ONCE
OUTPATIENT
Start: 2025-05-02 | End: 2025-05-02

## 2025-05-02 RX ORDER — GINSENG 100 MG
CAPSULE ORAL ONCE
OUTPATIENT
Start: 2025-05-02 | End: 2025-05-02

## 2025-05-02 ASSESSMENT — PAIN SCALES - GENERAL: PAINLEVEL_OUTOF10: 0

## 2025-05-02 NOTE — PATIENT INSTRUCTIONS
[facility] Wound Care Physician Orders and Discharge Instructions  [facility]  [address], [address]  [place], [place] [address]  Telephone: [phone]      FAX [phone]  MONDAY - THURSDAY 8:00 am - 4:30 pm and Friday 8:00 am - 12:00 pm.        NAME:  Marvin Montero  YOB: 1950  MEDICAL RECORD NUMBER:  [id number]  DATE:  5/2/2025      Return Appointment:  [x] Return Appointment: With Dr Cece Alejandre  in  1 Week(s)   [x] Wound and dressing supply provider: [facility]  [] ECF or Home Healthcare:   [x] Wound Assessment:FRIDAY MAY 2ND- ONLY CHANGE OUTER DRESSING [x] Physician or NP scheduled for Wound Assessment: LOI HUNT CNP  [] Orders placed during your visit:        Important Reminders:   Please wash hands with soap and water before and after every dressing change.  Do not scrub wounds.  Keep wounds dry in shower unless otherwise instructed by the physician.  SMOKING can slow would healing. Stop smoking as soon as possible to improve healing and prevent further complications associated with smoking.      Michelle-Wound Topical Treatments:  Do not apply lotions, creams, or ointments to wound bed unless directed.   [] Apply moisturizing lotion to skin surrounding the wound prior to dressing change.  [] Apply antifungal ointment to skin surrounding the wound prior to dressing change.  [] Apply thin film of no sting moisture barrier ointment to skin immediately around      wound.  [] Other:     **APPLY MEPILEX BORDER TO ABRASION ON RIGHT LOWER LEG**      Wound Location: RIGHT LATERAL TMA   **THERASKIN # 1 APPLIED 4/8/2025**    Wound Cleansing:    Primary Dressing:  [x]  THERASKIN MOISTENED WITH SALINE, CUTIMED SORBACT AND STERI STRIPS - LEAVE IN PLACE  [x] PLAIN FOAM TO ANTERIOR AND LATERAL ANKLE    Secondary Dressing:  [x] DRAWTEX AND OPTILOCK  [x] CAST PADDING, KERLIX AND COBAN ( FOOTBALL DRESSING )      Dressing Frequency:  [] THREE TIMES A WEEK  [x] Do Not Change

## 2025-05-02 NOTE — PROGRESS NOTES
(PE Tubes Visible per Joseline Avila CNP)    Post Tympanic Membrane Assessment:  Left: Normal (PE Tubes Visible; Denies any ear problems)  Right: Normal (PE Tubes Visible; Denies any ear problems)    Pulmonary/Chest:  clear to auscultation bilaterally- no wheezes, rales or rhonchi, normal air movement, no respiratory distress    Cardiovascular:  regular rate and rhythm, no murmurs rubs or gallops    Plan        Patient placed in a full body Monoplace Chamber #: [id number]  Treatment Start Time: 1051     Pressure Reached Time: 1104  MINGO : 2  Number of Air Breaks:  Treatment Status: No Air break     Decompression Time: 1234   Treatment End Time: 1244  Length of Treatment: 90 Minutes  Symptoms Noted During Treatment: None  Total Treatment Time (min): 113    Treatment Completion Status: Treatment completed without issue    I was present on these premises and immediately available to furnish assistance & direction throughout the HBO Treatment.     Marvin Montero is a 74 y.o. male  did successfully complete today's hyperbaric oxygen treatment at [facility] and HBO therapy.    In my clinical judgement, ongoing HBO therapy is  necessary at this time to assist with wound healing, preservation of limb, life, or function.    Supervision and attendance of Hyperbaric Oxygen Therapy provided. Continue HBO treatment as outlined in the treatment plan.    Hyperbaric Oxygen: Mr. Montero tolerated: Treatment Number: 40 without  issue.    Discharge Instructions were explained and given to Mr. Montero     Electronically signed by KENNY Carlisle CNP on 5/2/2025 at 1:16 PM

## 2025-05-05 ENCOUNTER — HOSPITAL ENCOUNTER (OUTPATIENT)
Dept: HYPERBARIC MEDICINE | Age: 75
Discharge: HOME | End: 2025-05-05
Payer: MEDICARE

## 2025-05-05 PROBLEM — E11.65 HYPERGLYCEMIA DUE TO DIABETES MELLITUS (HCC): Status: ACTIVE | Noted: 2025-05-05

## 2025-05-05 LAB
GLUCOSE BLD-MCNC: 436 MG/DL (ref 70–99)
GLUCOSE BLD-MCNC: 439 MG/DL (ref 70–99)
PERFORMED ON: ABNORMAL
PERFORMED ON: ABNORMAL

## 2025-05-05 PROCEDURE — 99212 OFFICE O/P EST SF 10 MIN: CPT

## 2025-05-05 NOTE — PROGRESS NOTES
Mr. Montero' blood sugar was checked upon arrival and found to be 436.  It was rechecked for accuracy and found to be 439.  Patient will not be able to dive today, but will resume treatment tomorrow.

## 2025-05-05 NOTE — PLAN OF CARE
HBO NURSING DOCUMENTATION          [facility]    NAME:  Marvin Montero  YOB: 1950  MEDICAL RECORD NUMBER:  [id number]  DATE:  5/5/2025    Patient's blood sugar checked prior to Hyperbaric dive and it was 436. Rechecked blood sugar to make sure it was accurate and again it was 439. Aaron Cuadra CNP, does not want patient to dive due to high blood sugar and will resume treatment tomorrow.    Electronically signed by Bhavani Hall RN on 5/5/2025 at 10:35 AM

## 2025-05-06 ENCOUNTER — APPOINTMENT (OUTPATIENT)
Dept: HYPERBARIC MEDICINE | Age: 75
End: 2025-05-06
Payer: MEDICARE

## 2025-05-06 ENCOUNTER — APPOINTMENT (OUTPATIENT)
Dept: WOUND CARE | Age: 75
End: 2025-05-06
Attending: EMERGENCY MEDICINE
Payer: MEDICARE

## 2025-05-06 LAB
ALBUMIN SERPL-MCNC: 3.5 G/DL (ref 3.4–5)
ALBUMIN/GLOB SERPL: 1.2 {RATIO} (ref 1.1–2.2)
ALP SERPL-CCNC: 97 U/L (ref 40–129)
ALT SERPL-CCNC: 110 U/L (ref 10–40)
ANION GAP SERPL CALCULATED.3IONS-SCNC: 10 MMOL/L (ref 3–16)
AST SERPL-CCNC: 71 U/L (ref 15–37)
BILIRUB SERPL-MCNC: 0.3 MG/DL (ref 0–1)
BUN SERPL-MCNC: 20 MG/DL (ref 7–20)
CALCIUM SERPL-MCNC: 8.8 MG/DL (ref 8.3–10.6)
CHLORIDE SERPL-SCNC: 108 MMOL/L (ref 99–110)
CO2 SERPL-SCNC: 26 MMOL/L (ref 21–32)
CREAT SERPL-MCNC: 1.3 MG/DL (ref 0.8–1.3)
CRP SERPL-MCNC: 3.5 MG/L (ref 0–5.1)
GFR SERPLBLD CREATININE-BSD FMLA CKD-EPI: 58 ML/MIN/{1.73_M2}
GLUCOSE SERPL-MCNC: 78 MG/DL (ref 70–99)
POTASSIUM SERPL-SCNC: 4.7 MMOL/L (ref 3.5–5.1)
PROT SERPL-MCNC: 6.4 G/DL (ref 6.4–8.2)
REASON FOR REJECTION: NORMAL
REJECTED TEST: NORMAL
SODIUM SERPL-SCNC: 144 MMOL/L (ref 136–145)

## 2025-05-07 ENCOUNTER — HOSPITAL ENCOUNTER (OUTPATIENT)
Dept: WOUND CARE | Age: 75
Discharge: HOME | End: 2025-05-07
Attending: EMERGENCY MEDICINE
Payer: MEDICARE

## 2025-05-07 ENCOUNTER — HOSPITAL ENCOUNTER (OUTPATIENT)
Dept: HYPERBARIC MEDICINE | Age: 75
Discharge: HOME | End: 2025-05-07
Payer: MEDICARE

## 2025-05-07 VITALS
HEART RATE: 77 BPM | TEMPERATURE: 97 F | RESPIRATION RATE: 16 BRPM | DIASTOLIC BLOOD PRESSURE: 74 MMHG | SYSTOLIC BLOOD PRESSURE: 153 MMHG

## 2025-05-07 VITALS
TEMPERATURE: 97.6 F | RESPIRATION RATE: 18 BRPM | HEART RATE: 79 BPM | SYSTOLIC BLOOD PRESSURE: 129 MMHG | DIASTOLIC BLOOD PRESSURE: 61 MMHG

## 2025-05-07 DIAGNOSIS — E11.628 TYPE 2 DIABETES MELLITUS WITH RIGHT DIABETIC FOOT INFECTION (HCC): ICD-10-CM

## 2025-05-07 DIAGNOSIS — L97.514 ULCER OF TOE OF RIGHT FOOT, WITH NECROSIS OF BONE (HCC): ICD-10-CM

## 2025-05-07 DIAGNOSIS — E11.621 DIABETIC FOOT ULCER WITH OSTEOMYELITIS (HCC): Primary | ICD-10-CM

## 2025-05-07 DIAGNOSIS — Z74.09 IMPAIRED MOBILITY: ICD-10-CM

## 2025-05-07 DIAGNOSIS — L97.509 DIABETIC FOOT ULCER WITH OSTEOMYELITIS (HCC): Primary | ICD-10-CM

## 2025-05-07 DIAGNOSIS — E11.69 TYPE 2 DIABETES MELLITUS WITH OBESITY (HCC): ICD-10-CM

## 2025-05-07 DIAGNOSIS — I70.234 ATHEROSCLEROSIS OF NATIVE ARTERY OF RIGHT LOWER EXTREMITY WITH ULCERATION OF MIDFOOT (HCC): ICD-10-CM

## 2025-05-07 DIAGNOSIS — I70.234 ATHEROSCLEROSIS OF NATIVE ARTERY OF RIGHT LOWER EXTREMITY WITH ULCERATION OF MIDFOOT (HCC): Primary | ICD-10-CM

## 2025-05-07 DIAGNOSIS — L97.514 RIGHT FOOT ULCER, WITH NECROSIS OF BONE (HCC): ICD-10-CM

## 2025-05-07 DIAGNOSIS — L97.413 DIABETIC ULCER OF RIGHT MIDFOOT ASSOCIATED WITH TYPE 2 DIABETES MELLITUS, WITH NECROSIS OF MUSCLE (HCC): ICD-10-CM

## 2025-05-07 DIAGNOSIS — E11.69 DIABETIC FOOT ULCER WITH OSTEOMYELITIS (HCC): Primary | ICD-10-CM

## 2025-05-07 DIAGNOSIS — L08.9 TYPE 2 DIABETES MELLITUS WITH RIGHT DIABETIC FOOT INFECTION (HCC): ICD-10-CM

## 2025-05-07 DIAGNOSIS — M86.9 DIABETIC FOOT ULCER WITH OSTEOMYELITIS (HCC): Primary | ICD-10-CM

## 2025-05-07 DIAGNOSIS — I87.321 IDIOPATHIC CHRONIC VENOUS HYPERTENSION OF RIGHT LEG WITH INFLAMMATION: ICD-10-CM

## 2025-05-07 DIAGNOSIS — E11.621 DIABETIC ULCER OF RIGHT MIDFOOT ASSOCIATED WITH TYPE 2 DIABETES MELLITUS, WITH NECROSIS OF MUSCLE (HCC): ICD-10-CM

## 2025-05-07 DIAGNOSIS — E66.9 TYPE 2 DIABETES MELLITUS WITH OBESITY (HCC): ICD-10-CM

## 2025-05-07 DIAGNOSIS — R60.0 LOCALIZED EDEMA: ICD-10-CM

## 2025-05-07 LAB
GLUCOSE BLD-MCNC: 135 MG/DL (ref 70–99)
GLUCOSE BLD-MCNC: 98 MG/DL (ref 70–99)
PERFORMED ON: ABNORMAL
PERFORMED ON: NORMAL

## 2025-05-07 PROCEDURE — G0277 HBOT, FULL BODY CHAMBER, 30M: HCPCS

## 2025-05-07 PROCEDURE — 15271 SKIN SUB GRAFT TRNK/ARM/LEG: CPT | Performed by: SURGERY

## 2025-05-07 PROCEDURE — 15276 SKIN SUB GRAFT F/N/HF/G ADDL: CPT

## 2025-05-07 PROCEDURE — 15272 SKIN SUB GRAFT T/A/L ADD-ON: CPT | Performed by: SURGERY

## 2025-05-07 PROCEDURE — 99183 HYPERBARIC OXYGEN THERAPY: CPT | Performed by: SURGERY

## 2025-05-07 PROCEDURE — 15275 SKIN SUB GRAFT FACE/NK/HF/G: CPT

## 2025-05-07 RX ORDER — LIDOCAINE 40 MG/G
CREAM TOPICAL ONCE
OUTPATIENT
Start: 2025-05-07 | End: 2025-05-07

## 2025-05-07 RX ORDER — LIDOCAINE HYDROCHLORIDE 40 MG/ML
SOLUTION TOPICAL ONCE
OUTPATIENT
Start: 2025-05-07 | End: 2025-05-07

## 2025-05-07 RX ORDER — SILVER SULFADIAZINE 10 MG/G
CREAM TOPICAL ONCE
OUTPATIENT
Start: 2025-05-07 | End: 2025-05-07

## 2025-05-07 RX ORDER — MUPIROCIN 20 MG/G
OINTMENT TOPICAL ONCE
OUTPATIENT
Start: 2025-05-07 | End: 2025-05-07

## 2025-05-07 RX ORDER — LIDOCAINE HYDROCHLORIDE 20 MG/ML
JELLY TOPICAL ONCE
OUTPATIENT
Start: 2025-05-07 | End: 2025-05-07

## 2025-05-07 RX ORDER — SODIUM CHLOR/HYPOCHLOROUS ACID 0.033 %
SOLUTION, IRRIGATION IRRIGATION ONCE
OUTPATIENT
Start: 2025-05-07 | End: 2025-05-07

## 2025-05-07 RX ORDER — GINSENG 100 MG
CAPSULE ORAL ONCE
OUTPATIENT
Start: 2025-05-07 | End: 2025-05-07

## 2025-05-07 RX ORDER — NEOMYCIN/BACITRACIN/POLYMYXINB 3.5-400-5K
OINTMENT (GRAM) TOPICAL ONCE
OUTPATIENT
Start: 2025-05-07 | End: 2025-05-07

## 2025-05-07 RX ORDER — LIDOCAINE 50 MG/G
OINTMENT TOPICAL ONCE
OUTPATIENT
Start: 2025-05-07 | End: 2025-05-07

## 2025-05-07 RX ORDER — GENTAMICIN SULFATE 1 MG/G
OINTMENT TOPICAL ONCE
OUTPATIENT
Start: 2025-05-07 | End: 2025-05-07

## 2025-05-07 RX ORDER — BACITRACIN ZINC AND POLYMYXIN B SULFATE 500; 1000 [USP'U]/G; [USP'U]/G
OINTMENT TOPICAL ONCE
OUTPATIENT
Start: 2025-05-07 | End: 2025-05-07

## 2025-05-07 RX ORDER — BETAMETHASONE DIPROPIONATE 0.5 MG/G
CREAM TOPICAL ONCE
OUTPATIENT
Start: 2025-05-07 | End: 2025-05-07

## 2025-05-07 RX ORDER — CLOBETASOL PROPIONATE 0.5 MG/G
OINTMENT TOPICAL ONCE
OUTPATIENT
Start: 2025-05-07 | End: 2025-05-07

## 2025-05-07 RX ORDER — TRIAMCINOLONE ACETONIDE 1 MG/G
OINTMENT TOPICAL ONCE
OUTPATIENT
Start: 2025-05-07 | End: 2025-05-07

## 2025-05-07 RX ORDER — LIDOCAINE HYDROCHLORIDE 40 MG/ML
SOLUTION TOPICAL ONCE
Status: COMPLETED | OUTPATIENT
Start: 2025-05-07 | End: 2025-05-07

## 2025-05-07 RX ADMIN — LIDOCAINE HYDROCHLORIDE: 40 SOLUTION TOPICAL at 09:33

## 2025-05-07 ASSESSMENT — PAIN SCALES - GENERAL
PAINLEVEL_OUTOF10: 0

## 2025-05-07 NOTE — PROGRESS NOTES
HBO PROGRESS NOTE      NAME: Marvin Montero  MEDICAL RECORD NUMBER:  [id number]  AGE: 74 y.o.   GENDER: male  : 1950  EPISODE DATE:  2025     Subjective     HBO Treatment Number: 41 out of Total Treatments: 50    HBO Diagnosis:             Indications: Lower Extremity Diabetic Wound ___(site) (right foot)    Safety checks performed prior to treatment.  See doc flowsheets for documentation.    Objective        Recent Labs     25  1012 25  1247   POCGLU 135* 98     Pre treatment Vital Signs       Temp: 97.4 °F (36.3 °C)     Pulse: 72     Respirations: 20     BP: (!) 142/62       Post treatment Vital Signs  Temp: 97 °F (36.1 °C)  Pulse: 77  Respirations: 16  BP: (!) 153/74    Assessment        HBO Diagnosis:   Problem List Items Addressed This Visit          Circulatory    Atherosclerosis of native artery of right lower extremity with ulceration of midfoot (HCC)    Relevant Orders    Notify physician (specify)    Hyperbaric Oxygen Therapy    POCT glucose    Care order/instruction    Care order/instruction       Endocrine    Diabetic foot ulcer with osteomyelitis (HCC) - Primary    Relevant Orders    Notify physician (specify)    Hyperbaric Oxygen Therapy    POCT glucose    Care order/instruction    Care order/instruction       Musculoskeletal and Integument    Right foot ulcer, with necrosis of bone (HCC)    Relevant Orders    Notify physician (specify)    Hyperbaric Oxygen Therapy     Other Visit Diagnoses         Ulcer of toe of right foot, with necrosis of bone (HCC)        Relevant Orders    POCT glucose    Care order/instruction    Care order/instruction            Physical Exam        General Appearance:  alert and oriented to person, place and time, well-developed and well-nourished, in no acute distress    Pre Tympanic Membrane Assessment:  Right: Normal (per Dr. Joann MD)  Left: Normal (per Dr. Joann MD)    Post Tympanic Membrane Assessment:  Left: Normal (PE tube visible,

## 2025-05-07 NOTE — PROGRESS NOTES
he has had admission to the hospital.  He developed an abscess to his right foot in April 2024.  At that time he had an extensive workup done including vascular evaluation.  Patient has noted decreased blood flow to right lower extremity and has an appointment with Dr. Christy.  He is status post TMA and surgical debridement and has been followed by podiatry since then.  Unfortunately has not achieved healing and his wife feels that sexually getting much worse.  Patient has been using Santyl to the area.  They have come here as a self-referral for second opinion.  Pertinent associated symptoms: drainage , redness, swelling, induration, and impaired mobility     Medical Decision Making:      Historian(s): patient .  74-year-old male with a nonhealing right diabetic foot ulcer status post TMA and surgical debridement for abscess 4/2024.  Coffey grade 3.  Wound as below present since the surgery.     Comorbid conditions affecting wound healing: As noted in PMH and PSH which was reviewed.  Pertinent labs reviewed.   Review of medical records and external note (s) from other providers was done for this visit.     Problem List Items Addressed This Visit                  Circulatory     Idiopathic chronic venous hypertension of right leg with inflammation     Relevant Orders     Initiate Outpatient Wound Care Protocol     Atherosclerosis of native artery of right lower extremity with ulceration of midfoot (HCC) - Primary     Relevant Orders     Initiate Outpatient Wound Care Protocol          Endocrine     Diabetic ulcer of right midfoot associated with type 2 diabetes mellitus, with necrosis of muscle (HCC)     Relevant Orders     Initiate Outpatient Wound Care Protocol     Type 2 diabetes mellitus with right diabetic foot infection (HCC)     Relevant Orders     Initiate Outpatient Wound Care Protocol     Type 2 diabetes mellitus with obesity (HCC)     Relevant Orders     Initiate Outpatient Wound Care Protocol

## 2025-05-07 NOTE — PLAN OF CARE
TheraSkin Treatment Note    NAME:  Marvin Montero  YOB: 1950  MEDICAL RECORD NUMBER:  [id number]  DATE:  5/7/2025    Goal:  Patient will receive safe and proper application of skin substitute.  Patient will comply with caring for dressing, offloading and reporting complications.     Expiration date of TheraSkin checked immediately prior to use.  Package intact prior to use and no damage noted.  Transport temperature controlled and acceptable.  TheraSkin was prepared for application by removing from package. TheraSkin was rinsed 2 times in room temperature normal saline for 2 minutes each time. A 2nd saline rinse was left on the TheraSkin until the physician was ready to apply it within 120 minutes of thawing. White side goes against ulcer bed.  Graft may be thawed and soaked in its inner pouch.Place sterile solution normal saline of lactated ringer in the inner pouch and completely submerge the graft. Do not allow the solution to exceed 42 degree C. To thaw for 2 minutes. TheraSkin until the physician was ready to apply must be within 120 minutes of thawing. Replace the sterile solution in the inner pouch between the thaw and soak. Remove the double mesh lining prior to applying to patient.  TheraSkin was applied to right foot and affixed with steri-strips by the physician.  CUTIMED SORBACT was applied on top of non-adherent dressing.  Dressing was secured with cover roll type tape to stabilize graft.  Coban or ace wrap was applied to secure graft and decrease edema.  The patient/caregiver was instructed not to remove the dressing and to keep it clean and dry.  The patient/family/caregiver was instructed on the need for offloading and elevation of the affected extremity and on using the prescribed offloading device.  The patient/family/caregiver was instructed on signs and symptoms of complication to report, such as draining through dressing, dressing falling down/slipping, getting wet, or severe

## 2025-05-07 NOTE — PLAN OF CARE
HBO NURSING DOCUMENTATION          [facility]    NAME:  Marvin Montero  YOB: 1950  MEDICAL RECORD NUMBER:  [id number]  DATE:  5/7/2025    Patient seen by Dr. Joann MD for follow up of Right lateral foot wound, patient evaluated for HBO at this time. Noted BS at end of visit was 135, per Dr. Joann MD patient was given 8oz glucerna. Per MD patient does not require recheck prior to HBO after taking glucerna, patient has met the required BS greater than 130. Per MD patient should be sent in with a second glucerna and instructed to drink this throughout treatment. HBO therapy initiated at 1055, patient given second glucerna for treatment and verbalized understanding to continue to drink throughout treatment. VSS, no other questions or concerns expressed at this time. Safety checks complete.     Electronically signed by Faith Guerrier RN on 5/7/2025 at 11:00 AM

## 2025-05-07 NOTE — PATIENT INSTRUCTIONS
[facility] Wound Care Physician Orders and Discharge Instructions  [facility]  [address], [address]  [place], [place] [address]  Telephone: [phone]      FAX [phone]  MONDAY - THURSDAY 8:00 am - 4:30 pm and Friday 8:00 am - 12:00 pm.        NAME:  Marvin Montero  YOB: 1950  MEDICAL RECORD NUMBER:  [id number]  DATE:  5/7/2025      Return Appointment:  [x] Return Appointment: With Dr Cece Alejandre  in  1 Week(s)   [x] Wound and dressing supply provider: [facility]  [] ECF or Home Healthcare:   [x] Wound Assessment:FRIDAY MAY 2ND- ONLY CHANGE OUTER DRESSING [x] Physician or NP scheduled for Wound Assessment: LOI HUNT CNP  [] Orders placed during your visit:        Important Reminders:   Please wash hands with soap and water before and after every dressing change.  Do not scrub wounds.  Keep wounds dry in shower unless otherwise instructed by the physician.  SMOKING can slow would healing. Stop smoking as soon as possible to improve healing and prevent further complications associated with smoking.      Michelle-Wound Topical Treatments:  Do not apply lotions, creams, or ointments to wound bed unless directed.   [] Apply moisturizing lotion to skin surrounding the wound prior to dressing change.  [] Apply antifungal ointment to skin surrounding the wound prior to dressing change.  [] Apply thin film of no sting moisture barrier ointment to skin immediately around      wound.  [] Other:     **APPLY MEPILEX BORDER TO ABRASION ON RIGHT LOWER LEG**    **ANTIBIOTICS PER DR BOOTH**      Wound Location: RIGHT LATERAL TMA   **THERASKIN # 2 APPLIED 5/7/2025**    Wound Cleansing:    Primary Dressing:  [x]  THERASKIN MOISTENED WITH SALINE, STERI STRIPS, CUTIMED SORBACT AND STERI STRIPS - LEAVE IN PLACE  [x] PLAIN FOAM TO ANTERIOR AND LATERAL ANKLE    Secondary Dressing:  [x] DRAWTEX AND OPTILOCK  [x] CAST PADDING, KERLIX AND COBAN ( FOOTBALL DRESSING )      Dressing

## 2025-05-08 ENCOUNTER — HOSPITAL ENCOUNTER (OUTPATIENT)
Dept: HYPERBARIC MEDICINE | Age: 75
Discharge: HOME | End: 2025-05-08
Payer: MEDICARE

## 2025-05-08 VITALS
SYSTOLIC BLOOD PRESSURE: 153 MMHG | DIASTOLIC BLOOD PRESSURE: 80 MMHG | TEMPERATURE: 97.5 F | RESPIRATION RATE: 16 BRPM | HEART RATE: 64 BPM

## 2025-05-08 DIAGNOSIS — L97.509 DIABETIC FOOT ULCER WITH OSTEOMYELITIS (HCC): Primary | ICD-10-CM

## 2025-05-08 DIAGNOSIS — L97.514 ULCER OF TOE OF RIGHT FOOT, WITH NECROSIS OF BONE (HCC): ICD-10-CM

## 2025-05-08 DIAGNOSIS — L97.514 RIGHT FOOT ULCER, WITH NECROSIS OF BONE (HCC): ICD-10-CM

## 2025-05-08 DIAGNOSIS — M86.9 DIABETIC FOOT ULCER WITH OSTEOMYELITIS (HCC): Primary | ICD-10-CM

## 2025-05-08 DIAGNOSIS — I70.234 ATHEROSCLEROSIS OF NATIVE ARTERY OF RIGHT LOWER EXTREMITY WITH ULCERATION OF MIDFOOT (HCC): ICD-10-CM

## 2025-05-08 DIAGNOSIS — E11.69 DIABETIC FOOT ULCER WITH OSTEOMYELITIS (HCC): Primary | ICD-10-CM

## 2025-05-08 DIAGNOSIS — E11.621 DIABETIC FOOT ULCER WITH OSTEOMYELITIS (HCC): Primary | ICD-10-CM

## 2025-05-08 LAB
ALBUMIN SERPL-MCNC: 3.5 G/DL (ref 3.4–5)
ALBUMIN/GLOB SERPL: 1.3 {RATIO} (ref 1.1–2.2)
ALP SERPL-CCNC: 92 U/L (ref 40–129)
ALT SERPL-CCNC: 123 U/L (ref 10–40)
ANION GAP SERPL CALCULATED.3IONS-SCNC: 8 MMOL/L (ref 3–16)
AST SERPL-CCNC: 118 U/L (ref 15–37)
BASOPHILS # BLD: 0 K/UL (ref 0–0.2)
BASOPHILS NFR BLD: 1.2 %
BILIRUB SERPL-MCNC: 0.3 MG/DL (ref 0–1)
BUN SERPL-MCNC: 17 MG/DL (ref 7–20)
CALCIUM SERPL-MCNC: 8.9 MG/DL (ref 8.3–10.6)
CHLORIDE SERPL-SCNC: 107 MMOL/L (ref 99–110)
CO2 SERPL-SCNC: 26 MMOL/L (ref 21–32)
CREAT SERPL-MCNC: 1.2 MG/DL (ref 0.8–1.3)
CRP SERPL-MCNC: 4 MG/L (ref 0–5.1)
DEPRECATED RDW RBC AUTO: 15.1 % (ref 12.4–15.4)
EOSINOPHIL # BLD: 0.2 K/UL (ref 0–0.6)
EOSINOPHIL NFR BLD: 4.8 %
ERYTHROCYTE [SEDIMENTATION RATE] IN BLOOD BY WESTERGREN METHOD: 31 MM/HR (ref 0–20)
GFR SERPLBLD CREATININE-BSD FMLA CKD-EPI: 64 ML/MIN/{1.73_M2}
GLUCOSE BLD-MCNC: 128 MG/DL (ref 70–99)
GLUCOSE BLD-MCNC: 156 MG/DL (ref 70–99)
GLUCOSE BLD-MCNC: 87 MG/DL (ref 70–99)
GLUCOSE SERPL-MCNC: 86 MG/DL (ref 70–99)
HCT VFR BLD AUTO: 35.5 % (ref 40.5–52.5)
HGB BLD-MCNC: 11.5 G/DL (ref 13.5–17.5)
LYMPHOCYTES # BLD: 1.2 K/UL (ref 1–5.1)
LYMPHOCYTES NFR BLD: 30 %
MCH RBC QN AUTO: 28.1 PG (ref 26–34)
MCHC RBC AUTO-ENTMCNC: 32.4 G/DL (ref 31–36)
MCV RBC AUTO: 86.7 FL (ref 80–100)
MONOCYTES # BLD: 0.4 K/UL (ref 0–1.3)
MONOCYTES NFR BLD: 10.8 %
NEUTROPHILS # BLD: 2.1 K/UL (ref 1.7–7.7)
NEUTROPHILS NFR BLD: 53.2 %
PERFORMED ON: ABNORMAL
PERFORMED ON: ABNORMAL
PERFORMED ON: NORMAL
PLATELET # BLD AUTO: 158 K/UL (ref 135–450)
PMV BLD AUTO: 10.2 FL (ref 5–10.5)
POTASSIUM SERPL-SCNC: 5 MMOL/L (ref 3.5–5.1)
PROT SERPL-MCNC: 6.2 G/DL (ref 6.4–8.2)
RBC # BLD AUTO: 4.1 M/UL (ref 4.2–5.9)
SODIUM SERPL-SCNC: 141 MMOL/L (ref 136–145)
WBC # BLD AUTO: 3.9 K/UL (ref 4–11)

## 2025-05-08 PROCEDURE — G0277 HBOT, FULL BODY CHAMBER, 30M: HCPCS

## 2025-05-08 PROCEDURE — 99183 HYPERBARIC OXYGEN THERAPY: CPT | Performed by: NURSE PRACTITIONER

## 2025-05-08 ASSESSMENT — PAIN SCALES - GENERAL
PAINLEVEL_OUTOF10: 0
PAINLEVEL_OUTOF10: 0

## 2025-05-08 NOTE — PROGRESS NOTES
HBO PROGRESS NOTE      NAME: Marvin Montero  MEDICAL RECORD NUMBER:  [id number]  AGE: 74 y.o.   GENDER: male  : 1950  EPISODE DATE:  2025     Subjective     HBO Treatment Number: 42 out of Total Treatments: 50    HBO Diagnosis:             Indications: Lower Extremity Diabetic Wound ___(site) (right foot)    Safety checks performed prior to treatment.  See doc flowsheets for documentation.    Objective        Recent Labs     25  1047 25  1242   POCGLU 156* 87     Pre treatment Vital Signs       Temp: 97.2 °F (36.2 °C)     Pulse: 93     Respirations: 18     BP: 138/71       Post treatment Vital Signs  Temp: 97.5 °F (36.4 °C)  Pulse: 64  Respirations: 16  BP: (!) 153/80    Assessment        HBO Diagnosis:   Problem List Items Addressed This Visit       Diabetic foot ulcer with osteomyelitis (HCC) - Primary    Relevant Orders    Notify physician (specify)    Hyperbaric Oxygen Therapy    POCT glucose    Care order/instruction    Atherosclerosis of native artery of right lower extremity with ulceration of midfoot (HCC)    Relevant Orders    Notify physician (specify)    Hyperbaric Oxygen Therapy    POCT glucose    Care order/instruction    Right foot ulcer, with necrosis of bone (HCC)    Relevant Orders    Notify physician (specify)    Hyperbaric Oxygen Therapy     Other Visit Diagnoses         Ulcer of toe of right foot, with necrosis of bone (HCC)        Relevant Orders    POCT glucose    Care order/instruction            Physical Exam:  General Appearance:  alert and oriented to person, place and time, well-developed and well-nourished, in no acute distress    Pre Tympanic Membrane Assessment:  Right: Normal (PE tube visible, per Aaron Cuadra CNP)  Left: Normal (PE tube visible, per Aaron Cuadra, CNP)    Post Tympanic Membrane Assessment:  Left: Normal (denies ear problems, PE tube visible)  Right: Normal (denies ear problems, PE tube visible)    Pulmonary/Chest:  clear to

## 2025-05-09 ENCOUNTER — HOSPITAL ENCOUNTER (OUTPATIENT)
Dept: HYPERBARIC MEDICINE | Age: 75
Discharge: HOME | End: 2025-05-09
Payer: MEDICARE

## 2025-05-09 ENCOUNTER — HOSPITAL ENCOUNTER (OUTPATIENT)
Dept: WOUND CARE | Age: 75
Discharge: HOME | End: 2025-05-09
Attending: EMERGENCY MEDICINE
Payer: MEDICARE

## 2025-05-09 VITALS
TEMPERATURE: 97.2 F | DIASTOLIC BLOOD PRESSURE: 60 MMHG | SYSTOLIC BLOOD PRESSURE: 131 MMHG | RESPIRATION RATE: 18 BRPM | HEART RATE: 61 BPM

## 2025-05-09 DIAGNOSIS — L97.424 DIABETIC ULCER OF LEFT MIDFOOT ASSOCIATED WITH TYPE 2 DIABETES MELLITUS, WITH NECROSIS OF BONE (HCC): ICD-10-CM

## 2025-05-09 DIAGNOSIS — E11.621 DIABETIC ULCER OF LEFT MIDFOOT ASSOCIATED WITH TYPE 2 DIABETES MELLITUS, WITH NECROSIS OF BONE (HCC): ICD-10-CM

## 2025-05-09 DIAGNOSIS — E11.69 TYPE 2 DIABETES MELLITUS WITH OBESITY (HCC): ICD-10-CM

## 2025-05-09 DIAGNOSIS — L97.514 ULCER OF TOE OF RIGHT FOOT, WITH NECROSIS OF BONE (HCC): ICD-10-CM

## 2025-05-09 DIAGNOSIS — E11.621 DIABETIC ULCER OF RIGHT MIDFOOT ASSOCIATED WITH TYPE 2 DIABETES MELLITUS, WITH NECROSIS OF MUSCLE (HCC): Primary | ICD-10-CM

## 2025-05-09 DIAGNOSIS — E11.621 DIABETIC FOOT ULCER WITH OSTEOMYELITIS (HCC): ICD-10-CM

## 2025-05-09 DIAGNOSIS — E66.9 TYPE 2 DIABETES MELLITUS WITH OBESITY (HCC): ICD-10-CM

## 2025-05-09 DIAGNOSIS — L97.413 DIABETIC ULCER OF RIGHT MIDFOOT ASSOCIATED WITH TYPE 2 DIABETES MELLITUS, WITH NECROSIS OF MUSCLE (HCC): Primary | ICD-10-CM

## 2025-05-09 DIAGNOSIS — E11.628 TYPE 2 DIABETES MELLITUS WITH RIGHT DIABETIC FOOT INFECTION (HCC): ICD-10-CM

## 2025-05-09 DIAGNOSIS — M86.9 DIABETIC FOOT ULCER WITH OSTEOMYELITIS (HCC): ICD-10-CM

## 2025-05-09 DIAGNOSIS — I87.321 IDIOPATHIC CHRONIC VENOUS HYPERTENSION OF RIGHT LEG WITH INFLAMMATION: ICD-10-CM

## 2025-05-09 DIAGNOSIS — I70.234 ATHEROSCLEROSIS OF NATIVE ARTERY OF RIGHT LOWER EXTREMITY WITH ULCERATION OF MIDFOOT (HCC): ICD-10-CM

## 2025-05-09 DIAGNOSIS — L97.509 DIABETIC FOOT ULCER WITH OSTEOMYELITIS (HCC): ICD-10-CM

## 2025-05-09 DIAGNOSIS — E11.69 DIABETIC FOOT ULCER WITH OSTEOMYELITIS (HCC): ICD-10-CM

## 2025-05-09 DIAGNOSIS — R60.0 LOCALIZED EDEMA: ICD-10-CM

## 2025-05-09 DIAGNOSIS — L97.514 RIGHT FOOT ULCER, WITH NECROSIS OF BONE (HCC): ICD-10-CM

## 2025-05-09 DIAGNOSIS — L08.9 TYPE 2 DIABETES MELLITUS WITH RIGHT DIABETIC FOOT INFECTION (HCC): ICD-10-CM

## 2025-05-09 DIAGNOSIS — Z74.09 IMPAIRED MOBILITY: ICD-10-CM

## 2025-05-09 LAB
GLUCOSE BLD-MCNC: 112 MG/DL (ref 70–99)
GLUCOSE BLD-MCNC: 120 MG/DL (ref 70–99)
GLUCOSE BLD-MCNC: 127 MG/DL (ref 70–99)
GLUCOSE BLD-MCNC: 140 MG/DL (ref 70–99)
GLUCOSE BLD-MCNC: 97 MG/DL (ref 70–99)
PERFORMED ON: ABNORMAL
PERFORMED ON: NORMAL

## 2025-05-09 PROCEDURE — 99213 OFFICE O/P EST LOW 20 MIN: CPT

## 2025-05-09 PROCEDURE — G0277 HBOT, FULL BODY CHAMBER, 30M: HCPCS

## 2025-05-09 RX ORDER — SILVER SULFADIAZINE 10 MG/G
CREAM TOPICAL ONCE
OUTPATIENT
Start: 2025-05-09 | End: 2025-05-09

## 2025-05-09 RX ORDER — BETAMETHASONE DIPROPIONATE 0.5 MG/G
CREAM TOPICAL ONCE
OUTPATIENT
Start: 2025-05-09 | End: 2025-05-09

## 2025-05-09 RX ORDER — LIDOCAINE HYDROCHLORIDE 40 MG/ML
SOLUTION TOPICAL ONCE
OUTPATIENT
Start: 2025-05-09 | End: 2025-05-09

## 2025-05-09 RX ORDER — TRIAMCINOLONE ACETONIDE 1 MG/G
OINTMENT TOPICAL ONCE
OUTPATIENT
Start: 2025-05-09 | End: 2025-05-09

## 2025-05-09 RX ORDER — MUPIROCIN 20 MG/G
OINTMENT TOPICAL ONCE
OUTPATIENT
Start: 2025-05-09 | End: 2025-05-09

## 2025-05-09 RX ORDER — BACITRACIN ZINC AND POLYMYXIN B SULFATE 500; 1000 [USP'U]/G; [USP'U]/G
OINTMENT TOPICAL ONCE
OUTPATIENT
Start: 2025-05-09 | End: 2025-05-09

## 2025-05-09 RX ORDER — SODIUM CHLOR/HYPOCHLOROUS ACID 0.033 %
SOLUTION, IRRIGATION IRRIGATION ONCE
OUTPATIENT
Start: 2025-05-09 | End: 2025-05-09

## 2025-05-09 RX ORDER — CLOBETASOL PROPIONATE 0.5 MG/G
OINTMENT TOPICAL ONCE
OUTPATIENT
Start: 2025-05-09 | End: 2025-05-09

## 2025-05-09 RX ORDER — LIDOCAINE 50 MG/G
OINTMENT TOPICAL ONCE
OUTPATIENT
Start: 2025-05-09 | End: 2025-05-09

## 2025-05-09 RX ORDER — LIDOCAINE 40 MG/G
CREAM TOPICAL ONCE
OUTPATIENT
Start: 2025-05-09 | End: 2025-05-09

## 2025-05-09 RX ORDER — LIDOCAINE HYDROCHLORIDE 20 MG/ML
JELLY TOPICAL ONCE
OUTPATIENT
Start: 2025-05-09 | End: 2025-05-09

## 2025-05-09 RX ORDER — GENTAMICIN SULFATE 1 MG/G
OINTMENT TOPICAL ONCE
OUTPATIENT
Start: 2025-05-09 | End: 2025-05-09

## 2025-05-09 RX ORDER — NEOMYCIN/BACITRACIN/POLYMYXINB 3.5-400-5K
OINTMENT (GRAM) TOPICAL ONCE
OUTPATIENT
Start: 2025-05-09 | End: 2025-05-09

## 2025-05-09 RX ORDER — GINSENG 100 MG
CAPSULE ORAL ONCE
OUTPATIENT
Start: 2025-05-09 | End: 2025-05-09

## 2025-05-09 ASSESSMENT — PAIN SCALES - GENERAL
PAINLEVEL_OUTOF10: 0
PAINLEVEL_OUTOF10: 0

## 2025-05-09 NOTE — PATIENT INSTRUCTIONS
[facility] Wound Care Physician Orders and Discharge Instructions  [facility]  [address], [address]  [place], [place] [address]  Telephone: [phone]      FAX [phone]  MONDAY - THURSDAY 8:00 am - 4:30 pm and Friday 8:00 am - 12:00 pm.        NAME:  Marvin Montero  YOB: 1950  MEDICAL RECORD NUMBER:  [id number]  DATE:  5/9/2025      Return Appointment:  [x] Return Appointment: With Dr Cece Alejandre  in  1 Week(s)   [x] Wound and dressing supply provider: [facility]  [] ECF or Home Healthcare:   [x] Wound Assessment:FRIDAY MAY 2ND- ONLY CHANGE OUTER DRESSING [x] Physician or NP scheduled for Wound Assessment: LOI HUNT CNP  [] Orders placed during your visit:        Important Reminders:   Please wash hands with soap and water before and after every dressing change.  Do not scrub wounds.  Keep wounds dry in shower unless otherwise instructed by the physician.  SMOKING can slow would healing. Stop smoking as soon as possible to improve healing and prevent further complications associated with smoking.      Michelle-Wound Topical Treatments:  Do not apply lotions, creams, or ointments to wound bed unless directed.   [] Apply moisturizing lotion to skin surrounding the wound prior to dressing change.  [] Apply antifungal ointment to skin surrounding the wound prior to dressing change.  [] Apply thin film of no sting moisture barrier ointment to skin immediately around      wound.  [] Other:     **APPLY MEPILEX BORDER TO ABRASION ON RIGHT LOWER LEG**    **ANTIBIOTICS PER DR BOOTH**      Wound Location: RIGHT LATERAL TMA   **THERASKIN # 2 APPLIED 5/7/2025**    Wound Cleansing:    Primary Dressing:  [x]  THERASKIN MOISTENED WITH SALINE, STERI STRIPS, CUTIMED SORBACT AND STERI STRIPS - LEAVE IN PLACE  [x] PLAIN FOAM TO ANTERIOR AND LATERAL ANKLE    Secondary Dressing:  [x] DRAWTEX AND OPTILOCK  [x] CAST PADDING, KERLIX AND COBAN ( FOOTBALL DRESSING )      Dressing

## 2025-05-09 NOTE — PROGRESS NOTES
HBO PROGRESS NOTE      NAME: Marvin Montero  MEDICAL RECORD NUMBER:  [id number]  AGE: 74 y.o.   GENDER: male  : 1950  EPISODE DATE:  2025     Subjective     HBO Treatment Number: 43 out of Total Treatments: 50    HBO Diagnosis:             Indications: Lower Extremity Diabetic Wound ___(site) (right foot)    Safety checks performed prior to treatment.  See doc flowsheets for documentation.    Objective        Recent Labs     25  1052 25  1257   POCGLU 140* 97     Pre treatment Vital Signs       Temp: 97.3 °F (36.3 °C)     Pulse: 79     Respirations: 16     BP: (!) 146/66       Post treatment Vital Signs  Temp: 97.2 °F (36.2 °C)  Pulse: 61  Respirations: 18  BP: 131/60    Assessment        HBO Diagnosis:   Problem List Items Addressed This Visit          Circulatory    Idiopathic chronic venous hypertension of right leg with inflammation    Relevant Orders    Initiate Outpatient Wound Care Protocol    Atherosclerosis of native artery of right lower extremity with ulceration of midfoot (HCC)    Relevant Orders    Initiate Outpatient Wound Care Protocol    Notify physician (specify)    Hyperbaric Oxygen Therapy    Hypoglycemial protocol    POCT glucose    Care order/instruction    Care order/instruction    Care order/instruction    Care order/instruction    Care order/instruction    Care order/instruction    Care order/instruction    Care order/instruction       Endocrine    Diabetic foot ulcer with osteomyelitis (HCC)    Relevant Orders    Notify physician (specify)    Hyperbaric Oxygen Therapy    Hypoglycemial protocol    POCT glucose    Care order/instruction    Care order/instruction    Care order/instruction    Care order/instruction    Care order/instruction    Care order/instruction    Care order/instruction    Care order/instruction    Diabetic ulcer of right midfoot associated with type 2 diabetes mellitus, with necrosis of muscle (HCC) - Primary    Relevant Orders    Initiate

## 2025-05-09 NOTE — PLAN OF CARE
HBO NURSING DOCUMENTATION          [facility]    NAME:  Marvin Montero  YOB: 1950  MEDICAL RECORD NUMBER:  [id number]  DATE:  5/9/2025      Patient blood sugar, 97 after treatment. Donis Avila NP made aware. Patient asymptomatic and declines Glucerna or anything else to eat or drink at this time. patient reports he is going to leave and have lunch at home with his wife. RN notified CNP, patient proceeds with discharge.     Electronically signed by Connie Rojas RN on 5/9/2025 at 1:11 PM

## 2025-05-11 PROCEDURE — 93297 REM INTERROG DEV EVAL ICPMS: CPT | Performed by: CLINICAL NURSE SPECIALIST

## 2025-05-12 ENCOUNTER — HOSPITAL ENCOUNTER (OUTPATIENT)
Dept: HYPERBARIC MEDICINE | Age: 75
Discharge: HOME | End: 2025-05-12
Payer: MEDICARE

## 2025-05-12 VITALS
RESPIRATION RATE: 16 BRPM | SYSTOLIC BLOOD PRESSURE: 122 MMHG | DIASTOLIC BLOOD PRESSURE: 60 MMHG | HEART RATE: 62 BPM | TEMPERATURE: 97.3 F

## 2025-05-12 DIAGNOSIS — L97.514 RIGHT FOOT ULCER, WITH NECROSIS OF BONE (HCC): ICD-10-CM

## 2025-05-12 DIAGNOSIS — E11.69 DIABETIC FOOT ULCER WITH OSTEOMYELITIS (HCC): Primary | ICD-10-CM

## 2025-05-12 DIAGNOSIS — L97.509 DIABETIC FOOT ULCER WITH OSTEOMYELITIS (HCC): Primary | ICD-10-CM

## 2025-05-12 DIAGNOSIS — M86.9 DIABETIC FOOT ULCER WITH OSTEOMYELITIS (HCC): Primary | ICD-10-CM

## 2025-05-12 DIAGNOSIS — I70.234 ATHEROSCLEROSIS OF NATIVE ARTERY OF RIGHT LOWER EXTREMITY WITH ULCERATION OF MIDFOOT (HCC): ICD-10-CM

## 2025-05-12 DIAGNOSIS — L97.514 ULCER OF TOE OF RIGHT FOOT, WITH NECROSIS OF BONE (HCC): ICD-10-CM

## 2025-05-12 DIAGNOSIS — E11.621 DIABETIC FOOT ULCER WITH OSTEOMYELITIS (HCC): Primary | ICD-10-CM

## 2025-05-12 LAB
GLUCOSE BLD-MCNC: 105 MG/DL (ref 70–99)
GLUCOSE BLD-MCNC: 130 MG/DL (ref 70–99)
PERFORMED ON: ABNORMAL
PERFORMED ON: ABNORMAL

## 2025-05-12 PROCEDURE — G0277 HBOT, FULL BODY CHAMBER, 30M: HCPCS

## 2025-05-12 PROCEDURE — 99183 HYPERBARIC OXYGEN THERAPY: CPT | Performed by: NURSE PRACTITIONER

## 2025-05-12 NOTE — PROGRESS NOTES
HBO PROGRESS NOTE      NAME: Marvin Montero  MEDICAL RECORD NUMBER:  [id number]  AGE: 74 y.o.   GENDER: male  : 1950  EPISODE DATE:  2025     Subjective     HBO Treatment Number: 44 out of Total Treatments: 50    HBO Diagnosis:             Indications: Lower Extremity Diabetic Wound ___(site) (Right Foot)    Safety checks performed prior to treatment.  See doc flowsheets for documentation.    Objective        Recent Labs     25  1027 25  1236   POCGLU 130* 105*     Pre treatment Vital Signs       Temp: 97.4 °F (36.3 °C)     Pulse: 75     Respirations: 16     BP: (!) 105/56 (Aaron Cuadra CNP aware.)       Post treatment Vital Signs  Temp: 97.3 °F (36.3 °C)  Pulse: 62  Respirations: 16  BP: 122/60    Assessment        HBO Diagnosis:   Problem List Items Addressed This Visit       Diabetic foot ulcer with osteomyelitis (HCC) - Primary    Relevant Orders    Notify physician (specify)    Hyperbaric Oxygen Therapy    Hypoglycemial protocol    POCT glucose    Care order/instruction    Atherosclerosis of native artery of right lower extremity with ulceration of midfoot (HCC)    Relevant Orders    Notify physician (specify)    Hyperbaric Oxygen Therapy    Hypoglycemial protocol    POCT glucose    Care order/instruction    Right foot ulcer, with necrosis of bone (HCC)    Relevant Orders    Notify physician (specify)    Hyperbaric Oxygen Therapy     Other Visit Diagnoses         Ulcer of toe of right foot, with necrosis of bone (HCC)        Relevant Orders    Hypoglycemial protocol    POCT glucose    Care order/instruction            Physical Exam:  General Appearance:  alert and oriented to person, place and time, well-developed and well-nourished, in no acute distress    Pre Tympanic Membrane Assessment:  Right: Normal (PE Tubes Visible per Aaron Cuadra CNP)  Left: Normal (PE Tubes Visible per Aaron Cuadra CNP)    Post Tympanic Membrane Assessment:  Left: Normal (PE Tubes Visible; Denies

## 2025-05-13 ENCOUNTER — HOSPITAL ENCOUNTER (OUTPATIENT)
Dept: HYPERBARIC MEDICINE | Age: 75
Discharge: HOME | End: 2025-05-13
Payer: MEDICARE

## 2025-05-13 VITALS
DIASTOLIC BLOOD PRESSURE: 87 MMHG | HEART RATE: 64 BPM | TEMPERATURE: 97.5 F | SYSTOLIC BLOOD PRESSURE: 144 MMHG | RESPIRATION RATE: 18 BRPM

## 2025-05-13 DIAGNOSIS — L97.509 DIABETIC FOOT ULCER WITH OSTEOMYELITIS (HCC): Primary | ICD-10-CM

## 2025-05-13 DIAGNOSIS — E11.621 DIABETIC FOOT ULCER WITH OSTEOMYELITIS (HCC): Primary | ICD-10-CM

## 2025-05-13 DIAGNOSIS — I70.234 ATHEROSCLEROSIS OF NATIVE ARTERY OF RIGHT LOWER EXTREMITY WITH ULCERATION OF MIDFOOT (HCC): ICD-10-CM

## 2025-05-13 DIAGNOSIS — E11.69 DIABETIC FOOT ULCER WITH OSTEOMYELITIS (HCC): Primary | ICD-10-CM

## 2025-05-13 DIAGNOSIS — M86.9 DIABETIC FOOT ULCER WITH OSTEOMYELITIS (HCC): Primary | ICD-10-CM

## 2025-05-13 DIAGNOSIS — L97.514 RIGHT FOOT ULCER, WITH NECROSIS OF BONE (HCC): ICD-10-CM

## 2025-05-13 DIAGNOSIS — L97.514 ULCER OF TOE OF RIGHT FOOT, WITH NECROSIS OF BONE (HCC): ICD-10-CM

## 2025-05-13 LAB
ALBUMIN SERPL-MCNC: 3.7 G/DL (ref 3.4–5)
ALBUMIN/GLOB SERPL: 1.3 {RATIO} (ref 1.1–2.2)
ALP SERPL-CCNC: 96 U/L (ref 40–129)
ALT SERPL-CCNC: 140 U/L (ref 10–40)
ANION GAP SERPL CALCULATED.3IONS-SCNC: 15 MMOL/L (ref 3–16)
AST SERPL-CCNC: 118 U/L (ref 15–37)
BASOPHILS # BLD: 0 K/UL (ref 0–0.2)
BASOPHILS NFR BLD: 1.1 %
BILIRUB SERPL-MCNC: 0.4 MG/DL (ref 0–1)
BUN SERPL-MCNC: 26 MG/DL (ref 7–20)
CALCIUM SERPL-MCNC: 8.8 MG/DL (ref 8.3–10.6)
CHLORIDE SERPL-SCNC: 107 MMOL/L (ref 99–110)
CO2 SERPL-SCNC: 22 MMOL/L (ref 21–32)
CREAT SERPL-MCNC: 1.2 MG/DL (ref 0.8–1.3)
CRP SERPL-MCNC: <3 MG/L (ref 0–5.1)
DEPRECATED RDW RBC AUTO: 15.4 % (ref 12.4–15.4)
EOSINOPHIL # BLD: 0.2 K/UL (ref 0–0.6)
EOSINOPHIL NFR BLD: 5.2 %
ERYTHROCYTE [SEDIMENTATION RATE] IN BLOOD BY WESTERGREN METHOD: 38 MM/HR (ref 0–20)
GFR SERPLBLD CREATININE-BSD FMLA CKD-EPI: 64 ML/MIN/{1.73_M2}
GLUCOSE BLD-MCNC: 131 MG/DL (ref 70–99)
GLUCOSE BLD-MCNC: 85 MG/DL (ref 70–99)
GLUCOSE SERPL-MCNC: 134 MG/DL (ref 70–99)
HCT VFR BLD AUTO: 37.7 % (ref 40.5–52.5)
HGB BLD-MCNC: 12.1 G/DL (ref 13.5–17.5)
LYMPHOCYTES # BLD: 1.1 K/UL (ref 1–5.1)
LYMPHOCYTES NFR BLD: 30 %
MCH RBC QN AUTO: 28 PG (ref 26–34)
MCHC RBC AUTO-ENTMCNC: 32.1 G/DL (ref 31–36)
MCV RBC AUTO: 87.2 FL (ref 80–100)
MONOCYTES # BLD: 0.4 K/UL (ref 0–1.3)
MONOCYTES NFR BLD: 10.8 %
NEUTROPHILS # BLD: 2 K/UL (ref 1.7–7.7)
NEUTROPHILS NFR BLD: 52.9 %
PERFORMED ON: ABNORMAL
PERFORMED ON: NORMAL
PLATELET # BLD AUTO: 151 K/UL (ref 135–450)
PMV BLD AUTO: 10.4 FL (ref 5–10.5)
POTASSIUM SERPL-SCNC: 4.8 MMOL/L (ref 3.5–5.1)
PROT SERPL-MCNC: 6.5 G/DL (ref 6.4–8.2)
RBC # BLD AUTO: 4.32 M/UL (ref 4.2–5.9)
SODIUM SERPL-SCNC: 144 MMOL/L (ref 136–145)
WBC # BLD AUTO: 3.8 K/UL (ref 4–11)

## 2025-05-13 PROCEDURE — 99183 HYPERBARIC OXYGEN THERAPY: CPT

## 2025-05-13 PROCEDURE — G0277 HBOT, FULL BODY CHAMBER, 30M: HCPCS

## 2025-05-13 ASSESSMENT — PAIN SCALES - GENERAL
PAINLEVEL_OUTOF10: 0
PAINLEVEL_OUTOF10: 0

## 2025-05-13 NOTE — PLAN OF CARE
HBO NURSING DOCUMENTATION          [facility]    NAME:  Marvin Montero  YOB: 1950  MEDICAL RECORD NUMBER:  [id number]  DATE:  5/13/2025      Patient blood sugar, 85 after treatment. Donis Avila NP made aware.   Patient asymptomatic, finished  8 oz Glucerna in chamber and accepted 4 oz Orange juice at this time. Patient reports he is going to leave and have lunch at home with his wife. RN notified CNP, patient proceeds with discharge     Electronically signed by Connie Rojas RN on 5/13/2025 at 1:15 PM

## 2025-05-13 NOTE — PROGRESS NOTES
HBO PROGRESS NOTE      NAME: Marvin Montero  MEDICAL RECORD NUMBER:  [id number]  AGE: 74 y.o.   GENDER: male  : 1950  EPISODE DATE:  2025     Subjective     HBO Treatment Number: 45 out of Total Treatments: 50    HBO Diagnosis:             Indications: Lower Extremity Diabetic Wound ___(site) (Right foot)    Safety checks performed prior to treatment.  See doc flowsheets for documentation.    Objective        Recent Labs     25  1037 25  1306   POCGLU 131* 85     Pre treatment Vital Signs       Temp: 97.1 °F (36.2 °C)     Pulse: 61     Respirations: 20     BP: (!) 110/52       Post treatment Vital Signs  Temp: 97.5 °F (36.4 °C)  Pulse: 64  Respirations: 18  BP: (!) 144/87    Assessment        HBO Diagnosis:   Problem List Items Addressed This Visit          Circulatory    Atherosclerosis of native artery of right lower extremity with ulceration of midfoot (HCC)    Relevant Orders    Notify physician (specify)    Hyperbaric Oxygen Therapy    Hypoglycemial protocol    POCT glucose    Care order/instruction       Endocrine    Diabetic foot ulcer with osteomyelitis (HCC) - Primary    Relevant Orders    Notify physician (specify)    Hyperbaric Oxygen Therapy    Hypoglycemial protocol    POCT glucose    Care order/instruction       Musculoskeletal and Integument    Right foot ulcer, with necrosis of bone (HCC)    Relevant Orders    Notify physician (specify)    Hyperbaric Oxygen Therapy     Other Visit Diagnoses         Ulcer of toe of right foot, with necrosis of bone (HCC)        Relevant Orders    Hypoglycemial protocol    POCT glucose    Care order/instruction            Physical Exam:  General Appearance:  alert and oriented to person, place and time, well-developed and well-nourished, in no acute distress    Pre Tympanic Membrane Assessment:          Post Tympanic Membrane Assessment:  Left: Normal (denies any ear problems PE tube visible)  Right: Normal (denies any ear problems PE tube

## 2025-05-14 ENCOUNTER — TELEPHONE (OUTPATIENT)
Dept: INFECTIOUS DISEASES | Age: 75
End: 2025-05-14

## 2025-05-14 ENCOUNTER — HOSPITAL ENCOUNTER (OUTPATIENT)
Dept: WOUND CARE | Age: 75
Discharge: HOME | End: 2025-05-14
Attending: EMERGENCY MEDICINE
Payer: MEDICARE

## 2025-05-14 ENCOUNTER — APPOINTMENT (OUTPATIENT)
Dept: HYPERBARIC MEDICINE | Age: 75
End: 2025-05-14
Payer: MEDICARE

## 2025-05-14 ENCOUNTER — HOSPITAL ENCOUNTER (OUTPATIENT)
Dept: HYPERBARIC MEDICINE | Age: 75
Discharge: HOME | End: 2025-05-14
Payer: MEDICARE

## 2025-05-14 VITALS
RESPIRATION RATE: 18 BRPM | HEART RATE: 81 BPM | DIASTOLIC BLOOD PRESSURE: 58 MMHG | SYSTOLIC BLOOD PRESSURE: 137 MMHG | TEMPERATURE: 97.2 F

## 2025-05-14 VITALS
SYSTOLIC BLOOD PRESSURE: 156 MMHG | TEMPERATURE: 97 F | RESPIRATION RATE: 16 BRPM | HEART RATE: 67 BPM | DIASTOLIC BLOOD PRESSURE: 75 MMHG

## 2025-05-14 DIAGNOSIS — M86.9 DIABETIC FOOT ULCER WITH OSTEOMYELITIS (HCC): Primary | ICD-10-CM

## 2025-05-14 DIAGNOSIS — L97.514 ULCER OF TOE OF RIGHT FOOT, WITH NECROSIS OF BONE (HCC): ICD-10-CM

## 2025-05-14 DIAGNOSIS — E66.9 TYPE 2 DIABETES MELLITUS WITH OBESITY (HCC): ICD-10-CM

## 2025-05-14 DIAGNOSIS — R60.0 LOCALIZED EDEMA: ICD-10-CM

## 2025-05-14 DIAGNOSIS — Z74.09 IMPAIRED MOBILITY: ICD-10-CM

## 2025-05-14 DIAGNOSIS — L97.413 DIABETIC ULCER OF RIGHT MIDFOOT ASSOCIATED WITH TYPE 2 DIABETES MELLITUS, WITH NECROSIS OF MUSCLE (HCC): ICD-10-CM

## 2025-05-14 DIAGNOSIS — E11.69 TYPE 2 DIABETES MELLITUS WITH OBESITY (HCC): ICD-10-CM

## 2025-05-14 DIAGNOSIS — L08.9 TYPE 2 DIABETES MELLITUS WITH RIGHT DIABETIC FOOT INFECTION (HCC): ICD-10-CM

## 2025-05-14 DIAGNOSIS — L97.514 RIGHT FOOT ULCER, WITH NECROSIS OF BONE (HCC): ICD-10-CM

## 2025-05-14 DIAGNOSIS — I87.321 IDIOPATHIC CHRONIC VENOUS HYPERTENSION OF RIGHT LEG WITH INFLAMMATION: ICD-10-CM

## 2025-05-14 DIAGNOSIS — I70.234 ATHEROSCLEROSIS OF NATIVE ARTERY OF RIGHT LOWER EXTREMITY WITH ULCERATION OF MIDFOOT (HCC): Primary | ICD-10-CM

## 2025-05-14 DIAGNOSIS — I70.234 ATHEROSCLEROSIS OF NATIVE ARTERY OF RIGHT LOWER EXTREMITY WITH ULCERATION OF MIDFOOT (HCC): ICD-10-CM

## 2025-05-14 DIAGNOSIS — E11.621 DIABETIC FOOT ULCER WITH OSTEOMYELITIS (HCC): Primary | ICD-10-CM

## 2025-05-14 DIAGNOSIS — E11.628 TYPE 2 DIABETES MELLITUS WITH RIGHT DIABETIC FOOT INFECTION (HCC): ICD-10-CM

## 2025-05-14 DIAGNOSIS — L97.509 DIABETIC FOOT ULCER WITH OSTEOMYELITIS (HCC): Primary | ICD-10-CM

## 2025-05-14 DIAGNOSIS — E11.621 DIABETIC ULCER OF RIGHT MIDFOOT ASSOCIATED WITH TYPE 2 DIABETES MELLITUS, WITH NECROSIS OF MUSCLE (HCC): ICD-10-CM

## 2025-05-14 DIAGNOSIS — E11.69 DIABETIC FOOT ULCER WITH OSTEOMYELITIS (HCC): Primary | ICD-10-CM

## 2025-05-14 LAB
GLUCOSE BLD-MCNC: 155 MG/DL (ref 70–99)
GLUCOSE BLD-MCNC: 96 MG/DL (ref 70–99)
GLUCOSE BLD-MCNC: 98 MG/DL (ref 70–99)
PERFORMED ON: ABNORMAL
PERFORMED ON: NORMAL
PERFORMED ON: NORMAL

## 2025-05-14 PROCEDURE — 99213 OFFICE O/P EST LOW 20 MIN: CPT

## 2025-05-14 PROCEDURE — 99183 HYPERBARIC OXYGEN THERAPY: CPT | Performed by: SURGERY

## 2025-05-14 PROCEDURE — G0277 HBOT, FULL BODY CHAMBER, 30M: HCPCS

## 2025-05-14 PROCEDURE — 99213 OFFICE O/P EST LOW 20 MIN: CPT | Performed by: SURGERY

## 2025-05-14 RX ORDER — LIDOCAINE 40 MG/G
CREAM TOPICAL ONCE
OUTPATIENT
Start: 2025-05-14 | End: 2025-05-14

## 2025-05-14 RX ORDER — LIDOCAINE HYDROCHLORIDE 20 MG/ML
JELLY TOPICAL ONCE
OUTPATIENT
Start: 2025-05-14 | End: 2025-05-14

## 2025-05-14 RX ORDER — GINSENG 100 MG
CAPSULE ORAL ONCE
OUTPATIENT
Start: 2025-05-14 | End: 2025-05-14

## 2025-05-14 RX ORDER — GENTAMICIN SULFATE 1 MG/G
OINTMENT TOPICAL ONCE
OUTPATIENT
Start: 2025-05-14 | End: 2025-05-14

## 2025-05-14 RX ORDER — SODIUM CHLOR/HYPOCHLOROUS ACID 0.033 %
SOLUTION, IRRIGATION IRRIGATION ONCE
OUTPATIENT
Start: 2025-05-14 | End: 2025-05-14

## 2025-05-14 RX ORDER — LIDOCAINE 50 MG/G
OINTMENT TOPICAL ONCE
OUTPATIENT
Start: 2025-05-14 | End: 2025-05-14

## 2025-05-14 RX ORDER — MUPIROCIN 20 MG/G
OINTMENT TOPICAL ONCE
OUTPATIENT
Start: 2025-05-14 | End: 2025-05-14

## 2025-05-14 RX ORDER — TRIAMCINOLONE ACETONIDE 1 MG/G
OINTMENT TOPICAL ONCE
OUTPATIENT
Start: 2025-05-14 | End: 2025-05-14

## 2025-05-14 RX ORDER — SILVER SULFADIAZINE 10 MG/G
CREAM TOPICAL ONCE
OUTPATIENT
Start: 2025-05-14 | End: 2025-05-14

## 2025-05-14 RX ORDER — LIDOCAINE HYDROCHLORIDE 40 MG/ML
SOLUTION TOPICAL ONCE
OUTPATIENT
Start: 2025-05-14 | End: 2025-05-14

## 2025-05-14 RX ORDER — DOXYCYCLINE HYCLATE 100 MG
100 TABLET ORAL 2 TIMES DAILY
Qty: 42 TABLET | Refills: 0 | Status: SHIPPED | OUTPATIENT
Start: 2025-05-14 | End: 2025-06-04

## 2025-05-14 RX ORDER — BETAMETHASONE DIPROPIONATE 0.5 MG/G
CREAM TOPICAL ONCE
OUTPATIENT
Start: 2025-05-14 | End: 2025-05-14

## 2025-05-14 RX ORDER — BACITRACIN ZINC AND POLYMYXIN B SULFATE 500; 1000 [USP'U]/G; [USP'U]/G
OINTMENT TOPICAL ONCE
OUTPATIENT
Start: 2025-05-14 | End: 2025-05-14

## 2025-05-14 RX ORDER — NEOMYCIN/BACITRACIN/POLYMYXINB 3.5-400-5K
OINTMENT (GRAM) TOPICAL ONCE
OUTPATIENT
Start: 2025-05-14 | End: 2025-05-14

## 2025-05-14 RX ORDER — CLOBETASOL PROPIONATE 0.5 MG/G
OINTMENT TOPICAL ONCE
OUTPATIENT
Start: 2025-05-14 | End: 2025-05-14

## 2025-05-14 ASSESSMENT — PAIN SCALES - GENERAL
PAINLEVEL_OUTOF10: 0

## 2025-05-14 NOTE — PROGRESS NOTES
[facility]   Progress Note and Procedure Note      Marvin Montero  MEDICAL RECORD NUMBER:  [id number]  AGE: 74 y.o.   GENDER: male  : 1950  EPISODE DATE:  2025  plaint   Patient presents with    Wound Check       Follow-up visit for a wound to the right foot.          HPI/Wound Narrative:      Subjective  Marvin Montero is a 74 y.o. male who presents today for an evaluation of a wound/ulcer. Wound duration:  2024 .     25: no new issues.      25: no new issues but hydrofera sticking too much     3/18/25: doing better overall, edema improving with diuretics     3/11/25: did not take his diuretic yesterday nor today and leg edema is much worse than last visit.     3/4/25: no new issues. Plans on HBOT today. Diuretic changed and helping with edema control much better. Eating better.      25: Has ENT appointment scheduled for 2 PM today with possible ET tubes but the patient was also given Flonase.  Unfortunately cannot trial the Flonase and at this time he does want to trial this next week.       25: no new issues. Had vascular intervention.     25: Patient has no specific complaints.  Does have angiogram with intervention scheduled for 2025.  He is also going to be getting his MRI soon.     25: no new issues. Has appt with cardio soon. MRI won't be done until after 2/3 due to recent pacer/defibrillator placed in 2024.     25: had his echo. EF about 55%. No changes in medications recommended by cardiology.     25: 74-year-old with past medical history notable for hypertension, type 2 diabetes mellitus, hyperlipidemia, atrial fibrillation on Eliquis, diastolic congestive heart failure, COPD, hypothyroidism, peripheral arterial disease status post left BKA who is known to me for wounds to his right lower extremity back in .  These were healing very well but for some reason he was lost to follow-up. Since I saw him last,

## 2025-05-14 NOTE — TELEPHONE ENCOUNTER
OPAT End  Call made to pharmacy  [facility]    Call made to [facility]  [facility] [place]  Call made to patient  yes  Will f/u in 1-2 days to verify line removal

## 2025-05-14 NOTE — PROGRESS NOTES
HBO PROGRESS NOTE      NAME: Marvin Montero  MEDICAL RECORD NUMBER:  [id number]  AGE: 74 y.o.   GENDER: male  : 1950  EPISODE DATE:  2025     Subjective     HBO Treatment Number: 46 out of Total Treatments: 50    HBO Diagnosis:             Indications: Lower Extremity Diabetic Wound ___(site) (right foot)    Safety checks performed prior to treatment.  See doc flowsheets for documentation.    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 (HCC)    Relevant Orders    Notify physician (specify)    Hyperbaric Oxygen Therapy    Care order/instruction       Endocrine    Diabetic foot ulcer with osteomyelitis (HCC) - Primary    Relevant Orders    Notify physician (specify)    Hyperbaric Oxygen Therapy    Care order/instruction       Musculoskeletal and Integument    Right foot ulcer, with necrosis of bone (HCC)    Relevant Orders    Notify physician (specify)    Hyperbaric Oxygen Therapy     Other Visit Diagnoses         Ulcer of toe of right foot, with necrosis of bone (HCC)        Relevant Orders    Care order/instruction            Physical Exam        General Appearance:  alert and oriented to person, place and time, well-developed and well-nourished, in no acute distress    Pre Tympanic Membrane Assessment:  Right: Normal (per Dr. Joann MD)  Left: Normal (per Dr. Joann MD)    Post Tympanic Membrane Assessment:  Left: Normal (PE tube visible, denies ear problems)  Right: Normal (PE tube visible, denies ear problems)    Pulmonary/Chest:  clear to auscultation bilaterally-

## 2025-05-14 NOTE — PATIENT INSTRUCTIONS
[facility] Wound Care Physician Orders and Discharge Instructions  [facility]  [address], [address]  [place], [place] [address]  Telephone: [phone]      FAX [phone]  MONDAY - THURSDAY 8:00 am - 4:30 pm and Friday 8:00 am - 12:00 pm.        NAME:  Marvin Montero  YOB: 1950  MEDICAL RECORD NUMBER:  [id number]  DATE:  5/14/2025      Return Appointment:  [x] Return Appointment: With Dr Cece Alejandre  in  1 Week(s)   [x] Wound and dressing supply provider: [facility]  [] ECF or Home Healthcare:   [x] Wound Assessment:FRIDAY MAY 16TH- ONLY CHANGE OUTER DRESSING [x] Physician or NP scheduled for Wound Assessment: LOI HUNT CNP  [] Orders placed during your visit:        Important Reminders:   Please wash hands with soap and water before and after every dressing change.  Do not scrub wounds.  Keep wounds dry in shower unless otherwise instructed by the physician.  SMOKING can slow would healing. Stop smoking as soon as possible to improve healing and prevent further complications associated with smoking.      Michelle-Wound Topical Treatments:  Do not apply lotions, creams, or ointments to wound bed unless directed.   [] Apply moisturizing lotion to skin surrounding the wound prior to dressing change.  [] Apply antifungal ointment to skin surrounding the wound prior to dressing change.  [] Apply thin film of no sting moisture barrier ointment to skin immediately around      wound.  [] Other:         **ANTIBIOTICS PER DR BOOTH**      Wound Location: RIGHT LATERAL TMA   **THERASKIN # 2 APPLIED 5/7/2025**    Wound Cleansing:    Primary Dressing:  [x]  THERASKIN LEFT IN PLACE, STERI STRIPS, CUTIMED SORBACT (REMAINDER LEFT IN PLACE ), MEPITEL AND STERI STRIPS - LEAVE IN PLACE  [x] PLAIN FOAM TO ANTERIOR AND LATERAL ANKLE    Secondary Dressing:  [x] DRAWTEX AND OPTILOCK  [x] CAST PADDING, KERLIX AND COBAN ( FOOTBALL DRESSING )      Dressing Frequency:  []   [x] Do Not Change

## 2025-05-15 ENCOUNTER — HOSPITAL ENCOUNTER (OUTPATIENT)
Dept: HYPERBARIC MEDICINE | Age: 75
Discharge: HOME | End: 2025-05-15
Payer: MEDICARE

## 2025-05-15 VITALS
RESPIRATION RATE: 20 BRPM | SYSTOLIC BLOOD PRESSURE: 147 MMHG | DIASTOLIC BLOOD PRESSURE: 58 MMHG | HEART RATE: 61 BPM | TEMPERATURE: 97 F

## 2025-05-15 DIAGNOSIS — L97.509 DIABETIC FOOT ULCER WITH OSTEOMYELITIS (HCC): Primary | ICD-10-CM

## 2025-05-15 DIAGNOSIS — L97.514 RIGHT FOOT ULCER, WITH NECROSIS OF BONE (HCC): ICD-10-CM

## 2025-05-15 DIAGNOSIS — L97.514 ULCER OF TOE OF RIGHT FOOT, WITH NECROSIS OF BONE (HCC): ICD-10-CM

## 2025-05-15 DIAGNOSIS — E11.69 DIABETIC FOOT ULCER WITH OSTEOMYELITIS (HCC): Primary | ICD-10-CM

## 2025-05-15 DIAGNOSIS — I70.234 ATHEROSCLEROSIS OF NATIVE ARTERY OF RIGHT LOWER EXTREMITY WITH ULCERATION OF MIDFOOT (HCC): ICD-10-CM

## 2025-05-15 DIAGNOSIS — E11.621 DIABETIC FOOT ULCER WITH OSTEOMYELITIS (HCC): Primary | ICD-10-CM

## 2025-05-15 DIAGNOSIS — M86.9 DIABETIC FOOT ULCER WITH OSTEOMYELITIS (HCC): Primary | ICD-10-CM

## 2025-05-15 LAB
GLUCOSE BLD-MCNC: 144 MG/DL (ref 70–99)
GLUCOSE BLD-MCNC: 91 MG/DL (ref 70–99)
PERFORMED ON: ABNORMAL
PERFORMED ON: NORMAL

## 2025-05-15 PROCEDURE — G0277 HBOT, FULL BODY CHAMBER, 30M: HCPCS

## 2025-05-15 PROCEDURE — 99183 HYPERBARIC OXYGEN THERAPY: CPT | Performed by: NURSE PRACTITIONER

## 2025-05-15 ASSESSMENT — PAIN SCALES - GENERAL
PAINLEVEL_OUTOF10: 0
PAINLEVEL_OUTOF10: 0

## 2025-05-15 NOTE — PROGRESS NOTES
HBO PROGRESS NOTE      NAME: Marvin Montero  MEDICAL RECORD NUMBER:  [id number]  AGE: 74 y.o.   GENDER: male  : 1950  EPISODE DATE:  5/15/2025     Subjective     HBO Treatment Number: 47 out of Total Treatments: 50    HBO Diagnosis:             Indications: Lower Extremity Diabetic Wound ___(site) (right foot)    Safety checks performed prior to treatment.  See doc flowsheets for documentation.    Objective        Recent Labs     05/15/25  1024 05/15/25  1242   POCGLU 144* 91     Pre treatment Vital Signs       Temp: 97.1 °F (36.2 °C)     Pulse: 77     Respirations: 18     BP: (!) 106/52 (notified provider Aaron Cuadra CNP okay to proceed with treatment)       Post treatment Vital Signs  Temp: 97 °F (36.1 °C)  Pulse: 61  Respirations: 20  BP: (!) 147/58 (Aaron Cuadra CNP notified)    Assessment        HBO Diagnosis:   Problem List Items Addressed This Visit       Diabetic foot ulcer with osteomyelitis (HCC) - Primary    Relevant Orders    Notify physician (specify)    Hyperbaric Oxygen Therapy    POCT glucose    Care order/instruction    Atherosclerosis of native artery of right lower extremity with ulceration of midfoot (HCC)    Relevant Orders    Notify physician (specify)    Hyperbaric Oxygen Therapy    POCT glucose    Care order/instruction    Right foot ulcer, with necrosis of bone (HCC)    Relevant Orders    Notify physician (specify)    Hyperbaric Oxygen Therapy     Other Visit Diagnoses         Ulcer of toe of right foot, with necrosis of bone (HCC)        Relevant Orders    POCT glucose    Care order/instruction            Physical Exam:  General Appearance:  alert and oriented to person, place and time, well-developed and well-nourished, in no acute distress    Pre Tympanic Membrane Assessment:  Right: Normal (PE tube visible, per Aaron Cuadra CNP)  Left: Normal (PE tube visible, per Aaron Cuadra CNP)    Post Tympanic Membrane Assessment:  Left: Normal (PE tube visible.  Denies

## 2025-05-16 ENCOUNTER — HOSPITAL ENCOUNTER (OUTPATIENT)
Dept: HYPERBARIC MEDICINE | Age: 75
Discharge: HOME | End: 2025-05-16
Payer: MEDICARE

## 2025-05-16 ENCOUNTER — HOSPITAL ENCOUNTER (OUTPATIENT)
Dept: WOUND CARE | Age: 75
Discharge: HOME | End: 2025-05-16
Attending: EMERGENCY MEDICINE
Payer: MEDICARE

## 2025-05-16 VITALS
HEART RATE: 81 BPM | TEMPERATURE: 97.3 F | SYSTOLIC BLOOD PRESSURE: 109 MMHG | RESPIRATION RATE: 18 BRPM | DIASTOLIC BLOOD PRESSURE: 54 MMHG

## 2025-05-16 VITALS
DIASTOLIC BLOOD PRESSURE: 79 MMHG | SYSTOLIC BLOOD PRESSURE: 156 MMHG | TEMPERATURE: 97.1 F | RESPIRATION RATE: 18 BRPM | HEART RATE: 71 BPM

## 2025-05-16 DIAGNOSIS — L97.509 DIABETIC FOOT ULCER WITH OSTEOMYELITIS (HCC): Primary | ICD-10-CM

## 2025-05-16 DIAGNOSIS — L97.514 ULCER OF TOE OF RIGHT FOOT, WITH NECROSIS OF BONE (HCC): ICD-10-CM

## 2025-05-16 DIAGNOSIS — E11.621 DIABETIC FOOT ULCER WITH OSTEOMYELITIS (HCC): Primary | ICD-10-CM

## 2025-05-16 DIAGNOSIS — M86.9 DIABETIC FOOT ULCER WITH OSTEOMYELITIS (HCC): Primary | ICD-10-CM

## 2025-05-16 DIAGNOSIS — L97.514 RIGHT FOOT ULCER, WITH NECROSIS OF BONE (HCC): ICD-10-CM

## 2025-05-16 DIAGNOSIS — I70.234 ATHEROSCLEROSIS OF NATIVE ARTERY OF RIGHT LOWER EXTREMITY WITH ULCERATION OF MIDFOOT (HCC): ICD-10-CM

## 2025-05-16 DIAGNOSIS — E11.69 DIABETIC FOOT ULCER WITH OSTEOMYELITIS (HCC): Primary | ICD-10-CM

## 2025-05-16 LAB
GLUCOSE BLD-MCNC: 109 MG/DL (ref 70–99)
GLUCOSE BLD-MCNC: 114 MG/DL (ref 70–99)
GLUCOSE BLD-MCNC: 121 MG/DL (ref 70–99)
GLUCOSE BLD-MCNC: 138 MG/DL (ref 70–99)
GLUCOSE BLD-MCNC: 182 MG/DL (ref 70–99)
GLUCOSE BLD-MCNC: 99 MG/DL (ref 70–99)
PERFORMED ON: ABNORMAL
PERFORMED ON: NORMAL

## 2025-05-16 PROCEDURE — G0277 HBOT, FULL BODY CHAMBER, 30M: HCPCS

## 2025-05-16 PROCEDURE — 99213 OFFICE O/P EST LOW 20 MIN: CPT

## 2025-05-16 ASSESSMENT — PAIN SCALES - GENERAL
PAINLEVEL_OUTOF10: 0

## 2025-05-16 NOTE — PLAN OF CARE
HBO NURSING DOCUMENTATION          [facility]    NAME:  Marvin Montero  YOB: 1950  MEDICAL RECORD NUMBER:  [id number]  DATE:  5/16/2025    Patient blood sugar, 99 after treatment. Donis Avila NP made aware. Patient asymptomatic and declines Glucerna or anything else to eat or drink at this time. patient reports he is going to leave and have lunch at home with his wife. RN notified CNP, patient proceeds with discharge.     Electronically signed by Connie Rojas RN on 5/16/2025 at 1:00 PM

## 2025-05-16 NOTE — PATIENT INSTRUCTIONS
[facility] Wound Care Physician Orders and Discharge Instructions  [facility]  [address], [address]  [place], [place] [address]  Telephone: [phone]      FAX [phone]  MONDAY - THURSDAY 8:00 am - 4:30 pm and Friday 8:00 am - 12:00 pm.        NAME:  Marvin Montero  YOB: 1950  MEDICAL RECORD NUMBER:  [id number]  DATE:  5/16/2025      Return Appointment:  [x] Return Appointment: With Dr Cece Alejandre  in  1 Week(s)   [x] Wound and dressing supply provider: [facility]  [] ECF or Home Healthcare:   [x] Wound Assessment:FRIDAY MAY 16TH- ONLY CHANGE OUTER DRESSING [x] Physician or NP scheduled for Wound Assessment: LOI HUNT CNP  [] Orders placed during your visit:        Important Reminders:   Please wash hands with soap and water before and after every dressing change.  Do not scrub wounds.  Keep wounds dry in shower unless otherwise instructed by the physician.  SMOKING can slow would healing. Stop smoking as soon as possible to improve healing and prevent further complications associated with smoking.      Michelle-Wound Topical Treatments:  Do not apply lotions, creams, or ointments to wound bed unless directed.   [] Apply moisturizing lotion to skin surrounding the wound prior to dressing change.  [] Apply antifungal ointment to skin surrounding the wound prior to dressing change.  [] Apply thin film of no sting moisture barrier ointment to skin immediately around      wound.  [] Other:         **ANTIBIOTICS PER DR BOOTH**      Wound Location: RIGHT LATERAL TMA   **THERASKIN # 2 APPLIED 5/7/2025**    Wound Cleansing:    Primary Dressing:  [x]  THERASKIN LEFT IN PLACE, STERI STRIPS, CUTIMED SORBACT (REMAINDER LEFT IN PLACE ), MEPITEL AND STERI STRIPS - LEAVE IN PLACE  [x] PLAIN FOAM TO ANTERIOR AND LATERAL ANKLE    Secondary Dressing:  [x] DRAWTEX AND OPTILOCK  [x] CAST PADDING, KERLIX AND COBAN ( FOOTBALL DRESSING )      Dressing Frequency:  []   [x] Do Not Change

## 2025-05-16 NOTE — PROGRESS NOTES
HBO PROGRESS NOTE      NAME: Marvin Montero  MEDICAL RECORD NUMBER:  [id number]  AGE: 74 y.o.   GENDER: male  : 1950  EPISODE DATE:  2025     Subjective     HBO Treatment Number: 48 out of Total Treatments: 50    HBO Diagnosis:             Indications: Lower Extremity Diabetic Wound ___(site) (right foot)    Safety checks performed prior to treatment.  See doc flowsheets for documentation.    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 of midfoot (HCC)    Relevant Orders    Notify physician (specify)    Hyperbaric Oxygen Therapy    Hypoglycemial protocol    POCT glucose    Care order/instruction    Care order/instruction    Care order/instruction    Care order/instruction    Care order/instruction    Care order/instruction    Care order/instruction    Care order/instruction       Endocrine    Diabetic foot ulcer with osteomyelitis (HCC) - Primary    Relevant Orders    Notify physician (specify)    Hyperbaric Oxygen Therapy    Hypoglycemial protocol    POCT glucose    Care order/instruction    Care order/instruction    Care order/instruction    Care order/instruction    Care order/instruction    Care order/instruction    Care order/instruction    Care order/instruction       Musculoskeletal and Integument    Right foot ulcer, with necrosis of bone (HCC)    Relevant Orders    Notify physician (specify)    Hyperbaric Oxygen Therapy     Other Visit Diagnoses         Ulcer of toe of right foot, with necrosis of bone (HCC)        Relevant Orders    Hypoglycemial protocol    POCT

## 2025-05-19 ENCOUNTER — HOSPITAL ENCOUNTER (OUTPATIENT)
Dept: HYPERBARIC MEDICINE | Age: 75
Discharge: HOME | End: 2025-05-19
Payer: MEDICARE

## 2025-05-19 VITALS
RESPIRATION RATE: 18 BRPM | SYSTOLIC BLOOD PRESSURE: 92 MMHG | HEART RATE: 70 BPM | DIASTOLIC BLOOD PRESSURE: 40 MMHG | TEMPERATURE: 97.1 F

## 2025-05-19 LAB
GLUCOSE BLD-MCNC: 288 MG/DL (ref 70–99)
GLUCOSE BLD-MCNC: 322 MG/DL (ref 70–99)
PERFORMED ON: ABNORMAL
PERFORMED ON: ABNORMAL

## 2025-05-19 PROCEDURE — 99212 OFFICE O/P EST SF 10 MIN: CPT | Performed by: NURSE PRACTITIONER

## 2025-05-19 PROCEDURE — 99212 OFFICE O/P EST SF 10 MIN: CPT

## 2025-05-19 NOTE — PROGRESS NOTES
HBO PROGRESS NOTE      NAME: Marvin Montero  MEDICAL RECORD NUMBER:  [id number]  AGE: 74 y.o.   GENDER: male  : 1950  EPISODE DATE:  2025     Subjective     HBO   out of      HBO Diagnosis:    Diabetic foot ulcer with osteomyelitis (HCC) - Primary  Right foot ulcer, with necrosis of bone (HCC)           Safety checks performed prior to treatment.  See doc flowsheets for documentation.    Objective        Recent Labs     25  1027 25  1028   POCGLU 322* 288*     Pre treatment Vital Signs      BS was taken and was 322; rechecked for accuracy and was 288.  Blood pressure was only 92/40.  Patient took a diuretic,  He reports some lightheadedness when when he goes from sitting to standing.  Mr. Montero is less than the required BP minimum of 100/60 to undergo HBO therapy.  He will return for treatment tomorrow.  Recommend he contact his PCP regarding use of the diuretic.                                                            Assessment        Physical Exam:  General Appearance:  alert and oriented to person, place and time, well-developed and well-nourished, in no acute distress    Pre Tympanic Membrane Assessment:  Right: Normal (PE tube visible)  Left:  Normal (PE tube visible)     Pulmonary/Chest:  clear to auscultation bilaterally- no wheezes, rales or rhonchi, normal air movement, no respiratory distress    Cardiovascular:  regular rate and rhythm      Electronically signed by KENNY Guerrero CNP on 2025 at 11:56 AM

## 2025-05-19 NOTE — PLAN OF CARE
Patient here for hyperbaric oxygen therapy. BS was taken and was 322. Rechecked for accuracy and was 288. Blood pressure was taken x2 and was 92/40. Aaron Cuadra CNP does not want patient to dive today. Patient is to call his PCP in regards to his water pill and low pressure. He has some lightheadedness when moving from a sitting to standing position.

## 2025-05-20 ENCOUNTER — HOSPITAL ENCOUNTER (OUTPATIENT)
Dept: WOUND CARE | Age: 75
Discharge: HOME | End: 2025-05-20
Attending: EMERGENCY MEDICINE
Payer: MEDICARE

## 2025-05-20 ENCOUNTER — HOSPITAL ENCOUNTER (OUTPATIENT)
Dept: HYPERBARIC MEDICINE | Age: 75
Discharge: HOME | End: 2025-05-20
Payer: MEDICARE

## 2025-05-20 VITALS
HEART RATE: 60 BPM | DIASTOLIC BLOOD PRESSURE: 52 MMHG | RESPIRATION RATE: 18 BRPM | TEMPERATURE: 97.2 F | SYSTOLIC BLOOD PRESSURE: 126 MMHG

## 2025-05-20 VITALS
DIASTOLIC BLOOD PRESSURE: 74 MMHG | TEMPERATURE: 97 F | SYSTOLIC BLOOD PRESSURE: 144 MMHG | HEART RATE: 66 BPM | RESPIRATION RATE: 16 BRPM

## 2025-05-20 DIAGNOSIS — E11.621 DIABETIC ULCER OF RIGHT MIDFOOT ASSOCIATED WITH TYPE 2 DIABETES MELLITUS, WITH NECROSIS OF MUSCLE (HCC): ICD-10-CM

## 2025-05-20 DIAGNOSIS — R60.0 LOCALIZED EDEMA: ICD-10-CM

## 2025-05-20 DIAGNOSIS — E66.9 TYPE 2 DIABETES MELLITUS WITH OBESITY (HCC): ICD-10-CM

## 2025-05-20 DIAGNOSIS — I87.321 IDIOPATHIC CHRONIC VENOUS HYPERTENSION OF RIGHT LEG WITH INFLAMMATION: ICD-10-CM

## 2025-05-20 DIAGNOSIS — L97.514 ULCER OF TOE OF RIGHT FOOT, WITH NECROSIS OF BONE (HCC): ICD-10-CM

## 2025-05-20 DIAGNOSIS — E11.69 TYPE 2 DIABETES MELLITUS WITH OBESITY (HCC): ICD-10-CM

## 2025-05-20 DIAGNOSIS — Z74.09 IMPAIRED MOBILITY: ICD-10-CM

## 2025-05-20 DIAGNOSIS — I70.234 ATHEROSCLEROSIS OF NATIVE ARTERY OF RIGHT LOWER EXTREMITY WITH ULCERATION OF MIDFOOT (HCC): Primary | ICD-10-CM

## 2025-05-20 DIAGNOSIS — L97.509 DIABETIC FOOT ULCER WITH OSTEOMYELITIS (HCC): Primary | ICD-10-CM

## 2025-05-20 DIAGNOSIS — M86.9 DIABETIC FOOT ULCER WITH OSTEOMYELITIS (HCC): Primary | ICD-10-CM

## 2025-05-20 DIAGNOSIS — L97.514 RIGHT FOOT ULCER, WITH NECROSIS OF BONE (HCC): ICD-10-CM

## 2025-05-20 DIAGNOSIS — I70.234 ATHEROSCLEROSIS OF NATIVE ARTERY OF RIGHT LOWER EXTREMITY WITH ULCERATION OF MIDFOOT (HCC): ICD-10-CM

## 2025-05-20 DIAGNOSIS — E11.628 TYPE 2 DIABETES MELLITUS WITH RIGHT DIABETIC FOOT INFECTION (HCC): ICD-10-CM

## 2025-05-20 DIAGNOSIS — L97.413 DIABETIC ULCER OF RIGHT MIDFOOT ASSOCIATED WITH TYPE 2 DIABETES MELLITUS, WITH NECROSIS OF MUSCLE (HCC): ICD-10-CM

## 2025-05-20 DIAGNOSIS — L08.9 TYPE 2 DIABETES MELLITUS WITH RIGHT DIABETIC FOOT INFECTION (HCC): ICD-10-CM

## 2025-05-20 DIAGNOSIS — E11.621 DIABETIC FOOT ULCER WITH OSTEOMYELITIS (HCC): Primary | ICD-10-CM

## 2025-05-20 DIAGNOSIS — E11.69 DIABETIC FOOT ULCER WITH OSTEOMYELITIS (HCC): Primary | ICD-10-CM

## 2025-05-20 LAB
GLUCOSE BLD-MCNC: 109 MG/DL (ref 70–99)
GLUCOSE BLD-MCNC: 174 MG/DL (ref 70–99)
GLUCOSE BLD-MCNC: 89 MG/DL (ref 70–99)
PERFORMED ON: ABNORMAL
PERFORMED ON: ABNORMAL
PERFORMED ON: NORMAL

## 2025-05-20 PROCEDURE — 99183 HYPERBARIC OXYGEN THERAPY: CPT | Performed by: EMERGENCY MEDICINE

## 2025-05-20 PROCEDURE — 15275 SKIN SUB GRAFT FACE/NK/HF/G: CPT | Performed by: EMERGENCY MEDICINE

## 2025-05-20 PROCEDURE — 15275 SKIN SUB GRAFT FACE/NK/HF/G: CPT

## 2025-05-20 PROCEDURE — 15276 SKIN SUB GRAFT F/N/HF/G ADDL: CPT | Performed by: EMERGENCY MEDICINE

## 2025-05-20 PROCEDURE — G0277 HBOT, FULL BODY CHAMBER, 30M: HCPCS

## 2025-05-20 RX ORDER — NEOMYCIN/BACITRACIN/POLYMYXINB 3.5-400-5K
OINTMENT (GRAM) TOPICAL ONCE
OUTPATIENT
Start: 2025-05-20 | End: 2025-05-20

## 2025-05-20 RX ORDER — LIDOCAINE 40 MG/G
CREAM TOPICAL ONCE
OUTPATIENT
Start: 2025-05-20 | End: 2025-05-20

## 2025-05-20 RX ORDER — GENTAMICIN SULFATE 1 MG/G
OINTMENT TOPICAL ONCE
OUTPATIENT
Start: 2025-05-20 | End: 2025-05-20

## 2025-05-20 RX ORDER — SODIUM CHLOR/HYPOCHLOROUS ACID 0.033 %
SOLUTION, IRRIGATION IRRIGATION ONCE
OUTPATIENT
Start: 2025-05-20 | End: 2025-05-20

## 2025-05-20 RX ORDER — LIDOCAINE HYDROCHLORIDE 20 MG/ML
JELLY TOPICAL ONCE
OUTPATIENT
Start: 2025-05-20 | End: 2025-05-20

## 2025-05-20 RX ORDER — BACITRACIN ZINC AND POLYMYXIN B SULFATE 500; 1000 [USP'U]/G; [USP'U]/G
OINTMENT TOPICAL ONCE
OUTPATIENT
Start: 2025-05-20 | End: 2025-05-20

## 2025-05-20 RX ORDER — BETAMETHASONE DIPROPIONATE 0.5 MG/G
CREAM TOPICAL ONCE
OUTPATIENT
Start: 2025-05-20 | End: 2025-05-20

## 2025-05-20 RX ORDER — TRIAMCINOLONE ACETONIDE 1 MG/G
OINTMENT TOPICAL ONCE
OUTPATIENT
Start: 2025-05-20 | End: 2025-05-20

## 2025-05-20 RX ORDER — CLOBETASOL PROPIONATE 0.5 MG/G
OINTMENT TOPICAL ONCE
OUTPATIENT
Start: 2025-05-20 | End: 2025-05-20

## 2025-05-20 RX ORDER — LIDOCAINE HYDROCHLORIDE 40 MG/ML
SOLUTION TOPICAL ONCE
OUTPATIENT
Start: 2025-05-20 | End: 2025-05-20

## 2025-05-20 RX ORDER — MUPIROCIN 20 MG/G
OINTMENT TOPICAL ONCE
OUTPATIENT
Start: 2025-05-20 | End: 2025-05-20

## 2025-05-20 RX ORDER — SILVER SULFADIAZINE 10 MG/G
CREAM TOPICAL ONCE
OUTPATIENT
Start: 2025-05-20 | End: 2025-05-20

## 2025-05-20 RX ORDER — LIDOCAINE 50 MG/G
OINTMENT TOPICAL ONCE
OUTPATIENT
Start: 2025-05-20 | End: 2025-05-20

## 2025-05-20 RX ORDER — LIDOCAINE 40 MG/G
CREAM TOPICAL ONCE
Status: COMPLETED | OUTPATIENT
Start: 2025-05-20 | End: 2025-05-20

## 2025-05-20 RX ORDER — GINSENG 100 MG
CAPSULE ORAL ONCE
OUTPATIENT
Start: 2025-05-20 | End: 2025-05-20

## 2025-05-20 RX ADMIN — LIDOCAINE: 40 CREAM TOPICAL at 09:42

## 2025-05-20 ASSESSMENT — PAIN SCALES - GENERAL
PAINLEVEL_OUTOF10: 0
PAINLEVEL_OUTOF10: 0

## 2025-05-20 NOTE — PROGRESS NOTES
[facility]  Hyperbaric Oxygen Therapy   Utilization Review    Marvin Montero  MEDICAL RECORD NUMBER:  [id number]  AGE: 74 y.o.   GENDER: male  : 1950  EPISODE DATE:  2025      HBO Diagnosis: right DFU to mid foot. Severity of bone necrosis. Coffey grade 4.               Marvin Montero is a 74 y.o. male  Who is currently receiving hyperbaric oxygen therapy at [facility].    Mr. Montero has currently received: Treatment Number: 49 out of Total Treatments: 50. Adding 10 more.    In my clinical judgement, ongoing HBO therapy is  necessary at this time.  Mr. Montero hasshown improvement with hyperbaric oxygen therapy as evidenced by decreased wound size, decreased edema, and decreased drainage. Increased eithelization and granulation noted as well.            The patient/caregiver verbalized understanding of the Utilization Review for HBOT and has agreed to the plan.     Electronically signed by FABIO ANTOINE MD on 2025 at 12:03 PM

## 2025-05-20 NOTE — PROGRESS NOTES
HBO PROGRESS NOTE      NAME: Marvin Montero  MEDICAL RECORD NUMBER:  [id number]  AGE: 74 y.o.   GENDER: male  : 1950  EPISODE DATE:  2025     Subjective     HBO Treatment Number: 49 out of Total Treatments: 50    HBO Diagnosis:             Indications: Lower Extremity Diabetic Wound ___(site) (Right Foot)    Safety checks performed prior to treatment.  See doc flowsheets for documentation.    Objective        Recent Labs     04/15/25  1039 04/15/25  1233   POCGLU 178* 88*     Pre treatment Vital Signs       Temp: 97 °F (36.1 °C)     Pulse: 60     Respirations: 16     BP: (!) 125/50 (Dr. Tinoco; Proceed with HBO)       Post treatment Vital Signs  Temp: 97 °F (36.1 °C)  Pulse: 66  Respirations: 16  BP: (!) 144/74    Assessment        HBO Diagnosis:   Problem List Items Addressed This Visit          Circulatory    Atherosclerosis of native artery of right lower extremity with ulceration of midfoot (HCC)    Relevant Orders    Notify physician (specify)    Hyperbaric Oxygen Therapy    Hypoglycemial protocol    POCT glucose    Care order/instruction    Care order/instruction       Endocrine    Diabetic foot ulcer with osteomyelitis (HCC) - Primary    Relevant Orders    Notify physician (specify)    Hyperbaric Oxygen Therapy    Hypoglycemial protocol    POCT glucose    Care order/instruction    Care order/instruction       Musculoskeletal and Integument    Right foot ulcer, with necrosis of bone (HCC)    Relevant Orders    Notify physician (specify)    Hyperbaric Oxygen Therapy     Other Visit Diagnoses         Ulcer of toe of right foot, with necrosis of bone (HCC)        Relevant Orders    Hypoglycemial protocol    POCT glucose    Care order/instruction    Care order/instruction            Physical Exam        General Appearance:  alert and oriented to person, place and time, well-developed and well-nourished, in no acute distress    Pre Tympanic Membrane Assessment:  Right: Normal (PE Tubes Visible

## 2025-05-20 NOTE — PLAN OF CARE
TheraSkin Treatment Note    NAME:  Marvin Montero  YOB: 1950  MEDICAL RECORD NUMBER:  [id number]  DATE:  5/20/2025    Goal:  Patient will receive safe and proper application of skin substitute.  Patient will comply with caring for dressing, offloading and reporting complications.     Expiration date of TheraSkin checked immediately prior to use.  Package intact prior to use and no damage noted.  Transport temperature controlled and acceptable.  TheraSkin was prepared for application by removing from package. TheraSkin was rinsed 2 times in room temperature normal saline for 2 minutes each time. A 2nd saline rinse was left on the TheraSkin until the physician was ready to apply it within 120 minutes of thawing. White side goes against ulcer bed.  Graft may be thawed and soaked in its inner pouch.Place sterile solution normal saline of lactated ringer in the inner pouch and completely submerge the graft. Do not allow the solution to exceed 42 degree C. To thaw for 2 minutes. TheraSkin until the physician was ready to apply must be within 120 minutes of thawing. Replace the sterile solution in the inner pouch between the thaw and soak. Remove the double mesh lining prior to applying to patient.  TheraSkin was applied to RIGHT FOOT and affixed with steri-strips by the physician.  CUTIMED SORBACT, DRAWTEX, OPTILOCK was applied on top of non-adherent dressing.  Fluffed gauze was applied.  Dressing was secured with cover roll type tape to stabilize graft.  Coban or ace wrap was applied to secure graft and decrease edema.  The patient/caregiver was instructed not to remove the dressing and to keep it clean and dry.  The patient/family/caregiver was instructed on the need for offloading and elevation of the affected extremity and on using the prescribed offloading device.  The patient/family/caregiver was instructed on signs and symptoms of complication to report, such as draining through dressing, dressing

## 2025-05-20 NOTE — PROGRESS NOTES
[facility]     Note Type: Medical Staff Progress Note    Referring Provider: Cece Alejandre MD  Reason for Referral:   Chief Complaint   Patient presents with    Wound Check     Follow up for right foot wound/        Marvin Montero  MEDICAL RECORD NUMBER:  [id number]  AGE: 74 y.o.   GENDER: male  : 1950  EPISODE DATE:  2025    Chief complaint and reason for visit:     Chief Complaint   Patient presents with    Wound Check     Follow up for right foot wound/         HPI/Wound Narrative:      Marvin Montero is a 74 y.o. male who presents today for an evaluation of a wound/ulcer. Wound duration:  2024 .    25: no new issues.     25: no new issues but hydrofera sticking too much    3/18/25: doing better overall, edema improving with diuretics    3/11/25: did not take his diuretic yesterday nor today and leg edema is much worse than last visit.    3/4/25: no new issues. Plans on HBOT today. Diuretic changed and helping with edema control much better. Eating better.     25: Has ENT appointment scheduled for 2 PM today with possible ET tubes but the patient was also given Flonase.  Unfortunately cannot trial the Flonase and at this time he does want to trial this next week.      25: no new issues. Had vascular intervention.    25: Patient has no specific complaints.  Does have angiogram with intervention scheduled for 2025.  He is also going to be getting his MRI soon.    25: no new issues. Has appt with cardio soon. MRI won't be done until after 2/3 due to recent pacer/defibrillator placed in 2024.    25: had his echo. EF about 55%. No changes in medications recommended by cardiology.    25: 74-year-old with past medical history notable for hypertension, type 2 diabetes mellitus, hyperlipidemia, atrial fibrillation on Eliquis, diastolic congestive heart failure, COPD, hypothyroidism, peripheral arterial disease status

## 2025-05-20 NOTE — PATIENT INSTRUCTIONS
[facility] Wound Care Physician Orders and Discharge Instructions  [facility]  [address], [address]  [place], [place] [address]  Telephone: [phone]      FAX [phone]  MONDAY - THURSDAY 8:00 am - 4:30 pm and Friday 8:00 am - 12:00 pm.        NAME:  Marvin Montero  YOB: 1950  MEDICAL RECORD NUMBER:  [id number]  DATE:  5/20/2025      Return Appointment:  [x] Return Appointment: With Dr Cece Alejandre  in  1 Week(s)   [x] Wound and dressing supply provider: [facility]  [] ECF or Home Healthcare:   [x] Wound Assessment:FRIDAY MAY 16TH- ONLY CHANGE OUTER DRESSING [x] Physician or NP scheduled for Wound Assessment: LOI HUNT CNP  [] Orders placed during your visit:        Important Reminders:   Please wash hands with soap and water before and after every dressing change.  Do not scrub wounds.  Keep wounds dry in shower unless otherwise instructed by the physician.  SMOKING can slow would healing. Stop smoking as soon as possible to improve healing and prevent further complications associated with smoking.      Michelle-Wound Topical Treatments:  Do not apply lotions, creams, or ointments to wound bed unless directed.   [] Apply moisturizing lotion to skin surrounding the wound prior to dressing change.  [] Apply antifungal ointment to skin surrounding the wound prior to dressing change.  [] Apply thin film of no sting moisture barrier ointment to skin immediately around      wound.  [] Other:         **ANTIBIOTICS PER DR BOOTH**      Wound Location: RIGHT LATERAL TMA   **THERASKIN # 3 APPLIED 5/20/2025**    Wound Cleansing:    Primary Dressing:  [x]  THERASKIN, STERI STRIPS, CUTIMED SORBACT, MEPITEL AND STERI STRIPS - LEAVE IN PLACE  [x] PLAIN FOAM TO ANTERIOR AND LATERAL ANKLE    Secondary Dressing:  [x] DRAWTEX AND OPTILOCK  [x] CAST PADDING, KERLIX AND COBAN ( FOOTBALL DRESSING )      Dressing Frequency:  []   [x] Do Not Change Dressing          Compression and Edema

## 2025-05-22 ENCOUNTER — APPOINTMENT (OUTPATIENT)
Dept: HYPERBARIC MEDICINE | Age: 75
End: 2025-05-22
Payer: MEDICARE

## 2025-05-22 ENCOUNTER — HOSPITAL ENCOUNTER (OUTPATIENT)
Dept: HYPERBARIC MEDICINE | Age: 75
Discharge: HOME | End: 2025-05-22
Payer: MEDICARE

## 2025-05-22 VITALS
HEART RATE: 65 BPM | DIASTOLIC BLOOD PRESSURE: 73 MMHG | SYSTOLIC BLOOD PRESSURE: 165 MMHG | TEMPERATURE: 97.1 F | RESPIRATION RATE: 16 BRPM

## 2025-05-22 DIAGNOSIS — M86.9 DIABETIC FOOT ULCER WITH OSTEOMYELITIS (HCC): Primary | ICD-10-CM

## 2025-05-22 DIAGNOSIS — L97.509 DIABETIC FOOT ULCER WITH OSTEOMYELITIS (HCC): Primary | ICD-10-CM

## 2025-05-22 DIAGNOSIS — L97.514 RIGHT FOOT ULCER, WITH NECROSIS OF BONE (HCC): ICD-10-CM

## 2025-05-22 DIAGNOSIS — E11.69 DIABETIC FOOT ULCER WITH OSTEOMYELITIS (HCC): Primary | ICD-10-CM

## 2025-05-22 DIAGNOSIS — E11.621 DIABETIC FOOT ULCER WITH OSTEOMYELITIS (HCC): Primary | ICD-10-CM

## 2025-05-22 DIAGNOSIS — L97.514 ULCER OF TOE OF RIGHT FOOT, WITH NECROSIS OF BONE (HCC): ICD-10-CM

## 2025-05-22 DIAGNOSIS — I70.234 ATHEROSCLEROSIS OF NATIVE ARTERY OF RIGHT LOWER EXTREMITY WITH ULCERATION OF MIDFOOT (HCC): ICD-10-CM

## 2025-05-22 LAB
GLUCOSE BLD-MCNC: 155 MG/DL (ref 70–99)
GLUCOSE BLD-MCNC: 156 MG/DL (ref 70–99)
GLUCOSE BLD-MCNC: 65 MG/DL (ref 70–99)
PERFORMED ON: ABNORMAL

## 2025-05-22 PROCEDURE — G0277 HBOT, FULL BODY CHAMBER, 30M: HCPCS

## 2025-05-22 PROCEDURE — 99183 HYPERBARIC OXYGEN THERAPY: CPT

## 2025-05-22 NOTE — PROGRESS NOTES
HBO PROGRESS NOTE      NAME: Marvin Montero  MEDICAL RECORD NUMBER:  [id number]  AGE: 74 y.o.   GENDER: male  : 1950  EPISODE DATE:  2025     Subjective     HBO Treatment Number: 50 out of Total Treatments: 50    HBO Diagnosis:             Indications: Lower Extremity Diabetic Wound ___(site) (right foot)    Safety checks performed prior to treatment.  See doc flowsheets for documentation.    Objective        Recent Labs     25  1243 25  1257   POCGLU 65* 155*     Pre treatment Vital Signs       Temp: 97.3 °F (36.3 °C)     Pulse: 72     Respirations: 16     BP: 138/61       Post treatment Vital Signs  Temp: 97.1 °F (36.2 °C)  Pulse: 65  Respirations: 16  BP: (!) 165/73    Assessment        HBO Diagnosis:   Problem List Items Addressed This Visit          Circulatory    Atherosclerosis of native artery of right lower extremity with ulceration of midfoot (HCC)    Relevant Orders    Notify physician (specify)    Hyperbaric Oxygen Therapy    Hypoglycemial protocol    POCT glucose    Care order/instruction       Endocrine    Diabetic foot ulcer with osteomyelitis (HCC) - Primary    Relevant Orders    Notify physician (specify)    Hyperbaric Oxygen Therapy    Hypoglycemial protocol    POCT glucose    Care order/instruction       Musculoskeletal and Integument    Right foot ulcer, with necrosis of bone (HCC)    Relevant Orders    Notify physician (specify)    Hyperbaric Oxygen Therapy     Other Visit Diagnoses         Ulcer of toe of right foot, with necrosis of bone (HCC)        Relevant Orders    Hypoglycemial protocol    POCT glucose    Care order/instruction            Physical Exam:  General Appearance:  alert and oriented to person, place and time, well-developed and well-nourished, in no acute distress    Pre Tympanic Membrane Assessment:  Right: Normal (per Umer Avila CNP)  Left: Normal (per Joseline Avila CNP)    Post Tympanic Membrane Assessment:  Left: Normal (PE tube visible,

## 2025-05-23 ENCOUNTER — HOSPITAL ENCOUNTER (OUTPATIENT)
Dept: HYPERBARIC MEDICINE | Age: 75
Discharge: HOME | End: 2025-05-23
Payer: MEDICARE

## 2025-05-23 ENCOUNTER — HOSPITAL ENCOUNTER (OUTPATIENT)
Dept: WOUND CARE | Age: 75
Discharge: HOME | End: 2025-05-23
Attending: EMERGENCY MEDICINE
Payer: MEDICARE

## 2025-05-23 ENCOUNTER — APPOINTMENT (OUTPATIENT)
Dept: HYPERBARIC MEDICINE | Age: 75
End: 2025-05-23
Payer: MEDICARE

## 2025-05-23 VITALS
HEART RATE: 62 BPM | RESPIRATION RATE: 16 BRPM | SYSTOLIC BLOOD PRESSURE: 131 MMHG | TEMPERATURE: 97.3 F | DIASTOLIC BLOOD PRESSURE: 56 MMHG

## 2025-05-23 VITALS
DIASTOLIC BLOOD PRESSURE: 68 MMHG | SYSTOLIC BLOOD PRESSURE: 157 MMHG | HEART RATE: 68 BPM | TEMPERATURE: 97.2 F | RESPIRATION RATE: 18 BRPM

## 2025-05-23 DIAGNOSIS — L97.514 ULCER OF TOE OF RIGHT FOOT, WITH NECROSIS OF BONE (HCC): ICD-10-CM

## 2025-05-23 DIAGNOSIS — E11.621 DIABETIC FOOT ULCER WITH OSTEOMYELITIS (HCC): Primary | ICD-10-CM

## 2025-05-23 DIAGNOSIS — L97.514 RIGHT FOOT ULCER, WITH NECROSIS OF BONE (HCC): ICD-10-CM

## 2025-05-23 DIAGNOSIS — I70.234 ATHEROSCLEROSIS OF NATIVE ARTERY OF RIGHT LOWER EXTREMITY WITH ULCERATION OF MIDFOOT (HCC): ICD-10-CM

## 2025-05-23 DIAGNOSIS — E11.69 DIABETIC FOOT ULCER WITH OSTEOMYELITIS (HCC): Primary | ICD-10-CM

## 2025-05-23 DIAGNOSIS — L97.509 DIABETIC FOOT ULCER WITH OSTEOMYELITIS (HCC): Primary | ICD-10-CM

## 2025-05-23 DIAGNOSIS — M86.9 DIABETIC FOOT ULCER WITH OSTEOMYELITIS (HCC): Primary | ICD-10-CM

## 2025-05-23 LAB
GLUCOSE BLD-MCNC: 100 MG/DL (ref 70–99)
GLUCOSE BLD-MCNC: 133 MG/DL (ref 70–99)
PERFORMED ON: ABNORMAL
PERFORMED ON: ABNORMAL

## 2025-05-23 PROCEDURE — G0277 HBOT, FULL BODY CHAMBER, 30M: HCPCS

## 2025-05-23 PROCEDURE — 99213 OFFICE O/P EST LOW 20 MIN: CPT

## 2025-05-23 RX ORDER — MUPIROCIN 20 MG/G
OINTMENT TOPICAL ONCE
OUTPATIENT
Start: 2025-05-23 | End: 2025-05-23

## 2025-05-23 RX ORDER — SILVER SULFADIAZINE 10 MG/G
CREAM TOPICAL ONCE
OUTPATIENT
Start: 2025-05-23 | End: 2025-05-23

## 2025-05-23 RX ORDER — BETAMETHASONE DIPROPIONATE 0.5 MG/G
CREAM TOPICAL ONCE
OUTPATIENT
Start: 2025-05-23 | End: 2025-05-23

## 2025-05-23 RX ORDER — LIDOCAINE HYDROCHLORIDE 20 MG/ML
JELLY TOPICAL ONCE
OUTPATIENT
Start: 2025-05-23 | End: 2025-05-23

## 2025-05-23 RX ORDER — CLOBETASOL PROPIONATE 0.5 MG/G
OINTMENT TOPICAL ONCE
OUTPATIENT
Start: 2025-05-23 | End: 2025-05-23

## 2025-05-23 RX ORDER — NEOMYCIN/BACITRACIN/POLYMYXINB 3.5-400-5K
OINTMENT (GRAM) TOPICAL ONCE
OUTPATIENT
Start: 2025-05-23 | End: 2025-05-23

## 2025-05-23 RX ORDER — TRIAMCINOLONE ACETONIDE 1 MG/G
OINTMENT TOPICAL ONCE
OUTPATIENT
Start: 2025-05-23 | End: 2025-05-23

## 2025-05-23 RX ORDER — LIDOCAINE 40 MG/G
CREAM TOPICAL ONCE
OUTPATIENT
Start: 2025-05-23 | End: 2025-05-23

## 2025-05-23 RX ORDER — BACITRACIN ZINC AND POLYMYXIN B SULFATE 500; 1000 [USP'U]/G; [USP'U]/G
OINTMENT TOPICAL ONCE
OUTPATIENT
Start: 2025-05-23 | End: 2025-05-23

## 2025-05-23 RX ORDER — GENTAMICIN SULFATE 1 MG/G
OINTMENT TOPICAL ONCE
OUTPATIENT
Start: 2025-05-23 | End: 2025-05-23

## 2025-05-23 RX ORDER — LIDOCAINE HYDROCHLORIDE 40 MG/ML
SOLUTION TOPICAL ONCE
OUTPATIENT
Start: 2025-05-23 | End: 2025-05-23

## 2025-05-23 RX ORDER — GINSENG 100 MG
CAPSULE ORAL ONCE
OUTPATIENT
Start: 2025-05-23 | End: 2025-05-23

## 2025-05-23 RX ORDER — LIDOCAINE 50 MG/G
OINTMENT TOPICAL ONCE
OUTPATIENT
Start: 2025-05-23 | End: 2025-05-23

## 2025-05-23 RX ORDER — SODIUM CHLOR/HYPOCHLOROUS ACID 0.033 %
SOLUTION, IRRIGATION IRRIGATION ONCE
OUTPATIENT
Start: 2025-05-23 | End: 2025-05-23

## 2025-05-23 ASSESSMENT — PAIN SCALES - GENERAL
PAINLEVEL_OUTOF10: 0
PAINLEVEL_OUTOF10: 0

## 2025-05-23 NOTE — PROGRESS NOTES
HBO PROGRESS NOTE      NAME: Marvin Montero  MEDICAL RECORD NUMBER:  [id number]  AGE: 74 y.o.   GENDER: male  : 1950  EPISODE DATE:  2025     Subjective     HBO Treatment Number: 51 out of Total Treatments: 60    HBO Diagnosis:             Indications: Lower Extremity Diabetic Wound ___(site) (righht foot)    Safety checks performed prior to treatment.  See doc flowsheets for documentation.    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 (HCC)    Relevant Orders    Notify physician (specify)    Hyperbaric Oxygen Therapy    Hypoglycemial protocol    POCT glucose    Care order/instruction    Care order/instruction       Endocrine    Diabetic foot ulcer with osteomyelitis (HCC) - Primary    Relevant Orders    Notify physician (specify)    Hyperbaric Oxygen Therapy    Hypoglycemial protocol    POCT glucose    Care order/instruction    Care order/instruction       Musculoskeletal and Integument    Right foot ulcer, with necrosis of bone (HCC)    Relevant Orders    Notify physician (specify)    Hyperbaric Oxygen Therapy     Other Visit Diagnoses         Ulcer of toe of right foot, with necrosis of bone (HCC)        Relevant Orders    Hypoglycemial protocol    POCT glucose    Care order/instruction    Care order/instruction            Physical Exam:  General Appearance:  alert and oriented to person, place and time, well-developed and well-nourished, in no acute distress    Pre Tympanic Membrane Assessment:  Right: Normal (Per Donis

## 2025-05-23 NOTE — PLAN OF CARE
HBO NURSING DOCUMENTATION          [facility]    NAME:  Marvin Montero  YOB: 1950  MEDICAL RECORD NUMBER:  [id number]  DATE:  5/23/2025    Patient blood sugar, 100 after treatment. Donis Avila NP made aware. Patient asymptomatic and declines Glucerna or anything else to eat or drink at this time. patient reports he is going to leave and have lunch at home with his wife. RN notified CNP, patient proceeds with discharge     Electronically signed by Connie Rojas RN on 5/23/2025 at 12:43 PM

## 2025-05-23 NOTE — PATIENT INSTRUCTIONS
12:30 pm.  If you need help with your wound outside these hours and cannot wait until we are again available, contact your PCP or go to the hospital emergency room.     PLEASE NOTE: IF YOU ARE UNABLE TO OBTAIN WOUND SUPPLIES, CONTINUE TO USE THE SUPPLIES YOU HAVE AVAILABLE UNTIL YOU ARE ABLE TO REACH US. IT IS MOST IMPORTANT TO KEEP THE WOUND COVERED AT ALL TIMES.         Physician Signature:_______________________    Date: ___________ Time:  ____________              Dr Cece Alejandre

## 2025-05-27 ENCOUNTER — OFFICE VISIT (OUTPATIENT)
Dept: ENT CLINIC | Age: 75
End: 2025-05-27
Payer: MEDICARE

## 2025-05-27 ENCOUNTER — HOSPITAL ENCOUNTER (OUTPATIENT)
Dept: HYPERBARIC MEDICINE | Age: 75
Discharge: HOME | End: 2025-05-27
Payer: MEDICARE

## 2025-05-27 ENCOUNTER — HOSPITAL ENCOUNTER (OUTPATIENT)
Dept: WOUND CARE | Age: 75
Discharge: HOME | End: 2025-05-27
Attending: EMERGENCY MEDICINE
Payer: MEDICARE

## 2025-05-27 VITALS
RESPIRATION RATE: 20 BRPM | SYSTOLIC BLOOD PRESSURE: 159 MMHG | HEART RATE: 66 BPM | DIASTOLIC BLOOD PRESSURE: 78 MMHG | TEMPERATURE: 97.2 F

## 2025-05-27 VITALS
HEIGHT: 76 IN | SYSTOLIC BLOOD PRESSURE: 152 MMHG | HEART RATE: 83 BPM | BODY MASS INDEX: 30.92 KG/M2 | DIASTOLIC BLOOD PRESSURE: 77 MMHG

## 2025-05-27 VITALS
TEMPERATURE: 97.5 F | HEART RATE: 63 BPM | RESPIRATION RATE: 18 BRPM | DIASTOLIC BLOOD PRESSURE: 55 MMHG | SYSTOLIC BLOOD PRESSURE: 124 MMHG

## 2025-05-27 DIAGNOSIS — M86.9 DIABETIC FOOT ULCER WITH OSTEOMYELITIS (HCC): Primary | ICD-10-CM

## 2025-05-27 DIAGNOSIS — L08.9 TYPE 2 DIABETES MELLITUS WITH RIGHT DIABETIC FOOT INFECTION (HCC): ICD-10-CM

## 2025-05-27 DIAGNOSIS — L97.514 ULCER OF TOE OF RIGHT FOOT, WITH NECROSIS OF BONE (HCC): ICD-10-CM

## 2025-05-27 DIAGNOSIS — E11.621 DIABETIC ULCER OF RIGHT MIDFOOT ASSOCIATED WITH TYPE 2 DIABETES MELLITUS, WITH NECROSIS OF MUSCLE (HCC): ICD-10-CM

## 2025-05-27 DIAGNOSIS — L97.413 DIABETIC ULCER OF RIGHT MIDFOOT ASSOCIATED WITH TYPE 2 DIABETES MELLITUS, WITH NECROSIS OF MUSCLE (HCC): ICD-10-CM

## 2025-05-27 DIAGNOSIS — E66.9 TYPE 2 DIABETES MELLITUS WITH OBESITY (HCC): ICD-10-CM

## 2025-05-27 DIAGNOSIS — L97.509 DIABETIC FOOT ULCER WITH OSTEOMYELITIS (HCC): Primary | ICD-10-CM

## 2025-05-27 DIAGNOSIS — E11.69 DIABETIC FOOT ULCER WITH OSTEOMYELITIS (HCC): Primary | ICD-10-CM

## 2025-05-27 DIAGNOSIS — I70.234 ATHEROSCLEROSIS OF NATIVE ARTERY OF RIGHT LOWER EXTREMITY WITH ULCERATION OF MIDFOOT (HCC): Primary | ICD-10-CM

## 2025-05-27 DIAGNOSIS — R60.0 LOCALIZED EDEMA: ICD-10-CM

## 2025-05-27 DIAGNOSIS — H90.3 SENSORINEURAL HEARING LOSS (SNHL) OF BOTH EARS: ICD-10-CM

## 2025-05-27 DIAGNOSIS — E11.69 TYPE 2 DIABETES MELLITUS WITH OBESITY (HCC): ICD-10-CM

## 2025-05-27 DIAGNOSIS — Z74.09 IMPAIRED MOBILITY: ICD-10-CM

## 2025-05-27 DIAGNOSIS — E11.628 TYPE 2 DIABETES MELLITUS WITH RIGHT DIABETIC FOOT INFECTION (HCC): ICD-10-CM

## 2025-05-27 DIAGNOSIS — I70.234 ATHEROSCLEROSIS OF NATIVE ARTERY OF RIGHT LOWER EXTREMITY WITH ULCERATION OF MIDFOOT (HCC): ICD-10-CM

## 2025-05-27 DIAGNOSIS — E11.621 DIABETIC FOOT ULCER WITH OSTEOMYELITIS (HCC): Primary | ICD-10-CM

## 2025-05-27 DIAGNOSIS — I87.321 IDIOPATHIC CHRONIC VENOUS HYPERTENSION OF RIGHT LEG WITH INFLAMMATION: ICD-10-CM

## 2025-05-27 DIAGNOSIS — T70.29XD BAROTRAUMA, SUBSEQUENT ENCOUNTER: ICD-10-CM

## 2025-05-27 DIAGNOSIS — H69.93 DYSFUNCTION OF BOTH EUSTACHIAN TUBES: Primary | ICD-10-CM

## 2025-05-27 DIAGNOSIS — L97.514 RIGHT FOOT ULCER, WITH NECROSIS OF BONE (HCC): ICD-10-CM

## 2025-05-27 LAB
GLUCOSE BLD-MCNC: 105 MG/DL (ref 70–99)
GLUCOSE BLD-MCNC: 163 MG/DL (ref 70–99)
GLUCOSE BLD-MCNC: 96 MG/DL (ref 70–99)
PERFORMED ON: ABNORMAL
PERFORMED ON: ABNORMAL
PERFORMED ON: NORMAL

## 2025-05-27 PROCEDURE — 99213 OFFICE O/P EST LOW 20 MIN: CPT

## 2025-05-27 PROCEDURE — 1159F MED LIST DOCD IN RCRD: CPT | Performed by: STUDENT IN AN ORGANIZED HEALTH CARE EDUCATION/TRAINING PROGRAM

## 2025-05-27 PROCEDURE — 99213 OFFICE O/P EST LOW 20 MIN: CPT | Performed by: STUDENT IN AN ORGANIZED HEALTH CARE EDUCATION/TRAINING PROGRAM

## 2025-05-27 PROCEDURE — 3077F SYST BP >= 140 MM HG: CPT | Performed by: STUDENT IN AN ORGANIZED HEALTH CARE EDUCATION/TRAINING PROGRAM

## 2025-05-27 PROCEDURE — G0277 HBOT, FULL BODY CHAMBER, 30M: HCPCS

## 2025-05-27 PROCEDURE — 3078F DIAST BP <80 MM HG: CPT | Performed by: STUDENT IN AN ORGANIZED HEALTH CARE EDUCATION/TRAINING PROGRAM

## 2025-05-27 PROCEDURE — 92504 EAR MICROSCOPY EXAMINATION: CPT | Performed by: STUDENT IN AN ORGANIZED HEALTH CARE EDUCATION/TRAINING PROGRAM

## 2025-05-27 PROCEDURE — 1123F ACP DISCUSS/DSCN MKR DOCD: CPT | Performed by: STUDENT IN AN ORGANIZED HEALTH CARE EDUCATION/TRAINING PROGRAM

## 2025-05-27 PROCEDURE — 99213 OFFICE O/P EST LOW 20 MIN: CPT | Performed by: EMERGENCY MEDICINE

## 2025-05-27 PROCEDURE — 99183 HYPERBARIC OXYGEN THERAPY: CPT | Performed by: EMERGENCY MEDICINE

## 2025-05-27 RX ORDER — GINSENG 100 MG
CAPSULE ORAL ONCE
OUTPATIENT
Start: 2025-05-27 | End: 2025-05-27

## 2025-05-27 RX ORDER — SODIUM CHLOR/HYPOCHLOROUS ACID 0.033 %
SOLUTION, IRRIGATION IRRIGATION ONCE
OUTPATIENT
Start: 2025-05-27 | End: 2025-05-27

## 2025-05-27 RX ORDER — TRIAMCINOLONE ACETONIDE 1 MG/G
OINTMENT TOPICAL ONCE
OUTPATIENT
Start: 2025-05-27 | End: 2025-05-27

## 2025-05-27 RX ORDER — MUPIROCIN 20 MG/G
OINTMENT TOPICAL ONCE
OUTPATIENT
Start: 2025-05-27 | End: 2025-05-27

## 2025-05-27 RX ORDER — SILVER SULFADIAZINE 10 MG/G
CREAM TOPICAL ONCE
OUTPATIENT
Start: 2025-05-27 | End: 2025-05-27

## 2025-05-27 RX ORDER — NEOMYCIN/BACITRACIN/POLYMYXINB 3.5-400-5K
OINTMENT (GRAM) TOPICAL ONCE
OUTPATIENT
Start: 2025-05-27 | End: 2025-05-27

## 2025-05-27 RX ORDER — LIDOCAINE HYDROCHLORIDE 20 MG/ML
JELLY TOPICAL ONCE
OUTPATIENT
Start: 2025-05-27 | End: 2025-05-27

## 2025-05-27 RX ORDER — BACITRACIN ZINC AND POLYMYXIN B SULFATE 500; 1000 [USP'U]/G; [USP'U]/G
OINTMENT TOPICAL ONCE
OUTPATIENT
Start: 2025-05-27 | End: 2025-05-27

## 2025-05-27 RX ORDER — LIDOCAINE 40 MG/G
CREAM TOPICAL ONCE
OUTPATIENT
Start: 2025-05-27 | End: 2025-05-27

## 2025-05-27 RX ORDER — LIDOCAINE 50 MG/G
OINTMENT TOPICAL ONCE
OUTPATIENT
Start: 2025-05-27 | End: 2025-05-27

## 2025-05-27 RX ORDER — CLOBETASOL PROPIONATE 0.5 MG/G
OINTMENT TOPICAL ONCE
OUTPATIENT
Start: 2025-05-27 | End: 2025-05-27

## 2025-05-27 RX ORDER — GENTAMICIN SULFATE 1 MG/G
OINTMENT TOPICAL ONCE
OUTPATIENT
Start: 2025-05-27 | End: 2025-05-27

## 2025-05-27 RX ORDER — LIDOCAINE HYDROCHLORIDE 40 MG/ML
SOLUTION TOPICAL ONCE
OUTPATIENT
Start: 2025-05-27 | End: 2025-05-27

## 2025-05-27 RX ORDER — BETAMETHASONE DIPROPIONATE 0.5 MG/G
CREAM TOPICAL ONCE
OUTPATIENT
Start: 2025-05-27 | End: 2025-05-27

## 2025-05-27 ASSESSMENT — PAIN SCALES - GENERAL
PAINLEVEL_OUTOF10: 0

## 2025-05-27 NOTE — PATIENT INSTRUCTIONS
[facility] Wound Care Physician Orders and Discharge Instructions  [facility]  [address]  [place], [place] [address]  Telephone: [phone]      FAX [phone]  MONDAY - THURSDAY 8:00 am - 4:30 pm and Friday 8:00 am - 12:00 pm.        NAME:  Marvin Montero  YOB: 1950  MEDICAL RECORD NUMBER:  [id number]  DATE:  5/27/2025      Return Appointment:  [x] Return Appointment: With Dr Cece Alejandre  in  1 Week(s)   [x] Wound and dressing supply provider: [facility]  [] ECF or Home Healthcare:   [x] Wound Assessment:FRIDAY MAY 16TH- ONLY CHANGE OUTER DRESSING [x] Physician or NP scheduled for Wound Assessment: LOI HUNT CNP  [] Orders placed during your visit:        Important Reminders:   Please wash hands with soap and water before and after every dressing change.  Do not scrub wounds.  Keep wounds dry in shower unless otherwise instructed by the physician.  SMOKING can slow would healing. Stop smoking as soon as possible to improve healing and prevent further complications associated with smoking.      Michelle-Wound Topical Treatments:  Do not apply lotions, creams, or ointments to wound bed unless directed.   [] Apply moisturizing lotion to skin surrounding the wound prior to dressing change.  [] Apply antifungal ointment to skin surrounding the wound prior to dressing change.  [] Apply thin film of no sting moisture barrier ointment to skin immediately around      wound.  [] Other:         **ANTIBIOTICS PER DR BOOTH**      Wound Location: RIGHT LATERAL TMA   **THERASKIN # 3 APPLIED 5/20/2025**    Wound Cleansing:    Primary Dressing:  [x]  THERASKIN, STERI STRIPS, CUTIMED SORBACT, MEPITEL AND ZINC OXIDE TO MICHELLE WOUND - LEAVE IN PLACE  [x] PLAIN FOAM TO ANTERIOR AND LATERAL ANKLE    Secondary Dressing:  [x] DRAWTEX AND OPTILOCK  [x] CAST PADDING, KERLIX AND COBAN ( FOOTBALL DRESSING )      Dressing Frequency:  []   [x] Do Not Change

## 2025-05-27 NOTE — PROGRESS NOTES
HBO PROGRESS NOTE      NAME: Marvin Montero  MEDICAL RECORD NUMBER:  [id number]  AGE: 74 y.o.   GENDER: male  : 1950  EPISODE DATE:  2025     Subjective     HBO Treatment Number: 52 out of Total Treatments: 60    HBO Diagnosis:             Indications: Lower Extremity Diabetic Wound ___(site) (Right Foot)    Safety checks performed prior to treatment.  See doc flowsheets for documentation.    Objective        Recent Labs     04/15/25  1039 04/15/25  1233   POCGLU 178* 88*     Pre treatment Vital Signs       Temp: 97.2 °F (36.2 °C)     Pulse: 65     Respirations: 18     BP: (!) 132/58 (Dr. 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 (HCC)    Relevant Orders    Notify physician (specify)    Hyperbaric Oxygen Therapy    Hypoglycemial protocol    POCT glucose    Care order/instruction    Care order/instruction    Care order/instruction       Endocrine    Diabetic foot ulcer with osteomyelitis (HCC) - Primary    Relevant Orders    Notify physician (specify)    Hyperbaric Oxygen Therapy    Hypoglycemial protocol    POCT glucose    Care order/instruction    Care order/instruction    Care order/instruction       Musculoskeletal and Integument    Right foot ulcer, with necrosis of bone (HCC)    Relevant Orders    Notify physician (specify)    Hyperbaric Oxygen Therapy     Other Visit Diagnoses         Ulcer of toe of right foot, with necrosis of bone (HCC)        Relevant Orders    Hypoglycemial protocol    POCT glucose    Care order/instruction    Care order/instruction    Care order/instruction            Physical Exam        General Appearance:  alert and oriented to person, place and time, well-developed and well-nourished, in no acute distress    Pre

## 2025-05-27 NOTE — PROGRESS NOTES
[facility]     Note Type: Medical Staff Progress Note    Referring Provider: Cece Alejandre MD  Reason for Referral:   Chief Complaint   Patient presents with    Wound Check     Follow-up visit for a wound to the right foot.        Marvin Montero  MEDICAL RECORD NUMBER:  [id number]  AGE: 74 y.o.   GENDER: male  : 1950  EPISODE DATE:  2025    Chief complaint and reason for visit:     Chief Complaint   Patient presents with    Wound Check     Follow-up visit for a wound to the right foot.         HPI/Wound Narrative:      Marvin Montero is a 74 y.o. male who presents today for an evaluation of a wound/ulcer. Wound duration:  2024 .    25: no new issues.     25: no new issues but hydrofera sticking too much    3/18/25: doing better overall, edema improving with diuretics    3/11/25: did not take his diuretic yesterday nor today and leg edema is much worse than last visit.    3/4/25: no new issues. Plans on HBOT today. Diuretic changed and helping with edema control much better. Eating better.     25: Has ENT appointment scheduled for 2 PM today with possible ET tubes but the patient was also given Flonase.  Unfortunately cannot trial the Flonase and at this time he does want to trial this next week.      25: no new issues. Had vascular intervention.    25: Patient has no specific complaints.  Does have angiogram with intervention scheduled for 2025.  He is also going to be getting his MRI soon.    25: no new issues. Has appt with cardio soon. MRI won't be done until after 2/3 due to recent pacer/defibrillator placed in 2024.    25: had his echo. EF about 55%. No changes in medications recommended by cardiology.    25: 74-year-old with past medical history notable for hypertension, type 2 diabetes mellitus, hyperlipidemia, atrial fibrillation on Eliquis, diastolic congestive heart failure, COPD, hypothyroidism,

## 2025-05-27 NOTE — PROGRESS NOTES
[facility]  DIVISION OF OTOLARYNGOLOGY- HEAD & NECK SURGERY        Marvin Montero (:  1950) is a 74 y.o. male, here for evaluation of the following chief complaint(s):  Follow-up and Ear Problem      ASSESSMENT/PLAN:  1. Dysfunction of both eustachian tubes  2. Barotrauma, subsequent encounter  3. Sensorineural hearing loss (SNHL) of both ears           This is a very pleasant 74 y.o. male here today for evaluation of the the above-noted complaints.      Assessment & Plan  1. Hearing loss, eustachian tube dysfunction and barotrauma in the setting of hyperbaric oxygen treatment.    - Tympanostomy tubes placed 2025  -Tympanostomy tubes in good position.  Discussed that they will typically follow-up in 12 to 18 months  - Cleared to continue hyperbaric oxygen treatment  - Follow-up in 6 months with audiogram before him      Medical Decision Making:  The following items were considered in medical decision making:  Independent review of images  Review / order clinical lab tests  Review / order radiology tests  Decision to obtain old records  Review and summation of old records as accessed through [employer] if applicable    SUBJECTIVE/OBJECTIVE:  HPI    History of Present Illness  The patient is a 74-year-old gentleman who presents today for hearing loss, tinnitus, and issues following hyperbaric oxygen treatment.    He is currently undergoing treatment for a foot injury, with his first round of hyperbaric oxygen therapy administered today. During this session, he experienced an unexpected response in his ears, which did not equalize as anticipated. This led to a prolonged exposure to the hyperbaric environment. Upon examination, his left ear exhibited mild redness, while the right ear was significantly more erythematous. The attending physician suggested the potential need for an ear tube. He reported experiencing pressure in his ears but no associated pain. His next scheduled hyperbaric oxygen

## 2025-05-28 ENCOUNTER — HOSPITAL ENCOUNTER (OUTPATIENT)
Dept: HYPERBARIC MEDICINE | Age: 75
Discharge: HOME | End: 2025-05-28
Payer: MEDICARE

## 2025-05-28 VITALS
RESPIRATION RATE: 18 BRPM | HEART RATE: 75 BPM | SYSTOLIC BLOOD PRESSURE: 161 MMHG | TEMPERATURE: 97.2 F | DIASTOLIC BLOOD PRESSURE: 77 MMHG

## 2025-05-28 DIAGNOSIS — E11.69 DIABETIC FOOT ULCER WITH OSTEOMYELITIS (HCC): Primary | ICD-10-CM

## 2025-05-28 DIAGNOSIS — M86.9 DIABETIC FOOT ULCER WITH OSTEOMYELITIS (HCC): Primary | ICD-10-CM

## 2025-05-28 DIAGNOSIS — E11.621 DIABETIC FOOT ULCER WITH OSTEOMYELITIS (HCC): Primary | ICD-10-CM

## 2025-05-28 DIAGNOSIS — L97.509 DIABETIC FOOT ULCER WITH OSTEOMYELITIS (HCC): Primary | ICD-10-CM

## 2025-05-28 DIAGNOSIS — L97.514 RIGHT FOOT ULCER, WITH NECROSIS OF BONE (HCC): ICD-10-CM

## 2025-05-28 DIAGNOSIS — L97.514 ULCER OF TOE OF RIGHT FOOT, WITH NECROSIS OF BONE (HCC): ICD-10-CM

## 2025-05-28 DIAGNOSIS — I70.234 ATHEROSCLEROSIS OF NATIVE ARTERY OF RIGHT LOWER EXTREMITY WITH ULCERATION OF MIDFOOT (HCC): ICD-10-CM

## 2025-05-28 LAB
GLUCOSE BLD-MCNC: 107 MG/DL (ref 70–99)
GLUCOSE BLD-MCNC: 160 MG/DL (ref 70–99)
PERFORMED ON: ABNORMAL
PERFORMED ON: ABNORMAL

## 2025-05-28 PROCEDURE — 99183 HYPERBARIC OXYGEN THERAPY: CPT | Performed by: SURGERY

## 2025-05-28 PROCEDURE — G0277 HBOT, FULL BODY CHAMBER, 30M: HCPCS

## 2025-05-28 ASSESSMENT — PAIN SCALES - GENERAL: PAINLEVEL_OUTOF10: 0

## 2025-05-28 NOTE — PROGRESS NOTES
HBO PROGRESS NOTE      NAME: Marvin Montero  MEDICAL RECORD NUMBER:  [id number]  AGE: 74 y.o.   GENDER: male  : 1950  EPISODE DATE:  2025     Subjective     HBO Treatment Number: 53 out of Total Treatments: 60    HBO Diagnosis:             Indications: Lower Extremity Diabetic Wound ___(site) (right foot)    Safety checks performed prior to treatment.  See doc flowsheets for documentation.    Objective        Recent Labs     25  1025 25  1248   POCGLU 160* 107*     Pre treatment Vital Signs       Temp: 97.2 °F (36.2 °C)     Pulse: 79     Respirations: 18     BP: (!) 132/50 (Dr. Joann MD, aware. 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 (HCC)    Relevant Orders    Notify physician (specify)    Hyperbaric Oxygen Therapy    Hypoglycemial protocol    POCT glucose    Care order/instruction       Endocrine    Diabetic foot ulcer with osteomyelitis (HCC) - Primary    Relevant Orders    Notify physician (specify)    Hyperbaric Oxygen Therapy    Hypoglycemial protocol    POCT glucose    Care order/instruction       Musculoskeletal and Integument    Right foot ulcer, with necrosis of bone (HCC)    Relevant Orders    Notify physician (specify)    Hyperbaric Oxygen Therapy     Other Visit Diagnoses         Ulcer of toe of right foot, with necrosis of bone (HCC)        Relevant Orders    Hypoglycemial protocol    POCT glucose    Care order/instruction            Physical Exam        General Appearance:  alert and oriented to person, place and time, well-developed and well-nourished, in no acute distress    Pre Tympanic Membrane Assessment:  Right: Normal (per Dr. Joann MD)  Left: Normal (per Dr. Joann MD)    Post Tympanic

## 2025-05-29 ENCOUNTER — HOSPITAL ENCOUNTER (OUTPATIENT)
Dept: HYPERBARIC MEDICINE | Age: 75
Discharge: HOME | End: 2025-05-29
Payer: MEDICARE

## 2025-05-29 VITALS
DIASTOLIC BLOOD PRESSURE: 78 MMHG | SYSTOLIC BLOOD PRESSURE: 149 MMHG | HEART RATE: 70 BPM | TEMPERATURE: 97.2 F | RESPIRATION RATE: 18 BRPM

## 2025-05-29 DIAGNOSIS — I70.234 ATHEROSCLEROSIS OF NATIVE ARTERY OF RIGHT LOWER EXTREMITY WITH ULCERATION OF MIDFOOT (HCC): ICD-10-CM

## 2025-05-29 DIAGNOSIS — E11.69 DIABETIC FOOT ULCER WITH OSTEOMYELITIS (HCC): Primary | ICD-10-CM

## 2025-05-29 DIAGNOSIS — L97.514 ULCER OF TOE OF RIGHT FOOT, WITH NECROSIS OF BONE (HCC): ICD-10-CM

## 2025-05-29 DIAGNOSIS — E11.621 DIABETIC FOOT ULCER WITH OSTEOMYELITIS (HCC): Primary | ICD-10-CM

## 2025-05-29 DIAGNOSIS — M86.9 DIABETIC FOOT ULCER WITH OSTEOMYELITIS (HCC): Primary | ICD-10-CM

## 2025-05-29 DIAGNOSIS — L97.509 DIABETIC FOOT ULCER WITH OSTEOMYELITIS (HCC): Primary | ICD-10-CM

## 2025-05-29 DIAGNOSIS — L97.514 RIGHT FOOT ULCER, WITH NECROSIS OF BONE (HCC): ICD-10-CM

## 2025-05-29 LAB
GLUCOSE BLD-MCNC: 121 MG/DL (ref 70–99)
GLUCOSE BLD-MCNC: 182 MG/DL (ref 70–99)
PERFORMED ON: ABNORMAL
PERFORMED ON: ABNORMAL

## 2025-05-29 PROCEDURE — 99183 HYPERBARIC OXYGEN THERAPY: CPT | Performed by: NURSE PRACTITIONER

## 2025-05-29 PROCEDURE — G0277 HBOT, FULL BODY CHAMBER, 30M: HCPCS

## 2025-05-29 ASSESSMENT — PAIN SCALES - GENERAL: PAINLEVEL_OUTOF10: 0

## 2025-05-29 NOTE — PROGRESS NOTES
Membrane Assessment:  Left: Normal (PE Tubes Visible; Denies any ear problems)  Right: Normal (PE Tubes Visible; Denies any ear problems)    Pulmonary/Chest:  clear to auscultation bilaterally- no wheezes, rales or rhonchi, normal air movement, no respiratory distress    Cardiovascular:  regular rate and rhythm    Plan        Patient placed in a full body Monoplace Chamber #: [id number]  Treatment Start Time: 1046     Pressure Reached Time: 1055  MINGO : 2  Number of Air Breaks:  Treatment Status: No Air break     Decompression Time: 1225   Treatment End Time: 1234  Length of Treatment: 90 Minutes  Symptoms Noted During Treatment: None  Total Treatment Time (min): 108    Treatment Completion Status: Treatment completed without issue    I was present on these premises and immediately available to furnish assistance & direction throughout the HBO Treatment.     Marvin Montero is a 74 y.o. male  did successfully complete today's hyperbaric oxygen treatment at [facility] and HBO therapy.    In my clinical judgement, ongoing HBO therapy is  necessary at this time to assist with wound healing, preservation of limb, life, or function.    Supervision and attendance of Hyperbaric Oxygen Therapy provided. Continue HBO treatment as outlined in the treatment plan.    Hyperbaric Oxygen: Mr. Montero tolerated: Treatment Number: 54 without  issue.    Discharge Instructions were explained and given to Mr. Montero     Electronically signed by KENNY Guerrero CNP on 5/29/2025 at 1:34 PM

## 2025-05-30 ENCOUNTER — HOSPITAL ENCOUNTER (OUTPATIENT)
Dept: HYPERBARIC MEDICINE | Age: 75
Discharge: HOME | End: 2025-05-30
Payer: MEDICARE

## 2025-05-30 ENCOUNTER — HOSPITAL ENCOUNTER (OUTPATIENT)
Dept: WOUND CARE | Age: 75
Discharge: HOME | End: 2025-05-30
Attending: EMERGENCY MEDICINE
Payer: MEDICARE

## 2025-05-30 VITALS
SYSTOLIC BLOOD PRESSURE: 120 MMHG | RESPIRATION RATE: 18 BRPM | TEMPERATURE: 97.2 F | HEART RATE: 74 BPM | DIASTOLIC BLOOD PRESSURE: 53 MMHG

## 2025-05-30 VITALS
HEART RATE: 72 BPM | SYSTOLIC BLOOD PRESSURE: 167 MMHG | DIASTOLIC BLOOD PRESSURE: 66 MMHG | RESPIRATION RATE: 18 BRPM | TEMPERATURE: 97.2 F

## 2025-05-30 DIAGNOSIS — L97.514 RIGHT FOOT ULCER, WITH NECROSIS OF BONE (HCC): ICD-10-CM

## 2025-05-30 DIAGNOSIS — M86.9 DIABETIC FOOT ULCER WITH OSTEOMYELITIS (HCC): Primary | ICD-10-CM

## 2025-05-30 DIAGNOSIS — I70.234 ATHEROSCLEROSIS OF NATIVE ARTERY OF RIGHT LOWER EXTREMITY WITH ULCERATION OF MIDFOOT (HCC): ICD-10-CM

## 2025-05-30 DIAGNOSIS — E11.621 DIABETIC ULCER OF RIGHT MIDFOOT ASSOCIATED WITH TYPE 2 DIABETES MELLITUS, WITH NECROSIS OF MUSCLE (HCC): ICD-10-CM

## 2025-05-30 DIAGNOSIS — E11.69 DIABETIC FOOT ULCER WITH OSTEOMYELITIS (HCC): ICD-10-CM

## 2025-05-30 DIAGNOSIS — L97.509 DIABETIC FOOT ULCER WITH OSTEOMYELITIS (HCC): Primary | ICD-10-CM

## 2025-05-30 DIAGNOSIS — L97.514 ULCER OF TOE OF RIGHT FOOT, WITH NECROSIS OF BONE (HCC): ICD-10-CM

## 2025-05-30 DIAGNOSIS — L97.509 DIABETIC FOOT ULCER WITH OSTEOMYELITIS (HCC): ICD-10-CM

## 2025-05-30 DIAGNOSIS — E11.69 TYPE 2 DIABETES MELLITUS WITH OBESITY (HCC): ICD-10-CM

## 2025-05-30 DIAGNOSIS — Z74.09 IMPAIRED MOBILITY: ICD-10-CM

## 2025-05-30 DIAGNOSIS — R60.0 LOCALIZED EDEMA: ICD-10-CM

## 2025-05-30 DIAGNOSIS — E11.621 DIABETIC FOOT ULCER WITH OSTEOMYELITIS (HCC): ICD-10-CM

## 2025-05-30 DIAGNOSIS — I87.321 IDIOPATHIC CHRONIC VENOUS HYPERTENSION OF RIGHT LEG WITH INFLAMMATION: ICD-10-CM

## 2025-05-30 DIAGNOSIS — E66.9 TYPE 2 DIABETES MELLITUS WITH OBESITY (HCC): ICD-10-CM

## 2025-05-30 DIAGNOSIS — E11.621 DIABETIC ULCER OF LEFT MIDFOOT ASSOCIATED WITH TYPE 2 DIABETES MELLITUS, WITH NECROSIS OF BONE (HCC): ICD-10-CM

## 2025-05-30 DIAGNOSIS — L97.413 DIABETIC ULCER OF RIGHT MIDFOOT ASSOCIATED WITH TYPE 2 DIABETES MELLITUS, WITH NECROSIS OF MUSCLE (HCC): ICD-10-CM

## 2025-05-30 DIAGNOSIS — E11.621 DIABETIC FOOT ULCER WITH OSTEOMYELITIS (HCC): Primary | ICD-10-CM

## 2025-05-30 DIAGNOSIS — E11.69 DIABETIC FOOT ULCER WITH OSTEOMYELITIS (HCC): Primary | ICD-10-CM

## 2025-05-30 DIAGNOSIS — I70.234 ATHEROSCLEROSIS OF NATIVE ARTERY OF RIGHT LOWER EXTREMITY WITH ULCERATION OF MIDFOOT (HCC): Primary | ICD-10-CM

## 2025-05-30 DIAGNOSIS — M86.9 DIABETIC FOOT ULCER WITH OSTEOMYELITIS (HCC): ICD-10-CM

## 2025-05-30 DIAGNOSIS — E11.628 TYPE 2 DIABETES MELLITUS WITH RIGHT DIABETIC FOOT INFECTION (HCC): ICD-10-CM

## 2025-05-30 DIAGNOSIS — L97.424 DIABETIC ULCER OF LEFT MIDFOOT ASSOCIATED WITH TYPE 2 DIABETES MELLITUS, WITH NECROSIS OF BONE (HCC): ICD-10-CM

## 2025-05-30 DIAGNOSIS — L08.9 TYPE 2 DIABETES MELLITUS WITH RIGHT DIABETIC FOOT INFECTION (HCC): ICD-10-CM

## 2025-05-30 LAB
GLUCOSE BLD-MCNC: 126 MG/DL (ref 70–99)
GLUCOSE BLD-MCNC: 98 MG/DL (ref 70–99)
PERFORMED ON: ABNORMAL
PERFORMED ON: NORMAL

## 2025-05-30 PROCEDURE — G0277 HBOT, FULL BODY CHAMBER, 30M: HCPCS

## 2025-05-30 PROCEDURE — 99213 OFFICE O/P EST LOW 20 MIN: CPT

## 2025-05-30 PROCEDURE — 99183 HYPERBARIC OXYGEN THERAPY: CPT

## 2025-05-30 RX ORDER — GINSENG 100 MG
CAPSULE ORAL ONCE
OUTPATIENT
Start: 2025-05-30 | End: 2025-05-30

## 2025-05-30 RX ORDER — SODIUM CHLOR/HYPOCHLOROUS ACID 0.033 %
SOLUTION, IRRIGATION IRRIGATION ONCE
OUTPATIENT
Start: 2025-05-30 | End: 2025-05-30

## 2025-05-30 RX ORDER — LIDOCAINE 40 MG/G
CREAM TOPICAL ONCE
OUTPATIENT
Start: 2025-05-30 | End: 2025-05-30

## 2025-05-30 RX ORDER — NEOMYCIN/BACITRACIN/POLYMYXINB 3.5-400-5K
OINTMENT (GRAM) TOPICAL ONCE
OUTPATIENT
Start: 2025-05-30 | End: 2025-05-30

## 2025-05-30 RX ORDER — MUPIROCIN 20 MG/G
OINTMENT TOPICAL ONCE
OUTPATIENT
Start: 2025-05-30 | End: 2025-05-30

## 2025-05-30 RX ORDER — LIDOCAINE 50 MG/G
OINTMENT TOPICAL ONCE
OUTPATIENT
Start: 2025-05-30 | End: 2025-05-30

## 2025-05-30 RX ORDER — LIDOCAINE HYDROCHLORIDE 40 MG/ML
SOLUTION TOPICAL ONCE
OUTPATIENT
Start: 2025-05-30 | End: 2025-05-30

## 2025-05-30 RX ORDER — LIDOCAINE HYDROCHLORIDE 20 MG/ML
JELLY TOPICAL ONCE
OUTPATIENT
Start: 2025-05-30 | End: 2025-05-30

## 2025-05-30 RX ORDER — GENTAMICIN SULFATE 1 MG/G
OINTMENT TOPICAL ONCE
OUTPATIENT
Start: 2025-05-30 | End: 2025-05-30

## 2025-05-30 RX ORDER — BETAMETHASONE DIPROPIONATE 0.5 MG/G
CREAM TOPICAL ONCE
OUTPATIENT
Start: 2025-05-30 | End: 2025-05-30

## 2025-05-30 RX ORDER — SILVER SULFADIAZINE 10 MG/G
CREAM TOPICAL ONCE
OUTPATIENT
Start: 2025-05-30 | End: 2025-05-30

## 2025-05-30 RX ORDER — CLOBETASOL PROPIONATE 0.5 MG/G
OINTMENT TOPICAL ONCE
OUTPATIENT
Start: 2025-05-30 | End: 2025-05-30

## 2025-05-30 RX ORDER — TRIAMCINOLONE ACETONIDE 1 MG/G
OINTMENT TOPICAL ONCE
OUTPATIENT
Start: 2025-05-30 | End: 2025-05-30

## 2025-05-30 RX ORDER — BACITRACIN ZINC AND POLYMYXIN B SULFATE 500; 1000 [USP'U]/G; [USP'U]/G
OINTMENT TOPICAL ONCE
OUTPATIENT
Start: 2025-05-30 | End: 2025-05-30

## 2025-05-30 ASSESSMENT — PAIN SCALES - GENERAL: PAINLEVEL_OUTOF10: 0

## 2025-05-30 NOTE — PATIENT INSTRUCTIONS
Dressing          Compression and Edema Control: RIGHT LOWER LEG   [] Wear Home Compression Stockings   [x] Spandagrip to RIGHT LEG FROM ANKLE TO KNEE  Size: []Low compression 5-10 mm/Hg      [x]Medium compression 10-20 mm/Hg           []High compression  20-30 mm/Hg  [] Ace Wrap Toes to Knee to    [] Multilayer Compression Wrap:    Do not get leg(s) with wrap wet.  If wraps become too tight call the center or completely remove the wrap.         Pressure Relief and Off Loading:  [] Off-loading when [] walking  [] in bed [] sitting   Turn every 2 hours when in bed   Avoid putting direct pressure on the site of the wound. Limit side lying to 30 degree tilt. Limit elevating the head of the bed greater than 30 degrees.                                      [x] Assistive Devices   PODIATRY SHOE  Use as instructed by the provider      Activity: Other WEIGHT BEARING STATUS PER DR. IVY, DPM      Dietary:   Continue your diet as tolerated.  Protein is a key nutrient in helping to repair damaged tissue and promote new tissue growth. Good sources of protein include milk, yogurt, cheese, fish, lean meat and beans.  If you are DIABETIC, having diabetes can make it hard for wounds to heal. Try to keep your blood sugar within it's target range.  Limit Sodium, Alcohol and Sugar.    Pain:   Please Note some pain, drainage and/or bleeding might be expected after seeing the provider. TO HELP ALLEVIATE PAIN WE RECOMMEND THE FOLLOWING  Elevate the affected limb.  Use over the counter medications as permitted by your family doctor.  For Persistent Pain not relieved by the above interventions, please notify your family doctor.        : ROB     Electronically signed by Courtney Conteh RN on 5/30/2025 at 9:40 AM       Wound Care Center Information: Should you experience any significant changes in your wound(s) or have questions about your wound care, please contact the [facility] at [phone] MONDAY -

## 2025-05-30 NOTE — PROGRESS NOTES
HBO PROGRESS NOTE      NAME: Marvin Montero  MEDICAL RECORD NUMBER:  [id number]  AGE: 74 y.o.   GENDER: male  : 1950  EPISODE DATE:  2025     Subjective     HBO Treatment Number: 55 out of Total Treatments: 60    HBO Diagnosis:             Indications: Lower Extremity Diabetic Wound ___(site) (Right Foot)    Safety checks performed prior to treatment.  See doc flowsheets for documentation.    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 (HCC)    Relevant Orders    Notify physician (specify)    Hyperbaric Oxygen Therapy    Hypoglycemial protocol    POCT glucose    Care order/instruction       Endocrine    Diabetic foot ulcer with osteomyelitis (HCC) - Primary    Relevant Orders    Notify physician (specify)    Hyperbaric Oxygen Therapy    Hypoglycemial protocol    POCT glucose    Care order/instruction       Musculoskeletal and Integument    Right foot ulcer, with necrosis of bone (HCC)    Relevant Orders    Notify physician (specify)    Hyperbaric Oxygen Therapy     Other Visit Diagnoses         Ulcer of toe of right foot, with necrosis of bone (HCC)        Relevant Orders    Hypoglycemial protocol    POCT glucose    Care order/instruction            Physical Exam:  General Appearance:  alert and oriented to person, place and time, well-developed and well-nourished, in no acute distress    Pre Tympanic Membrane Assessment:  Right: Normal (PE Tubes Visible per [facility] CNP)  Left: Normal (PE Tubes Visible per [email or web address] [facility] CNP)    Post Tympanic Membrane

## 2025-06-02 ENCOUNTER — HOSPITAL ENCOUNTER (OUTPATIENT)
Dept: HYPERBARIC MEDICINE | Age: 75
Discharge: HOME | End: 2025-06-02
Payer: MEDICARE

## 2025-06-02 VITALS
RESPIRATION RATE: 20 BRPM | TEMPERATURE: 97.3 F | DIASTOLIC BLOOD PRESSURE: 67 MMHG | SYSTOLIC BLOOD PRESSURE: 134 MMHG | HEART RATE: 66 BPM

## 2025-06-02 DIAGNOSIS — E11.621 DIABETIC ULCER OF LEFT MIDFOOT ASSOCIATED WITH TYPE 2 DIABETES MELLITUS, WITH NECROSIS OF BONE (HCC): Primary | ICD-10-CM

## 2025-06-02 DIAGNOSIS — L97.514 RIGHT FOOT ULCER, WITH NECROSIS OF BONE (HCC): ICD-10-CM

## 2025-06-02 DIAGNOSIS — M86.9 DIABETIC FOOT ULCER WITH OSTEOMYELITIS (HCC): ICD-10-CM

## 2025-06-02 DIAGNOSIS — L97.424 DIABETIC ULCER OF LEFT MIDFOOT ASSOCIATED WITH TYPE 2 DIABETES MELLITUS, WITH NECROSIS OF BONE (HCC): Primary | ICD-10-CM

## 2025-06-02 DIAGNOSIS — E11.69 DIABETIC FOOT ULCER WITH OSTEOMYELITIS (HCC): ICD-10-CM

## 2025-06-02 DIAGNOSIS — E11.621 DIABETIC FOOT ULCER WITH OSTEOMYELITIS (HCC): ICD-10-CM

## 2025-06-02 DIAGNOSIS — L97.514 ULCER OF TOE OF RIGHT FOOT, WITH NECROSIS OF BONE (HCC): ICD-10-CM

## 2025-06-02 DIAGNOSIS — I70.234 ATHEROSCLEROSIS OF NATIVE ARTERY OF RIGHT LOWER EXTREMITY WITH ULCERATION OF MIDFOOT (HCC): ICD-10-CM

## 2025-06-02 DIAGNOSIS — L97.509 DIABETIC FOOT ULCER WITH OSTEOMYELITIS (HCC): ICD-10-CM

## 2025-06-02 LAB
GLUCOSE BLD-MCNC: 207 MG/DL (ref 70–99)
GLUCOSE BLD-MCNC: 86 MG/DL (ref 70–99)
PERFORMED ON: ABNORMAL
PERFORMED ON: NORMAL

## 2025-06-02 PROCEDURE — 99183 HYPERBARIC OXYGEN THERAPY: CPT | Performed by: NURSE PRACTITIONER

## 2025-06-02 PROCEDURE — G0277 HBOT, FULL BODY CHAMBER, 30M: HCPCS

## 2025-06-02 ASSESSMENT — PAIN SCALES - GENERAL: PAINLEVEL_OUTOF10: 0

## 2025-06-02 NOTE — PROGRESS NOTES
HBO PROGRESS NOTE      NAME: Marvin Montero  MEDICAL RECORD NUMBER:  [id number]  AGE: 74 y.o.   GENDER: male  : 1950  EPISODE DATE:  2025     Subjective     HBO Treatment Number: 56 out of Total Treatments: 60    HBO Diagnosis:             Indications: Lower Extremity Diabetic Wound ___(site) (Right Foot)    Safety checks performed prior to treatment.  See doc flowsheets for documentation.    Objective        Recent Labs     25  1030 25  1244   POCGLU 207* 86     Pre treatment Vital Signs       Temp: 97.2 °F (36.2 °C)     Pulse: 68     Respirations: 16     BP: (!) 114/50 (Aaron Cuadra CNP aware. Proceed with Hyperbarics)       Post treatment Vital Signs  Temp: 97.3 °F (36.3 °C)  Pulse: 66  Respirations: 20  BP: 134/67    Assessment        HBO Diagnosis:   Problem List Items Addressed This Visit       Diabetic foot ulcer with osteomyelitis (HCC)    Relevant Orders    Notify physician (specify)    Hyperbaric Oxygen Therapy    Hypoglycemial protocol    POCT glucose    Care order/instruction    Care order/instruction    Diabetic ulcer of left midfoot associated with type 2 diabetes mellitus, with necrosis of bone (HCC) - Primary    Atherosclerosis of native artery of right lower extremity with ulceration of midfoot (HCC)    Relevant Orders    Notify physician (specify)    Hyperbaric Oxygen Therapy    Hypoglycemial protocol    POCT glucose    Care order/instruction    Care order/instruction    Right foot ulcer, with necrosis of bone (HCC)    Relevant Orders    Notify physician (specify)    Hyperbaric Oxygen Therapy     Other Visit Diagnoses         Ulcer of toe of right foot, with necrosis of bone (HCC)        Relevant Orders    Hypoglycemial protocol    POCT glucose    Care order/instruction    Care order/instruction            Physical Exam:  General Appearance:  alert and oriented to person, place and time, well-developed and well-nourished, in no acute distress    Pre Tympanic Membrane

## 2025-06-02 NOTE — PROGRESS NOTES
FSBS = 86 after HBO completed and given orange juice per patient request. Deneis any symptoms of hypoglycemai. Refuses Glucerna. States will stop for lunch on the way home. Aaron Cuadra, CNP aware.

## 2025-06-03 ENCOUNTER — HOSPITAL ENCOUNTER (OUTPATIENT)
Dept: WOUND CARE | Age: 75
Discharge: HOME | End: 2025-06-03
Attending: EMERGENCY MEDICINE
Payer: MEDICARE

## 2025-06-03 ENCOUNTER — HOSPITAL ENCOUNTER (OUTPATIENT)
Dept: HYPERBARIC MEDICINE | Age: 75
Discharge: HOME | End: 2025-06-03
Payer: MEDICARE

## 2025-06-03 VITALS
SYSTOLIC BLOOD PRESSURE: 169 MMHG | DIASTOLIC BLOOD PRESSURE: 65 MMHG | HEART RATE: 59 BPM | RESPIRATION RATE: 20 BRPM | TEMPERATURE: 97.2 F

## 2025-06-03 VITALS
DIASTOLIC BLOOD PRESSURE: 48 MMHG | RESPIRATION RATE: 18 BRPM | TEMPERATURE: 97.3 F | SYSTOLIC BLOOD PRESSURE: 129 MMHG | HEART RATE: 70 BPM

## 2025-06-03 DIAGNOSIS — E11.69 TYPE 2 DIABETES MELLITUS WITH OBESITY (HCC): ICD-10-CM

## 2025-06-03 DIAGNOSIS — I70.234 ATHEROSCLEROSIS OF NATIVE ARTERY OF RIGHT LOWER EXTREMITY WITH ULCERATION OF MIDFOOT (HCC): Primary | ICD-10-CM

## 2025-06-03 DIAGNOSIS — L97.413 DIABETIC ULCER OF RIGHT MIDFOOT ASSOCIATED WITH TYPE 2 DIABETES MELLITUS, WITH NECROSIS OF MUSCLE (HCC): ICD-10-CM

## 2025-06-03 DIAGNOSIS — Z74.09 IMPAIRED MOBILITY: ICD-10-CM

## 2025-06-03 DIAGNOSIS — M86.9 DIABETIC FOOT ULCER WITH OSTEOMYELITIS (HCC): Primary | ICD-10-CM

## 2025-06-03 DIAGNOSIS — E66.9 TYPE 2 DIABETES MELLITUS WITH OBESITY (HCC): ICD-10-CM

## 2025-06-03 DIAGNOSIS — E11.628 TYPE 2 DIABETES MELLITUS WITH RIGHT DIABETIC FOOT INFECTION (HCC): ICD-10-CM

## 2025-06-03 DIAGNOSIS — E11.621 DIABETIC FOOT ULCER WITH OSTEOMYELITIS (HCC): Primary | ICD-10-CM

## 2025-06-03 DIAGNOSIS — L08.9 TYPE 2 DIABETES MELLITUS WITH RIGHT DIABETIC FOOT INFECTION (HCC): ICD-10-CM

## 2025-06-03 DIAGNOSIS — L97.509 DIABETIC FOOT ULCER WITH OSTEOMYELITIS (HCC): Primary | ICD-10-CM

## 2025-06-03 DIAGNOSIS — I87.321 IDIOPATHIC CHRONIC VENOUS HYPERTENSION OF RIGHT LEG WITH INFLAMMATION: ICD-10-CM

## 2025-06-03 DIAGNOSIS — L97.514 ULCER OF TOE OF RIGHT FOOT, WITH NECROSIS OF BONE (HCC): ICD-10-CM

## 2025-06-03 DIAGNOSIS — L97.514 RIGHT FOOT ULCER, WITH NECROSIS OF BONE (HCC): ICD-10-CM

## 2025-06-03 DIAGNOSIS — E11.621 DIABETIC ULCER OF RIGHT MIDFOOT ASSOCIATED WITH TYPE 2 DIABETES MELLITUS, WITH NECROSIS OF MUSCLE (HCC): ICD-10-CM

## 2025-06-03 DIAGNOSIS — R60.0 LOCALIZED EDEMA: ICD-10-CM

## 2025-06-03 DIAGNOSIS — E11.69 DIABETIC FOOT ULCER WITH OSTEOMYELITIS (HCC): Primary | ICD-10-CM

## 2025-06-03 DIAGNOSIS — I70.234 ATHEROSCLEROSIS OF NATIVE ARTERY OF RIGHT LOWER EXTREMITY WITH ULCERATION OF MIDFOOT (HCC): ICD-10-CM

## 2025-06-03 LAB
GLUCOSE BLD-MCNC: 158 MG/DL (ref 70–99)
GLUCOSE BLD-MCNC: 92 MG/DL (ref 70–99)
PERFORMED ON: ABNORMAL
PERFORMED ON: NORMAL

## 2025-06-03 PROCEDURE — 15275 SKIN SUB GRAFT FACE/NK/HF/G: CPT

## 2025-06-03 PROCEDURE — 15275 SKIN SUB GRAFT FACE/NK/HF/G: CPT | Performed by: EMERGENCY MEDICINE

## 2025-06-03 PROCEDURE — G0277 HBOT, FULL BODY CHAMBER, 30M: HCPCS

## 2025-06-03 PROCEDURE — 99183 HYPERBARIC OXYGEN THERAPY: CPT | Performed by: EMERGENCY MEDICINE

## 2025-06-03 RX ORDER — SILVER SULFADIAZINE 10 MG/G
CREAM TOPICAL ONCE
Status: CANCELLED | OUTPATIENT
Start: 2025-06-03 | End: 2025-06-03

## 2025-06-03 RX ORDER — LIDOCAINE HYDROCHLORIDE 20 MG/ML
JELLY TOPICAL ONCE
Status: CANCELLED | OUTPATIENT
Start: 2025-06-03 | End: 2025-06-03

## 2025-06-03 RX ORDER — LIDOCAINE HYDROCHLORIDE 40 MG/ML
SOLUTION TOPICAL ONCE
Status: CANCELLED | OUTPATIENT
Start: 2025-06-03 | End: 2025-06-03

## 2025-06-03 RX ORDER — CLOBETASOL PROPIONATE 0.5 MG/G
OINTMENT TOPICAL ONCE
Status: CANCELLED | OUTPATIENT
Start: 2025-06-03 | End: 2025-06-03

## 2025-06-03 RX ORDER — NEOMYCIN/BACITRACIN/POLYMYXINB 3.5-400-5K
OINTMENT (GRAM) TOPICAL ONCE
Status: CANCELLED | OUTPATIENT
Start: 2025-06-03 | End: 2025-06-03

## 2025-06-03 RX ORDER — TRIAMCINOLONE ACETONIDE 1 MG/G
OINTMENT TOPICAL ONCE
Status: CANCELLED | OUTPATIENT
Start: 2025-06-03 | End: 2025-06-03

## 2025-06-03 RX ORDER — LIDOCAINE 40 MG/G
CREAM TOPICAL ONCE
Status: CANCELLED | OUTPATIENT
Start: 2025-06-03 | End: 2025-06-03

## 2025-06-03 RX ORDER — MUPIROCIN 20 MG/G
OINTMENT TOPICAL ONCE
Status: CANCELLED | OUTPATIENT
Start: 2025-06-03 | End: 2025-06-03

## 2025-06-03 RX ORDER — LIDOCAINE 40 MG/G
CREAM TOPICAL ONCE
Status: COMPLETED | OUTPATIENT
Start: 2025-06-03 | End: 2025-06-03

## 2025-06-03 RX ORDER — BACITRACIN ZINC AND POLYMYXIN B SULFATE 500; 1000 [USP'U]/G; [USP'U]/G
OINTMENT TOPICAL ONCE
Status: CANCELLED | OUTPATIENT
Start: 2025-06-03 | End: 2025-06-03

## 2025-06-03 RX ORDER — LIDOCAINE 50 MG/G
OINTMENT TOPICAL ONCE
Status: CANCELLED | OUTPATIENT
Start: 2025-06-03 | End: 2025-06-03

## 2025-06-03 RX ORDER — GENTAMICIN SULFATE 1 MG/G
OINTMENT TOPICAL ONCE
Status: CANCELLED | OUTPATIENT
Start: 2025-06-03 | End: 2025-06-03

## 2025-06-03 RX ORDER — BETAMETHASONE DIPROPIONATE 0.5 MG/G
CREAM TOPICAL ONCE
Status: CANCELLED | OUTPATIENT
Start: 2025-06-03 | End: 2025-06-03

## 2025-06-03 RX ORDER — GINSENG 100 MG
CAPSULE ORAL ONCE
Status: CANCELLED | OUTPATIENT
Start: 2025-06-03 | End: 2025-06-03

## 2025-06-03 RX ORDER — SODIUM CHLOR/HYPOCHLOROUS ACID 0.033 %
SOLUTION, IRRIGATION IRRIGATION ONCE
Status: CANCELLED | OUTPATIENT
Start: 2025-06-03 | End: 2025-06-03

## 2025-06-03 RX ADMIN — LIDOCAINE: 40 CREAM TOPICAL at 09:35

## 2025-06-03 ASSESSMENT — PAIN SCALES - GENERAL
PAINLEVEL_OUTOF10: 0

## 2025-06-03 NOTE — WOUND CARE
TheraSkin Treatment Note    NAME:  Marvin Montero  YOB: 1950  MEDICAL RECORD NUMBER:  [id number]  DATE:  6/3/2025    Goal:  Patient will receive safe and proper application of skin substitute.  Patient will comply with caring for dressing, offloading and reporting complications.     Expiration date of TheraSkin checked immediately prior to use.  Package intact prior to use and no damage noted.  Transport temperature controlled and acceptable.  TheraSkin was prepared for application by removing from package. TheraSkin was rinsed 2 times in room temperature normal saline for 2 minutes each time. A 2nd saline rinse was left on the TheraSkin until the physician was ready to apply it within 120 minutes of thawing. White side goes against ulcer bed.  Graft may be thawed and soaked in its inner pouch.Place sterile solution normal saline of lactated ringer in the inner pouch and completely submerge the graft. Do not allow the solution to exceed 42 degree C. To thaw for 2 minutes. TheraSkin until the physician was ready to apply must be within 120 minutes of thawing. Replace the sterile solution in the inner pouch between the thaw and soak. Remove the double mesh lining prior to applying to patient.  TheraSkin was applied to RIGHT FOOT and affixed with steri-strips by the physician.  CUTIMED SORBACT, DRAWTEX, OPTILOCK was applied on top of non-adherent dressing.  Fluffed gauze was applied.  Dressing was secured with cover roll type tape to stabilize graft.  Coban or ace wrap was applied to secure graft and decrease edema.  The patient/caregiver was instructed not to remove the dressing and to keep it clean and dry.  The patient/family/caregiver was instructed on the need for offloading and elevation of the affected extremity and on using the prescribed offloading device.  The patient/family/caregiver was instructed on signs and symptoms of complication to report, such as draining through dressing, dressing

## 2025-06-03 NOTE — PATIENT INSTRUCTIONS
[facility] Wound Care Physician Orders and Discharge Instructions  [facility]  [address], [address]  [place], [place] [address]  Telephone: [phone]      FAX [phone]  MONDAY - THURSDAY 8:00 am - 4:30 pm and Friday 8:00 am - 12:00 pm.        NAME:  Marvin Montero  YOB: 1950  MEDICAL RECORD NUMBER:  [id number]  DATE:  6/3/2025      Return Appointment:  [x] Return Appointment: With Dr Cece Alejandre  in  1 Week(s)   [x] Wound and dressing supply provider: [facility]  [] ECF or Home Healthcare:   [x] Wound Assessment:FRIDAY MAY 16TH- ONLY CHANGE OUTER DRESSING [x] Physician or NP scheduled for Wound Assessment: LOI HUNT CNP  [] Orders placed during your visit:        Important Reminders:   Please wash hands with soap and water before and after every dressing change.  Do not scrub wounds.  Keep wounds dry in shower unless otherwise instructed by the physician.  SMOKING can slow would healing. Stop smoking as soon as possible to improve healing and prevent further complications associated with smoking.      Michelle-Wound Topical Treatments:  Do not apply lotions, creams, or ointments to wound bed unless directed.   [] Apply moisturizing lotion to skin surrounding the wound prior to dressing change.  [] Apply antifungal ointment to skin surrounding the wound prior to dressing change.  [] Apply thin film of no sting moisture barrier ointment to skin immediately around      wound.  [] Other:         **ANTIBIOTICS PER DR BOOTH**      Wound Location: RIGHT LATERAL TMA   **THERASKIN # 5 APPLIED 6/3/2025**    Wound Cleansing:    Primary Dressing:  [x]  THERASKIN #5, COLLAGEN WITH SILVER, STERI STRIPS, CUTIMED SORBACT, MEPITEL AND ZINC OXIDE TO MICHELLE WOUND - LEAVE IN PLACE  [x] PLAIN FOAM TO ANTERIOR AND LATERAL ANKLE    Secondary Dressing:  [x] DRAWTEX AND OPTILOCK  [x] CAST PADDING, KERLIX AND COBAN ( FOOTBALL DRESSING )      Dressing Frequency:  []   [x] Do Not Change

## 2025-06-03 NOTE — PROGRESS NOTES
[facility]     Note Type: Medical Staff Progress Note    Referring Provider: self  Reason for Referral: foot wound  Chief Complaint   Patient presents with    Wound Check     Follow up for right foot wound.        Marvin Montero  MEDICAL RECORD NUMBER:  [id number]  AGE: 74 y.o.   GENDER: male  : 1950  EPISODE DATE:  6/3/2025    Chief complaint and reason for visit:     Chief Complaint   Patient presents with    Wound Check     Follow up for right foot wound.         HPI/Wound Narrative:      Marvin Montero is a 74 y.o. male who presents today for an evaluation of a wound/ulcer. Wound duration:  2024 .    6/3/25: no new issues. Glucose on waking was 80    25: no new issues but hydrofera sticking too much    3/18/25: doing better overall, edema improving with diuretics    3/11/25: did not take his diuretic yesterday nor today and leg edema is much worse than last visit.    3/4/25: no new issues. Plans on HBOT today. Diuretic changed and helping with edema control much better. Eating better.     25: Has ENT appointment scheduled for 2 PM today with possible ET tubes but the patient was also given Flonase.  Unfortunately cannot trial the Flonase and at this time he does want to trial this next week.      25: no new issues. Had vascular intervention.    25: Patient has no specific complaints.  Does have angiogram with intervention scheduled for 2025.  He is also going to be getting his MRI soon.    25: no new issues. Has appt with cardio soon. MRI won't be done until after 2/3 due to recent pacer/defibrillator placed in 2024.    25: had his echo. EF about 55%. No changes in medications recommended by cardiology.    25: 74-year-old with past medical history notable for hypertension, type 2 diabetes mellitus, hyperlipidemia, atrial fibrillation on Eliquis, diastolic congestive heart failure, COPD, hypothyroidism, peripheral

## 2025-06-03 NOTE — PROGRESS NOTES
HBO PROGRESS NOTE      NAME: Marvin Montero  MEDICAL RECORD NUMBER:  [id number]  AGE: 74 y.o.   GENDER: male  : 1950  EPISODE DATE:  6/3/2025     Subjective     HBO Treatment Number: 57 out of Total Treatments: 60    HBO Diagnosis:             Indications: Lower Extremity Diabetic Wound ___(site) (Right foot) [id number], [id number]    Safety checks performed prior to treatment.  See doc flowsheets for documentation.    Objective        Recent Labs     25  1006 25  1235   POCGLU 158* 92     Pre treatment Vital Signs       Temp: 97.1 °F (36.2 °C)     Pulse: 66     Respirations: 20     BP: 138/68       Post treatment Vital Signs  Temp: 97.2 °F (36.2 °C)  Pulse: 59 (Dr Alejandre aware)  Respirations: 20  BP: (!) 169/65    Assessment        HBO Diagnosis:   Problem List Items Addressed This Visit          Circulatory    Atherosclerosis of native artery of right lower extremity with ulceration of midfoot (HCC)    Relevant Orders    Notify physician (specify)    Hyperbaric Oxygen Therapy    Hypoglycemial protocol    POCT glucose    Care order/instruction       Endocrine    Diabetic foot ulcer with osteomyelitis (HCC) - Primary    Relevant Orders    Notify physician (specify)    Hyperbaric Oxygen Therapy    Hypoglycemial protocol    POCT glucose    Care order/instruction       Musculoskeletal and Integument    Right foot ulcer, with necrosis of bone (HCC)    Relevant Orders    Notify physician (specify)    Hyperbaric Oxygen Therapy     Other Visit Diagnoses         Ulcer of toe of right foot, with necrosis of bone (HCC)        Relevant Orders    Hypoglycemial protocol    POCT glucose    Care order/instruction            Physical Exam        General Appearance:  alert and oriented to person, place and time, well-developed and well-nourished, in no acute distress    Pre Tympanic Membrane Assessment:  Right: Normal (per Dr Alejandre)  Left: Normal (per Dr Alejandre)    Post Tympanic Membrane Assessment:  Left: Normal (Denies any

## 2025-06-03 NOTE — PROGRESS NOTES
FSBS after HBO treatment = 92.  Patient denies any symptoms of hypoglycemia and does not want any more Glucerna.  Patient states he will stop for lunch on his way home.  Dr Alejandre notified.

## 2025-06-04 ENCOUNTER — HOSPITAL ENCOUNTER (OUTPATIENT)
Dept: HYPERBARIC MEDICINE | Age: 75
Discharge: HOME | End: 2025-06-04
Payer: MEDICARE

## 2025-06-04 ENCOUNTER — TELEPHONE (OUTPATIENT)
Dept: WOUND CARE | Age: 75
End: 2025-06-04

## 2025-06-04 VITALS
DIASTOLIC BLOOD PRESSURE: 81 MMHG | TEMPERATURE: 97.3 F | SYSTOLIC BLOOD PRESSURE: 155 MMHG | RESPIRATION RATE: 18 BRPM | HEART RATE: 71 BPM

## 2025-06-04 DIAGNOSIS — L97.514 ULCER OF TOE OF RIGHT FOOT, WITH NECROSIS OF BONE (HCC): ICD-10-CM

## 2025-06-04 DIAGNOSIS — L97.514 RIGHT FOOT ULCER, WITH NECROSIS OF BONE (HCC): ICD-10-CM

## 2025-06-04 DIAGNOSIS — E11.69 DIABETIC FOOT ULCER WITH OSTEOMYELITIS (HCC): Primary | ICD-10-CM

## 2025-06-04 DIAGNOSIS — L97.424 DIABETIC ULCER OF LEFT MIDFOOT ASSOCIATED WITH TYPE 2 DIABETES MELLITUS, WITH NECROSIS OF BONE (HCC): Primary | ICD-10-CM

## 2025-06-04 DIAGNOSIS — L97.509 DIABETIC FOOT ULCER WITH OSTEOMYELITIS (HCC): Primary | ICD-10-CM

## 2025-06-04 DIAGNOSIS — I70.234 ATHEROSCLEROSIS OF NATIVE ARTERY OF RIGHT LOWER EXTREMITY WITH ULCERATION OF MIDFOOT (HCC): ICD-10-CM

## 2025-06-04 DIAGNOSIS — E11.621 DIABETIC ULCER OF LEFT MIDFOOT ASSOCIATED WITH TYPE 2 DIABETES MELLITUS, WITH NECROSIS OF BONE (HCC): Primary | ICD-10-CM

## 2025-06-04 DIAGNOSIS — M86.9 DIABETIC FOOT ULCER WITH OSTEOMYELITIS (HCC): Primary | ICD-10-CM

## 2025-06-04 DIAGNOSIS — E11.621 DIABETIC FOOT ULCER WITH OSTEOMYELITIS (HCC): Primary | ICD-10-CM

## 2025-06-04 LAB
GLUCOSE BLD-MCNC: 102 MG/DL (ref 70–99)
GLUCOSE BLD-MCNC: 182 MG/DL (ref 70–99)
PERFORMED ON: ABNORMAL
PERFORMED ON: ABNORMAL

## 2025-06-04 PROCEDURE — 99183 HYPERBARIC OXYGEN THERAPY: CPT | Performed by: SURGERY

## 2025-06-04 PROCEDURE — G0277 HBOT, FULL BODY CHAMBER, 30M: HCPCS

## 2025-06-04 ASSESSMENT — PAIN SCALES - GENERAL: PAINLEVEL_OUTOF10: 0

## 2025-06-04 NOTE — PROGRESS NOTES
HBO PROGRESS NOTE      NAME: Marvin Montero  MEDICAL RECORD NUMBER:  [id number]  AGE: 74 y.o.   GENDER: male  : 1950  EPISODE DATE:  2025     Subjective     HBO Treatment Number: 58 out of Total Treatments: 60    HBO Diagnosis:             Indications: Lower Extremity Diabetic Wound ___(site) (right foot)    Safety checks performed prior to treatment.  See doc flowsheets for documentation.    Objective        Recent Labs     25  1030 25  1248   POCGLU 182* 102*     Pre treatment Vital Signs       Temp: 97.1 °F (36.2 °C)     Pulse: 72     Respirations: 18     BP: 132/67       Post treatment Vital Signs  Temp: 97.3 °F (36.3 °C)  Pulse: 71  Respirations: 18  BP: (!) 155/81    Assessment        HBO Diagnosis:   Problem List Items Addressed This Visit          Circulatory    Atherosclerosis of native artery of right lower extremity with ulceration of midfoot (HCC)    Relevant Orders    Notify physician (specify)    Hyperbaric Oxygen Therapy    Hypoglycemial protocol    POCT glucose    Care order/instruction       Endocrine    Diabetic foot ulcer with osteomyelitis (HCC) - Primary    Relevant Orders    Notify physician (specify)    Hyperbaric Oxygen Therapy    Hypoglycemial protocol    POCT glucose    Care order/instruction       Musculoskeletal and Integument    Right foot ulcer, with necrosis of bone (HCC)    Relevant Orders    Notify physician (specify)    Hyperbaric Oxygen Therapy     Other Visit Diagnoses         Ulcer of toe of right foot, with necrosis of bone (HCC)        Relevant Orders    Hypoglycemial protocol    POCT glucose    Care order/instruction            Physical Exam        General Appearance:  alert and oriented to person, place and time, well-developed and well-nourished, in no acute distress    Pre Tympanic Membrane Assessment:  Right: Normal (per Dr. Joann MD)  Left: Normal (per Dr. Joann MD)    Post Tympanic Membrane Assessment:          Pulmonary/Chest:  clear to

## 2025-06-04 NOTE — TELEPHONE ENCOUNTER
[employer], per Availity this authorization must be obtained through [facility]. No prior Authorization required for CPT Code 31798.     Date: 5/21/25  Time: 0905/ 2nd call at 1530 (0805 and 1430 local time for [facility])     Service Provider: [place]/ Woody  Phone #: [phone]  Spokesperson for [facility]: Singh HENRY/ Milla OTTO  Authorization Approval Number: [id number]  CPT:   Quantity: 40 units  Dates for Approval: 5/23/25-7/22/25  ICD 10 Code: E11.621     Per Singh HENRY Representative from [facility] the patient is file will need further review to receive Hyperbaric Oxygen Therapy. Requested clinical documents to support continued medical necessity faxed to [phone]. Second call made later in the day, spoke with representative Milla OTTO who confirmed request to continue treatment had been approved. The case information is as listed above. Patient is agreeable to continue treatment at this time. No other needs or concerns expressed.    Electronically signed by Faith Guerrier RN on 6/4/2025 at 8:44 AM

## 2025-06-05 ENCOUNTER — HOSPITAL ENCOUNTER (OUTPATIENT)
Dept: HYPERBARIC MEDICINE | Age: 75
Discharge: HOME | End: 2025-06-05
Payer: MEDICARE

## 2025-06-05 VITALS
TEMPERATURE: 97.1 F | RESPIRATION RATE: 20 BRPM | DIASTOLIC BLOOD PRESSURE: 78 MMHG | SYSTOLIC BLOOD PRESSURE: 162 MMHG | HEART RATE: 60 BPM

## 2025-06-05 DIAGNOSIS — I70.234 ATHEROSCLEROSIS OF NATIVE ARTERY OF RIGHT LOWER EXTREMITY WITH ULCERATION OF MIDFOOT (HCC): ICD-10-CM

## 2025-06-05 DIAGNOSIS — M86.9 DIABETIC FOOT ULCER WITH OSTEOMYELITIS (HCC): Primary | ICD-10-CM

## 2025-06-05 DIAGNOSIS — L97.514 ULCER OF TOE OF RIGHT FOOT, WITH NECROSIS OF BONE (HCC): ICD-10-CM

## 2025-06-05 DIAGNOSIS — L97.509 DIABETIC FOOT ULCER WITH OSTEOMYELITIS (HCC): Primary | ICD-10-CM

## 2025-06-05 DIAGNOSIS — E11.69 DIABETIC FOOT ULCER WITH OSTEOMYELITIS (HCC): Primary | ICD-10-CM

## 2025-06-05 DIAGNOSIS — E11.621 DIABETIC FOOT ULCER WITH OSTEOMYELITIS (HCC): Primary | ICD-10-CM

## 2025-06-05 DIAGNOSIS — L97.514 RIGHT FOOT ULCER, WITH NECROSIS OF BONE (HCC): ICD-10-CM

## 2025-06-05 LAB
GLUCOSE BLD-MCNC: 172 MG/DL (ref 70–99)
GLUCOSE BLD-MCNC: 82 MG/DL (ref 70–99)
PERFORMED ON: ABNORMAL
PERFORMED ON: NORMAL

## 2025-06-05 PROCEDURE — 99183 HYPERBARIC OXYGEN THERAPY: CPT | Performed by: NURSE PRACTITIONER

## 2025-06-05 PROCEDURE — G0277 HBOT, FULL BODY CHAMBER, 30M: HCPCS

## 2025-06-05 ASSESSMENT — PAIN SCALES - GENERAL
PAINLEVEL_OUTOF10: 0
PAINLEVEL_OUTOF10: 0

## 2025-06-05 NOTE — PROGRESS NOTES
HBO PROGRESS NOTE      NAME: Marvin Montero  MEDICAL RECORD NUMBER:  [id number]  AGE: 74 y.o.   GENDER: male  : 1950  EPISODE DATE:  2025     Subjective     HBO Treatment Number: 59 out of Total Treatments: 60    HBO Diagnosis:             Indications: Lower Extremity Diabetic Wound ___(site) (right foot)    Safety checks performed prior to treatment.  See doc flowsheets for documentation.    Objective        Recent Labs     25  1030 25  1243   POCGLU 172* 82     Pre treatment Vital Signs       Temp: 97.3 °F (36.3 °C)     Pulse: 66     Respirations: 18     BP: (!) 125/50 (notified Aaron Cuadra CNP diastolic less than 60, patient asymptomatic. okay to proceed with treatment)       Post treatment Vital Signs  Temp: 97.1 °F (36.2 °C)  Pulse: 60  Respirations: 20  BP: (!) 162/78    Assessment        HBO Diagnosis:   Problem List Items Addressed This Visit       Diabetic foot ulcer with osteomyelitis (HCC) - Primary    Relevant Orders    Notify physician (specify)    Hyperbaric Oxygen Therapy    Hypoglycemial protocol    POCT glucose    Care order/instruction    Care order/instruction    Atherosclerosis of native artery of right lower extremity with ulceration of midfoot (HCC)    Relevant Orders    Notify physician (specify)    Hyperbaric Oxygen Therapy    Hypoglycemial protocol    POCT glucose    Care order/instruction    Care order/instruction    Right foot ulcer, with necrosis of bone (HCC)    Relevant Orders    Notify physician (specify)    Hyperbaric Oxygen Therapy     Other Visit Diagnoses         Ulcer of toe of right foot, with necrosis of bone (HCC)        Relevant Orders    Hypoglycemial protocol    POCT glucose    Care order/instruction    Care order/instruction            Physical Exam:  General Appearance:  alert and oriented to person, place and time, well-developed and well-nourished, in no acute distress    Pre Tympanic Membrane Assessment:  Right: Normal (PE Tube visible,

## 2025-06-05 NOTE — PROGRESS NOTES
FSBS = 82 after HBO treatment completed. Patient denies any symptoms of hypoglycemia and request not to drink Glucerna.  States will stop for lunch after leaving HBO.  8 ounces of OJ given.  Aaron Cuadra,, CNP notified and orders received that patient may be discharged home.

## 2025-06-06 ENCOUNTER — HOSPITAL ENCOUNTER (OUTPATIENT)
Dept: WOUND CARE | Age: 75
Discharge: HOME | End: 2025-06-06
Attending: EMERGENCY MEDICINE
Payer: MEDICARE

## 2025-06-06 ENCOUNTER — HOSPITAL ENCOUNTER (OUTPATIENT)
Dept: HYPERBARIC MEDICINE | Age: 75
Discharge: HOME | End: 2025-06-06
Payer: MEDICARE

## 2025-06-06 VITALS
SYSTOLIC BLOOD PRESSURE: 112 MMHG | RESPIRATION RATE: 18 BRPM | DIASTOLIC BLOOD PRESSURE: 60 MMHG | HEART RATE: 97 BPM | TEMPERATURE: 98.2 F

## 2025-06-06 VITALS
SYSTOLIC BLOOD PRESSURE: 150 MMHG | TEMPERATURE: 97.2 F | RESPIRATION RATE: 18 BRPM | DIASTOLIC BLOOD PRESSURE: 73 MMHG | HEART RATE: 60 BPM

## 2025-06-06 DIAGNOSIS — L97.413 DIABETIC ULCER OF RIGHT MIDFOOT ASSOCIATED WITH TYPE 2 DIABETES MELLITUS, WITH NECROSIS OF MUSCLE (HCC): ICD-10-CM

## 2025-06-06 DIAGNOSIS — I70.234 ATHEROSCLEROSIS OF NATIVE ARTERY OF RIGHT LOWER EXTREMITY WITH ULCERATION OF MIDFOOT (HCC): Primary | ICD-10-CM

## 2025-06-06 DIAGNOSIS — E11.69 TYPE 2 DIABETES MELLITUS WITH OBESITY (HCC): ICD-10-CM

## 2025-06-06 DIAGNOSIS — I87.321 IDIOPATHIC CHRONIC VENOUS HYPERTENSION OF RIGHT LEG WITH INFLAMMATION: ICD-10-CM

## 2025-06-06 DIAGNOSIS — E11.69 DIABETIC FOOT ULCER WITH OSTEOMYELITIS (HCC): Primary | ICD-10-CM

## 2025-06-06 DIAGNOSIS — M86.9 DIABETIC FOOT ULCER WITH OSTEOMYELITIS (HCC): Primary | ICD-10-CM

## 2025-06-06 DIAGNOSIS — Z74.09 IMPAIRED MOBILITY: ICD-10-CM

## 2025-06-06 DIAGNOSIS — L97.514 RIGHT FOOT ULCER, WITH NECROSIS OF BONE (HCC): ICD-10-CM

## 2025-06-06 DIAGNOSIS — E66.9 TYPE 2 DIABETES MELLITUS WITH OBESITY (HCC): ICD-10-CM

## 2025-06-06 DIAGNOSIS — E11.628 TYPE 2 DIABETES MELLITUS WITH RIGHT DIABETIC FOOT INFECTION (HCC): ICD-10-CM

## 2025-06-06 DIAGNOSIS — L97.509 DIABETIC FOOT ULCER WITH OSTEOMYELITIS (HCC): Primary | ICD-10-CM

## 2025-06-06 DIAGNOSIS — L08.9 TYPE 2 DIABETES MELLITUS WITH RIGHT DIABETIC FOOT INFECTION (HCC): ICD-10-CM

## 2025-06-06 DIAGNOSIS — E11.621 DIABETIC ULCER OF RIGHT MIDFOOT ASSOCIATED WITH TYPE 2 DIABETES MELLITUS, WITH NECROSIS OF MUSCLE (HCC): ICD-10-CM

## 2025-06-06 DIAGNOSIS — I70.234 ATHEROSCLEROSIS OF NATIVE ARTERY OF RIGHT LOWER EXTREMITY WITH ULCERATION OF MIDFOOT (HCC): ICD-10-CM

## 2025-06-06 DIAGNOSIS — E11.621 DIABETIC FOOT ULCER WITH OSTEOMYELITIS (HCC): Primary | ICD-10-CM

## 2025-06-06 DIAGNOSIS — L97.514 ULCER OF TOE OF RIGHT FOOT, WITH NECROSIS OF BONE (HCC): ICD-10-CM

## 2025-06-06 DIAGNOSIS — R60.0 LOCALIZED EDEMA: ICD-10-CM

## 2025-06-06 LAB
GLUCOSE BLD-MCNC: 139 MG/DL (ref 70–99)
GLUCOSE BLD-MCNC: 214 MG/DL (ref 70–99)
GLUCOSE BLD-MCNC: 325 MG/DL (ref 70–99)
PERFORMED ON: ABNORMAL

## 2025-06-06 PROCEDURE — G0277 HBOT, FULL BODY CHAMBER, 30M: HCPCS

## 2025-06-06 PROCEDURE — 99213 OFFICE O/P EST LOW 20 MIN: CPT

## 2025-06-06 RX ORDER — CLOBETASOL PROPIONATE 0.5 MG/G
OINTMENT TOPICAL ONCE
OUTPATIENT
Start: 2025-06-06 | End: 2025-06-06

## 2025-06-06 RX ORDER — LIDOCAINE HYDROCHLORIDE 20 MG/ML
JELLY TOPICAL ONCE
OUTPATIENT
Start: 2025-06-06 | End: 2025-06-06

## 2025-06-06 RX ORDER — LIDOCAINE 40 MG/G
CREAM TOPICAL ONCE
OUTPATIENT
Start: 2025-06-06 | End: 2025-06-06

## 2025-06-06 RX ORDER — GINSENG 100 MG
CAPSULE ORAL ONCE
OUTPATIENT
Start: 2025-06-06 | End: 2025-06-06

## 2025-06-06 RX ORDER — TRIAMCINOLONE ACETONIDE 1 MG/G
OINTMENT TOPICAL ONCE
OUTPATIENT
Start: 2025-06-06 | End: 2025-06-06

## 2025-06-06 RX ORDER — LIDOCAINE 50 MG/G
OINTMENT TOPICAL ONCE
OUTPATIENT
Start: 2025-06-06 | End: 2025-06-06

## 2025-06-06 RX ORDER — LIDOCAINE HYDROCHLORIDE 40 MG/ML
SOLUTION TOPICAL ONCE
OUTPATIENT
Start: 2025-06-06 | End: 2025-06-06

## 2025-06-06 RX ORDER — SILVER SULFADIAZINE 10 MG/G
CREAM TOPICAL ONCE
OUTPATIENT
Start: 2025-06-06 | End: 2025-06-06

## 2025-06-06 RX ORDER — SODIUM CHLOR/HYPOCHLOROUS ACID 0.033 %
SOLUTION, IRRIGATION IRRIGATION ONCE
OUTPATIENT
Start: 2025-06-06 | End: 2025-06-06

## 2025-06-06 RX ORDER — BACITRACIN ZINC AND POLYMYXIN B SULFATE 500; 1000 [USP'U]/G; [USP'U]/G
OINTMENT TOPICAL ONCE
OUTPATIENT
Start: 2025-06-06 | End: 2025-06-06

## 2025-06-06 RX ORDER — GENTAMICIN SULFATE 1 MG/G
OINTMENT TOPICAL ONCE
OUTPATIENT
Start: 2025-06-06 | End: 2025-06-06

## 2025-06-06 RX ORDER — NEOMYCIN/BACITRACIN/POLYMYXINB 3.5-400-5K
OINTMENT (GRAM) TOPICAL ONCE
OUTPATIENT
Start: 2025-06-06 | End: 2025-06-06

## 2025-06-06 RX ORDER — BETAMETHASONE DIPROPIONATE 0.5 MG/G
CREAM TOPICAL ONCE
OUTPATIENT
Start: 2025-06-06 | End: 2025-06-06

## 2025-06-06 RX ORDER — MUPIROCIN 20 MG/G
OINTMENT TOPICAL ONCE
OUTPATIENT
Start: 2025-06-06 | End: 2025-06-06

## 2025-06-06 ASSESSMENT — PAIN SCALES - GENERAL
PAINLEVEL_OUTOF10: 0
PAINLEVEL_OUTOF10: 0

## 2025-06-06 NOTE — PATIENT INSTRUCTIONS
[facility] Wound Care Physician Orders and Discharge Instructions  [facility]  [address], [address]  [place], [place] [address]  Telephone: [phone]      FAX [phone]  MONDAY - THURSDAY 8:00 am - 4:30 pm and Friday 8:00 am - 12:00 pm.        NAME:  Marvin Montero  YOB: 1950  MEDICAL RECORD NUMBER:  [id number]  DATE:  6/6/2025      Return Appointment:  [x] Return Appointment: With Dr Cece Alejandre  in  1 Week(s)   [x] Wound and dressing supply provider: [facility]  [] ECF or Home Healthcare:   [x] Wound Assessment:FRIDAY MAY 16TH- ONLY CHANGE OUTER DRESSING [x] Physician or NP scheduled for Wound Assessment: LOI HUNT CNP  [] Orders placed during your visit:        Important Reminders:   Please wash hands with soap and water before and after every dressing change.  Do not scrub wounds.  Keep wounds dry in shower unless otherwise instructed by the physician.  SMOKING can slow would healing. Stop smoking as soon as possible to improve healing and prevent further complications associated with smoking.      Michelle-Wound Topical Treatments:  Do not apply lotions, creams, or ointments to wound bed unless directed.   [] Apply moisturizing lotion to skin surrounding the wound prior to dressing change.  [] Apply antifungal ointment to skin surrounding the wound prior to dressing change.  [] Apply thin film of no sting moisture barrier ointment to skin immediately around      wound.  [] Other:         **ANTIBIOTICS PER DR BOOTH**      Wound Location: RIGHT LATERAL TMA   **THERASKIN # 3 APPLIED 5/20/2025**    Wound Cleansing:    Primary Dressing:  [x]  THERASKIN, STERI STRIPS, CUTIMED SORBACT, MEPITEL AND ZINC OXIDE TO MICHELLE WOUND - LEAVE IN PLACE  [x] PLAIN FOAM TO ANTERIOR AND LATERAL ANKLE    Secondary Dressing:  [x] DRAWTEX AND OPTILOCK  [x] CAST PADDING, KERLIX AND COBAN ( FOOTBALL DRESSING )      Dressing Frequency:  []   [x] Do Not Change

## 2025-06-06 NOTE — PROGRESS NOTES
HBO PROGRESS NOTE      NAME: Marvin Montero  MEDICAL RECORD NUMBER:  [id number]  AGE: 74 y.o.   GENDER: male  : 1950  EPISODE DATE:  2025     Subjective     HBO Treatment Number: 60 out of Total Treatments: 60    HBO Diagnosis:             Indications: Lower Extremity Diabetic Wound ___(site) (right foot)    Safety checks performed prior to treatment.  See doc flowsheets for documentation.    Objective        Recent Labs     25  1019 25  1238   POCGLU 214* 139*     Pre treatment Vital Signs       Temp: 97.3 °F (36.3 °C)     Pulse: 67     Respirations: 18     BP: 132/60       Post treatment Vital Signs  Temp: 97.2 °F (36.2 °C)  Pulse: 60  Respirations: 18  BP: (!) 150/73    Assessment        HBO Diagnosis:   Problem List Items Addressed This Visit          Circulatory    Atherosclerosis of native artery of right lower extremity with ulceration of midfoot (HCC)    Relevant Orders    Notify physician (specify)    Hyperbaric Oxygen Therapy    Hypoglycemial protocol    POCT glucose    Care order/instruction    Care order/instruction       Endocrine    Diabetic foot ulcer with osteomyelitis (HCC) - Primary    Relevant Orders    Notify physician (specify)    Hyperbaric Oxygen Therapy    Hypoglycemial protocol    POCT glucose    Care order/instruction    Care order/instruction       Musculoskeletal and Integument    Right foot ulcer, with necrosis of bone (HCC)    Relevant Orders    Notify physician (specify)    Hyperbaric Oxygen Therapy     Other Visit Diagnoses         Ulcer of toe of right foot, with necrosis of bone (HCC)        Relevant Orders    Hypoglycemial protocol    POCT glucose    Care order/instruction    Care order/instruction            Physical Exam:  General Appearance:  alert and oriented to person, place and time, well-developed and well-nourished, in no acute distress    Pre Tympanic Membrane Assessment:  Right: Normal (Per Donis Avila,cnp)  Left: Normal (Per Donis

## 2025-06-06 NOTE — DISCHARGE INSTRUCTIONS
Discharge Instructions for  Hyperbaric Oxygen Therapy  [facility]  [address]   [address]   [place], [place] [address]  Telephone: [phone]      FAX [phone]    NAME:  Marvin Montero  YOB: 1950  MEDICAL RECORD NUMBER:  [id number]  DATE:  6/6/2025    You have been treated with 100% oxygen in the Hyperbaric Chamber at the [facility].  There are usually no adverse effects or problems which follow this treatment.  However, for comfort and safety, we strongly recommend these guidelines are followed:    It is perfectly normal to feel tired after a hyperbaric treatment.  This tiredness should go away 2 to 3 days after your last treatment.  Avoid heavy exertion, exercise or other unnecessary activity if you are feeling tired.   Some people develop a feeling of fullness or stuffiness in their ears.  This is a result of a small amount of fluid build-up in the middle ear.  You may take an over the counter decongestant if approved by your doctor.  Follow the instructions in the medicine package for the amount to take.  If this problem lasts for more than 48 hours, please call your personal doctor.  You also may call or return to the [facility] and have a Hyperbaric doctor examine you.  In rare cases, patients may have so called “late effects” after the treatments.  Please call the Wound Care Center if you have any of these symptoms:    [x]Headache that is not relieved by over the counter pain medicine.  [x]Changes in vision.  During the course of many hyperbaric treatments (20 or more) some patients may complain of a temporary problem with sharp focus on distant objects.  This is a result of changes in the shape of the lens.  Your vision should return to normal in 6 to 8 weeks.  [x]Severe pain in your ears.  [x]Nausea or vomiting.  [x]Difficulty concentrating.  [x]Clumsiness, difficulty walking or numbness and tingling of your

## 2025-06-10 ENCOUNTER — HOSPITAL ENCOUNTER (OUTPATIENT)
Dept: WOUND CARE | Age: 75
Discharge: HOME | End: 2025-06-10
Attending: EMERGENCY MEDICINE
Payer: MEDICARE

## 2025-06-10 ENCOUNTER — OFFICE VISIT (OUTPATIENT)
Dept: CARDIOLOGY CLINIC | Age: 75
End: 2025-06-10
Attending: INTERNAL MEDICINE
Payer: MEDICARE

## 2025-06-10 VITALS
SYSTOLIC BLOOD PRESSURE: 111 MMHG | TEMPERATURE: 96.6 F | RESPIRATION RATE: 18 BRPM | HEART RATE: 66 BPM | DIASTOLIC BLOOD PRESSURE: 54 MMHG

## 2025-06-10 VITALS
WEIGHT: 256 LBS | BODY MASS INDEX: 31.17 KG/M2 | HEIGHT: 76 IN | HEART RATE: 78 BPM | DIASTOLIC BLOOD PRESSURE: 52 MMHG | OXYGEN SATURATION: 99 % | SYSTOLIC BLOOD PRESSURE: 98 MMHG

## 2025-06-10 DIAGNOSIS — I73.9 PAD (PERIPHERAL ARTERY DISEASE): ICD-10-CM

## 2025-06-10 DIAGNOSIS — I50.22 CHRONIC SYSTOLIC HEART FAILURE (HCC): Primary | ICD-10-CM

## 2025-06-10 DIAGNOSIS — Z95.810 CARDIAC RESYNCHRONIZATION THERAPY DEFIBRILLATOR (CRT-D) IN PLACE: ICD-10-CM

## 2025-06-10 DIAGNOSIS — I42.8 NONISCHEMIC CARDIOMYOPATHY (HCC): ICD-10-CM

## 2025-06-10 DIAGNOSIS — I87.321 IDIOPATHIC CHRONIC VENOUS HYPERTENSION OF RIGHT LEG WITH INFLAMMATION: ICD-10-CM

## 2025-06-10 DIAGNOSIS — Z74.09 IMPAIRED MOBILITY: ICD-10-CM

## 2025-06-10 DIAGNOSIS — E66.9 TYPE 2 DIABETES MELLITUS WITH OBESITY (HCC): ICD-10-CM

## 2025-06-10 DIAGNOSIS — L97.413 DIABETIC ULCER OF RIGHT MIDFOOT ASSOCIATED WITH TYPE 2 DIABETES MELLITUS, WITH NECROSIS OF MUSCLE (HCC): ICD-10-CM

## 2025-06-10 DIAGNOSIS — L97.424 DIABETIC ULCER OF LEFT MIDFOOT ASSOCIATED WITH TYPE 2 DIABETES MELLITUS, WITH NECROSIS OF BONE (HCC): Primary | ICD-10-CM

## 2025-06-10 DIAGNOSIS — I48.21 PERMANENT ATRIAL FIBRILLATION (HCC): ICD-10-CM

## 2025-06-10 DIAGNOSIS — E11.69 TYPE 2 DIABETES MELLITUS WITH OBESITY (HCC): ICD-10-CM

## 2025-06-10 DIAGNOSIS — Z79.899 ON AMIODARONE THERAPY: ICD-10-CM

## 2025-06-10 DIAGNOSIS — E11.621 DIABETIC ULCER OF LEFT MIDFOOT ASSOCIATED WITH TYPE 2 DIABETES MELLITUS, WITH NECROSIS OF BONE (HCC): Primary | ICD-10-CM

## 2025-06-10 DIAGNOSIS — I70.234 ATHEROSCLEROSIS OF NATIVE ARTERY OF RIGHT LOWER EXTREMITY WITH ULCERATION OF MIDFOOT (HCC): ICD-10-CM

## 2025-06-10 DIAGNOSIS — R60.0 LOCALIZED EDEMA: ICD-10-CM

## 2025-06-10 DIAGNOSIS — E11.628 TYPE 2 DIABETES MELLITUS WITH RIGHT DIABETIC FOOT INFECTION (HCC): ICD-10-CM

## 2025-06-10 DIAGNOSIS — E11.621 DIABETIC ULCER OF RIGHT MIDFOOT ASSOCIATED WITH TYPE 2 DIABETES MELLITUS, WITH NECROSIS OF MUSCLE (HCC): ICD-10-CM

## 2025-06-10 DIAGNOSIS — L97.514 RIGHT FOOT ULCER, WITH NECROSIS OF BONE (HCC): ICD-10-CM

## 2025-06-10 DIAGNOSIS — L08.9 TYPE 2 DIABETES MELLITUS WITH RIGHT DIABETIC FOOT INFECTION (HCC): ICD-10-CM

## 2025-06-10 PROCEDURE — G2211 COMPLEX E/M VISIT ADD ON: HCPCS | Performed by: INTERNAL MEDICINE

## 2025-06-10 PROCEDURE — 99213 OFFICE O/P EST LOW 20 MIN: CPT

## 2025-06-10 PROCEDURE — 99213 OFFICE O/P EST LOW 20 MIN: CPT | Performed by: EMERGENCY MEDICINE

## 2025-06-10 PROCEDURE — 29581 APPL MULTLAYER CMPRN SYS LEG: CPT

## 2025-06-10 PROCEDURE — 3074F SYST BP LT 130 MM HG: CPT | Performed by: INTERNAL MEDICINE

## 2025-06-10 PROCEDURE — 3078F DIAST BP <80 MM HG: CPT | Performed by: INTERNAL MEDICINE

## 2025-06-10 PROCEDURE — 1123F ACP DISCUSS/DSCN MKR DOCD: CPT | Performed by: INTERNAL MEDICINE

## 2025-06-10 PROCEDURE — G2211 COMPLEX E/M VISIT ADD ON: HCPCS | Performed by: EMERGENCY MEDICINE

## 2025-06-10 PROCEDURE — 1159F MED LIST DOCD IN RCRD: CPT | Performed by: INTERNAL MEDICINE

## 2025-06-10 PROCEDURE — 99214 OFFICE O/P EST MOD 30 MIN: CPT | Performed by: INTERNAL MEDICINE

## 2025-06-10 RX ORDER — BACITRACIN ZINC AND POLYMYXIN B SULFATE 500; 1000 [USP'U]/G; [USP'U]/G
OINTMENT TOPICAL ONCE
OUTPATIENT
Start: 2025-06-10 | End: 2025-06-10

## 2025-06-10 RX ORDER — TRIAMCINOLONE ACETONIDE 1 MG/G
OINTMENT TOPICAL ONCE
OUTPATIENT
Start: 2025-06-10 | End: 2025-06-10

## 2025-06-10 RX ORDER — SILVER SULFADIAZINE 10 MG/G
CREAM TOPICAL ONCE
OUTPATIENT
Start: 2025-06-10 | End: 2025-06-10

## 2025-06-10 RX ORDER — MUPIROCIN 2 %
OINTMENT (GRAM) TOPICAL ONCE
OUTPATIENT
Start: 2025-06-10 | End: 2025-06-10

## 2025-06-10 RX ORDER — LIDOCAINE HYDROCHLORIDE 40 MG/ML
SOLUTION TOPICAL ONCE
OUTPATIENT
Start: 2025-06-10 | End: 2025-06-10

## 2025-06-10 RX ORDER — CLOBETASOL PROPIONATE 0.5 MG/G
OINTMENT TOPICAL ONCE
OUTPATIENT
Start: 2025-06-10 | End: 2025-06-10

## 2025-06-10 RX ORDER — GENTAMICIN SULFATE 1 MG/G
OINTMENT TOPICAL ONCE
OUTPATIENT
Start: 2025-06-10 | End: 2025-06-10

## 2025-06-10 RX ORDER — METOPROLOL SUCCINATE 25 MG/1
25 TABLET, EXTENDED RELEASE ORAL NIGHTLY
Qty: 90 TABLET | OUTPATIENT
Start: 2025-06-10

## 2025-06-10 RX ORDER — LIDOCAINE HYDROCHLORIDE 20 MG/ML
JELLY TOPICAL ONCE
OUTPATIENT
Start: 2025-06-10 | End: 2025-06-10

## 2025-06-10 RX ORDER — SODIUM CHLOR/HYPOCHLOROUS ACID 0.033 %
SOLUTION, IRRIGATION IRRIGATION ONCE
OUTPATIENT
Start: 2025-06-10 | End: 2025-06-10

## 2025-06-10 RX ORDER — LIDOCAINE 40 MG/G
CREAM TOPICAL ONCE
OUTPATIENT
Start: 2025-06-10 | End: 2025-06-10

## 2025-06-10 RX ORDER — LIDOCAINE 50 MG/G
OINTMENT TOPICAL ONCE
OUTPATIENT
Start: 2025-06-10 | End: 2025-06-10

## 2025-06-10 RX ORDER — METOPROLOL SUCCINATE 25 MG/1
25 TABLET, EXTENDED RELEASE ORAL NIGHTLY
Qty: 30 TABLET | Refills: 3 | Status: SHIPPED | OUTPATIENT
Start: 2025-06-10

## 2025-06-10 RX ORDER — BETAMETHASONE DIPROPIONATE 0.5 MG/G
CREAM TOPICAL ONCE
OUTPATIENT
Start: 2025-06-10 | End: 2025-06-10

## 2025-06-10 RX ORDER — GINSENG 100 MG
CAPSULE ORAL ONCE
OUTPATIENT
Start: 2025-06-10 | End: 2025-06-10

## 2025-06-10 RX ORDER — NEOMYCIN/BACITRACIN/POLYMYXINB 3.5-400-5K
OINTMENT (GRAM) TOPICAL ONCE
OUTPATIENT
Start: 2025-06-10 | End: 2025-06-10

## 2025-06-10 ASSESSMENT — PAIN SCALES - GENERAL
PAINLEVEL_OUTOF10: 0
PAINLEVEL_OUTOF10: 0

## 2025-06-10 NOTE — PLAN OF CARE
Multilayer Compression Wrap   (Not Unna) Below the Knee    NAME:  Marvin Montero  YOB: 1950  MEDICAL RECORD NUMBER:  [id number]  DATE:  6/10/2025    Multilayer compression wrap: Applied moisturizing agent to dry skin as needed.   Applied primary and secondary dressing as ordered.  Applied multilayered dressing below the knee to right lower leg.  Instructed patient/caregiver not to remove dressing and to keep it clean and dry.   Instructed patient/caregiver on complications to report to provider, such as pain, numbness in toes, heavy drainage, and slippage of dressing.  Instructed patient on purpose of compression dressing and on activity and exercise recommendations.      Electronically signed by Connie Rojas RN on 6/10/2025 at 12:59 PM

## 2025-06-10 NOTE — PROGRESS NOTES
[facility]     Note Type: Medical Staff Progress Note    Referring Provider: self  Reason for Referral: foot wound  Chief Complaint   Patient presents with    Wound Check     Follow-up visit for a wound to the right foot.        Marvin Montero  MEDICAL RECORD NUMBER:  [id number]  AGE: 74 y.o.   GENDER: male  : 1950  EPISODE DATE:  6/10/2025    Chief complaint and reason for visit:     Chief Complaint   Patient presents with    Wound Check     Follow-up visit for a wound to the right foot.         HPI/Wound Narrative:      Marvin Montero is a 74 y.o. male who presents today for an evaluation of a wound/ulcer. Wound duration:  2024 .    6/10/25: had theraskin last week.    6/3/25: no new issues. Glucose on waking was 80    25: no new issues but hydrofera sticking too much    3/18/25: doing better overall, edema improving with diuretics    3/11/25: did not take his diuretic yesterday nor today and leg edema is much worse than last visit.    3/4/25: no new issues. Plans on HBOT today. Diuretic changed and helping with edema control much better. Eating better.     25: Has ENT appointment scheduled for 2 PM today with possible ET tubes but the patient was also given Flonase.  Unfortunately cannot trial the Flonase and at this time he does want to trial this next week.      25: no new issues. Had vascular intervention.    25: Patient has no specific complaints.  Does have angiogram with intervention scheduled for 2025.  He is also going to be getting his MRI soon.    25: no new issues. Has appt with cardio soon. MRI won't be done until after 2/3 due to recent pacer/defibrillator placed in 2024.    25: had his echo. EF about 55%. No changes in medications recommended by cardiology.    25: 74-year-old with past medical history notable for hypertension, type 2 diabetes mellitus, hyperlipidemia, atrial fibrillation on Eliquis,

## 2025-06-10 NOTE — PROGRESS NOTES
[facility]      Cardiology Consult    Marvin Montero  1950    Digna 10, 2025    Referring Physician: Reji Draper MD  Reason for Referral: CHF    CC: \"Lightheaded sometimes\"    HPI:  The patient is 74 y.o. male with a past medical history significant for hypertension, hyperlipidemia, diabetes type II, PAD s/p L BKA, CHF s/p Medtr[employer] BiV ICD (2017), atrial fibrillation s/p DCCV (2017), and WILL who presents for chronic management of CHF. He previously followed with Dr. Gómez, but switched care to [facility]. He follows with Dr. Pemberton for management of atrial fibrillation.  A repeat echocardiogram 1/2025 showed EF 55%. He underwent successful revascularization of distal right superficial femoral artery popliteal artery (3/2025). Following with Vascular, wound care and completed hyperbaric oxygent therapy. Today, he presents in office for a follow up. He is accompanied by his wife. He has complaints of intermittent lightheadedness often with position changes but denies syncope.  His primary complaint is actually feeling fatigued after eating.  He states after about 30 minutes he will begin feeling better.  He denies associated abdominal pain.  He reports compliance with his medications.    Past Medical History:   Diagnosis Date    Abnormal ECG     Atrial fibrillation ([facility])     Back pain     Below-knee amputation ([facility]) 05/13/2023    BKA stump complication ([facility]) 01/17/2023    Cardiomyopathy     CHF (congestive heart failure) ([facility])     COPD (chronic obstructive pulmonary disease) ([facility])     Dental disease     Dizziness     Dyslipidemia     GERD (gastroesophageal reflux disease)     Hearing loss     Hyperlipidemia     Hypertension     Hypothyroidism     Leg edema     MRSA (methicillin resistant staph aureus) culture positive 10/05/2015    foot/bone    MRSA nasal colonization 05/05/2017    Obesity     Prolonged emergence from general anesthesia     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     
medications as tolerated.     4) Hyperlipidemia. LDL 78 (4/18/25). Continue Lipitor 40 mg daily.     5) Diabetes type II. Continue SGLT2i. Management per PCP.     6) WILL. Following with sleep medicine.     7) PAD. S/p revascularization of distal right superficial femoral artery popliteal artery (3/25/24) and successful revascularization of distal right superficial femoral artery popliteal artery (3/2025). Continue statin therapy. No ASA with DOAC. Following with Vascular, wound care and undergoing hyperbaric therapy.     Follow up in 6 months     Thank you very much for allowing me to participate in the care of your patient. Please do not hesitate to contact me if you have any questions.    Sincerely,  Glenn Cyr MD      [facility] [place]  [address], [address]   [place] [address]  Ph: [phone]  Fax: [phone]

## 2025-06-10 NOTE — PATIENT INSTRUCTIONS
- Please resume taking Metoprolol 25 mg nightly   - Please call out office if you have any questions or concerns  - Follow up in 6 months

## 2025-06-10 NOTE — PATIENT INSTRUCTIONS
contact the [facility] at [phone] MONDAY - THURSDAY 8:00 am - 4:30 pm and Friday 8:00 am - 12:30 pm.  If you need help with your wound outside these hours and cannot wait until we are again available, contact your PCP or go to the hospital emergency room.     PLEASE NOTE: IF YOU ARE UNABLE TO OBTAIN WOUND SUPPLIES, CONTINUE TO USE THE SUPPLIES YOU HAVE AVAILABLE UNTIL YOU ARE ABLE TO REACH US. IT IS MOST IMPORTANT TO KEEP THE WOUND COVERED AT ALL TIMES.         Physician Signature:_______________________    Date: ___________ Time:  ____________              Dr Cece Alejandre

## 2025-06-11 ENCOUNTER — OFFICE VISIT (OUTPATIENT)
Dept: INFECTIOUS DISEASES | Age: 75
End: 2025-06-11
Payer: MEDICARE

## 2025-06-11 VITALS
OXYGEN SATURATION: 97 % | HEIGHT: 76 IN | TEMPERATURE: 96.8 F | BODY MASS INDEX: 31.17 KG/M2 | DIASTOLIC BLOOD PRESSURE: 67 MMHG | SYSTOLIC BLOOD PRESSURE: 138 MMHG | WEIGHT: 256 LBS | HEART RATE: 75 BPM

## 2025-06-11 DIAGNOSIS — M86.9 DIABETIC FOOT ULCER WITH OSTEOMYELITIS (HCC): Primary | ICD-10-CM

## 2025-06-11 DIAGNOSIS — E11.69 DIABETIC FOOT ULCER WITH OSTEOMYELITIS (HCC): ICD-10-CM

## 2025-06-11 DIAGNOSIS — E11.621 DIABETIC FOOT ULCER WITH OSTEOMYELITIS (HCC): ICD-10-CM

## 2025-06-11 DIAGNOSIS — L97.514 RIGHT FOOT ULCER, WITH NECROSIS OF BONE (HCC): ICD-10-CM

## 2025-06-11 DIAGNOSIS — E11.42 DIABETIC POLYNEUROPATHY ASSOCIATED WITH TYPE 2 DIABETES MELLITUS (HCC): ICD-10-CM

## 2025-06-11 DIAGNOSIS — L97.509 DIABETIC FOOT ULCER WITH OSTEOMYELITIS (HCC): ICD-10-CM

## 2025-06-11 DIAGNOSIS — E11.621 DIABETIC FOOT ULCER WITH OSTEOMYELITIS (HCC): Primary | ICD-10-CM

## 2025-06-11 DIAGNOSIS — I70.244 ATHEROSCLEROSIS OF NATIVE ARTERIES OF LEFT LEG WITH ULCERATION OF HEEL AND MIDFOOT (HCC): ICD-10-CM

## 2025-06-11 DIAGNOSIS — M86.9 DIABETIC FOOT ULCER WITH OSTEOMYELITIS (HCC): ICD-10-CM

## 2025-06-11 DIAGNOSIS — M79.89 RIGHT LEG SWELLING: ICD-10-CM

## 2025-06-11 DIAGNOSIS — L97.509 DIABETIC FOOT ULCER WITH OSTEOMYELITIS (HCC): Primary | ICD-10-CM

## 2025-06-11 DIAGNOSIS — M86.171 OTHER ACUTE OSTEOMYELITIS OF RIGHT FOOT (HCC): ICD-10-CM

## 2025-06-11 DIAGNOSIS — Z95.810 AICD (AUTOMATIC CARDIOVERTER/DEFIBRILLATOR) PRESENT: ICD-10-CM

## 2025-06-11 DIAGNOSIS — E11.69 DIABETIC FOOT ULCER WITH OSTEOMYELITIS (HCC): Primary | ICD-10-CM

## 2025-06-11 DIAGNOSIS — I73.9 PAD (PERIPHERAL ARTERY DISEASE): ICD-10-CM

## 2025-06-11 LAB
CRP SERPL-MCNC: 10.4 MG/L (ref 0–5.1)
ERYTHROCYTE [SEDIMENTATION RATE] IN BLOOD BY WESTERGREN METHOD: 34 MM/HR (ref 0–20)

## 2025-06-11 PROCEDURE — 1123F ACP DISCUSS/DSCN MKR DOCD: CPT | Performed by: INTERNAL MEDICINE

## 2025-06-11 PROCEDURE — 99213 OFFICE O/P EST LOW 20 MIN: CPT | Performed by: INTERNAL MEDICINE

## 2025-06-11 PROCEDURE — 3078F DIAST BP <80 MM HG: CPT | Performed by: INTERNAL MEDICINE

## 2025-06-11 PROCEDURE — 3075F SYST BP GE 130 - 139MM HG: CPT | Performed by: INTERNAL MEDICINE

## 2025-06-11 PROCEDURE — 3044F HG A1C LEVEL LT 7.0%: CPT | Performed by: INTERNAL MEDICINE

## 2025-06-11 RX ORDER — DOXYCYCLINE HYCLATE 100 MG
100 TABLET ORAL 2 TIMES DAILY
Qty: 60 TABLET | Refills: 1 | Status: SHIPPED | OUTPATIENT
Start: 2025-06-11 | End: 2025-08-10

## 2025-06-11 NOTE — PROGRESS NOTES
Infectious Diseases Outpatient Follow-up Note       Primary Care Physician:  Reji Draper MD       History Obtained From:   Patient, EPIC    CHIEF COMPLAINT / ID problem:    Chief Complaint   Patient presents with    Follow-up     Right Foot FM Ulcer -nk       HISTORY OF PRESENT ILLNESS / Interval history:    History of Present Illness  The patient presents for evaluation of a wound.    He has completed his intravenous (IV) antibiotic course and is currently on oral antibiotics, with only two doses remaining. He reports no adverse reactions to the oral antibiotics. He is under the care of a wound specialist, whom he visits weekly. The wound has undergone  skin graft procedures, and the exposed area is being treated with collagen. The wound care specialist is satisfied with the blood circulation in his foot. His next appointment with the wound care specialist is scheduled for October 2025, during which a Doppler test will be performed. The wound care team evaluates the need for additional skin grafting on a weekly basis. His foot is fully dressed, and he is able to bear full weight on it while wearing a surgical shoe. He is also on Eliquis, a blood thinner. He experiences frequent bowel movements, approximately three times daily, which he attributes to his past bariatric surgery and his diet. He manages this with Pepto-Bismol and Lomotil, which he finds effective. He experienced some ankle pain, which was alleviated by Tylenol    Past Medical History:    Past Medical History:   Diagnosis Date    Abnormal ECG     Atrial fibrillation ([facility])     Back pain     Below-knee amputation ([facility]) 05/13/2023    BKA stump complication ([facility]) 01/17/2023    Cardiomyopathy     CHF (congestive heart failure) ([facility])     COPD (chronic obstructive pulmonary disease) ([facility])     Dental disease     Dizziness     Dyslipidemia     GERD (gastroesophageal reflux disease)     Hearing loss     Hyperlipidemia     Hypertension     Hypothyroidism

## 2025-06-12 ENCOUNTER — HOSPITAL ENCOUNTER (OUTPATIENT)
Dept: ULTRASOUND IMAGING | Age: 75
Discharge: HOME | End: 2025-06-12
Payer: MEDICARE

## 2025-06-12 DIAGNOSIS — R79.89 ELEVATED LFTS: ICD-10-CM

## 2025-06-12 PROCEDURE — 76700 US EXAM ABDOM COMPLETE: CPT

## 2025-06-13 ENCOUNTER — HOSPITAL ENCOUNTER (OUTPATIENT)
Dept: WOUND CARE | Age: 75
Discharge: HOME | End: 2025-06-13
Attending: EMERGENCY MEDICINE
Payer: MEDICARE

## 2025-06-13 VITALS
RESPIRATION RATE: 18 BRPM | SYSTOLIC BLOOD PRESSURE: 155 MMHG | DIASTOLIC BLOOD PRESSURE: 73 MMHG | HEART RATE: 62 BPM | TEMPERATURE: 97.4 F

## 2025-06-13 DIAGNOSIS — R60.0 LOCALIZED EDEMA: ICD-10-CM

## 2025-06-13 DIAGNOSIS — I70.234 ATHEROSCLEROSIS OF NATIVE ARTERY OF RIGHT LOWER EXTREMITY WITH ULCERATION OF MIDFOOT (HCC): Primary | ICD-10-CM

## 2025-06-13 DIAGNOSIS — L97.413 DIABETIC ULCER OF RIGHT MIDFOOT ASSOCIATED WITH TYPE 2 DIABETES MELLITUS, WITH NECROSIS OF MUSCLE (HCC): ICD-10-CM

## 2025-06-13 DIAGNOSIS — E66.9 TYPE 2 DIABETES MELLITUS WITH OBESITY (HCC): ICD-10-CM

## 2025-06-13 DIAGNOSIS — E11.628 TYPE 2 DIABETES MELLITUS WITH RIGHT DIABETIC FOOT INFECTION (HCC): ICD-10-CM

## 2025-06-13 DIAGNOSIS — E11.69 TYPE 2 DIABETES MELLITUS WITH OBESITY (HCC): ICD-10-CM

## 2025-06-13 DIAGNOSIS — L08.9 TYPE 2 DIABETES MELLITUS WITH RIGHT DIABETIC FOOT INFECTION (HCC): ICD-10-CM

## 2025-06-13 DIAGNOSIS — Z74.09 IMPAIRED MOBILITY: ICD-10-CM

## 2025-06-13 DIAGNOSIS — I87.321 IDIOPATHIC CHRONIC VENOUS HYPERTENSION OF RIGHT LEG WITH INFLAMMATION: ICD-10-CM

## 2025-06-13 DIAGNOSIS — E11.621 DIABETIC ULCER OF RIGHT MIDFOOT ASSOCIATED WITH TYPE 2 DIABETES MELLITUS, WITH NECROSIS OF MUSCLE (HCC): ICD-10-CM

## 2025-06-13 PROCEDURE — 99213 OFFICE O/P EST LOW 20 MIN: CPT

## 2025-06-13 RX ORDER — SILVER SULFADIAZINE 10 MG/G
CREAM TOPICAL ONCE
OUTPATIENT
Start: 2025-06-13 | End: 2025-06-13

## 2025-06-13 RX ORDER — LIDOCAINE 50 MG/G
OINTMENT TOPICAL ONCE
OUTPATIENT
Start: 2025-06-13 | End: 2025-06-13

## 2025-06-13 RX ORDER — GINSENG 100 MG
CAPSULE ORAL ONCE
OUTPATIENT
Start: 2025-06-13 | End: 2025-06-13

## 2025-06-13 RX ORDER — BETAMETHASONE DIPROPIONATE 0.5 MG/G
CREAM TOPICAL ONCE
OUTPATIENT
Start: 2025-06-13 | End: 2025-06-13

## 2025-06-13 RX ORDER — MUPIROCIN 2 %
OINTMENT (GRAM) TOPICAL ONCE
OUTPATIENT
Start: 2025-06-13 | End: 2025-06-13

## 2025-06-13 RX ORDER — CLOBETASOL PROPIONATE 0.5 MG/G
OINTMENT TOPICAL ONCE
OUTPATIENT
Start: 2025-06-13 | End: 2025-06-13

## 2025-06-13 RX ORDER — SODIUM CHLOR/HYPOCHLOROUS ACID 0.033 %
SOLUTION, IRRIGATION IRRIGATION ONCE
OUTPATIENT
Start: 2025-06-13 | End: 2025-06-13

## 2025-06-13 RX ORDER — NEOMYCIN/BACITRACIN/POLYMYXINB 3.5-400-5K
OINTMENT (GRAM) TOPICAL ONCE
OUTPATIENT
Start: 2025-06-13 | End: 2025-06-13

## 2025-06-13 RX ORDER — GENTAMICIN SULFATE 1 MG/G
OINTMENT TOPICAL ONCE
OUTPATIENT
Start: 2025-06-13 | End: 2025-06-13

## 2025-06-13 RX ORDER — LIDOCAINE HYDROCHLORIDE 20 MG/ML
JELLY TOPICAL ONCE
OUTPATIENT
Start: 2025-06-13 | End: 2025-06-13

## 2025-06-13 RX ORDER — LIDOCAINE 40 MG/G
CREAM TOPICAL ONCE
OUTPATIENT
Start: 2025-06-13 | End: 2025-06-13

## 2025-06-13 RX ORDER — TRIAMCINOLONE ACETONIDE 1 MG/G
OINTMENT TOPICAL ONCE
OUTPATIENT
Start: 2025-06-13 | End: 2025-06-13

## 2025-06-13 RX ORDER — LIDOCAINE HYDROCHLORIDE 40 MG/ML
SOLUTION TOPICAL ONCE
OUTPATIENT
Start: 2025-06-13 | End: 2025-06-13

## 2025-06-13 RX ORDER — BACITRACIN ZINC AND POLYMYXIN B SULFATE 500; 1000 [USP'U]/G; [USP'U]/G
OINTMENT TOPICAL ONCE
OUTPATIENT
Start: 2025-06-13 | End: 2025-06-13

## 2025-06-13 NOTE — PATIENT INSTRUCTIONS
[facility] Wound Care Physician Orders and Discharge Instructions  [facility]  [address], [address]  [place], [place] [address]  Telephone: [phone]      FAX [phone]  MONDAY - THURSDAY 8:00 am - 4:30 pm and Friday 8:00 am - 12:00 pm.        NAME:  Marvin Montero  YOB: 1950  MEDICAL RECORD NUMBER:  [id number]  DATE:  6/13/2025      Return Appointment:  [x] Return Appointment: With Dr Cece Alejandre  in  1 Week(s)   [x] Wound and dressing supply provider: [facility]  [] ECF or Home Healthcare:   [x] Wound Assessment:FRIDAY JUNE 13TH- ONLY CHANGE OUTER DRESSING [x] Physician or NP scheduled for Wound Assessment: LOI HUNT CNP  [] Orders placed during your visit:        Important Reminders:   Please wash hands with soap and water before and after every dressing change.  Do not scrub wounds.  Keep wounds dry in shower unless otherwise instructed by the physician.  SMOKING can slow would healing. Stop smoking as soon as possible to improve healing and prevent further complications associated with smoking.      Michelle-Wound Topical Treatments:  Do not apply lotions, creams, or ointments to wound bed unless directed.   [] Apply moisturizing lotion to skin surrounding the wound prior to dressing change.  [] Apply antifungal ointment to skin surrounding the wound prior to dressing change.  [] Apply thin film of no sting moisture barrier ointment to skin immediately around      wound.  [] Other:         **ANTIBIOTICS PER DR BOOTH**      Wound Location: RIGHT LATERAL TMA   **THERASKIN # 5 APPLIED 6/3/2025**    Wound Cleansing:    Primary Dressing:  [x]  THERASKIN #5 ( LEFT IN PLACE ), COLLAGEN WITH SILVER TO AREAS NOT COVERED WITH GRAFT,  CUTIMED SORBACT, AND ZINC OXIDE TO MICHELLE WOUND - LEAVE IN PLACE  [x] PLAIN FOAM TO ANTERIOR AND LATERAL ANKLE    Secondary Dressing:  [x] DRAWTEX AND OPTILOCK  [x] ABD PAD AROUND ANKLE       GAUZE AND KERLIX TO END OF FOOT AFTER WRAP TO

## 2025-06-13 NOTE — PLAN OF CARE
Multilayer Compression Wrap   (Not Unna) Below the Knee    NAME:  Marvin Montero  YOB: 1950  MEDICAL RECORD NUMBER:  [id number]  DATE:  6/13/2025    Multilayer compression wrap: Removed old Multilayer wrap if indicated and wash leg with mild soap/water.  Applied moisturizing agent to dry skin as needed.   Applied primary and secondary dressing as ordered.  Applied multilayered dressing below the knee to right lower leg.  Instructed patient/caregiver not to remove dressing and to keep it clean and dry.   Instructed patient/caregiver on complications to report to provider, such as pain, numbness in toes, heavy drainage, and slippage of dressing.  Instructed patient on purpose of compression dressing and on activity and exercise recommendations.      Electronically signed by Bhavani Hall RN on 6/13/2025 at 11:51 AM

## 2025-06-14 DIAGNOSIS — N32.81 OAB (OVERACTIVE BLADDER): ICD-10-CM

## 2025-06-16 DIAGNOSIS — N32.81 OAB (OVERACTIVE BLADDER): ICD-10-CM

## 2025-06-16 RX ORDER — OXYBUTYNIN CHLORIDE 5 MG/1
5 TABLET ORAL NIGHTLY
Qty: 90 TABLET | Refills: 3 | Status: SHIPPED | OUTPATIENT
Start: 2025-06-16 | End: 2025-06-20 | Stop reason: SDUPTHER

## 2025-06-16 NOTE — TELEPHONE ENCOUNTER
Medication:   Requested Prescriptions     Pending Prescriptions Disp Refills    oxyBUTYnin (DITROPAN) 5 MG tablet [Pharmacy Med Name: OXYBUTYNIN 5MG TABLETS] 90 tablet 3     Sig: TAKE 1 TABLET BY MOUTH EVERY NIGHT        Last Filled:      Patient Phone Number: [phone] (home)     Last appt: 4/8/2025   Next appt: 7/8/2025    Last OARRS:        No data to display

## 2025-06-17 ENCOUNTER — HOSPITAL ENCOUNTER (OUTPATIENT)
Dept: WOUND CARE | Age: 75
Discharge: HOME | End: 2025-06-17
Attending: EMERGENCY MEDICINE
Payer: MEDICARE

## 2025-06-17 VITALS
SYSTOLIC BLOOD PRESSURE: 146 MMHG | HEART RATE: 79 BPM | DIASTOLIC BLOOD PRESSURE: 81 MMHG | RESPIRATION RATE: 18 BRPM | TEMPERATURE: 97.7 F

## 2025-06-17 DIAGNOSIS — I70.234 ATHEROSCLEROSIS OF NATIVE ARTERY OF RIGHT LOWER EXTREMITY WITH ULCERATION OF MIDFOOT (HCC): Primary | ICD-10-CM

## 2025-06-17 DIAGNOSIS — E11.628 TYPE 2 DIABETES MELLITUS WITH RIGHT DIABETIC FOOT INFECTION (HCC): ICD-10-CM

## 2025-06-17 DIAGNOSIS — E11.69 TYPE 2 DIABETES MELLITUS WITH OBESITY (HCC): ICD-10-CM

## 2025-06-17 DIAGNOSIS — R60.0 LOCALIZED EDEMA: ICD-10-CM

## 2025-06-17 DIAGNOSIS — L97.413 DIABETIC ULCER OF RIGHT MIDFOOT ASSOCIATED WITH TYPE 2 DIABETES MELLITUS, WITH NECROSIS OF MUSCLE (HCC): ICD-10-CM

## 2025-06-17 DIAGNOSIS — I87.321 IDIOPATHIC CHRONIC VENOUS HYPERTENSION OF RIGHT LEG WITH INFLAMMATION: ICD-10-CM

## 2025-06-17 DIAGNOSIS — E11.621 DIABETIC ULCER OF RIGHT MIDFOOT ASSOCIATED WITH TYPE 2 DIABETES MELLITUS, WITH NECROSIS OF MUSCLE (HCC): ICD-10-CM

## 2025-06-17 DIAGNOSIS — E66.9 TYPE 2 DIABETES MELLITUS WITH OBESITY (HCC): ICD-10-CM

## 2025-06-17 DIAGNOSIS — L08.9 TYPE 2 DIABETES MELLITUS WITH RIGHT DIABETIC FOOT INFECTION (HCC): ICD-10-CM

## 2025-06-17 DIAGNOSIS — Z74.09 IMPAIRED MOBILITY: ICD-10-CM

## 2025-06-17 PROCEDURE — 11043 DBRDMT MUSC&/FSCA 1ST 20/<: CPT | Performed by: EMERGENCY MEDICINE

## 2025-06-17 PROCEDURE — 11042 DBRDMT SUBQ TIS 1ST 20SQCM/<: CPT

## 2025-06-17 PROCEDURE — 11043 DBRDMT MUSC&/FSCA 1ST 20/<: CPT

## 2025-06-17 RX ORDER — LIDOCAINE 40 MG/G
CREAM TOPICAL ONCE
OUTPATIENT
Start: 2025-06-17 | End: 2025-06-17

## 2025-06-17 RX ORDER — BETAMETHASONE DIPROPIONATE 0.5 MG/G
CREAM TOPICAL ONCE
OUTPATIENT
Start: 2025-06-17 | End: 2025-06-17

## 2025-06-17 RX ORDER — OXYBUTYNIN CHLORIDE 5 MG/1
5 TABLET ORAL NIGHTLY
Qty: 90 TABLET | Refills: 3 | OUTPATIENT
Start: 2025-06-17

## 2025-06-17 RX ORDER — BACITRACIN ZINC AND POLYMYXIN B SULFATE 500; 1000 [USP'U]/G; [USP'U]/G
OINTMENT TOPICAL ONCE
OUTPATIENT
Start: 2025-06-17 | End: 2025-06-17

## 2025-06-17 RX ORDER — LIDOCAINE 50 MG/G
OINTMENT TOPICAL ONCE
OUTPATIENT
Start: 2025-06-17 | End: 2025-06-17

## 2025-06-17 RX ORDER — LIDOCAINE HYDROCHLORIDE 20 MG/ML
JELLY TOPICAL ONCE
OUTPATIENT
Start: 2025-06-17 | End: 2025-06-17

## 2025-06-17 RX ORDER — GINSENG 100 MG
CAPSULE ORAL ONCE
OUTPATIENT
Start: 2025-06-17 | End: 2025-06-17

## 2025-06-17 RX ORDER — SODIUM CHLOR/HYPOCHLOROUS ACID 0.033 %
SOLUTION, IRRIGATION IRRIGATION ONCE
OUTPATIENT
Start: 2025-06-17 | End: 2025-06-17

## 2025-06-17 RX ORDER — NEOMYCIN/BACITRACIN/POLYMYXINB 3.5-400-5K
OINTMENT (GRAM) TOPICAL ONCE
OUTPATIENT
Start: 2025-06-17 | End: 2025-06-17

## 2025-06-17 RX ORDER — LIDOCAINE 40 MG/G
CREAM TOPICAL ONCE
Status: COMPLETED | OUTPATIENT
Start: 2025-06-17 | End: 2025-06-17

## 2025-06-17 RX ORDER — TRIAMCINOLONE ACETONIDE 1 MG/G
OINTMENT TOPICAL ONCE
OUTPATIENT
Start: 2025-06-17 | End: 2025-06-17

## 2025-06-17 RX ORDER — CLOBETASOL PROPIONATE 0.5 MG/G
OINTMENT TOPICAL ONCE
OUTPATIENT
Start: 2025-06-17 | End: 2025-06-17

## 2025-06-17 RX ORDER — MUPIROCIN 2 %
OINTMENT (GRAM) TOPICAL ONCE
OUTPATIENT
Start: 2025-06-17 | End: 2025-06-17

## 2025-06-17 RX ORDER — GENTAMICIN SULFATE 1 MG/G
OINTMENT TOPICAL ONCE
OUTPATIENT
Start: 2025-06-17 | End: 2025-06-17

## 2025-06-17 RX ORDER — LIDOCAINE HYDROCHLORIDE 40 MG/ML
SOLUTION TOPICAL ONCE
OUTPATIENT
Start: 2025-06-17 | End: 2025-06-17

## 2025-06-17 RX ORDER — SILVER SULFADIAZINE 10 MG/G
CREAM TOPICAL ONCE
OUTPATIENT
Start: 2025-06-17 | End: 2025-06-17

## 2025-06-17 RX ADMIN — LIDOCAINE: 40 CREAM TOPICAL at 10:44

## 2025-06-17 ASSESSMENT — PAIN SCALES - GENERAL: PAINLEVEL_OUTOF10: 0

## 2025-06-17 NOTE — PATIENT INSTRUCTIONS
[facility] Wound Care Physician Orders and Discharge Instructions  [facility]  [address], [address]  [place], [place] [address]  Telephone: [phone]      FAX [phone]  MONDAY - THURSDAY 8:00 am - 4:30 pm and Friday 8:00 am - 12:00 pm.        NAME:  Marvin Montero  YOB: 1950  MEDICAL RECORD NUMBER:  [id number]  DATE:  6/17/2025      Return Appointment:  [x] Return Appointment: With Dr Cece Alejandre  in  1 Week(s)   [x] Wound and dressing supply provider: [facility]  [] ECF or Home Healthcare:   [] Wound Assessment: [] Physician or NP scheduled for Wound Assessment:   [] Orders placed during your visit:        Important Reminders:   Please wash hands with soap and water before and after every dressing change.  Do not scrub wounds.  Keep wounds dry in shower unless otherwise instructed by the physician.  SMOKING can slow would healing. Stop smoking as soon as possible to improve healing and prevent further complications associated with smoking.      Fransico-Wound Topical Treatments:  Do not apply lotions, creams, or ointments to wound bed unless directed.   [] Apply moisturizing lotion to skin surrounding the wound prior to dressing change.  [] Apply antifungal ointment to skin surrounding the wound prior to dressing change.  [] Apply thin film of no sting moisture barrier ointment to skin immediately around      wound.  [] Other:         **ANTIBIOTICS PER DR BOOTH**      Wound Location: RIGHT LATERAL TMA   **THERASKIN # 5 APPLIED 6/3/2025**    Wound Cleansing: ZINC OXIDE TO FRANSICO-WOUND    Primary Dressing:  [x]  HYDROFERA BLUE TRANSFER  [x] PLAIN FOAM TO ANTERIOR AND LATERAL ANKLE    Secondary Dressing:  [x] GAUZE, ROLL GAUZE  [x] ABD PAD AROUND ANKLE     Dressing Frequency:  [x] THREE TIMES A WEEK  [] Do Not Change Dressing          Compression and Edema Control: RIGHT LOWER LEG   [] Wear Home Compression Stockings   [x] Spandagrip to   Size: []Low compression 5-10

## 2025-06-17 NOTE — PROGRESS NOTES
[facility]     Note Type: Medical Staff Progress Note    Referring Provider: self  Reason for Referral: foot wound  Chief Complaint   Patient presents with    Wound Check     Follow Up on Right Foot       Marvin Montero  MEDICAL RECORD NUMBER:  [id number]  AGE: 74 y.o.   GENDER: male  : 1950  EPISODE DATE:  2025    Chief complaint and reason for visit:     Chief Complaint   Patient presents with    Wound Check     Follow Up on Right Foot        HPI/Wound Narrative:      Marvin Montero is a 74 y.o. male who presents today for an evaluation of a wound/ulcer. Wound duration: 2024.    25: no new issues    6/10/25: had theraskin last week.    6/3/25: no new issues. Glucose on waking was 80    25: no new issues but hydrofera sticking too much    3/18/25: doing better overall, edema improving with diuretics    3/11/25: did not take his diuretic yesterday nor today and leg edema is much worse than last visit.    3/4/25: no new issues. Plans on HBOT today. Diuretic changed and helping with edema control much better. Eating better.     25: Has ENT appointment scheduled for 2 PM today with possible ET tubes but the patient was also given Flonase.  Unfortunately cannot trial the Flonase and at this time he does want to trial this next week.      25: no new issues. Had vascular intervention.    25: Patient has no specific complaints.  Does have angiogram with intervention scheduled for 2025.  He is also going to be getting his MRI soon.    25: no new issues. Has appt with cardio soon. MRI won't be done until after 2/3 due to recent pacer/defibrillator placed in 2024.    25: had his echo. EF about 55%. No changes in medications recommended by cardiology.    25: 74-year-old with past medical history notable for hypertension, type 2 diabetes mellitus, hyperlipidemia, atrial fibrillation on Eliquis, diastolic congestive heart

## 2025-06-20 ENCOUNTER — APPOINTMENT (OUTPATIENT)
Dept: WOUND CARE | Age: 75
End: 2025-06-20
Attending: EMERGENCY MEDICINE
Payer: MEDICARE

## 2025-06-20 DIAGNOSIS — N32.81 OAB (OVERACTIVE BLADDER): ICD-10-CM

## 2025-06-20 RX ORDER — OXYBUTYNIN CHLORIDE 5 MG/1
5 TABLET ORAL NIGHTLY
Qty: 90 TABLET | Refills: 3 | Status: SHIPPED | OUTPATIENT
Start: 2025-06-20

## 2025-06-20 NOTE — TELEPHONE ENCOUNTER
Patient Requesting refill on  medication oxybutynin stating he called the pharmacy and they stated prescription that was sent they did not receive due to error in there sytem patient is all out of this medication. Please advise.

## 2025-06-24 ENCOUNTER — HOSPITAL ENCOUNTER (OUTPATIENT)
Dept: WOUND CARE | Age: 75
Discharge: HOME | End: 2025-06-24
Attending: EMERGENCY MEDICINE
Payer: MEDICARE

## 2025-06-24 VITALS
SYSTOLIC BLOOD PRESSURE: 151 MMHG | TEMPERATURE: 97.3 F | RESPIRATION RATE: 18 BRPM | HEART RATE: 80 BPM | DIASTOLIC BLOOD PRESSURE: 73 MMHG

## 2025-06-24 DIAGNOSIS — I70.234 ATHEROSCLEROSIS OF NATIVE ARTERY OF RIGHT LOWER EXTREMITY WITH ULCERATION OF MIDFOOT (HCC): Primary | ICD-10-CM

## 2025-06-24 DIAGNOSIS — E11.628 TYPE 2 DIABETES MELLITUS WITH RIGHT DIABETIC FOOT INFECTION (HCC): ICD-10-CM

## 2025-06-24 DIAGNOSIS — I87.321 IDIOPATHIC CHRONIC VENOUS HYPERTENSION OF RIGHT LEG WITH INFLAMMATION: ICD-10-CM

## 2025-06-24 DIAGNOSIS — E11.69 TYPE 2 DIABETES MELLITUS WITH OBESITY (HCC): ICD-10-CM

## 2025-06-24 DIAGNOSIS — E66.9 TYPE 2 DIABETES MELLITUS WITH OBESITY (HCC): ICD-10-CM

## 2025-06-24 DIAGNOSIS — Z74.09 IMPAIRED MOBILITY: ICD-10-CM

## 2025-06-24 DIAGNOSIS — L97.413 DIABETIC ULCER OF RIGHT MIDFOOT ASSOCIATED WITH TYPE 2 DIABETES MELLITUS, WITH NECROSIS OF MUSCLE (HCC): ICD-10-CM

## 2025-06-24 DIAGNOSIS — R60.0 LOCALIZED EDEMA: ICD-10-CM

## 2025-06-24 DIAGNOSIS — L08.9 TYPE 2 DIABETES MELLITUS WITH RIGHT DIABETIC FOOT INFECTION (HCC): ICD-10-CM

## 2025-06-24 DIAGNOSIS — E11.621 DIABETIC ULCER OF RIGHT MIDFOOT ASSOCIATED WITH TYPE 2 DIABETES MELLITUS, WITH NECROSIS OF MUSCLE (HCC): ICD-10-CM

## 2025-06-24 PROCEDURE — 11043 DBRDMT MUSC&/FSCA 1ST 20/<: CPT | Performed by: EMERGENCY MEDICINE

## 2025-06-24 PROCEDURE — 11043 DBRDMT MUSC&/FSCA 1ST 20/<: CPT

## 2025-06-24 RX ORDER — LIDOCAINE 40 MG/G
CREAM TOPICAL ONCE
Status: COMPLETED | OUTPATIENT
Start: 2025-06-24 | End: 2025-06-24

## 2025-06-24 RX ORDER — LIDOCAINE HYDROCHLORIDE 40 MG/ML
SOLUTION TOPICAL ONCE
OUTPATIENT
Start: 2025-06-24 | End: 2025-06-24

## 2025-06-24 RX ORDER — BACITRACIN ZINC AND POLYMYXIN B SULFATE 500; 1000 [USP'U]/G; [USP'U]/G
OINTMENT TOPICAL ONCE
OUTPATIENT
Start: 2025-06-24 | End: 2025-06-24

## 2025-06-24 RX ORDER — TRIAMCINOLONE ACETONIDE 1 MG/G
OINTMENT TOPICAL ONCE
OUTPATIENT
Start: 2025-06-24 | End: 2025-06-24

## 2025-06-24 RX ORDER — LIDOCAINE HYDROCHLORIDE 20 MG/ML
JELLY TOPICAL ONCE
OUTPATIENT
Start: 2025-06-24 | End: 2025-06-24

## 2025-06-24 RX ORDER — GENTAMICIN SULFATE 1 MG/G
OINTMENT TOPICAL ONCE
OUTPATIENT
Start: 2025-06-24 | End: 2025-06-24

## 2025-06-24 RX ORDER — SILVER SULFADIAZINE 10 MG/G
CREAM TOPICAL ONCE
OUTPATIENT
Start: 2025-06-24 | End: 2025-06-24

## 2025-06-24 RX ORDER — LIDOCAINE 50 MG/G
OINTMENT TOPICAL ONCE
OUTPATIENT
Start: 2025-06-24 | End: 2025-06-24

## 2025-06-24 RX ORDER — MUPIROCIN 2 %
OINTMENT (GRAM) TOPICAL ONCE
OUTPATIENT
Start: 2025-06-24 | End: 2025-06-24

## 2025-06-24 RX ORDER — GINSENG 100 MG
CAPSULE ORAL ONCE
OUTPATIENT
Start: 2025-06-24 | End: 2025-06-24

## 2025-06-24 RX ORDER — NEOMYCIN/BACITRACIN/POLYMYXINB 3.5-400-5K
OINTMENT (GRAM) TOPICAL ONCE
OUTPATIENT
Start: 2025-06-24 | End: 2025-06-24

## 2025-06-24 RX ORDER — SODIUM CHLOR/HYPOCHLOROUS ACID 0.033 %
SOLUTION, IRRIGATION IRRIGATION ONCE
OUTPATIENT
Start: 2025-06-24 | End: 2025-06-24

## 2025-06-24 RX ORDER — BETAMETHASONE DIPROPIONATE 0.5 MG/G
CREAM TOPICAL ONCE
OUTPATIENT
Start: 2025-06-24 | End: 2025-06-24

## 2025-06-24 RX ORDER — CLOBETASOL PROPIONATE 0.5 MG/G
OINTMENT TOPICAL ONCE
OUTPATIENT
Start: 2025-06-24 | End: 2025-06-24

## 2025-06-24 RX ORDER — LIDOCAINE 40 MG/G
CREAM TOPICAL ONCE
OUTPATIENT
Start: 2025-06-24 | End: 2025-06-24

## 2025-06-24 RX ADMIN — LIDOCAINE: 40 CREAM TOPICAL at 10:35

## 2025-06-24 ASSESSMENT — PAIN SCALES - GENERAL: PAINLEVEL_OUTOF10: 0

## 2025-06-24 NOTE — PROGRESS NOTES
[facility]     Note Type: Medical Staff Progress Note    Referring Provider: self  Reason for Referral: foot wound  Chief Complaint   Patient presents with    Wound Check     Follow Up on Right Foot       Marvin Montero  MEDICAL RECORD NUMBER:  [id number]  AGE: 74 y.o.   GENDER: male  : 1950  EPISODE DATE:  2025    Chief complaint and reason for visit:     Chief Complaint   Patient presents with    Wound Check     Follow Up on Right Foot        HPI/Wound Narrative:      Marvin Montero is a 74 y.o. male who presents today for an evaluation of a wound/ulcer. Wound duration: 2024.    25: no new issues    6/10/25: had theraskin last week.    6/3/25: no new issues. Glucose on waking was 80    25: no new issues but hydrofera sticking too much    3/18/25: doing better overall, edema improving with diuretics    3/11/25: did not take his diuretic yesterday nor today and leg edema is much worse than last visit.    3/4/25: no new issues. Plans on HBOT today. Diuretic changed and helping with edema control much better. Eating better.     25: Has ENT appointment scheduled for 2 PM today with possible ET tubes but the patient was also given Flonase.  Unfortunately cannot trial the Flonase and at this time he does want to trial this next week.      25: no new issues. Had vascular intervention.    25: Patient has no specific complaints.  Does have angiogram with intervention scheduled for 2025.  He is also going to be getting his MRI soon.    25: no new issues. Has appt with cardio soon. MRI won't be done until after 2/3 due to recent pacer/defibrillator placed in 2024.    25: had his echo. EF about 55%. No changes in medications recommended by cardiology.    25: 74-year-old with past medical history notable for hypertension, type 2 diabetes mellitus, hyperlipidemia, atrial fibrillation on Eliquis, diastolic congestive heart 
by mouth nightly 30 tablet 3    amiodarone (CORDARONE) 200 MG tablet TAKE 1 TABLET BY MOUTH DAILY 90 tablet 0    amoxicillin-clavulanate (AUGMENTIN) 875-125 MG per tablet Take 1 tablet by mouth 2 times daily 84 tablet 1    levothyroxine (SYNTHROID) 25 MCG tablet TAKE 1 TABLET BY MOUTH DAILY 90 tablet 3    sildenafil (VIAGRA) 100 MG tablet TAKE 1 TABLET BY MOUTH DAILY AS NEEDED FOR ERECTILE DYSFUNCTION 30 tablet 5    dapagliflozin (FARXIGA) 5 MG tablet Take 1 tablet by mouth every morning      torsemide (DEMADEX) 20 MG tablet Take 1 tablet by mouth daily (Patient taking differently: Take 1 tablet by mouth daily Every other day) 90 tablet 3    oxymetazoline (12 HOUR NASAL SPRAY) 0.05 % nasal spray Apply 2 sprays to each nostril 30 minutes prior to hyperbaric oxygen treatment 1 each 3    fluticasone (FLONASE) 50 MCG/ACT nasal spray 2 sprays by Nasal route 2 times daily One month supply equals 2 bottles 2 each 5    Continuous Glucose Sensor (FREESTYLE KWESI 14 DAY SENSOR) [facility] Use as directed Dx E11.9 6 each 3    atorvastatin (LIPITOR) 40 MG tablet Take 1 tablet by mouth daily 90 tablet 3    ezetimibe (ZETIA) 10 MG tablet Take 1 tablet by mouth daily 90 tablet 3    lisinopril (PRINIVIL;ZESTRIL) 2.5 MG tablet Take 1 tablet by mouth daily 90 tablet 3    potassium chloride (K-TAB) 20 MEQ TBCR extended release tablet Take 1 tablet by mouth daily as needed when taking diuretic. 90 tablet 3    tamsulosin (FLOMAX) 0.4 MG capsule Take 1 capsule by mouth daily 90 capsule 3    apixaban (ELIQUIS) 5 MG TABS tablet TAKE 1 TABLET BY MOUTH TWICE A  tablet 3    Continuous Blood Gluc  (FREESTYLE KWESI 2 READER) ANITA Use as directed to monitor blood sugar. 1 each 3    blood glucose test strips (ASCENSIA AUTODISC VI;ONE TOUCH ULTRA TEST VI) strip 1 each by In Vitro route daily Test as directed,Dispense according to insurance formulary 100 each 3    Lancets MISC 1 each by Does not apply route daily Test as directed, Dispense

## 2025-06-24 NOTE — PLAN OF CARE
currently being seen by Home Health: [] Yes   [x] No    Duration for needed supplies:  []15  []30  []60  [x]90 Days    Provider Information:      PROVIDER'S NAME: FABIO ANTOINE MD     NPI: [id number]

## 2025-06-24 NOTE — PATIENT INSTRUCTIONS
[facility] Wound Care Physician Orders and Discharge Instructions  [facility]  [address]  [place], [place] [address]  Telephone: [phone]      FAX [phone]  MONDAY - THURSDAY 8:00 am - 4:30 pm and Friday 8:00 am - 12:00 pm.        NAME:  Marvin Montero  YOB: 1950  MEDICAL RECORD NUMBER:  [id number]  DATE:  6/24/2025      Return Appointment:  [x] Return Appointment: With Dr Cece Alejandre  in  1 Week(s)   [x] Wound and dressing supply provider: [facility]  [] ECF or Home Healthcare:   [] Wound Assessment: [] Physician or NP scheduled for Wound Assessment:   [] Orders placed during your visit:        Important Reminders:   Please wash hands with soap and water before and after every dressing change.  Do not scrub wounds.  Keep wounds dry in shower unless otherwise instructed by the physician.  SMOKING can slow would healing. Stop smoking as soon as possible to improve healing and prevent further complications associated with smoking.      Fransico-Wound Topical Treatments:  Do not apply lotions, creams, or ointments to wound bed unless directed.   [] Apply moisturizing lotion to skin surrounding the wound prior to dressing change.  [] Apply antifungal ointment to skin surrounding the wound prior to dressing change.  [] Apply thin film of no sting moisture barrier ointment to skin immediately around      wound.  [] Other:         **ANTIBIOTICS PER DR BOOTH**      Wound Location: RIGHT LATERAL TMA   **THERASKIN # 5 APPLIED 6/3/2025**    Wound Cleansing: ZINC OXIDE TO FRANSICO-WOUND    Primary Dressing:  [x]  HYDROFERA BLUE TRANSFER  [x] PLAIN FOAM TO ANTERIOR AND LATERAL ANKLE    Secondary Dressing:  [x] GAUZE, ROLL GAUZE  [x] ABD PAD AROUND ANKLE     Dressing Frequency:  [x] THREE TIMES A WEEK  [] Do Not Change Dressing          Compression and Edema Control: RIGHT LOWER LEG   [] Wear Home Compression Stockings   [x] Spandagrip to   Size: []Low compression 5-10

## 2025-06-27 DIAGNOSIS — R39.12 BENIGN PROSTATIC HYPERPLASIA WITH WEAK URINARY STREAM: ICD-10-CM

## 2025-06-27 DIAGNOSIS — I10 ESSENTIAL HYPERTENSION: ICD-10-CM

## 2025-06-27 DIAGNOSIS — N40.1 BENIGN PROSTATIC HYPERPLASIA WITH WEAK URINARY STREAM: ICD-10-CM

## 2025-06-27 DIAGNOSIS — E78.2 MIXED HYPERLIPIDEMIA: ICD-10-CM

## 2025-06-27 DIAGNOSIS — E11.8 TYPE 2 DIABETES MELLITUS WITH COMPLICATION, WITH LONG-TERM CURRENT USE OF INSULIN (HCC): ICD-10-CM

## 2025-06-27 DIAGNOSIS — Z79.4 TYPE 2 DIABETES MELLITUS WITH COMPLICATION, WITH LONG-TERM CURRENT USE OF INSULIN (HCC): ICD-10-CM

## 2025-06-27 RX ORDER — ATORVASTATIN CALCIUM 40 MG/1
40 TABLET, FILM COATED ORAL DAILY
Qty: 90 TABLET | Refills: 3 | Status: SHIPPED | OUTPATIENT
Start: 2025-06-27

## 2025-06-27 RX ORDER — TAMSULOSIN HYDROCHLORIDE 0.4 MG/1
0.4 CAPSULE ORAL DAILY
Qty: 90 CAPSULE | Refills: 3 | Status: SHIPPED | OUTPATIENT
Start: 2025-06-27

## 2025-06-27 RX ORDER — APIXABAN 5 MG/1
5 TABLET, FILM COATED ORAL 2 TIMES DAILY
Qty: 180 TABLET | Refills: 3 | Status: SHIPPED | OUTPATIENT
Start: 2025-06-27

## 2025-06-27 RX ORDER — EZETIMIBE 10 MG/1
10 TABLET ORAL DAILY
Qty: 90 TABLET | Refills: 3 | Status: SHIPPED | OUTPATIENT
Start: 2025-06-27

## 2025-06-27 RX ORDER — LISINOPRIL 2.5 MG/1
2.5 TABLET ORAL DAILY
Qty: 90 TABLET | Refills: 3 | Status: SHIPPED | OUTPATIENT
Start: 2025-06-27

## 2025-06-27 NOTE — TELEPHONE ENCOUNTER
Medication:   Requested Prescriptions     Pending Prescriptions Disp Refills    lisinopril (PRINIVIL;ZESTRIL) 2.5 MG tablet [Pharmacy Med Name: LISINOPRIL 2.5MG TABLETS] 90 tablet 3     Sig: TAKE 1 TABLET BY MOUTH EVERY DAY    ELIQUIS 5 MG TABS tablet [Pharmacy Med Name: ELIQUIS 5MG TABLETS] 180 tablet 3     Sig: TAKE 1 TABLET BY MOUTH TWICE DAILY    tamsulosin (FLOMAX) 0.4 MG capsule [Pharmacy Med Name: TAMSULOSIN 0.4MG CAPSULES] 90 capsule 3     Sig: TAKE ONE CAPSULE BY MOUTH EVERY DAY    atorvastatin (LIPITOR) 40 MG tablet [Pharmacy Med Name: ATORVASTATIN 40MG TABLETS] 90 tablet 3     Sig: TAKE 1 TABLET BY MOUTH DAILY    ezetimibe (ZETIA) 10 MG tablet [Pharmacy Med Name: EZETIMIBE 10MG TABLETS] 90 tablet 3     Sig: TAKE 1 TABLET BY MOUTH DAILY        Last Filled:      Patient Phone Number: [phone] (home)     Last appt: 4/8/2025   Next appt: 7/8/2025    Last OARRS:        No data to display

## 2025-07-01 ENCOUNTER — HOSPITAL ENCOUNTER (OUTPATIENT)
Dept: WOUND CARE | Age: 75
Discharge: HOME | End: 2025-07-01
Attending: EMERGENCY MEDICINE
Payer: MEDICARE

## 2025-07-01 VITALS
TEMPERATURE: 97.9 F | DIASTOLIC BLOOD PRESSURE: 77 MMHG | RESPIRATION RATE: 18 BRPM | SYSTOLIC BLOOD PRESSURE: 137 MMHG | HEART RATE: 83 BPM

## 2025-07-01 DIAGNOSIS — I70.234 ATHEROSCLEROSIS OF NATIVE ARTERY OF RIGHT LOWER EXTREMITY WITH ULCERATION OF MIDFOOT (HCC): Primary | ICD-10-CM

## 2025-07-01 DIAGNOSIS — L97.413 DIABETIC ULCER OF RIGHT MIDFOOT ASSOCIATED WITH TYPE 2 DIABETES MELLITUS, WITH NECROSIS OF MUSCLE (HCC): ICD-10-CM

## 2025-07-01 DIAGNOSIS — I87.321 IDIOPATHIC CHRONIC VENOUS HYPERTENSION OF RIGHT LEG WITH INFLAMMATION: ICD-10-CM

## 2025-07-01 DIAGNOSIS — E11.69 TYPE 2 DIABETES MELLITUS WITH OBESITY (HCC): ICD-10-CM

## 2025-07-01 DIAGNOSIS — R60.0 LOCALIZED EDEMA: ICD-10-CM

## 2025-07-01 DIAGNOSIS — E11.621 DIABETIC ULCER OF RIGHT MIDFOOT ASSOCIATED WITH TYPE 2 DIABETES MELLITUS, WITH NECROSIS OF MUSCLE (HCC): ICD-10-CM

## 2025-07-01 DIAGNOSIS — Z74.09 IMPAIRED MOBILITY: ICD-10-CM

## 2025-07-01 DIAGNOSIS — E11.628 TYPE 2 DIABETES MELLITUS WITH RIGHT DIABETIC FOOT INFECTION (HCC): ICD-10-CM

## 2025-07-01 DIAGNOSIS — L08.9 TYPE 2 DIABETES MELLITUS WITH RIGHT DIABETIC FOOT INFECTION (HCC): ICD-10-CM

## 2025-07-01 DIAGNOSIS — E66.9 TYPE 2 DIABETES MELLITUS WITH OBESITY (HCC): ICD-10-CM

## 2025-07-01 PROCEDURE — 11043 DBRDMT MUSC&/FSCA 1ST 20/<: CPT

## 2025-07-01 RX ORDER — LIDOCAINE 50 MG/G
OINTMENT TOPICAL ONCE
OUTPATIENT
Start: 2025-07-01 | End: 2025-07-01

## 2025-07-01 RX ORDER — TRIAMCINOLONE ACETONIDE 1 MG/G
OINTMENT TOPICAL ONCE
OUTPATIENT
Start: 2025-07-01 | End: 2025-07-01

## 2025-07-01 RX ORDER — LIDOCAINE HYDROCHLORIDE 20 MG/ML
JELLY TOPICAL ONCE
OUTPATIENT
Start: 2025-07-01 | End: 2025-07-01

## 2025-07-01 RX ORDER — GINSENG 100 MG
CAPSULE ORAL ONCE
OUTPATIENT
Start: 2025-07-01 | End: 2025-07-01

## 2025-07-01 RX ORDER — GENTAMICIN SULFATE 1 MG/G
OINTMENT TOPICAL ONCE
OUTPATIENT
Start: 2025-07-01 | End: 2025-07-01

## 2025-07-01 RX ORDER — LIDOCAINE 40 MG/G
CREAM TOPICAL ONCE
Status: COMPLETED | OUTPATIENT
Start: 2025-07-01 | End: 2025-07-01

## 2025-07-01 RX ORDER — BETAMETHASONE DIPROPIONATE 0.5 MG/G
CREAM TOPICAL ONCE
OUTPATIENT
Start: 2025-07-01 | End: 2025-07-01

## 2025-07-01 RX ORDER — LIDOCAINE HYDROCHLORIDE 40 MG/ML
SOLUTION TOPICAL ONCE
OUTPATIENT
Start: 2025-07-01 | End: 2025-07-01

## 2025-07-01 RX ORDER — SILVER SULFADIAZINE 10 MG/G
CREAM TOPICAL ONCE
OUTPATIENT
Start: 2025-07-01 | End: 2025-07-01

## 2025-07-01 RX ORDER — LIDOCAINE 40 MG/G
CREAM TOPICAL ONCE
OUTPATIENT
Start: 2025-07-01 | End: 2025-07-01

## 2025-07-01 RX ORDER — SODIUM CHLOR/HYPOCHLOROUS ACID 0.033 %
SOLUTION, IRRIGATION IRRIGATION ONCE
OUTPATIENT
Start: 2025-07-01 | End: 2025-07-01

## 2025-07-01 RX ORDER — NEOMYCIN/BACITRACIN/POLYMYXINB 3.5-400-5K
OINTMENT (GRAM) TOPICAL ONCE
OUTPATIENT
Start: 2025-07-01 | End: 2025-07-01

## 2025-07-01 RX ORDER — MUPIROCIN 2 %
OINTMENT (GRAM) TOPICAL ONCE
OUTPATIENT
Start: 2025-07-01 | End: 2025-07-01

## 2025-07-01 RX ORDER — CLOBETASOL PROPIONATE 0.5 MG/G
OINTMENT TOPICAL ONCE
OUTPATIENT
Start: 2025-07-01 | End: 2025-07-01

## 2025-07-01 RX ORDER — BACITRACIN ZINC AND POLYMYXIN B SULFATE 500; 1000 [USP'U]/G; [USP'U]/G
OINTMENT TOPICAL ONCE
OUTPATIENT
Start: 2025-07-01 | End: 2025-07-01

## 2025-07-01 RX ADMIN — LIDOCAINE: 40 CREAM TOPICAL at 10:47

## 2025-07-01 ASSESSMENT — PAIN SCALES - GENERAL
PAINLEVEL_OUTOF10: 0
PAINLEVEL_OUTOF10: 0

## 2025-07-01 NOTE — PROGRESS NOTES
Suppl (ONETOUCH VERIO) w/Device KIT 1 each by Does not apply route 2 times daily 1 kit 0    Handicap Placard MISC by Does not apply route Diagnosis: heart failure.    Expires: 12/7/24. 1 each 0    Insulin Pen Needle (B-D UF III MINI PEN NEEDLES) 31G X 5 MM MISC Inject 1 each into the skin daily 100 each 5    blood glucose test strips (ONE TOUCH TEST STRIPS) strip 1 each by In Vitro route 3 times daily As needed. 300 each 3    ONETOUCH ULTRA strip USE WITH GLUCOSE METER THREE TIMES DAILY AND AS NEEDED 300 strip 3    Continuous Blood Gluc  (FREESTYLE KWESI 14 DAY READER) ANITA Use as Directed Dx E11.9 1 Device 0    Blood Glucose Monitoring Suppl (ONE TOUCH ULTRA MINI) w/Device KIT 1 kit by Does not apply route three times daily 1 kit 0    acetaminophen (TYLENOL) 325 MG tablet Take 2 tablets by mouth every 4 hours as needed for Pain 120 tablet 3    Coenzyme Q10 (CO Q 10) 100 MG CAPS Take 1 capsule by mouth daily      Cholecalciferol (VITAMIN D) 125 MCG (5000 UT) CAPS Take 1 capsule by mouth three times a week Indications: cut back on it due to pcp stating the vitamin D level was to high.      Insulin Syringe-Needle U-100 (INSULIN SYRINGE .5CC/31GX5/16\") 31G X 5/16\" 0.5 ML MISC Patient tests bid 100 Syringe 5    therapeutic multivitamin-minerals (THERAGRAN-M) tablet Take 1 tablet by mouth daily       No current facility-administered medications on file prior to encounter.       REVIEW OF SYSTEMS  A comprehensive review of systems was negative except noted in HPI/wound narrative and/or updates above.  Written patient dismissal instructions given to patient and signed by patient or POA.  Patient voiced understanding that the importance of adherence to instructions is paramount to wound healing improvement or success.   Patient Instructions   [facility] Wound Care Physician Orders and Discharge Instructions  [facility]  [address], [address]  [place], [place] [address]  Telephone:

## 2025-07-01 NOTE — PATIENT INSTRUCTIONS
[facility] Wound Care Physician Orders and Discharge Instructions  [facility]  [address], [address]  [place], [place] [address]  Telephone: [phone]      FAX [phone]  MONDAY - THURSDAY 8:00 am - 4:30 pm and Friday 8:00 am - 12:00 pm.        NAME:  Marvin Montero  YOB: 1950  MEDICAL RECORD NUMBER:  [id number]  DATE:  7/1/2025      Return Appointment:  [x] Return Appointment: With Dr Cece Alejandre  in  1 Week(s)   [x] Wound and dressing supply provider: [facility]  [] ECF or Home Healthcare:   [] Wound Assessment: [] Physician or NP scheduled for Wound Assessment:   [] Orders placed during your visit:        Important Reminders:   Please wash hands with soap and water before and after every dressing change.  Do not scrub wounds.  Keep wounds dry in shower unless otherwise instructed by the physician.  SMOKING can slow would healing. Stop smoking as soon as possible to improve healing and prevent further complications associated with smoking.      Fransico-Wound Topical Treatments:  Do not apply lotions, creams, or ointments to wound bed unless directed.   [] Apply moisturizing lotion to skin surrounding the wound prior to dressing change.  [] Apply antifungal ointment to skin surrounding the wound prior to dressing change.  [] Apply thin film of no sting moisture barrier ointment to skin immediately around      wound.  [] Other:         **ANTIBIOTICS PER DR BOOTH**      Wound Location: RIGHT LATERAL TMA   **THERASKIN # 4 APPLIED 6/3/2025**    Wound Cleansing: ZINC OXIDE TO FRANSICO-WOUND    Primary Dressing:  [x]  HYDROFERA BLUE TRANSFER  [x] PLAIN FOAM TO ANTERIOR AND LATERAL ANKLE    Secondary Dressing:  [x] GAUZE, ROLL GAUZE  [x] ABD PAD AROUND ANKLE     Dressing Frequency:  [x] THREE TIMES A WEEK  [] Do Not Change Dressing          Compression and Edema Control: RIGHT LOWER LEG   [] Wear Home Compression Stockings   [x] Spandagrip to   Size: []Low compression 5-10

## 2025-07-08 ENCOUNTER — HOSPITAL ENCOUNTER (OUTPATIENT)
Dept: WOUND CARE | Age: 75
Discharge: HOME | End: 2025-07-08
Attending: EMERGENCY MEDICINE
Payer: MEDICARE

## 2025-07-08 ENCOUNTER — TELEPHONE (OUTPATIENT)
Dept: INTERNAL MEDICINE CLINIC | Age: 75
End: 2025-07-08

## 2025-07-08 VITALS
TEMPERATURE: 97.5 F | HEART RATE: 79 BPM | RESPIRATION RATE: 20 BRPM | SYSTOLIC BLOOD PRESSURE: 145 MMHG | DIASTOLIC BLOOD PRESSURE: 66 MMHG

## 2025-07-08 DIAGNOSIS — R60.0 LOCALIZED EDEMA: ICD-10-CM

## 2025-07-08 DIAGNOSIS — I87.321 IDIOPATHIC CHRONIC VENOUS HYPERTENSION OF RIGHT LEG WITH INFLAMMATION: ICD-10-CM

## 2025-07-08 DIAGNOSIS — E11.621 DIABETIC ULCER OF RIGHT MIDFOOT ASSOCIATED WITH TYPE 2 DIABETES MELLITUS, WITH NECROSIS OF MUSCLE (HCC): ICD-10-CM

## 2025-07-08 DIAGNOSIS — E66.9 TYPE 2 DIABETES MELLITUS WITH OBESITY (HCC): ICD-10-CM

## 2025-07-08 DIAGNOSIS — Z74.09 IMPAIRED MOBILITY: ICD-10-CM

## 2025-07-08 DIAGNOSIS — L97.413 DIABETIC ULCER OF RIGHT MIDFOOT ASSOCIATED WITH TYPE 2 DIABETES MELLITUS, WITH NECROSIS OF MUSCLE (HCC): ICD-10-CM

## 2025-07-08 DIAGNOSIS — L08.9 TYPE 2 DIABETES MELLITUS WITH RIGHT DIABETIC FOOT INFECTION (HCC): ICD-10-CM

## 2025-07-08 DIAGNOSIS — E11.69 TYPE 2 DIABETES MELLITUS WITH OBESITY (HCC): ICD-10-CM

## 2025-07-08 DIAGNOSIS — E11.628 TYPE 2 DIABETES MELLITUS WITH RIGHT DIABETIC FOOT INFECTION (HCC): ICD-10-CM

## 2025-07-08 DIAGNOSIS — I70.234 ATHEROSCLEROSIS OF NATIVE ARTERY OF RIGHT LOWER EXTREMITY WITH ULCERATION OF MIDFOOT (HCC): Primary | ICD-10-CM

## 2025-07-08 PROCEDURE — 11046 DBRDMT MUSC&/FSCA EA ADDL: CPT

## 2025-07-08 PROCEDURE — 11043 DBRDMT MUSC&/FSCA 1ST 20/<: CPT

## 2025-07-08 RX ORDER — LIDOCAINE HYDROCHLORIDE 40 MG/ML
SOLUTION TOPICAL ONCE
Status: COMPLETED | OUTPATIENT
Start: 2025-07-08 | End: 2025-07-08

## 2025-07-08 RX ORDER — SILVER SULFADIAZINE 10 MG/G
CREAM TOPICAL ONCE
OUTPATIENT
Start: 2025-07-08 | End: 2025-07-08

## 2025-07-08 RX ORDER — SODIUM CHLOR/HYPOCHLOROUS ACID 0.033 %
SOLUTION, IRRIGATION IRRIGATION ONCE
OUTPATIENT
Start: 2025-07-08 | End: 2025-07-08

## 2025-07-08 RX ORDER — MUPIROCIN 2 %
OINTMENT (GRAM) TOPICAL ONCE
OUTPATIENT
Start: 2025-07-08 | End: 2025-07-08

## 2025-07-08 RX ORDER — LIDOCAINE HYDROCHLORIDE 20 MG/ML
JELLY TOPICAL ONCE
OUTPATIENT
Start: 2025-07-08 | End: 2025-07-08

## 2025-07-08 RX ORDER — BACITRACIN ZINC AND POLYMYXIN B SULFATE 500; 1000 [USP'U]/G; [USP'U]/G
OINTMENT TOPICAL ONCE
OUTPATIENT
Start: 2025-07-08 | End: 2025-07-08

## 2025-07-08 RX ORDER — GINSENG 100 MG
CAPSULE ORAL ONCE
OUTPATIENT
Start: 2025-07-08 | End: 2025-07-08

## 2025-07-08 RX ORDER — CLOBETASOL PROPIONATE 0.5 MG/G
OINTMENT TOPICAL ONCE
OUTPATIENT
Start: 2025-07-08 | End: 2025-07-08

## 2025-07-08 RX ORDER — LIDOCAINE 50 MG/G
OINTMENT TOPICAL ONCE
OUTPATIENT
Start: 2025-07-08 | End: 2025-07-08

## 2025-07-08 RX ORDER — GENTAMICIN SULFATE 1 MG/G
OINTMENT TOPICAL ONCE
OUTPATIENT
Start: 2025-07-08 | End: 2025-07-08

## 2025-07-08 RX ORDER — NEOMYCIN/BACITRACIN/POLYMYXINB 3.5-400-5K
OINTMENT (GRAM) TOPICAL ONCE
OUTPATIENT
Start: 2025-07-08 | End: 2025-07-08

## 2025-07-08 RX ORDER — TRIAMCINOLONE ACETONIDE 1 MG/G
OINTMENT TOPICAL ONCE
OUTPATIENT
Start: 2025-07-08 | End: 2025-07-08

## 2025-07-08 RX ORDER — LIDOCAINE HYDROCHLORIDE 40 MG/ML
SOLUTION TOPICAL ONCE
OUTPATIENT
Start: 2025-07-08 | End: 2025-07-08

## 2025-07-08 RX ORDER — BETAMETHASONE DIPROPIONATE 0.5 MG/G
CREAM TOPICAL ONCE
OUTPATIENT
Start: 2025-07-08 | End: 2025-07-08

## 2025-07-08 RX ORDER — LIDOCAINE 40 MG/G
CREAM TOPICAL ONCE
OUTPATIENT
Start: 2025-07-08 | End: 2025-07-08

## 2025-07-08 RX ADMIN — LIDOCAINE HYDROCHLORIDE 5 ML: 40 SOLUTION TOPICAL at 10:24

## 2025-07-08 ASSESSMENT — PAIN SCALES - GENERAL
PAINLEVEL_OUTOF10: 0
PAINLEVEL_OUTOF10: 0

## 2025-07-08 NOTE — PATIENT INSTRUCTIONS
[facility] Wound Care Physician Orders and Discharge Instructions  [facility]  [address]  [place], [place] [address]  Telephone: [phone]      FAX [phone]  MONDAY - THURSDAY 8:00 am - 4:30 pm and Friday 8:00 am - 12:00 pm.        NAME:  Marvin Montero  YOB: 1950  MEDICAL RECORD NUMBER:  [id number]  DATE:  7/8/2025      Return Appointment:  [x] Return Appointment: With Dr Cece Alejandre  in  1 Week(s)   [x] [facility]  [] ECF or Home Healthcare:   [] Wound Assessment: [] Physician or NP scheduled for Wound Assessment:   [] Orders placed during your visit:        Important Reminders:   Please wash hands with soap and water before and after every dressing change.  Do not scrub wounds.  Keep wounds dry in shower unless otherwise instructed by the physician.  SMOKING can slow would healing. Stop smoking as soon as possible to improve healing and prevent further complications associated with smoking.      Fransico-Wound Topical Treatments:  Do not apply lotions, creams, or ointments to wound bed unless directed.   [] Apply moisturizing lotion to skin surrounding the wound prior to dressing change.  [] Apply antifungal ointment to skin surrounding the wound prior to dressing change.  [] Apply thin film of no sting moisture barrier ointment to skin immediately around      wound.  [] Other:         **ANTIBIOTICS PER DR BOOTH**      Wound Location: RIGHT LATERAL TMA   **THERASKIN # 4 APPLIED 6/3/2025**    Wound Cleansing: ZINC OXIDE TO FRANSICO-WOUND    Primary Dressing:  [x]  HYDROFERA BLUE TRANSFER  [x] PLAIN FOAM TO ANTERIOR AND LATERAL ANKLE    Secondary Dressing:  [x] GAUZE, ROLL GAUZE  [x] ABD PAD AROUND ANKLE     Dressing Frequency:  [x] THREE TIMES A WEEK  [] Do Not Change Dressing          Compression and Edema Control: RIGHT LOWER LEG   [] Wear Home Compression Stockings   [x] Spandagrip to   Size: []Low compression 5-10

## 2025-07-08 NOTE — PROGRESS NOTES
10-20 mm/Hg           []High compression  20-30 mm/Hg  [] Ace Wrap Toes to Knee to    [] Multilayer Compression Wrap:   Do not get leg(s) with wrap wet.  If wraps become too tight call the center or completely remove the wrap.         Pressure Relief and Off Loading:  [] Off-loading when [] walking  [] in bed [] sitting   Turn every 2 hours when in bed   Avoid putting direct pressure on the site of the wound. Limit side lying to 30 degree tilt. Limit elevating the head of the bed greater than 30 degrees.                                      [x] Assistive Devices   PODIATRY SHOE- DO NOT WEAR TO BED  Use as instructed by the provider      Activity: Other WEIGHT BEARING STATUS PER DR. IVY, DPGIBSON      Dietary:   Continue your diet as tolerated.  Protein is a key nutrient in helping to repair damaged tissue and promote new tissue growth. Good sources of protein include milk, yogurt, cheese, fish, lean meat and beans.  If you are DIABETIC, having diabetes can make it hard for wounds to heal. Try to keep your blood sugar within it's target range.  Limit Sodium, Alcohol and Sugar.    Pain:   Please Note some pain, drainage and/or bleeding might be expected after seeing the provider. TO HELP ALLEVIATE PAIN WE RECOMMEND THE FOLLOWING  Elevate the affected limb.  Use over the counter medications as permitted by your family doctor.  For Persistent Pain not relieved by the above interventions, please notify your family doctor.        : FAITH     Electronically signed by Faith Guerrier RN on 7/8/2025 at 10:32 AM       Wound Care Center Information: Should you experience any significant changes in your wound(s) or have questions about your wound care, please contact the [facility] Wound Center at [phone] MONDAY - THURSDAY 8:00 am - 4:30 pm and Friday 8:00 am - 12:30 pm.  If you need help with your wound outside these hours and cannot wait until we are again available, contact your PCP or go to the hospital

## 2025-07-14 RX ORDER — FLASH GLUCOSE SENSOR
KIT MISCELLANEOUS
Qty: 6 EACH | Refills: 3 | Status: SHIPPED | OUTPATIENT
Start: 2025-07-14

## 2025-07-14 NOTE — TELEPHONE ENCOUNTER
Medication:   Requested Prescriptions     Pending Prescriptions Disp Refills    Continuous Glucose Sensor (FREESTYLE KWESI 14 DAY SENSOR) MISC [Pharmacy Med Name: FREESTYLE KWESI 14DAY SENSOR] 6 each 3     Sig: USE AS DIRECTED CHANGE EVERY 14 DAYS        Last Filled:      Patient Phone Number: [phone] (home)     Last appt: 4/8/2025   Next appt: 7/22/2025    Last OARRS:        No data to display

## 2025-07-15 ENCOUNTER — HOSPITAL ENCOUNTER (OUTPATIENT)
Dept: WOUND CARE | Age: 75
Discharge: HOME | End: 2025-07-15
Attending: EMERGENCY MEDICINE
Payer: MEDICARE

## 2025-07-15 VITALS
RESPIRATION RATE: 20 BRPM | HEART RATE: 67 BPM | TEMPERATURE: 97.8 F | DIASTOLIC BLOOD PRESSURE: 60 MMHG | SYSTOLIC BLOOD PRESSURE: 115 MMHG

## 2025-07-15 DIAGNOSIS — Z74.09 IMPAIRED MOBILITY: ICD-10-CM

## 2025-07-15 DIAGNOSIS — I87.321 IDIOPATHIC CHRONIC VENOUS HYPERTENSION OF RIGHT LEG WITH INFLAMMATION: ICD-10-CM

## 2025-07-15 DIAGNOSIS — E11.628 TYPE 2 DIABETES MELLITUS WITH RIGHT DIABETIC FOOT INFECTION (HCC): ICD-10-CM

## 2025-07-15 DIAGNOSIS — L97.413 DIABETIC ULCER OF RIGHT MIDFOOT ASSOCIATED WITH TYPE 2 DIABETES MELLITUS, WITH NECROSIS OF MUSCLE (HCC): ICD-10-CM

## 2025-07-15 DIAGNOSIS — E66.9 TYPE 2 DIABETES MELLITUS WITH OBESITY (HCC): ICD-10-CM

## 2025-07-15 DIAGNOSIS — E11.621 DIABETIC ULCER OF RIGHT MIDFOOT ASSOCIATED WITH TYPE 2 DIABETES MELLITUS, WITH NECROSIS OF MUSCLE (HCC): ICD-10-CM

## 2025-07-15 DIAGNOSIS — I70.234 ATHEROSCLEROSIS OF NATIVE ARTERY OF RIGHT LOWER EXTREMITY WITH ULCERATION OF MIDFOOT (HCC): Primary | ICD-10-CM

## 2025-07-15 DIAGNOSIS — E11.69 TYPE 2 DIABETES MELLITUS WITH OBESITY (HCC): ICD-10-CM

## 2025-07-15 DIAGNOSIS — R60.0 LOCALIZED EDEMA: ICD-10-CM

## 2025-07-15 DIAGNOSIS — L08.9 TYPE 2 DIABETES MELLITUS WITH RIGHT DIABETIC FOOT INFECTION (HCC): ICD-10-CM

## 2025-07-15 PROCEDURE — 11043 DBRDMT MUSC&/FSCA 1ST 20/<: CPT | Performed by: EMERGENCY MEDICINE

## 2025-07-15 PROCEDURE — 11043 DBRDMT MUSC&/FSCA 1ST 20/<: CPT

## 2025-07-15 RX ORDER — MUPIROCIN 2 %
OINTMENT (GRAM) TOPICAL ONCE
OUTPATIENT
Start: 2025-07-15 | End: 2025-07-15

## 2025-07-15 RX ORDER — LIDOCAINE 40 MG/G
CREAM TOPICAL ONCE
Status: COMPLETED | OUTPATIENT
Start: 2025-07-15 | End: 2025-07-15

## 2025-07-15 RX ORDER — LIDOCAINE HYDROCHLORIDE 20 MG/ML
JELLY TOPICAL ONCE
OUTPATIENT
Start: 2025-07-15 | End: 2025-07-15

## 2025-07-15 RX ORDER — SILVER SULFADIAZINE 10 MG/G
CREAM TOPICAL ONCE
OUTPATIENT
Start: 2025-07-15 | End: 2025-07-15

## 2025-07-15 RX ORDER — BETAMETHASONE DIPROPIONATE 0.5 MG/G
CREAM TOPICAL ONCE
OUTPATIENT
Start: 2025-07-15 | End: 2025-07-15

## 2025-07-15 RX ORDER — LIDOCAINE 40 MG/G
CREAM TOPICAL ONCE
OUTPATIENT
Start: 2025-07-15 | End: 2025-07-15

## 2025-07-15 RX ORDER — CLOBETASOL PROPIONATE 0.5 MG/G
OINTMENT TOPICAL ONCE
OUTPATIENT
Start: 2025-07-15 | End: 2025-07-15

## 2025-07-15 RX ORDER — TRIAMCINOLONE ACETONIDE 1 MG/G
OINTMENT TOPICAL ONCE
OUTPATIENT
Start: 2025-07-15 | End: 2025-07-15

## 2025-07-15 RX ORDER — GINSENG 100 MG
CAPSULE ORAL ONCE
OUTPATIENT
Start: 2025-07-15 | End: 2025-07-15

## 2025-07-15 RX ORDER — BACITRACIN ZINC AND POLYMYXIN B SULFATE 500; 1000 [USP'U]/G; [USP'U]/G
OINTMENT TOPICAL ONCE
OUTPATIENT
Start: 2025-07-15 | End: 2025-07-15

## 2025-07-15 RX ORDER — GENTAMICIN SULFATE 1 MG/G
OINTMENT TOPICAL ONCE
OUTPATIENT
Start: 2025-07-15 | End: 2025-07-15

## 2025-07-15 RX ORDER — NEOMYCIN/BACITRACIN/POLYMYXINB 3.5-400-5K
OINTMENT (GRAM) TOPICAL ONCE
OUTPATIENT
Start: 2025-07-15 | End: 2025-07-15

## 2025-07-15 RX ORDER — LIDOCAINE HYDROCHLORIDE 40 MG/ML
SOLUTION TOPICAL ONCE
OUTPATIENT
Start: 2025-07-15 | End: 2025-07-15

## 2025-07-15 RX ORDER — LIDOCAINE 50 MG/G
OINTMENT TOPICAL ONCE
OUTPATIENT
Start: 2025-07-15 | End: 2025-07-15

## 2025-07-15 RX ORDER — SODIUM CHLOR/HYPOCHLOROUS ACID 0.033 %
SOLUTION, IRRIGATION IRRIGATION ONCE
OUTPATIENT
Start: 2025-07-15 | End: 2025-07-15

## 2025-07-15 RX ORDER — AMIODARONE HYDROCHLORIDE 200 MG/1
200 TABLET ORAL DAILY
Qty: 90 TABLET | Refills: 0 | Status: SHIPPED | OUTPATIENT
Start: 2025-07-15

## 2025-07-15 RX ADMIN — LIDOCAINE: 40 CREAM TOPICAL at 11:14

## 2025-07-15 ASSESSMENT — PAIN SCALES - GENERAL
PAINLEVEL_OUTOF10: 0
PAINLEVEL_OUTOF10: 0

## 2025-07-15 NOTE — TELEPHONE ENCOUNTER
Last OV: 6/10/25 Dr. Cyr  Next OV: 10/28/25 Dr. Pemberton  Last refill: 4/11/25 #90  Most recent Labs: 3/31/25-TSH  Last EKG (if needed): 4/22/25

## 2025-07-15 NOTE — PROGRESS NOTES
[facility] Wound Care Center     Note Type: Medical Staff Progress Note    Referring Provider: self  Reason for Referral: foot wound  Chief Complaint   Patient presents with    Wound Check     F/u visit - right foot wound       Marvin Montero  MEDICAL RECORD NUMBER:  [id number]  AGE: 74 y.o.   GENDER: male  : 1950  EPISODE DATE:  7/15/2025    Chief complaint and reason for visit:     Chief Complaint   Patient presents with    Wound Check     F/u visit - right foot wound        HPI/Wound Narrative:      Marvin Montero is a 74 y.o. male who presents today for an evaluation of a wound/ulcer. Wound duration: 2024.    7/15/25: no new issues    6/10/25: had theraskin last week.    6/3/25: no new issues. Glucose on waking was 80    25: no new issues but hydrofera sticking too much    3/18/25: doing better overall, edema improving with diuretics    3/11/25: did not take his diuretic yesterday nor today and leg edema is much worse than last visit.    3/4/25: no new issues. Plans on HBOT today. Diuretic changed and helping with edema control much better. Eating better.     25: Has ENT appointment scheduled for 2 PM today with possible ET tubes but the patient was also given Flonase.  Unfortunately cannot trial the Flonase and at this time he does want to trial this next week.      25: no new issues. Had vascular intervention.    25: Patient has no specific complaints.  Does have angiogram with intervention scheduled for 2025.  He is also going to be getting his MRI soon.    25: no new issues. Has appt with cardio soon. MRI won't be done until after 2/3 due to recent pacer/defibrillator placed in 2024.    25: had his echo. EF about 55%. No changes in medications recommended by cardiology.    25: 74-year-old with past medical history notable for hypertension, type 2 diabetes mellitus, hyperlipidemia, atrial fibrillation on Eliquis, diastolic

## 2025-07-15 NOTE — PATIENT INSTRUCTIONS
[facility] Wound Care Physician Orders and Discharge Instructions  [facility]  [address]  [place], [place] [address]  Telephone: [phone]      FAX [phone]  MONDAY - THURSDAY 8:00 am - 4:30 pm and Friday 8:00 am - 12:00 pm.        NAME:  Marvin Montero  YOB: 1950  MEDICAL RECORD NUMBER:  [id number]  DATE:  7/15/2025      Return Appointment:  [x] Return Appointment: With Dr Cece Alejandre  in  1 Week(s)   [x] Wound and dressing supply provider: [facility]  [] ECF or Home Healthcare:   [] Wound Assessment: [] Physician or NP scheduled for Wound Assessment:   [] Orders placed during your visit:        Important Reminders:   Please wash hands with soap and water before and after every dressing change.  Do not scrub wounds.  Keep wounds dry in shower unless otherwise instructed by the physician.  SMOKING can slow would healing. Stop smoking as soon as possible to improve healing and prevent further complications associated with smoking.      Fransico-Wound Topical Treatments:  Do not apply lotions, creams, or ointments to wound bed unless directed.   [] Apply moisturizing lotion to skin surrounding the wound prior to dressing change.  [] Apply antifungal ointment to skin surrounding the wound prior to dressing change.  [] Apply thin film of no sting moisture barrier ointment to skin immediately around      wound.  [] Other:         **ANTIBIOTICS PER DR BOOTH**      Wound Location: RIGHT LATERAL TMA   **THERASKIN # 4 APPLIED 6/3/2025**    Wound Cleansing: ZINC OXIDE TO FRANSICO-WOUND    Primary Dressing:  [x]  COLLAGEN WITH SILVER DAMPENED WITH NORMAL SALINE, HYDROFERA BLUE TRANSFER  [x] PLAIN FOAM TO ANTERIOR AND LATERAL ANKLE    Secondary Dressing:  [x] DRAWTEX, OPTILOCK, ROLL GAUZE  [x] ABD PAD AROUND ANKLE     Dressing Frequency:  [x] THREE TIMES A WEEK  [] Do Not Change Dressing          Compression and Edema Control: RIGHT LOWER LEG   [] Wear Home Compression

## 2025-07-22 ENCOUNTER — OFFICE VISIT (OUTPATIENT)
Dept: INTERNAL MEDICINE CLINIC | Age: 75
End: 2025-07-22
Payer: MEDICARE

## 2025-07-22 ENCOUNTER — HOSPITAL ENCOUNTER (OUTPATIENT)
Dept: WOUND CARE | Age: 75
Discharge: HOME | End: 2025-07-22
Attending: EMERGENCY MEDICINE
Payer: MEDICARE

## 2025-07-22 VITALS
SYSTOLIC BLOOD PRESSURE: 130 MMHG | BODY MASS INDEX: 29.58 KG/M2 | OXYGEN SATURATION: 96 % | WEIGHT: 243 LBS | DIASTOLIC BLOOD PRESSURE: 86 MMHG | HEART RATE: 102 BPM

## 2025-07-22 VITALS
HEART RATE: 71 BPM | DIASTOLIC BLOOD PRESSURE: 61 MMHG | TEMPERATURE: 97.9 F | RESPIRATION RATE: 18 BRPM | SYSTOLIC BLOOD PRESSURE: 116 MMHG

## 2025-07-22 DIAGNOSIS — R53.83 OTHER FATIGUE: ICD-10-CM

## 2025-07-22 DIAGNOSIS — E78.2 MIXED HYPERLIPIDEMIA: ICD-10-CM

## 2025-07-22 DIAGNOSIS — I70.234 ATHEROSCLEROSIS OF NATIVE ARTERY OF RIGHT LOWER EXTREMITY WITH ULCERATION OF MIDFOOT (HCC): Primary | ICD-10-CM

## 2025-07-22 DIAGNOSIS — I73.9 PAD (PERIPHERAL ARTERY DISEASE): ICD-10-CM

## 2025-07-22 DIAGNOSIS — R79.89 ELEVATED LFTS: ICD-10-CM

## 2025-07-22 DIAGNOSIS — I87.321 IDIOPATHIC CHRONIC VENOUS HYPERTENSION OF RIGHT LEG WITH INFLAMMATION: ICD-10-CM

## 2025-07-22 DIAGNOSIS — E11.621 DIABETIC ULCER OF RIGHT MIDFOOT ASSOCIATED WITH TYPE 2 DIABETES MELLITUS, WITH NECROSIS OF MUSCLE (HCC): ICD-10-CM

## 2025-07-22 DIAGNOSIS — E03.9 ACQUIRED HYPOTHYROIDISM: ICD-10-CM

## 2025-07-22 DIAGNOSIS — E11.69 TYPE 2 DIABETES MELLITUS WITH OBESITY (HCC): ICD-10-CM

## 2025-07-22 DIAGNOSIS — E11.628 TYPE 2 DIABETES MELLITUS WITH RIGHT DIABETIC FOOT INFECTION (HCC): ICD-10-CM

## 2025-07-22 DIAGNOSIS — L08.9 TYPE 2 DIABETES MELLITUS WITH RIGHT DIABETIC FOOT INFECTION (HCC): ICD-10-CM

## 2025-07-22 DIAGNOSIS — E61.8 MINERAL DEFICIENCY: ICD-10-CM

## 2025-07-22 DIAGNOSIS — N18.2 STAGE 2 CHRONIC KIDNEY DISEASE: ICD-10-CM

## 2025-07-22 DIAGNOSIS — R60.0 LOCALIZED EDEMA: ICD-10-CM

## 2025-07-22 DIAGNOSIS — E11.8 TYPE 2 DIABETES MELLITUS WITH COMPLICATION, WITH LONG-TERM CURRENT USE OF INSULIN (HCC): Primary | ICD-10-CM

## 2025-07-22 DIAGNOSIS — E66.9 TYPE 2 DIABETES MELLITUS WITH OBESITY (HCC): ICD-10-CM

## 2025-07-22 DIAGNOSIS — Z79.4 TYPE 2 DIABETES MELLITUS WITH COMPLICATION, WITH LONG-TERM CURRENT USE OF INSULIN (HCC): Primary | ICD-10-CM

## 2025-07-22 DIAGNOSIS — R21 SKIN RASH: ICD-10-CM

## 2025-07-22 DIAGNOSIS — K83.8 COMMON BILE DUCT DILATATION: ICD-10-CM

## 2025-07-22 DIAGNOSIS — Z74.09 IMPAIRED MOBILITY: ICD-10-CM

## 2025-07-22 DIAGNOSIS — I10 PRIMARY HYPERTENSION: ICD-10-CM

## 2025-07-22 DIAGNOSIS — L97.413 DIABETIC ULCER OF RIGHT MIDFOOT ASSOCIATED WITH TYPE 2 DIABETES MELLITUS, WITH NECROSIS OF MUSCLE (HCC): ICD-10-CM

## 2025-07-22 LAB
CHP ED QC CHECK: NORMAL
GLUCOSE BLD-MCNC: 213 MG/DL
HBA1C MFR BLD: 5.9 %

## 2025-07-22 PROCEDURE — 15276 SKIN SUB GRAFT F/N/HF/G ADDL: CPT | Performed by: EMERGENCY MEDICINE

## 2025-07-22 PROCEDURE — 1123F ACP DISCUSS/DSCN MKR DOCD: CPT | Performed by: INTERNAL MEDICINE

## 2025-07-22 PROCEDURE — 82962 GLUCOSE BLOOD TEST: CPT | Performed by: INTERNAL MEDICINE

## 2025-07-22 PROCEDURE — 3079F DIAST BP 80-89 MM HG: CPT | Performed by: INTERNAL MEDICINE

## 2025-07-22 PROCEDURE — 99214 OFFICE O/P EST MOD 30 MIN: CPT | Performed by: INTERNAL MEDICINE

## 2025-07-22 PROCEDURE — 15275 SKIN SUB GRAFT FACE/NK/HF/G: CPT

## 2025-07-22 PROCEDURE — 15275 SKIN SUB GRAFT FACE/NK/HF/G: CPT | Performed by: EMERGENCY MEDICINE

## 2025-07-22 PROCEDURE — 3044F HG A1C LEVEL LT 7.0%: CPT | Performed by: INTERNAL MEDICINE

## 2025-07-22 PROCEDURE — 3075F SYST BP GE 130 - 139MM HG: CPT | Performed by: INTERNAL MEDICINE

## 2025-07-22 PROCEDURE — G2211 COMPLEX E/M VISIT ADD ON: HCPCS | Performed by: INTERNAL MEDICINE

## 2025-07-22 PROCEDURE — 83036 HEMOGLOBIN GLYCOSYLATED A1C: CPT | Performed by: INTERNAL MEDICINE

## 2025-07-22 RX ORDER — MUPIROCIN 2 %
OINTMENT (GRAM) TOPICAL ONCE
OUTPATIENT
Start: 2025-07-22 | End: 2025-07-22

## 2025-07-22 RX ORDER — BLOOD SUGAR DIAGNOSTIC
STRIP MISCELLANEOUS
Qty: 300 STRIP | Refills: 3 | Status: SHIPPED | OUTPATIENT
Start: 2025-07-22

## 2025-07-22 RX ORDER — NEOMYCIN/BACITRACIN/POLYMYXINB 3.5-400-5K
OINTMENT (GRAM) TOPICAL ONCE
OUTPATIENT
Start: 2025-07-22 | End: 2025-07-22

## 2025-07-22 RX ORDER — SILVER SULFADIAZINE 10 MG/G
CREAM TOPICAL ONCE
OUTPATIENT
Start: 2025-07-22 | End: 2025-07-22

## 2025-07-22 RX ORDER — LIDOCAINE 40 MG/G
CREAM TOPICAL ONCE
OUTPATIENT
Start: 2025-07-22 | End: 2025-07-22

## 2025-07-22 RX ORDER — BETAMETHASONE DIPROPIONATE 0.5 MG/G
CREAM TOPICAL ONCE
OUTPATIENT
Start: 2025-07-22 | End: 2025-07-22

## 2025-07-22 RX ORDER — GENTAMICIN SULFATE 1 MG/G
OINTMENT TOPICAL ONCE
OUTPATIENT
Start: 2025-07-22 | End: 2025-07-22

## 2025-07-22 RX ORDER — LIDOCAINE HYDROCHLORIDE 20 MG/ML
JELLY TOPICAL ONCE
OUTPATIENT
Start: 2025-07-22 | End: 2025-07-22

## 2025-07-22 RX ORDER — TRIAMCINOLONE ACETONIDE 1 MG/G
OINTMENT TOPICAL ONCE
OUTPATIENT
Start: 2025-07-22 | End: 2025-07-22

## 2025-07-22 RX ORDER — SODIUM CHLOR/HYPOCHLOROUS ACID 0.033 %
SOLUTION, IRRIGATION IRRIGATION ONCE
OUTPATIENT
Start: 2025-07-22 | End: 2025-07-22

## 2025-07-22 RX ORDER — CLOBETASOL PROPIONATE 0.5 MG/G
OINTMENT TOPICAL ONCE
OUTPATIENT
Start: 2025-07-22 | End: 2025-07-22

## 2025-07-22 RX ORDER — GINSENG 100 MG
CAPSULE ORAL ONCE
OUTPATIENT
Start: 2025-07-22 | End: 2025-07-22

## 2025-07-22 RX ORDER — BACITRACIN ZINC AND POLYMYXIN B SULFATE 500; 1000 [USP'U]/G; [USP'U]/G
OINTMENT TOPICAL ONCE
OUTPATIENT
Start: 2025-07-22 | End: 2025-07-22

## 2025-07-22 RX ORDER — LIDOCAINE HYDROCHLORIDE 40 MG/ML
SOLUTION TOPICAL ONCE
OUTPATIENT
Start: 2025-07-22 | End: 2025-07-22

## 2025-07-22 RX ORDER — LIDOCAINE 50 MG/G
OINTMENT TOPICAL ONCE
OUTPATIENT
Start: 2025-07-22 | End: 2025-07-22

## 2025-07-22 RX ORDER — LIDOCAINE 40 MG/G
CREAM TOPICAL ONCE
Status: COMPLETED | OUTPATIENT
Start: 2025-07-22 | End: 2025-07-22

## 2025-07-22 RX ADMIN — LIDOCAINE: 40 CREAM TOPICAL at 11:05

## 2025-07-22 ASSESSMENT — PAIN SCALES - GENERAL: PAINLEVEL_OUTOF10: 0

## 2025-07-22 NOTE — PROGRESS NOTES
[facility] Wound Care Center     Note Type: Medical Staff Progress Note    Referring Provider: self  Reason for Referral: foot wound  Chief Complaint   Patient presents with    Wound Check     Follow-up visit for a wound to the right foot.        Marvin Montero  MEDICAL RECORD NUMBER:  [id number]  AGE: 74 y.o.   GENDER: male  : 1950  EPISODE DATE:  2025    Chief complaint and reason for visit:     Chief Complaint   Patient presents with    Wound Check     Follow-up visit for a wound to the right foot.         HPI/Wound Narrative:      Marvin Montero is a 74 y.o. male who presents today for an evaluation of a wound/ulcer. Wound duration: 2024.    25: no new issues    6/10/25: had theraskin last week.    6/3/25: no new issues. Glucose on waking was 80    25: no new issues but hydrofera sticking too much    3/18/25: doing better overall, edema improving with diuretics    3/11/25: did not take his diuretic yesterday nor today and leg edema is much worse than last visit.    3/4/25: no new issues. Plans on HBOT today. Diuretic changed and helping with edema control much better. Eating better.     25: Has ENT appointment scheduled for 2 PM today with possible ET tubes but the patient was also given Flonase.  Unfortunately cannot trial the Flonase and at this time he does want to trial this next week.      25: no new issues. Had vascular intervention.    25: Patient has no specific complaints.  Does have angiogram with intervention scheduled for 2025.  He is also going to be getting his MRI soon.    25: no new issues. Has appt with cardio soon. MRI won't be done until after 2/3 due to recent pacer/defibrillator placed in 2024.    25: had his echo. EF about 55%. No changes in medications recommended by cardiology.    25: 74-year-old with past medical history notable for hypertension, type 2 diabetes mellitus, hyperlipidemia, atrial

## 2025-07-22 NOTE — PROGRESS NOTES
Patient: Marvin Montero is a 74 y.o. male who presents today with the following Chief Complaint(s):    Chief Complaint   Patient presents with    3 Month Follow-Up    Diabetes         HPIMRCP. BEKAL.elevated LFTs, fsatty liver.    Test strps.  No insulin in 1-1/2 years.   Just Faxiga.   Current Outpatient Medications   Medication Sig Dispense Refill    amiodarone (CORDARONE) 200 MG tablet TAKE 1 TABLET BY MOUTH DAILY 90 tablet 0    Continuous Glucose Sensor (FREESTYLE KWESI 14 DAY SENSOR) MISC USE AS DIRECTED CHANGE EVERY 14 DAYS 6 each 3    lisinopril (PRINIVIL;ZESTRIL) 2.5 MG tablet TAKE 1 TABLET BY MOUTH EVERY DAY 90 tablet 3    ELIQUIS 5 MG TABS tablet TAKE 1 TABLET BY MOUTH TWICE DAILY 180 tablet 3    tamsulosin (FLOMAX) 0.4 MG capsule TAKE ONE CAPSULE BY MOUTH EVERY DAY 90 capsule 3    atorvastatin (LIPITOR) 40 MG tablet TAKE 1 TABLET BY MOUTH DAILY 90 tablet 3    ezetimibe (ZETIA) 10 MG tablet TAKE 1 TABLET BY MOUTH DAILY 90 tablet 3    oxyBUTYnin (DITROPAN) 5 MG tablet Take 1 tablet by mouth nightly 90 tablet 3    doxycycline hyclate (VIBRA-TABS) 100 MG tablet Take 1 tablet by mouth 2 times daily 60 tablet 1    metoprolol succinate (TOPROL XL) 25 MG extended release tablet Take 1 tablet by mouth nightly 30 tablet 3    levothyroxine (SYNTHROID) 25 MCG tablet TAKE 1 TABLET BY MOUTH DAILY 90 tablet 3    sildenafil (VIAGRA) 100 MG tablet TAKE 1 TABLET BY MOUTH DAILY AS NEEDED FOR ERECTILE DYSFUNCTION 30 tablet 5    dapagliflozin (FARXIGA) 5 MG tablet Take 1 tablet by mouth every morning      torsemide (DEMADEX) 20 MG tablet Take 1 tablet by mouth daily (Patient taking differently: Take 1 tablet by mouth daily Every other day) 90 tablet 3    oxymetazoline (12 HOUR NASAL SPRAY) 0.05 % nasal spray Apply 2 sprays to each nostril 30 minutes prior to hyperbaric oxygen treatment 1 each 3    fluticasone (FLONASE) 50 MCG/ACT nasal spray 2 sprays by Nasal route 2 times daily One month supply equals 2 bottles 2 each 5

## 2025-07-22 NOTE — PLAN OF CARE
TheraSkin Treatment Note    NAME:  Marvin Montero  YOB: 1950  MEDICAL RECORD NUMBER:  [id number]  DATE:  7/22/2025    Goal:  Patient will receive safe and proper application of skin substitute.  Patient will comply with caring for dressing, offloading and reporting complications.     Expiration date of TheraSkin checked immediately prior to use.  Package intact prior to use and no damage noted.  Transport temperature controlled and acceptable.  TheraSkin was prepared for application by removing from package. TheraSkin was rinsed 2 times in room temperature normal saline for 2 minutes each time. A 2nd saline rinse was left on the TheraSkin until the physician was ready to apply it within 120 minutes of thawing. White side goes against ulcer bed.  Graft may be thawed and soaked in its inner pouch.Place sterile solution normal saline of lactated ringer in the inner pouch and completely submerge the graft. Do not allow the solution to exceed 42 degree C. To thaw for 2 minutes. TheraSkin until the physician was ready to apply must be within 120 minutes of thawing. Replace the sterile solution in the inner pouch between the thaw and soak. Remove the double mesh lining prior to applying to patient.  TheraSkin was applied to RIGHT FOOT and affixed with steri-strips by the physician.  CUTIMED SORBACT, DRAWTEX, OPTILOCK was applied on top of non-adherent dressing.  Fluffed gauze was applied.  Dressing was secured with cover roll type tape to stabilize graft.  Coban or ace wrap was applied to secure graft and decrease edema.  The patient/caregiver was instructed not to remove the dressing and to keep it clean and dry.  The patient/family/caregiver was instructed on the need for offloading and elevation of the affected extremity and on using the prescribed offloading device.  The patient/family/caregiver was instructed on signs and symptoms of complication to report, such as draining through dressing, dressing

## 2025-07-22 NOTE — PATIENT INSTRUCTIONS
[facility] Wound Care Physician Orders and Discharge Instructions  [facility]  [address]  [place], [place] [address]  Telephone: [phone]      FAX [phone]  MONDAY - THURSDAY 8:00 am - 4:30 pm and Friday 8:00 am - 12:00 pm.        NAME:  Marvin Montero  YOB: 1950  MEDICAL RECORD NUMBER:  [id number]  DATE:  7/22/2025      Return Appointment:  [x] Return Appointment: With Dr Cece Alejandre  in  1 Week(s)   [x] Wound and dressing supply provider: [facility]  [] ECF or Home Healthcare:   [x] Wound Assessment:FRIDAY JULY 25TH- ONLY CHANGE OUTER DRESSING [x] Physician or NP scheduled for Wound Assessment: LOI HUNT CNP  [] Orders placed during your visit:        Important Reminders:   Please wash hands with soap and water before and after every dressing change.  Do not scrub wounds.  Keep wounds dry in shower unless otherwise instructed by the physician.  SMOKING can slow would healing. Stop smoking as soon as possible to improve healing and prevent further complications associated with smoking.      Michelle-Wound Topical Treatments:  Do not apply lotions, creams, or ointments to wound bed unless directed.   [] Apply moisturizing lotion to skin surrounding the wound prior to dressing change.  [] Apply antifungal ointment to skin surrounding the wound prior to dressing change.  [] Apply thin film of no sting moisture barrier ointment to skin immediately around      wound.  [] Other:         **ANTIBIOTICS PER DR BOOTH**      Wound Location: RIGHT LATERAL TMA   **THERASKIN # 5 APPLIED 7/22/2025**    Wound Cleansing:    Primary Dressing:  [x]  THERASKIN #5, CUTIMED SORBACT, STERI-STRIPS AND ZINC OXIDE TO MICHELLE WOUND - LEAVE IN PLACE  [x] PLAIN FOAM TO ANTERIOR AND LATERAL ANKLE    Secondary Dressing:  [x] SILVER ALGINATE, DRAWTEX AND OPTILOCK  [x] CAST PADDING, KERLIX AND COBAN ( FOOTBALL DRESSING )      Dressing Frequency:  []   [x] Do Not Change

## 2025-07-25 ENCOUNTER — HOSPITAL ENCOUNTER (OUTPATIENT)
Dept: WOUND CARE | Age: 75
Discharge: HOME | End: 2025-07-25
Attending: EMERGENCY MEDICINE
Payer: MEDICARE

## 2025-07-25 VITALS
HEART RATE: 73 BPM | TEMPERATURE: 98.2 F | SYSTOLIC BLOOD PRESSURE: 130 MMHG | RESPIRATION RATE: 18 BRPM | DIASTOLIC BLOOD PRESSURE: 60 MMHG

## 2025-07-25 DIAGNOSIS — E66.9 TYPE 2 DIABETES MELLITUS WITH OBESITY (HCC): ICD-10-CM

## 2025-07-25 DIAGNOSIS — L97.413 DIABETIC ULCER OF RIGHT MIDFOOT ASSOCIATED WITH TYPE 2 DIABETES MELLITUS, WITH NECROSIS OF MUSCLE (HCC): ICD-10-CM

## 2025-07-25 DIAGNOSIS — I70.234 ATHEROSCLEROSIS OF NATIVE ARTERY OF RIGHT LOWER EXTREMITY WITH ULCERATION OF MIDFOOT (HCC): Primary | ICD-10-CM

## 2025-07-25 DIAGNOSIS — E11.628 TYPE 2 DIABETES MELLITUS WITH RIGHT DIABETIC FOOT INFECTION (HCC): ICD-10-CM

## 2025-07-25 DIAGNOSIS — E11.69 TYPE 2 DIABETES MELLITUS WITH OBESITY (HCC): ICD-10-CM

## 2025-07-25 DIAGNOSIS — Z74.09 IMPAIRED MOBILITY: ICD-10-CM

## 2025-07-25 DIAGNOSIS — I87.321 IDIOPATHIC CHRONIC VENOUS HYPERTENSION OF RIGHT LEG WITH INFLAMMATION: ICD-10-CM

## 2025-07-25 DIAGNOSIS — E11.621 DIABETIC ULCER OF RIGHT MIDFOOT ASSOCIATED WITH TYPE 2 DIABETES MELLITUS, WITH NECROSIS OF MUSCLE (HCC): ICD-10-CM

## 2025-07-25 DIAGNOSIS — R60.0 LOCALIZED EDEMA: ICD-10-CM

## 2025-07-25 DIAGNOSIS — L08.9 TYPE 2 DIABETES MELLITUS WITH RIGHT DIABETIC FOOT INFECTION (HCC): ICD-10-CM

## 2025-07-25 PROCEDURE — 99213 OFFICE O/P EST LOW 20 MIN: CPT

## 2025-07-25 RX ORDER — TRIAMCINOLONE ACETONIDE 1 MG/G
OINTMENT TOPICAL ONCE
OUTPATIENT
Start: 2025-07-25 | End: 2025-07-25

## 2025-07-25 RX ORDER — NEOMYCIN/BACITRACIN/POLYMYXINB 3.5-400-5K
OINTMENT (GRAM) TOPICAL ONCE
OUTPATIENT
Start: 2025-07-25 | End: 2025-07-25

## 2025-07-25 RX ORDER — GENTAMICIN SULFATE 1 MG/G
OINTMENT TOPICAL ONCE
OUTPATIENT
Start: 2025-07-25 | End: 2025-07-25

## 2025-07-25 RX ORDER — CLOBETASOL PROPIONATE 0.5 MG/G
OINTMENT TOPICAL ONCE
OUTPATIENT
Start: 2025-07-25 | End: 2025-07-25

## 2025-07-25 RX ORDER — SILVER SULFADIAZINE 10 MG/G
CREAM TOPICAL ONCE
OUTPATIENT
Start: 2025-07-25 | End: 2025-07-25

## 2025-07-25 RX ORDER — SODIUM CHLOR/HYPOCHLOROUS ACID 0.033 %
SOLUTION, IRRIGATION IRRIGATION ONCE
OUTPATIENT
Start: 2025-07-25 | End: 2025-07-25

## 2025-07-25 RX ORDER — LIDOCAINE 40 MG/G
CREAM TOPICAL ONCE
OUTPATIENT
Start: 2025-07-25 | End: 2025-07-25

## 2025-07-25 RX ORDER — BACITRACIN ZINC AND POLYMYXIN B SULFATE 500; 1000 [USP'U]/G; [USP'U]/G
OINTMENT TOPICAL ONCE
OUTPATIENT
Start: 2025-07-25 | End: 2025-07-25

## 2025-07-25 RX ORDER — LIDOCAINE HYDROCHLORIDE 40 MG/ML
SOLUTION TOPICAL ONCE
OUTPATIENT
Start: 2025-07-25 | End: 2025-07-25

## 2025-07-25 RX ORDER — MUPIROCIN 2 %
OINTMENT (GRAM) TOPICAL ONCE
OUTPATIENT
Start: 2025-07-25 | End: 2025-07-25

## 2025-07-25 RX ORDER — BETAMETHASONE DIPROPIONATE 0.5 MG/G
CREAM TOPICAL ONCE
OUTPATIENT
Start: 2025-07-25 | End: 2025-07-25

## 2025-07-25 RX ORDER — GINSENG 100 MG
CAPSULE ORAL ONCE
OUTPATIENT
Start: 2025-07-25 | End: 2025-07-25

## 2025-07-25 RX ORDER — LIDOCAINE HYDROCHLORIDE 20 MG/ML
JELLY TOPICAL ONCE
OUTPATIENT
Start: 2025-07-25 | End: 2025-07-25

## 2025-07-25 RX ORDER — LIDOCAINE 50 MG/G
OINTMENT TOPICAL ONCE
OUTPATIENT
Start: 2025-07-25 | End: 2025-07-25

## 2025-07-25 ASSESSMENT — PAIN SCALES - GENERAL: PAINLEVEL_OUTOF10: 0

## 2025-07-25 NOTE — PATIENT INSTRUCTIONS
[facility] Wound Care Physician Orders and Discharge Instructions  [facility]  [address]  [place], [place] [address]  Telephone: [phone]      FAX [phone]  MONDAY - THURSDAY 8:00 am - 4:30 pm and Friday 8:00 am - 12:00 pm.        NAME:  Marvin Montero  YOB: 1950  MEDICAL RECORD NUMBER:  [id number]  DATE:  7/25/2025      Return Appointment:  [x] Return Appointment: With Dr Cece Alejandre  in  1 Week(s)   [x] Wound and dressing supply provider: [facility]  [] ECF or Home Healthcare:   [x] Wound Assessment:FRIDAY JULY 25TH- ONLY CHANGE OUTER DRESSING [x] Physician or NP scheduled for Wound Assessment: LOI HUNT CNP  [] Orders placed during your visit:        Important Reminders:   Please wash hands with soap and water before and after every dressing change.  Do not scrub wounds.  Keep wounds dry in shower unless otherwise instructed by the physician.  SMOKING can slow would healing. Stop smoking as soon as possible to improve healing and prevent further complications associated with smoking.      Michelle-Wound Topical Treatments:  Do not apply lotions, creams, or ointments to wound bed unless directed.   [] Apply moisturizing lotion to skin surrounding the wound prior to dressing change.  [] Apply antifungal ointment to skin surrounding the wound prior to dressing change.  [] Apply thin film of no sting moisture barrier ointment to skin immediately around      wound.  [] Other:         **ANTIBIOTICS PER DR BOOTH**      Wound Location: RIGHT LATERAL TMA   **THERASKIN # 5 APPLIED 7/22/2025**    Wound Cleansing:    Primary Dressing:  [x]  THERASKIN #5, CUTIMED SORBACT, STERI-STRIPS AND ZINC OXIDE TO MICHELLE WOUND - LEAVE IN PLACE  [x] PLAIN FOAM TO ANTERIOR AND LATERAL ANKLE    Secondary Dressing:  [x] SILVER ALGINATE, DRAWTEX AND OPTILOCK  [x] CAST PADDING, KERLIX AND COBAN ( FOOTBALL DRESSING )      Dressing Frequency:  []   [x] Do Not Change

## 2025-07-28 ENCOUNTER — TELEPHONE (OUTPATIENT)
Dept: CARDIOLOGY CLINIC | Age: 75
End: 2025-07-28

## 2025-07-28 NOTE — TELEPHONE ENCOUNTER
[employer] remote transmission shows 1 FVT event in VF zone detected 07.22.25 @ 4:43pm successfully pace-terminated w ATPx1 (no shocks delivered). Atrial ATP noted also w 77.7% AT/AF burden since 07.20.25. Current EGM shows Ap/ w ectopy. Pt on Eliquis, Torpol XL, amiodarone. Report sent to [facility] for review via [facility].

## 2025-07-29 ENCOUNTER — HOSPITAL ENCOUNTER (OUTPATIENT)
Dept: WOUND CARE | Age: 75
Discharge: HOME | End: 2025-07-29
Attending: EMERGENCY MEDICINE
Payer: MEDICARE

## 2025-07-29 VITALS
SYSTOLIC BLOOD PRESSURE: 122 MMHG | HEART RATE: 73 BPM | RESPIRATION RATE: 18 BRPM | TEMPERATURE: 97.7 F | DIASTOLIC BLOOD PRESSURE: 55 MMHG

## 2025-07-29 DIAGNOSIS — R60.0 LOCALIZED EDEMA: ICD-10-CM

## 2025-07-29 DIAGNOSIS — I70.234 ATHEROSCLEROSIS OF NATIVE ARTERY OF RIGHT LOWER EXTREMITY WITH ULCERATION OF MIDFOOT (HCC): Primary | ICD-10-CM

## 2025-07-29 DIAGNOSIS — E11.69 TYPE 2 DIABETES MELLITUS WITH OBESITY (HCC): ICD-10-CM

## 2025-07-29 DIAGNOSIS — Z74.09 IMPAIRED MOBILITY: ICD-10-CM

## 2025-07-29 DIAGNOSIS — E11.628 TYPE 2 DIABETES MELLITUS WITH RIGHT DIABETIC FOOT INFECTION (HCC): ICD-10-CM

## 2025-07-29 DIAGNOSIS — E66.9 TYPE 2 DIABETES MELLITUS WITH OBESITY (HCC): ICD-10-CM

## 2025-07-29 DIAGNOSIS — I87.321 IDIOPATHIC CHRONIC VENOUS HYPERTENSION OF RIGHT LEG WITH INFLAMMATION: ICD-10-CM

## 2025-07-29 DIAGNOSIS — E11.621 DIABETIC ULCER OF RIGHT MIDFOOT ASSOCIATED WITH TYPE 2 DIABETES MELLITUS, WITH NECROSIS OF MUSCLE (HCC): ICD-10-CM

## 2025-07-29 DIAGNOSIS — L08.9 TYPE 2 DIABETES MELLITUS WITH RIGHT DIABETIC FOOT INFECTION (HCC): ICD-10-CM

## 2025-07-29 DIAGNOSIS — L97.413 DIABETIC ULCER OF RIGHT MIDFOOT ASSOCIATED WITH TYPE 2 DIABETES MELLITUS, WITH NECROSIS OF MUSCLE (HCC): ICD-10-CM

## 2025-07-29 PROCEDURE — 99213 OFFICE O/P EST LOW 20 MIN: CPT | Performed by: EMERGENCY MEDICINE

## 2025-07-29 PROCEDURE — 99213 OFFICE O/P EST LOW 20 MIN: CPT

## 2025-07-29 PROCEDURE — G2211 COMPLEX E/M VISIT ADD ON: HCPCS | Performed by: EMERGENCY MEDICINE

## 2025-07-29 RX ORDER — BETAMETHASONE DIPROPIONATE 0.5 MG/G
CREAM TOPICAL ONCE
OUTPATIENT
Start: 2025-07-29 | End: 2025-07-29

## 2025-07-29 RX ORDER — LIDOCAINE HYDROCHLORIDE 20 MG/ML
JELLY TOPICAL ONCE
OUTPATIENT
Start: 2025-07-29 | End: 2025-07-29

## 2025-07-29 RX ORDER — GENTAMICIN SULFATE 1 MG/G
OINTMENT TOPICAL ONCE
OUTPATIENT
Start: 2025-07-29 | End: 2025-07-29

## 2025-07-29 RX ORDER — LIDOCAINE HYDROCHLORIDE 40 MG/ML
SOLUTION TOPICAL ONCE
OUTPATIENT
Start: 2025-07-29 | End: 2025-07-29

## 2025-07-29 RX ORDER — GINSENG 100 MG
CAPSULE ORAL ONCE
OUTPATIENT
Start: 2025-07-29 | End: 2025-07-29

## 2025-07-29 RX ORDER — CLOBETASOL PROPIONATE 0.5 MG/G
OINTMENT TOPICAL ONCE
OUTPATIENT
Start: 2025-07-29 | End: 2025-07-29

## 2025-07-29 RX ORDER — LIDOCAINE 50 MG/G
OINTMENT TOPICAL ONCE
OUTPATIENT
Start: 2025-07-29 | End: 2025-07-29

## 2025-07-29 RX ORDER — SILVER SULFADIAZINE 10 MG/G
CREAM TOPICAL ONCE
OUTPATIENT
Start: 2025-07-29 | End: 2025-07-29

## 2025-07-29 RX ORDER — TRIAMCINOLONE ACETONIDE 1 MG/G
OINTMENT TOPICAL ONCE
OUTPATIENT
Start: 2025-07-29 | End: 2025-07-29

## 2025-07-29 RX ORDER — BACITRACIN ZINC AND POLYMYXIN B SULFATE 500; 1000 [USP'U]/G; [USP'U]/G
OINTMENT TOPICAL ONCE
OUTPATIENT
Start: 2025-07-29 | End: 2025-07-29

## 2025-07-29 RX ORDER — NEOMYCIN/BACITRACIN/POLYMYXINB 3.5-400-5K
OINTMENT (GRAM) TOPICAL ONCE
OUTPATIENT
Start: 2025-07-29 | End: 2025-07-29

## 2025-07-29 RX ORDER — MUPIROCIN 2 %
OINTMENT (GRAM) TOPICAL ONCE
OUTPATIENT
Start: 2025-07-29 | End: 2025-07-29

## 2025-07-29 RX ORDER — SODIUM CHLOR/HYPOCHLOROUS ACID 0.033 %
SOLUTION, IRRIGATION IRRIGATION ONCE
OUTPATIENT
Start: 2025-07-29 | End: 2025-07-29

## 2025-07-29 RX ORDER — LIDOCAINE 40 MG/G
CREAM TOPICAL ONCE
OUTPATIENT
Start: 2025-07-29 | End: 2025-07-29

## 2025-07-29 ASSESSMENT — PAIN SCALES - GENERAL
PAINLEVEL_OUTOF10: 0
PAINLEVEL_OUTOF10: 0

## 2025-07-29 NOTE — TELEPHONE ENCOUNTER
Called patient no answer, I left message advising that we will continue to monitor his device and will get a remote check on his device in two weeks to check rhythm.    Advised labs not needed at this time.

## 2025-07-29 NOTE — TELEPHONE ENCOUNTER
Curtis Pemberton MD  You36 minutes ago (8:14 AM)       Double tachycardia, AF and VT treated with ATP. On amio, continue to monitor. Recommend remote interrogation in 2 weeks to assess AF burden.    Thanks    Niecy

## 2025-07-29 NOTE — PROGRESS NOTES
06/10/25 116.1 kg (256 lb)     PHYSICAL EXAM  General Appearance/Constitutional: Alert and in no acute distress. Nontoxic.  Skin: Positive wound per LDA documentation if applicable. No jaundice.  Head: Normocephalic and atraumatic.  HEENT: Atraumatic. Unremarkable. Extraocular eye movements intact, sclera anicteric.  Pulmonary: No respiratory distress, No chest wall tenderness, Clear anteriorly, and Even and unlabored breathing  Cardiovascular: Normal rate and regular rhythm. and No signs of acute cardiac distress  GI: Abdomen soft, Non-tender and benign  Musculoskeletal: Edema present 3+ and dopplerable pulses only    Neurologic: Mental status normal    PAST MEDICAL HISTORY      Diagnosis Date    Abnormal ECG     Atrial fibrillation (HCC)     Back pain     Below-knee amputation (HCC) 05/13/2023    BKA stump complication (HCC) 01/17/2023    Cardiomyopathy     CHF (congestive heart failure) (HCC)     COPD (chronic obstructive pulmonary disease) (HCC)     Dental disease     Dizziness     Dyslipidemia     GERD (gastroesophageal reflux disease)     Hearing loss     Hyperlipidemia     Hypertension     Hypothyroidism     Leg edema     MRSA (methicillin resistant staph aureus) culture positive 10/05/2015    foot/bone    MRSA nasal colonization 05/05/2017    Obesity     Prolonged emergence from general anesthesia     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

## 2025-07-29 NOTE — TELEPHONE ENCOUNTER
Called and spoke with patient he reported that at the time of VF the incident he was getting out of the car felt dizzy and his prosthetic leg was loose and he fell. When asked if the fall was related to the loose prosthetic leg or the dizziness / lightheadedness and he stated the felt it was a combination of both. Denies loss of consciousness.   He reports compliance to medications, amiodarone, metoprolol and Eliquis. Typically takes amiodarone in the morning but on that particular day if took it later in the afternoon.     Would you like for patient to get BMP mag phos?    Any other recommendations.  Lab Results   Component Value Date/Time     05/13/2025 02:15 PM    K 4.8 05/13/2025 02:15 PM    K 4.3 04/02/2024 04:34 AM     05/13/2025 02:15 PM    CO2 22 05/13/2025 02:15 PM    BUN 26 05/13/2025 02:15 PM    CREATININE 1.2 05/13/2025 02:15 PM    GLUCOSE 134 05/13/2025 02:15 PM    CALCIUM 8.8 05/13/2025 02:15 PM    LABGLOM 64 05/13/2025 02:15 PM    LABGLOM >90 04/09/2024 01:08 PM        Lab Results   Component Value Date/Time    MG 2.06 08/14/2024 12:20 PM     Lab Results   Component Value Date    CALCIUM 8.8 05/13/2025    PHOS 3.2 04/01/2024

## 2025-07-29 NOTE — PATIENT INSTRUCTIONS
[facility] Wound Care Physician Orders and Discharge Instructions  [facility]  [address], [address]  [place], [place] [address]  Telephone: [phone]      FAX [phone]  MONDAY - THURSDAY 8:00 am - 4:30 pm and Friday 8:00 am - 12:00 pm.        NAME:  Marvin Montero  YOB: 1950  MEDICAL RECORD NUMBER:  [id number]  DATE:  7/29/2025      Return Appointment:  [x] Return Appointment: With Dr Cece Alejandre  in  1 Week(s)   [x] Wound and dressing supply provider: [facility]  [] ECF or Home Healthcare:   [] Wound Assessment: [] Physician or NP scheduled for Wound Assessment:   [] Orders placed during your visit:        Important Reminders:   Please wash hands with soap and water before and after every dressing change.  Do not scrub wounds.  Keep wounds dry in shower unless otherwise instructed by the physician.  SMOKING can slow would healing. Stop smoking as soon as possible to improve healing and prevent further complications associated with smoking.      Fransico-Wound Topical Treatments:  Do not apply lotions, creams, or ointments to wound bed unless directed.   [] Apply moisturizing lotion to skin surrounding the wound prior to dressing change.  [] Apply antifungal ointment to skin surrounding the wound prior to dressing change.  [] Apply thin film of no sting moisture barrier ointment to skin immediately around      wound.  [] Other:         **ANTIBIOTICS PER DR BOOTH**      Wound Location: RIGHT LATERAL TMA   **THERASKIN # 5 APPLIED 7/22/2025**    Wound Cleansing:    Primary Dressing:  [x]  THERASKIN #5, CUTIMED SORBACT, STERI-STRIPS AND ZINC OXIDE TO FRANSICO WOUND - LEAVE IN PLACE  [x] PLAIN FOAM TO ANTERIOR AND LATERAL ANKLE    Secondary Dressing:  [x] SILVER ALGINATE, OPTILOCK  [x] KERLIX       Dressing Frequency:  [x] EVERY OTHER DAY  [] Do Not Change Dressing          Compression and Edema Control: RIGHT LOWER LEG   [] Wear Home Compression Stockings   [x] Spandagrip to

## 2025-07-30 ASSESSMENT — ENCOUNTER SYMPTOMS
RESPIRATORY NEGATIVE: 1
NAUSEA: 0
ABDOMINAL PAIN: 0
VOMITING: 0

## 2025-07-30 NOTE — PROGRESS NOTES
Patient: Marvin Montero is a 74 y.o. male who presents today with the following Chief Complaint(s):    Chief Complaint   Patient presents with    3 Month Follow-Up    Diabetes         HPIHe is here for a check up and f/u on hypertension, hyperlipidemia, diabetes, T2, taking Farxiga as his sole diabetic medication, hypothyroidism, and stage 3 CKD. He is taking medication as prescribed, continues to focus on meal planning and physical activity working out about 90 minutes daily in divided sessions.   H/O HFrEF. Denies CP or SOB. F/U cardiology including ICD maintenance.           Became hypotensive while taking Coreg, Aldactone which were discontinued. B/P/heart meds include metoprolol, lisinopril, torsemide.          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 Has become comfortable walking with prosthesis.          He has had covid and RSV vaccines.          Note that US of abdomen revealed mild non specific common bile duct dilatation, fatty liver, cholelithiasis without sonographic of cholecystitis. H/O elevated LFTs.    Current Outpatient Medications   Medication Sig Dispense Refill    Handicap Placard MISC by Does not apply route Diagnosis: PAD    Expires 7/22/28 1 each 0    blood glucose test strips (ONETOUCH ULTRA) strip Check blood sugar 3 times daily and as needed. 300 strip 3    amiodarone (CORDARONE) 200 MG tablet TAKE 1 TABLET BY MOUTH DAILY 90 tablet 0    Continuous Glucose Sensor (FREESTYLE KWESI 14 DAY SENSOR) MISC USE AS DIRECTED CHANGE EVERY 14 DAYS 6 each 3    lisinopril (PRINIVIL;ZESTRIL) 2.5 MG tablet TAKE 1 TABLET BY MOUTH EVERY DAY 90 tablet 3    ELIQUIS 5 MG TABS tablet TAKE 1 TABLET BY MOUTH TWICE DAILY 180 tablet 3    tamsulosin (FLOMAX) 0.4 MG capsule TAKE ONE CAPSULE BY MOUTH EVERY DAY 90 capsule 3    atorvastatin (LIPITOR) 40 MG tablet TAKE 1 TABLET BY MOUTH DAILY 90 tablet 3    ezetimibe (ZETIA) 10 MG tablet TAKE 1 TABLET BY MOUTH DAILY 90 tablet 3    oxyBUTYnin (DITROPAN) 5 MG tablet Take

## 2025-08-05 ENCOUNTER — HOSPITAL ENCOUNTER (OUTPATIENT)
Dept: WOUND CARE | Age: 75
Discharge: HOME | End: 2025-08-05
Attending: EMERGENCY MEDICINE
Payer: MEDICARE

## 2025-08-05 VITALS
DIASTOLIC BLOOD PRESSURE: 62 MMHG | RESPIRATION RATE: 18 BRPM | SYSTOLIC BLOOD PRESSURE: 121 MMHG | HEART RATE: 60 BPM | TEMPERATURE: 97.5 F

## 2025-08-05 DIAGNOSIS — I87.321 IDIOPATHIC CHRONIC VENOUS HYPERTENSION OF RIGHT LEG WITH INFLAMMATION: ICD-10-CM

## 2025-08-05 DIAGNOSIS — L97.413 DIABETIC ULCER OF RIGHT MIDFOOT ASSOCIATED WITH TYPE 2 DIABETES MELLITUS, WITH NECROSIS OF MUSCLE (HCC): ICD-10-CM

## 2025-08-05 DIAGNOSIS — R60.0 LOCALIZED EDEMA: ICD-10-CM

## 2025-08-05 DIAGNOSIS — I70.234 ATHEROSCLEROSIS OF NATIVE ARTERY OF RIGHT LOWER EXTREMITY WITH ULCERATION OF MIDFOOT (HCC): Primary | ICD-10-CM

## 2025-08-05 DIAGNOSIS — Z74.09 IMPAIRED MOBILITY: ICD-10-CM

## 2025-08-05 DIAGNOSIS — E11.621 DIABETIC ULCER OF RIGHT MIDFOOT ASSOCIATED WITH TYPE 2 DIABETES MELLITUS, WITH NECROSIS OF MUSCLE (HCC): ICD-10-CM

## 2025-08-05 PROCEDURE — 11042 DBRDMT SUBQ TIS 1ST 20SQCM/<: CPT

## 2025-08-05 PROCEDURE — 11042 DBRDMT SUBQ TIS 1ST 20SQCM/<: CPT | Performed by: EMERGENCY MEDICINE

## 2025-08-05 PROCEDURE — 11045 DBRDMT SUBQ TISS EACH ADDL: CPT

## 2025-08-05 PROCEDURE — 11045 DBRDMT SUBQ TISS EACH ADDL: CPT | Performed by: EMERGENCY MEDICINE

## 2025-08-05 RX ORDER — TRIAMCINOLONE ACETONIDE 1 MG/G
OINTMENT TOPICAL ONCE
OUTPATIENT
Start: 2025-08-05 | End: 2025-08-05

## 2025-08-05 RX ORDER — SILVER SULFADIAZINE 10 MG/G
CREAM TOPICAL ONCE
OUTPATIENT
Start: 2025-08-05 | End: 2025-08-05

## 2025-08-05 RX ORDER — LIDOCAINE HYDROCHLORIDE 20 MG/ML
JELLY TOPICAL ONCE
OUTPATIENT
Start: 2025-08-05 | End: 2025-08-05

## 2025-08-05 RX ORDER — NEOMYCIN/BACITRACIN/POLYMYXINB 3.5-400-5K
OINTMENT (GRAM) TOPICAL ONCE
OUTPATIENT
Start: 2025-08-05 | End: 2025-08-05

## 2025-08-05 RX ORDER — LIDOCAINE 40 MG/G
CREAM TOPICAL ONCE
Status: COMPLETED | OUTPATIENT
Start: 2025-08-05 | End: 2025-08-05

## 2025-08-05 RX ORDER — BETAMETHASONE DIPROPIONATE 0.5 MG/G
CREAM TOPICAL ONCE
OUTPATIENT
Start: 2025-08-05 | End: 2025-08-05

## 2025-08-05 RX ORDER — SODIUM HYPOCHLORITE 1.25 MG/ML
SOLUTION TOPICAL DAILY
Qty: 1 EACH | Refills: 1 | Status: SHIPPED | OUTPATIENT
Start: 2025-08-05

## 2025-08-05 RX ORDER — SODIUM CHLOR/HYPOCHLOROUS ACID 0.033 %
SOLUTION, IRRIGATION IRRIGATION ONCE
OUTPATIENT
Start: 2025-08-05 | End: 2025-08-05

## 2025-08-05 RX ORDER — GINSENG 100 MG
CAPSULE ORAL ONCE
OUTPATIENT
Start: 2025-08-05 | End: 2025-08-05

## 2025-08-05 RX ORDER — LIDOCAINE 50 MG/G
OINTMENT TOPICAL ONCE
OUTPATIENT
Start: 2025-08-05 | End: 2025-08-05

## 2025-08-05 RX ORDER — MUPIROCIN 2 %
OINTMENT (GRAM) TOPICAL ONCE
OUTPATIENT
Start: 2025-08-05 | End: 2025-08-05

## 2025-08-05 RX ORDER — LIDOCAINE 40 MG/G
CREAM TOPICAL ONCE
OUTPATIENT
Start: 2025-08-05 | End: 2025-08-05

## 2025-08-05 RX ORDER — LIDOCAINE HYDROCHLORIDE 40 MG/ML
SOLUTION TOPICAL ONCE
OUTPATIENT
Start: 2025-08-05 | End: 2025-08-05

## 2025-08-05 RX ORDER — CLOBETASOL PROPIONATE 0.5 MG/G
OINTMENT TOPICAL ONCE
OUTPATIENT
Start: 2025-08-05 | End: 2025-08-05

## 2025-08-05 RX ORDER — GENTAMICIN SULFATE 1 MG/G
OINTMENT TOPICAL ONCE
OUTPATIENT
Start: 2025-08-05 | End: 2025-08-05

## 2025-08-05 RX ORDER — BACITRACIN ZINC AND POLYMYXIN B SULFATE 500; 1000 [USP'U]/G; [USP'U]/G
OINTMENT TOPICAL ONCE
OUTPATIENT
Start: 2025-08-05 | End: 2025-08-05

## 2025-08-05 RX ADMIN — LIDOCAINE: 40 CREAM TOPICAL at 10:38

## 2025-08-05 ASSESSMENT — PAIN SCALES - GENERAL: PAINLEVEL_OUTOF10: 0

## 2025-08-12 ENCOUNTER — HOSPITAL ENCOUNTER (OUTPATIENT)
Dept: WOUND CARE | Age: 75
Discharge: HOME | End: 2025-08-12
Attending: EMERGENCY MEDICINE
Payer: MEDICARE

## 2025-08-12 VITALS
DIASTOLIC BLOOD PRESSURE: 63 MMHG | TEMPERATURE: 97.8 F | SYSTOLIC BLOOD PRESSURE: 123 MMHG | HEART RATE: 82 BPM | RESPIRATION RATE: 20 BRPM

## 2025-08-12 DIAGNOSIS — E66.9 TYPE 2 DIABETES MELLITUS WITH OBESITY (HCC): ICD-10-CM

## 2025-08-12 DIAGNOSIS — L97.413 DIABETIC ULCER OF RIGHT MIDFOOT ASSOCIATED WITH TYPE 2 DIABETES MELLITUS, WITH NECROSIS OF MUSCLE (HCC): ICD-10-CM

## 2025-08-12 DIAGNOSIS — I70.234 ATHEROSCLEROSIS OF NATIVE ARTERY OF RIGHT LOWER EXTREMITY WITH ULCERATION OF MIDFOOT (HCC): ICD-10-CM

## 2025-08-12 DIAGNOSIS — E11.69 TYPE 2 DIABETES MELLITUS WITH OBESITY (HCC): ICD-10-CM

## 2025-08-12 DIAGNOSIS — E11.621 DIABETIC ULCER OF RIGHT MIDFOOT ASSOCIATED WITH TYPE 2 DIABETES MELLITUS, WITH NECROSIS OF MUSCLE (HCC): ICD-10-CM

## 2025-08-12 DIAGNOSIS — R60.0 LOCALIZED EDEMA: Primary | ICD-10-CM

## 2025-08-12 DIAGNOSIS — I87.321 IDIOPATHIC CHRONIC VENOUS HYPERTENSION OF RIGHT LEG WITH INFLAMMATION: ICD-10-CM

## 2025-08-12 DIAGNOSIS — Z74.09 IMPAIRED MOBILITY: ICD-10-CM

## 2025-08-12 PROCEDURE — 93297 REM INTERROG DEV EVAL ICPMS: CPT | Performed by: NURSE PRACTITIONER

## 2025-08-12 PROCEDURE — 11042 DBRDMT SUBQ TIS 1ST 20SQCM/<: CPT

## 2025-08-12 PROCEDURE — 11042 DBRDMT SUBQ TIS 1ST 20SQCM/<: CPT | Performed by: EMERGENCY MEDICINE

## 2025-08-12 PROCEDURE — 29581 APPL MULTLAYER CMPRN SYS LEG: CPT

## 2025-08-12 RX ORDER — GENTAMICIN SULFATE 1 MG/G
OINTMENT TOPICAL ONCE
OUTPATIENT
Start: 2025-08-12 | End: 2025-08-12

## 2025-08-12 RX ORDER — MUPIROCIN 2 %
OINTMENT (GRAM) TOPICAL ONCE
OUTPATIENT
Start: 2025-08-12 | End: 2025-08-12

## 2025-08-12 RX ORDER — SODIUM CHLOR/HYPOCHLOROUS ACID 0.033 %
SOLUTION, IRRIGATION IRRIGATION ONCE
OUTPATIENT
Start: 2025-08-12 | End: 2025-08-12

## 2025-08-12 RX ORDER — CLOBETASOL PROPIONATE 0.5 MG/G
OINTMENT TOPICAL ONCE
OUTPATIENT
Start: 2025-08-12 | End: 2025-08-12

## 2025-08-12 RX ORDER — LIDOCAINE HYDROCHLORIDE 20 MG/ML
JELLY TOPICAL ONCE
OUTPATIENT
Start: 2025-08-12 | End: 2025-08-12

## 2025-08-12 RX ORDER — LIDOCAINE 40 MG/G
CREAM TOPICAL ONCE
Status: COMPLETED | OUTPATIENT
Start: 2025-08-12 | End: 2025-08-12

## 2025-08-12 RX ORDER — BACITRACIN ZINC AND POLYMYXIN B SULFATE 500; 1000 [USP'U]/G; [USP'U]/G
OINTMENT TOPICAL ONCE
OUTPATIENT
Start: 2025-08-12 | End: 2025-08-12

## 2025-08-12 RX ORDER — LIDOCAINE 50 MG/G
OINTMENT TOPICAL ONCE
OUTPATIENT
Start: 2025-08-12 | End: 2025-08-12

## 2025-08-12 RX ORDER — NEOMYCIN/BACITRACIN/POLYMYXINB 3.5-400-5K
OINTMENT (GRAM) TOPICAL ONCE
OUTPATIENT
Start: 2025-08-12 | End: 2025-08-12

## 2025-08-12 RX ORDER — TRIAMCINOLONE ACETONIDE 1 MG/G
OINTMENT TOPICAL ONCE
OUTPATIENT
Start: 2025-08-12 | End: 2025-08-12

## 2025-08-12 RX ORDER — LIDOCAINE HYDROCHLORIDE 40 MG/ML
SOLUTION TOPICAL ONCE
Status: CANCELLED | OUTPATIENT
Start: 2025-08-12 | End: 2025-08-12

## 2025-08-12 RX ORDER — GINSENG 100 MG
CAPSULE ORAL ONCE
OUTPATIENT
Start: 2025-08-12 | End: 2025-08-12

## 2025-08-12 RX ORDER — BETAMETHASONE DIPROPIONATE 0.5 MG/G
CREAM TOPICAL ONCE
OUTPATIENT
Start: 2025-08-12 | End: 2025-08-12

## 2025-08-12 RX ORDER — SILVER SULFADIAZINE 10 MG/G
CREAM TOPICAL ONCE
OUTPATIENT
Start: 2025-08-12 | End: 2025-08-12

## 2025-08-12 RX ORDER — LIDOCAINE 40 MG/G
CREAM TOPICAL ONCE
OUTPATIENT
Start: 2025-08-12 | End: 2025-08-12

## 2025-08-12 RX ADMIN — LIDOCAINE: 40 CREAM TOPICAL at 10:36

## 2025-08-12 ASSESSMENT — PAIN SCALES - GENERAL
PAINLEVEL_OUTOF10: 0
PAINLEVEL_OUTOF10: 0

## 2025-08-13 ENCOUNTER — OFFICE VISIT (OUTPATIENT)
Dept: ENT CLINIC | Age: 75
End: 2025-08-13

## 2025-08-13 VITALS
SYSTOLIC BLOOD PRESSURE: 128 MMHG | HEART RATE: 68 BPM | DIASTOLIC BLOOD PRESSURE: 68 MMHG | HEIGHT: 76 IN | BODY MASS INDEX: 29.58 KG/M2 | OXYGEN SATURATION: 95 %

## 2025-08-13 DIAGNOSIS — H69.93 DYSFUNCTION OF BOTH EUSTACHIAN TUBES: Primary | ICD-10-CM

## 2025-08-13 DIAGNOSIS — T70.29XD BAROTRAUMA, SUBSEQUENT ENCOUNTER: ICD-10-CM

## 2025-08-13 DIAGNOSIS — H90.3 SENSORINEURAL HEARING LOSS (SNHL) OF BOTH EARS: ICD-10-CM

## 2025-08-13 DIAGNOSIS — H93.13 TINNITUS OF BOTH EARS: ICD-10-CM

## 2025-08-13 RX ORDER — OFLOXACIN 3 MG/ML
SOLUTION AURICULAR (OTIC)
Qty: 5 ML | Refills: 0 | Status: SHIPPED | OUTPATIENT
Start: 2025-08-13

## 2025-08-15 ENCOUNTER — HOSPITAL ENCOUNTER (OUTPATIENT)
Dept: WOUND CARE | Age: 75
Discharge: HOME | End: 2025-08-15
Attending: EMERGENCY MEDICINE
Payer: MEDICARE

## 2025-08-15 VITALS
SYSTOLIC BLOOD PRESSURE: 136 MMHG | RESPIRATION RATE: 18 BRPM | HEART RATE: 79 BPM | TEMPERATURE: 98.6 F | DIASTOLIC BLOOD PRESSURE: 71 MMHG

## 2025-08-15 DIAGNOSIS — E66.9 TYPE 2 DIABETES MELLITUS WITH OBESITY (HCC): ICD-10-CM

## 2025-08-15 DIAGNOSIS — I87.321 IDIOPATHIC CHRONIC VENOUS HYPERTENSION OF RIGHT LEG WITH INFLAMMATION: ICD-10-CM

## 2025-08-15 DIAGNOSIS — L97.413 DIABETIC ULCER OF RIGHT MIDFOOT ASSOCIATED WITH TYPE 2 DIABETES MELLITUS, WITH NECROSIS OF MUSCLE (HCC): ICD-10-CM

## 2025-08-15 DIAGNOSIS — I70.234 ATHEROSCLEROSIS OF NATIVE ARTERY OF RIGHT LOWER EXTREMITY WITH ULCERATION OF MIDFOOT (HCC): ICD-10-CM

## 2025-08-15 DIAGNOSIS — R60.0 LOCALIZED EDEMA: Primary | ICD-10-CM

## 2025-08-15 DIAGNOSIS — Z74.09 IMPAIRED MOBILITY: ICD-10-CM

## 2025-08-15 DIAGNOSIS — E11.621 DIABETIC ULCER OF RIGHT MIDFOOT ASSOCIATED WITH TYPE 2 DIABETES MELLITUS, WITH NECROSIS OF MUSCLE (HCC): ICD-10-CM

## 2025-08-15 DIAGNOSIS — E11.69 TYPE 2 DIABETES MELLITUS WITH OBESITY (HCC): ICD-10-CM

## 2025-08-15 PROCEDURE — 99213 OFFICE O/P EST LOW 20 MIN: CPT

## 2025-08-15 PROCEDURE — 87186 SC STD MICRODIL/AGAR DIL: CPT

## 2025-08-15 PROCEDURE — 87077 CULTURE AEROBIC IDENTIFY: CPT

## 2025-08-15 PROCEDURE — 87075 CULTR BACTERIA EXCEPT BLOOD: CPT

## 2025-08-15 PROCEDURE — 87070 CULTURE OTHR SPECIMN AEROBIC: CPT

## 2025-08-15 PROCEDURE — 87205 SMEAR GRAM STAIN: CPT

## 2025-08-15 RX ORDER — LIDOCAINE 40 MG/G
CREAM TOPICAL ONCE
OUTPATIENT
Start: 2025-08-15 | End: 2025-08-15

## 2025-08-15 RX ORDER — LIDOCAINE HYDROCHLORIDE 40 MG/ML
SOLUTION TOPICAL ONCE
Status: COMPLETED | OUTPATIENT
Start: 2025-08-15 | End: 2025-08-15

## 2025-08-15 RX ORDER — SILVER SULFADIAZINE 10 MG/G
CREAM TOPICAL ONCE
OUTPATIENT
Start: 2025-08-15 | End: 2025-08-15

## 2025-08-15 RX ORDER — CLOBETASOL PROPIONATE 0.5 MG/G
OINTMENT TOPICAL ONCE
OUTPATIENT
Start: 2025-08-15 | End: 2025-08-15

## 2025-08-15 RX ORDER — BACITRACIN ZINC AND POLYMYXIN B SULFATE 500; 1000 [USP'U]/G; [USP'U]/G
OINTMENT TOPICAL ONCE
OUTPATIENT
Start: 2025-08-15 | End: 2025-08-15

## 2025-08-15 RX ORDER — LIDOCAINE HYDROCHLORIDE 20 MG/ML
JELLY TOPICAL ONCE
OUTPATIENT
Start: 2025-08-15 | End: 2025-08-15

## 2025-08-15 RX ORDER — GENTAMICIN SULFATE 1 MG/G
OINTMENT TOPICAL ONCE
OUTPATIENT
Start: 2025-08-15 | End: 2025-08-15

## 2025-08-15 RX ORDER — LIDOCAINE 50 MG/G
OINTMENT TOPICAL ONCE
OUTPATIENT
Start: 2025-08-15 | End: 2025-08-15

## 2025-08-15 RX ORDER — GINSENG 100 MG
CAPSULE ORAL ONCE
OUTPATIENT
Start: 2025-08-15 | End: 2025-08-15

## 2025-08-15 RX ORDER — MUPIROCIN 2 %
OINTMENT (GRAM) TOPICAL ONCE
OUTPATIENT
Start: 2025-08-15 | End: 2025-08-15

## 2025-08-15 RX ORDER — TRIAMCINOLONE ACETONIDE 1 MG/G
OINTMENT TOPICAL ONCE
OUTPATIENT
Start: 2025-08-15 | End: 2025-08-15

## 2025-08-15 RX ORDER — SODIUM CHLOR/HYPOCHLOROUS ACID 0.033 %
SOLUTION, IRRIGATION IRRIGATION ONCE
OUTPATIENT
Start: 2025-08-15 | End: 2025-08-15

## 2025-08-15 RX ORDER — BETAMETHASONE DIPROPIONATE 0.5 MG/G
CREAM TOPICAL ONCE
OUTPATIENT
Start: 2025-08-15 | End: 2025-08-15

## 2025-08-15 RX ORDER — NEOMYCIN/BACITRACIN/POLYMYXINB 3.5-400-5K
OINTMENT (GRAM) TOPICAL ONCE
OUTPATIENT
Start: 2025-08-15 | End: 2025-08-15

## 2025-08-15 RX ORDER — LIDOCAINE HYDROCHLORIDE 40 MG/ML
SOLUTION TOPICAL ONCE
OUTPATIENT
Start: 2025-08-15 | End: 2025-08-15

## 2025-08-15 RX ADMIN — LIDOCAINE HYDROCHLORIDE: 40 SOLUTION TOPICAL at 10:40

## 2025-08-15 ASSESSMENT — PAIN SCALES - GENERAL
PAINLEVEL_OUTOF10: 0
PAINLEVEL_OUTOF10: 0

## 2025-08-17 LAB
BACTERIA SPEC AEROBE CULT: ABNORMAL
BACTERIA SPEC AEROBE CULT: ABNORMAL
BACTERIA SPEC ANAEROBE CULT: ABNORMAL
GRAM STN SPEC: ABNORMAL
ORGANISM: ABNORMAL
ORGANISM: ABNORMAL

## 2025-08-18 ENCOUNTER — TELEPHONE (OUTPATIENT)
Dept: WOUND CARE | Age: 75
End: 2025-08-18

## 2025-08-19 ENCOUNTER — HOSPITAL ENCOUNTER (OUTPATIENT)
Dept: WOUND CARE | Age: 75
Discharge: HOME | End: 2025-08-19
Attending: EMERGENCY MEDICINE
Payer: MEDICARE

## 2025-08-19 VITALS
DIASTOLIC BLOOD PRESSURE: 61 MMHG | RESPIRATION RATE: 22 BRPM | SYSTOLIC BLOOD PRESSURE: 134 MMHG | HEART RATE: 68 BPM | TEMPERATURE: 97.8 F

## 2025-08-19 DIAGNOSIS — E66.9 TYPE 2 DIABETES MELLITUS WITH OBESITY (HCC): ICD-10-CM

## 2025-08-19 DIAGNOSIS — E11.69 TYPE 2 DIABETES MELLITUS WITH OBESITY (HCC): ICD-10-CM

## 2025-08-19 DIAGNOSIS — L97.413 DIABETIC ULCER OF RIGHT MIDFOOT ASSOCIATED WITH TYPE 2 DIABETES MELLITUS, WITH NECROSIS OF MUSCLE (HCC): Primary | ICD-10-CM

## 2025-08-19 DIAGNOSIS — I87.321 IDIOPATHIC CHRONIC VENOUS HYPERTENSION OF RIGHT LEG WITH INFLAMMATION: ICD-10-CM

## 2025-08-19 DIAGNOSIS — E11.621 DIABETIC ULCER OF RIGHT MIDFOOT ASSOCIATED WITH TYPE 2 DIABETES MELLITUS, WITH NECROSIS OF MUSCLE (HCC): Primary | ICD-10-CM

## 2025-08-19 DIAGNOSIS — Z74.09 IMPAIRED MOBILITY: ICD-10-CM

## 2025-08-19 DIAGNOSIS — I70.234 ATHEROSCLEROSIS OF NATIVE ARTERY OF RIGHT LOWER EXTREMITY WITH ULCERATION OF MIDFOOT (HCC): ICD-10-CM

## 2025-08-19 DIAGNOSIS — R60.0 LOCALIZED EDEMA: ICD-10-CM

## 2025-08-19 PROCEDURE — 11042 DBRDMT SUBQ TIS 1ST 20SQCM/<: CPT

## 2025-08-19 PROCEDURE — 11042 DBRDMT SUBQ TIS 1ST 20SQCM/<: CPT | Performed by: EMERGENCY MEDICINE

## 2025-08-19 RX ORDER — LIDOCAINE HYDROCHLORIDE 20 MG/ML
JELLY TOPICAL ONCE
OUTPATIENT
Start: 2025-08-19 | End: 2025-08-19

## 2025-08-19 RX ORDER — LIDOCAINE HYDROCHLORIDE 40 MG/ML
SOLUTION TOPICAL ONCE
Status: COMPLETED | OUTPATIENT
Start: 2025-08-19 | End: 2025-08-19

## 2025-08-19 RX ORDER — NEOMYCIN/BACITRACIN/POLYMYXINB 3.5-400-5K
OINTMENT (GRAM) TOPICAL ONCE
OUTPATIENT
Start: 2025-08-19 | End: 2025-08-19

## 2025-08-19 RX ORDER — SILVER SULFADIAZINE 10 MG/G
CREAM TOPICAL ONCE
OUTPATIENT
Start: 2025-08-19 | End: 2025-08-19

## 2025-08-19 RX ORDER — LIDOCAINE HYDROCHLORIDE 40 MG/ML
SOLUTION TOPICAL ONCE
OUTPATIENT
Start: 2025-08-19 | End: 2025-08-19

## 2025-08-19 RX ORDER — LIDOCAINE 50 MG/G
OINTMENT TOPICAL ONCE
OUTPATIENT
Start: 2025-08-19 | End: 2025-08-19

## 2025-08-19 RX ORDER — GENTAMICIN SULFATE 1 MG/G
OINTMENT TOPICAL ONCE
OUTPATIENT
Start: 2025-08-19 | End: 2025-08-19

## 2025-08-19 RX ORDER — CLOBETASOL PROPIONATE 0.5 MG/G
OINTMENT TOPICAL ONCE
OUTPATIENT
Start: 2025-08-19 | End: 2025-08-19

## 2025-08-19 RX ORDER — BACITRACIN ZINC AND POLYMYXIN B SULFATE 500; 1000 [USP'U]/G; [USP'U]/G
OINTMENT TOPICAL ONCE
OUTPATIENT
Start: 2025-08-19 | End: 2025-08-19

## 2025-08-19 RX ORDER — TRIAMCINOLONE ACETONIDE 1 MG/G
OINTMENT TOPICAL ONCE
OUTPATIENT
Start: 2025-08-19 | End: 2025-08-19

## 2025-08-19 RX ORDER — MUPIROCIN 2 %
OINTMENT (GRAM) TOPICAL ONCE
OUTPATIENT
Start: 2025-08-19 | End: 2025-08-19

## 2025-08-19 RX ORDER — SODIUM CHLOR/HYPOCHLOROUS ACID 0.033 %
SOLUTION, IRRIGATION IRRIGATION ONCE
OUTPATIENT
Start: 2025-08-19 | End: 2025-08-19

## 2025-08-19 RX ORDER — LIDOCAINE 40 MG/G
CREAM TOPICAL ONCE
OUTPATIENT
Start: 2025-08-19 | End: 2025-08-19

## 2025-08-19 RX ORDER — BETAMETHASONE DIPROPIONATE 0.5 MG/G
CREAM TOPICAL ONCE
OUTPATIENT
Start: 2025-08-19 | End: 2025-08-19

## 2025-08-19 RX ORDER — GINSENG 100 MG
CAPSULE ORAL ONCE
OUTPATIENT
Start: 2025-08-19 | End: 2025-08-19

## 2025-08-19 RX ADMIN — LIDOCAINE HYDROCHLORIDE: 40 SOLUTION TOPICAL at 12:02

## 2025-08-19 ASSESSMENT — PAIN DESCRIPTION - LOCATION: LOCATION: ANKLE

## 2025-08-19 ASSESSMENT — PAIN DESCRIPTION - PAIN TYPE: TYPE: ACUTE PAIN

## 2025-08-19 ASSESSMENT — PAIN DESCRIPTION - FREQUENCY: FREQUENCY: INTERMITTENT

## 2025-08-19 ASSESSMENT — PAIN SCALES - GENERAL: PAINLEVEL_OUTOF10: 3

## 2025-08-19 ASSESSMENT — PAIN DESCRIPTION - ORIENTATION: ORIENTATION: RIGHT

## 2025-08-19 ASSESSMENT — PAIN DESCRIPTION - DESCRIPTORS: DESCRIPTORS: ACHING

## 2025-08-20 ENCOUNTER — TELEPHONE (OUTPATIENT)
Dept: INFECTIOUS DISEASES | Age: 75
End: 2025-08-20

## 2025-08-20 DIAGNOSIS — H91.90 HEARING LOSS, UNSPECIFIED HEARING LOSS TYPE, UNSPECIFIED LATERALITY: Primary | ICD-10-CM

## 2025-08-20 LAB
BACTERIA SPEC AEROBE CULT: ABNORMAL
BACTERIA SPEC AEROBE CULT: ABNORMAL
BACTERIA SPEC ANAEROBE CULT: ABNORMAL
BACTERIA SPEC ANAEROBE CULT: ABNORMAL
GRAM STN SPEC: ABNORMAL
ORGANISM: ABNORMAL

## 2025-08-22 ENCOUNTER — TELEPHONE (OUTPATIENT)
Dept: WOUND CARE | Age: 75
End: 2025-08-22

## 2025-08-22 RX ORDER — LEVOFLOXACIN 500 MG/1
500 TABLET, FILM COATED ORAL DAILY
Qty: 21 TABLET | Refills: 0 | Status: SHIPPED | OUTPATIENT
Start: 2025-08-22 | End: 2025-09-12

## 2025-08-22 RX ORDER — METRONIDAZOLE 500 MG/1
500 TABLET ORAL 3 TIMES DAILY
Qty: 63 TABLET | Refills: 0 | Status: SHIPPED | OUTPATIENT
Start: 2025-08-22 | End: 2025-09-12

## 2025-08-26 ENCOUNTER — HOSPITAL ENCOUNTER (OUTPATIENT)
Dept: WOUND CARE | Age: 75
Discharge: HOME | End: 2025-08-26
Attending: EMERGENCY MEDICINE
Payer: MEDICARE

## 2025-08-26 VITALS
DIASTOLIC BLOOD PRESSURE: 53 MMHG | SYSTOLIC BLOOD PRESSURE: 122 MMHG | RESPIRATION RATE: 20 BRPM | TEMPERATURE: 97.9 F | HEART RATE: 66 BPM

## 2025-08-26 DIAGNOSIS — I87.321 IDIOPATHIC CHRONIC VENOUS HYPERTENSION OF RIGHT LEG WITH INFLAMMATION: ICD-10-CM

## 2025-08-26 DIAGNOSIS — R60.0 LOCALIZED EDEMA: Primary | ICD-10-CM

## 2025-08-26 DIAGNOSIS — E11.621 DIABETIC ULCER OF RIGHT MIDFOOT ASSOCIATED WITH TYPE 2 DIABETES MELLITUS, WITH NECROSIS OF MUSCLE (HCC): ICD-10-CM

## 2025-08-26 DIAGNOSIS — E11.69 TYPE 2 DIABETES MELLITUS WITH OBESITY (HCC): ICD-10-CM

## 2025-08-26 DIAGNOSIS — L97.413 DIABETIC ULCER OF RIGHT MIDFOOT ASSOCIATED WITH TYPE 2 DIABETES MELLITUS, WITH NECROSIS OF MUSCLE (HCC): ICD-10-CM

## 2025-08-26 DIAGNOSIS — Z74.09 IMPAIRED MOBILITY: ICD-10-CM

## 2025-08-26 DIAGNOSIS — E66.9 TYPE 2 DIABETES MELLITUS WITH OBESITY (HCC): ICD-10-CM

## 2025-08-26 DIAGNOSIS — I70.234 ATHEROSCLEROSIS OF NATIVE ARTERY OF RIGHT LOWER EXTREMITY WITH ULCERATION OF MIDFOOT (HCC): ICD-10-CM

## 2025-08-26 PROCEDURE — 11042 DBRDMT SUBQ TIS 1ST 20SQCM/<: CPT | Performed by: EMERGENCY MEDICINE

## 2025-08-26 PROCEDURE — 11042 DBRDMT SUBQ TIS 1ST 20SQCM/<: CPT

## 2025-08-26 RX ORDER — CLOBETASOL PROPIONATE 0.5 MG/G
OINTMENT TOPICAL ONCE
OUTPATIENT
Start: 2025-08-26 | End: 2025-08-26

## 2025-08-26 RX ORDER — LIDOCAINE HYDROCHLORIDE 40 MG/ML
SOLUTION TOPICAL ONCE
OUTPATIENT
Start: 2025-08-26 | End: 2025-08-26

## 2025-08-26 RX ORDER — LIDOCAINE 40 MG/G
CREAM TOPICAL ONCE
Status: COMPLETED | OUTPATIENT
Start: 2025-08-26 | End: 2025-08-26

## 2025-08-26 RX ORDER — LIDOCAINE HYDROCHLORIDE 20 MG/ML
JELLY TOPICAL ONCE
OUTPATIENT
Start: 2025-08-26 | End: 2025-08-26

## 2025-08-26 RX ORDER — NEOMYCIN/BACITRACIN/POLYMYXINB 3.5-400-5K
OINTMENT (GRAM) TOPICAL ONCE
OUTPATIENT
Start: 2025-08-26 | End: 2025-08-26

## 2025-08-26 RX ORDER — TRIAMCINOLONE ACETONIDE 1 MG/G
OINTMENT TOPICAL ONCE
OUTPATIENT
Start: 2025-08-26 | End: 2025-08-26

## 2025-08-26 RX ORDER — LIDOCAINE 40 MG/G
CREAM TOPICAL ONCE
OUTPATIENT
Start: 2025-08-26 | End: 2025-08-26

## 2025-08-26 RX ORDER — GINSENG 100 MG
CAPSULE ORAL ONCE
OUTPATIENT
Start: 2025-08-26 | End: 2025-08-26

## 2025-08-26 RX ORDER — SILVER SULFADIAZINE 10 MG/G
CREAM TOPICAL ONCE
OUTPATIENT
Start: 2025-08-26 | End: 2025-08-26

## 2025-08-26 RX ORDER — MUPIROCIN 2 %
OINTMENT (GRAM) TOPICAL ONCE
OUTPATIENT
Start: 2025-08-26 | End: 2025-08-26

## 2025-08-26 RX ORDER — SODIUM CHLOR/HYPOCHLOROUS ACID 0.033 %
SOLUTION, IRRIGATION IRRIGATION ONCE
OUTPATIENT
Start: 2025-08-26 | End: 2025-08-26

## 2025-08-26 RX ORDER — BACITRACIN ZINC AND POLYMYXIN B SULFATE 500; 1000 [USP'U]/G; [USP'U]/G
OINTMENT TOPICAL ONCE
OUTPATIENT
Start: 2025-08-26 | End: 2025-08-26

## 2025-08-26 RX ORDER — BETAMETHASONE DIPROPIONATE 0.5 MG/G
CREAM TOPICAL ONCE
OUTPATIENT
Start: 2025-08-26 | End: 2025-08-26

## 2025-08-26 RX ORDER — LIDOCAINE 50 MG/G
OINTMENT TOPICAL ONCE
OUTPATIENT
Start: 2025-08-26 | End: 2025-08-26

## 2025-08-26 RX ORDER — GENTAMICIN SULFATE 1 MG/G
OINTMENT TOPICAL ONCE
OUTPATIENT
Start: 2025-08-26 | End: 2025-08-26

## 2025-08-26 RX ADMIN — LIDOCAINE: 40 CREAM TOPICAL at 10:37

## 2025-08-26 ASSESSMENT — PAIN SCALES - GENERAL
PAINLEVEL_OUTOF10: 0
PAINLEVEL_OUTOF10: 0

## 2025-08-27 ENCOUNTER — OFFICE VISIT (OUTPATIENT)
Dept: ENT CLINIC | Age: 75
End: 2025-08-27
Payer: MEDICARE

## 2025-08-27 ENCOUNTER — PROCEDURE VISIT (OUTPATIENT)
Dept: AUDIOLOGY | Age: 75
End: 2025-08-27
Payer: MEDICARE

## 2025-08-27 VITALS
WEIGHT: 249 LBS | HEART RATE: 75 BPM | BODY MASS INDEX: 30.32 KG/M2 | SYSTOLIC BLOOD PRESSURE: 159 MMHG | OXYGEN SATURATION: 98 % | DIASTOLIC BLOOD PRESSURE: 88 MMHG | HEIGHT: 76 IN

## 2025-08-27 DIAGNOSIS — E11.8 TYPE 2 DIABETES MELLITUS WITH COMPLICATION, WITH LONG-TERM CURRENT USE OF INSULIN (HCC): ICD-10-CM

## 2025-08-27 DIAGNOSIS — H93.13 TINNITUS OF BOTH EARS: ICD-10-CM

## 2025-08-27 DIAGNOSIS — H90.A22 SENSORINEURAL HEARING LOSS (SNHL) OF LEFT EAR WITH RESTRICTED HEARING OF RIGHT EAR: Primary | ICD-10-CM

## 2025-08-27 DIAGNOSIS — H90.A31 MIXED CONDUCTIVE AND SENSORINEURAL HEARING LOSS OF RIGHT EAR WITH RESTRICTED HEARING OF LEFT EAR: ICD-10-CM

## 2025-08-27 DIAGNOSIS — E61.8 MINERAL DEFICIENCY: ICD-10-CM

## 2025-08-27 DIAGNOSIS — T70.29XD BAROTRAUMA, SUBSEQUENT ENCOUNTER: Primary | ICD-10-CM

## 2025-08-27 DIAGNOSIS — Z79.4 TYPE 2 DIABETES MELLITUS WITH COMPLICATION, WITH LONG-TERM CURRENT USE OF INSULIN (HCC): ICD-10-CM

## 2025-08-27 PROCEDURE — 1159F MED LIST DOCD IN RCRD: CPT | Performed by: STUDENT IN AN ORGANIZED HEALTH CARE EDUCATION/TRAINING PROGRAM

## 2025-08-27 PROCEDURE — 99213 OFFICE O/P EST LOW 20 MIN: CPT | Performed by: STUDENT IN AN ORGANIZED HEALTH CARE EDUCATION/TRAINING PROGRAM

## 2025-08-27 PROCEDURE — 92567 TYMPANOMETRY: CPT

## 2025-08-27 PROCEDURE — 1123F ACP DISCUSS/DSCN MKR DOCD: CPT | Performed by: STUDENT IN AN ORGANIZED HEALTH CARE EDUCATION/TRAINING PROGRAM

## 2025-08-27 PROCEDURE — 3079F DIAST BP 80-89 MM HG: CPT | Performed by: STUDENT IN AN ORGANIZED HEALTH CARE EDUCATION/TRAINING PROGRAM

## 2025-08-27 PROCEDURE — 92557 COMPREHENSIVE HEARING TEST: CPT

## 2025-08-27 PROCEDURE — 3077F SYST BP >= 140 MM HG: CPT | Performed by: STUDENT IN AN ORGANIZED HEALTH CARE EDUCATION/TRAINING PROGRAM

## 2025-08-27 RX ORDER — OFLOXACIN 3 MG/ML
5 SOLUTION AURICULAR (OTIC) 2 TIMES DAILY
Qty: 10 ML | Refills: 0 | Status: SHIPPED | OUTPATIENT
Start: 2025-08-27 | End: 2025-09-06

## 2025-08-28 LAB
ALBUMIN SERPL-MCNC: 3.7 G/DL (ref 3.4–5)
ALBUMIN/GLOB SERPL: 1.1 {RATIO} (ref 1.1–2.2)
ALP SERPL-CCNC: 102 U/L (ref 40–129)
ALT SERPL-CCNC: 38 U/L (ref 10–40)
ANA SER QL IA: NEGATIVE
ANION GAP SERPL CALCULATED.3IONS-SCNC: 10 MMOL/L (ref 3–16)
AST SERPL-CCNC: 33 U/L (ref 15–37)
BASOPHILS # BLD: 0 K/UL (ref 0–0.2)
BASOPHILS NFR BLD: 0.8 %
BILIRUB SERPL-MCNC: 0.4 MG/DL (ref 0–1)
BUN SERPL-MCNC: 21 MG/DL (ref 7–20)
CALCIUM SERPL-MCNC: 8.7 MG/DL (ref 8.3–10.6)
CHLORIDE SERPL-SCNC: 105 MMOL/L (ref 99–110)
CO2 SERPL-SCNC: 25 MMOL/L (ref 21–32)
CREAT SERPL-MCNC: 1.4 MG/DL (ref 0.8–1.3)
DEPRECATED RDW RBC AUTO: 15.4 % (ref 12.4–15.4)
EOSINOPHIL # BLD: 0.1 K/UL (ref 0–0.6)
EOSINOPHIL NFR BLD: 3.7 %
FERRITIN SERPL IA-MCNC: 24.7 NG/ML (ref 30–400)
GFR SERPLBLD CREATININE-BSD FMLA CKD-EPI: 53 ML/MIN/{1.73_M2}
GLUCOSE SERPL-MCNC: 76 MG/DL (ref 70–99)
HBV SURFACE AG SERPL QL IA: NORMAL
HCT VFR BLD AUTO: 39.8 % (ref 40.5–52.5)
HGB BLD-MCNC: 12.8 G/DL (ref 13.5–17.5)
IRON SATN MFR SERPL: 25 % (ref 20–50)
IRON SERPL-MCNC: 74 UG/DL (ref 59–158)
LYMPHOCYTES # BLD: 1.1 K/UL (ref 1–5.1)
LYMPHOCYTES NFR BLD: 34 %
MCH RBC QN AUTO: 27.8 PG (ref 26–34)
MCHC RBC AUTO-ENTMCNC: 32.2 G/DL (ref 31–36)
MCV RBC AUTO: 86.3 FL (ref 80–100)
MONOCYTES # BLD: 0.4 K/UL (ref 0–1.3)
MONOCYTES NFR BLD: 11.1 %
NEUTROPHILS # BLD: 1.6 K/UL (ref 1.7–7.7)
NEUTROPHILS NFR BLD: 50.4 %
PLATELET # BLD AUTO: 173 K/UL (ref 135–450)
PMV BLD AUTO: 10.3 FL (ref 5–10.5)
POTASSIUM SERPL-SCNC: 4.7 MMOL/L (ref 3.5–5.1)
PROT SERPL-MCNC: 7 G/DL (ref 6.4–8.2)
RBC # BLD AUTO: 4.61 M/UL (ref 4.2–5.9)
SODIUM SERPL-SCNC: 140 MMOL/L (ref 136–145)
TIBC SERPL-MCNC: 299 UG/DL (ref 260–445)
WBC # BLD AUTO: 3.2 K/UL (ref 4–11)

## 2025-08-29 LAB
CERULOPLASMIN SERPL-MCNC: 19 MG/DL (ref 15–30)
FRUCTOSAMINE SERPL-SCNC: 324 UMOL/L (ref 205–285)
HAV AB SER QL IA: POSITIVE
ZINC SERPL-MCNC: 49.5 UG/DL (ref 60–120)

## 2025-08-30 LAB
SMA IGG SER-ACNC: 56 UNITS (ref 0–19)
SMOOTH MUSCLE IGG TITR SER: ABNORMAL {TITER}

## 2025-09-01 LAB
A1AT PHENOTYP SERPL-IMP: NORMAL
A1AT SERPL-MCNC: 141 MG/DL (ref 90–200)

## 2025-09-07 RX ORDER — DAPAGLIFLOZIN 5 MG/1
5 TABLET, FILM COATED ORAL EVERY MORNING
Qty: 90 TABLET | Refills: 1 | Status: SHIPPED | OUTPATIENT
Start: 2025-09-07

## 2025-09-07 RX ORDER — FINASTERIDE 5 MG/1
5 TABLET, FILM COATED ORAL DAILY
Qty: 90 TABLET | Refills: 0 | Status: SHIPPED | OUTPATIENT
Start: 2025-09-07

## (undated) DEVICE — Device: Brand: MEDICAL ACTION INDUSTRIES

## (undated) DEVICE — TUBING, SUCTION, 1/4" X 12', STRAIGHT: Brand: MEDLINE

## (undated) DEVICE — GOWN SIRUS NONREIN XL W/TWL: Brand: MEDLINE INDUSTRIES, INC.

## (undated) DEVICE — 3M™ COBAN™ NL STERILE NON-LATEX SELF-ADHERENT WRAP, 2084S, 4 IN X 5 YD (10 CM X 4,5 M), 18 ROLLS/CASE: Brand: 3M™ COBAN™

## (undated) DEVICE — TRAP SPEC RETRV CLR PLAS POLYP IN LN SUCT QUIK CTCH

## (undated) DEVICE — INTRODUCER SHTH 0.018 IN 4 FRX40 CM KT SFT TIP NIT SS VSI

## (undated) DEVICE — BANDAGE COMPR W4INXL15FT BGE E SGL LAYERED CLP CLSR

## (undated) DEVICE — SOLUTION IV 1000ML 0.9% SOD CHL

## (undated) DEVICE — COVER LT HNDL BLU PLAS

## (undated) DEVICE — ARMADA 14 PTA CATHETER 4.0 MM X 120 MM X 150 CM / OVER-THE-WIRE: Brand: ARMADA

## (undated) DEVICE — HANDPIECE SET WITH HIGH FLOW TIP AND SUCTION TUBE: Brand: INTERPULSE

## (undated) DEVICE — Device

## (undated) DEVICE — DRESSING,GAUZE,XEROFORM,CURAD,1"X8",ST: Brand: CURAD

## (undated) DEVICE — TOWEL,OR,DSP,ST,BLUE,STD,4/PK,20PK/CS: Brand: MEDLINE

## (undated) DEVICE — MERCY HEALTH WEST TURNOVER: Brand: MEDLINE INDUSTRIES, INC.

## (undated) DEVICE — SUTURE ETHLN SZ 3-0 L18IN NONABSORBABLE BLK FS-1 L24MM 3/8 663H

## (undated) DEVICE — RADIFOCUS GLIDEWIRE ADVANTAGE GUIDEWIRE: Brand: GLIDEWIRE ADVANTAGE

## (undated) DEVICE — SPONGE LAP W18XL18IN WHT COT 4 PLY FLD STRUNG RADPQ DISP ST 2 PER PACK

## (undated) DEVICE — MAJOR SET UP PK

## (undated) DEVICE — PLATE ES AD W 9FT CRD 2

## (undated) DEVICE — HANDPIECE SET WITH SUCTION TUBING: Brand: INTERPULSE

## (undated) DEVICE — Z DISCONTINUED USE 2275686 GLOVE SURG SZ 8 L12IN FNGR THK13MIL WHT ISOLEX POLYISOPRENE

## (undated) DEVICE — DRESSING ALG W3XL4IN TRNSPAR ANTIMIC WND CNTCT LAYR W/

## (undated) DEVICE — SUTURE VCRL SZ 3-0 L27IN ABSRB UD L26MM CT-2 1/2 CIR J232H

## (undated) DEVICE — MASTISOL ADHESIVE LIQ 2/3ML

## (undated) DEVICE — SYRINGE,EAR/ULCER, 2 OZ, STERILE: Brand: MEDLINE

## (undated) DEVICE — SUTURE VICRYL + SZ 2-0 L18IN ABSRB UD CT1 L36MM 1/2 CIR VCP839D

## (undated) DEVICE — GLOVE SURG SZ 75 L12IN FNGR THK94MIL WHT POLYMER LTX BEAD

## (undated) DEVICE — DRAPE,U/SHT,SPLIT,FILM,60X84,STERILE: Brand: MEDLINE

## (undated) DEVICE — STOCKINETTE,IMPERVIOUS,12X48,STERILE: Brand: MEDLINE

## (undated) DEVICE — ELECTRODE PT RET AD L9FT HI MOIST COND ADH HYDRGEL CORDED

## (undated) DEVICE — GLOVE SURG 9 PF CRM STRL SENSICARE PI MIC LF

## (undated) DEVICE — SUTURE VCRL SZ 0 L18IN ABSRB UD L36MM CT-1 1/2 CIR J840D

## (undated) DEVICE — SOLUTION PREP PAINT POV IOD FOR SKIN MUCOUS MEM

## (undated) DEVICE — PREMIUM DRY TRAY LF: Brand: MEDLINE INDUSTRIES, INC.

## (undated) DEVICE — GLOVE SURG SZ 6 L12IN FNGR THK87MIL DK GRN LTX FREE ISOLEX

## (undated) DEVICE — SUTURE VICRYL + SZ 3-0 L18IN CT 1 CR ABSRB VCP838D

## (undated) DEVICE — BANDAGE COMPR W4INXL12FT E DISP ESMARCH EVEN

## (undated) DEVICE — GOWN SIRUS NONREIN LG W/TWL: Brand: MEDLINE INDUSTRIES, INC.

## (undated) DEVICE — DRESSING,GAUZE,XEROFORM,CURAD,5"X9",ST: Brand: CURAD

## (undated) DEVICE — BANDAGE COMPR W6INXL15FT BGE E SGL LAYERED CLP CLSR

## (undated) DEVICE — T-DRAPE,EXTREMITY,STERILE: Brand: MEDLINE

## (undated) DEVICE — SOLUTION IV IRRIG POUR BRL 0.9% SODIUM CHL 2F7124

## (undated) DEVICE — INTENDED FOR TISSUE SEPARATION, AND OTHER PROCEDURES THAT REQUIRE A SHARP SURGICAL BLADE TO PUNCTURE OR CUT.: Brand: BARD-PARKER ® CARBON RIB-BACK BLADES

## (undated) DEVICE — 3M™ IOBAN™ 2 ANTIMICROBIAL INCISE DRAPE 6640EZ: Brand: IOBAN™ 2

## (undated) DEVICE — GLOVE SURG SZ 8 L12IN FNGR THK87MIL WHT LTX FREE

## (undated) DEVICE — 6 ML SYRINGE LUER-LOCK TIP: Brand: MONOJECT

## (undated) DEVICE — PODIATRY: Brand: MEDLINE INDUSTRIES, INC.

## (undated) DEVICE — FORCEPS BX L240CM DIA2.4MM L NDL RAD JAW 4 133340

## (undated) DEVICE — DESTINATION PERIPHERAL GUIDING SHEATH: Brand: DESTINATION

## (undated) DEVICE — SUTURE VICRYL + SZ 2-0 L27IN ABSRB WHT SH 1/2 CIR TAPERCUT VCP417H

## (undated) DEVICE — SOLUTION IRRIG 3000ML 0.9% SOD CHL USP UROMATIC PLAS CONT

## (undated) DEVICE — HI-TORQUE COMMAND ES GUIDE WIRE .014" 300 CM: Brand: HI-TORQUE COMMAND

## (undated) DEVICE — DRESSING PETRO W3XL3IN OIL EMUL N ADH GZ KNIT IMPREG CELOS

## (undated) DEVICE — ZIMMER® STERILE DISPOSABLE TOURNIQUET CUFF WITH PLC, DUAL PORT, SINGLE BLADDER, 18 IN. (46 CM)

## (undated) DEVICE — PADDING UNDERCAST W4INXL4YD 100% COT CRIMPED FINISH WBRL II

## (undated) DEVICE — SINGLE-USE POLYPECTOMY SNARE: Brand: CAPTIVATOR

## (undated) DEVICE — STRIP,CLOSURE,WOUND,MEDI-STRIP,1/2X4: Brand: MEDLINE

## (undated) DEVICE — SOLUTION SCRB 4OZ 7.5% POVIDONE IOD ANTIMIC BTL

## (undated) DEVICE — BANDAGE COMPR W4INXL10YD WHITE/BEIGE E MTRX HK LOOP CLSR

## (undated) DEVICE — GLOVE,SURG,SENSICARE SLT,LF,PF,8: Brand: MEDLINE

## (undated) DEVICE — SYRINGE MED 10ML LUERLOCK TIP W/O SFTY DISP

## (undated) DEVICE — STANDARD HYPODERMIC NEEDLE,POLYPROPYLENE HUB: Brand: MONOJECT

## (undated) DEVICE — GLOVE,SURG,SENSICARE SLT,LF,PF,7.5: Brand: MEDLINE

## (undated) DEVICE — SPECIMEN COLLECTION 4-PORT MANIFOLD: Brand: NEPTUNE 2

## (undated) DEVICE — PAD,ABDOMINAL,8"X7.5",STERILE,LF,1/PK: Brand: MEDLINE

## (undated) DEVICE — PAD,NON-ADHERENT,3X8,STERILE,LF,1/PK: Brand: MEDLINE

## (undated) DEVICE — CATHETER ANGIO 5FR L65CM 0.035IN VIS OMNI FLUSH-0 SFT TIP

## (undated) DEVICE — CANISTER NEG PRSS 500ML TBNG CLMP CONN W/O GEL DISP INFOVAC

## (undated) DEVICE — Z DISCONTINUED NO SUB IDED GLOVE SURG BEAD CUF 8 STD PF WHT STRL TRIUMPH LT LTX

## (undated) DEVICE — PODIATRY PK

## (undated) DEVICE — E-Z CLEAN, NON-STICK, PTFE COATED, ELECTROSURGICAL BLADE ELECTRODE, 2.5 INCH (6.35 CM): Brand: EZ CLEAN

## (undated) DEVICE — ARMADA 14 PTA CATHETER 3.0 MM X 120 MM X 150 CM / OVER-THE-WIRE: Brand: ARMADA

## (undated) DEVICE — BANDAGE GZ W2XL75IN ST RAYON POLY CNFRM STRTCH LTWT

## (undated) DEVICE — SUTURE VICRYL + SZ 0 L27IN ABSRB WHT CT-2 1/2 CIR TAPERPOINT VCP270H

## (undated) DEVICE — GLOVE SURG SZ 6 L12IN FNGR THK87MIL WHT LTX FREE

## (undated) DEVICE — SPONGE GZ W4XL4IN COT 12 PLY TYP VII WVN C FLD DSGN

## (undated) DEVICE — BASIC SINGLE BASIN 1-LF: Brand: MEDLINE INDUSTRIES, INC.

## (undated) DEVICE — JEWISH HOSPITAL TURNOVER KIT: Brand: MEDLINE INDUSTRIES, INC.

## (undated) DEVICE — SUTURE VCRL SZ 3-0 L18IN ABSRB UD PS-2 L19MM 1/2 CIR J497G

## (undated) DEVICE — Z DISCONTINUED BY MEDLINE USE 2711682 TRAY SKIN PREP DRY W/ PREM GLV

## (undated) DEVICE — GUIDEWIRE VASC L150CM DIA0.035IN FLX END L7CM J 3MM PTFE

## (undated) DEVICE — PINNACLE INTRODUCER SHEATH: Brand: PINNACLE

## (undated) DEVICE — GLOVE SURG SZ 85 CRM LTX FREE POLYISOPRENE POLYMER BEAD ANTI

## (undated) DEVICE — SIPS DUAL 2 MINUTE TIP

## (undated) DEVICE — GOWN,SIRUS,POLYRNF,BRTHSLV,XL,30/CS: Brand: MEDLINE

## (undated) DEVICE — KIT OR ROOM TURNOVER W/STRAP

## (undated) DEVICE — DRESSING WND VAC MED GRANUFOAM SENSATRAC

## (undated) DEVICE — SHEET,DRAPE,53X77,STERILE: Brand: MEDLINE

## (undated) DEVICE — PADDING,UNDERCAST,COTTON, 4"X4YD STERILE: Brand: MEDLINE

## (undated) DEVICE — SUTURE VCRL SZ 2-0 L18IN ABSRB UD CT-1 L36MM 1/2 CIR J839D

## (undated) DEVICE — INTENDED FOR TISSUE SEPARATION, AND OTHER PROCEDURES THAT REQUIRE A SHARP SURGICAL BLADE TO PUNCTURE OR CUT.: Brand: BARD-PARKER ® STAINLESS STEEL BLADES

## (undated) DEVICE — SUTURE VICRYL + SZ 3-0 L27IN ABSRB UD L26MM SH 1/2 CIR VCP416H

## (undated) DEVICE — NAVICROSS SUPPORT CATHETER: Brand: NAVICROSS

## (undated) DEVICE — BANDAGE,GAUZE,CONFORMING,4"X75",STRL,LF: Brand: MEDLINE

## (undated) DEVICE — DRAPE,REIN 53X77,STERILE: Brand: MEDLINE